# Patient Record
Sex: MALE | Race: OTHER | HISPANIC OR LATINO | URBAN - METROPOLITAN AREA
[De-identification: names, ages, dates, MRNs, and addresses within clinical notes are randomized per-mention and may not be internally consistent; named-entity substitution may affect disease eponyms.]

---

## 2018-04-26 ENCOUNTER — INPATIENT (INPATIENT)
Facility: HOSPITAL | Age: 43
LOS: 5 days | Discharge: HOME | DRG: 854 | End: 2018-05-02
Attending: STUDENT IN AN ORGANIZED HEALTH CARE EDUCATION/TRAINING PROGRAM | Admitting: STUDENT IN AN ORGANIZED HEALTH CARE EDUCATION/TRAINING PROGRAM
Payer: COMMERCIAL

## 2018-04-26 VITALS
RESPIRATION RATE: 18 BRPM | OXYGEN SATURATION: 100 % | DIASTOLIC BLOOD PRESSURE: 98 MMHG | HEART RATE: 99 BPM | SYSTOLIC BLOOD PRESSURE: 174 MMHG | WEIGHT: 220.46 LBS | TEMPERATURE: 98 F

## 2018-04-26 DIAGNOSIS — E11.621 TYPE 2 DIABETES MELLITUS WITH FOOT ULCER: ICD-10-CM

## 2018-04-26 DIAGNOSIS — R63.8 OTHER SYMPTOMS AND SIGNS CONCERNING FOOD AND FLUID INTAKE: ICD-10-CM

## 2018-04-26 DIAGNOSIS — Z29.9 ENCOUNTER FOR PROPHYLACTIC MEASURES, UNSPECIFIED: ICD-10-CM

## 2018-04-26 DIAGNOSIS — E11.9 TYPE 2 DIABETES MELLITUS WITHOUT COMPLICATIONS: ICD-10-CM

## 2018-04-26 DIAGNOSIS — Z89.429 ACQUIRED ABSENCE OF OTHER TOE(S), UNSPECIFIED SIDE: Chronic | ICD-10-CM

## 2018-04-26 LAB
ALBUMIN SERPL ELPH-MCNC: 3.9 G/DL — SIGNIFICANT CHANGE UP (ref 3.3–5)
ALP SERPL-CCNC: 118 U/L — SIGNIFICANT CHANGE UP (ref 40–120)
ALT FLD-CCNC: 21 U/L — SIGNIFICANT CHANGE UP (ref 10–45)
ANION GAP SERPL CALC-SCNC: 10 MMOL/L — SIGNIFICANT CHANGE UP (ref 5–17)
APPEARANCE UR: CLEAR — SIGNIFICANT CHANGE UP
APTT BLD: 30.1 SEC — SIGNIFICANT CHANGE UP (ref 27.5–37.4)
AST SERPL-CCNC: 22 U/L — SIGNIFICANT CHANGE UP (ref 10–40)
BASOPHILS NFR BLD AUTO: 0.2 % — SIGNIFICANT CHANGE UP (ref 0–2)
BILIRUB SERPL-MCNC: 0.7 MG/DL — SIGNIFICANT CHANGE UP (ref 0.2–1.2)
BILIRUB UR-MCNC: NEGATIVE — SIGNIFICANT CHANGE UP
BUN SERPL-MCNC: 16 MG/DL — SIGNIFICANT CHANGE UP (ref 7–23)
CALCIUM SERPL-MCNC: 9.4 MG/DL — SIGNIFICANT CHANGE UP (ref 8.4–10.5)
CHLORIDE SERPL-SCNC: 93 MMOL/L — LOW (ref 96–108)
CO2 SERPL-SCNC: 27 MMOL/L — SIGNIFICANT CHANGE UP (ref 22–31)
COLOR SPEC: YELLOW — SIGNIFICANT CHANGE UP
CREAT SERPL-MCNC: 0.84 MG/DL — SIGNIFICANT CHANGE UP (ref 0.5–1.3)
CRP SERPL-MCNC: 10.53 MG/DL — HIGH (ref 0–0.4)
DIFF PNL FLD: NEGATIVE — SIGNIFICANT CHANGE UP
EOSINOPHIL NFR BLD AUTO: 1.2 % — SIGNIFICANT CHANGE UP (ref 0–6)
ERYTHROCYTE [SEDIMENTATION RATE] IN BLOOD: 34 MM/HR — HIGH
GLUCOSE BLDC GLUCOMTR-MCNC: 120 MG/DL — HIGH (ref 70–99)
GLUCOSE BLDC GLUCOMTR-MCNC: 337 MG/DL — HIGH (ref 70–99)
GLUCOSE SERPL-MCNC: 348 MG/DL — HIGH (ref 70–99)
GLUCOSE UR QL: >=1000
HCT VFR BLD CALC: 39.5 % — SIGNIFICANT CHANGE UP (ref 39–50)
HGB BLD-MCNC: 13.7 G/DL — SIGNIFICANT CHANGE UP (ref 13–17)
INR BLD: 0.91 — SIGNIFICANT CHANGE UP (ref 0.88–1.16)
INR BLD: 0.96 — SIGNIFICANT CHANGE UP (ref 0.88–1.16)
KETONES UR-MCNC: NEGATIVE — SIGNIFICANT CHANGE UP
LACTATE SERPL-SCNC: 1.6 MMOL/L — SIGNIFICANT CHANGE UP (ref 0.5–2)
LEUKOCYTE ESTERASE UR-ACNC: NEGATIVE — SIGNIFICANT CHANGE UP
LYMPHOCYTES # BLD AUTO: 17.7 % — SIGNIFICANT CHANGE UP (ref 13–44)
MCHC RBC-ENTMCNC: 29 PG — SIGNIFICANT CHANGE UP (ref 27–34)
MCHC RBC-ENTMCNC: 34.7 G/DL — SIGNIFICANT CHANGE UP (ref 32–36)
MCV RBC AUTO: 83.7 FL — SIGNIFICANT CHANGE UP (ref 80–100)
MONOCYTES NFR BLD AUTO: 7.4 % — SIGNIFICANT CHANGE UP (ref 2–14)
NEUTROPHILS NFR BLD AUTO: 73.5 % — SIGNIFICANT CHANGE UP (ref 43–77)
NITRITE UR-MCNC: NEGATIVE — SIGNIFICANT CHANGE UP
PH UR: 6 — SIGNIFICANT CHANGE UP (ref 5–8)
PLATELET # BLD AUTO: 263 K/UL — SIGNIFICANT CHANGE UP (ref 150–400)
POTASSIUM SERPL-MCNC: 4.5 MMOL/L — SIGNIFICANT CHANGE UP (ref 3.5–5.3)
POTASSIUM SERPL-SCNC: 4.5 MMOL/L — SIGNIFICANT CHANGE UP (ref 3.5–5.3)
PROT SERPL-MCNC: 8.7 G/DL — HIGH (ref 6–8.3)
PROT UR-MCNC: 30 MG/DL
PROTHROM AB SERPL-ACNC: 10.1 SEC — SIGNIFICANT CHANGE UP (ref 9.8–12.7)
PROTHROM AB SERPL-ACNC: 10.6 SEC — SIGNIFICANT CHANGE UP (ref 9.8–12.7)
RBC # BLD: 4.72 M/UL — SIGNIFICANT CHANGE UP (ref 4.2–5.8)
RBC # FLD: 13.2 % — SIGNIFICANT CHANGE UP (ref 10.3–16.9)
SODIUM SERPL-SCNC: 130 MMOL/L — LOW (ref 135–145)
SP GR SPEC: 1.02 — SIGNIFICANT CHANGE UP (ref 1–1.03)
UROBILINOGEN FLD QL: 0.2 E.U./DL — SIGNIFICANT CHANGE UP
WBC # BLD: 12.3 K/UL — HIGH (ref 3.8–10.5)
WBC # FLD AUTO: 12.3 K/UL — HIGH (ref 3.8–10.5)

## 2018-04-26 PROCEDURE — 73630 X-RAY EXAM OF FOOT: CPT | Mod: 26,RT

## 2018-04-26 PROCEDURE — 99285 EMERGENCY DEPT VISIT HI MDM: CPT | Mod: 25

## 2018-04-26 PROCEDURE — 99223 1ST HOSP IP/OBS HIGH 75: CPT | Mod: GC

## 2018-04-26 PROCEDURE — 99231 SBSQ HOSP IP/OBS SF/LOW 25: CPT

## 2018-04-26 PROCEDURE — 71045 X-RAY EXAM CHEST 1 VIEW: CPT | Mod: 26

## 2018-04-26 RX ORDER — INSULIN LISPRO 100/ML
VIAL (ML) SUBCUTANEOUS
Refills: 0 | Status: DISCONTINUED | OUTPATIENT
Start: 2018-04-26 | End: 2018-05-02

## 2018-04-26 RX ORDER — VANCOMYCIN HCL 1 G
1000 VIAL (EA) INTRAVENOUS ONCE
Refills: 0 | Status: DISCONTINUED | OUTPATIENT
Start: 2018-04-26 | End: 2018-04-26

## 2018-04-26 RX ORDER — DEXTROSE 50 % IN WATER 50 %
25 SYRINGE (ML) INTRAVENOUS ONCE
Refills: 0 | Status: DISCONTINUED | OUTPATIENT
Start: 2018-04-26 | End: 2018-05-02

## 2018-04-26 RX ORDER — IBUPROFEN 200 MG
600 TABLET ORAL EVERY 8 HOURS
Refills: 0 | Status: DISCONTINUED | OUTPATIENT
Start: 2018-04-26 | End: 2018-04-27

## 2018-04-26 RX ORDER — PIPERACILLIN AND TAZOBACTAM 4; .5 G/20ML; G/20ML
4.5 INJECTION, POWDER, LYOPHILIZED, FOR SOLUTION INTRAVENOUS EVERY 6 HOURS
Refills: 0 | Status: DISCONTINUED | OUTPATIENT
Start: 2018-04-26 | End: 2018-04-29

## 2018-04-26 RX ORDER — PIPERACILLIN AND TAZOBACTAM 4; .5 G/20ML; G/20ML
3.38 INJECTION, POWDER, LYOPHILIZED, FOR SOLUTION INTRAVENOUS ONCE
Refills: 0 | Status: COMPLETED | OUTPATIENT
Start: 2018-04-26 | End: 2018-04-26

## 2018-04-26 RX ORDER — HEPARIN SODIUM 5000 [USP'U]/ML
5000 INJECTION INTRAVENOUS; SUBCUTANEOUS EVERY 8 HOURS
Refills: 0 | Status: DISCONTINUED | OUTPATIENT
Start: 2018-04-26 | End: 2018-05-02

## 2018-04-26 RX ORDER — INSULIN GLARGINE 100 [IU]/ML
10 INJECTION, SOLUTION SUBCUTANEOUS AT BEDTIME
Refills: 0 | Status: DISCONTINUED | OUTPATIENT
Start: 2018-04-26 | End: 2018-04-27

## 2018-04-26 RX ORDER — VANCOMYCIN HCL 1 G
1500 VIAL (EA) INTRAVENOUS EVERY 12 HOURS
Refills: 0 | Status: DISCONTINUED | OUTPATIENT
Start: 2018-04-26 | End: 2018-05-01

## 2018-04-26 RX ORDER — INSULIN HUMAN 100 [IU]/ML
8 INJECTION, SOLUTION SUBCUTANEOUS ONCE
Refills: 0 | Status: COMPLETED | OUTPATIENT
Start: 2018-04-26 | End: 2018-04-26

## 2018-04-26 RX ORDER — PIPERACILLIN AND TAZOBACTAM 4; .5 G/20ML; G/20ML
4.5 INJECTION, POWDER, LYOPHILIZED, FOR SOLUTION INTRAVENOUS EVERY 8 HOURS
Refills: 0 | Status: DISCONTINUED | OUTPATIENT
Start: 2018-04-26 | End: 2018-04-26

## 2018-04-26 RX ORDER — DEXTROSE 50 % IN WATER 50 %
12.5 SYRINGE (ML) INTRAVENOUS ONCE
Refills: 0 | Status: DISCONTINUED | OUTPATIENT
Start: 2018-04-26 | End: 2018-05-02

## 2018-04-26 RX ORDER — GLUCAGON INJECTION, SOLUTION 0.5 MG/.1ML
1 INJECTION, SOLUTION SUBCUTANEOUS ONCE
Refills: 0 | Status: DISCONTINUED | OUTPATIENT
Start: 2018-04-26 | End: 2018-05-02

## 2018-04-26 RX ORDER — ACETAMINOPHEN 500 MG
650 TABLET ORAL EVERY 6 HOURS
Refills: 0 | Status: DISCONTINUED | OUTPATIENT
Start: 2018-04-26 | End: 2018-05-02

## 2018-04-26 RX ORDER — INSULIN LISPRO 100/ML
6 VIAL (ML) SUBCUTANEOUS ONCE
Refills: 0 | Status: COMPLETED | OUTPATIENT
Start: 2018-04-26 | End: 2018-04-26

## 2018-04-26 RX ORDER — VANCOMYCIN HCL 1 G
1500 VIAL (EA) INTRAVENOUS ONCE
Refills: 0 | Status: COMPLETED | OUTPATIENT
Start: 2018-04-26 | End: 2018-04-26

## 2018-04-26 RX ORDER — LANOLIN ALCOHOL/MO/W.PET/CERES
5 CREAM (GRAM) TOPICAL AT BEDTIME
Refills: 0 | Status: DISCONTINUED | OUTPATIENT
Start: 2018-04-26 | End: 2018-05-02

## 2018-04-26 RX ORDER — ATORVASTATIN CALCIUM 80 MG/1
40 TABLET, FILM COATED ORAL AT BEDTIME
Refills: 0 | Status: DISCONTINUED | OUTPATIENT
Start: 2018-04-26 | End: 2018-05-02

## 2018-04-26 RX ORDER — SODIUM CHLORIDE 9 MG/ML
2000 INJECTION INTRAMUSCULAR; INTRAVENOUS; SUBCUTANEOUS ONCE
Refills: 0 | Status: COMPLETED | OUTPATIENT
Start: 2018-04-26 | End: 2018-04-26

## 2018-04-26 RX ORDER — DEXTROSE 50 % IN WATER 50 %
1 SYRINGE (ML) INTRAVENOUS ONCE
Refills: 0 | Status: DISCONTINUED | OUTPATIENT
Start: 2018-04-26 | End: 2018-05-02

## 2018-04-26 RX ORDER — SODIUM CHLORIDE 9 MG/ML
1000 INJECTION, SOLUTION INTRAVENOUS
Refills: 0 | Status: DISCONTINUED | OUTPATIENT
Start: 2018-04-26 | End: 2018-05-02

## 2018-04-26 RX ORDER — SODIUM CHLORIDE 9 MG/ML
1000 INJECTION INTRAMUSCULAR; INTRAVENOUS; SUBCUTANEOUS
Refills: 0 | Status: DISCONTINUED | OUTPATIENT
Start: 2018-04-26 | End: 2018-04-28

## 2018-04-26 RX ORDER — AMPICILLIN SODIUM AND SULBACTAM SODIUM 250; 125 MG/ML; MG/ML
3 INJECTION, POWDER, FOR SUSPENSION INTRAMUSCULAR; INTRAVENOUS ONCE
Refills: 0 | Status: DISCONTINUED | OUTPATIENT
Start: 2018-04-26 | End: 2018-04-26

## 2018-04-26 RX ADMIN — Medication 600 MILLIGRAM(S): at 19:01

## 2018-04-26 RX ADMIN — SODIUM CHLORIDE 75 MILLILITER(S): 9 INJECTION INTRAMUSCULAR; INTRAVENOUS; SUBCUTANEOUS at 13:08

## 2018-04-26 RX ADMIN — PIPERACILLIN AND TAZOBACTAM 200 GRAM(S): 4; .5 INJECTION, POWDER, LYOPHILIZED, FOR SOLUTION INTRAVENOUS at 13:15

## 2018-04-26 RX ADMIN — INSULIN HUMAN 8 UNIT(S): 100 INJECTION, SOLUTION SUBCUTANEOUS at 10:09

## 2018-04-26 RX ADMIN — HEPARIN SODIUM 5000 UNIT(S): 5000 INJECTION INTRAVENOUS; SUBCUTANEOUS at 22:00

## 2018-04-26 RX ADMIN — Medication 8: at 21:53

## 2018-04-26 RX ADMIN — Medication 600 MILLIGRAM(S): at 20:01

## 2018-04-26 RX ADMIN — INSULIN GLARGINE 10 UNIT(S): 100 INJECTION, SOLUTION SUBCUTANEOUS at 22:17

## 2018-04-26 RX ADMIN — ATORVASTATIN CALCIUM 40 MILLIGRAM(S): 80 TABLET, FILM COATED ORAL at 22:16

## 2018-04-26 RX ADMIN — Medication 300 MILLIGRAM(S): at 10:52

## 2018-04-26 RX ADMIN — PIPERACILLIN AND TAZOBACTAM 200 GRAM(S): 4; .5 INJECTION, POWDER, LYOPHILIZED, FOR SOLUTION INTRAVENOUS at 18:29

## 2018-04-26 RX ADMIN — HEPARIN SODIUM 5000 UNIT(S): 5000 INJECTION INTRAVENOUS; SUBCUTANEOUS at 15:42

## 2018-04-26 RX ADMIN — Medication 300 MILLIGRAM(S): at 22:18

## 2018-04-26 RX ADMIN — SODIUM CHLORIDE 1000 MILLILITER(S): 9 INJECTION INTRAMUSCULAR; INTRAVENOUS; SUBCUTANEOUS at 07:54

## 2018-04-26 RX ADMIN — PIPERACILLIN AND TAZOBACTAM 200 GRAM(S): 4; .5 INJECTION, POWDER, LYOPHILIZED, FOR SOLUTION INTRAVENOUS at 07:51

## 2018-04-26 RX ADMIN — Medication 5 MILLIGRAM(S): at 22:18

## 2018-04-26 RX ADMIN — Medication 6 UNIT(S): at 14:19

## 2018-04-26 NOTE — CONSULT NOTE ADULT - SUBJECTIVE AND OBJECTIVE BOX
Vascular Attending:  Ifeanyi      HPI:  42YM w/ pmhx of DM2 diagnosed in 20s, uncontrolled (never checked FS) since 20s as well, on insulin at home (no lantus, no set regimen) w/ amputations of right toes ,  presents after bumping leg on  found to have popped blister and worsening pain/ulceration since.  AM felt feverish and chills and came to ED. Took one pill amox at home. No other sx including SOB, urinary changes, n/v. Only takes insulin when 'feels' like sugar is high. Has refused to follow up with any doctors and does not know a1c. Refills meds by visiting [place] ED. Has some peripheral tingles and numbness in toes and fingertips that he barely notices and does not interfere with life. Works in maintenance.     In ED, VSS with mild tachycardia to 100. Afebrile on admission. Given 2L NS and lispro 8 for glucose of 350. Evaluated by podiatry (superficial debridement with culture) and vascular (no intervention, good flow). Given vanc/zosyn x1.     Reports requiring PICC line and 6wks abx after one of his toe amputations. (2018 12:20)    Vascular Surgical Addendum: Pt seen and examined at bedside, as stated above, felt fine 5 days pta, 4day pta bumped foot and felt fevers/chill, 2 days prior notice blister, popped it and had seropurulent drainage, 1 day pta redeveloped f/c.  Today denies F/C/CP/SOB/N/V/D/Dysuria.      PAST MEDICAL & SURGICAL HISTORY:  Diabetes  Toe amputation status        MEDICATIONS  (STANDING):  atorvastatin 40 milliGRAM(s) Oral at bedtime  dextrose 5%. 1000 milliLiter(s) (50 mL/Hr) IV Continuous <Continuous>  dextrose 50% Injectable 12.5 Gram(s) IV Push once  dextrose 50% Injectable 25 Gram(s) IV Push once  dextrose 50% Injectable 25 Gram(s) IV Push once  heparin  Injectable 5000 Unit(s) SubCutaneous every 8 hours  insulin lispro (HumaLOG) corrective regimen sliding scale   SubCutaneous Before meals and at bedtime  piperacillin/tazobactam IVPB. 4.5 Gram(s) IV Intermittent every 6 hours  sodium chloride 0.9%. 1000 milliLiter(s) (75 mL/Hr) IV Continuous <Continuous>  vancomycin  IVPB 1500 milliGRAM(s) IV Intermittent every 12 hours    MEDICATIONS  (PRN):  acetaminophen   Tablet. 650 milliGRAM(s) Oral every 6 hours PRN Moderate Pain (4 - 6)  dextrose Gel 1 Dose(s) Oral once PRN Blood Glucose LESS THAN 70 milliGRAM(s)/deciliter  glucagon  Injectable 1 milliGRAM(s) IntraMuscular once PRN Glucose LESS THAN 70 milligrams/deciliter        Vital Signs Last 24 Hrs  T(C): 36.9 (2018 13:01), Max: 37.6 (2018 06:12)  T(F): 98.5 (2018 13:), Max: 99.6 (2018 06:12)  HR: 85 (2018 13:) (85 - 100)  BP: 116/78 (2018 13:) (116/78 - 174/98)  BP(mean): --  RR: 18 (2018 13:) (17 - 18)  SpO2: 98% (2018 13:01) (98% - 100%)    PHYSICAL EXAM:  General: Age appropriate male in NAD  CV: RRR  Resp: No increased work of breathing  Abd: S/NT/ND  Ext: Right foot s/p 4th and 5th toes amputation. Lateral ulcer extending from lateral midfoot to plantar 3rd toe with mixed necrotic/infected soft tissue, + malodor, + mixed sero purulent drainage, left superficial hallux ulcer, no purulence or probing.   Vasc: 2+ b/l fem/pop/DP/PT        LABS:                        13.7   12.3  )-----------( 263      ( 2018 06:10 )             39.5     04-26    130<L>  |  93<L>  |  16  ----------------------------<  348<H>  4.5   |  27  |  0.84    Ca    9.4      2018 06:10    TPro  8.7<H>  /  Alb  3.9  /  TBili  0.7  /  DBili  x   /  AST  22  /  ALT  21  /  AlkPhos  118  04-26    PT/INR - ( 2018 06:57 )   PT: 10.6 sec;   INR: 0.96          PTT - ( 2018 06:57 )  PTT:30.1 sec  Urinalysis Basic - ( 2018 08:08 )    Color: Yellow / Appearance: Clear / S.020 / pH: x  Gluc: x / Ketone: NEGATIVE  / Bili: Negative / Urobili: 0.2 E.U./dL   Blood: x / Protein: 30 mg/dL / Nitrite: NEGATIVE   Leuk Esterase: NEGATIVE / RBC: < 5 /HPF / WBC < 5 /HPF   Sq Epi: x / Non Sq Epi: 0-5 /HPF / Bacteria: Present /HPF        RADIOLOGY & ADDITIONAL STUDIES  pending XR

## 2018-04-26 NOTE — H&P ADULT - HISTORY OF PRESENT ILLNESS
42YM w/ pmhx of DM2 diagnosed in 20s, uncontrolled (never checked FS) since 20s as well, on insulin at home (no lantus, no set regimen) w/ amputations of right toes 2015, 2017 presents after bumping leg on Sunday found to have popped blister and worsening pain/ulceration since. 4/26 AM felt feverish and chills and came to ED. Took one pill amox at home. No other sx including SOB, urinary changes, n/v. Only takes insulin when 'feels' like sugar is high. Has refused to follow up with any doctors and does not know a1c. Refills meds by visiting [place] ED. Has some peripheral tingles and numbness in toes and fingertips that he barely notices and does not interfere with life. Works in maintenance.     In ED, VSS with mild tachycardia to 100. Afebrile on admission. Given 2L NS and lispro 8 for glucose of 350. Evaluated by podiatry (superficial debridement with culture) and vascular (no intervention, good flow). Given vanc/zosyn x1.     Reports requiring PICC line and 6wks abx after one of his toe amputations.

## 2018-04-26 NOTE — H&P ADULT - PROBLEM SELECTOR PLAN 2
- f/u hba1c  - ISS for now   - dose lantus depending on coverage   - endo consult, needs to establish care   - diabetes education  - nutrition consult

## 2018-04-26 NOTE — H&P ADULT - ATTENDING COMMENTS
Pt seen and examined by me at bedside in [facility]. Agree with housestaff's exam/a/p as noted above,   VSS exam as above with addition,   +2 R DP pulse  + R large lateral foot ulcer, with serous drainage.   labs reviewed   CXR reviewed     a/p:  1. R diabetic foot ulcer: appreciate podiatry recs, pending MRI, agree with vanco/zosyn for now, check EKG in AM  2. hyperglycemia 2/2 Uncontrolled DMII: start lantus 10units at bedtime, c/w ISS; check hgba1c  3. Hyponatremia 2/2 hyperglycemia    Agree with rest of a/p as above.

## 2018-04-26 NOTE — ED ADULT TRIAGE NOTE - CHIEF COMPLAINT QUOTE
I am an insulin dependent diabetic with a history of right toe amputation.  I noticed a blister on my right foot yesterday and it burst with reddish foul smelling fluid.  I want to make sure I am not developing an infection.  I have had pain, chills lately.

## 2018-04-26 NOTE — H&P ADULT - PROBLEM SELECTOR PLAN 1
Eval by podiatry. Apparent low clinical suspicion for osteo but in for MRI. WBC of 12.3, ESR of 34 and crp of 10.   - f/u MRI foot   - c/w vanc/zosyn   - f/u Bcx and tissue Cx  - IVF maintenance

## 2018-04-26 NOTE — H&P ADULT - NSHPPHYSICALEXAM_GEN_ALL_CORE
.  VITAL SIGNS:  T(C): 37.2 (04-26-18 @ 10:10), Max: 37.6 (04-26-18 @ 06:12)  T(F): 98.9 (04-26-18 @ 10:10), Max: 99.6 (04-26-18 @ 06:12)  HR: 94 (04-26-18 @ 10:10) (94 - 100)  BP: 131/87 (04-26-18 @ 10:10) (131/87 - 174/98)  BP(mean): --  RR: 18 (04-26-18 @ 10:10) (17 - 18)  SpO2: 98% (04-26-18 @ 10:10) (98% - 100%)  Wt(kg): --    PHYSICAL EXAM:    Constitutional: WDWN resting comfortably in bed; NAD  Head: NC/AT  Eyes: PERRL, EOMI, anicteric sclera  ENT: no nasal discharge; uvula midline, no oropharyngeal erythema or exudates; MMM  Neck: supple; no JVD or thyromegaly  Respiratory: CTA B/L; no W/R/R,   Cardiac: +S1/S2; RRR; no M/R/G;   Gastrointestinal: soft, NT/ND; no rebound or guarding; +BSx4  Extremities: WWP, no clubbing or cyanosis; no peripheral edema, R foot wrapped by podiatry. Some superficial old scars/ulceration on L foot toes. No open wounds.    Musculoskeletal: NROM x4; no joint swelling, tenderness or erythema  Vascular: 2+ radial, femoral, DP/PT pulses B/L  Dermatologic: skin warm, dry and intact; no rashes, wounds, or scars  Lymphatic: no submandibular or cervical LAD  Neurologic: AAOx3; CNII-XII grossly intact; no focal deficits

## 2018-04-26 NOTE — CONSULT NOTE ADULT - SUBJECTIVE AND OBJECTIVE BOX
Attending: Norma     HPI:  42M w/ pmh of DM for 18 years, on insulin,  prior Right foot 5th and 4th partial ray amputations last one in october 2017 at [facility]. Was on IV Abx via picc for 6 week. Pt reports his amputation site healed well. However last week sunday he felt a little feverish and wednesday he noted a blister by the Right foot which he popped and oozed foul smelling mixed pus and blood. Reported chills and general malaise overnight. Took an augmentin this AM however felt ill so presented to ED.  Denies any  nausea vomiting fever chills SOB at this time.   In terms of sugar control pt reports he rarely checks his sugar but doses his insulin based off of how he feels. Does not know A1c.  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      Review of systems negative except per HPI    PAST MEDICAL & SURGICAL HISTORY:  Diabetes  No significant past surgical history    Home Medications:    Allergies    No Known Allergies    Intolerances      FAMILY HISTORY:    Social History:   denies etoh or tobacco     LABS                        13.7   12.3  )-----------( 263      ( 26 Apr 2018 06:10 )             39.5     04-26    130<L>  |  93<L>  |  16  ----------------------------<  348<H>  4.5   |  27  |  0.84    Ca    9.4      26 Apr 2018 06:10    TPro  8.7<H>  /  Alb  3.9  /  TBili  0.7  /  DBili  x   /  AST  22  /  ALT  21  /  AlkPhos  118  04-26    PT/INR - ( 26 Apr 2018 06:57 )   PT: 10.6 sec;   INR: 0.96          PTT - ( 26 Apr 2018 06:57 )  PTT:30.1 sec  ESR: 34  CRP: --  04-26 @ 06:10  C-Reactive Protein, Serum (04.26.18 @ 06:10)    C-Reactive Protein, Serum: 10.53 mg/dL        Vital Signs Last 24 Hrs  T(C): 37.2 (26 Apr 2018 10:10), Max: 37.6 (26 Apr 2018 06:12)  T(F): 98.9 (26 Apr 2018 10:10), Max: 99.6 (26 Apr 2018 06:12)  HR: 94 (26 Apr 2018 10:10) (94 - 100)  BP: 131/87 (26 Apr 2018 10:10) (131/87 - 174/98)  BP(mean): --  RR: 18 (26 Apr 2018 10:10) (17 - 18)  SpO2: 98% (26 Apr 2018 10:10) (98% - 100%)    PHYSICAL EXAM  General: NAD, AA0x3  no resp distress   Lower Extremity Focused:  Vasc: DP 2/4 BL PT 1/4 on right 2/4 on left. cap fill time <3sec.  no  LE edema, extremities warm well perfused   Derm: Right foot s/p partial 4th ray and 5th ray amputation. Plantar lateral ulcer extending  from lateral midfoot to plantar 3rd toe with mixed necrotic/infected soft tissue, + malodor, + mixed sero purulent drainage, no sinus tracts, no probe to bone, no tendon or fascia exposed.   left superficial hallux ulcer, no purulence or probing.   Neuro: no protective sensation   MSK: decreased pedal joint ROM, muscle power 5/5  TA, EHL PT  4/5 peroneals     RADIOLOGY  x-ray - s/p partial 4th and 5th ray amputations, no gas in soft tissue, erosions of 3rd  proximal interphalangeal joint suspicious for OM,  official read pending. Attending: Norma     HPI:  42M w/ pmh of DM for 18 years, on insulin,  prior Right foot 5th and 4th partial ray amputations last one in october 2017 at [facility]. Was on IV Abx via picc for 6 week. Pt reports his amputation site healed well. However last week sunday he felt a little feverish and wednesday he noted a blister by the Right foot which he popped and oozed foul smelling mixed pus and blood. Reported chills and general malaise overnight. Took an augmentin this AM however felt ill so presented to ED.  Denies any  nausea vomiting fever chills SOB at this time.   In terms of sugar control pt reports he rarely checks his sugar but doses his insulin based off of how he feels. Does not know A1c.  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      Review of systems negative except per HPI    PAST MEDICAL & SURGICAL HISTORY:  Diabetes  No significant past surgical history    Home Medications:    Allergies    No Known Allergies    Intolerances      FAMILY HISTORY:    Social History:   denies etoh or tobacco     LABS                        13.7   12.3  )-----------( 263      ( 26 Apr 2018 06:10 )             39.5     04-26    130<L>  |  93<L>  |  16  ----------------------------<  348<H>  4.5   |  27  |  0.84    Ca    9.4      26 Apr 2018 06:10    TPro  8.7<H>  /  Alb  3.9  /  TBili  0.7  /  DBili  x   /  AST  22  /  ALT  21  /  AlkPhos  118  04-26    PT/INR - ( 26 Apr 2018 06:57 )   PT: 10.6 sec;   INR: 0.96          PTT - ( 26 Apr 2018 06:57 )  PTT:30.1 sec  ESR: 34  CRP: --  04-26 @ 06:10  C-Reactive Protein, Serum (04.26.18 @ 06:10)    C-Reactive Protein, Serum: 10.53 mg/dL        Vital Signs Last 24 Hrs  T(C): 37.2 (26 Apr 2018 10:10), Max: 37.6 (26 Apr 2018 06:12)  T(F): 98.9 (26 Apr 2018 10:10), Max: 99.6 (26 Apr 2018 06:12)  HR: 94 (26 Apr 2018 10:10) (94 - 100)  BP: 131/87 (26 Apr 2018 10:10) (131/87 - 174/98)  BP(mean): --  RR: 18 (26 Apr 2018 10:10) (17 - 18)  SpO2: 98% (26 Apr 2018 10:10) (98% - 100%)    PHYSICAL EXAM  General: NAD, AA0x3  no resp distress   Lower Extremity Focused:  Vasc: DP 2/4 BL PT 1/4 on right 2/4 on left. cap fill time <3sec.  no  LE edema, extremities warm well perfused   Derm: Right foot s/p partial 4th ray and 5th ray amputation. Plantar lateral ulcer extending  from lateral midfoot to plantar 3rd toe with mixed necrotic/infected soft tissue, + malodor, + mixed sero purulent drainage, no sinus tracts, no probe to bone, no tendon or fascia exposed.   left superficial hallux ulcer, no purulence or probing.   Neuro: no protective sensation   MSK: decreased pedal joint ROM, muscle power 5/5  TA, EHL PT  4/5 peroneals   Left foot no open lesions or signs of infection     RADIOLOGY  x-ray - s/p partial 4th and 5th ray amputations, no gas in soft tissue, erosions of 3rd  proximal interphalangeal joint suspicious for OM,  official read pending.

## 2018-04-26 NOTE — ED ADULT NURSE NOTE - OBJECTIVE STATEMENT
pt aaox3 w/ c/o blister to right great toe stump/ states the blister drained yesterday "clear, bloody, smelly fluid" yesterday and then began to feel fever, chills and cough x 2 days. pt ambulatory in nad awaiting md serene reyna

## 2018-04-26 NOTE — H&P ADULT - ASSESSMENT
42YM w/ DMII uncontrolled w/ hx of 2 toe amputations presents with f/c and toe wound c/f osteomyelitis

## 2018-04-26 NOTE — ED PROVIDER NOTE - OBJECTIVE STATEMENT
43 y/o male with a PMHx of DM2 (on insulin) hx of 2 previous amputation of the right toes is present with fever, chills and foul smelling discharge from the right foot. Pt states he noticed a blister yesterday that popped with a bloody, yellowish discharge. Pt took one pill of amox that he had. Reports no pain. He denies the following: sob, chest disc 41 y/o male with a PMHx of DM2 (on insulin) hx of 2 previous amputation of the right toes is present with fever, chills and foul smelling discharge from the right foot. Pt states he noticed a blister yesterday that popped with a bloody, yellowish discharge. Pt took one pill of amox that he had. Reports no pain. He denies the following: sob, chest discomfort. Pt reports unknown a1c - does not know sugar level. Take insulin when he "feels" like his sugar is high.

## 2018-04-26 NOTE — ED ADULT NURSE NOTE - CHPI ED SYMPTOMS NEG
no abrasion/no back pain/no fever/no numbness/no stiffness/no tingling/no weakness/no bruising/no difficulty bearing weight

## 2018-04-26 NOTE — H&P ADULT - NSHPLABSRESULTS_GEN_ALL_CORE
.  LABS:                         13.7   12.3  )-----------( 263      ( 2018 06:10 )             39.5         130<L>  |  93<L>  |  16  ----------------------------<  348<H>  4.5   |  27  |  0.84    Ca    9.4      2018 06:10    TPro  8.7<H>  /  Alb  3.9  /  TBili  0.7  /  DBili  x   /  AST  22  /  ALT  21  /  AlkPhos  118      PT/INR - ( 2018 06:57 )   PT: 10.6 sec;   INR: 0.96          PTT - ( 2018 06:57 )  PTT:30.1 sec  Urinalysis Basic - ( 2018 08:08 )    Color: Yellow / Appearance: Clear / S.020 / pH: x  Gluc: x / Ketone: NEGATIVE  / Bili: Negative / Urobili: 0.2 E.U./dL   Blood: x / Protein: 30 mg/dL / Nitrite: NEGATIVE   Leuk Esterase: NEGATIVE / RBC: < 5 /HPF / WBC < 5 /HPF   Sq Epi: x / Non Sq Epi: 0-5 /HPF / Bacteria: Present /HPF            Lactate, Blood: 1.6 mmoL/L ( @ 06:09)    Old metatarsal fracture on xray - not acute issue.

## 2018-04-27 DIAGNOSIS — M86.10 OTHER ACUTE OSTEOMYELITIS, UNSPECIFIED SITE: ICD-10-CM

## 2018-04-27 DIAGNOSIS — E11.40 TYPE 2 DIABETES MELLITUS WITH DIABETIC NEUROPATHY, UNSPECIFIED: ICD-10-CM

## 2018-04-27 DIAGNOSIS — A41.9 SEPSIS, UNSPECIFIED ORGANISM: ICD-10-CM

## 2018-04-27 DIAGNOSIS — E11.65 TYPE 2 DIABETES MELLITUS WITH HYPERGLYCEMIA: ICD-10-CM

## 2018-04-27 DIAGNOSIS — G62.9 POLYNEUROPATHY, UNSPECIFIED: ICD-10-CM

## 2018-04-27 LAB
CHOLEST SERPL-MCNC: 145 MG/DL — SIGNIFICANT CHANGE UP (ref 10–199)
GLUCOSE BLDC GLUCOMTR-MCNC: 233 MG/DL — HIGH (ref 70–99)
GLUCOSE BLDC GLUCOMTR-MCNC: 253 MG/DL — HIGH (ref 70–99)
GLUCOSE BLDC GLUCOMTR-MCNC: 282 MG/DL — HIGH (ref 70–99)
GLUCOSE BLDC GLUCOMTR-MCNC: 291 MG/DL — HIGH (ref 70–99)
GRAM STN FLD: SIGNIFICANT CHANGE UP
HBA1C BLD-MCNC: 12.9 % — HIGH (ref 4–5.6)
HDLC SERPL-MCNC: 45 MG/DL — SIGNIFICANT CHANGE UP (ref 40–125)
INR BLD: 1.02 — SIGNIFICANT CHANGE UP (ref 0.88–1.16)
LIPID PNL WITH DIRECT LDL SERPL: 85 MG/DL — SIGNIFICANT CHANGE UP
PROTHROM AB SERPL-ACNC: 11.3 SEC — SIGNIFICANT CHANGE UP (ref 9.8–12.7)
SPECIMEN SOURCE: SIGNIFICANT CHANGE UP
TOTAL CHOLESTEROL/HDL RATIO MEASUREMENT: 3.2 RATIO — LOW (ref 3.4–9.6)
TRIGL SERPL-MCNC: 76 MG/DL — SIGNIFICANT CHANGE UP (ref 10–149)
VANCOMYCIN TROUGH SERPL-MCNC: 15 UG/ML — SIGNIFICANT CHANGE UP (ref 10–20)

## 2018-04-27 PROCEDURE — 99233 SBSQ HOSP IP/OBS HIGH 50: CPT | Mod: GC

## 2018-04-27 PROCEDURE — 93010 ELECTROCARDIOGRAM REPORT: CPT

## 2018-04-27 RX ORDER — INSULIN GLARGINE 100 [IU]/ML
22 INJECTION, SOLUTION SUBCUTANEOUS AT BEDTIME
Refills: 0 | Status: DISCONTINUED | OUTPATIENT
Start: 2018-04-27 | End: 2018-05-02

## 2018-04-27 RX ORDER — LISINOPRIL 2.5 MG/1
5 TABLET ORAL DAILY
Refills: 0 | Status: DISCONTINUED | OUTPATIENT
Start: 2018-04-27 | End: 2018-04-28

## 2018-04-27 RX ADMIN — ATORVASTATIN CALCIUM 40 MILLIGRAM(S): 80 TABLET, FILM COATED ORAL at 21:54

## 2018-04-27 RX ADMIN — PIPERACILLIN AND TAZOBACTAM 200 GRAM(S): 4; .5 INJECTION, POWDER, LYOPHILIZED, FOR SOLUTION INTRAVENOUS at 18:22

## 2018-04-27 RX ADMIN — HEPARIN SODIUM 5000 UNIT(S): 5000 INJECTION INTRAVENOUS; SUBCUTANEOUS at 21:54

## 2018-04-27 RX ADMIN — SODIUM CHLORIDE 75 MILLILITER(S): 9 INJECTION INTRAMUSCULAR; INTRAVENOUS; SUBCUTANEOUS at 03:40

## 2018-04-27 RX ADMIN — PIPERACILLIN AND TAZOBACTAM 200 GRAM(S): 4; .5 INJECTION, POWDER, LYOPHILIZED, FOR SOLUTION INTRAVENOUS at 00:50

## 2018-04-27 RX ADMIN — Medication 6: at 12:35

## 2018-04-27 RX ADMIN — Medication 6: at 22:38

## 2018-04-27 RX ADMIN — PIPERACILLIN AND TAZOBACTAM 200 GRAM(S): 4; .5 INJECTION, POWDER, LYOPHILIZED, FOR SOLUTION INTRAVENOUS at 05:57

## 2018-04-27 RX ADMIN — INSULIN GLARGINE 22 UNIT(S): 100 INJECTION, SOLUTION SUBCUTANEOUS at 22:39

## 2018-04-27 RX ADMIN — Medication 5 MILLIGRAM(S): at 21:54

## 2018-04-27 RX ADMIN — LISINOPRIL 5 MILLIGRAM(S): 2.5 TABLET ORAL at 12:37

## 2018-04-27 RX ADMIN — PIPERACILLIN AND TAZOBACTAM 200 GRAM(S): 4; .5 INJECTION, POWDER, LYOPHILIZED, FOR SOLUTION INTRAVENOUS at 13:19

## 2018-04-27 RX ADMIN — Medication 300 MILLIGRAM(S): at 10:14

## 2018-04-27 RX ADMIN — Medication 300 MILLIGRAM(S): at 21:56

## 2018-04-27 RX ADMIN — Medication 4: at 08:44

## 2018-04-27 RX ADMIN — Medication 6: at 17:52

## 2018-04-27 RX ADMIN — HEPARIN SODIUM 5000 UNIT(S): 5000 INJECTION INTRAVENOUS; SUBCUTANEOUS at 13:23

## 2018-04-27 RX ADMIN — HEPARIN SODIUM 5000 UNIT(S): 5000 INJECTION INTRAVENOUS; SUBCUTANEOUS at 05:57

## 2018-04-27 NOTE — DIETITIAN INITIAL EVALUATION ADULT. - NS AS NUTRI DX KNOWLEDGE BELIEFS
Food - and nutrition - related knowledge deficit/Self - monitoring deficit/Limited adherence to nutrition - related recommendations/Undesirable food choices

## 2018-04-27 NOTE — PROGRESS NOTE ADULT - SUBJECTIVE AND OBJECTIVE BOX
OVERNIGHT EVENTS: ANDREW    SUBJECTIVE / INTERVAL HPI: Patient seen and examined at bedside. Denies pain, SOB, F/C.    VITAL SIGNS:  Vital Signs Last 24 Hrs  T(C): 36.8 (2018 08:36), Max: 37 (2018 16:01)  T(F): 98.3 (2018 08:36), Max: 98.6 (2018 16:01)  HR: 83 (2018 08:36) (67 - 86)  BP: 126/76 (2018 08:36) (110/68 - 131/83)  BP(mean): --  RR: 17 (2018 08:36) (17 - 18)  SpO2: 99% (2018 08:36) (97% - 99%)    PHYSICAL EXAM:    General: WDWN resting comfortably  HEENT: NC/AT; PERRL, anicteric sclera; MMM  Neck: supple  Cardiovascular: +S1/S2, RRR  Respiratory: CTA B/L; no W/R/R  Gastrointestinal: soft, NT/ND; +BSx4  Extremities: R foot with partial 4th ray and 5th ray amputation. Plantar lateral ulcer extending from lateral midfoot to  3rd toe with mixed necrotic/infected soft tissue, + malodor, no active drainage,  R superficial hallux ulcer, no purulence  Vascular: 2+ radial, DP/PT pulses B/L  Neurological: AAOx3; no focal deficits    MEDICATIONS:  MEDICATIONS  (STANDING):  atorvastatin 40 milliGRAM(s) Oral at bedtime  dextrose 5%. 1000 milliLiter(s) (50 mL/Hr) IV Continuous <Continuous>  dextrose 50% Injectable 12.5 Gram(s) IV Push once  dextrose 50% Injectable 25 Gram(s) IV Push once  dextrose 50% Injectable 25 Gram(s) IV Push once  heparin  Injectable 5000 Unit(s) SubCutaneous every 8 hours  insulin glargine Injectable (LANTUS) 10 Unit(s) SubCutaneous at bedtime  insulin lispro (HumaLOG) corrective regimen sliding scale   SubCutaneous Before meals and at bedtime  lisinopril 5 milliGRAM(s) Oral daily  melatonin 5 milliGRAM(s) Oral at bedtime  piperacillin/tazobactam IVPB. 4.5 Gram(s) IV Intermittent every 6 hours  sodium chloride 0.9%. 1000 milliLiter(s) (75 mL/Hr) IV Continuous <Continuous>  vancomycin  IVPB 1500 milliGRAM(s) IV Intermittent every 12 hours    MEDICATIONS  (PRN):  acetaminophen   Tablet. 650 milliGRAM(s) Oral every 6 hours PRN Moderate Pain (4 - 6)  dextrose Gel 1 Dose(s) Oral once PRN Blood Glucose LESS THAN 70 milliGRAM(s)/deciliter  glucagon  Injectable 1 milliGRAM(s) IntraMuscular once PRN Glucose LESS THAN 70 milligrams/deciliter    ALLERGIES:  Allergies    No Known Allergies    Intolerances    LABS:                        13.7   12.3  )-----------( 263      ( 2018 06:10 )             39.5         130<L>  |  93<L>  |  16  ----------------------------<  348<H>  4.5   |  27  |  0.84    Ca    9.4      2018 06:10    TPro  8.7<H>  /  Alb  3.9  /  TBili  0.7  /  DBili  x   /  AST  22  /  ALT  21  /  AlkPhos  118      PT/INR - ( 2018 07:37 )   PT: 11.3 sec;   INR: 1.02          PTT - ( 2018 06:57 )  PTT:30.1 sec  Urinalysis Basic - ( 2018 08:08 )    Color: Yellow / Appearance: Clear / S.020 / pH: x  Gluc: x / Ketone: NEGATIVE  / Bili: Negative / Urobili: 0.2 E.U./dL   Blood: x / Protein: 30 mg/dL / Nitrite: NEGATIVE   Leuk Esterase: NEGATIVE / RBC: < 5 /HPF / WBC < 5 /HPF   Sq Epi: x / Non Sq Epi: 0-5 /HPF / Bacteria: Present /HPF    CAPILLARY BLOOD GLUCOSE  POCT Blood Glucose.: 291 mg/dL (2018 12:32)    Culture - Other (18 @ 09:22)    Gram Stain:   Numerous Gram positive cocci in pairs, chains and clusters  Numerous White blood cells    Specimen Source: .Other foot    Culture Results:   Numerous Streptococcus agalactiae (Group B)  Susceptibility to follow.  Culture in progress    Hemoglobin A1C, Whole Blood in AM (18 @ 07:37)    Hemoglobin A1C, Whole Blood: 12.9: Method: HPLC, NGSP Level 1 Certified       Reference Range                4.0-5.6%       High risk (prediabetic)        5.7-6.4%       Diabetic, diagnostic             >=6.5%       ADA diabetic treatment goal       <7.0%  The Hemoglobin A1c referenceranges are based on the 2010 recommendations  of  The American Diabetes Association.  Interpretation may vary for children  and  adolescent %    RADIOLOGY & ADDITIONAL TESTS: Reviewed.

## 2018-04-27 NOTE — DIETITIAN INITIAL EVALUATION ADULT. - OTHER INFO
41 y/o male admitted with toe ulcers/osteo and noted very poor DM control.Reportedly was on insulin at home,but not checking FS and admittedly noncompliant with diet."I try".No N/V/D denied pain from foot.Noted amputation and right foot ulcer.

## 2018-04-27 NOTE — PROGRESS NOTE ADULT - PROBLEM SELECTOR PLAN 2
Xray with evidence of chronic osteo, suspect presentation is 2/2 acute on chronic infection. WBC of 12.3, ESR of 34 and crp of 11.  Tissue Cx growing GBS.  - f/u MRI foot   - c/w vanc/zosyn   - f/u Bcx  - IVF maintenance

## 2018-04-27 NOTE — PROGRESS NOTE ADULT - PROBLEM SELECTOR PLAN 3
HbA1C 12.9.  - ISS for now   - dose lantus depending on coverage   - diabetes education  - nutrition consult HbA1C 12.9.  - ISS for now   - dose lantus depending on coverage   - diabetes education  - nutrition consult  -Ace inhibitor started

## 2018-04-27 NOTE — DIETITIAN INITIAL EVALUATION ADULT. - NS AS NUTRI INTERV ED CONTENT
review of CHO/portion sizes/Purpose of the nutrition education/Priority modifications/Nutrition relationship to health/disease/Recommended modifications

## 2018-04-27 NOTE — PROGRESS NOTE ADULT - SUBJECTIVE AND OBJECTIVE BOX
Pt notes that he had a good night. He states that he no longer feels chills or feverish. He denies any foot pain, nausea, vomiting, SOB    ICU Vital Signs Last 24 Hrs  T(C): 36.5 (27 Apr 2018 05:08), Max: 37.2 (26 Apr 2018 10:10)  T(F): 97.7 (27 Apr 2018 05:08), Max: 98.9 (26 Apr 2018 10:10)  HR: 67 (27 Apr 2018 05:08) (67 - 94)  BP: 131/83 (27 Apr 2018 05:08) (110/68 - 131/87)  BP(mean): --  ABP: --  ABP(mean): --  RR: 18 (27 Apr 2018 05:08) (18 - 18)  SpO2: 97% (27 Apr 2018 05:08) (97% - 98%)                          13.7   12.3  )-----------( 263      ( 26 Apr 2018 06:10 )             39.5     04-26    130<L>  |  93<L>  |  16  ----------------------------<  348<H>  4.5   |  27  |  0.84    Ca    9.4      26 Apr 2018 06:10    TPro  8.7<H>  /  Alb  3.9  /  TBili  0.7  /  DBili  x   /  AST  22  /  ALT  21  /  AlkPhos  118  04-26        PHYSICAL EXAM  General: NAD, AA0x3  no resp distress   Lower Extremity Focused:  Vasc: DP 2/4 BL PT 1/4 on right 2/4 on left. cap fill time <3sec.  no  LE edema, extremities warm well perfused   Derm: Right foot s/p partial 4th ray and 5th ray amputation. Plantar lateral ulcer extending  from lateral midfoot to plantar 3rd toe with mixed necrotic/infected soft tissue, + malodor, no active drainage, no sinus tracts, no probe to bone, no tendon or fascia exposed.   left superficial hallux ulcer, no purulence or probing.   Neuro: no protective sensation   MSK: decreased pedal joint ROM, muscle power 5/5  TA, EHL PT  4/5 peroneals   Left foot no open lesions or signs of infection     < from: Xray Foot AP + Lateral + Oblique, Right (04.26.18 @ 08:36) >  EXAM:  XR FOOT-RIGHT MIN 3 VIEWS                          PROCEDURE DATE:  04/26/2018                     INTERPRETATION:  X-rays of the RIGHT FOOT dated 4/26/2018 8:36 AM    Indication: Right foot ulceration.    Comparison studies: None.    3 views of the right foot are submitted. These views demonstrate prior   amputation of the fourth and fifth rays. Destruction of both sides of the   proximal interphalangeal joint of the third toe is noted. Lucency is seen   in the base of the proximal phalanx of the third toe. Degenerative   changes are noted in the great toe. Arterial calcifications are noted.   Soft tissue defect is noted distal to the stump of the fourth metatarsal,   likely representing a skin ulceration.       IMPRESSION:  Large soft tissue defect.   Possible septic arthritis and osteomyelitis at proximal interphalangeal   joint third toe.  Correlation with MRI (already scheduled) is recommended.        "Thank you for the opportunity to participate in the care of this   patient."    MARIA ISABEL SILVA M.D., ATTENDING RADIOLOGIST  This document has been electronically signed. Apr 26 2018  4:42PM    < end of copied text >        42M w/DM2 with infected R foot ulcer     - continue IV abx  - f/u culture  - f/u MRI  -  if + for osteomyelitis will likely need transmetatarsal amputation, if negative will go for debridement of necrotic tissue   - Wet to Dry dressing applied  - pre-op workup, type and screen x 2, CXR, EKG UA, coags, medical clearance  -dispensed surgical shoe. WBAT in surgical shoe  -discussed with medical team  - Will continue to follow

## 2018-04-28 LAB
-  AMPICILLIN: SIGNIFICANT CHANGE UP
-  CLINDAMYCIN: SIGNIFICANT CHANGE UP
-  ERYTHROMYCIN: SIGNIFICANT CHANGE UP
-  PENICILLIN: SIGNIFICANT CHANGE UP
-  VANCOMYCIN: SIGNIFICANT CHANGE UP
ANION GAP SERPL CALC-SCNC: 9 MMOL/L — SIGNIFICANT CHANGE UP (ref 5–17)
APTT BLD: 30.5 SEC — SIGNIFICANT CHANGE UP (ref 27.5–37.4)
BUN SERPL-MCNC: 8 MG/DL — SIGNIFICANT CHANGE UP (ref 7–23)
CALCIUM SERPL-MCNC: 9.2 MG/DL — SIGNIFICANT CHANGE UP (ref 8.4–10.5)
CHLORIDE SERPL-SCNC: 100 MMOL/L — SIGNIFICANT CHANGE UP (ref 96–108)
CO2 SERPL-SCNC: 27 MMOL/L — SIGNIFICANT CHANGE UP (ref 22–31)
CREAT SERPL-MCNC: 0.9 MG/DL — SIGNIFICANT CHANGE UP (ref 0.5–1.3)
GLUCOSE BLDC GLUCOMTR-MCNC: 176 MG/DL — HIGH (ref 70–99)
GLUCOSE BLDC GLUCOMTR-MCNC: 204 MG/DL — HIGH (ref 70–99)
GLUCOSE BLDC GLUCOMTR-MCNC: 246 MG/DL — HIGH (ref 70–99)
GLUCOSE BLDC GLUCOMTR-MCNC: 75 MG/DL — SIGNIFICANT CHANGE UP (ref 70–99)
GLUCOSE SERPL-MCNC: 210 MG/DL — HIGH (ref 70–99)
HCT VFR BLD CALC: 38 % — LOW (ref 39–50)
HGB BLD-MCNC: 12.6 G/DL — LOW (ref 13–17)
INR BLD: 1.01 — SIGNIFICANT CHANGE UP (ref 0.88–1.16)
MAGNESIUM SERPL-MCNC: 1.7 MG/DL — SIGNIFICANT CHANGE UP (ref 1.6–2.6)
MCHC RBC-ENTMCNC: 28.4 PG — SIGNIFICANT CHANGE UP (ref 27–34)
MCHC RBC-ENTMCNC: 33.2 G/DL — SIGNIFICANT CHANGE UP (ref 32–36)
MCV RBC AUTO: 85.6 FL — SIGNIFICANT CHANGE UP (ref 80–100)
METHOD TYPE: SIGNIFICANT CHANGE UP
PLATELET # BLD AUTO: 333 K/UL — SIGNIFICANT CHANGE UP (ref 150–400)
POTASSIUM SERPL-MCNC: 4.4 MMOL/L — SIGNIFICANT CHANGE UP (ref 3.5–5.3)
POTASSIUM SERPL-SCNC: 4.4 MMOL/L — SIGNIFICANT CHANGE UP (ref 3.5–5.3)
PROTHROM AB SERPL-ACNC: 11.2 SEC — SIGNIFICANT CHANGE UP (ref 9.8–12.7)
RBC # BLD: 4.44 M/UL — SIGNIFICANT CHANGE UP (ref 4.2–5.8)
RBC # FLD: 13.3 % — SIGNIFICANT CHANGE UP (ref 10.3–16.9)
SODIUM SERPL-SCNC: 136 MMOL/L — SIGNIFICANT CHANGE UP (ref 135–145)
WBC # BLD: 7.9 K/UL — SIGNIFICANT CHANGE UP (ref 3.8–10.5)
WBC # FLD AUTO: 7.9 K/UL — SIGNIFICANT CHANGE UP (ref 3.8–10.5)

## 2018-04-28 PROCEDURE — 73719 MRI LOWER EXTREMITY W/DYE: CPT | Mod: 26,RT

## 2018-04-28 PROCEDURE — 99233 SBSQ HOSP IP/OBS HIGH 50: CPT | Mod: GC

## 2018-04-28 RX ORDER — LISINOPRIL 2.5 MG/1
5 TABLET ORAL DAILY
Refills: 0 | Status: DISCONTINUED | OUTPATIENT
Start: 2018-04-29 | End: 2018-04-30

## 2018-04-28 RX ADMIN — Medication 300 MILLIGRAM(S): at 12:53

## 2018-04-28 RX ADMIN — Medication 5 MILLIGRAM(S): at 21:47

## 2018-04-28 RX ADMIN — Medication 4: at 12:52

## 2018-04-28 RX ADMIN — INSULIN GLARGINE 22 UNIT(S): 100 INJECTION, SOLUTION SUBCUTANEOUS at 21:48

## 2018-04-28 RX ADMIN — HEPARIN SODIUM 5000 UNIT(S): 5000 INJECTION INTRAVENOUS; SUBCUTANEOUS at 05:35

## 2018-04-28 RX ADMIN — Medication 300 MILLIGRAM(S): at 21:48

## 2018-04-28 RX ADMIN — Medication 4: at 21:46

## 2018-04-28 RX ADMIN — Medication 2: at 17:54

## 2018-04-28 RX ADMIN — PIPERACILLIN AND TAZOBACTAM 200 GRAM(S): 4; .5 INJECTION, POWDER, LYOPHILIZED, FOR SOLUTION INTRAVENOUS at 01:04

## 2018-04-28 RX ADMIN — HEPARIN SODIUM 5000 UNIT(S): 5000 INJECTION INTRAVENOUS; SUBCUTANEOUS at 21:47

## 2018-04-28 RX ADMIN — HEPARIN SODIUM 5000 UNIT(S): 5000 INJECTION INTRAVENOUS; SUBCUTANEOUS at 14:36

## 2018-04-28 RX ADMIN — ATORVASTATIN CALCIUM 40 MILLIGRAM(S): 80 TABLET, FILM COATED ORAL at 21:47

## 2018-04-28 RX ADMIN — PIPERACILLIN AND TAZOBACTAM 200 GRAM(S): 4; .5 INJECTION, POWDER, LYOPHILIZED, FOR SOLUTION INTRAVENOUS at 14:17

## 2018-04-28 RX ADMIN — PIPERACILLIN AND TAZOBACTAM 200 GRAM(S): 4; .5 INJECTION, POWDER, LYOPHILIZED, FOR SOLUTION INTRAVENOUS at 20:59

## 2018-04-28 RX ADMIN — PIPERACILLIN AND TAZOBACTAM 200 GRAM(S): 4; .5 INJECTION, POWDER, LYOPHILIZED, FOR SOLUTION INTRAVENOUS at 06:59

## 2018-04-28 RX ADMIN — LISINOPRIL 5 MILLIGRAM(S): 2.5 TABLET ORAL at 05:35

## 2018-04-28 NOTE — PROGRESS NOTE ADULT - SUBJECTIVE AND OBJECTIVE BOX
Patient is a 42y old  Male who presents with a chief complaint of foot pain (26 Apr 2018 12:20)      INTERVAL HPI/ OVERNIGHT EVENTS  Pt seen and examined at bedside. Pt lying comfortably in bed at time of visit. Pt denies any overnight events, pain, n/v/f/c. Pt has no complaints at this time.       LABS      PT/INR - ( 28 Apr 2018 08:10 )   PT: 11.2 sec;   INR: 1.01          PTT - ( 28 Apr 2018 08:10 )  PTT:30.5 sec    ICU Vital Signs Last 24 Hrs  T(C): 37 (28 Apr 2018 09:51), Max: 37.1 (27 Apr 2018 15:47)  T(F): 98.6 (28 Apr 2018 09:51), Max: 98.8 (27 Apr 2018 15:47)  HR: 72 (28 Apr 2018 09:51) (72 - 87)  BP: 110/68 (28 Apr 2018 09:51) (110/68 - 150/82)  BP(mean): --  ABP: --  ABP(mean): --  RR: 19 (28 Apr 2018 09:51) (16 - 19)  SpO2: 97% (28 Apr 2018 09:51) (96% - 99%)      RADIOLOGY    MICROBIOLOGY  Culture - Other (04.26.18 @ 09:22)    Gram Stain:   Numerous Gram positive cocci in pairs, chains and clusters  Numerous White blood cells    Specimen Source: .Other foot    Culture Results:   Numerous Streptococcus agalactiae (Group B)  Susceptibility to follow.  Culture in progress    PHYSICAL EXAM  Right Lower Extremity Focused  Vasc: DP/ PT 2+, CFT < 3 secs x 3, TG: wnl, no edema appreciated  Derm: Simpson grade 0 ulcer of the plantar distal hallux, no drainage, no PTB, no malodor, no clinical signs of infection with overlying hyperkeratotic tissue. Simpson grade 1 ulcer of the plantar lateral aspect of previous amputation site with fibrotic base, no undermining, no tracking, no malodor, no PTB, no erythema  Neuro: protective sensation grossly diminished   MSK: grossly intact

## 2018-04-28 NOTE — PROGRESS NOTE ADULT - ASSESSMENT
41 yo diabetic male with infected ulcer of right foot    -C/w IV abx, as per primary team  -Pt going for MRI today, f/u results  -Group B strep on culture, f/u sensitivities  -OR Sunday 4/29/18 for possible amputation vs. wound debridement pending results of MRI  -Pt pre oped   -Application of DSD to RLE  -WBAT in surgical shoe RLE  -Plan discussed with attending

## 2018-04-28 NOTE — PROGRESS NOTE ADULT - SUBJECTIVE AND OBJECTIVE BOX
Patient is a 42y old  Male who presents with a chief complaint of foot pain (26 Apr 2018 12:20)      INTERVAL HPI/OVERNIGHT EVENTS:  No acute overnight events.          Review of Systems: 12 point review of systems otherwise negative  ( - )fevers/chills  ( - ) dyspnea  ( - ) cough  ( - ) chest pain  ( - ) palpatations  ( - ) dizziness/lightheadedness  ( - ) nausea/vomiting  ( - ) abd pain  ( - ) diarrhea  ( - ) melena  ( - ) hematochezia  ( - ) dysuria  ( - ) hematuria  ( - ) leg swelling  ( -) calf tenderness  ( - ) motor weakness  ( - ) extremity numbness  ( - ) back pain  ( + ) tolerating POs  ( + ) BM    MEDICATIONS  (STANDING):  atorvastatin 40 milliGRAM(s) Oral at bedtime  dextrose 5%. 1000 milliLiter(s) (50 mL/Hr) IV Continuous <Continuous>  dextrose 50% Injectable 12.5 Gram(s) IV Push once  dextrose 50% Injectable 25 Gram(s) IV Push once  dextrose 50% Injectable 25 Gram(s) IV Push once  heparin  Injectable 5000 Unit(s) SubCutaneous every 8 hours  insulin glargine Injectable (LANTUS) 22 Unit(s) SubCutaneous at bedtime  insulin lispro (HumaLOG) corrective regimen sliding scale   SubCutaneous Before meals and at bedtime  lisinopril 5 milliGRAM(s) Oral daily  melatonin 5 milliGRAM(s) Oral at bedtime  piperacillin/tazobactam IVPB. 4.5 Gram(s) IV Intermittent every 6 hours  vancomycin  IVPB 1500 milliGRAM(s) IV Intermittent every 12 hours    MEDICATIONS  (PRN):  acetaminophen   Tablet. 650 milliGRAM(s) Oral every 6 hours PRN Moderate Pain (4 - 6)  dextrose Gel 1 Dose(s) Oral once PRN Blood Glucose LESS THAN 70 milliGRAM(s)/deciliter  glucagon  Injectable 1 milliGRAM(s) IntraMuscular once PRN Glucose LESS THAN 70 milligrams/deciliter      Allergies    No Known Allergies    Intolerances          Vital Signs Last 24 Hrs  T(C): 37 (28 Apr 2018 09:51), Max: 37.1 (27 Apr 2018 15:47)  T(F): 98.6 (28 Apr 2018 09:51), Max: 98.8 (27 Apr 2018 15:47)  HR: 72 (28 Apr 2018 09:51) (72 - 87)  BP: 110/68 (28 Apr 2018 09:51) (110/68 - 150/82)  BP(mean): --  RR: 19 (28 Apr 2018 09:51) (16 - 19)  SpO2: 97% (28 Apr 2018 09:51) (96% - 99%)  CAPILLARY BLOOD GLUCOSE      POCT Blood Glucose.: 75 mg/dL (28 Apr 2018 08:20)  POCT Blood Glucose.: 253 mg/dL (27 Apr 2018 22:27)  POCT Blood Glucose.: 282 mg/dL (27 Apr 2018 17:45)  POCT Blood Glucose.: 291 mg/dL (27 Apr 2018 12:32)        Physical Exam:    Daily     Daily   General:  Well appearing, NAD, not cachetic  HEENT:  Nonicteric, PERRLA  CV:  RRR, no murmur, no JVD  Lungs:  CTA B/L, no wheezes, rales, rhonchi  Abdomen:  Soft, non-tender, no distended, positive BS, no hepatosplenomegaly  Skin:  Warm and dry, no rashes  :  No johnson  Neuro:  AAOx3, non-focal, CN II-XII grossly intact  No Restraints    LABS:          PT/INR - ( 28 Apr 2018 08:10 )   PT: 11.2 sec;   INR: 1.01          PTT - ( 28 Apr 2018 08:10 )  PTT:30.5 sec        RADIOLOGY & ADDITIONAL TESTS:    ---------------------------------------------------------------------------  I personally reviewed: [  ]EKG   [  ]CXR    [  ] CT    [  ]Other  ---------------------------------------------------------------------------  PLEASE CHECK WHEN PRESENT:     [  ]Heart Failure     [  ] Acute     [  ] Acute on Chronic     [  ] Chronic  -------------------------------------------------------------------     [  ]Diastolic [HFpEF]     [  ]Systolic [HFrEF]     [  ]Combined [HFpEF & HFrEF]     [  ]Other:  -------------------------------------------------------------------  [  ]CHRIST     [  ]ATN     [  ]Reneal Medullary Necrosis     [  ]Renal Cortical Necrosis     [  ]Other Pathological Lesions:    [  ]CKD 1  [  ]CKD 2  [  ]CKD 3  [  ]CKD 4  [  ]CKD 5  [  ]Other  -------------------------------------------------------------------  [  ]Other/Unspecified:    --------------------------------------------------------------------    Abdominal Nutritional Status  [  ]Malnutrition: See Nutrition Note  [  ]Cachexia  [  ]Other:   [  ]Supplement Ordered:  [  ]Morbid Obesity (BMI >=40]

## 2018-04-28 NOTE — PROGRESS NOTE ADULT - PROBLEM SELECTOR PLAN 2
Xray with evidence of chronic osteo, suspect presentation is 2/2 acute on chronic infection. WBC of 12.3, ESR of 34 and crp of 11.  Tissue Cx growing GBS.  - f/u MRI foot   - c/w vanc/zosyn   - f/u Bcx

## 2018-04-29 ENCOUNTER — TRANSCRIPTION ENCOUNTER (OUTPATIENT)
Age: 43
End: 2018-04-29

## 2018-04-29 LAB
-  CEFAZOLIN: SIGNIFICANT CHANGE UP
-  CLINDAMYCIN: SIGNIFICANT CHANGE UP
-  ERYTHROMYCIN: SIGNIFICANT CHANGE UP
-  LINEZOLID: SIGNIFICANT CHANGE UP
-  OXACILLIN: SIGNIFICANT CHANGE UP
-  PENICILLIN: SIGNIFICANT CHANGE UP
-  RIFAMPIN: SIGNIFICANT CHANGE UP
-  TRIMETHOPRIM/SULFAMETHOXAZOLE: SIGNIFICANT CHANGE UP
-  VANCOMYCIN: SIGNIFICANT CHANGE UP
ANION GAP SERPL CALC-SCNC: 12 MMOL/L — SIGNIFICANT CHANGE UP (ref 5–17)
BASOPHILS NFR BLD AUTO: 0.7 % — SIGNIFICANT CHANGE UP (ref 0–2)
BUN SERPL-MCNC: 10 MG/DL — SIGNIFICANT CHANGE UP (ref 7–23)
CALCIUM SERPL-MCNC: 9.5 MG/DL — SIGNIFICANT CHANGE UP (ref 8.4–10.5)
CHLORIDE SERPL-SCNC: 100 MMOL/L — SIGNIFICANT CHANGE UP (ref 96–108)
CO2 SERPL-SCNC: 26 MMOL/L — SIGNIFICANT CHANGE UP (ref 22–31)
CREAT SERPL-MCNC: 0.99 MG/DL — SIGNIFICANT CHANGE UP (ref 0.5–1.3)
CULTURE RESULTS: SIGNIFICANT CHANGE UP
EOSINOPHIL NFR BLD AUTO: 3.8 % — SIGNIFICANT CHANGE UP (ref 0–6)
GLUCOSE BLDC GLUCOMTR-MCNC: 153 MG/DL — HIGH (ref 70–99)
GLUCOSE BLDC GLUCOMTR-MCNC: 170 MG/DL — HIGH (ref 70–99)
GLUCOSE BLDC GLUCOMTR-MCNC: 196 MG/DL — HIGH (ref 70–99)
GLUCOSE BLDC GLUCOMTR-MCNC: 225 MG/DL — HIGH (ref 70–99)
GLUCOSE SERPL-MCNC: 170 MG/DL — HIGH (ref 70–99)
HCT VFR BLD CALC: 38.3 % — LOW (ref 39–50)
HGB BLD-MCNC: 12.7 G/DL — LOW (ref 13–17)
INR BLD: 1.04 — SIGNIFICANT CHANGE UP (ref 0.88–1.16)
LYMPHOCYTES # BLD AUTO: 41.5 % — SIGNIFICANT CHANGE UP (ref 13–44)
MAGNESIUM SERPL-MCNC: 1.6 MG/DL — SIGNIFICANT CHANGE UP (ref 1.6–2.6)
MCHC RBC-ENTMCNC: 28.2 PG — SIGNIFICANT CHANGE UP (ref 27–34)
MCHC RBC-ENTMCNC: 33.2 G/DL — SIGNIFICANT CHANGE UP (ref 32–36)
MCV RBC AUTO: 85.1 FL — SIGNIFICANT CHANGE UP (ref 80–100)
METHOD TYPE: SIGNIFICANT CHANGE UP
MONOCYTES NFR BLD AUTO: 8.5 % — SIGNIFICANT CHANGE UP (ref 2–14)
NEUTROPHILS NFR BLD AUTO: 45.5 % — SIGNIFICANT CHANGE UP (ref 43–77)
ORGANISM # SPEC MICROSCOPIC CNT: SIGNIFICANT CHANGE UP
PLATELET # BLD AUTO: 378 K/UL — SIGNIFICANT CHANGE UP (ref 150–400)
POTASSIUM SERPL-MCNC: 4.2 MMOL/L — SIGNIFICANT CHANGE UP (ref 3.5–5.3)
POTASSIUM SERPL-SCNC: 4.2 MMOL/L — SIGNIFICANT CHANGE UP (ref 3.5–5.3)
PROTHROM AB SERPL-ACNC: 11.6 SEC — SIGNIFICANT CHANGE UP (ref 9.8–12.7)
RBC # BLD: 4.5 M/UL — SIGNIFICANT CHANGE UP (ref 4.2–5.8)
RBC # FLD: 13.2 % — SIGNIFICANT CHANGE UP (ref 10.3–16.9)
SODIUM SERPL-SCNC: 138 MMOL/L — SIGNIFICANT CHANGE UP (ref 135–145)
SPECIMEN SOURCE: SIGNIFICANT CHANGE UP
VANCOMYCIN TROUGH SERPL-MCNC: 17 UG/ML — SIGNIFICANT CHANGE UP (ref 10–20)
WBC # BLD: 7.8 K/UL — SIGNIFICANT CHANGE UP (ref 3.8–10.5)
WBC # FLD AUTO: 7.8 K/UL — SIGNIFICANT CHANGE UP (ref 3.8–10.5)

## 2018-04-29 PROCEDURE — 99233 SBSQ HOSP IP/OBS HIGH 50: CPT | Mod: GC

## 2018-04-29 RX ORDER — PIPERACILLIN AND TAZOBACTAM 4; .5 G/20ML; G/20ML
4.5 INJECTION, POWDER, LYOPHILIZED, FOR SOLUTION INTRAVENOUS EVERY 6 HOURS
Refills: 0 | Status: DISCONTINUED | OUTPATIENT
Start: 2018-04-29 | End: 2018-05-02

## 2018-04-29 RX ORDER — LISINOPRIL 2.5 MG/1
1 TABLET ORAL
Qty: 30 | Refills: 0
Start: 2018-04-29 | End: 2018-05-28

## 2018-04-29 RX ORDER — ATORVASTATIN CALCIUM 80 MG/1
1 TABLET, FILM COATED ORAL
Qty: 30 | Refills: 0
Start: 2018-04-29

## 2018-04-29 RX ORDER — INSULIN GLARGINE 100 [IU]/ML
22 INJECTION, SOLUTION SUBCUTANEOUS
Qty: 1 | Refills: 0
Start: 2018-04-29 | End: 2018-05-28

## 2018-04-29 RX ORDER — COLLAGENASE CLOSTRIDIUM HIST. 250 UNIT/G
1 OINTMENT (GRAM) TOPICAL DAILY
Refills: 0 | Status: DISCONTINUED | OUTPATIENT
Start: 2018-04-29 | End: 2018-05-02

## 2018-04-29 RX ADMIN — Medication 2: at 17:34

## 2018-04-29 RX ADMIN — PIPERACILLIN AND TAZOBACTAM 200 GRAM(S): 4; .5 INJECTION, POWDER, LYOPHILIZED, FOR SOLUTION INTRAVENOUS at 15:15

## 2018-04-29 RX ADMIN — Medication 300 MILLIGRAM(S): at 21:19

## 2018-04-29 RX ADMIN — HEPARIN SODIUM 5000 UNIT(S): 5000 INJECTION INTRAVENOUS; SUBCUTANEOUS at 13:55

## 2018-04-29 RX ADMIN — LISINOPRIL 5 MILLIGRAM(S): 2.5 TABLET ORAL at 05:45

## 2018-04-29 RX ADMIN — HEPARIN SODIUM 5000 UNIT(S): 5000 INJECTION INTRAVENOUS; SUBCUTANEOUS at 05:44

## 2018-04-29 RX ADMIN — HEPARIN SODIUM 5000 UNIT(S): 5000 INJECTION INTRAVENOUS; SUBCUTANEOUS at 21:19

## 2018-04-29 RX ADMIN — PIPERACILLIN AND TAZOBACTAM 200 GRAM(S): 4; .5 INJECTION, POWDER, LYOPHILIZED, FOR SOLUTION INTRAVENOUS at 03:08

## 2018-04-29 RX ADMIN — Medication 2: at 21:18

## 2018-04-29 RX ADMIN — Medication 4: at 13:59

## 2018-04-29 RX ADMIN — PIPERACILLIN AND TAZOBACTAM 200 GRAM(S): 4; .5 INJECTION, POWDER, LYOPHILIZED, FOR SOLUTION INTRAVENOUS at 09:31

## 2018-04-29 RX ADMIN — ATORVASTATIN CALCIUM 40 MILLIGRAM(S): 80 TABLET, FILM COATED ORAL at 21:19

## 2018-04-29 RX ADMIN — Medication 2: at 08:59

## 2018-04-29 RX ADMIN — Medication 5 MILLIGRAM(S): at 21:19

## 2018-04-29 RX ADMIN — INSULIN GLARGINE 22 UNIT(S): 100 INJECTION, SOLUTION SUBCUTANEOUS at 21:20

## 2018-04-29 RX ADMIN — PIPERACILLIN AND TAZOBACTAM 200 GRAM(S): 4; .5 INJECTION, POWDER, LYOPHILIZED, FOR SOLUTION INTRAVENOUS at 21:10

## 2018-04-29 RX ADMIN — Medication 300 MILLIGRAM(S): at 10:21

## 2018-04-29 NOTE — PROGRESS NOTE ADULT - SUBJECTIVE AND OBJECTIVE BOX
Patient is a 42y old  Male who presents with a chief complaint of foot pain (29 Apr 2018 12:47)      INTERVAL HPI/OVERNIGHT EVENTS:  No acute overnight events       Review of Systems: 12 point review of systems otherwise negative  ( - )fevers/chills  ( - ) dyspnea  ( - ) cough  ( - ) chest pain  ( - ) palpatations  ( - ) dizziness/lightheadedness  ( - ) nausea/vomiting  ( - ) abd pain  ( - ) diarrhea  ( - ) melena  ( - ) hematochezia  ( - ) dysuria  ( - ) hematuria  ( - ) leg swelling  ( -) calf tenderness  ( - ) motor weakness  ( - ) extremity numbness  ( - ) back pain  ( + ) tolerating POs  ( + ) BM    MEDICATIONS  (STANDING):  atorvastatin 40 milliGRAM(s) Oral at bedtime  collagenase Ointment 1 Application(s) Topical daily  dextrose 5%. 1000 milliLiter(s) (50 mL/Hr) IV Continuous <Continuous>  dextrose 50% Injectable 12.5 Gram(s) IV Push once  dextrose 50% Injectable 25 Gram(s) IV Push once  dextrose 50% Injectable 25 Gram(s) IV Push once  heparin  Injectable 5000 Unit(s) SubCutaneous every 8 hours  insulin glargine Injectable (LANTUS) 22 Unit(s) SubCutaneous at bedtime  insulin lispro (HumaLOG) corrective regimen sliding scale   SubCutaneous Before meals and at bedtime  lisinopril 5 milliGRAM(s) Oral daily  melatonin 5 milliGRAM(s) Oral at bedtime  piperacillin/tazobactam IVPB. 4.5 Gram(s) IV Intermittent every 6 hours  vancomycin  IVPB 1500 milliGRAM(s) IV Intermittent every 12 hours    MEDICATIONS  (PRN):  acetaminophen   Tablet. 650 milliGRAM(s) Oral every 6 hours PRN Moderate Pain (4 - 6)  dextrose Gel 1 Dose(s) Oral once PRN Blood Glucose LESS THAN 70 milliGRAM(s)/deciliter  glucagon  Injectable 1 milliGRAM(s) IntraMuscular once PRN Glucose LESS THAN 70 milligrams/deciliter      Allergies    No Known Allergies    Intolerances          Vital Signs Last 24 Hrs  T(C): 36.6 (29 Apr 2018 10:20), Max: 37.1 (28 Apr 2018 20:40)  T(F): 97.9 (29 Apr 2018 10:20), Max: 98.7 (28 Apr 2018 20:40)  HR: 76 (29 Apr 2018 10:20) (67 - 76)  BP: 111/75 (29 Apr 2018 10:20) (111/75 - 162/87)  BP(mean): --  RR: 18 (29 Apr 2018 10:20) (17 - 19)  SpO2: 98% (29 Apr 2018 10:20) (97% - 98%)  CAPILLARY BLOOD GLUCOSE      POCT Blood Glucose.: 225 mg/dL (29 Apr 2018 12:21)  POCT Blood Glucose.: 153 mg/dL (29 Apr 2018 08:28)  POCT Blood Glucose.: 246 mg/dL (28 Apr 2018 21:13)  POCT Blood Glucose.: 176 mg/dL (28 Apr 2018 17:40)      04-28 @ 07:01  -  04-29 @ 07:00  --------------------------------------------------------  IN: 850 mL / OUT: 0 mL / NET: 850 mL        Physical Exam:    Daily     General:  Well appearing, NAD, not cachetic  HEENT:  Nonicteric, PERRLA  CV:  RRR, no murmur, no JVD  Lungs:  CTA B/L, no wheezes, rales, rhonchi  Abdomen:  Soft, non-tender, no distended, positive BS, no hepatosplenomegaly  :  No johnson  Neuro:  AAOx3, non-focal, CN II-XII grossly intact  No Restraints    LABS:                        12.7   7.8   )-----------( 378      ( 29 Apr 2018 06:09 )             38.3     04-29    138  |  100  |  10  ----------------------------<  170<H>  4.2   |  26  |  0.99    Ca    9.5      29 Apr 2018 06:09  Mg     1.6     04-29      PT/INR - ( 29 Apr 2018 06:09 )   PT: 11.6 sec;   INR: 1.04          PTT - ( 28 Apr 2018 08:10 )  PTT:30.5 sec        RADIOLOGY & ADDITIONAL TESTS:    ---------------------------------------------------------------------------  I personally reviewed: [  ]EKG   [  ]CXR    [  ] CT    [  ]Other  ---------------------------------------------------------------------------  PLEASE CHECK WHEN PRESENT:     [  ]Heart Failure     [  ] Acute     [  ] Acute on Chronic     [  ] Chronic  -------------------------------------------------------------------     [  ]Diastolic [HFpEF]     [  ]Systolic [HFrEF]     [  ]Combined [HFpEF & HFrEF]     [  ]Other:  -------------------------------------------------------------------  [  ]CHRIST     [  ]ATN     [  ]Reneal Medullary Necrosis     [  ]Renal Cortical Necrosis     [  ]Other Pathological Lesions:    [  ]CKD 1  [  ]CKD 2  [  ]CKD 3  [  ]CKD 4  [  ]CKD 5  [  ]Other  -------------------------------------------------------------------  [  ]Other/Unspecified:    --------------------------------------------------------------------    Abdominal Nutritional Status  [  ]Malnutrition: See Nutrition Note  [  ]Cachexia  [  ]Other:   [  ]Supplement Ordered:  [  ]Morbid Obesity (BMI >=40]

## 2018-04-29 NOTE — PROGRESS NOTE ADULT - ASSESSMENT
42 M w/ DMII uncontrolled w/ hx of 2 toe amputations admitted for diabetic wound infection with suspicion for osteomyelitis.

## 2018-04-29 NOTE — DISCHARGE NOTE ADULT - MEDICATION SUMMARY - MEDICATIONS TO TAKE
I will START or STAY ON the medications listed below when I get home from the hospital:    lisinopril 5 mg oral tablet  -- 1 tab(s) by mouth once a day  -- Indication: For Hypertension    heparin flush 10 units/mL intravenous solution  -- 3 milliliter(s) intravenously every 24 hours every 8 hours Post Infusion   -- Indication: For PICC Line Care    Lantus Solostar Pen 100 units/mL subcutaneous solution  -- 24 unit(s) subcutaneous once a day (at bedtime)   -- Do not drink alcoholic beverages when taking this medication.  It is very important that you take or use this exactly as directed.  Do not skip doses or discontinue unless directed by your doctor.  Keep in refrigerator.  Do not freeze.    -- Indication: For Type 2 Diabetes    HumaLOG KwikPen (Concentrated) 200 units/mL subcutaneous solution  -- 8 unit(s) subcutaneous 3 times a day (before meals)   -- Do not drink alcoholic beverages when taking this medication.  It is very important that you take or use this exactly as directed.  Do not skip doses or discontinue unless directed by your doctor.  Keep in refrigerator.  Do not freeze.    -- Indication: For Type 2 Diabetes    atorvastatin 40 mg oral tablet  -- 1 tab(s) by mouth once a day (at bedtime)  -- Indication: For Type 2 Diabetes    cefepime 2 g intravenous injection  -- 2 gram(s) by continuous intravenous infusion every 8 hours until June 7th, 2018  -- Finish all this medication unless otherwise directed by prescriber.    -- Indication: For Osteomyelitis    Normal Saline Flush 0.9% injectable solution  -- 10 milliliter(s) injectable every 8 hours pre and post infusion  -- Indication: For PICC Line Care

## 2018-04-29 NOTE — PROGRESS NOTE ADULT - ASSESSMENT
43 yo diabetic male with infected ulcer of right foot    -C/w IV abx, as per primary team, leukocytosis down trending, wound clinically improved  -Prelim MRI read negative or osteomyelitis, f/u final read  -Wound clinically improved, no osteomyelitis, no need for surgical intervention, pt okay for discharge from podiatry standpoint   -Recommend PO abx of Augmentin 875 BID, upon discharge  -Aseptic excisional debridement of RLE wound down to viable dermis, using a sterile #15 blade  -Application of DSD to RLE, recommend Santyl for daily dressing changes  -WBAT in surgical shoe RLE  -Pt to follow up with Dr. David Green within two weeks of discharge   -Plan discussed with attending

## 2018-04-29 NOTE — PROGRESS NOTE ADULT - SUBJECTIVE AND OBJECTIVE BOX
Patient is a 42y old  Male who presents with a chief complaint of foot pain (26 Apr 2018 12:20)      INTERVAL HPI/ OVERNIGHT EVENTS  Pt seen and examined at bedside. Pt lying comfortably in bed a time of visit. Pt states that he got his dressings wet when he showered this morning. Pt denies any pain, n/v/f/c.     LABS                        12.7   7.8   )-----------( 378      ( 29 Apr 2018 06:09 )             38.3     04-29    138  |  100  |  10  ----------------------------<  170<H>  4.2   |  26  |  0.99    Ca    9.5      29 Apr 2018 06:09  Mg     1.6     04-29      PT/INR - ( 29 Apr 2018 06:09 )   PT: 11.6 sec;   INR: 1.04          PTT - ( 28 Apr 2018 08:10 )  PTT:30.5 sec    ICU Vital Signs Last 24 Hrs  T(C): 36.6 (29 Apr 2018 10:20), Max: 37.1 (28 Apr 2018 20:40)  T(F): 97.9 (29 Apr 2018 10:20), Max: 98.7 (28 Apr 2018 20:40)  HR: 76 (29 Apr 2018 10:20) (67 - 76)  BP: 111/75 (29 Apr 2018 10:20) (111/75 - 162/87)  BP(mean): --  ABP: --  ABP(mean): --  RR: 18 (29 Apr 2018 10:20) (17 - 19)  SpO2: 98% (29 Apr 2018 10:20) (97% - 98%)      RADIOLOGY    MICROBIOLOGY  Culture - Other (04.26.18 @ 09:22)    Gram Stain:   Numerous Gram positive cocci in pairs, chains and clusters  Numerous White blood cells    -  Ampicillin: S    -  Clindamycin: R    -  Erythromycin: R    -  Penicillin: S    -  Vancomycin: S    Specimen Source: .Other foot    Culture Results:   Numerous Streptococcus agalactiae (Group B)  Few Staphylococcus aureus  Susceptibility to follow.  Culture in progress    Organism Identification: Streptococcus agalactiae (Group B)    Organism: Streptococcus agalactiae (Group B)    Method Type: KB        PHYSICAL EXAM  Right Lower Extremity Focused  Vasc: DP/ PT 2+, CFT < 3 secs x 3, TG: wnl, no edema appreciated  Derm: Simpson grade 0 ulcer of the plantar distal hallux, no drainage, no PTB, no malodor, no clinical signs of infection with overlying hyperkeratotic tissue. Simpson grade 1 ulcer of the plantar lateral aspect of previous amputation site with fibrotic base, no undermining, no tracking, no malodor, no PTB, no erythema  Neuro: protective sensation grossly diminished   MSK: grossly intact

## 2018-04-29 NOTE — DISCHARGE NOTE ADULT - CARE PROVIDER_API CALL
Carlie Chisholm), EndocrinologyMetabDiabetes  [address]  [address]  [place] [address]  Phone: [phone]  Fax: [phone]    David Green (AIMEE), Orthopaedic Surgery  [address] [place] [address] [address]  [place] [address]  Phone: [phone]  Fax: [phone] Carlie Chisholm), EndocrinologyMetabDiabetes  [address]  [address]  [place] [address]  Phone: [phone]  Fax: [phone]    David Green (DPPARIS), Orthopaedic Surgery  [address] [place] [address] [address]  [place] [address]  Phone: [phone]  Fax: [phone]    Lety Foy), Infectious Disease; Internal Medicine  [address]  [address]th [place] [address]  Phone: [phone]  Fax: [phone]    [facility],   [phone]  Fax [phone]   [address] [place] [address]  Phone: (   )    -  Fax: (   )    -

## 2018-04-29 NOTE — DISCHARGE NOTE ADULT - HOSPITAL COURSE
42YM w/ DMII uncontrolled w/ hx of 2 toe amputations presented with osteomyelitis of R foot confirmed on MRI, treated with vancomycin and zosyn.  Podiatry recommended against surgical intervention.  Bone biopsy gram stain negative, likely sterilized by multiple days of IV antibiotics.  ID consulted for antibiotic recommendations.  Uncontrolled DM, started on Lantus 22 and Lispro 6.  Plan to follow-up with Dr Chisholm, at Endocrine clinic. 42YM w/ DMII uncontrolled w/ hx of 2 toe amputations presented with osteomyelitis of R foot confirmed on MRI, treated with vancomycin and zosyn.  Podiatry recommended against surgical intervention.  Bone biopsy gram stain negative, likely sterilized by multiple days of IV antibiotics.  ID consulted for antibiotic recommendations.  Uncontrolled DM, started on Lantus 22 and Lispro 6. -> discharged on basaglar and novolog given insurance approval. Patient is discharged on cefepime 2g q8 till 6/7/18. Plan to follow-up with Dr Chisholm,(Endocrine clinic), Dr. Strauss, podiatry and [facility].

## 2018-04-29 NOTE — DISCHARGE NOTE ADULT - ADDITIONAL INSTRUCTIONS
Dr Chisholm, Endocrine, 5/15 at 1:20 PM Please follow-up with Dr. Chisholm (Endocrinology) on 5/15/18 at 1:20 PM.  Please follow-up with Dr. Vaughan (Infectious disease) on 5/11/18 at 9:40 AM.   Please follow-up with Dr. Green (Podiatry) on 5/7/18 at 11:30AM.   Please follow-with [facility] at your earliest convenience (they will call you with follow-up appointment.

## 2018-04-29 NOTE — DISCHARGE NOTE ADULT - PLAN OF CARE
Your wound is clinically improved and you   -Prelim MRI read negative or osteomyelitis, f/u final read  -Wound clinically improved, no osteomyelitis, no need for surgical intervention, pt okay for discharge from podiatry standpoint   -Recommend PO abx of Augmentin 875 BID, upon discharge  -Aseptic excisional debridement of RLE wound down to viable dermis, using a sterile #15 blade  -Application of DSD to RLE, recommend Santyl for daily dressing changes  -WBAT in surgical shoe RLE  -Pt to follow up with Dr. David Green within two weeks of discharge   -Plan discussed with attending You were treated for infection of the bone with IV antibiotics.  Please continue ******** for ****** more days to complete your course of treatment.  Santyl for daily dressing changes  Weight bearing as tolerated in surgical shoe  Follow up with Dr. David Green, Podiatry, within two weeks of discharge. Resolution of infection Please continue lipitor 40mg once daily. Please follow-up at [facility] for further management. Please continue lisinopril 10mg once daily. Please follow-up at [facility] for further management. You were treated for infection of the bone with IV antibiotics.  Please continue cefepime 2mg IV every 8 hours till 6/7/18 to complete your course of treatment. Please follow-up with Dr. Vaughan and Dr. Green. Continue with Santyl for daily dressing changes. Weight bearing is tolerated in surgical shoe. Please follow up with Dr. David Green, Podiatry, within two weeks of discharge. Please continue Basaglar 24units every night and Novolog 8units premeal. Please follow-up with your endocrinologist. You were admitted for sepsis secondary to osteomyelitis. Sepsis has resolved. Please continue your antibiotic regimen.

## 2018-04-29 NOTE — DISCHARGE NOTE ADULT - CARE PROVIDERS DIRECT ADDRESSES
,DirectAddress_Unknown,DirectAddress_Unknown ,DirectAddress_Unknown,DirectAddress_Unknown,[email or web address],DirectAddress_Unknown

## 2018-04-29 NOTE — PROGRESS NOTE ADULT - SUBJECTIVE AND OBJECTIVE BOX
OVERNIGHT EVENTS: ANDREW    SUBJECTIVE / INTERVAL HPI: Patient seen and examined at bedside.  No F/C, pain.    VITAL SIGNS:  Vital Signs Last 24 Hrs  T(C): 36.8 (29 Apr 2018 16:13), Max: 37.1 (28 Apr 2018 20:40)  T(F): 98.2 (29 Apr 2018 16:13), Max: 98.7 (28 Apr 2018 20:40)  HR: 67 (29 Apr 2018 16:13) (67 - 76)  BP: 149/89 (29 Apr 2018 16:13) (111/75 - 162/87)  BP(mean): --  RR: 18 (29 Apr 2018 16:13) (17 - 18)  SpO2: 97% (29 Apr 2018 16:13) (97% - 98%)    PHYSICAL EXAM:    General: WDWN resting comfortably  HEENT: NC/AT; PERRL, anicteric sclera; MMM  Neck: supple  Cardiovascular: +S1/S2, RRR  Respiratory: CTA B/L; no W/R/R  Gastrointestinal: soft, NT/ND; +BSx4  Extremities: R foot with partial 4th ray and 5th ray amputation. Plantar lateral ulcer extending from lateral midfoot to  3rd toe with debrided to healthy appearing soft tissue no active drainage,  R superficial hallux ulcer, no purulence  Vascular: 2+ radial, DP/PT pulses B/L  Neurological: AAOx3; no focal deficits    MEDICATIONS:  MEDICATIONS  (STANDING):  atorvastatin 40 milliGRAM(s) Oral at bedtime  collagenase Ointment 1 Application(s) Topical daily  dextrose 5%. 1000 milliLiter(s) (50 mL/Hr) IV Continuous <Continuous>  dextrose 50% Injectable 12.5 Gram(s) IV Push once  dextrose 50% Injectable 25 Gram(s) IV Push once  dextrose 50% Injectable 25 Gram(s) IV Push once  heparin  Injectable 5000 Unit(s) SubCutaneous every 8 hours  insulin glargine Injectable (LANTUS) 22 Unit(s) SubCutaneous at bedtime  insulin lispro (HumaLOG) corrective regimen sliding scale   SubCutaneous Before meals and at bedtime  lisinopril 5 milliGRAM(s) Oral daily  melatonin 5 milliGRAM(s) Oral at bedtime  vancomycin  IVPB 1500 milliGRAM(s) IV Intermittent every 12 hours    MEDICATIONS  (PRN):  acetaminophen   Tablet. 650 milliGRAM(s) Oral every 6 hours PRN Moderate Pain (4 - 6)  dextrose Gel 1 Dose(s) Oral once PRN Blood Glucose LESS THAN 70 milliGRAM(s)/deciliter  glucagon  Injectable 1 milliGRAM(s) IntraMuscular once PRN Glucose LESS THAN 70 milligrams/deciliter      ALLERGIES:  Allergies    No Known Allergies    Intolerances      LABS:                        12.7   7.8   )-----------( 378      ( 29 Apr 2018 06:09 )             38.3     04-29    138  |  100  |  10  ----------------------------<  170<H>  4.2   |  26  |  0.99    Ca    9.5      29 Apr 2018 06:09  Mg     1.6     04-29      PT/INR - ( 29 Apr 2018 06:09 )   PT: 11.6 sec;   INR: 1.04          PTT - ( 28 Apr 2018 08:10 )  PTT:30.5 sec    CAPILLARY BLOOD GLUCOSE  POCT Blood Glucose.: 225 mg/dL (29 Apr 2018 12:21)    Culture - Other (04.26.18 @ 09:22)    Gram Stain:   Numerous Gram positive cocci in pairs, chains and clusters  Numerous White blood cells    -  Ampicillin: S    -  Cefazolin: S <=4    -  Clindamycin: R    -  Clindamycin: S <=0.5    -  Erythromycin: R    -  Erythromycin: S <=0.5    -  Linezolid: S 4    -  Oxacillin: S 0.5    -  Penicillin: S    -  Penicillin: R >8    -  RIF- Rifampin: S <=1    -  Trimethoprim/Sulfamethoxazole: S <=0.5/9.5    -  Vancomycin: S    -  Vancomycin: S 2    Specimen Source: .Other foot    Culture Results:   Numerous Streptococcus agalactiae (Group B)  Few Staphylococcus aureus    Organism Identification: Streptococcus agalactiae (Group B)  Staphylococcus aureus    Organism: Streptococcus agalactiae (Group B)    Organism: Staphylococcus aureus    Method Type: JULIOCESAR    Method Type: YUN        RADIOLOGY & ADDITIONAL TESTS: Reviewed.

## 2018-04-29 NOTE — PROGRESS NOTE ADULT - PROBLEM SELECTOR PLAN 3
HbA1C 12.9.   - Lantus 22 units at bedtime  - ISS   - diabetes education  - C/w lisinopril 5 mg daily

## 2018-04-29 NOTE — DISCHARGE NOTE ADULT - CARE PLAN
Principal Discharge DX:	Diabetic foot ulcer  Assessment and plan of treatment:	Your wound is clinically improved and you   -Prelim MRI read negative or osteomyelitis, f/u final read  -Wound clinically improved, no osteomyelitis, no need for surgical intervention, pt okay for discharge from podiatry standpoint   -Recommend PO abx of Augmentin 875 BID, upon discharge  -Aseptic excisional debridement of RLE wound down to viable dermis, using a sterile #15 blade  -Application of DSD to RLE, recommend Santyl for daily dressing changes  -WBAT in surgical shoe RLE  -Pt to follow up with Dr. David Green within two weeks of discharge   -Plan discussed with attending  Secondary Diagnosis:	Diabetes  Secondary Diagnosis:	Sepsis Principal Discharge DX:	Acute osteomyelitis  Goal:	Resolution of infection  Assessment and plan of treatment:	You were treated for infection of the bone with IV antibiotics.  Please continue ******** for ****** more days to complete your course of treatment.  Santyl for daily dressing changes  Weight bearing as tolerated in surgical shoe  Follow up with Dr. David Green, Podiatry, within two weeks of discharge.  Secondary Diagnosis:	Diabetes  Secondary Diagnosis:	Sepsis Principal Discharge DX:	Acute osteomyelitis  Goal:	Resolution of infection  Assessment and plan of treatment:	You were treated for infection of the bone with IV antibiotics.  Please continue cefepime 2mg IV every 8 hours till 6/7/18 to complete your course of treatment. Please follow-up with Dr. Vaughan and Dr. Green. Continue with Santyl for daily dressing changes. Weight bearing is tolerated in surgical shoe. Please follow up with Dr. David Green, Podiatry, within two weeks of discharge.  Secondary Diagnosis:	Diabetes  Assessment and plan of treatment:	Please continue Basaglar 24units every night and Novolog 8units premeal. Please follow-up with your endocrinologist.  Secondary Diagnosis:	Sepsis  Assessment and plan of treatment:	You were admitted for sepsis secondary to osteomyelitis. Sepsis has resolved. Please continue your antibiotic regimen.  Secondary Diagnosis:	Hypertension, unspecified type  Assessment and plan of treatment:	Please continue lisinopril 10mg once daily. Please follow-up at [facility] for further management.  Secondary Diagnosis:	Hyperlipidemia  Assessment and plan of treatment:	Please continue lipitor 40mg once daily. Please follow-up at [facility] for further management.

## 2018-04-29 NOTE — DISCHARGE NOTE ADULT - PROVIDER TOKENS
TOKEN:'[id number]',TOKEN:'[id number]' TOKEN:'[id number]:'[id number]:'[id number]',FREE:[LAST:[[facility]],PHONE:[(   )    -],FAX:[(   )    -],ADDRESS:[phone]  Fax [phone]   [address] [place] [address], [address] [place] [address]]]

## 2018-04-29 NOTE — DISCHARGE NOTE ADULT - PATIENT PORTAL LINK FT
You can access the [facility] Patient Portal, offered by [facility], by registering with the following website: [email or web address]

## 2018-04-29 NOTE — PROGRESS NOTE ADULT - PROBLEM SELECTOR PLAN 2
Xray with evidence of chronic osteo, suspect presentation is 2/2 acute on chronic infection. WBC of 12.3, ESR of 34 and crp of 11.  Tissue Cx growing GBS and MSSA.  - f/u MRI foot   - c/w vanc/zosyn   - f/u Bcx

## 2018-04-29 NOTE — PROGRESS NOTE ADULT - PROBLEM SELECTOR PLAN 2
Xray with evidence of chronic osteo, suspect presentation is 2/2 acute on chronic infection. WBC of 12.3, ESR of 34 and crp of 11.  Tissue Cx growing GBS.  - f/u OFFICIAL READ of  MRI foot   - c/w vanc  - f/u Bcx

## 2018-04-30 ENCOUNTER — RESULT REVIEW (OUTPATIENT)
Age: 43
End: 2018-04-30

## 2018-04-30 LAB
GLUCOSE BLDC GLUCOMTR-MCNC: 182 MG/DL — HIGH (ref 70–99)
GLUCOSE BLDC GLUCOMTR-MCNC: 205 MG/DL — HIGH (ref 70–99)
GLUCOSE BLDC GLUCOMTR-MCNC: 224 MG/DL — HIGH (ref 70–99)
GLUCOSE BLDC GLUCOMTR-MCNC: 350 MG/DL — HIGH (ref 70–99)

## 2018-04-30 PROCEDURE — 99233 SBSQ HOSP IP/OBS HIGH 50: CPT | Mod: GC

## 2018-04-30 PROCEDURE — 99221 1ST HOSP IP/OBS SF/LOW 40: CPT

## 2018-04-30 RX ORDER — LISINOPRIL 2.5 MG/1
10 TABLET ORAL DAILY
Refills: 0 | Status: DISCONTINUED | OUTPATIENT
Start: 2018-05-01 | End: 2018-05-02

## 2018-04-30 RX ORDER — LIDOCAINE HCL 20 MG/ML
20 VIAL (ML) INJECTION ONCE
Refills: 0 | Status: DISCONTINUED | OUTPATIENT
Start: 2018-04-30 | End: 2018-05-02

## 2018-04-30 RX ORDER — INSULIN LISPRO 100/ML
3 VIAL (ML) SUBCUTANEOUS
Refills: 0 | Status: DISCONTINUED | OUTPATIENT
Start: 2018-04-30 | End: 2018-05-01

## 2018-04-30 RX ADMIN — Medication 1 APPLICATION(S): at 11:24

## 2018-04-30 RX ADMIN — PIPERACILLIN AND TAZOBACTAM 200 GRAM(S): 4; .5 INJECTION, POWDER, LYOPHILIZED, FOR SOLUTION INTRAVENOUS at 21:25

## 2018-04-30 RX ADMIN — INSULIN GLARGINE 22 UNIT(S): 100 INJECTION, SOLUTION SUBCUTANEOUS at 22:32

## 2018-04-30 RX ADMIN — PIPERACILLIN AND TAZOBACTAM 200 GRAM(S): 4; .5 INJECTION, POWDER, LYOPHILIZED, FOR SOLUTION INTRAVENOUS at 09:54

## 2018-04-30 RX ADMIN — Medication 300 MILLIGRAM(S): at 10:48

## 2018-04-30 RX ADMIN — LISINOPRIL 5 MILLIGRAM(S): 2.5 TABLET ORAL at 05:49

## 2018-04-30 RX ADMIN — Medication 3 UNIT(S): at 17:39

## 2018-04-30 RX ADMIN — Medication 4: at 22:32

## 2018-04-30 RX ADMIN — Medication 4: at 08:14

## 2018-04-30 RX ADMIN — ATORVASTATIN CALCIUM 40 MILLIGRAM(S): 80 TABLET, FILM COATED ORAL at 21:30

## 2018-04-30 RX ADMIN — Medication 8: at 12:40

## 2018-04-30 RX ADMIN — Medication 650 MILLIGRAM(S): at 20:47

## 2018-04-30 RX ADMIN — PIPERACILLIN AND TAZOBACTAM 200 GRAM(S): 4; .5 INJECTION, POWDER, LYOPHILIZED, FOR SOLUTION INTRAVENOUS at 14:01

## 2018-04-30 RX ADMIN — Medication 5 MILLIGRAM(S): at 21:27

## 2018-04-30 RX ADMIN — Medication 2: at 17:39

## 2018-04-30 RX ADMIN — HEPARIN SODIUM 5000 UNIT(S): 5000 INJECTION INTRAVENOUS; SUBCUTANEOUS at 21:25

## 2018-04-30 RX ADMIN — HEPARIN SODIUM 5000 UNIT(S): 5000 INJECTION INTRAVENOUS; SUBCUTANEOUS at 14:00

## 2018-04-30 RX ADMIN — Medication 300 MILLIGRAM(S): at 21:27

## 2018-04-30 RX ADMIN — Medication 650 MILLIGRAM(S): at 21:37

## 2018-04-30 RX ADMIN — PIPERACILLIN AND TAZOBACTAM 200 GRAM(S): 4; .5 INJECTION, POWDER, LYOPHILIZED, FOR SOLUTION INTRAVENOUS at 03:00

## 2018-04-30 RX ADMIN — HEPARIN SODIUM 5000 UNIT(S): 5000 INJECTION INTRAVENOUS; SUBCUTANEOUS at 05:49

## 2018-04-30 NOTE — PROGRESS NOTE ADULT - PROBLEM SELECTOR PLAN 3
HbA1C 12.9.   - Lantus 22 units at bedtime  - ISS   - diabetes education  - C/w lisinopril 5 mg daily HbA1C 12.9.   - Lantus 22 units at bedtime  - Lispro 3 units premeal  - ISS   - diabetes education  - C/w lisinopril 5 mg daily  - c/w atorvastatin

## 2018-04-30 NOTE — CONSULT NOTE ADULT - SUBJECTIVE AND OBJECTIVE BOX
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 Patient admitted not septic, with WBC 12.3, ESR and CRP elevation. Podiatry consulted for draining right foot ulcer, unable to probe bone, cultured which showed GBS and staph species. Patient empirically started on Vancomycin and Zosyn. MRI of right foot done, consistent with osteomyelitis of the 3rd toe. Patient has not been febrile since admission, with downtrending leukocytosis. Infection disease consulted for antibiotic management and duration. Patient seen and examined at bedside. Patient feeling "ok", without any acute complaints. Denies any foot pain, abdominal pain, nausea, vomiting, diarrhea, SOB, cough.       PAST MEDICAL & SURGICAL HISTORY:  Diabetes  Toe amputation status      REVIEW OF SYSTEMS:  Negative except for above.       MEDICATIONS  (STANDING):  atorvastatin 40 milliGRAM(s) Oral at bedtime  collagenase Ointment 1 Application(s) Topical daily  dextrose 5%. 1000 milliLiter(s) (50 mL/Hr) IV Continuous <Continuous>  dextrose 50% Injectable 12.5 Gram(s) IV Push once  dextrose 50% Injectable 25 Gram(s) IV Push once  dextrose 50% Injectable 25 Gram(s) IV Push once  heparin  Injectable 5000 Unit(s) SubCutaneous every 8 hours  insulin glargine Injectable (LANTUS) 22 Unit(s) SubCutaneous at bedtime  insulin lispro (HumaLOG) corrective regimen sliding scale   SubCutaneous Before meals and at bedtime  lisinopril 5 milliGRAM(s) Oral daily  melatonin 5 milliGRAM(s) Oral at bedtime  piperacillin/tazobactam IVPB. 4.5 Gram(s) IV Intermittent every 6 hours  vancomycin  IVPB 1500 milliGRAM(s) IV Intermittent every 12 hours    MEDICATIONS  (PRN):  acetaminophen   Tablet. 650 milliGRAM(s) Oral every 6 hours PRN Moderate Pain (4 - 6)  dextrose Gel 1 Dose(s) Oral once PRN Blood Glucose LESS THAN 70 milliGRAM(s)/deciliter  glucagon  Injectable 1 milliGRAM(s) IntraMuscular once PRN Glucose LESS THAN 70 milligrams/deciliter      Allergies    No Known Allergies    Intolerances        Vital Signs Last 24 Hrs  T(C): 36.6 (30 Apr 2018 08:45), Max: 37.1 (29 Apr 2018 20:11)  T(F): 97.9 (30 Apr 2018 08:45), Max: 98.8 (29 Apr 2018 20:11)  HR: 73 (30 Apr 2018 08:45) (67 - 73)  BP: 125/83 (30 Apr 2018 08:45) (125/83 - 149/89)  BP(mean): --  RR: 18 (30 Apr 2018 08:45) (17 - 18)  SpO2: 99% (30 Apr 2018 08:45) (97% - 99%)    PHYSICAL EXAM:  Constitutional: NAD. Well-developed, well nourished  HEENT: Conjunctiva clear, no oral lesion, no sinus tenderness on percussion	  Neck: no JVD, no lymphadenopathy, supple  Respiratory: CTAB. No w/r/r.  Cardiovascular: RRR with no murmurs  Gastrointestinal:soft, (+) BS, no HSM, nondistened, nonrigid.  Extremities: LE WWP b/l. No LE edema b/l.   Vascular: 2+ DP pulses b/l    LABS                        12.7   7.8   )-----------( 378      ( 29 Apr 2018 06:09 )             38.3     04-29    138  |  100  |  10  ----------------------------<  170<H>  4.2   |  26  |  0.99    Ca    9.5      29 Apr 2018 06:09  Mg     1.6     04-29      PT/INR - ( 29 Apr 2018 06:09 )   PT: 11.6 sec;   INR: 1.04                MICROBIOLOGY:    RADIOLOGY & ADDITIONAL STUDIES: HPI:  42 year old male w/ hx of uncontrolled/non-compliant DM2 (on Insulin), s/p amputations of 4th and 5th right toes 2015, 2017, presents to [facility] after feelings of subjective fever and chills after bumping his right foot with draining ulcer. Patient admitted not septic, with WBC 12.3, ESR and CRP elevation. Podiatry consulted for draining right foot ulcer, unable to probe bone, cultured which showed GBS and staph species. Patient empirically started on Vancomycin and Zosyn. MRI of right foot done, consistent with osteomyelitis of the 3rd toe. Patient has not been febrile since admission, with downtrending leukocytosis. Infection disease consulted for antibiotic management and duration. Patient seen and examined at bedside. Patient feeling "ok", without any acute complaints. Denies any foot pain, abdominal pain, nausea, vomiting, diarrhea, SOB, cough.       PAST MEDICAL & SURGICAL HISTORY:  Diabetes  Toe amputation status      REVIEW OF SYSTEMS:  Negative except for above.       MEDICATIONS  (STANDING):  atorvastatin 40 milliGRAM(s) Oral at bedtime  collagenase Ointment 1 Application(s) Topical daily  dextrose 5%. 1000 milliLiter(s) (50 mL/Hr) IV Continuous <Continuous>  dextrose 50% Injectable 12.5 Gram(s) IV Push once  dextrose 50% Injectable 25 Gram(s) IV Push once  dextrose 50% Injectable 25 Gram(s) IV Push once  heparin  Injectable 5000 Unit(s) SubCutaneous every 8 hours  insulin glargine Injectable (LANTUS) 22 Unit(s) SubCutaneous at bedtime  insulin lispro (HumaLOG) corrective regimen sliding scale   SubCutaneous Before meals and at bedtime  lisinopril 5 milliGRAM(s) Oral daily  melatonin 5 milliGRAM(s) Oral at bedtime  piperacillin/tazobactam IVPB. 4.5 Gram(s) IV Intermittent every 6 hours  vancomycin  IVPB 1500 milliGRAM(s) IV Intermittent every 12 hours    MEDICATIONS  (PRN):  acetaminophen   Tablet. 650 milliGRAM(s) Oral every 6 hours PRN Moderate Pain (4 - 6)  dextrose Gel 1 Dose(s) Oral once PRN Blood Glucose LESS THAN 70 milliGRAM(s)/deciliter  glucagon  Injectable 1 milliGRAM(s) IntraMuscular once PRN Glucose LESS THAN 70 milligrams/deciliter      Allergies    No Known Allergies    Intolerances        Vital Signs Last 24 Hrs  T(C): 36.6 (30 Apr 2018 08:45), Max: 37.1 (29 Apr 2018 20:11)  T(F): 97.9 (30 Apr 2018 08:45), Max: 98.8 (29 Apr 2018 20:11)  HR: 73 (30 Apr 2018 08:45) (67 - 73)  BP: 125/83 (30 Apr 2018 08:45) (125/83 - 149/89)  BP(mean): --  RR: 18 (30 Apr 2018 08:45) (17 - 18)  SpO2: 99% (30 Apr 2018 08:45) (97% - 99%)    PHYSICAL EXAM:  Constitutional: NAD. Well-developed, well nourished  HEENT: Conjunctiva clear, no oral lesion, no sinus tenderness on percussion	  Neck: no JVD, no lymphadenopathy, supple  Respiratory: CTAB. No w/r/r.  Cardiovascular: RRR with no murmurs  Gastrointestinal:soft, (+) BS, no HSM, nondistened, nonrigid.  Extremities: R foot with partial 4th ray and 5th ray amputation. Plantar lateral ulcer extending from lateral midfoot to  3rd toe, no active drainage. R superficial hallux ulcer, no purulence  Vascular: 2+ DP pulses b/l    LABS                        12.7   7.8   )-----------( 378      ( 29 Apr 2018 06:09 )             38.3     04-29    138  |  100  |  10  ----------------------------<  170<H>  4.2   |  26  |  0.99    Ca    9.5      29 Apr 2018 06:09  Mg     1.6     04-29      PT/INR - ( 29 Apr 2018 06:09 )   PT: 11.6 sec;   INR: 1.04                MICROBIOLOGY:  Culture - Other (04.26.18 @ 09:22)    Gram Stain:   Numerous Gram positive cocci in pairs, chains and clusters  Numerous White blood cells    -  Ampicillin: S    -  Cefazolin: S <=4    -  Clindamycin: R    -  Clindamycin: S <=0.5    -  Erythromycin: R    -  Erythromycin: S <=0.5    -  Linezolid: S 4    -  Oxacillin: S 0.5    -  Penicillin: S    -  Penicillin: R >8    -  RIF- Rifampin: S <=1    -  Trimethoprim/Sulfamethoxazole: S <=0.5/9.5    -  Vancomycin: S    -  Vancomycin: S 2    Specimen Source: .Other foot    Culture Results:   Numerous Streptococcus agalactiae (Group B)  Few Staphylococcus aureus    Organism Identification: Streptococcus agalactiae (Group B)  Staphylococcus aureus    Organism: Streptococcus agalactiae (Group B)    Organism: Staphylococcus aureus    Method Type: KB    Method Type: YUN        RADIOLOGY & ADDITIONAL STUDIES:

## 2018-04-30 NOTE — PROGRESS NOTE ADULT - SUBJECTIVE AND OBJECTIVE BOX
Patient is a 42y old  Male who presents with a chief complaint of foot pain (29 Apr 2018 12:47)      INTERVAL HPI/ OVERNIGHT EVENTS  Pt reports no AE overnight. Denies any foot pain. denies any nausea vomiting fever chills CP or SOb. toelrating Abx well.       LABS                        12.7   7.8   )-----------( 378      ( 29 Apr 2018 06:09 )             38.3     04-29    138  |  100  |  10  ----------------------------<  170<H>  4.2   |  26  |  0.99    Ca    9.5      29 Apr 2018 06:09  Mg     1.6     04-29      PT/INR - ( 29 Apr 2018 06:09 )   PT: 11.6 sec;   INR: 1.04              ICU Vital Signs Last 24 Hrs  T(C): 36.6 (30 Apr 2018 08:45), Max: 37.1 (29 Apr 2018 20:11)  T(F): 97.9 (30 Apr 2018 08:45), Max: 98.8 (29 Apr 2018 20:11)  HR: 73 (30 Apr 2018 08:45) (67 - 76)  BP: 125/83 (30 Apr 2018 08:45) (111/75 - 149/89)  BP(mean): --  ABP: --  ABP(mean): --  RR: 18 (30 Apr 2018 08:45) (17 - 18)  SpO2: 99% (30 Apr 2018 08:45) (97% - 99%)      RADIOLOGY    MICROBIOLOGY  Culture - Other (04.26.18 @ 09:22)    Gram Stain:   Numerous Gram positive cocci in pairs, chains and clusters  Numerous White blood cells    -  Ampicillin: S    -  Cefazolin: S <=4    -  Clindamycin: R    -  Clindamycin: S <=0.5    -  Erythromycin: R    -  Erythromycin: S <=0.5    -  Linezolid: S 4    -  Oxacillin: S 0.5    -  Penicillin: S    -  Penicillin: R >8    -  RIF- Rifampin: S <=1    -  Trimethoprim/Sulfamethoxazole: S <=0.5/9.5    -  Vancomycin: S    -  Vancomycin: S 2    Specimen Source: .Other foot    Culture Results:   Numerous Streptococcus agalactiae (Group B)  Few Staphylococcus aureus    Organism Identification: Streptococcus agalactiae (Group B)  Staphylococcus aureus    Organism: Streptococcus agalactiae (Group B)    Organism: Staphylococcus aureus    Method Type: KB    Method Type: YUN      PHYSICAL EXAM  NAD AAO x 3   RLE  Focused  Vasc: DP/PT 2/4, cft brisk to remaining toes. no calor, no erthema, no edema.  simpson grade 0 ulceer plantar hallux, serosanguinous drainage, no signs of infeciton   Simpson grade 1 ulcer plantar lateral foot,  with small area of fibrotic tissue centrally with surrounding dermal layer intact, no probe to bone, no tracking, some clear exudate. No malodor, no purulence,   neuro: protective sensation diminished   MSK grossly intact       Derm:  Neuro:  MSK:

## 2018-04-30 NOTE — PROGRESS NOTE ADULT - PROBLEM SELECTOR PLAN 2
Xray with evidence of chronic osteo, suspect presentation is 2/2 acute on chronic infection. WBC of 12.3, ESR of 34 and crp of 11.  Tissue Cx growing GBS and MSSA.  - f/u MRI foot   - c/w vanc/zosyn   - f/u Bcx MRI confirming osteomyelitis.  Tissue Cx growing GBS and MSSA.  - c/w vanc/zosyn   - ID consult  - F/u podiatry recs

## 2018-04-30 NOTE — PROGRESS NOTE ADULT - SUBJECTIVE AND OBJECTIVE BOX
OVERNIGHT EVENTS:    SUBJECTIVE / INTERVAL HPI: Patient seen and examined at bedside.     VITAL SIGNS:  Vital Signs Last 24 Hrs  T(C): 36.6 (30 Apr 2018 08:45), Max: 37.1 (29 Apr 2018 20:11)  T(F): 97.9 (30 Apr 2018 08:45), Max: 98.8 (29 Apr 2018 20:11)  HR: 73 (30 Apr 2018 08:45) (67 - 73)  BP: 125/83 (30 Apr 2018 08:45) (125/83 - 149/89)  BP(mean): --  RR: 18 (30 Apr 2018 08:45) (17 - 18)  SpO2: 99% (30 Apr 2018 08:45) (97% - 99%)    PHYSICAL EXAM:    General: WDWN  HEENT: NC/AT; PERRL, anicteric sclera; MMM  Neck: supple  Cardiovascular: +S1/S2, RRR  Respiratory: CTA B/L; no W/R/R  Gastrointestinal: soft, NT/ND; +BSx4  Extremities: WWP; no edema, clubbing or cyanosis  Vascular: 2+ radial, DP/PT pulses B/L  Neurological: AAOx3; no focal deficits    MEDICATIONS:  MEDICATIONS  (STANDING):  atorvastatin 40 milliGRAM(s) Oral at bedtime  collagenase Ointment 1 Application(s) Topical daily  dextrose 5%. 1000 milliLiter(s) (50 mL/Hr) IV Continuous <Continuous>  dextrose 50% Injectable 12.5 Gram(s) IV Push once  dextrose 50% Injectable 25 Gram(s) IV Push once  dextrose 50% Injectable 25 Gram(s) IV Push once  heparin  Injectable 5000 Unit(s) SubCutaneous every 8 hours  insulin glargine Injectable (LANTUS) 22 Unit(s) SubCutaneous at bedtime  insulin lispro (HumaLOG) corrective regimen sliding scale   SubCutaneous Before meals and at bedtime  insulin lispro Injectable (HumaLOG) 3 Unit(s) SubCutaneous three times a day before meals  melatonin 5 milliGRAM(s) Oral at bedtime  piperacillin/tazobactam IVPB. 4.5 Gram(s) IV Intermittent every 6 hours  vancomycin  IVPB 1500 milliGRAM(s) IV Intermittent every 12 hours    MEDICATIONS  (PRN):  acetaminophen   Tablet. 650 milliGRAM(s) Oral every 6 hours PRN Moderate Pain (4 - 6)  dextrose Gel 1 Dose(s) Oral once PRN Blood Glucose LESS THAN 70 milliGRAM(s)/deciliter  glucagon  Injectable 1 milliGRAM(s) IntraMuscular once PRN Glucose LESS THAN 70 milligrams/deciliter      ALLERGIES:  Allergies    No Known Allergies    Intolerances        LABS:                        12.7   7.8   )-----------( 378      ( 29 Apr 2018 06:09 )             38.3     04-29    138  |  100  |  10  ----------------------------<  170<H>  4.2   |  26  |  0.99    Ca    9.5      29 Apr 2018 06:09  Mg     1.6     04-29      PT/INR - ( 29 Apr 2018 06:09 )   PT: 11.6 sec;   INR: 1.04              CAPILLARY BLOOD GLUCOSE      POCT Blood Glucose.: 350 mg/dL (30 Apr 2018 12:31)      RADIOLOGY & ADDITIONAL TESTS: Reviewed.    ASSESSMENT:    PLAN: OVERNIGHT EVENTS: ANDREW    SUBJECTIVE / INTERVAL HPI: Patient seen and examined at bedside. No complaints. ROS unremarkable    VITAL SIGNS:  Vital Signs Last 24 Hrs  T(C): 36.6 (30 Apr 2018 08:45), Max: 37.1 (29 Apr 2018 20:11)  T(F): 97.9 (30 Apr 2018 08:45), Max: 98.8 (29 Apr 2018 20:11)  HR: 73 (30 Apr 2018 08:45) (67 - 73)  BP: 125/83 (30 Apr 2018 08:45) (125/83 - 149/89)  BP(mean): --  RR: 18 (30 Apr 2018 08:45) (17 - 18)  SpO2: 99% (30 Apr 2018 08:45) (97% - 99%)    PHYSICAL EXAM:    General: WDWN, NAD  HEENT: NC/AT; PERRL, anicteric sclera; MMM  Neck: supple  Cardiovascular: +S1/S2, RRR  Respiratory: CTA B/L; no W/R/R  Gastrointestinal: soft, NT/ND; +BSx4  Extremities: WWP; b/l rosenthal thickening consistent with dupytren's contracture, right foot wrapped in gauze, C/D/I  Vascular: 2+ radial, DP/PT pulses B/L  Neurological: AAOx3; no focal deficits    MEDICATIONS:  MEDICATIONS  (STANDING):  atorvastatin 40 milliGRAM(s) Oral at bedtime  collagenase Ointment 1 Application(s) Topical daily  dextrose 5%. 1000 milliLiter(s) (50 mL/Hr) IV Continuous <Continuous>  dextrose 50% Injectable 12.5 Gram(s) IV Push once  dextrose 50% Injectable 25 Gram(s) IV Push once  dextrose 50% Injectable 25 Gram(s) IV Push once  heparin  Injectable 5000 Unit(s) SubCutaneous every 8 hours  insulin glargine Injectable (LANTUS) 22 Unit(s) SubCutaneous at bedtime  insulin lispro (HumaLOG) corrective regimen sliding scale   SubCutaneous Before meals and at bedtime  insulin lispro Injectable (HumaLOG) 3 Unit(s) SubCutaneous three times a day before meals  melatonin 5 milliGRAM(s) Oral at bedtime  piperacillin/tazobactam IVPB. 4.5 Gram(s) IV Intermittent every 6 hours  vancomycin  IVPB 1500 milliGRAM(s) IV Intermittent every 12 hours    MEDICATIONS  (PRN):  acetaminophen   Tablet. 650 milliGRAM(s) Oral every 6 hours PRN Moderate Pain (4 - 6)  dextrose Gel 1 Dose(s) Oral once PRN Blood Glucose LESS THAN 70 milliGRAM(s)/deciliter  glucagon  Injectable 1 milliGRAM(s) IntraMuscular once PRN Glucose LESS THAN 70 milligrams/deciliter    ALLERGIES:  Allergies    No Known Allergies    Intolerances    LABS:                        12.7   7.8   )-----------( 378      ( 29 Apr 2018 06:09 )             38.3     04-29    138  |  100  |  10  ----------------------------<  170<H>  4.2   |  26  |  0.99    Ca    9.5      29 Apr 2018 06:09  Mg     1.6     04-29      PT/INR - ( 29 Apr 2018 06:09 )   PT: 11.6 sec;   INR: 1.04       CAPILLARY BLOOD GLUCOSE  POCT Blood Glucose.: 350 mg/dL (30 Apr 2018 12:31)    < from: MR Foot w/ IV Cont, Right (04.28.18 @ 12:34) >  Evaluation of the osseous structures redemonstrate amputation at the   proximal aspect of the fourth and fifth metatarsal bones. The metatarsal   stumps making T1 hyperintensity. No significant edema like signal is   present in the metatarsal stumps. There is significant erosion of the   proximal and middle phalanges of the third toe at the proximal   interphalangeal joint adjacent marrow edema like signal is present. A   moderate amount of overlying soft tissue swelling is noted. This is   suspicious for septic arthritis with osteomyelitis. Postcontrast images   demonstrate enhancement soft tissues adjacent the PIP joint as well as   the periarticular osseousstructures. There is suspected ulceration at   the dorsal lateral aspect of the toe near the PIP joint. Additionally,   there is T1 hypointensity and corresponding STIR hyperintensity in the   distal phalanx of the great toe. Overlying skin thickening is present.   The distal phalanx demonstrates postcontrast enhancement in this too is   consistent with osteomyelitis.      The tibial hallux sesamoid is fragmented with edema like signal more   pronounced in the distal segment.    IMPRESSION:   1.Patient is again noted to be status post amputation of the fourth and   fifth metatarsal bones as above.  2.  Findings consistent with osteomyelitis of the third toe centered   about the PIP joint and the distal phalanx of the great toe.    < end of copied text >    RADIOLOGY & ADDITIONAL TESTS: Reviewed.

## 2018-04-30 NOTE — CONSULT NOTE ADULT - ASSESSMENT
42M w/DM2 with infected R foot ulcer     - No acute vascular intervention at this point in time.  - Agree with management as per Podiatry.  - Pt appears to have adequate distal blood flow.  - Please call back with any further questions or concerns.
42M w/DM2 with infected R foot ulcer     - Recommend admission to medicine for Broad spectrum IV Abx   - f/u culture  - f/u MRI  -  if + for osteomyelitis will likely need transmetatarsal amputation, if negative will go for debridement of necrotic tissue   - betadine dressing   - pre-op workup, type and screen x 2, CXR, EKG UA, coags   - will discuss with attending
42 year old male w/ hx of uncontrolled/non-compliant DM2 (on Insulin), s/p amputations of 4th and 5th right toes 2015, 2017 admitted for right foot ulceration and drainage, with likely osteomyelitis based on MRI. Superficial cultures growing GBS and staph species. Currently on empirical Vancomycin and Zosyn. Podiatry on board, plan for bone biopsy today.     Recommendations:   1. Continue with Vancomycin 1500mg q12h and Zosyn 4.5g q6h  2. Bone biopsy- as per podiatry     Discussed w/ primary team.   ID will follow

## 2018-04-30 NOTE — PROGRESS NOTE ADULT - PROBLEM SELECTOR PLAN 6
F: none  E: replete PRN  N: diabetic diet F: none  E: replete PRN  N: diabetic diet    FULL CODE    Dispo: [facility]

## 2018-04-30 NOTE — PROCEDURE NOTE - PROCEDURE
<<-----Click on this checkbox to enter Procedure Percutaneous biopsy of bone  05/01/2018  Right 1st and 3rd toe  Active  DMANDIL

## 2018-04-30 NOTE — CONSULT NOTE ADULT - ATTENDING COMMENTS
I have reviewed the medical record, including laboratory and radiographic studies, interviewed and examined the patient and discussed the plan with Dr. Armendariz, the ID Resident.  Agree with above.  Will follow with you – ID service. I have reviewed the medical record, including laboratory and radiographic studies, interviewed and examined the patient and discussed the plan with Dr. Armendariz, the ID Resident.  Agree with above.  MRI findings are in 3rd toe in area separate from ulcer.  Would treat for osteomyelitis even if bone biopsy cultures do not grow but they would be useful to direct therapy. Will follow with you – ID service.

## 2018-04-30 NOTE — PROGRESS NOTE ADULT - ASSESSMENT
42 M w/ DMII uncontrolled w/ hx of 2 toe amputations admitted for diabetic wound infection with suspicion for osteomyelitis. 42 M w/ DMII uncontrolled w/ hx of 2 toe amputations admitted for osteomyelitis of R foot

## 2018-04-30 NOTE — PROGRESS NOTE ADULT - ASSESSMENT
43 yo diabetic male with infected ulcer of right foot    -C/w IV abx, as per primary team, leukocytosis down trending, wound clinically improved  -f/u MRI   -Wound clinically improved,  no need for surgical intervention, pt okay for discharge from podiatry standpoint   - Santyl and DSD to wound qday   -Recommend PO abx of Augmentin 875 BID, upon discharge  - Weight bear with surgical shoe   - follow up with Dr. David Green 1 week after dc

## 2018-05-01 LAB
ANION GAP SERPL CALC-SCNC: 11 MMOL/L — SIGNIFICANT CHANGE UP (ref 5–17)
ANION GAP SERPL CALC-SCNC: 11 MMOL/L — SIGNIFICANT CHANGE UP (ref 5–17)
APTT BLD: 31.9 SEC — SIGNIFICANT CHANGE UP (ref 27.5–37.4)
BUN SERPL-MCNC: 14 MG/DL — SIGNIFICANT CHANGE UP (ref 7–23)
BUN SERPL-MCNC: 15 MG/DL — SIGNIFICANT CHANGE UP (ref 7–23)
CALCIUM SERPL-MCNC: 9.1 MG/DL — SIGNIFICANT CHANGE UP (ref 8.4–10.5)
CALCIUM SERPL-MCNC: 9.3 MG/DL — SIGNIFICANT CHANGE UP (ref 8.4–10.5)
CHLORIDE SERPL-SCNC: 95 MMOL/L — LOW (ref 96–108)
CHLORIDE SERPL-SCNC: 96 MMOL/L — SIGNIFICANT CHANGE UP (ref 96–108)
CO2 SERPL-SCNC: 26 MMOL/L — SIGNIFICANT CHANGE UP (ref 22–31)
CO2 SERPL-SCNC: 26 MMOL/L — SIGNIFICANT CHANGE UP (ref 22–31)
CREAT SERPL-MCNC: 1.05 MG/DL — SIGNIFICANT CHANGE UP (ref 0.5–1.3)
CREAT SERPL-MCNC: 1.17 MG/DL — SIGNIFICANT CHANGE UP (ref 0.5–1.3)
CULTURE RESULTS: SIGNIFICANT CHANGE UP
CULTURE RESULTS: SIGNIFICANT CHANGE UP
GLUCOSE BLDC GLUCOMTR-MCNC: 134 MG/DL — HIGH (ref 70–99)
GLUCOSE BLDC GLUCOMTR-MCNC: 202 MG/DL — HIGH (ref 70–99)
GLUCOSE BLDC GLUCOMTR-MCNC: 240 MG/DL — HIGH (ref 70–99)
GLUCOSE BLDC GLUCOMTR-MCNC: 252 MG/DL — HIGH (ref 70–99)
GLUCOSE SERPL-MCNC: 284 MG/DL — HIGH (ref 70–99)
GLUCOSE SERPL-MCNC: 331 MG/DL — HIGH (ref 70–99)
GRAM STN FLD: SIGNIFICANT CHANGE UP
GRAM STN FLD: SIGNIFICANT CHANGE UP
HCT VFR BLD CALC: 39.9 % — SIGNIFICANT CHANGE UP (ref 39–50)
HGB BLD-MCNC: 13.5 G/DL — SIGNIFICANT CHANGE UP (ref 13–17)
INR BLD: 1.05 — SIGNIFICANT CHANGE UP (ref 0.88–1.16)
MAGNESIUM SERPL-MCNC: 1.6 MG/DL — SIGNIFICANT CHANGE UP (ref 1.6–2.6)
MAGNESIUM SERPL-MCNC: 1.7 MG/DL — SIGNIFICANT CHANGE UP (ref 1.6–2.6)
MCHC RBC-ENTMCNC: 28.8 PG — SIGNIFICANT CHANGE UP (ref 27–34)
MCHC RBC-ENTMCNC: 33.8 G/DL — SIGNIFICANT CHANGE UP (ref 32–36)
MCV RBC AUTO: 85.1 FL — SIGNIFICANT CHANGE UP (ref 80–100)
OSMOLALITY SERPL: 299 MOSM/KG — SIGNIFICANT CHANGE UP (ref 280–301)
PLATELET # BLD AUTO: 408 K/UL — HIGH (ref 150–400)
POTASSIUM SERPL-MCNC: 4.4 MMOL/L — SIGNIFICANT CHANGE UP (ref 3.5–5.3)
POTASSIUM SERPL-MCNC: 4.9 MMOL/L — SIGNIFICANT CHANGE UP (ref 3.5–5.3)
POTASSIUM SERPL-SCNC: 4.4 MMOL/L — SIGNIFICANT CHANGE UP (ref 3.5–5.3)
POTASSIUM SERPL-SCNC: 4.9 MMOL/L — SIGNIFICANT CHANGE UP (ref 3.5–5.3)
PROTHROM AB SERPL-ACNC: 11.7 SEC — SIGNIFICANT CHANGE UP (ref 9.8–12.7)
RBC # BLD: 4.69 M/UL — SIGNIFICANT CHANGE UP (ref 4.2–5.8)
RBC # FLD: 13.4 % — SIGNIFICANT CHANGE UP (ref 10.3–16.9)
SODIUM SERPL-SCNC: 132 MMOL/L — LOW (ref 135–145)
SODIUM SERPL-SCNC: 133 MMOL/L — LOW (ref 135–145)
SPECIMEN SOURCE: SIGNIFICANT CHANGE UP
VANCOMYCIN TROUGH SERPL-MCNC: 20 UG/ML — SIGNIFICANT CHANGE UP (ref 10–20)
WBC # BLD: 9.4 K/UL — SIGNIFICANT CHANGE UP (ref 3.8–10.5)
WBC # FLD AUTO: 9.4 K/UL — SIGNIFICANT CHANGE UP (ref 3.8–10.5)

## 2018-05-01 PROCEDURE — 99233 SBSQ HOSP IP/OBS HIGH 50: CPT | Mod: GC

## 2018-05-01 RX ORDER — INSULIN LISPRO 100/ML
6 VIAL (ML) SUBCUTANEOUS
Refills: 0 | Status: DISCONTINUED | OUTPATIENT
Start: 2018-05-01 | End: 2018-05-02

## 2018-05-01 RX ORDER — VANCOMYCIN HCL 1 G
1500 VIAL (EA) INTRAVENOUS EVERY 12 HOURS
Refills: 0 | Status: DISCONTINUED | OUTPATIENT
Start: 2018-05-01 | End: 2018-05-02

## 2018-05-01 RX ADMIN — ATORVASTATIN CALCIUM 40 MILLIGRAM(S): 80 TABLET, FILM COATED ORAL at 21:28

## 2018-05-01 RX ADMIN — Medication 300 MILLIGRAM(S): at 12:00

## 2018-05-01 RX ADMIN — PIPERACILLIN AND TAZOBACTAM 200 GRAM(S): 4; .5 INJECTION, POWDER, LYOPHILIZED, FOR SOLUTION INTRAVENOUS at 03:44

## 2018-05-01 RX ADMIN — HEPARIN SODIUM 5000 UNIT(S): 5000 INJECTION INTRAVENOUS; SUBCUTANEOUS at 05:13

## 2018-05-01 RX ADMIN — Medication 300 MILLIGRAM(S): at 21:29

## 2018-05-01 RX ADMIN — Medication 6: at 12:49

## 2018-05-01 RX ADMIN — LISINOPRIL 10 MILLIGRAM(S): 2.5 TABLET ORAL at 05:13

## 2018-05-01 RX ADMIN — PIPERACILLIN AND TAZOBACTAM 200 GRAM(S): 4; .5 INJECTION, POWDER, LYOPHILIZED, FOR SOLUTION INTRAVENOUS at 19:11

## 2018-05-01 RX ADMIN — HEPARIN SODIUM 5000 UNIT(S): 5000 INJECTION INTRAVENOUS; SUBCUTANEOUS at 14:45

## 2018-05-01 RX ADMIN — Medication 3 UNIT(S): at 08:44

## 2018-05-01 RX ADMIN — Medication 650 MILLIGRAM(S): at 21:28

## 2018-05-01 RX ADMIN — Medication 6 UNIT(S): at 12:49

## 2018-05-01 RX ADMIN — HEPARIN SODIUM 5000 UNIT(S): 5000 INJECTION INTRAVENOUS; SUBCUTANEOUS at 21:28

## 2018-05-01 RX ADMIN — Medication 6 UNIT(S): at 17:59

## 2018-05-01 RX ADMIN — Medication 4: at 08:44

## 2018-05-01 RX ADMIN — Medication 4: at 22:03

## 2018-05-01 RX ADMIN — INSULIN GLARGINE 22 UNIT(S): 100 INJECTION, SOLUTION SUBCUTANEOUS at 22:03

## 2018-05-01 RX ADMIN — Medication 5 MILLIGRAM(S): at 21:28

## 2018-05-01 RX ADMIN — Medication 1 APPLICATION(S): at 12:28

## 2018-05-01 RX ADMIN — PIPERACILLIN AND TAZOBACTAM 200 GRAM(S): 4; .5 INJECTION, POWDER, LYOPHILIZED, FOR SOLUTION INTRAVENOUS at 09:55

## 2018-05-01 RX ADMIN — Medication 650 MILLIGRAM(S): at 22:27

## 2018-05-01 NOTE — PROGRESS NOTE ADULT - SUBJECTIVE AND OBJECTIVE BOX
PROGRESS NOTE   Patient is a 42y old  Male who presents with a chief complaint of foot pain (29 Apr 2018 12:47)      Interval History: Patient seen and evaluated at bedside resting comfortably in no pain.  Patient states that he had some pain at the bone biopsy site but that it has since resolved.     Vital Signs Last 24 Hrs  T(C): 36.5 (01 May 2018 05:05), Max: 37.1 (30 Apr 2018 20:45)  T(F): 97.7 (01 May 2018 05:05), Max: 98.8 (30 Apr 2018 20:45)  HR: 65 (01 May 2018 05:05) (65 - 71)  BP: 118/74 (01 May 2018 05:05) (118/74 - 121/74)  BP(mean): --  RR: 18 (01 May 2018 05:05) (18 - 18)  SpO2: 96% (01 May 2018 05:05) (96% - 99%)                          13.5   9.4   )-----------( 408      ( 01 May 2018 07:18 )             39.9               05-01    133<L>  |  96  |  15  ----------------------------<  284<H>  4.4   |  26  |  1.17    Ca    9.1      01 May 2018 07:18  Mg     1.6     05-01        PHYSICAL EXAM  GEN: ARIEL MOURA is a pleasant well-nourished, well developed 42y Male in no acute distress, alert awake, and oriented to person, place and time.   LE Focused:  No change in neurovascular status since the previous encounter.  The right lateral foot ulceration has a fibrogranular base that appears to be healing.  No periwound erythema, edema. or active drainage.

## 2018-05-01 NOTE — PROGRESS NOTE ADULT - SUBJECTIVE AND OBJECTIVE BOX
OVERNIGHT EVENTS: ANDREW    SUBJECTIVE / INTERVAL HPI: Patient seen and examined at bedside. C/o mild R foot pain after bone biopsy.  Remaining ROS unremarkable.    VITAL SIGNS:  Vital Signs Last 24 Hrs  T(C): 36.5 (01 May 2018 05:05), Max: 37.1 (30 Apr 2018 20:45)  T(F): 97.7 (01 May 2018 05:05), Max: 98.8 (30 Apr 2018 20:45)  HR: 65 (01 May 2018 05:05) (65 - 71)  BP: 118/74 (01 May 2018 05:05) (118/74 - 121/74)  BP(mean): --  RR: 18 (01 May 2018 05:05) (18 - 18)  SpO2: 96% (01 May 2018 05:05) (96% - 99%)    PHYSICAL EXAM:    General: WDWN, NAD  HEENT: NC/AT; PERRL, anicteric sclera; MMM  Neck: supple  Cardiovascular: +S1/S2, RRR  Respiratory: CTA B/L; no W/R/R  Gastrointestinal: soft, NT/ND; +BSx4  Extremities: WWP; b/l rosenthal thickening consistent with dupytren's contracture, right foot wrapped in gauze, C/D/I  Vascular: 2+ radial, DP/PT pulses B/L  Neurological: AAOx3; no focal deficits    MEDICATIONS:  MEDICATIONS  (STANDING):  atorvastatin 40 milliGRAM(s) Oral at bedtime  collagenase Ointment 1 Application(s) Topical daily  dextrose 5%. 1000 milliLiter(s) (50 mL/Hr) IV Continuous <Continuous>  dextrose 50% Injectable 12.5 Gram(s) IV Push once  dextrose 50% Injectable 25 Gram(s) IV Push once  dextrose 50% Injectable 25 Gram(s) IV Push once  heparin  Injectable 5000 Unit(s) SubCutaneous every 8 hours  insulin glargine Injectable (LANTUS) 22 Unit(s) SubCutaneous at bedtime  insulin lispro (HumaLOG) corrective regimen sliding scale   SubCutaneous Before meals and at bedtime  insulin lispro Injectable (HumaLOG) 6 Unit(s) SubCutaneous three times a day before meals  lidocaine 1% Injectable 20 milliLiter(s) Local Injection once  lisinopril 10 milliGRAM(s) Oral daily  melatonin 5 milliGRAM(s) Oral at bedtime  piperacillin/tazobactam IVPB. 4.5 Gram(s) IV Intermittent every 6 hours  vancomycin  IVPB 1500 milliGRAM(s) IV Intermittent every 12 hours    MEDICATIONS  (PRN):  acetaminophen   Tablet. 650 milliGRAM(s) Oral every 6 hours PRN Moderate Pain (4 - 6)  dextrose Gel 1 Dose(s) Oral once PRN Blood Glucose LESS THAN 70 milliGRAM(s)/deciliter  glucagon  Injectable 1 milliGRAM(s) IntraMuscular once PRN Glucose LESS THAN 70 milligrams/deciliter      ALLERGIES:  Allergies    No Known Allergies    Intolerances    LABS:                        13.5   9.4   )-----------( 408      ( 01 May 2018 07:18 )             39.9     05-01    133<L>  |  96  |  15  ----------------------------<  284<H>  4.4   |  26  |  1.17    Ca    9.1      01 May 2018 07:18  Mg     1.6     05-01    PT/INR - ( 01 May 2018 07:18 )   PT: 11.7 sec;   INR: 1.05      PTT - ( 01 May 2018 07:18 )  PTT:31.9 sec    CAPILLARY BLOOD GLUCOSE  POCT Blood Glucose.: 240 mg/dL (01 May 2018 08:08)    RADIOLOGY & ADDITIONAL TESTS: Reviewed.

## 2018-05-01 NOTE — PROGRESS NOTE ADULT - PROBLEM SELECTOR PLAN 2
MRI confirming osteomyelitis.  Tissue Cx growing GBS and MSSA. Bone biopsy gram stain negative. Cx unlikely to grow in setting of prolonged abx.  - c/w vanc/zosyn   - F/u ID recs for antibiotic regimen  - Plan for PICC line

## 2018-05-01 NOTE — PROGRESS NOTE ADULT - PROBLEM SELECTOR PLAN 3
HbA1C 12.9.   - Lantus 22 units at bedtime  - Lispro 6 units premeal  - ISS   - diabetes education  - C/w lisinopril 5 mg daily  - c/w atorvastatin

## 2018-05-01 NOTE — PROGRESS NOTE ADULT - ASSESSMENT
42 year old male w/ hx of uncontrolled/non-compliant DM2 (on Insulin), s/p amputations of 4th and 5th right toes 2015, 2017 admitted for right foot ulceration and drainage, with likely osteomyelitis based on MRI. Superficial cultures growing GBS and staph species. Currently on empirical Vancomycin and Zosyn. Podiatry on board, bone biopsy yesterday.     -Recommend PICC line for long term antibiotics  -Continue Vancomycin 1500mg q12h and Zosyn 4.5g q6h for a total of 6 weeks (stop date:   -f/u bone biopsy results and will tailor IV antibiotics if necessary.   -S/w patient, at length, about discharge and IV home infusions and pt states he is confident that he will be able to properly manage his IV antibiotics. In addition, pt states that he has his daughter taking care of him and she will help out.   -Pt is safe for d/c once PICC line is placement and IV antibiotic treatments are set up. 42 year old male w/ hx of uncontrolled/non-compliant DM2 (on Insulin), s/p amputations of 4th and 5th right toes 2015, 2017 admitted for right foot ulceration and drainage, with likely osteomyelitis based on MRI. Superficial cultures growing GBS and staph species. Currently on empirical Vancomycin and Zosyn. Podiatry on board, bone biopsy yesterday.     -Recommend PICC line for long term antibiotics  -Continue Vancomycin 1500mg q12h and Zosyn 4.5g q6h for a total of 6 weeks (stop date: 6/7/18)  -f/u bone biopsy results and will tailor IV antibiotics if necessary.   -S/w patient, at length, about discharge and IV home infusions and pt states he is confident that he will be able to properly manage his IV antibiotics. In addition, pt states that he has his daughter taking care of him and she will help out.   -Pt is safe for d/c once PICC line is placement and IV antibiotic treatments are set up. 42 year old male w/ hx of uncontrolled/non-compliant DM2 (on Insulin), s/p amputations of 4th and 5th right toes 2015, 2017 admitted for right foot ulceration and drainage, with likely osteomyelitis based on MRI. Superficial cultures growing GBS and staph species. Currently on empirical Vancomycin and Zosyn. Podiatry on board, bone biopsy yesterday.     -Recommend PICC line for long term antibiotics  -Continue Vancomycin 1500mg q12h and Zosyn 4.5g q6h for a total of 6 weeks (stop date: 6/7/18)  - Weekly labs:  CBC, CMP, ESR, CRP, vancomycin trough  -f/u bone biopsy results and will tailor IV antibiotics if necessary.   -S/w patient, at length, about discharge and IV home infusions and pt states he is confident that he will be able to properly manage his IV antibiotics.   -Pt is safe for d/c once PICC line is placement and IV antibiotic treatments are set up.   -Please schedule pt to see Dr. Foy within 2 weeks from discharge.  Recommendations discussed with primary team.

## 2018-05-01 NOTE — PROGRESS NOTE ADULT - ASSESSMENT
A/P: 42 year old male with right plantar lateral foot ulceration, and osteomyelitis confirmed on MRI s/p bone biopsy of the right distal hallucal phalanx.    - Patient to go for PICC today, followed by 6 weeks of IV antibiotics for OM.  Antibiotic regimen as per ID / will adjust coverage pending results of bone biopsy.   - Dispo planning as per primary team.  Patient exhibited concern to return to work as soon as possible.   - Explained to patient how to use the Santyl ointment and perform his own dressing changes.  Patient exhibited adequate verbal understanding.

## 2018-05-01 NOTE — PROGRESS NOTE ADULT - SUBJECTIVE AND OBJECTIVE BOX
ID progress note:      ARIEL MOURA is a 42 year old male w/ hx of uncontrolled/non-compliant DM2 (on Insulin), s/p amputations of 4th and 5th right toes 2015, 2017 admitted for right foot ulceration and drainage, with likely osteomyelitis based on MRI. Superficial cultures growing GBS and staph species. Currently on empirical Vancomycin and Zosyn. Podiatry on board, bone biopsy yesterday    PMH:   Diabetic neuropathy  Uncontrolled insulin dependent diabetes mellitus  Peripheral neuropathy  Acute osteomyelitis  Sepsis  Nutrition, metabolism, and development symptoms  Prophylactic measure  Diabetes  Diabetic foot ulcer      ROS:  CONSTITUTIONAL:  Negative fever or chills, feels well, good appetite  EYES:  Negative  blurry vision or double vision  CARDIOVASCULAR:  Negative for chest pain or palpitations  RESPIRATORY:  Negative for cough, wheezing, or SOB   GASTROINTESTINAL:  Negative for nausea, vomiting, diarrhea, constipation, or abdominal pain  GENITOURINARY:  Negative frequency, urgency or dysuria  NEUROLOGIC:  No headache, confusion, dizziness, lightheadedness    Allergies    No Known Allergies    Intolerances        ANTIBIOTICS/RELEVANT:  antimicrobials  piperacillin/tazobactam IVPB. 4.5 Gram(s) IV Intermittent every 6 hours  vancomycin  IVPB 1500 milliGRAM(s) IV Intermittent every 12 hours    immunologic:    OTHER:  acetaminophen   Tablet. 650 milliGRAM(s) Oral every 6 hours PRN  atorvastatin 40 milliGRAM(s) Oral at bedtime  collagenase Ointment 1 Application(s) Topical daily  dextrose 5%. 1000 milliLiter(s) IV Continuous <Continuous>  dextrose 50% Injectable 12.5 Gram(s) IV Push once  dextrose 50% Injectable 25 Gram(s) IV Push once  dextrose 50% Injectable 25 Gram(s) IV Push once  dextrose Gel 1 Dose(s) Oral once PRN  glucagon  Injectable 1 milliGRAM(s) IntraMuscular once PRN  heparin  Injectable 5000 Unit(s) SubCutaneous every 8 hours  insulin glargine Injectable (LANTUS) 22 Unit(s) SubCutaneous at bedtime  insulin lispro (HumaLOG) corrective regimen sliding scale   SubCutaneous Before meals and at bedtime  insulin lispro Injectable (HumaLOG) 6 Unit(s) SubCutaneous three times a day before meals  lidocaine 1% Injectable 20 milliLiter(s) Local Injection once  lisinopril 10 milliGRAM(s) Oral daily  melatonin 5 milliGRAM(s) Oral at bedtime      Objective:  Vital Signs Last 24 Hrs  T(C): 37.1 (01 May 2018 15:40), Max: 37.1 (30 Apr 2018 20:45)  T(F): 98.7 (01 May 2018 15:40), Max: 98.8 (30 Apr 2018 20:45)  HR: 100 (01 May 2018 15:40) (62 - 100)  BP: 132/79 (01 May 2018 15:40) (118/74 - 132/79)  BP(mean): --  RR: 20 (01 May 2018 15:40) (18 - 20)  SpO2: 96% (01 May 2018 15:40) (96% - 99%)    PHYSICAL EXAM:  Constitutional:Well-developed, well nourishe  Eyes:RONI, EOMI  Ear/Nose/Throat: no oral lesion, no sinus tenderness on percussion	  Neck:no JVD, no lymphadenopathy, supple  Respiratory: CTA maricruz  Cardiovascular: S1S2 RRR, no murmurs  Gastrointestinal:soft, (+) BS, no HSM  Extremities:no e/e/c        LABS:                        13.5   9.4   )-----------( 408      ( 01 May 2018 07:18 )             39.9     05-01    132<L>  |  95<L>  |  14  ----------------------------<  331<H>  4.9   |  26  |  1.05    Ca    9.3      01 May 2018 11:21  Mg     1.7     05-01      PT/INR - ( 01 May 2018 07:18 )   PT: 11.7 sec;   INR: 1.05          PTT - ( 01 May 2018 07:18 )  PTT:31.9 sec        MICROBIOLOGY:  Culture Results:   No growth to date (04-30 @ 19:11)  Culture Results:   No growth to date (04-30 @ 19:11)        RADIOLOGY & ADDITIONAL STUDIES: ID progress note:      ARIEL MOURA is a 42 year old male w/ hx of uncontrolled/non-compliant DM2 (on Insulin), s/p amputations of 4th and 5th right toes 2015, 2017 admitted for right foot ulceration and drainage, with likely osteomyelitis based on MRI. Superficial cultures growing GBS and staph species. Currently on empirical Vancomycin and Zosyn. Podiatry on board, bone biopsy yesterday.     PMH:   Diabetic neuropathy  Uncontrolled insulin dependent diabetes mellitus  Peripheral neuropathy  Acute osteomyelitis  Sepsis  Nutrition, metabolism, and development symptoms  Prophylactic measure  Diabetes  Diabetic foot ulcer      ROS:  CONSTITUTIONAL:  Negative fever or chills, feels well, good appetite  EYES:  Negative  blurry vision or double vision  CARDIOVASCULAR:  Negative for chest pain or palpitations  RESPIRATORY:  Negative for cough, wheezing, or SOB   GASTROINTESTINAL:  Negative for nausea, vomiting, diarrhea, constipation, or abdominal pain  GENITOURINARY:  Negative frequency, urgency or dysuria  NEUROLOGIC:  No headache, confusion, dizziness, lightheadedness    Allergies    No Known Allergies    Intolerances        ANTIBIOTICS/RELEVANT:  antimicrobials  piperacillin/tazobactam IVPB. 4.5 Gram(s) IV Intermittent every 6 hours  vancomycin  IVPB 1500 milliGRAM(s) IV Intermittent every 12 hours    immunologic:    OTHER:  acetaminophen   Tablet. 650 milliGRAM(s) Oral every 6 hours PRN  atorvastatin 40 milliGRAM(s) Oral at bedtime  collagenase Ointment 1 Application(s) Topical daily  dextrose 5%. 1000 milliLiter(s) IV Continuous <Continuous>  dextrose 50% Injectable 12.5 Gram(s) IV Push once  dextrose 50% Injectable 25 Gram(s) IV Push once  dextrose 50% Injectable 25 Gram(s) IV Push once  dextrose Gel 1 Dose(s) Oral once PRN  glucagon  Injectable 1 milliGRAM(s) IntraMuscular once PRN  heparin  Injectable 5000 Unit(s) SubCutaneous every 8 hours  insulin glargine Injectable (LANTUS) 22 Unit(s) SubCutaneous at bedtime  insulin lispro (HumaLOG) corrective regimen sliding scale   SubCutaneous Before meals and at bedtime  insulin lispro Injectable (HumaLOG) 6 Unit(s) SubCutaneous three times a day before meals  lidocaine 1% Injectable 20 milliLiter(s) Local Injection once  lisinopril 10 milliGRAM(s) Oral daily  melatonin 5 milliGRAM(s) Oral at bedtime      Objective:  Vital Signs Last 24 Hrs  T(C): 37.1 (01 May 2018 15:40), Max: 37.1 (30 Apr 2018 20:45)  T(F): 98.7 (01 May 2018 15:40), Max: 98.8 (30 Apr 2018 20:45)  HR: 100 (01 May 2018 15:40) (62 - 100)  BP: 132/79 (01 May 2018 15:40) (118/74 - 132/79)  BP(mean): --  RR: 20 (01 May 2018 15:40) (18 - 20)  SpO2: 96% (01 May 2018 15:40) (96% - 99%)    PHYSICAL EXAM:  Constitutional:Well-developed, well nourishe  Eyes:RONI, EOMI  Ear/Nose/Throat: no oral lesion, no sinus tenderness on percussion	  Neck:no JVD, no lymphadenopathy, supple  Respiratory: CTA maricruz  Cardiovascular: S1S2 RRR, no murmurs  Gastrointestinal:soft, (+) BS, no HSM  Extremities:no e/e/c        LABS:                        13.5   9.4   )-----------( 408      ( 01 May 2018 07:18 )             39.9     05-01    132<L>  |  95<L>  |  14  ----------------------------<  331<H>  4.9   |  26  |  1.05    Ca    9.3      01 May 2018 11:21  Mg     1.7     05-01      PT/INR - ( 01 May 2018 07:18 )   PT: 11.7 sec;   INR: 1.05          PTT - ( 01 May 2018 07:18 )  PTT:31.9 sec        MICROBIOLOGY:  Culture Results:   No growth to date (04-30 @ 19:11)  Culture Results:   No growth to date (04-30 @ 19:11)    Culture - Tissue with Gram Stain (04.30.18 @ 19:11)    Gram Stain:   No organisms seen  Rare White blood cells    Specimen Source: .Tissue right 3rd toe proximal phalynx    Culture Results:   No growth to date            RADIOLOGY & ADDITIONAL STUDIES:

## 2018-05-02 VITALS
HEART RATE: 71 BPM | OXYGEN SATURATION: 99 % | TEMPERATURE: 98 F | SYSTOLIC BLOOD PRESSURE: 144 MMHG | DIASTOLIC BLOOD PRESSURE: 87 MMHG | RESPIRATION RATE: 17 BRPM

## 2018-05-02 LAB
ANION GAP SERPL CALC-SCNC: 9 MMOL/L — SIGNIFICANT CHANGE UP (ref 5–17)
APTT BLD: 33.2 SEC — SIGNIFICANT CHANGE UP (ref 27.5–37.4)
BLD GP AB SCN SERPL QL: NEGATIVE — SIGNIFICANT CHANGE UP
BUN SERPL-MCNC: 17 MG/DL — SIGNIFICANT CHANGE UP (ref 7–23)
CALCIUM SERPL-MCNC: 9.1 MG/DL — SIGNIFICANT CHANGE UP (ref 8.4–10.5)
CHLORIDE SERPL-SCNC: 96 MMOL/L — SIGNIFICANT CHANGE UP (ref 96–108)
CO2 SERPL-SCNC: 27 MMOL/L — SIGNIFICANT CHANGE UP (ref 22–31)
CREAT ?TM UR-MCNC: 63 MG/DL — SIGNIFICANT CHANGE UP
CREAT SERPL-MCNC: 1.1 MG/DL — SIGNIFICANT CHANGE UP (ref 0.5–1.3)
GLUCOSE BLDC GLUCOMTR-MCNC: 129 MG/DL — HIGH (ref 70–99)
GLUCOSE BLDC GLUCOMTR-MCNC: 136 MG/DL — HIGH (ref 70–99)
GLUCOSE BLDC GLUCOMTR-MCNC: 199 MG/DL — HIGH (ref 70–99)
GLUCOSE SERPL-MCNC: 229 MG/DL — HIGH (ref 70–99)
INR BLD: 1.06 — SIGNIFICANT CHANGE UP (ref 0.88–1.16)
MAGNESIUM SERPL-MCNC: 1.8 MG/DL — SIGNIFICANT CHANGE UP (ref 1.6–2.6)
OSMOLALITY UR: 446 MOSMOL/KG — SIGNIFICANT CHANGE UP (ref 100–650)
PHOSPHATE SERPL-MCNC: 2.9 MG/DL — SIGNIFICANT CHANGE UP (ref 2.5–4.5)
POTASSIUM SERPL-MCNC: 4.3 MMOL/L — SIGNIFICANT CHANGE UP (ref 3.5–5.3)
POTASSIUM SERPL-SCNC: 4.3 MMOL/L — SIGNIFICANT CHANGE UP (ref 3.5–5.3)
PROTHROM AB SERPL-ACNC: 11.8 SEC — SIGNIFICANT CHANGE UP (ref 9.8–12.7)
RH IG SCN BLD-IMP: POSITIVE — SIGNIFICANT CHANGE UP
SODIUM SERPL-SCNC: 132 MMOL/L — LOW (ref 135–145)
SODIUM UR-SCNC: 81 MMOL/L — SIGNIFICANT CHANGE UP
UUN UR-MCNC: 462 MG/DL — SIGNIFICANT CHANGE UP

## 2018-05-02 PROCEDURE — 99239 HOSP IP/OBS DSCHRG MGMT >30: CPT

## 2018-05-02 PROCEDURE — 76937 US GUIDE VASCULAR ACCESS: CPT | Mod: 26

## 2018-05-02 PROCEDURE — 36569 INSJ PICC 5 YR+ W/O IMAGING: CPT

## 2018-05-02 PROCEDURE — 99232 SBSQ HOSP IP/OBS MODERATE 35: CPT | Mod: GC

## 2018-05-02 RX ORDER — SODIUM CHLORIDE 9 MG/ML
10 INJECTION INTRAMUSCULAR; INTRAVENOUS; SUBCUTANEOUS EVERY 12 HOURS
Refills: 0 | Status: DISCONTINUED | OUTPATIENT
Start: 2018-05-02 | End: 2018-05-02

## 2018-05-02 RX ORDER — INSULIN ASPART 100 [IU]/ML
8 INJECTION, SOLUTION SUBCUTANEOUS
Qty: 9 | Refills: 2
Start: 2018-05-02 | End: 2018-07-30

## 2018-05-02 RX ORDER — SODIUM CHLORIDE 9 MG/ML
20 INJECTION INTRAMUSCULAR; INTRAVENOUS; SUBCUTANEOUS ONCE
Refills: 0 | Status: DISCONTINUED | OUTPATIENT
Start: 2018-05-02 | End: 2018-05-02

## 2018-05-02 RX ORDER — MAGNESIUM SULFATE 500 MG/ML
1 VIAL (ML) INJECTION ONCE
Refills: 0 | Status: COMPLETED | OUTPATIENT
Start: 2018-05-02 | End: 2018-05-02

## 2018-05-02 RX ORDER — CEFEPIME 1 G/1
2 INJECTION, POWDER, FOR SOLUTION INTRAMUSCULAR; INTRAVENOUS
Qty: 216 | Refills: 0
Start: 2018-05-02 | End: 2018-05-11

## 2018-05-02 RX ORDER — INSULIN GLARGINE 100 [IU]/ML
24 INJECTION, SOLUTION SUBCUTANEOUS
Qty: 9 | Refills: 2
Start: 2018-05-02 | End: 2018-07-30

## 2018-05-02 RX ORDER — CEFEPIME 1 G/1
2000 INJECTION, POWDER, FOR SOLUTION INTRAMUSCULAR; INTRAVENOUS ONCE
Refills: 0 | Status: COMPLETED | OUTPATIENT
Start: 2018-05-02 | End: 2018-05-02

## 2018-05-02 RX ORDER — SODIUM CHLORIDE 9 MG/ML
10 INJECTION INTRAMUSCULAR; INTRAVENOUS; SUBCUTANEOUS
Refills: 0 | Status: DISCONTINUED | OUTPATIENT
Start: 2018-05-02 | End: 2018-05-02

## 2018-05-02 RX ORDER — INSULIN GLARGINE 100 [IU]/ML
24 INJECTION, SOLUTION SUBCUTANEOUS
Qty: 9 | Refills: 0
Start: 2018-05-02 | End: 2018-05-31

## 2018-05-02 RX ORDER — SODIUM CHLORIDE 9 MG/ML
10 INJECTION INTRAMUSCULAR; INTRAVENOUS; SUBCUTANEOUS
Qty: 2100 | Refills: 0
Start: 2018-05-02

## 2018-05-02 RX ORDER — INSULIN LISPRO 100/ML
8 VIAL (ML) SUBCUTANEOUS
Qty: 9 | Refills: 0
Start: 2018-05-02

## 2018-05-02 RX ORDER — INSULIN GLARGINE 100 [IU]/ML
30 INJECTION, SOLUTION SUBCUTANEOUS
Qty: 0 | Refills: 2 | DISCHARGE
Start: 2018-05-02 | End: 2018-07-30

## 2018-05-02 RX ADMIN — Medication 6 UNIT(S): at 17:39

## 2018-05-02 RX ADMIN — PIPERACILLIN AND TAZOBACTAM 200 GRAM(S): 4; .5 INJECTION, POWDER, LYOPHILIZED, FOR SOLUTION INTRAVENOUS at 00:22

## 2018-05-02 RX ADMIN — Medication 100 GRAM(S): at 12:41

## 2018-05-02 RX ADMIN — PIPERACILLIN AND TAZOBACTAM 200 GRAM(S): 4; .5 INJECTION, POWDER, LYOPHILIZED, FOR SOLUTION INTRAVENOUS at 06:34

## 2018-05-02 RX ADMIN — CEFEPIME 100 MILLIGRAM(S): 1 INJECTION, POWDER, FOR SOLUTION INTRAMUSCULAR; INTRAVENOUS at 19:42

## 2018-05-02 RX ADMIN — Medication 6 UNIT(S): at 08:38

## 2018-05-02 RX ADMIN — Medication 1 APPLICATION(S): at 12:41

## 2018-05-02 RX ADMIN — LISINOPRIL 10 MILLIGRAM(S): 2.5 TABLET ORAL at 05:21

## 2018-05-02 RX ADMIN — Medication 300 MILLIGRAM(S): at 07:35

## 2018-05-02 RX ADMIN — HEPARIN SODIUM 5000 UNIT(S): 5000 INJECTION INTRAVENOUS; SUBCUTANEOUS at 05:22

## 2018-05-02 RX ADMIN — Medication 2: at 08:39

## 2018-05-02 RX ADMIN — Medication 6 UNIT(S): at 12:48

## 2018-05-02 RX ADMIN — HEPARIN SODIUM 5000 UNIT(S): 5000 INJECTION INTRAVENOUS; SUBCUTANEOUS at 14:36

## 2018-05-02 NOTE — PROGRESS NOTE ADULT - SUBJECTIVE AND OBJECTIVE BOX
INTERVAL HPI/OVERNIGHT EVENTS: No acute events overnight. Denies fever, chills, nausea, emesis, chest pain, dyspnea, abdominal pain.     HPI: Pt is seen at bedside resting comfortably in no acute pain or distress. Denies n/v/f/sob/chest pain/sob.     CONSTITUTIONAL:  No fever, chills, night sweats  EYES:  No photophobia or visual changes  CARDIOVASCULAR:  No chest pain  RESPIRATORY:  No cough, wheezing, or SOB   GASTROINTESTINAL:  No nausea, vomiting, diarrhea, constipation, or abdominal pain  GENITOURINARY:  No frequency, urgency, dysuria or hematuria  NEUROLOGIC:  No headache, lightheadedness          Vital Signs Last 24 Hrs  T(C): 36.3 (02 May 2018 05:42), Max: 37.2 (01 May 2018 20:35)  T(F): 97.4 (02 May 2018 05:42), Max: 98.9 (01 May 2018 20:35)  HR: 64 (02 May 2018 05:42) (62 - 100)  BP: 121/75 (02 May 2018 05:42) (121/75 - 132/79)  BP(mean): --  RR: 18 (02 May 2018 05:42) (18 - 20)  SpO2: 100% (02 May 2018 05:42) (96% - 100%)    PHYSICAL EXAM:  Constitutional:  Well-developed, well nourished  Eyes:  Sclerae anicterica, conjunctivae clear, PERRL  Ear/Nose/Throat:  No nasal exudate or sinus tenderness;  No buccal mucosal lesions, no pharyngeal erythema or exudate	  Neck:  Supple, no adenopathy  Respiratory:  Clear bilaterally  Cardiovascular:  RRR, S1S2, no murmur appreciated  Gastrointestinal:  Symmetric, normoactive BS, soft, NT, no masses, guarding or rebound.  No HSM  Extremities:  No edema      LABS:                        13.5   9.4   )-----------( 408      ( 01 May 2018 07:18 )             39.9         05-02    132<L>  |  96  |  17  ----------------------------<  229<H>  4.3   |  27  |  1.10    Ca    9.1      02 May 2018 07:15  Phos  2.9     05-02  Mg     1.8     05-02            MICROBIOLOGY:  Culture - Tissue with Gram Stain (04.30.18 @ 19:11)    Gram Stain:   No organisms seen  Rare White blood cells    Specimen Source: .Tissue right 3rd toe proximal phalynx    Culture Results:   No growth to date    Culture - Tissue with Gram Stain (04.30.18 @ 19:11)    Gram Stain:   No organisms seen  No White blood cells    Specimen Source: .Tissue right hallux bone    Culture Results:   No growth to date          RADIOLOGY & ADDITIONAL STUDIES:

## 2018-05-02 NOTE — PROGRESS NOTE ADULT - SUBJECTIVE AND OBJECTIVE BOX
Patient is a 42y old  Male who presents with a chief complaint of foot pain (29 Apr 2018 12:47)      INTERVAL HPI/ OVERNIGHT EVENTS  Pt seen and examined at bedside. Pt lying comfortably in bed at time of visit. Pt has no pedal complaints at this time. Pt denies n/v/f/c.     LABS                        13.5   9.4   )-----------( 408      ( 01 May 2018 07:18 )             39.9     05-02    132<L>  |  96  |  17  ----------------------------<  229<H>  4.3   |  27  |  1.10    Ca    9.1      02 May 2018 07:15  Phos  2.9     05-02  Mg     1.8     05-02      PT/INR - ( 02 May 2018 07:15 )   PT: 11.8 sec;   INR: 1.06          PTT - ( 02 May 2018 07:15 )  PTT:33.2 sec    ICU Vital Signs Last 24 Hrs  T(C): 36.3 (02 May 2018 05:42), Max: 37.2 (01 May 2018 20:35)  T(F): 97.4 (02 May 2018 05:42), Max: 98.9 (01 May 2018 20:35)  HR: 64 (02 May 2018 05:42) (62 - 100)  BP: 121/75 (02 May 2018 05:42) (121/75 - 132/79)  BP(mean): --  ABP: --  ABP(mean): --  RR: 18 (02 May 2018 05:42) (18 - 20)  SpO2: 100% (02 May 2018 05:42) (96% - 100%)      RADIOLOGY    MICROBIOLOGY    PHYSICAL EXAM  GEN: ARIEL MOURA is a pleasant well-nourished, well developed 42y Male in no acute distress, alert awake, and oriented to person, place and time.   LE Focused:  No change in neurovascular status since the previous encounter.  The right lateral foot ulceration has a fibrogranular base that appears to be healing with reepithelization of the lateral aspect of the wound.  No periwound erythema, edema. or active drainage.

## 2018-05-02 NOTE — CONSULT NOTE ADULT - SUBJECTIVE AND OBJECTIVE BOX
V[facility] Service Consult Note    [facility]:  Diabetic neuropathy: Diabetic neuropathy  Uncontrolled insulin dependent diabetes mellitus: Uncontrolled insulin dependent diabetes mellitus  Peripheral neuropathy: Peripheral neuropathy  Acute osteomyelitis: Acute osteomyelitis  Sepsis: Sepsis  Nutrition, metabolism, and development symptoms: Nutrition, metabolism, and development symptoms  Prophylactic measure: Prophylactic measure  Diabetes: Diabetes  Diabetic foot ulcer: Diabetic foot ulcer             Diagnosis: osteomyelitis     Indications for Vascular Access (Check all that apply)  [X  ]  Antibiotic Therapy       Antibiotic Prescribed:  vanc and zosyn                                                                           Expected Duration of Therapy:   6 weeks            [  ]  IV Hydration  [  ]  Total Parenteral Nutrition  [  ]  Chemotherapy  [  ]  Difficult Venous Access  [  ]  CVP monitoring  [  ]  Medications with high potential for tissue necrosis on extravasation  [  ]  Other    Screening (Check all that apply)  Previous Radiation to chest  [  ] Yes      [ X ]  No  Breast Cancer                          [  ] Left     [  ]  Right    [ X ]  No  Lymph Node Dissection         [  ] Left     [  ]  Right    [ X ]  No  Pacemaker or ICD                   [  ] Left     [  ]  Right    [ X ]  No  Upper Extremity DVT             [  ] Left     [  ]  Right    [X  ]  No  Chronic Kidney Disease         [  ]  Yes     [ X ]  No  Hemodialysis                           [  ]  Yes     X[  ]  No  AV Fistula/ Graft                     [  ]  Left    [  ]  Right    [ X ]  No  Temp>101F in past 24 H       [  ]  Yes     [ X ]  No  H/O PICC/Midline                   [  X]  Yes     [  ]  No    Lab data:                        13.5   9.4   )-----------( 408      ( 01 May 2018 07:18 )             39.9     05-02    132<L>  |  96  |  17  ----------------------------<  229<H>  4.3   |  27  |  1.10    Ca    9.1      02 May 2018 07:15  Phos  2.9     05-02  Mg     1.8     05-02      PT/INR - ( 02 May 2018 07:15 )   PT: 11.8 sec;   INR: 1.06          PTT - ( 02 May 2018 07:15 )  PTT:33.2 sec          I have reviewed the chart, interviewed and examined the patient and determined that this patient:  [ X ] Is a candidate for a PICC line  [  ] Is a candidate for a Midline  [  ] Is not a candidate for vascular access device (reason)    Lumens:    [  ] Single  [  X] Double

## 2018-05-02 NOTE — PROGRESS NOTE ADULT - PROBLEM SELECTOR PROBLEM 5
Diabetic neuropathy
Prophylactic measure
Diabetic neuropathy
Prophylactic measure

## 2018-05-02 NOTE — PROGRESS NOTE ADULT - SUBJECTIVE AND OBJECTIVE BOX
INTERVAL HPI/OVERNIGHT EVENTS: ANDREW    SUBJECTIVE: Patient seen and examined at bedside. No fever, chills, n/v or diarrhea. Last BM earlier today. Voiding well.     OBJECTIVE:    VITAL SIGNS:  ICU Vital Signs Last 24 Hrs  T(C): 36.3 (02 May 2018 05:42), Max: 37.2 (01 May 2018 20:35)  T(F): 97.4 (02 May 2018 05:42), Max: 98.9 (01 May 2018 20:35)  HR: 64 (02 May 2018 05:42) (64 - 100)  BP: 121/75 (02 May 2018 05:42) (121/75 - 132/79)  BP(mean): --  ABP: --  ABP(mean): --  RR: 18 (02 May 2018 05:42) (18 - 20)  SpO2: 100% (02 May 2018 05:42) (96% - 100%)        05-01 @ 07:01  -  05-02 @ 07:00  --------------------------------------------------------  IN: 700 mL / OUT: 0 mL / NET: 700 mL      CAPILLARY BLOOD GLUCOSE      POCT Blood Glucose.: 136 mg/dL (02 May 2018 12:32)      PHYSICAL EXAM:    General: NAD  HEENT: NC/AT; PERRL, clear conjunctiva  Neck: supple  Respiratory: CTA b/l  Cardiovascular: +S1/S2; RRR  Abdomen: soft, NT/ND; +BS x4  Extremities: WWP, 2+ peripheral pulses b/l; no LE edema, right foot distal ulceration without purulent discharge.   Skin: normal color and turgor; no rash  Neurological: AAOx3, no focal deficits.     MEDICATIONS:  MEDICATIONS  (STANDING):  atorvastatin 40 milliGRAM(s) Oral at bedtime  collagenase Ointment 1 Application(s) Topical daily  dextrose 5%. 1000 milliLiter(s) (50 mL/Hr) IV Continuous <Continuous>  dextrose 50% Injectable 12.5 Gram(s) IV Push once  dextrose 50% Injectable 25 Gram(s) IV Push once  dextrose 50% Injectable 25 Gram(s) IV Push once  heparin  Injectable 5000 Unit(s) SubCutaneous every 8 hours  insulin glargine Injectable (LANTUS) 22 Unit(s) SubCutaneous at bedtime  insulin lispro (HumaLOG) corrective regimen sliding scale   SubCutaneous Before meals and at bedtime  insulin lispro Injectable (HumaLOG) 6 Unit(s) SubCutaneous three times a day before meals  lidocaine 1% Injectable 20 milliLiter(s) Local Injection once  lisinopril 10 milliGRAM(s) Oral daily  magnesium sulfate  IVPB 1 Gram(s) IV Intermittent once  melatonin 5 milliGRAM(s) Oral at bedtime  piperacillin/tazobactam IVPB. 4.5 Gram(s) IV Intermittent every 6 hours  sodium chloride 0.9% lock flush 20 milliLiter(s) IV Push once  vancomycin  IVPB 1500 milliGRAM(s) IV Intermittent every 12 hours    MEDICATIONS  (PRN):  acetaminophen   Tablet. 650 milliGRAM(s) Oral every 6 hours PRN Moderate Pain (4 - 6)  dextrose Gel 1 Dose(s) Oral once PRN Blood Glucose LESS THAN 70 milliGRAM(s)/deciliter  glucagon  Injectable 1 milliGRAM(s) IntraMuscular once PRN Glucose LESS THAN 70 milligrams/deciliter  sodium chloride 0.9% lock flush 10 milliLiter(s) IV Push every 1 hour PRN After each medication administration  sodium chloride 0.9% lock flush 10 milliLiter(s) IV Push every 12 hours PRN Lumen of catheter NOT used      ALLERGIES:  Allergies    No Known Allergies    Intolerances        LABS:                        13.5   9.4   )-----------( 408      ( 01 May 2018 07:18 )             39.9     05-02    132<L>  |  96  |  17  ----------------------------<  229<H>  4.3   |  27  |  1.10    Ca    9.1      02 May 2018 07:15  Phos  2.9     05-02  Mg     1.8     05-02      PT/INR - ( 02 May 2018 07:15 )   PT: 11.8 sec;   INR: 1.06          PTT - ( 02 May 2018 07:15 )  PTT:33.2 sec      RADIOLOGY & ADDITIONAL TESTS: Reviewed.

## 2018-05-02 NOTE — PROGRESS NOTE ADULT - ATTENDING COMMENTS
Agree with above. MRI shows no signs of OM. Patient should be stable for DC on PO antibiotics and will follow up with me as outpatient.
I have reviewed the medical record, including laboratory and radiographic studies, interviewed and examined the patient and discussed the plan with Dr. Mercedes, the ID Resident.  Agree with above. Please recall if further ID input is desired - ID service.
I have reviewed the medical record, including laboratory and radiographic studies, interviewed and examined the patient and discussed the plan with Dr. Mercedes, the ID Resident.  Agree with above. Please recall if further ID input is desired - ID service.
Patient was seen and examined by me at bedside. I agree with resident's note, subjective, objective physical exam, assessment and plan with following modifications/additions.
Patient was seen and examined by me at bedside. I agree with resident's note, subjective, objective physical exam, assessment and plan with following modifications/additions.     patient will need IV abx for Acute osteomyelitis.  Await ID recs regarding abx choice  For PICC line today
Patient was seen and examined by me at bedside. I agree with resident's note, subjective, objective physical exam, assessment and plan with following modifications/additions.     As per podiatry no surgical intervention however MRI still revealing acute osteomyelitis.  ID recs  Need better glycemic control increase Lantus and add pre-meal
Patient was seen and examined by me at bedside. I agree with resident's note, subjective, objective physical exam, assessment and plan with following modifications/additions.

## 2018-05-02 NOTE — PROGRESS NOTE ADULT - PROBLEM SELECTOR PLAN 3
HbA1C 12.9.   - increased Lantus 24 units at bedtime  - increased Lispro 8 units premeal  - ISS   - diabetes education  - C/w lisinopril 10 mg daily  - c/w atorvastatin 40

## 2018-05-02 NOTE — PROGRESS NOTE ADULT - PROBLEM SELECTOR PROBLEM 2
Acute osteomyelitis

## 2018-05-02 NOTE — PROGRESS NOTE ADULT - PROBLEM SELECTOR PLAN 6
F: none  E: replete PRN  N: diabetic diet    FULL CODE    Dispo: Home pending IV antibiotic approval

## 2018-05-02 NOTE — PROGRESS NOTE ADULT - PROBLEM SELECTOR PLAN 4
Currently asymptomatic.
Currently asymptomatic.
-Continue current lantus dose.    Monitor Blood sugar during the day and adjust regimen accordingly
Currently asymptomatic.    #Dupytrens contracture:  b/l. asymptomatic presently. no intervention.
Currently asymptomatic.
Currently asymptomatic.    #Dupytrens contracture:  b/l. asymptomatic presently. no intervention.
-Continue current lantus dose.    Monitor Blood sugar during the day and adjust regimen accordingly

## 2018-05-02 NOTE — PROCEDURE NOTE - NSPROCDETAILS_GEN_ALL_CORE
location identified, draped/prepped, sterile technique used/sterile dressing applied/sterile technique, catheter placed/supine position/ultrasound guidance/ultrasound assessment

## 2018-05-02 NOTE — PROGRESS NOTE ADULT - PROVIDER SPECIALTY LIST ADULT
Hospitalist
Infectious Disease
Podiatry
Infectious Disease
Podiatry
Podiatry
Internal Medicine
Hospitalist
Internal Medicine
Internal Medicine

## 2018-05-02 NOTE — PROGRESS NOTE ADULT - PROBLEM SELECTOR PLAN 1
On admission meeting sepsis criteria 2/2 leukocytosis and tachycardia.  Sepsis 2/2 osteomyelitis. Resolved.

## 2018-05-02 NOTE — PROGRESS NOTE ADULT - ASSESSMENT
42 year old male w/ hx of uncontrolled/non-compliant DM2 (on Insulin), s/p amputations of 4th and 5th right toes 2015, 2017 admitted for right foot ulceration and drainage, with likely osteomyelitis based on MRI. Superficial cultures growing GBS and staph species. Currently on empirical Vancomycin and Zosyn. Podiatry on board, bone biopsy yesterday.     - PICC line to be placed today for long term antibiotics  - Please change antibiotic to Cefepime 2g q8 for a total of 6 weeks of antibiotics (stop date: 6/7/18)  - Weekly labs:  CBC, CMP, ESR, CRP,   -f/u bone biopsy results and will tailor IV antibiotics if necessary.   -S/w patient, at length, about discharge and IV home infusions and pt states he is confident that he will be able to properly manage his IV antibiotics.   -Pt is safe for d/c once PICC line is placement and IV antibiotic treatments are set up.   -Please schedule pt to see Dr. Foy within 2 weeks from discharge. Pt can call [phone] opt 2 to schedule an appointment.  -Recommendations discussed with primary team.

## 2018-05-03 LAB
CULTURE RESULTS: NO GROWTH — SIGNIFICANT CHANGE UP
CULTURE RESULTS: NO GROWTH — SIGNIFICANT CHANGE UP
SPECIMEN SOURCE: SIGNIFICANT CHANGE UP
SPECIMEN SOURCE: SIGNIFICANT CHANGE UP

## 2018-05-04 DIAGNOSIS — M86.171 OTHER ACUTE OSTEOMYELITIS, RIGHT ANKLE AND FOOT: ICD-10-CM

## 2018-05-04 DIAGNOSIS — B95.1 STREPTOCOCCUS, GROUP B, AS THE CAUSE OF DISEASES CLASSIFIED ELSEWHERE: ICD-10-CM

## 2018-05-04 DIAGNOSIS — E87.1 HYPO-OSMOLALITY AND HYPONATREMIA: ICD-10-CM

## 2018-05-04 DIAGNOSIS — A41.9 SEPSIS, UNSPECIFIED ORGANISM: ICD-10-CM

## 2018-05-04 DIAGNOSIS — Z89.421 ACQUIRED ABSENCE OF OTHER RIGHT TOE(S): ICD-10-CM

## 2018-05-04 DIAGNOSIS — E11.40 TYPE 2 DIABETES MELLITUS WITH DIABETIC NEUROPATHY, UNSPECIFIED: ICD-10-CM

## 2018-05-04 DIAGNOSIS — E11.621 TYPE 2 DIABETES MELLITUS WITH FOOT ULCER: ICD-10-CM

## 2018-05-04 DIAGNOSIS — E11.69 TYPE 2 DIABETES MELLITUS WITH OTHER SPECIFIED COMPLICATION: ICD-10-CM

## 2018-05-04 DIAGNOSIS — Z91.14 PATIENT'S OTHER NONCOMPLIANCE WITH MEDICATION REGIMEN: ICD-10-CM

## 2018-05-04 DIAGNOSIS — L97.419 NON-PRESSURE CHRONIC ULCER OF RIGHT HEEL AND MIDFOOT WITH UNSPECIFIED SEVERITY: ICD-10-CM

## 2018-05-04 DIAGNOSIS — E11.65 TYPE 2 DIABETES MELLITUS WITH HYPERGLYCEMIA: ICD-10-CM

## 2018-05-04 DIAGNOSIS — L97.518 NON-PRESSURE CHRONIC ULCER OF OTHER PART OF RIGHT FOOT WITH OTHER SPECIFIED SEVERITY: ICD-10-CM

## 2018-05-04 DIAGNOSIS — Z79.4 LONG TERM (CURRENT) USE OF INSULIN: ICD-10-CM

## 2018-05-08 ENCOUNTER — APPOINTMENT (OUTPATIENT)
Dept: INTERNAL MEDICINE | Facility: CLINIC | Age: 43
End: 2018-05-08
Payer: MEDICAID

## 2018-05-08 VITALS
OXYGEN SATURATION: 98 % | HEART RATE: 85 BPM | SYSTOLIC BLOOD PRESSURE: 126 MMHG | DIASTOLIC BLOOD PRESSURE: 85 MMHG | TEMPERATURE: 98.4 F

## 2018-05-08 PROCEDURE — 99496 TRANSJ CARE MGMT HIGH F2F 7D: CPT | Mod: GC

## 2018-05-11 ENCOUNTER — APPOINTMENT (OUTPATIENT)
Dept: INFECTIOUS DISEASE | Facility: CLINIC | Age: 43
End: 2018-05-11
Payer: MEDICAID

## 2018-05-11 VITALS
TEMPERATURE: 98.1 F | WEIGHT: 238 LBS | RESPIRATION RATE: 16 BRPM | OXYGEN SATURATION: 98 % | BODY MASS INDEX: 31.89 KG/M2 | HEART RATE: 79 BPM | HEIGHT: 72.5 IN

## 2018-05-11 VITALS — DIASTOLIC BLOOD PRESSURE: 89 MMHG | SYSTOLIC BLOOD PRESSURE: 136 MMHG

## 2018-05-11 DIAGNOSIS — Z78.9 OTHER SPECIFIED HEALTH STATUS: ICD-10-CM

## 2018-05-11 PROCEDURE — 99213 OFFICE O/P EST LOW 20 MIN: CPT

## 2018-05-15 ENCOUNTER — APPOINTMENT (OUTPATIENT)
Dept: ENDOCRINOLOGY | Facility: CLINIC | Age: 43
End: 2018-05-15
Payer: MEDICAID

## 2018-05-15 VITALS
BODY MASS INDEX: 31.89 KG/M2 | SYSTOLIC BLOOD PRESSURE: 142 MMHG | HEART RATE: 84 BPM | HEIGHT: 72.5 IN | WEIGHT: 238 LBS | DIASTOLIC BLOOD PRESSURE: 93 MMHG

## 2018-05-15 DIAGNOSIS — Z82.49 FAMILY HISTORY OF ISCHEMIC HEART DISEASE AND OTHER DISEASES OF THE CIRCULATORY SYSTEM: ICD-10-CM

## 2018-05-15 DIAGNOSIS — Z80.3 FAMILY HISTORY OF MALIGNANT NEOPLASM OF BREAST: ICD-10-CM

## 2018-05-15 PROCEDURE — 99205 OFFICE O/P NEW HI 60 MIN: CPT

## 2018-05-22 ENCOUNTER — APPOINTMENT (OUTPATIENT)
Dept: ENDOCRINOLOGY | Facility: CLINIC | Age: 43
End: 2018-05-22

## 2018-05-23 PROCEDURE — 86140 C-REACTIVE PROTEIN: CPT

## 2018-05-23 PROCEDURE — 96375 TX/PRO/DX INJ NEW DRUG ADDON: CPT

## 2018-05-23 PROCEDURE — 85730 THROMBOPLASTIN TIME PARTIAL: CPT

## 2018-05-23 PROCEDURE — 80048 BASIC METABOLIC PNL TOTAL CA: CPT

## 2018-05-23 PROCEDURE — 80202 ASSAY OF VANCOMYCIN: CPT

## 2018-05-23 PROCEDURE — 93005 ELECTROCARDIOGRAM TRACING: CPT

## 2018-05-23 PROCEDURE — 82570 ASSAY OF URINE CREATININE: CPT

## 2018-05-23 PROCEDURE — 82962 GLUCOSE BLOOD TEST: CPT

## 2018-05-23 PROCEDURE — 71045 X-RAY EXAM CHEST 1 VIEW: CPT

## 2018-05-23 PROCEDURE — 84100 ASSAY OF PHOSPHORUS: CPT

## 2018-05-23 PROCEDURE — 73719 MRI LOWER EXTREMITY W/DYE: CPT

## 2018-05-23 PROCEDURE — 73630 X-RAY EXAM OF FOOT: CPT

## 2018-05-23 PROCEDURE — 83735 ASSAY OF MAGNESIUM: CPT

## 2018-05-23 PROCEDURE — 96374 THER/PROPH/DIAG INJ IV PUSH: CPT

## 2018-05-23 PROCEDURE — C1751: CPT

## 2018-05-23 PROCEDURE — 87070 CULTURE OTHR SPECIMN AEROBIC: CPT

## 2018-05-23 PROCEDURE — 76937 US GUIDE VASCULAR ACCESS: CPT

## 2018-05-23 PROCEDURE — 87184 SC STD DISK METHOD PER PLATE: CPT

## 2018-05-23 PROCEDURE — 87186 SC STD MICRODIL/AGAR DIL: CPT

## 2018-05-23 PROCEDURE — 86850 RBC ANTIBODY SCREEN: CPT

## 2018-05-23 PROCEDURE — 87040 BLOOD CULTURE FOR BACTERIA: CPT

## 2018-05-23 PROCEDURE — 84540 ASSAY OF URINE/UREA-N: CPT

## 2018-05-23 PROCEDURE — 83935 ASSAY OF URINE OSMOLALITY: CPT

## 2018-05-23 PROCEDURE — 85610 PROTHROMBIN TIME: CPT

## 2018-05-23 PROCEDURE — 80061 LIPID PANEL: CPT

## 2018-05-23 PROCEDURE — 87075 CULTR BACTERIA EXCEPT BLOOD: CPT

## 2018-05-23 PROCEDURE — 85025 COMPLETE CBC W/AUTO DIFF WBC: CPT

## 2018-05-23 PROCEDURE — 85652 RBC SED RATE AUTOMATED: CPT

## 2018-05-23 PROCEDURE — 99285 EMERGENCY DEPT VISIT HI MDM: CPT | Mod: 25

## 2018-05-23 PROCEDURE — 80053 COMPREHEN METABOLIC PANEL: CPT

## 2018-05-23 PROCEDURE — 83605 ASSAY OF LACTIC ACID: CPT

## 2018-05-23 PROCEDURE — 86900 BLOOD TYPING SEROLOGIC ABO: CPT

## 2018-05-23 PROCEDURE — 83036 HEMOGLOBIN GLYCOSYLATED A1C: CPT

## 2018-05-23 PROCEDURE — 88305 TISSUE EXAM BY PATHOLOGIST: CPT

## 2018-05-23 PROCEDURE — 36569 INSJ PICC 5 YR+ W/O IMAGING: CPT

## 2018-05-23 PROCEDURE — 81001 URINALYSIS AUTO W/SCOPE: CPT

## 2018-05-23 PROCEDURE — 96372 THER/PROPH/DIAG INJ SC/IM: CPT

## 2018-05-23 PROCEDURE — 36415 COLL VENOUS BLD VENIPUNCTURE: CPT

## 2018-05-23 PROCEDURE — 86901 BLOOD TYPING SEROLOGIC RH(D): CPT

## 2018-05-23 PROCEDURE — 85027 COMPLETE CBC AUTOMATED: CPT

## 2018-05-23 PROCEDURE — 88311 DECALCIFY TISSUE: CPT

## 2018-05-23 PROCEDURE — A9585: CPT

## 2018-05-23 PROCEDURE — 84300 ASSAY OF URINE SODIUM: CPT

## 2018-05-23 PROCEDURE — 83930 ASSAY OF BLOOD OSMOLALITY: CPT

## 2018-05-25 ENCOUNTER — APPOINTMENT (OUTPATIENT)
Dept: INFECTIOUS DISEASE | Facility: CLINIC | Age: 43
End: 2018-05-25
Payer: MEDICAID

## 2018-05-25 VITALS
OXYGEN SATURATION: 99 % | BODY MASS INDEX: 32.69 KG/M2 | HEIGHT: 72.5 IN | HEART RATE: 81 BPM | WEIGHT: 244 LBS | RESPIRATION RATE: 14 BRPM | DIASTOLIC BLOOD PRESSURE: 100 MMHG | TEMPERATURE: 98.2 F | SYSTOLIC BLOOD PRESSURE: 154 MMHG

## 2018-05-25 DIAGNOSIS — R42 DIZZINESS AND GIDDINESS: ICD-10-CM

## 2018-05-25 PROCEDURE — 99213 OFFICE O/P EST LOW 20 MIN: CPT

## 2018-05-30 ENCOUNTER — EMERGENCY (EMERGENCY)
Facility: HOSPITAL | Age: 43
LOS: 1 days | Discharge: HOME | End: 2018-05-30
Attending: EMERGENCY MEDICINE | Admitting: EMERGENCY MEDICINE
Payer: COMMERCIAL

## 2018-05-30 VITALS
WEIGHT: 246.92 LBS | SYSTOLIC BLOOD PRESSURE: 174 MMHG | OXYGEN SATURATION: 98 % | TEMPERATURE: 98 F | HEART RATE: 97 BPM | RESPIRATION RATE: 18 BRPM | DIASTOLIC BLOOD PRESSURE: 108 MMHG

## 2018-05-30 VITALS
SYSTOLIC BLOOD PRESSURE: 144 MMHG | OXYGEN SATURATION: 97 % | HEART RATE: 90 BPM | DIASTOLIC BLOOD PRESSURE: 97 MMHG | RESPIRATION RATE: 18 BRPM | TEMPERATURE: 99 F
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
## 2018-05-30 DIAGNOSIS — T82.898A OTHER SPECIFIED COMPLICATION OF VASCULAR PROSTHETIC DEVICES, IMPLANTS AND GRAFTS, INITIAL ENCOUNTER: ICD-10-CM

## 2018-05-30 DIAGNOSIS — Z79.899 OTHER LONG TERM (CURRENT) DRUG THERAPY: ICD-10-CM

## 2018-05-30 DIAGNOSIS — Z89.429 ACQUIRED ABSENCE OF OTHER TOE(S), UNSPECIFIED SIDE: Chronic | ICD-10-CM
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
## 2018-05-30 PROCEDURE — 99282 EMERGENCY DEPT VISIT SF MDM: CPT

## 2018-05-30 NOTE — ED ADULT TRIAGE NOTE - CHIEF COMPLAINT QUOTE
Pt states his LUE PICC line started bleeding under the dressing yesterday. Pt has had it for a month, using for antibiotics.

## 2018-05-30 NOTE — ED PROVIDER NOTE - OBJECTIVE STATEMENT
41yo male here with L upper ext PICC that was bleeding last night. Now is no longer bleeding. Pt denies fevers or chills or worsening redness of wound on R foot (treated for osteo with IV abx, 5 days left in treatment).

## 2018-05-30 NOTE — ED ADULT NURSE NOTE - OBJECTIVE STATEMENT
Patient presents to the ED, reports that the site around the picc line started to bleed yesterday. PICC on left upper arm. States that he has been getting antibiotics for a toe infection. Mild bleeding  noted around the site.

## 2018-05-30 NOTE — ED PROVIDER NOTE - MEDICAL DECISION MAKING DETAILS
Pt with mild old blood around PICC insertion site. Cleaned and dressing replaced. No active bleeding. Ulcer well healed, no worsening foot pain. VS repeated, pt afebrile.

## 2018-06-12 ENCOUNTER — APPOINTMENT (OUTPATIENT)
Dept: INTERNAL MEDICINE | Facility: CLINIC | Age: 43
End: 2018-06-12
Payer: MEDICAID

## 2018-06-12 ENCOUNTER — APPOINTMENT (OUTPATIENT)
Dept: INFECTIOUS DISEASE | Facility: CLINIC | Age: 43
End: 2018-06-12
Payer: MEDICAID

## 2018-06-12 VITALS
HEIGHT: 72 IN | RESPIRATION RATE: 14 BRPM | DIASTOLIC BLOOD PRESSURE: 87 MMHG | HEART RATE: 81 BPM | WEIGHT: 247 LBS | BODY MASS INDEX: 33.46 KG/M2 | SYSTOLIC BLOOD PRESSURE: 142 MMHG | TEMPERATURE: 98.2 F | OXYGEN SATURATION: 100 %

## 2018-06-12 VITALS
SYSTOLIC BLOOD PRESSURE: 142 MMHG | BODY MASS INDEX: 33.46 KG/M2 | HEIGHT: 72 IN | OXYGEN SATURATION: 100 % | WEIGHT: 247 LBS | DIASTOLIC BLOOD PRESSURE: 87 MMHG | HEART RATE: 81 BPM | TEMPERATURE: 98.2 F

## 2018-06-12 DIAGNOSIS — Z00.00 ENCOUNTER FOR GENERAL ADULT MEDICAL EXAMINATION W/OUT ABNORMAL FINDINGS: ICD-10-CM

## 2018-06-12 DIAGNOSIS — M86.9 OSTEOMYELITIS, UNSPECIFIED: ICD-10-CM

## 2018-06-12 DIAGNOSIS — R25.2 CRAMP AND SPASM: ICD-10-CM

## 2018-06-12 DIAGNOSIS — I10 ESSENTIAL (PRIMARY) HYPERTENSION: ICD-10-CM

## 2018-06-12 PROCEDURE — 36415 COLL VENOUS BLD VENIPUNCTURE: CPT

## 2018-06-12 PROCEDURE — 99396 PREV VISIT EST AGE 40-64: CPT | Mod: 25

## 2018-06-12 PROCEDURE — 99214 OFFICE O/P EST MOD 30 MIN: CPT | Mod: 25,GC

## 2018-06-12 PROCEDURE — 99214 OFFICE O/P EST MOD 30 MIN: CPT | Mod: 25

## 2018-06-12 RX ORDER — CEFEPIME 1 G/50ML
2 INJECTION, SOLUTION INTRAVENOUS EVERY 8 HOURS
Qty: 1 | Refills: 0 | Status: COMPLETED | COMMUNITY
Start: 2018-05-08 | End: 2018-06-12

## 2018-06-13 ENCOUNTER — RESULT REVIEW (OUTPATIENT)
Age: 43
End: 2018-06-13

## 2018-06-13 LAB
ALBUMIN SERPL ELPH-MCNC: 4.1 G/DL
ALP BLD-CCNC: 88 U/L
ALT SERPL-CCNC: 24 U/L
ANION GAP SERPL CALC-SCNC: 13 MMOL/L
AST SERPL-CCNC: 46 U/L
BASOPHILS # BLD AUTO: 0.03 K/UL
BASOPHILS NFR BLD AUTO: 0.4 %
BILIRUB SERPL-MCNC: 0.5 MG/DL
BUN SERPL-MCNC: 25 MG/DL
CALCIUM SERPL-MCNC: 9.7 MG/DL
CHLORIDE SERPL-SCNC: 99 MMOL/L
CO2 SERPL-SCNC: 27 MMOL/L
CREAT SERPL-MCNC: 0.99 MG/DL
CRP SERPL-MCNC: 1.9 MG/DL
EOSINOPHIL # BLD AUTO: 0.17 K/UL
EOSINOPHIL NFR BLD AUTO: 2 %
ERYTHROCYTE [SEDIMENTATION RATE] IN BLOOD BY WESTERGREN METHOD: 16 MM/HR
GLUCOSE SERPL-MCNC: 76 MG/DL
HBA1C MFR BLD HPLC: 11.3 %
HCT VFR BLD CALC: 38.1 %
HGB BLD-MCNC: 12.4 G/DL
IMM GRANULOCYTES NFR BLD AUTO: 0.2 %
LYMPHOCYTES # BLD AUTO: 2.73 K/UL
LYMPHOCYTES NFR BLD AUTO: 32.9 %
MAN DIFF?: NORMAL
MCHC RBC-ENTMCNC: 28.6 PG
MCHC RBC-ENTMCNC: 32.5 GM/DL
MCV RBC AUTO: 87.8 FL
MONOCYTES # BLD AUTO: 0.78 K/UL
MONOCYTES NFR BLD AUTO: 9.4 %
NEUTROPHILS # BLD AUTO: 4.57 K/UL
NEUTROPHILS NFR BLD AUTO: 55.1 %
PLATELET # BLD AUTO: 340 K/UL
POTASSIUM SERPL-SCNC: 4.3 MMOL/L
PROT SERPL-MCNC: 8.1 G/DL
RBC # BLD: 4.34 M/UL
RBC # FLD: 14.2 %
SODIUM SERPL-SCNC: 139 MMOL/L
WBC # FLD AUTO: 8.3 K/UL

## 2018-06-21 NOTE — ED PROVIDER NOTE - CPE EDP NEURO NORM
Electrodesiccation And Curettage Text: The wound bed was treated with electrodesiccation and curettage after the biopsy was performed. normal...

## 2018-06-25 ENCOUNTER — APPOINTMENT (OUTPATIENT)
Dept: ENDOCRINOLOGY | Facility: CLINIC | Age: 43
End: 2018-06-25

## 2018-07-06 ENCOUNTER — INPATIENT (INPATIENT)
Facility: HOSPITAL | Age: 43
LOS: 9 days | Discharge: NURSING, REHAB OR LONG-TERM CARE | DRG: 854 | End: 2018-07-16
Attending: INTERNAL MEDICINE | Admitting: INTERNAL MEDICINE
Payer: COMMERCIAL

## 2018-07-06 VITALS
RESPIRATION RATE: 20 BRPM | OXYGEN SATURATION: 98 % | SYSTOLIC BLOOD PRESSURE: 149 MMHG | TEMPERATURE: 102 F | WEIGHT: 246.26 LBS | DIASTOLIC BLOOD PRESSURE: 85 MMHG | HEART RATE: 113 BPM

## 2018-07-06 DIAGNOSIS — R63.8 OTHER SYMPTOMS AND SIGNS CONCERNING FOOD AND FLUID INTAKE: ICD-10-CM

## 2018-07-06 DIAGNOSIS — Z89.429 ACQUIRED ABSENCE OF OTHER TOE(S), UNSPECIFIED SIDE: Chronic | ICD-10-CM

## 2018-07-06 DIAGNOSIS — E11.9 TYPE 2 DIABETES MELLITUS WITHOUT COMPLICATIONS: ICD-10-CM

## 2018-07-06 DIAGNOSIS — L08.9 LOCAL INFECTION OF THE SKIN AND SUBCUTANEOUS TISSUE, UNSPECIFIED: ICD-10-CM

## 2018-07-06 DIAGNOSIS — E87.1 HYPO-OSMOLALITY AND HYPONATREMIA: ICD-10-CM

## 2018-07-06 DIAGNOSIS — A41.9 SEPSIS, UNSPECIFIED ORGANISM: ICD-10-CM

## 2018-07-06 DIAGNOSIS — Z29.9 ENCOUNTER FOR PROPHYLACTIC MEASURES, UNSPECIFIED: ICD-10-CM

## 2018-07-06 LAB
ALBUMIN SERPL ELPH-MCNC: 3.6 G/DL — SIGNIFICANT CHANGE UP (ref 3.3–5)
ALP SERPL-CCNC: 96 U/L — SIGNIFICANT CHANGE UP (ref 40–120)
ALT FLD-CCNC: 28 U/L — SIGNIFICANT CHANGE UP (ref 10–45)
ANION GAP SERPL CALC-SCNC: 15 MMOL/L — SIGNIFICANT CHANGE UP (ref 5–17)
AST SERPL-CCNC: 36 U/L — SIGNIFICANT CHANGE UP (ref 10–40)
BASOPHILS NFR BLD AUTO: 0.2 % — SIGNIFICANT CHANGE UP (ref 0–2)
BILIRUB SERPL-MCNC: 0.8 MG/DL — SIGNIFICANT CHANGE UP (ref 0.2–1.2)
BUN SERPL-MCNC: 16 MG/DL — SIGNIFICANT CHANGE UP (ref 7–23)
CALCIUM SERPL-MCNC: 9.1 MG/DL — SIGNIFICANT CHANGE UP (ref 8.4–10.5)
CHLORIDE SERPL-SCNC: 89 MMOL/L — LOW (ref 96–108)
CO2 SERPL-SCNC: 26 MMOL/L — SIGNIFICANT CHANGE UP (ref 22–31)
CREAT SERPL-MCNC: 1.11 MG/DL — SIGNIFICANT CHANGE UP (ref 0.5–1.3)
CRP SERPL-MCNC: 16.37 MG/DL — HIGH (ref 0–0.4)
EOSINOPHIL NFR BLD AUTO: 1.6 % — SIGNIFICANT CHANGE UP (ref 0–6)
ERYTHROCYTE [SEDIMENTATION RATE] IN BLOOD: 86 MM/HR — HIGH
GLUCOSE BLDC GLUCOMTR-MCNC: 218 MG/DL — HIGH (ref 70–99)
GLUCOSE BLDC GLUCOMTR-MCNC: 263 MG/DL — HIGH (ref 70–99)
GLUCOSE SERPL-MCNC: 242 MG/DL — HIGH (ref 70–99)
GRAM STN FLD: SIGNIFICANT CHANGE UP
HCT VFR BLD CALC: 35.5 % — LOW (ref 39–50)
HGB BLD-MCNC: 12.3 G/DL — LOW (ref 13–17)
LACTATE SERPL-SCNC: 1.2 MMOL/L — SIGNIFICANT CHANGE UP (ref 0.5–2)
LYMPHOCYTES # BLD AUTO: 19.2 % — SIGNIFICANT CHANGE UP (ref 13–44)
MAGNESIUM SERPL-MCNC: 1.8 MG/DL — SIGNIFICANT CHANGE UP (ref 1.6–2.6)
MCHC RBC-ENTMCNC: 29.4 PG — SIGNIFICANT CHANGE UP (ref 27–34)
MCHC RBC-ENTMCNC: 34.6 G/DL — SIGNIFICANT CHANGE UP (ref 32–36)
MCV RBC AUTO: 84.9 FL — SIGNIFICANT CHANGE UP (ref 80–100)
MONOCYTES NFR BLD AUTO: 9.3 % — SIGNIFICANT CHANGE UP (ref 2–14)
NEUTROPHILS NFR BLD AUTO: 69.7 % — SIGNIFICANT CHANGE UP (ref 43–77)
PLATELET # BLD AUTO: 408 K/UL — HIGH (ref 150–400)
POTASSIUM SERPL-MCNC: SIGNIFICANT CHANGE UP MMOL/L (ref 3.5–5.3)
POTASSIUM SERPL-SCNC: SIGNIFICANT CHANGE UP MMOL/L (ref 3.5–5.3)
PROT SERPL-MCNC: 8.5 G/DL — HIGH (ref 6–8.3)
RBC # BLD: 4.18 M/UL — LOW (ref 4.2–5.8)
RBC # FLD: 12.9 % — SIGNIFICANT CHANGE UP (ref 10.3–16.9)
SODIUM SERPL-SCNC: 130 MMOL/L — LOW (ref 135–145)
SPECIMEN SOURCE: SIGNIFICANT CHANGE UP
WBC # BLD: 15.2 K/UL — HIGH (ref 3.8–10.5)
WBC # FLD AUTO: 15.2 K/UL — HIGH (ref 3.8–10.5)

## 2018-07-06 PROCEDURE — 93010 ELECTROCARDIOGRAM REPORT: CPT

## 2018-07-06 PROCEDURE — 99223 1ST HOSP IP/OBS HIGH 75: CPT | Mod: GC

## 2018-07-06 PROCEDURE — 73630 X-RAY EXAM OF FOOT: CPT | Mod: 26,RT

## 2018-07-06 PROCEDURE — 99285 EMERGENCY DEPT VISIT HI MDM: CPT | Mod: 25

## 2018-07-06 RX ORDER — IBUPROFEN 200 MG
600 TABLET ORAL ONCE
Refills: 0 | Status: COMPLETED | OUTPATIENT
Start: 2018-07-06 | End: 2018-07-06

## 2018-07-06 RX ORDER — ACETAMINOPHEN 500 MG
650 TABLET ORAL ONCE
Refills: 0 | Status: COMPLETED | OUTPATIENT
Start: 2018-07-06 | End: 2018-07-06

## 2018-07-06 RX ORDER — INSULIN GLARGINE 100 [IU]/ML
24 INJECTION, SOLUTION SUBCUTANEOUS AT BEDTIME
Refills: 0 | Status: DISCONTINUED | OUTPATIENT
Start: 2018-07-06 | End: 2018-07-09

## 2018-07-06 RX ORDER — SODIUM CHLORIDE 9 MG/ML
1000 INJECTION INTRAMUSCULAR; INTRAVENOUS; SUBCUTANEOUS ONCE
Refills: 0 | Status: COMPLETED | OUTPATIENT
Start: 2018-07-06 | End: 2018-07-06

## 2018-07-06 RX ORDER — ACETAMINOPHEN 500 MG
650 TABLET ORAL EVERY 6 HOURS
Refills: 0 | Status: DISCONTINUED | OUTPATIENT
Start: 2018-07-06 | End: 2018-07-16

## 2018-07-06 RX ORDER — PIPERACILLIN AND TAZOBACTAM 4; .5 G/20ML; G/20ML
3.38 INJECTION, POWDER, LYOPHILIZED, FOR SOLUTION INTRAVENOUS ONCE
Refills: 0 | Status: COMPLETED | OUTPATIENT
Start: 2018-07-06 | End: 2018-07-06

## 2018-07-06 RX ORDER — GLUCAGON INJECTION, SOLUTION 0.5 MG/.1ML
1 INJECTION, SOLUTION SUBCUTANEOUS ONCE
Refills: 0 | Status: DISCONTINUED | OUTPATIENT
Start: 2018-07-06 | End: 2018-07-16

## 2018-07-06 RX ORDER — SODIUM CHLORIDE 9 MG/ML
500 INJECTION INTRAMUSCULAR; INTRAVENOUS; SUBCUTANEOUS ONCE
Refills: 0 | Status: COMPLETED | OUTPATIENT
Start: 2018-07-06 | End: 2018-07-06

## 2018-07-06 RX ORDER — ATORVASTATIN CALCIUM 80 MG/1
40 TABLET, FILM COATED ORAL AT BEDTIME
Refills: 0 | Status: DISCONTINUED | OUTPATIENT
Start: 2018-07-06 | End: 2018-07-16

## 2018-07-06 RX ORDER — VANCOMYCIN HCL 1 G
1750 VIAL (EA) INTRAVENOUS EVERY 12 HOURS
Refills: 0 | Status: DISCONTINUED | OUTPATIENT
Start: 2018-07-06 | End: 2018-07-07

## 2018-07-06 RX ORDER — INSULIN LISPRO 100/ML
VIAL (ML) SUBCUTANEOUS
Refills: 0 | Status: DISCONTINUED | OUTPATIENT
Start: 2018-07-06 | End: 2018-07-16

## 2018-07-06 RX ORDER — DEXTROSE 50 % IN WATER 50 %
15 SYRINGE (ML) INTRAVENOUS ONCE
Refills: 0 | Status: DISCONTINUED | OUTPATIENT
Start: 2018-07-06 | End: 2018-07-16

## 2018-07-06 RX ORDER — VANCOMYCIN HCL 1 G
1750 VIAL (EA) INTRAVENOUS ONCE
Refills: 0 | Status: COMPLETED | OUTPATIENT
Start: 2018-07-06 | End: 2018-07-06

## 2018-07-06 RX ORDER — DEXTROSE 50 % IN WATER 50 %
25 SYRINGE (ML) INTRAVENOUS ONCE
Refills: 0 | Status: DISCONTINUED | OUTPATIENT
Start: 2018-07-06 | End: 2018-07-16

## 2018-07-06 RX ORDER — SODIUM CHLORIDE 9 MG/ML
1000 INJECTION, SOLUTION INTRAVENOUS
Refills: 0 | Status: DISCONTINUED | OUTPATIENT
Start: 2018-07-06 | End: 2018-07-16

## 2018-07-06 RX ORDER — PIPERACILLIN AND TAZOBACTAM 4; .5 G/20ML; G/20ML
4.5 INJECTION, POWDER, LYOPHILIZED, FOR SOLUTION INTRAVENOUS EVERY 6 HOURS
Refills: 0 | Status: DISCONTINUED | OUTPATIENT
Start: 2018-07-06 | End: 2018-07-16

## 2018-07-06 RX ORDER — DEXTROSE 50 % IN WATER 50 %
12.5 SYRINGE (ML) INTRAVENOUS ONCE
Refills: 0 | Status: DISCONTINUED | OUTPATIENT
Start: 2018-07-06 | End: 2018-07-16

## 2018-07-06 RX ADMIN — Medication 250 MILLIGRAM(S): at 19:42

## 2018-07-06 RX ADMIN — Medication 600 MILLIGRAM(S): at 17:54

## 2018-07-06 RX ADMIN — SODIUM CHLORIDE 2000 MILLILITER(S): 9 INJECTION INTRAMUSCULAR; INTRAVENOUS; SUBCUTANEOUS at 18:37

## 2018-07-06 RX ADMIN — Medication 100 MILLIGRAM(S): at 22:41

## 2018-07-06 RX ADMIN — Medication 250 MILLIGRAM(S): at 18:20

## 2018-07-06 RX ADMIN — ATORVASTATIN CALCIUM 40 MILLIGRAM(S): 80 TABLET, FILM COATED ORAL at 22:41

## 2018-07-06 RX ADMIN — Medication 100 MILLIGRAM(S): at 17:54

## 2018-07-06 RX ADMIN — PIPERACILLIN AND TAZOBACTAM 200 GRAM(S): 4; .5 INJECTION, POWDER, LYOPHILIZED, FOR SOLUTION INTRAVENOUS at 17:00

## 2018-07-06 RX ADMIN — INSULIN GLARGINE 24 UNIT(S): 100 INJECTION, SOLUTION SUBCUTANEOUS at 23:40

## 2018-07-06 RX ADMIN — SODIUM CHLORIDE 2000 MILLILITER(S): 9 INJECTION INTRAMUSCULAR; INTRAVENOUS; SUBCUTANEOUS at 17:55

## 2018-07-06 RX ADMIN — Medication 4: at 23:14

## 2018-07-06 RX ADMIN — Medication 650 MILLIGRAM(S): at 17:14

## 2018-07-06 RX ADMIN — PIPERACILLIN AND TAZOBACTAM 200 GRAM(S): 4; .5 INJECTION, POWDER, LYOPHILIZED, FOR SOLUTION INTRAVENOUS at 23:57

## 2018-07-06 RX ADMIN — SODIUM CHLORIDE 1000 MILLILITER(S): 9 INJECTION INTRAMUSCULAR; INTRAVENOUS; SUBCUTANEOUS at 18:20

## 2018-07-06 RX ADMIN — SODIUM CHLORIDE 2000 MILLILITER(S): 9 INJECTION INTRAMUSCULAR; INTRAVENOUS; SUBCUTANEOUS at 17:05

## 2018-07-06 RX ADMIN — Medication 600 MILLIGRAM(S): at 18:27

## 2018-07-06 NOTE — H&P ADULT - ATTENDING COMMENTS
Pt seen and examined at bedside on 7/6/2018 @ 2100    Agree with HPI, ROS as above.     VS, Labs, FH, SH, allergies, medications, imaging reviewed. Agree with physical exam as above     A/P: 42 year old male with poorly controlled diabetes, s/p right 4th and 5th toe amputation (2015 and 2017), recent admission for osteomyelitis on base of right foot, treated with IV Abx, presents with 1 day of fever, pain and discharge of right 2nd toe concerning for cellulitis/osteomyelitis/necrotizing fascitis.     **Sepsis  -Pt meeting 3/4 SIRS criteria on admission with diabetic foot ulcer  -LA wnl - 1.2  -C/w Vancomycin, Zosyn. Clindamycin  -F/u cultures  -Podiatry following and patient may go to OR either tonight or tomorrow - thus will keep patient NPO  -Pt given 3.5 L NS bolus in ED    Plan otherwise as outlined above.....

## 2018-07-06 NOTE — H&P ADULT - NSHPSOCIALHISTORY_GEN_ALL_CORE
No current smoking. Stopped 20 years ago.   Drinks 1-2 beers a day.  Never had withdrawal symptoms of seizures.  Denies any current drug use (Used cocaine years ago).  Patient works as a building manage. No current smoking. Stopped 20 years ago.   Drinks 1-2 beers a day.  Never had withdrawal symptoms of seizures.  Denies any current drug use (Used cocaine years ago).  Patient works as a .

## 2018-07-06 NOTE — H&P ADULT - PROBLEM SELECTOR PLAN 5
DVT Prophylaxis: SCD's. Hold off on anticoagulants in case patient requires surgery. F: S/P 3.5 L in ED. Start on maintenance fluids as necessary.  E: Replete K to 4 and Mg to 2.  N: NPO in case requires surgery.

## 2018-07-06 NOTE — ED ADULT TRIAGE NOTE - CHIEF COMPLAINT QUOTE
Patient c/o rt knee pain ( shooting pain ) and rt 2nd toe pus discharge noticed this morning . History of DM .

## 2018-07-06 NOTE — H&P ADULT - PROBLEM SELECTOR PLAN 2
See above. Patient has a history of type II diabetes requiring insulin (April 2018 A1c 12.9%). He is s/p Toe amputation x 2.  -c/w MISS. C/w Lantus home dose 30 U at bedtime. Holding 8 units premeal while pt NPO Patient has a history of type II diabetes requiring insulin (April 2018 A1c 12.9%). He is s/p Toe amputation x 2.  -c/w MISS. C/w Lantus home dose 18 U at bedtime. Cutting patient's home dose of 30 because patient NPO. Give regular insulin if patient becomes hyperglycemic. Holding 8 units premeal while pt NPO

## 2018-07-06 NOTE — CONSULT NOTE ADULT - ASSESSMENT
41 yo M presents with osteomyelitis of the right 2nd digit due to underlying DMII    Plan:  - Bedside debridement performed with sterile suture removal kit and sterile #15 blade  - Wound was flushed with sterile saline and betadine  - Dressed in DSD with gauze and kerlix   - Recommend admission and NPO order for potential OR time tonight or tomorrow pending vitals and SIRS criteria  - Recommend ID consult  - Recommend Clindamycin added to IV Abx regimen  - Podiatry to follow 41 yo M presents with osteomyelitis of the right 2nd digit due with underlying DMII    Plan:  - Bedside debridement of right 2nd digit performed to level of subcutaneous tissue using sterile suture removal kit and sterile #15 blade  - Wounds flushed with sterile saline and betadine, washed with soap and water   - Bacitracin applied to wounds with DSD  - Patient to be admitted  - F/u vitals and labs, SIRS criteria  - To be taken to OR this weekend or Monday pending lab results  - Culture results pending  - Recommend ID consult  - Recommend Clindamycin added to IV Abx regimen  - Recommend NWB status to right foot  - Podiatry to follow 43 yo M presents with osteomyelitis of the right 2nd digit due with underlying DMII    Plan:  - Bedside debridement of right 2nd digit performed to level of subcutaneous tissue using sterile suture removal kit and sterile #15 blade  - Wounds flushed with sterile saline and betadine, washed with soap and water   - Bacitracin applied to wounds with DSD  - Patient to be admitted  - Monitor vitals closely  - To be taken to OR this weekend or Monday pending lab results  - Culture results pending  - Recommend ID consult  - Recommend Clindamycin added to IV Abx regimen  - Recommend NWB status to right foot  - Podiatry to follow

## 2018-07-06 NOTE — H&P ADULT - NSHPLABSRESULTS_GEN_ALL_CORE
LABS:                         12.3   15.2  )-----------( 408      ( 06 Jul 2018 16:38 )             35.5     07-06    130<L>  |  89<L>  |  16  ----------------------------<  242<H>  see note   |  26  |  1.11    Ca    9.1      06 Jul 2018 16:38  Mg     1.8     07-06    TPro  8.5<H>  /  Alb  3.6  /  TBili  0.8  /  DBili  x   /  AST  36  /  ALT  28  /  AlkPhos  96  07-06              Lactate, Blood: 1.2 mmoL/L (07-06 @ 16:37)      RADIOLOGY, EKG & ADDITIONAL TESTS: Reviewed. LABS:                         12.3   15.2  )-----------( 408      ( 06 Jul 2018 16:38 )             35.5     07-06    130<L>  |  89<L>  |  16  ----------------------------<  242<H>  see note   |  26  |  1.11    Ca    9.1      06 Jul 2018 16:38  Mg     1.8     07-06    TPro  8.5<H>  /  Alb  3.6  /  TBili  0.8  /  DBili  x   /  AST  36  /  ALT  28  /  AlkPhos  96  07-06    Sedimentation Rate, Erythrocyte (07.06.18 @ 17:57)    Sedimentation Rate, Erythrocyte: 86 mm/Hr    Lactate, Blood: 1.2 mmoL/L (07-06 @ 16:37)      RADIOLOGY, EKG & ADDITIONAL TESTS: Xray Foot: Amputation of fourth and fifth right toe. Third digit abnormality (possible fracture?). Hyperlucency of 2nd middle phalange.

## 2018-07-06 NOTE — ED ADULT NURSE NOTE - OBJECTIVE STATEMENT
42YM w/ DMII uncontrolled w/ hx of 2 toe amputations presented with osteomyelitis of R foot confirmed on MRI 2 months ago s/p 6wks of cefepime via PICC, returns today w/ 2 days of swelling, pain, discharge and malodor to the R 2nd toe. PICC line no longer in place. Base of foot ulcer that was infected on prior admission has been stable as per pt, no discharge or swelling to that area. R 2nd toe had no prior ulcer, this is a totally new infection. +fevers/chills, no N/V/D. code sepsis initiated on arrival, pt placed on continuous cardiac monitor, EKG obtained, IV access obtained, pt medicated as prescribed. pt in no apparent distress, speaking in full sentences, only c/o right toe/foot/leg pain 7/10.

## 2018-07-06 NOTE — CONSULT NOTE ADULT - SUBJECTIVE AND OBJECTIVE BOX
Patient is a 42y old  Male who presents with a chief complaint of right 2nd toe pain, swelling and active drainage     HPI: 43 yo M PMH diabetes, hypertension, previous toe amputations, foot ulcerations, h/o osteomyelitis and sepsis presents to ED with c/o 2nd swollen, painful toe that is actively draining with fever. Patient reports that he was an inpatient at [facility] at the end of April 2018 for about a week and has a PICC line for Abx for osteomyelitis of his right foot. He has two previous amputations of his 2nd and 3rd digit in 2015 and 2017, respectively. Patient reports that he sees his podiatrist every 2 weeks for wound care management. About two days ago, he noted pain coming from his right foot as well as swelling. This morning, he noticed active drainage and felt feverish. Admits to having body aches and chills. Patient is febrile and tachycardic. Patient denies any trauma to his foot. Patient reports that he has not eaten since 12 PM today.     Review of systems negative except per HPI    PAST MEDICAL & SURGICAL HISTORY:  Diabetes  Toe amputation status    Home Medications:    Allergies: No Known Allergies      FAMILY HISTORY:  No pertinent family history in first degree relatives    Social History:       LABS                        12.3   15.2  )-----------( 408      ( 06 Jul 2018 16:38 )             35.5     07-06    130<L>  |  89<L>  |  16  ----------------------------<  242<H>  see note   |  26  |  1.11    Ca    9.1      06 Jul 2018 16:38  Mg     1.8     07-06    TPro  8.5<H>  /  Alb  3.6  /  TBili  0.8  /  DBili  x   /  AST  36  /  ALT  28  /  AlkPhos  96  07-06    CRP, ESR pending     Lactate, Blood (07.06.18 @ 16:37)    Lactate, Blood: 1.2 mmoL/L    Vital Signs Last 24 Hrs  T(C): 39.4 (06 Jul 2018 17:28), Max: 39.4 (06 Jul 2018 17:28)  T(F): 103 (06 Jul 2018 17:28), Max: 103 (06 Jul 2018 17:28)  HR: 119 (06 Jul 2018 17:28) (113 - 119)  BP: 181/91 (06 Jul 2018 17:28) (149/85 - 181/91)  BP(mean): --  RR: 20 (06 Jul 2018 17:28) (20 - 20)  SpO2: 98% (06 Jul 2018 17:28) (98% - 98%)    PHYSICAL EXAM  General: NAD, AA0x3    Right Lower Extremity Focused:  Vasc: Bounding DP/PT pulses, temperature of foot is warm and 2nd digit is warm relative to contralateral side, CFT x3, 2nd digit is swollen relative to 1st and 3rd   Derm: dusky appearance to 2nd digit, erythema is noted on the 3rd digit and plantar lateral aspect of the right foot         - Ulcer on tip of 2nd digit: measures approximately 0.5 cm by 0.5 cm demonstrates extensive hyperkeratotic border with signs of hematoma, wound tunnels in all directions, active purulent drainage noted, probes to bone with malodor noted. Fluctuance of digit is also noted.          - Resolved ulceration on plantar aspect of right halluc         - Ulcer on plantar lateral midfoot of right foot measuring 1.0 cm by 1.0 cm with hyperkeratotic rim with granular base. No purulent drainage, no probing to bone, no tunneling or undermining, no erythema or signs of infection stemming from this ulcer site.     Neuro: sensation not intact.   MSK:     RADIOLOGY  Final read pending.     A few radiolucencies notes at the distal phalanx of the right 2nd digit. Tunneling of wound might be responsible for radiolucencies seen. Patient is a 42y old  Male who presents with a chief complaint of right 2nd toe pain, swelling and active drainage     HPI: 43 yo M PMH diabetes, hypertension, previous toe amputations, foot ulcerations, h/o osteomyelitis and sepsis presents to ED with c/o 2nd swollen, painful toe that is actively draining with fever. Patient reports that he was an inpatient at [facility] at the end of April 2018 for about a week and has a PICC line for Abx for osteomyelitis of his right foot. He has two previous amputations of his 2nd and 3rd digit in 2015 and 2017, respectively. Patient reports that he sees his podiatrist every 2 weeks for wound care management. About two days ago, he noted pain coming from his right foot as well as swelling. This morning, he noticed active drainage and felt feverish. Admits to having body aches and chills. Patient is febrile and tachycardic. Patient denies any trauma to his foot. Patient reports that he has not eaten since 12 PM today.     Review of systems negative except per HPI    PAST MEDICAL & SURGICAL HISTORY:  Diabetes  Toe amputation status    Home Medications:    Allergies: No Known Allergies      FAMILY HISTORY:  No pertinent family history in first degree relatives    Social History: Non-smoker, Drinker 2 6-packs every other week       LABS                        12.3   15.2  )-----------( 408      ( 06 Jul 2018 16:38 )             35.5     07-06    130<L>  |  89<L>  |  16  ----------------------------<  242<H>  see note   |  26  |  1.11    Ca    9.1      06 Jul 2018 16:38  Mg     1.8     07-06    TPro  8.5<H>  /  Alb  3.6  /  TBili  0.8  /  DBili  x   /  AST  36  /  ALT  28  /  AlkPhos  96  07-06    CRP, ESR pending     Lactate, Blood (07.06.18 @ 16:37)    Lactate, Blood: 1.2 mmoL/L    Vital Signs Last 24 Hrs  T(C): 39.4 (06 Jul 2018 17:28), Max: 39.4 (06 Jul 2018 17:28)  T(F): 103 (06 Jul 2018 17:28), Max: 103 (06 Jul 2018 17:28)  HR: 119 (06 Jul 2018 17:28) (113 - 119)  BP: 181/91 (06 Jul 2018 17:28) (149/85 - 181/91)  BP(mean): --  RR: 20 (06 Jul 2018 17:28) (20 - 20)  SpO2: 98% (06 Jul 2018 17:28) (98% - 98%)    PHYSICAL EXAM  General: NAD, AA0x3    Right Lower Extremity Focused:  Vasc: Bounding DP/PT pulses, temperature of foot is warm and 2nd digit is warm relative to contralateral side, CFT x3, 2nd digit is swollen relative to 1st and 3rd   Derm: dusky appearance to 2nd digit, erythema is noted on the 3rd digit and plantar lateral aspect of the right foot         - Ulcer on tip of 2nd digit: pre-debridement measurement 0.5 cm by 0.5 cm demonstrates extensive hyperkeratotic border with signs of hematoma, tunneling in all directions, active purulent drainage noted, probes to bone with malodor noted. Fluctuance noted. Post-debridement measurement 3.0 cm by 3.5 cm.          - Resolved ulceration on plantar aspect of right hallux         - Ulcer on plantar lateral midfoot of right foot measuring 2.0 cm by 2.0 cm with hyperkeratotic rim with granular base. No purulent drainage, no probing to bone, no tunneling or undermining, no erythema or signs of infection stemming from this ulcer site. Malodor noted.   Neuro: sensation not intact.       RADIOLOGY  Final read pending.     A few radiolucencies notes at the distal phalanx of the right 2nd digit. Tunneling of wound might be responsible for radiolucencies seen. Patient is a 42y old  Male who presents with a chief complaint of right 2nd toe pain, swelling and active drainage     HPI: 43 yo M PMH diabetes, hypertension, previous toe amputations, foot ulcerations, h/o osteomyelitis and sepsis presents to ED with c/o 2nd swollen, painful toe that is actively draining with fever. Patient reports that he was an inpatient at [facility] at the end of April 2018 for about a week and has a PICC line for Abx for osteomyelitis of his right foot. He has two previous amputations of his 2nd and 3rd digit in 2015 and 2017, respectively. Patient reports that he sees his podiatrist every 2 weeks for wound care management. About two days ago, he noted pain coming from his right foot as well as swelling. This morning, he noticed active drainage and felt feverish. Admits to having body aches and chills. Patient is febrile and tachycardic. Patient denies any trauma to his foot. Patient reports that he has not eaten since 12 PM today.     Review of systems negative except per HPI    PAST MEDICAL & SURGICAL HISTORY:  Diabetes  Toe amputation status    Home Medications:  Atorvastatin, Glargine, Losartan, NovoLog    Allergies: No Known Allergies      FAMILY HISTORY:  No pertinent family history in first degree relatives    Social History: Non-smoker, Drinker 2 6-packs every other week       LABS                        12.3   15.2  )-----------( 408      ( 06 Jul 2018 16:38 )             35.5     07-06    130<L>  |  89<L>  |  16  ----------------------------<  242<H>  see note   |  26  |  1.11    Ca    9.1      06 Jul 2018 16:38  Mg     1.8     07-06    TPro  8.5<H>  /  Alb  3.6  /  TBili  0.8  /  DBili  x   /  AST  36  /  ALT  28  /  AlkPhos  96  07-06    CRP, ESR pending     Lactate, Blood (07.06.18 @ 16:37)    Lactate, Blood: 1.2 mmoL/L    Vital Signs Last 24 Hrs  T(C): 39.4 (06 Jul 2018 17:28), Max: 39.4 (06 Jul 2018 17:28)  T(F): 103 (06 Jul 2018 17:28), Max: 103 (06 Jul 2018 17:28)  HR: 119 (06 Jul 2018 17:28) (113 - 119)  BP: 181/91 (06 Jul 2018 17:28) (149/85 - 181/91)  BP(mean): --  RR: 20 (06 Jul 2018 17:28) (20 - 20)  SpO2: 98% (06 Jul 2018 17:28) (98% - 98%)    PHYSICAL EXAM  General: NAD, AA0x3    Right Lower Extremity Focused:  Vasc: Bounding DP/PT pulses, temperature of foot is warm and 2nd digit is warm relative to contralateral side, CFT x3, 2nd digit is swollen relative to 1st and 3rd   Derm: dusky appearance to 2nd digit, erythema is noted on the 3rd digit and plantar lateral aspect of the right foot         - Ulcer on tip of 2nd digit: pre-debridement measurement 0.5 cm by 0.5 cm demonstrates extensive hyperkeratotic border with signs of hematoma, tunneling in all directions, active purulent drainage noted, probes to bone with malodor noted. Fluctuance noted. Post-debridement measurement 3.0 cm by 3.5 cm.          - Resolved ulceration on plantar aspect of right hallux         - Ulcer on plantar lateral midfoot of right foot measuring 2.0 cm by 2.0 cm with hyperkeratotic rim with granular base. No purulent drainage, no probing to bone, no tunneling or undermining, no erythema or signs of infection stemming from this ulcer site. Malodor noted.   Neuro: sensation not intact.       RADIOLOGY  Final read pending.     A few radiolucencies notes at the distal phalanx of the right 2nd digit. Tunneling of wound might be responsible for radiolucencies seen. Patient is a 42y old  Male who presents with a chief complaint of right 2nd toe pain, swelling and active drainage     HPI: 41 yo M PMH diabetes, hypertension, previous toe amputations, foot ulcerations, h/o osteomyelitis and sepsis presents to ED with c/o 2nd swollen, painful toe that is actively draining with fever. Patient reports that he was an inpatient at [facility] at the end of April 2018 for about a week and had a PICC line for Abx for osteomyelitis of his right foot. He has two previous amputations of his 2nd and 3rd digit in 2015 and 2017, respectively. Patient reports that he sees his podiatrist every 2 weeks for wound care management. About two days ago, he noted pain coming from his right foot as well as swelling. This morning, he noticed active drainage and felt feverish. Admits to having body aches and chills. Patient is febrile and tachy. Patient denies any trauma to his foot. Patient reports that he has not eaten since 12 PM today.     Review of systems negative except per HPI    PAST MEDICAL & SURGICAL HISTORY:  Diabetes  Toe amputation status    Home Medications:  Atorvastatin, Glargine, Losartan, NovoLog    Allergies: No Known Allergies      FAMILY HISTORY:  No pertinent family history in first degree relatives    Social History: Non-smoker, Drinker 2 6-packs every other week       LABS                        12.3   15.2  )-----------( 408      ( 06 Jul 2018 16:38 )             35.5     07-06    130<L>  |  89<L>  |  16  ----------------------------<  242<H>  see note   |  26  |  1.11    Ca    9.1      06 Jul 2018 16:38  Mg     1.8     07-06    TPro  8.5<H>  /  Alb  3.6  /  TBili  0.8  /  DBili  x   /  AST  36  /  ALT  28  /  AlkPhos  96  07-06    CRP, ESR pending     Lactate, Blood (07.06.18 @ 16:37)    Lactate, Blood: 1.2 mmoL/L    Vital Signs Last 24 Hrs  T(C): 39.4 (06 Jul 2018 17:28), Max: 39.4 (06 Jul 2018 17:28)  T(F): 103 (06 Jul 2018 17:28), Max: 103 (06 Jul 2018 17:28)  HR: 119 (06 Jul 2018 17:28) (113 - 119)  BP: 181/91 (06 Jul 2018 17:28) (149/85 - 181/91)  BP(mean): --  RR: 20 (06 Jul 2018 17:28) (20 - 20)  SpO2: 98% (06 Jul 2018 17:28) (98% - 98%)    PHYSICAL EXAM  General: NAD, AA0x3    Right Lower Extremity Focused:  Vasc: Bounding DP/PT pulses, temperature of foot is warm and 2nd digit is warm relative to contralateral side, 2nd digit is swollen relative to 1st and 3rd   Derm: dusky appearance to 2nd digit, erythema is noted on the 3rd digit and plantar lateral aspect of the right foot         - Ulcer on tip of 2nd digit had pre-debridement measurement of 0.5 cm by 0.5 cm. Demonstrates extensive hyperkeratotic border with signs of hematoma, tunneling in all directions, active purulent drainage noted, probes to bone with malodor and fluctuance. Post-debridement measurement 3.0 cm by 3.5 cm.          - Resolved ulceration on plantar aspect of right hallux         - Ulcer on plantar lateral midfoot of right foot measuring 2.0 cm by 2.0 cm pre-debridement with hyperkeratotic rim with fibrogranular base. No purulent drainage, no probing to bone, no tunneling or undermining, no erythema. Malodor noted. Post-debridement measurement 3.7 cm by 3.0 cm.  Neuro: sensation not intact.       RADIOLOGY  Final read pending.   A few radiolucencies noted at the distal phalanx of the right 2nd digit. Tunneling of wound might be responsible for radiolucencies seen. Patient is a 42y old  Male who presents with a chief complaint of right 2nd toe pain, swelling and active drainage     HPI: 41 yo M PMH diabetes, hypertension, previous toe amputations, foot ulcerations, h/o osteomyelitis and sepsis presents to ED with c/o 2nd swollen, painful toe that is actively draining with fever. Patient reports that he was an inpatient at [facility] at the end of April 2018 for about a week and had a PICC line for Abx for osteomyelitis of his right foot. Two previous amputations of 2nd and 3rd digit in 2015 and 2017, respectively. Reports that he sees his podiatrist every 2 weeks for wound care management. About two days ago, he noted pain and swelling of right 2nd toe. This morning, he noticed active drainage and felt feverish. Admits to having body aches and chills. Patient is febrile and tachy. Patient denies any trauma to his foot. Patient reports that he has not eaten since 12 PM today.     Review of systems negative except per HPI    PAST MEDICAL & SURGICAL HISTORY:  Diabetes  Toe amputation status    Home Medications:  Atorvastatin, Glargine, Losartan, NovoLog    Allergies: No Known Allergies      FAMILY HISTORY:  No pertinent family history in first degree relatives    Social History: Non-smoker, Drinker 2 6-packs every other week       LABS                        12.3   15.2  )-----------( 408      ( 06 Jul 2018 16:38 )             35.5     07-06    130<L>  |  89<L>  |  16  ----------------------------<  242<H>  see note   |  26  |  1.11    Ca    9.1      06 Jul 2018 16:38  Mg     1.8     07-06    TPro  8.5<H>  /  Alb  3.6  /  TBili  0.8  /  DBili  x   /  AST  36  /  ALT  28  /  AlkPhos  96  07-06    CRP, ESR pending     Lactate, Blood (07.06.18 @ 16:37)    Lactate, Blood: 1.2 mmoL/L    Vital Signs Last 24 Hrs  T(C): 39.4 (06 Jul 2018 17:28), Max: 39.4 (06 Jul 2018 17:28)  T(F): 103 (06 Jul 2018 17:28), Max: 103 (06 Jul 2018 17:28)  HR: 119 (06 Jul 2018 17:28) (113 - 119)  BP: 181/91 (06 Jul 2018 17:28) (149/85 - 181/91)  BP(mean): --  RR: 20 (06 Jul 2018 17:28) (20 - 20)  SpO2: 98% (06 Jul 2018 17:28) (98% - 98%)    PHYSICAL EXAM  General: NAD, AA0x3    Right Lower Extremity Focused:  Vasc: Bounding DP/PT pulses, foot and 2nd digit warm relative to contralateral side, 2nd digit edema  Derm: dusky appearance to 2nd digit, erythema is noted on the 3rd digit and plantar lateral aspect of the right foot         - Ulcer on tip of 2nd digit had pre-debridement measurement of 0.5 cm by 0.5 cm. Demonstrates extensive hyperkeratotic border with signs of hematoma, tunneling in all directions, active purulent drainage noted, probes to bone with malodor and fluctuance. Post-debridement measurement 3.0 cm by 3.5 cm.          - Resolved ulceration on plantar aspect of right hallux         - Ulcer on plantar lateral midfoot of right foot measuring 2.0 cm by 2.0 cm pre-debridement with hyperkeratotic rim with fibrogranular base. No purulent drainage, no probing to bone, no tunneling or undermining, no erythema. Malodor noted. Post-debridement measurement 3.7 cm by 3.0 cm.  Neuro: sensation not intact.       RADIOLOGY  Final read pending.   A few radiolucencies noted at the distal phalanx of the right 2nd digit. Tunneling of wound might be responsible for radiolucencies seen. Patient is a 42y old  Male who presents with a chief complaint of right 2nd toe pain, swelling and active drainage     HPI: 41 yo M PMH diabetes, hypertension, previous toe amputations, foot ulcerations, h/o osteomyelitis and sepsis presents to ED with c/o 2nd swollen, painful toe that is actively draining with fever. Patient reports that he was an inpatient at [facility] at the end of April 2018 for about a week and had a PICC line for Abx for osteomyelitis of his right foot. Two previous amputations of 2nd and 3rd digit in 2015 and 2017, respectively. Reports that he sees his podiatrist every 2 weeks for wound care management. About two days ago, he noted pain and swelling of right 2nd toe. This morning, he noticed active drainage and felt feverish. Admits to having body aches and chills. Patient is febrile and tachy. Patient denies any trauma to his foot. Patient reports that he has not eaten since 12 PM today.     Review of systems negative except per HPI    PAST MEDICAL & SURGICAL HISTORY:  Diabetes  Toe amputation status    Home Medications:  Atorvastatin, Glargine, Losartan, NovoLog    Allergies: No Known Allergies      FAMILY HISTORY:  No pertinent family history in first degree relatives    Social History: Non-smoker, Drinker 2 6-packs every other week       LABS                        12.3   15.2  )-----------( 408      ( 06 Jul 2018 16:38 )             35.5     07-06    130<L>  |  89<L>  |  16  ----------------------------<  242<H>  see note   |  26  |  1.11    Ca    9.1      06 Jul 2018 16:38  Mg     1.8     07-06    TPro  8.5<H>  /  Alb  3.6  /  TBili  0.8  /  DBili  x   /  AST  36  /  ALT  28  /  AlkPhos  96  07-06    CRP, ESR pending     Lactate, Blood (07.06.18 @ 16:37)    Lactate, Blood: 1.2 mmoL/L    Vital Signs Last 24 Hrs  T(C): 39.4 (06 Jul 2018 17:28), Max: 39.4 (06 Jul 2018 17:28)  T(F): 103 (06 Jul 2018 17:28), Max: 103 (06 Jul 2018 17:28)  HR: 119 (06 Jul 2018 17:28) (113 - 119)  BP: 181/91 (06 Jul 2018 17:28) (149/85 - 181/91)  BP(mean): --  RR: 20 (06 Jul 2018 17:28) (20 - 20)  SpO2: 98% (06 Jul 2018 17:28) (98% - 98%)    PHYSICAL EXAM  General: NAD, AA0x3    Right Lower Extremity Focused:  Vasc: Bounding DP/PT pulses, foot and 2nd digit warm relative to contralateral side, 2nd digit edema  Derm: dusky appearance to 2nd digit, erythema is noted on the 3rd digit and plantar lateral aspect of the right foot         - Ulcer on tip of 2nd digit had pre-debridement measurement of 0.5 cm by 0.5 cm. Demonstrates extensive hyperkeratotic border with signs of hematoma, tunneling at 12 o'clock, active purulent drainage noted, probes to bone with malodor and fluctuance. Post-debridement measurement 3.0 cm by 3.5 cm.          - Resolved ulceration on plantar aspect of right hallux         - Ulcer on plantar lateral midfoot of right foot measuring 2.0 cm by 2.0 cm pre-debridement with hyperkeratotic rim with fibrogranular base. No purulent drainage, no probing to bone, no tunneling or undermining, no erythema. Malodor noted. Post-debridement measurement 3.7 cm by 3.0 cm.  Neuro: sensation not intact.       RADIOLOGY  Final read pending.   A few radiolucencies noted at the distal phalanx of the right 2nd digit. Tunneling of wound might be responsible for radiolucencies seen.

## 2018-07-06 NOTE — H&P ADULT - HISTORY OF PRESENT ILLNESS
Patient is a 42 year old male with diabetes II (on insulin), s/p right 4th and 5th toe amputation (2015 and 2017), recent admission for osteomyelitis on base of right foot, treated with Abx via PICC, who is presenting with 12 hours of pain and discharge from right 2nd toe. Patient describes that he felt fine until this morning when he woke up and noticed drainage from the tip of his 2nd right toe. He drained as much as he could and wrapped his foot. As the day went on he developed worsening sharp pain in the toe up to a 9/10 in severity which prompted him to come to the ED. He describes that he also felt feverish, fatigued, and confused throughout the day. He denies headache, chest pain, difficulty breathing, abdominal pain, N/V/D, dysuria, increased frequency of urination. Patient follows up with a podiatrist every 2 weeks. Of note, this is not related to prior infection.    PMH: Diabetes  PSH: Right 4th and 5th toe amputations (2013 and 2015)  Medications: Lantus 30 U at bedtime. Novalog 8 U with meals. Lisinopril 5 mg once daily.  Allergies: None  Social History: Drinks 1-2 beers per day. Former smoker (20 years ago). No drug use (cocaine use years ago)    In ED: T: 103 HR: 119 BP: 181/91 RR: 20 SpO2 98% on RA. Labs were significant for WBC: 15.2 Lactate 1.2 (normal). Patient given 3.5 L NS and 1x Vanc/Zosyn and Clindamycin. Patient is a 42 year old male with diabetes II (A1C 12.9 on insulin), s/p right 4th and 5th toe amputation (2015 and 2017), recent admission for osteomyelitis on base of right foot, treated with Abx via PICC (finished course of cefepime under supervision of Dr Foy), who is presenting with 12 hours of pain and discharge from right 2nd toe. Patient describes that he felt fine until this morning when he woke up and noticed drainage from the tip of his 2nd right toe. He drained as much as he could and wrapped his foot. As the day went on he developed worsening sharp pain in the toe up to a 9/10 in severity which prompted him to come to the ED. He describes that he also felt feverish, fatigued, and confused throughout the day. He denies headache, chest pain, difficulty breathing, abdominal pain, N/V/D, dysuria, increased frequency of urination. Patient follows up with a podiatrist every 2 weeks. Of note, this is not related to prior infection.    PMH: Diabetes  PSH: Right 4th and 5th toe amputations (2013 and 2015)  Medications: Lantus 30 U at bedtime. Novalog 8 U with meals. Lisinopril 5 mg once daily.  Allergies: None  Social History: Drinks 1-2 beers per day. Former smoker (20 years ago). No drug use (cocaine use years ago)    In ED: T: 103 HR: 119 BP: 181/91 RR: 20 SpO2 98% on RA. Labs were significant for WBC: 15.2 Lactate 1.2 (normal). Patient given 3.5 L NS and 1x Vanc/Zosyn and Clindamycin. Patient is a 42 year old male with diabetes II (A1C 12.9 on insulin), s/p right 4th and 5th toe amputation (2015 and 2017), recent admission for osteomyelitis on base of right foot, treated with Abx via PICC (finished course of cefepime under supervision of Dr Foy), who is presenting with 12 hours of pain and discharge from right 2nd toe. Patient describes that he felt fine until this morning when he woke up and noticed drainage from the tip of his 2nd right toe. He drained as much as he could and wrapped his foot. As the day went on he developed worsening sharp pain in the toe up to a 9/10 in severity which prompted him to come to the ED. He describes that he also felt feverish, fatigued, and confused throughout the day. He denies headache, chest pain, difficulty breathing, abdominal pain, N/V/D, dysuria, increased frequency of urination. Patient follows up with a podiatrist every 2 weeks. Of note, this is not related to prior infection.    PMH: Diabetes  PSH: Right 4th and 5th toe amputations (2013 and 2015)  Medications: Lantus 30 U at bedtime. Novalog 8 U with meals. Lisinopril 5 mg once daily.  Allergies: None  Social History: Drinks 1-2 beers per day. Former smoker (20 years ago). No drug use (cocaine use years ago)  FH: no hx of cardiac disease in father    In ED: T: 103 HR: 119 BP: 181/91 RR: 20 SpO2 98% on RA. Labs were significant for WBC: 15.2 Lactate 1.2 (normal). Patient given 3.5 L NS and 1x Vanc/Zosyn and Clindamycin.

## 2018-07-06 NOTE — H&P ADULT - NSHPPHYSICALEXAM_GEN_ALL_CORE
VITAL SIGNS:  T(C): 38.8 (07-06-18 @ 18:05), Max: 39.4 (07-06-18 @ 17:28)  T(F): 101.8 (07-06-18 @ 18:05), Max: 103 (07-06-18 @ 17:28)  HR: 105 (07-06-18 @ 18:05) (105 - 119)  BP: 131/78 (07-06-18 @ 18:05) (131/78 - 181/91)  BP(mean): --  RR: 20 (07-06-18 @ 18:05) (20 - 20)  SpO2: 98% (07-06-18 @ 18:05) (98% - 98%)  Wt(kg): --    PHYSICAL EXAM:    Constitutional: WDWN resting comfortably in bed; NAD.  Head: NC/AT  Eyes: PERRL, EOMI, anicteric sclera  ENT: no oropharyngeal erythema or exudates; MMM  Neck: supple  Respiratory: Breathing comfortably on room air without accesory muscle use. CTA B/L; no W/R/R,   Cardiac: +S1/S2; RRR; no M/R/G; PMI non-displaced  Gastrointestinal: soft, NT/ND; no rebound or guarding; +BSx4  Extremities: WWP, Right foot with 3 toes. 2 cm round lesion with granulation tissue on base of right foot. Ulcer at tip of right 2nd toe with edema, erythema, malodorous and purulence.  Vascular: 2+ DP pulses b/l  Neurologic: AAOx3. Sensation intact in all extremities. VITAL SIGNS:  T(C): 38.8 (07-06-18 @ 18:05), Max: 39.4 (07-06-18 @ 17:28)  T(F): 101.8 (07-06-18 @ 18:05), Max: 103 (07-06-18 @ 17:28)  HR: 105 (07-06-18 @ 18:05) (105 - 119)  BP: 131/78 (07-06-18 @ 18:05) (131/78 - 181/91)  BP(mean): --  RR: 20 (07-06-18 @ 18:05) (20 - 20)  SpO2: 98% (07-06-18 @ 18:05) (98% - 98%)  Wt(kg): --    PHYSICAL EXAM:    Constitutional: WDWN resting comfortably in bed; NAD.  Head: NC/AT  Eyes: PERRL, EOMI, anicteric sclera  ENT: no oropharyngeal erythema or exudates; MMM  Neck: supple  Respiratory: Breathing comfortably on room air without accesory muscle use. CTA B/L; no W/R/R,   Cardiac: +S1/S2; RRR; no M/R/G; PMI non-displaced  Gastrointestinal: soft, NT/ND; no rebound or guarding; +BSx4  Extremities: WWP, Right foot with 3 toes. 2 cm round lesion with granulation tissue on base of right foot. Ulcer at tip of right 2nd toe with edema, erythema, malodorous and purulence. Erythema up to mid-shin but appears also to be related to chronic skin changes    Vascular: 2+ DP pulses b/l  Neurologic: AAOx3. Sensation intact in all extremities.

## 2018-07-06 NOTE — H&P ADULT - PROBLEM SELECTOR PLAN 6
F: S/P 3.5 L in ED. Start on maintenace fluids as necessary.  E: Replete K to 4 and Mg to 2.  N: NPO in case requires surgery.

## 2018-07-06 NOTE — ED PROVIDER NOTE - MEDICAL DECISION MAKING DETAILS
here w/ new R toe infection, actively draining pus. needs IV abx, anti pyretics. seen by podiatry in the ED, will check esr/crp & xray given hx of recent osteomyelitis. needs admission for iv abx and further monitoring.

## 2018-07-06 NOTE — H&P ADULT - ASSESSMENT
42 year old male with poorly controlled diabetes, s/p right 4th and 5th toe amputation (2015 and 2017), recent admission for osteomyelitis on base of right foot, treated with Abx,  presents with 1 day of fever, pain and discharge of right 2nd toe concerning for cellulitis/osteomyelitis. 42 year old male with poorly controlled diabetes, s/p right 4th and 5th toe amputation (2015 and 2017), recent admission for osteomyelitis on base of right foot, treated with IV Abx, presents with 1 day of fever, pain and discharge of right 2nd toe concerning for cellulitis/osteomyelitis/necrotizing fascitis.

## 2018-07-06 NOTE — H&P ADULT - PROBLEM SELECTOR PLAN 3
Patient has a history of type II diabetes requiring insulin. He is s/p Toe amputation x 2.  -Monitor Glucose levels  -SSI  -Continue home enalapril. On admission, Na: 130. Corrected 132. Likely hypovolemic hyponatremia secondary to sepsis. S/p 3.5 L NS.  -Trend Electrolytes

## 2018-07-06 NOTE — ED PROVIDER NOTE - OBJECTIVE STATEMENT
42YM w/ DMII uncontrolled w/ hx of 2 toe amputations presented with osteomyelitis of R foot confirmed on MRI 2 months ago s/p 6wks of cefepime via PICC, returns today w/ 2 days of swelling, pain, discharge and malodor to the R 2nd toe. Base of foot ulcer that was infected on prior admission has been stable as per pt, no discharge or swelling to that area. R 2nd toe had no prior ulcer, this is a totally new infection. +fevers/chills, no N/V/D.

## 2018-07-06 NOTE — H&P ADULT - PROBLEM SELECTOR PLAN 4
On admission, Na: 130. Corrected 132. Likely hypovolemic hyponatremia secondary to sepsis. S/p 3.5 L NS.  -Trend Electrolytes DVT Prophylaxis: Low risk, no VTE ppx at this time

## 2018-07-07 DIAGNOSIS — M86.9 OSTEOMYELITIS, UNSPECIFIED: ICD-10-CM

## 2018-07-07 DIAGNOSIS — Z91.89 OTHER SPECIFIED PERSONAL RISK FACTORS, NOT ELSEWHERE CLASSIFIED: ICD-10-CM

## 2018-07-07 LAB
ANION GAP SERPL CALC-SCNC: 6 MMOL/L — SIGNIFICANT CHANGE UP (ref 5–17)
BUN SERPL-MCNC: 13 MG/DL — SIGNIFICANT CHANGE UP (ref 7–23)
CALCIUM SERPL-MCNC: 8.4 MG/DL — SIGNIFICANT CHANGE UP (ref 8.4–10.5)
CHLORIDE SERPL-SCNC: 104 MMOL/L — SIGNIFICANT CHANGE UP (ref 96–108)
CO2 SERPL-SCNC: 26 MMOL/L — SIGNIFICANT CHANGE UP (ref 22–31)
CREAT SERPL-MCNC: 0.92 MG/DL — SIGNIFICANT CHANGE UP (ref 0.5–1.3)
GLUCOSE BLDC GLUCOMTR-MCNC: 173 MG/DL — HIGH (ref 70–99)
GLUCOSE BLDC GLUCOMTR-MCNC: 183 MG/DL — HIGH (ref 70–99)
GLUCOSE BLDC GLUCOMTR-MCNC: 255 MG/DL — HIGH (ref 70–99)
GLUCOSE BLDC GLUCOMTR-MCNC: 266 MG/DL — HIGH (ref 70–99)
GLUCOSE SERPL-MCNC: 212 MG/DL — HIGH (ref 70–99)
HBA1C BLD-MCNC: 9.4 % — HIGH (ref 4–5.6)
HCT VFR BLD CALC: 35.3 % — LOW (ref 39–50)
HGB BLD-MCNC: 11.5 G/DL — LOW (ref 13–17)
MCHC RBC-ENTMCNC: 28.7 PG — SIGNIFICANT CHANGE UP (ref 27–34)
MCHC RBC-ENTMCNC: 32.6 G/DL — SIGNIFICANT CHANGE UP (ref 32–36)
MCV RBC AUTO: 88 FL — SIGNIFICANT CHANGE UP (ref 80–100)
PLATELET # BLD AUTO: 367 K/UL — SIGNIFICANT CHANGE UP (ref 150–400)
POTASSIUM SERPL-MCNC: 4.6 MMOL/L — SIGNIFICANT CHANGE UP (ref 3.5–5.3)
POTASSIUM SERPL-SCNC: 4.6 MMOL/L — SIGNIFICANT CHANGE UP (ref 3.5–5.3)
RBC # BLD: 4.01 M/UL — LOW (ref 4.2–5.8)
RBC # FLD: 13.2 % — SIGNIFICANT CHANGE UP (ref 10.3–16.9)
SODIUM SERPL-SCNC: 136 MMOL/L — SIGNIFICANT CHANGE UP (ref 135–145)
WBC # BLD: 12.7 K/UL — HIGH (ref 3.8–10.5)
WBC # FLD AUTO: 12.7 K/UL — HIGH (ref 3.8–10.5)

## 2018-07-07 PROCEDURE — 73700 CT LOWER EXTREMITY W/O DYE: CPT | Mod: 26,RT

## 2018-07-07 PROCEDURE — 99221 1ST HOSP IP/OBS SF/LOW 40: CPT

## 2018-07-07 PROCEDURE — 99232 SBSQ HOSP IP/OBS MODERATE 35: CPT

## 2018-07-07 RX ADMIN — Medication 650 MILLIGRAM(S): at 13:01

## 2018-07-07 RX ADMIN — Medication 250 MILLIGRAM(S): at 07:52

## 2018-07-07 RX ADMIN — INSULIN GLARGINE 24 UNIT(S): 100 INJECTION, SOLUTION SUBCUTANEOUS at 21:33

## 2018-07-07 RX ADMIN — Medication 650 MILLIGRAM(S): at 21:25

## 2018-07-07 RX ADMIN — PIPERACILLIN AND TAZOBACTAM 200 GRAM(S): 4; .5 INJECTION, POWDER, LYOPHILIZED, FOR SOLUTION INTRAVENOUS at 12:16

## 2018-07-07 RX ADMIN — Medication 2: at 17:20

## 2018-07-07 RX ADMIN — Medication 250 MILLIGRAM(S): at 18:06

## 2018-07-07 RX ADMIN — Medication 6: at 21:33

## 2018-07-07 RX ADMIN — Medication 6: at 11:38

## 2018-07-07 RX ADMIN — PIPERACILLIN AND TAZOBACTAM 200 GRAM(S): 4; .5 INJECTION, POWDER, LYOPHILIZED, FOR SOLUTION INTRAVENOUS at 17:25

## 2018-07-07 RX ADMIN — Medication 100 MILLIGRAM(S): at 05:21

## 2018-07-07 RX ADMIN — PIPERACILLIN AND TAZOBACTAM 200 GRAM(S): 4; .5 INJECTION, POWDER, LYOPHILIZED, FOR SOLUTION INTRAVENOUS at 06:22

## 2018-07-07 RX ADMIN — Medication 650 MILLIGRAM(S): at 12:19

## 2018-07-07 RX ADMIN — Medication 2: at 07:51

## 2018-07-07 RX ADMIN — Medication 650 MILLIGRAM(S): at 20:54

## 2018-07-07 RX ADMIN — PIPERACILLIN AND TAZOBACTAM 200 GRAM(S): 4; .5 INJECTION, POWDER, LYOPHILIZED, FOR SOLUTION INTRAVENOUS at 23:56

## 2018-07-07 NOTE — CONSULT NOTE ADULT - ASSESSMENT
43 yo M with DM with likely OM of right second toe, initially with fever, tachycardia and leukocytosis, now afebrile with decreasing leukocytosis.  Agree with broad spectrum coverage for polymicrobial process, including coverage for MRSA and Pseudomonas in view of recent admission and treatment with cefepime.  Suggest:  - F/U blood cultures - pending from 7/6  - Agree with vancomycin - can continue at present dose - please check trough prior to 4th dose  - Agree with pip-tazo 4.5 g IV q6h for now  - Please d/c clindamycin  - Plans for surgery per Podiatry - if done, please send cultures of bone from OR for bacterial, fungal and AFB cultures.  Recommendations discussed with primary team.  Will follow with you.  Please call directly with questions.

## 2018-07-07 NOTE — PROGRESS NOTE ADULT - PROBLEM SELECTOR PLAN 4
On admission, Na: 130. Corrected 132. Likely hypovolemic hyponatremia secondary to sepsis. S/p 3.5 L NS.  -Trend Electrolytes and replete PRN

## 2018-07-07 NOTE — PROGRESS NOTE ADULT - PROBLEM SELECTOR PLAN 3
Patient has a history of type II diabetes requiring insulin (April 2018 A1c 12.9%). He is s/p Toe amputation x 2.  -c/w MISS  -C/w Lantus dose 24U   -Give regular insulin if patient becomes hyperglycemic

## 2018-07-07 NOTE — PROGRESS NOTE ADULT - SUBJECTIVE AND OBJECTIVE BOX
Patient is a 42y old  Male who presents with a chief complaint of Toe ulcer with purulent drainage (06 Jul 2018 18:28)    INTERVAL HPI/ OVERNIGHT EVENTS: Patient seen bedside with no complaints. Relates that he feels better. No pain. No events overnight. Denies chills, body aches, nausea and vomiting.      LABS                        11.5   12.7  )-----------( 367      ( 07 Jul 2018 07:17 )             35.3     07-07    136  |  104  |  13  ----------------------------<  212<H>  4.6   |  26  |  0.92    Ca    8.4      07 Jul 2018 07:17  Mg     1.8     07-06    TPro  8.5<H>  /  Alb  3.6  /  TBili  0.8  /  DBili  x   /  AST  36  /  ALT  28  /  AlkPhos  96  07-06      ESR: 86  C-Reactive Protein, Serum (07.06.18 @ 16:38)    C-Reactive Protein, Serum: 16.37 mg/dL    07-06 @ 17:57  Lactate, Blood (07.06.18 @ 16:37)    Lactate, Blood: 1.2 mmoL/L      ICU Vital Signs Last 24 Hrs  T(C): 37.2 (07 Jul 2018 08:06), Max: 39.4 (06 Jul 2018 17:28)  T(F): 99 (07 Jul 2018 08:06), Max: 103 (06 Jul 2018 17:28)  HR: 83 (07 Jul 2018 08:06) (71 - 119)  BP: 113/67 (07 Jul 2018 08:06) (110/68 - 181/91)  BP(mean): --  ABP: --  ABP(mean): --  RR: 17 (07 Jul 2018 08:06) (17 - 20)  SpO2: 97% (07 Jul 2018 08:06) (96% - 98%)      RADIOLOGY  Final read pending    MICROBIOLOGY  Culture - Surgical Swab (07.06.18 @ 19:47)    Gram Stain:   Few Gram positive cocci in pairs, chains and clusters  Few Gram Negative Rods  No WBC's seen.    Specimen Source: .Surgical Swab right 2nd toe infection    Culture Results:   Numerous Streptococcus agalactiae (Group B)  Susceptibility to follow.  Culture in progress    Culture - Blood (07.06.18 @ 17:17)    Specimen Source: .Blood Blood-Peripheral    Culture Results:   No growth at 12 hours    PHYSICAL EXAM  Right Lower Extremity Focused -   Dressing was dry and intact. Neurovascular status: bounding pulses and diminished sensation. Right 2nd digit is edematous, boggy. No tunneling, no purulent drainage from 2nd digit ulcer.

## 2018-07-07 NOTE — PROGRESS NOTE ADULT - SUBJECTIVE AND OBJECTIVE BOX
OVERNIGHT EVENTS: No acute events    SUBJECTIVE / INTERVAL HPI: Patient seen and examined at bedside. Patient complaining of R foot pain. Patient denies fevers, chills, night sweats, new change in sensation, weakness, chest pain, SOB, nausea, vomiting, diarrhea, constipation, new skin changes, dysuria.     VITAL SIGNS:  Vital Signs Last 24 Hrs  T(C): 37.3 (07 Jul 2018 20:15), Max: 37.5 (07 Jul 2018 17:05)  T(F): 99.1 (07 Jul 2018 20:15), Max: 99.5 (07 Jul 2018 17:05)  HR: 92 (07 Jul 2018 20:15) (71 - 92)  BP: 174/94 (07 Jul 2018 20:15) (110/68 - 174/94)  BP(mean): --  RR: 17 (07 Jul 2018 20:15) (16 - 17)  SpO2: 98% (07 Jul 2018 20:15) (97% - 98%)    PHYSICAL EXAM:    General: Well developed, well nourished. In no acute distress.  HEENT: NCAT; PERRL, anicteric sclera; MMM  Neck: supple  Cardiovascular: +S1/S2, RRR  Respiratory: CTA B/L; no W/R/R  Gastrointestinal: soft, nontender, nondistended; +BSx4  Extremities: R foot with amputated fourth and fifth right toe. Erythematous and dark-colored ulcer at tip of the right 2nd digit but with no purulent discharge appreciated. Clean base ulcer on the sole of the right foot.  Left foot without ulcerations.   Vascular: distal pulses intact b/l   Neurological: AAOx3; no focal deficits. Sensation intact throughout b/l with the exception of b/l feet (patient states this is chronic). Muscle strength 5/5 b/l throughout.     MEDICATIONS:  MEDICATIONS  (STANDING):  atorvastatin 40 milliGRAM(s) Oral at bedtime  dextrose 5%. 1000 milliLiter(s) (50 mL/Hr) IV Continuous <Continuous>  dextrose 50% Injectable 12.5 Gram(s) IV Push once  dextrose 50% Injectable 25 Gram(s) IV Push once  dextrose 50% Injectable 25 Gram(s) IV Push once  insulin glargine Injectable (LANTUS) 24 Unit(s) SubCutaneous at bedtime  insulin lispro (HumaLOG) corrective regimen sliding scale   SubCutaneous Before meals and at bedtime  piperacillin/tazobactam IVPB. 4.5 Gram(s) IV Intermittent every 6 hours    MEDICATIONS  (PRN):  acetaminophen   Tablet. 650 milliGRAM(s) Oral every 6 hours PRN Moderate Pain (4 - 6)  dextrose 40% Gel 15 Gram(s) Oral once PRN Blood Glucose LESS THAN 70 milliGRAM(s)/deciliter  glucagon  Injectable 1 milliGRAM(s) IntraMuscular once PRN Glucose LESS THAN 70 milligrams/deciliter      ALLERGIES:  Allergies    No Known Allergies    Intolerances        LABS:                        11.5   12.7  )-----------( 367      ( 07 Jul 2018 07:17 )             35.3     07-07    136  |  104  |  13  ----------------------------<  212<H>  4.6   |  26  |  0.92    Ca    8.4      07 Jul 2018 07:17  Mg     1.8     07-06    TPro  8.5<H>  /  Alb  3.6  /  TBili  0.8  /  DBili  x   /  AST  36  /  ALT  28  /  AlkPhos  96  07-06        CAPILLARY BLOOD GLUCOSE  218 (06 Jul 2018 22:15)      POCT Blood Glucose.: 255 mg/dL (07 Jul 2018 21:18)      RADIOLOGY & ADDITIONAL TESTS: Reviewed.

## 2018-07-07 NOTE — CONSULT NOTE ADULT - SUBJECTIVE AND OBJECTIVE BOX
HPI:  Patient is a 42 year old male with diabetes II (A1C 12.9 on insulin), s/p right 4th and 5th toe amputation (2015 and 2017), recent admission for osteomyelitis on base of right foot, treated with Abx via PICC (finished course of cefepime under supervision of Dr Foy), who is presenting with 12 hours of pain and discharge from right 2nd toe. Patient describes that he felt fine until this morning when he woke up and noticed drainage from the tip of his 2nd right toe. He drained as much as he could and wrapped his foot. As the day went on he developed worsening sharp pain in the toe up to a 9/10 in severity which prompted him to come to the ED. He describes that he also felt feverish, fatigued, and confused throughout the day. He denies headache, chest pain, difficulty breathing, abdominal pain, N/V/D, dysuria, increased frequency of urination. Patient follows up with a podiatrist every 2 weeks. Of note, this is not related to prior infection.    PMH: Diabetes  PSH: Right 4th and 5th toe amputations (2013 and 2015)  Medications: Lantus 30 U at bedtime. Novalog 8 U with meals. Lisinopril 5 mg once daily.  Allergies: None  Social History: Drinks 1-2 beers per day. Former smoker (20 years ago). No drug use (cocaine use years ago)  FH: no hx of cardiac disease in father    In ED: T: 103 HR: 119 BP: 181/91 RR: 20 SpO2 98% on RA. Labs were significant for WBC: 15.2 Lactate 1.2 (normal). Patient given 3.5 L NS and 1x Vanc/Zosyn and Clindamycin. (06 Jul 2018 18:28)      PAST MEDICAL & SURGICAL HISTORY:  Osteomyelitis  Diabetes  Toe amputation status          MEDICATIONS  (STANDING):  atorvastatin 40 milliGRAM(s) Oral at bedtime  clindamycin IVPB 600 milliGRAM(s) IV Intermittent every 8 hours  dextrose 5%. 1000 milliLiter(s) (50 mL/Hr) IV Continuous <Continuous>  dextrose 50% Injectable 12.5 Gram(s) IV Push once  dextrose 50% Injectable 25 Gram(s) IV Push once  dextrose 50% Injectable 25 Gram(s) IV Push once  insulin glargine Injectable (LANTUS) 24 Unit(s) SubCutaneous at bedtime  insulin lispro (HumaLOG) corrective regimen sliding scale   SubCutaneous Before meals and at bedtime  piperacillin/tazobactam IVPB. 4.5 Gram(s) IV Intermittent every 6 hours  vancomycin  IVPB 1750 milliGRAM(s) IV Intermittent every 12 hours    MEDICATIONS  (PRN):  acetaminophen   Tablet. 650 milliGRAM(s) Oral every 6 hours PRN Moderate Pain (4 - 6)  dextrose 40% Gel 15 Gram(s) Oral once PRN Blood Glucose LESS THAN 70 milliGRAM(s)/deciliter  glucagon  Injectable 1 milliGRAM(s) IntraMuscular once PRN Glucose LESS THAN 70 milligrams/deciliter      Allergies    No Known Allergies    Intolerances        SOCIAL HISTORY:    FAMILY HISTORY:  No pertinent family history in first degree relatives      Vital Signs Last 24 Hrs  T(C): 37.2 (07 Jul 2018 08:06), Max: 39.4 (06 Jul 2018 17:28)  T(F): 99 (07 Jul 2018 08:06), Max: 103 (06 Jul 2018 17:28)  HR: 83 (07 Jul 2018 08:06) (71 - 119)  BP: 113/67 (07 Jul 2018 08:06) (110/68 - 181/91)  BP(mean): --  RR: 17 (07 Jul 2018 08:06) (17 - 20)  SpO2: 97% (07 Jul 2018 08:06) (96% - 98%)    PE:  WDWN in no distress  HEENT:  NC, PERRL, sclerae anicteric, conjunctivae clear, EOMI.  Sinuses nontender, no nasal exudate.  No buccal or pharyngeal lesions, erythema or exudate  Neck:  Supple, no adenopathy  Lungs:  Clear to auscultation  Cor:  RRR, S1, S2, no murmur appreciated  Abd:  Symmetric, normoactive BS.  Soft, nontender, no masses, guarding or rebound.  Liver and spleen not enlarged  Extrem:  No cyanosis or edema  Skin:  No rashes.    LABS:                        11.5   12.7  )-----------( 367      ( 07 Jul 2018 07:17 )             35.3     07-07    136  |  104  |  13  ----------------------------<  212<H>  4.6   |  26  |  0.92    Ca    8.4      07 Jul 2018 07:17  Mg     1.8     07-06    TPro  8.5<H>  /  Alb  3.6  /  TBili  0.8  /  DBili  x   /  AST  36  /  ALT  28  /  AlkPhos  96  07-06        RADIOLOGY & ADDITIONAL STUDIES: HPI:  Patient is a 42 year old male with diabetes II (A1C 12.9 on insulin), s/p right 4th and 5th toe amputation (2015 and 2017), recent admission for osteomyelitis of the right 3rd toe (no culture data), ulcer on base of right foot (superficial culture grew GBS and MSSA), treated with vanc & pip-tazo initially, then d/austen on cefepime via PICC (finished 6 week course on 6/7), who presented on 7/6 with 12 hours of pain and discharge from right 2nd toe. Patient describes that he felt fine until yesterday morning when he woke up and noticed drainage from the tip of his 2nd right toe. He drained as much as he could and wrapped his foot. As the day went on he developed worsening sharp pain in the toe up to a 9/10 in severity which prompted him to come to the ED. He denied fever or chills. Patient follows up with a podiatrist every 2 weeks.  At his last visit, was told that there was concern regarding 2nd toe but that it was not clearly actively infected at that time.    In ED: Tm 103 HR: 119 BP: 181/91 RR: 20 SpO2 98% on RA. Labs were significant for WBC: 15.2 Lactate 1.2 (normal). Patient given 3.5 L NS and 1x Vanc/Zosyn and Clindamycin.    ROS:  No HA, photophobia, neck stiffness, rhinorrhea, sore throat, cough, CP, SOB, N, V, diarrhea, abd pain, dysuria, hematuria, other joint symptoms.  Is chronically numb in his feet.      PMH: Diabetes  PSH: Right 4th and 5th toe amputations (2013 and 2015)  Medications: Lantus 30 U at bedtime. Novalog 8 U with meals. Lisinopril 5 mg once daily.  Allergies: None  Social History:  Lives in [place].  Has 12 yo daughter.  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. Drinks 1-2 beers per day. Former smoker (20 years ago). No drug use (intranasal cocaine use years ago, no h/o IVDU)  FH: no hx of cardiac disease in father      PAST MEDICAL & SURGICAL HISTORY:  Osteomyelitis  Diabetes  Toe amputation status          MEDICATIONS  (STANDING):  atorvastatin 40 milliGRAM(s) Oral at bedtime  clindamycin IVPB 600 milliGRAM(s) IV Intermittent every 8 hours  dextrose 5%. 1000 milliLiter(s) (50 mL/Hr) IV Continuous <Continuous>  dextrose 50% Injectable 12.5 Gram(s) IV Push once  dextrose 50% Injectable 25 Gram(s) IV Push once  dextrose 50% Injectable 25 Gram(s) IV Push once  insulin glargine Injectable (LANTUS) 24 Unit(s) SubCutaneous at bedtime  insulin lispro (HumaLOG) corrective regimen sliding scale   SubCutaneous Before meals and at bedtime  piperacillin/tazobactam IVPB. 4.5 Gram(s) IV Intermittent every 6 hours  vancomycin  IVPB 1750 milliGRAM(s) IV Intermittent every 12 hours    MEDICATIONS  (PRN):  acetaminophen   Tablet. 650 milliGRAM(s) Oral every 6 hours PRN Moderate Pain (4 - 6)  dextrose 40% Gel 15 Gram(s) Oral once PRN Blood Glucose LESS THAN 70 milliGRAM(s)/deciliter  glucagon  Injectable 1 milliGRAM(s) IntraMuscular once PRN Glucose LESS THAN 70 milligrams/deciliter      Allergies    No Known Allergies    Intolerances        FAMILY HISTORY:  No pertinent family history in first degree relatives      Vital Signs Last 24 Hrs  T(C): 37.2 (07 Jul 2018 08:06), Max: 39.4 (06 Jul 2018 17:28)  T(F): 99 (07 Jul 2018 08:06), Max: 103 (06 Jul 2018 17:28)  HR: 83 (07 Jul 2018 08:06) (71 - 119)  BP: 113/67 (07 Jul 2018 08:06) (110/68 - 181/91)  BP(mean): --  RR: 17 (07 Jul 2018 08:06) (17 - 20)  SpO2: 97% (07 Jul 2018 08:06) (96% - 98%)    PE:  WDWN in no distress  HEENT:  NC, PERRL, sclerae anicteric, conjunctivae clear, EOMI.  Sinuses nontender, no nasal exudate.  No buccal or pharyngeal lesions, erythema or exudate  Neck:  Supple, no adenopathy  Lungs:  Clear to auscultation  Cor:  RRR, S1, S2, no murmur appreciated  Abd:  Symmetric, normoactive BS.  Soft, nontender, no masses, guarding or rebound.  Liver and spleen not enlarged  Extrem:  Right foot with ~2 cm ulcer at distal midfoot- base clean. 2nd toe dusky, tip of distal phalynx with ulcer with hematoma, no drainage, s/p amputation 4th and 5th digits  Skin:  No rashes.    LABS:                        11.5   12.7  )-----------( 367      ( 07 Jul 2018 07:17 )             35.3     07-07    136  |  104  |  13  ----------------------------<  212<H>  4.6   |  26  |  0.92    Ca    8.4      07 Jul 2018 07:17  Mg     1.8     07-06    TPro  8.5<H>  /  Alb  3.6  /  TBili  0.8  /  DBili  x   /  AST  36  /  ALT  28  /  AlkPhos  96  07-06        RADIOLOGY & ADDITIONAL STUDIES:  Right foot film - pending

## 2018-07-07 NOTE — DIETITIAN INITIAL EVALUATION ADULT. - PROBLEM SELECTOR PLAN 5
F: S/P 3.5 L in ED. Start on maintenance fluids as necessary.  E: Replete K to 4 and Mg to 2.  N: NPO in case requires surgery.

## 2018-07-07 NOTE — DIETITIAN INITIAL EVALUATION ADULT. - PROBLEM SELECTOR PLAN 1
Likely due to infection of toe. Patient presented with Tmax: 103 HR: 119 RR: 20, WBC: 15.2 with cellulitis/osteomyelitis of 2nd right digit. Lactate normal. Foot xray shows  amputation of fourth and fifth right toe. Third digit abnormality (possible fracture?). Hyperlucency of 2nd middle phalange. Must consider purulent cellulitis vs osteomyelitis along with nec fasc. S/p Vanc/Zosyn and Clindamycin x1 in ED.  -Continue Vanc 1750mg q12h/Zosyn 4.5 q6h/Clindamycin 600mg q8h. Please get vancomcyin trough before the 4th dose. s/p 3.5 L IV NS (30 CC/KG).   -F/u Podiatry recs. Possible OR tonight or tomorrow, they will make the decision based on clinical status.  -F/u Blood culture, swab culture, abscess culture.  -F/u CRP. ESR: 86  -Trend WBC  -Monitor hemodynamics holding home lisinopril given sepsis. Restart as tolerated.

## 2018-07-07 NOTE — PROGRESS NOTE ADULT - PROBLEM SELECTOR PLAN 1
Patient septic on admission with foot xray showing amputation of fourth and fifth right toe. Third digit abnormality (possible fracture?). Hyperlucency of 2nd middle phalange. Must consider purulent cellulitis vs osteomyelitis, unlikely nec fasc. S/p Vanc/Zosyn and Clindamycin x1 in ED.  -ESR 86  -d/c clinda  -c/w vanc and zosyn  -f/u AM vanc trough  -redose vanc according to trough  -f/u podiatry, recs appreciated. Will have surgery on Monday  -NPO after midnight Sunday for surgery Monday  -f/u ID recs. Recs appreciated. Will communicate need for podiatry to obtain bone bacterial, fungal, AFB cultures while they are in the OR  -f/u CT R foot with thin slices  -WBCs downtrended 15.2 <--12.7, continue to trend CBC  -BCxs negative to date  -Swab cxs positive for strep agalactiae and gram negative rods

## 2018-07-07 NOTE — DIETITIAN INITIAL EVALUATION ADULT. - NS AS NUTRI DX KNOWLEDGE BELIEFS
Food - and nutrition - related knowledge deficit/Not ready for diet/lifestyle change/Disordered eating pattern/Limited adherence to nutrition - related recommendations/Undesirable food choices

## 2018-07-07 NOTE — DIETITIAN INITIAL EVALUATION ADULT. - PROBLEM SELECTOR PLAN 2
Patient has a history of type II diabetes requiring insulin (April 2018 A1c 12.9%). He is s/p Toe amputation x 2.  -c/w MISS. C/w Lantus home dose 18 U at bedtime. Cutting patient's home dose of 30 because patient NPO. Give regular insulin if patient becomes hyperglycemic. Holding 8 units premeal while pt NPO

## 2018-07-07 NOTE — DIETITIAN INITIAL EVALUATION ADULT. - ENERGY NEEDS
Ht:6ft IBW:178lbs+/-10%,138% of IBW.BMI:33.23.Adjusted for weight.Increased protein and nutrient needs due to toe ulcer

## 2018-07-07 NOTE — PROGRESS NOTE ADULT - SUBJECTIVE AND OBJECTIVE BOX
Patient is a 42y old  Male who presents with a chief complaint of Toe ulcer with purulent drainage (2018 18:28)      INTERVAL HPI/OVERNIGHT EVENTS:  Seen by me this afternoon prior to being taken down to Radiology. Doing well. No complaints.    Review of Systems: 12 point review of systems otherwise negative    MEDICATIONS  (STANDING):  atorvastatin 40 milliGRAM(s) Oral at bedtime  dextrose 5%. 1000 milliLiter(s) (50 mL/Hr) IV Continuous <Continuous>  dextrose 50% Injectable 12.5 Gram(s) IV Push once  dextrose 50% Injectable 25 Gram(s) IV Push once  dextrose 50% Injectable 25 Gram(s) IV Push once  insulin glargine Injectable (LANTUS) 24 Unit(s) SubCutaneous at bedtime  insulin lispro (HumaLOG) corrective regimen sliding scale   SubCutaneous Before meals and at bedtime  piperacillin/tazobactam IVPB. 4.5 Gram(s) IV Intermittent every 6 hours  vancomycin  IVPB 1750 milliGRAM(s) IV Intermittent every 12 hours    MEDICATIONS  (PRN):  acetaminophen   Tablet. 650 milliGRAM(s) Oral every 6 hours PRN Moderate Pain (4 - 6)  dextrose 40% Gel 15 Gram(s) Oral once PRN Blood Glucose LESS THAN 70 milliGRAM(s)/deciliter  glucagon  Injectable 1 milliGRAM(s) IntraMuscular once PRN Glucose LESS THAN 70 milligrams/deciliter      Allergies    No Known Allergies    Intolerances          Vital Signs Last 24 Hrs  T(C): 37.5 (2018 17:05), Max: 37.5 (2018 17:05)  T(F): 99.5 (2018 17:05), Max: 99.5 (2018 17:05)  HR: 85 (2018 17:05) (71 - 87)  BP: 138/81 (2018 17:05) (110/68 - 138/81)  BP(mean): --  RR: 17 (2018 17:05) (16 - 17)  SpO2: 97% (2018 17:05) (97% - 98%)  CAPILLARY BLOOD GLUCOSE  218 (2018 22:15)      POCT Blood Glucose.: 183 mg/dL (2018 17:11)  POCT Blood Glucose.: 266 mg/dL (2018 11:12)  POCT Blood Glucose.: 173 mg/dL (2018 07:37)  POCT Blood Glucose.: 218 mg/dL (2018 22:14)       @ 07:  -   @ 07:00  --------------------------------------------------------  IN: 250 mL / OUT: 1000 mL / NET: -750 mL     @ 07:  -   @ 19:57  --------------------------------------------------------  IN: 1450 mL / OUT: 700 mL / NET: 750 mL        Physical Exam:    Daily     Daily Weight in k.7 (2018 11:05)  General:  Well appearing, NAD, not cachetic  HEENT:  Nonicteric, PERRLA  Neuro:  AAOx3, non-focal, CN II-XII grossly intact  Skin: no rashes    LABS:                        11.5   12.7  )-----------( 367      ( 2018 07:17 )             35.3         136  |  104  |  13  ----------------------------<  212<H>  4.6   |  26  |  0.92    Ca    8.4      2018 07:17  Mg     1.8         TPro  8.5<H>  /  Alb  3.6  /  TBili  0.8  /  DBili  x   /  AST  36  /  ALT  28  /  AlkPhos  96

## 2018-07-07 NOTE — DIETITIAN INITIAL EVALUATION ADULT. - NS AS NUTRI INTERV ED CONTENT
review of CHO and spacing of meals/Purpose of the nutrition education/Survival information/Nutrition relationship to health/disease/Recommended modifications

## 2018-07-07 NOTE — PROGRESS NOTE ADULT - PROBLEM SELECTOR PLAN 2
Likely due to osteomyelitis of 2nd digit on right foot. On admission, patient presented with Tmax: 103 HR: 119 RR: 20, WBC: 15.2 with cellulitis/osteomyelitis of 2nd right digit. Lactate normal. s/p 3.5 L IV NS (30 CC/KG).  S/p Vanc/Zosyn and Clindamycin x1 in ED. Foot xray shows amputation of fourth and fifth right toe. Third digit abnormality (possible fracture?). Hyperlucency of 2nd middle phalange. Must consider purulent cellulitis vs osteomyelitis, unlikely nec fasc. S/p Vanc/Zosyn and Clindamycin x1 in ED.  -plan as above  -Monitor hemodynamics holding home lisinopril given sepsis. Restart as tolerated.

## 2018-07-07 NOTE — PROGRESS NOTE ADULT - PROBLEM SELECTOR PLAN 1
Secondary to diabetic foot ulcer on R foot and possible OM.   Apprec ID and Podiatry recs.  C/w vanco/zosyn for now, f/up vanco levels.  F/up XR reading. Leukocytosis improving. BCX neg to date.  F/up CT scan. Plan for OR on Monday.

## 2018-07-07 NOTE — PROGRESS NOTE ADULT - ASSESSMENT
42 year old male with poorly controlled diabetes, s/p right 4th and 5th toe amputation (2015 and 2017), recent admission for osteomyelitis on base of right foot, treated with IV Abx, presents with 1 day of fever, pain and discharge of right 2nd toe likely 2/2 osteomyelitis

## 2018-07-07 NOTE — PROGRESS NOTE ADULT - ASSESSMENT
41 yo M presents with osteomyelitis of the right 2nd digit due with underlying DMII    Plan:  - Wound evaluated at bedside and dressed with DSD  - Recommend ordering CT thin slices r/o soft tissue emphysema of right 2nd digit and foot  - Continue to monitor patient closely  - Plan to take pt to OR on Monday   - NPO order after midnight Sunday night   - NWB status to right foot  - Recommend removing Clindamycin as part of Abx regimen

## 2018-07-08 LAB
-  AMPICILLIN: SIGNIFICANT CHANGE UP
-  CLINDAMYCIN: SIGNIFICANT CHANGE UP
-  ERYTHROMYCIN: SIGNIFICANT CHANGE UP
-  PENICILLIN: SIGNIFICANT CHANGE UP
-  VANCOMYCIN: SIGNIFICANT CHANGE UP
ANION GAP SERPL CALC-SCNC: 11 MMOL/L — SIGNIFICANT CHANGE UP (ref 5–17)
BUN SERPL-MCNC: 13 MG/DL — SIGNIFICANT CHANGE UP (ref 7–23)
CALCIUM SERPL-MCNC: 9.9 MG/DL — SIGNIFICANT CHANGE UP (ref 8.4–10.5)
CHLORIDE SERPL-SCNC: 97 MMOL/L — SIGNIFICANT CHANGE UP (ref 96–108)
CO2 SERPL-SCNC: 29 MMOL/L — SIGNIFICANT CHANGE UP (ref 22–31)
CREAT SERPL-MCNC: 1.1 MG/DL — SIGNIFICANT CHANGE UP (ref 0.5–1.3)
CRP SERPL-MCNC: 15.91 MG/DL — HIGH (ref 0–0.4)
ERYTHROCYTE [SEDIMENTATION RATE] IN BLOOD: 77 MM/HR — HIGH
GLUCOSE BLDC GLUCOMTR-MCNC: 115 MG/DL — HIGH (ref 70–99)
GLUCOSE BLDC GLUCOMTR-MCNC: 220 MG/DL — HIGH (ref 70–99)
GLUCOSE BLDC GLUCOMTR-MCNC: 227 MG/DL — HIGH (ref 70–99)
GLUCOSE BLDC GLUCOMTR-MCNC: 267 MG/DL — HIGH (ref 70–99)
GLUCOSE SERPL-MCNC: 105 MG/DL — HIGH (ref 70–99)
HCT VFR BLD CALC: 38.1 % — LOW (ref 39–50)
HGB BLD-MCNC: 12.7 G/DL — LOW (ref 13–17)
MAGNESIUM SERPL-MCNC: 2 MG/DL — SIGNIFICANT CHANGE UP (ref 1.6–2.6)
MCHC RBC-ENTMCNC: 28.7 PG — SIGNIFICANT CHANGE UP (ref 27–34)
MCHC RBC-ENTMCNC: 33.3 G/DL — SIGNIFICANT CHANGE UP (ref 32–36)
MCV RBC AUTO: 86.2 FL — SIGNIFICANT CHANGE UP (ref 80–100)
METHOD TYPE: SIGNIFICANT CHANGE UP
PHOSPHATE SERPL-MCNC: 4 MG/DL — SIGNIFICANT CHANGE UP (ref 2.5–4.5)
PLATELET # BLD AUTO: 445 K/UL — HIGH (ref 150–400)
POTASSIUM SERPL-MCNC: 4.8 MMOL/L — SIGNIFICANT CHANGE UP (ref 3.5–5.3)
POTASSIUM SERPL-SCNC: 4.8 MMOL/L — SIGNIFICANT CHANGE UP (ref 3.5–5.3)
RBC # BLD: 4.42 M/UL — SIGNIFICANT CHANGE UP (ref 4.2–5.8)
RBC # FLD: 13.1 % — SIGNIFICANT CHANGE UP (ref 10.3–16.9)
SODIUM SERPL-SCNC: 137 MMOL/L — SIGNIFICANT CHANGE UP (ref 135–145)
VANCOMYCIN TROUGH SERPL-MCNC: 10.7 UG/ML — SIGNIFICANT CHANGE UP (ref 10–20)
WBC # BLD: 11 K/UL — HIGH (ref 3.8–10.5)
WBC # FLD AUTO: 11 K/UL — HIGH (ref 3.8–10.5)

## 2018-07-08 PROCEDURE — 99233 SBSQ HOSP IP/OBS HIGH 50: CPT

## 2018-07-08 RX ORDER — VANCOMYCIN HCL 1 G
2000 VIAL (EA) INTRAVENOUS EVERY 12 HOURS
Refills: 0 | Status: DISCONTINUED | OUTPATIENT
Start: 2018-07-08 | End: 2018-07-08

## 2018-07-08 RX ORDER — VANCOMYCIN HCL 1 G
2000 VIAL (EA) INTRAVENOUS EVERY 12 HOURS
Refills: 0 | Status: DISCONTINUED | OUTPATIENT
Start: 2018-07-08 | End: 2018-07-09

## 2018-07-08 RX ADMIN — PIPERACILLIN AND TAZOBACTAM 200 GRAM(S): 4; .5 INJECTION, POWDER, LYOPHILIZED, FOR SOLUTION INTRAVENOUS at 06:15

## 2018-07-08 RX ADMIN — PIPERACILLIN AND TAZOBACTAM 200 GRAM(S): 4; .5 INJECTION, POWDER, LYOPHILIZED, FOR SOLUTION INTRAVENOUS at 12:57

## 2018-07-08 RX ADMIN — Medication 6: at 21:39

## 2018-07-08 RX ADMIN — PIPERACILLIN AND TAZOBACTAM 200 GRAM(S): 4; .5 INJECTION, POWDER, LYOPHILIZED, FOR SOLUTION INTRAVENOUS at 17:52

## 2018-07-08 RX ADMIN — INSULIN GLARGINE 24 UNIT(S): 100 INJECTION, SOLUTION SUBCUTANEOUS at 21:39

## 2018-07-08 RX ADMIN — Medication 4: at 11:40

## 2018-07-08 RX ADMIN — Medication 250 MILLIGRAM(S): at 09:52

## 2018-07-08 RX ADMIN — Medication 4: at 17:02

## 2018-07-08 RX ADMIN — Medication 250 MILLIGRAM(S): at 21:40

## 2018-07-08 NOTE — PROGRESS NOTE ADULT - PROBLEM SELECTOR PLAN 1
Secondary to diabetic foot ulcer on R foot and possible OM.   Apprec ID and Podiatry recs.  C/w vanco/zosyn for now, f/up vanco levels.  F/up XR reading. Leukocytosis continue to improve. BCX neg to date.  CT scan shows: Ulceration along the plantar aspect of the foot. Moderate diffuse soft tissue swelling which seen in cellulitis. No discrete subcutaneous air to suggest necrotizing fasciitis.  Status post fourth and fifth proximal/mid metatarsal amputations. No   definite evidence for osteomyelitis.  Plan for OR tomorrow per Podiatry. Secondary to diabetic foot ulcer on R foot and possible OM.   Apprec ID and Podiatry recs.  C/w vanco/zosyn for now, vanco level at 10 so dose adjusted.  F/up XR reading. Leukocytosis continue to improve. BCX neg to date.  CT scan shows: Ulceration along the plantar aspect of the foot. Moderate diffuse soft tissue swelling which seen in cellulitis. No discrete subcutaneous air to suggest necrotizing fasciitis.  Status post fourth and fifth proximal/mid metatarsal amputations. No   definite evidence for osteomyelitis.  Plan for OR tomorrow per Podiatry.

## 2018-07-08 NOTE — PROGRESS NOTE ADULT - SUBJECTIVE AND OBJECTIVE BOX
Patient is a 42y old  Male who presents with a chief complaint of Toe ulcer with purulent drainage (06 Jul 2018 18:28)      INTERVAL HPI/ OVERNIGHT EVENTS: Patient seen bedside with no complaints at this time, no pain and no AE overnight. Denies nausea, vomiting, shortness of breath, chills, fever. Admits to chest pain early in the AM.       LABS                        12.7   11.0  )-----------( 445      ( 08 Jul 2018 05:32 )             38.1     07-08    137  |  97  |  13  ----------------------------<  105<H>  4.8   |  29  |  1.10    Ca    9.9      08 Jul 2018 05:31  Phos  4.0     07-08  Mg     2.0     07-08    TPro  8.5<H>  /  Alb  3.6  /  TBili  0.8  /  DBili  x   /  AST  36  /  ALT  28  /  AlkPhos  96  07-06      ESR: 77  C-Reactive Protein, Serum (07.08.18 @ 05:31)    C-Reactive Protein, Serum: 15.91 mg/dL  07-08 @ 05:32    ICU Vital Signs Last 24 Hrs  T(C): 36.6 (08 Jul 2018 07:49), Max: 37.5 (07 Jul 2018 17:05)  T(F): 97.8 (08 Jul 2018 07:49), Max: 99.5 (07 Jul 2018 17:05)  HR: 79 (08 Jul 2018 07:49) (74 - 92)  BP: 151/87 (08 Jul 2018 07:49) (123/78 - 174/94)  BP(mean): --  ABP: --  ABP(mean): --  RR: 17 (08 Jul 2018 07:49) (16 - 17)  SpO2: 99% (08 Jul 2018 07:49) (97% - 100%)      RADIOLOGY  Soft tissue read: diffuse swelling, soft tissue ulceration along plantar aspect of foot at level of met heads. no significant subcutaneous air. no radioopaque foreign body. No soft tissue emphysema noted.    MICROBIOLOGY  Culture - Surgical Swab (07.06.18 @ 19:47)    Gram Stain:   Few Gram positive cocci in pairs, chains and clusters  Few Gram Negative Rods  No WBC's seen.    Specimen Source: .Surgical Swab right 2nd toe infection    Culture Results:   Numerous Streptococcus agalactiae (Group B)  Susceptibility to follow.  Culture in progress    PHYSICAL EXAM  Right Lower Extremity Focused  Bounding DP/PT pulses. Diminished sensation. CFT brisk x 3. Right 2nd digit edematous, hyperpigmented. Bulla present on 2nd digit. Distal tip 2nd digit ulcer presents with scab overlying base of wound. Probes to bone. Purulent drainage upon debridement of wound and after lancing bulla. No deroofing. No tunneling, no erythema.

## 2018-07-08 NOTE — PROGRESS NOTE ADULT - ASSESSMENT
41 yo Diabetic M with rojo grade 3 ulcer of right 2nd digit (osteomyelitis)    Plan:  - Patient seen bedside, debrided 2nd digit ulcer with sterile #15 blade  - Bulla on 2nd digit lanced using sterile # 15 blade  - Patient tolerated procedures well with no issues or pain   - Monitor labs/vitals  - OR Monday for distal symes amputation of right 2nd toe as well as debridement of right plantar ulcer with graft and VAC placement  - Order Bacterial/fungal/AFS for intra-op bone specimen  - NPO after midnight tonight   - NWB status to RLE  - Order MRI to evaluate extent of bone marrow edema r/o osteomyelitis  - Podiatry to follow 41 yo Diabetic M with rojo grade 3 ulcer of right 2nd digit (osteomyelitis)    Plan:  - Patient seen bedside, debrided 2nd digit ulcer with sterile #15 blade  - Bulla on 2nd digit lanced using sterile # 15 blade  - Patient tolerated procedures well with no issues or pain   - Monitor labs/vitals  - OR Monday for distal symes amputation of right 2nd toe as well as debridement of right plantar ulcer with graft and VAC placement  - Order Bacterial/fungal/AFS for intra-op bone specimen  - NPO after midnight tonight   - NWB status to RLE  - Order MRI w/contrast to evaluate extent of bone marrow edema r/o osteomyelitis  - Podiatry to follow

## 2018-07-08 NOTE — PROGRESS NOTE ADULT - SUBJECTIVE AND OBJECTIVE BOX
Patient is a 42y old  Male who presents with a chief complaint of Toe ulcer with purulent drainage (06 Jul 2018 18:28)      INTERVAL HPI/OVERNIGHT EVENTS:  Seen by me this afternoon. Sleeping comfortably. No complaints.    Review of Systems: 12 point review of systems otherwise negative    MEDICATIONS  (STANDING):  atorvastatin 40 milliGRAM(s) Oral at bedtime  dextrose 5%. 1000 milliLiter(s) (50 mL/Hr) IV Continuous <Continuous>  dextrose 50% Injectable 12.5 Gram(s) IV Push once  dextrose 50% Injectable 25 Gram(s) IV Push once  dextrose 50% Injectable 25 Gram(s) IV Push once  insulin glargine Injectable (LANTUS) 24 Unit(s) SubCutaneous at bedtime  insulin lispro (HumaLOG) corrective regimen sliding scale   SubCutaneous Before meals and at bedtime  piperacillin/tazobactam IVPB. 4.5 Gram(s) IV Intermittent every 6 hours  vancomycin  IVPB 2000 milliGRAM(s) IV Intermittent every 12 hours    MEDICATIONS  (PRN):  acetaminophen   Tablet. 650 milliGRAM(s) Oral every 6 hours PRN Moderate Pain (4 - 6)  dextrose 40% Gel 15 Gram(s) Oral once PRN Blood Glucose LESS THAN 70 milliGRAM(s)/deciliter  glucagon  Injectable 1 milliGRAM(s) IntraMuscular once PRN Glucose LESS THAN 70 milligrams/deciliter      Allergies    No Known Allergies    Intolerances          Vital Signs Last 24 Hrs  T(C): 37.2 (08 Jul 2018 16:15), Max: 37.3 (07 Jul 2018 20:15)  T(F): 99 (08 Jul 2018 16:15), Max: 99.1 (07 Jul 2018 20:15)  HR: 88 (08 Jul 2018 16:15) (74 - 92)  BP: 162/105 (08 Jul 2018 16:15) (141/81 - 174/94)  BP(mean): --  RR: 18 (08 Jul 2018 16:15) (16 - 18)  SpO2: 99% (08 Jul 2018 16:15) (97% - 100%)  CAPILLARY BLOOD GLUCOSE      POCT Blood Glucose.: 227 mg/dL (08 Jul 2018 16:52)  POCT Blood Glucose.: 220 mg/dL (08 Jul 2018 11:28)  POCT Blood Glucose.: 115 mg/dL (08 Jul 2018 07:40)  POCT Blood Glucose.: 255 mg/dL (07 Jul 2018 21:18)      07-07 @ 07:01  -  07-08 @ 07:00  --------------------------------------------------------  IN: 1450 mL / OUT: 1900 mL / NET: -450 mL    07-08 @ 07:01 - 07-08 @ 18:34  --------------------------------------------------------  IN: 1260 mL / OUT: 700 mL / NET: 560 mL        Physical Exam:    Daily     Daily   General:  Well appearing, NAD, not cachetic  HEENT:  Nonicteric, PERRLA  CV:  RRR, no murmur, no JVD  Lungs:  CTA B/L, no wheezes, rales, rhonchi  Abdomen:  Soft, non-tender, no distended, positive BS, no hepatosplenomegaly  Extremities:  R foot covered with dressing  Skin:  Warm and dry, no rashes  :  No johnson  Neuro:  AAOx3, non-focal, CN II-XII grossly intact  No Restraints    LABS:                        12.7   11.0  )-----------( 445      ( 08 Jul 2018 05:32 )             38.1     07-08    137  |  97  |  13  ----------------------------<  105<H>  4.8   |  29  |  1.10    Ca    9.9      08 Jul 2018 05:31  Phos  4.0     07-08  Mg     2.0     07-08              RADIOLOGY & ADDITIONAL TESTS:

## 2018-07-09 ENCOUNTER — RESULT REVIEW (OUTPATIENT)
Age: 43
End: 2018-07-09

## 2018-07-09 LAB
-  AZTREONAM: SIGNIFICANT CHANGE UP
-  CEFTAZIDIME: SIGNIFICANT CHANGE UP
-  CIPROFLOXACIN: SIGNIFICANT CHANGE UP
-  GENTAMICIN: SIGNIFICANT CHANGE UP
-  PIPERACILLIN/TAZOBACTAM: SIGNIFICANT CHANGE UP
-  TOBRAMYCIN: SIGNIFICANT CHANGE UP
ANION GAP SERPL CALC-SCNC: 13 MMOL/L — SIGNIFICANT CHANGE UP (ref 5–17)
APTT BLD: 33.7 SEC — SIGNIFICANT CHANGE UP (ref 27.5–37.4)
BLD GP AB SCN SERPL QL: NEGATIVE — SIGNIFICANT CHANGE UP
BUN SERPL-MCNC: 17 MG/DL — SIGNIFICANT CHANGE UP (ref 7–23)
CALCIUM SERPL-MCNC: 10 MG/DL — SIGNIFICANT CHANGE UP (ref 8.4–10.5)
CHLORIDE SERPL-SCNC: 94 MMOL/L — LOW (ref 96–108)
CO2 SERPL-SCNC: 27 MMOL/L — SIGNIFICANT CHANGE UP (ref 22–31)
CREAT SERPL-MCNC: 1.31 MG/DL — HIGH (ref 0.5–1.3)
CULTURE RESULTS: SIGNIFICANT CHANGE UP
GLUCOSE BLDC GLUCOMTR-MCNC: 109 MG/DL — HIGH (ref 70–99)
GLUCOSE BLDC GLUCOMTR-MCNC: 111 MG/DL — HIGH (ref 70–99)
GLUCOSE BLDC GLUCOMTR-MCNC: 280 MG/DL — HIGH (ref 70–99)
GLUCOSE BLDC GLUCOMTR-MCNC: 284 MG/DL — HIGH (ref 70–99)
GLUCOSE SERPL-MCNC: 293 MG/DL — HIGH (ref 70–99)
HCT VFR BLD CALC: 39.5 % — SIGNIFICANT CHANGE UP (ref 39–50)
HGB BLD-MCNC: 12.9 G/DL — LOW (ref 13–17)
INR BLD: 1.11 — SIGNIFICANT CHANGE UP (ref 0.88–1.16)
MCHC RBC-ENTMCNC: 28.6 PG — SIGNIFICANT CHANGE UP (ref 27–34)
MCHC RBC-ENTMCNC: 32.7 G/DL — SIGNIFICANT CHANGE UP (ref 32–36)
MCV RBC AUTO: 87.6 FL — SIGNIFICANT CHANGE UP (ref 80–100)
METHOD TYPE: SIGNIFICANT CHANGE UP
ORGANISM # SPEC MICROSCOPIC CNT: SIGNIFICANT CHANGE UP
PLATELET # BLD AUTO: 535 K/UL — HIGH (ref 150–400)
POTASSIUM SERPL-MCNC: 5.1 MMOL/L — SIGNIFICANT CHANGE UP (ref 3.5–5.3)
POTASSIUM SERPL-SCNC: 5.1 MMOL/L — SIGNIFICANT CHANGE UP (ref 3.5–5.3)
PROTHROM AB SERPL-ACNC: 12.3 SEC — SIGNIFICANT CHANGE UP (ref 9.8–12.7)
RBC # BLD: 4.51 M/UL — SIGNIFICANT CHANGE UP (ref 4.2–5.8)
RBC # FLD: 13.1 % — SIGNIFICANT CHANGE UP (ref 10.3–16.9)
RH IG SCN BLD-IMP: POSITIVE — SIGNIFICANT CHANGE UP
SODIUM SERPL-SCNC: 134 MMOL/L — LOW (ref 135–145)
SPECIMEN SOURCE: SIGNIFICANT CHANGE UP
WBC # BLD: 10.4 K/UL — SIGNIFICANT CHANGE UP (ref 3.8–10.5)
WBC # FLD AUTO: 10.4 K/UL — SIGNIFICANT CHANGE UP (ref 3.8–10.5)

## 2018-07-09 PROCEDURE — 99232 SBSQ HOSP IP/OBS MODERATE 35: CPT

## 2018-07-09 PROCEDURE — 99233 SBSQ HOSP IP/OBS HIGH 50: CPT

## 2018-07-09 RX ORDER — SODIUM CHLORIDE 9 MG/ML
1000 INJECTION, SOLUTION INTRAVENOUS
Refills: 0 | Status: DISCONTINUED | OUTPATIENT
Start: 2018-07-09 | End: 2018-07-11

## 2018-07-09 RX ORDER — ONDANSETRON 8 MG/1
4 TABLET, FILM COATED ORAL EVERY 6 HOURS
Refills: 0 | Status: DISCONTINUED | OUTPATIENT
Start: 2018-07-09 | End: 2018-07-12

## 2018-07-09 RX ORDER — SODIUM CHLORIDE 9 MG/ML
1000 INJECTION INTRAMUSCULAR; INTRAVENOUS; SUBCUTANEOUS
Refills: 0 | Status: DISCONTINUED | OUTPATIENT
Start: 2018-07-09 | End: 2018-07-09

## 2018-07-09 RX ORDER — SODIUM CHLORIDE 9 MG/ML
500 INJECTION INTRAMUSCULAR; INTRAVENOUS; SUBCUTANEOUS ONCE
Refills: 0 | Status: COMPLETED | OUTPATIENT
Start: 2018-07-09 | End: 2018-07-09

## 2018-07-09 RX ORDER — HYDROMORPHONE HYDROCHLORIDE 2 MG/ML
1 INJECTION INTRAMUSCULAR; INTRAVENOUS; SUBCUTANEOUS
Refills: 0 | Status: DISCONTINUED | OUTPATIENT
Start: 2018-07-09 | End: 2018-07-11

## 2018-07-09 RX ORDER — INSULIN GLARGINE 100 [IU]/ML
27 INJECTION, SOLUTION SUBCUTANEOUS AT BEDTIME
Refills: 0 | Status: DISCONTINUED | OUTPATIENT
Start: 2018-07-09 | End: 2018-07-13

## 2018-07-09 RX ADMIN — SODIUM CHLORIDE 80 MILLILITER(S): 9 INJECTION INTRAMUSCULAR; INTRAVENOUS; SUBCUTANEOUS at 08:18

## 2018-07-09 RX ADMIN — PIPERACILLIN AND TAZOBACTAM 200 GRAM(S): 4; .5 INJECTION, POWDER, LYOPHILIZED, FOR SOLUTION INTRAVENOUS at 07:06

## 2018-07-09 RX ADMIN — SODIUM CHLORIDE 1000 MILLILITER(S): 9 INJECTION INTRAMUSCULAR; INTRAVENOUS; SUBCUTANEOUS at 12:51

## 2018-07-09 RX ADMIN — INSULIN GLARGINE 27 UNIT(S): 100 INJECTION, SOLUTION SUBCUTANEOUS at 23:51

## 2018-07-09 RX ADMIN — SODIUM CHLORIDE 80 MILLILITER(S): 9 INJECTION INTRAMUSCULAR; INTRAVENOUS; SUBCUTANEOUS at 18:27

## 2018-07-09 RX ADMIN — Medication 6: at 08:17

## 2018-07-09 RX ADMIN — PIPERACILLIN AND TAZOBACTAM 200 GRAM(S): 4; .5 INJECTION, POWDER, LYOPHILIZED, FOR SOLUTION INTRAVENOUS at 18:27

## 2018-07-09 RX ADMIN — Medication 6: at 12:51

## 2018-07-09 RX ADMIN — PIPERACILLIN AND TAZOBACTAM 200 GRAM(S): 4; .5 INJECTION, POWDER, LYOPHILIZED, FOR SOLUTION INTRAVENOUS at 01:13

## 2018-07-09 RX ADMIN — PIPERACILLIN AND TAZOBACTAM 200 GRAM(S): 4; .5 INJECTION, POWDER, LYOPHILIZED, FOR SOLUTION INTRAVENOUS at 12:51

## 2018-07-09 RX ADMIN — Medication 250 MILLIGRAM(S): at 09:49

## 2018-07-09 NOTE — PROGRESS NOTE ADULT - ASSESSMENT
41 yo male with uncontrolled DM, s/p R 4th and 5th TMA, s/p 6-week course of cefepime (ended 6/7) for R foot osteomyelitis 2/2 GBS, staph spp, p/w purulent drainage from R plantar foot ulcer; +probe to bone c/w osteomyelitis. Cx 7/6 (obtained via sterile bedside debridement) growing GBS, numerous Prevotella, and rare Pseudomonas.  - bcx ngtd  - OR today for further debridement by podiatry; to f/u intra-operative findings and cultures  - d/c vancomycin  - continue Zosyn 4.5g IV q6h  - needs optimization of glycemic control

## 2018-07-09 NOTE — PROGRESS NOTE ADULT - PROBLEM SELECTOR PLAN 1
Patient septic on admission with foot xray showing amputation of fourth and fifth right toe, third digit abnormality (possible fracture?), hyperlucency of 2nd middle phalange. Likely osteomyelitis, unlikely nec fasc as CT R foot without significant collection of gas. S/p Vanc/Zosyn and Clindamycin x1 in ED. ESR 86. Swab Cxs growing GBS, numerous Prevotella, and rare Pseudomonas. BCxs negative to date. CT R foot (7/7) demonstrated ulceration of the plantar aspect of the foot, no significant gas collection, fourth and fifth transmetatarsal amputation, Irregular articular surfaces of the third PIP joint, which may be a result of previous osteo vs. active infection.  -d/c vanc and c/w zosyn per ID recs and sensitivities. ID recs appreciated.  -f/u ID  -f/u podiatry, recs appreciated. Will have surgery today- planned 2nd toe amputation of right foot and ulcer debridement. Continue NPO. No weight bearing of Left lower extremity following surgery. c/w Wet to dry dressing.   - f/u bone bacterial, fungal, AFB cultures from OR samples  -WBCs trending downward 12.9 (7/9) <-- 12.7<-- 15.2 <--12.7, continue to trend CBC  -consider MRI R foot.

## 2018-07-09 NOTE — PROGRESS NOTE ADULT - PROBLEM SELECTOR PLAN 4
On admission, Na: 130. Corrected 132. Likely hypovolemic hyponatremia secondary to sepsis. S/p 3.5 L NS.  -Na+ 134 ( 7/9).Trend Electrolytes and replete PRN

## 2018-07-09 NOTE — PROGRESS NOTE ADULT - SUBJECTIVE AND OBJECTIVE BOX
PRE-OP NOTE    Pre-Op Diagnosis:  2nd toe osteomyelitis and plantar diabetic foot ulcer.   Planned Procedure: 2nd toe amputation, ulcer debridement and graft application, Right foot   Scheduled Date / Time:  7/9 - add on      Labs:                       12.9   10.4  )-----------( 535      ( 09 Jul 2018 06:34 )             39.5   07-09    134<L>  |  94<L>  |  17  ----------------------------<  293<H>  5.1   |  27  |  1.31<H>    Ca    10.0      09 Jul 2018 06:34  Phos  4.0     07-08  Mg     2.0     07-08      Vitals: Vital Signs Last 24 Hrs  T(C): 36.4 (09 Jul 2018 06:59), Max: 37.7 (08 Jul 2018 20:10)  T(F): 97.5 (09 Jul 2018 06:59), Max: 99.8 (08 Jul 2018 20:10)  HR: 82 (09 Jul 2018 06:59) (76 - 89)  BP: 104/70 (09 Jul 2018 06:59) (104/70 - 162/105)  BP(mean): --  RR: 17 (09 Jul 2018 06:59) (16 - 18)  SpO2: 97% (09 Jul 2018 06:59) (97% - 99%)  PE:   Official CXR reading: (On Chart)  Official EKG reading: (On Chart)  Type and Cross/Screen:   NPO after MN  Antibiotics: vanc/zosyn   Anesthesia evaluation (on chart)  Operative Consent (on chart)

## 2018-07-09 NOTE — PROGRESS NOTE ADULT - ASSESSMENT
42 year old male with poorly controlled diabetes, s/p right 4th and 5th toe amputation (2015 and 2017), recent admission for osteomyelitis on base of right foot, treated with IV Abx, presented with 1 day of fever, pain and discharge of right 2nd toe likely 2/2 osteomyelitis

## 2018-07-09 NOTE — PROGRESS NOTE ADULT - SUBJECTIVE AND OBJECTIVE BOX
Coverage for Dr. Foy    INTERVAL HPI/OVERNIGHT EVENTS: No fevers. Continues to report drainage from R foot.     CONSTITUTIONAL:  Negative fever or chills, feels well, good appetite  EYES:  Negative  blurry vision or double vision  CARDIOVASCULAR:  Negative for chest pain or palpitations  RESPIRATORY:  Negative for cough, wheezing, or SOB   GASTROINTESTINAL:  Negative for nausea, vomiting, diarrhea, constipation, or abdominal pain  GENITOURINARY:  Negative frequency, urgency or dysuria  NEUROLOGIC:  No headache, confusion, dizziness, lightheadedness      ANTIBIOTICS/RELEVANT:    MEDICATIONS  (STANDING):  atorvastatin 40 milliGRAM(s) Oral at bedtime  dextrose 5%. 1000 milliLiter(s) (50 mL/Hr) IV Continuous <Continuous>  dextrose 50% Injectable 12.5 Gram(s) IV Push once  dextrose 50% Injectable 25 Gram(s) IV Push once  dextrose 50% Injectable 25 Gram(s) IV Push once  insulin glargine Injectable (LANTUS) 24 Unit(s) SubCutaneous at bedtime  insulin lispro (HumaLOG) corrective regimen sliding scale   SubCutaneous Before meals and at bedtime  piperacillin/tazobactam IVPB. 4.5 Gram(s) IV Intermittent every 6 hours  sodium chloride 0.9% Bolus 500 milliLiter(s) IV Bolus once  sodium chloride 0.9%. 1000 milliLiter(s) (80 mL/Hr) IV Continuous <Continuous>  vancomycin  IVPB 2000 milliGRAM(s) IV Intermittent every 12 hours    MEDICATIONS  (PRN):  acetaminophen   Tablet. 650 milliGRAM(s) Oral every 6 hours PRN Moderate Pain (4 - 6)  dextrose 40% Gel 15 Gram(s) Oral once PRN Blood Glucose LESS THAN 70 milliGRAM(s)/deciliter  glucagon  Injectable 1 milliGRAM(s) IntraMuscular once PRN Glucose LESS THAN 70 milligrams/deciliter        Vital Signs Last 24 Hrs  T(C): 37.1 (09 Jul 2018 09:00), Max: 37.7 (08 Jul 2018 20:10)  T(F): 98.7 (09 Jul 2018 09:00), Max: 99.8 (08 Jul 2018 20:10)  HR: 84 (09 Jul 2018 09:00) (76 - 89)  BP: 134/87 (09 Jul 2018 09:00) (104/70 - 162/105)  BP(mean): --  RR: 16 (09 Jul 2018 09:00) (16 - 18)  SpO2: 99% (09 Jul 2018 09:00) (97% - 99%)    PHYSICAL EXAM:  Constitutional:Well-developed, well nourished, obese  Eyes:RONI, EOMI  Ear/Nose/Throat: no oral lesion, no sinus tenderness on percussion	  Neck:no JVD, no lymphadenopathy, supple  Respiratory: CTA maricruz  Cardiovascular: S1S2 RRR, no murmurs  Gastrointestinal:soft, (+) BS, no HSM  Extremities: hyperpigentation R foot; dressing in place without surrounding erythema; foot not warm      LABS:                        12.9   10.4  )-----------( 535      ( 09 Jul 2018 06:34 )             39.5     07-09    134<L>  |  94<L>  |  17  ----------------------------<  293<H>  5.1   |  27  |  1.31<H>    Ca    10.0      09 Jul 2018 06:34  Phos  4.0     07-08  Mg     2.0     07-08      PT/INR - ( 09 Jul 2018 06:35 )   PT: 12.3 sec;   INR: 1.11          PTT - ( 09 Jul 2018 06:35 )  PTT:33.7 sec      MICROBIOLOGY: Culture - Surgical Swab (07.06.18 @ 19:47)    Gram Stain:   Few Gram positive cocci in pairs, chains and clusters  Few Gram Negative Rods  No WBC's seen.    -  Ceftazidime: S 4    -  Ciprofloxacin: S <=1    -  Ampicillin: S    -  Aztreonam: S <=4    -  Clindamycin: R    -  Erythromycin: R    -  Gentamicin: S <=4    -  Tobramycin: S <=4    -  Penicillin: S Predicts results for ampicillin, amoxicillin, amoxicillin/clavulanate, ampicillin/sulbactam, 1st, 2nd and 3rd generation cephalosporins and carbapenems.    -  Piperacillin/Tazobactam: S <=16    -  Vancomycin: S    Specimen Source: .Surgical Swab right 2nd toe infection    Culture Results:   Numerous Streptococcus agalactiae (Group B)  Rare Pseudomonas aeruginosa  Numerous Prevotella bivia Beta lactamase positive    Organism Identification: Streptococcus agalactiae (Group B)  Pseudomonas aeruginosa    Organism: Streptococcus agalactiae (Group B)    Organism: Pseudomonas aeruginosa    Method Type: JULIOCESAR    Method Type: YUN      Culture - Blood (07.06.18 @ 17:17)    Specimen Source: .Blood Blood-Peripheral    Culture Results:   No growth at 2 days.    RADIOLOGY & ADDITIONAL STUDIES: < from: Xray Foot AP + Lateral + Oblique, Right (07.06.18 @ 17:25) >  EXAM:  XR FOOT-RIGHT MIN 3 VIEWS                          PROCEDURE DATE:  07/06/2018          INTERPRETATION:  Indication: r/o osteo    3 views of the right foot are submitted. As on the previous examination   4/26/2018 the patient status post transmetatarsal amputation of the   fourth and fifth toes. There are chronic appearing erosive changes at the   third PIP joint. Soft tissue swelling is noted. There is gas in the soft   tissues distal tip of the second toe compatible with infection. No   discrete osseous erosion is identified.      Impression: Findings consistent with soft tissue infection of the second   toe.    < end of copied text >

## 2018-07-09 NOTE — PROGRESS NOTE ADULT - PROBLEM SELECTOR PLAN 2
Likely due to osteomyelitis of 2nd digit on right foot. On admission, patient presented with Tmax: 103 HR: 119 RR: 20, WBC: 15.2 with osteomyelitis of 2nd right digit of right foot. Lactate normal. s/p 3.5 L IV NS (30 CC/KG).  S/p Vanc/Zosyn and Clindamycin x1 in ED. Foot xray sshowing amputation of fourth and fifth right toe, third digit abnormality (possible fracture?), hyperlucency of 2nd middle phalange. Likely osteomyelitis, unlikely nec fasc as CT R foot without significant collection of gas. S/p Vanc/Zosyn and Clindamycin x1 in ED. ESR 86. Swab Cxs growing GBS, numerous Prevotella, and rare Pseudomonas. BCxs negative to date. CT R foot (7/7) demonstrated ulceration of the plantar aspect of the foot, no significant gas collection, fourth and fifth transmetatarsal amputation, Irregular articular surfaces of the third PIP joint, which may be a result of previous osteo vs. active infection.  -plan as above  -Monitor hemodynamics holding home lisinopril given sepsis. Restart as tolerated.

## 2018-07-09 NOTE — BRIEF OPERATIVE NOTE - POST-OP DX
Diabetic foot ulcer  07/09/2018    Active  Colt Isable  Osteomyelitis of toe of right foot  07/09/2018  2nd  Active  Colt Isabel

## 2018-07-09 NOTE — BRIEF OPERATIVE NOTE - PROCEDURE
Integra dressing applied to wound  07/09/2018    Active  DMANDIL  Toe amputation, right, single toe  07/09/2018  2nd  Active  DMANDIL <<-----Click on this checkbox to enter Procedure

## 2018-07-09 NOTE — PROGRESS NOTE ADULT - PROBLEM SELECTOR PLAN 3
Patient has a history of type II diabetes requiring insulin (April 2018 A1c 12.9%). He is s/p Toe amputation x 2.  -c/w MISS  -C/w Lantus dose 24U   -Give regular insulin if patient becomes hyperglycemic  -FS have been in mid to high 200s, consider increasing lantus

## 2018-07-09 NOTE — PROGRESS NOTE ADULT - ASSESSMENT
42M DM2 h/o osteomyelitis and amputation left 4th 5th toes no with 2nd toe osteomyelitis and plantar diabetic foot ulcer     - plan for 2nd toe amputation and ulcer debridement today as add-on   - continue npo   - Abx per ID   - WTD  -weight bear as tolerated 42M DM2 h/o osteomyelitis and amputation left 4th 5th toes no with 2nd toe osteomyelitis and plantar diabetic foot ulcer     - plan for 2nd toe amputation and ulcer debridement today as add-on   - continue npo   - Abx per ID   - WTD dressing  - will need to be non weight bearing Left LE after surgery 42M DM2 h/o osteomyelitis and amputation right 4th 5th toes no with 2nd toe osteomyelitis and plantar diabetic foot ulcer     - plan for 2nd toe amputation and ulcer debridement today as add-on   - continue npo   - Abx per ID   - WTD dressing  - will need to be non weight bearing Right LE after surgery

## 2018-07-09 NOTE — BRIEF OPERATIVE NOTE - OPERATION/FINDINGS
R 2nd toe amputation, sent for culture and path. CLean margins sent as well.   Plantar ulcer debrided, minimal bleeding but no signs of acute infection. Integra graft applied

## 2018-07-09 NOTE — BRIEF OPERATIVE NOTE - PRE-OP DX
Diabetic foot ulcer  07/09/2018    Active  Colt Isabel  Osteomyelitis of toe of right foot  07/09/2018  2nd  Active  Colt Isabel

## 2018-07-09 NOTE — PROGRESS NOTE ADULT - SUBJECTIVE AND OBJECTIVE BOX
Patient is a 42y old  Male who presents with a chief complaint of Toe ulcer with purulent drainage (06 Jul 2018 18:28)      INTERVAL HPI/ OVERNIGHT EVENTS  seen and examined bedside. reports some chills and sweating o/n but has since resolved.  No other complaints at this time.     LABS                        12.9   10.4  )-----------( 535      ( 09 Jul 2018 06:34 )             39.5     07-09    134<L>  |  94<L>  |  17  ----------------------------<  293<H>  5.1   |  27  |  1.31<H>    Ca    10.0      09 Jul 2018 06:34  Phos  4.0     07-08  Mg     2.0     07-08      PT/INR - ( 09 Jul 2018 06:35 )   PT: 12.3 sec;   INR: 1.11          PTT - ( 09 Jul 2018 06:35 )  PTT:33.7 sec    ICU Vital Signs Last 24 Hrs  T(C): 37.1 (09 Jul 2018 09:00), Max: 37.7 (08 Jul 2018 20:10)  T(F): 98.7 (09 Jul 2018 09:00), Max: 99.8 (08 Jul 2018 20:10)  HR: 84 (09 Jul 2018 09:00) (76 - 89)  BP: 134/87 (09 Jul 2018 09:00) (104/70 - 162/105)  BP(mean): --  ABP: --  ABP(mean): --  RR: 16 (09 Jul 2018 09:00) (16 - 18)  SpO2: 99% (09 Jul 2018 09:00) (97% - 99%)      RADIOLOGY  < from: Xray Foot AP + Lateral + Oblique, Right (07.06.18 @ 17:25) >  Impression: Findings consistent with soft tissue infection of the second   toe.      < end of copied text >      MICROBIOLOGY  Culture - Surgical Swab (07.06.18 @ 19:47)    -  Ampicillin: S    Gram Stain:   Few Gram positive cocci in pairs, chains and clusters  Few Gram Negative Rods  No WBC's seen.    -  Clindamycin: R    -  Erythromycin: R    -  Penicillin: S Predicts results for ampicillin, amoxicillin, amoxicillin/clavulanate, ampicillin/sulbactam, 1st, 2nd and 3rd generation cephalosporins and carbapenems.    -  Vancomycin: S    Specimen Source: .Surgical Swab right 2nd toe infection    Culture Results:   Numerous Streptococcus agalactiae (Group B)  Rare Gram Negative Rods  Identification and susceptibility to follow.  Numerous Prevotella bivia Beta lactamase positive    Organism Identification: Streptococcus agalactiae (Group B)    Organism: Streptococcus agalactiae (Group B)    Method Type: KB        PHYSICAL EXAM  Lower Extremity Focused  Left foot s/p partial 4th 5th ray amputation.  DP/pt 2+ Patient is a 42y old  Male who presents with a chief complaint of Toe ulcer with purulent drainage (06 Jul 2018 18:28)      INTERVAL HPI/ OVERNIGHT EVENTS  seen and examined bedside. reports some chills and sweating o/n but has since resolved.  No other complaints at this time.     LABS                        12.9   10.4  )-----------( 535      ( 09 Jul 2018 06:34 )             39.5     07-09    134<L>  |  94<L>  |  17  ----------------------------<  293<H>  5.1   |  27  |  1.31<H>    Ca    10.0      09 Jul 2018 06:34  Phos  4.0     07-08  Mg     2.0     07-08      PT/INR - ( 09 Jul 2018 06:35 )   PT: 12.3 sec;   INR: 1.11          PTT - ( 09 Jul 2018 06:35 )  PTT:33.7 sec    ICU Vital Signs Last 24 Hrs  T(C): 37.1 (09 Jul 2018 09:00), Max: 37.7 (08 Jul 2018 20:10)  T(F): 98.7 (09 Jul 2018 09:00), Max: 99.8 (08 Jul 2018 20:10)  HR: 84 (09 Jul 2018 09:00) (76 - 89)  BP: 134/87 (09 Jul 2018 09:00) (104/70 - 162/105)  BP(mean): --  ABP: --  ABP(mean): --  RR: 16 (09 Jul 2018 09:00) (16 - 18)  SpO2: 99% (09 Jul 2018 09:00) (97% - 99%)      RADIOLOGY  < from: Xray Foot AP + Lateral + Oblique, Right (07.06.18 @ 17:25) >  Impression: Findings consistent with soft tissue infection of the second   toe.      < end of copied text >      MICROBIOLOGY  Culture - Surgical Swab (07.06.18 @ 19:47)    -  Ampicillin: S    Gram Stain:   Few Gram positive cocci in pairs, chains and clusters  Few Gram Negative Rods  No WBC's seen.    -  Clindamycin: R    -  Erythromycin: R    -  Penicillin: S Predicts results for ampicillin, amoxicillin, amoxicillin/clavulanate, ampicillin/sulbactam, 1st, 2nd and 3rd generation cephalosporins and carbapenems.    -  Vancomycin: S    Specimen Source: .Surgical Swab right 2nd toe infection    Culture Results:   Numerous Streptococcus agalactiae (Group B)  Rare Gram Negative Rods  Identification and susceptibility to follow.  Numerous Prevotella bivia Beta lactamase positive    Organism Identification: Streptococcus agalactiae (Group B)    Organism: Streptococcus agalactiae (Group B)    Method Type: KB        PHYSICAL EXAM  Lower Extremity Focused  Left foot s/p partial 4th 5th ray amputation.  DP/pt 2+   2nd toe still with purulent drainage,   plantar ulcer mixed fibrogranular base, + sinus tract  0.5cm deep. not to bone. no purulent drainage Patient is a 42y old  Male who presents with a chief complaint of Toe ulcer with purulent drainage (06 Jul 2018 18:28)      INTERVAL HPI/ OVERNIGHT EVENTS  seen and examined bedside. reports some chills and sweating o/n but has since resolved.  No other complaints at this time.     LABS                        12.9   10.4  )-----------( 535      ( 09 Jul 2018 06:34 )             39.5     07-09    134<L>  |  94<L>  |  17  ----------------------------<  293<H>  5.1   |  27  |  1.31<H>    Ca    10.0      09 Jul 2018 06:34  Phos  4.0     07-08  Mg     2.0     07-08      PT/INR - ( 09 Jul 2018 06:35 )   PT: 12.3 sec;   INR: 1.11          PTT - ( 09 Jul 2018 06:35 )  PTT:33.7 sec    ICU Vital Signs Last 24 Hrs  T(C): 37.1 (09 Jul 2018 09:00), Max: 37.7 (08 Jul 2018 20:10)  T(F): 98.7 (09 Jul 2018 09:00), Max: 99.8 (08 Jul 2018 20:10)  HR: 84 (09 Jul 2018 09:00) (76 - 89)  BP: 134/87 (09 Jul 2018 09:00) (104/70 - 162/105)  BP(mean): --  ABP: --  ABP(mean): --  RR: 16 (09 Jul 2018 09:00) (16 - 18)  SpO2: 99% (09 Jul 2018 09:00) (97% - 99%)      RADIOLOGY  < from: Xray Foot AP + Lateral + Oblique, Right (07.06.18 @ 17:25) >  Impression: Findings consistent with soft tissue infection of the second   toe.      < end of copied text >      MICROBIOLOGY  Culture - Surgical Swab (07.06.18 @ 19:47)    -  Ampicillin: S    Gram Stain:   Few Gram positive cocci in pairs, chains and clusters  Few Gram Negative Rods  No WBC's seen.    -  Clindamycin: R    -  Erythromycin: R    -  Penicillin: S Predicts results for ampicillin, amoxicillin, amoxicillin/clavulanate, ampicillin/sulbactam, 1st, 2nd and 3rd generation cephalosporins and carbapenems.    -  Vancomycin: S    Specimen Source: .Surgical Swab right 2nd toe infection    Culture Results:   Numerous Streptococcus agalactiae (Group B)  Rare Gram Negative Rods  Identification and susceptibility to follow.  Numerous Prevotella bivia Beta lactamase positive    Organism Identification: Streptococcus agalactiae (Group B)    Organism: Streptococcus agalactiae (Group B)    Method Type: KB        PHYSICAL EXAM  Lower Extremity Focused  Right foot s/p partial 4th 5th ray amputation.  DP/pt 2+   2nd toe still with purulent drainage,   plantar ulcer mixed fibrogranular base, + sinus tract  0.5cm deep. not to bone. no purulent drainage

## 2018-07-09 NOTE — PROGRESS NOTE ADULT - SUBJECTIVE AND OBJECTIVE BOX
OVERNIGHT EVENTS: Patient NPO for surgery.    SUBJECTIVE / INTERVAL HPI: Patient seen and examined at bedside. Patient complaining of dizziness (described as lightheadedness) with standing. Orthostatics in AM were positive, pt. was given 500cc bolus and placed on IV NS @80 cc/hr. Patient denied chest pain, SOB, fever, chills, night sweats, weakness, change in sensation, nausea, vomiting, diarrhea, constipation, changes in urination, headache.     VITAL SIGNS:  Vital Signs Last 24 Hrs  T(C): 37.1 (09 Jul 2018 09:00), Max: 37.7 (08 Jul 2018 20:10)  T(F): 98.7 (09 Jul 2018 09:00), Max: 99.8 (08 Jul 2018 20:10)  HR: 84 (09 Jul 2018 09:00) (82 - 89)  BP: 134/87 (09 Jul 2018 09:00) (104/70 - 162/105)  BP(mean): --  RR: 16 (09 Jul 2018 09:00) (16 - 18)  SpO2: 99% (09 Jul 2018 09:00) (97% - 99%)    PHYSICAL EXAM:    General: Well developed, well nourished.   HEENT: NCAT; PERRL, anicteric sclera; MMM  Neck: supple  Cardiovascular: +S1/S2, RRR  Respiratory: CTA B/L; no W/R/R  Gastrointestinal: soft, nontender, nondistended; +BSx4  Extremities: Left foot dressing intact and dry. No edema, no cyanosis upper and lower extremities.   Vascular: 2+ radial pulses b/l; 1+ posterior tibial pulses B/L  Neurological: AAOx3; no focal deficits. Muscle strength 5/5 throughout, sensation grossly intact. CNs II-XII grossly intact.     MEDICATIONS:  MEDICATIONS  (STANDING):  atorvastatin 40 milliGRAM(s) Oral at bedtime  dextrose 5%. 1000 milliLiter(s) (50 mL/Hr) IV Continuous <Continuous>  dextrose 50% Injectable 12.5 Gram(s) IV Push once  dextrose 50% Injectable 25 Gram(s) IV Push once  dextrose 50% Injectable 25 Gram(s) IV Push once  insulin glargine Injectable (LANTUS) 27 Unit(s) SubCutaneous at bedtime  insulin lispro (HumaLOG) corrective regimen sliding scale   SubCutaneous Before meals and at bedtime  piperacillin/tazobactam IVPB. 4.5 Gram(s) IV Intermittent every 6 hours  sodium chloride 0.9%. 1000 milliLiter(s) (80 mL/Hr) IV Continuous <Continuous>    MEDICATIONS  (PRN):  acetaminophen   Tablet. 650 milliGRAM(s) Oral every 6 hours PRN Moderate Pain (4 - 6)  dextrose 40% Gel 15 Gram(s) Oral once PRN Blood Glucose LESS THAN 70 milliGRAM(s)/deciliter  glucagon  Injectable 1 milliGRAM(s) IntraMuscular once PRN Glucose LESS THAN 70 milligrams/deciliter      ALLERGIES:  Allergies    No Known Allergies    Intolerances        LABS:                        12.9   10.4  )-----------( 535      ( 09 Jul 2018 06:34 )             39.5     07-09    134<L>  |  94<L>  |  17  ----------------------------<  293<H>  5.1   |  27  |  1.31<H>    Ca    10.0      09 Jul 2018 06:34  Phos  4.0     07-08  Mg     2.0     07-08      PT/INR - ( 09 Jul 2018 06:35 )   PT: 12.3 sec;   INR: 1.11          PTT - ( 09 Jul 2018 06:35 )  PTT:33.7 sec    CAPILLARY BLOOD GLUCOSE      POCT Blood Glucose.: 280 mg/dL (09 Jul 2018 11:48)      RADIOLOGY & ADDITIONAL TESTS: Reviewed.    < from: CT Foot No Cont, Right (07.07.18 @ 17:00) >  Impression: Ulceration of the plantar aspect of the foot    No significant collection of gas in the soft tissues suggest necrotizing   fasciitis.    Status post fourth and fifth transmetatarsal amputation. The articular   surfaces of the third PIP joint are irregular. This may be the result of   osteomyelitis described on the MRI of 4/20/2018. Activeinfection cannot   be excluded.    MICROBIOLOGY  Culture - Surgical Swab (07.06.18 @ 19:47)    -  Ampicillin: S    Gram Stain:   Few Gram positive cocci in pairs, chains and clusters  Few Gram Negative Rods  No WBC's seen.    -  Clindamycin: R    -  Erythromycin: R    -  Penicillin: S Predicts results for ampicillin, amoxicillin, amoxicillin/clavulanate, ampicillin/sulbactam, 1st, 2nd and 3rd generation cephalosporins and carbapenems.    -  Vancomycin: S    Specimen Source: .Surgical Swab right 2nd toe infection    Culture Results:   Numerous Streptococcus agalactiae (Group B)  Rare Gram Negative Rods  Identification and susceptibility to follow.  Numerous Prevotella bivia Beta lactamase positive    Organism Identification: Streptococcus agalactiae (Group B)    Organism: Streptococcus agalactiae (Group B)    Method Type: KB

## 2018-07-10 LAB
ANION GAP SERPL CALC-SCNC: 13 MMOL/L — SIGNIFICANT CHANGE UP (ref 5–17)
BUN SERPL-MCNC: 14 MG/DL — SIGNIFICANT CHANGE UP (ref 7–23)
CALCIUM SERPL-MCNC: 8.8 MG/DL — SIGNIFICANT CHANGE UP (ref 8.4–10.5)
CHLORIDE SERPL-SCNC: 99 MMOL/L — SIGNIFICANT CHANGE UP (ref 96–108)
CO2 SERPL-SCNC: 26 MMOL/L — SIGNIFICANT CHANGE UP (ref 22–31)
CREAT SERPL-MCNC: 1.34 MG/DL — HIGH (ref 0.5–1.3)
GLUCOSE BLDC GLUCOMTR-MCNC: 156 MG/DL — HIGH (ref 70–99)
GLUCOSE BLDC GLUCOMTR-MCNC: 212 MG/DL — HIGH (ref 70–99)
GLUCOSE BLDC GLUCOMTR-MCNC: 215 MG/DL — HIGH (ref 70–99)
GLUCOSE BLDC GLUCOMTR-MCNC: 225 MG/DL — HIGH (ref 70–99)
GLUCOSE BLDC GLUCOMTR-MCNC: 236 MG/DL — HIGH (ref 70–99)
GLUCOSE SERPL-MCNC: 170 MG/DL — HIGH (ref 70–99)
GRAM STN FLD: SIGNIFICANT CHANGE UP
GRAM STN FLD: SIGNIFICANT CHANGE UP
HCT VFR BLD CALC: 37.2 % — LOW (ref 39–50)
HGB BLD-MCNC: 12.2 G/DL — LOW (ref 13–17)
MAGNESIUM SERPL-MCNC: 1.7 MG/DL — SIGNIFICANT CHANGE UP (ref 1.6–2.6)
MCHC RBC-ENTMCNC: 28.4 PG — SIGNIFICANT CHANGE UP (ref 27–34)
MCHC RBC-ENTMCNC: 32.8 G/DL — SIGNIFICANT CHANGE UP (ref 32–36)
MCV RBC AUTO: 86.5 FL — SIGNIFICANT CHANGE UP (ref 80–100)
NIGHT BLUE STAIN TISS: SIGNIFICANT CHANGE UP
NIGHT BLUE STAIN TISS: SIGNIFICANT CHANGE UP
PHOSPHATE SERPL-MCNC: 3.8 MG/DL — SIGNIFICANT CHANGE UP (ref 2.5–4.5)
PLATELET # BLD AUTO: 518 K/UL — HIGH (ref 150–400)
POTASSIUM SERPL-MCNC: 4.1 MMOL/L — SIGNIFICANT CHANGE UP (ref 3.5–5.3)
POTASSIUM SERPL-SCNC: 4.1 MMOL/L — SIGNIFICANT CHANGE UP (ref 3.5–5.3)
RBC # BLD: 4.3 M/UL — SIGNIFICANT CHANGE UP (ref 4.2–5.8)
RBC # FLD: 13 % — SIGNIFICANT CHANGE UP (ref 10.3–16.9)
SODIUM SERPL-SCNC: 138 MMOL/L — SIGNIFICANT CHANGE UP (ref 135–145)
SPECIMEN SOURCE: SIGNIFICANT CHANGE UP
WBC # BLD: 10.7 K/UL — HIGH (ref 3.8–10.5)
WBC # FLD AUTO: 10.7 K/UL — HIGH (ref 3.8–10.5)

## 2018-07-10 PROCEDURE — 99232 SBSQ HOSP IP/OBS MODERATE 35: CPT

## 2018-07-10 PROCEDURE — 73630 X-RAY EXAM OF FOOT: CPT | Mod: 26,RT

## 2018-07-10 PROCEDURE — 99233 SBSQ HOSP IP/OBS HIGH 50: CPT | Mod: GC

## 2018-07-10 RX ORDER — MAGNESIUM SULFATE 500 MG/ML
2 VIAL (ML) INJECTION ONCE
Refills: 0 | Status: COMPLETED | OUTPATIENT
Start: 2018-07-10 | End: 2018-07-10

## 2018-07-10 RX ADMIN — PIPERACILLIN AND TAZOBACTAM 200 GRAM(S): 4; .5 INJECTION, POWDER, LYOPHILIZED, FOR SOLUTION INTRAVENOUS at 13:42

## 2018-07-10 RX ADMIN — Medication 4: at 17:43

## 2018-07-10 RX ADMIN — Medication 2: at 07:34

## 2018-07-10 RX ADMIN — Medication 50 GRAM(S): at 11:38

## 2018-07-10 RX ADMIN — PIPERACILLIN AND TAZOBACTAM 200 GRAM(S): 4; .5 INJECTION, POWDER, LYOPHILIZED, FOR SOLUTION INTRAVENOUS at 00:10

## 2018-07-10 RX ADMIN — PIPERACILLIN AND TAZOBACTAM 200 GRAM(S): 4; .5 INJECTION, POWDER, LYOPHILIZED, FOR SOLUTION INTRAVENOUS at 06:13

## 2018-07-10 RX ADMIN — Medication 4: at 21:31

## 2018-07-10 RX ADMIN — Medication 4: at 11:37

## 2018-07-10 RX ADMIN — INSULIN GLARGINE 27 UNIT(S): 100 INJECTION, SOLUTION SUBCUTANEOUS at 21:31

## 2018-07-10 RX ADMIN — SODIUM CHLORIDE 125 MILLILITER(S): 9 INJECTION, SOLUTION INTRAVENOUS at 12:02

## 2018-07-10 RX ADMIN — PIPERACILLIN AND TAZOBACTAM 200 GRAM(S): 4; .5 INJECTION, POWDER, LYOPHILIZED, FOR SOLUTION INTRAVENOUS at 19:11

## 2018-07-10 NOTE — PHYSICAL THERAPY INITIAL EVALUATION ADULT - LEVEL OF INDEPENDENCE: STAND/SIT, REHAB EVAL
Fairly steady, however slight loss of eccentric control at end of descent. Required verbal cues to keep walker close while backing up to EOB./supervision

## 2018-07-10 NOTE — PROGRESS NOTE ADULT - SUBJECTIVE AND OBJECTIVE BOX
INTERVAL HPI/OVERNIGHT EVENTS: No fevers. s/p R 2nd toe amputation and debridement of R plantar foot ulcer last night.     CONSTITUTIONAL:  Negative fever or chills, feels well, good appetite  EYES:  Negative  blurry vision or double vision  CARDIOVASCULAR:  Negative for chest pain or palpitations  RESPIRATORY:  Negative for cough, wheezing, or SOB   GASTROINTESTINAL:  Negative for nausea, vomiting, diarrhea, constipation, or abdominal pain  GENITOURINARY:  Negative frequency, urgency or dysuria  NEUROLOGIC:  No headache, confusion, dizziness, lightheadedness      ANTIBIOTICS/RELEVANT:    MEDICATIONS  (STANDING):  atorvastatin 40 milliGRAM(s) Oral at bedtime  dextrose 5%. 1000 milliLiter(s) (50 mL/Hr) IV Continuous <Continuous>  dextrose 50% Injectable 12.5 Gram(s) IV Push once  dextrose 50% Injectable 25 Gram(s) IV Push once  dextrose 50% Injectable 25 Gram(s) IV Push once  insulin glargine Injectable (LANTUS) 27 Unit(s) SubCutaneous at bedtime  insulin lispro (HumaLOG) corrective regimen sliding scale   SubCutaneous Before meals and at bedtime  lactated ringers. 1000 milliLiter(s) (125 mL/Hr) IV Continuous <Continuous>  piperacillin/tazobactam IVPB. 4.5 Gram(s) IV Intermittent every 6 hours    MEDICATIONS  (PRN):  acetaminophen   Tablet. 650 milliGRAM(s) Oral every 6 hours PRN Moderate Pain (4 - 6)  dextrose 40% Gel 15 Gram(s) Oral once PRN Blood Glucose LESS THAN 70 milliGRAM(s)/deciliter  glucagon  Injectable 1 milliGRAM(s) IntraMuscular once PRN Glucose LESS THAN 70 milligrams/deciliter  HYDROmorphone  Injectable 1 milliGRAM(s) IV Push every 10 minutes PRN Severe Pain (7 - 10)  ondansetron Injectable 4 milliGRAM(s) IV Push every 6 hours PRN Nausea and/or Vomiting        Vital Signs Last 24 Hrs  T(C): 36.6 (10 Jul 2018 14:03), Max: 36.9 (10 Jul 2018 05:56)  T(F): 97.8 (10 Jul 2018 14:03), Max: 98.4 (10 Jul 2018 05:56)  HR: 83 (10 Jul 2018 14:03) (82 - 95)  BP: 155/88 (10 Jul 2018 14:03) (111/75 - 177/102)  BP(mean): 110 (09 Jul 2018 22:50) (110 - 131)  RR: 16 (10 Jul 2018 14:03) (3 - 18)  SpO2: 99% (10 Jul 2018 14:03) (98% - 100%)    PHYSICAL EXAM:  Constitutional:Well-developed, well nourished  Eyes:RONI, EOMI  Ear/Nose/Throat: no oral lesion, no sinus tenderness on percussion	  Neck:no JVD, no lymphadenopathy, supple  Respiratory: CTA maricruz  Cardiovascular: S1S2 RRR, no murmurs  Gastrointestinal:soft, (+) BS, no HSM  Extremities:no e/e/c  Vascular: DP Pulse:	right normal; left normal      LABS:                        12.2   10.7  )-----------( 518      ( 10 Jul 2018 08:15 )             37.2     07-10    138  |  99  |  14  ----------------------------<  170<H>  4.1   |  26  |  1.34<H>    Ca    8.8      10 Jul 2018 08:15  Phos  3.8     07-10  Mg     1.7     07-10      PT/INR - ( 09 Jul 2018 06:35 )   PT: 12.3 sec;   INR: 1.11          PTT - ( 09 Jul 2018 06:35 )  PTT:33.7 sec      MICROBIOLOGY: Culture - Tissue with Gram Stain (07.09.18 @ 22:27)    Gram Stain:   Test cannot be performed on this type of specimen.    Specimen Source: .Tissue second toe rt foot or specimen    Culture Results:   No growth to date        RADIOLOGY & ADDITIONAL STUDIES: reviewed

## 2018-07-10 NOTE — PROGRESS NOTE ADULT - PROBLEM SELECTOR PLAN 6
F: IV LR @ 125 cc/hr. Hold if patient SOB and crackles on physical exam, edematous   E: Replete PRN  N: DASH diet

## 2018-07-10 NOTE — PHYSICAL THERAPY INITIAL EVALUATION ADULT - DIAGNOSIS, PT EVAL
4I: Impaired Joint Mobility, Motor Function, Muscle Performance, and Range of Motion Associated with Bony or Soft Tissue Surgery 4J: Impaired Motor Function, Muscle Performance, Range of Motion, Gait, Locomotion, and Balance Associated with Amputation

## 2018-07-10 NOTE — PHYSICAL THERAPY INITIAL EVALUATION ADULT - THERAPY FREQUENCY, PT EVAL
Patient educated on frequency of inpatient therapy at [facility], patient verbalized understanding./2-3x/week

## 2018-07-10 NOTE — PHYSICAL THERAPY INITIAL EVALUATION ADULT - GENERAL OBSERVATIONS, REHAB EVAL
Chart reviewed. IE completed. IVELISSE Winston cleared for treatment. No c/o pain at rest. Agreeable to PT. Pt received semi-supine, NAD, (R) LE dressing C/D/I, +(L) IV. Chart reviewed. IE completed. RN  cleared for treatment. No c/o pain at rest. Agreeable to PT. Pt received semi-supine, NAD, (R) LE dressing C/D/I, +(L) IV. Chart reviewed. IE completed. IVELISSE Souza cleared for treatment. No c/o pain at rest. Agreeable to PT. Pt received semi-supine, NAD, (R) LE dressing C/D/I, +(L) IV. Chart reviewed. IE completed. IVELISSE Souza cleared for treatment. Confirmed strict nonweight-bearing (R) LE, heel weight-bearing non-negotiable (MD Aguila, Podiatry). No c/o pain at rest. Agreeable to PT. Pt received semi-supine, NAD, (R) LE dressing C/D/I, +(L) IV.

## 2018-07-10 NOTE — PHYSICAL THERAPY INITIAL EVALUATION ADULT - ADDITIONAL COMMENTS
PLOF received from patient. Prior to admission patient reports independence will all ADLs/IADLs. No mechanical falls in past 6 months.

## 2018-07-10 NOTE — PROGRESS NOTE ADULT - PROBLEM SELECTOR PLAN 2
Likely due to osteomyelitis of 2nd digit on right foot. On admission, patient presented with Tmax: 103 HR: 119 RR: 20, WBC: 15.2 with osteomyelitis of 2nd right digit of right foot. Lactate normal. s/p 3.5 L IV NS (30 CC/KG).  S/p Vanc/Zosyn and Clindamycin x1 in ED. Foot xray sshowing amputation of fourth and fifth right toe, third digit abnormality (possible fracture?), hyperlucency of 2nd middle phalange. Likely osteomyelitis, unlikely nec fasc as CT R foot without significant collection of gas. S/p Vanc/Zosyn and Clindamycin x1 in ED. ESR 86. Swab Cxs growing GBS, numerous Prevotella, and rare Pseudomonas. BCxs negative to date. CT R foot (7/7) demonstrated ulceration of the plantar aspect of the foot, no significant gas collection, fourth and fifth transmetatarsal amputation, Irregular articular surfaces of the third PIP joint, which may be a result of previous osteo vs. active infection.  -plan as above  -Monitor hemodynamics held home lisinopril sepsic on admission. Restart as tolerated.

## 2018-07-10 NOTE — PHYSICAL THERAPY INITIAL EVALUATION ADULT - MANUAL MUSCLE TESTING RESULTS, REHAB EVAL
Grossly assessed with functional movement, bilateral UE/LE greater than or equal to 3+/5 (L) LE grossly assessed as 3+/5 via functional movement; (R) LE not assessed 2/2 nonweight-bearing status

## 2018-07-10 NOTE — PROGRESS NOTE ADULT - PROBLEM SELECTOR PLAN 1
Patient septic on admission with foot xray showing amputation of fourth and fifth right toe, third digit abnormality (possible fracture?), hyperlucency of 2nd middle phalange. Likely osteomyelitis, unlikely nec fasc as CT R foot without significant collection of gas. S/p Vanc/Zosyn and Clindamycin x1 in ED. ESR 86. Swab Cxs growing GBS, numerous Prevotella, and rare Pseudomonas. BCxs negative to date. CT R foot (7/7) demonstrated ulceration of the plantar aspect of the foot, no significant gas collection, fourth and fifth transmetatarsal amputation, Irregular articular surfaces of the third PIP joint, which may be a result of previous osteo vs. active infection.  -c/w zosyn per ID recs and sensitivities. ID recs appreciated. Consider transition to PO Augmentin and Cipro  -f/u ID  -f/u podiatry, recs appreciated. s/p amputation of 2nd digit of right foot. No weight bearing of Left lower extremity  - f/u tissue cxs from OR  -WBCs trending downward 10.7 (7/10) <-- 12.9 (7/9) <-- 12.7<-- 15.2 <--12.7, continue to trend CBC  -c/w LR @ 125cc/hr given uptrending Cr @1.34. Hold if patient SOB, edematous, crackles on exam  -consider MRI R foot.

## 2018-07-10 NOTE — PROGRESS NOTE ADULT - SUBJECTIVE AND OBJECTIVE BOX
Hospital Course    Patient is a 42 year old male with diabetes II (A1C 12.9 on insulin), s/p right 2nd, 4th and 5th toe amputation (7/9/18- this admission, 2015, and 2017 respectively), recent admission for osteomyelitis on base of right foot, treated with Abx via PICC (finished course of cefepime under supervision of Dr Foy), presented with pain and discharge from right 2nd toe with associated fever, fatigue, and confusion, admitted for osteomyelitis of 2nd digit of right toe. In ED: T: 103 HR: 119 BP: 181/91 RR: 20 SpO2 98% on RA. Labs were significant for WBC: 15.2 Lactate 1.2, ESR 86. Patient given 3.5 L NS and 1x Vanc/Zosyn and Clindamycin.  Xray  of R foot (7/6) demonstrated amputation of fourth and fifth right toe, third digit abnormality (possible fracture?), hyperlucency of 2nd middle phalange. CT R foot (7/7) demonstrated ulceration of the plantar aspect of the foot, no significant gas collection, fourth and fifth transmetatarsal amputation, Irregular articular surfaces of the third PIP joint, which may be a result of previous osteo vs. active infection. Swab Cxs growing GBS, numerous Prevotella, and rare Pseudomonas. BCxs negative to date. ID and Podiatry following. Patient s/p amputation of 2nd digit of R foot on 7/9 with podiatry recs for no weight bearing on right lower extremity and daily dressing changes by podiatry. Tissue Cxs obtained in OR for bacteria, fungus, AFB, f/u results. Patient currently on zosyn with goal of discharging on PO Augmentin and Cipro to avoid PICC placement pending OR cx sensitivities. Patient currently on IV LR @ 125 cc/hr due to uptrending Cr @ 1.34 (baseline Cr ~1)    OVERNIGHT EVENTS: No acute events.     SUBJECTIVE / INTERVAL HPI: Patient seen and examined at bedside. Patient s/p amputation of 2nd digit on right foot. Patient with no complaints. Patient denies chest pain, shortness of breath, fever, chills, night sweats, weakness, changes in sensation, nausea, vomiting, diarrhea, constipation, lightheadedness, dizziness.     VITAL SIGNS:  Vital Signs Last 24 Hrs  T(C): 37 (10 Jul 2018 20:30), Max: 37 (10 Jul 2018 20:30)  T(F): 98.6 (10 Jul 2018 20:30), Max: 98.6 (10 Jul 2018 20:30)  HR: 82 (10 Jul 2018 20:30) (79 - 95)  BP: 159/93 (10 Jul 2018 20:30) (111/75 - 177/102)  BP(mean): 110 (09 Jul 2018 22:50) (110 - 131)  RR: 16 (10 Jul 2018 20:30) (10 - 17)  SpO2: 99% (10 Jul 2018 20:30) (98% - 100%)    PHYSICAL EXAM:    General: WDWN  HEENT: NC/AT; PERRL, anicteric sclera; MMM  Neck: supple  Cardiovascular: +S1/S2, RRR  Respiratory: CTA B/L; no W/R/R  Gastrointestinal: soft, NT/ND; +BSx4  Extremities: Right foot with ace bandage that is dry and intact. No edema, no cyanosis throughout.   Vascular: 2+ radial, DP/PT pulses B/L  Neurological: AAOx3; no focal deficits. Sensation intact b/l throughout, muscle strength 5/5 throughout      MEDICATIONS:  MEDICATIONS  (STANDING):  atorvastatin 40 milliGRAM(s) Oral at bedtime  dextrose 5%. 1000 milliLiter(s) (50 mL/Hr) IV Continuous <Continuous>  dextrose 50% Injectable 12.5 Gram(s) IV Push once  dextrose 50% Injectable 25 Gram(s) IV Push once  dextrose 50% Injectable 25 Gram(s) IV Push once  insulin glargine Injectable (LANTUS) 27 Unit(s) SubCutaneous at bedtime  insulin lispro (HumaLOG) corrective regimen sliding scale   SubCutaneous Before meals and at bedtime  lactated ringers. 1000 milliLiter(s) (125 mL/Hr) IV Continuous <Continuous>  piperacillin/tazobactam IVPB. 4.5 Gram(s) IV Intermittent every 6 hours    MEDICATIONS  (PRN):  acetaminophen   Tablet. 650 milliGRAM(s) Oral every 6 hours PRN Moderate Pain (4 - 6)  dextrose 40% Gel 15 Gram(s) Oral once PRN Blood Glucose LESS THAN 70 milliGRAM(s)/deciliter  glucagon  Injectable 1 milliGRAM(s) IntraMuscular once PRN Glucose LESS THAN 70 milligrams/deciliter  HYDROmorphone  Injectable 1 milliGRAM(s) IV Push every 10 minutes PRN Severe Pain (7 - 10)  ondansetron Injectable 4 milliGRAM(s) IV Push every 6 hours PRN Nausea and/or Vomiting      ALLERGIES:  Allergies    No Known Allergies    Intolerances        LABS:                        12.2   10.7  )-----------( 518      ( 10 Jul 2018 08:15 )             37.2     07-10    138  |  99  |  14  ----------------------------<  170<H>  4.1   |  26  |  1.34<H>    Ca    8.8      10 Jul 2018 08:15  Phos  3.8     07-10  Mg     1.7     07-10      PT/INR - ( 09 Jul 2018 06:35 )   PT: 12.3 sec;   INR: 1.11          PTT - ( 09 Jul 2018 06:35 )  PTT:33.7 sec    CAPILLARY BLOOD GLUCOSE      POCT Blood Glucose.: 236 mg/dL (10 Jul 2018 21:00)      RADIOLOGY & ADDITIONAL TESTS: Reviewed.    < from: Xray Foot AP + Lateral + Oblique, Right (07.10.18 @ 09:50) >    IMPRESSION:  Interval resection of the second middle and distal phalanges

## 2018-07-10 NOTE — PROGRESS NOTE ADULT - SUBJECTIVE AND OBJECTIVE BOX
Patient is a 42y old  Male who presents with a chief complaint of Toe ulcer with purulent drainage (06 Jul 2018 18:28)      INTERVAL HPI/OVERNIGHT EVENTS: No acute events O/N. Patient went to OR for debridement today and comfortable after procedure. No current complaints.     Review of Systems: 12 point review of systems otherwise negative      MEDICATIONS  (STANDING):  atorvastatin 40 milliGRAM(s) Oral at bedtime  dextrose 5%. 1000 milliLiter(s) (50 mL/Hr) IV Continuous <Continuous>  dextrose 50% Injectable 12.5 Gram(s) IV Push once  dextrose 50% Injectable 25 Gram(s) IV Push once  dextrose 50% Injectable 25 Gram(s) IV Push once  insulin glargine Injectable (LANTUS) 27 Unit(s) SubCutaneous at bedtime  insulin lispro (HumaLOG) corrective regimen sliding scale   SubCutaneous Before meals and at bedtime  lactated ringers. 1000 milliLiter(s) (125 mL/Hr) IV Continuous <Continuous>  piperacillin/tazobactam IVPB. 4.5 Gram(s) IV Intermittent every 6 hours    MEDICATIONS  (PRN):  acetaminophen   Tablet. 650 milliGRAM(s) Oral every 6 hours PRN Moderate Pain (4 - 6)  dextrose 40% Gel 15 Gram(s) Oral once PRN Blood Glucose LESS THAN 70 milliGRAM(s)/deciliter  glucagon  Injectable 1 milliGRAM(s) IntraMuscular once PRN Glucose LESS THAN 70 milligrams/deciliter  HYDROmorphone  Injectable 1 milliGRAM(s) IV Push every 10 minutes PRN Severe Pain (7 - 10)  ondansetron Injectable 4 milliGRAM(s) IV Push every 6 hours PRN Nausea and/or Vomiting      Allergies    No Known Allergies    Intolerances          Vital Signs Last 24 Hrs  T(C): 36.6 (10 Jul 2018 16:50), Max: 36.9 (10 Jul 2018 05:56)  T(F): 97.9 (10 Jul 2018 16:50), Max: 98.4 (10 Jul 2018 05:56)  HR: 79 (10 Jul 2018 16:50) (79 - 95)  BP: 150/83 (10 Jul 2018 16:50) (111/75 - 177/102)  BP(mean): 110 (09 Jul 2018 22:50) (110 - 131)  RR: 17 (10 Jul 2018 16:50) (3 - 18)  SpO2: 98% (10 Jul 2018 16:50) (98% - 100%)  CAPILLARY BLOOD GLUCOSE      POCT Blood Glucose.: 225 mg/dL (10 Jul 2018 16:33)  POCT Blood Glucose.: 212 mg/dL (10 Jul 2018 11:02)  POCT Blood Glucose.: 156 mg/dL (10 Jul 2018 07:20)  POCT Blood Glucose.: 111 mg/dL (09 Jul 2018 22:40)      07-09 @ 07:01  -  07-10 @ 07:00  --------------------------------------------------------  IN: 3965 mL / OUT: 30 mL / NET: 3935 mL    07-10 @ 07:01  -  07-10 @ 17:26  --------------------------------------------------------  IN: 680 mL / OUT: 1150 mL / NET: -470 mL        Physical Exam:    General:  Well appearing, NAD, not cachetic  HEENT:  Nonicteric, PERRLA  CV:  RRR, no murmur, no JVD  Lungs:  CTA B/L, no wheezes, rales, rhonchi  Abdomen:  Soft, non-tender, non distended  Extremities:  Left foot wrapped post surgically, dressing C/D/I  Skin:  Warm and dry, no rashes  :  No johnson      LABS:                        12.2   10.7  )-----------( 518      ( 10 Jul 2018 08:15 )             37.2     07-10    138  |  99  |  14  ----------------------------<  170<H>  4.1   |  26  |  1.34<H>    Ca    8.8      10 Jul 2018 08:15  Phos  3.8     07-10  Mg     1.7     07-10      PT/INR - ( 09 Jul 2018 06:35 )   PT: 12.3 sec;   INR: 1.11          PTT - ( 09 Jul 2018 06:35 )  PTT:33.7 sec        RADIOLOGY & ADDITIONAL TESTS:    ---------------------------------------------------------------------------  I personally reviewed: [  ]EKG   [  ]CXR    [  ] CT    [  ]Other  ---------------------------------------------------------------------------  PLEASE CHECK WHEN PRESENT:     [  ]Heart Failure     [  ] Acute     [  ] Acute on Chronic     [  ] Chronic  -------------------------------------------------------------------     [  ]Diastolic [HFpEF]     [  ]Systolic [HFrEF]     [  ]Combined [HFpEF & HFrEF]     [  ]Other:  -------------------------------------------------------------------  [  ]CHRIST     [  ]ATN     [  ]Reneal Medullary Necrosis     [  ]Renal Cortical Necrosis     [  ]Other Pathological Lesions:    [  ]CKD 1  [  ]CKD 2  [  ]CKD 3  [  ]CKD 4  [  ]CKD 5  [  ]Other  -------------------------------------------------------------------  [  ]Other/Unspecified:    --------------------------------------------------------------------    Abdominal Nutritional Status  [  ]Malnutrition: See Nutrition Note  [  ]Cachexia  [  ]Other:   [  ]Supplement Ordered:  [  ]Morbid Obesity (BMI >=40]

## 2018-07-10 NOTE — PROGRESS NOTE ADULT - SUBJECTIVE AND OBJECTIVE BOX
Post-Op Note    Procedure: 2nd toe amputation, ulcer debridement and integra graft application  Right     Diagnosis/Indication: osteomyelitis 2nd toe and diabetic foot ulcer Right     Surgeon: Norma     S: Pt has no complaints. Denies CP, SOB, MAYO, calf tenderness. Pain controlled with medication.    O:  T(C): 36.5 (07-09-18 @ 23:51), Max: 36.5 (07-09-18 @ 23:51)  T(F): 97.7 (07-09-18 @ 23:51), Max: 97.7 (07-09-18 @ 23:51)  HR: 95 (07-09-18 @ 23:51) (84 - 95)  BP: 135/84 (07-09-18 @ 23:51) (135/84 - 177/102)  RR: 16 (07-09-18 @ 23:51) (3 - 18)  SpO2: 98% (07-09-18 @ 23:51) (98% - 100%)  Wt(kg): --                        12.9   10.4  )-----------( 535      ( 09 Jul 2018 06:34 )             39.5     07-09    134<L>  |  94<L>  |  17  ----------------------------<  293<H>  5.1   |  27  |  1.31<H>    Ca    10.0      09 Jul 2018 06:34  Phos  4.0     07-08  Mg     2.0     07-08        Gen: NAD, resting comfortably in bed  C/V: NSR  Pulm: Nonlabored breathing, no respiratory distress  Abd: soft, NT/ND  Extrem: WWP, no calf edema, SCDs in place dressing clean dry intact       A/P: 42yMale s/p above procedure  - return to rmf when stable per pacu  - continue Iv Abx per ID   - keep dressing clean dry intact to be changed by podiatry only   - f/u intraoperative cultures

## 2018-07-10 NOTE — PHYSICAL THERAPY INITIAL EVALUATION ADULT - ACTIVE RANGE OF MOTION EXAMINATION, REHAB EVAL
bilateral upper extremity Active ROM was WFL (within functional limits)/bilateral  lower extremity Active ROM was WFL (within functional limits) (L) LE active ROM was WFL; (R) LE hip and knee ROM was WFL; (R) LE ankle ROM not tested 2/2 nonweight-bearing orders/bilateral upper extremity Active ROM was WFL (within functional limits)

## 2018-07-10 NOTE — PHYSICAL THERAPY INITIAL EVALUATION ADULT - NS ASR WT BEARING DETAIL RLE
nonweight-bearing Confirmed with MD Aguila (Podiatry), strict nonweight-bearing (R) LE (no heel weight-bearing allowed)./nonweight-bearing

## 2018-07-10 NOTE — PHYSICAL THERAPY INITIAL EVALUATION ADULT - LEVEL OF INDEPENDENCE: GAIT, REHAB EVAL
Fairly steady gait with no LOBs noted; required verbal cuing for maintaining proper distance from walker especially during turns; pt noted (L) shoulder pain with RW use; distance limited by (L) shoulder pain and decreased activity tolerance/contact guard

## 2018-07-10 NOTE — PROGRESS NOTE ADULT - PROBLEM SELECTOR PLAN 4
On admission, Na: 130. Corrected 132. Likely hypovolemic hyponatremia secondary to sepsis. S/p 3.5 L NS.  -Na+ 138 (7/10) <-- 134 ( 7/9).Trend Electrolytes and replete PRN  -patient on IV LR @ 125cc/hr given recent uptrending Cr @ 1.34. d/c fluids if patient  edematous, crackles on exam, SOB

## 2018-07-10 NOTE — PHYSICAL THERAPY INITIAL EVALUATION ADULT - PERTINENT HX OF CURRENT PROBLEM, REHAB EVAL
carmen is a 42 year old male with diabetes II (A1C 12.9 on insulin), s/p right 4th and 5th toe amputation (2015 and 2017), recent admission for osteomyelitis on base of right foot, treated with Abx via PICC. Please refer to H&P in [place] for additional details. Patient is a 42 year old male with diabetes II (A1C 12.9 on insulin), s/p right 4th and 5th toe amputation (2015 and 2017), recent admission for osteomyelitis on base of right foot, treated with Abx via PICC. Please refer to H&P in [place] for additional details.

## 2018-07-10 NOTE — PROGRESS NOTE ADULT - ASSESSMENT
43 yo male with uncontrolled DM, s/p R 4th and 5th TMA, s/p 6-week course of cefepime (ended 6/7) for R foot osteomyelitis 2/2 GBS, staph spp, p/w purulent drainage from R plantar foot ulcer; +probe to bone c/w osteomyelitis. Cx 7/6 (obtained via sterile bedside debridement) growing GBS, numerous Prevotella, and rare Pseudomonas. s/p R 2nd toe amputation and debridement of R plantar foot ulcer per podiatry 7/9.  - case discussed with podiatry: proximal debridement from R 2nd toe to clean margins/healthy viable tissue performed; the ulcer did not appear particularly inflamed   - f/u intra-operative cultures  - continue Zosyn 4.5g IV q6h; pending final culture results, may consider transition to Augmentin + Cipro upon discharge; patient would like to avoid another PICC line   - needs optimization of glycemic control

## 2018-07-10 NOTE — PHYSICAL THERAPY INITIAL EVALUATION ADULT - LIVES WITH, PROFILE
Mom in private apartment with 5 steps w/ handrail to enter. 1 step inside apartment to enter the bathroom. Mom will not be present to assist patient at all times.

## 2018-07-11 ENCOUNTER — TRANSCRIPTION ENCOUNTER (OUTPATIENT)
Age: 43
End: 2018-07-11

## 2018-07-11 LAB
ANION GAP SERPL CALC-SCNC: 11 MMOL/L — SIGNIFICANT CHANGE UP (ref 5–17)
BUN SERPL-MCNC: 16 MG/DL — SIGNIFICANT CHANGE UP (ref 7–23)
CALCIUM SERPL-MCNC: 9.8 MG/DL — SIGNIFICANT CHANGE UP (ref 8.4–10.5)
CHLORIDE SERPL-SCNC: 97 MMOL/L — SIGNIFICANT CHANGE UP (ref 96–108)
CO2 SERPL-SCNC: 28 MMOL/L — SIGNIFICANT CHANGE UP (ref 22–31)
CREAT SERPL-MCNC: 1.26 MG/DL — SIGNIFICANT CHANGE UP (ref 0.5–1.3)
CULTURE RESULTS: SIGNIFICANT CHANGE UP
CULTURE RESULTS: SIGNIFICANT CHANGE UP
GLUCOSE BLDC GLUCOMTR-MCNC: 156 MG/DL — HIGH (ref 70–99)
GLUCOSE BLDC GLUCOMTR-MCNC: 212 MG/DL — HIGH (ref 70–99)
GLUCOSE BLDC GLUCOMTR-MCNC: 241 MG/DL — HIGH (ref 70–99)
GLUCOSE BLDC GLUCOMTR-MCNC: 242 MG/DL — HIGH (ref 70–99)
GLUCOSE BLDC GLUCOMTR-MCNC: 287 MG/DL — HIGH (ref 70–99)
GLUCOSE BLDC GLUCOMTR-MCNC: 287 MG/DL — HIGH (ref 70–99)
GLUCOSE SERPL-MCNC: 175 MG/DL — HIGH (ref 70–99)
HCT VFR BLD CALC: 37.2 % — LOW (ref 39–50)
HCT VFR BLD CALC: 38.2 % — LOW (ref 39–50)
HGB BLD-MCNC: 12.1 G/DL — LOW (ref 13–17)
HGB BLD-MCNC: 12.7 G/DL — LOW (ref 13–17)
MAGNESIUM SERPL-MCNC: 2 MG/DL — SIGNIFICANT CHANGE UP (ref 1.6–2.6)
MCHC RBC-ENTMCNC: 27.9 PG — SIGNIFICANT CHANGE UP (ref 27–34)
MCHC RBC-ENTMCNC: 28.7 PG — SIGNIFICANT CHANGE UP (ref 27–34)
MCHC RBC-ENTMCNC: 32.5 G/DL — SIGNIFICANT CHANGE UP (ref 32–36)
MCHC RBC-ENTMCNC: 33.2 G/DL — SIGNIFICANT CHANGE UP (ref 32–36)
MCV RBC AUTO: 85.9 FL — SIGNIFICANT CHANGE UP (ref 80–100)
MCV RBC AUTO: 86.4 FL — SIGNIFICANT CHANGE UP (ref 80–100)
PHOSPHATE SERPL-MCNC: 3.3 MG/DL — SIGNIFICANT CHANGE UP (ref 2.5–4.5)
PLATELET # BLD AUTO: 565 K/UL — HIGH (ref 150–400)
PLATELET # BLD AUTO: 574 K/UL — HIGH (ref 150–400)
POTASSIUM SERPL-MCNC: 4.6 MMOL/L — SIGNIFICANT CHANGE UP (ref 3.5–5.3)
POTASSIUM SERPL-SCNC: 4.6 MMOL/L — SIGNIFICANT CHANGE UP (ref 3.5–5.3)
RBC # BLD: 4.33 M/UL — SIGNIFICANT CHANGE UP (ref 4.2–5.8)
RBC # BLD: 4.42 M/UL — SIGNIFICANT CHANGE UP (ref 4.2–5.8)
RBC # FLD: 12.8 % — SIGNIFICANT CHANGE UP (ref 10.3–16.9)
RBC # FLD: 12.9 % — SIGNIFICANT CHANGE UP (ref 10.3–16.9)
SODIUM SERPL-SCNC: 136 MMOL/L — SIGNIFICANT CHANGE UP (ref 135–145)
SPECIMEN SOURCE: SIGNIFICANT CHANGE UP
SPECIMEN SOURCE: SIGNIFICANT CHANGE UP
WBC # BLD: 7.6 K/UL — SIGNIFICANT CHANGE UP (ref 3.8–10.5)
WBC # BLD: 9.7 K/UL — SIGNIFICANT CHANGE UP (ref 3.8–10.5)
WBC # FLD AUTO: 7.6 K/UL — SIGNIFICANT CHANGE UP (ref 3.8–10.5)
WBC # FLD AUTO: 9.7 K/UL — SIGNIFICANT CHANGE UP (ref 3.8–10.5)

## 2018-07-11 PROCEDURE — 99232 SBSQ HOSP IP/OBS MODERATE 35: CPT

## 2018-07-11 PROCEDURE — 99233 SBSQ HOSP IP/OBS HIGH 50: CPT | Mod: GC

## 2018-07-11 PROCEDURE — 73719 MRI LOWER EXTREMITY W/DYE: CPT | Mod: 26,RT

## 2018-07-11 RX ORDER — INSULIN LISPRO 100/ML
3 VIAL (ML) SUBCUTANEOUS
Refills: 0 | Status: DISCONTINUED | OUTPATIENT
Start: 2018-07-11 | End: 2018-07-13

## 2018-07-11 RX ADMIN — PIPERACILLIN AND TAZOBACTAM 200 GRAM(S): 4; .5 INJECTION, POWDER, LYOPHILIZED, FOR SOLUTION INTRAVENOUS at 05:27

## 2018-07-11 RX ADMIN — INSULIN GLARGINE 27 UNIT(S): 100 INJECTION, SOLUTION SUBCUTANEOUS at 23:57

## 2018-07-11 RX ADMIN — Medication 2: at 08:49

## 2018-07-11 RX ADMIN — PIPERACILLIN AND TAZOBACTAM 200 GRAM(S): 4; .5 INJECTION, POWDER, LYOPHILIZED, FOR SOLUTION INTRAVENOUS at 12:57

## 2018-07-11 RX ADMIN — Medication 4: at 17:14

## 2018-07-11 RX ADMIN — PIPERACILLIN AND TAZOBACTAM 200 GRAM(S): 4; .5 INJECTION, POWDER, LYOPHILIZED, FOR SOLUTION INTRAVENOUS at 00:23

## 2018-07-11 RX ADMIN — PIPERACILLIN AND TAZOBACTAM 200 GRAM(S): 4; .5 INJECTION, POWDER, LYOPHILIZED, FOR SOLUTION INTRAVENOUS at 18:43

## 2018-07-11 RX ADMIN — Medication 6: at 23:56

## 2018-07-11 RX ADMIN — Medication 4: at 13:25

## 2018-07-11 RX ADMIN — SODIUM CHLORIDE 125 MILLILITER(S): 9 INJECTION, SOLUTION INTRAVENOUS at 07:12

## 2018-07-11 RX ADMIN — ATORVASTATIN CALCIUM 40 MILLIGRAM(S): 80 TABLET, FILM COATED ORAL at 23:57

## 2018-07-11 NOTE — PROGRESS NOTE ADULT - PROBLEM SELECTOR PLAN 2
Likely due to osteomyelitis of 2nd digit on right foot. On admission, patient presented with Tmax: 103 HR: 119 RR: 20, WBC: 15.2 with osteomyelitis of 2nd right digit of right foot. Lactate normal. s/p 3.5 L IV NS (30 CC/KG).  S/p Vanc/Zosyn and Clindamycin x1 in ED. Foot xray sshowing amputation of fourth and fifth right toe, third digit abnormality (possible fracture?), hyperlucency of 2nd middle phalange. Likely osteomyelitis, unlikely nec fasc as CT R foot without significant collection of gas. S/p Vanc/Zosyn and Clindamycin x1 in ED. ESR 86. Swab Cxs growing GBS, numerous Prevotella, and rare Pseudomonas. BCxs negative to date. CT R foot (7/7) demonstrated ulceration of the plantar aspect of the foot, no significant gas collection, fourth and fifth transmetatarsal amputation, Irregular articular surfaces of the third PIP joint, which may be a result of previous osteo vs. active infection.  --plan as above  --Monitor hemodynamics  -- held home lisinopril as septicc on admission. Restart as tolerated Likely due to osteomyelitis of 2nd digit on right foot. On admission, patient presented with Tmax: 103 HR: 119 RR: 20, WBC: 15.2 with osteomyelitis of 2nd right digit of right foot. Lactate normal. s/p 3.5 L IV NS (30 CC/KG).  S/p Vanc/Zosyn and Clindamycin x1 in ED. Foot xray sshowing amputation of fourth and fifth right toe, third digit abnormality (possible fracture?), hyperlucency of 2nd middle phalange. Likely osteomyelitis, unlikely nec fasc as CT R foot without significant collection of gas. S/p Vanc/Zosyn and Clindamycin x1 in ED. ESR 86. Swab Cxs growing GBS, numerous Prevotella, and rare Pseudomonas. BCxs negative to date. CT R foot (7/7) demonstrated ulceration of the plantar aspect of the foot, no significant gas collection, fourth and fifth transmetatarsal amputation, Irregular articular surfaces of the third PIP joint, which may be a result of previous osteo vs. active infection.  --plan as above  --Monitor hemodynamics  -- held home lisinopril as septic on admission. Restart as tolerated Likely due to osteomyelitis of 2nd digit on right foot. On admission, patient presented with Tmax: 103 HR: 119 RR: 20, WBC: 15.2 with osteomyelitis of 2nd right digit of right foot. Lactate normal. ESR 86. s/p 3.5 L IV NS. S/p Vanc/Zosyn and Clindamycin x1 in ED. Likely osteomyelitis, unlikely nec fasc as CT R foot without significant collection of gas. Swab Cxs growing GBS, numerous Prevotella, and rare Pseudomonas. BCxs negative to date.   --plan as above  --resolved  --Monitor hemodynamics  -- held home lisinopril as septic on admission. Restart as tolerated

## 2018-07-11 NOTE — PROGRESS NOTE ADULT - ASSESSMENT
42M DM2 h/o osteomyelitis and amputation right 4th 5th toes 2 days s/p Right 2nd toe amputation, ulcer debridement and integra graft application      -Applied adaptic to the plantar wound and a DSD consisting of 4x4 gauze, abd pad, kerlix and an ace bandage to the RLE.   -Rare co-ag staph likely contaminate or mislabel given that intra-op tissue showing no growth and prior cultures growing   - Abx per ID.   - will need to be non weight bearing Right LE for 2 weeks  -f/u with Dr. Green in 1 week 42M DM2 h/o osteomyelitis and amputation right 4th 5th toes 2 days s/p Right 2nd toe amputation, ulcer debridement and integra graft application      -Applied adaptic to the plantar wound and a DSD consisting of 4x4 gauze, abd pad, kerlix and an ace bandage to the RLE.   -Culture labeled "clean margin" growing rare co-ag staph likely contaminate or mislabel given that intra-op tissue showing no growth and prior cultures growing GBS, and gram negative rods.   - Abx per ID.   - will need to be non weight bearing Right LE for 2 weeks  -f/u with Dr. Green in 1 week

## 2018-07-11 NOTE — DISCHARGE NOTE ADULT - SECONDARY DIAGNOSIS.
Diabetes CHRIST (acute kidney injury) Hyperlipidemia Transition of care performed with sharing of clinical summary Dressing change

## 2018-07-11 NOTE — PROGRESS NOTE ADULT - SUBJECTIVE AND OBJECTIVE BOX
PROGRESS NOTE   Patient is a 42y old  Male who presents with a chief complaint of Toe ulcer with purulent drainage (06 Jul 2018 18:28)      Interval History: No acute events overnight. Pt is seen resting comfortably in bed.  Pt denies pain in his right foot.  Denies fever, chills, nausea, emesis, chest pain, dyspnea, abdominal pain.     Vital Signs Last 24 Hrs  T(C): 36.8 (11 Jul 2018 09:56), Max: 37 (10 Jul 2018 20:30)  T(F): 98.2 (11 Jul 2018 09:56), Max: 98.6 (10 Jul 2018 20:30)  HR: 75 (11 Jul 2018 09:56) (75 - 83)  BP: 134/88 (11 Jul 2018 09:56) (134/88 - 159/93)  BP(mean): --  RR: 16 (11 Jul 2018 09:56) (16 - 17)  SpO2: 99% (11 Jul 2018 09:56) (98% - 99%)                          12.7   9.7   )-----------( 565      ( 11 Jul 2018 07:04 )             38.2               07-11    136  |  97  |  16  ----------------------------<  175<H>  4.6   |  28  |  1.26    Ca    9.8      11 Jul 2018 07:04  Phos  3.3     07-11  Mg     2.0     07-11    RADIOLOGY  < from: Xray Foot AP + Lateral + Oblique, Right (07.06.18 @ 17:25) >  Impression: Findings consistent with soft tissue infection of the second   toe.      < end of copied text >      MICROBIOLOGY  Culture - Surgical Swab (07.06.18 @ 19:47)  Culture - Tissue with Gram Stain (07.09.18 @ 22:26)    Gram Stain:   No organisms seen  Rare White blood cells    Specimen Source: .Tissue senond toe rt foot clear margin or    Culture Results:   Rare Gram Positive Cocci Identification and susceptibility to follow.  Culture in progress      -  Ampicillin: S    Gram Stain:   Few Gram positive cocci in pairs, chains and clusters  Few Gram Negative Rods  No WBC's seen.    -  Clindamycin: R    -  Erythromycin: R    -  Penicillin: S Predicts results for ampicillin, amoxicillin, amoxicillin/clavulanate, ampicillin/sulbactam, 1st, 2nd and 3rd generation cephalosporins and carbapenems.    -  Vancomycin: S    Specimen Source: .Surgical Swab right 2nd toe infection    Culture Results:   Numerous Streptococcus agalactiae (Group B)  Rare Gram Negative Rods  Identification and susceptibility to follow.  Numerous Prevotella bivia Beta lactamase positive    Organism Identification: Streptococcus agalactiae (Group B)    Organism: Streptococcus agalactiae (Group B)    Method Type: KB    Culture - Tissue with Gram Stain (07.09.18 @ 22:26)    Gram Stain:   No organisms seen  Rare White blood cells    Specimen Source: .Tissue senond toe rt foot clear margin or    Culture Results:   Rare Gram Positive Cocci Identification and susceptibility to follow.  Culture in progress      Culture - Acid Fast - Tissue w/Smear (07.10.18 @ 05:50)    Specimen Source: .Tissue senond toe rt foot clear margin or    Acid Fast Bacilli Smear:   No acid fast bacilli seen by fluorochrome stain          PHYSICAL EXAM  GEN: ARIEL MOURA is a pleasant well-nourished, well developed 42y Male in no acute distress, alert awake, and oriented to person, place and time.     LE Focused:  Dressing c/d/i. No strikethrough. Incision site of the right 2nd digit is well-coapted, sutures intact. no dehiscence. no clinical signs of infection. Plantar ulcer with integra graft sutured in place with overlying adaptic. no clinical sings of infection. PROGRESS NOTE   Patient is a 42y old  Male who presents with a chief complaint of Toe ulcer with purulent drainage (06 Jul 2018 18:28)      Interval History: No acute events overnight. Pt is seen resting comfortably in bed.  Pt denies pain in his right foot.  Denies fever, chills, nausea, emesis, chest pain, dyspnea, abdominal pain.     Vital Signs Last 24 Hrs  T(C): 36.8 (11 Jul 2018 09:56), Max: 37 (10 Jul 2018 20:30)  T(F): 98.2 (11 Jul 2018 09:56), Max: 98.6 (10 Jul 2018 20:30)  HR: 75 (11 Jul 2018 09:56) (75 - 83)  BP: 134/88 (11 Jul 2018 09:56) (134/88 - 159/93)  BP(mean): --  RR: 16 (11 Jul 2018 09:56) (16 - 17)  SpO2: 99% (11 Jul 2018 09:56) (98% - 99%)                          12.7   9.7   )-----------( 565      ( 11 Jul 2018 07:04 )             38.2               07-11    136  |  97  |  16  ----------------------------<  175<H>  4.6   |  28  |  1.26    Ca    9.8      11 Jul 2018 07:04  Phos  3.3     07-11  Mg     2.0     07-11    RADIOLOGY  < from: Xray Foot AP + Lateral + Oblique, Right (07.06.18 @ 17:25) >  Impression: Findings consistent with soft tissue infection of the second   toe.      < end of copied text >      MICROBIOLOGY  Culture - Surgical Swab (07.06.18 @ 19:47)  Culture - Tissue with Gram Stain (07.09.18 @ 22:26)    Gram Stain:   No organisms seen  Rare White blood cells    Specimen Source: .Tissue senond toe rt foot clear margin or    Culture Results:   Rare Gram Positive Cocci Identification and susceptibility to follow.  Culture in progress      -  Ampicillin: S    Gram Stain:   Few Gram positive cocci in pairs, chains and clusters  Few Gram Negative Rods  No WBC's seen.    -  Clindamycin: R    -  Erythromycin: R    -  Penicillin: S Predicts results for ampicillin, amoxicillin, amoxicillin/clavulanate, ampicillin/sulbactam, 1st, 2nd and 3rd generation cephalosporins and carbapenems.    -  Vancomycin: S    Specimen Source: .Surgical Swab right 2nd toe infection    Culture Results:   Numerous Streptococcus agalactiae (Group B)  Rare Gram Negative Rods  Identification and susceptibility to follow.  Numerous Prevotella bivia Beta lactamase positive    Organism Identification: Streptococcus agalactiae (Group B)    Organism: Streptococcus agalactiae (Group B)    Method Type: KB    Culture - Tissue with Gram Stain (07.09.18 @ 22:26)    Gram Stain:   No organisms seen  Rare White blood cells    Specimen Source: .Tissue senond toe rt foot clear margin or    Culture Results:   Rare Gram Positive Cocci Identification and susceptibility to follow.  Culture in progress      Culture - Acid Fast - Tissue w/Smear (07.10.18 @ 05:50)    Specimen Source: .Tissue senond toe rt foot clear margin or    Acid Fast Bacilli Smear:   No acid fast bacilli seen by fluorochrome stain          PHYSICAL EXAM  GEN: ARIEL MOURA is a pleasant well-nourished, well developed 42y Male in no acute distress, alert awake, and oriented to person, place and time.     LE Focused:  Dressing c/d/i. No strikethrough. Incision site of the right 2nd digit is well-coapted, sutures intact. no dehiscence.  no drainage. no clinical signs of infection. Plantar ulcer with integra graft sutured in place with overlying adaptic. sutures intact. no dehiscence. no drainage.  no clinical sings of infection.

## 2018-07-11 NOTE — DISCHARGE NOTE ADULT - PATIENT PORTAL LINK FT
You can access the [facility], offered by [facility], by registering with the following website: [email or web address]

## 2018-07-11 NOTE — DISCHARGE NOTE ADULT - OTHER SIGNIFICANT FINDINGS
MRI Foot w/ IV Cont, Right 07/11/18  Impression:  Since 4/28/2018, there has been interval development of plantar surface   ulcer near the third metatarsophalangeal joint with associated   osteoarthritis.    RENETTA SUGGS M.D., RADIOLOGY RESIDENT  This document has been electronically signed.  ABDOUL OWUSU M.D., ATTENDING RADIOLOGIST  This document has been electronically signed. Jul 12 2018 11:19AM      CT Foot No Cont, Right 7/7/18  Impression: Ulceration of the plantar aspect of the foot    No significant collection of gas in the soft tissues suggest necrotizing   fasciitis.    Status post fourth and fifth transmetatarsal amputation. The articular   surfaces of the third PIP joint are irregular. This may be the result of   osteomyelitis described on the MRI of 4/20/2018. Activeinfection cannot   be excluded.      ALVAREZ TORRE M.D., RADIOLOGY RESIDENT  This document has been electronically signed.  ABDOUL OWUSU M.D., ATTENDING RADIOLOGIST  This document has been electronically signed. Jul 9 2018  8:45AM            Xray Foot AP + Lateral + Oblique, Right 07/06/18  INTERPRETATION:  Indication: r/o osteo    3 views of the right foot are submitted. As on the previous examination   4/26/2018 the patient status post transmetatarsal amputation of the   fourth and fifth toes. There are chronic appearing erosive changes at the   third PIP joint. Soft tissue swelling is noted. There is gas in the soft   tissues distal tip of the second toe compatible with infection. No   discrete osseous erosion is identified.    Impression: Findings consistent with soft tissue infection of the second   toe.      ABDOUL OWUSU M.D., ATTENDING RADIOLOGIST  This document has been electronically signed. Jul 9 2018  9:22AM MRI Foot w/ IV Cont, Right 07/11/18  Impression:  Since 4/28/2018, there has been interval development of plantar surface   ulcer near the third metatarsophalangeal joint with associated   osteoarthritis.    RENETTA SUGGS M.D., RADIOLOGY RESIDENT  This document has been electronically signed.  ABDOUL OWUSU M.D., ATTENDING RADIOLOGIST  This document has been electronically signed. Jul 12 2018 11:19AM      CT Foot No Cont, Right 7/7/18  Impression: Ulceration of the plantar aspect of the foot    No significant collection of gas in the soft tissues suggest necrotizing   fasciitis.    Status post fourth and fifth transmetatarsal amputation. The articular   surfaces of the third PIP joint are irregular. This may be the result of   osteomyelitis described on the MRI of 4/20/2018. Activeinfection cannot   be excluded.      ALVAREZ TORRE M.D., RADIOLOGY RESIDENT  This document has been electronically signed.  ABDOUL OWUSU M.D., ATTENDING RADIOLOGIST  This document has been electronically signed. Jul 9 2018  8:45AM            Xray Foot AP + Lateral + Oblique, Right 07/06/18  INTERPRETATION:  Indication: r/o osteo    3 views of the right foot are submitted. As on the previous examination   4/26/2018 the patient status post transmetatarsal amputation of the   fourth and fifth toes. There are chronic appearing erosive changes at the   third PIP joint. Soft tissue swelling is noted. There is gas in the soft   tissues distal tip of the second toe compatible with infection. No   discrete osseous erosion is identified.    Impression: Findings consistent with soft tissue infection of the second   toe.      ABDOUL OWUSU M.D., ATTENDING RADIOLOGIST  This document has been electronically signed. Jul 9 2018  9:22AM     US Retroperitoneal Complete (07.16.18 @ 12:04)   1.  No hydronephrosis.   2.  Enlarged prostate.  3.  Mild bladder wall thickening which may suggest some element of   bladder outlet obstruction.      SIVAN FONTANA M.D., RADIOLOGY RESIDENT  This document has been electronically signed.  DIANA COOLEY M.D., ATTENDING RADIOLOGIST  This document has been electronically signed. Jul 16 2018  2:10PM MRI Foot w/ IV Cont, Right 07/11/18  Impression:  Since 4/28/2018, there has been interval development of plantar surface   ulcer near the third metatarsophalangeal joint with associated   osteoarthritis.    CT Foot No Cont, Right 7/7/18  Impression: Ulceration of the plantar aspect of the foot  No significant collection of gas in the soft tissues suggest necrotizing   fasciitis.  Status post fourth and fifth transmetatarsal amputation. The articular   surfaces of the third PIP joint are irregular. This may be the result of   osteomyelitis described on the MRI of 4/20/2018. Activeinfection cannot   be excluded.    Xray Foot AP + Lateral + Oblique, Right 07/06/18  INTERPRETATION:  Indication: r/o osteo  3 views of the right foot are submitted. As on the previous examination   4/26/2018 the patient status post transmetatarsal amputation of the   fourth and fifth toes. There are chronic appearing erosive changes at the   third PIP joint. Soft tissue swelling is noted. There is gas in the soft   tissues distal tip of the second toe compatible with infection. No   discrete osseous erosion is identified.  Impression: Findings consistent with soft tissue infection of the second   toe.    US Retroperitoneal Complete (07.16.18 @ 12:04)   1.  No hydronephrosis.   2.  Enlarged prostate.  3.  Mild bladder wall thickening which may suggest some element of   bladder outlet obstruction.

## 2018-07-11 NOTE — PROGRESS NOTE ADULT - PROBLEM SELECTOR PLAN 7
Patient's case and plan discussed with medicine team and consultation services. Patient's case and plan discussed with medicine team and consultation services.  Will contact PCP in the AM 7/12.

## 2018-07-11 NOTE — PROGRESS NOTE ADULT - PROBLEM SELECTOR PLAN 6
F: IV LR @ 125 cc/hr. Hold if patient SOB and crackles on physical exam, edematous   E: Replete PRN  N: DASH diet. F: None  E: Replete PRN  N: DASH diet.

## 2018-07-11 NOTE — PROGRESS NOTE ADULT - SUBJECTIVE AND OBJECTIVE BOX
OVERNIGHT EVENTS:    SUBJECTIVE / INTERVAL HPI: Patient seen and examined at bedside.     VITAL SIGNS:  Vital Signs Last 24 Hrs  T(C): 35.9 (11 Jul 2018 05:38), Max: 37 (10 Jul 2018 20:30)  T(F): 96.7 (11 Jul 2018 05:38), Max: 98.6 (10 Jul 2018 20:30)  HR: 75 (11 Jul 2018 05:38) (75 - 83)  BP: 137/80 (11 Jul 2018 05:38) (111/75 - 159/93)  BP(mean): --  RR: 16 (11 Jul 2018 05:38) (16 - 17)  SpO2: 99% (11 Jul 2018 05:38) (98% - 100%)    PHYSICAL EXAM:    General: WDWN  HEENT: NCAT; PERRL, anicteric sclera; MMM  Neck: supple, trachea midline  Cardiovascular: S1, S2 normal; RRR, no M/G/R  Respiratory: CTABL; no W/R/R  Gastrointestinal: soft, nontender, nondistended. bowel sounds present.  Skin: no ulcerations or visible rashes appreciated  Extremities: WWP; no edema, clubbing or cyanosis  Vascular: 2+ radial, DP/PT pulses B/L  Neurological: AAOx3; CN II-XII grossly intact; no focal deficits    MEDICATIONS:  MEDICATIONS  (STANDING):  atorvastatin 40 milliGRAM(s) Oral at bedtime  dextrose 5%. 1000 milliLiter(s) (50 mL/Hr) IV Continuous <Continuous>  dextrose 50% Injectable 12.5 Gram(s) IV Push once  dextrose 50% Injectable 25 Gram(s) IV Push once  dextrose 50% Injectable 25 Gram(s) IV Push once  insulin glargine Injectable (LANTUS) 27 Unit(s) SubCutaneous at bedtime  insulin lispro (HumaLOG) corrective regimen sliding scale   SubCutaneous Before meals and at bedtime  lactated ringers. 1000 milliLiter(s) (125 mL/Hr) IV Continuous <Continuous>  piperacillin/tazobactam IVPB. 4.5 Gram(s) IV Intermittent every 6 hours    MEDICATIONS  (PRN):  acetaminophen   Tablet. 650 milliGRAM(s) Oral every 6 hours PRN Moderate Pain (4 - 6)  dextrose 40% Gel 15 Gram(s) Oral once PRN Blood Glucose LESS THAN 70 milliGRAM(s)/deciliter  glucagon  Injectable 1 milliGRAM(s) IntraMuscular once PRN Glucose LESS THAN 70 milligrams/deciliter  HYDROmorphone  Injectable 1 milliGRAM(s) IV Push every 10 minutes PRN Severe Pain (7 - 10)  ondansetron Injectable 4 milliGRAM(s) IV Push every 6 hours PRN Nausea and/or Vomiting      ALLERGIES:  Allergies    No Known Allergies    Intolerances        LABS:                        12.2   10.7  )-----------( 518      ( 10 Jul 2018 08:15 )             37.2     07-10    138  |  99  |  14  ----------------------------<  170<H>  4.1   |  26  |  1.34<H>    Ca    8.8      10 Jul 2018 08:15  Phos  3.8     07-10  Mg     1.7     07-10      PT/INR - ( 09 Jul 2018 06:35 )   PT: 12.3 sec;   INR: 1.11          PTT - ( 09 Jul 2018 06:35 )  PTT:33.7 sec    CAPILLARY BLOOD GLUCOSE      POCT Blood Glucose.: 236 mg/dL (10 Jul 2018 21:00)      RADIOLOGY & ADDITIONAL TESTS: Reviewed. OVERNIGHT EVENTS: Per nurse no acute events over night. Per float team:    SUBJECTIVE / INTERVAL HPI: Patient seen and examined at bedside. Patient has no complaints. Is s/p amputation of R 2nd digit on 7/9. States he is not in any pain and has no dizziness today. Pt is eating and drinking. Last BM was yesterday. Denies any SOB, CP, n/v, f/c, diarrhea/constipation, urinary changes, headache.     VITAL SIGNS:  Vital Signs Last 24 Hrs  T(C): 35.9 (11 Jul 2018 05:38), Max: 37 (10 Jul 2018 20:30)  T(F): 96.7 (11 Jul 2018 05:38), Max: 98.6 (10 Jul 2018 20:30)  HR: 75 (11 Jul 2018 05:38) (75 - 83)  BP: 137/80 (11 Jul 2018 05:38) (111/75 - 159/93)  BP(mean): --  RR: 16 (11 Jul 2018 05:38) (16 - 17)  SpO2: 99% (11 Jul 2018 05:38) (98% - 100%)    PHYSICAL EXAM:    General: NAD  HEENT: NCAT; PERRL, anicteric sclera; MMM  Neck: supple, trachea midline  Cardiovascular: S1, S2 normal; RRR, no M/G/R  Respiratory: CTABL; no W/R/R  Gastrointestinal: soft, nontender, nondistended. bowel sounds present.  Skin: no ulcerations or visible rashes appreciated  Extremities: WWP; no edema, clubbing or cyanosis. R foot is in ace bandage that is dry and intact.    Vascular: 2+ radial, DP/PT pulses B/L  Neurological: AAOx3    MEDICATIONS:  MEDICATIONS  (STANDING):  atorvastatin 40 milliGRAM(s) Oral at bedtime  dextrose 5%. 1000 milliLiter(s) (50 mL/Hr) IV Continuous <Continuous>  dextrose 50% Injectable 12.5 Gram(s) IV Push once  dextrose 50% Injectable 25 Gram(s) IV Push once  dextrose 50% Injectable 25 Gram(s) IV Push once  insulin glargine Injectable (LANTUS) 27 Unit(s) SubCutaneous at bedtime  insulin lispro (HumaLOG) corrective regimen sliding scale   SubCutaneous Before meals and at bedtime  lactated ringers. 1000 milliLiter(s) (125 mL/Hr) IV Continuous <Continuous>  piperacillin/tazobactam IVPB. 4.5 Gram(s) IV Intermittent every 6 hours    MEDICATIONS  (PRN):  acetaminophen   Tablet. 650 milliGRAM(s) Oral every 6 hours PRN Moderate Pain (4 - 6)  dextrose 40% Gel 15 Gram(s) Oral once PRN Blood Glucose LESS THAN 70 milliGRAM(s)/deciliter  glucagon  Injectable 1 milliGRAM(s) IntraMuscular once PRN Glucose LESS THAN 70 milligrams/deciliter  HYDROmorphone  Injectable 1 milliGRAM(s) IV Push every 10 minutes PRN Severe Pain (7 - 10)  ondansetron Injectable 4 milliGRAM(s) IV Push every 6 hours PRN Nausea and/or Vomiting      ALLERGIES:  Allergies    No Known Allergies    Intolerances        LABS:                        12.2   10.7  )-----------( 518      ( 10 Jul 2018 08:15 )             37.2     07-10    138  |  99  |  14  ----------------------------<  170<H>  4.1   |  26  |  1.34<H>    Ca    8.8      10 Jul 2018 08:15  Phos  3.8     07-10  Mg     1.7     07-10      PT/INR - ( 09 Jul 2018 06:35 )   PT: 12.3 sec;   INR: 1.11          PTT - ( 09 Jul 2018 06:35 )  PTT:33.7 sec    CAPILLARY BLOOD GLUCOSE      POCT Blood Glucose.: 236 mg/dL (10 Jul 2018 21:00)      RADIOLOGY & ADDITIONAL TESTS: Reviewed.    < from: Xray Foot AP + Lateral + Oblique, Right (07.10.18 @ 09:50) >  IMPRESSION:  Interval resection of the second middle and distal phalanges.    "Thank you for the opportunity to participate in the care of this   patient."    RENETTA SUGGS M.D., RADIOLOGY RESIDENT  This document has been electronically signed.  ABDOUL OWUSU M.D., ATTENDING RADIOLOGIST  This document has been electronically signed. Jul 10 2018  2:40PM        < end of copied text >

## 2018-07-11 NOTE — DISCHARGE NOTE ADULT - ADDITIONAL INSTRUCTIONS
Please follow up at Endocrine clinic on  Please follow up with your infectious disease doctor on  Please go to your appointment at [facility] on Please see your endocrinologist Dr. Chisholm on Monday 7/23 at 11:20am. The address is [address] [place] [address], [address]th floor [place], [place]  Please follow up with your infectious disease doctor, Dr. Foy on Tuesday, 7/24 at 11:20am. The address is [address] [place] [address], [address] [place] [address].  Please go to your appointment at [facility] to see your primary care provider. [facility] will call you in the next few days to schedule your appointment. Please see your endocrinologist Dr. Chisholm on Monday 7/23 at 11:20am. The address is [address], [address]th floor [place], [place]  Please follow up with your infectious disease doctor, Dr. Foy on Tuesday, 7/24 at 11:20am. The address is [address] [place] [address], [address] [place] [address].  Please go to your appointment at [facility] to see your primary care provider. [facility] will call you in the next few days to schedule your appointment.  Please make an appointment with the kidney doctor, Dr. Blanchard. His office number to make an appointment is [phone]. At your appointment you will tell Dr. Blanchard that you need renal ultrasound with doppler, bladder ultrasound with PVR, and ECHO with stress test. Please see your endocrinologist Dr. Chisholm on Monday 7/23 at 11:20am. The address is [address], 8th floor [place], [place]    Please follow up with your infectious disease doctor, Dr. Foy on Tuesday, 7/24 at 11:20am. The address is [address] [place] [address], [address] [place] [address].  Please go to your appointment with your primary care doctor at [facility] on 7/23 at 1:30Pm. The address is [address] [place] [address]    Please make an appointment with the kidney doctor, Dr. Blanchard. His office number to make an appointment is [phone]. At your appointment you will tell Dr. Blanchard that you need renal ultrasound with doppler, bladder ultrasound with PVR, and ECHO with stress test. Please see your endocrinologist Dr. Chisholm on Monday 7/23 at 11:20am. The address is [address], [address]th floor [place], [place]    Please follow up with your infectious disease doctor, Dr. Foy on Tuesday, 7/24 at 11:20am. The address is [address] [place] [address], [address] [place] [address].    Please go to your appointment with your primary care doctor at [facility] on 7/23 at 1:30Pm. The address is [address] [place] [address]    Please make an appointment with the kidney doctor, Dr. Blanchard. His office number to make an appointment is [phone]. At your appointment you will tell Dr. Blanchard that you need renal ultrasound with doppler, bladder ultrasound with PVR, and ECHO with stress test. Please see your endocrinologist Dr. Chisholm on Monday 7/23 at 11:20am. The address is [address], [address] floor [place], [place]    Please follow up with your infectious disease doctor, Dr. Foy on Tuesday, 7/24 at 11:20am. The address is [address] [place] [address], [address] [place] [address].    Please go to your appointment with your primary care doctor at [facility] on 7/23 at 1:30pm. The address is [address] [place] [address]    Please make an appointment with the kidney doctor, Dr. Blanchard. His office number to make an appointment is [phone]. At your appointment you will tell Dr. Blanchard that you need renal ultrasound with doppler, bladder ultrasound with PVR, and ECHO with stress test. - Please see your endocrinologist Dr. Chisholm on Monday 7/23 at 11:20am. The address is [address] [place] [address], 8th floor [place], [place]  - Please follow up with your infectious disease doctor, Dr. Foy on Tuesday, 7/24 at 11:20am. The address is [address] [place] [address], [address] [place] [address].  - Please go to your appointment with your primary care doctor at [facility] on 7/23 at 1:30pm. The address is [address] [place] [address]  - Please make an appointment with the kidney doctor, Dr. Blanchard. His office number to make an appointment is [phone]

## 2018-07-11 NOTE — DISCHARGE NOTE ADULT - PLAN OF CARE
You will finish complete course of IV antibiotics. You have osteomyelitis of your right foot. Your second toe on your right foot was amputated because of your osteomyelitis. Osteomyelitis is a severe infection of the bone that is caused by bacteria. Your osteomyelitis is due to your uncontrolled diabetes. It is crucial that you follow up at endocrine clinic and your primary care provider in order to manage your diabetes. Uncontrolled diabetes can lead to many severe health consequences such as stroke, heart attack, amputation of the legs, and kidney failure. Please call your PCP at [facility] if you have increased swelling, redness, or pain of your right foot or you have new drainage or odor from your wound. Please go to endocrine clinic for an appointment with your endocrinologist and [facility] for appointment with your primary care provider. You have uncontrolled diabetes that requires very close followup with your doctors at [facility] endocrine clinic and your primary care provider at [facility]. Uncontrolled diabetes can lead to many severe health consequences such as stroke, heart attack, amputation of the legs, and kidney failure. Please see your endocrionologist Dr. Chisholm on Monday 7/23 at 11:20am. The address is [address] [place] [address], [address] [place], [place]. Please go to your appointment at [facility]. [facility] will call you to schedule the appointment. You have uncontrolled diabetes that requires very close followup with your endocrinologist Dr. Chisholm and your primary care provider at [facility]. Uncontrolled diabetes can lead to many severe health consequences such as stroke, heart attack, amputation of the legs, and kidney failure. Please follow up with your infectious disease doctor, Dr. Foy on Tuesday, 7/24 at 11:20am. The address is [address] [place] [address], [address] [place] [address].. You have acute kidney injury that is seen with an elevated kidney enzyme in the blood.  Acute kidney injury happens when your kidneys stop working properly. This causes the kidneys to be unable to remove waste,chemicals, or extra fluid from your body. You will need to make an appointment with the kidney doctor, Dr. Blanchard. His office number to make an appointment is [phone]. At your appointment you will tell Dr. Blanchard that you need renal ultrasound with doppler, bladder ultrasound with PVR, and ECHO with stress test. You will need to make an appointment with the kidney doctor, Dr. Blanchard. You have acute kidney injury that is seen with an elevated kidney enzyme in the blood.  Acute kidney injury happens when your kidneys stop working properly. This causes the kidneys to be unable to remove waste,chemicals, or extra fluid from your body. You will need to make an appointment with the kidney doctor, Dr. Blanchard. His office number to make an appointment is [phone]. At your appointment you will tell Dr. Blanchard that you need renal ultrasound with doppler, bladder ultrasound with PVR, and ECHO with stress test. You will also need labs of your vitamin D 25 level, vitamin D 1,25 level, and baseline phosphate level. You must avoid NSAIDs because of your acute kidney disease. Please see your endocrionologist Dr. Chisholm on Monday 7/23 at 11:20am. The address is [address] [place] [address], [address] [place], [place]. Please go to your appointment at [facility]. with your primary care provider on 7/23 at 1:30pm. The address is You have osteomyelitis of your right foot. Your toes were amputated because of your osteomyelitis. Osteomyelitis is a severe infection of the bone that is caused by bacteria. Your osteomyelitis is due to your uncontrolled diabetes. It is crucial that you follow up at endocrine clinic and your primary care provider in order to manage your diabetes. Uncontrolled diabetes can lead to many severe health consequences such as stroke, heart attack, amputation of the legs, and kidney failure. Please call your primary care doctor at [facility] if you have increased swelling, redness, or pain of your right foot or you have new drainage or odor from your wound. For the osteomyelitis your will be continued on IV vancomycin and IV zosyn for the next six weeks. You will nee weekly CBC with diff, CMP, ESR, C-reactive protein and please send to Dr. Foy's clinic. Please see your endocrinologists Dr. Chisholm on Monday 7/23 at 11:20am. The address is [address] [place] [address], [address] [place], [place]. Please go to your appointment at [facility]. with your primary care provider on 7/23 at 1:30pm. The address is You have uncontrolled diabetes that requires very close followup with your endocrinologist Dr. Chisholm and your primary care provider at [facility]. Uncontrolled diabetes can lead to many severe health consequences such as stroke, heart attack, amputation of the legs, and kidney failure. While you were in the hospital we adjusted your insulin regimen based on your requirements. Please have them continue to take your blood glucose and adjust your insulin needs. You should have cardiac work-up outpatient with stress test and echocardiogram. You have acute kidney injury that is seen with an elevated kidney enzyme in the blood.  Acute kidney injury happens when your kidneys stop working properly. This causes the kidneys to be unable to remove waste,chemicals, or extra fluid from your body. You will need to make an appointment with the kidney doctor, Dr. Blanchard. His office number to make an appointment is [phone]. At your appointment you will tell Dr. Blanchard that you had a renal ultrasound with doppler while in the hospital and may need a bladder ultrasound with PVR. You will also need labs of your vitamin D 25 level, vitamin D 1,25 level, and baseline phosphate level. You must avoid NSAIDs because of your acute kidney disease. Podiatry recommends that on discharge there should be an adaptic and a dry sterile dressing consisting of 4x4 gauze, abd pad, kerlix, and an ace bandage to the RLE plantar wound. The dressing should be changed once a day. No weight bearing of right lower extremity for 2 weeks. You have acute kidney injury that is seen with an elevated kidney enzyme in the blood.  Acute kidney injury happens when your kidneys stop working properly. This causes the kidneys to be unable to remove waste, chemicals, or extra fluid from your body. You will need to make an appointment with the kidney doctor, Dr. Blanchard. His office number to make an appointment is [phone]. At your appointment you will tell Dr. Blanchard that you had a renal ultrasound with doppler while in the hospital and may need a bladder ultrasound with PVR. You will also need labs of your vitamin D 25 level, vitamin D 1,25 level, and baseline phosphate level. You must avoid NSAIDs because of your acute kidney disease. continue with home medication atorvastatin 40mg daily Documentation sent to [facility] about hospital course and appointment scheduled for patient [facility] wound care for R foot You have uncontrolled diabetes that requires very close followup with your endocrinologist Dr. Chisholm and your primary care provider at [facility]. Uncontrolled diabetes can lead to many severe health consequences such as stroke, heart attack, amputation of the legs, and kidney failure. While you were in the hospital we adjusted your insulin regimen based on your requirements. Please have them continue to take your blood glucose and adjust your insulin needs. Continue with lisinopril 5mg daily. You have a history of high cholesterol. Continue with your statin medication atorvastatin to reduce your risk of heart attacks and strokes. Continue to follow with your primary care doctor at [facility]. You should have cardiac work-up outpatient with stress test and echocardiogram. You have osteomyelitis of your right foot. Your toes were amputated because of your osteomyelitis. Osteomyelitis is a severe infection of the bone that is caused by bacteria. Your osteomyelitis is due to your uncontrolled diabetes. It is crucial that you follow up at endocrine clinic and your primary care provider in order to manage your diabetes. Uncontrolled diabetes can lead to many severe health consequences such as stroke, heart attack, amputation of the legs, and kidney failure. Please call your primary care doctor at [facility] if you have increased swelling, redness, or pain of your right foot or you have new drainage or odor from your wound. For the osteomyelitis your will be continued on IV vancomycin and IV zosyn for the next six weeks. Weekly CBC w/ diff, weekly CMP, weekly CRP and weekly ESR and appropriate vancomycin trough with goal 15-20 while on antibiotic. Patient will need to finish 6 week course of IV vancomycin and IV zosyn that started 7/9/18. (Last day of 6 week course will be 8/20/18). Currently on IV vancomycin 1.250g IV q12h and zosyn 4.5g IV q6h. Please adjust vancomycin with appropriate trough levels with goal vancomycin level 15-20. You have uncontrolled diabetes that requires very close followup with your endocrinologist Dr. Chisholm and your primary care provider at [facility]. Uncontrolled diabetes can lead to many severe health consequences such as stroke, heart attack, amputation of the legs, and kidney failure. While you were in the hospital we adjusted your insulin regimen based on your requirements. Please have them continue to take your blood glucose and adjust your insulin needs. Do not continue with lisinopril 5mg by mouth daily because it makes you cough. Please follow up with your PCP about an alternative to lisinopril. Please see your endocrinologists Dr. Chisholm on Monday 7/23 at 11:20am. The address is [address] [place] [address], [address] [place], [place]. Please go to your appointment at [facility]. with your primary care provider on 7/23 at 1:30pm.

## 2018-07-11 NOTE — DISCHARGE NOTE ADULT - MEDICATION SUMMARY - MEDICATIONS TO TAKE
I will START or STAY ON the medications listed below when I get home from the hospital:    lisinopril 5 mg oral tablet  -- 1 tab(s) by mouth once a day  -- Indication: For Diabetes    insulin lispro 100 units/mL injectable solution  -- 8 unit(s) injectable 3 times a day (before meals) Please adjust with insulin requirements  -- Indication: For Diabetes    Lantus 100 units/mL subcutaneous solution  -- 38 unit(s) subcutaneous once a day (at bedtime) Please modify as needed depending on basal insulin requirements.   -- Do not drink alcoholic beverages when taking this medication.  It is very important that you take or use this exactly as directed.  Do not skip doses or discontinue unless directed by your doctor.  Keep in refrigerator.  Do not freeze.    -- Indication: For Diabetes    atorvastatin 40 mg oral tablet  -- 1 tab(s) by mouth once a day (at bedtime)  -- Indication: For Prophylactic measure    vancomycin  -- 1250 milligram(s) intravenously every 12 hours  -- Indication: For Osteomyelitis    piperacillin-tazobactam 2 g-0.25 g intravenous injection  -- 4.5 gram(s) intravenous every 6 hours  -- Indication: For Osteomyelitis I will START or STAY ON the medications listed below when I get home from the hospital:    insulin lispro 100 units/mL injectable solution  -- 8 unit(s) injectable 3 times a day (before meals) Please adjust with insulin requirements  -- Indication: For Diabetes    Lantus 100 units/mL subcutaneous solution  -- 38 unit(s) subcutaneous once a day (at bedtime) Please modify as needed depending on basal insulin requirements.   -- Do not drink alcoholic beverages when taking this medication.  It is very important that you take or use this exactly as directed.  Do not skip doses or discontinue unless directed by your doctor.  Keep in refrigerator.  Do not freeze.    -- Indication: For Diabetes    atorvastatin 40 mg oral tablet  -- 1 tab(s) by mouth once a day (at bedtime)  -- Indication: For Prophylactic measure    vancomycin  -- 1250 milligram(s) intravenously every 12 hours for 6 weeks (end date August 20) with goal vancomycin troughs 15-20 and will need weekly CBC with diff, CMP, CRP, ESR  -- Indication: For Osteomyelitis    piperacillin-tazobactam 2 g-0.25 g intravenous injection  -- 4.5 gram(s) intravenous every 6 hours for 6 weeks (end date August 20) and will need weekly CBC with diff, CMP, CRP, ESR  -- Indication: For Osteomyelitis

## 2018-07-11 NOTE — PROGRESS NOTE ADULT - SUBJECTIVE AND OBJECTIVE BOX
INTERVAL HPI/OVERNIGHT EVENTS: No fevers. Denies R foot pain.     CONSTITUTIONAL:  Negative fever or chills, feels well, good appetite  EYES:  Negative  blurry vision or double vision  CARDIOVASCULAR:  Negative for chest pain or palpitations  RESPIRATORY:  Negative for cough, wheezing, or SOB   GASTROINTESTINAL:  Negative for nausea, vomiting, diarrhea, constipation, or abdominal pain  GENITOURINARY:  Negative frequency, urgency or dysuria  NEUROLOGIC:  No headache, confusion, dizziness, lightheadedness      ANTIBIOTICS/RELEVANT:    MEDICATIONS  (STANDING):  atorvastatin 40 milliGRAM(s) Oral at bedtime  dextrose 5%. 1000 milliLiter(s) (50 mL/Hr) IV Continuous <Continuous>  dextrose 50% Injectable 12.5 Gram(s) IV Push once  dextrose 50% Injectable 25 Gram(s) IV Push once  dextrose 50% Injectable 25 Gram(s) IV Push once  insulin glargine Injectable (LANTUS) 27 Unit(s) SubCutaneous at bedtime  insulin lispro (HumaLOG) corrective regimen sliding scale   SubCutaneous Before meals and at bedtime  piperacillin/tazobactam IVPB. 4.5 Gram(s) IV Intermittent every 6 hours    MEDICATIONS  (PRN):  acetaminophen   Tablet. 650 milliGRAM(s) Oral every 6 hours PRN Moderate Pain (4 - 6)  dextrose 40% Gel 15 Gram(s) Oral once PRN Blood Glucose LESS THAN 70 milliGRAM(s)/deciliter  glucagon  Injectable 1 milliGRAM(s) IntraMuscular once PRN Glucose LESS THAN 70 milligrams/deciliter  HYDROmorphone  Injectable 1 milliGRAM(s) IV Push every 10 minutes PRN Severe Pain (7 - 10)  ondansetron Injectable 4 milliGRAM(s) IV Push every 6 hours PRN Nausea and/or Vomiting        Vital Signs Last 24 Hrs  T(C): 36.7 (11 Jul 2018 14:08), Max: 37 (10 Jul 2018 20:30)  T(F): 98 (11 Jul 2018 14:08), Max: 98.6 (10 Jul 2018 20:30)  HR: 82 (11 Jul 2018 14:08) (75 - 82)  BP: 138/82 (11 Jul 2018 14:08) (134/88 - 159/93)  BP(mean): --  RR: 16 (11 Jul 2018 14:08) (16 - 17)  SpO2: 98% (11 Jul 2018 14:08) (98% - 99%)    PHYSICAL EXAM:  Constitutional:Well-developed, well nourished  Eyes:RONI, EOMI  Ear/Nose/Throat: no oral lesion, no sinus tenderness on percussion	  Neck:no JVD, no lymphadenopathy, supple  Respiratory: CTA maricruz  Cardiovascular: S1S2 RRR, no murmurs  Gastrointestinal:soft, (+) BS, no HSM  Extremities:no R foot cast in place  Vascular: DP Pulse:	right normal; left normal      LABS:                        12.7   9.7   )-----------( 565      ( 11 Jul 2018 07:04 )             38.2     07-11    136  |  97  |  16  ----------------------------<  175<H>  4.6   |  28  |  1.26    Ca    9.8      11 Jul 2018 07:04  Phos  3.3     07-11  Mg     2.0     07-11            MICROBIOLOGY: Culture - Tissue with Gram Stain (07.09.18 @ 22:26)    Gram Stain:   No organisms seen  Rare White blood cells    Specimen Source: .Tissue senond toe rt foot clear margin or    Culture Results:   Rare Gram Positive Cocci Identification and susceptibility to follow.  Culture in progress    Culture - Surgical Swab (07.06.18 @ 19:47)    -  Penicillin: S Predicts results for ampicillin, amoxicillin, amoxicillin/clavulanate, ampicillin/sulbactam, 1st, 2nd and 3rd generation cephalosporins and carbapenems.    -  Piperacillin/Tazobactam: S <=16    -  Vancomycin: S    -  Gentamicin: S <=4    -  Tobramycin: S <=4    -  Ampicillin: S    -  Aztreonam: S <=4    -  Ceftazidime: S 4    -  Ciprofloxacin: S <=1    Gram Stain:   Few Gram positive cocci in pairs, chains and clusters  Few Gram Negative Rods  No WBC's seen.    -  Clindamycin: R    -  Erythromycin: R    Specimen Source: .Surgical Swab right 2nd toe infection    Culture Results:   Numerous Streptococcus agalactiae (Group B)  Rare Pseudomonas aeruginosa  Numerous Prevotella bivia Beta lactamase positive    Organism Identification: Streptococcus agalactiae (Group B)  Pseudomonas aeruginosa    Organism: Streptococcus agalactiae (Group B)    Organism: Pseudomonas aeruginosa    Method Type: KB    Method Type: YUN      RADIOLOGY & ADDITIONAL STUDIES: reviewed

## 2018-07-11 NOTE — DISCHARGE NOTE ADULT - CARE PLAN
Principal Discharge DX:	Osteomyelitis  Secondary Diagnosis:	Diabetes Principal Discharge DX:	Osteomyelitis  Goal:	You will finish complete course of IV antibiotics.  Secondary Diagnosis:	Diabetes Principal Discharge DX:	Osteomyelitis  Goal:	You will finish complete course of IV antibiotics.  Assessment and plan of treatment:	You have osteomyelitis of your right foot. Your second toe on your right foot was amputated because of your osteomyelitis. Osteomyelitis is a severe infection of the bone that is caused by bacteria. Your osteomyelitis is due to your uncontrolled diabetes. It is crucial that you follow up at endocrine clinic and your primary care provider in order to manage your diabetes. Uncontrolled diabetes can lead to many severe health consequences such as stroke, heart attack, amputation of the legs, and kidney failure. Please call your PCP at [facility] if you have increased swelling, redness, or pain of your right foot or you have new drainage or odor from your wound.  Secondary Diagnosis:	Diabetes  Goal:	Please go to endocrine clinic for an appointment with your endocrinologist and [facility] for appointment with your primary care provider.  Assessment and plan of treatment:	You have uncontrolled diabetes that requires very close followup with your doctors at [facility] endocrine clinic and your primary care provider at [facility]. Uncontrolled diabetes can lead to many severe health consequences such as stroke, heart attack, amputation of the legs, and kidney failure. Principal Discharge DX:	Osteomyelitis  Goal:	Please follow up with your infectious disease doctor, Dr. Foy on Tuesday, 7/24 at 11:20am. The address is [address] [place] [address]..  Assessment and plan of treatment:	You have osteomyelitis of your right foot. Your second toe on your right foot was amputated because of your osteomyelitis. Osteomyelitis is a severe infection of the bone that is caused by bacteria. Your osteomyelitis is due to your uncontrolled diabetes. It is crucial that you follow up at endocrine clinic and your primary care provider in order to manage your diabetes. Uncontrolled diabetes can lead to many severe health consequences such as stroke, heart attack, amputation of the legs, and kidney failure. Please call your PCP at [facility] if you have increased swelling, redness, or pain of your right foot or you have new drainage or odor from your wound.  Secondary Diagnosis:	Diabetes  Goal:	Please see your endocrionologist Dr. Chisholm on Monday 7/23 at 11:20am. The address is [address] [place] [address], [address] [place], [place]. Please go to your appointment at [facility]. [facility] will call you to schedule the appointment.  Assessment and plan of treatment:	You have uncontrolled diabetes that requires very close followup with your endocrinologist Dr. Chisholm and your primary care provider at [facility]. Uncontrolled diabetes can lead to many severe health consequences such as stroke, heart attack, amputation of the legs, and kidney failure. Principal Discharge DX:	Osteomyelitis  Goal:	Please follow up with your infectious disease doctor, Dr. Foy on Tuesday, 7/24 at 11:20am. The address is [address] [place] [address], [address] [place] [address]..  Assessment and plan of treatment:	You have osteomyelitis of your right foot. Your second toe on your right foot was amputated because of your osteomyelitis. Osteomyelitis is a severe infection of the bone that is caused by bacteria. Your osteomyelitis is due to your uncontrolled diabetes. It is crucial that you follow up at endocrine clinic and your primary care provider in order to manage your diabetes. Uncontrolled diabetes can lead to many severe health consequences such as stroke, heart attack, amputation of the legs, and kidney failure. Please call your PCP at [facility] if you have increased swelling, redness, or pain of your right foot or you have new drainage or odor from your wound.  Secondary Diagnosis:	Diabetes  Goal:	Please see your endocrionologist Dr. Chisholm on Monday 7/23 at 11:20am. The address is [address] [place] [address], [address] floor [place], [place]. Please go to your appointment at [facility]. [facility] will call you to schedule the appointment.  Assessment and plan of treatment:	You have uncontrolled diabetes that requires very close followup with your endocrinologist Dr. Chisholm and your primary care provider at [facility]. Uncontrolled diabetes can lead to many severe health consequences such as stroke, heart attack, amputation of the legs, and kidney failure.  Secondary Diagnosis:	CHRIST (acute kidney injury)  Goal:	You will need to make an appointment with the kidney doctor, Dr. Blanchard.  Assessment and plan of treatment:	You have acute kidney injury that is seen with an elevated kidney enzyme in the blood.  Acute kidney injury happens when your kidneys stop working properly. This causes the kidneys to be unable to remove waste,chemicals, or extra fluid from your body. You will need to make an appointment with the kidney doctor, Dr. Blanchard. His office number to make an appointment is [phone]. At your appointment you will tell Dr. Blanchard that you need renal ultrasound with doppler, bladder ultrasound with PVR, and ECHO with stress test. Principal Discharge DX:	Osteomyelitis  Goal:	Please follow up with your infectious disease doctor, Dr. oFy on Tuesday, 7/24 at 11:20am. The address is [address] [place] [address], [address] [place] [address]..  Assessment and plan of treatment:	You have osteomyelitis of your right foot. Your second toe on your right foot was amputated because of your osteomyelitis. Osteomyelitis is a severe infection of the bone that is caused by bacteria. Your osteomyelitis is due to your uncontrolled diabetes. It is crucial that you follow up at endocrine clinic and your primary care provider in order to manage your diabetes. Uncontrolled diabetes can lead to many severe health consequences such as stroke, heart attack, amputation of the legs, and kidney failure. Please call your PCP at [facility] if you have increased swelling, redness, or pain of your right foot or you have new drainage or odor from your wound.  Secondary Diagnosis:	Diabetes  Goal:	Please see your endocrionologist Dr. Chisholm on Monday 7/23 at 11:20am. The address is [address] [place] [address], [address] floor [place], [place]. Please go to your appointment at [facility]. [facility] will call you to schedule the appointment.  Assessment and plan of treatment:	You have uncontrolled diabetes that requires very close followup with your endocrinologist Dr. Chisholm and your primary care provider at [facility]. Uncontrolled diabetes can lead to many severe health consequences such as stroke, heart attack, amputation of the legs, and kidney failure.  Secondary Diagnosis:	CHRIST (acute kidney injury)  Goal:	You will need to make an appointment with the kidney doctor, Dr. Blanchard.  Assessment and plan of treatment:	You have acute kidney injury that is seen with an elevated kidney enzyme in the blood.  Acute kidney injury happens when your kidneys stop working properly. This causes the kidneys to be unable to remove waste,chemicals, or extra fluid from your body. You will need to make an appointment with the kidney doctor, Dr. Blanchard. His office number to make an appointment is [phone]. At your appointment you will tell Dr. Blanchard that you need renal ultrasound with doppler, bladder ultrasound with PVR, and ECHO with stress test. You will also need labs of your vitamin D 25 level, vitamin D 1,25 level, and baseline phosphate level. You must avoid NSAIDs because of your acute kidney disease. Principal Discharge DX:	Osteomyelitis  Goal:	Please follow up with your infectious disease doctor, Dr. Foy on Tuesday, 7/24 at 11:20am. The address is [address] [place] [address], [address] [place] [address]..  Assessment and plan of treatment:	You have osteomyelitis of your right foot. Your second toe on your right foot was amputated because of your osteomyelitis. Osteomyelitis is a severe infection of the bone that is caused by bacteria. Your osteomyelitis is due to your uncontrolled diabetes. It is crucial that you follow up at endocrine clinic and your primary care provider in order to manage your diabetes. Uncontrolled diabetes can lead to many severe health consequences such as stroke, heart attack, amputation of the legs, and kidney failure. Please call your PCP at [facility] if you have increased swelling, redness, or pain of your right foot or you have new drainage or odor from your wound.  Secondary Diagnosis:	Diabetes  Goal:	Please see your endocrionologist Dr. Chisholm on Monday 7/23 at 11:20am. The address is [address] [place] [address], [address] [place], [place]. Please go to your appointment at [facility]. with your primary care provider on 7/23 at 1:30pm. The address is  Assessment and plan of treatment:	You have uncontrolled diabetes that requires very close followup with your endocrinologist Dr. Chisholm and your primary care provider at [facility]. Uncontrolled diabetes can lead to many severe health consequences such as stroke, heart attack, amputation of the legs, and kidney failure.  Secondary Diagnosis:	CHRIST (acute kidney injury)  Goal:	You will need to make an appointment with the kidney doctor, Dr. Blanchard.  Assessment and plan of treatment:	You have acute kidney injury that is seen with an elevated kidney enzyme in the blood.  Acute kidney injury happens when your kidneys stop working properly. This causes the kidneys to be unable to remove waste,chemicals, or extra fluid from your body. You will need to make an appointment with the kidney doctor, Dr. Blanchard. His office number to make an appointment is [phone]. At your appointment you will tell Dr. Blanchard that you need renal ultrasound with doppler, bladder ultrasound with PVR, and ECHO with stress test. You will also need labs of your vitamin D 25 level, vitamin D 1,25 level, and baseline phosphate level. You must avoid NSAIDs because of your acute kidney disease. Principal Discharge DX:	Osteomyelitis  Goal:	Please follow up with your infectious disease doctor, Dr. Foy on Tuesday, 7/24 at 11:20am. The address is [address] [place] [address], [address] [place] [address]..  Assessment and plan of treatment:	You have osteomyelitis of your right foot. Your toes were amputated because of your osteomyelitis. Osteomyelitis is a severe infection of the bone that is caused by bacteria. Your osteomyelitis is due to your uncontrolled diabetes. It is crucial that you follow up at endocrine clinic and your primary care provider in order to manage your diabetes. Uncontrolled diabetes can lead to many severe health consequences such as stroke, heart attack, amputation of the legs, and kidney failure. Please call your primary care doctor at [facility] if you have increased swelling, redness, or pain of your right foot or you have new drainage or odor from your wound. For the osteomyelitis your will be continued on IV vancomycin and IV zosyn for the next six weeks. You will nee weekly CBC with diff, CMP, ESR, C-reactive protein and please send to Dr. Foy's clinic.  Secondary Diagnosis:	Diabetes  Goal:	Please see your endocrinologists Dr. Chisholm on Monday 7/23 at 11:20am. The address is [address] [place] [address], [address] [place], [place]. Please go to your appointment at [facility]. with your primary care provider on 7/23 at 1:30pm. The address is  Assessment and plan of treatment:	You have uncontrolled diabetes that requires very close followup with your endocrinologist Dr. Chisholm and your primary care provider at [facility]. Uncontrolled diabetes can lead to many severe health consequences such as stroke, heart attack, amputation of the legs, and kidney failure. While you were in the hospital we adjusted your insulin regimen based on your requirements. Please have them continue to take your blood glucose and adjust your insulin needs. You should have cardiac work-up outpatient with stress test and echocardiogram.  Secondary Diagnosis:	CHRIST (acute kidney injury)  Goal:	You will need to make an appointment with the kidney doctor, Dr. Blanchard.  Assessment and plan of treatment:	You have acute kidney injury that is seen with an elevated kidney enzyme in the blood.  Acute kidney injury happens when your kidneys stop working properly. This causes the kidneys to be unable to remove waste,chemicals, or extra fluid from your body. You will need to make an appointment with the kidney doctor, Dr. Blanchard. His office number to make an appointment is [phone]. At your appointment you will tell Dr. Blanchard that you had a renal ultrasound with doppler while in the hospital and may need a bladder ultrasound with PVR. You will also need labs of your vitamin D 25 level, vitamin D 1,25 level, and baseline phosphate level. You must avoid NSAIDs because of your acute kidney disease. Principal Discharge DX:	Osteomyelitis  Goal:	Please follow up with your infectious disease doctor, Dr. Foy on Tuesday, 7/24 at 11:20am. The address is [address] [place] [address]..  Assessment and plan of treatment:	You have osteomyelitis of your right foot. Your toes were amputated because of your osteomyelitis. Osteomyelitis is a severe infection of the bone that is caused by bacteria. Your osteomyelitis is due to your uncontrolled diabetes. It is crucial that you follow up at endocrine clinic and your primary care provider in order to manage your diabetes. Uncontrolled diabetes can lead to many severe health consequences such as stroke, heart attack, amputation of the legs, and kidney failure. Please call your primary care doctor at [facility] if you have increased swelling, redness, or pain of your right foot or you have new drainage or odor from your wound. For the osteomyelitis your will be continued on IV vancomycin and IV zosyn for the next six weeks. Weekly CBC w/ diff, weekly CMP, weekly CRP and weekly ESR and appropriate vancomycin trough with goal 15-20 while on antibiotic. Patient will need to finish 6 week course of IV vancomycin and IV zosyn that started 7/9/18. (Last day of 6 week course will be 8/20/18). Currently on IV vancomycin 1.250g IV q12h and zosyn 4.5g IV q6h. Please adjust vancomycin with appropriate trough levels with goal vancomycin level 15-20.  Secondary Diagnosis:	Diabetes  Goal:	Please see your endocrinologists Dr. Chisholm on Monday 7/23 at 11:20am. The address is [address] [place] [address], [address] [place], [place]. Please go to your appointment at [facility]. with your primary care provider on 7/23 at 1:30pm. The address is  Assessment and plan of treatment:	You have uncontrolled diabetes that requires very close followup with your endocrinologist Dr. Chisholm and your primary care provider at [facility]. Uncontrolled diabetes can lead to many severe health consequences such as stroke, heart attack, amputation of the legs, and kidney failure. While you were in the hospital we adjusted your insulin regimen based on your requirements. Please have them continue to take your blood glucose and adjust your insulin needs. Continue with lisinopril 5mg daily.  Secondary Diagnosis:	CHRIST (acute kidney injury)  Goal:	You will need to make an appointment with the kidney doctor, Dr. Blanchard.  Assessment and plan of treatment:	You have acute kidney injury that is seen with an elevated kidney enzyme in the blood.  Acute kidney injury happens when your kidneys stop working properly. This causes the kidneys to be unable to remove waste, chemicals, or extra fluid from your body. You will need to make an appointment with the kidney doctor, Dr. Blanchard. His office number to make an appointment is [phone]. At your appointment you will tell Dr. Blanchard that you had a renal ultrasound with doppler while in the hospital and may need a bladder ultrasound with PVR. You will also need labs of your vitamin D 25 level, vitamin D 1,25 level, and baseline phosphate level. You must avoid NSAIDs because of your acute kidney disease.  Secondary Diagnosis:	Hyperlipidemia  Goal:	continue with home medication atorvastatin 40mg daily  Assessment and plan of treatment:	You have a history of high cholesterol. Continue with your statin medication atorvastatin to reduce your risk of heart attacks and strokes. Continue to follow with your primary care doctor at [facility]. You should have cardiac work-up outpatient with stress test and echocardiogram.  Secondary Diagnosis:	Transition of care performed with sharing of clinical summary  Goal:	Documentation sent to [facility] about hospital course and appointment scheduled for patient  Secondary Diagnosis:	Dressing change  Goal:	Proper wound care for R foot  Assessment and plan of treatment:	Podiatry recommends that on discharge there should be an adaptic and a dry sterile dressing consisting of 4x4 gauze, abd pad, kerlix, and an ace bandage to the RLE plantar wound. The dressing should be changed once a day. No weight bearing of right lower extremity for 2 weeks. Principal Discharge DX:	Osteomyelitis  Goal:	Please follow up with your infectious disease doctor, Dr. Foy on Tuesday, 7/24 at 11:20am. The address is [address] [place] [address]..  Assessment and plan of treatment:	You have osteomyelitis of your right foot. Your toes were amputated because of your osteomyelitis. Osteomyelitis is a severe infection of the bone that is caused by bacteria. Your osteomyelitis is due to your uncontrolled diabetes. It is crucial that you follow up at endocrine clinic and your primary care provider in order to manage your diabetes. Uncontrolled diabetes can lead to many severe health consequences such as stroke, heart attack, amputation of the legs, and kidney failure. Please call your primary care doctor at [facility] if you have increased swelling, redness, or pain of your right foot or you have new drainage or odor from your wound. For the osteomyelitis your will be continued on IV vancomycin and IV zosyn for the next six weeks. Weekly CBC w/ diff, weekly CMP, weekly CRP and weekly ESR and appropriate vancomycin trough with goal 15-20 while on antibiotic. Patient will need to finish 6 week course of IV vancomycin and IV zosyn that started 7/9/18. (Last day of 6 week course will be 8/20/18). Currently on IV vancomycin 1.250g IV q12h and zosyn 4.5g IV q6h. Please adjust vancomycin with appropriate trough levels with goal vancomycin level 15-20.  Secondary Diagnosis:	Diabetes  Goal:	Please see your endocrinologists Dr. Chisholm on Monday 7/23 at 11:20am. The address is [address] [place] [address], [address] [place], [place]. Please go to your appointment at [facility]. with your primary care provider on 7/23 at 1:30pm. The address is  Assessment and plan of treatment:	You have uncontrolled diabetes that requires very close followup with your endocrinologist Dr. Chisholm and your primary care provider at [facility]. Uncontrolled diabetes can lead to many severe health consequences such as stroke, heart attack, amputation of the legs, and kidney failure. While you were in the hospital we adjusted your insulin regimen based on your requirements. Please have them continue to take your blood glucose and adjust your insulin needs. Do not continue with lisinopril 5mg by mouth daily because it makes you cough. Please follow up with your PCP about an alternative to lisinopril.  Secondary Diagnosis:	CHRIST (acute kidney injury)  Goal:	You will need to make an appointment with the kidney doctor, Dr. Blanchard.  Assessment and plan of treatment:	You have acute kidney injury that is seen with an elevated kidney enzyme in the blood.  Acute kidney injury happens when your kidneys stop working properly. This causes the kidneys to be unable to remove waste, chemicals, or extra fluid from your body. You will need to make an appointment with the kidney doctor, Dr. Blanchard. His office number to make an appointment is [phone]. At your appointment you will tell Dr. Blanchard that you had a renal ultrasound with doppler while in the hospital and may need a bladder ultrasound with PVR. You will also need labs of your vitamin D 25 level, vitamin D 1,25 level, and baseline phosphate level. You must avoid NSAIDs because of your acute kidney disease.  Secondary Diagnosis:	Hyperlipidemia  Goal:	continue with home medication atorvastatin 40mg daily  Assessment and plan of treatment:	You have a history of high cholesterol. Continue with your statin medication atorvastatin to reduce your risk of heart attacks and strokes. Continue to follow with your primary care doctor at [facility]. You should have cardiac work-up outpatient with stress test and echocardiogram.  Secondary Diagnosis:	Transition of care performed with sharing of clinical summary  Goal:	Documentation sent to [facility] about hospital course and appointment scheduled for patient  Secondary Diagnosis:	Dressing change  Goal:	Proper wound care for R foot  Assessment and plan of treatment:	Podiatry recommends that on discharge there should be an adaptic and a dry sterile dressing consisting of 4x4 gauze, abd pad, kerlix, and an ace bandage to the RLE plantar wound. The dressing should be changed once a day. No weight bearing of right lower extremity for 2 weeks. Principal Discharge DX:	Osteomyelitis  Goal:	Please follow up with your infectious disease doctor, Dr. Foy on Tuesday, 7/24 at 11:20am. The address is [address] [place] [address], [address] [place] [address]..  Assessment and plan of treatment:	You have osteomyelitis of your right foot. Your toes were amputated because of your osteomyelitis. Osteomyelitis is a severe infection of the bone that is caused by bacteria. Your osteomyelitis is due to your uncontrolled diabetes. It is crucial that you follow up at endocrine clinic and your primary care provider in order to manage your diabetes. Uncontrolled diabetes can lead to many severe health consequences such as stroke, heart attack, amputation of the legs, and kidney failure. Please call your primary care doctor at [facility] if you have increased swelling, redness, or pain of your right foot or you have new drainage or odor from your wound. For the osteomyelitis your will be continued on IV vancomycin and IV zosyn for the next six weeks. Weekly CBC w/ diff, weekly CMP, weekly CRP and weekly ESR and appropriate vancomycin trough with goal 15-20 while on antibiotic. Patient will need to finish 6 week course of IV vancomycin and IV zosyn that started 7/9/18. (Last day of 6 week course will be 8/20/18). Currently on IV vancomycin 1.250g IV q12h and zosyn 4.5g IV q6h. Please adjust vancomycin with appropriate trough levels with goal vancomycin level 15-20.  Secondary Diagnosis:	Diabetes  Goal:	Please see your endocrinologists Dr. Chisholm on Monday 7/23 at 11:20am. The address is [address] [place] [address], [address] [place], [place]. Please go to your appointment at [facility]. with your primary care provider on 7/23 at 1:30pm.  Assessment and plan of treatment:	You have uncontrolled diabetes that requires very close followup with your endocrinologist Dr. Chisholm and your primary care provider at [facility]. Uncontrolled diabetes can lead to many severe health consequences such as stroke, heart attack, amputation of the legs, and kidney failure. While you were in the hospital we adjusted your insulin regimen based on your requirements. Please have them continue to take your blood glucose and adjust your insulin needs. Do not continue with lisinopril 5mg by mouth daily because it makes you cough. Please follow up with your PCP about an alternative to lisinopril.  Secondary Diagnosis:	CHRIST (acute kidney injury)  Goal:	You will need to make an appointment with the kidney doctor, Dr. Blanchard.  Assessment and plan of treatment:	You have acute kidney injury that is seen with an elevated kidney enzyme in the blood.  Acute kidney injury happens when your kidneys stop working properly. This causes the kidneys to be unable to remove waste, chemicals, or extra fluid from your body. You will need to make an appointment with the kidney doctor, Dr. Blanchard. His office number to make an appointment is [phone]. At your appointment you will tell Dr. Blanchard that you had a renal ultrasound with doppler while in the hospital and may need a bladder ultrasound with PVR. You will also need labs of your vitamin D 25 level, vitamin D 1,25 level, and baseline phosphate level. You must avoid NSAIDs because of your acute kidney disease.  Secondary Diagnosis:	Hyperlipidemia  Goal:	continue with home medication atorvastatin 40mg daily  Assessment and plan of treatment:	You have a history of high cholesterol. Continue with your statin medication atorvastatin to reduce your risk of heart attacks and strokes. Continue to follow with your primary care doctor at [facility]. You should have cardiac work-up outpatient with stress test and echocardiogram.  Secondary Diagnosis:	Transition of care performed with sharing of clinical summary  Goal:	Documentation sent to [facility] about hospital course and appointment scheduled for patient  Secondary Diagnosis:	Dressing change  Goal:	Proper wound care for R foot  Assessment and plan of treatment:	Podiatry recommends that on discharge there should be an adaptic and a dry sterile dressing consisting of 4x4 gauze, abd pad, kerlix, and an ace bandage to the RLE plantar wound. The dressing should be changed once a day. No weight bearing of right lower extremity for 2 weeks.

## 2018-07-11 NOTE — PROGRESS NOTE ADULT - PROBLEM SELECTOR PLAN 4
On admission, Na: 130. Corrected 132. Likely hypovolemic hyponatremia secondary to sepsis. S/p 3.5 L NS.  --Na+ 138 (7/10) <-- 134 ( 7/9).Trend Electrolytes and replete PRN  --patient on IV LR @ 125cc/hr given recent uptrending Cr @ 1.34. d/c fluids if patient  edematous, crackles on exam, SOB. Resolved. On admission, Na: 130. Likely hypovolemic hyponatremia secondary to sepsis. S/p 3.5 L NS.  --Na+ 136 (7/11)  --patient on IV LR @ 125cc/hr given recent uptrending Cr @ 1.34. d/c fluids if patient  edematous, crackles on exam, SOB. Resolved. On admission, Na: 130. Likely hypovolemic hyponatremia secondary to sepsis. S/p 3.5 L NS.  --Na+ 136 (7/11)

## 2018-07-11 NOTE — PROGRESS NOTE ADULT - ASSESSMENT
43 yo male with uncontrolled DM, s/p R 4th and 5th TMA, s/p 6-week course of cefepime (ended 6/7) for R foot osteomyelitis 2/2 GBS, staph spp, p/w purulent drainage from R plantar foot ulcer; +probe to bone c/w osteomyelitis. Cx 7/6 (obtained via sterile bedside debridement) growing GBS, numerous Prevotella, and rare Pseudomonas. s/p R 2nd toe amputation and debridement of R plantar foot ulcer per podiatry 7/9.  - case discussed with podiatry: proximal debridement from R 2nd toe to clean margins/healthy viable tissue performed; the ulcer did not appear particularly inflamed   - f/u intra-operative cultures--rare GPC growing  - continue Zosyn 4.5g IV q6h; pending final culture results, may consider transition to Augmentin + Cipro upon discharge; patient would like to avoid another PICC line   - needs optimization of glycemic control    Dr. Mosley to assume care Thursday 7/12

## 2018-07-11 NOTE — PROGRESS NOTE ADULT - PROBLEM SELECTOR PLAN 3
Patient has a history of type II diabetes requiring insulin (April 2018 A1c 12.9%). He is s/p Toe amputation x 2.  --c/w MISS  --C/w Lantus dose 24U   --Give regular insulin if patient becomes hyperglycemic  --FS have been in mid to high 200s, consider increasing lantus.

## 2018-07-11 NOTE — DISCHARGE NOTE ADULT - PROVIDER TOKENS
FREE:[LAST:[[facility]],PHONE:[phone]],FAX:[(   )    -],ADDRESS:[[address] [place] [address]]] TOKEN:'[id number]',FREE:[LAST:[[facility]],PHONE:[phone]],FAX:[(   )    -],ADDRESS:[[address] [place] [address]]],FREE:[LAST:[[facility]],PHONE:[phone]],FAX:[(   )    -],ADDRESS:[[address], [place] [address]]] TOKEN:'[id number]',TOKEN:'[id number]',FREE:[LAST:[[facility]],PHONE:[(   )    -],FAX:[(   )    -],ADDRESS:[[address] [place] [address]  [phone]]] TOKEN:'[id number]',TOKEN:'[id number]',FREE:[LAST:[[facility]],PHONE:[(   )    -],FAX:[(   )    -],ADDRESS:[[address] [place] [address]  [phone]]],TOKEN:'[id number]'

## 2018-07-11 NOTE — PROGRESS NOTE ADULT - PROBLEM SELECTOR PLAN 1
Patient septic on admission with foot xray showing amputation of fourth and fifth right toe, third digit abnormality (possible fracture?), hyperlucency of 2nd middle phalange. Likely osteomyelitis, unlikely nec fasc as CT R foot without significant collection of gas. S/p Vanc/Zosyn and Clindamycin x1 in ED. ESR 86. Swab Cxs growing GBS, numerous Prevotella, and rare Pseudomonas. BCxs negative to date. CT R foot (7/7) demonstrated ulceration of the plantar aspect of the foot, no significant gas collection, fourth and fifth transmetatarsal amputation, Irregular articular surfaces of the third PIP joint, which may be a result of previous osteo vs. active infection.  --c/w zosyn per ID recs and sensitivities. ID recs appreciated. Consider transition to PO Augmentin and Cipro. Will f/u w ID about antibiotics on discharge.  --Podiatry recs appreciated: Plantar there should be a piece of adaptec dressing w 4 by 4 overlying, dry sterile dressing, ABD, kerlex, ace bandage. No weight bearing of Left lower extremity  - f/u tissue cxs from OR  -WBCs trending downward 10.7 (7/10) <-- 12.9 (7/9) <-- 12.7<-- 15.2 <--12.7, continue to trend CBC  -c/w LR @ 125cc/hr given uptrending Cr @1.34. Hold if patient SOB, edematous, crackles on exam  -consider MRI R foot. Patient septic on admission with foot xray showing amputation of fourth and fifth right toe, third digit abnormality (possible fracture?), hyperlucency of 2nd middle phalange. Likely osteomyelitis, unlikely nec fasc as CT R foot without significant collection of gas. S/p Vanc/Zosyn and Clindamycin x1 in ED. ESR 86. Swab Cxs growing GBS, numerous Prevotella, and rare Pseudomonas. BCxs negative to date. CT R foot (7/7) demonstrated ulceration of the plantar aspect of the foot, no significant gas collection, fourth and fifth transmetatarsal amputation, Irregular articular surfaces of the third PIP joint, which may be a result of previous osteo vs. active infection.  --c/w zosyn per ID recs and sensitivities. ID recs appreciated. Consider transition to PO Augmentin and Cipro pending final culture results  --Podiatry recs appreciated: Plantar there should be a piece of adaptec dressing w 4 by 4 overlying, dry sterile dressing, ABD, kerlex, ace bandage. No weight bearing of Left lower extremity  - f/u tissue cxs from OR  -WBCs trending downward. 9.7 today from 10.7 yesterday  -continue to trend CBC  -c/w LR @ 125cc/hr given uptrending Cr @1.34. Hold if patient SOB, edematous, crackles on exam  -consider MRI R foot. Patient septic on admission with foot xray showing amputation of fourth and fifth right toe, third digit abnormality (possible fracture?), hyperlucency of 2nd middle phalange. Likely osteomyelitis, unlikely nec fasc as CT R foot without significant collection of gas. S/p Vanc/Zosyn and Clindamycin x1 in ED. ESR 86. Swab Cxs growing GBS, numerous Prevotella, and rare Pseudomonas. BCxs negative to date. CT R foot (7/7) demonstrated ulceration of the plantar aspect of the foot, no significant gas collection, fourth and fifth transmetatarsal amputation, Irregular articular surfaces of the third PIP joint, which may be a result of previous osteo vs. active infection.  --c/w zosyn per ID recs and sensitivities. ID recs appreciated. Consider transition to PO Augmentin and Cipro pending final culture results  --Podiatry recs appreciated: For plantar wound should apply adaptic and a dry sterile dressing consisting of 4x4 gauze, abd pad, kerlix, and an ace bandage to the RLE. No weight bearing of right lower extremity for 2 weeks.   -Per podiatry the culture labeled "clean margin" is growing rare co-ag staph that is likely contaminate or mislabel given that intra-op tissue shows no growth and prior culture christopher GBC and gram neg rods.   -WBCs trending downward. 9.7 today from 10.7 yesterday  -continue to trend CBC  -c/w LR @ 125cc/hr given uptrending Cr @1.34. Hold if patient SOB, edematous, crackles on exam  -consider MRI R foot. Patient septic on admission with foot xray showing amputation of fourth and fifth right toe, third digit abnormality (possible fracture?), hyperlucency of 2nd middle phalange. Likely osteomyelitis, unlikely nec fasc as CT R foot without significant collection of gas. S/p Vanc/Zosyn and Clindamycin x1 in ED. ESR 86. Swab Cxs growing GBS, numerous Prevotella, and rare Pseudomonas. BCxs negative to date. CT R foot (7/7) demonstrated ulceration of the plantar aspect of the foot, no significant gas collection, fourth and fifth transmetatarsal amputation, Irregular articular surfaces of the third PIP joint, which may be a result of previous osteo vs. active infection.  --c/w zosyn per ID recs and sensitivities. ID recs appreciated. Consider transition to PO Augmentin and Cipro pending final culture results  --Podiatry recs appreciated: For plantar wound should apply adaptic and a dry sterile dressing consisting of 4x4 gauze, abd pad, kerlix, and an ace bandage to the RLE. The dressing should be changed once a day. No weight bearing of right lower extremity for 2 weeks.   -Per podiatry the culture labeled "clean margin" is growing rare co-ag staph that is likely contaminate or mislabel given that intra-op tissue shows no growth and prior culture christopher GBC and gram neg rods.   -WBCs trending downward. 9.7 today from 10.7 yesterday  -continue to trend CBC  -c/w LR @ 125cc/hr given uptrending Cr @1.34. Hold if patient SOB, edematous, crackles on exam  -consider MRI R foot. Patient septic on admission with foot xray showing amputation of fourth and fifth right toe, third digit abnormality (possible fracture?), hyperlucency of 2nd middle phalange. Likely osteomyelitis, unlikely nec fasc as CT R foot without significant collection of gas. S/p Vanc/Zosyn and Clindamycin x1 in ED. ESR 86. Swab Cxs growing GBS, numerous Prevotella, and rare Pseudomonas. BCxs negative to date.   --c/w zosyn. Per ID consider transition to PO Augmentin and Cipro pending final culture results  --final culture results still in progress  --Podiatry recs appreciated: For plantar wound should apply adaptic and a dry sterile dressing consisting of 4x4 gauze, abd pad, kerlix, and an ace bandage to the RLE. The dressing should be changed once a day. No weight bearing of right lower extremity for 2 weeks.   -Per podiatry the culture labeled "clean margin" is growing rare co-ag staph that is likely contaminate or mislabel given that intra-op tissue shows no growth and prior culture gross GBC and gram neg rods.   -WBCs trending downward. 9.7 today from 10.7 yesterday  -continue to trend CBC

## 2018-07-11 NOTE — DISCHARGE NOTE ADULT - MEDICATION SUMMARY - MEDICATIONS TO STOP TAKING
I will STOP taking the medications listed below when I get home from the hospital:  None I will STOP taking the medications listed below when I get home from the hospital:    Basaglar KwikPen 100 units/mL subcutaneous solution  -- 24 unit(s) subcutaneous once a day (at bedtime)   -- Do not drink alcoholic beverages when taking this medication.  It is very important that you take or use this exactly as directed.  Do not skip doses or discontinue unless directed by your doctor.  Keep in refrigerator.  Do not freeze.    Basaglar KwikPen 100 units/mL subcutaneous solution  -- 30 unit(s) subcutaneous once a day (at bedtime)  -- Do not drink alcoholic beverages when taking this medication.  It is very important that you take or use this exactly as directed.  Do not skip doses or discontinue unless directed by your doctor.  Keep in refrigerator.  Do not freeze. I will STOP taking the medications listed below when I get home from the hospital:    lisinopril 5 mg oral tablet  -- 1 tab(s) by mouth once a day    Basaglar KwikPen 100 units/mL subcutaneous solution  -- 24 unit(s) subcutaneous once a day (at bedtime)   -- Do not drink alcoholic beverages when taking this medication.  It is very important that you take or use this exactly as directed.  Do not skip doses or discontinue unless directed by your doctor.  Keep in refrigerator.  Do not freeze.    Basaglar KwikPen 100 units/mL subcutaneous solution  -- 30 unit(s) subcutaneous once a day (at bedtime)  -- Do not drink alcoholic beverages when taking this medication.  It is very important that you take or use this exactly as directed.  Do not skip doses or discontinue unless directed by your doctor.  Keep in refrigerator.  Do not freeze.

## 2018-07-11 NOTE — DISCHARGE NOTE ADULT - CARE PROVIDER_API CALL
[facility],   [address] [place] [address]  Phone: [phone]  Fax:     - Lety Foy), Infectious Disease; Internal Medicine  [address]  [address]th [place] [address]  Phone: [phone]  Fax: [phone]    [facility],   [address] [address]  Phone: [phone]  Fax: (   )    -    [facility],   [address], [address]  [place] [address]  Phone: [phone]  Fax: (   )    - Lety Foy), Infectious Disease; Internal Medicine  [address]  [address]th [place] [address]  Phone: [phone]  Fax: [phone]    Carlie Chisholm), EndocrinologyMetabDiabetes  [address]  [address]  [place] [address]  Phone: [phone]  Fax: [phone]    [facility],   [id number]  [phone]  Phone: (   )    -  Fax: (   )    - Lety Foy), Infectious Disease; Internal Medicine  [address]  [address]th [place] [address]  Phone: [phone]  Fax: [phone]    Carlie Chisholm), EndocrinologyMetabDiabetes  [address]  [address]  [place] [address]  Phone: [phone]  Fax: [phone]    [facility],   [address] [address]  [phone]  Phone: (   )    -  Fax: (   )    -    Anton Blanchard), Internal Medicine; Nephrology  [address]  [address]  [place] [address]  Phone: [phone]  Fax: [phone]

## 2018-07-11 NOTE — DISCHARGE NOTE ADULT - CARE PROVIDERS DIRECT ADDRESSES
,DirectAddress_Unknown ,[email or web address],DirectAddress_Unknown,DirectAddress_Unknown ,[email or web address],DirectAddress_Unknown,DirectAddress_Unknown,[email or web address]

## 2018-07-11 NOTE — PROGRESS NOTE ADULT - ASSESSMENT
42 year old male with poorly controlled diabetes (A!C 12.9 on insulin), s/p right 4th and 5th toe amputation (2015 and 2017), recent admission for osteomyelitis on base of right foot, treated with IV Abx, presented with 1 day of fever, pain and discharge of right 2nd toe likely 2/2 osteomyelitis associated with fever, fatigue, and confusion. Now is s/p amputation of R 2nd toe 7/9/18.

## 2018-07-11 NOTE — DISCHARGE NOTE ADULT - HOSPITAL COURSE
Patient presented with pain and discharge from right second toe with associated fever, fatigue and confusion. Patient was admitted for osteomylitis of 2nd digit of right toe. In the ED the patient was given 3.5 liters of normal saline and one dose each of vanc, zosyn, and clindamycin. Patient had amputation of the Patient presented with pain and discharge from right second toe with associated fever, fatigue and confusion. Patient was admitted for osteomylitis of 2nd digit of right toe. In the ED the patient was given 3.5 liters of normal saline and one dose each of vanc, zosyn, and clindamycin. Patient had amputation of the second toe of right food on 7/9. Podiatry recommends that for the plantar wound should apply adaptic and a dry sterile dressing consisting of 4x4 gauze, abd pad, kerlix, and an ace bandage to the RLE. The dressing should be changed every    No weight bearing of right lower extremity for 2 weeks. Patient presented with pain and discharge from right second toe with associated fever, fatigue and confusion. Patient was admitted for osteomylitis of 2nd digit of right toe. In the ED the patient was given 3.5 liters of normal saline and one dose each of vanc, zosyn, and clindamycin. Patient had amputation of the second toe of right food on 7/9. Podiatry recommends that for the plantar wound should apply adaptic and a dry sterile dressing consisting of 4x4 gauze, abd pad, kerlix, and an ace bandage to the RLE. The dressing should be changed once a day. No weight bearing of right lower extremity for 2 weeks. 42 Y male with PMH diabetes II (A1C 12.9 on insulin), s/p right 2nd, 4th and 5th toe amputation (7/9/18- this admission, 2015, and 2017 respectively), recent admission for osteomyelitis on base of right foot, treated with IV antibiotics via PICC (finished course of cefepime under supervision of Dr Foy), presented with pain and discharge from right 2nd toe with associated fever, fatigue, and confusion. In the ED the patient had a Tmax of 103 with a HR of 119 and /99, RR: 20 SpO2 98% on RA. Labs were significant for wbc 15.2 amd lactate of 1.2 Pt was given 3.5 liters of normal saline and one dose each of vanc, zosyn, and clindamycin. Patient was admitted to medicine for osteomylitis of 2nd digit of right toe. CT of R foot (7/6) demonstrated ulceration of the plantar aspect of the right foot, no significant gas collection.  Patient had amputation of the second toe of right food on 7/9.   The patient was on zosyn until final culture results from the amputation came back that were positive for enterococcus and coag negative staph. Per ID recs the patient was started on IV vancomycin and continued with IV zosyn. A right Picc line was inserted b      Podiatry recommends that for the plantar wound should apply adaptic and a dry sterile dressing consisting of 4x4 gauze, abd pad, kerlix, and an ace bandage to the RLE. The dressing should be changed once a day. No weight bearing of right lower extremity for 2 weeks. 42 Y male with PMH diabetes II (A1C 12.9 on insulin), s/p right 4th and 5th toe amputation (2015, and 2017 respectively), recent admission for osteomyelitis on base of right foot, treated with IV antibiotics via PICC (finished course of cefepime under supervision of Dr Foy), presented with pain and discharge from right 2nd toe with associated fever, fatigue, and confusion. In the ED the patient had a Tmax of 103 with a HR of 119 and /99, RR: 20 SpO2 98% on RA. Labs were significant for wbc 15.2 amd lactate of 1.2 Pt was given 3.5 liters of normal saline and one dose each of vanc, zosyn, and clindamycin. Patient was admitted to medicine for osteomylitis of 2nd digit of right toe. CT of R foot (7/6) demonstrated ulceration of the plantar aspect of the right foot, no significant gas collection.  Patient had amputation of the second toe of right foot on 7/9. There were no complications.    The patient was on zosyn until final culture results from the amputation came back that were positive for enterococcus and coag negative staph. Per ID recs the patient was started on IV vancomycin and continued with IV zosyn. A right PICC line for long term antibiotic therapy was inserted by interventional radiology PA.       Podiatry recommends that for the plantar wound should apply adaptic and a dry sterile dressing consisting of 4x4 gauze, abd pad, kerlix, and an ace bandage to the RLE. The dressing should be changed once a day. No weight bearing of right lower extremity for 2 weeks. 42 Y male with PMH diabetes II (A1C 12.9 on insulin), s/p right 4th and 5th toe amputation (2015, and 2017 respectively), recent admission for osteomyelitis on base of right foot, treated with IV antibiotics via PICC (finished course of cefepime under supervision of Dr Foy), presented with pain and discharge from right 2nd toe with associated fever, fatigue, and confusion. In the ED the patient had a Tmax of 103 with a HR of 119 and /99, RR: 20 SpO2 98% on RA. Labs were significant for wbc 15.2 and lactate of 1.2 Pt was given 3.5 liters of normal saline and one dose each of zosyn 3.375 grams IV, vancomycin 1750mg IV, and clindamycin 600mg IV.     Patient was admitted to medicine for osteomyelitis of 2nd digit of right toe. CT of R foot (7/6) demonstrated ulceration of the plantar aspect of the right foot, no significant gas collection.  Patient had amputation of the second toe of right foot on 7/9. There were no complications. Patient was managed with IV zosyn until final culture results of the right second toe showed enterococcus and coag negative staph. Per ID recommendations the patient was continued on zosyn 4.5g IV q6h and started on vancomycin 1gram IV q12h. A right PICC line was inserted by the interventional radiology PA on 7/13/18 for long term antibiotic therapy because ID recommended that the patient be continued on IV antibiotics for another 4-6 weeks because IV antibiotics are preferred in osteomyelitis and given the polymicrobial nature of the infection, it would be difficult to cover all bacteria with PO antibiotic. The vanc trough was subtherapeutic so vancomycin was increased from 1 g IV q12h to 1.25 gram IV q12h. Every day podiatry dressed the plantar wound in a sterile dressing.  Podiatry recommends that on discharge there should be an adaptic and a dry sterile dressing consisting of 4x4 gauze, abd pad, kerlix, and an ace bandage to the RLE plantar wound. The dressing should be changed once a day. No weight bearing of right lower extremity for 2 weeks. The patient’s continued to have hyperglycemia in the hospital so his diabetes medications were changed as needed until the glucose level was stable with premeal lispro 7 units sc before meals, insulin sliding scale, and lantus 38 units sc at bedtime.  Patient had an episode of feeling lightheaded and was orthostatic positive so was given IVF as needed. 42 Y male with PMH diabetes II (A1C 12.9 on insulin), s/p right 4th and 5th toe amputation (2015, and 2017 respectively), recent admission for osteomyelitis on base of right foot, treated with IV antibiotics via PICC (finished course of cefepime under supervision of Dr Foy), presented with pain and discharge from right 2nd toe with associated fever, fatigue, and confusion. In the ED the patient had a Tmax of 103 with a HR of 119 and /99, RR: 20 SpO2 98% on RA. Labs were significant for wbc 15.2 and lactate of 1.2 Pt was given 3.5 liters of normal saline and one dose each of zosyn 3.375 grams IV, vancomycin 1750mg IV, and clindamycin 600mg IV.     Patient was admitted to medicine for IV antibiotics and monitoring of osteomyelitis of 2nd digit of right toe. CT of R foot (7/6) demonstrated ulceration of the plantar aspect of the right foot, no significant gas collection.  Patient had amputation of the second toe of right foot on 7/9. There were no complications. Patient was managed with IV zosyn until final culture results of the right second toe showed enterococcus and coag negative staph. Per ID recommendations the patient was continued on zosyn 4.5g IV q6h and started on vancomycin 1gram IV q12h. A right PICC line was inserted by the interventional radiology PA on 7/13/18 for long term antibiotic therapy because ID recommended that the patient be continued on IV antibiotics for another 4-6 weeks because IV antibiotics are preferred in osteomyelitis and given the polymicrobial nature of the infection, it would be difficult to cover all bacteria with PO antibiotic. The vanc trough was subtherapeutic so vancomycin was increased from 1 g IV q12h to 1.25 gram IV q12h. Every day podiatry dressed the plantar wound in a sterile dressing.  Podiatry recommends that on discharge there should be an adaptic and a dry sterile dressing consisting of 4x4 gauze, abd pad, kerlix, and an ace bandage to the RLE plantar wound. The dressing should be changed once a day. No weight bearing of right lower extremity for 2 weeks. The patient’s continued to have hyperglycemia in the hospital so his diabetes medications were changed as needed until the glucose level was stable with premeal lispro 7 units sc before meals, insulin sliding scale, and lantus 38 units sc at bedtime. Patient's hyponatremia on the first day of admission was hypovolemic hyponatremia likely 2/2 dehydration and osteomyelitis, it resolved with IVF.   Patient had an episode of feeling lightheaded and was orthostatic positive so was given IVF as needed. Patient's creatinine was elevated on 7/15 at 1.41 and BUN of 21 with a FeNa of 1.4% indicative of intrinsic CHRIST. Creatinine decreased to normal the next day, but a full workup was still done. Renal was consulted and a renal ultrasound ordered. The patient's episode of intrinsic CHRIST likely 2/2 diabetes vs vancomycin use. 42 Y male with PMH diabetes II (A1C 12.9 on insulin), s/p right 4th and 5th toe amputation (2015, and 2017 respectively), recent admission for osteomyelitis on base of right foot, treated with IV antibiotics via PICC (finished course of cefepime under supervision of Dr Foy), presented with pain and discharge from right 2nd toe with associated fever, fatigue, and confusion. In the ED the patient had a Tmax of 103 with a HR of 119 and /99, RR: 20 SpO2 98% on RA. Labs were significant for wbc 15.2 and lactate of 1.2 Pt was given 3.5 liters of normal saline and one dose each of zosyn 3.375 grams IV, vancomycin 1750mg IV, and clindamycin 600mg IV.     Patient was admitted to medicine for IV antibiotics and monitoring of osteomyelitis of 2nd digit of right toe. CT of R foot (7/6) demonstrated ulceration of the plantar aspect of the right foot, no significant gas collection.  Patient had amputation of the second toe of right foot on 7/9. There were no complications. Patient was managed with IV zosyn until final culture results of the culture with clears margins of right second toe showed enterococcus and coag negative staph. Per ID recommendations the patient was continued on zosyn 4.5g IV q6h and started on vancomycin 1gram IV q12h. A right PICC line was inserted by the interventional radiology PA on 7/13/18 for long term antibiotic therapy because ID recommended that the patient be continued on IV antibiotics for another 4-6 weeks because IV antibiotics are preferred in osteomyelitis and given the polymicrobial nature of the infection, it would be difficult to cover all bacteria with PO antibiotic. The vanc trough was subtherapeutic so vancomycin was increased from 1 g IV q12h to 1.25 gram IV q12h. Every day podiatry dressed the plantar wound in a sterile dressing.  Podiatry recommends that on discharge there should be an adaptic and a dry sterile dressing consisting of 4x4 gauze, abd pad, kerlix, and an ace bandage to the RLE plantar wound. The dressing should be changed once a day. No weight bearing of right lower extremity for 2 weeks. The patient’s continued to have hyperglycemia in the hospital so his diabetes medications were changed as needed until the glucose level was stable with premeal lispro 7 units sc before meals, insulin sliding scale, and lantus 38 units sc at bedtime. Patient's hyponatremia on the first day of admission was hypovolemic hyponatremia likely 2/2 dehydration and osteomyelitis, it resolved with IVF.   Patient had an episode of feeling lightheaded and was orthostatic positive so was given IVF as needed. Patient's creatinine was elevated on 7/15 at 1.41 and BUN of 21 with a FeNa of 1.4% indicative of intrinsic CHRIST. Creatinine decreased to normal the next day, but a full workup was still done. Renal was consulted and a renal ultrasound ordered. The patient's episode of intrinsic CHRIST likely 2/2 diabetes vs vancomycin use. 42 Y male with PMH diabetes II (A1C 12.9 on insulin), s/p right 4th and 5th toe amputation (2015, and 2017 respectively), recent admission for osteomyelitis on base of right foot, treated with IV antibiotics via PICC (finished course of cefepime under supervision of Dr Foy), presented with pain and discharge from right 2nd toe with associated fever, fatigue, and confusion. In the ED the patient had a Tmax of 103 with a HR of 119 and /99, RR: 20 SpO2 98% on RA. Labs were significant for wbc 15.2 and lactate of 1.2 Pt was given 3.5 liters of normal saline and one dose each of zosyn 3.375 grams IV, vancomycin 1750mg IV, and clindamycin 600mg IV. Xray of R foot showed chronic appearing erosive changes at the   third PIP joint. Soft tissue swelling is noted. There is gas in the soft  tissues distal tip of the second toe compatible with infection. No discrete osseous erosion is identified.     Patient was admitted to medicine for IV antibiotics and monitoring of osteomyelitis of 2nd digit of right toe with sepsis. CT of R foot (7/6) demonstrated ulceration of the plantar aspect of the right foot, no significant gas collection. The sepsis resolved with downtrending wbc and stable vitals. Patient had amputation of the second toe of right foot on 7/9. There were no complications. Patient was managed with IV zosyn until final culture results of the culture with clears margins of right second toe showed enterococcus and coag negative staph. Per ID recommendations the patient was continued on zosyn 4.5g IV q6h and started on vancomycin 1gram IV q12h. A right PICC line was inserted by the interventional radiology PA on 7/13/18 for long term antibiotic therapy because ID recommended that the patient be continued on IV antibiotics for another 4-6 weeks because IV antibiotics are preferred in osteomyelitis and given the polymicrobial nature of the infection, it would be difficult to cover all bacteria with PO antibiotic. The vanc trough was subtherapeutic so vancomycin was increased from 1 g IV q12h to 1.25 gram IV q12h. Every day podiatry dressed the plantar wound in a sterile dressing.  Podiatry recommends that on discharge there should be an adaptic and a dry sterile dressing consisting of 4x4 gauze, abd pad, kerlix, and an ace bandage to the RLE plantar wound. The dressing should be changed once a day. No weight bearing of right lower extremity for 2 weeks. The patient’s continued to have hyperglycemia in the hospital so his diabetes medications were changed as needed until the glucose level was stable with premeal lispro 7 units sc before meals, insulin sliding scale, and lantus 38 units sc at bedtime. Patient's hyponatremia on the first day of admission was hypovolemic hyponatremia likely 2/2 dehydration and osteomyelitis, it resolved with IVF.   Patient had an episode of feeling lightheaded and was orthostatic positive so was given IVF as needed. Patient's creatinine was elevated on 7/15 at 1.41 and BUN of 21 with a FeNa of 1.4% indicative of intrinsic CHRIST possible 2/2 vancomycin. Creatinine decreased to normal the next day, but a full workup was still done as per renal consult. Patient will need renal ultrasound as an outpatient. Upon discharge the patient will need weekly CBC w diff, and weekly CRP and ESR to monitor osteomyelitis and antibiotic use. 42 Y male with PMH diabetes II (A1C 12.9 on insulin), s/p right 4th and 5th toe amputation (2015, and 2017 respectively), recent admission for osteomyelitis on base of right foot, treated with IV antibiotics via PICC (finished course of cefepime under supervision of Dr Foy), presented with pain and discharge from right 2nd toe with associated fever, fatigue, and confusion. In the ED the patient had a Tmax of 103 with a HR of 119 and /99, RR: 20 SpO2 98% on RA. Labs were significant for wbc 15.2 and lactate of 1.2 Pt was given 3.5 liters of normal saline and one dose each of zosyn 3.375 grams IV, vancomycin 1750mg IV, and clindamycin 600mg IV. Xray of R foot showed chronic appearing erosive changes at the   third PIP joint. Soft tissue swelling is noted. There is gas in the soft  tissues distal tip of the second toe compatible with infection. No discrete osseous erosion is identified.     Patient was admitted to medicine for IV antibiotics and monitoring of osteomyelitis of 2nd digit of right toe with sepsis. CT of R foot (7/6) demonstrated ulceration of the plantar aspect of the right foot, no significant gas collection. The sepsis resolved with downtrending wbc and stable vitals. Patient had amputation of the second toe of right foot on 7/9. There were no complications. Patient was managed with IV zosyn until final culture results of the culture with clears margins of right second toe showed enterococcus and coag negative staph. Per ID recommendations the patient was continued on zosyn 4.5g IV q6h and started on vancomycin 1gram IV q12h. A right PICC line was inserted by the interventional radiology PA on 7/13/18 for long term antibiotic therapy because ID recommended that the patient be continued on IV antibiotics for another 4-6 weeks because IV antibiotics are preferred in osteomyelitis and given the polymicrobial nature of the infection, it would be difficult to cover all bacteria with PO antibiotic. The vanc trough was subtherapeutic so vancomycin was increased from 1 g IV q12h to 1.25 gram IV q12h. Every day podiatry dressed the plantar wound in a sterile dressing.  Podiatry recommends that on discharge there should be an adaptic and a dry sterile dressing consisting of 4x4 gauze, abd pad, kerlix, and an ace bandage to the RLE plantar wound. The dressing should be changed once a day. No weight bearing of right lower extremity for 2 weeks. The patient’s continued to have hyperglycemia in the hospital so his diabetes medications were changed as needed until the glucose level was stable with premeal lispro 7 units sc before meals, insulin sliding scale, and lantus 38 units sc at bedtime. Patient's hyponatremia on the first day of admission was hypovolemic hyponatremia likely 2/2 dehydration and osteomyelitis, it resolved with IVF.   Patient had an episode of feeling lightheaded and was orthostatic positive so was given IVF as needed. Patient's creatinine was elevated on 7/15 at 1.41 and BUN of 21 with a FeNa of 1.4% indicative of intrinsic CHRIST possible 2/2 vancomycin use vs unknown etiology. Creatinine decreased to normal the next day, but a full workup was still done as per renal consult. Patient will need renal ultrasound as an outpatient. Upon discharge the patient will need weekly CBC w diff, and weekly CRP and ESR to monitor osteomyelitis and antibiotic use. Patient will need to be on IV vancomycin and IV zosyn for the next 4-6 weeks. Currently on IV vancomycin 1.250g IV q12h and zosyn 4.5g IV q6h 42 Y male with PMH diabetes II (A1C 12.9 on insulin), s/p right 4th and 5th toe amputation (2015, and 2017 respectively), recent admission for osteomyelitis on base of right foot, treated with IV antibiotics via PICC (finished course of cefepime under supervision of Dr Foy), presented with pain and discharge from right 2nd toe with associated fever, fatigue, and confusion. In the ED the patient had a Tmax of 103 with a HR of 119 and /99, RR: 20 SpO2 98% on RA. Labs were significant for wbc 15.2 and lactate of 1.2 Pt was given 3.5 liters of normal saline and one dose each of zosyn 3.375 grams IV, vancomycin 1750mg IV, and clindamycin 600mg IV. Xray of R foot showed chronic appearing erosive changes at the third PIP joint. Soft tissue swelling is noted. There is gas in the soft  tissues distal tip of the second toe compatible with infection. No discrete osseous erosion is identified.     Patient was admitted to medicine for IV antibiotics and monitoring of osteomyelitis of 2nd digit of right toe with sepsis. CT of R foot (7/6) demonstrated ulceration of the plantar aspect of the right foot, no significant gas collection. The sepsis resolved with downtrending wbc and stable vitals. Patient had amputation of the second toe of right foot on 7/9. There were no complications. Patient was managed with IV zosyn until final culture results of the culture with clears margins of right second toe showed enterococcus and coag negative staph. Per ID recommendations the patient was continued on zosyn 4.5g IV q6h and started on vancomycin 1gram IV q12h. A right PICC line was inserted by the interventional radiology PA on 7/13/18 for long term antibiotic therapy because ID recommended that the patient be continued on IV antibiotics for another 4-6 weeks because IV antibiotics are preferred in osteomyelitis and given the polymicrobial nature of the infection, it would be difficult to cover all bacteria with PO antibiotic. The vanc trough was subtherapeutic so vancomycin was increased from 1 g IV q12h to 1.25 gram IV q12h. Every day podiatry dressed the plantar wound in a sterile dressing.  Podiatry recommends that on discharge there should be an adaptic and a dry sterile dressing consisting of 4x4 gauze, abd pad, kerlix, and an ace bandage to the RLE plantar wound. The dressing should be changed once a day. No weight bearing of right lower extremity for 2 weeks. The patient’s continued to have hyperglycemia in the hospital so his diabetes medications were changed as needed until the glucose level was stable with premeal lispro 7 units sc before meals, insulin sliding scale, and lantus 38 units sc at bedtime. Patient's hyponatremia on the first day of admission was hypovolemic hyponatremia likely 2/2 dehydration and osteomyelitis, it resolved with IVF.   Patient had an episode of feeling lightheaded and was orthostatic positive so was given IVF as needed. Patient's creatinine was elevated on 7/15 at 1.41 and BUN of 21 with a FeNa of 1.4% indicative of intrinsic CHRIST possible 2/2 vancomycin use vs unknown etiology. Creatinine decreased to normal the next day, but a full workup was still done as per renal consult. Patient will need renal ultrasound as an outpatient. Upon discharge the patient will need weekly CBC w diff, and weekly CRP and ESR to monitor osteomyelitis and antibiotic use. Patient will need to be on IV vancomycin and IV zosyn for the next 4-6 weeks. Currently on IV vancomycin 1.250g IV q12h and zosyn 4.5g IV q6h 42 Y male with PMH diabetes II (A1C 12.9 on insulin), s/p right 4th and 5th toe amputation (2015, and 2017 respectively), recent admission for osteomyelitis on base of right foot, treated with IV antibiotics via PICC (finished course of cefepime under supervision of Dr Foy), presented with pain and discharge from right 2nd toe with associated fever, fatigue, and confusion. In the ED the patient had a Tmax of 103 with a HR of 119 and /99, RR: 20 SpO2 98% on RA. Labs were significant for wbc 15.2 and lactate of 1.2 Pt was given 3.5 liters of normal saline and one dose each of zosyn 3.375 grams IV, vancomycin 1750mg IV, and clindamycin 600mg IV. Xray of R foot showed chronic appearing erosive changes at the third PIP joint. Soft tissue swelling is noted. There is gas in the soft  tissues distal tip of the second toe compatible with infection. No discrete osseous erosion is identified.     Patient was admitted to medicine for IV antibiotics and monitoring of osteomyelitis of 2nd digit of right toe with sepsis. CT of R foot (7/6) demonstrated ulceration of the plantar aspect of the right foot, no significant gas collection. The sepsis resolved with downtrending wbc and stable vitals. Patient had amputation of the second toe of right foot on 7/9. There were no complications. Patient was managed with IV zosyn until final culture results of the culture with clears margins of right second toe showed enterococcus and coag negative staph. Per ID recommendations the patient was continued on zosyn 4.5g IV q6h and started on vancomycin 1gram IV q12h. A right PICC line was inserted by the interventional radiology PA on 7/13/18 for long term antibiotic therapy because ID recommended that the patient be continued on IV antibiotics for another 4-6 weeks because IV antibiotics are preferred in osteomyelitis and given the polymicrobial nature of the infection, it would be difficult to cover all bacteria with PO antibiotic. The vanc trough was subtherapeutic so vancomycin was increased from 1 g IV q12h to 1.25 gram IV q12h. Every day podiatry dressed the plantar wound in a sterile dressing.  Podiatry recommends that on discharge there should be an adaptic and a dry sterile dressing consisting of 4x4 gauze, abd pad, kerlix, and an ace bandage to the RLE plantar wound. The dressing should be changed once a day. No weight bearing of right lower extremity for 2 weeks. The patient’s continued to have hyperglycemia in the hospital so his diabetes medications were changed as needed until the glucose level was stable with premeal lispro 7 units sc before meals, insulin sliding scale, and lantus 38 units sc at bedtime. Patient's hyponatremia on the first day of admission was hypovolemic hyponatremia likely 2/2 dehydration and osteomyelitis, it resolved with IVF.   Patient had an episode of feeling lightheaded and was orthostatic positive so was given IVF as needed. Patient's creatinine was elevated on 7/15 at 1.41 and BUN of 21 so a full workup was done with a FeNa of 1.4% indicative of intrinsic CHRIST possible 2/2 vancomycin use vs unknown etiology. Creatinine decreased to normal the next day. Patient will need renal ultrasound as an outpatient. Upon discharge the patient will need weekly CBC w diff, and weekly CRP and ESR to monitor osteomyelitis and antibiotic use. Patient will need to be on IV vancomycin and IV zosyn for the next 4-6 weeks. Currently on IV vancomycin 1.250g IV q12h and zosyn 4.5g IV q6h 42 Y male with PMH diabetes II (A1C 12.9 on insulin), s/p right 4th and 5th toe amputation (2015, and 2017 respectively), recent admission for osteomyelitis on base of right foot, treated with IV antibiotics via PICC (finished course of cefepime under supervision of Dr Foy), presented with pain and discharge from right 2nd toe with associated fever, fatigue, and confusion. In the ED the patient had a Tmax of 103 with a HR of 119 and /99, RR: 20 SpO2 98% on RA. Labs were significant for wbc 15.2 and lactate of 1.2 Pt was given 3.5 liters of normal saline and one dose each of zosyn 3.375 grams IV, vancomycin 1750mg IV, and clindamycin 600mg IV. Xray of R foot showed chronic appearing erosive changes at the third PIP joint. Soft tissue swelling is noted. There is gas in the soft  tissues distal tip of the second toe compatible with infection. No discrete osseous erosion is identified.     Patient was admitted to medicine for IV antibiotics and monitoring of osteomyelitis of 2nd digit of right toe with sepsis. CT of R foot (7/6) demonstrated ulceration of the plantar aspect of the right foot, no significant gas collection. The sepsis resolved with downtrending wbc and stable vitals. Patient had amputation of the second toe of right foot on 7/9. There were no complications. Patient was managed with IV zosyn until final culture results of the culture with clears margins of right second toe showed enterococcus and coag negative staph. Per ID recommendations the patient was continued on zosyn 4.5g IV q6h and started on vancomycin 1gram IV q12h. A right PICC line was inserted by the interventional radiology PA on 7/13/18 for long term antibiotic therapy because ID recommended that the patient be continued on IV antibiotics for another 4-6 weeks because IV antibiotics are preferred in osteomyelitis and given the polymicrobial nature of the infection, it would be difficult to cover all bacteria with PO antibiotic. The vanc trough was subtherapeutic so vancomycin was increased from 1 g IV q12h to 1.25 gram IV q12h. Every day podiatry dressed the plantar wound in a sterile dressing.  Podiatry recommends that on discharge there should be an adaptic and a dry sterile dressing consisting of 4x4 gauze, abd pad, kerlix, and an ace bandage to the RLE plantar wound. The dressing should be changed once a day. No weight bearing of right lower extremity for 2 weeks. The patient’s continued to have hyperglycemia in the hospital so his diabetes medications were changed as needed until the glucose level was stable with premeal lispro 7 units sc before meals, insulin sliding scale, and lantus 38 units sc at bedtime. Patient's hyponatremia on the first day of admission was hypovolemic hyponatremia likely 2/2 dehydration and osteomyelitis, it resolved with IVF.   Patient had an episode of feeling lightheaded and was orthostatic positive so was given IVF as needed. Patient's creatinine was elevated on 7/15 at 1.41 and BUN of 21 so a full workup was done with a FeNa of 1.4% indicative of intrinsic CHRIST possible 2/2 vancomycin use vs unknown etiology. Creatinine decreased to normal the next day. Patient will have a renal ultrasound on discharge. Upon discharge the patient will need weekly CBC w diff, and weekly CRP and ESR to monitor osteomyelitis and antibiotic use. Patient will need to be on IV vancomycin and IV zosyn for the next 4-6 weeks. Currently on IV vancomycin 1.250g IV q12h and zosyn 4.5g IV q6h. 42 Y male with PMH diabetes II (A1C 12.9 on insulin), s/p right 4th and 5th toe amputation (2015, and 2017 respectively), recent admission for osteomyelitis on base of right foot, treated with IV antibiotics via PICC (finished course of cefepime under supervision of Dr Foy), presented with pain and discharge from right 2nd toe with associated fever, fatigue, and confusion. In the ED the patient had a Tmax of 103 with a HR of 119 and /99, RR: 20 SpO2 98% on RA. Labs were significant for wbc 15.2 and lactate of 1.2 Pt was given 3.5 liters of normal saline and one dose each of zosyn 3.375 grams IV, vancomycin 1750mg IV, and clindamycin 600mg IV. Xray of R foot showed chronic appearing erosive changes at the third PIP joint. Soft tissue swelling is noted. There is gas in the soft  tissues distal tip of the second toe compatible with infection. No discrete osseous erosion is identified.     Patient was admitted to medicine for IV antibiotics and monitoring of osteomyelitis of 2nd digit of right toe with sepsis. CT of R foot (7/6) demonstrated ulceration of the plantar aspect of the right foot, no significant gas collection. The sepsis resolved with downtrending wbc and stable vitals. Patient had amputation of the second toe of right foot on 7/9. There were no complications. Patient was managed with IV zosyn until final culture results of the culture with clears margins of right second toe showed enterococcus and coag negative staph. Per ID recommendations the patient was continued on zosyn 4.5g IV q6h and started on vancomycin 1gram IV q12h. A right PICC line was inserted by the interventional radiology PA on 7/13/18 for long term antibiotic therapy because ID recommended that the patient be continued on IV antibiotics for another 4-6 weeks because IV antibiotics are preferred in osteomyelitis and given the polymicrobial nature of the infection, it would be difficult to cover all bacteria with PO antibiotic. The vanc trough was subtherapeutic so vancomycin was increased from 1 g IV q12h to 1.25 gram IV q12h. Every day podiatry dressed the plantar wound in a sterile dressing.  Podiatry recommends that on discharge there should be an adaptic and a dry sterile dressing consisting of 4x4 gauze, abd pad, kerlix, and an ace bandage to the RLE plantar wound. The dressing should be changed once a day. No weight bearing of right lower extremity for 2 weeks. The patient’s continued to have hyperglycemia in the hospital so his diabetes medications were changed as needed until the glucose level was stable with premeal lispro 7 units sc before meals, insulin sliding scale, and lantus 38 units sc at bedtime. Patient's hyponatremia on the first day of admission was hypovolemic hyponatremia likely 2/2 dehydration and osteomyelitis, it resolved with IVF.   Patient had an episode of feeling lightheaded and was orthostatic positive so was given IVF as needed. Patient's creatinine was elevated on 7/15 at 1.41 and BUN of 21 so a full workup was done with a FeNa of 1.4% indicative of intrinsic CHRIST possible 2/2 vancomycin use vs unknown etiology. Creatinine decreased to normal the next day. Per renal consult antibiotics must be renally dosed, patient should avoid NSAIDs, and can resume home lisinopril for proteinuria.  Patient will have a renal ultrasound on discharge. Upon discharge the patient will need weekly CBC w diff, weekly CMP, weekly CRP and weekly ESR to monitor osteomyelitis and antibiotic use. Patient will need to finish 6 week course of IV vancomycin and IV zosyn that started 7/9/18. (Last day of 6 week course will be 8/20/18). Currently on IV vancomycin 1.250g IV q12h and zosyn 4.5g IV q6h. Antibiotics must be renally dosed. 42 Y male with PMH diabetes II (A1C 12.9 on insulin), s/p right 4th and 5th toe amputation (2015, and 2017 respectively), recent admission for osteomyelitis on base of right foot, treated with IV antibiotics via PICC (finished course of cefepime under supervision of Dr Foy), presented with pain and discharge from right 2nd toe with associated fever, fatigue, and confusion. In the ED the patient had a Tmax of 103 with a HR of 119 and /99, RR: 20 SpO2 98% on RA. Labs were significant for wbc 15.2 and lactate of 1.2 Pt was given 3.5 liters of normal saline and one dose each of zosyn 3.375 grams IV, vancomycin 1750mg IV, and clindamycin 600mg IV. Xray of R foot showed chronic appearing erosive changes at the third PIP joint. Soft tissue swelling is noted. There is gas in the soft  tissues distal tip of the second toe compatible with infection. No discrete osseous erosion is identified.     Patient was admitted to medicine for IV antibiotics and monitoring of osteomyelitis of 2nd digit of right toe with sepsis. CT of R foot (7/6) demonstrated ulceration of the plantar aspect of the right foot, no significant gas collection. The sepsis resolved with downtrending wbc and stable vitals. Patient had amputation of the second toe of right foot on 7/9. There were no complications. Patient was managed with IV zosyn until final culture results of the culture with clears margins of right second toe showed enterococcus and coag negative staph. Per ID recommendations the patient was continued on zosyn 4.5g IV q6h and started on vancomycin 1gram IV q12h. A right PICC line was inserted by the interventional radiology PA on 7/13/18 for long term antibiotic therapy because ID recommended that the patient be continued on IV antibiotics for another 4-6 weeks because IV antibiotics are preferred in osteomyelitis and given the polymicrobial nature of the infection, it would be difficult to cover all bacteria with PO antibiotic. The vanc trough was subtherapeutic so vancomycin was increased from 1 g IV q12h to 1.25 gram IV q12h. Every day podiatry dressed the plantar wound in a sterile dressing.  Podiatry recommends that on discharge there should be an adaptic and a dry sterile dressing consisting of 4x4 gauze, abd pad, kerlix, and an ace bandage to the RLE plantar wound. The dressing should be changed once a day. No weight bearing of right lower extremity for 2 weeks. The patient’s continued to have hyperglycemia in the hospital so his diabetes medications were changed as needed until the glucose level was stable with premeal lispro 7 units sc before meals, insulin sliding scale, and lantus 38 units sc at bedtime. Patient's hyponatremia on the first day of admission was hypovolemic hyponatremia likely 2/2 dehydration and osteomyelitis, it resolved with IVF.   Patient had an episode of feeling lightheaded and was orthostatic positive so was given IVF as needed. Patient's creatinine was elevated on 7/15 at 1.41 and BUN of 21 so a full workup was done with a FeNa of 1.4% indicative of intrinsic CHRIST possible 2/2 vancomycin use vs unknown etiology. Creatinine decreased to normal the next day. Per renal consult antibiotics must be renally dosed, patient should avoid NSAIDs, and can resume home lisinopril for proteinuria.  Patient's renal ultrasound with doppler showed no hydronephrosis, enlarged prostate, and mild bladder wall thickening which may suggest some bladder outlet obstruction. Upon discharge the patient will need weekly CBC w diff, weekly CMP, weekly CRP and weekly ESR to monitor osteomyelitis and antibiotic use. Patient will need to finish 6 week course of IV vancomycin and IV zosyn that started 7/9/18. (Last day of 6 week course will be 8/20/18). Currently on IV vancomycin 1.250g IV q12h and zosyn 4.5g IV q6h. Antibiotics must be renally dosed. 42 Y male with PMH diabetes II (A1C 12.9 on insulin), s/p right 4th and 5th toe amputation (2015, and 2017 respectively), recent admission for osteomyelitis on base of right foot, treated with IV antibiotics via PICC (finished course of cefepime under supervision of Dr Foy), presented with pain and discharge from right 2nd toe with associated fever, fatigue, and confusion. In the ED the patient had a Tmax of 103 with a HR of 119 and /99, RR: 20 SpO2 98% on RA. Labs were significant for wbc 15.2 and lactate of 1.2 Pt was given 3.5 liters of normal saline and given zosyn, vancomycin and clindamycin. X ray of R foot showed chronic appearing erosive changes at the third PIP joint. Soft tissue swelling is noted. There is gas in the soft  tissues distal tip of the second toe compatible with infection. No discrete osseous erosion is identified. Patient was admitted to medicine for IV antibiotics and monitoring of osteomyelitis of 2nd digit of right toe with sepsis. CT of R foot (7/6) demonstrated ulceration of the plantar aspect of the right foot, no significant gas collection. The sepsis resolved with downtrending wbc and stable vitals. Patient had amputation of the second toe of right foot on 7/9. There were no complications. Patient was managed with IV zosyn until final culture results of the culture with clears margins of right second toe showed enterococcus and coag negative staph. Per ID recommendations the patient was continued on zosyn 4.5g IV q6h and started on vancomycin 1gram IV q12h. A right PICC line was inserted by the interventional radiology PA on 7/13/18 for long term antibiotic therapy because ID recommended that the patient be continued on IV antibiotics for another 4-6 weeks because IV antibiotics are preferred in osteomyelitis and given the polymicrobial nature of the infection, it would be difficult to cover all bacteria with PO antibiotic. The vanc trough was subtherapeutic so vancomycin was increased from 1 g IV q12h to 1.25 gram IV q12h. Every day podiatry dressed the plantar wound in a sterile dressing.  Podiatry recommends that on discharge there should be an adaptic and a dry sterile dressing consisting of 4x4 gauze, abd pad, kerlix, and an ace bandage to the RLE plantar wound. The dressing should be changed once a day. No weight bearing of right lower extremity for 2 weeks. The patient’s continued to have hyperglycemia in the hospital so his diabetes medications were changed as needed with discharge with 8u TID with meals, insulin sliding scale, and lantus 38u at bedtime. Patient's hyponatremia on the first day of admission was hypovolemic hyponatremia likely 2/2 dehydration and osteomyelitis, it resolved with IVF.   Patient had an episode of feeling lightheaded and was orthostatic positive so was given IVF as needed Patient's creatinine was elevated on 7/15 at 1.41 and BUN of 21 so a full workup was done with a FeNa of 1.4% indicative of intrinsic CHRIST possible 2/2 vancomycin use vs unknown etiology. Creatinine decreased to normal the next day. Per renal consult, patient should avoid NSAIDs, and can resume home lisinopril for proteinuria.  Patient's renal ultrasound with doppler showed no hydronephrosis, enlarged prostate, and mild bladder wall thickening which may suggest some bladder outlet obstruction. Upon discharge the patient will need weekly CBC w diff, weekly CMP, weekly CRP and weekly ESR to monitor osteomyelitis and antibiotic use. Patient will need to finish 6 week course of IV vancomycin and IV zosyn that started 7/9/18. (Last day of 6 week course will be 8/20/18). Currently on IV vancomycin 1.250g IV q12h and zosyn 4.5g IV q6h. Antibiotics must be renally dosed.     WOUND CARE INSTRUCTIONS   Podiatry recommends that on discharge there should be an adaptic and a dry sterile dressing consisting of 4x4 gauze, abd pad, kerlix, and an ace bandage to the RLE plantar wound. The dressing should be changed once a day. No weight bearing of right lower extremity for 2 weeks.     WHILE ON IV ANTIBIOTICS:   Weekly CBC w/ diff, weekly CMP, weekly CRP and weekly ESR and appropriate vancomycin trough with goal 15-20 while on antibiotic. Patient will need to finish 6 week course of IV vancomycin and IV zosyn that started 7/9/18. (Last day of 6 week course will be 8/20/18). Currently on IV vancomycin 1.250g IV q12h and zosyn 4.5g IV q6h. Please adjust vancomycin with appropriate trough levels with goal vancomycin level 15-20. 42 Y male with PMH diabetes II (A1C 12.9 on insulin), s/p right 4th and 5th toe amputation (2015, and 2017 respectively), recent admission for osteomyelitis on base of right foot, treated with IV antibiotics via PICC (finished course of cefepime under supervision of Dr Foy), presented with pain and discharge from right 2nd toe with associated fever, fatigue, and confusion. In the ED the patient had a Tmax of 103 with a HR of 119 and /99, RR: 20 SpO2 98% on RA. Labs were significant for wbc 15.2 and lactate of 1.2 Pt was given 3.5 liters of normal saline and given zosyn, vancomycin and clindamycin. X ray of R foot showed chronic appearing erosive changes at the third PIP joint. Soft tissue swelling is noted. There is gas in the soft  tissues distal tip of the second toe compatible with infection. No discrete osseous erosion is identified. Patient was admitted to medicine for IV antibiotics and monitoring of osteomyelitis of 2nd digit of right toe with sepsis. CT of R foot (7/6) demonstrated ulceration of the plantar aspect of the right foot, no significant gas collection. The sepsis resolved with downtrending wbc and stable vitals. Patient had amputation of the second toe of right foot on 7/9. There were no complications. Patient was managed with IV zosyn until final culture results of the culture with clears margins of right second toe showed enterococcus and coag negative staph. Per ID recommendations the patient was continued on zosyn 4.5g IV q6h and started on vancomycin 1gram IV q12h. A right PICC line was inserted by the interventional radiology PA on 7/13/18 for long term antibiotic therapy because ID recommended that the patient be continued on IV antibiotics for another 4-6 weeks because IV antibiotics are preferred in osteomyelitis and given the polymicrobial nature of the infection, it would be difficult to cover all bacteria with PO antibiotic. The vanc trough was subtherapeutic so vancomycin was increased from 1 g IV q12h to 1.25 gram IV q12h. Every day podiatry dressed the plantar wound in a sterile dressing.  Podiatry recommends that on discharge there should be an adaptic and a dry sterile dressing consisting of 4x4 gauze, abd pad, kerlix, and an ace bandage to the RLE plantar wound. The dressing should be changed once a day. No weight bearing of right lower extremity for 2 weeks. The patient’s continued to have hyperglycemia in the hospital so his diabetes medications were changed as needed with discharge with 8u TID with meals, insulin sliding scale, and lantus 38u at bedtime. Patient's hyponatremia on the first day of admission was hypovolemic hyponatremia likely 2/2 dehydration and osteomyelitis, it resolved with IVF.   Patient had an episode of feeling lightheaded and was orthostatic positive so was given IVF as needed Patient's creatinine was elevated on 7/15 at 1.41 and BUN of 21 so a full workup was done with a FeNa of 1.4% indicative of intrinsic CHRIST possible 2/2 vancomycin use vs unknown etiology. Creatinine decreased to normal the next day. Per renal consult, patient should avoid NSAIDs, and can resume home lisinopril for proteinuria.  Patient's renal ultrasound with doppler showed no hydronephrosis, enlarged prostate, and mild bladder wall thickening which may suggest some bladder outlet obstruction, but patient with no complaints of urinary retention. Upon discharge the patient will need weekly CBC w diff, weekly CMP, weekly CRP and weekly ESR to monitor osteomyelitis and antibiotic use. Patient will need to finish 6 week course of IV vancomycin and IV zosyn that started 7/9/18. (Last day of 6 week course will be 8/20/18). Currently on IV vancomycin 1.250g IV q12h and zosyn 4.5g IV q6h. Antibiotics must be renally dosed.     WOUND CARE INSTRUCTIONS   Podiatry recommends that on discharge there should be an adaptic and a dry sterile dressing consisting of 4x4 gauze, abd pad, kerlix, and an ace bandage to the RLE plantar wound. The dressing should be changed once a day. No weight bearing of right lower extremity for 2 weeks.     WHILE ON IV ANTIBIOTICS:   Weekly CBC w/ diff, weekly CMP, weekly CRP and weekly ESR and appropriate vancomycin trough with goal 15-20 while on antibiotic. Patient will need to finish 6 week course of IV vancomycin and IV zosyn that started 7/9/18. (Last day of 6 week course will be 8/20/18). Currently on IV vancomycin 1.250g IV q12h and zosyn 4.5g IV q6h. Please adjust vancomycin with appropriate trough levels with goal vancomycin level 15-20. 42 Y male with PMH diabetes II (A1C 12.9 on insulin), s/p right 4th and 5th toe amputation (2015, and 2017 respectively), recent admission for osteomyelitis on base of right foot, treated with IV antibiotics via PICC (finished course of cefepime under supervision of Dr Foy), presented with pain and discharge from right 2nd toe with associated fever, fatigue, and confusion. In the ED the patient had a Tmax of 103 with a HR of 119 and /99, RR: 20 SpO2 98% on RA. Labs were significant for wbc 15.2 and lactate of 1.2 Pt was given 3.5 liters of normal saline and given zosyn, vancomycin and clindamycin. X ray of R foot showed chronic appearing erosive changes at the third PIP joint. Soft tissue swelling is noted. There is gas in the soft  tissues distal tip of the second toe compatible with infection. No discrete osseous erosion is identified. Patient was admitted to medicine for IV antibiotics and monitoring of osteomyelitis of 2nd digit of right toe with sepsis. CT of R foot (7/6) demonstrated ulceration of the plantar aspect of the right foot, no significant gas collection. The sepsis resolved with downtrending wbc and stable vitals. Patient had amputation of the second toe of right foot on 7/9. There were no complications. Patient was managed with IV zosyn until final culture results of the culture with clears margins of right second toe showed enterococcus and coag negative staph. Per ID recommendations the patient was continued on zosyn 4.5g IV q6h and started on vancomycin 1gram IV q12h. A right PICC line was inserted by the interventional radiology PA on 7/13/18 for long term antibiotic therapy because ID recommended that the patient be continued on IV antibiotics for another 4-6 weeks because IV antibiotics are preferred in osteomyelitis and given the polymicrobial nature of the infection, it would be difficult to cover all bacteria with PO antibiotic. The vanc trough was subtherapeutic so vancomycin was increased from 1 g IV q12h to 1.25 gram IV q12h. Every day podiatry dressed the plantar wound in a sterile dressing.  Podiatry recommends that on discharge there should be an adaptic and a dry sterile dressing consisting of 4x4 gauze, abd pad, kerlix, and an ace bandage to the RLE plantar wound. The dressing should be changed once a day. No weight bearing of right lower extremity for 2 weeks. The patient’s continued to have hyperglycemia in the hospital so his diabetes medications were changed as needed with discharge with 8u TID with meals, insulin sliding scale, and lantus 38u at bedtime. Patient's hyponatremia on the first day of admission was hypovolemic hyponatremia likely 2/2 dehydration and osteomyelitis, it resolved with IVF.   Patient had an episode of feeling lightheaded and was orthostatic positive so was given IVF as needed Patient's creatinine was elevated on 7/15 at 1.41 and BUN of 21 so a full workup was done with a FeNa of 1.4% indicative of intrinsic CHRIST possible 2/2 vancomycin use vs unknown etiology. Creatinine decreased to normal the next day. Per renal consult, patient should avoid NSAIDs. Patient not restarted on home lisinopril 5mg PO qd because he says it gives him a cough.  Patient's renal ultrasound with doppler showed no hydronephrosis, enlarged prostate, and mild bladder wall thickening which may suggest some bladder outlet obstruction, but patient with no complaints of urinary retention. Upon discharge the patient will need weekly CBC w diff, weekly CMP, weekly CRP and weekly ESR to monitor osteomyelitis and antibiotic use. Patient will need to finish 6 week course of IV vancomycin and IV zosyn that started 7/9/18. (Last day of 6 week course will be 8/20/18). Currently on IV vancomycin 1.250g IV q12h and zosyn 4.5g IV q6h. Antibiotics must be renally dosed.     WOUND CARE INSTRUCTIONS   Podiatry recommends that on discharge there should be an adaptic and a dry sterile dressing consisting of 4x4 gauze, abd pad, kerlix, and an ace bandage to the RLE plantar wound. The dressing should be changed once a day. No weight bearing of right lower extremity for 2 weeks.     WHILE ON IV ANTIBIOTICS:   Weekly CBC w/ diff, weekly CMP, weekly CRP and weekly ESR and appropriate vancomycin trough with goal 15-20 while on antibiotic. Patient will need to finish 6 week course of IV vancomycin and IV zosyn that started 7/9/18. (Last day of 6 week course will be 8/20/18). Currently on IV vancomycin 1.250g IV q12h and zosyn 4.5g IV q6h. Please adjust vancomycin with appropriate trough levels with goal vancomycin level 15-20.

## 2018-07-11 NOTE — DISCHARGE NOTE ADULT - FINDINGS/TREATMENT
On 07/09/18 Pt had amputation of the right second toe by Dr. Green, sent for culture and pathology. Clean margins sent as well. Plantar ulcer debrided, minimal bleeding and no signs of acute infection. Integra graft applied. On 7/13/18 the patient had a Right double lumen PICC line inserted into the right basilic vein for antibiotic therapy by interventional radiology COSME Roper. Informed consent was obtained and there were no complications.

## 2018-07-11 NOTE — DISCHARGE NOTE ADULT - MEDICATION SUMMARY - MEDICATIONS TO CHANGE
I will SWITCH the dose or number of times a day I take the medications listed below when I get home from the hospital:    Basaglar KwikPen 100 units/mL subcutaneous solution  -- 24 unit(s) subcutaneous once a day (at bedtime)   -- Do not drink alcoholic beverages when taking this medication.  It is very important that you take or use this exactly as directed.  Do not skip doses or discontinue unless directed by your doctor.  Keep in refrigerator.  Do not freeze.    Basaglar KwikPen 100 units/mL subcutaneous solution  -- 30 unit(s) subcutaneous once a day (at bedtime)  -- Do not drink alcoholic beverages when taking this medication.  It is very important that you take or use this exactly as directed.  Do not skip doses or discontinue unless directed by your doctor.  Keep in refrigerator.  Do not freeze. I will SWITCH the dose or number of times a day I take the medications listed below when I get home from the hospital:  None

## 2018-07-12 LAB
-  AMOXICILLIN/CLAVULANIC ACID: SIGNIFICANT CHANGE UP
-  CEFAZOLIN: SIGNIFICANT CHANGE UP
-  CEFAZOLIN: SIGNIFICANT CHANGE UP
-  CLINDAMYCIN: SIGNIFICANT CHANGE UP
-  ERYTHROMYCIN: SIGNIFICANT CHANGE UP
-  ERYTHROMYCIN: SIGNIFICANT CHANGE UP
-  LINEZOLID: SIGNIFICANT CHANGE UP
-  LINEZOLID: SIGNIFICANT CHANGE UP
-  METRONIDAZOLE: SIGNIFICANT CHANGE UP
-  MOXIFLOXACIN(AEROBIC): SIGNIFICANT CHANGE UP
-  OXACILLIN: SIGNIFICANT CHANGE UP
-  OXACILLIN: SIGNIFICANT CHANGE UP
-  PENICILLIN: SIGNIFICANT CHANGE UP
-  PENICILLIN: SIGNIFICANT CHANGE UP
-  RIFAMPIN: SIGNIFICANT CHANGE UP
-  RIFAMPIN: SIGNIFICANT CHANGE UP
-  TRIMETHOPRIM/SULFAMETHOXAZOLE: SIGNIFICANT CHANGE UP
-  TRIMETHOPRIM/SULFAMETHOXAZOLE: SIGNIFICANT CHANGE UP
-  VANCOMYCIN: SIGNIFICANT CHANGE UP
-  VANCOMYCIN: SIGNIFICANT CHANGE UP
CULTURE RESULTS: SIGNIFICANT CHANGE UP
GLUCOSE BLDC GLUCOMTR-MCNC: 140 MG/DL — HIGH (ref 70–99)
GLUCOSE BLDC GLUCOMTR-MCNC: 163 MG/DL — HIGH (ref 70–99)
GLUCOSE BLDC GLUCOMTR-MCNC: 247 MG/DL — HIGH (ref 70–99)
GLUCOSE BLDC GLUCOMTR-MCNC: 251 MG/DL — HIGH (ref 70–99)
METHOD TYPE: SIGNIFICANT CHANGE UP
ORGANISM # SPEC MICROSCOPIC CNT: SIGNIFICANT CHANGE UP
SPECIMEN SOURCE: SIGNIFICANT CHANGE UP

## 2018-07-12 PROCEDURE — 99233 SBSQ HOSP IP/OBS HIGH 50: CPT | Mod: GC

## 2018-07-12 PROCEDURE — 99232 SBSQ HOSP IP/OBS MODERATE 35: CPT | Mod: GC

## 2018-07-12 RX ADMIN — Medication 3 UNIT(S): at 16:33

## 2018-07-12 RX ADMIN — Medication 6: at 22:14

## 2018-07-12 RX ADMIN — Medication 3 UNIT(S): at 12:01

## 2018-07-12 RX ADMIN — Medication 4: at 11:28

## 2018-07-12 RX ADMIN — PIPERACILLIN AND TAZOBACTAM 200 GRAM(S): 4; .5 INJECTION, POWDER, LYOPHILIZED, FOR SOLUTION INTRAVENOUS at 18:57

## 2018-07-12 RX ADMIN — Medication 3 UNIT(S): at 08:43

## 2018-07-12 RX ADMIN — PIPERACILLIN AND TAZOBACTAM 200 GRAM(S): 4; .5 INJECTION, POWDER, LYOPHILIZED, FOR SOLUTION INTRAVENOUS at 06:36

## 2018-07-12 RX ADMIN — PIPERACILLIN AND TAZOBACTAM 200 GRAM(S): 4; .5 INJECTION, POWDER, LYOPHILIZED, FOR SOLUTION INTRAVENOUS at 01:15

## 2018-07-12 RX ADMIN — INSULIN GLARGINE 27 UNIT(S): 100 INJECTION, SOLUTION SUBCUTANEOUS at 22:16

## 2018-07-12 RX ADMIN — Medication 2: at 16:33

## 2018-07-12 RX ADMIN — PIPERACILLIN AND TAZOBACTAM 200 GRAM(S): 4; .5 INJECTION, POWDER, LYOPHILIZED, FOR SOLUTION INTRAVENOUS at 12:01

## 2018-07-12 NOTE — PROGRESS NOTE ADULT - PROBLEM SELECTOR PLAN 2
Likely due to osteomyelitis of 2nd digit on right foot. On admission, patient presented with Tmax: 103 HR: 119 RR: 20, WBC: 15.2 with osteomyelitis of 2nd right digit of right foot. Lactate normal. ESR 86. s/p 3.5 L IV NS. S/p Vanc/Zosyn and Clindamycin x1 in ED. Likely osteomyelitis, unlikely nec fasc as CT R foot without significant collection of gas. Swab Cxs growing GBS, numerous Prevotella, and rare Pseudomonas. BCxs negative to date.   --plan as above  --resolved  --Monitor hemodynamics  -- held home lisinopril as septic on admission. Restart as tolerated

## 2018-07-12 NOTE — PROGRESS NOTE ADULT - ASSESSMENT
IMPRESSION:  Osteomyelitis of right foot    Recommend:  1.  Continue zosyn for now  2.  Follow up final culture results with transition to PO if possible

## 2018-07-12 NOTE — PROGRESS NOTE ADULT - PROBLEM SELECTOR PLAN 7
Patient's case and plan discussed with medicine team and consultation services.  Will contact PCP in the AM 7/13. Patient's case and plan discussed with medicine team and consultation services.  Patient's PCP is at [facility]

## 2018-07-12 NOTE — PROGRESS NOTE ADULT - PROBLEM SELECTOR PLAN 4
Resolved. On admission, Na: 130. Likely hypovolemic hyponatremia secondary to sepsis. S/p 3.5 L NS.  --Na+ 136 (7/11)

## 2018-07-12 NOTE — PROGRESS NOTE ADULT - SUBJECTIVE AND OBJECTIVE BOX
OVERNIGHT EVENTS:    SUBJECTIVE / INTERVAL HPI: Patient seen and examined at bedside. Patient says that he has 2/10 pain in his R foot s/p amputation of R second toe. Denies any fever/chills, SOB, CP, n/v, urinary changes, headache. Had a BM this morning    VITAL SIGNS:  Vital Signs Last 24 Hrs  T(C): 36.9 (12 Jul 2018 12:37), Max: 37 (12 Jul 2018 12:29)  T(F): 98.5 (12 Jul 2018 12:37), Max: 98.6 (12 Jul 2018 12:29)  HR: 68 (12 Jul 2018 12:37) (68 - 85)  BP: 111/78 (12 Jul 2018 12:37) (111/78 - 160/99)  BP(mean): --  RR: 16 (12 Jul 2018 12:37) (16 - 17)  SpO2: 97% (12 Jul 2018 12:37) (97% - 98%)    PHYSICAL EXAM:    General: NAD  HEENT: NCAT; PERRL, anicteric sclera; MMM  Neck: supple, trachea midline  Cardiovascular: S1, S2 normal; RRR, no M/G/R  Respiratory: CTABL; no W/R/R  Gastrointestinal: soft, nontender, nondistended. bowel sounds present.  Skin: no ulcerations or visible rashes appreciated  Extremities: No GEOVANY. R foot is wrapped in a sterile dressing. No erythema, edema, discharge noticed.   Neurological: AAOx3    MEDICATIONS:  MEDICATIONS  (STANDING):  atorvastatin 40 milliGRAM(s) Oral at bedtime  dextrose 5%. 1000 milliLiter(s) (50 mL/Hr) IV Continuous <Continuous>  dextrose 50% Injectable 12.5 Gram(s) IV Push once  dextrose 50% Injectable 25 Gram(s) IV Push once  dextrose 50% Injectable 25 Gram(s) IV Push once  insulin glargine Injectable (LANTUS) 27 Unit(s) SubCutaneous at bedtime  insulin lispro (HumaLOG) corrective regimen sliding scale   SubCutaneous Before meals and at bedtime  insulin lispro Injectable (HumaLOG) 3 Unit(s) SubCutaneous three times a day with meals  piperacillin/tazobactam IVPB. 4.5 Gram(s) IV Intermittent every 6 hours    MEDICATIONS  (PRN):  acetaminophen   Tablet. 650 milliGRAM(s) Oral every 6 hours PRN Moderate Pain (4 - 6)  dextrose 40% Gel 15 Gram(s) Oral once PRN Blood Glucose LESS THAN 70 milliGRAM(s)/deciliter  glucagon  Injectable 1 milliGRAM(s) IntraMuscular once PRN Glucose LESS THAN 70 milligrams/deciliter      ALLERGIES:  Allergies    No Known Allergies    Intolerances        LABS:                        12.1   7.6   )-----------( 574      ( 11 Jul 2018 16:53 )             37.2     07-11    136  |  97  |  16  ----------------------------<  175<H>  4.6   |  28  |  1.26    Ca    9.8      11 Jul 2018 07:04  Phos  3.3     07-11  Mg     2.0     07-11          CAPILLARY BLOOD GLUCOSE      POCT Blood Glucose.: 247 mg/dL (12 Jul 2018 11:12)      RADIOLOGY & ADDITIONAL TESTS: Reviewed.

## 2018-07-12 NOTE — PROGRESS NOTE ADULT - ASSESSMENT
42M DM2 h/o osteomyelitis and amputation right 4th 5th toes 3 days s/p Right 2nd toe amputation, ulcer debridement and integra graft application      -Applied adaptic to the plantar wound and a DSD consisting of 4x4 gauze, abd pad, kerlix and an ace bandage to the RLE.   -Culture labeled "clean margin" growing rare co-ag staph likely contaminate or mislabel given that intra-op tissue showing no growth and prior cultures growing GBS, and gram negative rods.   - Abx per ID.   - will need to be non weight bearing Right LE for 2 weeks  -f/u with Dr. Green in 1 week

## 2018-07-12 NOTE — PROGRESS NOTE ADULT - PROBLEM SELECTOR PLAN 3
Patient has a history of type II diabetes requiring insulin (April 2018 A1c 12.9%). He is s/p Toe amputation x 2.  --c/w ISS  --increased Lantus dose 27U sc qhs  --Give regular insulin if patient becomes hyperglycemic Patient has a history of type II diabetes requiring insulin (April 2018 A1c 12.9%). He is s/p Toe amputation x 2.  --c/w ISS  --premeal 3 units  --increased Lantus dose 27U sc qhs  --Give regular insulin if patient becomes hyperglycemic

## 2018-07-12 NOTE — PROGRESS NOTE ADULT - PROBLEM SELECTOR PLAN 1
Patient septic on admission with foot xray showing amputation of fourth and fifth right toe, third digit abnormality (possible fracture?), hyperlucency of 2nd middle phalange. Likely osteomyelitis, unlikely nec fasc as CT R foot without significant collection of gas. S/p Vanc/Zosyn and Clindamycin x1 in ED. ESR 86. Swab Cxs growing GBS, numerous Prevotella, and rare Pseudomonas. BCxs negative to date.   --c/w zosyn. Per ID consider transition to PO Augmentin and Cipro pending final culture results  --final culture results with sensitivities still in progress  --Podiatry recs appreciated: For plantar wound should apply adaptic and a dry sterile dressing consisting of 4x4 gauze, abd pad, kerlix, and an ace bandage to the RLE. The dressing should be changed once a day. No weight bearing of right lower extremity for 2 weeks.   -Per podiatry the culture labeled "clean margin" is growing rare co-ag staph that is likely contaminate or mislabel given that intra-op tissue shows no growth and prior culture gross GBC and gram neg rods.

## 2018-07-12 NOTE — PROGRESS NOTE ADULT - SUBJECTIVE AND OBJECTIVE BOX
INTERVAL HPI/OVERNIGHT EVENTS:    Patient was seen and examined at bedside.  No complaints.      CONSTITUTIONAL:  Negative fever or chills, feels well, good appetite  EYES:  Negative  blurry vision or double vision  CARDIOVASCULAR:  Negative for chest pain or palpitations  RESPIRATORY:  Negative for cough, wheezing, or SOB   GASTROINTESTINAL:  Negative for nausea, vomiting, diarrhea, constipation, or abdominal pain  GENITOURINARY:  Negative frequency, urgency or dysuria  NEUROLOGIC:  No headache, confusion, dizziness, lightheadedness      ANTIBIOTICS/RELEVANT:    MEDICATIONS  (STANDING):  atorvastatin 40 milliGRAM(s) Oral at bedtime  dextrose 5%. 1000 milliLiter(s) (50 mL/Hr) IV Continuous <Continuous>  dextrose 50% Injectable 12.5 Gram(s) IV Push once  dextrose 50% Injectable 25 Gram(s) IV Push once  dextrose 50% Injectable 25 Gram(s) IV Push once  insulin glargine Injectable (LANTUS) 27 Unit(s) SubCutaneous at bedtime  insulin lispro (HumaLOG) corrective regimen sliding scale   SubCutaneous Before meals and at bedtime  insulin lispro Injectable (HumaLOG) 3 Unit(s) SubCutaneous three times a day with meals  piperacillin/tazobactam IVPB. 4.5 Gram(s) IV Intermittent every 6 hours    MEDICATIONS  (PRN):  acetaminophen   Tablet. 650 milliGRAM(s) Oral every 6 hours PRN Moderate Pain (4 - 6)  dextrose 40% Gel 15 Gram(s) Oral once PRN Blood Glucose LESS THAN 70 milliGRAM(s)/deciliter  glucagon  Injectable 1 milliGRAM(s) IntraMuscular once PRN Glucose LESS THAN 70 milligrams/deciliter        Vital Signs Last 24 Hrs  T(C): 37 (12 Jul 2018 12:29), Max: 37 (12 Jul 2018 12:29)  T(F): 98.6 (12 Jul 2018 12:29), Max: 98.6 (12 Jul 2018 12:29)  HR: 85 (12 Jul 2018 12:29) (69 - 85)  BP: 128/85 (12 Jul 2018 12:29) (128/82 - 160/99)  BP(mean): --  RR: 17 (12 Jul 2018 12:29) (16 - 17)  SpO2: 97% (12 Jul 2018 12:29) (97% - 98%)    PHYSICAL EXAM:  Constitutional:Well-developed, well nourished  Eyes:RONI, EOMI  Ear/Nose/Throat: no oral lesion, no sinus tenderness on percussion	  Neck:no JVD, no lymphadenopathy, supple  Respiratory: CTA maricruz  Cardiovascular: S1S2 RRR, no murmurs  Gastrointestinal:soft, (+) BS, no HSM  Extremities:  right foot with surgical dressing in place   Vascular: DP Pulse:	right normal; left normal      LABS:                        12.1   7.6   )-----------( 574      ( 11 Jul 2018 16:53 )             37.2     07-11    136  |  97  |  16  ----------------------------<  175<H>  4.6   |  28  |  1.26    Ca    9.8      11 Jul 2018 07:04  Phos  3.3     07-11  Mg     2.0     07-11            MICROBIOLOGY:  Culture - Acid Fast - Tissue w/Smear (07.10.18 @ 05:50)    Specimen Source: .Tissue senond toe rt foot clear margin or    Acid Fast Bacilli Smear:   No acid fast bacilli seen by fluorochrome stain    Culture - Tissue with Gram Stain (07.09.18 @ 22:27)    -  Trimethoprim/Sulfamethoxazole: S <=0.5/9.5    -  Vancomycin: S 2    -  Oxacillin: R >2    -  Penicillin: R >8    -  RIF- Rifampin: S <=1 Should not be used as monotherapy    -  Linezolid: S 2    Gram Stain:   Test cannot be performed on this type of specimen.    -  Cefazolin: R 8    -  Clindamycin: R >4    -  Erythromycin: R >4    Specimen Source: .Tissue second toe rt foot or specimen    Culture Results:   Growth in fluid media only Enterococcus faecalis Susceptibility to follow.  Growth in fluid media only Coag Negative Staphylococcus    Organism Identification: Coag Negative Staphylococcus    Organism: Coag Negative Staphylococcus    Method Type: YUN        Culture - Tissue with Gram Stain (07.09.18 @ 22:26)    -  Clindamycin: R >4    -  Cefazolin: R <=4    Gram Stain:   No organisms seen  Rare White blood cells    -  Erythromycin: R >4    -  Vancomycin: S 2    -  Trimethoprim/Sulfamethoxazole: S <=0.5/9.5    -  Linezolid: S 2    -  RIF- Rifampin: S <=1 Should not be used as monotherapy    -  Penicillin: R 0.25    -  Oxacillin: R >2    Specimen Source: .Tissue senond toe rt foot clear margin or    Culture Results:   Rare Coag Negative Staphylococcus    Organism Identification: Coag Negative Staphylococcus    Organism: Coag Negative Staphylococcus    Method Type: YUN    RADIOLOGY & ADDITIONAL STUDIES:  < from: MR Foot w/ IV Cont, Right (07.11.18 @ 22:53) >  Since 4/28/2018, there has been interval development of plantar surface   ulcer near the third metatarsophalangeal joint with associated   osteoarthritis.

## 2018-07-12 NOTE — PROGRESS NOTE ADULT - SUBJECTIVE AND OBJECTIVE BOX
PROGRESS NOTE   Patient is a 42y old  Male who presents with a chief complaint of Toe ulcer with purulent drainage (06 Jul 2018 18:28)      Interval History: Pt is seen and evaluated bedisde, resting comfortably. No acute events overnight.  Pt denies pain in his right foot.  Denies fever, chills, nausea, emesis, chest pain, dyspnea, abdominal pain.     Vital Signs Last 24 Hrs  T(C): 36.8 (11 Jul 2018 09:56), Max: 37 (10 Jul 2018 20:30)  T(F): 98.2 (11 Jul 2018 09:56), Max: 98.6 (10 Jul 2018 20:30)  HR: 75 (11 Jul 2018 09:56) (75 - 83)  BP: 134/88 (11 Jul 2018 09:56) (134/88 - 159/93)  BP(mean): --  RR: 16 (11 Jul 2018 09:56) (16 - 17)  SpO2: 99% (11 Jul 2018 09:56) (98% - 99%)                          12.7   9.7   )-----------( 565      ( 11 Jul 2018 07:04 )             38.2               07-11    136  |  97  |  16  ----------------------------<  175<H>  4.6   |  28  |  1.26    Ca    9.8      11 Jul 2018 07:04  Phos  3.3     07-11  Mg     2.0     07-11    RADIOLOGY  < from: Xray Foot AP + Lateral + Oblique, Right (07.06.18 @ 17:25) >  Impression: Findings consistent with soft tissue infection of the second   toe.      < end of copied text >      MICROBIOLOGY  Culture - Surgical Swab (07.06.18 @ 19:47)  Culture - Tissue with Gram Stain (07.09.18 @ 22:26)    Gram Stain:   No organisms seen  Rare White blood cells    Specimen Source: .Tissue senond toe rt foot clear margin or    Culture Results:   Rare Gram Positive Cocci Identification and susceptibility to follow.  Culture in progress      -  Ampicillin: S    Gram Stain:   Few Gram positive cocci in pairs, chains and clusters  Few Gram Negative Rods  No WBC's seen.    -  Clindamycin: R    -  Erythromycin: R    -  Penicillin: S Predicts results for ampicillin, amoxicillin, amoxicillin/clavulanate, ampicillin/sulbactam, 1st, 2nd and 3rd generation cephalosporins and carbapenems.    -  Vancomycin: S    Specimen Source: .Surgical Swab right 2nd toe infection    Culture Results:   Numerous Streptococcus agalactiae (Group B)  Rare Gram Negative Rods  Identification and susceptibility to follow.  Numerous Prevotella bivia Beta lactamase positive    Organism Identification: Streptococcus agalactiae (Group B)    Organism: Streptococcus agalactiae (Group B)    Method Type: KB    Culture - Tissue with Gram Stain (07.09.18 @ 22:26)    Gram Stain:   No organisms seen  Rare White blood cells    Specimen Source: .Tissue senond toe rt foot clear margin or    Culture Results:   Rare Gram Positive Cocci Identification and susceptibility to follow.  Culture in progress      Culture - Acid Fast - Tissue w/Smear (07.10.18 @ 05:50)    Specimen Source: .Tissue senond toe rt foot clear margin or    Acid Fast Bacilli Smear:   No acid fast bacilli seen by fluorochrome stain          PHYSICAL EXAM  GEN: ARIEL MOURA is a pleasant well-nourished, well developed 42y Male in no acute distress, alert awake, and oriented to person, place and time.     LE Focused:  Dressing c/d/i. No strikethrough. Incision site of the right 2nd digit is well-coapted, sutures intact. no dehiscence.  no drainage. no clinical signs of infection. Plantar ulcer with integra graft sutured in place with overlying adaptic. sutures intact. no dehiscence. no drainage.  no clinical sings of infection.

## 2018-07-13 LAB
-  AMPICILLIN: SIGNIFICANT CHANGE UP
-  VANCOMYCIN: SIGNIFICANT CHANGE UP
CULTURE RESULTS: SIGNIFICANT CHANGE UP
GLUCOSE BLDC GLUCOMTR-MCNC: 179 MG/DL — HIGH (ref 70–99)
GLUCOSE BLDC GLUCOMTR-MCNC: 189 MG/DL — HIGH (ref 70–99)
GLUCOSE BLDC GLUCOMTR-MCNC: 197 MG/DL — HIGH (ref 70–99)
GLUCOSE BLDC GLUCOMTR-MCNC: 304 MG/DL — HIGH (ref 70–99)
GLUCOSE BLDC GLUCOMTR-MCNC: 342 MG/DL — HIGH (ref 70–99)
METHOD TYPE: SIGNIFICANT CHANGE UP
ORGANISM # SPEC MICROSCOPIC CNT: SIGNIFICANT CHANGE UP
SPECIMEN SOURCE: SIGNIFICANT CHANGE UP

## 2018-07-13 PROCEDURE — 99233 SBSQ HOSP IP/OBS HIGH 50: CPT | Mod: GC

## 2018-07-13 PROCEDURE — 99232 SBSQ HOSP IP/OBS MODERATE 35: CPT

## 2018-07-13 PROCEDURE — 36569 INSJ PICC 5 YR+ W/O IMAGING: CPT

## 2018-07-13 PROCEDURE — 76937 US GUIDE VASCULAR ACCESS: CPT | Mod: 26

## 2018-07-13 RX ORDER — INSULIN LISPRO 100/ML
5 VIAL (ML) SUBCUTANEOUS
Refills: 0 | Status: DISCONTINUED | OUTPATIENT
Start: 2018-07-14 | End: 2018-07-15

## 2018-07-13 RX ORDER — INSULIN GLARGINE 100 [IU]/ML
32 INJECTION, SOLUTION SUBCUTANEOUS AT BEDTIME
Refills: 0 | Status: DISCONTINUED | OUTPATIENT
Start: 2018-07-13 | End: 2018-07-15

## 2018-07-13 RX ORDER — SODIUM CHLORIDE 9 MG/ML
10 INJECTION INTRAMUSCULAR; INTRAVENOUS; SUBCUTANEOUS EVERY 12 HOURS
Refills: 0 | Status: DISCONTINUED | OUTPATIENT
Start: 2018-07-13 | End: 2018-07-16

## 2018-07-13 RX ORDER — VANCOMYCIN HCL 1 G
1000 VIAL (EA) INTRAVENOUS EVERY 12 HOURS
Refills: 0 | Status: DISCONTINUED | OUTPATIENT
Start: 2018-07-13 | End: 2018-07-14

## 2018-07-13 RX ORDER — SODIUM CHLORIDE 9 MG/ML
10 INJECTION INTRAMUSCULAR; INTRAVENOUS; SUBCUTANEOUS
Refills: 0 | Status: DISCONTINUED | OUTPATIENT
Start: 2018-07-13 | End: 2018-07-16

## 2018-07-13 RX ORDER — SODIUM CHLORIDE 9 MG/ML
20 INJECTION INTRAMUSCULAR; INTRAVENOUS; SUBCUTANEOUS ONCE
Refills: 0 | Status: DISCONTINUED | OUTPATIENT
Start: 2018-07-13 | End: 2018-07-16

## 2018-07-13 RX ADMIN — Medication 3 UNIT(S): at 17:16

## 2018-07-13 RX ADMIN — Medication 250 MILLIGRAM(S): at 16:44

## 2018-07-13 RX ADMIN — INSULIN GLARGINE 32 UNIT(S): 100 INJECTION, SOLUTION SUBCUTANEOUS at 23:19

## 2018-07-13 RX ADMIN — PIPERACILLIN AND TAZOBACTAM 200 GRAM(S): 4; .5 INJECTION, POWDER, LYOPHILIZED, FOR SOLUTION INTRAVENOUS at 06:58

## 2018-07-13 RX ADMIN — Medication 2: at 17:16

## 2018-07-13 RX ADMIN — PIPERACILLIN AND TAZOBACTAM 200 GRAM(S): 4; .5 INJECTION, POWDER, LYOPHILIZED, FOR SOLUTION INTRAVENOUS at 16:44

## 2018-07-13 RX ADMIN — Medication 3 UNIT(S): at 11:51

## 2018-07-13 RX ADMIN — Medication 2: at 11:50

## 2018-07-13 RX ADMIN — Medication 3 UNIT(S): at 08:01

## 2018-07-13 RX ADMIN — Medication 8: at 23:06

## 2018-07-13 RX ADMIN — PIPERACILLIN AND TAZOBACTAM 200 GRAM(S): 4; .5 INJECTION, POWDER, LYOPHILIZED, FOR SOLUTION INTRAVENOUS at 00:54

## 2018-07-13 RX ADMIN — PIPERACILLIN AND TAZOBACTAM 200 GRAM(S): 4; .5 INJECTION, POWDER, LYOPHILIZED, FOR SOLUTION INTRAVENOUS at 23:19

## 2018-07-13 RX ADMIN — Medication 2: at 08:01

## 2018-07-13 NOTE — PROGRESS NOTE ADULT - ASSESSMENT
IMPRESSION:  Osteomyelitis of right foot, polymicrobial in nature.      Recommend:  1.  Start vancomycin 1 gram IV q12 to cover coag negative staph  2.  Continue zosyn    Would prefer to treat this patient with IV antibiotics for another 4-6 weeks. IV is preferred in osteomyelitis and given the polymicrobial nature of this infection, it would difficult to cover all bacteria with PO antibiotics.    Patient is amenable to this since he is going to rehab

## 2018-07-13 NOTE — PROGRESS NOTE ADULT - PROBLEM SELECTOR PLAN 4
Resolved. On admission, Na: 130. Likely hypovolemic hyponatremia secondary to sepsis. S/p 3.5 L NS.  --Na+ 136 (7/11) On admission, Na: 130. Likely hypovolemic hyponatremia secondary to sepsis. S/p 3.5 L NS.  -RESOLVED

## 2018-07-13 NOTE — PROGRESS NOTE ADULT - PROBLEM SELECTOR PLAN 6
F: None  E: Replete PRN  N: DASH diet. F: None  E: Replete PRN. Mg>2, K>4  N: consistent carbohydrate

## 2018-07-13 NOTE — PROGRESS NOTE ADULT - PROBLEM SELECTOR PLAN 1
Patient septic on admission with foot xray showing s/p amputation of fourth and fifth R toe, third digit abnormality (possible fracture?), hyperlucency of 2nd middle phalange. Likely osteomyelitis, unlikely nec fasc as CT R foot without significant collection of gas. S/p Vanc/Zosyn and Clindamycin x1 in ED. ESR 86. Swab Cxs growing GBS, numerous Prevotella, and rare Pseudomonas. BCxs negative to date.  --final cx results positive for enterococcus sensitive to vancomycin and ampicillin, and coag neg staph   --ID  consulted. recs appreciated.  7/13 s/p final cx results are c/w zosyn, start vanc 1g IV q 12 to cover coag neg staph.   --PICC placement as ID recs are now IV antibiotics for another 4-6 weeks due to IV is preferred in osteomylitis and the infectious is polymicrobial so would be difficult to cover w PO antibiotics.   --Podiatry recs appreciated: For plantar wound should apply adaptic and a dry sterile dressing consisting of 4x4 gauze, abd pad, kerlix, and an ace bandage to the RLE. The dressing should be changed once a day. No weight bearing of right lower extremity for 2 weeks. Patient septic on admission with foot xray showing s/p amputation of fourth and fifth R toe, third digit abnormality (possible fracture?), hyperlucency of 2nd middle phalange. Likely osteomyelitis, unlikely nec fasc as CT R foot without significant collection of gas. S/p Vanc/Zosyn and Clindamycin x1 in ED. ESR 86. Swab Cxs growing GBS, numerous Prevotella, and rare Pseudomonas. BCxs negative to date.  --final cx results positive for enterococcus sensitive to vancomycin and ampicillin, and coag neg staph   --ID  consulted. recs appreciated.  7/13 s/p final cx results are c/w zosyn, start vanc 1g IV q 12 to cover coag neg staph.   --PICC placement today as ID recs are now IV antibiotics for another 4-6 weeks due to IV is preferred in osteomylitis and the infectious is polymicrobial so would be difficult to cover w PO antibiotics.   --Podiatry recs appreciated: For plantar wound should apply adaptic and a dry sterile dressing consisting of 4x4 gauze, abd pad, kerlix, and an ace bandage to the RLE. The dressing should be changed once a day. No weight bearing of right lower extremity for 2 weeks.

## 2018-07-13 NOTE — CONSULT NOTE ADULT - SUBJECTIVE AND OBJECTIVE BOX
Vascular Access Service Consult Note    [facility] - [facility]OBLEM Dx:  Transition of care performed with sharing of clinical summary: Transition of care performed with sharing of clinical summary  Osteomyelitis: Osteomyelitis  Nutrition, metabolism, and development symptoms: Nutrition, metabolism, and development symptoms  Prophylactic measure: Prophylactic measure  Hyponatremia: Hyponatremia  Diabetes: Diabetes  Infection of toe: Infection of toe  Sepsis: Sepsis             Diagnosis: osteomyelitis    Indications for Vascular Access (Check all that apply)  [ x ]  Antibiotic Therapy       Antibiotic Prescribed:     vanc/zosyn x4-6 weeks         [  ]  IV Hydration  [  ]  Total Parenteral Nutrition  [  ]  Chemotherapy  [  ]  Difficult Venous Access  [  ]  CVP monitoring  [  ]  Medications with high potential for tissue necrosis on extravasation  [  ]  Other    Screening (Check all that apply)  Previous Radiation to chest  [  ] Yes      [ x ]  No  Breast Cancer                          [  ] Left     [  ]  Right    [ x ]  No  Lymph Node Dissection         [  ] Left     [  ]  Right    [ x ]  No  Pacemaker or ICD                   [  ] Left     [  ]  Right    [ x ]  No  Upper Extremity DVT             [  ] Left     [  ]  Right    [ x ]  No  Chronic Kidney Disease         [  ]  Yes     [ x ]  No  Hemodialysis                           [  ]  Yes     [ x ]  No  AV Fistula/ Graft                     [  ]  Left    [  ]  Right    [ x ]  No  Temp>101F in past 24 H       [  ]  Yes     [x  ]  No  H/O PICC/Midline                   [ x ]  Yes- both sides     [  ]  No    Lab data:                        12.1   7.6   )-----------( 574      ( 11 Jul 2018 16:53 )             37.2             blood cultures 7/6- ngtd x5days        I have reviewed the chart, interviewed and examined the patient and determined that this patient:  [ x ] Is a candidate for a PICC line  [  ] Is a candidate for a Midline  [  ] Is not a candidate for vascular access device (reason)    Lumens:    [  ] Single  [ x ] Double

## 2018-07-13 NOTE — PROGRESS NOTE ADULT - SUBJECTIVE AND OBJECTIVE BOX
OVERNIGHT EVENTS:    SUBJECTIVE / INTERVAL HPI: Patient seen and examined at bedside.     VITAL SIGNS:  Vital Signs Last 24 Hrs  T(C): 36.8 (13 Jul 2018 09:15), Max: 36.8 (13 Jul 2018 09:15)  T(F): 98.3 (13 Jul 2018 09:15), Max: 98.3 (13 Jul 2018 09:15)  HR: 72 (13 Jul 2018 09:15) (67 - 80)  BP: 125/80 (13 Jul 2018 09:15) (115/74 - 147/87)  BP(mean): --  RR: 16 (13 Jul 2018 09:15) (16 - 19)  SpO2: 99% (13 Jul 2018 09:15) (97% - 100%)    PHYSICAL EXAM:    General: WDWN  HEENT: NCAT; PERRL, anicteric sclera; MMM  Neck: supple, trachea midline  Cardiovascular: S1, S2 normal; RRR, no M/G/R  Respiratory: CTABL; no W/R/R  Gastrointestinal: soft, nontender, nondistended. bowel sounds present.  Skin: no ulcerations or visible rashes appreciated  Extremities: WWP; no edema, clubbing or cyanosis  Vascular: 2+ radial, DP/PT pulses B/L  Neurological: AAOx3; CN II-XII grossly intact; no focal deficits    MEDICATIONS:  MEDICATIONS  (STANDING):  atorvastatin 40 milliGRAM(s) Oral at bedtime  dextrose 5%. 1000 milliLiter(s) (50 mL/Hr) IV Continuous <Continuous>  dextrose 50% Injectable 12.5 Gram(s) IV Push once  dextrose 50% Injectable 25 Gram(s) IV Push once  dextrose 50% Injectable 25 Gram(s) IV Push once  insulin glargine Injectable (LANTUS) 27 Unit(s) SubCutaneous at bedtime  insulin lispro (HumaLOG) corrective regimen sliding scale   SubCutaneous Before meals and at bedtime  insulin lispro Injectable (HumaLOG) 3 Unit(s) SubCutaneous three times a day with meals  piperacillin/tazobactam IVPB. 4.5 Gram(s) IV Intermittent every 6 hours  vancomycin  IVPB 1000 milliGRAM(s) IV Intermittent every 12 hours    MEDICATIONS  (PRN):  acetaminophen   Tablet. 650 milliGRAM(s) Oral every 6 hours PRN Moderate Pain (4 - 6)  dextrose 40% Gel 15 Gram(s) Oral once PRN Blood Glucose LESS THAN 70 milliGRAM(s)/deciliter  glucagon  Injectable 1 milliGRAM(s) IntraMuscular once PRN Glucose LESS THAN 70 milligrams/deciliter      ALLERGIES:  Allergies    No Known Allergies    Intolerances        LABS:                        12.1   7.6   )-----------( 574      ( 11 Jul 2018 16:53 )             37.2               CAPILLARY BLOOD GLUCOSE      POCT Blood Glucose.: 179 mg/dL (13 Jul 2018 11:21)      RADIOLOGY & ADDITIONAL TESTS: Reviewed. OVERNIGHT EVENTS:    SUBJECTIVE / INTERVAL HPI: Patient seen and examined at bedside. Patient has no complaints. Has some very mild R foot pain. Denies any CP, SOB, abd pain, n/v, f/c.     VITAL SIGNS:  Vital Signs Last 24 Hrs  T(C): 36.8 (13 Jul 2018 09:15), Max: 36.8 (13 Jul 2018 09:15)  T(F): 98.3 (13 Jul 2018 09:15), Max: 98.3 (13 Jul 2018 09:15)  HR: 72 (13 Jul 2018 09:15) (67 - 80)  BP: 125/80 (13 Jul 2018 09:15) (115/74 - 147/87)  BP(mean): --  RR: 16 (13 Jul 2018 09:15) (16 - 19)  SpO2: 99% (13 Jul 2018 09:15) (97% - 100%)    PHYSICAL EXAM:    General: NAD  HEENT: NCAT; PERRL, anicteric sclera; MMM  Neck: supple, trachea midline  Cardiovascular: S1, S2 normal; RRR, no M/G/R  Respiratory: CTABL; no W/R/R  Gastrointestinal: soft, nontender, nondistended. bowel sounds present.  Skin: R foot in bandage, no purulent discharge/edema  Extremities: no GEOVANY b/l  Vascular: 2+ radial, DP/PT pulses B/L  Neurological: AAOx3    MEDICATIONS:  MEDICATIONS  (STANDING):  atorvastatin 40 milliGRAM(s) Oral at bedtime  dextrose 5%. 1000 milliLiter(s) (50 mL/Hr) IV Continuous <Continuous>  dextrose 50% Injectable 12.5 Gram(s) IV Push once  dextrose 50% Injectable 25 Gram(s) IV Push once  dextrose 50% Injectable 25 Gram(s) IV Push once  insulin glargine Injectable (LANTUS) 27 Unit(s) SubCutaneous at bedtime  insulin lispro (HumaLOG) corrective regimen sliding scale   SubCutaneous Before meals and at bedtime  insulin lispro Injectable (HumaLOG) 3 Unit(s) SubCutaneous three times a day with meals  piperacillin/tazobactam IVPB. 4.5 Gram(s) IV Intermittent every 6 hours  vancomycin  IVPB 1000 milliGRAM(s) IV Intermittent every 12 hours    MEDICATIONS  (PRN):  acetaminophen   Tablet. 650 milliGRAM(s) Oral every 6 hours PRN Moderate Pain (4 - 6)  dextrose 40% Gel 15 Gram(s) Oral once PRN Blood Glucose LESS THAN 70 milliGRAM(s)/deciliter  glucagon  Injectable 1 milliGRAM(s) IntraMuscular once PRN Glucose LESS THAN 70 milligrams/deciliter      ALLERGIES:  Allergies    No Known Allergies    Intolerances        LABS:                        12.1   7.6   )-----------( 574      ( 11 Jul 2018 16:53 )             37.2               CAPILLARY BLOOD GLUCOSE      POCT Blood Glucose.: 179 mg/dL (13 Jul 2018 11:21)      RADIOLOGY & ADDITIONAL TESTS: Reviewed.

## 2018-07-13 NOTE — PROGRESS NOTE ADULT - SUBJECTIVE AND OBJECTIVE BOX
PROGRESS NOTE   Patient is a 42y old  Male who presents with a chief complaint of Toe ulcer with purulent drainage (11 Jul 2018 14:44)      Interval History: No acute events overnight. Pt seen resting comfortably in bed. Pt denies pain in foot. No complaints at this time. Denies fever, chills, nausea, emesis, chest pain, dyspnea, abdominal pain.     Vital Signs Last 24 Hrs  T(C): 36.8 (13 Jul 2018 09:15), Max: 36.8 (13 Jul 2018 09:15)  T(F): 98.3 (13 Jul 2018 09:15), Max: 98.3 (13 Jul 2018 09:15)  HR: 72 (13 Jul 2018 09:15) (67 - 80)  BP: 125/80 (13 Jul 2018 09:15) (115/74 - 147/87)  BP(mean): --  RR: 16 (13 Jul 2018 09:15) (16 - 19)  SpO2: 99% (13 Jul 2018 09:15) (97% - 100%)                          12.1   7.6   )-----------( 574      ( 11 Jul 2018 16:53 )             37.2                       PHYSICAL EXAM  GEN: ARIEL MOURA is a pleasant well-nourished, well developed 42y Male in no acute distress, alert awake, and oriented to person, place and time.     LE Focused:  Dressing c/d/i. No strikethrough. Incision site of the right 2nd digit is well-coapted, sutures intact. no dehiscence.  no drainage. no clinical signs of infection. Plantar ulcer with integra graft sutured in place with overlying adaptic. sutures intact. no dehiscence. no drainage.  no clinical sings of infection.

## 2018-07-13 NOTE — PROGRESS NOTE ADULT - PROBLEM SELECTOR PLAN 3
Patient has a history of type II diabetes requiring insulin (April 2018 A1c 12.9%). He is s/p Toe amputation x 2.  --c/w ISS  --premeal 3 units  --increased Lantus dose 27U sc qhs  --Give regular insulin if patient becomes hyperglycemic Patient has a history of type II diabetes requiring insulin (April 2018 A1c 12.9%). He is s/p Toe amputation x 2.  --c/w ISS, c/w premeal 3 units  --c/w Lantus dose 27U sc qhs  --Give regular insulin if patient becomes hyperglycemic

## 2018-07-13 NOTE — PROGRESS NOTE ADULT - PROBLEM SELECTOR PLAN 2
Likely due to osteomyelitis of 2nd digit on right foot. On admission, patient presented with Tmax: 103 HR: 119 RR: 20, WBC: 15.2 with osteomyelitis of 2nd right digit of right foot. Lactate normal. ESR 86. s/p 3.5 L IV NS. S/p Vanc/Zosyn and Clindamycin x1 in ED. Likely osteomyelitis, unlikely nec fasc as CT R foot without significant collection of gas. Swab Cxs growing GBS, numerous Prevotella, and rare Pseudomonas. BCxs negative to date.   --plan as above  --resolved  --Monitor hemodynamics  -- held home lisinopril as septic on admission. Restart as tolerated Likely due to osteomyelitis of 2nd digit on right foot. On admission, patient presented with Tmax: 103 HR: 119 RR: 20, WBC: 15.2 with osteomyelitis of 2nd right digit of right foot. Lactate normal. ESR 86. s/p 3.5 L IV NS. S/p Vanc/Zosyn and Clindamycin x1 in ED. Likely osteomyelitis, unlikely nec fasc as CT R foot without significant collection of gas. Swab Cxs growing GBS, numerous Prevotella, and rare Pseudomonas. BCxs negative to date.   --plan as above  --RESOLVED  --Monitor hemodynamics  -- held home lisinopril as septic on admission. Restart as tolerated

## 2018-07-13 NOTE — PROGRESS NOTE ADULT - SUBJECTIVE AND OBJECTIVE BOX
INTERVAL HPI/OVERNIGHT EVENTS:    Patient seen and examined at bedside.  No complaints    CONSTITUTIONAL:  Negative fever or chills, feels well, good appetite  EYES:  Negative  blurry vision or double vision  CARDIOVASCULAR:  Negative for chest pain or palpitations  RESPIRATORY:  Negative for cough, wheezing, or SOB   GASTROINTESTINAL:  Negative for nausea, vomiting, diarrhea, constipation, or abdominal pain  GENITOURINARY:  Negative frequency, urgency or dysuria  NEUROLOGIC:  No headache, confusion, dizziness, lightheadedness      ANTIBIOTICS/RELEVANT:    MEDICATIONS  (STANDING):  atorvastatin 40 milliGRAM(s) Oral at bedtime  dextrose 5%. 1000 milliLiter(s) (50 mL/Hr) IV Continuous <Continuous>  dextrose 50% Injectable 12.5 Gram(s) IV Push once  dextrose 50% Injectable 25 Gram(s) IV Push once  dextrose 50% Injectable 25 Gram(s) IV Push once  insulin glargine Injectable (LANTUS) 27 Unit(s) SubCutaneous at bedtime  insulin lispro (HumaLOG) corrective regimen sliding scale   SubCutaneous Before meals and at bedtime  insulin lispro Injectable (HumaLOG) 3 Unit(s) SubCutaneous three times a day with meals  piperacillin/tazobactam IVPB. 4.5 Gram(s) IV Intermittent every 6 hours    MEDICATIONS  (PRN):  acetaminophen   Tablet. 650 milliGRAM(s) Oral every 6 hours PRN Moderate Pain (4 - 6)  dextrose 40% Gel 15 Gram(s) Oral once PRN Blood Glucose LESS THAN 70 milliGRAM(s)/deciliter  glucagon  Injectable 1 milliGRAM(s) IntraMuscular once PRN Glucose LESS THAN 70 milligrams/deciliter        Vital Signs Last 24 Hrs  T(C): 36.8 (13 Jul 2018 09:15), Max: 36.8 (13 Jul 2018 09:15)  T(F): 98.3 (13 Jul 2018 09:15), Max: 98.3 (13 Jul 2018 09:15)  HR: 72 (13 Jul 2018 09:15) (67 - 80)  BP: 125/80 (13 Jul 2018 09:15) (115/74 - 147/87)  BP(mean): --  RR: 16 (13 Jul 2018 09:15) (16 - 19)  SpO2: 99% (13 Jul 2018 09:15) (97% - 100%)    PHYSICAL EXAM:  Constitutional:  non-toxic, no distress  Eyes: no icterus   Ear/Nose/Throat:  no sinus tenderness on percussion	  Neck:  supple  Respiratory:  clear to auscultation   Cardiovascular: S1S2 RRR, no murmurs  Gastrointestinal:soft, (+) BS, no HSM  Extremities: dressing in place   Vascular: DP Pulse:	right normal; left normal      LABS:                        12.1   7.6   )-----------( 574      ( 11 Jul 2018 16:53 )             37.2                 MICROBIOLOGY:  Culture - Acid Fast - Tissue w/Smear (07.10.18 @ 05:50)    Specimen Source: .Tissue senond toe rt foot clear margin or    Acid Fast Bacilli Smear:   No acid fast bacilli seen by fluorochrome stain        Culture - Tissue with Gram Stain (07.09.18 @ 22:27)    Gram Stain:   Test cannot be performed on this type of specimen.    -  Cefazolin: R 8    -  Ampicillin: S <=2 Predicts results to ampicillin/sulbactam, amoxacillin-clavulanate and  piperacillin-tazobactam.    -  Clindamycin: R >4    -  Erythromycin: R >4    -  Linezolid: S 2    -  Oxacillin: R >2    -  Penicillin: R >8    -  RIF- Rifampin: S <=1 Should not be used as monotherapy    -  Trimethoprim/Sulfamethoxazole: S <=0.5/9.5    -  Vancomycin: S 2    -  Vancomycin: S 2    Specimen Source: .Tissue second toe rt foot or specimen    Culture Results:   Growth in fluid media only Enterococcus faecalis  Growth in fluid media only Coag Negative Staphylococcus    Organism Identification: Enterococcus faecalis  Coag Negative Staphylococcus    Organism: Enterococcus faecalis    Organism: Coag Negative Staphylococcus    Method Type: YUN    Method Type: YUN    RADIOLOGY & ADDITIONAL STUDIES:

## 2018-07-13 NOTE — PROGRESS NOTE ADULT - PROBLEM SELECTOR PLAN 7
Patient's case and plan discussed with medicine team and consultation services.  Patient's PCP is at [facility] Patient's case and plan discussed with medicine team and consultation services.  Patient's PCP is at [facility]. Patient will be going to acute rehab on monday. Patient's case and plan discussed with medicine team and consultation services.  Patient's PCP is [facility]. Patient will be going to acute rehab on monday.

## 2018-07-13 NOTE — CHART NOTE - NSCHARTNOTEFT_GEN_A_CORE
Admitting Diagnosis:   Patient is a 42y old  Male who presents with a chief complaint of Toe ulcer with purulent drainage (11 Jul 2018 14:44)      PAST MEDICAL & SURGICAL HISTORY:  Osteomyelitis  Diabetes  Toe amputation status      Current Nutrition Order: CSTCHO     PO Intake: Good (%) [ x  ]  Fair (50-75%) [   ] Poor (<25%) [   ]    GI Issues: Denies N/V/D/C    Pain: No pain reported.     Skin Integrity: surgical incision on 2nd toe    Labs:         CAPILLARY BLOOD GLUCOSE      POCT Blood Glucose.: 179 mg/dL (13 Jul 2018 11:21)  POCT Blood Glucose.: 197 mg/dL (13 Jul 2018 07:36)  POCT Blood Glucose.: 251 mg/dL (12 Jul 2018 21:06)      Medications:  MEDICATIONS  (STANDING):  atorvastatin 40 milliGRAM(s) Oral at bedtime  dextrose 5%. 1000 milliLiter(s) (50 mL/Hr) IV Continuous <Continuous>  dextrose 50% Injectable 12.5 Gram(s) IV Push once  dextrose 50% Injectable 25 Gram(s) IV Push once  dextrose 50% Injectable 25 Gram(s) IV Push once  insulin glargine Injectable (LANTUS) 27 Unit(s) SubCutaneous at bedtime  insulin lispro (HumaLOG) corrective regimen sliding scale   SubCutaneous Before meals and at bedtime  insulin lispro Injectable (HumaLOG) 3 Unit(s) SubCutaneous three times a day with meals  piperacillin/tazobactam IVPB. 4.5 Gram(s) IV Intermittent every 6 hours  sodium chloride 0.9% lock flush 20 milliLiter(s) IV Push once  vancomycin  IVPB 1000 milliGRAM(s) IV Intermittent every 12 hours    MEDICATIONS  (PRN):  acetaminophen   Tablet. 650 milliGRAM(s) Oral every 6 hours PRN Moderate Pain (4 - 6)  dextrose 40% Gel 15 Gram(s) Oral once PRN Blood Glucose LESS THAN 70 milliGRAM(s)/deciliter  glucagon  Injectable 1 milliGRAM(s) IntraMuscular once PRN Glucose LESS THAN 70 milligrams/deciliter  sodium chloride 0.9% lock flush 10 milliLiter(s) IV Push every 1 hour PRN After each medication administration  sodium chloride 0.9% lock flush 10 milliLiter(s) IV Push every 12 hours PRN Lumen of catheter NOT used      Weight: 246lb   Ht:6ft IBW:178lbs+/-10%,138% of   Daily     Weight Change:   25lb wt gain over last 2 months per initial assessment.     Estimated energy needs:   IBW used for calculations as pt >120% of IBW   Increased protein and nutrient needs due to post-op  2017-2421kcal/day (25-30kcal/kg)  81-97g pro/day (1-1.2g/kg)  2017-2421ml fluid/day (25-30ml/kg)    Subjective:   43yo M DM2 h/o osteomyelitis and amputation right 4th 5th toes 3 days s/p Right 2nd toe amputation, ulcer debridement and integra graft application. Pt known to be a poorly controlled diabetic (A1C 12.9%). ABX switched from PO to IV. Awaiting PICC line prior to d/c. Pt seen resting in bed. Currently on a CSTCOH diet w/ evening snack and tolerating PO. Endorsing good appetite, consuming >75% meals. No GI distress, no N/V/D/C.  Reviewed w/ pt purpose of CSTCHO diet order relative to stable BS/BG. Offered to review in further detail w/ written edu to which pt was receptive to. Discussed CHO counting, portion sizes, fiber vs sugar. Pt receptive and expressed understanding. Outpatient RD/CDE contact info provided. RD to follow for further diet reinforcement.    Previous Nutrition Diagnosis:  food and nutrition related knowledge deficit; not ready for recommendations; limited adherence to nutrition related recommendations RT poor DM management AEB A1C and diet recall    Active [  x ]  Resolved [   ]    If resolved, new PES:     Goal:  Pt to recall 2 main concepts from edu (CHO counting, fiber vs sugar)    Recommendations:  1) Continue on CSTCHO diet as tolerated  2) Reinforce education provided  3) trend wts 2/2 reported wt change    Education:   Diabetes medical nutrition therapy. Discussed CHO counting, portion sizes, fiber vs sugar. Pt receptive and expressed understanding    Risk Level: High [   ] Moderate [   ] Low [   ]

## 2018-07-14 LAB
GLUCOSE BLDC GLUCOMTR-MCNC: 148 MG/DL — HIGH (ref 70–99)
GLUCOSE BLDC GLUCOMTR-MCNC: 203 MG/DL — HIGH (ref 70–99)
GLUCOSE BLDC GLUCOMTR-MCNC: 244 MG/DL — HIGH (ref 70–99)
GLUCOSE BLDC GLUCOMTR-MCNC: 279 MG/DL — HIGH (ref 70–99)

## 2018-07-14 PROCEDURE — 99233 SBSQ HOSP IP/OBS HIGH 50: CPT | Mod: GC

## 2018-07-14 RX ADMIN — Medication 5 UNIT(S): at 09:01

## 2018-07-14 RX ADMIN — Medication 4: at 12:14

## 2018-07-14 RX ADMIN — Medication 5 UNIT(S): at 12:14

## 2018-07-14 RX ADMIN — PIPERACILLIN AND TAZOBACTAM 200 GRAM(S): 4; .5 INJECTION, POWDER, LYOPHILIZED, FOR SOLUTION INTRAVENOUS at 22:51

## 2018-07-14 RX ADMIN — PIPERACILLIN AND TAZOBACTAM 200 GRAM(S): 4; .5 INJECTION, POWDER, LYOPHILIZED, FOR SOLUTION INTRAVENOUS at 11:42

## 2018-07-14 RX ADMIN — PIPERACILLIN AND TAZOBACTAM 200 GRAM(S): 4; .5 INJECTION, POWDER, LYOPHILIZED, FOR SOLUTION INTRAVENOUS at 17:16

## 2018-07-14 RX ADMIN — PIPERACILLIN AND TAZOBACTAM 200 GRAM(S): 4; .5 INJECTION, POWDER, LYOPHILIZED, FOR SOLUTION INTRAVENOUS at 05:27

## 2018-07-14 RX ADMIN — INSULIN GLARGINE 32 UNIT(S): 100 INJECTION, SOLUTION SUBCUTANEOUS at 21:25

## 2018-07-14 RX ADMIN — Medication 5 UNIT(S): at 17:16

## 2018-07-14 RX ADMIN — Medication 250 MILLIGRAM(S): at 15:57

## 2018-07-14 RX ADMIN — Medication 250 MILLIGRAM(S): at 03:23

## 2018-07-14 RX ADMIN — Medication 4: at 17:16

## 2018-07-14 RX ADMIN — Medication 6: at 21:25

## 2018-07-14 NOTE — PROGRESS NOTE ADULT - PROBLEM SELECTOR PLAN 7
Patient's case and plan discussed with medicine team and consultation services.  Patient's PCP is [facility]. Patient will be going to acute rehab on monday.

## 2018-07-14 NOTE — PROGRESS NOTE ADULT - PROBLEM SELECTOR PROBLEM 7
Transition of care performed with sharing of clinical summary

## 2018-07-14 NOTE — PROGRESS NOTE ADULT - SUBJECTIVE AND OBJECTIVE BOX
Patient is a 42y old  Male who presents with a chief complaint of Toe ulcer with purulent drainage (11 Jul 2018 14:44)      INTERVAL HPI/OVERNIGHT EVENTS: No acute events O/N. No complaints.     Review of Systems: 12 point review of systems otherwise negative      MEDICATIONS  (STANDING):  atorvastatin 40 milliGRAM(s) Oral at bedtime  dextrose 5%. 1000 milliLiter(s) (50 mL/Hr) IV Continuous <Continuous>  dextrose 50% Injectable 12.5 Gram(s) IV Push once  dextrose 50% Injectable 25 Gram(s) IV Push once  dextrose 50% Injectable 25 Gram(s) IV Push once  insulin glargine Injectable (LANTUS) 32 Unit(s) SubCutaneous at bedtime  insulin lispro (HumaLOG) corrective regimen sliding scale   SubCutaneous Before meals and at bedtime  insulin lispro Injectable (HumaLOG) 5 Unit(s) SubCutaneous three times a day before meals  piperacillin/tazobactam IVPB. 4.5 Gram(s) IV Intermittent every 6 hours  sodium chloride 0.9% lock flush 20 milliLiter(s) IV Push once  vancomycin  IVPB 1000 milliGRAM(s) IV Intermittent every 12 hours    MEDICATIONS  (PRN):  acetaminophen   Tablet. 650 milliGRAM(s) Oral every 6 hours PRN Moderate Pain (4 - 6)  dextrose 40% Gel 15 Gram(s) Oral once PRN Blood Glucose LESS THAN 70 milliGRAM(s)/deciliter  glucagon  Injectable 1 milliGRAM(s) IntraMuscular once PRN Glucose LESS THAN 70 milligrams/deciliter  sodium chloride 0.9% lock flush 10 milliLiter(s) IV Push every 1 hour PRN After each medication administration  sodium chloride 0.9% lock flush 10 milliLiter(s) IV Push every 12 hours PRN Lumen of catheter NOT used      Allergies    No Known Allergies    Intolerances          Vital Signs Last 24 Hrs  T(C): 37 (14 Jul 2018 16:49), Max: 37 (14 Jul 2018 16:49)  T(F): 98.6 (14 Jul 2018 16:49), Max: 98.6 (14 Jul 2018 16:49)  HR: 78 (14 Jul 2018 16:49) (69 - 87)  BP: 114/75 (14 Jul 2018 14:07) (103/71 - 171/92)  BP(mean): --  RR: 17 (14 Jul 2018 16:49) (16 - 18)  SpO2: 99% (14 Jul 2018 16:49) (98% - 100%)  CAPILLARY BLOOD GLUCOSE      POCT Blood Glucose.: 244 mg/dL (14 Jul 2018 11:13)  POCT Blood Glucose.: 148 mg/dL (14 Jul 2018 07:36)  POCT Blood Glucose.: 304 mg/dL (13 Jul 2018 23:02)  POCT Blood Glucose.: 342 mg/dL (13 Jul 2018 21:24)  POCT Blood Glucose.: 189 mg/dL (13 Jul 2018 17:10)      07-13 @ 07:01 - 07-14 @ 07:00  --------------------------------------------------------  IN: 1250 mL / OUT: 900 mL / NET: 350 mL    07-14 @ 07:01 - 07-14 @ 16:51  --------------------------------------------------------  IN: 1380 mL / OUT: 1150 mL / NET: 230 mL        Physical Exam:    General:  Well appearing, NAD, not cachetic  HEENT:  Nonicteric, PERRLA  CV:  RRR, no murmur, no JVD  Lungs:  CTA B/L, no wheezes, rales, rhonchi  Abdomen:  Soft, non-tender, no distended, positive BS  Extremities:  Left foot wrapped and dressing C/D/I  Skin:  Warm and dry, no rashes  :  No johnson  Neuro:  AAOx3  No Restraints    LABS:                  RADIOLOGY & ADDITIONAL TESTS:    ---------------------------------------------------------------------------  I personally reviewed: [  ]EKG   [  ]CXR    [  ] CT    [  ]Other  ---------------------------------------------------------------------------  PLEASE CHECK WHEN PRESENT:     [  ]Heart Failure     [  ] Acute     [  ] Acute on Chronic     [  ] Chronic  -------------------------------------------------------------------     [  ]Diastolic [HFpEF]     [  ]Systolic [HFrEF]     [  ]Combined [HFpEF & HFrEF]     [  ]Other:  -------------------------------------------------------------------  [  ]CHRIST     [  ]ATN     [  ]Reneal Medullary Necrosis     [  ]Renal Cortical Necrosis     [  ]Other Pathological Lesions:    [  ]CKD 1  [  ]CKD 2  [  ]CKD 3  [  ]CKD 4  [  ]CKD 5  [  ]Other  -------------------------------------------------------------------  [  ]Other/Unspecified:    --------------------------------------------------------------------    Abdominal Nutritional Status  [  ]Malnutrition: See Nutrition Note  [  ]Cachexia  [  ]Other:   [  ]Supplement Ordered:  [  ]Morbid Obesity (BMI >=40]

## 2018-07-14 NOTE — PROGRESS NOTE ADULT - PROBLEM SELECTOR PLAN 3
Patient has a history of type II diabetes requiring insulin (April 2018 A1c 12.9%). He is s/p Toe amputation x 2.  --c/w ISS, c/w premeal 3 units  --c/w Lantus dose 27U sc qhs  --Give regular insulin if patient becomes hyperglycemic

## 2018-07-14 NOTE — PROGRESS NOTE ADULT - SUBJECTIVE AND OBJECTIVE BOX
PROGRESS NOTE   Patient is a 42y old  Male who presents with a chief complaint of Toe ulcer with purulent drainage (11 Jul 2018 14:44)      Interval History: Pt is seen and examined bedside. Denies pain in right foot. Pt states that he has not been putting weight on his right foot. No complaints at this time.  No acute events overnight. Denies fever, chills, nausea, emesis, chest pain, dyspnea, abdominal pain.     Vital Signs Last 24 Hrs  T(C): 36.2 (14 Jul 2018 08:30), Max: 36.9 (13 Jul 2018 15:02)  T(F): 97.1 (14 Jul 2018 08:30), Max: 98.5 (13 Jul 2018 15:02)  HR: 70 (14 Jul 2018 08:30) (69 - 87)  BP: 123/83 (14 Jul 2018 08:30) (103/71 - 171/92)  BP(mean): --  RR: 16 (14 Jul 2018 08:30) (16 - 18)  SpO2: 100% (14 Jul 2018 08:30) (98% - 100%)                          12.7   9.7   )-----------( 565      ( 11 Jul 2018 07:04 )             38.2               07-11    136  |  97  |  16  ----------------------------<  175<H>  4.6   |  28  |  1.26    Ca    9.8      11 Jul 2018 07:04  Phos  3.3     07-11  Mg     2.0     07-11    RADIOLOGY  < from: Xray Foot AP + Lateral + Oblique, Right (07.06.18 @ 17:25) >  Impression: Findings consistent with soft tissue infection of the second   toe.      < end of copied text >      MICROBIOLOGY  Culture - Surgical Swab (07.06.18 @ 19:47)  Culture - Tissue with Gram Stain (07.09.18 @ 22:26)    Gram Stain:   No organisms seen  Rare White blood cells    Specimen Source: .Tissue senond toe rt foot clear margin or    Culture Results:   Rare Gram Positive Cocci Identification and susceptibility to follow.  Culture in progress      -  Ampicillin: S    Gram Stain:   Few Gram positive cocci in pairs, chains and clusters  Few Gram Negative Rods  No WBC's seen.    -  Clindamycin: R    -  Erythromycin: R    -  Penicillin: S Predicts results for ampicillin, amoxicillin, amoxicillin/clavulanate, ampicillin/sulbactam, 1st, 2nd and 3rd generation cephalosporins and carbapenems.    -  Vancomycin: S    Specimen Source: .Surgical Swab right 2nd toe infection    Culture Results:   Numerous Streptococcus agalactiae (Group B)  Rare Gram Negative Rods  Identification and susceptibility to follow.  Numerous Prevotella bivia Beta lactamase positive    Organism Identification: Streptococcus agalactiae (Group B)    Organism: Streptococcus agalactiae (Group B)    Method Type: KB    Culture - Tissue with Gram Stain (07.09.18 @ 22:26)    Gram Stain:   No organisms seen  Rare White blood cells    Specimen Source: .Tissue senond toe rt foot clear margin or    Culture Results:   Rare Gram Positive Cocci Identification and susceptibility to follow.  Culture in progress      Culture - Acid Fast - Tissue w/Smear (07.10.18 @ 05:50)    Specimen Source: .Tissue senond toe rt foot clear margin or    Acid Fast Bacilli Smear:   No acid fast bacilli seen by fluorochrome stain                      PHYSICAL EXAM  GEN: ARIEL MOURA is a pleasant well-nourished, well developed 42y Male in no acute distress, alert awake, and oriented to person, place and time.   LE Focused:  Dressing c/d/i. No strikethrough. Incision site of the right 2nd digit is well-coapted, sutures intact. no dehiscence.  no drainage. no clinical signs of infection. Plantar ulcer with integra graft sutured in place with overlying adaptic. sutures intact. no dehiscence. no drainage.  no clinical sings of infection.

## 2018-07-15 DIAGNOSIS — N17.9 ACUTE KIDNEY FAILURE, UNSPECIFIED: ICD-10-CM

## 2018-07-15 LAB
ANION GAP SERPL CALC-SCNC: 13 MMOL/L — SIGNIFICANT CHANGE UP (ref 5–17)
APPEARANCE UR: CLEAR — SIGNIFICANT CHANGE UP
BASOPHILS NFR BLD AUTO: 0.4 % — SIGNIFICANT CHANGE UP (ref 0–2)
BILIRUB UR-MCNC: NEGATIVE — SIGNIFICANT CHANGE UP
BUN SERPL-MCNC: 18 MG/DL — SIGNIFICANT CHANGE UP (ref 7–23)
CALCIUM SERPL-MCNC: 9.2 MG/DL — SIGNIFICANT CHANGE UP (ref 8.4–10.5)
CHLORIDE SERPL-SCNC: 98 MMOL/L — SIGNIFICANT CHANGE UP (ref 96–108)
CO2 SERPL-SCNC: 27 MMOL/L — SIGNIFICANT CHANGE UP (ref 22–31)
COLOR SPEC: YELLOW — SIGNIFICANT CHANGE UP
CREAT ?TM UR-MCNC: 60 MG/DL — SIGNIFICANT CHANGE UP
CREAT ?TM UR-MCNC: 64 MG/DL — SIGNIFICANT CHANGE UP
CREAT SERPL-MCNC: 1.41 MG/DL — HIGH (ref 0.5–1.3)
CRP SERPL-MCNC: 0.8 MG/DL — HIGH (ref 0–0.4)
DIFF PNL FLD: NEGATIVE — SIGNIFICANT CHANGE UP
EOSINOPHIL NFR BLD AUTO: 3.6 % — SIGNIFICANT CHANGE UP (ref 0–6)
ERYTHROCYTE [SEDIMENTATION RATE] IN BLOOD: 50 MM/HR — HIGH
GLUCOSE BLDC GLUCOMTR-MCNC: 185 MG/DL — HIGH (ref 70–99)
GLUCOSE BLDC GLUCOMTR-MCNC: 213 MG/DL — HIGH (ref 70–99)
GLUCOSE BLDC GLUCOMTR-MCNC: 246 MG/DL — HIGH (ref 70–99)
GLUCOSE BLDC GLUCOMTR-MCNC: 300 MG/DL — HIGH (ref 70–99)
GLUCOSE SERPL-MCNC: 219 MG/DL — HIGH (ref 70–99)
GLUCOSE UR QL: 250
HCT VFR BLD CALC: 40.2 % — SIGNIFICANT CHANGE UP (ref 39–50)
HGB BLD-MCNC: 13.4 G/DL — SIGNIFICANT CHANGE UP (ref 13–17)
KETONES UR-MCNC: NEGATIVE — SIGNIFICANT CHANGE UP
LEUKOCYTE ESTERASE UR-ACNC: NEGATIVE — SIGNIFICANT CHANGE UP
LYMPHOCYTES # BLD AUTO: 38.1 % — SIGNIFICANT CHANGE UP (ref 13–44)
MAGNESIUM SERPL-MCNC: 1.8 MG/DL — SIGNIFICANT CHANGE UP (ref 1.6–2.6)
MCHC RBC-ENTMCNC: 29.4 PG — SIGNIFICANT CHANGE UP (ref 27–34)
MCHC RBC-ENTMCNC: 33.3 G/DL — SIGNIFICANT CHANGE UP (ref 32–36)
MCV RBC AUTO: 88.2 FL — SIGNIFICANT CHANGE UP (ref 80–100)
MONOCYTES NFR BLD AUTO: 9.2 % — SIGNIFICANT CHANGE UP (ref 2–14)
NEUTROPHILS NFR BLD AUTO: 48.7 % — SIGNIFICANT CHANGE UP (ref 43–77)
NITRITE UR-MCNC: NEGATIVE — SIGNIFICANT CHANGE UP
OSMOLALITY UR: 419 MOSMOL/KG — SIGNIFICANT CHANGE UP (ref 100–650)
PH UR: 6 — SIGNIFICANT CHANGE UP (ref 5–8)
PLATELET # BLD AUTO: 564 K/UL — HIGH (ref 150–400)
POTASSIUM SERPL-MCNC: 3.9 MMOL/L — SIGNIFICANT CHANGE UP (ref 3.5–5.3)
POTASSIUM SERPL-SCNC: 3.9 MMOL/L — SIGNIFICANT CHANGE UP (ref 3.5–5.3)
PROT ?TM UR-MCNC: 22 MG/DL — HIGH (ref 0–12)
PROT UR-MCNC: NEGATIVE MG/DL — SIGNIFICANT CHANGE UP
PROT/CREAT UR-RTO: 0.3 RATIO — HIGH (ref 0–0.2)
RBC # BLD: 4.56 M/UL — SIGNIFICANT CHANGE UP (ref 4.2–5.8)
RBC # FLD: 13.2 % — SIGNIFICANT CHANGE UP (ref 10.3–16.9)
SODIUM SERPL-SCNC: 138 MMOL/L — SIGNIFICANT CHANGE UP (ref 135–145)
SODIUM UR-SCNC: 83 MMOL/L — SIGNIFICANT CHANGE UP
SP GR SPEC: 1.01 — SIGNIFICANT CHANGE UP (ref 1–1.03)
UROBILINOGEN FLD QL: 0.2 E.U./DL — SIGNIFICANT CHANGE UP
VANCOMYCIN TROUGH SERPL-MCNC: 8.8 UG/ML — LOW (ref 10–20)
WBC # BLD: 8.9 K/UL — SIGNIFICANT CHANGE UP (ref 3.8–10.5)
WBC # FLD AUTO: 8.9 K/UL — SIGNIFICANT CHANGE UP (ref 3.8–10.5)

## 2018-07-15 RX ORDER — INSULIN LISPRO 100/ML
7 VIAL (ML) SUBCUTANEOUS
Refills: 0 | Status: DISCONTINUED | OUTPATIENT
Start: 2018-07-16 | End: 2018-07-16

## 2018-07-15 RX ORDER — CHLORHEXIDINE GLUCONATE 213 G/1000ML
1 SOLUTION TOPICAL DAILY
Refills: 0 | Status: DISCONTINUED | OUTPATIENT
Start: 2018-07-15 | End: 2018-07-16

## 2018-07-15 RX ORDER — VANCOMYCIN HCL 1 G
1250 VIAL (EA) INTRAVENOUS EVERY 12 HOURS
Refills: 0 | Status: DISCONTINUED | OUTPATIENT
Start: 2018-07-15 | End: 2018-07-15

## 2018-07-15 RX ORDER — INSULIN GLARGINE 100 [IU]/ML
38 INJECTION, SOLUTION SUBCUTANEOUS AT BEDTIME
Refills: 0 | Status: DISCONTINUED | OUTPATIENT
Start: 2018-07-15 | End: 2018-07-16

## 2018-07-15 RX ORDER — MAGNESIUM SULFATE 500 MG/ML
1 VIAL (ML) INJECTION ONCE
Refills: 0 | Status: COMPLETED | OUTPATIENT
Start: 2018-07-15 | End: 2018-07-15

## 2018-07-15 RX ORDER — VANCOMYCIN HCL 1 G
1250 VIAL (EA) INTRAVENOUS EVERY 12 HOURS
Refills: 0 | Status: DISCONTINUED | OUTPATIENT
Start: 2018-07-15 | End: 2018-07-16

## 2018-07-15 RX ORDER — POTASSIUM CHLORIDE 20 MEQ
10 PACKET (EA) ORAL ONCE
Refills: 0 | Status: COMPLETED | OUTPATIENT
Start: 2018-07-15 | End: 2018-07-15

## 2018-07-15 RX ADMIN — Medication 166.67 MILLIGRAM(S): at 10:57

## 2018-07-15 RX ADMIN — Medication 100 GRAM(S): at 10:01

## 2018-07-15 RX ADMIN — INSULIN GLARGINE 38 UNIT(S): 100 INJECTION, SOLUTION SUBCUTANEOUS at 21:58

## 2018-07-15 RX ADMIN — PIPERACILLIN AND TAZOBACTAM 200 GRAM(S): 4; .5 INJECTION, POWDER, LYOPHILIZED, FOR SOLUTION INTRAVENOUS at 16:40

## 2018-07-15 RX ADMIN — Medication 5 UNIT(S): at 12:15

## 2018-07-15 RX ADMIN — Medication 10 MILLIEQUIVALENT(S): at 10:16

## 2018-07-15 RX ADMIN — Medication 2: at 08:18

## 2018-07-15 RX ADMIN — PIPERACILLIN AND TAZOBACTAM 200 GRAM(S): 4; .5 INJECTION, POWDER, LYOPHILIZED, FOR SOLUTION INTRAVENOUS at 04:18

## 2018-07-15 RX ADMIN — PIPERACILLIN AND TAZOBACTAM 200 GRAM(S): 4; .5 INJECTION, POWDER, LYOPHILIZED, FOR SOLUTION INTRAVENOUS at 21:59

## 2018-07-15 RX ADMIN — Medication 166.67 MILLIGRAM(S): at 23:30

## 2018-07-15 RX ADMIN — Medication 6: at 21:57

## 2018-07-15 RX ADMIN — PIPERACILLIN AND TAZOBACTAM 200 GRAM(S): 4; .5 INJECTION, POWDER, LYOPHILIZED, FOR SOLUTION INTRAVENOUS at 10:16

## 2018-07-15 RX ADMIN — Medication 4: at 12:15

## 2018-07-15 RX ADMIN — Medication 4: at 17:14

## 2018-07-15 RX ADMIN — Medication 5 UNIT(S): at 08:18

## 2018-07-15 RX ADMIN — Medication 5 UNIT(S): at 17:14

## 2018-07-15 NOTE — PROVIDER CONTACT NOTE (MEDICATION) - RECOMMENDATIONS
I contacted the pharmacy department and spoke to Katelin who stated Vancomycin can be mixed in NS but will require a new order with those instructions. MD wax was made aware of this.

## 2018-07-15 NOTE — PROGRESS NOTE ADULT - PROBLEM SELECTOR PLAN 3
DVT Prophylaxis: SCDs. Patient has a history of type II diabetes requiring insulin (April 2018 A1c 12.9%). He is s/p Toe amputation x 2.  --c/w ISS, increased premeal to 5 units  --lantus increased to 32U sc qhs  --still continues to be hyperglycemic w glucose in 200s so changed vancomycin to be in NS instead of D5.

## 2018-07-15 NOTE — PROGRESS NOTE ADULT - PROBLEM SELECTOR PLAN 2
Patient has a history of type II diabetes requiring insulin (April 2018 A1c 12.9%). He is s/p Toe amputation x 2.  --c/w ISS, increased premeal to 5 units  --lantus increased to 32U sc qhs Patient's Cr today jumped to 1.41. Urine Na 83, urine Cr 60. Calculated FeNa score of 1.3% suggests intrinsic CHRIST. Cr was elevated on 7/9 and 7/10 as well.  2/2 unknown etiology. possible vancomycin altho trough subtherapuetic.   --f/u Cr level Patient's Cr today jumped to 1.41. Urine Na 83, urine Cr 60. Calculated FeNa score of 1.3% suggests intrinsic CHRIST. Cr was elevated on 7/9 and 7/10 as well.  2/2 unknown etiology. possible vancomycin altho trough subtherapuetic.   --f/u Cr level  --UA neg for UTI. >250 glucose in UA  --total protein random urine elevated at 22, urine protein/cr ratio elevated at 0.3  --urine osmolality wnl 419 Patient's Cr today jumped up to 1.41. Calculated FeNa score of 1.3% suggests intrinsic CHRIST. Cr was elevated on 7/9 and 7/10 as well.  2/2 unknown etiology. possiblly due to vancomycin altho trough subtherapuetic   --trend serum Cr level  --UA neg for UTI. >250 glucose in UA  --total protein random urine elevated at 22, urine protein/cr ratio elevated at 0.3  --urine osmolality wnl 419 Patient's Cr today jumped up to 1.41. Calculated FeNa score of 1.3% suggests intrinsic CHRIST. Cr was elevated on 7/9 and 7/10 as well.  2/2 unknown etiology. possibly due to vancomycin altho trough subtherapeutic   --trend serum Cr level  --UA neg for UTI. >250 glucose in UA  --total protein random urine elevated at 22, urine protein/cr ratio elevated at 0.3  --urine osmolality wnl 419

## 2018-07-15 NOTE — PROGRESS NOTE ADULT - SUBJECTIVE AND OBJECTIVE BOX
OVERNIGHT EVENTS: ANDREW     SUBJECTIVE / INTERVAL HPI: Patient seen and examined at bedside. Patient has no complaints. Denies any CP, SOB, abdominal pain, n/v, fever/chills. Patient is eating and drinking and has a good appetite. Normal BM. No changes in urination.     VITAL SIGNS:  Vital Signs Last 24 Hrs  T(C): 36.1 (15 Jul 2018 14:25), Max: 37.1 (15 Jul 2018 08:15)  T(F): 97 (15 Jul 2018 14:25), Max: 98.7 (15 Jul 2018 08:15)  HR: 73 (15 Jul 2018 14:25) (69 - 78)  BP: 120/79 (15 Jul 2018 14:25) (114/72 - 147/91)  BP(mean): --  RR: 17 (15 Jul 2018 14:25) (15 - 17)  SpO2: 95% (15 Jul 2018 14:25) (95% - 100%)    PHYSICAL EXAM:    General: WDWN  HEENT: NCAT; PERRL, anicteric sclera; MMM  Neck: supple, trachea midline  Cardiovascular: S1, S2 normal; RRR, no M/G/R  Respiratory: CTABL; no W/R/R  Gastrointestinal: soft, nontender, nondistended. bowel sounds present.  Skin: no ulcerations or visible rashes appreciated  Extremities: No GEOVANY, picc line inserted on R arm, R foot is wrapped neatly in a dressing.   Vascular: 2+ radial, DP/PT pulses B/L  Neurological: AAOx3    MEDICATIONS:  MEDICATIONS  (STANDING):  atorvastatin 40 milliGRAM(s) Oral at bedtime  chlorhexidine 2% Cloths 1 Application(s) Topical daily  dextrose 5%. 1000 milliLiter(s) (50 mL/Hr) IV Continuous <Continuous>  dextrose 50% Injectable 12.5 Gram(s) IV Push once  dextrose 50% Injectable 25 Gram(s) IV Push once  dextrose 50% Injectable 25 Gram(s) IV Push once  insulin glargine Injectable (LANTUS) 32 Unit(s) SubCutaneous at bedtime  insulin lispro (HumaLOG) corrective regimen sliding scale   SubCutaneous Before meals and at bedtime  insulin lispro Injectable (HumaLOG) 5 Unit(s) SubCutaneous three times a day before meals  piperacillin/tazobactam IVPB. 4.5 Gram(s) IV Intermittent every 6 hours  sodium chloride 0.9% lock flush 20 milliLiter(s) IV Push once  vancomycin  IVPB 1250 milliGRAM(s) IV Intermittent every 12 hours    MEDICATIONS  (PRN):  acetaminophen   Tablet. 650 milliGRAM(s) Oral every 6 hours PRN Moderate Pain (4 - 6)  dextrose 40% Gel 15 Gram(s) Oral once PRN Blood Glucose LESS THAN 70 milliGRAM(s)/deciliter  glucagon  Injectable 1 milliGRAM(s) IntraMuscular once PRN Glucose LESS THAN 70 milligrams/deciliter  sodium chloride 0.9% lock flush 10 milliLiter(s) IV Push every 1 hour PRN After each medication administration  sodium chloride 0.9% lock flush 10 milliLiter(s) IV Push every 12 hours PRN Lumen of catheter NOT used      ALLERGIES:  Allergies    No Known Allergies    Intolerances        LABS:                        13.4   8.9   )-----------( 564      ( 15 Jul 2018 06:00 )             40.2     07-15    138  |  98  |  18  ----------------------------<  219<H>  3.9   |  27  |  1.41<H>    Ca    9.2      15 Jul 2018 06:00  Mg     1.8     07-15          CAPILLARY BLOOD GLUCOSE      POCT Blood Glucose.: 213 mg/dL (15 Jul 2018 11:30)      RADIOLOGY & ADDITIONAL TESTS: Reviewed.

## 2018-07-15 NOTE — PROGRESS NOTE ADULT - PROBLEM SELECTOR PROBLEM 5
Transition of care performed with sharing of clinical summary Nutrition, metabolism, and development symptoms

## 2018-07-15 NOTE — PROGRESS NOTE ADULT - ASSESSMENT
42M DM2 h/o osteomyelitis and amputation right 4th 5th toes 3 days s/p Right 2nd toe amputation, ulcer debridement and integra graft application      -Applied adaptic to the plantar wound and a DSD consisting of 4x4 gauze, abd pad, kerlix and an ace bandage to the RLE.   - Abx per ID.   - will need to be non weight bearing Right LE for 2 weeks  -f/u with Dr. Green in 1 week (call [phone] to make appt

## 2018-07-15 NOTE — PROVIDER CONTACT NOTE (MEDICATION) - ASSESSMENT
Patient has been having high sugar levels ranging between 200-304 in the past two to three days. Patient is receiving zosyn q6 and Vancomycin q12. Vancomycin is mixed in d5 which can be contributing to high sugar levels. MD Waters made aware of this.

## 2018-07-15 NOTE — PROGRESS NOTE ADULT - PROBLEM SELECTOR PLAN 1
Patient septic on admission with foot xray showing s/p amputation of 4th and 5th R toe, 3rd digit abnormality (possible fracture?), hyperlucency of 2nd middle phalange. Likely osteomyelitis, unlikely nec fasc as CT R foot without significant collection of gas. S/p Vanc/Zosyn and Clindamycin x1 in ED. ESR 86. Final cx results positive for enterococcus and coag neg staph.   --vanc trough subtherapeutic today at 8.8. (goal vanc 15-20). Increased vanc to 1250 mg IV q12 to cover coag neg staph.   -- c/w zosyn   --PICC placement 7/13 as ID recs are now IV antibiotics for another 4-6 weeks due to IV is preferred in osteomyelitis and the infectious is polymicrobial so would be difficult to cover w PO antibiotics.   --ID recs appreciated.  --Podiatry recs appreciated: For plantar wound should apply adaptic and a dry sterile dressing consisting of 4x4 gauze, abd pad, kerlix, and an ace bandage to the RLE. The dressing should be changed once a day. No weight bearing of right lower extremity for 2 weeks.

## 2018-07-15 NOTE — PROVIDER CONTACT NOTE (MEDICATION) - BACKGROUND
Patient has hx of 4th and 5th toe amputations. Hx of diabetes, hyponatremia, sepsis, and diabetic foot ulcer.

## 2018-07-15 NOTE — PROVIDER CONTACT NOTE (MEDICATION) - ACTION/TREATMENT ORDERED:
MD Waters is aware of suggestion to change vancomycin mixture and stated she will contact the resident for  a decision.

## 2018-07-15 NOTE — PROGRESS NOTE ADULT - SUBJECTIVE AND OBJECTIVE BOX
PROGRESS NOTE   Patient is a 42y old  Male who presents with a chief complaint of Toe ulcer with purulent drainage (11 Jul 2018 14:44)      Interval History: Pt is seen bedside with attending present. No complaints at this time. No acute events overnight. Denies fever, chills, nausea, emesis, chest pain, dyspnea, abdominal pain.     Vital Signs Last 24 Hrs  T(C): 37.1 (15 Jul 2018 08:15), Max: 37.1 (15 Jul 2018 08:15)  T(F): 98.7 (15 Jul 2018 08:15), Max: 98.7 (15 Jul 2018 08:15)  HR: 78 (15 Jul 2018 08:15) (69 - 78)  BP: 114/72 (15 Jul 2018 08:15) (114/72 - 147/91)  BP(mean): --  RR: 16 (15 Jul 2018 08:15) (15 - 17)  SpO2: 100% (15 Jul 2018 08:15) (98% - 100%)                          13.4   8.9   )-----------( 564      ( 15 Jul 2018 06:00 )             40.2               07-15    138  |  98  |  18  ----------------------------<  219<H>  3.9   |  27  |  1.41<H>    Ca    9.2      15 Jul 2018 06:00  Mg     1.8     07-15    RADIOLOGY  < from: Xray Foot AP + Lateral + Oblique, Right (07.06.18 @ 17:25) >  Impression: Findings consistent with soft tissue infection of the second   toe.      < end of copied text >      MICROBIOLOGY  Culture - Surgical Swab (07.06.18 @ 19:47)  Culture - Tissue with Gram Stain (07.09.18 @ 22:26)    Gram Stain:   No organisms seen  Rare White blood cells    Specimen Source: .Tissue senond toe rt foot clear margin or    Culture Results:   Rare Gram Positive Cocci Identification and susceptibility to follow.  Culture in progress      -  Ampicillin: S    Gram Stain:   Few Gram positive cocci in pairs, chains and clusters  Few Gram Negative Rods  No WBC's seen.    -  Clindamycin: R    -  Erythromycin: R    -  Penicillin: S Predicts results for ampicillin, amoxicillin, amoxicillin/clavulanate, ampicillin/sulbactam, 1st, 2nd and 3rd generation cephalosporins and carbapenems.    -  Vancomycin: S    Specimen Source: .Surgical Swab right 2nd toe infection    Culture Results:   Numerous Streptococcus agalactiae (Group B)  Rare Gram Negative Rods  Identification and susceptibility to follow.  Numerous Prevotella bivia Beta lactamase positive    Organism Identification: Streptococcus agalactiae (Group B)    Organism: Streptococcus agalactiae (Group B)    Method Type: KB    Culture - Tissue with Gram Stain (07.09.18 @ 22:26)    Gram Stain:   No organisms seen  Rare White blood cells    Specimen Source: .Tissue senond toe rt foot clear margin or    Culture Results:   Rare Gram Positive Cocci Identification and susceptibility to follow.  Culture in progress      Culture - Acid Fast - Tissue w/Smear (07.10.18 @ 05:50)    Specimen Source: .Tissue senond toe rt foot clear margin or    Acid Fast Bacilli Smear:   No acid fast bacilli seen by fluorochrome stain                    PHYSICAL EXAM  GEN: ARIEL MOURA is a pleasant well-nourished, well developed 42y Male in no acute distress, alert awake, and oriented to person, place and time.     LE Focused:  Dressing c/d/i. No strikethrough. Incision site of the right 2nd digit is well-coapted, sutures intact. no dehiscence.  no drainage. no clinical signs of infection. Plantar ulcer with integra graft sutured in place with overlying adaptic. sutures intact. no dehiscence. no drainage.  no clinical sings of infection.

## 2018-07-15 NOTE — PROGRESS NOTE ADULT - ASSESSMENT
42 year old male with poorly controlled diabetes (A1C 12.9 on insulin), s/p right 4th and 5th toe amputation (2015 and 2017), recent admission for osteomyelitis on base of right foot, treated with IV Abx, presented with 1 day of fever, pain and discharge of right 2nd toe likely 2/2 osteomyelitis associated with fever, fatigue, and confusion. Now is s/p amputation of R 2nd toe 7/9/18. s/p R picc placement on 7/13/18.

## 2018-07-15 NOTE — PROGRESS NOTE ADULT - PROBLEM SELECTOR PLAN 5
Patient's case and plan discussed with medicine team and consultation services.  Patient's PCP is [facility]. Patient will be going to acute rehab on monday. F: None  E: Replete PRN. Mg>2, K>4  N: consistent carbohydrate

## 2018-07-15 NOTE — PROGRESS NOTE ADULT - PROBLEM SELECTOR PLAN 6
Patient's case and plan discussed with medicine team and consultation services.  Patient's PCP is [facility]. Patient will be going to acute rehab on monday 7/16/18.

## 2018-07-16 ENCOUNTER — APPOINTMENT (OUTPATIENT)
Dept: INTERNAL MEDICINE | Facility: CLINIC | Age: 43
End: 2018-07-16

## 2018-07-16 VITALS
OXYGEN SATURATION: 97 % | RESPIRATION RATE: 16 BRPM | TEMPERATURE: 98 F | DIASTOLIC BLOOD PRESSURE: 88 MMHG | HEART RATE: 74 BPM | SYSTOLIC BLOOD PRESSURE: 145 MMHG

## 2018-07-16 DIAGNOSIS — M86.171 OTHER ACUTE OSTEOMYELITIS, RIGHT ANKLE AND FOOT: ICD-10-CM

## 2018-07-16 DIAGNOSIS — R80.9 PROTEINURIA, UNSPECIFIED: ICD-10-CM

## 2018-07-16 LAB
ALBUMIN SERPL ELPH-MCNC: 3.4 G/DL — SIGNIFICANT CHANGE UP (ref 3.3–5)
ALP SERPL-CCNC: 100 U/L — SIGNIFICANT CHANGE UP (ref 40–120)
ALT FLD-CCNC: 31 U/L — SIGNIFICANT CHANGE UP (ref 10–45)
ANION GAP SERPL CALC-SCNC: 12 MMOL/L — SIGNIFICANT CHANGE UP (ref 5–17)
AST SERPL-CCNC: 17 U/L — SIGNIFICANT CHANGE UP (ref 10–40)
BILIRUB SERPL-MCNC: 0.2 MG/DL — SIGNIFICANT CHANGE UP (ref 0.2–1.2)
BUN SERPL-MCNC: 21 MG/DL — SIGNIFICANT CHANGE UP (ref 7–23)
CALCIUM SERPL-MCNC: 9.1 MG/DL — SIGNIFICANT CHANGE UP (ref 8.4–10.5)
CHLORIDE SERPL-SCNC: 101 MMOL/L — SIGNIFICANT CHANGE UP (ref 96–108)
CO2 SERPL-SCNC: 25 MMOL/L — SIGNIFICANT CHANGE UP (ref 22–31)
CREAT SERPL-MCNC: 1.19 MG/DL — SIGNIFICANT CHANGE UP (ref 0.5–1.3)
GLUCOSE BLDC GLUCOMTR-MCNC: 160 MG/DL — HIGH (ref 70–99)
GLUCOSE BLDC GLUCOMTR-MCNC: 193 MG/DL — HIGH (ref 70–99)
GLUCOSE BLDC GLUCOMTR-MCNC: 208 MG/DL — HIGH (ref 70–99)
GLUCOSE SERPL-MCNC: 224 MG/DL — HIGH (ref 70–99)
MAGNESIUM SERPL-MCNC: 1.7 MG/DL — SIGNIFICANT CHANGE UP (ref 1.6–2.6)
POTASSIUM SERPL-MCNC: 4.1 MMOL/L — SIGNIFICANT CHANGE UP (ref 3.5–5.3)
POTASSIUM SERPL-SCNC: 4.1 MMOL/L — SIGNIFICANT CHANGE UP (ref 3.5–5.3)
PROT SERPL-MCNC: 8.2 G/DL — SIGNIFICANT CHANGE UP (ref 6–8.3)
SODIUM SERPL-SCNC: 138 MMOL/L — SIGNIFICANT CHANGE UP (ref 135–145)

## 2018-07-16 PROCEDURE — 83935 ASSAY OF URINE OSMOLALITY: CPT

## 2018-07-16 PROCEDURE — 83735 ASSAY OF MAGNESIUM: CPT

## 2018-07-16 PROCEDURE — 36569 INSJ PICC 5 YR+ W/O IMAGING: CPT

## 2018-07-16 PROCEDURE — 86901 BLOOD TYPING SEROLOGIC RH(D): CPT

## 2018-07-16 PROCEDURE — 84100 ASSAY OF PHOSPHORUS: CPT

## 2018-07-16 PROCEDURE — 84156 ASSAY OF PROTEIN URINE: CPT

## 2018-07-16 PROCEDURE — 76770 US EXAM ABDO BACK WALL COMP: CPT | Mod: 26

## 2018-07-16 PROCEDURE — 87070 CULTURE OTHR SPECIMN AEROBIC: CPT

## 2018-07-16 PROCEDURE — 99238 HOSP IP/OBS DSCHRG MGMT 30/<: CPT

## 2018-07-16 PROCEDURE — 84300 ASSAY OF URINE SODIUM: CPT

## 2018-07-16 PROCEDURE — 85025 COMPLETE CBC W/AUTO DIFF WBC: CPT

## 2018-07-16 PROCEDURE — C9363: CPT

## 2018-07-16 PROCEDURE — 86900 BLOOD TYPING SEROLOGIC ABO: CPT

## 2018-07-16 PROCEDURE — 80202 ASSAY OF VANCOMYCIN: CPT

## 2018-07-16 PROCEDURE — 85610 PROTHROMBIN TIME: CPT

## 2018-07-16 PROCEDURE — 85730 THROMBOPLASTIN TIME PARTIAL: CPT

## 2018-07-16 PROCEDURE — 83036 HEMOGLOBIN GLYCOSYLATED A1C: CPT

## 2018-07-16 PROCEDURE — 86850 RBC ANTIBODY SCREEN: CPT

## 2018-07-16 PROCEDURE — 88305 TISSUE EXAM BY PATHOLOGIST: CPT

## 2018-07-16 PROCEDURE — 88311 DECALCIFY TISSUE: CPT

## 2018-07-16 PROCEDURE — 80048 BASIC METABOLIC PNL TOTAL CA: CPT

## 2018-07-16 PROCEDURE — 99285 EMERGENCY DEPT VISIT HI MDM: CPT | Mod: 25

## 2018-07-16 PROCEDURE — 87102 FUNGUS ISOLATION CULTURE: CPT

## 2018-07-16 PROCEDURE — 76937 US GUIDE VASCULAR ACCESS: CPT

## 2018-07-16 PROCEDURE — 96374 THER/PROPH/DIAG INJ IV PUSH: CPT

## 2018-07-16 PROCEDURE — 73630 X-RAY EXAM OF FOOT: CPT

## 2018-07-16 PROCEDURE — 76770 US EXAM ABDO BACK WALL COMP: CPT

## 2018-07-16 PROCEDURE — 97530 THERAPEUTIC ACTIVITIES: CPT

## 2018-07-16 PROCEDURE — 83605 ASSAY OF LACTIC ACID: CPT

## 2018-07-16 PROCEDURE — C1751: CPT

## 2018-07-16 PROCEDURE — 97161 PT EVAL LOW COMPLEX 20 MIN: CPT

## 2018-07-16 PROCEDURE — 87186 SC STD MICRODIL/AGAR DIL: CPT

## 2018-07-16 PROCEDURE — 97110 THERAPEUTIC EXERCISES: CPT

## 2018-07-16 PROCEDURE — 87040 BLOOD CULTURE FOR BACTERIA: CPT

## 2018-07-16 PROCEDURE — 86140 C-REACTIVE PROTEIN: CPT

## 2018-07-16 PROCEDURE — 85027 COMPLETE CBC AUTOMATED: CPT

## 2018-07-16 PROCEDURE — 82570 ASSAY OF URINE CREATININE: CPT

## 2018-07-16 PROCEDURE — 73719 MRI LOWER EXTREMITY W/DYE: CPT

## 2018-07-16 PROCEDURE — 82962 GLUCOSE BLOOD TEST: CPT

## 2018-07-16 PROCEDURE — 73700 CT LOWER EXTREMITY W/O DYE: CPT

## 2018-07-16 PROCEDURE — 93005 ELECTROCARDIOGRAM TRACING: CPT

## 2018-07-16 PROCEDURE — 99232 SBSQ HOSP IP/OBS MODERATE 35: CPT

## 2018-07-16 PROCEDURE — 99233 SBSQ HOSP IP/OBS HIGH 50: CPT

## 2018-07-16 PROCEDURE — 87116 MYCOBACTERIA CULTURE: CPT

## 2018-07-16 PROCEDURE — 85652 RBC SED RATE AUTOMATED: CPT

## 2018-07-16 PROCEDURE — 87015 SPECIMEN INFECT AGNT CONCNTJ: CPT

## 2018-07-16 PROCEDURE — A9585: CPT

## 2018-07-16 PROCEDURE — 36415 COLL VENOUS BLD VENIPUNCTURE: CPT

## 2018-07-16 PROCEDURE — 87075 CULTR BACTERIA EXCEPT BLOOD: CPT

## 2018-07-16 PROCEDURE — 81003 URINALYSIS AUTO W/O SCOPE: CPT

## 2018-07-16 PROCEDURE — 87184 SC STD DISK METHOD PER PLATE: CPT

## 2018-07-16 PROCEDURE — 80053 COMPREHEN METABOLIC PANEL: CPT

## 2018-07-16 PROCEDURE — 87206 SMEAR FLUORESCENT/ACID STAI: CPT

## 2018-07-16 RX ORDER — LISINOPRIL 2.5 MG/1
1 TABLET ORAL
Qty: 0 | Refills: 0 | DISCHARGE
Start: 2018-07-16

## 2018-07-16 RX ORDER — INSULIN GLARGINE 100 [IU]/ML
38 INJECTION, SOLUTION SUBCUTANEOUS
Qty: 6 | Refills: 0
Start: 2018-07-16 | End: 2018-07-29

## 2018-07-16 RX ORDER — VANCOMYCIN HCL 1 G
1250 VIAL (EA) INTRAVENOUS
Qty: 0 | Refills: 0 | DISCHARGE
Start: 2018-07-16 | End: 2018-08-20

## 2018-07-16 RX ORDER — PIPERACILLIN AND TAZOBACTAM 4; .5 G/20ML; G/20ML
4.5 INJECTION, POWDER, LYOPHILIZED, FOR SOLUTION INTRAVENOUS
Qty: 0 | Refills: 0 | DISCHARGE
Start: 2018-07-16 | End: 2018-08-20

## 2018-07-16 RX ORDER — INSULIN LISPRO 100/ML
8 VIAL (ML) SUBCUTANEOUS
Qty: 4 | Refills: 0
Start: 2018-07-16 | End: 2018-07-29

## 2018-07-16 RX ORDER — MAGNESIUM SULFATE 500 MG/ML
2 VIAL (ML) INJECTION ONCE
Refills: 0 | Status: COMPLETED | OUTPATIENT
Start: 2018-07-16 | End: 2018-07-16

## 2018-07-16 RX ORDER — LISINOPRIL 2.5 MG/1
5 TABLET ORAL DAILY
Refills: 0 | Status: DISCONTINUED | OUTPATIENT
Start: 2018-07-16 | End: 2018-07-16

## 2018-07-16 RX ORDER — INSULIN GLARGINE 100 [IU]/ML
48 INJECTION, SOLUTION SUBCUTANEOUS
Qty: 0 | Refills: 0 | DISCHARGE
Start: 2018-07-16 | End: 2018-07-29

## 2018-07-16 RX ORDER — INSULIN LISPRO 100/ML
8 VIAL (ML) SUBCUTANEOUS
Refills: 0 | Status: DISCONTINUED | OUTPATIENT
Start: 2018-07-16 | End: 2018-07-16

## 2018-07-16 RX ADMIN — Medication 8 UNIT(S): at 13:09

## 2018-07-16 RX ADMIN — Medication 7 UNIT(S): at 08:10

## 2018-07-16 RX ADMIN — Medication 2: at 13:09

## 2018-07-16 RX ADMIN — Medication 166.67 MILLIGRAM(S): at 12:59

## 2018-07-16 RX ADMIN — CHLORHEXIDINE GLUCONATE 1 APPLICATION(S): 213 SOLUTION TOPICAL at 12:58

## 2018-07-16 RX ADMIN — PIPERACILLIN AND TAZOBACTAM 200 GRAM(S): 4; .5 INJECTION, POWDER, LYOPHILIZED, FOR SOLUTION INTRAVENOUS at 04:36

## 2018-07-16 RX ADMIN — Medication 2: at 08:10

## 2018-07-16 RX ADMIN — PIPERACILLIN AND TAZOBACTAM 200 GRAM(S): 4; .5 INJECTION, POWDER, LYOPHILIZED, FOR SOLUTION INTRAVENOUS at 09:23

## 2018-07-16 RX ADMIN — Medication 50 GRAM(S): at 08:10

## 2018-07-16 NOTE — PROGRESS NOTE ADULT - PROBLEM SELECTOR PLAN 6
PCP appt at [facility] scheduled for 7/23 at 1:30pm.  Endocrinologist appt with Dr. Chisholm scheduled for 7/23 at 11:20am.   ID appt with Dr. Foy on 7/24 at 11:20am.

## 2018-07-16 NOTE — CONSULT NOTE ADULT - PROBLEM SELECTOR RECOMMENDATION 9
resolved  cr @ 1.19  Abx need to be renally dosed  recommend baseline renal US with doppler inpatient or outpatient.  urineP/creatinine ratio @ 0.3  can resume lisninopril  should be instructed to avoid NSAIDs  needs to follow outpatient with nephrologist  check baseline phos, vit d 1,25, vit d 25

## 2018-07-16 NOTE — PROGRESS NOTE ADULT - ATTENDING COMMENTS
Pt. seen and examined by me earlier today; I have read Dr. Waters's note, I agree w/ her findings and plan of care as documented; Pt. medically-clear for d/c to [facility] w/ outpatient ID and Podiatry f/u - see d/c summary
To OR today for amputation and debridement. D/C Vanco and cont a zosyn. F/U Intra-op cx data.
Cont abx as per ID. Awaiting rehab placement. F/U ID and podiatry recs.

## 2018-07-16 NOTE — CONSULT NOTE ADULT - ASSESSMENT
Patient is a 42 year old male with diabetes II (A1C 12.9 on insulin), s/p right 4th and 5th toe amputation (2015 and 2017), recent admission for osteomyelitis on base of right foot, treated with Abx via PICC (finished course of cefepime under supervision of Dr Foy), who is presenting with 12 hours of pain and discharge from right 2nd toe and found to have worsening kidney function.

## 2018-07-16 NOTE — PROGRESS NOTE ADULT - PROBLEM SELECTOR PROBLEM 6
Nutrition, metabolism, and development symptoms
Transition of care performed with sharing of clinical summary
Transition of care performed with sharing of clinical summary
Nutrition, metabolism, and development symptoms
Nutrition, metabolism, and development symptoms

## 2018-07-16 NOTE — PROGRESS NOTE ADULT - SUBJECTIVE AND OBJECTIVE BOX
INTERVAL HPI/OVERNIGHT EVENTS:    Patient was seen and examined. No complaints.  PICC line has been placed.  Awaiting discharge to rehab    CONSTITUTIONAL:  Negative fever or chills, feels well, good appetite  EYES:  Negative  blurry vision or double vision  CARDIOVASCULAR:  Negative for chest pain or palpitations  RESPIRATORY:  Negative for cough, wheezing, or SOB   GASTROINTESTINAL:  Negative for nausea, vomiting, diarrhea, constipation, or abdominal pain  GENITOURINARY:  Negative frequency, urgency or dysuria  NEUROLOGIC:  No headache, confusion, dizziness, lightheadedness      ANTIBIOTICS/RELEVANT:    MEDICATIONS  (STANDING):  atorvastatin 40 milliGRAM(s) Oral at bedtime  chlorhexidine 2% Cloths 1 Application(s) Topical daily  dextrose 5%. 1000 milliLiter(s) (50 mL/Hr) IV Continuous <Continuous>  dextrose 50% Injectable 12.5 Gram(s) IV Push once  dextrose 50% Injectable 25 Gram(s) IV Push once  dextrose 50% Injectable 25 Gram(s) IV Push once  insulin glargine Injectable (LANTUS) 38 Unit(s) SubCutaneous at bedtime  insulin lispro (HumaLOG) corrective regimen sliding scale   SubCutaneous Before meals and at bedtime  insulin lispro Injectable (HumaLOG) 8 Unit(s) SubCutaneous three times a day before meals  piperacillin/tazobactam IVPB. 4.5 Gram(s) IV Intermittent every 6 hours  sodium chloride 0.9% lock flush 20 milliLiter(s) IV Push once  vancomycin  IVPB 1250 milliGRAM(s) IV Intermittent every 12 hours    MEDICATIONS  (PRN):  acetaminophen   Tablet. 650 milliGRAM(s) Oral every 6 hours PRN Moderate Pain (4 - 6)  dextrose 40% Gel 15 Gram(s) Oral once PRN Blood Glucose LESS THAN 70 milliGRAM(s)/deciliter  glucagon  Injectable 1 milliGRAM(s) IntraMuscular once PRN Glucose LESS THAN 70 milligrams/deciliter  sodium chloride 0.9% lock flush 10 milliLiter(s) IV Push every 1 hour PRN After each medication administration  sodium chloride 0.9% lock flush 10 milliLiter(s) IV Push every 12 hours PRN Lumen of catheter NOT used        Vital Signs Last 24 Hrs  T(C): 36.4 (2018 08:30), Max: 36.8 (15 Jul 2018 20:50)  T(F): 97.5 (2018 08:30), Max: 98.3 (15 Jul 2018 20:50)  HR: 66 (2018 08:30) (66 - 86)  BP: 126/79 (2018 08:30) (114/78 - 128/83)  BP(mean): --  RR: 16 (2018 08:30) (16 - 18)  SpO2: 98% (2018 08:30) (95% - 99%)    PHYSICAL EXAM:  Constitutional:  non-toxic, no distress  Eyes:  no icterus   Ear/Nose/Throat: no oral lesion, no sinus tenderness on percussion	  Neck:no JVD, no lymphadenopathy, supple  Respiratory: CTA maricruz  Cardiovascular: S1S2 RRR, no murmurs  Gastrointestinal:soft, (+) BS, no HSM  Extremities:  right foot s/p 2nd toe amputation, sutures in place at base, no tenderness, no erythema, no discharge   Vascular: DP Pulse:	right normal; left normal      LABS:                        13.4   8.9   )-----------( 564      ( 15 Jul 2018 06:00 )             40.2     07-16    138  |  101  |  21  ----------------------------<  224<H>  4.1   |  25  |  1.19    Ca    9.1      2018 06:32  Mg     1.7     -    TPro  8.2  /  Alb  3.4  /  TBili  0.2  /  DBili  x   /  AST  17  /  ALT  31  /  AlkPhos  100  07-16      Urinalysis Basic - ( 15 Jul 2018 16:01 )    Color: Yellow / Appearance: Clear / S.010 / pH: x  Gluc: x / Ketone: NEGATIVE  / Bili: Negative / Urobili: 0.2 E.U./dL   Blood: x / Protein: NEGATIVE mg/dL / Nitrite: NEGATIVE   Leuk Esterase: NEGATIVE / RBC: x / WBC x   Sq Epi: x / Non Sq Epi: x / Bacteria: x        MICROBIOLOGY:    Culture - Acid Fast - Tissue w/Smear (07.10.18 @ 05:50)    Specimen Source: .Tissue senond toe rt foot clear margin or    Acid Fast Bacilli Smear:   No acid fast bacilli seen by fluorochrome stain        Culture - Tissue with Gram Stain (18 @ 22:27)    -  RIF- Rifampin: S <=1 Should not be used as monotherapy    -  Trimethoprim/Sulfamethoxazole: S <=0.5/9.5    -  Vancomycin: S 2    -  Vancomycin: S 2    Gram Stain:   Test cannot be performed on this type of specimen.    -  Ampicillin: S <=2 Predicts results to ampicillin/sulbactam, amoxacillin-clavulanate and  piperacillin-tazobactam.    -  Cefazolin: R 8    -  Clindamycin: R >4    -  Erythromycin: R >4    -  Linezolid: S 2    -  Oxacillin: R >2    -  Penicillin: R >8    Specimen Source: .Tissue second toe rt foot or specimen    Culture Results:   Growth in fluid media only Enterococcus faecalis  Growth in fluid media only Coag Negative Staphylococcus    Organism Identification: Enterococcus faecalis  Coag Negative Staphylococcus    Organism: Enterococcus faecalis    Organism: Coag Negative Staphylococcus    Method Type: YUN    Method Type: YUN    Culture - Surgical Swab (18 @ 19:47)    -  Tobramycin: S <=4    -  Vancomycin: S    -  Clindamycin: R    -  Clindamycin: S 0.032    -  Penicillin: S Predicts results for ampicillin, amoxicillin, amoxicillin/clavulanate, ampicillin/sulbactam, 1st, 2nd and 3rd generation cephalosporins and carbapenems.    -  Amoxicillin/Clavulanic Acid: R 64    -  Aztreonam: S <=4    Gram Stain:   Few Gram positive cocci in pairs, chains and clusters  Few Gram Negative Rods  No WBC's seen.    -  Ampicillin: S    -  Ceftazidime: S 4    -  Ciprofloxacin: S <=1    -  Erythromycin: R    -  Gentamicin: S <=4    -  Metronidazole/Anaerobe: S 0.75    -  Moxifloxacin(Aerobic): R 12    -  Piperacillin/Tazobactam: S <=16    Specimen Source: .Surgical Swab right 2nd toe infection    Culture Results:   Numerous Streptococcus agalactiae (Group B)  Rare Pseudomonas aeruginosa  Numerous Prevotella bivia Beta lactamase positive    Organism Identification: Streptococcus agalactiae (Group B)  Pseudomonas aeruginosa  Prevotella bivia    Organism: Streptococcus agalactiae (Group B)    Organism: Pseudomonas aeruginosa    Organism: Prevotella bivia    Method Type: KB    Method Type: YUN    Method Type: ETEST        RADIOLOGY & ADDITIONAL STUDIES:

## 2018-07-16 NOTE — PROGRESS NOTE ADULT - PROBLEM SELECTOR PLAN 2
Patient had intrinsic CHRIST yesterday with a Cr of 1.41 and calculated FeNa score of 1.3%. UA negative for UTI. Cr was elevated on 7/9 and 7/10 as well.  2/2 unknown etiology. possibly due to vancomycin.   --CHRIST resolved today with Cr of 1.11<--1.41 (7/15)  --Renal was consulted. Recs appreciated.   --f/u baseline phos, vit d 1,25, vit d 25  --renal u/s w Doppler shows no hydronephrosis, enlarged prostate, mild bladder wall thickening which may suggest some bladder outlet obstruction.   --avoid NSAIDs  --restarted home dose Lisinopril 5mg PO qd for diabetic proteinuria.   --total protein random urine elevated at 22, urine protein/cr ratio elevated at 0.3, urine osmolality wnl 419. Patient had intrinsic CHRIST yesterday with a Cr of 1.41 and calculated FeNa score of 1.3%. UA negative for UTI. Cr was elevated on 7/9 and 7/10 as well.  2/2 unknown etiology. possibly due to vancomycin.   --CHRIST resolved today with Cr of 1.11<--1.41 (7/15)  --Renal was consulted. Recs appreciated.   --f/u baseline phos, vit d 1,25, vit d 25  --renal u/s w Doppler shows no hydronephrosis, enlarged prostate, mild bladder wall thickening which may suggest some bladder outlet obstruction.   --avoid NSAIDs  --patient NOT restarted home dose Lisinopril 5mg PO qd for diabetic proteinuria because it gives him a cough.   --total protein random urine elevated at 22, urine protein/cr ratio elevated at 0.3, urine osmolality wnl 419.

## 2018-07-16 NOTE — PROGRESS NOTE ADULT - PROBLEM SELECTOR PLAN 1
Patient had osteomyelitis with sepsis on admission with foot xray showing s/p amputation of 4th and 5th R toe, 3rd digit abnormality (possible fracture?), hyperlucency of 2nd middle phalange. Likely osteomyelitis, unlikely nec fasc as CT R foot without significant collection of gas. S/p Vanc/Zosyn and Clindamycin x1 in ED. ESR 86. Final cx results positive for enterococcus and coag neg staph. Sepsis now resolved.   --c/w vanc 1250 mg IV q12 to cover coag neg staph and c/w zosyn 4.5mg IV q6h for full 6 week course that started 7/9/18. (Last day will be 8/20/18).  --check vanc trough after 3rd dose tonight.   --PICC placement 7/13 as ID recs are now IV antibiotics for another 4-6 weeks due to IV is preferred in osteomyelitis and the infectious is polymicrobial so would be difficult to cover w PO antibiotics.   --ID recs appreciated.  --weekly CBC, CMP, ESR in 2 weeks.  --Podiatry recs appreciated: For plantar wound should apply adaptic and a dry sterile dressing consisting of 4x4 gauze, abd pad, kerlix, and an ace bandage to the RLE. The dressing should be changed once a day. No weight bearing of right lower extremity for 2 weeks.

## 2018-07-16 NOTE — CONSULT NOTE ADULT - ATTENDING COMMENTS
have reviewed chart and discussed pt status with house staff.   pt to complete renal imagining w/u as outpt, and pt to complete prolonged course of iv abx for foot infection and osteomyelitis.    reenforced need for renal f/u with pt.

## 2018-07-16 NOTE — PROGRESS NOTE ADULT - SUBJECTIVE AND OBJECTIVE BOX
OVERNIGHT EVENTS: ANDREW    SUBJECTIVE / INTERVAL HPI: Patient seen and examined at bedside. Patient has no complaints. Denies CB, SOB, abdominal pain, n/v, d/c, headache, f/c,     VITAL SIGNS:  Vital Signs Last 24 Hrs  T(C): 36.4 (2018 13:45), Max: 36.8 (15 Jul 2018 20:50)  T(F): 97.5 (2018 13:45), Max: 98.3 (15 Jul 2018 20:50)  HR: 74 (2018 13:45) (66 - 86)  BP: 145/88 (2018 13:45) (114/78 - 145/88)  BP(mean): --  RR: 16 (2018 13:45) (16 - 18)  SpO2: 97% (2018 13:45) (95% - 99%)    PHYSICAL EXAM:    General: NAD  HEENT: NCAT; PERRL, anicteric sclera  Neck: supple, trachea midline  Cardiovascular: S1, S2 normal; RRR, no M/G/R  Respiratory: CTABL; no W/R/R  Gastrointestinal: soft, nontender, nondistended. bowel sounds present.  Skin: no ulcerations or visible rashes appreciated  Extremities: WWP; No GEOVANY. R foot is in dressing. picc line in R arm  Vascular: 2+ radial, DP/PT pulses B/L  Neurological: AAOx3; no focal deficits    MEDICATIONS:  MEDICATIONS  (STANDING):  atorvastatin 40 milliGRAM(s) Oral at bedtime  chlorhexidine 2% Cloths 1 Application(s) Topical daily  dextrose 5%. 1000 milliLiter(s) (50 mL/Hr) IV Continuous <Continuous>  dextrose 50% Injectable 12.5 Gram(s) IV Push once  dextrose 50% Injectable 25 Gram(s) IV Push once  dextrose 50% Injectable 25 Gram(s) IV Push once  insulin glargine Injectable (LANTUS) 38 Unit(s) SubCutaneous at bedtime  insulin lispro (HumaLOG) corrective regimen sliding scale   SubCutaneous Before meals and at bedtime  insulin lispro Injectable (HumaLOG) 8 Unit(s) SubCutaneous three times a day before meals  lisinopril 5 milliGRAM(s) Oral daily  piperacillin/tazobactam IVPB. 4.5 Gram(s) IV Intermittent every 6 hours  sodium chloride 0.9% lock flush 20 milliLiter(s) IV Push once  vancomycin  IVPB 1250 milliGRAM(s) IV Intermittent every 12 hours    MEDICATIONS  (PRN):  acetaminophen   Tablet. 650 milliGRAM(s) Oral every 6 hours PRN Moderate Pain (4 - 6)  dextrose 40% Gel 15 Gram(s) Oral once PRN Blood Glucose LESS THAN 70 milliGRAM(s)/deciliter  glucagon  Injectable 1 milliGRAM(s) IntraMuscular once PRN Glucose LESS THAN 70 milligrams/deciliter  sodium chloride 0.9% lock flush 10 milliLiter(s) IV Push every 1 hour PRN After each medication administration  sodium chloride 0.9% lock flush 10 milliLiter(s) IV Push every 12 hours PRN Lumen of catheter NOT used      ALLERGIES:  Allergies    No Known Allergies    Intolerances        LABS:                        13.4   8.9   )-----------( 564      ( 15 Jul 2018 06:00 )             40.2     07-16    138  |  101  |  21  ----------------------------<  224<H>  4.1   |  25  |  1.19    Ca    9.1      2018 06:32  Mg     1.7     07-16    TPro  8.2  /  Alb  3.4  /  TBili  0.2  /  DBili  x   /  AST  17  /  ALT  31  /  AlkPhos  100  07-16      Urinalysis Basic - ( 15 Jul 2018 16:01 )    Color: Yellow / Appearance: Clear / S.010 / pH: x  Gluc: x / Ketone: NEGATIVE  / Bili: Negative / Urobili: 0.2 E.U./dL   Blood: x / Protein: NEGATIVE mg/dL / Nitrite: NEGATIVE   Leuk Esterase: NEGATIVE / RBC: x / WBC x   Sq Epi: x / Non Sq Epi: x / Bacteria: x      CAPILLARY BLOOD GLUCOSE      POCT Blood Glucose.: 160 mg/dL (2018 12:53)      RADIOLOGY & ADDITIONAL TESTS: Reviewed.

## 2018-07-16 NOTE — PROGRESS NOTE ADULT - PROBLEM SELECTOR PROBLEM 1
Osteomyelitis
Sepsis
Sepsis
Osteomyelitis
Other acute osteomyelitis of right foot
Osteomyelitis

## 2018-07-16 NOTE — CONSULT NOTE ADULT - CONSULT REASON
Assistance with antibiotics
Right second toe pain, swelling and active drainage
evaluation for vascular access
Manny

## 2018-07-16 NOTE — CONSULT NOTE ADULT - SUBJECTIVE AND OBJECTIVE BOX
Patient is a 42y old  Male who presents with a chief complaint of IV antibiotic treatment for osteomyelitis. 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 Patient describes that he felt fine until this morning when he woke up and noticed drainage from the tip of his 2nd right toe. He drained as much as he could and wrapped his foot. As the day went on he developed worsening sharp pain in the toe up to a 9/10 in severity which prompted him to come to the ED. He describes that he also felt feverish, fatigued, and confused throughout the day. He denies headache, chest pain, difficulty breathing, abdominal pain, N/V/D, dysuria, increased frequency of urination. Patient follows up with a podiatrist every 2 weeks. Of note, this is not related to prior infection.    renal consult:  Patient is a 42 year old male with diabetes II (A1C 12.9 on insulin), s/p right 4th and 5th toe amputation ( and ), recent admission for osteomyelitis on base of right foot, treated with Abx via PICC (finished course of cefepime under supervision of Dr Foy), who is presenting with 12 hours of pain and discharge from right 2nd toe.  On admission, patient was found to have Bun/ cr @ 16/1.11. throughout his hospitalization course, creatinine has been fluctuating between 0.92 and 1.41.  today cr back to 1.19.  patient denies any prior h/o kidney disease nor family history of kidney disease. he denies any Nausea, vomiting, diarrhea.  pt has been on vancomycin and zosyn.   vanc through yesterday @ 8.8.  Ua--> 1.010, ph 6, urine protein 22, urine creatinine 64, urine P/cr @ 0.3.  renal us pending.  urine lytes pending.      PMH: Diabetes  PSH: Right 4th and 5th toe amputations ( and )  Medications: Lantus 30 U at bedtime. Novalog 8 U with meals. Lisinopril 5 mg once daily.  Allergies: None  Social History: Drinks 1-2 beers per day. Former smoker (20 years ago). No drug use (cocaine use years ago)  FH: no hx of cardiac disease in father    In ED: T: 103 HR: 119 BP: 181/91 RR: 20 SpO2 98% on RA. Labs were significant for WBC: 15.2 Lactate 1.2 (normal). Patient given 3.5 L NS and 1x Vanc/Zosyn and Clindamycin. (2018 18:28)      PAST MEDICAL & SURGICAL HISTORY:  Osteomyelitis  Diabetes  Toe amputation status      Allergies    No Known Allergies    Intolerances        FAMILY HISTORY:  No pertinent family history in first degree relatives      SOCIAL HISTORY:    MEDICATIONS  (STANDING):  atorvastatin 40 milliGRAM(s) Oral at bedtime  chlorhexidine 2% Cloths 1 Application(s) Topical daily  dextrose 5%. 1000 milliLiter(s) (50 mL/Hr) IV Continuous <Continuous>  dextrose 50% Injectable 12.5 Gram(s) IV Push once  dextrose 50% Injectable 25 Gram(s) IV Push once  dextrose 50% Injectable 25 Gram(s) IV Push once  insulin glargine Injectable (LANTUS) 38 Unit(s) SubCutaneous at bedtime  insulin lispro (HumaLOG) corrective regimen sliding scale   SubCutaneous Before meals and at bedtime  insulin lispro Injectable (HumaLOG) 8 Unit(s) SubCutaneous three times a day before meals  piperacillin/tazobactam IVPB. 4.5 Gram(s) IV Intermittent every 6 hours  sodium chloride 0.9% lock flush 20 milliLiter(s) IV Push once  vancomycin  IVPB 1250 milliGRAM(s) IV Intermittent every 12 hours    MEDICATIONS  (PRN):  acetaminophen   Tablet. 650 milliGRAM(s) Oral every 6 hours PRN Moderate Pain (4 - 6)  dextrose 40% Gel 15 Gram(s) Oral once PRN Blood Glucose LESS THAN 70 milliGRAM(s)/deciliter  glucagon  Injectable 1 milliGRAM(s) IntraMuscular once PRN Glucose LESS THAN 70 milligrams/deciliter  sodium chloride 0.9% lock flush 10 milliLiter(s) IV Push every 1 hour PRN After each medication administration  sodium chloride 0.9% lock flush 10 milliLiter(s) IV Push every 12 hours PRN Lumen of catheter NOT used      REVIEW OF SYSTEMS:    CONSTITUTIONAL: No fever or chills, No fatigue or tiredness.  EYES: No blurred or double vision.  ENT: No recent URI or sore throat  RESPIRATORY: No shortness of breath, cough, hemoptysis  CARDIOVASCULAR: No Chest pain or shortness of breath  GASTROINTESTINAL: NO abdominal or flank pain, No nausea or vomiting, No diarrhea  GENITOURINARY: No dysuria or urinary burning, No difficulty passing urine, No hematuria  NEUROLOGICAL: No headaches or blurred vision  SKIN: No skin rashes   MUSCULOSKELETAL: No arthralgia, Joint pain, leg edema, No muscle pains        PHYSICAL EXAM:  GENERAL: NAD, well-developed, well nourished, alert, awake, no acute distress at present  HEAD:  Atraumatic, Normocephalic,   EYES: Bilateral conjuctiva and sclera normal,  Oral cavity: Oral mucosa dry and pink  NECK: Neck supple, No JVD  CHEST/LUNG: Clear to auscultation bilaterally; No wheeze, no rales, no crepitations  HEART: Regular rate and rhythm. MARIA L II/VI at LPSB, No gallop, no rub   ABDOMEN: Soft, Nontender, BS+nt, No flank tenderness.   EXTREMITIES: R foot with dressing,  neurology: AAOx3, no focal neurological deficit  SKIN: No rashes or lesions      CAPILLARY BLOOD GLUCOSE      POCT Blood Glucose.: 208 mg/dL (2018 11:09)  POCT Blood Glucose.: 193 mg/dL (2018 07:43)  POCT Blood Glucose.: 300 mg/dL (15 Jul 2018 21:06)  POCT Blood Glucose.: 246 mg/dL (15 Jul 2018 16:24)      I&O's Summary    15 Jul 2018 07:  -  2018 07:00  --------------------------------------------------------  IN: 1200 mL / OUT: 1200 mL / NET: 0 mL    2018 07:  -  2018 12:24  --------------------------------------------------------  IN: 680 mL / OUT: 300 mL / NET: 380 mL          LABS:                                                   18 @ 06:32    138  |  101  |  21  ----------------------------<  224<H>  4.1   |  25  |  1.19                                                 07-15-18 @ 06:00    138  |  98  |  18  ----------------------------<  219<H>  3.9   |  27  |  1.41<H>    Ca    9.1      2018 06:32  Ca    9.2      15 Jul 2018 06:00  Mg     1.7       Mg     1.8     07-15    TPro  8.2  /  Alb  3.4  /  TBili  0.2  /  DBili  x   /  AST  17  /  ALT  31  /  AlkPhos  100                            13.4   8.9   )-----------( 564      ( 15 Jul 2018 06:00 )             40.2     CBC Full  -  ( 15 Jul 2018 06:00 )  WBC Count : 8.9 K/uL  Hemoglobin : 13.4 g/dL  Hematocrit : 40.2 %  Platelet Count - Automated : 564 K/uL  Mean Cell Volume : 88.2 fL              Urinalysis Basic - ( 15 Jul 2018 16:01 )    Color: Yellow / Appearance: Clear / S.010 / pH: x  Gluc: x / Ketone: NEGATIVE  / Bili: Negative / Urobili: 0.2 E.U./dL   Blood: x / Protein: NEGATIVE mg/dL / Nitrite: NEGATIVE   Leuk Esterase: NEGATIVE / RBC: x / WBC x   Sq Epi: x / Non Sq Epi: x / Bacteria: x        RADIOLOGY & ADDITIONAL TESTS:    EKG/Telemetry: Reviewed

## 2018-07-16 NOTE — PROGRESS NOTE ADULT - ASSESSMENT
IMPRESSION:  Osteomyelitis of right foot    Recommend:  1.  Continue vancomycin 1250 mg IV q12.  Needs a trough tonight before dose if still in the hospital  2.  Continue zosyn  3.  Recommend 6 weeks of IV antibiotics (day 1 = 7/9/2018)  4.  Needs weekly CBC, CMP, ESR, CRP    Follow up with ID Dr Foy as outpatient in 2 weeks:    [address], [address] [place] [address]  [phone]. option 2  fax: [phone]

## 2018-07-16 NOTE — PROGRESS NOTE ADULT - PROBLEM SELECTOR PLAN 3
Patient has a history of uncontrolled type II diabetes requiring insulin (April 2018 A1c 12.9%) complicated by osteomyelitis.  --continues to have elevated glucose so in 200s-300s so increased premeal to 8 units sc before meals  --lantus increased to 38U sc qhs  --c/w ISS

## 2018-07-16 NOTE — PROGRESS NOTE ADULT - PROVIDER SPECIALTY LIST ADULT
Infectious Disease
Infectious Disease
Internal Medicine
Podiatry
Infectious Disease
Infectious Disease
Podiatry
Podiatry
Infectious Disease
Podiatry
Infectious Disease
Hospitalist
Internal Medicine
Internal Medicine
Hospitalist
Hospitalist
Internal Medicine
Internal Medicine
Hospitalist
Internal Medicine

## 2018-07-21 LAB — SURGICAL PATHOLOGY STUDY: SIGNIFICANT CHANGE UP

## 2018-07-23 ENCOUNTER — APPOINTMENT (OUTPATIENT)
Dept: INTERNAL MEDICINE | Facility: CLINIC | Age: 43
End: 2018-07-23

## 2018-07-23 ENCOUNTER — APPOINTMENT (OUTPATIENT)
Dept: ENDOCRINOLOGY | Facility: CLINIC | Age: 43
End: 2018-07-23

## 2018-07-23 DIAGNOSIS — E87.1 HYPO-OSMOLALITY AND HYPONATREMIA: ICD-10-CM

## 2018-07-23 DIAGNOSIS — M86.9 OSTEOMYELITIS, UNSPECIFIED: ICD-10-CM

## 2018-07-23 DIAGNOSIS — E11.69 TYPE 2 DIABETES MELLITUS WITH OTHER SPECIFIED COMPLICATION: ICD-10-CM

## 2018-07-23 DIAGNOSIS — B95.7 OTHER STAPHYLOCOCCUS AS THE CAUSE OF DISEASES CLASSIFIED ELSEWHERE: ICD-10-CM

## 2018-07-23 DIAGNOSIS — A41.9 SEPSIS, UNSPECIFIED ORGANISM: ICD-10-CM

## 2018-07-23 DIAGNOSIS — Z79.4 LONG TERM (CURRENT) USE OF INSULIN: ICD-10-CM

## 2018-07-23 DIAGNOSIS — E11.621 TYPE 2 DIABETES MELLITUS WITH FOOT ULCER: ICD-10-CM

## 2018-07-23 DIAGNOSIS — E11.65 TYPE 2 DIABETES MELLITUS WITH HYPERGLYCEMIA: ICD-10-CM

## 2018-07-23 DIAGNOSIS — N17.9 ACUTE KIDNEY FAILURE, UNSPECIFIED: ICD-10-CM

## 2018-07-23 DIAGNOSIS — Z87.891 PERSONAL HISTORY OF NICOTINE DEPENDENCE: ICD-10-CM

## 2018-07-23 DIAGNOSIS — E78.5 HYPERLIPIDEMIA, UNSPECIFIED: ICD-10-CM

## 2018-07-23 DIAGNOSIS — L03.031 CELLULITIS OF RIGHT TOE: ICD-10-CM

## 2018-07-23 DIAGNOSIS — E86.0 DEHYDRATION: ICD-10-CM

## 2018-07-23 DIAGNOSIS — L97.519 NON-PRESSURE CHRONIC ULCER OF OTHER PART OF RIGHT FOOT WITH UNSPECIFIED SEVERITY: ICD-10-CM

## 2018-07-23 DIAGNOSIS — I10 ESSENTIAL (PRIMARY) HYPERTENSION: ICD-10-CM

## 2018-07-24 ENCOUNTER — APPOINTMENT (OUTPATIENT)
Dept: INFECTIOUS DISEASE | Facility: CLINIC | Age: 43
End: 2018-07-24

## 2018-07-31 ENCOUNTER — APPOINTMENT (OUTPATIENT)
Dept: INFECTIOUS DISEASE | Facility: CLINIC | Age: 43
End: 2018-07-31

## 2018-08-08 LAB
CULTURE RESULTS: SIGNIFICANT CHANGE UP
CULTURE RESULTS: SIGNIFICANT CHANGE UP
SPECIMEN SOURCE: SIGNIFICANT CHANGE UP
SPECIMEN SOURCE: SIGNIFICANT CHANGE UP

## 2018-08-24 ENCOUNTER — APPOINTMENT (OUTPATIENT)
Dept: ENDOCRINOLOGY | Facility: CLINIC | Age: 43
End: 2018-08-24

## 2018-12-03 ENCOUNTER — EMERGENCY (EMERGENCY)
Facility: HOSPITAL | Age: 43
LOS: 1 days | Discharge: HOME | End: 2018-12-03
Attending: EMERGENCY MEDICINE | Admitting: EMERGENCY MEDICINE
Payer: COMMERCIAL

## 2018-12-03 ENCOUNTER — INPATIENT (INPATIENT)
Facility: HOSPITAL | Age: 43
LOS: 1 days | Discharge: HOME | DRG: 638 | End: 2018-12-05
Attending: INTERNAL MEDICINE | Admitting: INTERNAL MEDICINE
Payer: COMMERCIAL

## 2018-12-03 VITALS
WEIGHT: 229.94 LBS | HEART RATE: 108 BPM | SYSTOLIC BLOOD PRESSURE: 157 MMHG | DIASTOLIC BLOOD PRESSURE: 97 MMHG | TEMPERATURE: 98 F | HEIGHT: 71 IN | OXYGEN SATURATION: 99 % | RESPIRATION RATE: 18 BRPM

## 2018-12-03 VITALS
DIASTOLIC BLOOD PRESSURE: 90 MMHG | WEIGHT: 229.94 LBS | RESPIRATION RATE: 20 BRPM | SYSTOLIC BLOOD PRESSURE: 138 MMHG | TEMPERATURE: 100 F | HEART RATE: 121 BPM | OXYGEN SATURATION: 96 %

## 2018-12-03 VITALS
TEMPERATURE: 99 F | RESPIRATION RATE: 18 BRPM | OXYGEN SATURATION: 99 % | HEART RATE: 92 BPM | SYSTOLIC BLOOD PRESSURE: 132 MMHG | DIASTOLIC BLOOD PRESSURE: 88 MMHG

## 2018-12-03 DIAGNOSIS — Z79.2 LONG TERM (CURRENT) USE OF ANTIBIOTICS: ICD-10-CM

## 2018-12-03 DIAGNOSIS — Z79.899 OTHER LONG TERM (CURRENT) DRUG THERAPY: ICD-10-CM

## 2018-12-03 DIAGNOSIS — R50.9 FEVER, UNSPECIFIED: ICD-10-CM

## 2018-12-03 DIAGNOSIS — Z79.4 LONG TERM (CURRENT) USE OF INSULIN: ICD-10-CM

## 2018-12-03 DIAGNOSIS — E11.9 TYPE 2 DIABETES MELLITUS WITHOUT COMPLICATIONS: ICD-10-CM

## 2018-12-03 DIAGNOSIS — Z87.891 PERSONAL HISTORY OF NICOTINE DEPENDENCE: ICD-10-CM

## 2018-12-03 DIAGNOSIS — Z89.429 ACQUIRED ABSENCE OF OTHER TOE(S), UNSPECIFIED SIDE: Chronic | ICD-10-CM

## 2018-12-03 DIAGNOSIS — B34.9 VIRAL INFECTION, UNSPECIFIED: ICD-10-CM

## 2018-12-03 LAB
ALBUMIN SERPL ELPH-MCNC: 3.5 G/DL — SIGNIFICANT CHANGE UP (ref 3.3–5)
ALP SERPL-CCNC: 113 U/L — SIGNIFICANT CHANGE UP (ref 40–120)
ALT FLD-CCNC: 43 U/L — SIGNIFICANT CHANGE UP (ref 10–45)
ANION GAP SERPL CALC-SCNC: 8 MMOL/L — SIGNIFICANT CHANGE UP (ref 5–17)
APPEARANCE UR: CLEAR — SIGNIFICANT CHANGE UP
APTT BLD: 29.6 SEC — SIGNIFICANT CHANGE UP (ref 27.5–36.3)
AST SERPL-CCNC: 24 U/L — SIGNIFICANT CHANGE UP (ref 10–40)
BASOPHILS NFR BLD AUTO: 0.2 % — SIGNIFICANT CHANGE UP (ref 0–2)
BILIRUB SERPL-MCNC: 0.6 MG/DL — SIGNIFICANT CHANGE UP (ref 0.2–1.2)
BILIRUB UR-MCNC: NEGATIVE — SIGNIFICANT CHANGE UP
BUN SERPL-MCNC: 22 MG/DL — SIGNIFICANT CHANGE UP (ref 7–23)
CALCIUM SERPL-MCNC: 8.6 MG/DL — SIGNIFICANT CHANGE UP (ref 8.4–10.5)
CHLORIDE SERPL-SCNC: 96 MMOL/L — SIGNIFICANT CHANGE UP (ref 96–108)
CHOLEST SERPL-MCNC: 172 MG/DL — SIGNIFICANT CHANGE UP (ref 10–199)
CO2 SERPL-SCNC: 25 MMOL/L — SIGNIFICANT CHANGE UP (ref 22–31)
COLOR SPEC: YELLOW — SIGNIFICANT CHANGE UP
CREAT SERPL-MCNC: 1 MG/DL — SIGNIFICANT CHANGE UP (ref 0.5–1.3)
CRP SERPL-MCNC: 20.77 MG/DL — HIGH (ref 0–0.4)
DIFF PNL FLD: ABNORMAL
EOSINOPHIL NFR BLD AUTO: 0.8 % — SIGNIFICANT CHANGE UP (ref 0–6)
ERYTHROCYTE [SEDIMENTATION RATE] IN BLOOD: 46 MM/HR — HIGH
GLUCOSE BLDC GLUCOMTR-MCNC: 258 MG/DL — HIGH (ref 70–99)
GLUCOSE SERPL-MCNC: 282 MG/DL — HIGH (ref 70–99)
GLUCOSE UR QL: >=1000
HBA1C BLD-MCNC: 9.2 % — HIGH (ref 4–5.6)
HCT VFR BLD CALC: 35.2 % — LOW (ref 39–50)
HDLC SERPL-MCNC: 58 MG/DL — SIGNIFICANT CHANGE UP
HGB BLD-MCNC: 11.9 G/DL — LOW (ref 13–17)
INR BLD: 1.08 — SIGNIFICANT CHANGE UP (ref 0.88–1.16)
KETONES UR-MCNC: NEGATIVE — SIGNIFICANT CHANGE UP
LACTATE SERPL-SCNC: 0.9 MMOL/L — SIGNIFICANT CHANGE UP (ref 0.5–2)
LACTATE SERPL-SCNC: 1.7 MMOL/L — SIGNIFICANT CHANGE UP (ref 0.5–2)
LEUKOCYTE ESTERASE UR-ACNC: NEGATIVE — SIGNIFICANT CHANGE UP
LIPID PNL WITH DIRECT LDL SERPL: 104 MG/DL — SIGNIFICANT CHANGE UP
LYMPHOCYTES # BLD AUTO: 13.7 % — SIGNIFICANT CHANGE UP (ref 13–44)
MAGNESIUM SERPL-MCNC: 1.7 MG/DL — SIGNIFICANT CHANGE UP (ref 1.6–2.6)
MCHC RBC-ENTMCNC: 28.3 PG — SIGNIFICANT CHANGE UP (ref 27–34)
MCHC RBC-ENTMCNC: 33.8 G/DL — SIGNIFICANT CHANGE UP (ref 32–36)
MCV RBC AUTO: 83.8 FL — SIGNIFICANT CHANGE UP (ref 80–100)
MONOCYTES NFR BLD AUTO: 5.3 % — SIGNIFICANT CHANGE UP (ref 2–14)
NEUTROPHILS NFR BLD AUTO: 80 % — HIGH (ref 43–77)
NITRITE UR-MCNC: NEGATIVE — SIGNIFICANT CHANGE UP
PH UR: 6.5 — SIGNIFICANT CHANGE UP (ref 5–8)
PLATELET # BLD AUTO: 278 K/UL — SIGNIFICANT CHANGE UP (ref 150–400)
POTASSIUM SERPL-MCNC: 4.7 MMOL/L — SIGNIFICANT CHANGE UP (ref 3.5–5.3)
POTASSIUM SERPL-SCNC: 4.7 MMOL/L — SIGNIFICANT CHANGE UP (ref 3.5–5.3)
PROT SERPL-MCNC: 7.7 G/DL — SIGNIFICANT CHANGE UP (ref 6–8.3)
PROT UR-MCNC: NEGATIVE MG/DL — SIGNIFICANT CHANGE UP
PROTHROM AB SERPL-ACNC: 12.2 SEC — SIGNIFICANT CHANGE UP (ref 10–12.9)
RAPID RVP RESULT: SIGNIFICANT CHANGE UP
RBC # BLD: 4.2 M/UL — SIGNIFICANT CHANGE UP (ref 4.2–5.8)
RBC # FLD: 12.8 % — SIGNIFICANT CHANGE UP (ref 10.3–16.9)
SODIUM SERPL-SCNC: 129 MMOL/L — LOW (ref 135–145)
SP GR SPEC: 1.01 — SIGNIFICANT CHANGE UP (ref 1–1.03)
TOTAL CHOLESTEROL/HDL RATIO MEASUREMENT: 3 RATIO — LOW (ref 3.4–9.6)
TRIGL SERPL-MCNC: 50 MG/DL — SIGNIFICANT CHANGE UP (ref 10–149)
UROBILINOGEN FLD QL: 0.2 E.U./DL — SIGNIFICANT CHANGE UP
WBC # BLD: 17.9 K/UL — HIGH (ref 3.8–10.5)
WBC # FLD AUTO: 17.9 K/UL — HIGH (ref 3.8–10.5)

## 2018-12-03 PROCEDURE — 99283 EMERGENCY DEPT VISIT LOW MDM: CPT | Mod: 25

## 2018-12-03 PROCEDURE — 80053 COMPREHEN METABOLIC PANEL: CPT

## 2018-12-03 PROCEDURE — 83605 ASSAY OF LACTIC ACID: CPT

## 2018-12-03 PROCEDURE — 99285 EMERGENCY DEPT VISIT HI MDM: CPT | Mod: 25

## 2018-12-03 PROCEDURE — 71046 X-RAY EXAM CHEST 2 VIEWS: CPT | Mod: 26

## 2018-12-03 PROCEDURE — 73630 X-RAY EXAM OF FOOT: CPT | Mod: 26,RT

## 2018-12-03 PROCEDURE — 71045 X-RAY EXAM CHEST 1 VIEW: CPT

## 2018-12-03 PROCEDURE — 87040 BLOOD CULTURE FOR BACTERIA: CPT

## 2018-12-03 PROCEDURE — 86140 C-REACTIVE PROTEIN: CPT

## 2018-12-03 PROCEDURE — 87633 RESP VIRUS 12-25 TARGETS: CPT

## 2018-12-03 PROCEDURE — 99285 EMERGENCY DEPT VISIT HI MDM: CPT

## 2018-12-03 PROCEDURE — 87486 CHLMYD PNEUM DNA AMP PROBE: CPT

## 2018-12-03 PROCEDURE — 93010 ELECTROCARDIOGRAM REPORT: CPT | Mod: 59

## 2018-12-03 PROCEDURE — 87798 DETECT AGENT NOS DNA AMP: CPT

## 2018-12-03 PROCEDURE — 84484 ASSAY OF TROPONIN QUANT: CPT

## 2018-12-03 PROCEDURE — 36415 COLL VENOUS BLD VENIPUNCTURE: CPT

## 2018-12-03 PROCEDURE — 80061 LIPID PANEL: CPT

## 2018-12-03 PROCEDURE — 81001 URINALYSIS AUTO W/SCOPE: CPT

## 2018-12-03 PROCEDURE — 87581 M.PNEUMON DNA AMP PROBE: CPT

## 2018-12-03 PROCEDURE — 83036 HEMOGLOBIN GLYCOSYLATED A1C: CPT

## 2018-12-03 PROCEDURE — 85730 THROMBOPLASTIN TIME PARTIAL: CPT

## 2018-12-03 PROCEDURE — 85025 COMPLETE CBC W/AUTO DIFF WBC: CPT

## 2018-12-03 PROCEDURE — 85610 PROTHROMBIN TIME: CPT

## 2018-12-03 PROCEDURE — 93005 ELECTROCARDIOGRAM TRACING: CPT

## 2018-12-03 PROCEDURE — 71045 X-RAY EXAM CHEST 1 VIEW: CPT | Mod: 26,59

## 2018-12-03 PROCEDURE — 87086 URINE CULTURE/COLONY COUNT: CPT

## 2018-12-03 PROCEDURE — 71045 X-RAY EXAM CHEST 1 VIEW: CPT | Mod: 26

## 2018-12-03 RX ORDER — HEPARIN SODIUM 5000 [USP'U]/ML
5000 INJECTION INTRAVENOUS; SUBCUTANEOUS EVERY 8 HOURS
Refills: 0 | Status: DISCONTINUED | OUTPATIENT
Start: 2018-12-03 | End: 2018-12-05

## 2018-12-03 RX ORDER — DEXTROSE 50 % IN WATER 50 %
25 SYRINGE (ML) INTRAVENOUS ONCE
Refills: 0 | Status: DISCONTINUED | OUTPATIENT
Start: 2018-12-03 | End: 2018-12-05

## 2018-12-03 RX ORDER — INSULIN GLARGINE 100 [IU]/ML
25 INJECTION, SOLUTION SUBCUTANEOUS AT BEDTIME
Refills: 0 | Status: DISCONTINUED | OUTPATIENT
Start: 2018-12-03 | End: 2018-12-04

## 2018-12-03 RX ORDER — DEXTROSE 50 % IN WATER 50 %
12.5 SYRINGE (ML) INTRAVENOUS ONCE
Refills: 0 | Status: DISCONTINUED | OUTPATIENT
Start: 2018-12-03 | End: 2018-12-05

## 2018-12-03 RX ORDER — PIPERACILLIN AND TAZOBACTAM 4; .5 G/20ML; G/20ML
4.5 INJECTION, POWDER, LYOPHILIZED, FOR SOLUTION INTRAVENOUS ONCE
Refills: 0 | Status: COMPLETED | OUTPATIENT
Start: 2018-12-03 | End: 2018-12-03

## 2018-12-03 RX ORDER — SODIUM CHLORIDE 9 MG/ML
1000 INJECTION, SOLUTION INTRAVENOUS
Refills: 0 | Status: DISCONTINUED | OUTPATIENT
Start: 2018-12-03 | End: 2018-12-05

## 2018-12-03 RX ORDER — SODIUM CHLORIDE 9 MG/ML
1000 INJECTION INTRAMUSCULAR; INTRAVENOUS; SUBCUTANEOUS ONCE
Refills: 0 | Status: COMPLETED | OUTPATIENT
Start: 2018-12-03 | End: 2018-12-03

## 2018-12-03 RX ORDER — GLUCAGON INJECTION, SOLUTION 0.5 MG/.1ML
1 INJECTION, SOLUTION SUBCUTANEOUS ONCE
Refills: 0 | Status: DISCONTINUED | OUTPATIENT
Start: 2018-12-03 | End: 2018-12-05

## 2018-12-03 RX ORDER — DEXTROSE 50 % IN WATER 50 %
15 SYRINGE (ML) INTRAVENOUS ONCE
Refills: 0 | Status: DISCONTINUED | OUTPATIENT
Start: 2018-12-03 | End: 2018-12-05

## 2018-12-03 RX ORDER — ACETAMINOPHEN 500 MG
650 TABLET ORAL ONCE
Refills: 0 | Status: COMPLETED | OUTPATIENT
Start: 2018-12-03 | End: 2018-12-03

## 2018-12-03 RX ORDER — ATORVASTATIN CALCIUM 80 MG/1
40 TABLET, FILM COATED ORAL AT BEDTIME
Refills: 0 | Status: DISCONTINUED | OUTPATIENT
Start: 2018-12-03 | End: 2018-12-05

## 2018-12-03 RX ORDER — VANCOMYCIN HCL 1 G
1000 VIAL (EA) INTRAVENOUS ONCE
Refills: 0 | Status: COMPLETED | OUTPATIENT
Start: 2018-12-03 | End: 2018-12-03

## 2018-12-03 RX ORDER — INSULIN LISPRO 100/ML
8 VIAL (ML) SUBCUTANEOUS
Refills: 0 | Status: DISCONTINUED | OUTPATIENT
Start: 2018-12-03 | End: 2018-12-05

## 2018-12-03 RX ORDER — INSULIN LISPRO 100/ML
VIAL (ML) SUBCUTANEOUS
Refills: 0 | Status: DISCONTINUED | OUTPATIENT
Start: 2018-12-03 | End: 2018-12-05

## 2018-12-03 RX ORDER — ACETAMINOPHEN 500 MG
975 TABLET ORAL ONCE
Refills: 0 | Status: COMPLETED | OUTPATIENT
Start: 2018-12-03 | End: 2018-12-03

## 2018-12-03 RX ADMIN — ATORVASTATIN CALCIUM 40 MILLIGRAM(S): 80 TABLET, FILM COATED ORAL at 23:51

## 2018-12-03 RX ADMIN — Medication 6: at 23:53

## 2018-12-03 RX ADMIN — Medication 975 MILLIGRAM(S): at 19:04

## 2018-12-03 RX ADMIN — SODIUM CHLORIDE 2000 MILLILITER(S): 9 INJECTION INTRAMUSCULAR; INTRAVENOUS; SUBCUTANEOUS at 19:04

## 2018-12-03 RX ADMIN — SODIUM CHLORIDE 2000 MILLILITER(S): 9 INJECTION INTRAMUSCULAR; INTRAVENOUS; SUBCUTANEOUS at 19:28

## 2018-12-03 RX ADMIN — SODIUM CHLORIDE 1000 MILLILITER(S): 9 INJECTION INTRAMUSCULAR; INTRAVENOUS; SUBCUTANEOUS at 07:46

## 2018-12-03 RX ADMIN — SODIUM CHLORIDE 1000 MILLILITER(S): 9 INJECTION INTRAMUSCULAR; INTRAVENOUS; SUBCUTANEOUS at 06:00

## 2018-12-03 RX ADMIN — Medication 650 MILLIGRAM(S): at 07:47

## 2018-12-03 RX ADMIN — PIPERACILLIN AND TAZOBACTAM 200 GRAM(S): 4; .5 INJECTION, POWDER, LYOPHILIZED, FOR SOLUTION INTRAVENOUS at 19:03

## 2018-12-03 RX ADMIN — Medication 250 MILLIGRAM(S): at 19:50

## 2018-12-03 RX ADMIN — SODIUM CHLORIDE 1000 MILLILITER(S): 9 INJECTION INTRAMUSCULAR; INTRAVENOUS; SUBCUTANEOUS at 06:55

## 2018-12-03 RX ADMIN — HEPARIN SODIUM 5000 UNIT(S): 5000 INJECTION INTRAVENOUS; SUBCUTANEOUS at 23:51

## 2018-12-03 NOTE — H&P ADULT - PROBLEM SELECTOR PLAN 1
-patient w/ a hx of multiple diabetic foot ulcers in the past requiring amputation and IV abx now presenting with fevers, chills, tachycardia and swollen R foot with a known ulcer; admitted for IV ABX per podiatry recommendations  -currenltlyon vanc and zosyn -patient w/ a hx of multiple diabetic foot ulcers in the past requiring amputation and IV abx now presenting with fevers, chills, tachycardia and swollen R foot with a known ulcer; admitted for IV ABX per podiatry recommendations  -c/w vanc and zosyn     -ashley mcfadden -patient w/ a hx of multiple diabetic foot ulcers in the past requiring amputation and IV abx now presenting with fevers, chills, tachycardia and swollen R foot with a known ulcer; admitted for IV ABX per podiatry recommendations  -sed rate and CRP both very elevated concerning for underylying inflammatory process such as osteomyelitis  - c/w vanc (1500mg BID) and zosyn (3.375 g q6)  - wound care consult  - f/u podiatry recs  - f/u xray for evaluation for osteomyelitis

## 2018-12-03 NOTE — ED PROVIDER NOTE - MEDICAL DECISION MAKING DETAILS
Impression: acute viral illness. Febrile and tachycardic on arrival improved with tx in ed. CXR neg for i/e/chf. Labs reviewed w/ findings of leukocytosis to 17. Mildly hyperglycemia to 282 with neg ag. Renal function intact.  RVP neg. UA neg for infection. No evidence of foot infection. Pt hydrated with ivf and feeling much better. ED evaluation and management discussed with the patient and family (if available) in detail.  Close PMD follow up encouraged.  Strict ED return instructions discussed in detail and patient given the opportunity to ask any questions about their discharge diagnosis and instructions. Patient verbalized understanding.

## 2018-12-03 NOTE — ED PROVIDER NOTE - CARE PLAN
Principal Discharge DX:	Diabetic foot ulcer  Secondary Diagnosis:	Cellulitis of right lower extremity

## 2018-12-03 NOTE — H&P ADULT - HISTORY OF PRESENT ILLNESS
43 yr old male with a PMHx of DMT2 (dx in 2003 transitioned from PO meds to insulin 5 yrs after dx; last A1c in July 2018 9.7), multiple toe amputations for diabetic foot ulcers/osteomyelitis (most recent in July 2018) that presents with R 1st metatarsal head ulcer. Patient states the ulcer has been present for ~2 months and is cared for weekly by his outpatient podiatrist. Patient states that he was in his usual state of health until this morning when he woke up with malaise, fevers and chest pressure type pain. He came to [facility] ED this AM where he underwent blood cx, CXR, 43 yr old male with a PMHx of DMT2 (dx in 2003 transitioned from PO meds to insulin 5 yrs after dx; last A1c in July 2018 9.7), multiple toe amputations for diabetic foot ulcers/osteomyelitis (most recent in July 2018) that presents with R 1st metatarsal head ulcer. Patient states the ulcer has been present for ~2 months and is cared for weekly by his outpatient podiatrist. Patient states that he was in his usual state of health until this morning when he woke up with malaise, fevers and chest pressure type pain. He came to [facility] ED this AM where he underwent blood cx, CXR, and RVP.    ED Course  Vital Signs Last 24 Hrs  TMax: 100.4 HR: 101 BP: 140/89 RR: 18 SpO2: 98%   S/P blood cx in the ED  CXR w/ non-specific hazy opacity in the LLL  EKG pending  s/p vanc and zosyn in the ED 43 yr old male with a PMHx of DMT2 (dx in 2003 transitioned from PO meds to insulin 5 yrs after dx; last A1c in July 2018 9.7), multiple toe amputations for diabetic foot ulcers/osteomyelitis (most recent in July 2018) that presents with R 1st metatarsal head ulcer. Patient states the ulcer has been present for ~2 months and is cared for weekly by his outpatient podiatrist. Patient states that he was in his usual state of health until this morning when he woke up with malaise, fevers and chest pressure type pain. He came to [facility] ED this AM where he underwent blood cx, CXR, and RVP.     ED Course  Vital Signs Last 24 Hrs  TMax: 100.4 HR: 101 BP: 140/89 RR: 18 SpO2: 98%   S/P blood cx in the ED  CXR w/ non-specific hazy opacity in the LLL  EKG pending  s/p vanc and zosyn in the ED 43 yr old male with a PMHx of DMT2 (dx in 2003 transitioned from PO meds to insulin 5 yrs after dx; last A1c in July 2018 9.7), multiple toe amputations for diabetic foot ulcers/osteomyelitis (most recent in July 2018) that presents with R 1st metatarsal head ulcer. Patient states the ulcer has been present for ~2 months and is cared for weekly by his outpatient podiatrist. Patient states that he was in his usual state of health until this morning when he woke up with malaise, fevers and chest pressure type pain. He came to [facility] ED this AM where he underwent blood cx, CXR, and RVP. In respect to his chest pain, patient states he had pressure like chest pain that felt like "an elephant sitting on his chest" this morning when he started having malaise and fever like symptoms. He states he has never had this type of pain before and has no hx of prior CAD. He states sometimes when he climbs too many stairs, he gets extremely short of breath and has some pressure type pain in the mid-substernal area that is releived with rest. He has never taken nitroglycerin before and has never been hospitalized before for chest pain.     ED Course  Vital Signs Last 24 Hrs  TMax: 100.4 HR: 101 BP: 140/89 RR: 18 SpO2: 98%   S/P blood cx in the ED  CXR w/ non-specific hazy opacity in the LLL  EKG pending  s/p vanc and zosyn in the ED 43 yr old male with a PMHx of DMT2 (dx in 2003 transitioned from PO meds to insulin 5 yrs after dx; last A1c in July 2018 9.7), multiple toe amputations for diabetic foot ulcers/osteomyelitis (most recent in July 2018) that presents with R 1st metatarsal head ulcer. Patient states the ulcer has been present for ~2 months and is cared for weekly by his outpatient podiatrist. Patient states that he was in his usual state of health until this morning when he woke up with malaise, fevers and chest pressure type pain. He came to [facility] ED this AM where he underwent blood cx, CXR, and RVP. None of his blood work was significant for any signs or symptoms of acute infection so patient was resuscitated with IVF and discharged home Patient went to see his podiatrist regarding his foot for routine followup and was noted to have increased pain, swelling and warmth in the foot and patient's podiatrist recommended admission to  In respect to his chest pain, patient states he had pressure like chest pain that felt like "an elephant sitting on his chest" this morning when he started having malaise and fever like symptoms. He states he has never had this type of pain before and has no hx of prior CAD. He states sometimes when he climbs too many stairs, he gets extremely short of breath and has some pressure type pain in the mid-substernal area that is releived with rest. He has never taken nitroglycerin before and has never been hospitalized before for chest pain.     ED Course  Vital Signs Last 24 Hrs  TMax: 100.4 HR: 101 BP: 140/89 RR: 18 SpO2: 98%   S/P blood cx in the ED  CXR w/ non-specific hazy opacity in the LLL  EKG pending  s/p vanc and zosyn in the ED 43 yr old male with a PMHx of DMT2 (dx in 2003 transitioned from PO meds to insulin 5 yrs after dx; last A1c in July 2018 9.7), multiple toe amputations for diabetic foot ulcers/osteomyelitis (most recent in July 2018) that presents with R 1st metatarsal head ulcer. Patient states the ulcer has been present for ~2 months and is cared for weekly by his outpatient podiatrist. Patient states that he was in his usual state of health until this morning when he woke up with malaise, fevers and chest pressure type pain. He came to [facility] ED this AM where he underwent blood cx, CXR, and RVP. None of his blood work was significant for any signs or symptoms of acute infection so patient was resuscitated with IVF and discharged home Patient went to see his podiatrist regarding his foot for routine followup and was noted to have increased pain, swelling and warmth in the foot and patient's podiatrist recommended admission to  In respect to his chest pain, patient states he had pressure like chest pain that felt like "an elephant sitting on his chest" this morning when he started having malaise and fever like symptoms. He states he has never had this type of pain before and has no hx of prior CAD. He states sometimes when he climbs too many stairs, he gets extremely short of breath and has some pressure type pain in the mid-substernal area that is relieved with rest. He has never taken nitroglycerin before and has never been hospitalized before for chest pain.        ED Course  Vital Signs Last 24 Hrs  TMax: 100.4 HR: 101 BP: 140/89 RR: 18 SpO2: 98%   S/P blood cx in the ED  CXR w/ non-specific hazy opacity in the LLL  EKG w/ TWI inversion    s/p vanc and zosyn in the ED 43 yr old male with a PMHx of DMT2 (dx in 2003 transitioned from PO meds to insulin 5 yrs after dx; last A1c in July 2018 9.7), multiple toe amputations for diabetic foot ulcers/osteomyelitis (most recent in July 2018) that presents with R 1st metatarsal head ulcer. Patient states the ulcer has been present for ~2 months and is cared for weekly by his outpatient podiatrist. Patient states that he was in his usual state of health until this morning when he woke up with malaise, fevers and chest pressure type pain. He came to [facility] ED this AM where he was found to have a fever to 100.4 and underwent blood cx, CXR, and RVP. None of his blood work was significant for any signs or symptoms of acute infection, patient's physical exam was unremarkable and so patient was discharged form the ED so patient was resuscitated with IVF and discharged home Patient went to see his podiatrist regarding his foot for routine followup and was noted to have increased pain, swelling and warmth in the foot and patient's podiatrist recommended admission to the hospital for IV abx 2/2 underlying concern that his swollen foot with an open ulcer was the underlying source of patient's earlier systemic symptoms. In respect to his chest pain, patient states he had pressure like chest pain that felt like "an elephant sitting on his chest" this morning when he started having malaise and fever like symptoms. He states he has never had this type of pain before and has no hx of prior CAD. He states sometimes when he climbs too many stairs, he gets extremely short of breath and has some pressure type pain in the mid-substernal area that is relieved with rest. He has never taken nitroglycerin before and has never been hospitalized before for chest pain.        ED Course  Vital Signs Last 24 Hrs  TMax: 100.4 HR: 101 BP: 140/89 RR: 18 SpO2: 98%   S/P blood cx in the ED  CXR w/ non-specific hazy opacity in the LLL  EKG w/ TWI inversion    s/p vanc and zosyn in the ED

## 2018-12-03 NOTE — H&P ADULT - PROBLEM SELECTOR PLAN 4
- on lantus 48 u at bedtime at home; 8 u premeal   - c/w 8U premeal; cut patient's home lantus dose in half and initiated lantus 25U lantus qhs  -f/u A1c

## 2018-12-03 NOTE — CONSULT NOTE ADULT - ASSESSMENT
A/P:  43yMale presents with Right third toe cellulitis and a diabetic ulcer on the plantar aspect of the right foot A/P:  43yMale presents with Right third toe cellulitis and a diabetic ulcer on the plantar aspect of the right foot    -Admit to medicine for IV antibiotics for cellulitis  -Xrays of the Right foot ordered. F/u on Xrays  -Culture of the Right foot plantar ulcer taken. F/u on results  -Applied betadine in 2nd interspace and on the ulcer wound edges and a dsd to the right foot  -WBAT to Right foot in surgical shoe   -Podiatry will follow A/P:  43yMale presents with Right third toe cellulitis and a diabetic ulcer on the plantar aspect of the right foot    -Admit to medicine for IV antibiotics for cellulitis  -Xrays of the Right foot ordered. F/u on Xrays  -Culture of the Right foot plantar ulcer taken. F/u on results  -Applied betadine in 2nd interspace and on the ulcer wound edges and a DSD to the right foot  -WBAT to Right foot in surgical shoe   -Podiatry will follow A/P:  43yMale with DM2 and hx of osteomyelitis and amputations of the Right 3rd and 4th toe and partial 2nd toe presents with Right third toe cellulitis and a diabetic ulcer on the plantar aspect of the right foot    -Admit to medicine for IV antibiotics for cellulitis  -Xrays of the Right foot ordered. F/u on Xrays  -Culture of the Right foot plantar ulcer taken. F/u on results  -Applied betadine in 2nd interspace and on the ulcer wound edges and a DSD to the right foot  -WBAT to Right foot in surgical shoe   -Podiatry will follow

## 2018-12-03 NOTE — ED ADULT TRIAGE NOTE - ARRIVAL INFO ADDITIONAL COMMENTS
pt c/o chills and bodyaches this morning. febrile here. denies cough, runny nose, sore throat, nausea, vomiting, diarrhea, flank pain, dysuria.

## 2018-12-03 NOTE — CONSULT NOTE ADULT - SUBJECTIVE AND OBJECTIVE BOX
Patient is a 43y old  Male who presents with a chief complaint of fevers and right foot redness     HPI: Patient states that he was not feeling well this morning and had a terrible headache and body aches so he went to the ED and was febrile, and received tylenol. Then, he went his podiatrist this afternoon and was found to have redness and swelling of his third toe and was told to return to ED. Patient states that he has had an ulcer on the bottom of his right foot for a long time and he has been putting on a "home remedy cream" on the ulcer daily and he feels like the ulcer is getting smaller. Pt states he has a history of osteomyelitis and toe amputations. Pt denies pain in his right foot but states that he has been experiencing body aches today.       ROS: Const:  ___febrile   Eyes:___ENT:___CV: __-_chest pain  Resp: __-__sob  GI:_-__nausea _-__vomiting __-__abd pain ___npo ___clears ___full diet __bm  :___ Musk: ___pain ___spasm  Skin:___ Neuro:  ___sedation__-_confusion_+__ numbness _+__weakness ___paresthesia  Psych:___anxiety  Endo:___ Heme:___Allergy:___    PAST MEDICAL & SURGICAL HISTORY:  Osteomyelitis  Diabetes  Toe amputation status      MEDICATIONS  (STANDING):    MEDICATIONS  (PRN):      Allergies    No Known Allergies    Intolerances        FAMILY HISTORY:  No pertinent family history in first degree relatives                            11.9   17.9  )-----------( 278      ( 03 Dec 2018 06:29 )             35.2                                                  12-03    129<L>  |  96  |  22  ----------------------------<  282<H>  4.7   |  25  |  1.00    Ca    8.6      03 Dec 2018 06:29    TPro  7.7  /  Alb  3.5  /  TBili  0.6  /  DBili  x   /  AST  24  /  ALT  43  /  AlkPhos  113  12-03      PT/INR - ( 03 Dec 2018 06:29 )   PT: 12.2 sec;   INR: 1.08          PTT - ( 03 Dec 2018 06:29 )  PTT:29.6 sec    C-Reactive Protein, Serum (12.03.18 @ 06:29)    C-Reactive Protein, Serum: 20.77 mg/dL    Sedimentation Rate, Erythrocyte (12.03.18 @ 19:05)    Sedimentation Rate, Erythrocyte: 46 mm/Hr        Medical Imaging:       PE:   GEN: Patient is a 43y well developed, well nourished Male, alert, awake and oriented to person, place and time in no acute distress.   HEENT: NCAT; PERRLA, no appreciable asymmetry noted  Lungs: Non-labored breathing in no respiratory distress      Extremities   Vasc: DP/PT 2+. CFT , 3 sec x 3. TG warm to warm.   Derm: Erythema and edema of the dorsal aspect of the right 3rd toe. Diabetic ulcer on the plantar aspect of the foot measuring 2cm x 2cm x 1.5cm with macerated border. No active drainage. No purulence. No malodor. +PTB. Maceration of the 2nd interspace.   Neuro: Protective Sensation diminished 3

## 2018-12-03 NOTE — ED PROVIDER NOTE - MEDICAL DECISION MAKING DETAILS
covered with vanc/zosyn, cultures sent, HDS and comfortable, distal pulses intact, seen by podiatry, admitting to [facility] for continued IV abx, possible debridement.

## 2018-12-03 NOTE — ED ADULT NURSE NOTE - NSIMPLEMENTINTERV_GEN_ALL_ED
Implemented All Universal Safety Interventions:  [place] to call system. Call bell, personal items and telephone within reach. Instruct patient to call for assistance. Room bathroom lighting operational. Non-slip footwear when patient is off stretcher. Physically safe environment: no spills, clutter or unnecessary equipment. Stretcher in lowest position, wheels locked, appropriate side rails in place.

## 2018-12-03 NOTE — ED PROVIDER NOTE - NSFOLLOWUPINSTRUCTIONS_ED_ALL_ED_FT
Drink plenty of fluids  Take tylenol/ motrin as needed for pain/ fever  Follow up with your primary care physician and podiatrist for re-evaluation  Return to er for any new or worsening symptoms

## 2018-12-03 NOTE — H&P ADULT - ASSESSMENT
43 yr old male with a PMHx of DMT2 (dx in 2003 transitioned from PO meds to insulin 5 yrs after dx; last A1c in July 2018 9.7), multiple toe amputations for diabetic foot ulcers/osteomyelitis (most recent in July 2018) that presents with fever, tachycardia and malaise w/  R 1st metatarsal head ulcer concerning for osteomyelitis; admitted for IV abx.

## 2018-12-03 NOTE — H&P ADULT - PROBLEM SELECTOR PLAN 3
- patient endorsing 1 episode of crushing chest pain this morning with the onset of   fevers and malaise; not typical chest pain per history (was at rest, resolved   spontaneously, not associated with exertion)  - no prior history of coronary artery disease per patient; no hx of acs  - troponin (-)  - EKG without acute ischemic changes  -CTM

## 2018-12-03 NOTE — ED PROVIDER NOTE - PHYSICAL EXAMINATION
VITAL SIGNS: I have reviewed nursing notes and confirm.  CONSTITUTIONAL: Well-developed; well-nourished; in no acute distress.   SKIN:  warm and dry, no acute rash.   HEAD:  normocephalic, atraumatic.  EYES: PERRL, EOM intact; conjunctiva and sclera clear.  ENT: No nasal discharge; airway clear. Mildly dry mm.  NECK: Supple; non tender.  CARD: S1, S2 normal; no murmurs, gallops, or rubs. Regular rate and rhythm.   RESP:  Clear to auscultation b/l, no wheezes, rales or rhonchi.  ABD: Normal bowel sounds; soft; non-distended; non-tender; no guarding/ rebound. No cvat.  EXT: Normal ROM. No clubbing, cyanosis or edema. R foot; + chronic wound to plantarsurface of foot near distal 3rd metatarsal region, margins clean, no surrounding erythema, no drainage. Dp/pt pulses intact and equal b/l.   NEURO: Alert, oriented, grossly unremarkable  PSYCH: Cooperative, mood and affect appropriate.

## 2018-12-03 NOTE — H&P ADULT - NSHPPHYSICALEXAM_GEN_ALL_CORE
VITAL SIGNS:  T(F): 99.1 (12-03-18 @ 20:23), Max: 100.4 (12-03-18 @ 07:49)  HR: 101 (12-03-18 @ 20:23) (92 - 121)  BP: 140/89 (12-03-18 @ 20:23) (118/64 - 190/98)  BP(mean): --  RR: 18 (12-03-18 @ 20:23) (18 - 20)  SpO2: 98% (12-03-18 @ 20:23) (96% - 99%)    PHYSICAL EXAM:  Constitutional: WDWN resting comfortably in bed; NAD  HEENT: NC/AT, PERRL, EOMI  Neck: supple; no JVD or thyromegaly  Respiratory: CTA B/L; no W/R/R, no retractions  Cardiac: +S1/S2; RRR; no M/R/G; PMI non-displaced  Gastrointestinal: soft, NT/ND; no rebound or guarding; +BSx4  Back: spine midline, no bony tenderness or step-offs; no CVAT B/L   Extremities: WWP, 3x3 cm ulcer on the 1st metatarsal head; no erythema of note  Musculoskeletal: NROM x4; no joint swelling, tenderness or erythema  Vascular: 2+ radial, femoral, DP/PT pulses B/L  Dermatologic: skin warm, dry and intact; no rashes, wounds, or scars  Lymphatic: no submandibular or cervical LAD  Neurologic: AAOx3; CNII-XII grossly intact; no focal deficits  Psychiatric: affect and characteristics of appearance, verbalizations, behaviors are appropriate, denies SI/HI/AH/VH VITAL SIGNS:  T(F): 99.1 (12-03-18 @ 20:23), Max: 100.4 (12-03-18 @ 07:49)  HR: 101 (12-03-18 @ 20:23) (92 - 121)  BP: 140/89 (12-03-18 @ 20:23) (118/64 - 190/98)  BP(mean): --  RR: 18 (12-03-18 @ 20:23) (18 - 20)  SpO2: 98% (12-03-18 @ 20:23) (96% - 99%)    PHYSICAL EXAM:  Constitutional: WDWN resting comfortably in bed; NAD  HEENT: NC/AT, PERRL, EOMI  Neck: supple; no JVD or thyromegaly  Respiratory: CTA B/L; no W/R/R, no retractions  Cardiac: +S1/S2; RRR; no M/R/G; PMI non-displaced  Gastrointestinal: soft, NT/ND; no rebound or guarding; +BSx4  Back: spine midline, no bony tenderness or step-offs; no CVAT B/L   Extremities: WWP, 3x3 cm ulcer on the 1st metatarsal head; amputated 2nd toe on the R foot; no erythema of note; 2+ pedal pulses b/l   Musculoskeletal: NROM x4; no joint swelling, tenderness or erythema  Vascular: 2+ radial, femoral, DP/PT pulses B/L  Dermatologic: skin warm, dry and intact; no rashes, wounds, or scars  Lymphatic: no submandibular or cervical LAD  Neurologic: AAOx3; CNII-XII grossly intact; no focal deficits  Psychiatric: affect and characteristics of appearance, verbalizations, behaviors are appropriate, denies SI/HI/AH/VH

## 2018-12-03 NOTE — ED PROVIDER NOTE - OBJECTIVE STATEMENT
Pt is a 44yo m, h/o dm, amp of r 4th and 5th toes and partial amp of 2nd toe, chronic wd to plantar surface of foot, who p/w c/o generalized myalgias, subj warmth, ha, np cough, generalized soreness/ pressure to chest/ upper abd, starting this am while on his way to work. No sob, palp, dizziness, vomiting, diarrhea, urinary sx's. Parents recently dx'd w/ flu. No drainage from foot or associated foot pain.

## 2018-12-03 NOTE — H&P ADULT - NSHPREVIEWOFSYSTEMS_GEN_ALL_CORE
REVIEW OF SYSTEMS:    CONSTITUTIONAL: (+) weakness, fevers or chills  EYES/ENT: No visual changes;  No vertigo or throat pain   NECK: No pain or stiffness  RESPIRATORY: No cough, wheezing, hemoptysis; No shortness of breath  CARDIOVASCULAR: No chest pain or palpitations  GASTROINTESTINAL: No abdominal or epigastric pain. No nausea, vomiting, or hematemesis; No diarrhea or constipation. No melena or hematochezia.  GENITOURINARY: No dysuria, frequency or hematuria  NEUROLOGICAL: No numbness or weakness  SKIN: No itching, rashes

## 2018-12-03 NOTE — ED ADULT TRIAGE NOTE - CHIEF COMPLAINT QUOTE
Patient was sent by Dr. Pelaez for rt foot cellulitis , was seen in the ER this morning for fever and chills  .

## 2018-12-03 NOTE — ED ADULT NURSE REASSESSMENT NOTE - NS ED NURSE REASSESS COMMENT FT1
Pt received from night shift RN, presented to ED w/ c/o fever/chills, & body aches. Recent travel to [place]. No distress noted upon assessment, vitals updated. Pt denies any pain, no chest pain/SOB. Will continue to monitor.

## 2018-12-03 NOTE — ED ADULT NURSE NOTE - OBJECTIVE STATEMENT
The pt is a 44 y/o male who was sent to ED from podiatrist office for evaluation of swelling to right foot. Pt has hx of DMII. Right foot is swollen, warm to touch, Podiatrist "cleaned and apply a dressing to foot"

## 2018-12-03 NOTE — H&P ADULT - NSHPLABSRESULTS_GEN_ALL_CORE
LABS:                         11.9   17.9  )-----------( 278      ( 03 Dec 2018 06:29 )             35.2     12    129<L>  |  96  |  22  ----------------------------<  282<H>  4.7   |  25  |  1.00    Ca    8.6      03 Dec 2018 06:29  Mg     1.7         TPro  7.7  /  Alb  3.5  /  TBili  0.6  /  DBili  x   /  AST  24  /  ALT  43  /  AlkPhos  113      PT/INR - ( 03 Dec 2018 06:29 )   PT: 12.2 sec;   INR: 1.08          PTT - ( 03 Dec 2018 06:29 )  PTT:29.6 sec  Urinalysis Basic - ( 03 Dec 2018 08:28 )    Color: Yellow / Appearance: Clear / S.010 / pH: x  Gluc: x / Ketone: NEGATIVE  / Bili: Negative / Urobili: 0.2 E.U./dL   Blood: x / Protein: NEGATIVE mg/dL / Nitrite: NEGATIVE   Leuk Esterase: NEGATIVE / RBC: < 5 /HPF / WBC < 5 /HPF   Sq Epi: x / Non Sq Epi: 0-5 /HPF / Bacteria: Present /HPF      CARDIAC MARKERS ( 03 Dec 2018 23:26 )  x     / <0.01 ng/mL / x     / x     / 1.5 ng/mL  CARDIAC MARKERS ( 03 Dec 2018 06:29 )  x     / <0.01 ng/mL / x     / x     / x            Lactate, Blood: 0.9 mmoL/L ( @ 19:05)      RADIOLOGY, EKG & ADDITIONAL TESTS:     EKG w/o acute ischemic changes    CXR w/ ?LLL infiltrate; will re-evaluate with PA Lateral

## 2018-12-03 NOTE — ED PROVIDER NOTE - OBJECTIVE STATEMENT
44yo m, h/o dm, amp of r 4th and 5th toes and partial amp of 2nd toe, chronic wd to plantar surface of foot, who p/w c/o generalized myalgias, subj warmth, ha, np cough, seen today earlier at [facility] w/negative workup for PNA, sent to podiatrist who identified a weeping R foot ulceration w/warmth and swelling, having progressed rapidly from time of ED eval w/recurrent fever, referred to ED for r/o osteo and treatment for diabetic foot ulcer.

## 2018-12-03 NOTE — ED PROVIDER NOTE - PHYSICAL EXAMINATION
VITAL SIGNS: I have reviewed nursing notes and confirm.  CONSTITUTIONAL: Well-developed; well-nourished; in no acute distress.  SKIN: Agree with RN documentation regarding decubitus evaluation. Remainder of skin exam is warm and dry, no acute rash.  HEAD: Normocephalic; atraumatic.  EYES: PERRL, EOM intact; conjunctiva and sclera clear.  ENT: No nasal discharge; airway clear.  NECK: Supple; non tender.  CARD: S1, S2 normal; no murmurs, gallops, or rubs. + tachycardic regular rhythm  RESP: No wheezes, rales or rhonchi. CTA w/good excursion  ABD: Normal bowel sounds; soft; non-distended; non-tender  EXT: Normal ROM. s/p amputation 4th and 5th R toes w/ 2cm weeping ulceration w/purulent discharge and surrounding erythema mild TTP, distal pulses intact, all other ext exam wnl  LYMPH: No acute cervical adenopathy.  NEURO: Alert, oriented. Grossly unremarkable.  PSYCH: Cooperative, appropriate.

## 2018-12-03 NOTE — H&P ADULT - NSHPSOCIALHISTORY_GEN_ALL_CORE
Tobacco use: denies  EtOH use: denies  Illicit drug use: kimberlee    Living situation: lives in [place]

## 2018-12-04 DIAGNOSIS — E11.9 TYPE 2 DIABETES MELLITUS WITHOUT COMPLICATIONS: ICD-10-CM

## 2018-12-04 DIAGNOSIS — R63.8 OTHER SYMPTOMS AND SIGNS CONCERNING FOOD AND FLUID INTAKE: ICD-10-CM

## 2018-12-04 DIAGNOSIS — Z29.9 ENCOUNTER FOR PROPHYLACTIC MEASURES, UNSPECIFIED: ICD-10-CM

## 2018-12-04 DIAGNOSIS — E78.5 HYPERLIPIDEMIA, UNSPECIFIED: ICD-10-CM

## 2018-12-04 DIAGNOSIS — E11.621 TYPE 2 DIABETES MELLITUS WITH FOOT ULCER: ICD-10-CM

## 2018-12-04 DIAGNOSIS — Z91.89 OTHER SPECIFIED PERSONAL RISK FACTORS, NOT ELSEWHERE CLASSIFIED: ICD-10-CM

## 2018-12-04 LAB
ALBUMIN SERPL ELPH-MCNC: 3 G/DL — LOW (ref 3.3–5)
ALP SERPL-CCNC: 104 U/L — SIGNIFICANT CHANGE UP (ref 40–120)
ALT FLD-CCNC: 50 U/L — HIGH (ref 10–45)
ANION GAP SERPL CALC-SCNC: 12 MMOL/L — SIGNIFICANT CHANGE UP (ref 5–17)
AST SERPL-CCNC: 36 U/L — SIGNIFICANT CHANGE UP (ref 10–40)
BILIRUB SERPL-MCNC: 0.5 MG/DL — SIGNIFICANT CHANGE UP (ref 0.2–1.2)
BUN SERPL-MCNC: 15 MG/DL — SIGNIFICANT CHANGE UP (ref 7–23)
CALCIUM SERPL-MCNC: 8.8 MG/DL — SIGNIFICANT CHANGE UP (ref 8.4–10.5)
CHLORIDE SERPL-SCNC: 97 MMOL/L — SIGNIFICANT CHANGE UP (ref 96–108)
CK MB CFR SERPL CALC: 1.5 NG/ML — SIGNIFICANT CHANGE UP (ref 0–6.7)
CK SERPL-CCNC: 138 U/L — SIGNIFICANT CHANGE UP (ref 30–200)
CO2 SERPL-SCNC: 26 MMOL/L — SIGNIFICANT CHANGE UP (ref 22–31)
CREAT SERPL-MCNC: 1.02 MG/DL — SIGNIFICANT CHANGE UP (ref 0.5–1.3)
CULTURE RESULTS: NO GROWTH — SIGNIFICANT CHANGE UP
GLUCOSE BLDC GLUCOMTR-MCNC: 131 MG/DL — HIGH (ref 70–99)
GLUCOSE BLDC GLUCOMTR-MCNC: 177 MG/DL — HIGH (ref 70–99)
GLUCOSE BLDC GLUCOMTR-MCNC: 225 MG/DL — HIGH (ref 70–99)
GLUCOSE BLDC GLUCOMTR-MCNC: 252 MG/DL — HIGH (ref 70–99)
GLUCOSE SERPL-MCNC: 307 MG/DL — HIGH (ref 70–99)
GRAM STN FLD: SIGNIFICANT CHANGE UP
HBA1C BLD-MCNC: 9.2 % — HIGH (ref 4–5.6)
HCT VFR BLD CALC: 35.5 % — LOW (ref 39–50)
HGB BLD-MCNC: 11.7 G/DL — LOW (ref 13–17)
INR BLD: 1.09 — SIGNIFICANT CHANGE UP (ref 0.88–1.16)
MCHC RBC-ENTMCNC: 28.1 PG — SIGNIFICANT CHANGE UP (ref 27–34)
MCHC RBC-ENTMCNC: 33 G/DL — SIGNIFICANT CHANGE UP (ref 32–36)
MCV RBC AUTO: 85.3 FL — SIGNIFICANT CHANGE UP (ref 80–100)
PLATELET # BLD AUTO: 285 K/UL — SIGNIFICANT CHANGE UP (ref 150–400)
POTASSIUM SERPL-MCNC: 4.3 MMOL/L — SIGNIFICANT CHANGE UP (ref 3.5–5.3)
POTASSIUM SERPL-SCNC: 4.3 MMOL/L — SIGNIFICANT CHANGE UP (ref 3.5–5.3)
PROT SERPL-MCNC: 7.5 G/DL — SIGNIFICANT CHANGE UP (ref 6–8.3)
PROTHROM AB SERPL-ACNC: 12.4 SEC — SIGNIFICANT CHANGE UP (ref 10–12.9)
RBC # BLD: 4.16 M/UL — LOW (ref 4.2–5.8)
RBC # FLD: 13.3 % — SIGNIFICANT CHANGE UP (ref 10.3–16.9)
SODIUM SERPL-SCNC: 135 MMOL/L — SIGNIFICANT CHANGE UP (ref 135–145)
SPECIMEN SOURCE: SIGNIFICANT CHANGE UP
SPECIMEN SOURCE: SIGNIFICANT CHANGE UP
TROPONIN T SERPL-MCNC: <0.01 NG/ML — SIGNIFICANT CHANGE UP (ref 0–0.01)
WBC # BLD: 11.3 K/UL — HIGH (ref 3.8–10.5)
WBC # FLD AUTO: 11.3 K/UL — HIGH (ref 3.8–10.5)

## 2018-12-04 RX ORDER — IBUPROFEN 200 MG
400 TABLET ORAL ONCE
Refills: 0 | Status: COMPLETED | OUTPATIENT
Start: 2018-12-04 | End: 2018-12-04

## 2018-12-04 RX ORDER — VANCOMYCIN HCL 1 G
1500 VIAL (EA) INTRAVENOUS ONCE
Refills: 0 | Status: COMPLETED | OUTPATIENT
Start: 2018-12-04 | End: 2018-12-04

## 2018-12-04 RX ORDER — IBUPROFEN 200 MG
400 TABLET ORAL EVERY 6 HOURS
Refills: 0 | Status: DISCONTINUED | OUTPATIENT
Start: 2018-12-04 | End: 2018-12-05

## 2018-12-04 RX ORDER — VANCOMYCIN HCL 1 G
1500 VIAL (EA) INTRAVENOUS EVERY 12 HOURS
Refills: 0 | Status: DISCONTINUED | OUTPATIENT
Start: 2018-12-04 | End: 2018-12-04

## 2018-12-04 RX ORDER — PIPERACILLIN AND TAZOBACTAM 4; .5 G/20ML; G/20ML
3.38 INJECTION, POWDER, LYOPHILIZED, FOR SOLUTION INTRAVENOUS EVERY 6 HOURS
Refills: 0 | Status: DISCONTINUED | OUTPATIENT
Start: 2018-12-04 | End: 2018-12-05

## 2018-12-04 RX ORDER — INSULIN GLARGINE 100 [IU]/ML
30 INJECTION, SOLUTION SUBCUTANEOUS AT BEDTIME
Refills: 0 | Status: DISCONTINUED | OUTPATIENT
Start: 2018-12-04 | End: 2018-12-05

## 2018-12-04 RX ADMIN — INSULIN GLARGINE 30 UNIT(S): 100 INJECTION, SOLUTION SUBCUTANEOUS at 22:29

## 2018-12-04 RX ADMIN — Medication 6: at 06:47

## 2018-12-04 RX ADMIN — HEPARIN SODIUM 5000 UNIT(S): 5000 INJECTION INTRAVENOUS; SUBCUTANEOUS at 13:12

## 2018-12-04 RX ADMIN — Medication 8 UNIT(S): at 09:01

## 2018-12-04 RX ADMIN — PIPERACILLIN AND TAZOBACTAM 200 GRAM(S): 4; .5 INJECTION, POWDER, LYOPHILIZED, FOR SOLUTION INTRAVENOUS at 14:48

## 2018-12-04 RX ADMIN — HEPARIN SODIUM 5000 UNIT(S): 5000 INJECTION INTRAVENOUS; SUBCUTANEOUS at 21:03

## 2018-12-04 RX ADMIN — Medication 2: at 17:45

## 2018-12-04 RX ADMIN — Medication 8 UNIT(S): at 12:46

## 2018-12-04 RX ADMIN — Medication 300 MILLIGRAM(S): at 07:02

## 2018-12-04 RX ADMIN — Medication 400 MILLIGRAM(S): at 15:39

## 2018-12-04 RX ADMIN — ATORVASTATIN CALCIUM 40 MILLIGRAM(S): 80 TABLET, FILM COATED ORAL at 21:03

## 2018-12-04 RX ADMIN — PIPERACILLIN AND TAZOBACTAM 200 GRAM(S): 4; .5 INJECTION, POWDER, LYOPHILIZED, FOR SOLUTION INTRAVENOUS at 02:29

## 2018-12-04 RX ADMIN — Medication 400 MILLIGRAM(S): at 21:32

## 2018-12-04 RX ADMIN — Medication 300 MILLIGRAM(S): at 18:53

## 2018-12-04 RX ADMIN — PIPERACILLIN AND TAZOBACTAM 200 GRAM(S): 4; .5 INJECTION, POWDER, LYOPHILIZED, FOR SOLUTION INTRAVENOUS at 09:45

## 2018-12-04 RX ADMIN — Medication 400 MILLIGRAM(S): at 16:33

## 2018-12-04 RX ADMIN — HEPARIN SODIUM 5000 UNIT(S): 5000 INJECTION INTRAVENOUS; SUBCUTANEOUS at 06:06

## 2018-12-04 RX ADMIN — PIPERACILLIN AND TAZOBACTAM 200 GRAM(S): 4; .5 INJECTION, POWDER, LYOPHILIZED, FOR SOLUTION INTRAVENOUS at 21:03

## 2018-12-04 RX ADMIN — INSULIN GLARGINE 25 UNIT(S): 100 INJECTION, SOLUTION SUBCUTANEOUS at 00:41

## 2018-12-04 RX ADMIN — Medication 4: at 22:29

## 2018-12-04 RX ADMIN — Medication 400 MILLIGRAM(S): at 21:02

## 2018-12-04 RX ADMIN — Medication 8 UNIT(S): at 17:45

## 2018-12-04 NOTE — PROGRESS NOTE ADULT - PROBLEM SELECTOR PLAN 6
F: No IVD indicated  E: Replete PRN  N: DASH/TLC    Full Code    Dispo: [facility] F: No IVF indicated  E: Replete PRN  N: DASH/TLC    Full Code    Dispo: [facility]

## 2018-12-04 NOTE — PROGRESS NOTE ADULT - PROBLEM SELECTOR PLAN 1
P\atient w/ a hx of multiple diabetic foot ulcers in the past requiring amputation and IV abx now presenting with fevers, chills, tachycardia and swollen R foot with a known ulcer; admitted for IV ABX per podiatry recommendations  -EST and CRP both very elevated concerning for underlying inflammatory process such as osteomyelitis  - c/w vanc (1500mg BID) and zosyn (3.375 g q6)  - wound care consult  - f/u podiatry recs  - f/u xray for evaluation for osteomyelitis P\atient w/ a hx of multiple diabetic foot ulcers in the past requiring amputation and IV abx now presenting with fevers, chills, tachycardia and swollen R foot with a known ulcer; admitted for IV ABX per podiatry recommendations  -EST and CRP both very elevated concerning for underlying inflammatory process such as osteomyelitis  - c/w vanc (1500mg BID) and zosyn (3.375 g q6)  - wound care consult  - f/u podiatry recs -- In AM podiatry explored foot ulcer and ~4cc  purulence expressed. irrigated with 1L saline, no further purulence.  iodoform packing. They will monitor clinical progression to determine whether formal debridement vs continued Abx and wound care warranted   - f/u xray for evaluation for osteomyelitis

## 2018-12-04 NOTE — PROGRESS NOTE ADULT - PROBLEM SELECTOR PLAN 4
On lantus 48 u at bedtime at home; 8 u premeal   - c/w 8U premeal; cut patient's home lantus dose in half and initiated lantus 25U lantus qhs  -f/u A1c On lantus 48 u at bedtime at home; 8 u premeal   - c/w 8U premeal; cut patient's home lantus dose in half and initiated lantus 25U lantus qhs  -A1c 9.2 On lantus 48 u at bedtime at home; 8 u premeal   - c/w 8U premeal; cut patient's home lantus dose in half and initiated lantus 25U qhs. Lantus increased to 30U qhs as AM FSG 250s.   -A1c 9.2

## 2018-12-04 NOTE — PROGRESS NOTE ADULT - ASSESSMENT
A/P:  43yMale with DM2 and hx of osteomyelitis and amputations of the Right 3rd and 4th toe and partial 2nd toe presents with R foot cellulitis and infected diabetic foot ulcer.     - Foot ulcer explored and ~4cc  purulence expressed. irrigated with 1L saline, no further purulence.  iodoform packing   - recommend continue IV Abx,   f.u Cx   - will monitor clinical progression to determine whether formal debridement vs continued Abx and wound care warranted   -

## 2018-12-04 NOTE — PROGRESS NOTE ADULT - PROBLEM SELECTOR PROBLEM 4
R/O Type 2 diabetes mellitus without complication, with long-term current use of insulin Type 2 diabetes mellitus without complication, with long-term current use of insulin

## 2018-12-05 ENCOUNTER — TRANSCRIPTION ENCOUNTER (OUTPATIENT)
Age: 43
End: 2018-12-05

## 2018-12-05 VITALS
HEART RATE: 81 BPM | RESPIRATION RATE: 18 BRPM | DIASTOLIC BLOOD PRESSURE: 82 MMHG | OXYGEN SATURATION: 97 % | TEMPERATURE: 99 F | SYSTOLIC BLOOD PRESSURE: 132 MMHG

## 2018-12-05 LAB
ANION GAP SERPL CALC-SCNC: 9 MMOL/L — SIGNIFICANT CHANGE UP (ref 5–17)
BUN SERPL-MCNC: 16 MG/DL — SIGNIFICANT CHANGE UP (ref 7–23)
CALCIUM SERPL-MCNC: 9.2 MG/DL — SIGNIFICANT CHANGE UP (ref 8.4–10.5)
CHLORIDE SERPL-SCNC: 102 MMOL/L — SIGNIFICANT CHANGE UP (ref 96–108)
CO2 SERPL-SCNC: 27 MMOL/L — SIGNIFICANT CHANGE UP (ref 22–31)
CREAT SERPL-MCNC: 1.05 MG/DL — SIGNIFICANT CHANGE UP (ref 0.5–1.3)
GLUCOSE BLDC GLUCOMTR-MCNC: 177 MG/DL — HIGH (ref 70–99)
GLUCOSE BLDC GLUCOMTR-MCNC: 182 MG/DL — HIGH (ref 70–99)
GLUCOSE SERPL-MCNC: 174 MG/DL — HIGH (ref 70–99)
HCT VFR BLD CALC: 37.7 % — LOW (ref 39–50)
HGB BLD-MCNC: 12.4 G/DL — LOW (ref 13–17)
MAGNESIUM SERPL-MCNC: 1.8 MG/DL — SIGNIFICANT CHANGE UP (ref 1.6–2.6)
MCHC RBC-ENTMCNC: 28.4 PG — SIGNIFICANT CHANGE UP (ref 27–34)
MCHC RBC-ENTMCNC: 32.9 G/DL — SIGNIFICANT CHANGE UP (ref 32–36)
MCV RBC AUTO: 86.3 FL — SIGNIFICANT CHANGE UP (ref 80–100)
PHOSPHATE SERPL-MCNC: 3.7 MG/DL — SIGNIFICANT CHANGE UP (ref 2.5–4.5)
PLATELET # BLD AUTO: 370 K/UL — SIGNIFICANT CHANGE UP (ref 150–400)
POTASSIUM SERPL-MCNC: 4.4 MMOL/L — SIGNIFICANT CHANGE UP (ref 3.5–5.3)
POTASSIUM SERPL-SCNC: 4.4 MMOL/L — SIGNIFICANT CHANGE UP (ref 3.5–5.3)
RBC # BLD: 4.37 M/UL — SIGNIFICANT CHANGE UP (ref 4.2–5.8)
RBC # FLD: 13.3 % — SIGNIFICANT CHANGE UP (ref 10.3–16.9)
SODIUM SERPL-SCNC: 138 MMOL/L — SIGNIFICANT CHANGE UP (ref 135–145)
VANCOMYCIN TROUGH SERPL-MCNC: 11.2 UG/ML — SIGNIFICANT CHANGE UP (ref 10–20)
WBC # BLD: 8.6 K/UL — SIGNIFICANT CHANGE UP (ref 3.8–10.5)
WBC # FLD AUTO: 8.6 K/UL — SIGNIFICANT CHANGE UP (ref 3.8–10.5)

## 2018-12-05 PROCEDURE — 83036 HEMOGLOBIN GLYCOSYLATED A1C: CPT

## 2018-12-05 PROCEDURE — 80048 BASIC METABOLIC PNL TOTAL CA: CPT

## 2018-12-05 PROCEDURE — 80202 ASSAY OF VANCOMYCIN: CPT

## 2018-12-05 PROCEDURE — 82550 ASSAY OF CK (CPK): CPT

## 2018-12-05 PROCEDURE — 96374 THER/PROPH/DIAG INJ IV PUSH: CPT

## 2018-12-05 PROCEDURE — 84100 ASSAY OF PHOSPHORUS: CPT

## 2018-12-05 PROCEDURE — 83605 ASSAY OF LACTIC ACID: CPT

## 2018-12-05 PROCEDURE — 83735 ASSAY OF MAGNESIUM: CPT

## 2018-12-05 PROCEDURE — 87186 SC STD MICRODIL/AGAR DIL: CPT

## 2018-12-05 PROCEDURE — 36415 COLL VENOUS BLD VENIPUNCTURE: CPT

## 2018-12-05 PROCEDURE — 84484 ASSAY OF TROPONIN QUANT: CPT

## 2018-12-05 PROCEDURE — 87075 CULTR BACTERIA EXCEPT BLOOD: CPT

## 2018-12-05 PROCEDURE — 73630 X-RAY EXAM OF FOOT: CPT

## 2018-12-05 PROCEDURE — 82962 GLUCOSE BLOOD TEST: CPT

## 2018-12-05 PROCEDURE — 82553 CREATINE MB FRACTION: CPT

## 2018-12-05 PROCEDURE — 80053 COMPREHEN METABOLIC PANEL: CPT

## 2018-12-05 PROCEDURE — 87070 CULTURE OTHR SPECIMN AEROBIC: CPT

## 2018-12-05 PROCEDURE — 85652 RBC SED RATE AUTOMATED: CPT

## 2018-12-05 PROCEDURE — 87184 SC STD DISK METHOD PER PLATE: CPT

## 2018-12-05 PROCEDURE — 96375 TX/PRO/DX INJ NEW DRUG ADDON: CPT

## 2018-12-05 PROCEDURE — 85610 PROTHROMBIN TIME: CPT

## 2018-12-05 PROCEDURE — 99285 EMERGENCY DEPT VISIT HI MDM: CPT | Mod: 25

## 2018-12-05 PROCEDURE — 71046 X-RAY EXAM CHEST 2 VIEWS: CPT

## 2018-12-05 PROCEDURE — 85027 COMPLETE CBC AUTOMATED: CPT

## 2018-12-05 RX ORDER — INSULIN GLARGINE 100 [IU]/ML
48 INJECTION, SOLUTION SUBCUTANEOUS
Qty: 7 | Refills: 0
Start: 2018-12-05 | End: 2018-12-18

## 2018-12-05 RX ORDER — CEPHALEXIN 500 MG
1 CAPSULE ORAL
Qty: 40 | Refills: 0
Start: 2018-12-05 | End: 2018-12-14

## 2018-12-05 RX ORDER — VANCOMYCIN HCL 1 G
1750 VIAL (EA) INTRAVENOUS EVERY 12 HOURS
Refills: 0 | Status: DISCONTINUED | OUTPATIENT
Start: 2018-12-05 | End: 2018-12-05

## 2018-12-05 RX ORDER — MAGNESIUM SULFATE 500 MG/ML
1 VIAL (ML) INJECTION ONCE
Refills: 0 | Status: COMPLETED | OUTPATIENT
Start: 2018-12-05 | End: 2018-12-05

## 2018-12-05 RX ORDER — INSULIN LISPRO 100/ML
8 VIAL (ML) SUBCUTANEOUS
Qty: 4 | Refills: 0
Start: 2018-12-05 | End: 2018-12-18

## 2018-12-05 RX ADMIN — HEPARIN SODIUM 5000 UNIT(S): 5000 INJECTION INTRAVENOUS; SUBCUTANEOUS at 12:54

## 2018-12-05 RX ADMIN — Medication 400 MILLIGRAM(S): at 07:29

## 2018-12-05 RX ADMIN — Medication 100 GRAM(S): at 09:08

## 2018-12-05 RX ADMIN — PIPERACILLIN AND TAZOBACTAM 200 GRAM(S): 4; .5 INJECTION, POWDER, LYOPHILIZED, FOR SOLUTION INTRAVENOUS at 10:12

## 2018-12-05 RX ADMIN — Medication 2: at 09:07

## 2018-12-05 RX ADMIN — Medication 400 MILLIGRAM(S): at 13:51

## 2018-12-05 RX ADMIN — Medication 8 UNIT(S): at 12:53

## 2018-12-05 RX ADMIN — HEPARIN SODIUM 5000 UNIT(S): 5000 INJECTION INTRAVENOUS; SUBCUTANEOUS at 06:26

## 2018-12-05 RX ADMIN — Medication 250 MILLIGRAM(S): at 11:32

## 2018-12-05 RX ADMIN — Medication 2: at 12:54

## 2018-12-05 RX ADMIN — PIPERACILLIN AND TAZOBACTAM 200 GRAM(S): 4; .5 INJECTION, POWDER, LYOPHILIZED, FOR SOLUTION INTRAVENOUS at 03:06

## 2018-12-05 RX ADMIN — Medication 400 MILLIGRAM(S): at 07:58

## 2018-12-05 RX ADMIN — Medication 8 UNIT(S): at 09:07

## 2018-12-05 NOTE — PROGRESS NOTE ADULT - PROBLEM SELECTOR PLAN 3
Patient endorsing 1 episode of crushing chest pain on morning of admisison with the onset of fevers and malaise; not typical chest pain per history (was at rest, resolved spontaneously, not associated with exertion)  - no prior history of coronary artery disease per patient; no hx of acs  - troponin (-)  - EKG without acute ischemic changes  - continue to monitor
Patient endorsing 1 episode of crushing chest pain on morning of admisison with the onset of fevers and malaise; not typical chest pain per history (was at rest, resolved spontaneously, not associated with exertion)  - no prior history of coronary artery disease per patient; no hx of acs  - troponin (-)  - EKG without acute ischemic changes  - continue to monitor

## 2018-12-05 NOTE — DISCHARGE NOTE ADULT - PLAN OF CARE
Management with antibiotics Management of diabetes with insulin You came in to the hospital with pain of the right foot. Medical testing and imaging, including blood work and an X-ray, revealed that you have cellulitis of the right foot, or a soft tissue infection. You were started on two antibiotics, vancomycin and ceftriaxone, but these were stopped and you are being discharged on another medication, called cephalexin, which you will continue to take for 10 days. Please follow up with Dr. Green for continued monitoring and treatment of your right foot. Type 2 diabetes is a disease that affects how your body uses glucose (sugar). Normally, when the blood sugar level increases, the pancreas makes more insulin. Insulin helps move sugar out of the blood so it can be used for energy. Type 2 diabetes develops because either the body cannot make enough insulin, or it cannot use the insulin correctly. After many years, your pancreas may stop making insulin.    Please see your PCP  to have your A1c checked every 3 months. You will need to return at least once each year to have your feet checked. You will need an eye exam once a year to check for retinopathy. You will also need urine tests every year to check for kidney problems. You may need tests to monitor for heart disease such as an EKG, stress test, blood pressure monitoring, and blood tests. Write down your questions so you remember to ask them during your visits.    You will need to check your blood sugar level at least 3 times each day if you are on insulin. If you check your blood sugar level before a meal , it should be between 80 and 130 mg/dL. If you check your blood sugar level 1 to 2 hours after a meal , it should be less than 180 mg/dL.  Your blood sugar level is too low if it goes below 70 mg/dL. If the level is too low, eat or drink 15 grams of fast-acting carbohydrates, such as 1/2 cup fruit juice or 4 oz. regular soda. Check your blood sugar level 15 minutes later. If the level is still low (less than 100 mg/dL), drink another serving.     Do not skip meals. Your blood sugar level may drop too low if you have taken diabetes medicine and do not eat.    Please seek medical attention immediately if:  You have severe abdominal pain, or the pain spreads to your back. You may also be vomiting.  You have trouble staying awake or focusing.  You are shaking or sweating.  You have blurred or double vision.  Your breath has a fruity, sweet smell.  Your breathing is deep and labored, or rapid and shallow.  Your heartbeat is fast and weak.

## 2018-12-05 NOTE — DISCHARGE NOTE ADULT - HOSPITAL COURSE
43 yr old male with a PMHx of DMT2 (dx in 2003 transitioned from PO meds to insulin 5 yrs after dx; last A1c in July 2018 9.7), multiple toe amputations for diabetic foot ulcers/osteomyelitis (most recent in July 2018) that presents with fever, tachycardia and malaise w/  R 1st metatarsal head ulcer concerning for osteomyelitis; admitted for IV abx.    Pt was started on vanc/zosyn. XR of the foot suggested cellulitis. Podiatry followed patients, did not want to do debridement in OR, but did treat the wound as necessary at bedside. Pt was discharged on cephalexin for a total of a 7 day course with instructions to follow up outpatient with ortho. Pt's wound improved during the course of his stay.

## 2018-12-05 NOTE — PROGRESS NOTE ADULT - PROBLEM SELECTOR PLAN 1
Patient w/ a hx of multiple diabetic foot ulcers in the past requiring amputation and IV abx now presenting with fevers, chills, tachycardia and swollen R foot with a known ulcer; admitted for IV ABX per podiatry recommendations  -ESR and CRP both very elevated concerning for underlying inflammatory process such as osteomyelitis  - c/w vanc (trough 12/5 am 11.2, increased dose to 1750mg q12)  - c/w zosyn (3.375g q6)  - wound care consult  - f/u podiatry recs -- 12/4 podiatry explored foot ulcer and ~4cc  purulence expressed. irrigated with 1L saline, no further purulence.  iodoform packing. They will monitor clinical progression to determine whether formal debridement vs continued Abx and wound care warranted   - XR with increased soft tissue swelling suggestive of cellulitis

## 2018-12-05 NOTE — DISCHARGE NOTE ADULT - MEDICATION SUMMARY - MEDICATIONS TO STOP TAKING
I will STOP taking the medications listed below when I get home from the hospital:    vancomycin  -- 1250 milligram(s) intravenously every 12 hours for 6 weeks (end date August 20) with goal vancomycin troughs 15-20 and will need weekly CBC with diff, CMP, CRP, ESR    piperacillin-tazobactam 2 g-0.25 g intravenous injection  -- 4.5 gram(s) intravenous every 6 hours for 6 weeks (end date August 20) and will need weekly CBC with diff, CMP, CRP, ESR

## 2018-12-05 NOTE — DISCHARGE NOTE ADULT - PATIENT PORTAL LINK FT
You can access the [facility] Patient Portal, offered by [facility], by registering with the following website: [email or web address]

## 2018-12-05 NOTE — DISCHARGE NOTE ADULT - MEDICATION SUMMARY - MEDICATIONS TO TAKE
I will START or STAY ON the medications listed below when I get home from the hospital:    insulin lispro 100 units/mL injectable solution  -- 8 unit(s) injectable 3 times a day (before meals) Please adjust with insulin requirements  -- Indication: For Diabetes    Lantus 100 units/mL subcutaneous solution  -- 48 unit(s) subcutaneous once a day (at bedtime)  -- Do not drink alcoholic beverages when taking this medication.  It is very important that you take or use this exactly as directed.  Do not skip doses or discontinue unless directed by your doctor.  Keep in refrigerator.  Do not freeze.    -- Indication: For Diabetes    atorvastatin 40 mg oral tablet  -- 1 tab(s) by mouth once a day (at bedtime)  -- Indication: For Hyperlipidemia, unspecified hyperlipidemia type    cephalexin 500 mg oral capsule  -- 1 cap(s) by mouth 4 times a day   -- Finish all this medication unless otherwise directed by prescriber.    -- Indication: For Cellulitis of right lower extremity

## 2018-12-05 NOTE — DISCHARGE NOTE ADULT - PROVIDER TOKENS
FREE:[LAST:[Devon],FIRST:[Barry],PHONE:[phone]],FAX:[(   )    -],ADDRESS:[[address] [facility], [place] [address]]]

## 2018-12-05 NOTE — DISCHARGE NOTE ADULT - CARE PLAN
Principal Discharge DX:	Cellulitis of right lower extremity  Goal:	Management with antibiotics  Assessment and plan of treatment:	You came in to the hospital with pain of the right foot. Medical testing and imaging, including blood work and an X-ray, revealed that you have cellulitis of the right foot, or a soft tissue infection. You were started on two antibiotics, vancomycin and ceftriaxone, but these were stopped and you are being discharged on another medication, called cephalexin, which you will continue to take for 10 days. Please follow up with Dr. Green for continued monitoring and treatment of your right foot.  Secondary Diagnosis:	Other specified diabetes mellitus with foot ulcer, with long-term current use of insulin  Goal:	Management of diabetes with insulin  Assessment and plan of treatment:	Type 2 diabetes is a disease that affects how your body uses glucose (sugar). Normally, when the blood sugar level increases, the pancreas makes more insulin. Insulin helps move sugar out of the blood so it can be used for energy. Type 2 diabetes develops because either the body cannot make enough insulin, or it cannot use the insulin correctly. After many years, your pancreas may stop making insulin.    Please see your PCP  to have your A1c checked every 3 months. You will need to return at least once each year to have your feet checked. You will need an eye exam once a year to check for retinopathy. You will also need urine tests every year to check for kidney problems. You may need tests to monitor for heart disease such as an EKG, stress test, blood pressure monitoring, and blood tests. Write down your questions so you remember to ask them during your visits.    You will need to check your blood sugar level at least 3 times each day if you are on insulin. If you check your blood sugar level before a meal , it should be between 80 and 130 mg/dL. If you check your blood sugar level 1 to 2 hours after a meal , it should be less than 180 mg/dL.  Your blood sugar level is too low if it goes below 70 mg/dL. If the level is too low, eat or drink 15 grams of fast-acting carbohydrates, such as 1/2 cup fruit juice or 4 oz. regular soda. Check your blood sugar level 15 minutes later. If the level is still low (less than 100 mg/dL), drink another serving.     Do not skip meals. Your blood sugar level may drop too low if you have taken diabetes medicine and do not eat.    Please seek medical attention immediately if:  You have severe abdominal pain, or the pain spreads to your back. You may also be vomiting.  You have trouble staying awake or focusing.  You are shaking or sweating.  You have blurred or double vision.  Your breath has a fruity, sweet smell.  Your breathing is deep and labored, or rapid and shallow.  Your heartbeat is fast and weak.

## 2018-12-05 NOTE — PROGRESS NOTE ADULT - PROBLEM SELECTOR PLAN 6
F: No IVF indicated  E: Replete PRN  N: DASH/TLC    Full Code    Dispo: [facility]

## 2018-12-05 NOTE — PROGRESS NOTE ADULT - SUBJECTIVE AND OBJECTIVE BOX
PROGRESS NOTE   Patient is a 43y old  Male who presents with a chief complaint of foot pain (04 Dec 2018 15:50)      Interval HPI:  Patient resting comfortably, no complaints of pain      Vital Signs Last 24 Hrs  T(C): 36.6 (05 Dec 2018 09:12), Max: 37.3 (04 Dec 2018 15:18)  T(F): 97.9 (05 Dec 2018 09:12), Max: 99.1 (04 Dec 2018 15:18)  HR: 73 (05 Dec 2018 09:12) (68 - 88)  BP: 137/84 (05 Dec 2018 09:12) (131/80 - 163/90)  BP(mean): --  RR: 16 (05 Dec 2018 09:12) (16 - 18)  SpO2: 97% (05 Dec 2018 09:12) (95% - 100%)                          12.4   8.6   )-----------( 370      ( 05 Dec 2018 06:44 )             37.7               12-05    138  |  102  |  16  ----------------------------<  174<H>  4.4   |  27  |  1.05    Ca    9.2      05 Dec 2018 06:43  Phos  3.7     12-05  Mg     1.8     12-05    TPro  7.5  /  Alb  3.0<L>  /  TBili  0.5  /  DBili  x   /  AST  36  /  ALT  50<H>  /  AlkPhos  104  12-04      MICROBIOLOGY  Culture - Surgical Swab (12.03.18 @ 22:59)    Gram Stain:   Moderate Gram positive cocci in pairs  Moderate WBC's    Specimen Source: .Surgical Swab wound    Culture Results:   Moderate Staphylococcus aureus  Susceptibility to follow.  Culture in progress        PHYSICAL EXAM  Right foot   Vasc: DP/PT 2+. CFT , 3 sec x 3. TG warm to warm.   Derm: s/p 4th 5th toe amputation, plantar ulcer continued improvement of erythema and compared to marked outline.   Right foot distal ulcer measuring 2cm x 2cm x 1.5cm with macerated border. now  ~1-2cc serous and purulent drainage,  No malodor. +PTB.  Neuro: Protective sensation diminished
Patient is a 43y old  Male who presents with a chief complaint of     INTERVAL HPI/ OVERNIGHT EVENTS  PT still in ED holding, reports improvement in R foot in terms of swelling. denies chest pain cough nausea vomiting feer.     LABS                        11.7   11.3  )-----------( 285      ( 04 Dec 2018 05:33 )             35.5     12-04    135  |  97  |  15  ----------------------------<  307<H>  4.3   |  26  |  1.02    Ca    8.8      04 Dec 2018 05:31  Mg     1.7     12-03    TPro  7.5  /  Alb  3.0<L>  /  TBili  0.5  /  DBili  x   /  AST  36  /  ALT  50<H>  /  AlkPhos  104  12-04    PT/INR - ( 04 Dec 2018 05:33 )   PT: 12.4 sec;   INR: 1.09     C-Reactive Protein, Serum (12.03.18 @ 06:29)    C-Reactive Protein, Serum: 20.77 mg/dL    Sedimentation Rate, Erythrocyte (12.03.18 @ 19:05)    Sedimentation Rate, Erythrocyte: 46 mm/Hr         PTT - ( 03 Dec 2018 06:29 )  PTT:29.6 sec  ESR: 46  CRP: --  12-03 @ 19:05    ICU Vital Signs Last 24 Hrs  T(C): 36.3 (04 Dec 2018 08:47), Max: 37.6 (03 Dec 2018 18:05)  T(F): 97.4 (04 Dec 2018 08:47), Max: 99.6 (03 Dec 2018 18:05)  HR: 75 (04 Dec 2018 08:47) (75 - 112)  BP: 149/83 (04 Dec 2018 08:47) (134/85 - 190/98)  BP(mean): 101 (04 Dec 2018 05:21) (101 - 101)  ABP: --  ABP(mean): --  RR: 18 (04 Dec 2018 08:47) (18 - 18)  SpO2: 100% (04 Dec 2018 08:47) (98% - 100%)      RADIOLOGY      MICROBIOLOGY      PHYSICAL EXAM  Vasc: DP/PT 2+. CFT , 3 sec x 3. TG warm to warm.   Derm: s/p 4th 5th toe amputation, plantar ulcer Improvement on erythema and compared to marked outline.   foot measuring 2cm x 2cm x 1.5cm with macerated border. now  ~3-4cc purulent drainage,  No malodor. +PTB.Neuro: Protective Sensation diminished
coverage Dr. Dickerson  HPI:  43 yr old male with a PMHx of DMT2 (dx in 2003 transitioned from PO meds to insulin 5 yrs after dx; last A1c in July 2018 9.7), multiple toe amputations for diabetic foot ulcers/osteomyelitis (most recent in July 2018) that presents with R 1st metatarsal head ulcer. Patient states the ulcer has been present for ~2 months and is cared for weekly by his outpatient podiatrist. Patient states that he was in his usual state of health until this morning when he woke up with malaise, fevers and chest pressure type pain. He came to [facility] ED this AM where he was found to have a fever to 100.4 and underwent blood cx, CXR, and RVP. None of his blood work was significant for any signs or symptoms of acute infection, patient's physical exam was unremarkable and so patient was discharged from the ED  Patient went to see his podiatrist regarding his foot for routine followup and was noted to have increased pain, swelling and warmth in the foot and patient's podiatrist recommended admission to the hospital for IV abx 2/2 underlying concern that his swollen foot with an open ulcer was the underlying source of patient's earlier systemic symptoms. In respect to his chest pain, patient states he had pressure like chest pain that felt like "an elephant sitting on his chest" this morning when he started having malaise and fever like symptoms. He states he has never had this type of pain before and has no hx of prior CAD. He states sometimes when he climbs too many stairs, he gets extremely short of breath and has some pressure type pain in the mid-substernal area that is relieved with rest. He has never taken nitroglycerin before and has never been hospitalized before for chest pain.              REVIEW OF SYSTEMS:  Constitutional: No fever, weight loss or fatigue  ENMT:  No difficulty hearing, tinnitus, vertigo; No sinus or throat pain  Respiratory: No cough, wheezing, chills or hemoptysis  Cardiovascular: No chest pain, palpitations, dizziness or leg swelling  Gastrointestinal: No abdominal or epigastric pain. No nausea, vomiting or hematemesis; No diarrhea or constipation. No melena or hematochezia.  Skin: No itching, burning, rashes or lesions   Musculoskeletal: foot pain    PAST MEDICAL & SURGICAL HISTORY:  Hyperlipidemia, unspecified hyperlipidemia type  Osteomyelitis  Diabetes  Toe amputation status      FAMILY HISTORY:  No family history of cardiovascular disease (Father, Mother)      SOCIAL HISTORY:  Smoking Status: [ ] Current, [ ] Former, [ ] Never  Pack Years:    MEDICATIONS:  MEDICATIONS  (STANDING):  atorvastatin 40 milliGRAM(s) Oral at bedtime  dextrose 5%. 1000 milliLiter(s) (50 mL/Hr) IV Continuous <Continuous>  dextrose 50% Injectable 12.5 Gram(s) IV Push once  dextrose 50% Injectable 25 Gram(s) IV Push once  dextrose 50% Injectable 25 Gram(s) IV Push once  heparin  Injectable 5000 Unit(s) SubCutaneous every 8 hours  insulin glargine Injectable (LANTUS) 25 Unit(s) SubCutaneous at bedtime  insulin lispro (HumaLOG) corrective regimen sliding scale   SubCutaneous Before meals and at bedtime  insulin lispro Injectable (HumaLOG) 8 Unit(s) SubCutaneous three times a day before meals  piperacillin/tazobactam IVPB. 3.375 Gram(s) IV Intermittent every 6 hours  vancomycin  IVPB 1500 milliGRAM(s) IV Intermittent every 12 hours    MEDICATIONS  (PRN):  dextrose 40% Gel 15 Gram(s) Oral once PRN Blood Glucose LESS THAN 70 milliGRAM(s)/deciliter  glucagon  Injectable 1 milliGRAM(s) IntraMuscular once PRN Glucose LESS THAN 70 milligrams/deciliter  ibuprofen  Tablet. 400 milliGRAM(s) Oral every 6 hours PRN Moderate Pain (4 - 6)      Allergies    No Known Allergies    Intolerances        Vital Signs Last 24 Hrs  T(C): 37.3 (04 Dec 2018 15:18), Max: 37.6 (03 Dec 2018 18:05)  T(F): 99.1 (04 Dec 2018 15:18), Max: 99.6 (03 Dec 2018 18:05)  HR: 88 (04 Dec 2018 15:18) (75 - 112)  BP: 163/90 (04 Dec 2018 15:18) (134/85 - 190/98)  BP(mean): 101 (04 Dec 2018 05:21) (101 - 101)  RR: 18 (04 Dec 2018 15:18) (16 - 18)  SpO2: 96% (04 Dec 2018 15:18) (96% - 100%)        PHYSICAL EXAM:    General: Well developed; well nourished; in no acute distress  HEENT: MMM, conjunctiva and sclera clear  Lungs: clear  Heart: regular  Gastrointestinal: Soft, non-tender non-distended; Normal bowel sounds; No rebound or guarding  Extremities: Normal range of motion, No clubbing, cyanosis or edema  Neurological: Alert and oriented x3  Skin: Warm and dry. dressings on right foot      LABS:                        11.7   11.3  )-----------( 285      ( 04 Dec 2018 05:33 )             35.5     12-04    135  |  97  |  15  ----------------------------<  307<H>  4.3   |  26  |  1.02    Ca    8.8      04 Dec 2018 05:31  Mg     1.7     12-03    TPro  7.5  /  Alb  3.0<L>  /  TBili  0.5  /  DBili  x   /  AST  36  /  ALT  50<H>  /  AlkPhos  104  12-04      Culture Results:   Moderate Staphylococcus aureus  Susceptibility to follow.  Culture in progress (12-03 @ 22:59)      RADIOLOGY & ADDITIONAL STUDIES:
Pt seen and examined   coverage for Dr. alonso    REVIEW OF SYSTEMS:  Constitutional: No fever,   Cardiovascular: No chest pain, palpitations, dizziness or leg swelling  Gastrointestinal: No abdominal or epigastric pain. No nausea, vomiting or hematemesis; No diarrhea  Skin: No itching, burning, rashes or lesions       MEDICATIONS:  MEDICATIONS  (STANDING):  atorvastatin 40 milliGRAM(s) Oral at bedtime  dextrose 5%. 1000 milliLiter(s) (50 mL/Hr) IV Continuous <Continuous>  dextrose 50% Injectable 12.5 Gram(s) IV Push once  dextrose 50% Injectable 25 Gram(s) IV Push once  dextrose 50% Injectable 25 Gram(s) IV Push once  heparin  Injectable 5000 Unit(s) SubCutaneous every 8 hours  insulin glargine Injectable (LANTUS) 30 Unit(s) SubCutaneous at bedtime  insulin lispro (HumaLOG) corrective regimen sliding scale   SubCutaneous Before meals and at bedtime  insulin lispro Injectable (HumaLOG) 8 Unit(s) SubCutaneous three times a day before meals  piperacillin/tazobactam IVPB. 3.375 Gram(s) IV Intermittent every 6 hours  vancomycin  IVPB 1750 milliGRAM(s) IV Intermittent every 12 hours    MEDICATIONS  (PRN):  dextrose 40% Gel 15 Gram(s) Oral once PRN Blood Glucose LESS THAN 70 milliGRAM(s)/deciliter  glucagon  Injectable 1 milliGRAM(s) IntraMuscular once PRN Glucose LESS THAN 70 milligrams/deciliter  ibuprofen  Tablet. 400 milliGRAM(s) Oral every 6 hours PRN Moderate Pain (4 - 6)      Allergies    No Known Allergies    Intolerances        Vital Signs Last 24 Hrs  T(C): 36.6 (05 Dec 2018 09:12), Max: 37.3 (04 Dec 2018 15:18)  T(F): 97.9 (05 Dec 2018 09:12), Max: 99.1 (04 Dec 2018 15:18)  HR: 73 (05 Dec 2018 09:12) (68 - 88)  BP: 137/84 (05 Dec 2018 09:12) (131/80 - 163/90)  BP(mean): --  RR: 16 (05 Dec 2018 09:12) (16 - 18)  SpO2: 97% (05 Dec 2018 09:12) (95% - 100%)      PHYSICAL EXAM:    General:  in no acute distress  HEENT: MMM, conjunctiva and sclera clear  Lungs: clear  Heart:regular  Gastrointestinal: Soft non-tender non-distended; Normal bowel sounds;   Skin: Warm and dry.   Ext: dressings on    LABS:      CBC Full  -  ( 05 Dec 2018 06:44 )  WBC Count : 8.6 K/uL  Hemoglobin : 12.4 g/dL  Hematocrit : 37.7 %  Platelet Count - Automated : 370 K/uL  Mean Cell Volume : 86.3 fL  Mean Cell Hemoglobin : 28.4 pg  Mean Cell Hemoglobin Concentration : 32.9 g/dL  Auto Neutrophil # : x  Auto Lymphocyte # : x  Auto Monocyte # : x  Auto Eosinophil # : x  Auto Basophil # : x  Auto Neutrophil % : x  Auto Lymphocyte % : x  Auto Monocyte % : x  Auto Eosinophil % : x  Auto Basophil % : x    12-05    138  |  102  |  16  ----------------------------<  174<H>  4.4   |  27  |  1.05    Ca    9.2      05 Dec 2018 06:43  Phos  3.7     12-05  Mg     1.8     12-05    TPro  7.5  /  Alb  3.0<L>  /  TBili  0.5  /  DBili  x   /  AST  36  /  ALT  50<H>  /  AlkPhos  104  12-04    PT/INR - ( 04 Dec 2018 05:33 )   PT: 12.4 sec;   INR: 1.09                            RADIOLOGY & ADDITIONAL STUDIES (The following images were personally reviewed):
OVERNIGHT EVENTS: ANDREW    SUBJECTIVE: Pt seen and examined at bedside. Pt without any acute complaint. Denies chest pain, fevers, malaise, leg pain/swelling, shortness of breath, n/v/d.     Vital Signs Last 12 Hrs  T(F): 98.7 (18 @ 14:03), Max: 98.9 (18 @ 13:05)  HR: 84 (18 @ 14:03) (75 - 84)  BP: 145/94 (18 @ 14:03) (134/85 - 155/98)  BP(mean): 101 (18 @ 05:21) (101 - 101)  RR: 17 (18 @ 14:03) (16 - 18)  SpO2: 98% (18 @ 14:03) (98% - 100%)  I&O's Summary      PHYSICAL EXAM:  Constitutional: WDWN Male, resting comfortably in bed; NAD  HEENT: NC/AT, PERRL, EOMI  Neck: spple; no JVD or thyromegaly  Respiratory: CTA B/L; no W/R/R, no retractions  Cardiac: +S1/S2; RRR; no M/R/G; PMI non-displaced  Gastrointestinal: soft, NT/ND; no rebound or guarding; +BSx4  Back: spine midline, no bony tenderness or step-offs; no CVAT B/L   Extremities: WWP, 1st metatarsal covered in bandage, exposed amputated 2nd toe on the R foot; no erythema of note; 2+ pedal pulses b/l   Musculoskeletal: NROM x4; no joint swelling, tenderness or erythema  Vascular: 2+ radial, femoral, DP/PT pulses B/L  Dermatologic: skin warm, dry and intact; no rashes, wounds, or scars  Lymphatic: no submandibular or cervical LAD  Neurologic: AAOx3; CNII-XII grossly intact; no focal deficits  Psychiatric: affect and characteristics of appearance, verbalizations, behaviors are appropriate, denies SI/HI/AH/VH        LABS:                        11.7   11.3  )-----------( 285      ( 04 Dec 2018 05:33 )             35.5     12-    135  |  97  |  15  ----------------------------<  307<H>  4.3   |  26  |  1.02    Ca    8.8      04 Dec 2018 05:31  Mg     1.7     12-    TPro  7.5  /  Alb  3.0<L>  /  TBili  0.5  /  DBili  x   /  AST  36  /  ALT  50<H>  /  AlkPhos  104  12-04    PT/INR - ( 04 Dec 2018 05:33 )   PT: 12.4 sec;   INR: 1.09          PTT - ( 03 Dec 2018 06:29 )  PTT:29.6 sec  Urinalysis Basic - ( 03 Dec 2018 08:28 )    Color: Yellow / Appearance: Clear / S.010 / pH: x  Gluc: x / Ketone: NEGATIVE  / Bili: Negative / Urobili: 0.2 E.U./dL   Blood: x / Protein: NEGATIVE mg/dL / Nitrite: NEGATIVE   Leuk Esterase: NEGATIVE / RBC: < 5 /HPF / WBC < 5 /HPF   Sq Epi: x / Non Sq Epi: 0-5 /HPF / Bacteria: Present /HPF    RADIOLOGY & ADDITIONAL TESTS:    CXR w/ non-specific hazy opacity in the LLL  EKG w/ TWI inversion      MEDICATIONS  (STANDING):  atorvastatin 40 milliGRAM(s) Oral at bedtime  dextrose 5%. 1000 milliLiter(s) (50 mL/Hr) IV Continuous <Continuous>  dextrose 50% Injectable 12.5 Gram(s) IV Push once  dextrose 50% Injectable 25 Gram(s) IV Push once  dextrose 50% Injectable 25 Gram(s) IV Push once  heparin  Injectable 5000 Unit(s) SubCutaneous every 8 hours  insulin glargine Injectable (LANTUS) 25 Unit(s) SubCutaneous at bedtime  insulin lispro (HumaLOG) corrective regimen sliding scale   SubCutaneous Before meals and at bedtime  insulin lispro Injectable (HumaLOG) 8 Unit(s) SubCutaneous three times a day before meals  piperacillin/tazobactam IVPB. 3.375 Gram(s) IV Intermittent every 6 hours  vancomycin  IVPB 1500 milliGRAM(s) IV Intermittent every 12 hours    MEDICATIONS  (PRN):  dextrose 40% Gel 15 Gram(s) Oral once PRN Blood Glucose LESS THAN 70 milliGRAM(s)/deciliter  glucagon  Injectable 1 milliGRAM(s) IntraMuscular once PRN Glucose LESS THAN 70 milligrams/deciliter
OVERNIGHT EVENTS: ANDREW    SUBJECTIVE: Pt seen and examined at bedside. Endorses some discomfort of the R foot related to the work podiatry did on the foot yesterday. Otherwise, pt denies fever, chills, headache, chest pain, SOB, abdominal pain, n/v/d.     Vital Signs Last 12 Hrs  T(F): 97.9 (12-05-18 @ 09:12), Max: 97.9 (12-05-18 @ 09:12)  HR: 73 (12-05-18 @ 09:12) (68 - 73)  BP: 137/84 (12-05-18 @ 09:12) (131/80 - 137/84)  BP(mean): --  RR: 16 (12-05-18 @ 09:12) (16 - 16)  SpO2: 97% (12-05-18 @ 09:12) (97% - 97%)  I&O's Summary      PHYSICAL EXAM:  Constitutional: WDWN Male, resting comfortably in bed; NAD  HEENT: NC/AT, PERRL, EOMI  Neck: spple; no JVD or thyromegaly  Respiratory: CTA B/L; no W/R/R, no retractions  Cardiac: +S1/S2; RRR; no M/R/G; PMI non-displaced  Gastrointestinal: soft, NT/ND; no rebound or guarding; +BSx4  Back: spine midline, no bony tenderness or step-offs; no CVAT B/L   Extremities: WWP, 1st metatarsal covered in bandage, exposed amputated 2nd toe on the R foot; no erythema of note; 2+ pedal pulses b/l   Musculoskeletal: NROM x4; no joint swelling, tenderness or erythema  Vascular: 2+ radial, femoral, DP/PT pulses B/L  Dermatologic: skin warm, dry and intact; no rashes, wounds, or scars  Lymphatic: no submandibular or cervical LAD  Neurologic: AAOx3; CNII-XII grossly intact; no focal deficits  Psychiatric: affect and characteristics of appearance, verbalizations, behaviors are appropriate, denies SI/HI/AH/VH        LABS:                        12.4   8.6   )-----------( 370      ( 05 Dec 2018 06:44 )             37.7     12-05    138  |  102  |  16  ----------------------------<  174<H>  4.4   |  27  |  1.05    Ca    9.2      05 Dec 2018 06:43  Phos  3.7     12-05  Mg     1.8     12-05    TPro  7.5  /  Alb  3.0<L>  /  TBili  0.5  /  DBili  x   /  AST  36  /  ALT  50<H>  /  AlkPhos  104  12-04    PT/INR - ( 04 Dec 2018 05:33 )   PT: 12.4 sec;   INR: 1.09       MICROBIOLOGY  Culture - Surgical Swab (12.03.18 @ 22:59)    Gram Stain:   Moderate Gram positive cocci in pairs  Moderate WBC's    Specimen Source: .Surgical Swab wound    Culture Results:   Moderate Staphylococcus aureus  Susceptibility to follow.  Culture in progress    RADIOLOGY & ADDITIONAL TESTS:    < from: Xray Foot AP + Lateral + Oblique, Right (12.03.18 @ 18:51) >  3 views of the right foot have been submitted and are compared to the   previous examination of 7/10/2018. Postsurgical changes are similar to   the prior study. However, there is increased soft tissue swelling near   the second toe stump and throughout the dorsum of the forefoot. No new   osseous erosion or periostitis is identified.      Impression: New/increased soft tissue swelling suspicious for cellulitis.    < end of copied text >      MEDICATIONS  (STANDING):  atorvastatin 40 milliGRAM(s) Oral at bedtime  dextrose 5%. 1000 milliLiter(s) (50 mL/Hr) IV Continuous <Continuous>  dextrose 50% Injectable 12.5 Gram(s) IV Push once  dextrose 50% Injectable 25 Gram(s) IV Push once  dextrose 50% Injectable 25 Gram(s) IV Push once  heparin  Injectable 5000 Unit(s) SubCutaneous every 8 hours  insulin glargine Injectable (LANTUS) 30 Unit(s) SubCutaneous at bedtime  insulin lispro (HumaLOG) corrective regimen sliding scale   SubCutaneous Before meals and at bedtime  insulin lispro Injectable (HumaLOG) 8 Unit(s) SubCutaneous three times a day before meals  piperacillin/tazobactam IVPB. 3.375 Gram(s) IV Intermittent every 6 hours  vancomycin  IVPB 1750 milliGRAM(s) IV Intermittent every 12 hours    MEDICATIONS  (PRN):  dextrose 40% Gel 15 Gram(s) Oral once PRN Blood Glucose LESS THAN 70 milliGRAM(s)/deciliter  glucagon  Injectable 1 milliGRAM(s) IntraMuscular once PRN Glucose LESS THAN 70 milligrams/deciliter  ibuprofen  Tablet. 400 milliGRAM(s) Oral every 6 hours PRN Moderate Pain (4 - 6)

## 2018-12-05 NOTE — PROGRESS NOTE ADULT - PROBLEM SELECTOR PLAN 4
On lantus 48 u at bedtime at home; 8 u premeal   - c/w 8U premeal; cut patient's home lantus dose in half and initiated lantus 25U qhs. On 12/5 Lantus increased to 30U qhs as AM FSG 250s.   -A1c 9.2

## 2018-12-06 LAB
-  AMPICILLIN: SIGNIFICANT CHANGE UP
-  CEFAZOLIN: SIGNIFICANT CHANGE UP
-  CLINDAMYCIN: SIGNIFICANT CHANGE UP
-  CLINDAMYCIN: SIGNIFICANT CHANGE UP
-  ERYTHROMYCIN: SIGNIFICANT CHANGE UP
-  ERYTHROMYCIN: SIGNIFICANT CHANGE UP
-  LEVOFLOXACIN: SIGNIFICANT CHANGE UP
-  LEVOFLOXACIN: SIGNIFICANT CHANGE UP
-  LINEZOLID: SIGNIFICANT CHANGE UP
-  OXACILLIN: SIGNIFICANT CHANGE UP
-  PENICILLIN: SIGNIFICANT CHANGE UP
-  PENICILLIN: SIGNIFICANT CHANGE UP
-  RIFAMPIN: SIGNIFICANT CHANGE UP
-  TRIMETHOPRIM/SULFAMETHOXAZOLE: SIGNIFICANT CHANGE UP
-  VANCOMYCIN: SIGNIFICANT CHANGE UP
-  VANCOMYCIN: SIGNIFICANT CHANGE UP
CULTURE RESULTS: SIGNIFICANT CHANGE UP
METHOD TYPE: SIGNIFICANT CHANGE UP
METHOD TYPE: SIGNIFICANT CHANGE UP
ORGANISM # SPEC MICROSCOPIC CNT: SIGNIFICANT CHANGE UP
SPECIMEN SOURCE: SIGNIFICANT CHANGE UP

## 2018-12-07 DIAGNOSIS — E11.621 TYPE 2 DIABETES MELLITUS WITH FOOT ULCER: ICD-10-CM

## 2018-12-07 DIAGNOSIS — E78.5 HYPERLIPIDEMIA, UNSPECIFIED: ICD-10-CM

## 2018-12-07 DIAGNOSIS — Z89.421 ACQUIRED ABSENCE OF OTHER RIGHT TOE(S): ICD-10-CM

## 2018-12-07 DIAGNOSIS — L97.519 NON-PRESSURE CHRONIC ULCER OF OTHER PART OF RIGHT FOOT WITH UNSPECIFIED SEVERITY: ICD-10-CM

## 2018-12-07 DIAGNOSIS — L03.115 CELLULITIS OF RIGHT LOWER LIMB: ICD-10-CM

## 2018-12-07 DIAGNOSIS — Z79.4 LONG TERM (CURRENT) USE OF INSULIN: ICD-10-CM

## 2018-12-22 ENCOUNTER — INPATIENT (INPATIENT)
Facility: HOSPITAL | Age: 43
LOS: 5 days | Discharge: HOME HEALTH | DRG: 629 | End: 2018-12-28
Attending: STUDENT IN AN ORGANIZED HEALTH CARE EDUCATION/TRAINING PROGRAM | Admitting: STUDENT IN AN ORGANIZED HEALTH CARE EDUCATION/TRAINING PROGRAM
Payer: COMMERCIAL

## 2018-12-22 VITALS
OXYGEN SATURATION: 98 % | DIASTOLIC BLOOD PRESSURE: 110 MMHG | HEART RATE: 86 BPM | SYSTOLIC BLOOD PRESSURE: 171 MMHG | HEIGHT: 71 IN | TEMPERATURE: 97 F | RESPIRATION RATE: 18 BRPM | WEIGHT: 246.92 LBS

## 2018-12-22 DIAGNOSIS — E78.5 HYPERLIPIDEMIA, UNSPECIFIED: ICD-10-CM

## 2018-12-22 DIAGNOSIS — D47.3 ESSENTIAL (HEMORRHAGIC) THROMBOCYTHEMIA: ICD-10-CM

## 2018-12-22 DIAGNOSIS — E11.9 TYPE 2 DIABETES MELLITUS WITHOUT COMPLICATIONS: ICD-10-CM

## 2018-12-22 DIAGNOSIS — Z91.89 OTHER SPECIFIED PERSONAL RISK FACTORS, NOT ELSEWHERE CLASSIFIED: ICD-10-CM

## 2018-12-22 DIAGNOSIS — B35.1 TINEA UNGUIUM: ICD-10-CM

## 2018-12-22 DIAGNOSIS — E11.621 TYPE 2 DIABETES MELLITUS WITH FOOT ULCER: ICD-10-CM

## 2018-12-22 DIAGNOSIS — M86.9 OSTEOMYELITIS, UNSPECIFIED: ICD-10-CM

## 2018-12-22 DIAGNOSIS — E66.9 OBESITY, UNSPECIFIED: ICD-10-CM

## 2018-12-22 DIAGNOSIS — E87.1 HYPO-OSMOLALITY AND HYPONATREMIA: ICD-10-CM

## 2018-12-22 DIAGNOSIS — E11.69 TYPE 2 DIABETES MELLITUS WITH OTHER SPECIFIED COMPLICATION: ICD-10-CM

## 2018-12-22 DIAGNOSIS — L97.419 NON-PRESSURE CHRONIC ULCER OF RIGHT HEEL AND MIDFOOT WITH UNSPECIFIED SEVERITY: ICD-10-CM

## 2018-12-22 DIAGNOSIS — I10 ESSENTIAL (PRIMARY) HYPERTENSION: ICD-10-CM

## 2018-12-22 DIAGNOSIS — R63.8 OTHER SYMPTOMS AND SIGNS CONCERNING FOOD AND FLUID INTAKE: ICD-10-CM

## 2018-12-22 DIAGNOSIS — L97.414 NON-PRESSURE CHRONIC ULCER OF RIGHT HEEL AND MIDFOOT WITH NECROSIS OF BONE: ICD-10-CM

## 2018-12-22 DIAGNOSIS — M86.671 OTHER CHRONIC OSTEOMYELITIS, RIGHT ANKLE AND FOOT: ICD-10-CM

## 2018-12-22 DIAGNOSIS — L40.9 PSORIASIS, UNSPECIFIED: ICD-10-CM

## 2018-12-22 DIAGNOSIS — L97.509 NON-PRESSURE CHRONIC ULCER OF OTHER PART OF UNSPECIFIED FOOT WITH UNSPECIFIED SEVERITY: ICD-10-CM

## 2018-12-22 DIAGNOSIS — L03.115 CELLULITIS OF RIGHT LOWER LIMB: ICD-10-CM

## 2018-12-22 DIAGNOSIS — Z89.429 ACQUIRED ABSENCE OF OTHER TOE(S), UNSPECIFIED SIDE: Chronic | ICD-10-CM

## 2018-12-22 DIAGNOSIS — Z89.421 ACQUIRED ABSENCE OF OTHER RIGHT TOE(S): ICD-10-CM

## 2018-12-22 DIAGNOSIS — Z79.4 LONG TERM (CURRENT) USE OF INSULIN: ICD-10-CM

## 2018-12-22 DIAGNOSIS — D64.9 ANEMIA, UNSPECIFIED: ICD-10-CM

## 2018-12-22 DIAGNOSIS — M79.89 OTHER SPECIFIED SOFT TISSUE DISORDERS: ICD-10-CM

## 2018-12-22 LAB
ALBUMIN SERPL ELPH-MCNC: 3.8 G/DL — SIGNIFICANT CHANGE UP (ref 3.3–5)
ALP SERPL-CCNC: 96 U/L — SIGNIFICANT CHANGE UP (ref 40–120)
ALT FLD-CCNC: 29 U/L — SIGNIFICANT CHANGE UP (ref 10–45)
ANION GAP SERPL CALC-SCNC: 10 MMOL/L — SIGNIFICANT CHANGE UP (ref 5–17)
APTT BLD: 34.2 SEC — SIGNIFICANT CHANGE UP (ref 27.5–36.3)
AST SERPL-CCNC: 26 U/L — SIGNIFICANT CHANGE UP (ref 10–40)
BASOPHILS NFR BLD AUTO: 0.4 % — SIGNIFICANT CHANGE UP (ref 0–2)
BILIRUB SERPL-MCNC: 0.2 MG/DL — SIGNIFICANT CHANGE UP (ref 0.2–1.2)
BLD GP AB SCN SERPL QL: NEGATIVE — SIGNIFICANT CHANGE UP
BUN SERPL-MCNC: 20 MG/DL — SIGNIFICANT CHANGE UP (ref 7–23)
CALCIUM SERPL-MCNC: 9 MG/DL — SIGNIFICANT CHANGE UP (ref 8.4–10.5)
CHLORIDE SERPL-SCNC: 101 MMOL/L — SIGNIFICANT CHANGE UP (ref 96–108)
CO2 SERPL-SCNC: 26 MMOL/L — SIGNIFICANT CHANGE UP (ref 22–31)
CREAT SERPL-MCNC: 0.96 MG/DL — SIGNIFICANT CHANGE UP (ref 0.5–1.3)
CRP SERPL-MCNC: 2.54 MG/DL — HIGH (ref 0–0.4)
EOSINOPHIL NFR BLD AUTO: 4.1 % — SIGNIFICANT CHANGE UP (ref 0–6)
ERYTHROCYTE [SEDIMENTATION RATE] IN BLOOD: 38 MM/HR — HIGH
GLUCOSE BLDC GLUCOMTR-MCNC: 172 MG/DL — HIGH (ref 70–99)
GLUCOSE SERPL-MCNC: 126 MG/DL — HIGH (ref 70–99)
HCT VFR BLD CALC: 35.2 % — LOW (ref 39–50)
HGB BLD-MCNC: 11.5 G/DL — LOW (ref 13–17)
INR BLD: 1.03 — SIGNIFICANT CHANGE UP (ref 0.88–1.16)
LYMPHOCYTES # BLD AUTO: 39.7 % — SIGNIFICANT CHANGE UP (ref 13–44)
MCHC RBC-ENTMCNC: 27.9 PG — SIGNIFICANT CHANGE UP (ref 27–34)
MCHC RBC-ENTMCNC: 32.7 G/DL — SIGNIFICANT CHANGE UP (ref 32–36)
MCV RBC AUTO: 85.4 FL — SIGNIFICANT CHANGE UP (ref 80–100)
MONOCYTES NFR BLD AUTO: 13 % — SIGNIFICANT CHANGE UP (ref 2–14)
NEUTROPHILS NFR BLD AUTO: 42.8 % — LOW (ref 43–77)
PLATELET # BLD AUTO: 421 K/UL — HIGH (ref 150–400)
POTASSIUM SERPL-MCNC: 4.6 MMOL/L — SIGNIFICANT CHANGE UP (ref 3.5–5.3)
POTASSIUM SERPL-SCNC: 4.6 MMOL/L — SIGNIFICANT CHANGE UP (ref 3.5–5.3)
PROT SERPL-MCNC: 7.9 G/DL — SIGNIFICANT CHANGE UP (ref 6–8.3)
PROTHROM AB SERPL-ACNC: 11.7 SEC — SIGNIFICANT CHANGE UP (ref 10–12.9)
RBC # BLD: 4.12 M/UL — LOW (ref 4.2–5.8)
RBC # FLD: 13.3 % — SIGNIFICANT CHANGE UP (ref 10.3–16.9)
RH IG SCN BLD-IMP: POSITIVE — SIGNIFICANT CHANGE UP
SODIUM SERPL-SCNC: 137 MMOL/L — SIGNIFICANT CHANGE UP (ref 135–145)
WBC # BLD: 7 K/UL — SIGNIFICANT CHANGE UP (ref 3.8–10.5)
WBC # FLD AUTO: 7 K/UL — SIGNIFICANT CHANGE UP (ref 3.8–10.5)

## 2018-12-22 PROCEDURE — 99223 1ST HOSP IP/OBS HIGH 75: CPT | Mod: GC

## 2018-12-22 PROCEDURE — 93010 ELECTROCARDIOGRAM REPORT: CPT | Mod: 59

## 2018-12-22 PROCEDURE — 73610 X-RAY EXAM OF ANKLE: CPT | Mod: 26,RT

## 2018-12-22 PROCEDURE — 73701 CT LOWER EXTREMITY W/DYE: CPT | Mod: 26,RT

## 2018-12-22 PROCEDURE — 93971 EXTREMITY STUDY: CPT | Mod: 26,RT

## 2018-12-22 PROCEDURE — 99285 EMERGENCY DEPT VISIT HI MDM: CPT | Mod: 25

## 2018-12-22 PROCEDURE — 73630 X-RAY EXAM OF FOOT: CPT | Mod: 26,RT

## 2018-12-22 PROCEDURE — 71045 X-RAY EXAM CHEST 1 VIEW: CPT | Mod: 26

## 2018-12-22 RX ORDER — VANCOMYCIN HCL 1 G
1750 VIAL (EA) INTRAVENOUS EVERY 12 HOURS
Refills: 0 | Status: DISCONTINUED | OUTPATIENT
Start: 2018-12-23 | End: 2018-12-24

## 2018-12-22 RX ORDER — PIPERACILLIN AND TAZOBACTAM 4; .5 G/20ML; G/20ML
3.38 INJECTION, POWDER, LYOPHILIZED, FOR SOLUTION INTRAVENOUS EVERY 6 HOURS
Refills: 0 | Status: DISCONTINUED | OUTPATIENT
Start: 2018-12-23 | End: 2018-12-27

## 2018-12-22 RX ORDER — SODIUM CHLORIDE 9 MG/ML
1000 INJECTION, SOLUTION INTRAVENOUS
Refills: 0 | Status: DISCONTINUED | OUTPATIENT
Start: 2018-12-22 | End: 2018-12-27

## 2018-12-22 RX ORDER — PIPERACILLIN AND TAZOBACTAM 4; .5 G/20ML; G/20ML
3.38 INJECTION, POWDER, LYOPHILIZED, FOR SOLUTION INTRAVENOUS ONCE
Refills: 0 | Status: COMPLETED | OUTPATIENT
Start: 2018-12-22 | End: 2018-12-22

## 2018-12-22 RX ORDER — DEXTROSE 50 % IN WATER 50 %
25 SYRINGE (ML) INTRAVENOUS ONCE
Refills: 0 | Status: DISCONTINUED | OUTPATIENT
Start: 2018-12-22 | End: 2018-12-27

## 2018-12-22 RX ORDER — INSULIN LISPRO 100/ML
8 VIAL (ML) SUBCUTANEOUS
Refills: 0 | Status: DISCONTINUED | OUTPATIENT
Start: 2018-12-22 | End: 2018-12-23

## 2018-12-22 RX ORDER — DEXTROSE 50 % IN WATER 50 %
15 SYRINGE (ML) INTRAVENOUS ONCE
Refills: 0 | Status: DISCONTINUED | OUTPATIENT
Start: 2018-12-22 | End: 2018-12-27

## 2018-12-22 RX ORDER — INSULIN LISPRO 100/ML
VIAL (ML) SUBCUTANEOUS
Refills: 0 | Status: DISCONTINUED | OUTPATIENT
Start: 2018-12-22 | End: 2018-12-27

## 2018-12-22 RX ORDER — SOD,AMMONIUM,POTASSIUM LACTATE
1 CREAM (GRAM) TOPICAL
Refills: 0 | Status: DISCONTINUED | OUTPATIENT
Start: 2018-12-22 | End: 2018-12-27

## 2018-12-22 RX ORDER — DEXTROSE 50 % IN WATER 50 %
12.5 SYRINGE (ML) INTRAVENOUS ONCE
Refills: 0 | Status: DISCONTINUED | OUTPATIENT
Start: 2018-12-22 | End: 2018-12-27

## 2018-12-22 RX ORDER — INSULIN GLARGINE 100 [IU]/ML
48 INJECTION, SOLUTION SUBCUTANEOUS AT BEDTIME
Refills: 0 | Status: DISCONTINUED | OUTPATIENT
Start: 2018-12-22 | End: 2018-12-23

## 2018-12-22 RX ORDER — GLUCAGON INJECTION, SOLUTION 0.5 MG/.1ML
1 INJECTION, SOLUTION SUBCUTANEOUS ONCE
Refills: 0 | Status: DISCONTINUED | OUTPATIENT
Start: 2018-12-22 | End: 2018-12-27

## 2018-12-22 RX ORDER — NICARDIPINE HYDROCHLORIDE 30 MG/1
30 CAPSULE, EXTENDED RELEASE ORAL ONCE
Refills: 0 | Status: COMPLETED | OUTPATIENT
Start: 2018-12-22 | End: 2018-12-22

## 2018-12-22 RX ORDER — ATORVASTATIN CALCIUM 80 MG/1
40 TABLET, FILM COATED ORAL AT BEDTIME
Refills: 0 | Status: DISCONTINUED | OUTPATIENT
Start: 2018-12-22 | End: 2018-12-27

## 2018-12-22 RX ORDER — VANCOMYCIN HCL 1 G
1750 VIAL (EA) INTRAVENOUS ONCE
Refills: 0 | Status: COMPLETED | OUTPATIENT
Start: 2018-12-22 | End: 2018-12-22

## 2018-12-22 RX ADMIN — INSULIN GLARGINE 48 UNIT(S): 100 INJECTION, SOLUTION SUBCUTANEOUS at 23:31

## 2018-12-22 RX ADMIN — Medication 250 MILLIGRAM(S): at 22:39

## 2018-12-22 RX ADMIN — Medication 2: at 23:03

## 2018-12-22 RX ADMIN — PIPERACILLIN AND TAZOBACTAM 200 GRAM(S): 4; .5 INJECTION, POWDER, LYOPHILIZED, FOR SOLUTION INTRAVENOUS at 21:17

## 2018-12-22 RX ADMIN — NICARDIPINE HYDROCHLORIDE 30 MILLIGRAM(S): 30 CAPSULE, EXTENDED RELEASE ORAL at 21:21

## 2018-12-22 NOTE — H&P ADULT - HISTORY OF PRESENT ILLNESS
43 yr old male with a PMHx of DMT2 (dx in 2003 transitioned from PO meds to insulin 5 yrs after dx; last A1c in July 2018 9.7), multiple toe amputations for diabetic foot ulcers/osteomyelitis (most recent in July 2018) that presents with fever, tachycardia and malaise w/  R 1st metatarsal head ulcer concerning for osteomyelitis; admitted for IV abx.    Pt was started on vanc/zosyn. XR of the foot suggested cellulitis. Podiatry followed patients, did not want to do debridement in OR, but did treat the wound as necessary at bedside. Pt was discharged on cephalexin for a total of a 7 day course with instructions to follow up outpatient with ortho. Pt's wound improved during the course of his stay. DC December 5  43 yr old male with a PMHx of DMT2 (dx in 2003 transitioned from PO meds to insulin 5 yrs after dx; last A1c in July 2018 9.7), multiple toe amputations for diabetic foot ulcers/osteomyelitis (most recent in July 2018) that presents with fever, tachycardia and malaise w/  R 1st metatarsal head ulcer concerning for osteomyelitis; admitted for IV abx.    Pt was started on vanc/zosyn. XR of the foot suggested cellulitis. Podiatry followed patients, did not want to do debridement in OR, but did treat the wound as necessary at bedside. Pt was discharged on cephalexin for a total of a 7 day course with instructions to follow up outpatient with ortho. Pt's wound improved during the course of his stay. Patient is a 43 year old male with PMHx of DMT2 (dx in 2003 transitioned from PO meds to insulin 5 yrs after dx; last A1c in July 2018 9.7), multiple toe amputations for diabetic foot ulcers/osteomyelitis (most recent in July 2018) that presents R foot swelling x 1 day. CT foot in the ED showing ongoing osteomyelitis and air in the dorsum w concern for necrotizing fasciitis. Admitted for antibiotic treatment and podiatric intervention in OR tomorrow AM.     Patient explains that he was recently hospitalized at [facility] in early December for osteomyelitis treatment for infection in right foot. During that hospital stay, podiatry followed patients, did not want to do debridement in OR, but did treat the wound as necessary at bedside. Pt was discharged on cephalexin for a total of a 7 day course with instructions to follow up outpatient with ortho.  He completed his outpatient treatment. However, yesterday he noticed increased swelling in his right foot after work that did not improve by next morning. No associated pain, signs of overlying infection or purulence. No fever or chills, tachypnea or sensation of palpitations. Patient presented to ED for management.    In [facility] ED patient arrived stable with vitals in normal range. His labs significant for elevated ESR and CRP. CT foot positive of ongoing osteomyelitis and air c/f necrotizing fasciitis. Patient seen by podiatry with plan for surgical intervention. Patient placed on abx and admitted to medicine service.

## 2018-12-22 NOTE — ED PROVIDER NOTE - MEDICAL DECISION MAKING DETAILS
left foot ulcer and swelling. neurovascular intact.  no d/c or erythema. labs noted. pt evaluated in ED by podiatry, doppler negative. dispo pending ct ankle as per podiatry.

## 2018-12-22 NOTE — H&P ADULT - NSHPPHYSICALEXAM_GEN_ALL_CORE
.  VITAL SIGNS:  T(C): 36.4 (12-22-18 @ 22:27), Max: 36.7 (12-22-18 @ 15:54)  T(F): 97.5 (12-22-18 @ 22:27), Max: 98.1 (12-22-18 @ 15:54)  HR: 92 (12-22-18 @ 22:27) (76 - 92)  BP: 139/79 (12-22-18 @ 22:27) (139/79 - 172/102)  BP(mean): --  RR: 20 (12-22-18 @ 22:27) (18 - 20)  SpO2: 99% (12-22-18 @ 22:27) (97% - 99%)  Wt(kg): --    PHYSICAL EXAM:    Constitutional: WDWN resting comfortably in bed; NAD  Head: NC/AT  Eyes: PERRL, EOMI, anicteric sclera  ENT: no nasal discharge; uvula midline, no oropharyngeal erythema or exudates; MMM  Neck: supple; no JVD or thyromegaly  Respiratory: CTA B/L; no W/R/R, no retractions  Cardiac: +S1/S2; RRR; no M/R/G; PMI non-displaced  Gastrointestinal: obese abdomen soft, NT/ND; no rebound or guarding; +BSx4 normoactive  Back: spine midline, no bony tenderness or step-offs; no CVAT B/L  Extremities: WWP, no clubbing or cyanosis; mild swelling over dorsum of right foot, nonpitting edema in legs bilaterally  Musculoskeletal: NROM x4; amputated toes on right foot  Vascular: 2+ radial and DP pulses bilaterally  Dermatologic: patches of psoriasis on hands and forearm, tattoo arm, cracked skin on legs bilaterally, onychomycosis of toenails  Neurologic: AAOx3; CNII-XII grossly intact; no focal deficits  Psychiatric: affect and characteristics of appearance, verbalizations, behaviors are appropriate

## 2018-12-22 NOTE — H&P ADULT - NSHPLABSRESULTS_GEN_ALL_CORE
LABS:                         11.5   7.0   )-----------( 421      ( 22 Dec 2018 10:52 )             35.2     12-22    137  |  101  |  20  ----------------------------<  126<H>  4.6   |  26  |  0.96    Ca    9.0      22 Dec 2018 10:52    TPro  7.9  /  Alb  3.8  /  TBili  0.2  /  DBili  x   /  AST  26  /  ALT  29  /  AlkPhos  96  12-22    RADIOLOGY, EKG & ADDITIONAL TESTS: Reviewed.     CT Ankle w/ IV Cont, Right (12.22.18 @ 19:06)   IMPRESSION:  1. Large ulceration is seen in the spine of distal third metatarsal bone   extending to the bone with mild osseous erosion consistent with osteomyelitis.  2. Soft tissue inflammatory changes are seen on dorsal aspect of the foot   containing an 8 mm pocket of air. Finding is consistent with cellulitis. Necrotizing fasciitis   cannot excluded.   3. There is dislocation of the third metatarsophalangeal joint and third   proximal interphalangeal joint.  4. Subcutaneous edema from distal foot extensive midfoot with mild   subcutaneous edema seen in the ankle. Proximal extension of cellulitis should be considered.

## 2018-12-22 NOTE — H&P ADULT - ASSESSMENT
Patient is a 43 year old male with PMHx of DMT2 (dx in 2003 transitioned from PO meds to insulin 5 yrs after dx; last A1c in July 2018 9.7), multiple toe amputations for diabetic foot ulcers/osteomyelitis (most recent in July 2018) that presents R foot swelling x 1 day. CT foot in the ED showing ongoing osteomyelitis and air in the dorsum w concern for necrotizing fasciitis. Admitted for antibiotic treatment and podiatric intervention in OR tomorrow AM.

## 2018-12-22 NOTE — ED ADULT NURSE NOTE - NSIMPLEMENTINTERV_GEN_ALL_ED
Implemented All Universal Safety Interventions:  [place] to call system. Call bell, personal items and telephone within reach. Instruct patient to call for assistance. Room bathroom lighting operational. Non-slip footwear when patient is off stretcher. Physically safe environment: no spills, clutter or unnecessary equipment. Stretcher in lowest position, wheels locked, appropriate side rails in place.

## 2018-12-22 NOTE — PATIENT PROFILE ADULT - NSPROMEDSPUMP_GEN_A_NUR
----- Message from Mariann Pichardo sent at 10/8/2018  2:26 PM CDT -----  Contact: [phone]  Pt called to let doctor know he is having reaction to medication, pt is breaking out in hive.      Thank you!   none

## 2018-12-22 NOTE — CONSULT NOTE ADULT - ASSESSMENT
A/P 44 yo M DM, HLD, h/o OM and mult amps pw R ankle edema and pain     - RLE ulcer eval and dressed with DSD. No clinical signs of acute infection. No concern at this time.   - F/u final read R F&A radiographs  - F/u venous duplex RLE  - F/u CT w contrast r/o ankle effusion A/P 44 yo M DM, HLD, h/o OM and mult amps pw R ankle edema and pain     - RLE ulcer eval and dressed with DSD. No clinical signs of acute infection. No concern at this time.   - F/u final read R F&A radiographs  - F/u venous duplex RLE  - F/u CT w contrast r/o ankle effusion  - Pt in stable condition. Good for discharge. Will follow test results on outpatient basis with Dr. Green.  Patient should follow up with Dr. Maximliiano Green within 1 week of discharge.    Office information:          [place] Address- [address], [place] [address] Phone: [phone]         [place] Address- [address], [place] [address] Phone: [phone]  [facility] page w any ques or concerns: [phone] A/P 42 yo M DM, HLD, h/o OM and mult amps pw R ankle edema and pain     - RLE plantar ulcer dressed with DSD  - F/u final read R F&A radiographs  - F/u venous duplex RLE  - F/u CT w contrast final read. Preliminary read reveals 8mm pocket of air at dorsal midfoot.   - Pt admitted. IV Antibiotics as per primary team  - NPO at 11PM 12/22  - Addon tomorrow morning 12/23 7AM for incision and drainage, washout right foot and ankle  - Consent in chart  - Podiatry will follow

## 2018-12-22 NOTE — H&P ADULT - PROBLEM SELECTOR PLAN 4
BMI 31  -nutrition consult BMI 31  -nutrition consult    #normocytic anemia  At patients baseline. No signs of bleeding.   -f/u AM CBC

## 2018-12-22 NOTE — ED PROVIDER NOTE - ATTENDING CONTRIBUTION TO CARE
44 y/o male with hx of DM, right foot ulcer and recent admission for cellulitis c/o swelling to right foot and ankle x 1 day. pt states twisted ankle one week ago but swelling had resolved and then noticed swelling this past 1 wk. no fever or chills. no numbness, tingling or weakness. no d/c. no further complaints.    PE no acute distress  + swelling and tenderness to right ankle/foot. no deformity. no bruising or redness. + ulcer to plantar aspect of right foot with area of deep ulceration. no d/c. distal NVI. s/p amputation of  5th and partial 2nd toes.    Agree with PA PE  Podiatry aware and eval pt in ED    Requests CT in ED  Signed out ot night team pending results of CT

## 2018-12-22 NOTE — H&P ADULT - PROBLEM SELECTOR PLAN 2
Likely site of entry; has received, and been compliant with oral treatment.   -wound care   -diabetes control  -continue antibiotics as above Likely site of entry; has received, and been compliant with oral treatment.   -wound care   -diabetes control  -continue antibiotics as above  -ammonium lactate lotion BID to moisturize skin    #onychomycosis  clotrimazole cream BID    #psoriasis  scaling patches on hands and left arm - says he takes an OTC, not sure which. Nothing here per pharmacy. Not currently bothering him.

## 2018-12-22 NOTE — H&P ADULT - PROBLEM SELECTOR PLAN 6
F: No IVF  E: K>4, Mg>2  N: DASH with consistent carbohydrate and evening snack, mISS  Ppx: SCD  Lines: peripheral  Ambulation: with assistance  Code: full  Dispo: regional medical floors for management of osteo and r/o nec fasc

## 2018-12-22 NOTE — ED ADULT NURSE REASSESSMENT NOTE - NS ED NURSE REASSESS COMMENT FT1
Pt is 43y male received from day shift RN. Melissa, presented to ED w/ c/o swelling to right foot x yesterday. As per pt, twisted right ankle a few days ago, swelling went down and thought nothing of it. Pt states swelling started again which brought him to seek evaluation. Pt denies any pain. COSME Ramírez noted @ bedside, Pt is 43y male received from day shift RN. Melissa, presented to ED w/ c/o swelling to right foot x yesterday. As per pt, twisted right ankle a few days ago, swelling went down and thought nothing of it. Pt states swelling started again which brought him to seek evaluation. Pt denies any pain. COSME Reno noted @ bedside.  Elevated Bp noted, COSME Reno made aware.

## 2018-12-22 NOTE — ED ADULT TRIAGE NOTE - CHIEF COMPLAINT QUOTE
Pt CO Right Foot swelling since yesterday.  Pt states "I have a diabetic wound that hasn't been healing for a couple months now, but the foot started to swelling up yesterday."  FSBG in progress.  Pt denies N/V/D, SOB, Fevers, CP and Dizziness.

## 2018-12-22 NOTE — ED PROVIDER NOTE - PROGRESS NOTE DETAILS
CT read concerning for cellulitis with sub q gas, discussed with podiatry who returned to ED, will give IV vanc/zosyn, admit, plan for OR

## 2018-12-22 NOTE — ED PROVIDER NOTE - OBJECTIVE STATEMENT
44 y/o male with hx of DM, right foot ulcer and recent admission for cellulitis c/o swelling to right foot and ankle x 1 day. pt states twisted ankle one week ago but swelling had resolved and then noticed swelling this past 1 wk. no fever or chills. no numbness, tingling or weakness. no d/c. no further complaints.

## 2018-12-22 NOTE — PATIENT PROFILE ADULT - NSASFUNCLEVELADLAMBULATE_GEN_A_NUR
Problem:  CARE  Goal: Vital signs are medically acceptable  Outcome: Ongoing  VS WNL for age  Goal: Thermoregulation maintained greater than 97/less than 99.4 Ax  Outcome: Ongoing  Temps WNL for age  Goal: Infant exhibits minimal/reduced signs of pain/discomfort  Outcome: Ongoing  NIPS scores 0  Goal: Infant is maintained in safe environment  Outcome: Ongoing  HUGS tag applied and functioning  Goal: Baby is with Mother and family  Outcome: Ongoing  Infant with parents during 2 hour recovery period    Comments: Care plan reviewed with parents. Parents verbalize understanding of the plan of care and contribute to goal setting.
Problem:  CARE  Goal: Vital signs are medically acceptable  Outcome: Ongoing  Vital signs and assessments WNL. Goal: Infant exhibits minimal/reduced signs of pain/discomfort  Outcome: Ongoing  NIPS 0  Goal: Infant is maintained in safe environment  Outcome: Ongoing  Infant security HUGS band and ID bands in place. Encouraged to room in with mother. Goal: Baby is with Mother and family  Outcome: Ongoing  Bonding with baby, participating in infant care. Problem: Discharge Planning:  Goal: Discharged to appropriate level of care  Discharged to appropriate level of care   Outcome: Ongoing  Remains in hospital, discussed possible discharge needs. Problem: Infant Care:  Goal: Will show no infection signs and symptoms  Will show no infection signs and symptoms   Outcome: Ongoing  Infant shows no sign/symptom of infection. Problem:  Screening:  Goal: Serum bilirubin within specified parameters  Serum bilirubin within specified parameters   Outcome: Ongoing  TCB 50%  Goal: Circulatory function within specified parameters  Circulatory function within specified parameters   Outcome: Ongoing  Infant active and pink, see flowsheets    Comments: Plan of care discussed with mother and she contributes to goal setting and voices understanding of plan of care.
Problem:  CARE  Goal: Vital signs are medically acceptable  Outcome: Ongoing  Vital signs stable. Goal: Infant exhibits minimal/reduced signs of pain/discomfort  Outcome: Ongoing  Nips scale assessed this shift. No sign of discomfort noted. Goal: Infant is maintained in safe environment  Outcome: Ongoing  Infant security HUGS band and ID bands in place. Encouraged to room in with mother. Goal: Baby is with Mother and family  Outcome: Ongoing   bonding well with mother. Problem: Discharge Planning:  Goal: Discharged to appropriate level of care  Discharged to appropriate level of care   Outcome: Ongoing  Plan to be discharged home with parents. Problem: Infant Care:  Goal: Will show no infection signs and symptoms  Will show no infection signs and symptoms   Outcome: Ongoing  Umbilical cord site remains free from infection. Comments: Care plan reviewed with parents  Parents verbalize understanding of the plan of care and contribute to goal setting.
Problem:  CARE  Goal: Vital signs are medically acceptable  Outcome: Ongoing  Vitals have been within normal limits this shift will continue to monitor. Goal: Thermoregulation maintained greater than 97/less than 99.4 Ax  Outcome: Ongoing  Baby's temperature has been within normal limits. Will continue to monitor. Goal: Infant exhibits minimal/reduced signs of pain/discomfort  Outcome: Ongoing  Baby is not showing any signs of pain or discomfort. Will continue to use NIPS scoring to assess for pain. Goal: Infant is maintained in safe environment  Outcome: Ongoing  Infant security HUGS band and ID bands in place. Encouraged to room in with mother. Goal: Baby is with Mother and family  Outcome: Ongoing  Baby is bonding well with mother. Rooming in encouraged. Will continue to monitor    Problem: Discharge Planning:  Goal: Discharged to appropriate level of care  Discharged to appropriate level of care   Outcome: Ongoing  Will continue to work toward discharge. Baby planning on home with mother    Problem: Body Temperature -  Risk of, Imbalanced  Goal: Ability to maintain a body temperature in the normal range will improve to within specified parameters  Ability to maintain a body temperature in the normal range will improve to within specified parameters   Outcome: Completed Date Met: 18  Babys temp has been wnl     Problem: Infant Care:  Goal: Will show no infection signs and symptoms  Will show no infection signs and symptoms   Outcome: Ongoing  Baby is not showing any signs of infection. Will continue to monitor    Problem:  Screening:  Goal: Serum bilirubin within specified parameters  Serum bilirubin within specified parameters   Outcome: Ongoing  TCB will be done tomorrow at 0400  Goal: Circulatory function within specified parameters  Circulatory function within specified parameters   Outcome: Ongoing  CCHD will be done today. Baby's cap refill is less than 3 sec.  Will continue to
Problem:  CARE  Goal: Vital signs are medically acceptable  Outcome: Ongoing  Vitals stable  Goal: Thermoregulation maintained greater than 97/less than 99.4 Ax  Outcome: Completed Date Met: 09/10/18    Goal: Infant exhibits minimal/reduced signs of pain/discomfort  Outcome: Ongoing  NIPS score 0  Goal: Infant is maintained in safe environment  Outcome: Ongoing  Infant security HUGS band and ID bands in place. Encouraged to room in with mother. Goal: Baby is with Mother and family  Outcome: Ongoing  Mother and father bonding with baby    Problem: Discharge Planning:  Goal: Discharged to appropriate level of care  Discharged to appropriate level of care   Outcome: Ongoing  Probable discharge to home with mom tomorrow, mom states she has supplies at home for baby    Problem: Infant Care:  Goal: Will show no infection signs and symptoms  Will show no infection signs and symptoms   Outcome: Ongoing  Vitals stable    Problem:  Screening:  Goal: Serum bilirubin within specified parameters  Serum bilirubin within specified parameters   Outcome: Ongoing  TCB 50%  Goal: Circulatory function within specified parameters  Circulatory function within specified parameters   Outcome: Ongoing  Color pink. passed CCHD    Comments: Plan of care discussed with mother and she contributes to goal setting and voices understanding of plan of care.
0 = independent

## 2018-12-22 NOTE — ED PROVIDER NOTE - PHYSICAL EXAMINATION
+ swelling and tenderness to right ankle/foot. no deformity. no bruising or redness. + ulcer to plantar aspect of right foot with area of deep ulceration. no d/c. distal NVI. s/p amputation of  5th and partial 2nd toes.

## 2018-12-22 NOTE — H&P ADULT - PROBLEM SELECTOR PLAN 1
Chronic in nature - completed 10 day regimen with oral keflex after discharge from hospital on December 5. CT from ED now with evidence of air collection on the dorsum of right foot.   -podiatry following; plan for I and D with exploratory surgery of right foot in AM, NPO at 23:00: no plan for bone Bx in the ED, will start abx prior to surgery with primary concern r/o nec fasc  -vancomycin 1750mg q12 hours, zosyn 3.375mg q8 hours, clindamycin 900mg q8 hours  -walk with assistance  -wound care recommendations by podiatry team, currently wrapped  -follow up blood cultures x2  -monitor for signs of decompensation or infectious spread

## 2018-12-22 NOTE — CONSULT NOTE ADULT - SUBJECTIVE AND OBJECTIVE BOX
Attending: Dr. Green    Patient is a 43y old  Male who presents with a chief complaint of     HPI: 44 yo M h/o DM, OM, mult amps w recent adm early Dec 2018 for right foot ulcer with cellulitis pw painful right ankle and swelling. Reports he twisted his ankle Dec16 2018 while wearing his Darco surgical shoe on right foot. Noticed swelling of right ankle which resolved. Saw Dr. Green, [facility] on 12/17for wound care follow up no acute findings. Pt was on his feet running around at work all day Thurs 12/20 and swelling and discomfort with ambulation started again on 12/21. Pt reports a shooting pain up and down his right leg from tibial tuberosity to the second toe in the front of the leg and ankle. He feels excssive pressure and discomfort along the path that this shooting nerve pain takes place. Only pain on ambulation. NO pain at rest. Denies any constitutional symptoms.     Review of systems negative except per HPI    PAST MEDICAL & SURGICAL HISTORY:  Hyperlipidemia, unspecified hyperlipidemia type  Osteomyelitis  Diabetes  Toe amputation status    Home Medications:    Allergies    No Known Allergies    Intolerances    FAMILY HISTORY:  No family history of cardiovascular disease (Father, Mother)      LABS                        11.5   7.0   )-----------( 421      ( 22 Dec 2018 10:52 )             35.2     Vital Signs Last 24 Hrs  T(C): 36.3 (22 Dec 2018 10:10), Max: 36.3 (22 Dec 2018 10:10)  T(F): 97.4 (22 Dec 2018 10:10), Max: 97.4 (22 Dec 2018 10:10)  HR: 86 (22 Dec 2018 10:10) (86 - 86)  BP: 171/110 (22 Dec 2018 10:10) (171/110 - 171/110)  BP(mean): --  RR: 18 (22 Dec 2018 10:10) (18 - 18)  SpO2: 98% (22 Dec 2018 10:10) (98% - 98%)    PHYSICAL EXAM  General: NAD, AA0x3    Lower Extremity Focused:  Vasc: DP/PT 2/4 Calvin, CFT intact 8, TG warm>warm calvin, no pedal hair present   Derm: Chronic ulcer submet 3 [id number] granular base hyperkeratotic rim +PTB, serous drainage, no purulence, no crepitus or clinical signs of infection, no erythema appreciated   Neuro:  MSK: POP at anterior ankle joint lateral and medial to TA in anterocentral and medial gutter, mild pain in anterolateral gutter; POP medial calf at musculotendinous junction; upon palpation at fibular head near common peroneal nerve shooting pain noted at anterior ankle extending to dorsal medial aspect right foot and dorsal lateral aspect right foot; Pain on ROM ankle joint    RADIOLOGY Attending: Dr. Green    Patient is a 43y old  Male who presents with a chief complaint of     HPI: 44 yo M DM, HLD, h/o OM and mult amps w recent adm early Dec 2018 for right foot ulcer with cellulitis. Pt pw painful right ankle and swelling. Reports he twisted R ankle 12/16/18 while wearing his Darco surgical shoe, resulting in swelling and resolved in 1 day. Pt ran around more than usual on 12/20 while at work. The next day he noticed the R ankle swelling and pain with ambulation on 12/21. Describes a shooting pain up and down his right leg from tibial tuberosity to the second toe dorsal leg and ankle. He feels unusual pressure/ discomfort and shooting pain up and down his right lower extrem. Pain on ambulation. NO pain at rest. Denies any constitutional symptoms.     Review of systems negative except per HPI    PAST MEDICAL & SURGICAL HISTORY:  Hyperlipidemia, unspecified hyperlipidemia type  Osteomyelitis  Diabetes  Toe amputation status    Home Medications:    Allergies    No Known Allergies    Intolerances    FAMILY HISTORY:  No family history of cardiovascular disease (Father, Mother)    LABS                        11.5   7.0   )-----------( 421      ( 22 Dec 2018 10:52 )             35.2     Vital Signs Last 24 Hrs  T(C): 36.3 (22 Dec 2018 10:10), Max: 36.3 (22 Dec 2018 10:10)  T(F): 97.4 (22 Dec 2018 10:10), Max: 97.4 (22 Dec 2018 10:10)  HR: 86 (22 Dec 2018 10:10) (86 - 86)  BP: 171/110 (22 Dec 2018 10:10) (171/110 - 171/110)  BP(mean): --  RR: 18 (22 Dec 2018 10:10) (18 - 18)  SpO2: 98% (22 Dec 2018 10:10) (98% - 98%)    PHYSICAL EXAM  General: NAD, AA0x3    Lower Extremity Focused:  Vasc: DP/PT 2/4 Calvin, CFT intact x8, TG warm>warm calvin, no pedal hair present   Derm: Chronic ulcer submet 3 [id number] granular base hyperkeratotic rim +PTB, serous drainage, no purulence, no crepitus or clinical signs of infection, no erythema appreciated   Neuro: Protective sensation diminished   MSK: POP at anterior ankle joint lateral and medial to Tibialis Anterior tendon central and medial gutter, mild pain in anterolateral gutter with guarding; POP medial calf at musculotendinous junction; Painful ankle joint ROM; R 2nd amp, partial 4th and 5th ray amps    RADIOLOGY  Preliminary read: No fractures/dislocation, no soft tissue emphysema Attending: Dr. Green    Patient is a 43y old  Male who presents with a chief complaint of right ankle pain and swelling     HPI: 44 yo M DM, HLD, h/o OM and mult amps w recent adm early Dec 2018 for right foot ulcer with cellulitis. Pt pw painful right ankle and swelling. Reports he twisted R ankle 12/16/18 while wearing his Darco surgical shoe, resulting in swelling and resolved in 1 day. Pt ran around more than usual on 12/20 while at work. The next day he noticed the R ankle swelling and pain with ambulation on 12/21. Describes a shooting pain up and down his right leg from tibial tuberosity to the dorsal digits. He feels unusual pressure/ discomfort and shooting painalong right lower extrem. Pain on ambulation. No pain at rest. Denies any constitutional symptoms.     Review of systems negative except per HPI    PAST MEDICAL & SURGICAL HISTORY:  Hyperlipidemia, unspecified hyperlipidemia type  Osteomyelitis  Diabetes  Toe amputation status    Home Medications:    Allergies    No Known Allergies    Intolerances    FAMILY HISTORY:  No family history of cardiovascular disease (Father, Mother)    LABS                        11.5   7.0   )-----------( 421      ( 22 Dec 2018 10:52 )             35.2     Vital Signs Last 24 Hrs  T(C): 36.3 (22 Dec 2018 10:10), Max: 36.3 (22 Dec 2018 10:10)  T(F): 97.4 (22 Dec 2018 10:10), Max: 97.4 (22 Dec 2018 10:10)  HR: 86 (22 Dec 2018 10:10) (86 - 86)  BP: 171/110 (22 Dec 2018 10:10) (171/110 - 171/110)  BP(mean): --  RR: 18 (22 Dec 2018 10:10) (18 - 18)  SpO2: 98% (22 Dec 2018 10:10) (98% - 98%)    PHYSICAL EXAM  General: NAD, AA0x3    Lower Extremity Focused:  Vasc: DP/PT 2/4 Calvin, CFT intact x8, TG warm>warm calvin, no pedal hair present   Derm: Chronic ulcer submet 3 [id number] granular base hyperkeratotic rim +PTB, serous drainage, no purulence, no crepitus or clinical signs of infection, no erythema appreciated   Neuro: Protective sensation diminished   MSK: POP at anterior ankle joint lateral and medial to Tibialis Anterior tendon central and medial gutter, mild pain in anterolateral gutter with guarding; POP medial calf at musculotendinous junction; Painful ankle joint ROM; R 2nd amp, partial 4th and 5th ray amps    RADIOLOGY  Preliminary read: No fractures/dislocation, no soft tissue emphysema    No DVT seen. Attending: Dr. Green    Patient is a 43y old  Male who presents with a chief complaint of right ankle pain and swelling     HPI: 42 yo M DM, HLD, h/o OM and mult amps w recent adm early Dec 2018 for right foot ulcer with cellulitis. Pt pw painful right ankle and swelling. Reports he twisted R ankle 12/16/18 while wearing his Darco surgical shoe, resulting in swelling and resolved in 1 day. Pt ran around more than usual on 12/20 while at work. The next day he noticed the R ankle swelling and pain with ambulation on 12/21. Describes a shooting pain up and down his right leg from tibial tuberosity to the dorsal digits. He feels unusual pressure/ discomfort and shooting painalong right lower extrem. Pain on ambulation. No pain at rest. Denies any constitutional symptoms.     Review of systems negative except per HPI    PAST MEDICAL & SURGICAL HISTORY:  Hyperlipidemia, unspecified hyperlipidemia type  Osteomyelitis  Diabetes  Toe amputation status    Home Medications:    Allergies    No Known Allergies    Intolerances    FAMILY HISTORY:  No family history of cardiovascular disease (Father, Mother)    LABS                        11.5   7.0   )-----------( 421      ( 22 Dec 2018 10:52 )             35.2     Vital Signs Last 24 Hrs  T(C): 36.3 (22 Dec 2018 10:10), Max: 36.3 (22 Dec 2018 10:10)  T(F): 97.4 (22 Dec 2018 10:10), Max: 97.4 (22 Dec 2018 10:10)  HR: 86 (22 Dec 2018 10:10) (86 - 86)  BP: 171/110 (22 Dec 2018 10:10) (171/110 - 171/110)  BP(mean): --  RR: 18 (22 Dec 2018 10:10) (18 - 18)  SpO2: 98% (22 Dec 2018 10:10) (98% - 98%)    PHYSICAL EXAM  General: NAD, AA0x3    Lower Extremity Focused:  Vasc: DP/PT 2/4 Calvin, CFT intact x8, TG warm>warm calvin, no pedal hair present   Derm: Chronic ulcer submet 3 [id number] granular base hyperkeratotic rim +PTB, serous drainage, no purulence, no crepitus or clinical signs of infection, no erythema appreciated   Neuro: Protective sensation diminished   MSK: POP at anterior ankle joint lateral and medial to Tibialis Anterior tendon central and medial gutter, mild pain in anterolateral gutter with guarding; POP medial calf at musculotendinous junction; Painful ankle joint ROM; R 2nd amp, partial 4th and 5th ray amps    RADIOLOGY    < from: CT Ankle w/ IV Cont, Right (12.22.18 @ 19:06) >  IMPRESSION:  1. Largeulceration is seen in the spine of distal third metatarsal bone   extending to the bone with mild  osseous erosion consistent with osteomyelitis.  2. Soft tissue inflammatory changes are seen on dorsal aspect of the foot   containing an 8 mm pocket  of air. Finding is consistent with cellulitis. Necrotizing fasciitis   cannot excluded. The  3. There is dislocation of the third metatarsophalangeal joint and third   proximal interphalangeal joint.  4. Subcutaneous edema from distal foot extensive midfoot with mild   subcutaneous edema seen in the  ankle. Proximal extension of cellulitis should be considered.    < end of copied text >

## 2018-12-22 NOTE — ED ADULT NURSE NOTE - OBJECTIVE STATEMENT
Pt to the ED with swelling to his R ankle that began last night. Pt states that he "twisted" his ankle one week ago and had some swelling that "got better". Pt states that at work on Thursday he was on his feet more than normal and yesterday sat "a lot" at work. Pt complains of some mild pain to R ankle. Pt has a chronic diabetic wound to R foot, pt denies any change to the wound.

## 2018-12-22 NOTE — ED PROVIDER NOTE - NS ED ATTENDING STATEMENT MOD
Problem: Patient Care Overview  Goal: Plan of Care Review  Outcome: Ongoing (interventions implemented as appropriate)  Tolerated prolia injection without any c/o; RTC as directed; Verb agreement with plan; amb off unit independently by self      
I have personally performed a face to face diagnostic evaluation on this patient. I have reviewed the ACP note and agree with the history, exam and plan of care, except as noted.

## 2018-12-22 NOTE — H&P ADULT - PROBLEM SELECTOR PLAN 3
Continue home regimen:   -lantus 48 qHS  -lispro 8U prior to meals  -moderate insulin sliding scale  -consistent carbohydrate diet with evening snack home regimen: lantus 48 qHS, lispro 8U prior to meals  -continue 38U lantus qHS, 6U lispro bolus insulin  -moderate insulin sliding scale  -consistent carbohydrate diet with evening snack

## 2018-12-22 NOTE — H&P ADULT - NSHPSOCIALHISTORY_GEN_ALL_CORE
EtOH: socially  smoking: quit in 1998: was 10 year smoker, 2 packs per week  Recreational drug use: cocaine - socially. Last use - "long time ago."  Living with family, performing ADLs independently, walking without ambulatory aid, currently employed

## 2018-12-23 LAB
ANION GAP SERPL CALC-SCNC: 13 MMOL/L — SIGNIFICANT CHANGE UP (ref 5–17)
APTT BLD: 32.3 SEC — SIGNIFICANT CHANGE UP (ref 27.5–36.3)
BUN SERPL-MCNC: 17 MG/DL — SIGNIFICANT CHANGE UP (ref 7–23)
CALCIUM SERPL-MCNC: 8.9 MG/DL — SIGNIFICANT CHANGE UP (ref 8.4–10.5)
CHLORIDE SERPL-SCNC: 99 MMOL/L — SIGNIFICANT CHANGE UP (ref 96–108)
CHOLEST SERPL-MCNC: 140 MG/DL — SIGNIFICANT CHANGE UP (ref 10–199)
CO2 SERPL-SCNC: 25 MMOL/L — SIGNIFICANT CHANGE UP (ref 22–31)
CREAT SERPL-MCNC: 1.02 MG/DL — SIGNIFICANT CHANGE UP (ref 0.5–1.3)
GLUCOSE BLDC GLUCOMTR-MCNC: 112 MG/DL — HIGH (ref 70–99)
GLUCOSE BLDC GLUCOMTR-MCNC: 121 MG/DL — HIGH (ref 70–99)
GLUCOSE BLDC GLUCOMTR-MCNC: 127 MG/DL — HIGH (ref 70–99)
GLUCOSE BLDC GLUCOMTR-MCNC: 139 MG/DL — HIGH (ref 70–99)
GLUCOSE BLDC GLUCOMTR-MCNC: 164 MG/DL — HIGH (ref 70–99)
GLUCOSE BLDC GLUCOMTR-MCNC: 78 MG/DL — SIGNIFICANT CHANGE UP (ref 70–99)
GLUCOSE SERPL-MCNC: 198 MG/DL — HIGH (ref 70–99)
HCT VFR BLD CALC: 38.1 % — LOW (ref 39–50)
HDLC SERPL-MCNC: 46 MG/DL — SIGNIFICANT CHANGE UP
HGB BLD-MCNC: 12.5 G/DL — LOW (ref 13–17)
INR BLD: 1.02 — SIGNIFICANT CHANGE UP (ref 0.88–1.16)
LIPID PNL WITH DIRECT LDL SERPL: 74 MG/DL — SIGNIFICANT CHANGE UP
MAGNESIUM SERPL-MCNC: 1.8 MG/DL — SIGNIFICANT CHANGE UP (ref 1.6–2.6)
MCHC RBC-ENTMCNC: 28 PG — SIGNIFICANT CHANGE UP (ref 27–34)
MCHC RBC-ENTMCNC: 32.8 G/DL — SIGNIFICANT CHANGE UP (ref 32–36)
MCV RBC AUTO: 85.4 FL — SIGNIFICANT CHANGE UP (ref 80–100)
PLATELET # BLD AUTO: 419 K/UL — HIGH (ref 150–400)
POTASSIUM SERPL-MCNC: 4.2 MMOL/L — SIGNIFICANT CHANGE UP (ref 3.5–5.3)
POTASSIUM SERPL-SCNC: 4.2 MMOL/L — SIGNIFICANT CHANGE UP (ref 3.5–5.3)
PROTHROM AB SERPL-ACNC: 11.5 SEC — SIGNIFICANT CHANGE UP (ref 10–12.9)
RBC # BLD: 4.46 M/UL — SIGNIFICANT CHANGE UP (ref 4.2–5.8)
RBC # FLD: 13.7 % — SIGNIFICANT CHANGE UP (ref 10.3–16.9)
SODIUM SERPL-SCNC: 137 MMOL/L — SIGNIFICANT CHANGE UP (ref 135–145)
TOTAL CHOLESTEROL/HDL RATIO MEASUREMENT: 3 RATIO — LOW (ref 3.4–9.6)
TRIGL SERPL-MCNC: 98 MG/DL — SIGNIFICANT CHANGE UP (ref 10–149)
WBC # BLD: 6 K/UL — SIGNIFICANT CHANGE UP (ref 3.8–10.5)
WBC # FLD AUTO: 6 K/UL — SIGNIFICANT CHANGE UP (ref 3.8–10.5)

## 2018-12-23 PROCEDURE — 99233 SBSQ HOSP IP/OBS HIGH 50: CPT | Mod: GC

## 2018-12-23 PROCEDURE — 73630 X-RAY EXAM OF FOOT: CPT | Mod: 26,RT

## 2018-12-23 RX ORDER — BENZOCAINE AND MENTHOL 5; 1 G/100ML; G/100ML
1 LIQUID ORAL ONCE
Refills: 0 | Status: COMPLETED | OUTPATIENT
Start: 2018-12-23 | End: 2018-12-23

## 2018-12-23 RX ORDER — HYDROMORPHONE HYDROCHLORIDE 2 MG/ML
1 INJECTION INTRAMUSCULAR; INTRAVENOUS; SUBCUTANEOUS EVERY 4 HOURS
Refills: 0 | Status: DISCONTINUED | OUTPATIENT
Start: 2018-12-23 | End: 2018-12-27

## 2018-12-23 RX ORDER — OXYCODONE AND ACETAMINOPHEN 5; 325 MG/1; MG/1
2 TABLET ORAL EVERY 6 HOURS
Refills: 0 | Status: DISCONTINUED | OUTPATIENT
Start: 2018-12-23 | End: 2018-12-27

## 2018-12-23 RX ORDER — BENZOCAINE AND MENTHOL 5; 1 G/100ML; G/100ML
1 LIQUID ORAL ONCE
Refills: 0 | Status: DISCONTINUED | OUTPATIENT
Start: 2018-12-23 | End: 2018-12-23

## 2018-12-23 RX ORDER — INSULIN LISPRO 100/ML
6 VIAL (ML) SUBCUTANEOUS
Refills: 0 | Status: DISCONTINUED | OUTPATIENT
Start: 2018-12-23 | End: 2018-12-27

## 2018-12-23 RX ORDER — SODIUM CHLORIDE 9 MG/ML
1000 INJECTION, SOLUTION INTRAVENOUS
Refills: 0 | Status: DISCONTINUED | OUTPATIENT
Start: 2018-12-23 | End: 2018-12-25

## 2018-12-23 RX ORDER — HEPARIN SODIUM 5000 [USP'U]/ML
5000 INJECTION INTRAVENOUS; SUBCUTANEOUS EVERY 8 HOURS
Refills: 0 | Status: DISCONTINUED | OUTPATIENT
Start: 2018-12-23 | End: 2018-12-27

## 2018-12-23 RX ORDER — INSULIN GLARGINE 100 [IU]/ML
38 INJECTION, SOLUTION SUBCUTANEOUS AT BEDTIME
Refills: 0 | Status: DISCONTINUED | OUTPATIENT
Start: 2018-12-23 | End: 2018-12-24

## 2018-12-23 RX ORDER — ONDANSETRON 8 MG/1
4 TABLET, FILM COATED ORAL EVERY 6 HOURS
Refills: 0 | Status: DISCONTINUED | OUTPATIENT
Start: 2018-12-23 | End: 2018-12-27

## 2018-12-23 RX ORDER — OXYCODONE AND ACETAMINOPHEN 5; 325 MG/1; MG/1
1 TABLET ORAL EVERY 4 HOURS
Refills: 0 | Status: DISCONTINUED | OUTPATIENT
Start: 2018-12-23 | End: 2018-12-27

## 2018-12-23 RX ADMIN — Medication 1 APPLICATION(S): at 06:33

## 2018-12-23 RX ADMIN — HEPARIN SODIUM 5000 UNIT(S): 5000 INJECTION INTRAVENOUS; SUBCUTANEOUS at 13:09

## 2018-12-23 RX ADMIN — Medication 1 APPLICATION(S): at 06:32

## 2018-12-23 RX ADMIN — Medication 250 MILLIGRAM(S): at 23:45

## 2018-12-23 RX ADMIN — Medication 100 MILLIGRAM(S): at 02:50

## 2018-12-23 RX ADMIN — HEPARIN SODIUM 5000 UNIT(S): 5000 INJECTION INTRAVENOUS; SUBCUTANEOUS at 22:40

## 2018-12-23 RX ADMIN — PIPERACILLIN AND TAZOBACTAM 200 GRAM(S): 4; .5 INJECTION, POWDER, LYOPHILIZED, FOR SOLUTION INTRAVENOUS at 22:40

## 2018-12-23 RX ADMIN — INSULIN GLARGINE 38 UNIT(S): 100 INJECTION, SOLUTION SUBCUTANEOUS at 22:40

## 2018-12-23 RX ADMIN — SODIUM CHLORIDE 100 MILLILITER(S): 9 INJECTION, SOLUTION INTRAVENOUS at 10:06

## 2018-12-23 RX ADMIN — PIPERACILLIN AND TAZOBACTAM 200 GRAM(S): 4; .5 INJECTION, POWDER, LYOPHILIZED, FOR SOLUTION INTRAVENOUS at 05:42

## 2018-12-23 RX ADMIN — Medication 100 MILLIGRAM(S): at 10:06

## 2018-12-23 RX ADMIN — Medication 250 MILLIGRAM(S): at 11:43

## 2018-12-23 RX ADMIN — Medication 6 UNIT(S): at 12:37

## 2018-12-23 RX ADMIN — Medication 1 APPLICATION(S): at 17:21

## 2018-12-23 RX ADMIN — Medication 1 APPLICATION(S): at 17:18

## 2018-12-23 RX ADMIN — HYDROMORPHONE HYDROCHLORIDE 1 MILLIGRAM(S): 2 INJECTION INTRAMUSCULAR; INTRAVENOUS; SUBCUTANEOUS at 10:06

## 2018-12-23 RX ADMIN — PIPERACILLIN AND TAZOBACTAM 200 GRAM(S): 4; .5 INJECTION, POWDER, LYOPHILIZED, FOR SOLUTION INTRAVENOUS at 11:03

## 2018-12-23 RX ADMIN — HYDROMORPHONE HYDROCHLORIDE 1 MILLIGRAM(S): 2 INJECTION INTRAMUSCULAR; INTRAVENOUS; SUBCUTANEOUS at 10:21

## 2018-12-23 RX ADMIN — PIPERACILLIN AND TAZOBACTAM 200 GRAM(S): 4; .5 INJECTION, POWDER, LYOPHILIZED, FOR SOLUTION INTRAVENOUS at 17:17

## 2018-12-23 NOTE — DIETITIAN INITIAL EVALUATION ADULT. - PROBLEM SELECTOR PLAN 4
BMI 31  -nutrition consult    #normocytic anemia  At patients baseline. No signs of bleeding.   -f/u AM CBC

## 2018-12-23 NOTE — PROGRESS NOTE ADULT - SUBJECTIVE AND OBJECTIVE BOX
POST OP NOTE    ARIEL MOURA  MRN-[id number]    Procedure: Right foot and ankle incision and drainage with washout and bone biopsy  Surgeon: David Green  Assistants: Judi Crocker    Patient tolerated procedure well without incident.  Patient transferred to PACU in stable condition.       PE / Post Op Check:       GEN: NAD, AAOx3, resting comfortably with pain controlled      LE Focused: CFT shows adequate perfusion b/l with no signs of ischemic compromise.  No strikethrough is appreciated on surgical dressings.  Dressings were dry, clean, and intact.  No neuromuscular deficits appreciated.

## 2018-12-23 NOTE — PROGRESS NOTE ADULT - PROBLEM SELECTOR PLAN 2
Likely site of entry; has received, and been compliant with oral treatment.   -wound care   -diabetes control  -continue antibiotics as above  -ammonium lactate lotion BID to moisturize skin    #onychomycosis  clotrimazole cream BID    #psoriasis  scaling patches on hands and left arm - says he takes an OTC, not sure which. Nothing here per pharmacy. Not currently bothering him.

## 2018-12-23 NOTE — PROGRESS NOTE ADULT - SUBJECTIVE AND OBJECTIVE BOX
PRE-OP NOTE    Pre-Op Diagnosis: Right foot ulcer with osteomyelitis and soft tissue emphysema   Planned Procedure: Right foot incision and drainage with washout and bone biopsy   Scheduled Date / Time: Addon 12/23/18 AM   Indication: Soft tissue emphysema   Labs:                       11.5   7.0   )-----------( 421      ( 22 Dec 2018 10:52 )             35.2   12-22    137  |  101  |  20  ----------------------------<  126<H>  4.6   |  26  |  0.96    Ca    9.0      22 Dec 2018 10:52    TPro  7.9  /  Alb  3.8  /  TBili  0.2  /  DBili  x   /  AST  26  /  ALT  29  /  AlkPhos  96  12-22  LIVER FUNCTIONS - ( 22 Dec 2018 10:52 )  Alb: 3.8 g/dL / Pro: 7.9 g/dL / ALK PHOS: 96 U/L / ALT: 29 U/L / AST: 26 U/L / GGT: x           Vitals: Vital Signs Last 24 Hrs  T(C): 37 (23 Dec 2018 05:04), Max: 37 (23 Dec 2018 05:04)  T(F): 98.6 (23 Dec 2018 05:04), Max: 98.6 (23 Dec 2018 05:04)  HR: 80 (23 Dec 2018 05:04) (76 - 92)  BP: 135/87 (23 Dec 2018 05:04) (135/87 - 172/102)  BP(mean): --  RR: 17 (23 Dec 2018 05:04) (17 - 20)  SpO2: 98% (23 Dec 2018 05:04) (97% - 99%)  PE:   Official CXR reading: (On Chart)  Official EKG reading: (On Chart)  Type and Cross/Screen: O+  NPO after MN  Antibiotics: Vanc, Zosyn, Clinda   Anesthesia evaluation (on chart)  Operative Consent (on chart)

## 2018-12-23 NOTE — PROGRESS NOTE ADULT - ASSESSMENT
A/P 43 yoF s/p R foot and ankle I&D washout w biopsy     - IV Abx as per primary team   - Podiatry dressing change 12/24: Replace iodoform packing dorsal lateral foot incision. Ankle I&D primarily closed w Nylon  - Follow up intra-op culture swabs from both incision sites and bone biopsy of third metatarsal bone   - Weight bear as tolerated in surgical shoe to right lower extremity   - Podiatry will follow

## 2018-12-23 NOTE — DIETITIAN INITIAL EVALUATION ADULT. - NS AS NUTRI INTERV ED CONTENT
Purpose of the nutrition education/Priority modifications/Survival information/Nutrition relationship to health/disease

## 2018-12-23 NOTE — DIETITIAN INITIAL EVALUATION ADULT. - OTHER INFO
44 yo/male with PMHx poorly managed T2DM, multiple toe amputations 2/2 diabetic ulcers/OM, presented with R. foot swelling. CT showing OM c/f necrotizing fasciitis. S/p OR this AM with podiatry, s/p I&D of R. foot and bone biopsy. Per MD note, little c/f nec. fasc. Pt seen in room, awake, alert. Endorses fair appetite PTA, does not eat consistently 2/2 work schedule. Pt reports that while at [facility], a nutritionist gave him an eating plan (hard boiled eggs, salad with lamb or chicken, slim fast shake, and rice/beans/meat) that he followed for some time but then felt that it was making him gain weight so he stopped and now follows no restrictions. Claims to not have an endocrinologist and does not take his insulin, only metformin. Currently pt reports 100% intake per meal. No N/V/C/D reported at this time. +BM. NKFA. Skin noted w/R. foot wound. Pain endorsed but tolerable. Spent time reviewing DM diet with patient, and encouraging patient to go back on previous diet. Suspect that pt did not tell full diet recall, as this diet likely would not cause weight gain. Pt noted to be 220# in April, 245# in July, now 232#. Pt likely to remain non-compliant as he has received education multiple times in the past. Pt referred to outpatient CDE.

## 2018-12-23 NOTE — PROGRESS NOTE ADULT - ASSESSMENT
Patient is a 43 year old male with PMHx of DMT2 (dx in 2003 transitioned from PO meds to insulin 5 yrs after dx; last A1c in July 2018 9.7), multiple toe amputations for diabetic foot ulcers/osteomyelitis (most recent in July 2018) that presents R foot swelling x 1 day. CT foot in the ED showing ongoing osteomyelitis and air in the dorsum w concern for necrotizing fasciitis. Admitted for antibiotic treatment and podiatric intervention in OR today

## 2018-12-23 NOTE — PROGRESS NOTE ADULT - ATTENDING COMMENTS
Pt seen and examined by me at bedside. Agree with housestaff's exam/a/p as noted above with additions, s/p OR   pt feel well no complaints  VSS exam as above with addition, R foot wrapped, sensation intact  labs reviewed  a/p:  1. R foot OM, s/p OR, will f/u with podiatry for post-op recs, c/w IV abx.  2. DMII-adjust insulin accordingly.   rest of a/p as above.

## 2018-12-23 NOTE — PROGRESS NOTE ADULT - PROBLEM SELECTOR PLAN 1
Chronic in nature - completed 10 day regimen with oral keflex after discharge from hospital on December 5. CT from ED now with evidence of air collection on the dorsum of right foot.   -podiatry following; plan for I and D with exploratory surgery of right foot today  NPO at 23:00: no plan for bone Bx in the ED, will start abx prior to surgery with primary concern r/o nec fasc  -vancomycin 1750mg q12 hours, zosyn 3.375mg q8 hours, clindamycin 900mg q8 hours  -walk with assistance  -wound care recommendations by podiatry team, currently wrapped  -follow up blood cultures x2  -monitor for signs of decompensation or infectious spread Chronic in nature - completed 10 day regimen with oral keflex after discharge from hospital on December 5. CT from ED now with evidence of air collection on the dorsum of right foot.   -podiatry following; plan for I and D with exploratory surgery of right foot today  NPO at 23:00: no plan for bone Bx in the ED, will start abx prior to surgery with primary concern r/o nec fasc  -vancomycin 1750mg q12 hours, zosyn 3.375mg q8 hours, clindamycin 900mg q8 hours  -walk with assistance  -wound care recommendations by podiatry team, currently wrapped  -follow up blood cultures x2  -monitor for signs of decompensation or infectious spread    -->> S/P OR PROCEDURE with podiatry, little concern for nec fasc, cx taken, bone biopsied, packed. will continue with iv abx for now. Chronic in nature - completed 10 day regimen with oral keflex after discharge from hospital on December 5. CT from ED now with evidence of air collection on the dorsum of right foot.   -podiatry following; plan for I and D with exploratory surgery of right foot today  NPO at 23:00: no plan for bone Bx in the ED, will start abx prior to surgery with primary concern r/o nec fasc  -vancomycin 1750mg q12 hours, zosyn 3.375mg q8 hours(clinda stopped )  -walk with assistance  -wound care recommendations by podiatry team, currently wrapped  -follow up blood cultures x2  -monitor for signs of decompensation or infectious spread    -->> S/P OR PROCEDURE with podiatry, little concern for nec fasc, cx taken, bone biopsied, packed. will continue with iv abx for now.  NPO after midnight for closure of foot incision and graft application to plantar ulcer. Chronic in nature - completed 10 day regimen with oral keflex after discharge from hospital on December 5. CT from ED now with evidence of air collection on the dorsum of right foot.   -podiatry following; plan for I and D with exploratory surgery of right foot today  NPO at 23:00: no plan for bone Bx in the ED, will start abx prior to surgery with primary concern r/o nec fasc  -vancomycin 1750mg q12 hours, zosyn 3.375mg q8 hours(clinda stopped )  -walk with assistance  -wound care recommendations by podiatry team, currently wrapped  -follow up blood cultures x2  -monitor for signs of decompensation or infectious spread    -->> S/P OR PROCEDURE with podiatry  Right foot and ankle incision and drainage with washout and bone biopsy POD 0   little concern for nec fasc, cx taken, bone biopsied, packed. will continue with iv abx for now.  NPO after midnight for closure of foot incision and graft application to plantar ulcer. Chronic in nature - completed 10 day regimen with oral keflex after discharge from hospital on December 5. CT from ED now with evidence of air collection on the dorsum of right foot.   -podiatry following; plan for I and D with exploratory surgery of right foot today  NPO at 23:00: no plan for bone Bx in the ED, will start abx prior to surgery with primary concern r/o nec fasc  -vancomycin 1750mg q12 hours, zosyn 3.375mg q8 hours(clinda stopped )  -walk with assistance  -wound care recommendations by podiatry team, currently wrapped  -follow up blood cultures x2  -monitor for signs of decompensation or infectious spread    -->> S/P OR PROCEDURE with podiatry  Right foot and ankle incision and drainage with washout and bone biopsy POD 0   little concern for nec fasc, cx taken, bone biopsied, packed. will continue with iv abx for now.  Plan to be taken to OR again possibly in Tuesday for  closure of foot incision and graft application to plantar ulcer.  F/u podiatry recs

## 2018-12-23 NOTE — DIETITIAN INITIAL EVALUATION ADULT. - PROBLEM SELECTOR PLAN 3
home regimen: lantus 48 qHS, lispro 8U prior to meals  -continue 38U lantus qHS, 6U lispro bolus insulin  -moderate insulin sliding scale  -consistent carbohydrate diet with evening snack

## 2018-12-23 NOTE — BRIEF OPERATIVE NOTE - PROCEDURE
<<-----Click on this checkbox to enter Procedure Incision and drainage, ankle  12/23/2018    Active  [email or web address]  Incision and drainage of abscess of right foot  12/23/2018    Active  [email or web address]

## 2018-12-23 NOTE — PROGRESS NOTE ADULT - SUBJECTIVE AND OBJECTIVE BOX
OVERNIGHT EVENTS :admitted via ED    SUBJECTIVE / INTERVAL HPI: Patient seen and examined at bedside. In no acute distress ,npo for procedure today     VITAL SIGNS:  Vital Signs Last 24 Hrs  T(C): 37 (23 Dec 2018 07:20), Max: 37 (23 Dec 2018 05:04)  T(F): 98.6 (23 Dec 2018 07:20), Max: 98.6 (23 Dec 2018 05:04)  HR: 80 (23 Dec 2018 07:20) (76 - 92)  BP: 135/87 (23 Dec 2018 07:20) (135/87 - 172/102)  BP(mean): --  RR: 17 (23 Dec 2018 07:20) (17 - 20)  SpO2: 98% (23 Dec 2018 05:04) (97% - 99%)    PHYSICAL EXAM:    General: WDWN  HEENT: NC/AT; PERRL, anicteric sclera; MMM  Neck: supple  Cardiovascular: +S1/S2; RRR  Respiratory: CTA B/L; no W/R/R  Gastrointestinal: soft, NT/ND; +BSx4  Extremities: RLE with dressing intact, no oozing or bleeding through dressing.   Vascular: 2+ radial, DP/PT pulses B/L  Neurological: AAOx3; no focal deficits    MEDICATIONS:  MEDICATIONS  (STANDING):  ammonium lactate 12% Lotion 1 Application(s) Topical two times a day  atorvastatin 40 milliGRAM(s) Oral at bedtime  clindamycin IVPB 900 milliGRAM(s) IV Intermittent every 8 hours  clotrimazole 1% Cream 1 Application(s) Topical two times a day  dextrose 5%. 1000 milliLiter(s) (50 mL/Hr) IV Continuous <Continuous>  dextrose 50% Injectable 12.5 Gram(s) IV Push once  dextrose 50% Injectable 25 Gram(s) IV Push once  dextrose 50% Injectable 25 Gram(s) IV Push once  insulin glargine Injectable (LANTUS) 38 Unit(s) SubCutaneous at bedtime  insulin lispro (HumaLOG) corrective regimen sliding scale   SubCutaneous three times a day before meals  insulin lispro Injectable (HumaLOG) 6 Unit(s) SubCutaneous three times a day before meals  piperacillin/tazobactam IVPB. 3.375 Gram(s) IV Intermittent every 6 hours  vancomycin  IVPB 1750 milliGRAM(s) IV Intermittent every 12 hours    MEDICATIONS  (PRN):  dextrose 40% Gel 15 Gram(s) Oral once PRN Blood Glucose LESS THAN 70 milliGRAM(s)/deciliter  glucagon  Injectable 1 milliGRAM(s) IntraMuscular once PRN Glucose LESS THAN 70 milligrams/deciliter      ALLERGIES:  Allergies    No Known Allergies    Intolerances        LABS:                        12.5   6.0   )-----------( [id number]      ( 23 Dec 2018 06:21 )             38.1     12-23    137  |  99  |  17  ----------------------------<  198<H>  4.2   |  25  |  1.02    Ca    8.9      23 Dec 2018 06:19  Mg     1.8     12-23    TPro  7.9  /  Alb  3.8  /  TBili  0.2  /  DBili  x   /  AST  26  /  ALT  29  /  AlkPhos  96  12-22    PT/INR - ( 23 Dec 2018 06:21 )   PT: 11.5 sec;   INR: 1.02          PTT - ( 23 Dec 2018 06:21 )  PTT:32.3 sec    CAPILLARY BLOOD GLUCOSE      POCT Blood Glucose.: 172 mg/dL (22 Dec 2018 22:39)      RADIOLOGY & ADDITIONAL TESTS: Reviewed.    ASSESSMENT:    PLAN:

## 2018-12-24 LAB
ANION GAP SERPL CALC-SCNC: 11 MMOL/L — SIGNIFICANT CHANGE UP (ref 5–17)
APTT BLD: 33.8 SEC — SIGNIFICANT CHANGE UP (ref 27.5–36.3)
BASOPHILS NFR BLD AUTO: 0.5 % — SIGNIFICANT CHANGE UP (ref 0–2)
BUN SERPL-MCNC: 16 MG/DL — SIGNIFICANT CHANGE UP (ref 7–23)
CALCIUM SERPL-MCNC: 8.7 MG/DL — SIGNIFICANT CHANGE UP (ref 8.4–10.5)
CHLORIDE SERPL-SCNC: 100 MMOL/L — SIGNIFICANT CHANGE UP (ref 96–108)
CO2 SERPL-SCNC: 27 MMOL/L — SIGNIFICANT CHANGE UP (ref 22–31)
CREAT SERPL-MCNC: 1.11 MG/DL — SIGNIFICANT CHANGE UP (ref 0.5–1.3)
EOSINOPHIL NFR BLD AUTO: 2.3 % — SIGNIFICANT CHANGE UP (ref 0–6)
GLUCOSE BLDC GLUCOMTR-MCNC: 104 MG/DL — HIGH (ref 70–99)
GLUCOSE BLDC GLUCOMTR-MCNC: 150 MG/DL — HIGH (ref 70–99)
GLUCOSE BLDC GLUCOMTR-MCNC: 224 MG/DL — HIGH (ref 70–99)
GLUCOSE BLDC GLUCOMTR-MCNC: 90 MG/DL — SIGNIFICANT CHANGE UP (ref 70–99)
GLUCOSE SERPL-MCNC: 124 MG/DL — HIGH (ref 70–99)
GRAM STN FLD: SIGNIFICANT CHANGE UP
HCT VFR BLD CALC: 37.8 % — LOW (ref 39–50)
HGB BLD-MCNC: 12.4 G/DL — LOW (ref 13–17)
INR BLD: 1.02 — SIGNIFICANT CHANGE UP (ref 0.88–1.16)
LYMPHOCYTES # BLD AUTO: 27.2 % — SIGNIFICANT CHANGE UP (ref 13–44)
MAGNESIUM SERPL-MCNC: 1.6 MG/DL — SIGNIFICANT CHANGE UP (ref 1.6–2.6)
MCHC RBC-ENTMCNC: 28.2 PG — SIGNIFICANT CHANGE UP (ref 27–34)
MCHC RBC-ENTMCNC: 32.8 G/DL — SIGNIFICANT CHANGE UP (ref 32–36)
MCV RBC AUTO: 85.9 FL — SIGNIFICANT CHANGE UP (ref 80–100)
MONOCYTES NFR BLD AUTO: 10.4 % — SIGNIFICANT CHANGE UP (ref 2–14)
NEUTROPHILS NFR BLD AUTO: 59.6 % — SIGNIFICANT CHANGE UP (ref 43–77)
PLATELET # BLD AUTO: 426 K/UL — HIGH (ref 150–400)
POTASSIUM SERPL-MCNC: 4.4 MMOL/L — SIGNIFICANT CHANGE UP (ref 3.5–5.3)
POTASSIUM SERPL-SCNC: 4.4 MMOL/L — SIGNIFICANT CHANGE UP (ref 3.5–5.3)
PROTHROM AB SERPL-ACNC: 11.5 SEC — SIGNIFICANT CHANGE UP (ref 10–12.9)
RBC # BLD: 4.4 M/UL — SIGNIFICANT CHANGE UP (ref 4.2–5.8)
RBC # FLD: 13.8 % — SIGNIFICANT CHANGE UP (ref 10.3–16.9)
SODIUM SERPL-SCNC: 138 MMOL/L — SIGNIFICANT CHANGE UP (ref 135–145)
SPECIMEN SOURCE: SIGNIFICANT CHANGE UP
VANCOMYCIN TROUGH SERPL-MCNC: 15.9 UG/ML — SIGNIFICANT CHANGE UP (ref 10–20)
WBC # BLD: 8.1 K/UL — SIGNIFICANT CHANGE UP (ref 3.8–10.5)
WBC # FLD AUTO: 8.1 K/UL — SIGNIFICANT CHANGE UP (ref 3.8–10.5)

## 2018-12-24 PROCEDURE — 99233 SBSQ HOSP IP/OBS HIGH 50: CPT | Mod: GC

## 2018-12-24 RX ORDER — VANCOMYCIN HCL 1 G
1750 VIAL (EA) INTRAVENOUS EVERY 12 HOURS
Refills: 0 | Status: DISCONTINUED | OUTPATIENT
Start: 2018-12-24 | End: 2018-12-24

## 2018-12-24 RX ORDER — SODIUM CHLORIDE 0.65 %
1 AEROSOL, SPRAY (ML) NASAL THREE TIMES A DAY
Refills: 0 | Status: DISCONTINUED | OUTPATIENT
Start: 2018-12-24 | End: 2018-12-27

## 2018-12-24 RX ORDER — INSULIN GLARGINE 100 [IU]/ML
28 INJECTION, SOLUTION SUBCUTANEOUS AT BEDTIME
Refills: 0 | Status: DISCONTINUED | OUTPATIENT
Start: 2018-12-24 | End: 2018-12-27

## 2018-12-24 RX ORDER — VANCOMYCIN HCL 1 G
1750 VIAL (EA) INTRAVENOUS EVERY 12 HOURS
Refills: 0 | Status: COMPLETED | OUTPATIENT
Start: 2018-12-24 | End: 2018-12-25

## 2018-12-24 RX ADMIN — Medication 6 UNIT(S): at 12:51

## 2018-12-24 RX ADMIN — Medication 6 UNIT(S): at 08:49

## 2018-12-24 RX ADMIN — HEPARIN SODIUM 5000 UNIT(S): 5000 INJECTION INTRAVENOUS; SUBCUTANEOUS at 06:09

## 2018-12-24 RX ADMIN — OXYCODONE AND ACETAMINOPHEN 2 TABLET(S): 5; 325 TABLET ORAL at 10:11

## 2018-12-24 RX ADMIN — HEPARIN SODIUM 5000 UNIT(S): 5000 INJECTION INTRAVENOUS; SUBCUTANEOUS at 12:36

## 2018-12-24 RX ADMIN — Medication 250 MILLIGRAM(S): at 12:36

## 2018-12-24 RX ADMIN — HEPARIN SODIUM 5000 UNIT(S): 5000 INJECTION INTRAVENOUS; SUBCUTANEOUS at 22:36

## 2018-12-24 RX ADMIN — PIPERACILLIN AND TAZOBACTAM 200 GRAM(S): 4; .5 INJECTION, POWDER, LYOPHILIZED, FOR SOLUTION INTRAVENOUS at 05:32

## 2018-12-24 RX ADMIN — PIPERACILLIN AND TAZOBACTAM 200 GRAM(S): 4; .5 INJECTION, POWDER, LYOPHILIZED, FOR SOLUTION INTRAVENOUS at 11:00

## 2018-12-24 RX ADMIN — SODIUM CHLORIDE 100 MILLILITER(S): 9 INJECTION, SOLUTION INTRAVENOUS at 23:02

## 2018-12-24 RX ADMIN — Medication 1 APPLICATION(S): at 06:08

## 2018-12-24 RX ADMIN — OXYCODONE AND ACETAMINOPHEN 2 TABLET(S): 5; 325 TABLET ORAL at 11:11

## 2018-12-24 RX ADMIN — Medication 1 APPLICATION(S): at 17:02

## 2018-12-24 RX ADMIN — PIPERACILLIN AND TAZOBACTAM 200 GRAM(S): 4; .5 INJECTION, POWDER, LYOPHILIZED, FOR SOLUTION INTRAVENOUS at 23:01

## 2018-12-24 RX ADMIN — PIPERACILLIN AND TAZOBACTAM 200 GRAM(S): 4; .5 INJECTION, POWDER, LYOPHILIZED, FOR SOLUTION INTRAVENOUS at 17:02

## 2018-12-24 RX ADMIN — INSULIN GLARGINE 28 UNIT(S): 100 INJECTION, SOLUTION SUBCUTANEOUS at 22:36

## 2018-12-24 RX ADMIN — Medication 4: at 22:36

## 2018-12-24 NOTE — PROGRESS NOTE ADULT - ASSESSMENT
Patient is a 43 year old male with PMHx of DMT2 (dx in 2003 transitioned from PO meds to insulin 5 yrs after dx; last A1c in July 2018 9.7), multiple toe amputations for diabetic foot ulcers/osteomyelitis (most recent in July 2018) that presents R foot swelling x 1 day. CT foot in the ED showing ongoing osteomyelitis and air in the dorsum w concern for necrotizing fasciitis, s/p Right foot and ankle incision and drainage with washout and bone biopsy pod 1 with plans to take patient to OR again for for  closure of foot incision and graft application to plantar ulcer.

## 2018-12-24 NOTE — PROGRESS NOTE ADULT - ASSESSMENT
A/P 43 yoF POD1 R foot and ankle I&D washout w biopsy     - IV Abx as per primary team   - Dressing changes by Podiatry. Dorsal foot I&D site repacked with iodoform packing. DSD applied to RLE with ACE bandage  - Follow up intra-op culture swabs from both incision sites and bone biopsy of third metatarsal bone   - Weight bear as tolerated in surgical shoe to right lower extremity   - Final plans for primary closure of incision and graft application to be discussed with attending. Will not happen tomorrow 12/25  - Podiatry will follow A/P 43 yoM POD1 R foot and ankle I&D washout w biopsy     - IV Abx as per primary team   - Dressing changes by Podiatry. Dorsal foot I&D site repacked with iodoform packing. DSD applied to RLE with ACE bandage  - Follow up intra-op culture swabs from both incision sites and bone biopsy of third metatarsal bone   - Weight bear as tolerated in surgical shoe to right lower extremity   - Final plans for primary closure of incision and graft application to be discussed with attending. Will not happen tomorrow 12/25  - Podiatry will follow

## 2018-12-24 NOTE — PROGRESS NOTE ADULT - PROBLEM SELECTOR PLAN 1
Chronic in nature - completed 10 day regimen with oral keflex after discharge from hospital on December 5. CT from ED now with evidence of air collection on the dorsum of right foot.    S/P OR PROCEDURE with podiatry POD 1   Right foot and ankle incision and drainage with washout and bone biopsy POD 0   little concern for nec fasc, cx taken, bone biopsied, packed.   will continue with iv abx for now- -vancomycin 1750mg q12 hours, zosyn 3.375mg q8 hours  Plan to be taken to OR again possibly in Tuesday for  closure of foot incision and graft application to plantar ulcer.  F/u podiatry recs Chronic in nature - completed 10 day regimen with oral keflex after discharge from hospital on December 5. CT from ED now with evidence of air collection on the dorsum of right foot.    S/P OR PROCEDURE with podiatry POD 1   Right foot and ankle incision and drainage with washout and bone biopsy   little concern for nec fasc, cx taken, bone biopsied, packed.   will continue with iv abx for now- -vancomycin 1750mg q12 hours, zosyn 3.375mg q8 hours  Plan to be taken to OR again possibly in Tuesday for  closure of foot incision and graft application to plantar ulcer.  F/u podiatry recs Chronic in nature - completed 10 day regimen with oral keflex after discharge from hospital on December 5. CT from ED now with evidence of air collection on the dorsum of right foot.    S/P OR PROCEDURE with podiatry POD 1   Right foot and ankle incision and drainage with washout and bone biopsy   little concern for nec fasc, cx taken, bone biopsied, packed.   will continue with iv abx for now- -vancomycin 1750mg q12 hours, zosyn 3.375mg q8 hours  Plan to be taken to OR again  Thursday for  closure of foot incision and graft application to plantar ulcer.  NPO Wednesday night   F/u podiatry recs

## 2018-12-24 NOTE — PROGRESS NOTE ADULT - PROBLEM SELECTOR PLAN 5
F: No IVF  E: K>4, Mg>2  N: DASH with consistent carbohydrate and evening snack, mISS  Ppx: SCD  Lines: peripheral  Ambulation: with assistance  Code: full  Dispo: regional medical floors

## 2018-12-24 NOTE — PROGRESS NOTE ADULT - SUBJECTIVE AND OBJECTIVE BOX
OVERNIGHT EVENTS: ANDREW  SUBJECTIVE / INTERVAL HPI: Patient seen and examined at bedside. Pt denies pain on right foot.     VITAL SIGNS:  Vital Signs Last 24 Hrs  T(C): 37.1 (24 Dec 2018 05:24), Max: 37.1 (24 Dec 2018 05:24)  T(F): 98.8 (24 Dec 2018 05:24), Max: 98.8 (24 Dec 2018 05:24)  HR: 86 (24 Dec 2018 05:24) (72 - 86)  BP: 150/89 (24 Dec 2018 05:24) (92/56 - 159/92)  BP(mean): 94 (23 Dec 2018 09:45) (65 - 94)  RR: 16 (24 Dec 2018 05:24) (16 - 20)  SpO2: 97% (24 Dec 2018 05:24) (97% - 99%)    PHYSICAL EXAM:    General: WDWN  HEENT: NC/AT; PERRL, anicteric sclera; MMM  Neck: supple  Cardiovascular: +S1/S2; RRR  Respiratory: CTA B/L; no W/R/R  Gastrointestinal: soft, NT/ND; +BSx4  Extremities: Right foot wrapped in dressing , toes feel warm.   Vascular: 2+ radial, DP/PT pulses B/L  Neurological: AAOx3; no focal deficits    MEDICATIONS:  MEDICATIONS  (STANDING):  ammonium lactate 12% Lotion 1 Application(s) Topical two times a day  atorvastatin 40 milliGRAM(s) Oral at bedtime  benzocaine 15 mG/menthol 3.6 mG Lozenge 1 Lozenge Oral once  clotrimazole 1% Cream 1 Application(s) Topical two times a day  dextrose 5%. 1000 milliLiter(s) (50 mL/Hr) IV Continuous <Continuous>  dextrose 50% Injectable 12.5 Gram(s) IV Push once  dextrose 50% Injectable 25 Gram(s) IV Push once  dextrose 50% Injectable 25 Gram(s) IV Push once  heparin  Injectable 5000 Unit(s) SubCutaneous every 8 hours  insulin glargine Injectable (LANTUS) 38 Unit(s) SubCutaneous at bedtime  insulin lispro (HumaLOG) corrective regimen sliding scale   SubCutaneous three times a day before meals  insulin lispro Injectable (HumaLOG) 6 Unit(s) SubCutaneous three times a day before meals  lactated ringers. 1000 milliLiter(s) (100 mL/Hr) IV Continuous <Continuous>  piperacillin/tazobactam IVPB. 3.375 Gram(s) IV Intermittent every 6 hours  vancomycin  IVPB 1750 milliGRAM(s) IV Intermittent every 12 hours    MEDICATIONS  (PRN):  dextrose 40% Gel 15 Gram(s) Oral once PRN Blood Glucose LESS THAN 70 milliGRAM(s)/deciliter  glucagon  Injectable 1 milliGRAM(s) IntraMuscular once PRN Glucose LESS THAN 70 milligrams/deciliter  HYDROmorphone  Injectable 1 milliGRAM(s) IV Push every 4 hours PRN breakthrough pain  ondansetron Injectable 4 milliGRAM(s) IV Push every 6 hours PRN Nausea and/or Vomiting  oxyCODONE    5 mG/acetaminophen 325 mG 1 Tablet(s) Oral every 4 hours PRN Moderate Pain (4 - 6)  oxyCODONE    5 mG/acetaminophen 325 mG 2 Tablet(s) Oral every 6 hours PRN Severe Pain (7 - 10)      ALLERGIES:  Allergies    No Known Allergies    Intolerances        LABS:                        12.4   8.1   )-----------( 426      ( 24 Dec 2018 05:53 )             37.8     12-24    138  |  100  |  16  ----------------------------<  124<H>  4.4   |  27  |  1.11    Ca    8.7      24 Dec 2018 05:53  Mg     1.6     12-24    TPro  7.9  /  Alb  3.8  /  TBili  0.2  /  DBili  x   /  AST  26  /  ALT  29  /  AlkPhos  96  12-22    PT/INR - ( 24 Dec 2018 05:53 )   PT: 11.5 sec;   INR: 1.02          PTT - ( 24 Dec 2018 05:53 )  PTT:33.8 sec    CAPILLARY BLOOD GLUCOSE      POCT Blood Glucose.: 164 mg/dL (23 Dec 2018 22:26)      RADIOLOGY & ADDITIONAL TESTS: Reviewed.    ASSESSMENT:    PLAN:

## 2018-12-24 NOTE — ADVANCED PRACTICE NURSE CONSULT - REASON FOR CONSULT
WOC nurse consult to assess foot ulcer. Patient being followed by podiatry team. Informed house staff, Joaquin, [facility] nurse consult not indicated at this time.

## 2018-12-24 NOTE — PROGRESS NOTE ADULT - SUBJECTIVE AND OBJECTIVE BOX
Patient is a 43y old  Male who presents with a chief complaint of osteomyelitis w c/f nec fascitis (24 Dec 2018 07:35)    INTERVAL HPI/ OVERNIGHT EVENTS: Pt seen and eval resting comfortably in bed. No complaints at this time. ANDREW o/n. VSS      LABS                        12.4   8.1   )-----------( 426      ( 24 Dec 2018 05:53 )             37.8     12-24    138  |  100  |  16  ----------------------------<  124<H>  4.4   |  27  |  1.11    Ca    8.7      24 Dec 2018 05:53  Mg     1.6     12-24    TPro  7.9  /  Alb  3.8  /  TBili  0.2  /  DBili  x   /  AST  26  /  ALT  29  /  AlkPhos  96  12-22    PT/INR - ( 24 Dec 2018 05:53 )   PT: 11.5 sec;   INR: 1.02          PTT - ( 24 Dec 2018 05:53 )  PTT:33.8 sec    ICU Vital Signs Last 24 Hrs  T(C): 36.9 (24 Dec 2018 08:40), Max: 37.1 (24 Dec 2018 05:24)  T(F): 98.5 (24 Dec 2018 08:40), Max: 98.8 (24 Dec 2018 05:24)  HR: 103 (24 Dec 2018 08:40) (72 - 103)  BP: 121/82 (24 Dec 2018 08:40) (121/82 - 159/92)  BP(mean): --  ABP: --  ABP(mean): --  RR: 17 (24 Dec 2018 08:40) (16 - 18)  SpO2: 98% (24 Dec 2018 08:40) (97% - 98%)    RADIOLOGY    MICROBIOLOGY  Culture - Surgical Swab (12.23.18 @ 11:53)    Gram Stain:   No organisms seen  Rare White blood cells    Specimen Source: .Surgical Swab Right Foot Wound #1    Culture Results:   No growth to date    Culture - Surgical Swab (12.23.18 @ 11:53)    Gram Stain:   No organisms seen  Rare White blood cells    Specimen Source: .Surgical Swab Right Foot Wound #2    Culture Results:   No growth to date    Culture - Surgical Swab (12.23.18 @ 11:53)    Gram Stain:   No organisms seen  Rare White blood cells    Specimen Source: .Surgical Swab Right Foot Wound #3    Culture Results:   No growth to date    Culture - Tissue with Gram Stain (12.23.18 @ 11:54)    Gram Stain:   No organisms seen  Rare White blood cells    Specimen Source: .Tissue Right Foot Bone    Culture Results:   No growth to date    PHYSICAL EXAM  Lower Extremity Focused:  Vasc: DP/PT 2/4 Calvin, CFT intact x8, TG warm>warm calvin  Derm: Surgical dressing D/C/I. Serosanguinous strikethrough to layer of gauze/abd pads. Packing intact. No purulence, malodor, crepitus, drainage, erythema or clinical signs of infection at dorsal right foot I&D site. Ankle I&D site with sutures intact well-coapted, no dehiscence, no drainage/purulence/erythema/clinical signs of infection. Plantar submet 3 ulcer communicates with dorsal incision

## 2018-12-25 ENCOUNTER — TRANSCRIPTION ENCOUNTER (OUTPATIENT)
Age: 43
End: 2018-12-25

## 2018-12-25 LAB
ANION GAP SERPL CALC-SCNC: 9 MMOL/L — SIGNIFICANT CHANGE UP (ref 5–17)
BLD GP AB SCN SERPL QL: NEGATIVE — SIGNIFICANT CHANGE UP
BUN SERPL-MCNC: 14 MG/DL — SIGNIFICANT CHANGE UP (ref 7–23)
CALCIUM SERPL-MCNC: 9 MG/DL — SIGNIFICANT CHANGE UP (ref 8.4–10.5)
CHLORIDE SERPL-SCNC: 103 MMOL/L — SIGNIFICANT CHANGE UP (ref 96–108)
CO2 SERPL-SCNC: 26 MMOL/L — SIGNIFICANT CHANGE UP (ref 22–31)
CREAT SERPL-MCNC: 1.05 MG/DL — SIGNIFICANT CHANGE UP (ref 0.5–1.3)
CULTURE RESULTS: NO GROWTH — SIGNIFICANT CHANGE UP
GLUCOSE BLDC GLUCOMTR-MCNC: 112 MG/DL — HIGH (ref 70–99)
GLUCOSE BLDC GLUCOMTR-MCNC: 150 MG/DL — HIGH (ref 70–99)
GLUCOSE BLDC GLUCOMTR-MCNC: 152 MG/DL — HIGH (ref 70–99)
GLUCOSE BLDC GLUCOMTR-MCNC: 307 MG/DL — HIGH (ref 70–99)
GLUCOSE SERPL-MCNC: 124 MG/DL — HIGH (ref 70–99)
HCT VFR BLD CALC: 38.5 % — LOW (ref 39–50)
HGB BLD-MCNC: 12.5 G/DL — LOW (ref 13–17)
MAGNESIUM SERPL-MCNC: 1.7 MG/DL — SIGNIFICANT CHANGE UP (ref 1.6–2.6)
MCHC RBC-ENTMCNC: 27.9 PG — SIGNIFICANT CHANGE UP (ref 27–34)
MCHC RBC-ENTMCNC: 32.5 G/DL — SIGNIFICANT CHANGE UP (ref 32–36)
MCV RBC AUTO: 85.9 FL — SIGNIFICANT CHANGE UP (ref 80–100)
PLATELET # BLD AUTO: 417 K/UL — HIGH (ref 150–400)
POTASSIUM SERPL-MCNC: 4.5 MMOL/L — SIGNIFICANT CHANGE UP (ref 3.5–5.3)
POTASSIUM SERPL-SCNC: 4.5 MMOL/L — SIGNIFICANT CHANGE UP (ref 3.5–5.3)
RBC # BLD: 4.48 M/UL — SIGNIFICANT CHANGE UP (ref 4.2–5.8)
RBC # FLD: 13.5 % — SIGNIFICANT CHANGE UP (ref 10.3–16.9)
RH IG SCN BLD-IMP: POSITIVE — SIGNIFICANT CHANGE UP
SODIUM SERPL-SCNC: 138 MMOL/L — SIGNIFICANT CHANGE UP (ref 135–145)
SPECIMEN SOURCE: SIGNIFICANT CHANGE UP
WBC # BLD: 8.3 K/UL — SIGNIFICANT CHANGE UP (ref 3.8–10.5)
WBC # FLD AUTO: 8.3 K/UL — SIGNIFICANT CHANGE UP (ref 3.8–10.5)

## 2018-12-25 PROCEDURE — 99233 SBSQ HOSP IP/OBS HIGH 50: CPT | Mod: GC

## 2018-12-25 RX ORDER — MAGNESIUM SULFATE 500 MG/ML
2 VIAL (ML) INJECTION ONCE
Refills: 0 | Status: COMPLETED | OUTPATIENT
Start: 2018-12-25 | End: 2018-12-25

## 2018-12-25 RX ADMIN — Medication 250 MILLIGRAM(S): at 06:45

## 2018-12-25 RX ADMIN — Medication 1 APPLICATION(S): at 17:52

## 2018-12-25 RX ADMIN — Medication 1 APPLICATION(S): at 05:30

## 2018-12-25 RX ADMIN — PIPERACILLIN AND TAZOBACTAM 200 GRAM(S): 4; .5 INJECTION, POWDER, LYOPHILIZED, FOR SOLUTION INTRAVENOUS at 11:24

## 2018-12-25 RX ADMIN — Medication 2: at 09:01

## 2018-12-25 RX ADMIN — HEPARIN SODIUM 5000 UNIT(S): 5000 INJECTION INTRAVENOUS; SUBCUTANEOUS at 13:55

## 2018-12-25 RX ADMIN — Medication 8: at 22:27

## 2018-12-25 RX ADMIN — PIPERACILLIN AND TAZOBACTAM 200 GRAM(S): 4; .5 INJECTION, POWDER, LYOPHILIZED, FOR SOLUTION INTRAVENOUS at 17:03

## 2018-12-25 RX ADMIN — INSULIN GLARGINE 28 UNIT(S): 100 INJECTION, SOLUTION SUBCUTANEOUS at 22:27

## 2018-12-25 RX ADMIN — Medication 1 APPLICATION(S): at 17:48

## 2018-12-25 RX ADMIN — PIPERACILLIN AND TAZOBACTAM 200 GRAM(S): 4; .5 INJECTION, POWDER, LYOPHILIZED, FOR SOLUTION INTRAVENOUS at 22:26

## 2018-12-25 RX ADMIN — HEPARIN SODIUM 5000 UNIT(S): 5000 INJECTION INTRAVENOUS; SUBCUTANEOUS at 05:29

## 2018-12-25 RX ADMIN — Medication 6 UNIT(S): at 17:59

## 2018-12-25 RX ADMIN — HEPARIN SODIUM 5000 UNIT(S): 5000 INJECTION INTRAVENOUS; SUBCUTANEOUS at 22:27

## 2018-12-25 RX ADMIN — Medication 250 MILLIGRAM(S): at 17:48

## 2018-12-25 RX ADMIN — PIPERACILLIN AND TAZOBACTAM 200 GRAM(S): 4; .5 INJECTION, POWDER, LYOPHILIZED, FOR SOLUTION INTRAVENOUS at 05:29

## 2018-12-25 RX ADMIN — Medication 50 GRAM(S): at 07:44

## 2018-12-25 RX ADMIN — SODIUM CHLORIDE 100 MILLILITER(S): 9 INJECTION, SOLUTION INTRAVENOUS at 07:29

## 2018-12-25 RX ADMIN — Medication 6 UNIT(S): at 09:01

## 2018-12-25 RX ADMIN — Medication 6 UNIT(S): at 12:45

## 2018-12-25 NOTE — PROGRESS NOTE ADULT - PROBLEM SELECTOR PLAN 2
home regimen: lantus 48 qHS, lispro 8U prior to meals  -continue 38U lantus qHS, 6U lispro bolus insulin  lantus decreased to 28 as patient to be taken to OR and will be npo, can increase to home dose upon d/ c  -moderate insulin sliding scale  -consistent carbohydrate diet with evening snack

## 2018-12-25 NOTE — PROGRESS NOTE ADULT - SUBJECTIVE AND OBJECTIVE BOX
Patient is a 43y old  Male who presents with a chief complaint of osteomyelitis w c/f nec fascitis (25 Dec 2018 10:25)    INTERVAL HPI/ OVERNIGHT EVENTS: Pt seen and eval bedside, resting comfortably. No pedal complaints, ANDREW o/n, VSS.      LABS                        12.5   8.3   )-----------( 417      ( 25 Dec 2018 06:05 )             38.5     12-25    138  |  103  |  14  ----------------------------<  124<H>  4.5   |  26  |  1.05    Ca    9.0      25 Dec 2018 06:04  Mg     1.7     12-25      PT/INR - ( 24 Dec 2018 05:53 )   PT: 11.5 sec;   INR: 1.02     PTT - ( 24 Dec 2018 05:53 )  PTT:33.8 sec    Culture - Surgical Swab (12.23.18 @ 11:53)    Gram Stain:   No organisms seen  Rare White blood cells    Specimen Source: .Surgical Swab Right Foot Wound #2    Culture Results:   No growth to date    ICU Vital Signs Last 24 Hrs  T(C): 36.8 (25 Dec 2018 09:24), Max: 36.8 (24 Dec 2018 15:33)  T(F): 98.2 (25 Dec 2018 09:24), Max: 98.3 (24 Dec 2018 15:33)  HR: 81 (25 Dec 2018 09:24) (77 - 81)  BP: 142/92 (25 Dec 2018 09:24) (135/84 - 160/89)  BP(mean): --  ABP: --  ABP(mean): --  RR: 18 (25 Dec 2018 09:24) (16 - 18)  SpO2: 98% (25 Dec 2018 09:24) (97% - 98%)    MICROBIOLOGY  Culture - Tissue with Gram Stain (12.23.18 @ 11:54)    Gram Stain:   No organisms seen  Rare White blood cells    Specimen Source: .Tissue Right Foot Bone    Culture Results:   No growth to date    Culture - Surgical Swab (12.23.18 @ 11:53)    Gram Stain:   No organisms seen  Rare White blood cells    Specimen Source: .Surgical Swab Right Foot Wound #3    Culture Results:   No growth to date    Culture - Surgical Swab (12.23.18 @ 11:53)    Gram Stain:   No organisms seen  Rare White blood cells    Specimen Source: .Surgical Swab Right Foot Wound #1    Culture Results:   No growth    PHYSICAL EXAM  Lower Extremity Focused:  No change in neurovasc status. Surgical dressing D/C/I. Serosanguinous strikethrough to layer of gauze/abd pads. Packing intact. No purulence, malodor, crepitus, drainage at dorsal right foot I&D site. Mild erythema lateral to incision. Ankle edema resolved. Ankle I&D site with sutures intact well-coapted, no dehiscence, no drainage/purulence/erythema/clinical signs of infection. Plantar submet 3 ulcer communicates with dorsal incision. Hyperkeratotic rim and fibrogranular base. No drainage or clinical signs of infection.

## 2018-12-25 NOTE — DISCHARGE NOTE ADULT - CARE PLAN
Principal Discharge DX:	Ankle swelling  Goal:	To achieve resolution of sx  Assessment and plan of treatment:	You have a past history of chronic osteomyelitis with multiple toe amputations. You presented with ankle swelling . CT imaging showed Large ulceration  in the spine of distal third metatarsal bone extending to the bone with mild osseous erosion consistent with osteomyelitis. Soft tissue inflammatory changes are seen on dorsal aspect of the foot containing an 8 mm pocket of air.  You were seen by podiatry in ED with plan for surgical intervention. We placed on abx and admitted to medicine service.  You were taken to OR by podiatry and underwent a Right foot and ankle incision and drainage with washout and bone biopsy . We continued on broad spectrum abx(vanc and zosyn for chronic osteo ). You are now clinically stable for d/c with instructions to f/u with podiatry  Secondary Diagnosis:	Osteomyelitis  Assessment and plan of treatment:	You have a past history of chronic osteomyelitis with multiple toe amputations. You presented with ankle swelling . CT imaging showed Large ulceration  in the spine of distal third metatarsal bone extending to the bone with mild osseous erosion consistent with osteomyelitis. Soft tissue inflammatory changes are seen on dorsal aspect of the foot containing an 8 mm pocket of air.  You were seen by podiatry in ED with plan for surgical intervention. We placed on abx and admitted to medicine service.  You were taken to OR by podiatry and underwent a Right foot and ankle incision and drainage with washout and bone biopsy . We continued on broad spectrum abx(vanc and zosyn for chronic osteo ). You are now clinically stable for d/c with instructions to f/u with podiatry  Secondary Diagnosis:	Diabetes  Assessment and plan of treatment:	You have a PMH of diabetes and are on insulin. Please continue your medications and f/u with your PMD Principal Discharge DX:	Osteomyelitis  Goal:	To achieve resolution of sx  Assessment and plan of treatment:	You have a past history of chronic osteomyelitis with multiple toe amputations. You presented with ankle swelling . CT imaging showed Large ulceration  in the spine of distal third metatarsal bone extending to the bone with mild osseous erosion consistent with osteomyelitis. Soft tissue inflammatory changes are seen on dorsal aspect of the foot containing an 8 mm pocket of air.  You were seen by podiatry in ED with plan for surgical intervention. We placed on abx and admitted to medicine service.  You were taken to OR by podiatry and underwent a Right foot and ankle incision and drainage with washout and bone biopsy . We continued on broad spectrum abx(vanc and zosyn for chronic osteo ).  You are now clinically stable for d/c with instructions to f/u with podiatry Norma on January 7th at 12pm (office information below). Please Continue Augmentin 875 twice daily for 14 days. Please apply kerlex gauze daily.  Secondary Diagnosis:	Ankle swelling  Assessment and plan of treatment:	You have a past history of chronic osteomyelitis with multiple toe amputations. You presented with ankle swelling. CT imaging showed Large ulceration  in the spine of distal third metatarsal bone extending to the bone with mild osseous erosion consistent with osteomyelitis. Soft tissue inflammatory changes are seen on dorsal aspect of the foot containing an 8 mm pocket of air.  You were seen by podiatry in ED with plan for surgical intervention. We placed on abx and admitted to medicine service.  You were taken to OR by podiatry and underwent a Right foot and ankle incision and drainage with washout and bone biopsy . We continued on broad spectrum abx(vanc and zosyn for chronic osteo ). You are now clinically stable for d/c with instructions to f/u with podiatry Norma on January 7th at 12pm (office information below). Please Continue Augmentin 875 twice daily for 14 days  Secondary Diagnosis:	Diabetes  Assessment and plan of treatment:	You have a PMH of diabetes and are on insulin. Please continue your medications and f/u with M on Principal Discharge DX:	Osteomyelitis  Goal:	To achieve resolution of sx  Assessment and plan of treatment:	You have a past history of chronic osteomyelitis with multiple toe amputations. You presented with ankle swelling . CT imaging showed Large ulceration  in the spine of distal third metatarsal bone extending to the bone with mild osseous erosion consistent with osteomyelitis. Soft tissue inflammatory changes are seen on dorsal aspect of the foot containing an 8 mm pocket of air.  You were seen by podiatry in ED with plan for surgical intervention. We placed on abx and admitted to medicine service.  You were taken to OR by podiatry and underwent a Right foot and ankle incision and drainage with washout and bone biopsy . We continued on broad spectrum abx(vanc and zosyn for chronic osteo ).  You are now clinically stable for d/c with instructions to f/u with podiatry Norma on January 7th at 12pm (office information below). Please Continue Augmentin 875 twice daily for 14 days. Please apply kerlex gauze daily.  Secondary Diagnosis:	Ankle swelling  Assessment and plan of treatment:	You have a past history of chronic osteomyelitis with multiple toe amputations. You presented with ankle swelling. CT imaging showed Large ulceration  in the spine of distal third metatarsal bone extending to the bone with mild osseous erosion consistent with osteomyelitis. Soft tissue inflammatory changes are seen on dorsal aspect of the foot containing an 8 mm pocket of air.  You were seen by podiatry in ED with plan for surgical intervention. We placed on abx and admitted to medicine service.  You were taken to OR by podiatry and underwent a Right foot and ankle incision and drainage with washout and bone biopsy . We continued on broad spectrum abx(vanc and zosyn for chronic osteo ). You are now clinically stable for d/c with instructions to f/u with podiatry Norma on January 7th at 12pm (office information below). Please Continue Augmentin 875 twice daily for 14 days  Secondary Diagnosis:	Diabetes  Assessment and plan of treatment:	You have a PMH of diabetes and are on insulin. Please continue your medications and f/u with M. Principal Discharge DX:	Osteomyelitis  Goal:	To achieve resolution of sx  Assessment and plan of treatment:	You have a past history of chronic osteomyelitis with multiple toe amputations. You presented with ankle swelling . CT imaging showed Large ulceration  in the spine of distal third metatarsal bone extending to the bone with mild osseous erosion consistent with osteomyelitis. Soft tissue inflammatory changes are seen on dorsal aspect of the foot containing an 8 mm pocket of air.  You were seen by podiatry in ED with plan for surgical intervention. We placed on abx and admitted to medicine service.  You were taken to OR by podiatry and underwent a Right foot and ankle incision and drainage with washout and bone biopsy . We continued on broad spectrum abx(vanc and zosyn for chronic osteo ).  You are now clinically stable for d/c with instructions to f/u with podiatry Norma on January 7th at 12pm (office information below). Please Continue Augmentin 875 twice daily for 14 days. Please apply kerlex gauze daily.  Secondary Diagnosis:	Ankle swelling  Assessment and plan of treatment:	You have a past history of chronic osteomyelitis with multiple toe amputations. You presented with ankle swelling. CT imaging showed Large ulceration  in the spine of distal third metatarsal bone extending to the bone with mild osseous erosion consistent with osteomyelitis. Soft tissue inflammatory changes are seen on dorsal aspect of the foot containing an 8 mm pocket of air.  You were seen by podiatry in ED with plan for surgical intervention. We placed on abx and admitted to medicine service.  You were taken to OR by podiatry and underwent a Right foot and ankle incision and drainage with washout and bone biopsy . We continued on broad spectrum abx(vanc and zosyn for chronic osteo ). You are now clinically stable for d/c with instructions to f/u with podiatry Norma on January 7th at 12pm (office information below). Please Continue Augmentin 875 twice daily for 14 days  Secondary Diagnosis:	Diabetes  Assessment and plan of treatment:	You have a PMH of diabetes and are on insulin. Please continue your medications and f/u with [facility]. Please follow up with [facility] nutrition services, information has been given to you and you will make appointment as discussed.

## 2018-12-25 NOTE — DISCHARGE NOTE ADULT - MEDICATION SUMMARY - MEDICATIONS TO STOP TAKING
I will STOP taking the medications listed below when I get home from the hospital:    cephalexin 500 mg oral capsule  -- 1 cap(s) by mouth 4 times a day   -- Finish all this medication unless otherwise directed by prescriber.

## 2018-12-25 NOTE — DISCHARGE NOTE ADULT - HOSPITAL COURSE
Patient is a 43 year old male with PMHx of DMT2 (dx in 2003 transitioned from PO meds to insulin 5 yrs after dx; last A1c in July 2018 9.7), multiple toe amputations for diabetic foot ulcers/osteomyelitis (most recent in July 2018) that presents R foot swelling x 1 day. In [facility] ED patient arrived stable with vitals in normal range. His labs significant for elevated ESR and CRP. CT foot positive of ongoing osteomyelitis and air concerning for necrotizing fasciitis. Patient seen by podiatry in ED with plan for surgical intervention. Patient placed on abx and admitted to medicine service.  Pt was taken to OR by podiatry and underwent a Right foot and ankle incision and drainage with washout and bone biopsy . Pt was continued on broad spectrum abx(vanc and zosyn for chronic osteo ). Pt clinically stable after procedure. Pt retaken to OR for closure of foot incision and graft application to plantar ulcer. Pt is now clinically stable for d/c with instructions to follow up with podiatry outpatient. Patient is a 43 year old male with PMHx of DMT2 (dx in 2003 transitioned from PO meds to insulin 5 yrs after dx; last A1c in July 2018 9.7), multiple toe amputations for diabetic foot ulcers/osteomyelitis (most recent in July 2018) that presents R foot swelling x 1 day. In [facility] ED patient arrived stable with vitals in normal range. His labs significant for elevated ESR and CRP. CT showed  Large ulceration  in the spine of distal third metatarsal bone extending to the bone with mild osseous erosion consistent with osteomyelitis. Soft tissue inflammatory changes are seen on dorsal aspect of the foot containing an 8 mm pocket of air. Finding is consistent with cellulitis. Necrotizing fasciitis cannot excluded. There is dislocation of the third metatarsophalangeal joint and third proximal interphalangeal joint.. Subcutaneous edema from distal foot extensive midfoot with mild   subcutaneous edema seen in the ankle. Proximal extension of cellulitis should be considered.  Patient seen by podiatry in ED with plan for surgical intervention. Patient placed on abx and admitted to medicine service.  Pt was taken to OR by podiatry and underwent a Right foot and ankle incision and drainage with washout and bone biopsy . Pt was continued on broad spectrum abx(vanc and zosyn for chronic osteo ). Pt clinically stable after procedure. Pt retaken to OR for closure of foot incision and graft application to plantar ulcer. Pt is now clinically stable for d/c with instructions to follow up with podiatry outpatient. Patient is a 43 year old male with PMHx of DMT2 (dx in 2003 transitioned from PO meds to insulin 5 yrs after dx; last A1c in July 2018 9.7), multiple toe amputations for diabetic foot ulcers/osteomyelitis (most recent in July 2018) that presents R foot swelling x 1 day. In [facility] ED patient arrived stable with vitals in normal range. His labs significant for elevated ESR and CRP. CT showed  Large ulceration  in the spine of distal third metatarsal bone extending to the bone with mild osseous erosion consistent with osteomyelitis. Soft tissue inflammatory changes are seen on dorsal aspect of the foot containing an 8 mm pocket of air. Finding is consistent with cellulitis. Necrotizing fasciitis cannot excluded. There is dislocation of the third metatarsophalangeal joint and third proximal interphalangeal joint.. Subcutaneous edema from distal foot extensive midfoot with mild subcutaneous edema seen in the ankle. Proximal extension of cellulitis should be considered.Patient seen by podiatry in ED with plan for surgical intervention. Patient placed on abx and admitted to medicine service.  Pt was taken to OR by podiatry and underwent a Right foot and ankle incision and drainage with washout and bone biopsy . Pt was continued on broad spectrum abx(vanc and zosyn for chronic osteo ). Pt clinically stable after procedure. Pt retaken to OR for closure of foot incision and graft application to plantar ulcer. Pt is now clinically stable for d/c with instructions to follow up with podiatry outpatient.

## 2018-12-25 NOTE — PROGRESS NOTE ADULT - ASSESSMENT
A/P 43 yoM POD2 R foot and ankle I&D washout w biopsy     - IV Abx as per primary team   - Dressing changes by Podiatry. Dorsal foot I&D site repacked with iodoform packing. DSD applied to RLE with ACE bandage  - Follow up intra-op culture swabs from both incision sites and bone biopsy of third metatarsal bone   - Weight bear as tolerated in surgical shoe to right lower extremity   - Return to OR 12/27 AM for primary closure of incision and graft application   - Podiatry will follow

## 2018-12-25 NOTE — DISCHARGE NOTE ADULT - INSTRUCTIONS
Dressing change instructions: Leave adaptic intact sutured to skin margins plantar right foot intact. Replace gauze and Kerlix only  daily.

## 2018-12-25 NOTE — DISCHARGE NOTE ADULT - PATIENT PORTAL LINK FT
You can access the [facility], offered by [facility], by registering with the following website: [email or web address]

## 2018-12-25 NOTE — PROGRESS NOTE ADULT - PROBLEM SELECTOR PLAN 1
Chronic in nature - completed 10 day regimen with oral keflex after discharge from hospital on December 5. CT from ED now with evidence of air collection on the dorsum of right foot.    S/P OR PROCEDURE with podiatry  Right foot and ankle incision and drainage with washout and bone biopsy   little concern for nec fasc, cx taken, bone biopsied, packed.   will continue with iv abx for now- -vancomycin 1750mg q12 hours, zosyn 3.375mg q8 hours  Plan to be taken to OR again  Thursday for  closure of foot incision and graft application to plantar ulcer.  NPO Wednesday night   F/u podiatry recs

## 2018-12-25 NOTE — PROGRESS NOTE ADULT - PROBLEM SELECTOR PLAN 5
F: No IVF  E: K>4, Mg>2  N: NPO WEDNESDAY NIGHT  Ppx: SCD  Lines: peripheral  Ambulation: with assistance  Code: full  Dispo: regional medical floors

## 2018-12-25 NOTE — DISCHARGE NOTE ADULT - CARE PROVIDER_API CALL
David Green (AIMEE), Orthopaedic Surgery  [address] [place] [address]  Phone: [phone]  Fax: [phone]

## 2018-12-25 NOTE — DISCHARGE NOTE ADULT - OTHER SIGNIFICANT FINDINGS
< from: CT Ankle w/ IV Cont, Right (12.22.18 @ 19:06) >    IMPRESSION:  1. Large ulceration is seen in the spine of distal third metatarsal bone   extending to the bone with mild  osseous erosion consistent with osteomyelitis.  2. Soft tissue inflammatory changes are seen on dorsal aspect of the foot   containing an 8 mm pocket  of air. Finding is consistent with cellulitis. Necrotizing fasciitis   cannot excluded. The  3. There is dislocation of the third metatarsophalangeal joint and third   proximal interphalangeal joint.  4. Subcutaneous edema from distal foot extensive midfoot with mild   subcutaneous edema seen in the  ankle. Proximal extension of cellulitis should be considered.    < end of copied text >

## 2018-12-25 NOTE — DISCHARGE NOTE ADULT - ADDITIONAL INSTRUCTIONS
Please follow up with podiatry David Green (AIMEE), Orthopaedic Surgery  [address] [place] [address] [address] [place] [address]  Phone: [phone]  Fax: [phone]

## 2018-12-25 NOTE — PROGRESS NOTE ADULT - SUBJECTIVE AND OBJECTIVE BOX
OVERNIGHT EVENTS: ANDREW     SUBJECTIVE / INTERVAL HPI: Patient seen and examined at bedside. No acute complaints. Pt planned for procedure on Thursday     VITAL SIGNS:  Vital Signs Last 24 Hrs  T(C): 36.8 (25 Dec 2018 09:24), Max: 36.8 (24 Dec 2018 15:33)  T(F): 98.2 (25 Dec 2018 09:24), Max: 98.3 (24 Dec 2018 15:33)  HR: 81 (25 Dec 2018 09:24) (77 - 81)  BP: 142/92 (25 Dec 2018 09:24) (135/84 - 160/89)  BP(mean): --  RR: 18 (25 Dec 2018 09:24) (16 - 18)  SpO2: 98% (25 Dec 2018 09:24) (97% - 98%)    PHYSICAL EXAM:    General: WDWN  HEENT: NC/AT; PERRL, anicteric sclera; MMM  Neck: supple  Cardiovascular: +S1/S2; RRR  Respiratory: CTA B/L; no W/R/R  Gastrointestinal: soft, NT/ND; +BSx4  Extremities: right foot wrapped, toes warm , denies pain  Vascular: 2+ radial, DP/PT pulses B/L  Neurological: AAOx3; no focal deficits    MEDICATIONS:  MEDICATIONS  (STANDING):  ammonium lactate 12% Lotion 1 Application(s) Topical two times a day  atorvastatin 40 milliGRAM(s) Oral at bedtime  clotrimazole 1% Cream 1 Application(s) Topical two times a day  dextrose 5%. 1000 milliLiter(s) (50 mL/Hr) IV Continuous <Continuous>  dextrose 50% Injectable 12.5 Gram(s) IV Push once  dextrose 50% Injectable 25 Gram(s) IV Push once  dextrose 50% Injectable 25 Gram(s) IV Push once  heparin  Injectable 5000 Unit(s) SubCutaneous every 8 hours  insulin glargine Injectable (LANTUS) 28 Unit(s) SubCutaneous at bedtime  insulin lispro (HumaLOG) corrective regimen sliding scale   SubCutaneous Before meals and at bedtime  insulin lispro Injectable (HumaLOG) 6 Unit(s) SubCutaneous three times a day before meals  piperacillin/tazobactam IVPB. 3.375 Gram(s) IV Intermittent every 6 hours  vancomycin  IVPB 1750 milliGRAM(s) IV Intermittent every 12 hours    MEDICATIONS  (PRN):  dextrose 40% Gel 15 Gram(s) Oral once PRN Blood Glucose LESS THAN 70 milliGRAM(s)/deciliter  glucagon  Injectable 1 milliGRAM(s) IntraMuscular once PRN Glucose LESS THAN 70 milligrams/deciliter  HYDROmorphone  Injectable 1 milliGRAM(s) IV Push every 4 hours PRN breakthrough pain  ondansetron Injectable 4 milliGRAM(s) IV Push every 6 hours PRN Nausea and/or Vomiting  oxyCODONE    5 mG/acetaminophen 325 mG 1 Tablet(s) Oral every 4 hours PRN Moderate Pain (4 - 6)  oxyCODONE    5 mG/acetaminophen 325 mG 2 Tablet(s) Oral every 6 hours PRN Severe Pain (7 - 10)  sodium chloride 0.65% Nasal 1 Spray(s) Both Nostrils three times a day PRN Nasal Congestion      ALLERGIES:  Allergies    No Known Allergies    Intolerances        LABS:                        12.5   8.3   )-----------( 417      ( 25 Dec 2018 06:05 )             38.5     12-25    138  |  103  |  14  ----------------------------<  124<H>  4.5   |  26  |  1.05    Ca    9.0      25 Dec 2018 06:04  Mg     1.7     12-25      PT/INR - ( 24 Dec 2018 05:53 )   PT: 11.5 sec;   INR: 1.02          PTT - ( 24 Dec 2018 05:53 )  PTT:33.8 sec    CAPILLARY BLOOD GLUCOSE      POCT Blood Glucose.: 152 mg/dL (25 Dec 2018 08:21)      RADIOLOGY & ADDITIONAL TESTS: Reviewed.    ASSESSMENT:    PLAN: HOSPITAL COURSE  Patient is a 43 year old male with PMHx of DMT2 (dx in 2003 transitioned from PO meds to insulin 5 yrs after dx; last A1c in July 2018 9.7), multiple toe amputations for diabetic foot ulcers/osteomyelitis (most recent in July 2018) that presents R foot swelling x 1 day. In [facility] ED patient arrived stable with vitals in normal range. His labs significant for elevated ESR and CRP. CT foot positive of ongoing osteomyelitis and air concerning for necrotizing fasciitis. Patient seen by podiatry in ED with plan for surgical intervention. Patient placed on abx and admitted to medicine service.  Pt was taken to OR by podiatry and underwent a Right foot and ankle incision and drainage with washout and bone biopsy . Pt was continued on broad spectrum abx(vanc and zosyn for chronic osteo ). Pt clinically stable after procedure. Pt to be taken to OR on Thursday for closure of foot incision and graft application to plantar ulcer. Plan to keep pt  NPO Wednesday night     OVERNIGHT EVENTS: ANDREW     SUBJECTIVE / INTERVAL HPI: Patient seen and examined at bedside. No acute complaints. Pt planned for procedure on Thursday     VITAL SIGNS:  Vital Signs Last 24 Hrs  T(C): 36.8 (25 Dec 2018 09:24), Max: 36.8 (24 Dec 2018 15:33)  T(F): 98.2 (25 Dec 2018 09:24), Max: 98.3 (24 Dec 2018 15:33)  HR: 81 (25 Dec 2018 09:24) (77 - 81)  BP: 142/92 (25 Dec 2018 09:24) (135/84 - 160/89)  BP(mean): --  RR: 18 (25 Dec 2018 09:24) (16 - 18)  SpO2: 98% (25 Dec 2018 09:24) (97% - 98%)    PHYSICAL EXAM:    General: WDWN  HEENT: NC/AT; PERRL, anicteric sclera; MMM  Neck: supple  Cardiovascular: +S1/S2; RRR  Respiratory: CTA B/L; no W/R/R  Gastrointestinal: soft, NT/ND; +BSx4  Extremities: right foot wrapped, toes warm , denies pain  Vascular: 2+ radial, DP/PT pulses B/L  Neurological: AAOx3; no focal deficits    MEDICATIONS:  MEDICATIONS  (STANDING):  ammonium lactate 12% Lotion 1 Application(s) Topical two times a day  atorvastatin 40 milliGRAM(s) Oral at bedtime  clotrimazole 1% Cream 1 Application(s) Topical two times a day  dextrose 5%. 1000 milliLiter(s) (50 mL/Hr) IV Continuous <Continuous>  dextrose 50% Injectable 12.5 Gram(s) IV Push once  dextrose 50% Injectable 25 Gram(s) IV Push once  dextrose 50% Injectable 25 Gram(s) IV Push once  heparin  Injectable 5000 Unit(s) SubCutaneous every 8 hours  insulin glargine Injectable (LANTUS) 28 Unit(s) SubCutaneous at bedtime  insulin lispro (HumaLOG) corrective regimen sliding scale   SubCutaneous Before meals and at bedtime  insulin lispro Injectable (HumaLOG) 6 Unit(s) SubCutaneous three times a day before meals  piperacillin/tazobactam IVPB. 3.375 Gram(s) IV Intermittent every 6 hours  vancomycin  IVPB 1750 milliGRAM(s) IV Intermittent every 12 hours    MEDICATIONS  (PRN):  dextrose 40% Gel 15 Gram(s) Oral once PRN Blood Glucose LESS THAN 70 milliGRAM(s)/deciliter  glucagon  Injectable 1 milliGRAM(s) IntraMuscular once PRN Glucose LESS THAN 70 milligrams/deciliter  HYDROmorphone  Injectable 1 milliGRAM(s) IV Push every 4 hours PRN breakthrough pain  ondansetron Injectable 4 milliGRAM(s) IV Push every 6 hours PRN Nausea and/or Vomiting  oxyCODONE    5 mG/acetaminophen 325 mG 1 Tablet(s) Oral every 4 hours PRN Moderate Pain (4 - 6)  oxyCODONE    5 mG/acetaminophen 325 mG 2 Tablet(s) Oral every 6 hours PRN Severe Pain (7 - 10)  sodium chloride 0.65% Nasal 1 Spray(s) Both Nostrils three times a day PRN Nasal Congestion      ALLERGIES:  Allergies    No Known Allergies    Intolerances        LABS:                        12.5   8.3   )-----------( 417      ( 25 Dec 2018 06:05 )             38.5     12-25    138  |  103  |  14  ----------------------------<  124<H>  4.5   |  26  |  1.05    Ca    9.0      25 Dec 2018 06:04  Mg     1.7     12-25      PT/INR - ( 24 Dec 2018 05:53 )   PT: 11.5 sec;   INR: 1.02          PTT - ( 24 Dec 2018 05:53 )  PTT:33.8 sec    CAPILLARY BLOOD GLUCOSE      POCT Blood Glucose.: 152 mg/dL (25 Dec 2018 08:21)      RADIOLOGY & ADDITIONAL TESTS: Reviewed.    ASSESSMENT:    PLAN: HOSPITAL COURSE  Patient is a 43 year old male with PMHx of DMT2 (dx in 2003 transitioned from PO meds to insulin 5 yrs after dx; last A1c in July 2018 9.7), multiple toe amputations for diabetic foot ulcers/osteomyelitis (most recent in July 2018) that presents R foot swelling x 1 day. In [facility] ED patient arrived stable with vitals in normal range. His labs significant for elevated ESR and CRP. CT showed  Large ulceration  in the spine of distal third metatarsal bone extending to the bone with mild osseous erosion consistent with osteomyelitis. Soft tissue inflammatory changes are seen on dorsal aspect of the foot containing an 8 mm pocket of air. Finding is consistent with cellulitis. Necrotizing fasciitis cannot excluded. There is dislocation of the third metatarsophalangeal joint and third proximal interphalangeal joint.. Subcutaneous edema from distal foot extensive midfoot with mild   subcutaneous edema seen in the ankle. Proximal extension of cellulitis should be considered.  Patient seen by podiatry in ED with plan for surgical intervention. Patient placed on abx and admitted to medicine service.  Pt was taken to OR by podiatry and underwent a Right foot and ankle incision and drainage with washout and bone biopsy . Pt was continued on broad spectrum abx (vanc and zosyn for chronic osteo ). Pt clinically stable after procedure. Pt retaken to OR for closure of foot incision and graft application to plantar ulcer. Pt is now clinically stable for d/c with instructions to follow up with podiatry outpatient.        OVERNIGHT EVENTS: ANDREW     SUBJECTIVE / INTERVAL HPI: Patient seen and examined at bedside. No acute complaints. Pt planned for procedure on Thursday     VITAL SIGNS:  Vital Signs Last 24 Hrs  T(C): 36.8 (25 Dec 2018 09:24), Max: 36.8 (24 Dec 2018 15:33)  T(F): 98.2 (25 Dec 2018 09:24), Max: 98.3 (24 Dec 2018 15:33)  HR: 81 (25 Dec 2018 09:24) (77 - 81)  BP: 142/92 (25 Dec 2018 09:24) (135/84 - 160/89)  BP(mean): --  RR: 18 (25 Dec 2018 09:24) (16 - 18)  SpO2: 98% (25 Dec 2018 09:24) (97% - 98%)    PHYSICAL EXAM:    General: WDWN  HEENT: NC/AT; PERRL, anicteric sclera; MMM  Neck: supple  Cardiovascular: +S1/S2; RRR  Respiratory: CTA B/L; no W/R/R  Gastrointestinal: soft, NT/ND; +BSx4  Extremities: right foot wrapped, toes warm , denies pain  Vascular: 2+ radial, DP/PT pulses B/L  Neurological: AAOx3; no focal deficits    MEDICATIONS:  MEDICATIONS  (STANDING):  ammonium lactate 12% Lotion 1 Application(s) Topical two times a day  atorvastatin 40 milliGRAM(s) Oral at bedtime  clotrimazole 1% Cream 1 Application(s) Topical two times a day  dextrose 5%. 1000 milliLiter(s) (50 mL/Hr) IV Continuous <Continuous>  dextrose 50% Injectable 12.5 Gram(s) IV Push once  dextrose 50% Injectable 25 Gram(s) IV Push once  dextrose 50% Injectable 25 Gram(s) IV Push once  heparin  Injectable 5000 Unit(s) SubCutaneous every 8 hours  insulin glargine Injectable (LANTUS) 28 Unit(s) SubCutaneous at bedtime  insulin lispro (HumaLOG) corrective regimen sliding scale   SubCutaneous Before meals and at bedtime  insulin lispro Injectable (HumaLOG) 6 Unit(s) SubCutaneous three times a day before meals  piperacillin/tazobactam IVPB. 3.375 Gram(s) IV Intermittent every 6 hours  vancomycin  IVPB 1750 milliGRAM(s) IV Intermittent every 12 hours    MEDICATIONS  (PRN):  dextrose 40% Gel 15 Gram(s) Oral once PRN Blood Glucose LESS THAN 70 milliGRAM(s)/deciliter  glucagon  Injectable 1 milliGRAM(s) IntraMuscular once PRN Glucose LESS THAN 70 milligrams/deciliter  HYDROmorphone  Injectable 1 milliGRAM(s) IV Push every 4 hours PRN breakthrough pain  ondansetron Injectable 4 milliGRAM(s) IV Push every 6 hours PRN Nausea and/or Vomiting  oxyCODONE    5 mG/acetaminophen 325 mG 1 Tablet(s) Oral every 4 hours PRN Moderate Pain (4 - 6)  oxyCODONE    5 mG/acetaminophen 325 mG 2 Tablet(s) Oral every 6 hours PRN Severe Pain (7 - 10)  sodium chloride 0.65% Nasal 1 Spray(s) Both Nostrils three times a day PRN Nasal Congestion      ALLERGIES:  Allergies    No Known Allergies    Intolerances        LABS:                        12.5   8.3   )-----------( 417      ( 25 Dec 2018 06:05 )             38.5     12-25    138  |  103  |  14  ----------------------------<  124<H>  4.5   |  26  |  1.05    Ca    9.0      25 Dec 2018 06:04  Mg     1.7     12-25      PT/INR - ( 24 Dec 2018 05:53 )   PT: 11.5 sec;   INR: 1.02          PTT - ( 24 Dec 2018 05:53 )  PTT:33.8 sec    CAPILLARY BLOOD GLUCOSE      POCT Blood Glucose.: 152 mg/dL (25 Dec 2018 08:21)      RADIOLOGY & ADDITIONAL TESTS: Reviewed.    ASSESSMENT:    PLAN:

## 2018-12-25 NOTE — DISCHARGE NOTE ADULT - MEDICATION SUMMARY - MEDICATIONS TO TAKE
I will START or STAY ON the medications listed below when I get home from the hospital:    Kerlex Gauze Roll  -- Apply on skin to affected area once a day   -- Indication: For Osteomyelitis    Lantus 100 units/mL subcutaneous solution  -- 48 unit(s) subcutaneous once a day (at bedtime)  -- Do not drink alcoholic beverages when taking this medication.  It is very important that you take or use this exactly as directed.  Do not skip doses or discontinue unless directed by your doctor.  Keep in refrigerator.  Do not freeze.    -- Indication: For Diabetes    insulin lispro 100 units/mL injectable solution  -- 8 unit(s) injectable 3 times a day (before meals) Please adjust with insulin requirements  -- Indication: For Diabetes    atorvastatin 40 mg oral tablet  -- 1 tab(s) by mouth once a day (at bedtime)  -- Indication: For Hyperlipidemia, unspecified hyperlipidemia type    Augmentin 875 mg-125 mg oral tablet  -- 1 milligram(s) by mouth 2 times a day   -- Finish all this medication unless otherwise directed by prescriber.  Take with food or milk.    -- Indication: For Osteomyelitis

## 2018-12-25 NOTE — DISCHARGE NOTE ADULT - PLAN OF CARE
To achieve resolution of sx You have a past history of chronic osteomyelitis with multiple toe amputations. You presented with ankle swelling . CT imaging showed Large ulceration  in the spine of distal third metatarsal bone extending to the bone with mild osseous erosion consistent with osteomyelitis. Soft tissue inflammatory changes are seen on dorsal aspect of the foot containing an 8 mm pocket of air.  You were seen by podiatry in ED with plan for surgical intervention. We placed on abx and admitted to medicine service.  You were taken to OR by podiatry and underwent a Right foot and ankle incision and drainage with washout and bone biopsy . We continued on broad spectrum abx(vanc and zosyn for chronic osteo ). You are now clinically stable for d/c with instructions to f/u with podiatry You have a PMH of diabetes and are on insulin. Please continue your medications and f/u with your PMD You have a PMH of diabetes and are on insulin. Please continue your medications and f/u with [facility] on You have a past history of chronic osteomyelitis with multiple toe amputations. You presented with ankle swelling. CT imaging showed Large ulceration  in the spine of distal third metatarsal bone extending to the bone with mild osseous erosion consistent with osteomyelitis. Soft tissue inflammatory changes are seen on dorsal aspect of the foot containing an 8 mm pocket of air.  You were seen by podiatry in ED with plan for surgical intervention. We placed on abx and admitted to medicine service.  You were taken to OR by podiatry and underwent a Right foot and ankle incision and drainage with washout and bone biopsy . We continued on broad spectrum abx(vanc and zosyn for chronic osteo ). You are now clinically stable for d/c with instructions to f/u with podiatry Norma on January 7th at 12pm (office information below). Please Continue Augmentin 875 twice daily for 14 days You have a past history of chronic osteomyelitis with multiple toe amputations. You presented with ankle swelling . CT imaging showed Large ulceration  in the spine of distal third metatarsal bone extending to the bone with mild osseous erosion consistent with osteomyelitis. Soft tissue inflammatory changes are seen on dorsal aspect of the foot containing an 8 mm pocket of air.  You were seen by podiatry in ED with plan for surgical intervention. We placed on abx and admitted to medicine service.  You were taken to OR by podiatry and underwent a Right foot and ankle incision and drainage with washout and bone biopsy . We continued on broad spectrum abx(vanc and zosyn for chronic osteo ).  You are now clinically stable for d/c with instructions to f/u with podiatry Norma on January 7th at 12pm (office information below). Please Continue Augmentin 875 twice daily for 14 days. Please apply kerlex gauze daily. You have a PMH of diabetes and are on insulin. Please continue your medications and f/u with [facility]. You have a PMH of diabetes and are on insulin. Please continue your medications and f/u with M. Please follow up with [facility] nutrition services, information has been given to you and you will make appointment as discussed.

## 2018-12-26 LAB
ANION GAP SERPL CALC-SCNC: 13 MMOL/L — SIGNIFICANT CHANGE UP (ref 5–17)
BUN SERPL-MCNC: 21 MG/DL — SIGNIFICANT CHANGE UP (ref 7–23)
CALCIUM SERPL-MCNC: 9.2 MG/DL — SIGNIFICANT CHANGE UP (ref 8.4–10.5)
CHLORIDE SERPL-SCNC: 94 MMOL/L — LOW (ref 96–108)
CO2 SERPL-SCNC: 27 MMOL/L — SIGNIFICANT CHANGE UP (ref 22–31)
CREAT SERPL-MCNC: 1.17 MG/DL — SIGNIFICANT CHANGE UP (ref 0.5–1.3)
CULTURE RESULTS: NO GROWTH — SIGNIFICANT CHANGE UP
CULTURE RESULTS: NO GROWTH — SIGNIFICANT CHANGE UP
GLUCOSE BLDC GLUCOMTR-MCNC: 187 MG/DL — HIGH (ref 70–99)
GLUCOSE BLDC GLUCOMTR-MCNC: 203 MG/DL — HIGH (ref 70–99)
GLUCOSE BLDC GLUCOMTR-MCNC: 239 MG/DL — HIGH (ref 70–99)
GLUCOSE BLDC GLUCOMTR-MCNC: 248 MG/DL — HIGH (ref 70–99)
GLUCOSE SERPL-MCNC: 168 MG/DL — HIGH (ref 70–99)
HCT VFR BLD CALC: 39.3 % — SIGNIFICANT CHANGE UP (ref 39–50)
HGB BLD-MCNC: 13.1 G/DL — SIGNIFICANT CHANGE UP (ref 13–17)
MAGNESIUM SERPL-MCNC: 1.9 MG/DL — SIGNIFICANT CHANGE UP (ref 1.6–2.6)
MCHC RBC-ENTMCNC: 28.2 PG — SIGNIFICANT CHANGE UP (ref 27–34)
MCHC RBC-ENTMCNC: 33.3 G/DL — SIGNIFICANT CHANGE UP (ref 32–36)
MCV RBC AUTO: 84.5 FL — SIGNIFICANT CHANGE UP (ref 80–100)
PLATELET # BLD AUTO: 416 K/UL — HIGH (ref 150–400)
POTASSIUM SERPL-MCNC: 4.3 MMOL/L — SIGNIFICANT CHANGE UP (ref 3.5–5.3)
POTASSIUM SERPL-SCNC: 4.3 MMOL/L — SIGNIFICANT CHANGE UP (ref 3.5–5.3)
RBC # BLD: 4.65 M/UL — SIGNIFICANT CHANGE UP (ref 4.2–5.8)
RBC # FLD: 13.4 % — SIGNIFICANT CHANGE UP (ref 10.3–16.9)
SODIUM SERPL-SCNC: 134 MMOL/L — LOW (ref 135–145)
SPECIMEN SOURCE: SIGNIFICANT CHANGE UP
SPECIMEN SOURCE: SIGNIFICANT CHANGE UP
VANCOMYCIN TROUGH SERPL-MCNC: 17.3 UG/ML — SIGNIFICANT CHANGE UP (ref 10–20)
WBC # BLD: 9 K/UL — SIGNIFICANT CHANGE UP (ref 3.8–10.5)
WBC # FLD AUTO: 9 K/UL — SIGNIFICANT CHANGE UP (ref 3.8–10.5)

## 2018-12-26 PROCEDURE — 99232 SBSQ HOSP IP/OBS MODERATE 35: CPT | Mod: GC

## 2018-12-26 RX ORDER — VANCOMYCIN HCL 1 G
1750 VIAL (EA) INTRAVENOUS EVERY 12 HOURS
Refills: 0 | Status: DISCONTINUED | OUTPATIENT
Start: 2018-12-26 | End: 2018-12-26

## 2018-12-26 RX ORDER — VANCOMYCIN HCL 1 G
1750 VIAL (EA) INTRAVENOUS EVERY 12 HOURS
Refills: 0 | Status: COMPLETED | OUTPATIENT
Start: 2018-12-26 | End: 2018-12-27

## 2018-12-26 RX ORDER — MAGNESIUM SULFATE 500 MG/ML
1 VIAL (ML) INJECTION ONCE
Refills: 0 | Status: COMPLETED | OUTPATIENT
Start: 2018-12-26 | End: 2018-12-26

## 2018-12-26 RX ADMIN — Medication 2: at 12:24

## 2018-12-26 RX ADMIN — Medication 1 APPLICATION(S): at 05:35

## 2018-12-26 RX ADMIN — Medication 250 MILLIGRAM(S): at 18:57

## 2018-12-26 RX ADMIN — Medication 100 GRAM(S): at 07:45

## 2018-12-26 RX ADMIN — HEPARIN SODIUM 5000 UNIT(S): 5000 INJECTION INTRAVENOUS; SUBCUTANEOUS at 05:35

## 2018-12-26 RX ADMIN — HEPARIN SODIUM 5000 UNIT(S): 5000 INJECTION INTRAVENOUS; SUBCUTANEOUS at 15:11

## 2018-12-26 RX ADMIN — INSULIN GLARGINE 28 UNIT(S): 100 INJECTION, SOLUTION SUBCUTANEOUS at 22:02

## 2018-12-26 RX ADMIN — Medication 4: at 09:03

## 2018-12-26 RX ADMIN — PIPERACILLIN AND TAZOBACTAM 200 GRAM(S): 4; .5 INJECTION, POWDER, LYOPHILIZED, FOR SOLUTION INTRAVENOUS at 23:36

## 2018-12-26 RX ADMIN — Medication 6 UNIT(S): at 12:24

## 2018-12-26 RX ADMIN — PIPERACILLIN AND TAZOBACTAM 200 GRAM(S): 4; .5 INJECTION, POWDER, LYOPHILIZED, FOR SOLUTION INTRAVENOUS at 17:45

## 2018-12-26 RX ADMIN — Medication 1 APPLICATION(S): at 17:46

## 2018-12-26 RX ADMIN — Medication 250 MILLIGRAM(S): at 06:22

## 2018-12-26 RX ADMIN — PIPERACILLIN AND TAZOBACTAM 200 GRAM(S): 4; .5 INJECTION, POWDER, LYOPHILIZED, FOR SOLUTION INTRAVENOUS at 05:34

## 2018-12-26 RX ADMIN — Medication 4: at 17:43

## 2018-12-26 RX ADMIN — Medication 1 APPLICATION(S): at 17:45

## 2018-12-26 RX ADMIN — Medication 6 UNIT(S): at 09:02

## 2018-12-26 RX ADMIN — PIPERACILLIN AND TAZOBACTAM 200 GRAM(S): 4; .5 INJECTION, POWDER, LYOPHILIZED, FOR SOLUTION INTRAVENOUS at 11:49

## 2018-12-26 RX ADMIN — Medication 6 UNIT(S): at 17:45

## 2018-12-26 RX ADMIN — HEPARIN SODIUM 5000 UNIT(S): 5000 INJECTION INTRAVENOUS; SUBCUTANEOUS at 22:02

## 2018-12-26 RX ADMIN — Medication 4: at 22:03

## 2018-12-26 RX ADMIN — OXYCODONE AND ACETAMINOPHEN 2 TABLET(S): 5; 325 TABLET ORAL at 23:43

## 2018-12-26 NOTE — PROGRESS NOTE ADULT - ASSESSMENT
A/P 43 yoM POD3 R foot and ankle I&D washout w biopsy     - IV Abx as per primary team   - Dressing changes by Podiatry. I&D site flushed with sterile saline. Dorsal foot I&D site repacked with iodoform packing. DSD applied to RLE with ACE bandage  - Follow up intra-op culture swabs from both incision sites and bone biopsy of third metatarsal bone   - Weight bear as tolerated in surgical shoe to right lower extremity   - Return to OR 7;30AM 12/27 AM for primary closure of incision and graft application to plantar ulcer   - Podiatry will follow

## 2018-12-26 NOTE — PROGRESS NOTE ADULT - SUBJECTIVE AND OBJECTIVE BOX
Patient is a 43y old  Male who presents with a chief complaint of osteomyelitis w c/f nec fascitis (25 Dec 2018 11:16)      INTERVAL HPI/OVERNIGHT EVENTS:  ICU Vital Signs Last 24 Hrs  T(C): 36.6 (26 Dec 2018 05:49), Max: 37.1 (25 Dec 2018 16:39)  T(F): 97.9 (26 Dec 2018 05:49), Max: 98.7 (25 Dec 2018 16:39)  HR: 76 (26 Dec 2018 05:49) (76 - 83)  BP: 148/89 (26 Dec 2018 05:49) (142/92 - 150/84)  BP(mean): --  ABP: --  ABP(mean): --  RR: 17 (26 Dec 2018 05:49) (16 - 20)  SpO2: 98% (26 Dec 2018 05:49) (97% - 98%)    I&O's Summary    24 Dec 2018 07:01  -  25 Dec 2018 07:00  --------------------------------------------------------  IN: 1000 mL / OUT: 0 mL / NET: 1000 mL          LABS:                        13.1   9.0   )-----------( 416      ( 26 Dec 2018 04:14 )             39.3     12-26    134<L>  |  94<L>  |  21  ----------------------------<  168<H>  4.3   |  27  |  1.17    Ca    9.2      26 Dec 2018 04:14  Mg     1.9     12-26          CAPILLARY BLOOD GLUCOSE      POCT Blood Glucose.: 307 mg/dL (25 Dec 2018 22:11)  POCT Blood Glucose.: 112 mg/dL (25 Dec 2018 17:08)  POCT Blood Glucose.: 150 mg/dL (25 Dec 2018 12:14)  POCT Blood Glucose.: 152 mg/dL (25 Dec 2018 08:21)        RADIOLOGY & ADDITIONAL TESTS:    Consultant(s) Notes Reviewed:  [x ] YES  [ ] NO    MEDICATIONS  (STANDING):  ammonium lactate 12% Lotion 1 Application(s) Topical two times a day  atorvastatin 40 milliGRAM(s) Oral at bedtime  clotrimazole 1% Cream 1 Application(s) Topical two times a day  dextrose 5%. 1000 milliLiter(s) (50 mL/Hr) IV Continuous <Continuous>  dextrose 50% Injectable 12.5 Gram(s) IV Push once  dextrose 50% Injectable 25 Gram(s) IV Push once  dextrose 50% Injectable 25 Gram(s) IV Push once  heparin  Injectable 5000 Unit(s) SubCutaneous every 8 hours  insulin glargine Injectable (LANTUS) 28 Unit(s) SubCutaneous at bedtime  insulin lispro (HumaLOG) corrective regimen sliding scale   SubCutaneous Before meals and at bedtime  insulin lispro Injectable (HumaLOG) 6 Unit(s) SubCutaneous three times a day before meals  piperacillin/tazobactam IVPB. 3.375 Gram(s) IV Intermittent every 6 hours  vancomycin  IVPB 1750 milliGRAM(s) IV Intermittent every 12 hours    MEDICATIONS  (PRN):  dextrose 40% Gel 15 Gram(s) Oral once PRN Blood Glucose LESS THAN 70 milliGRAM(s)/deciliter  glucagon  Injectable 1 milliGRAM(s) IntraMuscular once PRN Glucose LESS THAN 70 milligrams/deciliter  HYDROmorphone  Injectable 1 milliGRAM(s) IV Push every 4 hours PRN breakthrough pain  ondansetron Injectable 4 milliGRAM(s) IV Push every 6 hours PRN Nausea and/or Vomiting  oxyCODONE    5 mG/acetaminophen 325 mG 1 Tablet(s) Oral every 4 hours PRN Moderate Pain (4 - 6)  oxyCODONE    5 mG/acetaminophen 325 mG 2 Tablet(s) Oral every 6 hours PRN Severe Pain (7 - 10)  sodium chloride 0.65% Nasal 1 Spray(s) Both Nostrils three times a day PRN Nasal Congestion      PHYSICAL EXAM:  GENERAL: well built, well nourished  HEAD:  Atraumatic, Normocephalic  EYES: EOMI, PERRLA, conjunctiva and sclera clear  ENT: No tonsillar erythema, exudates, or enlargement; Moist mucous membranes, Good dentition, No lesions  NECK: Supple, No JVD, Normal thyroid, no enlarged nodes  NERVOUS SYSTEM:  Alert & Oriented X3, Good concentration; Motor Strength 5/5 B/L upper and lower extremities; DTRs 2+ intact and symmetric, sensory intact  CHEST/LUNG: B/L good air entry; No rales, rhonchi, or wheezing  HEART: S1S2 normal, no S3, Regular rate and rhythm; No murmurs, rubs, or gallops  ABDOMEN: Soft, Nontender, Nondistended; Bowel sounds present  EXTREMITIES:  2+ Peripheral Pulses, No clubbing, cyanosis, or edema  LYMPH: No lymphadenopathy noted  SKIN: No rashes or lesions    Care Discussed with Consultants/Other Providers [ x] YES  [ ] NO OVERNIGHT EVENTS: Vancomycin redosed.     INTERVAL HPI: Pt is examined at bedside. He appears well in NAD. He denies chest pain, SOB, abdominal pain, nausea/vomiting. No complaints at this time.     ICU Vital Signs Last 24 Hrs  T(C): 36.6 (26 Dec 2018 05:49), Max: 37.1 (25 Dec 2018 16:39)  T(F): 97.9 (26 Dec 2018 05:49), Max: 98.7 (25 Dec 2018 16:39)  HR: 76 (26 Dec 2018 05:49) (76 - 83)  BP: 148/89 (26 Dec 2018 05:49) (142/92 - 150/84)  BP(mean): --  ABP: --  ABP(mean): --  RR: 17 (26 Dec 2018 05:49) (16 - 20)  SpO2: 98% (26 Dec 2018 05:49) (97% - 98%)    I&O's Summary    24 Dec 2018 07:01  -  25 Dec 2018 07:00  --------------------------------------------------------  IN: 1000 mL / OUT: 0 mL / NET: 1000 mL          LABS:                        13.1   9.0   )-----------( 416      ( 26 Dec 2018 04:14 )             39.3     12-26    134<L>  |  94<L>  |  21  ----------------------------<  168<H>  4.3   |  27  |  1.17    Ca    9.2      26 Dec 2018 04:14  Mg     1.9     12-26          CAPILLARY BLOOD GLUCOSE      POCT Blood Glucose.: 307 mg/dL (25 Dec 2018 22:11)  POCT Blood Glucose.: 112 mg/dL (25 Dec 2018 17:08)  POCT Blood Glucose.: 150 mg/dL (25 Dec 2018 12:14)  POCT Blood Glucose.: 152 mg/dL (25 Dec 2018 08:21)        RADIOLOGY & ADDITIONAL TESTS:    Consultant(s) Notes Reviewed:  [x ] YES  [ ] NO    MEDICATIONS  (STANDING):  ammonium lactate 12% Lotion 1 Application(s) Topical two times a day  atorvastatin 40 milliGRAM(s) Oral at bedtime  clotrimazole 1% Cream 1 Application(s) Topical two times a day  dextrose 5%. 1000 milliLiter(s) (50 mL/Hr) IV Continuous <Continuous>  dextrose 50% Injectable 12.5 Gram(s) IV Push once  dextrose 50% Injectable 25 Gram(s) IV Push once  dextrose 50% Injectable 25 Gram(s) IV Push once  heparin  Injectable 5000 Unit(s) SubCutaneous every 8 hours  insulin glargine Injectable (LANTUS) 28 Unit(s) SubCutaneous at bedtime  insulin lispro (HumaLOG) corrective regimen sliding scale   SubCutaneous Before meals and at bedtime  insulin lispro Injectable (HumaLOG) 6 Unit(s) SubCutaneous three times a day before meals  piperacillin/tazobactam IVPB. 3.375 Gram(s) IV Intermittent every 6 hours  vancomycin  IVPB 1750 milliGRAM(s) IV Intermittent every 12 hours    MEDICATIONS  (PRN):  dextrose 40% Gel 15 Gram(s) Oral once PRN Blood Glucose LESS THAN 70 milliGRAM(s)/deciliter  glucagon  Injectable 1 milliGRAM(s) IntraMuscular once PRN Glucose LESS THAN 70 milligrams/deciliter  HYDROmorphone  Injectable 1 milliGRAM(s) IV Push every 4 hours PRN breakthrough pain  ondansetron Injectable 4 milliGRAM(s) IV Push every 6 hours PRN Nausea and/or Vomiting  oxyCODONE    5 mG/acetaminophen 325 mG 1 Tablet(s) Oral every 4 hours PRN Moderate Pain (4 - 6)  oxyCODONE    5 mG/acetaminophen 325 mG 2 Tablet(s) Oral every 6 hours PRN Severe Pain (7 - 10)  sodium chloride 0.65% Nasal 1 Spray(s) Both Nostrils three times a day PRN Nasal Congestion    PHYSICAL EXAM:  General: WDWN  HEENT: NC/AT; PERRL, anicteric sclera; MMM  Neck: supple  Cardiovascular: +S1/S2; RRR  Respiratory: CTA B/L; no W/R/R  Gastrointestinal: soft, NT/ND; +BSx4  Extremities: right foot wrapped, toes warm , denies pain  Vascular: 2+ radial, DP/PT pulses B/L  Neurological: AAOx3; no focal deficits    Care Discussed with Consultants/Other Providers [ x] YES  [ ] NO

## 2018-12-26 NOTE — PROGRESS NOTE ADULT - PROBLEM SELECTOR PLAN 1
Chronic in nature - completed 10 day regimen with oral keflex after discharge from hospital on December 5. CT from ED now with evidence of air collection on the dorsum of right foot.    S/P OR PROCEDURE with podiatry  Right foot and ankle incision and drainage with washout and bone biopsy   little concern for nec fasc, cx taken, bone biopsied, packed.   will continue with iv abx for now- -vancomycin 1750mg q12 hours, zosyn 3.375mg q8 hours  Plan to be taken to OR again  Thursday for  closure of foot incision and graft application to plantar ulcer.  NPO Wednesday night   F/u podiatry recs Chronic in nature - completed 10 day regimen with oral keflex after discharge from hospital on December 5. CT from ED now with evidence of air collection on the dorsum of right foot.   - S/P OR PROCEDURE with podiatry  - Right foot and ankle incision and drainage with washout and bone biopsy little concern for nec fasc, cx taken, bone biopsied, packed.   - will continue with iv abx for now- -vancomycin 1750mg q12 hours, zosyn 3.375mg q8 hours.   - Plan to be taken to OR again thursday for closure of foot incision and graft application to plantar ulcer.  NPO after midnight tonight  F/u podiatry recs

## 2018-12-26 NOTE — PROGRESS NOTE ADULT - SUBJECTIVE AND OBJECTIVE BOX
Patient is a 43y old  Male who presents with a chief complaint of osteomyelitis w c/f nec fascitis (26 Dec 2018 06:30)    INTERVAL HPI/ OVERNIGHT EVENTS: Seen and eval bedside. ANDREW o/n. VSS. No pedal complaints. Pain-controlled      LABS                        13.1   9.0   )-----------( 416      ( 26 Dec 2018 04:14 )             39.3     12-26    134<L>  |  94<L>  |  21  ----------------------------<  168<H>  4.3   |  27  |  1.17    Ca    9.2      26 Dec 2018 04:14  Mg     1.9     12-26    ICU Vital Signs Last 24 Hrs  T(C): 36.9 (26 Dec 2018 09:11), Max: 37.1 (25 Dec 2018 16:39)  T(F): 98.4 (26 Dec 2018 09:11), Max: 98.7 (25 Dec 2018 16:39)  HR: 86 (26 Dec 2018 09:11) (76 - 86)  BP: 115/79 (26 Dec 2018 09:11) (115/79 - 150/84)  BP(mean): --  ABP: --  ABP(mean): --  RR: 17 (26 Dec 2018 09:11) (16 - 20)  SpO2: 99% (26 Dec 2018 09:11) (97% - 99%)    MICROBIOLOGY  Culture - Tissue with Gram Stain (12.23.18 @ 11:54)    Gram Stain:   No organisms seen  Rare White blood cells    Specimen Source: .Tissue Right Foot Bone    Culture Results:   No growth    Culture - Surgical Swab (12.23.18 @ 11:53)    Gram Stain:   No organisms seen  Rare White blood cells    Specimen Source: .Surgical Swab Right Foot Wound #3    Culture Results:   No growth    Culture - Surgical Swab (12.23.18 @ 11:53)    Gram Stain:   No organisms seen  Rare White blood cells    Specimen Source: .Surgical Swab Right Foot Wound #2    Culture Results:   No growth to date    Culture - Surgical Swab (12.23.18 @ 11:53)    Gram Stain:   No organisms seen  Rare White blood cells    Specimen Source: .Surgical Swab Right Foot Wound #1    Culture Results:   No growth    PHYSICAL EXAM  Lower Extremity Focused:  No change in neurovasc status. Surgical dressing D/C/I. Serosanguinous strikethrough to layer of gauze/abd pads. Packing intact. No purulence, malodor, crepitus, drainage at dorsal right foot I&D site. Dusky appearance evolving at lateral incision site at dorsal foot. Ankle I&D site with sutures intact well-coapted, no dehiscence, no drainage/purulence/erythema/clinical signs of infection. Plantar submet 3 ulcer communicates with dorsal incision. Hyperkeratotic rim and fibrogranular base. No drainage or clinical signs of infection.

## 2018-12-26 NOTE — PROGRESS NOTE ADULT - PROBLEM SELECTOR PLAN 2
home regimen: lantus 48 qHS, lispro 8U prior to meals  -continue 38U lantus qHS, 6U lispro bolus insulin  lantus decreased to 28 as patient to be taken to OR and will be npo, can increase to home dose upon d/ c  -moderate insulin sliding scale  -consistent carbohydrate diet with evening snack home regimen: lantus 48 qHS, lispro 8U prior to meals  - continue 38U lantus qHS, 6U lispro bolus insulin  - lantus decreased to 28 as patient to be taken to OR and will be npo, can increase to home dose upon d/ c  -moderate insulin sliding scale  -consistent carbohydrate diet with evening snack

## 2018-12-27 LAB
-  AMPICILLIN: SIGNIFICANT CHANGE UP
-  VANCOMYCIN: SIGNIFICANT CHANGE UP
ANION GAP SERPL CALC-SCNC: 12 MMOL/L — SIGNIFICANT CHANGE UP (ref 5–17)
APPEARANCE UR: CLEAR — SIGNIFICANT CHANGE UP
APTT BLD: 32.6 SEC — SIGNIFICANT CHANGE UP (ref 27.5–36.3)
BILIRUB UR-MCNC: NEGATIVE — SIGNIFICANT CHANGE UP
BLD GP AB SCN SERPL QL: NEGATIVE — SIGNIFICANT CHANGE UP
BUN SERPL-MCNC: 20 MG/DL — SIGNIFICANT CHANGE UP (ref 7–23)
CALCIUM SERPL-MCNC: 9.2 MG/DL — SIGNIFICANT CHANGE UP (ref 8.4–10.5)
CHLORIDE SERPL-SCNC: 96 MMOL/L — SIGNIFICANT CHANGE UP (ref 96–108)
CO2 SERPL-SCNC: 27 MMOL/L — SIGNIFICANT CHANGE UP (ref 22–31)
COLOR SPEC: YELLOW — SIGNIFICANT CHANGE UP
CREAT SERPL-MCNC: 1.23 MG/DL — SIGNIFICANT CHANGE UP (ref 0.5–1.3)
CULTURE RESULTS: SIGNIFICANT CHANGE UP
DIFF PNL FLD: NEGATIVE — SIGNIFICANT CHANGE UP
GLUCOSE BLDC GLUCOMTR-MCNC: 120 MG/DL — HIGH (ref 70–99)
GLUCOSE BLDC GLUCOMTR-MCNC: 138 MG/DL — HIGH (ref 70–99)
GLUCOSE BLDC GLUCOMTR-MCNC: 212 MG/DL — HIGH (ref 70–99)
GLUCOSE BLDC GLUCOMTR-MCNC: 229 MG/DL — HIGH (ref 70–99)
GLUCOSE BLDC GLUCOMTR-MCNC: 261 MG/DL — HIGH (ref 70–99)
GLUCOSE BLDC GLUCOMTR-MCNC: 473 MG/DL — CRITICAL HIGH (ref 70–99)
GLUCOSE BLDC GLUCOMTR-MCNC: 78 MG/DL — SIGNIFICANT CHANGE UP (ref 70–99)
GLUCOSE SERPL-MCNC: 257 MG/DL — HIGH (ref 70–99)
GLUCOSE UR QL: 500
GRAM STN FLD: SIGNIFICANT CHANGE UP
HCT VFR BLD CALC: 39.9 % — SIGNIFICANT CHANGE UP (ref 39–50)
HGB BLD-MCNC: 13.2 G/DL — SIGNIFICANT CHANGE UP (ref 13–17)
INR BLD: 0.99 — SIGNIFICANT CHANGE UP (ref 0.88–1.16)
KETONES UR-MCNC: NEGATIVE — SIGNIFICANT CHANGE UP
LEUKOCYTE ESTERASE UR-ACNC: NEGATIVE — SIGNIFICANT CHANGE UP
MAGNESIUM SERPL-MCNC: 1.7 MG/DL — SIGNIFICANT CHANGE UP (ref 1.6–2.6)
MCHC RBC-ENTMCNC: 28.3 PG — SIGNIFICANT CHANGE UP (ref 27–34)
MCHC RBC-ENTMCNC: 33.1 G/DL — SIGNIFICANT CHANGE UP (ref 32–36)
MCV RBC AUTO: 85.6 FL — SIGNIFICANT CHANGE UP (ref 80–100)
METHOD TYPE: SIGNIFICANT CHANGE UP
NITRITE UR-MCNC: NEGATIVE — SIGNIFICANT CHANGE UP
ORGANISM # SPEC MICROSCOPIC CNT: SIGNIFICANT CHANGE UP
ORGANISM # SPEC MICROSCOPIC CNT: SIGNIFICANT CHANGE UP
PH UR: 6 — SIGNIFICANT CHANGE UP (ref 5–8)
PLATELET # BLD AUTO: 453 K/UL — HIGH (ref 150–400)
POTASSIUM SERPL-MCNC: 4.6 MMOL/L — SIGNIFICANT CHANGE UP (ref 3.5–5.3)
POTASSIUM SERPL-SCNC: 4.6 MMOL/L — SIGNIFICANT CHANGE UP (ref 3.5–5.3)
PROT UR-MCNC: NEGATIVE MG/DL — SIGNIFICANT CHANGE UP
PROTHROM AB SERPL-ACNC: 11.2 SEC — SIGNIFICANT CHANGE UP (ref 10–12.9)
RBC # BLD: 4.66 M/UL — SIGNIFICANT CHANGE UP (ref 4.2–5.8)
RBC # FLD: 13.4 % — SIGNIFICANT CHANGE UP (ref 10.3–16.9)
RH IG SCN BLD-IMP: POSITIVE — SIGNIFICANT CHANGE UP
SODIUM SERPL-SCNC: 135 MMOL/L — SIGNIFICANT CHANGE UP (ref 135–145)
SP GR SPEC: 1.02 — SIGNIFICANT CHANGE UP (ref 1–1.03)
SPECIMEN SOURCE: SIGNIFICANT CHANGE UP
UROBILINOGEN FLD QL: 0.2 E.U./DL — SIGNIFICANT CHANGE UP
VANCOMYCIN TROUGH SERPL-MCNC: 21.3 UG/ML — HIGH (ref 10–20)
WBC # BLD: 8.9 K/UL — SIGNIFICANT CHANGE UP (ref 3.8–10.5)
WBC # FLD AUTO: 8.9 K/UL — SIGNIFICANT CHANGE UP (ref 3.8–10.5)

## 2018-12-27 PROCEDURE — 93010 ELECTROCARDIOGRAM REPORT: CPT

## 2018-12-27 PROCEDURE — 99233 SBSQ HOSP IP/OBS HIGH 50: CPT | Mod: GC

## 2018-12-27 PROCEDURE — 73630 X-RAY EXAM OF FOOT: CPT | Mod: 26,RT

## 2018-12-27 RX ORDER — INSULIN LISPRO 100/ML
6 VIAL (ML) SUBCUTANEOUS
Refills: 0 | Status: DISCONTINUED | OUTPATIENT
Start: 2018-12-27 | End: 2018-12-27

## 2018-12-27 RX ORDER — INSULIN LISPRO 100/ML
6 VIAL (ML) SUBCUTANEOUS
Refills: 0 | Status: DISCONTINUED | OUTPATIENT
Start: 2018-12-27 | End: 2018-12-28

## 2018-12-27 RX ORDER — GLUCAGON INJECTION, SOLUTION 0.5 MG/.1ML
1 INJECTION, SOLUTION SUBCUTANEOUS ONCE
Refills: 0 | Status: DISCONTINUED | OUTPATIENT
Start: 2018-12-27 | End: 2018-12-28

## 2018-12-27 RX ORDER — DEXTROSE 50 % IN WATER 50 %
15 SYRINGE (ML) INTRAVENOUS ONCE
Refills: 0 | Status: DISCONTINUED | OUTPATIENT
Start: 2018-12-27 | End: 2018-12-28

## 2018-12-27 RX ORDER — INSULIN LISPRO 100/ML
4 VIAL (ML) SUBCUTANEOUS ONCE
Refills: 0 | Status: COMPLETED | OUTPATIENT
Start: 2018-12-27 | End: 2018-12-27

## 2018-12-27 RX ORDER — DEXTROSE 50 % IN WATER 50 %
12.5 SYRINGE (ML) INTRAVENOUS ONCE
Refills: 0 | Status: DISCONTINUED | OUTPATIENT
Start: 2018-12-27 | End: 2018-12-28

## 2018-12-27 RX ORDER — INSULIN GLARGINE 100 [IU]/ML
28 INJECTION, SOLUTION SUBCUTANEOUS AT BEDTIME
Refills: 0 | Status: DISCONTINUED | OUTPATIENT
Start: 2018-12-27 | End: 2018-12-27

## 2018-12-27 RX ORDER — HYDROMORPHONE HYDROCHLORIDE 2 MG/ML
1 INJECTION INTRAMUSCULAR; INTRAVENOUS; SUBCUTANEOUS EVERY 4 HOURS
Refills: 0 | Status: DISCONTINUED | OUTPATIENT
Start: 2018-12-27 | End: 2018-12-28

## 2018-12-27 RX ORDER — VANCOMYCIN HCL 1 G
1500 VIAL (EA) INTRAVENOUS EVERY 12 HOURS
Refills: 0 | Status: DISCONTINUED | OUTPATIENT
Start: 2018-12-27 | End: 2018-12-27

## 2018-12-27 RX ORDER — INSULIN LISPRO 100/ML
VIAL (ML) SUBCUTANEOUS
Refills: 0 | Status: DISCONTINUED | OUTPATIENT
Start: 2018-12-27 | End: 2018-12-28

## 2018-12-27 RX ORDER — SODIUM CHLORIDE 0.65 %
1 AEROSOL, SPRAY (ML) NASAL THREE TIMES A DAY
Refills: 0 | Status: DISCONTINUED | OUTPATIENT
Start: 2018-12-27 | End: 2018-12-28

## 2018-12-27 RX ORDER — DEXTROSE 50 % IN WATER 50 %
25 SYRINGE (ML) INTRAVENOUS ONCE
Refills: 0 | Status: DISCONTINUED | OUTPATIENT
Start: 2018-12-27 | End: 2018-12-28

## 2018-12-27 RX ORDER — VANCOMYCIN HCL 1 G
1500 VIAL (EA) INTRAVENOUS EVERY 12 HOURS
Refills: 0 | Status: DISCONTINUED | OUTPATIENT
Start: 2018-12-27 | End: 2018-12-28

## 2018-12-27 RX ORDER — SODIUM CHLORIDE 9 MG/ML
1000 INJECTION, SOLUTION INTRAVENOUS
Refills: 0 | Status: DISCONTINUED | OUTPATIENT
Start: 2018-12-27 | End: 2018-12-27

## 2018-12-27 RX ORDER — INSULIN GLARGINE 100 [IU]/ML
38 INJECTION, SOLUTION SUBCUTANEOUS AT BEDTIME
Refills: 0 | Status: DISCONTINUED | OUTPATIENT
Start: 2018-12-27 | End: 2018-12-28

## 2018-12-27 RX ORDER — SODIUM CHLORIDE 9 MG/ML
1000 INJECTION, SOLUTION INTRAVENOUS
Refills: 0 | Status: DISCONTINUED | OUTPATIENT
Start: 2018-12-27 | End: 2018-12-28

## 2018-12-27 RX ORDER — HEPARIN SODIUM 5000 [USP'U]/ML
5000 INJECTION INTRAVENOUS; SUBCUTANEOUS EVERY 8 HOURS
Refills: 0 | Status: DISCONTINUED | OUTPATIENT
Start: 2018-12-27 | End: 2018-12-28

## 2018-12-27 RX ORDER — ONDANSETRON 8 MG/1
4 TABLET, FILM COATED ORAL EVERY 6 HOURS
Refills: 0 | Status: DISCONTINUED | OUTPATIENT
Start: 2018-12-27 | End: 2018-12-28

## 2018-12-27 RX ORDER — PIPERACILLIN AND TAZOBACTAM 4; .5 G/20ML; G/20ML
3.38 INJECTION, POWDER, LYOPHILIZED, FOR SOLUTION INTRAVENOUS EVERY 6 HOURS
Refills: 0 | Status: DISCONTINUED | OUTPATIENT
Start: 2018-12-27 | End: 2018-12-28

## 2018-12-27 RX ADMIN — HYDROMORPHONE HYDROCHLORIDE 1 MILLIGRAM(S): 2 INJECTION INTRAMUSCULAR; INTRAVENOUS; SUBCUTANEOUS at 15:28

## 2018-12-27 RX ADMIN — Medication 1 APPLICATION(S): at 05:50

## 2018-12-27 RX ADMIN — PIPERACILLIN AND TAZOBACTAM 200 GRAM(S): 4; .5 INJECTION, POWDER, LYOPHILIZED, FOR SOLUTION INTRAVENOUS at 05:50

## 2018-12-27 RX ADMIN — HYDROMORPHONE HYDROCHLORIDE 1 MILLIGRAM(S): 2 INJECTION INTRAMUSCULAR; INTRAVENOUS; SUBCUTANEOUS at 19:43

## 2018-12-27 RX ADMIN — HYDROMORPHONE HYDROCHLORIDE 1 MILLIGRAM(S): 2 INJECTION INTRAMUSCULAR; INTRAVENOUS; SUBCUTANEOUS at 19:28

## 2018-12-27 RX ADMIN — Medication 4 UNIT(S): at 18:32

## 2018-12-27 RX ADMIN — OXYCODONE AND ACETAMINOPHEN 2 TABLET(S): 5; 325 TABLET ORAL at 00:52

## 2018-12-27 RX ADMIN — Medication 250 MILLIGRAM(S): at 06:32

## 2018-12-27 RX ADMIN — PIPERACILLIN AND TAZOBACTAM 200 GRAM(S): 4; .5 INJECTION, POWDER, LYOPHILIZED, FOR SOLUTION INTRAVENOUS at 15:28

## 2018-12-27 RX ADMIN — PIPERACILLIN AND TAZOBACTAM 200 GRAM(S): 4; .5 INJECTION, POWDER, LYOPHILIZED, FOR SOLUTION INTRAVENOUS at 21:59

## 2018-12-27 RX ADMIN — HEPARIN SODIUM 5000 UNIT(S): 5000 INJECTION INTRAVENOUS; SUBCUTANEOUS at 05:51

## 2018-12-27 RX ADMIN — Medication 6: at 06:32

## 2018-12-27 RX ADMIN — Medication 12: at 17:09

## 2018-12-27 RX ADMIN — HEPARIN SODIUM 5000 UNIT(S): 5000 INJECTION INTRAVENOUS; SUBCUTANEOUS at 21:59

## 2018-12-27 RX ADMIN — Medication 300 MILLIGRAM(S): at 19:04

## 2018-12-27 RX ADMIN — HEPARIN SODIUM 5000 UNIT(S): 5000 INJECTION INTRAVENOUS; SUBCUTANEOUS at 15:21

## 2018-12-27 RX ADMIN — Medication 6 UNIT(S): at 17:12

## 2018-12-27 RX ADMIN — INSULIN GLARGINE 38 UNIT(S): 100 INJECTION, SOLUTION SUBCUTANEOUS at 22:54

## 2018-12-27 RX ADMIN — HYDROMORPHONE HYDROCHLORIDE 1 MILLIGRAM(S): 2 INJECTION INTRAMUSCULAR; INTRAVENOUS; SUBCUTANEOUS at 15:43

## 2018-12-27 NOTE — BRIEF OPERATIVE NOTE - OPERATION/FINDINGS
Sedation with local nerve block employed. Plantar right foot ulcer debridement of soft tissue and bone. Flushed surgical site. Packed wound with Stravix. Then sutured Stravix graft to skin margins. Adaptic was sutured over graft for reinforcement. Next, the dorsal incision and drainage surgical site was flushed with sterile saline. Delayed primary closure of dorsal incision performed. Dry sterile dressing applied to right lower extremity. Pt tolerated procedure well without complication. Transferred to PACU in stable condition.
GA employed. 2cm incision made at dorsolateral aspect third distal metatarsal right foot where 8mm pocket of air seen on radiographs. Dissected down to bone and freed up tissue around suspect area. Incision connected with plantar ulcer submet 3. Necrotic bone appreciated. No purulence, no malodor or lack of tissue resistance. Surgical site irrigated 2L antibiotic solution with pulse lavage. Two surgical cultures obtained. Bone biopsy of distal 3rd metatarsal performed. Small 1 cm incision made at anterior medial aspect right ankle. Subcutaneous tissue explored. No signs of infection, abscess, purulence or necrotic tissue seen. Site flushed and closed primarily with 4-0 Nylon. The dorsal foot incision was packed with Iodoform packing from dorsal to plantar to the ulceration. Surgical dressing applied to right lower ext. Pt tolerated procedure well without complication. Pt transferred to PACU in stable condition.

## 2018-12-27 NOTE — PROGRESS NOTE ADULT - ASSESSMENT
43 yoM s/p Right foot debridement soft tissue and bone, bone biopsy, delayed primary closure and graft application     - C/w IV Abx as per primary team   - Follow up bone culture  - Dressing change by podiatry 12/28   - Dressing change instructions: Leave adaptic intact sutured to skin margins plantar right foot intact. Replace gauze and Kerlix only   - Surgical shoe dispensed   - Nonweightbearing to right lower extremity   - Podiatry will follow

## 2018-12-27 NOTE — PROGRESS NOTE ADULT - SUBJECTIVE AND OBJECTIVE BOX
POST OP NOTE    ARIEL MOURA  MRN-[id number]    Procedure: Right foot debridement of bone and soft tissue, bone biopsy, delayed primary closure and graft application   Surgeon: Norma  Assistants: Bonifacio Crocker    Patient tolerated procedure well without incident.  Patient transferred to PACU in stable condition.       PE / Post Op Check:       GEN: NAD, AAOx3, resting comfortably with pain controlled      LE Focused: CFT shows adequate perfusion b/l with no signs of ischemic compromise.  No strikethrough is appreciated on surgical dressings.  Dressings were dry, clean, and intact.  No neuromuscular deficits appreciated.

## 2018-12-27 NOTE — PROGRESS NOTE ADULT - PROBLEM SELECTOR PLAN 2
home regimen: lantus 48 qHS, lispro 8U prior to meals  - continue 38U lantus qHS, 6U lispro bolus insulin  - lantus decreased to 28 as patient to be taken to OR and will be npo, can increase to home dose upon d/ c  -moderate insulin sliding scale  -consistent carbohydrate diet with evening snack home regimen: lantus 48 qHS, lispro 8U prior to meals  right foot wrapped, toes warm , denies pain  - c/w 6U lispro bolus insulin  - lantus decreased to 28 as patient pt was npo.  - will increase back to 38 when diet restarted   - moderate insulin sliding scale  - consistent carbohydrate diet with evening snack after procedure

## 2018-12-27 NOTE — PROGRESS NOTE ADULT - PROBLEM SELECTOR PLAN 5
F: No IVF  E: K>4, Mg>2  N: NPO WEDNESDAY NIGHT  Ppx: SCD  Lines: peripheral  Ambulation: with assistance  Code: full  Dispo: regional medical floors F: No IVF  E: K>4, Mg>2  N: NPO until after procedure   Ppx: SCD  Lines: peripheral  Ambulation: with assistance  Code: full  Dispo: regional medical floors

## 2018-12-27 NOTE — BRIEF OPERATIVE NOTE - PROCEDURE
<<-----Click on this checkbox to enter Procedure Delayed closure of wound of foot  12/27/2018    Active  [email or web address]  Debridement of bone of foot  12/27/2018    Active  [email or web address]

## 2018-12-27 NOTE — PROGRESS NOTE ADULT - PROBLEM SELECTOR PLAN 1
Chronic in nature - completed 10 day regimen with oral keflex after discharge from hospital on December 5. CT from ED now with evidence of air collection on the dorsum of right foot.   - S/P OR PROCEDURE with podiatry  - Right foot and ankle incision and drainage with washout and bone biopsy little concern for nec fasc, cx taken, bone biopsied, packed.   - will continue with iv abx for now- -vancomycin 1750mg q12 hours, zosyn 3.375mg q8 hours.   - Plan to be taken to OR again thursday for closure of foot incision and graft application to plantar ulcer.  NPO after midnight tonight  F/u podiatry recs Chronic in nature - completed 10 day regimen with oral keflex after discharge from hospital on December 5. CT from ED now with evidence of air collection on the dorsum of right foot.   - S/P OR PROCEDURE with podiatry  - Right foot and ankle incision and drainage with washout and bone biopsy little concern for nec fasc, cx taken, bone biopsied, packed.   - will continue with iv abx for now- -vancomycin 1750mg q12 hours, zosyn 3.375mg q8 hours.   - OR for closure of foot incision and graft application to plantar ulcer.  - F/u podiatry recs

## 2018-12-27 NOTE — PROGRESS NOTE ADULT - SUBJECTIVE AND OBJECTIVE BOX
PRE-OP NOTE    Pre-Op Diagnosis: Right foot infection  Planned Procedure:  Right foot primary closure with graft application  Scheduled Date / Time: 12/27  Labs:                       13.2   8.9   )-----------( 453      ( 27 Dec 2018 06:13 )             39.9   12-27    135  |  96  |  20  ----------------------------<  257<H>  4.6   |  27  |  1.23    Ca    9.2      27 Dec 2018 06:13  Mg     1.7     12-27      Vitals: Vital Signs Last 24 Hrs  T(C): 36.8 (27 Dec 2018 06:00), Max: 36.9 (26 Dec 2018 09:11)  T(F): 98.2 (27 Dec 2018 06:00), Max: 98.4 (26 Dec 2018 09:11)  HR: 75 (27 Dec 2018 06:00) (75 - 86)  BP: 136/78 (27 Dec 2018 06:00) (115/79 - 142/88)  BP(mean): --  RR: 18 (27 Dec 2018 06:00) (17 - 18)  SpO2: 99% (27 Dec 2018 06:00) (97% - 99%)  PE:   PHYSICAL EXAM  Lower Extremity Focused:  No change in neurovasc status. Surgical dressing D/C/I. Serosanguinous strikethrough to layer of gauze/abd pads. Packing intact. No purulence, malodor, crepitus, drainage at dorsal right foot I&D site. Dusky appearance evolving at lateral incision site at dorsal foot. Ankle I&D site with sutures intact well-coapted, no dehiscence, no drainage/purulence/erythema/clinical signs of infection. Plantar submet 3 ulcer communicates with dorsal incision. Hyperkeratotic rim and fibrogranular base. No drainage or clinical signs of infection.  Official CXR reading: (On Chart)  Official EKG reading: (On Chart)  Type and Cross/Screen:   NPO after MN  Antibiotics: on floor  Anesthesia evaluation (on chart)  Operative Consent (on chart)

## 2018-12-27 NOTE — BRIEF OPERATIVE NOTE - PRE-OP DX
Cellulitis of right lower extremity  12/23/2018    Active  Judi Crocker  Osteomyelitis of ankle or foot  12/23/2018    Active  Judi Crocker
Cellulitis of right lower extremity  12/23/2018    Active  Judi Crocker  Osteomyelitis of ankle or foot  12/23/2018    Active  Judi Crocker

## 2018-12-27 NOTE — PROGRESS NOTE ADULT - SUBJECTIVE AND OBJECTIVE BOX
Patient is a 43y old  Male who presents with a chief complaint of osteomyelitis w c/f nec fascitis (27 Dec 2018 07:51)      INTERVAL HPI/OVERNIGHT EVENTS:  ICU Vital Signs Last 24 Hrs  T(C): 36.8 (27 Dec 2018 06:00), Max: 36.9 (26 Dec 2018 09:11)  T(F): 98.2 (27 Dec 2018 06:00), Max: 98.4 (26 Dec 2018 09:11)  HR: 75 (27 Dec 2018 06:00) (75 - 86)  BP: 136/78 (27 Dec 2018 06:00) (115/79 - 142/88)  BP(mean): --  ABP: --  ABP(mean): --  RR: 18 (27 Dec 2018 06:00) (17 - 18)  SpO2: 99% (27 Dec 2018 06:00) (97% - 99%)    I&O's Summary    26 Dec 2018 07:01  -  27 Dec 2018 07:00  --------------------------------------------------------  IN: 700 mL / OUT: 0 mL / NET: 700 mL          LABS:                        13.2   8.9   )-----------( 453      ( 27 Dec 2018 06:13 )             39.9     12    135  |  96  |  20  ----------------------------<  257<H>  4.6   |  27  |  1.23    Ca    9.2      27 Dec 2018 06:13  Mg     1.7           PT/INR - ( 27 Dec 2018 06:13 )   PT: 11.2 sec;   INR: 0.99          PTT - ( 27 Dec 2018 06:13 )  PTT:32.6 sec  Urinalysis Basic - ( 27 Dec 2018 06:46 )    Color: Yellow / Appearance: Clear / S.025 / pH: x  Gluc: x / Ketone: NEGATIVE  / Bili: Negative / Urobili: 0.2 E.U./dL   Blood: x / Protein: NEGATIVE mg/dL / Nitrite: NEGATIVE   Leuk Esterase: NEGATIVE / RBC: x / WBC x   Sq Epi: x / Non Sq Epi: x / Bacteria: x      CAPILLARY BLOOD GLUCOSE      POCT Blood Glucose.: 261 mg/dL (27 Dec 2018 06:10)  POCT Blood Glucose.: 248 mg/dL (26 Dec 2018 21:53)  POCT Blood Glucose.: 239 mg/dL (26 Dec 2018 17:11)  POCT Blood Glucose.: 187 mg/dL (26 Dec 2018 12:05)        RADIOLOGY & ADDITIONAL TESTS:    Consultant(s) Notes Reviewed:  [x ] YES  [ ] NO    MEDICATIONS  (STANDING):  ammonium lactate 12% Lotion 1 Application(s) Topical two times a day  atorvastatin 40 milliGRAM(s) Oral at bedtime  clotrimazole 1% Cream 1 Application(s) Topical two times a day  dextrose 5%. 1000 milliLiter(s) (50 mL/Hr) IV Continuous <Continuous>  dextrose 50% Injectable 12.5 Gram(s) IV Push once  dextrose 50% Injectable 25 Gram(s) IV Push once  dextrose 50% Injectable 25 Gram(s) IV Push once  heparin  Injectable 5000 Unit(s) SubCutaneous every 8 hours  insulin glargine Injectable (LANTUS) 28 Unit(s) SubCutaneous at bedtime  insulin lispro (HumaLOG) corrective regimen sliding scale   SubCutaneous Before meals and at bedtime  insulin lispro Injectable (HumaLOG) 6 Unit(s) SubCutaneous three times a day before meals  piperacillin/tazobactam IVPB. 3.375 Gram(s) IV Intermittent every 6 hours    MEDICATIONS  (PRN):  dextrose 40% Gel 15 Gram(s) Oral once PRN Blood Glucose LESS THAN 70 milliGRAM(s)/deciliter  glucagon  Injectable 1 milliGRAM(s) IntraMuscular once PRN Glucose LESS THAN 70 milligrams/deciliter  HYDROmorphone  Injectable 1 milliGRAM(s) IV Push every 4 hours PRN breakthrough pain  ondansetron Injectable 4 milliGRAM(s) IV Push every 6 hours PRN Nausea and/or Vomiting  oxyCODONE    5 mG/acetaminophen 325 mG 1 Tablet(s) Oral every 4 hours PRN Moderate Pain (4 - 6)  oxyCODONE    5 mG/acetaminophen 325 mG 2 Tablet(s) Oral every 6 hours PRN Severe Pain (7 - 10)  sodium chloride 0.65% Nasal 1 Spray(s) Both Nostrils three times a day PRN Nasal Congestion      PHYSICAL EXAM:  GENERAL: well built, well nourished  HEAD:  Atraumatic, Normocephalic  EYES: EOMI, PERRLA, conjunctiva and sclera clear  ENT: No tonsillar erythema, exudates, or enlargement; Moist mucous membranes, Good dentition, No lesions  NECK: Supple, No JVD, Normal thyroid, no enlarged nodes  NERVOUS SYSTEM:  Alert & Oriented X3, Good concentration; Motor Strength 5/5 B/L upper and lower extremities; DTRs 2+ intact and symmetric, sensory intact  CHEST/LUNG: B/L good air entry; No rales, rhonchi, or wheezing  HEART: S1S2 normal, no S3, Regular rate and rhythm; No murmurs, rubs, or gallops  ABDOMEN: Soft, Nontender, Nondistended; Bowel sounds present  EXTREMITIES:  2+ Peripheral Pulses, No clubbing, cyanosis, or edema  LYMPH: No lymphadenopathy noted  SKIN: No rashes or lesions    Care Discussed with Consultants/Other Providers [ x] YES  [ ] NO OVERNIGHT EVENTS: ANDREW    INTERVAL HPI: Pt is examined at bedside. He appears well in NAD. He denies any chest pain, SOB, abdominal pain, nausea/vomiting, fevers/chills.     ICU Vital Signs Last 24 Hrs  T(C): 36.8 (27 Dec 2018 06:00), Max: 36.9 (26 Dec 2018 09:11)  T(F): 98.2 (27 Dec 2018 06:00), Max: 98.4 (26 Dec 2018 09:11)  HR: 75 (27 Dec 2018 06:00) (75 - 86)  BP: 136/78 (27 Dec 2018 06:00) (115/79 - 142/88)  BP(mean): --  ABP: --  ABP(mean): --  RR: 18 (27 Dec 2018 06:00) (17 - 18)  SpO2: 99% (27 Dec 2018 06:00) (97% - 99%)    I&O's Summary    26 Dec 2018 07:01  -  27 Dec 2018 07:00  --------------------------------------------------------  IN: 700 mL / OUT: 0 mL / NET: 700 mL          LABS:                        13.2   8.9   )-----------( 453      ( 27 Dec 2018 06:13 )             39.9     12    135  |  96  |  20  ----------------------------<  257<H>  4.6   |  27  |  1.23    Ca    9.2      27 Dec 2018 06:13  Mg     1.7           PT/INR - ( 27 Dec 2018 06:13 )   PT: 11.2 sec;   INR: 0.99          PTT - ( 27 Dec 2018 06:13 )  PTT:32.6 sec  Urinalysis Basic - ( 27 Dec 2018 06:46 )    Color: Yellow / Appearance: Clear / S.025 / pH: x  Gluc: x / Ketone: NEGATIVE  / Bili: Negative / Urobili: 0.2 E.U./dL   Blood: x / Protein: NEGATIVE mg/dL / Nitrite: NEGATIVE   Leuk Esterase: NEGATIVE / RBC: x / WBC x   Sq Epi: x / Non Sq Epi: x / Bacteria: x      CAPILLARY BLOOD GLUCOSE      POCT Blood Glucose.: 261 mg/dL (27 Dec 2018 06:10)  POCT Blood Glucose.: 248 mg/dL (26 Dec 2018 21:53)  POCT Blood Glucose.: 239 mg/dL (26 Dec 2018 17:11)  POCT Blood Glucose.: 187 mg/dL (26 Dec 2018 12:05)        RADIOLOGY & ADDITIONAL TESTS:    Consultant(s) Notes Reviewed:  [x ] YES  [ ] NO    MEDICATIONS  (STANDING):  ammonium lactate 12% Lotion 1 Application(s) Topical two times a day  atorvastatin 40 milliGRAM(s) Oral at bedtime  clotrimazole 1% Cream 1 Application(s) Topical two times a day  dextrose 5%. 1000 milliLiter(s) (50 mL/Hr) IV Continuous <Continuous>  dextrose 50% Injectable 12.5 Gram(s) IV Push once  dextrose 50% Injectable 25 Gram(s) IV Push once  dextrose 50% Injectable 25 Gram(s) IV Push once  heparin  Injectable 5000 Unit(s) SubCutaneous every 8 hours  insulin glargine Injectable (LANTUS) 28 Unit(s) SubCutaneous at bedtime  insulin lispro (HumaLOG) corrective regimen sliding scale   SubCutaneous Before meals and at bedtime  insulin lispro Injectable (HumaLOG) 6 Unit(s) SubCutaneous three times a day before meals  piperacillin/tazobactam IVPB. 3.375 Gram(s) IV Intermittent every 6 hours    MEDICATIONS  (PRN):  dextrose 40% Gel 15 Gram(s) Oral once PRN Blood Glucose LESS THAN 70 milliGRAM(s)/deciliter  glucagon  Injectable 1 milliGRAM(s) IntraMuscular once PRN Glucose LESS THAN 70 milligrams/deciliter  HYDROmorphone  Injectable 1 milliGRAM(s) IV Push every 4 hours PRN breakthrough pain  ondansetron Injectable 4 milliGRAM(s) IV Push every 6 hours PRN Nausea and/or Vomiting  oxyCODONE    5 mG/acetaminophen 325 mG 1 Tablet(s) Oral every 4 hours PRN Moderate Pain (4 - 6)  oxyCODONE    5 mG/acetaminophen 325 mG 2 Tablet(s) Oral every 6 hours PRN Severe Pain (7 - 10)  sodium chloride 0.65% Nasal 1 Spray(s) Both Nostrils three times a day PRN Nasal Congestion      PHYSICAL EXAM:  GENERAL: NAD  HEAD:  Atraumatic, Normocephalic  EYES: EOMI, PERRLA, conjunctiva and sclera clear  ENT: No tonsillar erythema, exudates, or enlargement; Moist mucous membranes, Good dentition, No lesions  NECK: Supple, No JVD, Normal thyroid, no enlarged nodes  NERVOUS SYSTEM:  Alert & Oriented X3, Good concentration; Motor Strength 5/5 B/L upper and lower extremities; DTRs 2+ intact and symmetric, sensory intact  CHEST/LUNG: B/L good air entry; No rales, rhonchi, or wheezing  HEART: S1S2 normal, no S3, Regular rate and rhythm; No murmurs, rubs, or gallops  ABDOMEN: Soft, Nontender, Nondistended; Bowel sounds present  EXTREMITIES: right foot wrapped, toes warm , denies pain  LYMPH: No lymphadenopathy noted  SKIN: No rashes or lesions    Care Discussed with Consultants/Other Providers [ x] YES  [ ] NO

## 2018-12-28 VITALS
HEART RATE: 83 BPM | RESPIRATION RATE: 18 BRPM | DIASTOLIC BLOOD PRESSURE: 87 MMHG | SYSTOLIC BLOOD PRESSURE: 139 MMHG | TEMPERATURE: 98 F | OXYGEN SATURATION: 98 %

## 2018-12-28 LAB
ANION GAP SERPL CALC-SCNC: 12 MMOL/L — SIGNIFICANT CHANGE UP (ref 5–17)
BASOPHILS NFR BLD AUTO: 0.3 % — SIGNIFICANT CHANGE UP (ref 0–2)
BUN SERPL-MCNC: 17 MG/DL — SIGNIFICANT CHANGE UP (ref 7–23)
CALCIUM SERPL-MCNC: 8.7 MG/DL — SIGNIFICANT CHANGE UP (ref 8.4–10.5)
CHLORIDE SERPL-SCNC: 97 MMOL/L — SIGNIFICANT CHANGE UP (ref 96–108)
CO2 SERPL-SCNC: 27 MMOL/L — SIGNIFICANT CHANGE UP (ref 22–31)
CREAT SERPL-MCNC: 1.12 MG/DL — SIGNIFICANT CHANGE UP (ref 0.5–1.3)
EOSINOPHIL NFR BLD AUTO: 3.8 % — SIGNIFICANT CHANGE UP (ref 0–6)
GLUCOSE BLDC GLUCOMTR-MCNC: 102 MG/DL — HIGH (ref 70–99)
GLUCOSE BLDC GLUCOMTR-MCNC: 152 MG/DL — HIGH (ref 70–99)
GLUCOSE BLDC GLUCOMTR-MCNC: 217 MG/DL — HIGH (ref 70–99)
GLUCOSE SERPL-MCNC: 270 MG/DL — HIGH (ref 70–99)
HCT VFR BLD CALC: 38.1 % — LOW (ref 39–50)
HGB BLD-MCNC: 12.4 G/DL — LOW (ref 13–17)
LYMPHOCYTES # BLD AUTO: 45.8 % — HIGH (ref 13–44)
MAGNESIUM SERPL-MCNC: 1.7 MG/DL — SIGNIFICANT CHANGE UP (ref 1.6–2.6)
MCHC RBC-ENTMCNC: 27.7 PG — SIGNIFICANT CHANGE UP (ref 27–34)
MCHC RBC-ENTMCNC: 32.5 G/DL — SIGNIFICANT CHANGE UP (ref 32–36)
MCV RBC AUTO: 85.2 FL — SIGNIFICANT CHANGE UP (ref 80–100)
MONOCYTES NFR BLD AUTO: 6.6 % — SIGNIFICANT CHANGE UP (ref 2–14)
NEUTROPHILS NFR BLD AUTO: 43.5 % — SIGNIFICANT CHANGE UP (ref 43–77)
PLATELET # BLD AUTO: 367 K/UL — SIGNIFICANT CHANGE UP (ref 150–400)
POTASSIUM SERPL-MCNC: 4.5 MMOL/L — SIGNIFICANT CHANGE UP (ref 3.5–5.3)
POTASSIUM SERPL-SCNC: 4.5 MMOL/L — SIGNIFICANT CHANGE UP (ref 3.5–5.3)
RBC # BLD: 4.47 M/UL — SIGNIFICANT CHANGE UP (ref 4.2–5.8)
RBC # FLD: 13.5 % — SIGNIFICANT CHANGE UP (ref 10.3–16.9)
SODIUM SERPL-SCNC: 136 MMOL/L — SIGNIFICANT CHANGE UP (ref 135–145)
WBC # BLD: 6.8 K/UL — SIGNIFICANT CHANGE UP (ref 3.8–10.5)
WBC # FLD AUTO: 6.8 K/UL — SIGNIFICANT CHANGE UP (ref 3.8–10.5)

## 2018-12-28 PROCEDURE — 99239 HOSP IP/OBS DSCHRG MGMT >30: CPT

## 2018-12-28 RX ORDER — INSULIN GLARGINE 100 [IU]/ML
48 INJECTION, SOLUTION SUBCUTANEOUS
Qty: 7 | Refills: 0
Start: 2018-12-28 | End: 2019-01-10

## 2018-12-28 RX ORDER — ACETAMINOPHEN 500 MG
650 TABLET ORAL ONCE
Refills: 0 | Status: COMPLETED | OUTPATIENT
Start: 2018-12-28 | End: 2018-12-28

## 2018-12-28 RX ORDER — INSULIN LISPRO 100/ML
8 VIAL (ML) SUBCUTANEOUS
Qty: 4 | Refills: 0
Start: 2018-12-28 | End: 2019-01-10

## 2018-12-28 RX ADMIN — Medication 300 MILLIGRAM(S): at 07:08

## 2018-12-28 RX ADMIN — HYDROMORPHONE HYDROCHLORIDE 1 MILLIGRAM(S): 2 INJECTION INTRAMUSCULAR; INTRAVENOUS; SUBCUTANEOUS at 00:07

## 2018-12-28 RX ADMIN — Medication 4: at 08:50

## 2018-12-28 RX ADMIN — PIPERACILLIN AND TAZOBACTAM 200 GRAM(S): 4; .5 INJECTION, POWDER, LYOPHILIZED, FOR SOLUTION INTRAVENOUS at 15:32

## 2018-12-28 RX ADMIN — HYDROMORPHONE HYDROCHLORIDE 1 MILLIGRAM(S): 2 INJECTION INTRAMUSCULAR; INTRAVENOUS; SUBCUTANEOUS at 00:41

## 2018-12-28 RX ADMIN — Medication 6 UNIT(S): at 12:46

## 2018-12-28 RX ADMIN — HEPARIN SODIUM 5000 UNIT(S): 5000 INJECTION INTRAVENOUS; SUBCUTANEOUS at 13:06

## 2018-12-28 RX ADMIN — PIPERACILLIN AND TAZOBACTAM 200 GRAM(S): 4; .5 INJECTION, POWDER, LYOPHILIZED, FOR SOLUTION INTRAVENOUS at 03:31

## 2018-12-28 RX ADMIN — Medication 650 MILLIGRAM(S): at 10:35

## 2018-12-28 RX ADMIN — PIPERACILLIN AND TAZOBACTAM 200 GRAM(S): 4; .5 INJECTION, POWDER, LYOPHILIZED, FOR SOLUTION INTRAVENOUS at 09:17

## 2018-12-28 RX ADMIN — Medication 6 UNIT(S): at 08:49

## 2018-12-28 RX ADMIN — HEPARIN SODIUM 5000 UNIT(S): 5000 INJECTION INTRAVENOUS; SUBCUTANEOUS at 07:08

## 2018-12-28 RX ADMIN — Medication 2: at 12:47

## 2018-12-28 RX ADMIN — Medication 650 MILLIGRAM(S): at 11:05

## 2018-12-28 RX ADMIN — Medication 6 UNIT(S): at 17:53

## 2018-12-28 NOTE — CHART NOTE - NSCHARTNOTEFT_GEN_A_CORE
Admitting Diagnosis:   Patient is a 43y old  Male who presents with a chief complaint of osteomyelitis w c/f nec fascitis (28 Dec 2018 07:46)      PAST MEDICAL & SURGICAL HISTORY:  Hyperlipidemia, unspecified hyperlipidemia type  Osteomyelitis  Diabetes  Toe amputation status      Current Nutrition Order: CSTCHOSN diet    PO Intake: Good (%) [   ]  Fair (50-75%) [   ] Poor (<25%) [   ]    GI Issues:   +BM      Pain:    Skin Integrity: intact with surgical incisions at foot wounds    Labs:       136  |  97  |  17  ----------------------------<  270<H>  4.5   |  27  |  1.12    Ca    8.7      28 Dec 2018 06:02  Mg     1.7           CAPILLARY BLOOD GLUCOSE      POCT Blood Glucose.: 217 mg/dL (28 Dec 2018 08:28)  POCT Blood Glucose.: 78 mg/dL (27 Dec 2018 21:51)  POCT Blood Glucose.: 212 mg/dL (27 Dec 2018 18:20)  POCT Blood Glucose.: 229 mg/dL (27 Dec 2018 17:36)  POCT Blood Glucose.: 473 mg/dL (27 Dec 2018 17:07)  POCT Blood Glucose.: 138 mg/dL (27 Dec 2018 13:13)  POCT Blood Glucose.: 120 mg/dL (27 Dec 2018 11:22)      Medications:  MEDICATIONS  (STANDING):  acetaminophen   Tablet .. 650 milliGRAM(s) Oral once  dextrose 5%. 1000 milliLiter(s) (50 mL/Hr) IV Continuous <Continuous>  dextrose 50% Injectable 12.5 Gram(s) IV Push once  dextrose 50% Injectable 25 Gram(s) IV Push once  dextrose 50% Injectable 25 Gram(s) IV Push once  heparin  Injectable 5000 Unit(s) SubCutaneous every 8 hours  insulin glargine Injectable (LANTUS) 38 Unit(s) SubCutaneous at bedtime  insulin lispro (HumaLOG) corrective regimen sliding scale   SubCutaneous Before meals and at bedtime  insulin lispro Injectable (HumaLOG) 6 Unit(s) SubCutaneous three times a day before meals  piperacillin/tazobactam IVPB. 3.375 Gram(s) IV Intermittent every 6 hours  vancomycin  IVPB 1500 milliGRAM(s) IV Intermittent every 12 hours    MEDICATIONS  (PRN):  dextrose 40% Gel 15 Gram(s) Oral once PRN Blood Glucose LESS THAN 70 milliGRAM(s)/deciliter  glucagon  Injectable 1 milliGRAM(s) IntraMuscular once PRN Glucose LESS THAN 70 milligrams/deciliter  ondansetron Injectable 4 milliGRAM(s) IV Push every 6 hours PRN Nausea and/or Vomiting  sodium chloride 0.65% Nasal 1 Spray(s) Both Nostrils three times a day PRN Nasal Congestion      Weight:   No new weights obtained at this time    Height 71"; #; #; 135%IBW, BMI 32.4  Estimated energy needs:   Ideal body weight used for calculations as pt >120% of IBW. Increased protein for wound healing/OM  25-30 kcal/k4512-0698 kcal  1-1.2 gm/k-93 gm protein  25-30 mL/k5161-7679 mL fluids    Subjective:   42 yo/male with PMHx poorly managed T2DM, multiple toe amputations 2/2 diabetic ulcers/OM, presented with R. foot swelling. CT showing OM c/f necrotizing fasciitis. S/p OR this AM with podiatry, s/p I&D of R. foot and bone biopsy. Per MD note, little c/f nec. fasc. Pt seen in room, awake, alert. Endorses fair appetite PTA, does not eat consistently 2/2 work schedule. Pt reports that while at [facility], a nutritionist gave him an eating plan (hard boiled eggs, salad with lamb or chicken, slim fast shake, and rice/beans/meat) that he followed for some time but then felt that it was making him gain weight so he stopped and now follows no restrictions. Claims to not have an endocrinologist and does not take his insulin, only metformin. Currently pt reports 100% intake per meal. No N/V/C/D reported at this time. +BM. NKFA. Skin noted w/R. foot wound. Pain endorsed but tolerable. Spent time reviewing DM diet with patient, and encouraging patient to go back on previous diet. Suspect that pt did not tell full diet recall, as this diet likely would not cause weight gain. Pt noted to be 220# in April, 245# in July, now 232#. Pt likely to remain non-compliant as he has received education multiple times in the past. Pt referred to outpatient CDE.    s/p debridement of bone of foot . Plan for possible OR for closure of foot incision and graft application to plantar ulcer.     Previous Nutrition Diagnosis:  Limited adherence to nutrition - related recommendations RT pt being educated multiple times in the past regarding DM diet AEB A1C 9.2%, undesirable food choices, medication non-compliance    Active [   ]  Resolved [   ]    If resolved, new PES:     Goal:  Pt will state 3+ diet changes that he will make after discharge    Recommendations:    Education:     Risk Level: High [   ] Moderate [   ] Low [   ] Admitting Diagnosis:   Patient is a 43y old  Male who presents with a chief complaint of osteomyelitis w c/f nec fascitis (28 Dec 2018 07:46)      PAST MEDICAL & SURGICAL HISTORY:  Hyperlipidemia, unspecified hyperlipidemia type  Osteomyelitis  Diabetes  Toe amputation status      Current Nutrition Order: CSTCHOSN diet    PO Intake: Good (%) [ X ]  Fair (50-75%) [   ] Poor (<25%) [   ]    GI Issues: Denies N/V/D/C  +BM      Pain: resting comfortably in bed    Skin Integrity: intact with surgical incisions at foot wounds    Labs:       136  |  97  |  17  ----------------------------<  270<H>  4.5   |  27  |  1.12    Ca    8.7      28 Dec 2018 06:02  Mg     1.7           CAPILLARY BLOOD GLUCOSE      POCT Blood Glucose.: 217 mg/dL (28 Dec 2018 08:28)  POCT Blood Glucose.: 78 mg/dL (27 Dec 2018 21:51)  POCT Blood Glucose.: 212 mg/dL (27 Dec 2018 18:20)  POCT Blood Glucose.: 229 mg/dL (27 Dec 2018 17:36)  POCT Blood Glucose.: 473 mg/dL (27 Dec 2018 17:07)  POCT Blood Glucose.: 138 mg/dL (27 Dec 2018 13:13)  POCT Blood Glucose.: 120 mg/dL (27 Dec 2018 11:22)      Medications:  MEDICATIONS  (STANDING):  acetaminophen   Tablet .. 650 milliGRAM(s) Oral once  dextrose 5%. 1000 milliLiter(s) (50 mL/Hr) IV Continuous <Continuous>  dextrose 50% Injectable 12.5 Gram(s) IV Push once  dextrose 50% Injectable 25 Gram(s) IV Push once  dextrose 50% Injectable 25 Gram(s) IV Push once  heparin  Injectable 5000 Unit(s) SubCutaneous every 8 hours  insulin glargine Injectable (LANTUS) 38 Unit(s) SubCutaneous at bedtime  insulin lispro (HumaLOG) corrective regimen sliding scale   SubCutaneous Before meals and at bedtime  insulin lispro Injectable (HumaLOG) 6 Unit(s) SubCutaneous three times a day before meals  piperacillin/tazobactam IVPB. 3.375 Gram(s) IV Intermittent every 6 hours  vancomycin  IVPB 1500 milliGRAM(s) IV Intermittent every 12 hours    MEDICATIONS  (PRN):  dextrose 40% Gel 15 Gram(s) Oral once PRN Blood Glucose LESS THAN 70 milliGRAM(s)/deciliter  glucagon  Injectable 1 milliGRAM(s) IntraMuscular once PRN Glucose LESS THAN 70 milligrams/deciliter  ondansetron Injectable 4 milliGRAM(s) IV Push every 6 hours PRN Nausea and/or Vomiting  sodium chloride 0.65% Nasal 1 Spray(s) Both Nostrils three times a day PRN Nasal Congestion      Weight:   No new weights obtained at this time    Height 71"; #; #; 135%IBW, BMI 32.4  Estimated energy needs:   Ideal body weight used for calculations as pt >120% of IBW. Increased protein for wound healing/OM  25-30 kcal/k0719-5776 kcal  1-1.2 gm/k-93 gm protein  25-30 mL/k2930-8414 mL fluids    Subjective:   42 yo/male with PMHx poorly managed T2DM, multiple toe amputations 2/2 diabetic ulcers/OM, presented with R. foot swelling. CT showing OM c/f necrotizing fasciitis. S/p OR this AM with podiatry, s/p I&D of R. foot and bone biopsy. Per MD note, little c/f nec. fasc. NKFA. s/p debridement of bone of foot . Plan for possible OR for closure of foot incision and graft application to plantar ulcer. Seen pt in room, pt tolerating diet well, no complaints at this time. Discussed further with pt about outpatient nutrition services. Pt agreeable to try [facility] nutrition outpatient services with RD for T2DM diet management. Please reinforced and encouraged referral to outpatient nutrition.     Previous Nutrition Diagnosis:  Limited adherence to nutrition - related recommendations RT pt being educated multiple times in the past regarding DM diet AEB A1C 9.2%, undesirable food choices, medication non-compliance    Active [ X ]  Resolved [   ]    If resolved, new PES:   no new nutrition problems identified at this time     Goal:  Pt will state 3+ diet changes that he will make after discharge    Recommendations:  Continue current nutrition orders as tolerated    Education: Referral made to outpatient nutrition     Risk Level: High [   ] Moderate [ X ] Low [   ]

## 2018-12-28 NOTE — PROGRESS NOTE ADULT - PROBLEM SELECTOR PROBLEM 3
Obesity (BMI 30.0-34.9)
Diabetes
Obesity (BMI 30.0-34.9)

## 2018-12-28 NOTE — PROGRESS NOTE ADULT - PROBLEM SELECTOR PROBLEM 4
Hyperlipidemia, unspecified hyperlipidemia type
Obesity (BMI 30.0-34.9)
Hyperlipidemia, unspecified hyperlipidemia type

## 2018-12-28 NOTE — PHYSICAL THERAPY INITIAL EVALUATION ADULT - PERTINENT HX OF CURRENT PROBLEM, REHAB EVAL
noticed increased swelling in his right foot after work that did not improve by next morning. CT foot positive of ongoing osteomyelitis and air c/f necrotizing fasciitis. Patient seen by podiatry with plan for surgical intervention. Patient placed on abx and admitted to medicine service.

## 2018-12-28 NOTE — PROGRESS NOTE ADULT - ASSESSMENT
43 yoM w/ R foot ulcer tissue necrosis, and necrosis of bone. s/p Right foot debridement soft tissue and bone, bone biopsy, delayed primary closure and graft application     - C/w IV Abx as per primary team   - Follow up bone culture  - Dressing change instructions: Leave adaptic intact sutured to skin margins plantar right foot intact.    Replace gauze and Kerlix only  daily.   - Surgical shoe dispensed   - may heel weight bear with surgical shoe and crutches minimally.   - plan to follow up with Dr. David Green 1 week from discharge.

## 2018-12-28 NOTE — PROGRESS NOTE ADULT - SUBJECTIVE AND OBJECTIVE BOX
OVERNIGHT EVENTS: 6:26 PM, Lispro 4 U given for sugars >200s.  At bedtime, sugar was 78, ate a PB&J sandwich and then gave 38U Lantus.    INTERVAL HPI: Pt is examined at bedside. He appears well, in NAD. Patient denies chest pain, SOB, abdominal pain, nausea/vomiing.     ICU Vital Signs Last 24 Hrs  T(C): 36.6 (28 Dec 2018 04:33), Max: 36.7 (27 Dec 2018 20:34)  T(F): 97.9 (28 Dec 2018 04:33), Max: 98 (27 Dec 2018 20:34)  HR: 78 (28 Dec 2018 03:34) (70 - 80)  BP: 135/83 (28 Dec 2018 04:33) (127/75 - 149/93)  BP(mean): 107 (27 Dec 2018 12:56) (95 - 114)  ABP: --  ABP(mean): --  RR: 17 (28 Dec 2018 04:33) (10 - 32)  SpO2: 97% (28 Dec 2018 04:33) (96% - 100%)    I&O's Summary    27 Dec 2018 07:01  -  28 Dec 2018 07:00  --------------------------------------------------------  IN: 900 mL / OUT: 50 mL / NET: 850 mL          LABS:                        12.4   6.8   )-----------( 367      ( 28 Dec 2018 06:02 )             38.1         136  |  97  |  17  ----------------------------<  270<H>  4.5   |  27  |  1.12    Ca    8.7      28 Dec 2018 06:02  Mg     1.7           PT/INR - ( 27 Dec 2018 06:13 )   PT: 11.2 sec;   INR: 0.99          PTT - ( 27 Dec 2018 06:13 )  PTT:32.6 sec  Urinalysis Basic - ( 27 Dec 2018 06:46 )    Color: Yellow / Appearance: Clear / S.025 / pH: x  Gluc: x / Ketone: NEGATIVE  / Bili: Negative / Urobili: 0.2 E.U./dL   Blood: x / Protein: NEGATIVE mg/dL / Nitrite: NEGATIVE   Leuk Esterase: NEGATIVE / RBC: x / WBC x   Sq Epi: x / Non Sq Epi: x / Bacteria: x      CAPILLARY BLOOD GLUCOSE      POCT Blood Glucose.: 78 mg/dL (27 Dec 2018 21:51)  POCT Blood Glucose.: 212 mg/dL (27 Dec 2018 18:20)  POCT Blood Glucose.: 229 mg/dL (27 Dec 2018 17:36)  POCT Blood Glucose.: 473 mg/dL (27 Dec 2018 17:07)  POCT Blood Glucose.: 138 mg/dL (27 Dec 2018 13:13)  POCT Blood Glucose.: 120 mg/dL (27 Dec 2018 11:22)        RADIOLOGY & ADDITIONAL TESTS:    Consultant(s) Notes Reviewed:  [x ] YES  [ ] NO    MEDICATIONS  (STANDING):  dextrose 5%. 1000 milliLiter(s) (50 mL/Hr) IV Continuous <Continuous>  dextrose 50% Injectable 12.5 Gram(s) IV Push once  dextrose 50% Injectable 25 Gram(s) IV Push once  dextrose 50% Injectable 25 Gram(s) IV Push once  heparin  Injectable 5000 Unit(s) SubCutaneous every 8 hours  insulin glargine Injectable (LANTUS) 38 Unit(s) SubCutaneous at bedtime  insulin lispro (HumaLOG) corrective regimen sliding scale   SubCutaneous Before meals and at bedtime  insulin lispro Injectable (HumaLOG) 6 Unit(s) SubCutaneous three times a day before meals  piperacillin/tazobactam IVPB. 3.375 Gram(s) IV Intermittent every 6 hours  vancomycin  IVPB 1500 milliGRAM(s) IV Intermittent every 12 hours    MEDICATIONS  (PRN):  dextrose 40% Gel 15 Gram(s) Oral once PRN Blood Glucose LESS THAN 70 milliGRAM(s)/deciliter  glucagon  Injectable 1 milliGRAM(s) IntraMuscular once PRN Glucose LESS THAN 70 milligrams/deciliter  ondansetron Injectable 4 milliGRAM(s) IV Push every 6 hours PRN Nausea and/or Vomiting  sodium chloride 0.65% Nasal 1 Spray(s) Both Nostrils three times a day PRN Nasal Congestion    PHYSICAL EXAM:  GENERAL: NAD  HEAD:  Atraumatic, Normocephalic  EYES: EOMI, PERRLA, conjunctiva and sclera clear  ENT: No tonsillar erythema, exudates, or enlargement; Moist mucous membranes, Good dentition, No lesions  NECK: Supple, No JVD, Normal thyroid, no enlarged nodes  NERVOUS SYSTEM:  Alert & Oriented X3, Good concentration; Motor Strength 5/5 B/L upper and lower extremities; DTRs 2+ intact and symmetric, sensory intact  CHEST/LUNG: B/L good air entry; No rales, rhonchi, or wheezing  HEART: S1S2 normal, no S3, Regular rate and rhythm; No murmurs, rubs, or gallops  ABDOMEN: Soft, Nontender, Nondistended; Bowel sounds present  EXTREMITIES: right foot wrapped, toes warm , denies pain  LYMPH: No lymphadenopathy noted  SKIN: No rashes or lesions    Care Discussed with Consultants/Other Providers [ x] YES  [ ] NO

## 2018-12-28 NOTE — PROGRESS NOTE ADULT - PROBLEM SELECTOR PLAN 6
1) PCP Contacted on Admission: (Y/N) --> Name & Phone #:  2) Date of Contact with PCP:  3) PCP Contacted at Discharge: (Y/N, N/A)  4) Summary of Handoff Given to PCP:   5) Post-Discharge Appointment Date and Location:
F: No IVF  E: K>4, Mg>2  N: DASH with consistent carbohydrate and evening snack, mISS  Ppx: SCD  Lines: peripheral  Ambulation: with assistance  Code: full  Dispo: regional medical floors for management of osteo and r/o nec fasc
1) PCP Contacted on Admission: (Y/N) --> Name & Phone #: Pt will follow up with Dr. busch in 1 week. He has no PCP, will make apt with M  2) Date of Contact with PCP:  3) PCP Contacted at Discharge: (Y/N, N/A)  4) Summary of Handoff Given to PCP:   5) Post-Discharge Appointment Date and Location:

## 2018-12-28 NOTE — PROGRESS NOTE ADULT - REASON FOR ADMISSION
osteomyelitis w c/f nec fascitis
osteomyelitis w cellulitis
osteomyelitis w c/f nec fascitis

## 2018-12-28 NOTE — PROGRESS NOTE ADULT - PROBLEM SELECTOR PROBLEM 6
Transition of care performed with sharing of clinical summary
Transition of care performed with sharing of clinical summary
Nutrition, metabolism, and development symptoms
Transition of care performed with sharing of clinical summary

## 2018-12-28 NOTE — PROGRESS NOTE ADULT - PROBLEM SELECTOR PLAN 1
Chronic in nature - completed 10 day regimen with oral keflex after discharge from hospital on December 5. CT from ED now with evidence of air collection on the dorsum of right foot.   - S/P OR PROCEDURE with podiatry  - Right foot and ankle incision and drainage with washout and bone biopsy little concern for nec fasc, cx taken, bone biopsied, packed.   - will continue with iv abx for now- -vancomycin 1750mg q12 hours, zosyn 3.375mg q8 hours. Will transition to PO pending poditry recs.   - Pt to have dressing change today and d/c with f/up with Dr. Khan   - F/u podiatry recs

## 2018-12-28 NOTE — PROGRESS NOTE ADULT - PROBLEM SELECTOR PLAN 3
BMI 31  -nutrition consult-pending     #normocytic anemia  At patients baseline. No signs of bleeding.   -f/u AM CBC
home regimen: lantus 48 qHS, lispro 8U prior to meals  -continue 38U lantus qHS, 6U lispro bolus insulin  -moderate insulin sliding scale  -consistent carbohydrate diet with evening snack
BMI 31  -nutrition consult    #normocytic anemia  At patients baseline. No signs of bleeding.   -f/u AM CBC
BMI 31  -nutrition consult-pending     #normocytic anemia  At patients baseline. No signs of bleeding.   -f/u AM CBC

## 2018-12-28 NOTE — PROGRESS NOTE ADULT - PROBLEM SELECTOR PLAN 5
F: No IVF  E: K>4, Mg>2  N: NPO until after procedure   Ppx: SCD  Lines: peripheral  Ambulation: with assistance  Code: full  Dispo: regional medical floors

## 2018-12-28 NOTE — PROGRESS NOTE ADULT - PROBLEM SELECTOR PLAN 4
-holding atorvastatin - says it causes cough
BMI 31  -nutrition consult    #normocytic anemia  At patients baseline. No signs of bleeding.   -f/u AM CBC
-holding atorvastatin - says it causes cough

## 2018-12-28 NOTE — PROGRESS NOTE ADULT - PROVIDER SPECIALTY LIST ADULT
Internal Medicine
Podiatry
Internal Medicine
Internal Medicine
Podiatry
Internal Medicine

## 2018-12-28 NOTE — PROGRESS NOTE ADULT - SUBJECTIVE AND OBJECTIVE BOX
Patient is a 43y old  Male who presents with a chief complaint of osteomyelitis w c/f nec fascitis (28 Dec 2018 07:46)      INTERVAL HPI/ OVERNIGHT EVENTS  pt seen bedside , no complaints overnight   1/day s/p R foot debridement and amniotic graft application      LABS                        12.4   6.8   )-----------( 367      ( 28 Dec 2018 06:02 )             38.1     12-28    136  |  97  |  17  ----------------------------<  270<H>  4.5   |  27  |  1.12    Ca    8.7      28 Dec 2018 06:02  Mg     1.7     12-28      PT/INR - ( 27 Dec 2018 06:13 )   PT: 11.2 sec;   INR: 0.99          PTT - ( 27 Dec 2018 06:13 )  PTT:32.6 sec    ICU Vital Signs Last 24 Hrs  T(C): 36.9 (28 Dec 2018 08:43), Max: 36.9 (28 Dec 2018 08:43)  T(F): 98.4 (28 Dec 2018 08:43), Max: 98.4 (28 Dec 2018 08:43)  HR: 77 (28 Dec 2018 08:43) (72 - 80)  BP: 148/88 (28 Dec 2018 08:43) (129/75 - 149/93)  BP(mean): 107 (27 Dec 2018 12:56) (102 - 114)  ABP: --  ABP(mean): --  RR: 18 (28 Dec 2018 08:43) (10 - 32)  SpO2: 98% (28 Dec 2018 08:43) (96% - 100%)      RADIOLOGY    MICROBIOLOGY    PHYSICAL EXAM  NAD AAO x 3   dressing left clean dry and intact, no calf tenderness BL , cap fill time to toes brisk.

## 2018-12-28 NOTE — PROGRESS NOTE ADULT - PROBLEM SELECTOR PROBLEM 5
Nutrition, metabolism, and development symptoms
Hyperlipidemia, unspecified hyperlipidemia type
Nutrition, metabolism, and development symptoms
Nutrition, metabolism, and development symptoms

## 2018-12-28 NOTE — PROGRESS NOTE ADULT - PROBLEM SELECTOR PLAN 2
home regimen: lantus 48 qHS, lispro 8U prior to meals  right foot wrapped, toes warm , denies pain  - c/w 6U lispro insulin and 38U at bedtime.   - moderate insulin sliding scale  - consistent carbohydrate diet with evening snack

## 2018-12-30 LAB
CULTURE RESULTS: NO GROWTH — SIGNIFICANT CHANGE UP
SPECIMEN SOURCE: SIGNIFICANT CHANGE UP

## 2018-12-31 PROCEDURE — 71045 X-RAY EXAM CHEST 1 VIEW: CPT

## 2018-12-31 PROCEDURE — 85027 COMPLETE CBC AUTOMATED: CPT

## 2018-12-31 PROCEDURE — 93005 ELECTROCARDIOGRAM TRACING: CPT

## 2018-12-31 PROCEDURE — 86140 C-REACTIVE PROTEIN: CPT

## 2018-12-31 PROCEDURE — 81003 URINALYSIS AUTO W/O SCOPE: CPT

## 2018-12-31 PROCEDURE — 80053 COMPREHEN METABOLIC PANEL: CPT

## 2018-12-31 PROCEDURE — 96374 THER/PROPH/DIAG INJ IV PUSH: CPT | Mod: XU

## 2018-12-31 PROCEDURE — 80202 ASSAY OF VANCOMYCIN: CPT

## 2018-12-31 PROCEDURE — 93971 EXTREMITY STUDY: CPT

## 2018-12-31 PROCEDURE — 87040 BLOOD CULTURE FOR BACTERIA: CPT

## 2018-12-31 PROCEDURE — 86901 BLOOD TYPING SEROLOGIC RH(D): CPT

## 2018-12-31 PROCEDURE — 80061 LIPID PANEL: CPT

## 2018-12-31 PROCEDURE — 85730 THROMBOPLASTIN TIME PARTIAL: CPT

## 2018-12-31 PROCEDURE — 82962 GLUCOSE BLOOD TEST: CPT

## 2018-12-31 PROCEDURE — 80048 BASIC METABOLIC PNL TOTAL CA: CPT

## 2018-12-31 PROCEDURE — 97161 PT EVAL LOW COMPLEX 20 MIN: CPT

## 2018-12-31 PROCEDURE — 83735 ASSAY OF MAGNESIUM: CPT

## 2018-12-31 PROCEDURE — 87186 SC STD MICRODIL/AGAR DIL: CPT

## 2018-12-31 PROCEDURE — 99285 EMERGENCY DEPT VISIT HI MDM: CPT | Mod: 25

## 2018-12-31 PROCEDURE — 73701 CT LOWER EXTREMITY W/DYE: CPT

## 2018-12-31 PROCEDURE — 73610 X-RAY EXAM OF ANKLE: CPT

## 2018-12-31 PROCEDURE — 87070 CULTURE OTHR SPECIMN AEROBIC: CPT

## 2018-12-31 PROCEDURE — 85025 COMPLETE CBC W/AUTO DIFF WBC: CPT

## 2018-12-31 PROCEDURE — 86850 RBC ANTIBODY SCREEN: CPT

## 2018-12-31 PROCEDURE — 73630 X-RAY EXAM OF FOOT: CPT

## 2018-12-31 PROCEDURE — 85610 PROTHROMBIN TIME: CPT

## 2018-12-31 PROCEDURE — 36415 COLL VENOUS BLD VENIPUNCTURE: CPT

## 2018-12-31 PROCEDURE — 86900 BLOOD TYPING SEROLOGIC ABO: CPT

## 2018-12-31 PROCEDURE — 85652 RBC SED RATE AUTOMATED: CPT

## 2018-12-31 PROCEDURE — 87075 CULTR BACTERIA EXCEPT BLOOD: CPT

## 2019-01-09 ENCOUNTER — RESULT CHARGE (OUTPATIENT)
Age: 44
End: 2019-01-09

## 2019-01-09 ENCOUNTER — TRANSCRIPTION ENCOUNTER (OUTPATIENT)
Age: 44
End: 2019-01-09

## 2019-01-09 ENCOUNTER — APPOINTMENT (OUTPATIENT)
Dept: ENDOCRINOLOGY | Facility: CLINIC | Age: 44
End: 2019-01-09
Payer: COMMERCIAL

## 2019-01-09 VITALS
HEIGHT: 72 IN | SYSTOLIC BLOOD PRESSURE: 132 MMHG | HEART RATE: 88 BPM | WEIGHT: 250 LBS | BODY MASS INDEX: 33.86 KG/M2 | DIASTOLIC BLOOD PRESSURE: 82 MMHG

## 2019-01-09 DIAGNOSIS — E11.65 TYPE 2 DIABETES MELLITUS WITH HYPERGLYCEMIA: ICD-10-CM

## 2019-01-09 LAB
GLUCOSE BLDC GLUCOMTR-MCNC: 232
HBA1C MFR BLD HPLC: 9.6

## 2019-01-09 PROCEDURE — 83036 HEMOGLOBIN GLYCOSYLATED A1C: CPT | Mod: QW

## 2019-01-09 PROCEDURE — 99204 OFFICE O/P NEW MOD 45 MIN: CPT | Mod: 25,GC

## 2019-01-09 PROCEDURE — 82962 GLUCOSE BLOOD TEST: CPT

## 2019-01-09 RX ORDER — FLASH GLUCOSE SCANNING READER
EACH MISCELLANEOUS
Qty: 1 | Refills: 0 | Status: ACTIVE | COMMUNITY
Start: 2019-01-09 | End: 1900-01-01

## 2019-01-09 RX ORDER — INSULIN GLARGINE 100 [IU]/ML
100 INJECTION, SOLUTION SUBCUTANEOUS
Qty: 1 | Refills: 5 | Status: ACTIVE | COMMUNITY
Start: 2018-05-08 | End: 1900-01-01

## 2019-01-09 RX ORDER — FLASH GLUCOSE SENSOR
KIT MISCELLANEOUS
Qty: 2 | Refills: 6 | Status: ACTIVE | COMMUNITY
Start: 2019-01-09 | End: 1900-01-01

## 2019-01-09 RX ORDER — INSULIN ASPART 100 [IU]/ML
100 INJECTION, SOLUTION INTRAVENOUS; SUBCUTANEOUS 3 TIMES DAILY
Qty: 3 | Refills: 0 | Status: DISCONTINUED | COMMUNITY
Start: 2018-05-08 | End: 2019-01-09

## 2019-01-09 RX ORDER — INSULIN LISPRO 100 [IU]/ML
100 INJECTION, SOLUTION INTRAVENOUS; SUBCUTANEOUS 3 TIMES DAILY
Qty: 1 | Refills: 5 | Status: ACTIVE | COMMUNITY
Start: 2018-12-28 | End: 1900-01-01

## 2019-01-09 RX ORDER — METFORMIN HYDROCHLORIDE 500 MG/1
500 TABLET, COATED ORAL
Qty: 1 | Refills: 5 | Status: ACTIVE | COMMUNITY
Start: 2019-01-09 | End: 1900-01-01

## 2019-01-09 NOTE — REVIEW OF SYSTEMS
[Recent Weight Loss (___ Lbs)] : recent [unfilled] ~Ulb weight loss [Blurry Vision] : blurred vision [Pain/Numbness of Digits] : pain/numbness of digits

## 2019-01-14 LAB
ALBUMIN SERPL ELPH-MCNC: 4.3 G/DL
ALP BLD-CCNC: 79 U/L
ALT SERPL-CCNC: 57 U/L
ANION GAP SERPL CALC-SCNC: 11 MMOL/L
AST SERPL-CCNC: 31 U/L
BASOPHILS # BLD AUTO: 0.04 K/UL
BASOPHILS NFR BLD AUTO: 0.4 %
BILIRUB SERPL-MCNC: 0.6 MG/DL
BUN SERPL-MCNC: 21 MG/DL
CALCIUM SERPL-MCNC: 9.9 MG/DL
CHLORIDE SERPL-SCNC: 99 MMOL/L
CHOLEST SERPL-MCNC: 225 MG/DL
CHOLEST/HDLC SERPL: 4.7 RATIO
CO2 SERPL-SCNC: 27 MMOL/L
CREAT SERPL-MCNC: 1.19 MG/DL
CREAT SPEC-SCNC: 250 MG/DL
EOSINOPHIL # BLD AUTO: 0.24 K/UL
EOSINOPHIL NFR BLD AUTO: 2.6 %
GLUCOSE SERPL-MCNC: 259 MG/DL
HCT VFR BLD CALC: 41.9 %
HDLC SERPL-MCNC: 48 MG/DL
HGB BLD-MCNC: 13.7 G/DL
IMM GRANULOCYTES NFR BLD AUTO: 0.1 %
LDLC SERPL CALC-MCNC: 154 MG/DL
LYMPHOCYTES # BLD AUTO: 4.27 K/UL
LYMPHOCYTES NFR BLD AUTO: 46.9 %
MAN DIFF?: NORMAL
MCHC RBC-ENTMCNC: 28.4 PG
MCHC RBC-ENTMCNC: 32.7 GM/DL
MCV RBC AUTO: 86.7 FL
MICROALBUMIN 24H UR DL<=1MG/L-MCNC: 30 MG/DL
MICROALBUMIN/CREAT 24H UR-RTO: 120 MG/G
MONOCYTES # BLD AUTO: 0.53 K/UL
MONOCYTES NFR BLD AUTO: 5.8 %
NEUTROPHILS # BLD AUTO: 4.02 K/UL
NEUTROPHILS NFR BLD AUTO: 44.2 %
PLATELET # BLD AUTO: 373 K/UL
POTASSIUM SERPL-SCNC: 4.9 MMOL/L
PROT SERPL-MCNC: 8.3 G/DL
RBC # BLD: 4.83 M/UL
RBC # FLD: 13.9 %
SODIUM SERPL-SCNC: 137 MMOL/L
TRIGL SERPL-MCNC: 113 MG/DL
WBC # FLD AUTO: 9.11 K/UL

## 2019-01-14 RX ORDER — ATORVASTATIN CALCIUM 40 MG/1
40 TABLET, FILM COATED ORAL DAILY
Qty: 30 | Refills: 5 | Status: ACTIVE | COMMUNITY
Start: 2018-06-12 | End: 1900-01-01

## 2019-01-14 RX ORDER — LOSARTAN POTASSIUM 25 MG/1
25 TABLET, FILM COATED ORAL DAILY
Qty: 30 | Refills: 5 | Status: ACTIVE | COMMUNITY
Start: 2018-06-12 | End: 1900-01-01

## 2019-01-14 NOTE — HISTORY OF PRESENT ILLNESS
[FreeTextEntry1] : 43 year male presents for post hospital discharge follow-up. He was evaluated by Dr Chisholm last year after a hospital discharge for osteomyelitis. This time, he had an unhealing ulcer under his feet that needed debridement. He was admitted in [facility] in December.\par \par Dx: Many years ago\par Type: 2\par \par Current regimen:  Basaglar 30 units QHS, Humalog 8 units TID\par \par Patient tends to forget to inject his Humalog especially during lunch atleast 3x per week\par \par Pt checks FSG      {8-10     } times per day:\par \par FS-130s\par Pre-breakfast:120s-130s\par Pre-lunch:190s\par Pre-dinner: 200s\par bedtime: 100s\par \par Hypoglycemia:never\par \par \par Diet:\par Breakfast: bought usually coffee and eggs\par Lunch: bought\par Dinner:home made\par Snacks: candy\par \par Urine micro: NA\par lipid profile:NA\par last hba1c: 9.6% 2019  11.3  2018\par eye exam: 2 years ago\par diabetic foot exam/podiatry: seen last week\par \par \par \par \par

## 2019-01-14 NOTE — PHYSICAL EXAM
[Alert] : alert [No Acute Distress] : no acute distress [Well Nourished] : well nourished [Well Developed] : well developed [Normal Sclera/Conjunctiva] : normal sclera/conjunctiva [EOMI] : extra ocular movement intact [No Proptosis] : no proptosis [Normal Oropharynx] : the oropharynx was normal [Thyroid Not Enlarged] : the thyroid was not enlarged [No Thyroid Nodules] : there were no palpable thyroid nodules [No Respiratory Distress] : no respiratory distress [No Accessory Muscle Use] : no accessory muscle use [Clear to Auscultation] : lungs were clear to auscultation bilaterally [Normal Rate] : heart rate was normal  [Normal S1, S2] : normal S1 and S2 [Regular Rhythm] : with a regular rhythm [Pedal Pulses Normal] : the pedal pulses are present [No Edema] : there was no peripheral edema [Normal Bowel Sounds] : normal bowel sounds [Not Tender] : non-tender [Soft] : abdomen soft [Not Distended] : not distended [Post Cervical Nodes] : posterior cervical nodes [Anterior Cervical Nodes] : anterior cervical nodes [Axillary Nodes] : axillary nodes [Normal] : normal and non tender [No Spinal Tenderness] : no spinal tenderness [Spine Straight] : spine straight [No Stigmata of Cushings Syndrome] : no stigmata of cushings syndrome [Normal Gait] : normal gait [Normal Strength/Tone] : muscle strength and tone were normal [Foot Ulcers] : foot ulcers [Normal Reflexes] : deep tendon reflexes were 2+ and symmetric [No Tremors] : no tremors [Oriented x3] : oriented to person, place, and time [Acanthosis Nigricans] : no acanthosis nigricans [de-identified] : R foot ulcer in bandage, 1/2 of 2 nd toe and 3rd toe missing

## 2019-01-14 NOTE — END OF VISIT
[FreeTextEntry3] : Pt seen and discussed with fellow. I agree with Dr. Salazar's assessment and plan. Potential complications of diabetes reviewed (blindness, kidney disease, nerve damage) and importance of glucose control discussed to prevent complications.  Explained that osteo is a very serious infection that can lead to amputation so he really has to get his sugars under control.

## 2019-01-14 NOTE — ASSESSMENT
[Carbohydrate Consistent Diet] : carbohydrate consistent diet [Diabetes Foot Care] : diabetes foot care [Long Term Vascular Complications] : long term vascular complications of diabetes [Importance of Diet and Exercise] : importance of diet and exercise to improve glycemic control, achieve weight loss and improve cardiovascular health [Self Monitoring of Blood Glucose] : self monitoring of blood glucose [Retinopathy Screening] : Patient was referred to ophthalmology for retinopathy screening [FreeTextEntry1] : uncontrolled, complicated\par Patient needs a lot of guidance but he feels that he can make changes himself and improve his diabetes. \par I recommended that he sees the educator and nutritionist but he wants to be given 2 months to show his efforts before seeking more help\par I reviewed all the complications of diabetes if he does not make changes now to improve his management and control\par He understands and will work harder\par Will cont Basaglar 30 units QHS, HUmalog 8 units TID w/ meals\par Will start Metformin 500 mg BID to inc insulin sensitivity\par Will order freestyle prakash. Patient may be more compliant with CGM\par Will check microalbumin, lipid, cbc, bmp\par RTC in 2 months\par \par \par 1/14 Addendum\par Labs reviewed-  and microalbumin 120. Patient rec to restart atorvastatin and losartan. Will repeat labs in 6 months.\par Patient to follow up on 2 months

## 2019-01-27 NOTE — PROGRESS NOTE ADULT - PROBLEM SELECTOR PLAN 2
Continue debrox  Pt will need ear irrigated MRI confirming osteomyelitis.  Tissue Cx growing GBS and MSSA. Bone biopsy gram stain negative. Cx unlikely to grow in setting of prolonged abx.  - discontinued vanc/zosyn on 5/2  - switched to cef 2g q8 till 6/7/18  - F/u ID recs for antibiotic regimen  - Plan for PICC line

## 2019-02-26 NOTE — PROGRESS NOTE ADULT - ASSESSMENT
42 year old male with poorly controlled diabetes (A1C 12.9 on insulin), s/p right 4th and 5th toe amputation (2015 and 2017), recent admission for osteomyelitis on base of right foot, treated with IV Abx, presented with 1 day of fever, pain and discharge of right 2nd toe 2/2 osteomyelitis associated with fever, fatigue, and confusion. Now is s/p amputation of R 2nd toe 7/9/18. s/p R Picc placement for IV antibiotics on 7/13/18. Lab Facility: [id number] Lab Facility: [id number]

## 2019-03-08 ENCOUNTER — APPOINTMENT (OUTPATIENT)
Dept: ENDOCRINOLOGY | Facility: CLINIC | Age: 44
End: 2019-03-08

## 2019-07-11 ENCOUNTER — INPATIENT (INPATIENT)
Facility: HOSPITAL | Age: 44
LOS: 0 days | Discharge: HOME HEALTH | DRG: 313 | End: 2019-07-12
Attending: INTERNAL MEDICINE | Admitting: INTERNAL MEDICINE
Payer: COMMERCIAL

## 2019-07-11 VITALS
TEMPERATURE: 98 F | SYSTOLIC BLOOD PRESSURE: 114 MMHG | WEIGHT: 252.87 LBS | RESPIRATION RATE: 18 BRPM | DIASTOLIC BLOOD PRESSURE: 77 MMHG | HEART RATE: 100 BPM

## 2019-07-11 DIAGNOSIS — M86.671 OTHER CHRONIC OSTEOMYELITIS, RIGHT ANKLE AND FOOT: ICD-10-CM

## 2019-07-11 DIAGNOSIS — Z89.429 ACQUIRED ABSENCE OF OTHER TOE(S), UNSPECIFIED SIDE: Chronic | ICD-10-CM

## 2019-07-11 DIAGNOSIS — Z02.9 ENCOUNTER FOR ADMINISTRATIVE EXAMINATIONS, UNSPECIFIED: ICD-10-CM

## 2019-07-11 DIAGNOSIS — Z94.5 SKIN TRANSPLANT STATUS: Chronic | ICD-10-CM

## 2019-07-11 DIAGNOSIS — E11.9 TYPE 2 DIABETES MELLITUS WITHOUT COMPLICATIONS: ICD-10-CM

## 2019-07-11 DIAGNOSIS — E78.5 HYPERLIPIDEMIA, UNSPECIFIED: ICD-10-CM

## 2019-07-11 DIAGNOSIS — I10 ESSENTIAL (PRIMARY) HYPERTENSION: ICD-10-CM

## 2019-07-11 DIAGNOSIS — N17.9 ACUTE KIDNEY FAILURE, UNSPECIFIED: ICD-10-CM

## 2019-07-11 DIAGNOSIS — Z29.9 ENCOUNTER FOR PROPHYLACTIC MEASURES, UNSPECIFIED: ICD-10-CM

## 2019-07-11 DIAGNOSIS — Z87.898 PERSONAL HISTORY OF OTHER SPECIFIED CONDITIONS: ICD-10-CM

## 2019-07-11 DIAGNOSIS — R07.9 CHEST PAIN, UNSPECIFIED: ICD-10-CM

## 2019-07-11 LAB
ALBUMIN SERPL ELPH-MCNC: 4 G/DL — SIGNIFICANT CHANGE UP (ref 3.3–5)
ALBUMIN SERPL ELPH-MCNC: 4.3 G/DL — SIGNIFICANT CHANGE UP (ref 3.3–5)
ALP SERPL-CCNC: 58 U/L — SIGNIFICANT CHANGE UP (ref 40–120)
ALP SERPL-CCNC: SIGNIFICANT CHANGE UP U/L (ref 40–120)
ALT FLD-CCNC: 31 U/L — SIGNIFICANT CHANGE UP (ref 10–45)
ALT FLD-CCNC: SIGNIFICANT CHANGE UP U/L (ref 10–45)
AMPHET UR-MCNC: NEGATIVE — SIGNIFICANT CHANGE UP
ANION GAP SERPL CALC-SCNC: 12 MMOL/L — SIGNIFICANT CHANGE UP (ref 5–17)
ANION GAP SERPL CALC-SCNC: 12 MMOL/L — SIGNIFICANT CHANGE UP (ref 5–17)
APPEARANCE UR: CLEAR — SIGNIFICANT CHANGE UP
APTT BLD: 34.5 SEC — SIGNIFICANT CHANGE UP (ref 27.5–36.3)
AST SERPL-CCNC: 30 U/L — SIGNIFICANT CHANGE UP (ref 10–40)
AST SERPL-CCNC: SIGNIFICANT CHANGE UP U/L (ref 10–40)
BARBITURATES UR SCN-MCNC: NEGATIVE — SIGNIFICANT CHANGE UP
BASOPHILS # BLD AUTO: 0.07 K/UL — SIGNIFICANT CHANGE UP (ref 0–0.2)
BASOPHILS NFR BLD AUTO: 0.6 % — SIGNIFICANT CHANGE UP (ref 0–2)
BENZODIAZ UR-MCNC: NEGATIVE — SIGNIFICANT CHANGE UP
BILIRUB SERPL-MCNC: 0.4 MG/DL — SIGNIFICANT CHANGE UP (ref 0.2–1.2)
BILIRUB SERPL-MCNC: 0.4 MG/DL — SIGNIFICANT CHANGE UP (ref 0.2–1.2)
BILIRUB UR-MCNC: NEGATIVE — SIGNIFICANT CHANGE UP
BUN SERPL-MCNC: 35 MG/DL — HIGH (ref 7–23)
BUN SERPL-MCNC: 35 MG/DL — HIGH (ref 7–23)
CALCIUM SERPL-MCNC: 9.5 MG/DL — SIGNIFICANT CHANGE UP (ref 8.4–10.5)
CALCIUM SERPL-MCNC: 9.9 MG/DL — SIGNIFICANT CHANGE UP (ref 8.4–10.5)
CHLORIDE SERPL-SCNC: 101 MMOL/L — SIGNIFICANT CHANGE UP (ref 96–108)
CHLORIDE SERPL-SCNC: 102 MMOL/L — SIGNIFICANT CHANGE UP (ref 96–108)
CK MB CFR SERPL CALC: 5 NG/ML — SIGNIFICANT CHANGE UP (ref 0–6.7)
CK MB CFR SERPL CALC: 6.1 NG/ML — SIGNIFICANT CHANGE UP (ref 0–6.7)
CK SERPL-CCNC: 172 U/L — SIGNIFICANT CHANGE UP (ref 30–200)
CK SERPL-CCNC: SIGNIFICANT CHANGE UP U/L (ref 30–200)
CO2 SERPL-SCNC: 23 MMOL/L — SIGNIFICANT CHANGE UP (ref 22–31)
CO2 SERPL-SCNC: 27 MMOL/L — SIGNIFICANT CHANGE UP (ref 22–31)
COCAINE METAB.OTHER UR-MCNC: NEGATIVE — SIGNIFICANT CHANGE UP
COLOR SPEC: YELLOW — SIGNIFICANT CHANGE UP
CREAT ?TM UR-MCNC: 171 MG/DL — SIGNIFICANT CHANGE UP
CREAT SERPL-MCNC: 1.96 MG/DL — HIGH (ref 0.5–1.3)
CREAT SERPL-MCNC: 2.06 MG/DL — HIGH (ref 0.5–1.3)
DIFF PNL FLD: NEGATIVE — SIGNIFICANT CHANGE UP
EOSINOPHIL # BLD AUTO: 0.24 K/UL — SIGNIFICANT CHANGE UP (ref 0–0.5)
EOSINOPHIL NFR BLD AUTO: 2.2 % — SIGNIFICANT CHANGE UP (ref 0–6)
GLUCOSE BLDC GLUCOMTR-MCNC: 106 MG/DL — HIGH (ref 70–99)
GLUCOSE BLDC GLUCOMTR-MCNC: 90 MG/DL — SIGNIFICANT CHANGE UP (ref 70–99)
GLUCOSE SERPL-MCNC: 128 MG/DL — HIGH (ref 70–99)
GLUCOSE SERPL-MCNC: 129 MG/DL — HIGH (ref 70–99)
GLUCOSE UR QL: NEGATIVE — SIGNIFICANT CHANGE UP
HBA1C BLD-MCNC: 9.8 % — HIGH (ref 4–5.6)
HCT VFR BLD CALC: 39.4 % — SIGNIFICANT CHANGE UP (ref 39–50)
HGB BLD-MCNC: 12.9 G/DL — LOW (ref 13–17)
IMM GRANULOCYTES NFR BLD AUTO: 0.3 % — SIGNIFICANT CHANGE UP (ref 0–1.5)
INR BLD: 0.97 — SIGNIFICANT CHANGE UP (ref 0.88–1.16)
KETONES UR-MCNC: NEGATIVE — SIGNIFICANT CHANGE UP
LEUKOCYTE ESTERASE UR-ACNC: NEGATIVE — SIGNIFICANT CHANGE UP
LYMPHOCYTES # BLD AUTO: 39.3 % — SIGNIFICANT CHANGE UP (ref 13–44)
LYMPHOCYTES # BLD AUTO: 4.31 K/UL — HIGH (ref 1–3.3)
MCHC RBC-ENTMCNC: 29.3 PG — SIGNIFICANT CHANGE UP (ref 27–34)
MCHC RBC-ENTMCNC: 32.7 GM/DL — SIGNIFICANT CHANGE UP (ref 32–36)
MCV RBC AUTO: 89.3 FL — SIGNIFICANT CHANGE UP (ref 80–100)
METHADONE UR-MCNC: NEGATIVE — SIGNIFICANT CHANGE UP
MONOCYTES # BLD AUTO: 0.77 K/UL — SIGNIFICANT CHANGE UP (ref 0–0.9)
MONOCYTES NFR BLD AUTO: 7 % — SIGNIFICANT CHANGE UP (ref 2–14)
NEUTROPHILS # BLD AUTO: 5.55 K/UL — SIGNIFICANT CHANGE UP (ref 1.8–7.4)
NEUTROPHILS NFR BLD AUTO: 50.6 % — SIGNIFICANT CHANGE UP (ref 43–77)
NITRITE UR-MCNC: NEGATIVE — SIGNIFICANT CHANGE UP
NRBC # BLD: 0 /100 WBCS — SIGNIFICANT CHANGE UP (ref 0–0)
OPIATES UR-MCNC: NEGATIVE — SIGNIFICANT CHANGE UP
OSMOLALITY UR: 540 MOSMOL/KG — SIGNIFICANT CHANGE UP (ref 100–650)
PCP SPEC-MCNC: SIGNIFICANT CHANGE UP
PCP UR-MCNC: NEGATIVE — SIGNIFICANT CHANGE UP
PH UR: 6 — SIGNIFICANT CHANGE UP (ref 5–8)
PLATELET # BLD AUTO: 328 K/UL — SIGNIFICANT CHANGE UP (ref 150–400)
POTASSIUM SERPL-MCNC: 5.4 MMOL/L — HIGH (ref 3.5–5.3)
POTASSIUM SERPL-MCNC: SIGNIFICANT CHANGE UP MMOL/L (ref 3.5–5.3)
POTASSIUM SERPL-SCNC: 5.4 MMOL/L — HIGH (ref 3.5–5.3)
POTASSIUM SERPL-SCNC: SIGNIFICANT CHANGE UP MMOL/L (ref 3.5–5.3)
PROT ?TM UR-MCNC: 11 MG/DL — SIGNIFICANT CHANGE UP (ref 0–12)
PROT ?TM UR-MCNC: 11 MG/DL — SIGNIFICANT CHANGE UP (ref 0–12)
PROT SERPL-MCNC: 8 G/DL — SIGNIFICANT CHANGE UP (ref 6–8.3)
PROT SERPL-MCNC: 8.2 G/DL — SIGNIFICANT CHANGE UP (ref 6–8.3)
PROT UR-MCNC: NEGATIVE MG/DL — SIGNIFICANT CHANGE UP
PROT/CREAT UR-RTO: 0.1 RATIO — SIGNIFICANT CHANGE UP (ref 0–0.2)
PROTHROM AB SERPL-ACNC: 11 SEC — SIGNIFICANT CHANGE UP (ref 10–12.9)
RBC # BLD: 4.41 M/UL — SIGNIFICANT CHANGE UP (ref 4.2–5.8)
RBC # FLD: 13.7 % — SIGNIFICANT CHANGE UP (ref 10.3–14.5)
SODIUM SERPL-SCNC: 137 MMOL/L — SIGNIFICANT CHANGE UP (ref 135–145)
SODIUM SERPL-SCNC: 140 MMOL/L — SIGNIFICANT CHANGE UP (ref 135–145)
SODIUM UR-SCNC: 73 MMOL/L — SIGNIFICANT CHANGE UP
SP GR SPEC: 1.01 — SIGNIFICANT CHANGE UP (ref 1–1.03)
THC UR QL: NEGATIVE — SIGNIFICANT CHANGE UP
TROPONIN T SERPL-MCNC: <0.01 NG/ML — SIGNIFICANT CHANGE UP (ref 0–0.01)
TROPONIN T SERPL-MCNC: <0.01 NG/ML — SIGNIFICANT CHANGE UP (ref 0–0.01)
UROBILINOGEN FLD QL: 0.2 E.U./DL — SIGNIFICANT CHANGE UP
WBC # BLD: 10.97 K/UL — HIGH (ref 3.8–10.5)
WBC # FLD AUTO: 10.97 K/UL — HIGH (ref 3.8–10.5)

## 2019-07-11 PROCEDURE — 99285 EMERGENCY DEPT VISIT HI MDM: CPT

## 2019-07-11 PROCEDURE — 99223 1ST HOSP IP/OBS HIGH 75: CPT | Mod: AI

## 2019-07-11 PROCEDURE — 93010 ELECTROCARDIOGRAM REPORT: CPT | Mod: 59

## 2019-07-11 PROCEDURE — 93306 TTE W/DOPPLER COMPLETE: CPT | Mod: 26

## 2019-07-11 PROCEDURE — 71045 X-RAY EXAM CHEST 1 VIEW: CPT | Mod: 26

## 2019-07-11 RX ORDER — ASPIRIN/CALCIUM CARB/MAGNESIUM 324 MG
325 TABLET ORAL ONCE
Refills: 0 | Status: COMPLETED | OUTPATIENT
Start: 2019-07-11 | End: 2019-07-11

## 2019-07-11 RX ORDER — AMLODIPINE BESYLATE 2.5 MG/1
5 TABLET ORAL DAILY
Refills: 0 | Status: DISCONTINUED | OUTPATIENT
Start: 2019-07-11 | End: 2019-07-12

## 2019-07-11 RX ORDER — DEXTROSE 50 % IN WATER 50 %
25 SYRINGE (ML) INTRAVENOUS ONCE
Refills: 0 | Status: DISCONTINUED | OUTPATIENT
Start: 2019-07-11 | End: 2019-07-12

## 2019-07-11 RX ORDER — HEPARIN SODIUM 5000 [USP'U]/ML
5000 INJECTION INTRAVENOUS; SUBCUTANEOUS EVERY 8 HOURS
Refills: 0 | Status: DISCONTINUED | OUTPATIENT
Start: 2019-07-11 | End: 2019-07-12

## 2019-07-11 RX ORDER — SODIUM CHLORIDE 9 MG/ML
3 INJECTION INTRAMUSCULAR; INTRAVENOUS; SUBCUTANEOUS ONCE
Refills: 0 | Status: COMPLETED | OUTPATIENT
Start: 2019-07-11 | End: 2019-07-11

## 2019-07-11 RX ORDER — ATORVASTATIN CALCIUM 80 MG/1
40 TABLET, FILM COATED ORAL AT BEDTIME
Refills: 0 | Status: DISCONTINUED | OUTPATIENT
Start: 2019-07-12 | End: 2019-07-12

## 2019-07-11 RX ORDER — SODIUM CHLORIDE 9 MG/ML
1000 INJECTION INTRAMUSCULAR; INTRAVENOUS; SUBCUTANEOUS
Refills: 0 | Status: DISCONTINUED | OUTPATIENT
Start: 2019-07-11 | End: 2019-07-12

## 2019-07-11 RX ORDER — DEXTROSE 50 % IN WATER 50 %
12.5 SYRINGE (ML) INTRAVENOUS ONCE
Refills: 0 | Status: DISCONTINUED | OUTPATIENT
Start: 2019-07-11 | End: 2019-07-12

## 2019-07-11 RX ORDER — INSULIN GLARGINE 100 [IU]/ML
20 INJECTION, SOLUTION SUBCUTANEOUS AT BEDTIME
Refills: 0 | Status: DISCONTINUED | OUTPATIENT
Start: 2019-07-11 | End: 2019-07-12

## 2019-07-11 RX ORDER — GLUCAGON INJECTION, SOLUTION 0.5 MG/.1ML
1 INJECTION, SOLUTION SUBCUTANEOUS ONCE
Refills: 0 | Status: DISCONTINUED | OUTPATIENT
Start: 2019-07-11 | End: 2019-07-12

## 2019-07-11 RX ORDER — SODIUM POLYSTYRENE SULFONATE 4.1 MEQ/G
15 POWDER, FOR SUSPENSION ORAL ONCE
Refills: 0 | Status: COMPLETED | OUTPATIENT
Start: 2019-07-11 | End: 2019-07-11

## 2019-07-11 RX ORDER — INSULIN LISPRO 100/ML
VIAL (ML) SUBCUTANEOUS
Refills: 0 | Status: DISCONTINUED | OUTPATIENT
Start: 2019-07-11 | End: 2019-07-12

## 2019-07-11 RX ORDER — ASPIRIN/CALCIUM CARB/MAGNESIUM 324 MG
81 TABLET ORAL DAILY
Refills: 0 | Status: DISCONTINUED | OUTPATIENT
Start: 2019-07-11 | End: 2019-07-12

## 2019-07-11 RX ORDER — SODIUM CHLORIDE 9 MG/ML
1000 INJECTION, SOLUTION INTRAVENOUS
Refills: 0 | Status: DISCONTINUED | OUTPATIENT
Start: 2019-07-11 | End: 2019-07-12

## 2019-07-11 RX ORDER — TICAGRELOR 90 MG/1
180 TABLET ORAL ONCE
Refills: 0 | Status: COMPLETED | OUTPATIENT
Start: 2019-07-11 | End: 2019-07-11

## 2019-07-11 RX ORDER — DEXTROSE 50 % IN WATER 50 %
15 SYRINGE (ML) INTRAVENOUS ONCE
Refills: 0 | Status: DISCONTINUED | OUTPATIENT
Start: 2019-07-11 | End: 2019-07-12

## 2019-07-11 RX ADMIN — SODIUM CHLORIDE 125 MILLILITER(S): 9 INJECTION INTRAMUSCULAR; INTRAVENOUS; SUBCUTANEOUS at 15:26

## 2019-07-11 RX ADMIN — INSULIN GLARGINE 20 UNIT(S): 100 INJECTION, SOLUTION SUBCUTANEOUS at 23:01

## 2019-07-11 RX ADMIN — Medication 325 MILLIGRAM(S): at 15:26

## 2019-07-11 RX ADMIN — TICAGRELOR 180 MILLIGRAM(S): 90 TABLET ORAL at 15:26

## 2019-07-11 RX ADMIN — HEPARIN SODIUM 5000 UNIT(S): 5000 INJECTION INTRAVENOUS; SUBCUTANEOUS at 22:16

## 2019-07-11 RX ADMIN — SODIUM POLYSTYRENE SULFONATE 15 GRAM(S): 4.1 POWDER, FOR SUSPENSION ORAL at 19:07

## 2019-07-11 RX ADMIN — SODIUM CHLORIDE 3 MILLILITER(S): 9 INJECTION INTRAMUSCULAR; INTRAVENOUS; SUBCUTANEOUS at 15:05

## 2019-07-11 NOTE — H&P ADULT - NSHPLABSRESULTS_GEN_ALL_CORE
ECG: SR with RENE vs jpoint elevation hyperacute Tw V1-v3 no reciprocal changes or TWI    < from: Echocardiogram (07.11.19 @ 17:26) >    There is moderate concentric left ventricular hypertrophy.The left   ventricular   wall motion is normal.The leftventricular ejection fraction is   normal.The   left ventricular ejection fraction is 65%.The right ventricle is normal   in   size and function.The left atrial size is normal.Right atrial size is   normal.No evidence for any hemodynamically significant valvular   disease.There   was insufficient TR detected from which to calculate pulmonary artery   systolic   pressure.  There is no pericardial effusion.    < end of copied text >

## 2019-07-11 NOTE — ED PROVIDER NOTE - OBJECTIVE STATEMENT
42 yo male with hx of HLD DM on glipizide/ metformin combo x 1 week  had been on Prandin, Humalog until 1 week ago  dev  left sided CP  2 hr ago  nausea no vomiting feeling weak    no syncope or back pain

## 2019-07-11 NOTE — H&P ADULT - RS GEN PE MLT RESP DETAILS PC
clear to auscultation bilaterally/no chest wall tenderness/no intercostal retractions/no rales/no rhonchi/no wheezes

## 2019-07-11 NOTE — H&P ADULT - PROBLEM SELECTOR PLAN 1
EKG with dynamic ST changes but normal ECHO, trop neg, currently cp free.  -Trend Mustapha to r/o ACS, serial ECGs  -NPO after MN for ETT in am unless becomes sx then will consider cath  -Hydration overnight for elevated Cr  -No BB given recent cocaine use  -Start Amlodipine 5mg po daily  -Continue Aspirin 81mg daily

## 2019-07-11 NOTE — ED ADULT TRIAGE NOTE - CHIEF COMPLAINT QUOTE
" I started a new diabetes medication and my sugar has been going up and down the past three days, I feel fatigue, dizzy and tired and weak".

## 2019-07-11 NOTE — H&P ADULT - NSHPSOCIALHISTORY_GEN_ALL_CORE
LOV 9/13/2016   EtOH: socially  	smoking: quit in 1998: was 10 year smoker, 2 packs per week  	Recreational drug use: cocaine - socially. Last use – 7/4/19  Living with family, performing ADLs independently, walking without ambulatory aid, currently

## 2019-07-11 NOTE — ED PROVIDER NOTE - CLINICAL SUMMARY MEDICAL DECISION MAKING FREE TEXT BOX
44 yo male with hx DM  withj fatigue malaise nausea  cphest pain 2 hr ago FS  82  EKG  with hyperacute T wave V2 vs j point  dw  cards fellow  no CCTA  available - will be on add on schedule  admit cards tele ASA brilinta  trop# 1 neg

## 2019-07-11 NOTE — ED ADULT NURSE NOTE - OBJECTIVE STATEMENT
Patient is a 42yo M, arrived to ED via walk-in, AAOx3, in NAD, VSS, complaining of weakness, lightheadedness, nausea and SOB since Sunday.  Patient states symptoms started when his PMD adjusted his DM medications.  Patient also complaining of intermittent chest pressure for several days.  Patient denies any vomiting, fevers, abdominal pain or any other complaints at this time.  PIV placed, labs drawn and sent, EKG done, placed on cardiac monitor.

## 2019-07-11 NOTE — H&P ADULT - ATTENDING COMMENTS
Assessment: Patient personally seen and examined myself during rounds with the   Physician Assistant  ON DATE 7/11/19    Note read, including vitals, physical findings, laboratory data, and radiological reports.   Agree with above and revisions, if any, included below.  - New ACS-unstable angina  - Severe chest pain  - Unstable hemodynamics  - Plan of Care: continuous telemetry monitoring for arrhythmias, echocardiogram to evaluate ejection fraction and central pressures, daily weights and I/Os, serial EKGs and lab work to evaluate and replete potassium, magnesium. Troponin added to labs and will cycle. Will need to be seen by EP or Interv Cardio if intervention needed.

## 2019-07-11 NOTE — H&P ADULT - PROBLEM SELECTOR PLAN 3
Recent changes in DM regimen probably contributing to sx. A1C 9.8  -ENDO Dr Quezada consulted.  Continue Lantus 20units qhs and moderate dose ISS.  -Will see in AM. No light breakfast while awaiting stress test.

## 2019-07-11 NOTE — ED PROVIDER NOTE - CARE PLAN
Principal Discharge DX:	Chest pain  Secondary Diagnosis:	Diabetes  Secondary Diagnosis:	Hyperlipidemia, unspecified hyperlipidemia type  Secondary Diagnosis:	EKG abnormalities

## 2019-07-11 NOTE — H&P ADULT - ASSESSMENT
42 yo male former smoker and social EtoH/recent cocaine use 7/4/19 with hx HTN, Hyperlipidemia, DM-2 on Insulin and Orals, chronic osteomyelitis of Rt foot s/p toe amputations in past and I&D w/plantar skin graft Dec 2018, now admitted for chest pain to R/O ACS, [facility], and DM management.

## 2019-07-11 NOTE — H&P ADULT - PROBLEM SELECTOR PLAN 2
Cr 2.09 above baseline level 0.9-1.2 (reports recent labs with PMD normal) with hyperkalemia  -Obtain collateral from PMD  -UA and Urine lytes ordered  -Continue IVF hydration  -Renal consulted recommend Kayexalate 15 for elevated K and will see in AM Cr 2.09 above baseline level 0.9-1.2 (reports recent labs with PMD normal) with hyperkalemia  -Obtain collateral from PMD  -UA neg for protein or ketones; Urine lytes c/w pre-renal process  -Continue IVF hydration  -Renal consulted recommend Kayexalate 15 for elevated K and will see in AM

## 2019-07-11 NOTE — H&P ADULT - HISTORY OF PRESENT ILLNESS
44 yo male former smoker and social EtoH/recent cocaine use 7/4/19 with hx HTN, Hyperlipidemia, DM-2 on Insulin and Orals, chronic osteomyelitis of Rt foot s/p toe amputations in past and I&D w/plantar skin graft Dec 2018, presented to ER today c/o fatigue malaise nausea  chest pain that started 2 hr prior to admission.  Pt reports chest pain sx started about 1 month ago, gets associated Rt arm discomfort and SOB.  Sx intermittent, not activity limiting, can come on with or without exertion and usually last 5-10minutes before resolving.  Today pt reports feeling similar chest pressure with nausea and malaise while at work so he walked to ER.  He endorses recent changes in his DM regimen where Basaglar dose was decreased from 42units to 20units, Humalog stopped, and Glipizide/Metformin plus Prandin added.  He states he didn’t feel right on Prandin so stopped taking week ago and started his Humalog again (did not discuss with his PMD).  In ER, FS  82,  EKG  with hyperacute T wave V2 vs j point; troponin neg, initial CMP hemolyzed.  ER attending dw cards fellow no CCTA  available – was considered for add on schedule for cath and given ASA brilinta load.  Repeat CMP showed BUN/Cr 35/2.09 (baseline on prior admits 0.98-1.2), ECHO with moderate concentric LVH, normal EF 65% with no wall motion abnormalities, no pericardial effusion or valve disease. Repeat ECG dynamic ST changes V1-V3, pt chest pain free.  No cath at this time, admit for tele monitor, probable ETT in AM, renal consult for [facility]ST and Endo consult for DM management.      **OF NOTE: Pt endorses was scheduled for outpat stress test this month for upcoming Personal Surgery (pt did not wish to disclose the surgical procedure other than to say "it's personal")

## 2019-07-11 NOTE — H&P ADULT - NSICDXPASTMEDICALHX_GEN_ALL_CORE_FT
PAST MEDICAL HISTORY:  Diabetes     History of cocaine use     Hyperlipidemia, unspecified hyperlipidemia type     Hypertension     Osteomyelitis

## 2019-07-11 NOTE — H&P ADULT - NSICDXFAMILYHX_GEN_ALL_CORE_FT
FAMILY HISTORY:  Father  Still living? Unknown  No family history of cardiovascular disease, Age at diagnosis: Age Unknown    Mother  Still living? Unknown  No family history of cardiovascular disease, Age at diagnosis: Age Unknown

## 2019-07-12 ENCOUNTER — TRANSCRIPTION ENCOUNTER (OUTPATIENT)
Age: 44
End: 2019-07-12

## 2019-07-12 ENCOUNTER — INBOUND DOCUMENT (OUTPATIENT)
Age: 44
End: 2019-07-12

## 2019-07-12 VITALS — SYSTOLIC BLOOD PRESSURE: 137 MMHG | RESPIRATION RATE: 18 BRPM | DIASTOLIC BLOOD PRESSURE: 80 MMHG | HEART RATE: 82 BPM

## 2019-07-12 LAB
ANION GAP SERPL CALC-SCNC: 9 MMOL/L — SIGNIFICANT CHANGE UP (ref 5–17)
ANION GAP SERPL CALC-SCNC: 9 MMOL/L — SIGNIFICANT CHANGE UP (ref 5–17)
BASOPHILS # BLD AUTO: 0.06 K/UL — SIGNIFICANT CHANGE UP (ref 0–0.2)
BASOPHILS NFR BLD AUTO: 0.6 % — SIGNIFICANT CHANGE UP (ref 0–2)
BUN SERPL-MCNC: 20 MG/DL — SIGNIFICANT CHANGE UP (ref 7–23)
BUN SERPL-MCNC: 25 MG/DL — HIGH (ref 7–23)
CALCIUM SERPL-MCNC: 9 MG/DL — SIGNIFICANT CHANGE UP (ref 8.4–10.5)
CALCIUM SERPL-MCNC: 9.5 MG/DL — SIGNIFICANT CHANGE UP (ref 8.4–10.5)
CHLORIDE SERPL-SCNC: 101 MMOL/L — SIGNIFICANT CHANGE UP (ref 96–108)
CHLORIDE SERPL-SCNC: 105 MMOL/L — SIGNIFICANT CHANGE UP (ref 96–108)
CHOLEST SERPL-MCNC: 120 MG/DL — SIGNIFICANT CHANGE UP (ref 10–199)
CO2 SERPL-SCNC: 25 MMOL/L — SIGNIFICANT CHANGE UP (ref 22–31)
CO2 SERPL-SCNC: 27 MMOL/L — SIGNIFICANT CHANGE UP (ref 22–31)
CREAT SERPL-MCNC: 1 MG/DL — SIGNIFICANT CHANGE UP (ref 0.5–1.3)
CREAT SERPL-MCNC: 1.12 MG/DL — SIGNIFICANT CHANGE UP (ref 0.5–1.3)
CULTURE RESULTS: SIGNIFICANT CHANGE UP
EOSINOPHIL # BLD AUTO: 0.27 K/UL — SIGNIFICANT CHANGE UP (ref 0–0.5)
EOSINOPHIL NFR BLD AUTO: 2.8 % — SIGNIFICANT CHANGE UP (ref 0–6)
GLUCOSE BLDC GLUCOMTR-MCNC: 106 MG/DL — HIGH (ref 70–99)
GLUCOSE BLDC GLUCOMTR-MCNC: 110 MG/DL — HIGH (ref 70–99)
GLUCOSE BLDC GLUCOMTR-MCNC: 131 MG/DL — HIGH (ref 70–99)
GLUCOSE SERPL-MCNC: 129 MG/DL — HIGH (ref 70–99)
GLUCOSE SERPL-MCNC: 134 MG/DL — HIGH (ref 70–99)
HCT VFR BLD CALC: 42.2 % — SIGNIFICANT CHANGE UP (ref 39–50)
HDLC SERPL-MCNC: 43 MG/DL — SIGNIFICANT CHANGE UP
HGB BLD-MCNC: 13.5 G/DL — SIGNIFICANT CHANGE UP (ref 13–17)
IMM GRANULOCYTES NFR BLD AUTO: 0.2 % — SIGNIFICANT CHANGE UP (ref 0–1.5)
LIPID PNL WITH DIRECT LDL SERPL: 55 MG/DL — SIGNIFICANT CHANGE UP
LYMPHOCYTES # BLD AUTO: 3.79 K/UL — HIGH (ref 1–3.3)
LYMPHOCYTES # BLD AUTO: 40 % — SIGNIFICANT CHANGE UP (ref 13–44)
MAGNESIUM SERPL-MCNC: 1.3 MG/DL — LOW (ref 1.6–2.6)
MAGNESIUM SERPL-MCNC: 2.4 MG/DL — SIGNIFICANT CHANGE UP (ref 1.6–2.6)
MCHC RBC-ENTMCNC: 28.6 PG — SIGNIFICANT CHANGE UP (ref 27–34)
MCHC RBC-ENTMCNC: 32 GM/DL — SIGNIFICANT CHANGE UP (ref 32–36)
MCV RBC AUTO: 89.4 FL — SIGNIFICANT CHANGE UP (ref 80–100)
MONOCYTES # BLD AUTO: 0.79 K/UL — SIGNIFICANT CHANGE UP (ref 0–0.9)
MONOCYTES NFR BLD AUTO: 8.3 % — SIGNIFICANT CHANGE UP (ref 2–14)
NEUTROPHILS # BLD AUTO: 4.55 K/UL — SIGNIFICANT CHANGE UP (ref 1.8–7.4)
NEUTROPHILS NFR BLD AUTO: 48.1 % — SIGNIFICANT CHANGE UP (ref 43–77)
NRBC # BLD: 0 /100 WBCS — SIGNIFICANT CHANGE UP (ref 0–0)
PLATELET # BLD AUTO: 325 K/UL — SIGNIFICANT CHANGE UP (ref 150–400)
POTASSIUM SERPL-MCNC: 4.5 MMOL/L — SIGNIFICANT CHANGE UP (ref 3.5–5.3)
POTASSIUM SERPL-MCNC: 4.9 MMOL/L — SIGNIFICANT CHANGE UP (ref 3.5–5.3)
POTASSIUM SERPL-SCNC: 4.5 MMOL/L — SIGNIFICANT CHANGE UP (ref 3.5–5.3)
POTASSIUM SERPL-SCNC: 4.9 MMOL/L — SIGNIFICANT CHANGE UP (ref 3.5–5.3)
RBC # BLD: 4.72 M/UL — SIGNIFICANT CHANGE UP (ref 4.2–5.8)
RBC # FLD: 13.4 % — SIGNIFICANT CHANGE UP (ref 10.3–14.5)
SODIUM SERPL-SCNC: 137 MMOL/L — SIGNIFICANT CHANGE UP (ref 135–145)
SODIUM SERPL-SCNC: 139 MMOL/L — SIGNIFICANT CHANGE UP (ref 135–145)
SPECIMEN SOURCE: SIGNIFICANT CHANGE UP
TOTAL CHOLESTEROL/HDL RATIO MEASUREMENT: 2.8 RATIO — LOW (ref 3.4–9.6)
TRIGL SERPL-MCNC: 112 MG/DL — SIGNIFICANT CHANGE UP (ref 10–149)
WBC # BLD: 9.48 K/UL — SIGNIFICANT CHANGE UP (ref 3.8–10.5)
WBC # FLD AUTO: 9.48 K/UL — SIGNIFICANT CHANGE UP (ref 3.8–10.5)

## 2019-07-12 PROCEDURE — 93005 ELECTROCARDIOGRAM TRACING: CPT

## 2019-07-12 PROCEDURE — 93017 CV STRESS TEST TRACING ONLY: CPT

## 2019-07-12 PROCEDURE — 93016 CV STRESS TEST SUPVJ ONLY: CPT

## 2019-07-12 PROCEDURE — 84300 ASSAY OF URINE SODIUM: CPT

## 2019-07-12 PROCEDURE — 93306 TTE W/DOPPLER COMPLETE: CPT

## 2019-07-12 PROCEDURE — A9505: CPT

## 2019-07-12 PROCEDURE — 93018 CV STRESS TEST I&R ONLY: CPT

## 2019-07-12 PROCEDURE — 85025 COMPLETE CBC W/AUTO DIFF WBC: CPT

## 2019-07-12 PROCEDURE — 85730 THROMBOPLASTIN TIME PARTIAL: CPT

## 2019-07-12 PROCEDURE — 82570 ASSAY OF URINE CREATININE: CPT

## 2019-07-12 PROCEDURE — 78452 HT MUSCLE IMAGE SPECT MULT: CPT

## 2019-07-12 PROCEDURE — 87086 URINE CULTURE/COLONY COUNT: CPT

## 2019-07-12 PROCEDURE — 85610 PROTHROMBIN TIME: CPT

## 2019-07-12 PROCEDURE — 99238 HOSP IP/OBS DSCHRG MGMT 30/<: CPT

## 2019-07-12 PROCEDURE — 36415 COLL VENOUS BLD VENIPUNCTURE: CPT

## 2019-07-12 PROCEDURE — 83735 ASSAY OF MAGNESIUM: CPT

## 2019-07-12 PROCEDURE — A9500: CPT

## 2019-07-12 PROCEDURE — 82550 ASSAY OF CK (CPK): CPT

## 2019-07-12 PROCEDURE — 80053 COMPREHEN METABOLIC PANEL: CPT

## 2019-07-12 PROCEDURE — 80048 BASIC METABOLIC PNL TOTAL CA: CPT

## 2019-07-12 PROCEDURE — 71045 X-RAY EXAM CHEST 1 VIEW: CPT

## 2019-07-12 PROCEDURE — 82962 GLUCOSE BLOOD TEST: CPT

## 2019-07-12 PROCEDURE — 80061 LIPID PANEL: CPT

## 2019-07-12 PROCEDURE — 82553 CREATINE MB FRACTION: CPT

## 2019-07-12 PROCEDURE — 80307 DRUG TEST PRSMV CHEM ANLYZR: CPT

## 2019-07-12 PROCEDURE — 99285 EMERGENCY DEPT VISIT HI MDM: CPT | Mod: 25

## 2019-07-12 PROCEDURE — 78452 HT MUSCLE IMAGE SPECT MULT: CPT | Mod: 26

## 2019-07-12 PROCEDURE — 81003 URINALYSIS AUTO W/O SCOPE: CPT

## 2019-07-12 PROCEDURE — 84484 ASSAY OF TROPONIN QUANT: CPT

## 2019-07-12 PROCEDURE — 83935 ASSAY OF URINE OSMOLALITY: CPT

## 2019-07-12 PROCEDURE — 83036 HEMOGLOBIN GLYCOSYLATED A1C: CPT

## 2019-07-12 PROCEDURE — 84156 ASSAY OF PROTEIN URINE: CPT

## 2019-07-12 RX ORDER — INSULIN LISPRO 100/ML
7 VIAL (ML) SUBCUTANEOUS
Refills: 0 | Status: DISCONTINUED | OUTPATIENT
Start: 2019-07-12 | End: 2019-07-12

## 2019-07-12 RX ORDER — MAGNESIUM SULFATE 500 MG/ML
4 VIAL (ML) INJECTION ONCE
Refills: 0 | Status: COMPLETED | OUTPATIENT
Start: 2019-07-12 | End: 2019-07-12

## 2019-07-12 RX ADMIN — AMLODIPINE BESYLATE 5 MILLIGRAM(S): 2.5 TABLET ORAL at 06:41

## 2019-07-12 RX ADMIN — HEPARIN SODIUM 5000 UNIT(S): 5000 INJECTION INTRAVENOUS; SUBCUTANEOUS at 06:41

## 2019-07-12 RX ADMIN — Medication 81 MILLIGRAM(S): at 11:20

## 2019-07-12 RX ADMIN — Medication 7 UNIT(S): at 16:30

## 2019-07-12 RX ADMIN — Medication 100 GRAM(S): at 09:26

## 2019-07-12 NOTE — DISCHARGE NOTE PROVIDER - CARE PROVIDER_API CALL
Regi Gee)  Internal Medicine  [address] [id number]  Phone: [phone]  Fax: [phone]  Follow Up Time: 2 weeks    Angela Quezada; PhD)  Internal Medicine  [address], [address]  [place] [address]  Phone: [phone]  Fax: [phone]  Follow Up Time: 2 weeks

## 2019-07-12 NOTE — DISCHARGE NOTE PROVIDER - PROVIDER TOKENS
PROVIDER:[TOKEN:[[id number]],FOLLOWUP:[2 weeks]],PROVIDER:[TOKEN:[[id number]],FOLLOWUP:[2 weeks]]

## 2019-07-12 NOTE — CONSULT NOTE ADULT - ATTENDING COMMENTS
A/P: 43y Male with hx of DM presenting for management of chest pain with good glycemic control    1.  DM - type 2, uncontrolled, complicated.   can continue 20 units lantus at bedtime, start 7 units lispro TID with meals when pt resumes PO diet.   Lispro moderate dose scale with meals and at bedtime.   Continue consistent carb diet, Nutrition consult  dilute all medications in NS instead of D5 when possible.   Ensure correct injection and FSG technique    2.  Hyperlipidemia - LDL goal < 70  LDL at goal, continue atorvastatin 40mg daily    3.  Obesity - outpatient medical management.   would consider bariatric surgery as outpatient    Pt is advised to follow up with me at discharge.  Please call  to schedule an appointment.

## 2019-07-12 NOTE — DISCHARGE NOTE PROVIDER - NSDCCPCAREPLAN_GEN_ALL_CORE_FT
PRINCIPAL DISCHARGE DIAGNOSIS  Diagnosis: Chest pain  Assessment and Plan of Treatment: You came to the hospital because you were having chest pain which has since then resolved. You had cardiac enzymes done which were negatve for any damage to the heart muscle or a heart attack. You had a Stress test which was normal. Please follow up [facility] Dr. Gee in 1-2 weeks. If you experience any persistent chest pain, palpitations, shortness of breath or dizziness, please go to the emergency room      SECONDARY DISCHARGE DIAGNOSES  Diagnosis: Essential hypertension  Assessment and Plan of Treatment: Please continue medications as listed to keep your blood pressure controlled. For blood pressure that is too high or too low please see your doctor or go to the emergency room as necessary.    Diagnosis: Hyperlipidemia, unspecified hyperlipidemia type  Assessment and Plan of Treatment: Please continue Atorvastatin to keep your cholesterol low. High cholesterol contributes to heart disease.    Diagnosis: Diabetes  Assessment and Plan of Treatment: Please continue medications as listed for diabetes. Maintain a low carbohydrate, low sugar diet. Check for your blood sugars regularly. Please follow up with Dr. Quezada in 1-2 weeks.

## 2019-07-12 NOTE — DISCHARGE NOTE PROVIDER - HOSPITAL COURSE
44 yo male former smoker and social EtoH/recent cocaine use 7/4/19 with hx HTN, Hyperlipidemia, DM-2 on Insulin and Orals, chronic osteomyelitis of Rt foot s/p toe amputations in past and I&D w/plantar skin graft Dec 2018, presented to ER today c/o fatigue malaise nausea  chest pain that started 2 hr prior to admission.  Pt reports chest pain sx started about 1 month ago, gets associated Rt arm discomfort and SOB.  Sx intermittent, not activity limiting, can come on with or without exertion and usually last 5-10minutes before resolving.  Today pt reports feeling similar chest pressure with nausea and malaise while at work so he walked to ER.  He endorses recent changes in his DM regimen where Basaglar dose was decreased from 42units to 20units, Humalog stopped, and Glipizide/Metformin plus Prandin added.  He states he didn’t feel right on Prandin so stopped taking week ago and started his Humalog again (did not discuss with his PMD).  In ER, FS  82,  EKG  with hyperacute T wave V2 vs j point; troponin neg, initial CMP hemolyzed.  ER attending dw cards fellow no CCTA  available – was considered for add on schedule for cath and given ASA brilinta load.  Repeat CMP showed BUN/Cr 35/2.09 (baseline on prior admits 0.98-1.2), ECHO with moderate concentric LVH, normal EF 65% with no wall motion abnormalities, no pericardial effusion or valve disease. Repeat ECG dynamic ST changes V1-V3, pt chest pain free.  No cath at this time, admit for tele monitor, probable ETT in AM, renal consult for [facility] and Endo consult for DM management.      CE trended, negative x 2. Pt had a stress revealing Myocardial perfusion imaging is normal, Overall left ventricular systolic function is normal without regional, wall motion abnormalities, EKG stress test is normal. Hospital course was c/b CHRIST which has since then resolved with IVF hydration. Pt was found to have an A1C of 9.8 and Dr. Quezada was consulted, pt is to follow up with her as an outpt. Pt seen and examined at bedside this AM without any complaints. VSS, labs and telemetry reviewed and pt stable for discharge as discussed with Dr. Gee. Pt has been given appropriate discharge instructions, including medication regimen and follow up.        PHYSICAL EXAM:    cardiac- RRR no murmurs/rubs/gallops    resp- lungs CTA B/L, no wheezes/ronchi/rales    abd- soft non distended non tender BS+    ext- no cyanosis or edema

## 2019-07-12 NOTE — CONSULT NOTE ADULT - SUBJECTIVE AND OBJECTIVE BOX
HPI:  42 yo male former smoker and social EtoH/recent cocaine use 19 with hx HTN, Hyperlipidemia, DM-2 on Insulin and Orals, chronic osteomyelitis of Rt foot s/p toe amputations in past and I&D w/plantar skin graft Dec 2018, presented to ER today c/o fatigue malaise nausea  chest pain that started 2 hr prior to admission.  Pt reports chest pain sx started about 1 month ago, gets associated Rt arm discomfort and SOB.  Sx intermittent, not activity limiting, can come on with or without exertion and usually last 5-10minutes before resolving.  Today pt reports feeling similar chest pressure with nausea and malaise while at work so he walked to ER.  He endorses recent changes in his DM regimen where Basaglar dose was decreased from 42units to 20units, Humalog stopped, and Glipizide/Metformin plus Prandin added.  He states he didn’t feel right on Prandin so stopped taking week ago and started his Humalog again (did not discuss with his PMD).  In ER, FS  82,  EKG  with hyperacute T wave V2 vs j point; troponin neg, initial CMP hemolyzed.  ER attending dw cards fellow no CCTA  available – was considered for add on schedule for cath and given ASA brilinta load.  Repeat CMP showed BUN/Cr 35/2.09 (baseline on prior admits 0.98-1.2), ECHO with moderate concentric LVH, normal EF 65% with no wall motion abnormalities, no pericardial effusion or valve disease. Repeat ECG dynamic ST changes V1-V3, pt chest pain free.  No cath at this time, admit for tele monitor, probable ETT in AM, renal consult for CHRIST and Endo consult for DM management.      **OF NOTE: Pt endorses was scheduled for outpat stress test this month for upcoming Personal Surgery (pt did not wish to disclose the surgical procedure other than to say "it's personal") (2019 19:32)    Regarding his DM, he has a long hx of type 2 DM, recently saw PMD who reduced his lantus dose from 40 to 20.  Also stopped his pre-meal insulin.   he reports compliance prior to arrival with this regimen.  On presentation his glucose level was appropriate.  He rec'd 20 units last night at bedtime and ahs been NPO for possible procedure today. Insulin administration since admission was reviewed.     PMH & Surgical Hx:  CHEST PAIN  History of cocaine use  Hypertension  Hyperlipidemia, unspecified hyperlipidemia type  Osteomyelitis  Diabetes  H/O skin graft    FH:  DM:  Thyroid:  Autoimmune:  Other:    SH:  Smoking: former  Etoh: denies  Recreational Drugs: yes  Social Life:    Current Meds:  amLODIPine   Tablet 5 milliGRAM(s) Oral daily  aspirin enteric coated 81 milliGRAM(s) Oral daily  atorvastatin 40 milliGRAM(s) Oral at bedtime  dextrose 40% Gel 15 Gram(s) Oral once PRN  dextrose 5%. 1000 milliLiter(s) IV Continuous <Continuous>  dextrose 50% Injectable 12.5 Gram(s) IV Push once  dextrose 50% Injectable 25 Gram(s) IV Push once  dextrose 50% Injectable 25 Gram(s) IV Push once  glucagon  Injectable 1 milliGRAM(s) IntraMuscular once PRN  heparin  Injectable 5000 Unit(s) SubCutaneous every 8 hours  insulin glargine Injectable (LANTUS) 20 Unit(s) SubCutaneous at bedtime  insulin lispro (HumaLOG) corrective regimen sliding scale   SubCutaneous Before meals and at bedtime  sodium chloride 0.9%. 1000 milliLiter(s) IV Continuous <Continuous>      Allergies:  No Known Allergies      ROS:  Denies the following except as indicated.    General: weight loss/weight gain, decreased appetite, fatigue  Eyes: Blurry vision, double vision, visual changes  ENT: Throat pain, changes in voice,   CV: palpitations, SOB, CP, cough  GI: NVD, difficulty swallowing, abdominal pain  : polyuria, dysuria  Endo: abnormal menses, temperature intolerance, decreased libido  MSK: weakness, joint pain  Skin: rash, dryness, diaphoresis  Heme: Easy bruising,bleeding  Neuro: HA, dizziness, lightheadedness, numbness tingling  Psych: Anxiety, Depression    Vital Signs Last 24 Hrs  T(C): 36.6 (2019 09:00), Max: 37.1 (2019 00:16)  T(F): 97.9 (2019 09:00), Max: 98.7 (2019 00:16)  HR: 80 (2019 09:40) (79 - 100)  BP: 125/72 (2019 09:40) (114/69 - 130/86)  BP(mean): 91 (2019 19:32) (91 - 91)  RR: 18 (2019 09:40) (17 - 18)  SpO2: 98% (2019 09:40) (97% - 99%)  Height (cm): 182.88 ( @ 23:30)  Weight (kg): 112.6 ( @ 23:30)  BMI (kg/m2): 33.7 ( @ 23:30)    Constitutional: wn/wd in NAD.   HEENT: NCAT, MMM, OP clear, EOMI, , no proptosis or lid retraction  Neck: no thyromegaly or palpable thyroid nodules   Respiratory: lungs CTAB.  Cardiovascular: regular rhythm, normal S1 and S2, no audible murmurs, no peripheral edema  GI: soft, NT/ND, no masses/HSM appreciated.  Neurology: no tremors, DTR 2+  Skin: no visible rashes/lesions  Psychiatric: AAO x 3, normal affect/mood.  Ext: radial pulses intact, DP pulses intact, extremities warm, no cyanosis, clubbing or edema.       LABS:                        13.5   9.48  )-----------( 325      ( 2019 06:24 )             42.2         139  |  105  |  25<H>  ----------------------------<  134<H>  4.5   |  25  |  1.12    Ca    9.0      2019 06:24  Mg     1.3         TPro  8.2  /  Alb  4.3  /  TBili  0.4  /  DBili  x   /  AST  30  /  ALT  31  /  AlkPhos  58  11    PT/INR - ( 2019 15:22 )   PT: 11.0 sec;   INR: 0.97          PTT - ( 2019 15:22 )  PTT:34.5 sec  Urinalysis Basic - ( 2019 18:55 )    Color: Yellow / Appearance: Clear / S.015 / pH: x  Gluc: x / Ketone: NEGATIVE  / Bili: Negative / Urobili: 0.2 E.U./dL   Blood: x / Protein: NEGATIVE mg/dL / Nitrite: NEGATIVE   Leuk Esterase: NEGATIVE / RBC: x / WBC x   Sq Epi: x / Non Sq Epi: x / Bacteria: x      Hemoglobin A1C, Whole Blood: 9.8 ( @ 15:04)      Cholesterol, Serum: 120 mg/dL (19 @ 06:24)  HDL Cholesterol, Serum: 43 mg/dL (19 @ 06:24)  Triglycerides, Serum: 112 mg/dL (19 @ 06:24)  Direct LDL: 55 mg/dL (19 @ 06:24)      CAPILLARY BLOOD GLUCOSE      POCT Blood Glucose.: 110 mg/dL (2019 10:40)  POCT Blood Glucose.: 131 mg/dL (2019 06:23)  POCT Blood Glucose.: 106 mg/dL (2019 23:26)  POCT Blood Glucose.: 90 mg/dL (2019 22:11)  POCT Blood Glucose.: 118 mg/dL (2019 15:09)  POCT Blood Glucose.: 82 mg/dL (2019 13:42)      Echocardiogram:   EXAM:  ECHOCARDIOGRAM (CARDIOL)                          PROCEDURE DATE:  2019          INTERPRETATION:  Interpretation Summary  There is moderate concentric left ventricular hypertrophy.The left   ventricular   wall motion is normal.The leftventricular ejection fraction is   normal.The   left ventricular ejection fraction is 65%.The right ventricle is normal   in   size and function.The left atrial size is normal.Right atrial size is   normal.No evidence for any hemodynamically significant valvular   disease.There   was insufficient TR detected from which to calculate pulmonary artery   systolic   pressure.  There is no pericardial effusion.  Procedure Details  A complete two-dimensional transthoracic echocardiogram was performed (2D,  M-mode, spectral and color flow doppler).  Study Quality: Good.  Left Ventricle  There is moderate concentric left ventricular hypertrophy.  The left ventricular wall motion is normal.  The left ventricular ejection fraction is normal.  The left ventricular ejection fraction is 65%.  Left Atrium  The left atrial size is normal.  Right Atrium  Right atrial size is normal.  Right Ventricle  The right ventricle is normal in size and function.  The tricuspid annular plane systolic excursion (TAPSE) is 26 mm (normal   17mm  or higher).  Aortic Valve  Structurally normal aortic valve.  No aortic regurgitation noted.  Mitral Valve  Structurally normal mitral valve.  There is trace mitral regurgitation.  Tricuspid Valve  Structurally normal tricuspid valve.  No tricuspid regurgitation noted.  There was insufficient TR detected from which to calculate pulmonary   artery  systolic pressure.  Pulmonic Valve  Structurally normal pulmonic valve.  No pulmonic regurgitation noted.  Arteries and Venous System  No aortic root dilatation.  Normal size aortic arch with no hemodynamic evidence for coarctation  The inferior vena cava is normal in size (<2.1 cm) with normal inspiratory  collapse (>50%) consistent with normal right atrial pressure.  Pericardium / Pleura  There is no pericardial effusion.  Additional Comments  No evidence for any hemodynamically significant valvular disease.  Doppler Measurements & Calculations  MV E point: 67.6 cm/sec  MV A point: 56.6 cm/sec  MV E/A: 1.2  MV dec slope: 283.4 cm/sec^2  Ao V2 max: 84.3 cm/sec  Ao max P.8 mmHg  Ao max PG (full): 0.6 mmHg  Ao V2 mean: 62.9 cm/sec  Ao mean P.7 mmHg  Ao mean PG (full): 0.6 mmHg  Ao V2 VTI: 15.0 cm  JENA(I,A): 3.9 cm^2  JENA(I,D): 3.9 cm^2  JENA(V,A): 4.0 cm^2  JENA(V,D): 4.0 cm^2  LV max P.2 mmHg  LV mean P.1 mmHg  LV V1 max: 74.3 cm/sec  LV V1 mean: 49.9 cm/sec  LV V1 VTI: 12.8 cm  SV(Ao): 142.5 ml  SV(LVOT): 58.6 ml  MMode 2D Measurements & Calculations  IVSd: 1.4 cm  LVIDd: 4.7 cm  LVIDs: 2.5 cm  LVPWd: 1.4 cm  IVS/LVPW: 1.0  FS: 47.0 %  EDV(Teich): 103.8 ml  ESV(Teich): 22.5 ml  LV mass(C)d: 262.1 grams  Ao root diam: 3.5 cm  Ao root area: 9.5 cm^2  LA dimension: 3.8 cm  LA/Ao: 1.1  LVOT diam: 2.4 cm  LVOT area: 4.6 cm^2  LVOT area (M): 4.6 cm^2  EDV(MOD-sp4): 125 ml  EDV(MOD-sp2): 40 ml  Interpreting Physician:Trang Palacios MD electronically signed on 2019   17:42:11                "Thank you for the opportunity to participate in the care of this   patient."        TRANG PALACIOS M.D., ATTENDING CARDIOLOGIST  This document has been electronically signed. 2019  5:42PM               (19 @ 17:26)

## 2019-07-12 NOTE — DISCHARGE NOTE NURSING/CASE MANAGEMENT/SOCIAL WORK - NSDCDPATPORTLINK_GEN_ALL_CORE
You can access the [facility] Patient Portal, offered by [facility], by registering with the following website: [email or web address]

## 2019-07-13 ENCOUNTER — TRANSCRIPTION ENCOUNTER (OUTPATIENT)
Age: 44
End: 2019-07-13

## 2019-07-16 DIAGNOSIS — E66.9 OBESITY, UNSPECIFIED: ICD-10-CM

## 2019-07-16 DIAGNOSIS — E78.5 HYPERLIPIDEMIA, UNSPECIFIED: ICD-10-CM

## 2019-07-16 DIAGNOSIS — I10 ESSENTIAL (PRIMARY) HYPERTENSION: ICD-10-CM

## 2019-07-16 DIAGNOSIS — R07.9 CHEST PAIN, UNSPECIFIED: ICD-10-CM

## 2019-07-16 DIAGNOSIS — Z89.421 ACQUIRED ABSENCE OF OTHER RIGHT TOE(S): ICD-10-CM

## 2019-07-16 DIAGNOSIS — E11.9 TYPE 2 DIABETES MELLITUS WITHOUT COMPLICATIONS: ICD-10-CM

## 2019-07-16 DIAGNOSIS — M86.671 OTHER CHRONIC OSTEOMYELITIS, RIGHT ANKLE AND FOOT: ICD-10-CM

## 2019-07-16 DIAGNOSIS — Z79.4 LONG TERM (CURRENT) USE OF INSULIN: ICD-10-CM

## 2019-07-16 DIAGNOSIS — N17.9 ACUTE KIDNEY FAILURE, UNSPECIFIED: ICD-10-CM

## 2019-10-07 ENCOUNTER — EMERGENCY (EMERGENCY)
Facility: HOSPITAL | Age: 44
LOS: 1 days | Discharge: HOME | End: 2019-10-07
Attending: EMERGENCY MEDICINE | Admitting: EMERGENCY MEDICINE
Payer: COMMERCIAL

## 2019-10-07 VITALS
HEART RATE: 84 BPM | OXYGEN SATURATION: 98 % | TEMPERATURE: 99 F | RESPIRATION RATE: 18 BRPM | SYSTOLIC BLOOD PRESSURE: 138 MMHG | DIASTOLIC BLOOD PRESSURE: 86 MMHG

## 2019-10-07 VITALS
RESPIRATION RATE: 18 BRPM | SYSTOLIC BLOOD PRESSURE: 134 MMHG | HEART RATE: 88 BPM | WEIGHT: 234.57 LBS | OXYGEN SATURATION: 100 % | DIASTOLIC BLOOD PRESSURE: 87 MMHG | TEMPERATURE: 99 F

## 2019-10-07 DIAGNOSIS — Z89.429 ACQUIRED ABSENCE OF OTHER TOE(S), UNSPECIFIED SIDE: Chronic | ICD-10-CM

## 2019-10-07 DIAGNOSIS — Z94.5 SKIN TRANSPLANT STATUS: Chronic | ICD-10-CM

## 2019-10-07 LAB
ALBUMIN SERPL ELPH-MCNC: 4.1 G/DL — SIGNIFICANT CHANGE UP (ref 3.3–5)
ALP SERPL-CCNC: 58 U/L — SIGNIFICANT CHANGE UP (ref 40–120)
ALT FLD-CCNC: 41 U/L — SIGNIFICANT CHANGE UP (ref 10–45)
ANION GAP SERPL CALC-SCNC: 10 MMOL/L — SIGNIFICANT CHANGE UP (ref 5–17)
APPEARANCE UR: CLEAR — SIGNIFICANT CHANGE UP
AST SERPL-CCNC: 35 U/L — SIGNIFICANT CHANGE UP (ref 10–40)
B-OH-BUTYR SERPL-SCNC: 0.1 MMOL/L — SIGNIFICANT CHANGE UP
BASOPHILS # BLD AUTO: 0.07 K/UL — SIGNIFICANT CHANGE UP (ref 0–0.2)
BASOPHILS NFR BLD AUTO: 0.8 % — SIGNIFICANT CHANGE UP (ref 0–2)
BILIRUB SERPL-MCNC: 0.3 MG/DL — SIGNIFICANT CHANGE UP (ref 0.2–1.2)
BILIRUB UR-MCNC: NEGATIVE — SIGNIFICANT CHANGE UP
BUN SERPL-MCNC: 26 MG/DL — HIGH (ref 7–23)
CALCIUM SERPL-MCNC: 9.6 MG/DL — SIGNIFICANT CHANGE UP (ref 8.4–10.5)
CHLORIDE SERPL-SCNC: 104 MMOL/L — SIGNIFICANT CHANGE UP (ref 96–108)
CK MB CFR SERPL CALC: 4.4 NG/ML — SIGNIFICANT CHANGE UP (ref 0–6.7)
CO2 SERPL-SCNC: 27 MMOL/L — SIGNIFICANT CHANGE UP (ref 22–31)
COLOR SPEC: YELLOW — SIGNIFICANT CHANGE UP
CREAT SERPL-MCNC: 1.11 MG/DL — SIGNIFICANT CHANGE UP (ref 0.5–1.3)
DIFF PNL FLD: NEGATIVE — SIGNIFICANT CHANGE UP
EOSINOPHIL # BLD AUTO: 0.26 K/UL — SIGNIFICANT CHANGE UP (ref 0–0.5)
EOSINOPHIL NFR BLD AUTO: 2.8 % — SIGNIFICANT CHANGE UP (ref 0–6)
GLUCOSE SERPL-MCNC: 80 MG/DL — SIGNIFICANT CHANGE UP (ref 70–99)
GLUCOSE UR QL: >=1000
HCT VFR BLD CALC: 42.1 % — SIGNIFICANT CHANGE UP (ref 39–50)
HGB BLD-MCNC: 13.5 G/DL — SIGNIFICANT CHANGE UP (ref 13–17)
IMM GRANULOCYTES NFR BLD AUTO: 0.1 % — SIGNIFICANT CHANGE UP (ref 0–1.5)
KETONES UR-MCNC: NEGATIVE — SIGNIFICANT CHANGE UP
LEUKOCYTE ESTERASE UR-ACNC: NEGATIVE — SIGNIFICANT CHANGE UP
LYMPHOCYTES # BLD AUTO: 3.48 K/UL — HIGH (ref 1–3.3)
LYMPHOCYTES # BLD AUTO: 37.6 % — SIGNIFICANT CHANGE UP (ref 13–44)
MCHC RBC-ENTMCNC: 28.6 PG — SIGNIFICANT CHANGE UP (ref 27–34)
MCHC RBC-ENTMCNC: 32.1 GM/DL — SIGNIFICANT CHANGE UP (ref 32–36)
MCV RBC AUTO: 89.2 FL — SIGNIFICANT CHANGE UP (ref 80–100)
MONOCYTES # BLD AUTO: 0.83 K/UL — SIGNIFICANT CHANGE UP (ref 0–0.9)
MONOCYTES NFR BLD AUTO: 9 % — SIGNIFICANT CHANGE UP (ref 2–14)
NEUTROPHILS # BLD AUTO: 4.6 K/UL — SIGNIFICANT CHANGE UP (ref 1.8–7.4)
NEUTROPHILS NFR BLD AUTO: 49.7 % — SIGNIFICANT CHANGE UP (ref 43–77)
NITRITE UR-MCNC: NEGATIVE — SIGNIFICANT CHANGE UP
NRBC # BLD: 0 /100 WBCS — SIGNIFICANT CHANGE UP (ref 0–0)
PH UR: 6 — SIGNIFICANT CHANGE UP (ref 5–8)
PLATELET # BLD AUTO: 375 K/UL — SIGNIFICANT CHANGE UP (ref 150–400)
POTASSIUM SERPL-MCNC: 5.2 MMOL/L — SIGNIFICANT CHANGE UP (ref 3.5–5.3)
POTASSIUM SERPL-SCNC: 5.2 MMOL/L — SIGNIFICANT CHANGE UP (ref 3.5–5.3)
PROT SERPL-MCNC: 8.3 G/DL — SIGNIFICANT CHANGE UP (ref 6–8.3)
PROT UR-MCNC: ABNORMAL MG/DL
RBC # BLD: 4.72 M/UL — SIGNIFICANT CHANGE UP (ref 4.2–5.8)
RBC # FLD: 13.7 % — SIGNIFICANT CHANGE UP (ref 10.3–14.5)
SODIUM SERPL-SCNC: 141 MMOL/L — SIGNIFICANT CHANGE UP (ref 135–145)
SP GR SPEC: 1.02 — SIGNIFICANT CHANGE UP (ref 1–1.03)
TROPONIN T SERPL-MCNC: <0.01 NG/ML — SIGNIFICANT CHANGE UP (ref 0–0.01)
TSH SERPL-MCNC: 0.24 UIU/ML — LOW (ref 0.35–4.94)
UROBILINOGEN FLD QL: 0.2 E.U./DL — SIGNIFICANT CHANGE UP
WBC # BLD: 9.25 K/UL — SIGNIFICANT CHANGE UP (ref 3.8–10.5)
WBC # FLD AUTO: 9.25 K/UL — SIGNIFICANT CHANGE UP (ref 3.8–10.5)

## 2019-10-07 PROCEDURE — 71046 X-RAY EXAM CHEST 2 VIEWS: CPT

## 2019-10-07 PROCEDURE — 96361 HYDRATE IV INFUSION ADD-ON: CPT

## 2019-10-07 PROCEDURE — 85025 COMPLETE CBC W/AUTO DIFF WBC: CPT

## 2019-10-07 PROCEDURE — 71275 CT ANGIOGRAPHY CHEST: CPT | Mod: 26

## 2019-10-07 PROCEDURE — 84484 ASSAY OF TROPONIN QUANT: CPT

## 2019-10-07 PROCEDURE — 71046 X-RAY EXAM CHEST 2 VIEWS: CPT | Mod: 26

## 2019-10-07 PROCEDURE — 82550 ASSAY OF CK (CPK): CPT

## 2019-10-07 PROCEDURE — 36415 COLL VENOUS BLD VENIPUNCTURE: CPT

## 2019-10-07 PROCEDURE — 82962 GLUCOSE BLOOD TEST: CPT

## 2019-10-07 PROCEDURE — 93010 ELECTROCARDIOGRAM REPORT: CPT | Mod: 59

## 2019-10-07 PROCEDURE — 80053 COMPREHEN METABOLIC PANEL: CPT

## 2019-10-07 PROCEDURE — 84443 ASSAY THYROID STIM HORMONE: CPT

## 2019-10-07 PROCEDURE — 82553 CREATINE MB FRACTION: CPT

## 2019-10-07 PROCEDURE — 96360 HYDRATION IV INFUSION INIT: CPT

## 2019-10-07 PROCEDURE — 82010 KETONE BODYS QUAN: CPT

## 2019-10-07 PROCEDURE — 93005 ELECTROCARDIOGRAM TRACING: CPT

## 2019-10-07 PROCEDURE — 87086 URINE CULTURE/COLONY COUNT: CPT

## 2019-10-07 PROCEDURE — 81001 URINALYSIS AUTO W/SCOPE: CPT

## 2019-10-07 PROCEDURE — 71275 CT ANGIOGRAPHY CHEST: CPT

## 2019-10-07 PROCEDURE — 99284 EMERGENCY DEPT VISIT MOD MDM: CPT

## 2019-10-07 PROCEDURE — 99284 EMERGENCY DEPT VISIT MOD MDM: CPT | Mod: 25

## 2019-10-07 RX ORDER — SODIUM CHLORIDE 9 MG/ML
1000 INJECTION INTRAMUSCULAR; INTRAVENOUS; SUBCUTANEOUS
Refills: 0 | Status: DISCONTINUED | OUTPATIENT
Start: 2019-10-07 | End: 2019-10-22

## 2019-10-07 RX ADMIN — SODIUM CHLORIDE 1000 MILLILITER(S): 9 INJECTION INTRAMUSCULAR; INTRAVENOUS; SUBCUTANEOUS at 18:43

## 2019-10-07 RX ADMIN — SODIUM CHLORIDE 125 MILLILITER(S): 9 INJECTION INTRAMUSCULAR; INTRAVENOUS; SUBCUTANEOUS at 15:08

## 2019-10-07 NOTE — ED PROVIDER NOTE - ATTENDING CONTRIBUTION TO CARE
45 yo male former smoker and social EtoH/recent cocaine use 7/4/19, h/o HTN, Hyperlipidemia, DM-2 on Insulin and Orals, chronic osteomyelitis of Rt foot s/p toe amputations in past and I&D w/plantar skin graft Dec 2018 s/p admit in July for cp w nl stress and echo (ef 65%) c/o 45 yo male former smoker and social EtoH/recent cocaine use 7/4/19, h/o HTN, Hyperlipidemia, DM-2 on Insulin and Orals, chronic osteomyelitis of Rt foot s/p toe amputations in past and I&D w/plantar skin graft Dec 2018, ? dvt (reports on ac x ~ 1 yr) s/p admit in July for cp w nl stress and echo (ef 65%) c/o exertional dyspnea x several days w/o associated cp, palpitations.  No uri sx, fever, cough, le pain/swelling.  + travel in May but not more recently.  Pt c/o labile glu readings to pa and gen malaise.  Well appearing, nad, nc/at, lung cta, heart reg, abd soft, nt, ext no gross deformity, no c/c/e/ttp bilat le, no gross neuro deficits   Pt c/o wallace - recent echo w nl ef so no concern for chf, also w recent nl stress so less concern for acs, pt reports h/o dvt - ? pe.  No pna sx.   Plan labs, cta if cr wnl.  Reassess.

## 2019-10-07 NOTE — ED PROVIDER NOTE - CLINICAL SUMMARY MEDICAL DECISION MAKING FREE TEXT BOX
Patient with DM with hypoglycemic episodes due to mismanage DM meds. Recommend to f/u with endocrinology. Elevated TSH pt aware advise to follow up as well.

## 2019-10-07 NOTE — ED PROVIDER NOTE - PROGRESS NOTE DETAILS
Director - pt pending cta for pe; pt signed out to Dr Cornell.  COSME Lange will cont to follow. Xavifish: Received s/o pending CTA. tsh low, can f/u outpt. CTA no acute pathology, feeling much better, comfortable for dc, outpt pmd f/u.  Discussed all results with patient, including any incidental radiological findings and lab abnormalities. Given copy of results and instructed to bring copy to primary doctor.

## 2019-10-07 NOTE — ED PROVIDER NOTE - PMH
Diabetes    History of cocaine use    Hyperlipidemia, unspecified hyperlipidemia type    Hypertension    Osteomyelitis

## 2019-10-07 NOTE — ED ADULT NURSE REASSESSMENT NOTE - NS ED NURSE REASSESS COMMENT FT1
All tests, CT scan resulted, no pain or symptom complaints, states feeling better, no lightheadedness or SOB.  VItal signs stable.  Discharged to home in stable condition.

## 2019-10-07 NOTE — ED PROVIDER NOTE - OBJECTIVE STATEMENT
43 yo male former smoker and social EtoH/recent cocaine use 7/4/19, h/o HTN, Hyperlipidemia, DM-2 on Insulin and Orals, chronic osteomyelitis of Rt foot s/p toe amputations in past and I&D w/plantar skin graft Dec 2018, admitted in July 2019 for c/o cp (ce neg x 3, ECHO with moderate concentric LVH, normal EF 65% with no wall motion abnormalities, no pericardial effusion or valve disease, stress revealing Myocardial perfusion imaging is normal, Overall left ventricular systolic function is normal without regional, wall motion abnormalities, EKG stress test is normal) and CHRIST that improved w ivf c/o

## 2019-10-07 NOTE — ED PROVIDER NOTE - PATIENT PORTAL LINK FT
You can access the FollowMyHealth Patient Portal offered by [facility] by registering at the following website: [email or web address]. By joining [employer]’s FollowMyHealth portal, you will also be able to view your health information using other applications (apps) compatible with our system.

## 2019-10-07 NOTE — ED ADULT NURSE NOTE - OBJECTIVE STATEMENT
Patient c/o of lightheadedness, increased fatigue and dizziness and SOB after walking for 5 min , no cough, symptoms started 2 days ago, last Friday, feels like passing out, w/ chest pressure, not pain, radiates to right arm.  States w/ episodes of diaphoresis,  no nausea/vomitting, no fevers.  PMHx. DM not on Insulin, states recently changed to oral meds.  States had cardiac work-up last July, results normal.  EKG NSR in ED.

## 2019-10-08 LAB
CULTURE RESULTS: NO GROWTH — SIGNIFICANT CHANGE UP
SPECIMEN SOURCE: SIGNIFICANT CHANGE UP

## 2019-10-12 DIAGNOSIS — R06.02 SHORTNESS OF BREATH: ICD-10-CM

## 2019-10-12 DIAGNOSIS — E78.5 HYPERLIPIDEMIA, UNSPECIFIED: ICD-10-CM

## 2019-10-12 DIAGNOSIS — Z79.899 OTHER LONG TERM (CURRENT) DRUG THERAPY: ICD-10-CM

## 2019-10-12 DIAGNOSIS — E11.649 TYPE 2 DIABETES MELLITUS WITH HYPOGLYCEMIA WITHOUT COMA: ICD-10-CM

## 2019-10-12 DIAGNOSIS — T38.3X5A ADVERSE EFFECT OF INSULIN AND ORAL HYPOGLYCEMIC [ANTIDIABETIC] DRUGS, INITIAL ENCOUNTER: ICD-10-CM

## 2019-11-13 ENCOUNTER — EMERGENCY (EMERGENCY)
Facility: HOSPITAL | Age: 44
LOS: 1 days | Discharge: HOME | End: 2019-11-13
Attending: EMERGENCY MEDICINE | Admitting: EMERGENCY MEDICINE
Payer: COMMERCIAL

## 2019-11-13 VITALS
HEART RATE: 97 BPM | HEIGHT: 72 IN | RESPIRATION RATE: 18 BRPM | WEIGHT: 233.69 LBS | DIASTOLIC BLOOD PRESSURE: 90 MMHG | OXYGEN SATURATION: 99 % | SYSTOLIC BLOOD PRESSURE: 171 MMHG | TEMPERATURE: 98 F

## 2019-11-13 DIAGNOSIS — Z89.429 ACQUIRED ABSENCE OF OTHER TOE(S), UNSPECIFIED SIDE: Chronic | ICD-10-CM

## 2019-11-13 DIAGNOSIS — Z94.5 SKIN TRANSPLANT STATUS: Chronic | ICD-10-CM

## 2019-11-13 LAB
ALBUMIN SERPL ELPH-MCNC: 4 G/DL — SIGNIFICANT CHANGE UP (ref 3.3–5)
ALP SERPL-CCNC: 83 U/L — SIGNIFICANT CHANGE UP (ref 40–120)
ALT FLD-CCNC: 38 U/L — SIGNIFICANT CHANGE UP (ref 10–45)
ANION GAP SERPL CALC-SCNC: 12 MMOL/L — SIGNIFICANT CHANGE UP (ref 5–17)
AST SERPL-CCNC: 26 U/L — SIGNIFICANT CHANGE UP (ref 10–40)
BASOPHILS # BLD AUTO: 0.07 K/UL — SIGNIFICANT CHANGE UP (ref 0–0.2)
BASOPHILS NFR BLD AUTO: 0.8 % — SIGNIFICANT CHANGE UP (ref 0–2)
BILIRUB SERPL-MCNC: 0.7 MG/DL — SIGNIFICANT CHANGE UP (ref 0.2–1.2)
BUN SERPL-MCNC: 18 MG/DL — SIGNIFICANT CHANGE UP (ref 7–23)
CALCIUM SERPL-MCNC: 9.8 MG/DL — SIGNIFICANT CHANGE UP (ref 8.4–10.5)
CHLORIDE SERPL-SCNC: 99 MMOL/L — SIGNIFICANT CHANGE UP (ref 96–108)
CO2 SERPL-SCNC: 27 MMOL/L — SIGNIFICANT CHANGE UP (ref 22–31)
CREAT SERPL-MCNC: 0.97 MG/DL — SIGNIFICANT CHANGE UP (ref 0.5–1.3)
CRP SERPL-MCNC: 0.66 MG/DL — HIGH (ref 0–0.4)
EOSINOPHIL # BLD AUTO: 0.18 K/UL — SIGNIFICANT CHANGE UP (ref 0–0.5)
EOSINOPHIL NFR BLD AUTO: 1.9 % — SIGNIFICANT CHANGE UP (ref 0–6)
ERYTHROCYTE [SEDIMENTATION RATE] IN BLOOD: 13 MM/HR — SIGNIFICANT CHANGE UP
GLUCOSE SERPL-MCNC: 158 MG/DL — HIGH (ref 70–99)
HCT VFR BLD CALC: 41.6 % — SIGNIFICANT CHANGE UP (ref 39–50)
HGB BLD-MCNC: 13.8 G/DL — SIGNIFICANT CHANGE UP (ref 13–17)
IMM GRANULOCYTES NFR BLD AUTO: 0.3 % — SIGNIFICANT CHANGE UP (ref 0–1.5)
LYMPHOCYTES # BLD AUTO: 3.12 K/UL — SIGNIFICANT CHANGE UP (ref 1–3.3)
LYMPHOCYTES # BLD AUTO: 33.6 % — SIGNIFICANT CHANGE UP (ref 13–44)
MCHC RBC-ENTMCNC: 29.2 PG — SIGNIFICANT CHANGE UP (ref 27–34)
MCHC RBC-ENTMCNC: 33.2 GM/DL — SIGNIFICANT CHANGE UP (ref 32–36)
MCV RBC AUTO: 87.9 FL — SIGNIFICANT CHANGE UP (ref 80–100)
MONOCYTES # BLD AUTO: 0.78 K/UL — SIGNIFICANT CHANGE UP (ref 0–0.9)
MONOCYTES NFR BLD AUTO: 8.4 % — SIGNIFICANT CHANGE UP (ref 2–14)
NEUTROPHILS # BLD AUTO: 5.11 K/UL — SIGNIFICANT CHANGE UP (ref 1.8–7.4)
NEUTROPHILS NFR BLD AUTO: 55 % — SIGNIFICANT CHANGE UP (ref 43–77)
NRBC # BLD: 0 /100 WBCS — SIGNIFICANT CHANGE UP (ref 0–0)
PLATELET # BLD AUTO: 322 K/UL — SIGNIFICANT CHANGE UP (ref 150–400)
POTASSIUM SERPL-MCNC: 4.3 MMOL/L — SIGNIFICANT CHANGE UP (ref 3.5–5.3)
POTASSIUM SERPL-SCNC: 4.3 MMOL/L — SIGNIFICANT CHANGE UP (ref 3.5–5.3)
PROT SERPL-MCNC: 8.1 G/DL — SIGNIFICANT CHANGE UP (ref 6–8.3)
RBC # BLD: 4.73 M/UL — SIGNIFICANT CHANGE UP (ref 4.2–5.8)
RBC # FLD: 14 % — SIGNIFICANT CHANGE UP (ref 10.3–14.5)
SODIUM SERPL-SCNC: 138 MMOL/L — SIGNIFICANT CHANGE UP (ref 135–145)
WBC # BLD: 9.29 K/UL — SIGNIFICANT CHANGE UP (ref 3.8–10.5)
WBC # FLD AUTO: 9.29 K/UL — SIGNIFICANT CHANGE UP (ref 3.8–10.5)

## 2019-11-13 PROCEDURE — 73660 X-RAY EXAM OF TOE(S): CPT

## 2019-11-13 PROCEDURE — 99284 EMERGENCY DEPT VISIT MOD MDM: CPT

## 2019-11-13 PROCEDURE — 85652 RBC SED RATE AUTOMATED: CPT

## 2019-11-13 PROCEDURE — 86140 C-REACTIVE PROTEIN: CPT

## 2019-11-13 PROCEDURE — 73660 X-RAY EXAM OF TOE(S): CPT | Mod: 26,RT

## 2019-11-13 PROCEDURE — 99283 EMERGENCY DEPT VISIT LOW MDM: CPT

## 2019-11-13 PROCEDURE — 80053 COMPREHEN METABOLIC PANEL: CPT

## 2019-11-13 PROCEDURE — 85025 COMPLETE CBC W/AUTO DIFF WBC: CPT

## 2019-11-13 PROCEDURE — 36415 COLL VENOUS BLD VENIPUNCTURE: CPT

## 2019-11-13 NOTE — ED PROVIDER NOTE - MUSCULOSKELETAL, MLM
Spine appears normal, range of motion is not limited, no muscle or joint tenderness, distal pulses good,

## 2019-11-13 NOTE — CONSULT NOTE ADULT - ASSESSMENT
45 YO M with PMHx significant for HTN, Hyperlipidemia, DM-2 on Insulin and Orals, with h/o chronic osteomyelitis of Rt foot s/p toe amputations presenting with a Simpson grade 2 ulcer of right plantar hallux     - No clinical signs of acute infection   - No cortical erosions seen on radiographs  - Aseptic debridement of hyperkeratotic rim down to viable dermis tissue using a sterile #15 blade  - Flushed wound and applied a dry sterile dressing   - Instructed patient to continue applying dry sterile dressing daily  - Keep wound clean and monitor closely  - Dispensed surgical shoe to right foot for offloading of the digit  - Patient should follow up with Dr. Maximiliano Green within 1 week of discharge.    Office information:          [place] Address- [address] [address] Phone: [phone]         [place] Address- [address], [place] [address] Phone: [phone]  -Stable from podiatric standpoint for discharge

## 2019-11-13 NOTE — ED PROVIDER NOTE - CLINICAL SUMMARY MEDICAL DECISION MAKING FREE TEXT BOX
43 yo male, former smoker, social ETOH drinker and cocaine user, h/o HTN, DM type 2, chronic osteomyelitis to right foot, s/p right 3,4,5th toes amputation and skin graft in the past, in the ER for a wound check- has ulcer to his right great toe. No drainage no erythema, or increased warmth to r great toe and foot, distal pulses palpable. plan : xray, labs, podiatry consult. 45 yo male, former smoker, social ETOH drinker and cocaine user, h/o HTN, DM type 2, chronic osteomyelitis to right foot, s/p right 2nd,4,5th toes amputation and skin graft in the past, in the ER for a wound check- has ulcer to his right great toe. No drainage no erythema, or increased warmth to r great toe and foot, distal pulses palpable. plan : xray, labs, podiatry consult.

## 2019-11-13 NOTE — ED PROVIDER NOTE - ATTENDING CONTRIBUTION TO CARE
44M pmh htn, dm, smoking, cocaine, s/p R toe amputations, osteomyelitis, p/w wound under 1st toe, was blister, now worsening smell. Exam noted for no active drainage, no diffuse erythema. Concern osteo, diabetic ulcer, less likely sepsis.

## 2019-11-13 NOTE — ED PROVIDER NOTE - PROGRESS NOTE DETAILS
labs and XRay reviewed. pt seen and evaluated by podiatry on call. D/c home recommended for out pt f/u with . ortho shoe for support , no abx at this time, as per consult team.

## 2019-11-13 NOTE — ED ADULT NURSE NOTE - OBJECTIVE STATEMENT
The pt is a 43 y/o male who came in to Ed for evaluation of wound to right great toe. Pt reports developing a blister while on vacation and "popping it" Pt came in for evaluation. Pt noted to have a open blister on right great toe with a deep orifice, no drainage noted, foul smell noted.

## 2019-11-13 NOTE — ED PROVIDER NOTE - OBJECTIVE STATEMENT
45 yo male former smoker and social EtoH drinker, h/o HTN, Hyperlipidemia, DM-2 on Insulin and Orals, chronic osteomyelitis of Rt foot s/p toe amputations in past and I&D w/plantar skin graft Dec 2018, present to ER today c/o odorous drainage from his right great toe wound. Pt reports having a blister on the plantar aspect of his great toe at first, states its probably due to uncomfortable shoes. Recently toe became swollen and pt noted a wound. He is concerned about infection. Denies fever, chills, pain or numbness to his right foot and toes.

## 2019-11-13 NOTE — CONSULT NOTE ADULT - SUBJECTIVE AND OBJECTIVE BOX
Attending: Norma     Patient is a 44y old  Male who presents with a chief complaint of right foot toe ulcer     HPI: 43 yo M former smoker and social EtoH drinker with PMHx significant for HTN, Hyperlipidemia, DM-2 on Insulin and Orals, chronic osteomyelitis of Rt foot s/p toe amputations in past and I&D w/plantar skin graft Dec 2018, present to ER today c/o odorous drainage from his right great toe wound. Pt reports having a blister on the plantar aspect of his great toe at first, states its probably due to uncomfortable shoes. patient relates that he first noticed the wound 1 week prior. Denies fever, chills, pain or numbness to his right foot and toes .Dr. Green referred patient to ED for further workup of toe ulcer    Review of systems negative except per HPI    PAST MEDICAL & SURGICAL HISTORY:  History of cocaine use  Hypertension  Hyperlipidemia, unspecified hyperlipidemia type  Osteomyelitis  Diabetes  H/O skin graft: Right foot  Toe amputation status: Right foot    Home Medications:  Aspirin Enteric Coated 81 mg oral delayed release tablet: 1 tab(s) orally once a day (07 Oct 2019 14:17)  Basaglar KwikPen 100 units/mL subcutaneous solution: 20 unit(s) subcutaneous once a day (at bedtime) (07 Oct 2019 14:17)  glipizide-metformin 2.5 mg-500 mg oral tablet: 2 tab(s) orally 2 times a day (07 Oct 2019 14:17)  lisinopril 5 mg oral tablet: 1 tab(s) orally once a day (11 Jul 2019 19:37)  Prandin 1 mg oral tablet: 1 tab(s) orally 3 times a day (before meals) (11 Jul 2019 19:37)    Allergies  No Known Allergies    FAMILY HISTORY:  No family history of cardiovascular disease (Father, Mother)    Social History:     LABS                        13.8   9.29  )-----------( 322      ( 13 Nov 2019 13:53 )             41.6     11-13    138  |  99  |  18  ----------------------------<  158<H>  4.3   |  27  |  0.97    Ca    9.8      13 Nov 2019 13:53    TPro  8.1  /  Alb  4.0  /  TBili  0.7  /  DBili  x   /  AST  26  /  ALT  38  /  AlkPhos  83  11-13      ESR: 13  CRP: --  11-13 @ 13:53    Vital Signs Last 24 Hrs  T(C): 36.6 (13 Nov 2019 13:11), Max: 36.6 (13 Nov 2019 13:11)  T(F): 97.8 (13 Nov 2019 13:11), Max: 97.8 (13 Nov 2019 13:11)  HR: 97 (13 Nov 2019 13:11) (97 - 97)  BP: 171/90 (13 Nov 2019 13:11) (171/90 - 171/90)  BP(mean): --  RR: 18 (13 Nov 2019 13:11) (18 - 18)  SpO2: 99% (13 Nov 2019 13:11) (99% - 99%)    PHYSICAL EXAM  General: NAD, AA0x3    Lower Extremity Focused:  Vasc: Palpable 2/4 pedal pulses. CFT intact x3 of right foot.   Derm: Simpson grade 2 ulcer plantar right hallux with hyperkeratotic rim, tracking 2cm distal laterally, undermining, no purulence/fluctuance/crepitus, no concern for a collection   Neuro: Grossly diminished   MSK: ambulatory     RADIOLOGY  Foot Xray, 3 views (wet read): No cortical erosions evident on radiographs

## 2019-11-13 NOTE — ED PROVIDER NOTE - SKIN, MLM
Skin normal color for race, warm, dry and intact. No evidence of rash.  right great toe- chronic appearing ulcer on the plantar aspect, no active drainage, no cellulitis, deformed toenail,

## 2019-11-13 NOTE — ED PROVIDER NOTE - PATIENT PORTAL LINK FT
You can access the FollowMyHealth Patient Portal offered by [facility] by registering at the following website: [email or web address]. By joining [employer]’s FollowMyHealth portal, you will also be able to view your health information using other applications (apps) compatible with our system.

## 2019-11-13 NOTE — ED PROVIDER NOTE - CARE PROVIDER_API CALL
David Green (AIMEE)  Orthopaedic Surgery  [address] [place] [address]  Phone: [phone]  Fax: [phone]  Follow Up Time:

## 2019-11-13 NOTE — ED PROVIDER NOTE - NSFOLLOWUPINSTRUCTIONS_ED_ALL_ED_FT
I have discussed the discharge plan with the patient. The patient agrees with the plan, as discussed.  The patient understands Emergency Department diagnosis is a preliminary diagnosis often based on limited information and that the patient must adhere to the follow-up plan as discussed.  The patient understands that if the symptoms worsen or if prescribed medications do not have the desired/planned effect that the patient may return to the Emergency Department at any time for further evaluation and treatment.      Diabetes Mellitus and Foot Care  Foot care is an important part of your health, especially when you have diabetes. Diabetes may cause you to have problems because of poor blood flow (circulation) to your feet and legs, which can cause your skin to:  Become thinner and drier.Break more easily.Heal more slowly.Peel and crack.You may also have nerve damage (neuropathy) in your legs and feet, causing decreased feeling in them. This means that you may not notice minor injuries to your feet that could lead to more serious problems. Noticing and addressing any potential problems early is the best way to prevent future foot problems.  How to care for your feet  Foot hygiene     Wash your feet daily with warm water and mild soap. Do not use hot water. Then, pat your feet and the areas between your toes until they are completely dry. Do not soak your feet as this can dry your skin.Trim your toenails straight across. Do not dig under them or around the cuticle. File the edges of your nails with an emery board or nail file.Apply a moisturizing lotion or petroleum jelly to the skin on your feet and to dry, brittle toenails. Use lotion that does not contain alcohol and is unscented. Do not apply lotion between your toes.Shoes and socks     Wear clean socks or stockings every day. Make sure they are not too tight. Do not wear knee-high stockings since they may decrease blood flow to your legs.Wear shoes that fit properly and have enough cushioning. Always look in your shoes before you put them on to be sure there are no objects inside.To break in new shoes, wear them for just a few hours a day. This prevents injuries on your feet.Wounds, scrapes, corns, and calluses     Check your feet daily for blisters, cuts, bruises, sores, and redness. If you cannot see the bottom of your feet, use a mirror or ask someone for help.Do not cut corns or calluses or try to remove them with medicine.If you find a minor scrape, cut, or break in the skin on your feet, keep it and the skin around it clean and dry. You may clean these areas with mild soap and water. Do not clean the area with peroxide, alcohol, or iodine.If you have a wound, scrape, corn, or callus on your foot, look at it several times a day to make sure it is healing and not infected. Check for:  Redness, swelling, or pain.Fluid or blood.Warmth.Pus or a bad smell.General instructions     Do not cross your legs. This may decrease blood flow to your feet.Do not use heating pads or hot water bottles on your feet. They may burn your skin. If you have lost feeling in your feet or legs, you may not know this is happening until it is too late.Protect your feet from hot and cold by wearing shoes, such as at the beach or on hot pavement.Schedule a complete foot exam at least once a year (annually) or more often if you have foot problems. If you have foot problems, report any cuts, sores, or bruises to your health care provider immediately.Contact a health care provider if:  You have a medical condition that increases your risk of infection and you have any cuts, sores, or bruises on your feet.You have an injury that is not healing.You have redness on your legs or feet.You feel burning or tingling in your legs or feet.You have pain or cramps in your legs and feet.Your legs or feet are numb.Your feet always feel cold.You have pain around a toenail.Get help right away if:  You have a wound, scrape, corn, or callus on your foot and:  You have pain, swelling, or redness that gets worse.You have fluid or blood coming from the wound, scrape, corn, or callus.Your wound, scrape, corn, or callus feels warm to the touch.You have pus or a bad smell coming from the wound, scrape, corn, or callus.You have a fever.You have a red line going up your leg.Summary  Check your feet every day for cuts, sores, red spots, swelling, and blisters.Moisturize feet and legs daily.Wear shoes that fit properly and have enough cushioning.If you have foot problems, report any cuts, sores, or bruises to your health care provider immediately.Schedule a complete foot exam at least once a year (annually) or more often if you have foot problems.This information is not intended to replace advice given to you by your health care provider. Make sure you discuss any questions you have with your health care provider.

## 2019-11-17 DIAGNOSIS — Z48.00 ENCOUNTER FOR CHANGE OR REMOVAL OF NONSURGICAL WOUND DRESSING: ICD-10-CM

## 2019-11-17 DIAGNOSIS — E11.9 TYPE 2 DIABETES MELLITUS WITHOUT COMPLICATIONS: ICD-10-CM

## 2019-11-17 DIAGNOSIS — L97.519 NON-PRESSURE CHRONIC ULCER OF OTHER PART OF RIGHT FOOT WITH UNSPECIFIED SEVERITY: ICD-10-CM

## 2019-11-17 DIAGNOSIS — Z79.899 OTHER LONG TERM (CURRENT) DRUG THERAPY: ICD-10-CM

## 2019-11-17 DIAGNOSIS — Z79.4 LONG TERM (CURRENT) USE OF INSULIN: ICD-10-CM

## 2019-11-17 DIAGNOSIS — I10 ESSENTIAL (PRIMARY) HYPERTENSION: ICD-10-CM

## 2019-11-17 DIAGNOSIS — E78.5 HYPERLIPIDEMIA, UNSPECIFIED: ICD-10-CM

## 2019-12-26 ENCOUNTER — EMERGENCY (EMERGENCY)
Facility: HOSPITAL | Age: 44
LOS: 1 days | Discharge: HOME | End: 2019-12-26
Attending: EMERGENCY MEDICINE | Admitting: EMERGENCY MEDICINE
Payer: COMMERCIAL

## 2019-12-26 VITALS
SYSTOLIC BLOOD PRESSURE: 159 MMHG | RESPIRATION RATE: 16 BRPM | OXYGEN SATURATION: 99 % | TEMPERATURE: 98 F | DIASTOLIC BLOOD PRESSURE: 107 MMHG | WEIGHT: 229.94 LBS | HEART RATE: 87 BPM

## 2019-12-26 DIAGNOSIS — Z94.5 SKIN TRANSPLANT STATUS: Chronic | ICD-10-CM

## 2019-12-26 DIAGNOSIS — L02.11 CUTANEOUS ABSCESS OF NECK: ICD-10-CM

## 2019-12-26 DIAGNOSIS — Z89.429 ACQUIRED ABSENCE OF OTHER TOE(S), UNSPECIFIED SIDE: Chronic | ICD-10-CM

## 2019-12-26 DIAGNOSIS — E11.9 TYPE 2 DIABETES MELLITUS WITHOUT COMPLICATIONS: ICD-10-CM

## 2019-12-26 DIAGNOSIS — I10 ESSENTIAL (PRIMARY) HYPERTENSION: ICD-10-CM

## 2019-12-26 DIAGNOSIS — Z79.4 LONG TERM (CURRENT) USE OF INSULIN: ICD-10-CM

## 2019-12-26 DIAGNOSIS — Z79.82 LONG TERM (CURRENT) USE OF ASPIRIN: ICD-10-CM

## 2019-12-26 DIAGNOSIS — E78.5 HYPERLIPIDEMIA, UNSPECIFIED: ICD-10-CM

## 2019-12-26 DIAGNOSIS — Z79.899 OTHER LONG TERM (CURRENT) DRUG THERAPY: ICD-10-CM

## 2019-12-26 DIAGNOSIS — M54.2 CERVICALGIA: ICD-10-CM

## 2019-12-26 LAB
ALBUMIN SERPL ELPH-MCNC: 3.9 G/DL — SIGNIFICANT CHANGE UP (ref 3.3–5)
ALP SERPL-CCNC: 61 U/L — SIGNIFICANT CHANGE UP (ref 40–120)
ALT FLD-CCNC: 31 U/L — SIGNIFICANT CHANGE UP (ref 10–45)
ANION GAP SERPL CALC-SCNC: 9 MMOL/L — SIGNIFICANT CHANGE UP (ref 5–17)
AST SERPL-CCNC: 28 U/L — SIGNIFICANT CHANGE UP (ref 10–40)
BILIRUB SERPL-MCNC: 0.7 MG/DL — SIGNIFICANT CHANGE UP (ref 0.2–1.2)
BUN SERPL-MCNC: 13 MG/DL — SIGNIFICANT CHANGE UP (ref 7–23)
CALCIUM SERPL-MCNC: 9 MG/DL — SIGNIFICANT CHANGE UP (ref 8.4–10.5)
CHLORIDE SERPL-SCNC: 99 MMOL/L — SIGNIFICANT CHANGE UP (ref 96–108)
CO2 SERPL-SCNC: 28 MMOL/L — SIGNIFICANT CHANGE UP (ref 22–31)
CREAT SERPL-MCNC: 1.03 MG/DL — SIGNIFICANT CHANGE UP (ref 0.5–1.3)
GLUCOSE SERPL-MCNC: 126 MG/DL — HIGH (ref 70–99)
HCT VFR BLD CALC: 39.4 % — SIGNIFICANT CHANGE UP (ref 39–50)
HGB BLD-MCNC: 12.9 G/DL — LOW (ref 13–17)
MCHC RBC-ENTMCNC: 28.6 PG — SIGNIFICANT CHANGE UP (ref 27–34)
MCHC RBC-ENTMCNC: 32.7 GM/DL — SIGNIFICANT CHANGE UP (ref 32–36)
MCV RBC AUTO: 87.4 FL — SIGNIFICANT CHANGE UP (ref 80–100)
NRBC # BLD: 0 /100 WBCS — SIGNIFICANT CHANGE UP (ref 0–0)
PLATELET # BLD AUTO: 307 K/UL — SIGNIFICANT CHANGE UP (ref 150–400)
POTASSIUM SERPL-MCNC: 4.4 MMOL/L — SIGNIFICANT CHANGE UP (ref 3.5–5.3)
POTASSIUM SERPL-SCNC: 4.4 MMOL/L — SIGNIFICANT CHANGE UP (ref 3.5–5.3)
PROT SERPL-MCNC: 7.5 G/DL — SIGNIFICANT CHANGE UP (ref 6–8.3)
RBC # BLD: 4.51 M/UL — SIGNIFICANT CHANGE UP (ref 4.2–5.8)
RBC # FLD: 13.2 % — SIGNIFICANT CHANGE UP (ref 10.3–14.5)
SODIUM SERPL-SCNC: 136 MMOL/L — SIGNIFICANT CHANGE UP (ref 135–145)
WBC # BLD: 11.56 K/UL — HIGH (ref 3.8–10.5)
WBC # FLD AUTO: 11.56 K/UL — HIGH (ref 3.8–10.5)

## 2019-12-26 PROCEDURE — 70491 CT SOFT TISSUE NECK W/DYE: CPT | Mod: 26

## 2019-12-26 PROCEDURE — 99285 EMERGENCY DEPT VISIT HI MDM: CPT

## 2019-12-26 RX ORDER — AMPICILLIN SODIUM AND SULBACTAM SODIUM 250; 125 MG/ML; MG/ML
3 INJECTION, POWDER, FOR SUSPENSION INTRAMUSCULAR; INTRAVENOUS ONCE
Refills: 0 | Status: COMPLETED | OUTPATIENT
Start: 2019-12-26 | End: 2019-12-27

## 2019-12-26 RX ORDER — MORPHINE SULFATE 50 MG/1
4 CAPSULE, EXTENDED RELEASE ORAL ONCE
Refills: 0 | Status: DISCONTINUED | OUTPATIENT
Start: 2019-12-26 | End: 2019-12-26

## 2019-12-26 RX ADMIN — MORPHINE SULFATE 4 MILLIGRAM(S): 50 CAPSULE, EXTENDED RELEASE ORAL at 21:52

## 2019-12-26 NOTE — ED PROVIDER NOTE - CLINICAL SUMMARY MEDICAL DECISION MAKING FREE TEXT BOX
43 y/o M with PMHx DM presents to the ED c/o L neck swelling x3d. Will obtain CT to r/o abscess mass.

## 2019-12-26 NOTE — ED PROVIDER NOTE - PROGRESS NOTE DETAILS
Disc w ENT, too small to drain, will trial dose of abx and fu ENT as outpt. to return for any worsening f/c, swelling. Disc w ENT on call who reviewed imaging, too small to drain, will trial dose of abx and fu ENT as outpt. to return for any worsening f/c, swelling.

## 2019-12-26 NOTE — ED PROVIDER NOTE - TIMING
Pt arguing about metoprolol administration stating she feels fine and wants her medication. Pt advised we cannot safely give her the medication due to her BP.       Jalen Marino RN  09/13/18 6610 constant

## 2019-12-26 NOTE — ED PROVIDER NOTE - PATIENT PORTAL LINK FT
You can access the FollowMyHealth Patient Portal offered by [facility] by registering at the following website: [email or web address]. By joining [facility]’s FollowMyHealth portal, you will also be able to view your health information using other applications (apps) compatible with our system.

## 2019-12-26 NOTE — ED ADULT NURSE NOTE - CHIEF COMPLAINT QUOTE
pt complaining of left sided neck pain since Monday reports a pimple to that area at that time denies difficulty swallowing or SOB states he was at [facility] earlier and eloped due to wait time arrives with IV access to left arm removed in triage reportedly was given pain medication with good effect and waited for Ct scan that was not yet performed

## 2019-12-26 NOTE — ED ADULT NURSE NOTE - NSIMPLEMENTINTERV_GEN_ALL_ED
Implemented All Universal Safety Interventions:  [place] to call system. Call bell, personal items and telephone within reach. Instruct patient to call for assistance. Room bathroom lighting operational. Non-slip footwear when patient is off stretcher. Physically safe environment: no spills, clutter or unnecessary equipment. Stretcher in lowest position, wheels locked, appropriate side rails in place.

## 2019-12-26 NOTE — ED PROVIDER NOTE - CARE PROVIDER_API CALL
Olga Acuna)  Otolaryngology  [address] [place] [address]  Phone: [phone]  Fax: [phone]  Follow Up Time:

## 2019-12-26 NOTE — ED PROVIDER NOTE - OBJECTIVE STATEMENT
43 y/o M with PMHx DM presents to the ED c/o L neck swelling x3d after pimple associated with L chest pain and HA. Denies fever and chills. 45 y/o M with PMHx DM presents to the ED c/o L neck swelling x3d after initially starting off as a pimple. Associated with radiating L sided HA. Denies fever and chills, sob, other complaints.

## 2019-12-26 NOTE — ED ADULT NURSE REASSESSMENT NOTE - NS ED NURSE REASSESS COMMENT FT1
Pt. was received into the ED, axox3, stating that he is still having pain to the neck.  MD Justin was notified, pt. medicated as per orders for pain.  Pt. is pending results of ct scan, will continue to monitor.

## 2019-12-26 NOTE — ED ADULT TRIAGE NOTE - CHIEF COMPLAINT QUOTE
pt complaining of left sided neck pain since Monday reports a pimple to that area at that time denies difficulty swallowing or SOB states he was at [facility] earlier and eloped due to wait time arrives with IV access to left arm removed in triage reportedly was given pain medication with good effect and waited for Ct scan that was not yet performed

## 2019-12-26 NOTE — ED PROVIDER NOTE - PHYSICAL EXAMINATION
CONSTITUTIONAL: Well appearing, well nourished, awake, alert and in no apparent distress.  HEENT: Head is atraumatic. Eyes clear bilaterally, normal EOMI. Airway patent. Posterior 4 by 4 swelling on L neck   CARDIAC: Normal rate, regular rhythm.  Heart sounds S1, S2.   RESPIRATORY: Breath sounds clear and equal bilaterally. no tachypnea, respiratory distress.   GASTROINTESTINAL: Abdomen soft, non-tender, no guarding, distension.  MUSCULOSKELETAL: Spine appears normal, no midline spinal tenderness, range of motion is not limited, no muscle or joint tenderness. no bony tenderness. no JVD, peripheral edema.   NEUROLOGICAL: Alert and oriented, no focal deficits, no motor or sensory deficits.  SKIN: Skin normal color for race, warm, dry and intact. No evidence of rash.  PSYCHIATRIC: Alert and oriented to person, place, time/situation. normal mood and affect. no apparent risk to self or others. CONSTITUTIONAL: Well appearing, well nourished, awake, alert and in no apparent distress.  HEENT: Head is atraumatic. Eyes clear bilaterally, normal EOMI. Airway patent. Posterior 4 by 4 swelling on L neck   CARDIAC: Normal rate, regular rhythm.  Heart sounds S1, S2.   RESPIRATORY: Breath sounds clear and equal bilaterally. no tachypnea, respiratory distress.   GASTROINTESTINAL: Abdomen soft, non-tender, no guarding, distension.  MUSCULOSKELETAL: Spine appears normal, no midline spinal tenderness, range of motion is not limited, no muscle or joint tenderness.   NEUROLOGICAL: Alert and oriented, no focal deficits, no motor or sensory deficits.  SKIN: Skin normal color for race, warm, dry and intact. No evidence of rash.  PSYCHIATRIC: Alert and oriented to person, place, time/situation. normal mood and affect. no apparent risk to self or others.

## 2019-12-26 NOTE — ED ADULT NURSE NOTE - OBJECTIVE STATEMENT
44 y      pt complaining of left sided neck pain since Monday reports a pimple to that area at that time denies difficulty swallowing or SOB states he was at [facility] earlier and eloped due to wait time arrives with IV access to left arm removed in triage reportedly was given pain medication with good effect and waited for Ct scan that was not yet performed 43 y/o male c/o left sided neck pain. pt states " I felt a pimple on Monday and yesterday it just grew and now I have pain and headache" pt reports going to [facility] and [facility] prior to blood work results and Ct scan test " because the waiting was too long" redness and swelling noted to the left side of the neck and occipital area.

## 2019-12-26 NOTE — ED PROVIDER NOTE - NSFOLLOWUPINSTRUCTIONS_ED_ALL_ED_FT
PLEASE RETURN TO ER FOR ANY WORSENING SWELLING, FEVER, PAIN OR OTHER CONCERNING SYMPTOMS. FOLLOW UP WITH DR. GARCIA ON MONDAY.  Abscess    WHAT YOU NEED TO KNOW:    What is an abscess? An abscess is an area under the skin where pus (infected fluid) collects. An abscess is often caused by bacteria, fungi or other germs that get into an open wound. You can get an abscess anywhere on your body.    What increases my risk for an abscess?     An animal bite      A foreign object lodged under your skin      Heavy or frequent sweating      A health problem, such as diabetes or obesity      Injecting illegal drugs    What are the signs and symptoms of an abscess? You may have a swollen mass that is red and painful. Pus may leak out of the mass. The pus will be white or yellow and may smell bad. You may have redness and pain days before the mass appears. You may have a fever and chills if the infection spreads.     How is an abscess diagnosed? Your healthcare provider will examine the area. He will check to see if your abscess is draining. A sample of fluid from your abscess may show what is causing your infection.    How is an abscess treated?     Incision and drainage is a procedure used to remove pus and fluid from the abscess. Your healthcare provider will make a cut in the abscess so it can drain. Then gauze will be put into the wound and it will be covered with a bandage.      Surgery may be needed to remove your abscess. Your healthcare provider may do this if the abscess is on your hands or buttocks. Surgery can decrease the risk that the abscess will come back.    What can I do to care for my self?     Apply a warm compress to your abscess. This will help it open and drain. Wet a washcloth in warm, but not hot, water. Apply the compress for 10 minutes. Repeat this 4 times each day. Do not press on an abscess or try to open it with a needle. You may push the bacteria deeper or into your blood.       Do not share your clothes, towels, or sheets with anyone. This can spread the infection to others.       Wash your hands often. This can help prevent the spread of germs. Use soap and water or an alcohol-based hand rub.     What can I do to care for my wound after it is drained?     Care for your wound as directed. If your healthcare provider says it is okay, carefully remove the bandage and gauze packing. You may need to soak the gauze to get it out of your wound. Clean your wound and the area around it as directed. Dry the area and put on new, clean bandages. Change your bandages when they get wet or dirty.       Ask your healthcare provider how to change the gauze in your wound. Keep track of how many pieces of gauze are placed inside the wound. Do not put too much packing in the wound. Do not pack the gauze too tightly in your wound.     When should I seek immediate care?     The area around your abscess becomes very painful, warm, or has red streaks.      You have a fever and chills.      Your heart is beating faster than usual.       You feel faint or confused.    When should I contact my healthcare provider?     Your abscess gets bigger.      Your abscess returns.      You have questions or concerns about your condition or care.    CARE AGREEMENT:    You have the right to help plan your care. Learn about your health condition and how it may be treated. Discuss treatment options with your healthcare providers to decide what care you want to receive. You always have the right to refuse treatment.

## 2019-12-27 VITALS
TEMPERATURE: 98 F | DIASTOLIC BLOOD PRESSURE: 63 MMHG | RESPIRATION RATE: 16 BRPM | OXYGEN SATURATION: 98 % | HEART RATE: 72 BPM | SYSTOLIC BLOOD PRESSURE: 161 MMHG

## 2019-12-27 PROCEDURE — 85027 COMPLETE CBC AUTOMATED: CPT

## 2019-12-27 PROCEDURE — 80053 COMPREHEN METABOLIC PANEL: CPT

## 2019-12-27 PROCEDURE — 96375 TX/PRO/DX INJ NEW DRUG ADDON: CPT | Mod: XU

## 2019-12-27 PROCEDURE — 99284 EMERGENCY DEPT VISIT MOD MDM: CPT | Mod: 25

## 2019-12-27 PROCEDURE — 96374 THER/PROPH/DIAG INJ IV PUSH: CPT | Mod: XU

## 2019-12-27 PROCEDURE — 70491 CT SOFT TISSUE NECK W/DYE: CPT

## 2019-12-27 PROCEDURE — 36415 COLL VENOUS BLD VENIPUNCTURE: CPT

## 2019-12-27 RX ORDER — IBUPROFEN 200 MG
1 TABLET ORAL
Qty: 12 | Refills: 0
Start: 2019-12-27

## 2019-12-27 RX ADMIN — AMPICILLIN SODIUM AND SULBACTAM SODIUM 200 GRAM(S): 250; 125 INJECTION, POWDER, FOR SUSPENSION INTRAMUSCULAR; INTRAVENOUS at 01:02

## 2019-12-28 ENCOUNTER — EMERGENCY (EMERGENCY)
Facility: HOSPITAL | Age: 44
LOS: 1 days | Discharge: HOME | End: 2019-12-28
Admitting: EMERGENCY MEDICINE

## 2019-12-28 VITALS
OXYGEN SATURATION: 100 % | HEART RATE: 94 BPM | DIASTOLIC BLOOD PRESSURE: 90 MMHG | SYSTOLIC BLOOD PRESSURE: 150 MMHG | RESPIRATION RATE: 18 BRPM | WEIGHT: 229.94 LBS | TEMPERATURE: 97 F | HEIGHT: 72 IN

## 2019-12-28 DIAGNOSIS — Z89.429 ACQUIRED ABSENCE OF OTHER TOE(S), UNSPECIFIED SIDE: Chronic | ICD-10-CM

## 2019-12-28 DIAGNOSIS — Z94.5 SKIN TRANSPLANT STATUS: Chronic | ICD-10-CM

## 2019-12-28 NOTE — ED ADULT TRIAGE NOTE - CHIEF COMPLAINT QUOTE
pt c/o abscess to back of L side of head, radiating to L neck. was seen at [facility] ED on 12/26, had CT scan and was prescribed clindamycin 300mg. states woke up this morning and abcess is bigger, affecting ear/hearing and causing pain. denies fevers.

## 2019-12-28 NOTE — ED ADULT NURSE NOTE - OBJECTIVE STATEMENT
Pt is a 45 y/o male c/o abscess to left occipital and neck. Pt reports being seen in ED for same complaint on 12/26 and sent home on oral antibiotics. Pt states "the abscess got bigger and now my left ear and shoulder hurts and they told me to come back if it got worse." Pt denies difficulty swallowing, breathing, N/V/D, fever/chills. Upon assessment edema noted to right pharynx, pt denies pain or discomfort to area.

## 2019-12-28 NOTE — ED ADULT NURSE NOTE - CHIEF COMPLAINT QUOTE
pt c/o abscess to back of L side of head, radiating to L neck. was seen at [facility] ED on 12/26, had CT scan and was prescribed clindamycin 300mg. states woke up this morning and abcess is bigger, affecting ear/hearing and causing pain. denies fevers.

## 2019-12-28 NOTE — ED ADULT NURSE NOTE - NSIMPLEMENTINTERV_GEN_ALL_ED
Implemented All Universal Safety Interventions:  [place] to call system. Call bell, personal items and telephone within reach. Instruct patient to call for assistance. Room bathroom lighting operational. Non-slip footwear when patient is off stretcher. Physically safe environment: no spills, clutter or unnecessary equipment. Stretcher in lowest position, wheels locked, appropriate side rails in place.

## 2020-01-01 DIAGNOSIS — L02.811 CUTANEOUS ABSCESS OF HEAD [ANY PART, EXCEPT FACE]: ICD-10-CM

## 2020-08-12 NOTE — DIETITIAN INITIAL EVALUATION ADULT. - PROBLEM SELECTOR PLAN 3
Left message for patient he doesn't need the iron anymore and not to take it. Also sent my chart message. On admission, Na: 130. Corrected 132. Likely hypovolemic hyponatremia secondary to sepsis. S/p 3.5 L NS.  -Trend Electrolytes

## 2020-08-14 NOTE — PROVIDER CONTACT NOTE (OTHER) - SITUATION
Patient's .  on recheck after administration of 18 U of lispro SC inj. Scribe Attestation (For Scribes USE Only)... Scribe Attestation (For Scribes USE Only).../Attending Attestation (For Attendings USE Only)...

## 2020-08-25 NOTE — PROCEDURE NOTE - ATTENDING PROVIDER
Monitor diet for food that clearly upset stomach or produce gas. Milk and Vegetables like Cauliflower or Broccoli can cause gas.     You may use Simethicone or Mylicon for gas.     Also ok to use over the counter Nexium 20 mg 1 tab daily.    Norma

## 2020-09-02 ENCOUNTER — TRANSCRIPTION ENCOUNTER (OUTPATIENT)
Age: 45
End: 2020-09-02

## 2020-09-08 NOTE — DIETITIAN INITIAL EVALUATION ADULT. - PROBLEM SELECTOR PROBLEM 1
Problem: Potential for Falls  Goal: Patient will remain free of falls  Description: INTERVENTIONS:  - Assess patient frequently for physical needs  -  Identify cognitive and physical deficits and behaviors that affect risk of falls  -  [place] fall precautions as indicated by assessment   - Educate patient/family on patient safety including physical limitations  - Instruct patient to call for assistance with activity based on assessment  - Modify environment to reduce risk of injury  - Consider OT/PT consult to assist with strengthening/mobility  Outcome: Progressing     Problem: PAIN - ADULT  Goal: Verbalizes/displays adequate comfort level or baseline comfort level  Description: Interventions:  - Encourage patient to monitor pain and request assistance  - Assess pain using appropriate pain scale  - Administer analgesics based on type and severity of pain and evaluate response  - Implement non-pharmacological measures as appropriate and evaluate response  - Consider cultural and social influences on pain and pain management  - Notify physician/advanced practitioner if interventions unsuccessful or patient reports new pain  Outcome: Progressing     Problem: Knowledge Deficit  Goal: Patient/family/caregiver demonstrates understanding of disease process, treatment plan, medications, and discharge instructions  Description: Complete learning assessment and assess knowledge base    Interventions:  - Provide teaching at level of understanding  - Provide teaching via preferred learning methods  Outcome: Progressing     Problem: GENITOURINARY - ADULT  Goal: Maintains or returns to baseline urinary function  Description: INTERVENTIONS:  - Assess urinary function  - Encourage oral fluids to ensure adequate hydration if ordered  - Administer IV fluids as ordered to ensure adequate hydration  - Administer ordered medications as needed  - Offer frequent toileting  - Follow urinary retention protocol if ordered  Outcome: Progressing Diabetic foot ulcer

## 2020-09-13 ENCOUNTER — INPATIENT (INPATIENT)
Facility: HOSPITAL | Age: 45
LOS: 3 days | Discharge: HOME | DRG: 629 | End: 2020-09-17
Attending: HOSPITALIST | Admitting: STUDENT IN AN ORGANIZED HEALTH CARE EDUCATION/TRAINING PROGRAM
Payer: COMMERCIAL

## 2020-09-13 VITALS
OXYGEN SATURATION: 99 % | HEIGHT: 72 IN | WEIGHT: 231.93 LBS | RESPIRATION RATE: 18 BRPM | SYSTOLIC BLOOD PRESSURE: 129 MMHG | HEART RATE: 89 BPM | DIASTOLIC BLOOD PRESSURE: 79 MMHG | TEMPERATURE: 98 F

## 2020-09-13 DIAGNOSIS — E78.5 HYPERLIPIDEMIA, UNSPECIFIED: ICD-10-CM

## 2020-09-13 DIAGNOSIS — E11.9 TYPE 2 DIABETES MELLITUS WITHOUT COMPLICATIONS: ICD-10-CM

## 2020-09-13 DIAGNOSIS — I10 ESSENTIAL (PRIMARY) HYPERTENSION: ICD-10-CM

## 2020-09-13 DIAGNOSIS — Z89.429 ACQUIRED ABSENCE OF OTHER TOE(S), UNSPECIFIED SIDE: Chronic | ICD-10-CM

## 2020-09-13 DIAGNOSIS — Z94.5 SKIN TRANSPLANT STATUS: Chronic | ICD-10-CM

## 2020-09-13 DIAGNOSIS — L97.509 NON-PRESSURE CHRONIC ULCER OF OTHER PART OF UNSPECIFIED FOOT WITH UNSPECIFIED SEVERITY: ICD-10-CM

## 2020-09-13 DIAGNOSIS — Z29.9 ENCOUNTER FOR PROPHYLACTIC MEASURES, UNSPECIFIED: ICD-10-CM

## 2020-09-13 DIAGNOSIS — Z87.898 PERSONAL HISTORY OF OTHER SPECIFIED CONDITIONS: ICD-10-CM

## 2020-09-13 LAB
ALBUMIN SERPL ELPH-MCNC: 3.5 G/DL — SIGNIFICANT CHANGE UP (ref 3.3–5)
ALP SERPL-CCNC: 66 U/L — SIGNIFICANT CHANGE UP (ref 40–120)
ALT FLD-CCNC: 35 U/L — SIGNIFICANT CHANGE UP (ref 10–45)
ANION GAP SERPL CALC-SCNC: 9 MMOL/L — SIGNIFICANT CHANGE UP (ref 5–17)
AST SERPL-CCNC: 20 U/L — SIGNIFICANT CHANGE UP (ref 10–40)
BASOPHILS # BLD AUTO: 0.05 K/UL — SIGNIFICANT CHANGE UP (ref 0–0.2)
BASOPHILS NFR BLD AUTO: 0.5 % — SIGNIFICANT CHANGE UP (ref 0–2)
BILIRUB SERPL-MCNC: 0.4 MG/DL — SIGNIFICANT CHANGE UP (ref 0.2–1.2)
BLD GP AB SCN SERPL QL: NEGATIVE — SIGNIFICANT CHANGE UP
BUN SERPL-MCNC: 14 MG/DL — SIGNIFICANT CHANGE UP (ref 7–23)
CALCIUM SERPL-MCNC: 9.4 MG/DL — SIGNIFICANT CHANGE UP (ref 8.4–10.5)
CHLORIDE SERPL-SCNC: 99 MMOL/L — SIGNIFICANT CHANGE UP (ref 96–108)
CO2 SERPL-SCNC: 26 MMOL/L — SIGNIFICANT CHANGE UP (ref 22–31)
CREAT SERPL-MCNC: 0.82 MG/DL — SIGNIFICANT CHANGE UP (ref 0.5–1.3)
CRP SERPL-MCNC: 9.89 MG/DL — HIGH (ref 0–0.4)
EOSINOPHIL # BLD AUTO: 0.28 K/UL — SIGNIFICANT CHANGE UP (ref 0–0.5)
EOSINOPHIL NFR BLD AUTO: 3.1 % — SIGNIFICANT CHANGE UP (ref 0–6)
ERYTHROCYTE [SEDIMENTATION RATE] IN BLOOD: 80 MM/HR — HIGH
GLUCOSE BLDC GLUCOMTR-MCNC: 236 MG/DL — HIGH (ref 70–99)
GLUCOSE SERPL-MCNC: 249 MG/DL — HIGH (ref 70–99)
HCT VFR BLD CALC: 38.9 % — LOW (ref 39–50)
HGB BLD-MCNC: 12.9 G/DL — LOW (ref 13–17)
IMM GRANULOCYTES NFR BLD AUTO: 0.3 % — SIGNIFICANT CHANGE UP (ref 0–1.5)
LACTATE SERPL-SCNC: 0.6 MMOL/L — SIGNIFICANT CHANGE UP (ref 0.5–2)
LYMPHOCYTES # BLD AUTO: 2.78 K/UL — SIGNIFICANT CHANGE UP (ref 1–3.3)
LYMPHOCYTES # BLD AUTO: 30.3 % — SIGNIFICANT CHANGE UP (ref 13–44)
MCHC RBC-ENTMCNC: 28.9 PG — SIGNIFICANT CHANGE UP (ref 27–34)
MCHC RBC-ENTMCNC: 33.2 GM/DL — SIGNIFICANT CHANGE UP (ref 32–36)
MCV RBC AUTO: 87.2 FL — SIGNIFICANT CHANGE UP (ref 80–100)
MONOCYTES # BLD AUTO: 0.89 K/UL — SIGNIFICANT CHANGE UP (ref 0–0.9)
MONOCYTES NFR BLD AUTO: 9.7 % — SIGNIFICANT CHANGE UP (ref 2–14)
NEUTROPHILS # BLD AUTO: 5.13 K/UL — SIGNIFICANT CHANGE UP (ref 1.8–7.4)
NEUTROPHILS NFR BLD AUTO: 56.1 % — SIGNIFICANT CHANGE UP (ref 43–77)
NRBC # BLD: 0 /100 WBCS — SIGNIFICANT CHANGE UP (ref 0–0)
PLATELET # BLD AUTO: 391 K/UL — SIGNIFICANT CHANGE UP (ref 150–400)
POTASSIUM SERPL-MCNC: 4.7 MMOL/L — SIGNIFICANT CHANGE UP (ref 3.5–5.3)
POTASSIUM SERPL-SCNC: 4.7 MMOL/L — SIGNIFICANT CHANGE UP (ref 3.5–5.3)
PROT SERPL-MCNC: 7.7 G/DL — SIGNIFICANT CHANGE UP (ref 6–8.3)
RBC # BLD: 4.46 M/UL — SIGNIFICANT CHANGE UP (ref 4.2–5.8)
RBC # FLD: 12.7 % — SIGNIFICANT CHANGE UP (ref 10.3–14.5)
RH IG SCN BLD-IMP: POSITIVE — SIGNIFICANT CHANGE UP
SARS-COV-2 RNA SPEC QL NAA+PROBE: SIGNIFICANT CHANGE UP
SODIUM SERPL-SCNC: 134 MMOL/L — LOW (ref 135–145)
WBC # BLD: 9.16 K/UL — SIGNIFICANT CHANGE UP (ref 3.8–10.5)
WBC # FLD AUTO: 9.16 K/UL — SIGNIFICANT CHANGE UP (ref 3.8–10.5)

## 2020-09-13 PROCEDURE — 93010 ELECTROCARDIOGRAM REPORT: CPT

## 2020-09-13 PROCEDURE — 73630 X-RAY EXAM OF FOOT: CPT | Mod: 26,RT

## 2020-09-13 PROCEDURE — 99285 EMERGENCY DEPT VISIT HI MDM: CPT

## 2020-09-13 PROCEDURE — 71045 X-RAY EXAM CHEST 1 VIEW: CPT | Mod: 26

## 2020-09-13 RX ORDER — VANCOMYCIN HCL 1 G
1500 VIAL (EA) INTRAVENOUS ONCE
Refills: 0 | Status: COMPLETED | OUTPATIENT
Start: 2020-09-13 | End: 2020-09-13

## 2020-09-13 RX ORDER — DEXTROSE 50 % IN WATER 50 %
12.5 SYRINGE (ML) INTRAVENOUS ONCE
Refills: 0 | Status: DISCONTINUED | OUTPATIENT
Start: 2020-09-13 | End: 2020-09-17

## 2020-09-13 RX ORDER — PIPERACILLIN AND TAZOBACTAM 4; .5 G/20ML; G/20ML
3.38 INJECTION, POWDER, LYOPHILIZED, FOR SOLUTION INTRAVENOUS EVERY 6 HOURS
Refills: 0 | Status: DISCONTINUED | OUTPATIENT
Start: 2020-09-13 | End: 2020-09-14

## 2020-09-13 RX ORDER — PIPERACILLIN AND TAZOBACTAM 4; .5 G/20ML; G/20ML
3.38 INJECTION, POWDER, LYOPHILIZED, FOR SOLUTION INTRAVENOUS ONCE
Refills: 0 | Status: COMPLETED | OUTPATIENT
Start: 2020-09-13 | End: 2020-09-13

## 2020-09-13 RX ORDER — ASPIRIN/CALCIUM CARB/MAGNESIUM 324 MG
81 TABLET ORAL DAILY
Refills: 0 | Status: DISCONTINUED | OUTPATIENT
Start: 2020-09-13 | End: 2020-09-17

## 2020-09-13 RX ORDER — ATORVASTATIN CALCIUM 80 MG/1
40 TABLET, FILM COATED ORAL AT BEDTIME
Refills: 0 | Status: DISCONTINUED | OUTPATIENT
Start: 2020-09-13 | End: 2020-09-17

## 2020-09-13 RX ORDER — DEXTROSE 50 % IN WATER 50 %
15 SYRINGE (ML) INTRAVENOUS ONCE
Refills: 0 | Status: DISCONTINUED | OUTPATIENT
Start: 2020-09-13 | End: 2020-09-17

## 2020-09-13 RX ORDER — GLUCAGON INJECTION, SOLUTION 0.5 MG/.1ML
1 INJECTION, SOLUTION SUBCUTANEOUS ONCE
Refills: 0 | Status: DISCONTINUED | OUTPATIENT
Start: 2020-09-13 | End: 2020-09-17

## 2020-09-13 RX ORDER — ENOXAPARIN SODIUM 100 MG/ML
40 INJECTION SUBCUTANEOUS EVERY 24 HOURS
Refills: 0 | Status: DISCONTINUED | OUTPATIENT
Start: 2020-09-13 | End: 2020-09-14

## 2020-09-13 RX ORDER — SODIUM CHLORIDE 9 MG/ML
1000 INJECTION, SOLUTION INTRAVENOUS
Refills: 0 | Status: DISCONTINUED | OUTPATIENT
Start: 2020-09-13 | End: 2020-09-16

## 2020-09-13 RX ORDER — INSULIN LISPRO 100/ML
VIAL (ML) SUBCUTANEOUS
Refills: 0 | Status: DISCONTINUED | OUTPATIENT
Start: 2020-09-13 | End: 2020-09-15

## 2020-09-13 RX ADMIN — PIPERACILLIN AND TAZOBACTAM 200 GRAM(S): 4; .5 INJECTION, POWDER, LYOPHILIZED, FOR SOLUTION INTRAVENOUS at 13:36

## 2020-09-13 RX ADMIN — Medication 4: at 17:19

## 2020-09-13 RX ADMIN — PIPERACILLIN AND TAZOBACTAM 3.38 GRAM(S): 4; .5 INJECTION, POWDER, LYOPHILIZED, FOR SOLUTION INTRAVENOUS at 14:37

## 2020-09-13 RX ADMIN — Medication 300 MILLIGRAM(S): at 14:39

## 2020-09-13 RX ADMIN — ENOXAPARIN SODIUM 40 MILLIGRAM(S): 100 INJECTION SUBCUTANEOUS at 22:06

## 2020-09-13 RX ADMIN — PIPERACILLIN AND TAZOBACTAM 200 GRAM(S): 4; .5 INJECTION, POWDER, LYOPHILIZED, FOR SOLUTION INTRAVENOUS at 19:34

## 2020-09-13 RX ADMIN — ATORVASTATIN CALCIUM 40 MILLIGRAM(S): 80 TABLET, FILM COATED ORAL at 22:06

## 2020-09-13 RX ADMIN — PIPERACILLIN AND TAZOBACTAM 200 GRAM(S): 4; .5 INJECTION, POWDER, LYOPHILIZED, FOR SOLUTION INTRAVENOUS at 23:36

## 2020-09-13 NOTE — CONSULT NOTE ADULT - ASSESSMENT
45M PMHx HTN, HLD, DM (poorly controlled), h/o multiple R toe amputations presenting with worsening R great toe wound/infection x 1 week. Wound noted probing deep to bone with changes on x-ray consistent with osteomyelitis. Recommended admission to hospital to complete work-up of R distal hallux osteomyelitis.     P:  -Broad spectrum IV abx given h/o polymicrobial infections and poorly controlled DM  -Recommend ID consult  -Recommend endocrine consult given h/o poorly controlled DM and its major factor in recurrent infected ulcerations  -Bedside excisional debridement w/ #15 blade performed on R distal hallux down to bone w/ trace purulence noted  -Plan for bedside bone biopsy of R distal hallux tomorrow  -Plan d/w Dr. Green 45M PMHx HTN, HLD, DM (poorly controlled), h/o multiple R toe amputations presenting with worsening R great toe wound/infection x 1 week. Wound noted probing deep to bone with changes on x-ray consistent with osteomyelitis. Recommended admission to hospital to complete work-up of R distal hallux osteomyelitis.     P:  -Broad spectrum IV abx given h/o polymicrobial infections and poorly controlled DM  -Recommend ID consult  -Recommend endocrine consult given h/o poorly controlled DM and its major factor in recurrent infected ulcerations  -Bedside excisional debridement w/ #15 blade performed on R distal hallux down to bone w/ trace purulence noted  -Deep wound swab cx obtained  -Plan for bedside bone biopsy of R distal hallux tomorrow  -Plan d/w Dr. Green

## 2020-09-13 NOTE — ED PROVIDER NOTE - PHYSICAL EXAMINATION
GEN: Well appearing, well nourished, awake, alert, oriented to person, place, time/situation and in no apparent distress. NTAF  ENT: Airway patent, Nasal mucosa clear. Mouth with normal mucosa.  EYES: Clear bilaterally. PERRL, EOMI  RESPIRATORY: Breathing comfortably with normal RR. No W/C/R, no hypoxia or resp distress.  CARDIAC: Regular rate and rhythm, no M/R/G  ABDOMEN: Soft, nontender, +bowel sounds, no rebound, rigidity, or guarding.  MSK: R foot with prior toe amputations (Right 2nd, 4th, and 5th toes amputations), right great toe with large infected ulcer with purulence and surrounding erythema, distal pulses +2. Otherwise Range of motion is not limited.  NEURO: Alert and oriented, no focal deficits.  SKIN: Skin normal color for race, warm, dry, see msk.  PSYCH: Alert and oriented to person, place, time/situation. normal mood and affect. no apparent risk to self or others.

## 2020-09-13 NOTE — H&P ADULT - PROBLEM SELECTOR PLAN 2
- at home on metformin glipizide; no longer using insulin 2/2 pt running out of insulin a couple months ago  - hold PO antidiabetics  - sliding scale for now, calculate Lantus needs for Monday night  - c/b OM s/p 3 toe amp

## 2020-09-13 NOTE — ED ADULT NURSE NOTE - OBJECTIVE STATEMENT
pt states "I have a wound to my right foot that first opened last year and healed, then it opened again in janurary 2020 and it has not healed because I did not have the access to the wound care supplies and I am a diabetic. I was at [place] recently and was told I needed an MRI because they think I may have a bone infection but they told me I could not get it done till Monday so I left and my podiatrist told me to come here". Pt report intermittent fevers and chills for a about 1 week now.

## 2020-09-13 NOTE — H&P ADULT - ATTENDING COMMENTS
45M w/ PMHx of HTN, HLD, DM, hx of cocaine use, prior OM s/p amputation p/w worsening R first digit of the foot infection.  Elevated inflammatory marker with high suspicion for OM pending bone biopsy.  Alrdy started on abx in ED, so continue with vanc and zosyn.  F/u wound culture. Podiatry following.  ID consult in AM for OM.  Continue SSI, hold home hypoglycemics.  DVT PPx with LMWH.  Dispo pending.    I have personally seen, examined, and participated in the care of this patient.  I have reviewed all pertinent clinical information, including history, physical exam, plan and the Medical/PA/NP Student’s note and agree except as noted.     55 minutes spent on total encounter; more than 50% of the visit was spent counseling and/or coordinating care by the attending physician.

## 2020-09-13 NOTE — H&P ADULT - NSHPREVIEWOFSYSTEMS_GEN_ALL_CORE
REVIEW OF SYSTEMS:    CONSTITUTIONAL: No weakness, fevers or chills  EYES/ENT: No visual changes;  No vertigo or throat pain   NECK: No pain or stiffness  RESPIRATORY: No cough, wheezing, hemoptysis; No shortness of breath  CARDIOVASCULAR: No chest pain or palpitations  GASTROINTESTINAL: No abdominal or epigastric pain. No nausea, vomiting, or hematemesis; No diarrhea or constipation. No melena or hematochezia.  GENITOURINARY: No dysuria, frequency or hematuria  NEUROLOGICAL: No numbness or weakness  SKIN: No itching, rashes REVIEW OF SYSTEMS:    CONSTITUTIONAL: No weakness, fevers or chills  EYES/ENT: No visual changes;  No vertigo or throat pain; + for b/l frontal headache  NECK: No pain or stiffness  RESPIRATORY: No cough, wheezing, hemoptysis; No shortness of breath  CARDIOVASCULAR: No chest pain or palpitations  GASTROINTESTINAL: No nausea, vomiting, or hematemesis; No diarrhea or constipation. No melena or hematochezia. + for epigastric pain since Tue, diarrhea Tue/Wed overnight but resolved now,   GENITOURINARY: No dysuria, hematuria; + for increased frequency (q2hrs)  NEUROLOGICAL: No numbness or weakness  SKIN: No itching, rashes

## 2020-09-13 NOTE — H&P ADULT - HISTORY OF PRESENT ILLNESS
Mr. Valentin is a 44yo M with a hx of HTN, HLD, hx of substance abuse, DM, OM with multiple toe amputations in the past who p/w worsening R great toe wound/infection x 1 week, sent in to the ER by his podiatrist for admission. Patient states      In the ED:  Initial vital signs: T: 98.5 F, HR: 89, BP: 129/79, R: 18, SpO2: 99% on RA  Labs: significant for Hb 12.9, CRP 9.89 w/ WBC wnl (9.16), Glu 224, Na 134  Imaging: R foot XR - read pending  CXR: read pending  EKG: SR, no acute ischemic changes  Medications: s/p Zosyn 3.375g, Vanc 1.5g  Consults: none  Mr. Valentin is a 46yo M with a hx of HTN, HLD, hx of substance abuse, DM, OM with multiple toe amputations in the past who p/w worsening R great toe wound/infection x 1 week, sent in to the ER by his podiatrist for admission. Patient states that he first noticed pain in his R foot/leg Monday night so he removed the dressing but didn't notice any drainage so he redressed it, and had fever/chills since Tue. Thursday morning he felt increased pain, and fever/chills, so he called an ambulance and was admitted to [facility], where he was found to be hyperglycemic to the 250s and admitted for R toe infection, treated w/ antibiotics (does not know which one, does not have d/c papers as he AMA'd yesterday). He was seen by his podiatrist who recommended him to present to [facility].  Of note, pt first had an open wound on his R great toe in Oct 2019 [place], which intermittently healed but reopened in Jan 2020 and has been present ever since. He also complains of increased urinary frequency (q2hrs) recently but denies dysuria, epigastric pain since Tue, diarrhea Tue/Wed overnight but resolved now, and pain in his R foot/ leg when walking. Has been noncompliant w/ insulin & ASS since he ran out of meds a couple of months ago.      In the ED:  Initial vital signs: T: 98.5 F, HR: 89, BP: 129/79, R: 18, SpO2: 99% on RA  Labs: significant for Hb 12.9, CRP 9.89 w/ WBC wnl (9.16), Glu 224, Na 134  Imaging: R foot XR - read pending  CXR: read pending  EKG: SR, no acute ischemic changes  Medications: s/p Zosyn 3.375g, Vanc 1.5g  Consults: none

## 2020-09-13 NOTE — H&P ADULT - PROBLEM SELECTOR PLAN 6
Fluids: No IVF currently needed  Electrolytes: Mg>2, K>4, replete lytes PRN  Nutrition:    Prophylaxis: Lovenox 40 qd  Activity: AAT, OOBTC  GI: none  C: FC  Dispo: Admit to [facility]

## 2020-09-13 NOTE — ED ADULT TRIAGE NOTE - CHIEF COMPLAINT QUOTE
right great toe with diabetic foot ulcer referred to the ED. Reported with subjective fever since Thursday

## 2020-09-13 NOTE — ED PROVIDER NOTE - CARE PLAN
Principal Discharge DX:	Foot ulcer  Secondary Diagnosis:	Toe amputation status  Secondary Diagnosis:	Diabetes

## 2020-09-13 NOTE — H&P ADULT - NSHPPHYSICALEXAM_GEN_ALL_CORE
.  VITAL SIGNS:  T(C): 36.8 (09-13-20 @ 13:14), Max: 36.9 (09-13-20 @ 11:24)  T(F): 98.3 (09-13-20 @ 13:14), Max: 98.5 (09-13-20 @ 11:24)  HR: 84 (09-13-20 @ 13:14) (84 - 89)  BP: 173/109 (09-13-20 @ 13:14) (129/79 - 173/109)  BP(mean): --  RR: 18 (09-13-20 @ 13:14) (18 - 18)  SpO2: 99% (09-13-20 @ 13:14) (99% - 99%)  Wt(kg): --    PHYSICAL EXAM:    Constitutional: WDWN resting comfortably in bed; NAD  Head: NC/AT  Eyes: PERRL, EOMI, anicteric sclera  ENT: no nasal discharge; uvula midline, no oropharyngeal erythema or exudates; MMM  Neck: supple; no JVD or thyromegaly  Respiratory: CTA B/L; no W/R/R, no retractions  Cardiac: +S1/S2; RRR; no M/R/G; PMI non-displaced  Gastrointestinal: soft, NT/ND; no rebound or guarding; +BSx4  Genitourinary: normal external genitalia  Back: spine midline, no bony tenderness or step-offs; no CVAT B/L  Extremities: WWP, no clubbing or cyanosis; no peripheral edema  Musculoskeletal: NROM x4; no joint swelling, tenderness or erythema  Vascular: 2+ radial, femoral, DP/PT pulses B/L  Dermatologic: skin warm, dry and intact; no rashes, wounds, or scars  Lymphatic: no submandibular or cervical LAD  Neurologic: AAOx3; CNII-XII grossly intact; no focal deficits  Psychiatric: affect and characteristics of appearance, verbalizations, behaviors are appropriate .  VITAL SIGNS:  T(C): 36.8 (09-13-20 @ 13:14), Max: 36.9 (09-13-20 @ 11:24)  T(F): 98.3 (09-13-20 @ 13:14), Max: 98.5 (09-13-20 @ 11:24)  HR: 84 (09-13-20 @ 13:14) (84 - 89)  BP: 173/109 (09-13-20 @ 13:14) (129/79 - 173/109)  BP(mean): --  RR: 18 (09-13-20 @ 13:14) (18 - 18)  SpO2: 99% (09-13-20 @ 13:14) (99% - 99%)  Wt(kg): --    PHYSICAL EXAM:    Constitutional: WDWN resting comfortably in bed; NAD  Head: NC/AT  Eyes: PERRL, EOMI, anicteric sclera  ENT: no nasal discharge; uvula midline, no oropharyngeal erythema or exudates; MMM  Neck: supple; no JVD or thyromegaly  Respiratory: CTA B/L; no W/R/R, no retractions  Cardiac: +S1/S2; RRR; no M/R/G; PMI non-displaced  Gastrointestinal: soft, NT/ND; no rebound or guarding; +BSx4  Genitourinary: normal external genitalia  Back: spine midline, no bony tenderness or step-offs; no CVAT B/L  Extremities: WWP, no clubbing or cyanosis; no peripheral edema; R calf pain on palpation; R foot covered in gauze, s/p R toe III-V amp  Musculoskeletal: NROM x4; no joint swelling, tenderness or erythema  Vascular: 2+ radial, femoral, DP/PT pulses B/L  Dermatologic: skin warm, dry and intact; no rashes, wounds, or scars  Lymphatic: no submandibular or cervical LAD  Neurologic: AAOx3; CNII-XII grossly intact; no focal deficits  Psychiatric: affect and characteristics of appearance, verbalizations, behaviors are appropriate

## 2020-09-13 NOTE — ED PROVIDER NOTE - CLINICAL SUMMARY MEDICAL DECISION MAKING FREE TEXT BOX
45M with a hx of HTN, HLD, DM, OM with multiple toe amputations in the past who p/w worsening R great toe wound/infection x 1 week, sent in to the ER by his podiatrist for admission. VSS afebrile on arrival, POCT BG 200s, right great toe infected appearing diabetic foot ulcer, podiatry resident at bedside for culture and debridement. Plan for labs, Bcx, Xrays, IV Vanc zosyn and admission to medicine with podiatry on consult.

## 2020-09-13 NOTE — H&P ADULT - NSHPLABSRESULTS_GEN_ALL_CORE
.  LABS:                         12.9   9.16  )-----------( 391      ( 13 Sep 2020 13:13 )             38.9     09-13    134<L>  |  99  |  14  ----------------------------<  249<H>  4.7   |  26  |  0.82    Ca    9.4      13 Sep 2020 13:38    TPro  7.7  /  Alb  3.5  /  TBili  0.4  /  DBili  x   /  AST  20  /  ALT  35  /  AlkPhos  66  09-13              Lactate, Blood: 0.6 mmol/L (09-13 @ 13:38)      RADIOLOGY, EKG & ADDITIONAL TESTS: Reviewed.

## 2020-09-13 NOTE — H&P ADULT - NSHPSOCIALHISTORY_GEN_ALL_CORE
.  Tobacco use:   EtOH use:  Illicit drug use:    Living situation: .  Tobacco use: former smoker; quit in 1998  EtOH use: socially  Illicit drug use: cocaine (last used 3ish wks ago per pt)    Living situation: lives w/ mom + stepdad  Works in maintenance.

## 2020-09-13 NOTE — H&P ADULT - ASSESSMENT
Mr. Valentin is a 46yo M with a hx of HTN, HLD, hx of substance abuse, DM, OM with multiple toe amputations in the past who p/w worsening R great toe wound/infection x 1 week, sent in to the ER by his podiatrist for admission.

## 2020-09-13 NOTE — ED PROVIDER NOTE - OBJECTIVE STATEMENT
45M with a hx of HTN, HLD, DM, OM with multiple toe amputations in the past who p/w worsening R great toe wound/infection x 1 week, sent in to the ER by hi podiatrist for admission. Pt reports subjective f/c, his glucose has been running in the 200s, no cp/sob, no n/v, no abd pain, no ha or neck pain, no dizziness or syncope, no n/t/w in ext. No other complaints. 45M with a hx of HTN, HLD, hx of substance abuse, DM, OM with multiple toe amputations in the past who p/w worsening R great toe wound/infection x 1 week, sent in to the ER by hi podiatrist for admission. Pt reports subjective f/c, his glucose has been running in the 200s, no cp/sob, no n/v, no abd pain, no ha or neck pain, no dizziness or syncope, no n/t/w in ext. No other complaints.

## 2020-09-13 NOTE — H&P ADULT - PROBLEM SELECTOR PLAN 1
-   - podiatry following, plan for bedside bone biopsy tmrw - recs appreciated  - c/w arnold Rodriguez - R foot s/p toe amp III-V  - R great toe covered in gauze, purulent drainage per EM signout  - podiatry following, plan for bedside bone biopsy tmrw - recs appreciated  - c/w arnold Rodriguez  - Pt's podiatrist is Dr. Danilo Green - R foot s/p toe amp III-V  - R great toe covered in gauze, purulent drainage per EM signout  - podiatry following, plan for bedside bone biopsy tmrw - recs appreciated  - c/w Vanc 1500 q12 hrs, zosyn [id number]  - f/u Vanc trough before 4th dose (Tue)  - f/u ESR  - Pt's podiatrist is Dr. Danilo Green - R foot s/p toe amp III-V  - R great toe covered in gauze, purulent drainage per EM signout  - podiatry following, plan for bedside bone biopsy tmrw - recs appreciated  - c/w Vanc 1500 q12 hrs, zosyn [id number]  - f/u Vanc trough before 4th dose (Tue)  - ESR 80, CRP 9.89  - Pt's podiatrist is Dr. Danilo Green

## 2020-09-13 NOTE — ED ADULT NURSE REASSESSMENT NOTE - NS ED NURSE REASSESS COMMENT FT1
Podiatry resident came to see patient, he cleaned wound and removed dead tissue, he also found the wound to undermine from the plantar areas of the right big toe to the dorsal area. He was able to get saline threw. Pt has new dressing in place by podiatry.

## 2020-09-14 ENCOUNTER — TRANSCRIPTION ENCOUNTER (OUTPATIENT)
Age: 45
End: 2020-09-14

## 2020-09-14 DIAGNOSIS — Z01.818 ENCOUNTER FOR OTHER PREPROCEDURAL EXAMINATION: ICD-10-CM

## 2020-09-14 DIAGNOSIS — M86.9 OSTEOMYELITIS, UNSPECIFIED: ICD-10-CM

## 2020-09-14 LAB
A1C WITH ESTIMATED AVERAGE GLUCOSE RESULT: 12.3 % — HIGH (ref 4–5.6)
ANION GAP SERPL CALC-SCNC: 13 MMOL/L — SIGNIFICANT CHANGE UP (ref 5–17)
APTT BLD: 33.2 SEC — SIGNIFICANT CHANGE UP (ref 27.5–35.5)
BASOPHILS # BLD AUTO: 0.06 K/UL — SIGNIFICANT CHANGE UP (ref 0–0.2)
BASOPHILS NFR BLD AUTO: 0.7 % — SIGNIFICANT CHANGE UP (ref 0–2)
BUN SERPL-MCNC: 14 MG/DL — SIGNIFICANT CHANGE UP (ref 7–23)
CALCIUM SERPL-MCNC: 9.2 MG/DL — SIGNIFICANT CHANGE UP (ref 8.4–10.5)
CHLORIDE SERPL-SCNC: 98 MMOL/L — SIGNIFICANT CHANGE UP (ref 96–108)
CO2 SERPL-SCNC: 24 MMOL/L — SIGNIFICANT CHANGE UP (ref 22–31)
CREAT SERPL-MCNC: 1.02 MG/DL — SIGNIFICANT CHANGE UP (ref 0.5–1.3)
EOSINOPHIL # BLD AUTO: 0.29 K/UL — SIGNIFICANT CHANGE UP (ref 0–0.5)
EOSINOPHIL NFR BLD AUTO: 3.6 % — SIGNIFICANT CHANGE UP (ref 0–6)
ESTIMATED AVERAGE GLUCOSE: 306 MG/DL — HIGH (ref 68–114)
GLUCOSE BLDC GLUCOMTR-MCNC: 209 MG/DL — HIGH (ref 70–99)
GLUCOSE BLDC GLUCOMTR-MCNC: 289 MG/DL — HIGH (ref 70–99)
GLUCOSE BLDC GLUCOMTR-MCNC: 308 MG/DL — HIGH (ref 70–99)
GLUCOSE BLDC GLUCOMTR-MCNC: 346 MG/DL — HIGH (ref 70–99)
GLUCOSE SERPL-MCNC: 299 MG/DL — HIGH (ref 70–99)
GRAM STN FLD: SIGNIFICANT CHANGE UP
HCT VFR BLD CALC: 39 % — SIGNIFICANT CHANGE UP (ref 39–50)
HGB BLD-MCNC: 13 G/DL — SIGNIFICANT CHANGE UP (ref 13–17)
IMM GRANULOCYTES NFR BLD AUTO: 0.4 % — SIGNIFICANT CHANGE UP (ref 0–1.5)
INR BLD: 1.03 — SIGNIFICANT CHANGE UP (ref 0.88–1.16)
LYMPHOCYTES # BLD AUTO: 2.69 K/UL — SIGNIFICANT CHANGE UP (ref 1–3.3)
LYMPHOCYTES # BLD AUTO: 33.2 % — SIGNIFICANT CHANGE UP (ref 13–44)
MAGNESIUM SERPL-MCNC: 1.6 MG/DL — SIGNIFICANT CHANGE UP (ref 1.6–2.6)
MCHC RBC-ENTMCNC: 28.8 PG — SIGNIFICANT CHANGE UP (ref 27–34)
MCHC RBC-ENTMCNC: 33.3 GM/DL — SIGNIFICANT CHANGE UP (ref 32–36)
MCV RBC AUTO: 86.3 FL — SIGNIFICANT CHANGE UP (ref 80–100)
MONOCYTES # BLD AUTO: 0.69 K/UL — SIGNIFICANT CHANGE UP (ref 0–0.9)
MONOCYTES NFR BLD AUTO: 8.5 % — SIGNIFICANT CHANGE UP (ref 2–14)
NEUTROPHILS # BLD AUTO: 4.35 K/UL — SIGNIFICANT CHANGE UP (ref 1.8–7.4)
NEUTROPHILS NFR BLD AUTO: 53.6 % — SIGNIFICANT CHANGE UP (ref 43–77)
NRBC # BLD: 0 /100 WBCS — SIGNIFICANT CHANGE UP (ref 0–0)
PLATELET # BLD AUTO: 407 K/UL — HIGH (ref 150–400)
POTASSIUM SERPL-MCNC: 4.6 MMOL/L — SIGNIFICANT CHANGE UP (ref 3.5–5.3)
POTASSIUM SERPL-SCNC: 4.6 MMOL/L — SIGNIFICANT CHANGE UP (ref 3.5–5.3)
PROTHROM AB SERPL-ACNC: 12.3 SEC — SIGNIFICANT CHANGE UP (ref 10.6–13.6)
RBC # BLD: 4.52 M/UL — SIGNIFICANT CHANGE UP (ref 4.2–5.8)
RBC # FLD: 12.4 % — SIGNIFICANT CHANGE UP (ref 10.3–14.5)
SODIUM SERPL-SCNC: 135 MMOL/L — SIGNIFICANT CHANGE UP (ref 135–145)
SPECIMEN SOURCE: SIGNIFICANT CHANGE UP
VANCOMYCIN TROUGH SERPL-MCNC: 8.9 UG/ML — LOW (ref 10–20)
WBC # BLD: 8.11 K/UL — SIGNIFICANT CHANGE UP (ref 3.8–10.5)
WBC # FLD AUTO: 8.11 K/UL — SIGNIFICANT CHANGE UP (ref 3.8–10.5)

## 2020-09-14 PROCEDURE — 99233 SBSQ HOSP IP/OBS HIGH 50: CPT | Mod: GC

## 2020-09-14 PROCEDURE — 99253 IP/OBS CNSLTJ NEW/EST LOW 45: CPT | Mod: GC

## 2020-09-14 PROCEDURE — 99222 1ST HOSP IP/OBS MODERATE 55: CPT | Mod: GC

## 2020-09-14 RX ORDER — VANCOMYCIN HCL 1 G
1500 VIAL (EA) INTRAVENOUS EVERY 12 HOURS
Refills: 0 | Status: DISCONTINUED | OUTPATIENT
Start: 2020-09-14 | End: 2020-09-15

## 2020-09-14 RX ORDER — INFLUENZA VIRUS VACCINE 15; 15; 15; 15 UG/.5ML; UG/.5ML; UG/.5ML; UG/.5ML
0.5 SUSPENSION INTRAMUSCULAR ONCE
Refills: 0 | Status: DISCONTINUED | OUTPATIENT
Start: 2020-09-14 | End: 2020-09-17

## 2020-09-14 RX ORDER — PIPERACILLIN AND TAZOBACTAM 4; .5 G/20ML; G/20ML
3.38 INJECTION, POWDER, LYOPHILIZED, FOR SOLUTION INTRAVENOUS EVERY 6 HOURS
Refills: 0 | Status: DISCONTINUED | OUTPATIENT
Start: 2020-09-14 | End: 2020-09-14

## 2020-09-14 RX ORDER — INSULIN LISPRO 100/ML
10 VIAL (ML) SUBCUTANEOUS
Refills: 0 | Status: DISCONTINUED | OUTPATIENT
Start: 2020-09-14 | End: 2020-09-15

## 2020-09-14 RX ORDER — PIPERACILLIN AND TAZOBACTAM 4; .5 G/20ML; G/20ML
3.38 INJECTION, POWDER, LYOPHILIZED, FOR SOLUTION INTRAVENOUS EVERY 6 HOURS
Refills: 0 | Status: DISCONTINUED | OUTPATIENT
Start: 2020-09-14 | End: 2020-09-17

## 2020-09-14 RX ORDER — INSULIN GLARGINE 100 [IU]/ML
20 INJECTION, SOLUTION SUBCUTANEOUS AT BEDTIME
Refills: 0 | Status: DISCONTINUED | OUTPATIENT
Start: 2020-09-14 | End: 2020-09-15

## 2020-09-14 RX ORDER — MAGNESIUM SULFATE 500 MG/ML
1 VIAL (ML) INJECTION ONCE
Refills: 0 | Status: COMPLETED | OUTPATIENT
Start: 2020-09-14 | End: 2020-09-14

## 2020-09-14 RX ADMIN — Medication 100 GRAM(S): at 09:57

## 2020-09-14 RX ADMIN — Medication 8: at 12:11

## 2020-09-14 RX ADMIN — Medication 300 MILLIGRAM(S): at 22:03

## 2020-09-14 RX ADMIN — PIPERACILLIN AND TAZOBACTAM 200 GRAM(S): 4; .5 INJECTION, POWDER, LYOPHILIZED, FOR SOLUTION INTRAVENOUS at 06:08

## 2020-09-14 RX ADMIN — INSULIN GLARGINE 20 UNIT(S): 100 INJECTION, SOLUTION SUBCUTANEOUS at 22:06

## 2020-09-14 RX ADMIN — Medication 300 MILLIGRAM(S): at 11:37

## 2020-09-14 RX ADMIN — ATORVASTATIN CALCIUM 40 MILLIGRAM(S): 80 TABLET, FILM COATED ORAL at 22:06

## 2020-09-14 RX ADMIN — Medication 8: at 22:19

## 2020-09-14 RX ADMIN — Medication 4: at 16:22

## 2020-09-14 RX ADMIN — Medication 81 MILLIGRAM(S): at 11:38

## 2020-09-14 RX ADMIN — PIPERACILLIN AND TAZOBACTAM 200 GRAM(S): 4; .5 INJECTION, POWDER, LYOPHILIZED, FOR SOLUTION INTRAVENOUS at 16:39

## 2020-09-14 RX ADMIN — PIPERACILLIN AND TAZOBACTAM 200 GRAM(S): 4; .5 INJECTION, POWDER, LYOPHILIZED, FOR SOLUTION INTRAVENOUS at 11:37

## 2020-09-14 NOTE — PROGRESS NOTE ADULT - ATTENDING COMMENTS
podiatry to do possible bone biopsy vs toe amputation later this afternoon  patient NPO  continue vanc/zosyn    infection in setting of uncontrolled diabetes - patient off of insulin for past few months, reports only taking oral agents  a1c 12 - has been education on risk of uncontrolled diabetes, osteomyelitis, and complications    will f/u with surgical path with podiatry to consider possible picc placement and ID consult

## 2020-09-14 NOTE — CONSULT NOTE ADULT - ASSESSMENT
Assessment: 45y Male with R 1st toe infection, managed by podiatry. Pt has palpable pedal pulses bilaterally. No arterial insufficiency.    Recommendations:  - foot infection care by podiatry (OR tomorrow with podiatry for R 1st toe amp)  - continue IV abx  - f/u cultures  - recommend ASA 81 mg po qd and statin therapy  - no vascular surgery intervention  - discussed with attending, Dr. Odom  - vascular to sign off, reconsult if needed, [phone] with questions

## 2020-09-14 NOTE — PROGRESS NOTE ADULT - PROBLEM SELECTOR PLAN 2
- at home on metformin glipizide; no longer using insulin 2/2 pt running out of insulin a couple months ago  - hold PO antidiabetics  - sliding scale for now, calculate Lantus needs for Monday night  - c/b OM s/p 3 toe amp - at home on metformin glipizide; no longer using insulin 2/2 pt running out of insulin a couple months ago  - hold PO antidiabetics  - sliding scale for now, calculate Lantus needs for Monday night  - c/b OM s/p 3 toe amp  - HbA1c: 12.3%  - f/u endo consult - recs appreciated RCRI score: 1 (Class II risk, 6.0% 30day risk of death, MI, cardiac arrest)  Salas score: 0.4% risk of MI or cardiac arrest intraoperatively/up to 30 days post-op RCRI score: 1 (Class II risk, 6.0% 30day risk of death, MI, cardiac arrest)  Salas score: 0.4% risk of MI or cardiac arrest intraoperatively/up to 30 days post-op  NSQIP Score: Serious complication - 12%, any complication - 14%, cardiac complication 1% RCRI score: 1 (Class II risk, 6.0% 30day risk of death, MI, cardiac arrest)  Salas score: 0.4% risk of MI or cardiac arrest intraoperatively/up to 30 days post-op  CXR: No acute pathology.  NSQIP Score: Serious complication - 12%, any complication - 14%, cardiac complication 1%

## 2020-09-14 NOTE — SBIRT NOTE ADULT - NSSBIRTALCNOACTINTDET_GEN_A_CORE
Patient reported drinking 21 beers daily. Patient stated that alcohol is the only thing that helps him function. Patient does not want to reduce or quit his alcohol use. SW offered patient resources; patient declined.

## 2020-09-14 NOTE — PROGRESS NOTE ADULT - PROBLEM SELECTOR PLAN 6
Fluids: No IVF currently needed  Electrolytes: Mg>2, K>4, replete lytes PRN  Nutrition:    Prophylaxis: Lovenox 40 qd  Activity: AAT, OOBTC  GI: none  C: FC  Dispo: Admit to [facility] - last use 3ish weeks ago per pt - last use 3ish weeks ago per pt, no signs of withdrawal - last use 3ish weeks ago per pt, no signs of withdrawal  - snorts it

## 2020-09-14 NOTE — PROGRESS NOTE ADULT - PROBLEM SELECTOR PLAN 3
- Pt normotensive on admission, currently not taking any home meds - at home on metformin glipizide; no longer using insulin 2/2 pt running out of insulin a couple months ago  - hold PO antidiabetics  - sliding scale for now, calculate Lantus needs for Monday night  - c/b OM s/p 3 toe amp  - HbA1c: 12.3%  - f/u endo consult - recs appreciated

## 2020-09-14 NOTE — PROGRESS NOTE ADULT - PROBLEM SELECTOR PLAN 7
Fluids: No IVF currently needed  Electrolytes: Mg>2, K>4, replete lytes PRN  Nutrition:    Prophylaxis: Lovenox 40 qd  Activity: AAT, OOBTC  GI: none  C: FC  Dispo: Admit to [facility] Fluids: No IVF currently needed  Electrolytes: Mg>2, K>4, replete lytes PRN  Nutrition: NPO for procedure  Prophylaxis: Lovenox 40 qd  Activity: AAT, OOBTC  GI: none  C: FC  Dispo: Admit to [facility]

## 2020-09-14 NOTE — PROGRESS NOTE ADULT - ASSESSMENT
Mr. Valentin is a 44yo M with a hx of HTN, HLD, hx of substance abuse, DM, OM with multiple toe amputations in the past who p/w worsening R great toe wound/infection x 1 week, sent in to the ER by his podiatrist for admission.

## 2020-09-14 NOTE — PROGRESS NOTE ADULT - PROBLEM SELECTOR PLAN 1
- R foot s/p toe amp III-V  - R great toe covered in gauze, purulent drainage per EM signout  - podiatry following, plan for bedside bone biopsy tmrw - recs appreciated  - c/w Vanc 1500 q12 hrs, zosyn [id number]  - f/u Vanc trough before 4th dose (Tue)  - ESR 80, CRP 9.89  - Pt's podiatrist is Dr. Danilo Green - R foot s/p toe amp III-V  - R great toe covered in gauze, purulent drainage per EM signout  - podiatry following, plan for bedside bone biopsy tmrw - recs appreciated  - c/w Vanc 1500 q12 hrs, zosyn [id number]  - f/u Vanc trough before 4th dose (Mon night)  - ESR 80, CRP 9.89  - f/u ID consult - recs appreciated  - Pt's podiatrist is Dr. Danilo Green - R foot s/p toe amp III-V  - R great toe covered in gauze, purulent drainage per EM signout  - podiatry following, plan for R toe amputation tonight - recs appreciated  - c/w Vanc 1500 q12 hrs, zosyn [id number]  - f/u Vanc trough before 4th dose (Mon night)  - ESR 80, CRP 9.89  - f/u ID consult - recs appreciated  - Pt's podiatrist is Dr. Danilo Green - R foot s/p toe amp III-V  - R great toe covered in gauze, purulent drainage per EM signout  - podiatry following, plan for R toe amputation tonight - recs appreciated  - c/w Vanc 1500 q12 hrs, zosyn [id number]  - f/u Vanc trough before 4th dose (Mon night)  - ESR 80, CRP 9.89  - f/u ID consult - recs appreciated  - Pt's podiatrist is Dr. Danilo Green  - f/u podiatry recs post surgery, consider PICC line if long-term IV Abx necessary  - f/u wound cxs  - blood cxs NGTD - R foot s/p toe amp III-V  - R great toe covered in gauze, purulent drainage per EM signout  - Xray: 1. Since 12/27/2018, there is now osteomyelitis of the right first distal phalanx with surrounding cellulitis. No soft tissue gas.  2. Improvement in osteomyelitis involving the distal portion of the third metatarsal.  - podiatry following, plan for R toe amputation tonight - recs appreciated  - c/w Vanc 1500 q12 hrs, zosyn [id number]  - f/u Vanc trough before 4th dose (Mon night)  - ESR 80, CRP 9.89  - f/u ID consult - recs appreciated  - Pt's podiatrist is Dr. Danilo Green  - f/u podiatry recs post surgery, consider PICC line if long-term IV Abx necessary  - f/u wound cxs  - blood cxs NGTD

## 2020-09-14 NOTE — PROGRESS NOTE ADULT - SUBJECTIVE AND OBJECTIVE BOX
CC: Patient is a 45y old  Male who presents with a chief complaint of     OVERNIGHT EVENTS:    SUBJECTIVE / INTERVAL HPI: Patient seen and examined at bedside.     ROS: negative unless otherwise stated above.    VITAL SIGNS:  Vital Signs Last 24 Hrs  T(C): 37.1 (14 Sep 2020 06:30), Max: 37.4 (13 Sep 2020 20:25)  T(F): 98.8 (14 Sep 2020 06:30), Max: 99.4 (13 Sep 2020 20:25)  HR: 88 (14 Sep 2020 06:30) (82 - 98)  BP: 149/85 (14 Sep 2020 06:30) (129/79 - 184/115)  BP(mean): --  RR: 19 (14 Sep 2020 06:30) (18 - 19)  SpO2: 99% (14 Sep 2020 06:30) (97% - 99%)    PHYSICAL EXAM:    General: WDWN  HEENT: NC/AT; PERRL, anicteric sclera; MMM  Neck: supple  Cardiovascular: +S1/S2; RRR  Respiratory: CTA B/L; no W/R/R  Gastrointestinal: soft, NT/ND; +BSx4  Extremities: WWP; no edema, clubbing or cyanosis  Vascular: 2+ radial, DP/PT pulses B/L  Neurological: AAOx3; no focal deficits    MEDICATIONS:  MEDICATIONS  (STANDING):  aspirin enteric coated 81 milliGRAM(s) Oral daily  atorvastatin 40 milliGRAM(s) Oral at bedtime  dextrose 5%. 1000 milliLiter(s) (50 mL/Hr) IV Continuous <Continuous>  dextrose 50% Injectable 12.5 Gram(s) IV Push once  enoxaparin Injectable 40 milliGRAM(s) SubCutaneous every 24 hours  insulin lispro (HumaLOG) corrective regimen sliding scale   SubCutaneous three times a day before meals  piperacillin/tazobactam IVPB.. 3.375 Gram(s) IV Intermittent every 6 hours    MEDICATIONS  (PRN):  dextrose 40% Gel 15 Gram(s) Oral once PRN Blood Glucose LESS THAN 70 milliGRAM(s)/deciliter  glucagon  Injectable 1 milliGRAM(s) IntraMuscular once PRN Glucose LESS THAN 70 milligrams/deciliter      ALLERGIES:  Allergies    No Known Allergies    Intolerances        LABS:                        13.0   8.11  )-----------( 407      ( 14 Sep 2020 07:01 )             39.0     09-13    134<L>  |  99  |  14  ----------------------------<  249<H>  4.7   |  26  |  0.82    Ca    9.4      13 Sep 2020 13:38    TPro  7.7  /  Alb  3.5  /  TBili  0.4  /  DBili  x   /  AST  20  /  ALT  35  /  AlkPhos  66  09-13        CAPILLARY BLOOD GLUCOSE      POCT Blood Glucose.: 236 mg/dL (13 Sep 2020 17:13)      RADIOLOGY & ADDITIONAL TESTS: Reviewed. CC: Patient is a 45y old  Male who presents with a chief complaint of R toe infection.    OVERNIGHT EVENTS: ANDREW.    SUBJECTIVE / INTERVAL HPI: Patient seen and examined at bedside. States he is a little tired, has mild pain in his R foot and calf pain that he describes as sore. He also complains of constant epigastric pain/pressure that he has had since Tue after having had spicy food Monday night, no alleviating/worsening factors; denies N/V/D/melena/blood in stool/CP/SOB/fever/chills.    ROS: negative unless otherwise stated above.    VITAL SIGNS:  Vital Signs Last 24 Hrs  T(C): 37.1 (14 Sep 2020 06:30), Max: 37.4 (13 Sep 2020 20:25)  T(F): 98.8 (14 Sep 2020 06:30), Max: 99.4 (13 Sep 2020 20:25)  HR: 88 (14 Sep 2020 06:30) (82 - 98)  BP: 149/85 (14 Sep 2020 06:30) (129/79 - 184/115)  BP(mean): --  RR: 19 (14 Sep 2020 06:30) (18 - 19)  SpO2: 99% (14 Sep 2020 06:30) (97% - 99%)    PHYSICAL EXAM:    General: WDWN, resting comfortably in bed, in NAD  HEENT: NC/AT; PERRL, anicteric sclera; MMM  Neck: supple, buffalo hump  Cardiovascular: +S1/S2; RRR, no rubs or murmurs appreciated  Respiratory: CTA B/L; no W/R/R appreciated  Gastrointestinal: soft, NT/ND; +BSx4, tenderness to palpation over epigastrium  Extremities: WWP; no edema, clubbing or cyanosis; R foot wrapped in gauze, R lower leg feels warmer than left, pain on palpation  Vascular: 2+ radial, DP/PT pulses B/L  Neurological: AAOx3; no focal deficits    MEDICATIONS:  MEDICATIONS  (STANDING):  aspirin enteric coated 81 milliGRAM(s) Oral daily  atorvastatin 40 milliGRAM(s) Oral at bedtime  dextrose 5%. 1000 milliLiter(s) (50 mL/Hr) IV Continuous <Continuous>  dextrose 50% Injectable 12.5 Gram(s) IV Push once  enoxaparin Injectable 40 milliGRAM(s) SubCutaneous every 24 hours  insulin lispro (HumaLOG) corrective regimen sliding scale   SubCutaneous three times a day before meals  piperacillin/tazobactam IVPB.. 3.375 Gram(s) IV Intermittent every 6 hours    MEDICATIONS  (PRN):  dextrose 40% Gel 15 Gram(s) Oral once PRN Blood Glucose LESS THAN 70 milliGRAM(s)/deciliter  glucagon  Injectable 1 milliGRAM(s) IntraMuscular once PRN Glucose LESS THAN 70 milligrams/deciliter      ALLERGIES:  Allergies    No Known Allergies    Intolerances        LABS:                        13.0   8.11  )-----------( 407      ( 14 Sep 2020 07:01 )             39.0     09-13    134<L>  |  99  |  14  ----------------------------<  249<H>  4.7   |  26  |  0.82    Ca    9.4      13 Sep 2020 13:38    TPro  7.7  /  Alb  3.5  /  TBili  0.4  /  DBili  x   /  AST  20  /  ALT  35  /  AlkPhos  66  09-13        CAPILLARY BLOOD GLUCOSE      POCT Blood Glucose.: 236 mg/dL (13 Sep 2020 17:13)      RADIOLOGY & ADDITIONAL TESTS: Reviewed.

## 2020-09-14 NOTE — PROGRESS NOTE ADULT - ASSESSMENT
45M PMHx HTN, HLD, DM (poorly controlled), h/o multiple R toe amputations presenting with worsening R great toe wound/infection x 1 week. Wound noted probing deep to bone with changes on x-ray consistent with osteomyelitis.     P:  -F/u ID recs  -F/u deep swab culture  -3-5 cc purulence expressed from dorsal and plantar wounds, which are communicating  -Bone biopsy aborted due to presence of diffuse abscess, patient will likely need operative I&D and possible hallux hamputation  -Please obtain pre op eval for low risk procedure (right hallux I&D vs amputation) under light sedation and local  -Please make patient NPO for possible OR this evening, last meal at 8:30AM  -Please keep active T&S  -Plan d/w Dr. Green 45M PMHx HTN, HLD, DM (poorly controlled), h/o multiple R toe amputations presenting with worsening R great toe wound/infection x 1 week. Wound noted probing deep to bone with changes on x-ray consistent with osteomyelitis.     P:  -F/u ID recs  -F/u deep swab culture  -3-5 cc purulence expressed from dorsal and plantar wounds, which are communicating  -Bone biopsy aborted due to presence of diffuse abscess, patient will likely need operative I&D and possible hallux hamputation  -Please obtain pre op eval for low risk procedure (right hallux I&D vs amputation) under light sedation and local  -Please make patient NPO after midnight tonight  -Please keep active T&S  -Plan d/w Dr. Green

## 2020-09-14 NOTE — PROGRESS NOTE ADULT - PROBLEM SELECTOR PLAN 4
- c/w home atorvastatin 40mg, ASS 81mg - Pt normotensive on admission, currently not taking any home meds - Pt normotensive on admission, currently not taking any home meds  - Per chart review, Pt took amlodipine 5mg before  - Consider antihypertensive therapy   - plan for outpatient f/u

## 2020-09-14 NOTE — PROGRESS NOTE ADULT - SUBJECTIVE AND OBJECTIVE BOX
Patient is a 45y old  Male who presents with a chief complaint of     INTERVAL HPI/ OVERNIGHT EVENTS: Patient seen and examined bedside. Bone biopsy aborted due to presence of diffuse abscess in great toe. NAOE. Denies f/c/n/v      LABS                        13.0   8.11  )-----------( 407      ( 14 Sep 2020 07:01 )             39.0     09-14    135  |  98  |  14  ----------------------------<  299<H>  4.6   |  24  |  1.02    Ca    9.2      14 Sep 2020 07:01  Mg     1.6     09-14    TPro  7.7  /  Alb  3.5  /  TBili  0.4  /  DBili  x   /  AST  20  /  ALT  35  /  AlkPhos  66  09-13      ESR: 80  CRP: --  09-13 @ 13:38    ICU Vital Signs Last 24 Hrs  T(C): 36.7 (14 Sep 2020 08:50), Max: 37.4 (13 Sep 2020 20:25)  T(F): 98 (14 Sep 2020 08:50), Max: 99.4 (13 Sep 2020 20:25)  HR: 87 (14 Sep 2020 08:50) (82 - 98)  BP: 122/70 (14 Sep 2020 08:50) (122/70 - 184/115)  BP(mean): --  ABP: --  ABP(mean): --  RR: 18 (14 Sep 2020 08:50) (18 - 19)  SpO2: 99% (14 Sep 2020 08:50) (97% - 99%)      RADIOLOGY    MICROBIOLOGY    PHYSICAL EXAM  Lower Extremity Focused:  Vasc: DP/PT 2/4 B/L  Derm: R ulcer plantar distal hallux w/ probing noted deep to bone. Trace pus/purulence.. Dorsal wound noted with connection to plantar wound. Significantly edematous hallux.  Neuro: Sensation grossly diminished  MSK: S/p R 4th/5th partial ray and 2nd partial digit amputations.

## 2020-09-14 NOTE — CONSULT NOTE ADULT - ATTENDING COMMENTS
Pt seen on rounds this afternoon.  Has had an ongoing infection R first toe which worsened dramatically and was accompanied by chills and fever on 9/10.  Went to the ED at [place], was admitted and stayed for 2 days but left after MRI of the toe could not be done.  Now admitted for this problem, will be undergoing I & D in OR by Podiatry tomorrow.  Has had previous toe amputations on that foot.  Main risk factor is seems to be peripheral neuropathy.  (Has decreased sensation over the toes and distal left foot--R cannot be tested due to dressings).  Says that his A1c level was "down to 7%" without insulin in March, but he then stopped monitoring (was using a Leo but was unable to get the sensors) and also stopped taking insulin.  Glucose was 236 on admission yesterday.  Is on sliding scale coverage only.  Was seen by Dr. Quezada during a previous admission and discharged on 20 Lantus/10 units premeal lispro.  Lantus had been increased to 30 units by his PCP before he lapsed on therapy.  Can restart the regimen of 20/10 now, and continue sliding scale.

## 2020-09-14 NOTE — CONSULT NOTE ADULT - SUBJECTIVE AND OBJECTIVE BOX
Attending: Dr. Odom    HPI:  Mr. Valentin is a 46yo M with a hx of HTN, HLD, hx of substance abuse, DM, OM with multiple toe amputations (R foot) in the past who p/w worsening R great toe wound/infection x 1 week, sent in to the ER by his podiatrist for admission. Patient states that he first noticed pain in his R foot/leg Monday night so he removed the dressing but didn't notice any drainage so he redressed it, and had fever/chills since Tue. Thursday morning he felt increased pain, and fever/chills, so he called an ambulance and was admitted to [facility], where he was found to be hyperglycemic to the 250s and admitted for R toe infection, treated w/ antibiotics (does not know which one, does not have d/c papers as he AMA'd yesterday). He was seen by his podiatrist who recommended him to present to [facility].  Of note, pt first had an open wound on his R great toe in Oct [place] which intermittently healed but reopened in Jan 2020 and has been present ever since. He also complains of increased urinary frequency (q2hrs) recently but denies dysuria, epigastric pain since Tue, diarrhea Tue/Wed overnight but resolved now, and pain in his R foot/ leg when walking. Has been noncompliant w/ insulin & ASS since he ran out of meds a couple of months ago.    Vascular consulted to assess for PAD. Denies h/o claudication/rest pain. Says he had an angiogram done in [place] in the past 1-2 years, but he's unsure what was done at that time.       In the ED:  Initial vital signs: T: 98.5 F, HR: 89, BP: 129/79, R: 18, SpO2: 99% on RA  Labs: significant for Hb 12.9, CRP 9.89 w/ WBC wnl (9.16), Glu 224, Na 134  Imaging: R foot XR - read pending  CXR: read pending  EKG: SR, no acute ischemic changes  Medications: s/p Zosyn 3.375g, Vanc 1.5g  Consults: none  (13 Sep 2020 14:44)          PAST MEDICAL & SURGICAL HISTORY:  History of cocaine use    Hypertension    Hyperlipidemia, unspecified hyperlipidemia type    Osteomyelitis    Diabetes    H/O skin graft  Right foot    Toe amputation status  Right foot        MEDICATIONS  (STANDING):  aspirin enteric coated 81 milliGRAM(s) Oral daily  atorvastatin 40 milliGRAM(s) Oral at bedtime  dextrose 5%. 1000 milliLiter(s) (50 mL/Hr) IV Continuous <Continuous>  dextrose 50% Injectable 12.5 Gram(s) IV Push once  influenza   Vaccine 0.5 milliLiter(s) IntraMuscular once  insulin glargine Injectable (LANTUS) 20 Unit(s) SubCutaneous at bedtime  insulin lispro (HumaLOG) corrective regimen sliding scale   SubCutaneous Before meals and at bedtime  insulin lispro Injectable (HumaLOG) 10 Unit(s) SubCutaneous three times a day before meals  piperacillin/tazobactam IVPB.. 3.375 Gram(s) IV Intermittent every 6 hours  vancomycin  IVPB 1500 milliGRAM(s) IV Intermittent every 12 hours    MEDICATIONS  (PRN):  dextrose 40% Gel 15 Gram(s) Oral once PRN Blood Glucose LESS THAN 70 milliGRAM(s)/deciliter  glucagon  Injectable 1 milliGRAM(s) IntraMuscular once PRN Glucose LESS THAN 70 milligrams/deciliter      Allergies    No Known Allergies        FAMILY HISTORY:  No family history of cardiovascular disease (Father, Mother)      Vital Signs Last 24 Hrs  T(C): 36.8 (14 Sep 2020 16:05), Max: 37.1 (14 Sep 2020 06:30)  T(F): 98.2 (14 Sep 2020 16:05), Max: 98.8 (14 Sep 2020 06:30)  HR: 89 (14 Sep 2020 16:05) (87 - 89)  BP: 134/89 (14 Sep 2020 16:05) (122/70 - 149/85)  BP(mean): --  RR: 18 (14 Sep 2020 16:05) (18 - 19)  SpO2: 97% (14 Sep 2020 16:05) (97% - 99%)    I&O's Summary    13 Sep 2020 07:01  -  14 Sep 2020 07:00  --------------------------------------------------------  IN: 450 mL / OUT: 0 mL / NET: 450 mL    14 Sep 2020 07:01  -  14 Sep 2020 21:50  --------------------------------------------------------  IN: 360 mL / OUT: 0 mL / NET: 360 mL        Physical Exam:  General: NAD, resting comfortably  HEENT: NC/AT, EOMI, normal hearing  Pulmonary: normal resp effort  Cardiovascular: NSR  Extremities: WWP, normal strength, no clubbing/cyanosis. L foot without wounds/ulcers. R foot with prior toe amps (healed), R 1st toe with purulent and sanguineous discharge from a pinpoint ulcer, with tenderness. Minimal erythema. No crepitus.  Neuro: A/O x 3, CNs II-XII grossly intact, normal sensation, no focal deficits  Pulses:   Right:  FEM [x ]2+ [ ]1+ [ ]doppler    POP [ x]2+ [ ]1+ [ ]doppler  DP [ x]2+ [ ]1+ [ ]doppler  PT[x ]2+ [ ]1+ [ ]doppler    Left:  FEM [ x]2+ [ ]1+ [ ]doppler  POP [x ]2+ [ ]1+ [ ]doppler  DP [x ]2+ [ ]1+ [ ]doppler  PT [ ]2+ [ x]1+ [ ]doppler      LABS:                        13.0   8.11  )-----------( 407      ( 14 Sep 2020 07:01 )             39.0     09-14    135  |  98  |  14  ----------------------------<  299<H>  4.6   |  24  |  1.02    Ca    9.2      14 Sep 2020 07:01  Mg     1.6     09-14    TPro  7.7  /  Alb  3.5  /  TBili  0.4  /  DBili  x   /  AST  20  /  ALT  35  /  AlkPhos  66  09-13    PT/INR - ( 14 Sep 2020 12:28 )   PT: 12.3 sec;   INR: 1.03          PTT - ( 14 Sep 2020 12:28 )  PTT:33.2 sec    CAPILLARY BLOOD GLUCOSE      POCT Blood Glucose.: 209 mg/dL (14 Sep 2020 16:10)  POCT Blood Glucose.: 346 mg/dL (14 Sep 2020 11:57)  POCT Blood Glucose.: 289 mg/dL (14 Sep 2020 07:49)    LIVER FUNCTIONS - ( 13 Sep 2020 13:38 )  Alb: 3.5 g/dL / Pro: 7.7 g/dL / ALK PHOS: 66 U/L / ALT: 35 U/L / AST: 20 U/L / GGT: x             Cultures:  Culture Results:   No growth at 1 day. (09-13 @ 14:13)  Culture Results:   No growth at 1 day. (09-13 @ 14:13)  Culture Results:   Few Streptococcus anginosus Susceptibility to follow.  Moderate Streptococcus mitis/oralis group  Culture in progress (09-13 @ 13:02)
HPI: 45yMale with PMHx of HTN, HLD, hx of substance abuse, DM, OM with multiple toe amputations in the past who p/w worsening R great toe infection x 1 week.    Pt was sent in to the ER by his podiatrist for admission. Patient states that he first noticed pain in his R foot/leg Monday night so he removed the dressing but didn't notice any drainage so he redressed it, and had fever/chills since Tue. Thursday morning he felt increased pain, and fever/chills, so he called an ambulance and was admitted to [place], where he was found to be hyperglycemic to the 250s and admitted for R toe infection, treated w/ antibiotics (does not know which one, does not have d/c papers as he AMA'd yesterday). He was seen by his podiatrist who recommended him to present to [facility].    Of note, pt first had an open wound on his R great toe in Oct 2019 [place], which intermittently healed but reopened in 2020 and has been present ever since. He also complains of increased urinary frequency (q2hrs) recently but denies dysuria, epigastric pain since Tue, diarrhea Tue/Wed overnight but resolved now, and pain in his R foot/ leg when walking. Has been noncompliant w/ insulin & ASS since he ran out of meds a couple of months ago.    In the ED:vital signs: T: 98.5 F, HR: 89, BP: 129/79, R: 18, SpO2: 99% on RALabs: significant for Hb 12.9, CRP 9.89 w/ WBC wnl (9.16), Glu 224, Na 134EKG: SR, no acute ischemic changes  Medications: s/p Zosyn 3.375g, Vanca 1.5g. pt admitted for further management . Endo consulted for DM management    DM history:   pt reports he has DM for many years. Currently on basiglar 30 units and homolog 10-12 units with meals which he uses as back up, if his FS is high he use insulin not every day. pt take s combination [id number] mg- glipizide 2.5 mg gerri day. Last seen by PCP 6 months ago. pt doesn’t check FS since 6 months ago. Reports last A1c as outpatient was 7-8 % 6about 6 months ago. pt diet breakfast: coffee. Pt usually skips lunch but sometimes chicken. Dinner milkshake. pt denies smoking, reports drinking 12 bottles of BEER every day. Snacks with cookies. pt reports family hx of DM in his mother. pt reports neuropathy. pt started on sliding scale .   FSG & Insulin received:    Yesterday:  pre-dinner fs  4  units lispro SS      Today:  pre-breakfast fs  pre-lunch fs  8  units lispro SS    Outpatient Endo :follows with PCP     PMH & Surgical Hx:FOOT ULCER; TOE AMPUTATION STATUS; DIABETES    No family history of cardiovascular disease (Father, Mother)    Handoff    MEWS Score    Osteomyelitis    Preop examination    Prophylactic measure    History of cocaine use    Hyperlipidemia, unspecified hyperlipidemia type    Hypertension    Diabetes    Foot ulcer    H/O skin graft    Toe amputation status        Current Meds:  aspirin enteric coated 81 milliGRAM(s) Oral daily  atorvastatin 40 milliGRAM(s) Oral at bedtime  dextrose 40% Gel 15 Gram(s) Oral once PRN  dextrose 5%. 1000 milliLiter(s) IV Continuous <Continuous>  dextrose 50% Injectable 12.5 Gram(s) IV Push once  glucagon  Injectable 1 milliGRAM(s) IntraMuscular once PRN  insulin glargine Injectable (LANTUS) 20 Unit(s) SubCutaneous at bedtime  insulin lispro (HumaLOG) corrective regimen sliding scale   SubCutaneous Before meals and at bedtime  insulin lispro Injectable (HumaLOG) 10 Unit(s) SubCutaneous three times a day before meals  piperacillin/tazobactam IVPB.. 3.375 Gram(s) IV Intermittent every 6 hours  vancomycin  IVPB 1500 milliGRAM(s) IV Intermittent every 12 hours      Allergies:  No Known Allergies      ROS:  Denies the following except as indicated.    General: weight loss/weight gain, decreased appetite, fatigue  ENT: Throat pain, changes in voice,   CV: palpitations, SOB, CP, cough  GI: NVD, difficulty swallowing, abdominal pain  : polyuria, dysuria  Skin: rash, dryness, diaphoresis  Heme: Easy bruising,bleeding  Neuro: HA, dizziness, lightheadedness,    Vital Signs Last 24 Hrs  T(C): 36.8 (14 Sep 2020 16:05), Max: 37.4 (13 Sep 2020 20:25)  T(F): 98.2 (14 Sep 2020 16:05), Max: 99.4 (13 Sep 2020 20:25)  HR: 89 (14 Sep 2020 16:05) (87 - 98)  BP: 134/89 (14 Sep 2020 16:05) (122/70 - 149/85)  BP(mean): --  RR: 18 (14 Sep 2020 16:05) (18 - 19)  SpO2: 97% (14 Sep 2020 16:05) (97% - 99%)  Height (cm): 182.9 ( @ 11:24)  Weight (kg): 105.2 ( @ 11:24)  BMI (kg/m2): 31.4 ( @ 11:24)      Constitutional: in NAD.   Neck: no thyromegaly or palpable thyroid nodules   Respiratory: lungs CTAB.  Cardiovascular: regular rhythm, normal S1 and S2  GI: soft, NT/ND, no masses/HSM appreciated.  Neurology: no tremors, DTR 2+  Psychiatric: AAO x 3  Ext: left radial pulse intact, no edema, right foot dressing.       LABS:                        13.0   8.11  )-----------( 407      ( 14 Sep 2020 07:01 )             39.0     09-14    135  |  98  |  14  ----------------------------<  299<H>  4.6   |  24  |  1.02    Ca    9.2      14 Sep 2020 07:01  Mg     1.6     -14    TPro  7.7  /  Alb  3.5  /  TBili  0.4  /  DBili  x   /  AST  20  /  ALT  35  /  AlkPhos  66  09-13    PT/INR - ( 14 Sep 2020 12:28 )   PT: 12.3 sec;   INR: 1.03          PTT - ( 14 Sep 2020 12:28 )  PTT:33.2 sec      Thyroid Stimulating Hormone, Serum: 0.238 (10-07 @ 15:04)      RADIOLOGY & ADDITIONAL STUDIES:  CAPILLARY BLOOD GLUCOSE      POCT Blood Glucose.: 209 mg/dL (14 Sep 2020 16:10)  POCT Blood Glucose.: 346 mg/dL (14 Sep 2020 11:57)  POCT Blood Glucose.: 289 mg/dL (14 Sep 2020 07:49)        A/P:45y Male with PMHx of HTN, HLD, hx of substance abuse, DM, OM with multiple toe amputations in the past who p/w worsening R great toe infection x 1 week. Endo consulted for DM management.     1.  DM  A1c:12.3%  cr:1.02, GFR: 88  weight:105. 2 kg, BMI: 31.5  Please start lantus  20     units at night.  Please start  lispro  10    units before each meal.  Please continue lispro moderate  dose sliding scale four times daily with meals and at bedtime    Pt's fingerstick glucose goal is     Will continue to monitor     For discharge, pt can continue    Pt can follow up at discharge with [facility] by calling  to make an appointment.   case seen and discussed  with     and update primary team
Attending: Norma    45M PMHx HTN, HLD, DM (poorly controlled), h/o multiple R toe amputations presenting with worsening R great toe wound/infection x 1 week. Pt reports he was previously at [facility], but was unhappy with the care he was receiving there, so he left and contacted Dr. Green who recommended him to come to [facility] ED. Pt reported prior subjective f/c. Denies N/V/CP/SOB. Denies other complaints.    Review of systems negative except per HPI    PAST MEDICAL & SURGICAL HISTORY:  History of cocaine use    Hypertension    Hyperlipidemia, unspecified hyperlipidemia type    Osteomyelitis    Diabetes    H/O skin graft  Right foot    Toe amputation status  Right foot      Home Medications:  Aspirin Enteric Coated 81 mg oral delayed release tablet: 1 tab(s) orally once a day (07 Oct 2019 14:17)  Basaglar KwikPen 100 units/mL subcutaneous solution: 20 unit(s) subcutaneous once a day (at bedtime) (07 Oct 2019 14:17)  glipizide-metformin 2.5 mg-500 mg oral tablet: 2 tab(s) orally 2 times a day (07 Oct 2019 14:17)  lisinopril 5 mg oral tablet: 1 tab(s) orally once a day (11 Jul 2019 19:37)  Prandin 1 mg oral tablet: 1 tab(s) orally 3 times a day (before meals) (11 Jul 2019 19:37)    Allergies    No Known Allergies    Intolerances      FAMILY HISTORY:  No family history of cardiovascular disease (Father, Mother)      Social History:       LABS  Pending    Vital Signs Last 24 Hrs  T(C): 36.9 (13 Sep 2020 11:24), Max: 36.9 (13 Sep 2020 11:24)  T(F): 98.5 (13 Sep 2020 11:24), Max: 98.5 (13 Sep 2020 11:24)  HR: 89 (13 Sep 2020 11:24) (89 - 89)  BP: 129/79 (13 Sep 2020 11:24) (129/79 - 129/79)  BP(mean): --  RR: 18 (13 Sep 2020 11:24) (18 - 18)  SpO2: 99% (13 Sep 2020 11:24) (99% - 99%)    PHYSICAL EXAM  General: NAD, AA0x3    Lower Extremity Focused:  Vasc: DP/PT 2/4 B/L  Derm: R ulcer plantar distal hallux w/ probing noted deep to bone. Trace pus/purulence.. Dorsal wound noted with connection to plantar wound. Significantly edematous hallux.  Neuro: Sensation grossly diminished  MSK: S/p R 4th/5th partial ray and 2nd partial digit amputations.    RADIOLOGY  R foot x-ray - appearance of changes of distal phalanx consistent with osteomyelitis

## 2020-09-15 ENCOUNTER — TRANSCRIPTION ENCOUNTER (OUTPATIENT)
Age: 45
End: 2020-09-15

## 2020-09-15 ENCOUNTER — RESULT REVIEW (OUTPATIENT)
Age: 45
End: 2020-09-15

## 2020-09-15 DIAGNOSIS — R10.9 UNSPECIFIED ABDOMINAL PAIN: ICD-10-CM

## 2020-09-15 LAB
-  CEFTRIAXONE: SIGNIFICANT CHANGE UP
-  CEFTRIAXONE: SIGNIFICANT CHANGE UP
-  CLINDAMYCIN: SIGNIFICANT CHANGE UP
-  CLINDAMYCIN: SIGNIFICANT CHANGE UP
-  ERYTHROMYCIN: SIGNIFICANT CHANGE UP
-  ERYTHROMYCIN: SIGNIFICANT CHANGE UP
-  LEVOFLOXACIN: SIGNIFICANT CHANGE UP
-  LEVOFLOXACIN: SIGNIFICANT CHANGE UP
-  PENICILLIN: SIGNIFICANT CHANGE UP
-  PENICILLIN: SIGNIFICANT CHANGE UP
-  VANCOMYCIN: SIGNIFICANT CHANGE UP
-  VANCOMYCIN: SIGNIFICANT CHANGE UP
ALBUMIN SERPL ELPH-MCNC: 3.2 G/DL — LOW (ref 3.3–5)
ALP SERPL-CCNC: 89 U/L — SIGNIFICANT CHANGE UP (ref 40–120)
ALT FLD-CCNC: 37 U/L — SIGNIFICANT CHANGE UP (ref 10–45)
ANION GAP SERPL CALC-SCNC: 11 MMOL/L — SIGNIFICANT CHANGE UP (ref 5–17)
AST SERPL-CCNC: 30 U/L — SIGNIFICANT CHANGE UP (ref 10–40)
BILIRUB SERPL-MCNC: 0.3 MG/DL — SIGNIFICANT CHANGE UP (ref 0.2–1.2)
BLD GP AB SCN SERPL QL: NEGATIVE — SIGNIFICANT CHANGE UP
BUN SERPL-MCNC: 16 MG/DL — SIGNIFICANT CHANGE UP (ref 7–23)
CALCIUM SERPL-MCNC: 9.6 MG/DL — SIGNIFICANT CHANGE UP (ref 8.4–10.5)
CHLORIDE SERPL-SCNC: 97 MMOL/L — SIGNIFICANT CHANGE UP (ref 96–108)
CO2 SERPL-SCNC: 25 MMOL/L — SIGNIFICANT CHANGE UP (ref 22–31)
CREAT SERPL-MCNC: 1.03 MG/DL — SIGNIFICANT CHANGE UP (ref 0.5–1.3)
CULTURE RESULTS: SIGNIFICANT CHANGE UP
GLUCOSE BLDC GLUCOMTR-MCNC: 152 MG/DL — HIGH (ref 70–99)
GLUCOSE BLDC GLUCOMTR-MCNC: 168 MG/DL — HIGH (ref 70–99)
GLUCOSE BLDC GLUCOMTR-MCNC: 276 MG/DL — HIGH (ref 70–99)
GLUCOSE BLDC GLUCOMTR-MCNC: 283 MG/DL — HIGH (ref 70–99)
GLUCOSE BLDC GLUCOMTR-MCNC: 284 MG/DL — HIGH (ref 70–99)
GLUCOSE SERPL-MCNC: 266 MG/DL — HIGH (ref 70–99)
HCT VFR BLD CALC: 37.7 % — LOW (ref 39–50)
HGB BLD-MCNC: 12.5 G/DL — LOW (ref 13–17)
INR BLD: 1.06 — SIGNIFICANT CHANGE UP (ref 0.88–1.16)
MAGNESIUM SERPL-MCNC: 1.8 MG/DL — SIGNIFICANT CHANGE UP (ref 1.6–2.6)
MCHC RBC-ENTMCNC: 28.7 PG — SIGNIFICANT CHANGE UP (ref 27–34)
MCHC RBC-ENTMCNC: 33.2 GM/DL — SIGNIFICANT CHANGE UP (ref 32–36)
MCV RBC AUTO: 86.7 FL — SIGNIFICANT CHANGE UP (ref 80–100)
METHOD TYPE: SIGNIFICANT CHANGE UP
NRBC # BLD: 0 /100 WBCS — SIGNIFICANT CHANGE UP (ref 0–0)
ORGANISM # SPEC MICROSCOPIC CNT: SIGNIFICANT CHANGE UP
PHOSPHATE SERPL-MCNC: 3.6 MG/DL — SIGNIFICANT CHANGE UP (ref 2.5–4.5)
PLATELET # BLD AUTO: 436 K/UL — HIGH (ref 150–400)
POTASSIUM SERPL-MCNC: 4.2 MMOL/L — SIGNIFICANT CHANGE UP (ref 3.5–5.3)
POTASSIUM SERPL-SCNC: 4.2 MMOL/L — SIGNIFICANT CHANGE UP (ref 3.5–5.3)
PROT SERPL-MCNC: 7.5 G/DL — SIGNIFICANT CHANGE UP (ref 6–8.3)
PROTHROM AB SERPL-ACNC: 12.7 SEC — SIGNIFICANT CHANGE UP (ref 10.6–13.6)
RBC # BLD: 4.35 M/UL — SIGNIFICANT CHANGE UP (ref 4.2–5.8)
RBC # FLD: 12.4 % — SIGNIFICANT CHANGE UP (ref 10.3–14.5)
RH IG SCN BLD-IMP: POSITIVE — SIGNIFICANT CHANGE UP
SODIUM SERPL-SCNC: 133 MMOL/L — LOW (ref 135–145)
SPECIMEN SOURCE: SIGNIFICANT CHANGE UP
WBC # BLD: 8.75 K/UL — SIGNIFICANT CHANGE UP (ref 3.8–10.5)
WBC # FLD AUTO: 8.75 K/UL — SIGNIFICANT CHANGE UP (ref 3.8–10.5)

## 2020-09-15 PROCEDURE — 99231 SBSQ HOSP IP/OBS SF/LOW 25: CPT | Mod: GC

## 2020-09-15 PROCEDURE — 88312 SPECIAL STAINS GROUP 1: CPT | Mod: 26

## 2020-09-15 PROCEDURE — 99233 SBSQ HOSP IP/OBS HIGH 50: CPT | Mod: GC

## 2020-09-15 PROCEDURE — 88305 TISSUE EXAM BY PATHOLOGIST: CPT | Mod: 26

## 2020-09-15 PROCEDURE — 88311 DECALCIFY TISSUE: CPT | Mod: 26

## 2020-09-15 PROCEDURE — 88304 TISSUE EXAM BY PATHOLOGIST: CPT | Mod: 26

## 2020-09-15 RX ORDER — VANCOMYCIN HCL 1 G
1750 VIAL (EA) INTRAVENOUS EVERY 12 HOURS
Refills: 0 | Status: DISCONTINUED | OUTPATIENT
Start: 2020-09-15 | End: 2020-09-15

## 2020-09-15 RX ORDER — INSULIN LISPRO 100/ML
12 VIAL (ML) SUBCUTANEOUS ONCE
Refills: 0 | Status: COMPLETED | OUTPATIENT
Start: 2020-09-15 | End: 2020-09-15

## 2020-09-15 RX ORDER — INSULIN GLARGINE 100 [IU]/ML
30 INJECTION, SOLUTION SUBCUTANEOUS AT BEDTIME
Refills: 0 | Status: DISCONTINUED | OUTPATIENT
Start: 2020-09-15 | End: 2020-09-16

## 2020-09-15 RX ORDER — OXYCODONE AND ACETAMINOPHEN 5; 325 MG/1; MG/1
1 TABLET ORAL EVERY 4 HOURS
Refills: 0 | Status: DISCONTINUED | OUTPATIENT
Start: 2020-09-15 | End: 2020-09-17

## 2020-09-15 RX ORDER — VANCOMYCIN HCL 1 G
1750 VIAL (EA) INTRAVENOUS EVERY 12 HOURS
Refills: 0 | Status: COMPLETED | OUTPATIENT
Start: 2020-09-15 | End: 2020-09-16

## 2020-09-15 RX ORDER — INSULIN LISPRO 100/ML
12 VIAL (ML) SUBCUTANEOUS
Refills: 0 | Status: DISCONTINUED | OUTPATIENT
Start: 2020-09-15 | End: 2020-09-16

## 2020-09-15 RX ORDER — INSULIN LISPRO 100/ML
VIAL (ML) SUBCUTANEOUS
Refills: 0 | Status: DISCONTINUED | OUTPATIENT
Start: 2020-09-15 | End: 2020-09-17

## 2020-09-15 RX ORDER — POLYETHYLENE GLYCOL 3350 17 G/17G
17 POWDER, FOR SOLUTION ORAL DAILY
Refills: 0 | Status: DISCONTINUED | OUTPATIENT
Start: 2020-09-15 | End: 2020-09-17

## 2020-09-15 RX ORDER — VANCOMYCIN HCL 1 G
250 VIAL (EA) INTRAVENOUS ONCE
Refills: 0 | Status: COMPLETED | OUTPATIENT
Start: 2020-09-15 | End: 2020-09-15

## 2020-09-15 RX ORDER — SODIUM CHLORIDE 9 MG/ML
1000 INJECTION, SOLUTION INTRAVENOUS
Refills: 0 | Status: DISCONTINUED | OUTPATIENT
Start: 2020-09-15 | End: 2020-09-16

## 2020-09-15 RX ADMIN — Medication 12 UNIT(S): at 22:43

## 2020-09-15 RX ADMIN — Medication 6: at 22:43

## 2020-09-15 RX ADMIN — Medication 2: at 17:46

## 2020-09-15 RX ADMIN — SODIUM CHLORIDE 100 MILLILITER(S): 9 INJECTION, SOLUTION INTRAVENOUS at 23:54

## 2020-09-15 RX ADMIN — OXYCODONE AND ACETAMINOPHEN 1 TABLET(S): 5; 325 TABLET ORAL at 23:35

## 2020-09-15 RX ADMIN — INSULIN GLARGINE 30 UNIT(S): 100 INJECTION, SOLUTION SUBCUTANEOUS at 22:43

## 2020-09-15 RX ADMIN — PIPERACILLIN AND TAZOBACTAM 200 GRAM(S): 4; .5 INJECTION, POWDER, LYOPHILIZED, FOR SOLUTION INTRAVENOUS at 14:53

## 2020-09-15 RX ADMIN — OXYCODONE AND ACETAMINOPHEN 1 TABLET(S): 5; 325 TABLET ORAL at 22:50

## 2020-09-15 RX ADMIN — Medication 100 MILLIGRAM(S): at 03:00

## 2020-09-15 RX ADMIN — Medication 6: at 12:56

## 2020-09-15 RX ADMIN — Medication 81 MILLIGRAM(S): at 11:10

## 2020-09-15 RX ADMIN — PIPERACILLIN AND TAZOBACTAM 200 GRAM(S): 4; .5 INJECTION, POWDER, LYOPHILIZED, FOR SOLUTION INTRAVENOUS at 07:15

## 2020-09-15 RX ADMIN — Medication 10 UNIT(S): at 09:24

## 2020-09-15 RX ADMIN — Medication 250 MILLIGRAM(S): at 16:05

## 2020-09-15 RX ADMIN — PIPERACILLIN AND TAZOBACTAM 200 GRAM(S): 4; .5 INJECTION, POWDER, LYOPHILIZED, FOR SOLUTION INTRAVENOUS at 01:11

## 2020-09-15 RX ADMIN — Medication 6: at 09:23

## 2020-09-15 RX ADMIN — POLYETHYLENE GLYCOL 3350 17 GRAM(S): 17 POWDER, FOR SOLUTION ORAL at 12:14

## 2020-09-15 RX ADMIN — PIPERACILLIN AND TAZOBACTAM 200 GRAM(S): 4; .5 INJECTION, POWDER, LYOPHILIZED, FOR SOLUTION INTRAVENOUS at 20:43

## 2020-09-15 RX ADMIN — ATORVASTATIN CALCIUM 40 MILLIGRAM(S): 80 TABLET, FILM COATED ORAL at 22:44

## 2020-09-15 NOTE — DISCHARGE NOTE PROVIDER - NSDCFUADDAPPT_GEN_ALL_CORE_FT
Please follow up with a primary care provider on XX/XX/2020 at XX:XX.    Please follow up with your podiatrist Dr. Mas on XX/XX/2020 at XX:XX. Please follow up with Primary Care Provider Dr. Obed Olivo at [address] [place] [address] on 9/29/2020 at 10:15am.    Please follow up with your podiatrist Dr. Barry aMs at [address] [place] [address], [place] [address] on 9/21/2020 at 3:00pm.    Appointments were scheduled by Ms. VAISHNAVI Quintero, Referral Coordinator.

## 2020-09-15 NOTE — PROGRESS NOTE ADULT - PROBLEM SELECTOR PLAN 2
RCRI score: 1 (Class II risk, 6.0% 30day risk of death, MI, cardiac arrest)  Salas score: 0.4% risk of MI or cardiac arrest intraoperatively/up to 30 days post-op  CXR: No acute pathology.  NSQIP Score: Serious complication - 12%, any complication - 14%, cardiac complication 1% RCRI score: 1 (Class II risk, 6.0% 30day risk of death, MI, cardiac arrest)  Salas score: 0.4% risk of MI or cardiac arrest intraoperatively/up to 30 days post-op  CXR: No acute pathology.  NSQIP Score: Serious complication - 12%, any complication - 14%, cardiac complication 1%  EKG:   Patient medically optimized for surgery. - RCRI score: 1 (Class II risk, 6.0% 30day risk of death, MI, cardiac arrest)  - Salas score: 0.4% risk of MI or cardiac arrest intraoperatively/up to 30 days post-op  - NSQIP Score: Serious complication - 12%, any complication - 14%, cardiac complication 1%  - EKG w/ NSR, non-specific ST-T changes  - CXR: no acute infiltrates  - patient is low risk for low  risk procedure  - Patient is medically optimized for procedure

## 2020-09-15 NOTE — PHYSICAL THERAPY INITIAL EVALUATION ADULT - PREDICTED DURATION OF THERAPY (DAYS/WKS), PT EVAL
Pt. would benefit from further post-op gait, stairs assessments  post planned R hallux I&D vs amputation to ensure safety and promote functional independence.

## 2020-09-15 NOTE — PROGRESS NOTE ADULT - SUBJECTIVE AND OBJECTIVE BOX
Patient is a 45y old  Male who presents with a chief complaint of R 1st toe infection (14 Sep 2020 21:49)      INTERVAL HPI/ OVERNIGHT EVENTS: Pt is complaining of hunger this morning and was given a light breakfast. He was told that he had to be NPO after that because he has a scheduled OR procedure planned for today. Pt acknowledges the plan and has consented to the procedure. He denies any N/V/F/chills.       LABS                        12.5   8.75  )-----------( 436      ( 15 Sep 2020 05:44 )             37.7     09-15    133<L>  |  97  |  16  ----------------------------<  266<H>  4.2   |  25  |  1.03    Ca    9.6      15 Sep 2020 05:44  Phos  3.6     09-15  Mg     1.8     09-15    TPro  7.5  /  Alb  3.2<L>  /  TBili  0.3  /  DBili  x   /  AST  30  /  ALT  37  /  AlkPhos  89  09-15    PT/INR - ( 14 Sep 2020 12:28 )   PT: 12.3 sec;   INR: 1.03          PTT - ( 14 Sep 2020 12:28 )  PTT:33.2 sec    ICU Vital Signs Last 24 Hrs  T(C): 36.4 (15 Sep 2020 08:56), Max: 37.1 (15 Sep 2020 05:51)  T(F): 97.6 (15 Sep 2020 08:56), Max: 98.7 (15 Sep 2020 05:51)  HR: 77 (15 Sep 2020 08:56) (77 - 89)  BP: 154/96 (15 Sep 2020 08:56) (131/81 - 156/80)  BP(mean): --  ABP: --  ABP(mean): --  RR: 18 (15 Sep 2020 08:56) (18 - 20)  SpO2: 98% (15 Sep 2020 08:56) (96% - 98%)      RADIOLOGY  < from: Xray Foot AP + Lateral + Oblique, Right (09.13.20 @ 12:06) >  1. Since 12/27/2018, there is now osteomyelitis of the right first distal phalanx with surrounding cellulitis. No soft tissue gas.    2. Improvement in osteomyelitis involving the distal portion of the third metatarsal.    < end of copied text >      MICROBIOLOGY  Culture - Blood (09.13.20 @ 14:13)    Specimen Source: .Blood Blood    Culture Results:   No growth at 1 day.      PHYSICAL EXAM  Lower Extremity Focused  Vasc:  Derm:  Neuro:  MSK: Patient is a 45y old  Male who presents with a chief complaint of R 1st toe infection (14 Sep 2020 21:49)      INTERVAL HPI/ OVERNIGHT EVENTS: Pt is complaining of hunger this morning and was given a light breakfast. He was told that he had to be NPO after that because he has a scheduled OR procedure planned for today. Pt acknowledges the plan and has consented to the procedure. He denies any N/V/F/chills.       LABS                        12.5   8.75  )-----------( 436      ( 15 Sep 2020 05:44 )             37.7     09-15    133<L>  |  97  |  16  ----------------------------<  266<H>  4.2   |  25  |  1.03    Ca    9.6      15 Sep 2020 05:44  Phos  3.6     09-15  Mg     1.8     09-15    TPro  7.5  /  Alb  3.2<L>  /  TBili  0.3  /  DBili  x   /  AST  30  /  ALT  37  /  AlkPhos  89  09-15    PT/INR - ( 14 Sep 2020 12:28 )   PT: 12.3 sec;   INR: 1.03          PTT - ( 14 Sep 2020 12:28 )  PTT:33.2 sec    ICU Vital Signs Last 24 Hrs  T(C): 36.4 (15 Sep 2020 08:56), Max: 37.1 (15 Sep 2020 05:51)  T(F): 97.6 (15 Sep 2020 08:56), Max: 98.7 (15 Sep 2020 05:51)  HR: 77 (15 Sep 2020 08:56) (77 - 89)  BP: 154/96 (15 Sep 2020 08:56) (131/81 - 156/80)  BP(mean): --  ABP: --  ABP(mean): --  RR: 18 (15 Sep 2020 08:56) (18 - 20)  SpO2: 98% (15 Sep 2020 08:56) (96% - 98%)      RADIOLOGY  < from: Xray Foot AP + Lateral + Oblique, Right (09.13.20 @ 12:06) >  1. Since 12/27/2018, there is now osteomyelitis of the right first distal phalanx with surrounding cellulitis. No soft tissue gas.    2. Improvement in osteomyelitis involving the distal portion of the third metatarsal.    < end of copied text >      MICROBIOLOGY  Culture - Blood (09.13.20 @ 14:13)    Specimen Source: .Blood Blood    Culture Results:   No growth at 1 day.      PHYSICAL EXAM  Lower Extremity Focused  Vasc: DP/PT 2/4 B/L  Derm: R ulcer plantar distal hallux w/ probing noted deep to bone. Trace pus/purulence.. Dorsal wound noted with connection to plantar wound. Significantly edematous hallux.  Neuro: Sensation grossly diminished  MSK: S/p R 4th/5th partial ray and 2nd partial digit amputations.

## 2020-09-15 NOTE — DISCHARGE NOTE PROVIDER - NSDCCPCAREPLAN_GEN_ALL_CORE_FT
PRINCIPAL DISCHARGE DIAGNOSIS  Diagnosis: Osteomyelitis  Assessment and Plan of Treatment: You were admitted for osteomyelitis (bone infection) of your right great toe, and treated with antibiotics. Podiatry saw you and performed a surgery on you to ________  Please follow up with a primary care provider on XX/XX/2020 at XX:XX.  Please follow up with your podiatrist Dr. Mas on XX/XX/2020 at XX:XX for further management.      SECONDARY DISCHARGE DIAGNOSES  Diagnosis: Abdominal pain  Assessment and Plan of Treatment: During your admission, you also complained of abdominal pain. If your pain does not resolve, please follow up with a primary care provider on XX/XX/2020 at XX:XX for further work-up.    Diagnosis: Diabetes  Assessment and Plan of Treatment: Your blood sugars were very high on admission, and your diabetes seems to not be well controlled on the medications you were taking prior to admission. Having high blood sugar levels can lead to a lot of complications long-term, including kidney failure, blindness, and erectile dysfunction.  We consulted the endocrinology team to give us recommendations for your diabetes management. To get better control of your sugar levels, please take the following medications:  Also, please follow up with a primary care provider on XX/XX/2020 at XX:XX.     PRINCIPAL DISCHARGE DIAGNOSIS  Diagnosis: Cellulitis  Assessment and Plan of Treatment: You were admitted for infection of your right great toe, and treated with antibiotics. Podiatry performed an incision and drainage of your toe. They determined that the infection was of the skin and soft tissue, and not a bone infection. Please take your antibiotics (Keflex) for 10 days, twice per day, upon leaving the hospital.  Please follow up with a primary care provider on Dr. Obed Olivo at [address] [place] [address] on 9/29/2020 at 10:15am.  Please follow up with your podiatrist Dr. Mas on Please follow up with your podiatrist Dr. Barry Mas at [address] [place] [address], [place] [address] on 9/21/2020 at 3:00pm.      SECONDARY DISCHARGE DIAGNOSES  Diagnosis: Diabetes  Assessment and Plan of Treatment: Your blood sugars were very high on admission, and your diabetes seems to not be well controlled on the medications you were taking prior to admission. Having high blood sugar levels can lead to a lot of complications long-term, including kidney failure, blindness, and erectile dysfunction.  We consulted the endocrinology team to give us recommendations for your diabetes management. To get better control of your sugar levels, please take the following medications:   Also, please follow up with a primary care provider on  Dr. Obed Olivo at [address] [place] [address] on 9/29/2020 at 10:15am.     PRINCIPAL DISCHARGE DIAGNOSIS  Diagnosis: Cellulitis  Assessment and Plan of Treatment: You were admitted for infection of your right great toe, and treated with antibiotics. Podiatry performed an incision and drainage of your toe. They determined that the infection was of the skin and soft tissue, and not a bone infection. Please take your antibiotics (Keflex) for 10 days, twice per day, upon leaving the hospital. Wound care as follows: wash your hands and pack your wound with dressing as instructed by podiatry education.  Please follow up with a primary care provider on Dr. Obed Olivo at [address] [place] [address] on 9/29/2020 at 10:15am.  Please follow up with your podiatrist Dr. Mas on Please follow up with your podiatrist Dr. Barry Mas at [address] [place] [address], [place] [address] on 9/21/2020 at 3:00pm.      SECONDARY DISCHARGE DIAGNOSES  Diagnosis: Diabetes  Assessment and Plan of Treatment: Your blood sugars were very high on admission, and your diabetes seems to not be well controlled on the medications you were taking prior to admission. Having high blood sugar levels can lead to a lot of complications long-term, including kidney failure, blindness, and erectile dysfunction.  We consulted the endocrinology team to give us recommendations for your diabetes management. To get better control of your sugar levels, please take the following medications:   Also, please follow up with a primary care provider on  Dr. Obed Olivo at [address] [place] [address] on 9/29/2020 at 10:15am.

## 2020-09-15 NOTE — PROGRESS NOTE ADULT - ATTENDING COMMENTS
RCRI score: 1 (Class II risk, 6.0% 30day risk of death, MI, cardiac arrest)  Salas score: 0.4% risk of MI or cardiac arrest intraoperatively/up to 30 days post-op    EKG w/ NSR, non-specific ST-T changes  CXR: no acute infiltrates      Patient lower risk for low risk procedure and is medically optimized.    Plan for OR today per podiatry.  Will f/u surgical op report for potential IV abx. RCRI score: 1 (Class II risk, 6.0% 30day risk of death, MI, cardiac arrest)  Salas score: 0.4% risk of MI or cardiac arrest intraoperatively/up to 30 days post-op    EKG w/ NSR, non-specific ST-T changes  CXR: no acute infiltrates      Patient lower risk for low risk procedure and is medically optimized.    Plan for OR today per podiatry.  Will f/u surgical op report for potential IV abx    abd pain likely 2/2 constipation vs reflux  last bm yesterday but minimal and strained  will add bowel regimen  if no relief, add tums

## 2020-09-15 NOTE — PROGRESS NOTE ADULT - PROBLEM SELECTOR PLAN 8
Fluids: No IVF currently needed  Electrolytes: Mg>2, K>4, replete lytes PRN  Nutrition: NPO for procedure  Prophylaxis: Lovenox 40 qd  Activity: AAT, OOBTC  GI: none  C: FC  Dispo: Admit to [facility]

## 2020-09-15 NOTE — PROGRESS NOTE ADULT - PROBLEM SELECTOR PLAN 6
- last use 3ish weeks ago per pt, no signs of withdrawal  - snorts it - c/w home atorvastatin 40mg, ASS 81mg

## 2020-09-15 NOTE — PROGRESS NOTE ADULT - ASSESSMENT
45M PMHx HTN, HLD, DM (poorly controlled), h/o multiple R toe amputations presenting with worsening R great toe wound/infection x 1 week. Wound noted probing deep to bone with changes on x-ray consistent with osteomyelitis.     P:  -WBAT on R foot and full WB on L foot  -c/w abx recs per ID  -Can resume normal DVT ppx  -Can resume normal diet   -Podiatry will follow in the AM  -Plan d/w Dr. Green 45M PMHx HTN, HLD, DM (poorly controlled), h/o multiple R toe amputations presenting with worsening R great toe wound/infection x 1 week. Wound noted probing deep to bone with changes on x-ray consistent with osteomyelitis, now s/p wound debridement and bone biopsy (pending results).     P:  -WBAT on R foot and full WB on L foot  -c/w abx recs per ID  -Can resume normal DVT ppx  -Can resume normal diet   -Podiatry will follow in the AM  -Plan d/w Dr. Green

## 2020-09-15 NOTE — PROGRESS NOTE ADULT - SUBJECTIVE AND OBJECTIVE BOX
POST OP NOTE    ARIEL MOURA  MRN-[id number]    Procedure: Debridement of the R foot ulcer and bone biopsy   Surgeon: Dr. David Green   Assistants: Gaston PGY1, Amber PGY2, Clarisse MS4    Patient tolerated procedure well without incident.  Patient transferred to PACU in stable condition.       PE / Post Op Check:       GEN: NAD, AAOx3, resting comfortably with pain controlled      LE Focused: CFT shows adequate perfusion b/l with no signs of ischemic compromise.  No strikethrough is appreciated on surgical dressings.  Dressings were dry, clean, and intact.  No neuromuscular deficits appreciated.

## 2020-09-15 NOTE — DISCHARGE NOTE PROVIDER - NSDCMRMEDTOKEN_GEN_ALL_CORE_FT
Aspirin Enteric Coated 81 mg oral delayed release tablet: 1 tab(s) orally once a day  atorvastatin 40 mg oral tablet: 1 tab(s) orally once a day (at bedtime)  Basaglar KwikPen 100 units/mL subcutaneous solution: 20 unit(s) subcutaneous once a day (at bedtime)  glipizide-metformin 2.5 mg-500 mg oral tablet: 2 tab(s) orally 2 times a day  insulin lispro 100 units/mL injectable solution: 8 unit(s) injectable 3 times a day (before meals) Please adjust with insulin requirements   alcohol swabs : Apply topically to affected area 4 times a day   Aspirin Enteric Coated 81 mg oral delayed release tablet: 1 tab(s) orally once a day  atorvastatin 40 mg oral tablet: 1 tab(s) orally once a day (at bedtime)  Basaglar KwikPen 100 units/mL subcutaneous solution: 20 unit(s) subcutaneous once a day (at bedtime)  glipizide-metformin 2.5 mg-500 mg oral tablet: 2 tab(s) orally 2 times a day  glucometer (per patient&#x27;s insurance): Test blood sugars four times a day. Dispense #1 glucometer.  insulin lispro 100 units/mL injectable solution: 8 unit(s) injectable 3 times a day (before meals) Please adjust with insulin requirements  lancets: 1 application subcutaneously 4 times a day   test strips (per patient&#x27;s insurance): 1 application subcutaneously 4 times a day. ** Compatible with patient&#x27;s glucometer **   alcohol swabs : Apply topically to affected area 4 times a day   Aspirin Enteric Coated 81 mg oral delayed release tablet: 1 tab(s) orally once a day  atorvastatin 40 mg oral tablet: 1 tab(s) orally once a day (at bedtime)  cephalexin 500 mg oral capsule: 1 cap(s) orally 4 times a day  glucometer (per patient&#x27;s insurance): Test blood sugars four times a day. Dispense #1 glucometer.  insulin glargine: 40 unit(s) subcutaneous once a day (at bedtime)   Insulin Lispro KwikPen 100 units/mL injectable solution: 14 unit(s) injectable 3 times a day (with meals)   Insulin Pen Needles, 4mm: 1 application subcutaneously 4 times a day. ** Use with insulin pen **   lancets: 1 application subcutaneously 4 times a day   test strips (per patient&#x27;s insurance): 1 application subcutaneously 4 times a day. ** Compatible with patient&#x27;s glucometer **

## 2020-09-15 NOTE — PACU DISCHARGE NOTE - COMMENTS
Pt AOx4, VSS,afebrile No sign of distress or SOB noted. No c/o pain. Rt foot with ace bandage packing. Rt FA 20g IVL patent and intact, inserted by anesthesiologist. Tolerated sandwich and soda. Report given to IVELISSE Brooks.

## 2020-09-15 NOTE — BRIEF OPERATIVE NOTE - OPERATION/FINDINGS
Incision and drainage of the right great toe and ray, in addition to a complete nail avulsion and bone biopsy. surgical site was copiously irrigated with NS. Wound site packed with sterile packing and dressed with DSD and ace bandage. Bone Biopsy sent for pathology and culture.

## 2020-09-15 NOTE — PROGRESS NOTE ADULT - SUBJECTIVE AND OBJECTIVE BOX
PRE-OP NOTE    Pre-Op Diagnosis: OM of R distal hallux   Planned Procedure: Soft tissue Debridement of R foot hallux   Scheduled Date / Time: 09/15/2020 at 5PM  Indication: OM of R distal hallux   Labs:                       12.5   8.75  )-----------( 436      ( 15 Sep 2020 05:44 )             37.7   09-15    133<L>  |  97  |  16  ----------------------------<  266<H>  4.2   |  25  |  1.03    Ca    9.6      15 Sep 2020 05:44  Phos  3.6     09-15  Mg     1.8     09-15    TPro  7.5  /  Alb  3.2<L>  /  TBili  0.3  /  DBili  x   /  AST  30  /  ALT  37  /  AlkPhos  89  09-15  LIVER FUNCTIONS - ( 15 Sep 2020 05:44 )  Alb: 3.2 g/dL / Pro: 7.5 g/dL / ALK PHOS: 89 U/L / ALT: 37 U/L / AST: 30 U/L / GGT: x           Vitals: Vital Signs Last 24 Hrs  T(C): 36.4 (15 Sep 2020 08:56), Max: 37.1 (15 Sep 2020 05:51)  T(F): 97.6 (15 Sep 2020 08:56), Max: 98.7 (15 Sep 2020 05:51)  HR: 77 (15 Sep 2020 08:56) (77 - 89)  BP: 154/96 (15 Sep 2020 08:56) (131/81 - 156/80)  BP(mean): --  RR: 18 (15 Sep 2020 08:56) (18 - 20)  SpO2: 98% (15 Sep 2020 08:56) (96% - 98%)  PE:   Official CXR reading: (On Chart)  Official EKG reading: (On Chart)  Type and Cross/Screen:   NPO after [place]  Antibiotics:   Anesthesia evaluation (on chart)  Operative Consent (on chart)

## 2020-09-15 NOTE — PROGRESS NOTE ADULT - PROBLEM SELECTOR PLAN 5
- c/w home atorvastatin 40mg, ASS 81mg - Pt normotensive on admission, currently not taking any home meds  - Per chart review, Pt took amlodipine 5mg before  - Consider antihypertensive therapy post surgery (starting Wed, amlodipine 5mg)  - plan for outpatient f/u

## 2020-09-15 NOTE — DISCHARGE NOTE PROVIDER - CARE PROVIDER_API CALL
Barry Green  [address]  [place] [address]  Phone: [phone]  Fax: (   )    -  Established Patient  Follow Up Time: 2 weeks    Obed Olivo  INTERNAL MEDICINE  [address] [place] [address]  Phone: [phone]  Fax: [phone]  Follow Up Time: 2 weeks   Obed Olivo  INTERNAL MEDICINE  [address] [place] [address]  Phone: [phone]  Fax: [phone]  Scheduled Appointment: 09/29/2020 10:15 AM    Barry Geren  [address]  [place] [address]  Phone: [phone]  Fax: (   )    -  Established Patient  Scheduled Appointment: 09/21/2020 03:00 PM

## 2020-09-15 NOTE — PROGRESS NOTE ADULT - SUBJECTIVE AND OBJECTIVE BOX
INTERVAL HPI/OVERNIGHT EVENTS:    Patient is a 45y old  Male who presents with a chief complaint of R 1st toe infection (14 Sep 2020 21:49)  Planned for OR later this afternoon/evening for soft tissue Debridement of R foot hallux with possible hallux amputation. Saw patient in bed. Right foot wrapped. Tender to palpations. Otherwise no complaints. Had full liquid breakfast this AM that was not consistent carb.    FSG & insulin:  9/14:  Dinner FSG  209  Lispro 4   Ate chicken, swt potato, pudding  Bedtime    Lantus  20  lispro  8   9/15:  Breakfast    Lispro 10+6.  Ate cream of wheat, apple juice, and 2 milks.  Lunch FSG     Lispro   Ate    Pt reports the following symptoms:    CONSTITUTIONAL:  Negative fever or chills, feels well, good appetite  EYES:  Negative  blurry vision or double vision  CARDIOVASCULAR:  Negative for chest pain or palpitations  RESPIRATORY:  Negative for cough, wheezing, or SOB   GASTROINTESTINAL:  Negative for nausea, vomiting, diarrhea, constipation, or abdominal pain  GENITOURINARY:  Negative frequency, urgency or dysuria  NEUROLOGIC:  No headache, confusion, dizziness, lightheadedness    MEDICATIONS  (STANDING):  aspirin enteric coated 81 milliGRAM(s) Oral daily  atorvastatin 40 milliGRAM(s) Oral at bedtime  dextrose 5%. 1000 milliLiter(s) (50 mL/Hr) IV Continuous <Continuous>  dextrose 50% Injectable 12.5 Gram(s) IV Push once  influenza   Vaccine 0.5 milliLiter(s) IntraMuscular once  insulin glargine Injectable (LANTUS) 20 Unit(s) SubCutaneous at bedtime  insulin lispro (HumaLOG) corrective regimen sliding scale   SubCutaneous Before meals and at bedtime  insulin lispro Injectable (HumaLOG) 10 Unit(s) SubCutaneous three times a day before meals  piperacillin/tazobactam IVPB.. 3.375 Gram(s) IV Intermittent every 6 hours  vancomycin  IVPB 1750 milliGRAM(s) IV Intermittent every 12 hours    MEDICATIONS  (PRN):  dextrose 40% Gel 15 Gram(s) Oral once PRN Blood Glucose LESS THAN 70 milliGRAM(s)/deciliter  glucagon  Injectable 1 milliGRAM(s) IntraMuscular once PRN Glucose LESS THAN 70 milligrams/deciliter  polyethylene glycol 3350 17 Gram(s) Oral daily PRN Constipation      PHYSICAL EXAM  Vital Signs Last 24 Hrs  T(C): 36.4 (15 Sep 2020 08:56), Max: 37.1 (15 Sep 2020 05:51)  T(F): 97.6 (15 Sep 2020 08:56), Max: 98.7 (15 Sep 2020 05:51)  HR: 77 (15 Sep 2020 08:56) (77 - 89)  BP: 154/96 (15 Sep 2020 08:56) (131/81 - 156/80)  BP(mean): --  RR: 18 (15 Sep 2020 08:56) (18 - 20)  SpO2: 98% (15 Sep 2020 08:56) (96% - 98%)    Constitutional: wn/wd in NAD.   HEENT: NCAT, MMM, OP clear, EOMI, no proptosis or lid retraction  Neck: no thyromegaly or palpable thyroid nodules   Respiratory: lungs CTAB.  Cardiovascular: regular rhythm, normal S1 and S2, no audible murmurs, no peripheral edema  GI: soft, NT/ND, no masses/HSM appreciated.  Neurology: no tremors, DTR 2+  Skin: no visible rashes/lesions. no acanthosis nigricans. no lipohypertrophy. no cushing's stigmata.  Ext: R foot wrapped in gauze, tenderness to palpation of R lower extremity  Psychiatric: AAO x 3, normal affect/mood.    LABS:                        12.5   8.75  )-----------( 436      ( 15 Sep 2020 05:44 )             37.7     09-15    133<L>  |  97  |  16  ----------------------------<  266<H>  4.2   |  25  |  1.03    Ca    9.6      15 Sep 2020 05:44  Phos  3.6     09-15  Mg     1.8     09-15    TPro  7.5  /  Alb  3.2<L>  /  TBili  0.3  /  DBili  x   /  AST  30  /  ALT  37  /  AlkPhos  89  09-15    PT/INR - ( 14 Sep 2020 12:28 )   PT: 12.3 sec;   INR: 1.03          PTT - ( 14 Sep 2020 12:28 )  PTT:33.2 sec    Thyroid Stimulating Hormone, Serum: 0.238 uIU/mL (10-07 @ 15:04)      HbA1C:   CAPILLARY BLOOD GLUCOSE      POCT Blood Glucose.: 284 mg/dL (15 Sep 2020 08:32)  POCT Blood Glucose.: 308 mg/dL (14 Sep 2020 22:02)  POCT Blood Glucose.: 209 mg/dL (14 Sep 2020 16:10)  POCT Blood Glucose.: 346 mg/dL (14 Sep 2020 11:57)      Will discuss in rounds  A/P:45y Male with PMHx of HTN, HLD, hx of substance abuse, DM, OM with multiple toe amputations in the past who p/w worsening R great toe infection x 1 week. Endo consulted for DM management.     1.  DM  A1c:12.3%  cr:1.02, GFR: 88  weight:105. 2 kg, BMI: 31.5  Please start lantus       units at night.  Please start  lispro     units before each meal.  Please continue lispro moderate  dose sliding scale four times daily with meals and at bedtime    Pt's fingerstick glucose goal is     Will continue to monitor     For discharge, pt can continue    Pt can follow up at discharge with [facility] by calling  to make an appointment.   case seen and discussed  with     and update primary team INTERVAL HPI/OVERNIGHT EVENTS:    Patient is a 45y old  Male who presents with a chief complaint of R 1st toe infection (14 Sep 2020 21:49)  Planned for OR later this afternoon/evening for soft tissue Debridement of R foot hallux with possible hallux amputation. Saw patient in bed. Right foot wrapped. Tender to palpations. Otherwise no complaints. Had full liquid breakfast this AM that was not consistent carb.    FSG & insulin:  9/14:  Dinner FSG  209  Lispro 4   Ate chicken, swt potato, pudding  Bedtime    Lantus  20  lispro  8   9/15:  Breakfast    Lispro 10+6.  Ate cream of wheat, apple juice, and 2 milks.  Lunch FSG     Lispro   Ate    Pt reports the following symptoms:    CONSTITUTIONAL:  Negative fever or chills, feels well, good appetite  EYES:  Negative  blurry vision or double vision  CARDIOVASCULAR:  Negative for chest pain or palpitations  RESPIRATORY:  Negative for cough, wheezing, or SOB   GASTROINTESTINAL:  Negative for nausea, vomiting, diarrhea, constipation, or abdominal pain  GENITOURINARY:  Negative frequency, urgency or dysuria  NEUROLOGIC:  No headache, confusion, dizziness, lightheadedness    MEDICATIONS  (STANDING):  aspirin enteric coated 81 milliGRAM(s) Oral daily  atorvastatin 40 milliGRAM(s) Oral at bedtime  dextrose 5%. 1000 milliLiter(s) (50 mL/Hr) IV Continuous <Continuous>  dextrose 50% Injectable 12.5 Gram(s) IV Push once  influenza   Vaccine 0.5 milliLiter(s) IntraMuscular once  insulin glargine Injectable (LANTUS) 20 Unit(s) SubCutaneous at bedtime  insulin lispro (HumaLOG) corrective regimen sliding scale   SubCutaneous Before meals and at bedtime  insulin lispro Injectable (HumaLOG) 10 Unit(s) SubCutaneous three times a day before meals  piperacillin/tazobactam IVPB.. 3.375 Gram(s) IV Intermittent every 6 hours  vancomycin  IVPB 1750 milliGRAM(s) IV Intermittent every 12 hours    MEDICATIONS  (PRN):  dextrose 40% Gel 15 Gram(s) Oral once PRN Blood Glucose LESS THAN 70 milliGRAM(s)/deciliter  glucagon  Injectable 1 milliGRAM(s) IntraMuscular once PRN Glucose LESS THAN 70 milligrams/deciliter  polyethylene glycol 3350 17 Gram(s) Oral daily PRN Constipation      PHYSICAL EXAM  Vital Signs Last 24 Hrs  T(C): 36.4 (15 Sep 2020 08:56), Max: 37.1 (15 Sep 2020 05:51)  T(F): 97.6 (15 Sep 2020 08:56), Max: 98.7 (15 Sep 2020 05:51)  HR: 77 (15 Sep 2020 08:56) (77 - 89)  BP: 154/96 (15 Sep 2020 08:56) (131/81 - 156/80)  BP(mean): --  RR: 18 (15 Sep 2020 08:56) (18 - 20)  SpO2: 98% (15 Sep 2020 08:56) (96% - 98%)    Constitutional: wn/wd in NAD.   HEENT: NCAT, MMM, OP clear, EOMI, no proptosis or lid retraction  Neck: no thyromegaly or palpable thyroid nodules   Respiratory: lungs CTAB.  Cardiovascular: regular rhythm, normal S1 and S2, no audible murmurs, no peripheral edema  GI: soft, NT/ND, no masses/HSM appreciated.  Neurology: no tremors, DTR 2+  Skin: no visible rashes/lesions. no acanthosis nigricans. no lipohypertrophy. no cushing's stigmata.  Ext: R foot wrapped in gauze, tenderness to palpation of R lower extremity  Psychiatric: AAO x 3, normal affect/mood.    LABS:                        12.5   8.75  )-----------( 436      ( 15 Sep 2020 05:44 )             37.7     09-15    133<L>  |  97  |  16  ----------------------------<  266<H>  4.2   |  25  |  1.03    Ca    9.6      15 Sep 2020 05:44  Phos  3.6     09-15  Mg     1.8     09-15    TPro  7.5  /  Alb  3.2<L>  /  TBili  0.3  /  DBili  x   /  AST  30  /  ALT  37  /  AlkPhos  89  09-15    PT/INR - ( 14 Sep 2020 12:28 )   PT: 12.3 sec;   INR: 1.03          PTT - ( 14 Sep 2020 12:28 )  PTT:33.2 sec    Thyroid Stimulating Hormone, Serum: 0.238 uIU/mL (10-07 @ 15:04)      HbA1C:   CAPILLARY BLOOD GLUCOSE      POCT Blood Glucose.: 284 mg/dL (15 Sep 2020 08:32)  POCT Blood Glucose.: 308 mg/dL (14 Sep 2020 22:02)  POCT Blood Glucose.: 209 mg/dL (14 Sep 2020 16:10)  POCT Blood Glucose.: 346 mg/dL (14 Sep 2020 11:57)      A/P:45y Male with PMHx of HTN, HLD, hx of substance abuse, DM, OM with multiple toe amputations in the past who p/w worsening R great toe infection x 1 week. Endo consulted for DM management.     1.  DM  A1c:12.3%  cr:1.02, GFR: 88  weight:105. 2 kg, BMI: 31.5  Please increase lantus to 30      units at night.  Please increase  lispro to 12   units before each meal.  Please continue lispro moderate  dose sliding scale four times daily with meals and at bedtime    Pt's fingerstick glucose goal is     Will continue to monitor     For discharge, pt can continue    Pt can follow up at discharge with [facility] by calling  to make an appointment.   case seen and discussed  with     and update primary team

## 2020-09-15 NOTE — DISCHARGE NOTE PROVIDER - PROVIDER TOKENS
FREE:[LAST:[Norma],FIRST:[Barry],PHONE:[phone]],FAX:[(   )    -],ADDRESS:[[address]  [place] [address]],FOLLOWUP:[2 weeks],ESTABLISHEDPATIENT:[T]],PROVIDER:[TOKEN:[[id number]],FOLLOWUP:[2 weeks]] PROVIDER:[TOKEN:[[id number]],SCHEDULEDAPPT:[09/29/2020],SCHEDULEDAPPTTIME:[10:15 AM]],FREE:[LAST:[Norma],FIRST:[Barry],PHONE:[phone]],FAX:[(   )    -],ADDRESS:[[address] [place] [address] [address] [place] [address]],SCHEDULEDAPPT:[09/21/2020],SCHEDULEDAPPTTIME:[03:00 PM],ESTABLISHEDPATIENT:[T]]

## 2020-09-15 NOTE — PROGRESS NOTE ADULT - SUBJECTIVE AND OBJECTIVE BOX
CC: Patient is a 45y old  Male who presents with a chief complaint of R 1st toe infection (14 Sep 2020 21:49)      OVERNIGHT EVENTS:    SUBJECTIVE / INTERVAL HPI: Patient seen and examined at bedside.     ROS: negative unless otherwise stated above.    VITAL SIGNS:  Vital Signs Last 24 Hrs  T(C): 37.1 (15 Sep 2020 05:51), Max: 37.1 (15 Sep 2020 05:51)  T(F): 98.7 (15 Sep 2020 05:51), Max: 98.7 (15 Sep 2020 05:51)  HR: 78 (15 Sep 2020 05:51) (78 - 89)  BP: 131/81 (15 Sep 2020 05:51) (122/70 - 156/80)  BP(mean): --  RR: 18 (15 Sep 2020 05:51) (18 - 20)  SpO2: 97% (15 Sep 2020 05:51) (96% - 99%)    PHYSICAL EXAM:    General: WDWN  HEENT: NC/AT; PERRL, anicteric sclera; MMM  Neck: supple  Cardiovascular: +S1/S2; RRR  Respiratory: CTA B/L; no W/R/R  Gastrointestinal: soft, NT/ND; +BSx4  Extremities: WWP; no edema, clubbing or cyanosis  Vascular: 2+ radial, DP/PT pulses B/L  Neurological: AAOx3; no focal deficits    MEDICATIONS:  MEDICATIONS  (STANDING):  aspirin enteric coated 81 milliGRAM(s) Oral daily  atorvastatin 40 milliGRAM(s) Oral at bedtime  dextrose 5%. 1000 milliLiter(s) (50 mL/Hr) IV Continuous <Continuous>  dextrose 50% Injectable 12.5 Gram(s) IV Push once  influenza   Vaccine 0.5 milliLiter(s) IntraMuscular once  insulin glargine Injectable (LANTUS) 20 Unit(s) SubCutaneous at bedtime  insulin lispro (HumaLOG) corrective regimen sliding scale   SubCutaneous Before meals and at bedtime  insulin lispro Injectable (HumaLOG) 10 Unit(s) SubCutaneous three times a day before meals  piperacillin/tazobactam IVPB.. 3.375 Gram(s) IV Intermittent every 6 hours  vancomycin  IVPB 1750 milliGRAM(s) IV Intermittent every 12 hours    MEDICATIONS  (PRN):  dextrose 40% Gel 15 Gram(s) Oral once PRN Blood Glucose LESS THAN 70 milliGRAM(s)/deciliter  glucagon  Injectable 1 milliGRAM(s) IntraMuscular once PRN Glucose LESS THAN 70 milligrams/deciliter      ALLERGIES:  Allergies    No Known Allergies    Intolerances        LABS:                        12.5   8.75  )-----------( 436      ( 15 Sep 2020 05:44 )             37.7     09-15    133<L>  |  97  |  16  ----------------------------<  266<H>  4.2   |  25  |  1.03    Ca    9.6      15 Sep 2020 05:44  Phos  3.6     09-15  Mg     1.8     09-15    TPro  7.5  /  Alb  3.2<L>  /  TBili  0.3  /  DBili  x   /  AST  30  /  ALT  37  /  AlkPhos  89  09-15    PT/INR - ( 14 Sep 2020 12:28 )   PT: 12.3 sec;   INR: 1.03          PTT - ( 14 Sep 2020 12:28 )  PTT:33.2 sec    CAPILLARY BLOOD GLUCOSE      POCT Blood Glucose.: 308 mg/dL (14 Sep 2020 22:02)      RADIOLOGY & ADDITIONAL TESTS: Reviewed. CC: Patient is a 45y old  Male who presents with a chief complaint of R 1st toe infection (14 Sep 2020 21:49)      OVERNIGHT EVENTS: Vanc trough 9 -> dose increased to 1750    SUBJECTIVE / INTERVAL HPI: Patient seen and examined at bedside. States he feels about the same as yesterday, abdominal pain unchanged, R lower leg still sore when touched. Denies headaches, SOB, chest pain, N/V/D/C.    ROS: negative unless otherwise stated above.    VITAL SIGNS:  Vital Signs Last 24 Hrs  T(C): 37.1 (15 Sep 2020 05:51), Max: 37.1 (15 Sep 2020 05:51)  T(F): 98.7 (15 Sep 2020 05:51), Max: 98.7 (15 Sep 2020 05:51)  HR: 78 (15 Sep 2020 05:51) (78 - 89)  BP: 131/81 (15 Sep 2020 05:51) (122/70 - 156/80)  BP(mean): --  RR: 18 (15 Sep 2020 05:51) (18 - 20)  SpO2: 97% (15 Sep 2020 05:51) (96% - 99%)    PHYSICAL EXAM:    General: WDWN, resting comfortably in bed, in NAD  HEENT: NC/AT; PERRL, anicteric sclera; MMM  Neck: supple  Cardiovascular: +S1/S2; RRR  Respiratory: CTA B/L; no W/R/R  Gastrointestinal: soft, ND; +BSx4; diffuse tenderness to palpation w/ PM periumbilical, no rebound tenderness, no guarding  Extremities: WWP; no edema, clubbing or cyanosis; R foot wrapped in gauze, tenderness to palpation of R lower extremity  Vascular: 2+ radial, DP/PT pulses B/L  Neurological: AAOx3; no focal deficits    MEDICATIONS:  MEDICATIONS  (STANDING):  aspirin enteric coated 81 milliGRAM(s) Oral daily  atorvastatin 40 milliGRAM(s) Oral at bedtime  dextrose 5%. 1000 milliLiter(s) (50 mL/Hr) IV Continuous <Continuous>  dextrose 50% Injectable 12.5 Gram(s) IV Push once  influenza   Vaccine 0.5 milliLiter(s) IntraMuscular once  insulin glargine Injectable (LANTUS) 20 Unit(s) SubCutaneous at bedtime  insulin lispro (HumaLOG) corrective regimen sliding scale   SubCutaneous Before meals and at bedtime  insulin lispro Injectable (HumaLOG) 10 Unit(s) SubCutaneous three times a day before meals  piperacillin/tazobactam IVPB.. 3.375 Gram(s) IV Intermittent every 6 hours  vancomycin  IVPB 1750 milliGRAM(s) IV Intermittent every 12 hours    MEDICATIONS  (PRN):  dextrose 40% Gel 15 Gram(s) Oral once PRN Blood Glucose LESS THAN 70 milliGRAM(s)/deciliter  glucagon  Injectable 1 milliGRAM(s) IntraMuscular once PRN Glucose LESS THAN 70 milligrams/deciliter      ALLERGIES:  Allergies    No Known Allergies    Intolerances        LABS:                        12.5   8.75  )-----------( 436      ( 15 Sep 2020 05:44 )             37.7     09-15    133<L>  |  97  |  16  ----------------------------<  266<H>  4.2   |  25  |  1.03    Ca    9.6      15 Sep 2020 05:44  Phos  3.6     09-15  Mg     1.8     09-15    TPro  7.5  /  Alb  3.2<L>  /  TBili  0.3  /  DBili  x   /  AST  30  /  ALT  37  /  AlkPhos  89  09-15    PT/INR - ( 14 Sep 2020 12:28 )   PT: 12.3 sec;   INR: 1.03          PTT - ( 14 Sep 2020 12:28 )  PTT:33.2 sec    CAPILLARY BLOOD GLUCOSE      POCT Blood Glucose.: 308 mg/dL (14 Sep 2020 22:02)      RADIOLOGY & ADDITIONAL TESTS: Reviewed.

## 2020-09-15 NOTE — PROGRESS NOTE ADULT - ATTENDING COMMENTS
Pt seen on rounds this afternoon.  Awaiting OR, which is scheduled for late afternoon.  Glucoses still quite high, ?? due to infection, but he also did not receive standing pre-supper insulin last night and clearly needs increased Lantus.  To increase the Lantus to 30 units, the lispro to 12 units AC

## 2020-09-15 NOTE — PROGRESS NOTE ADULT - PROBLEM SELECTOR PLAN 7
Fluids: No IVF currently needed  Electrolytes: Mg>2, K>4, replete lytes PRN  Nutrition: NPO for procedure  Prophylaxis: Lovenox 40 qd  Activity: AAT, OOBTC  GI: none  C: FC  Dispo: Admit to [facility] - last use 3ish weeks ago per pt, no signs of withdrawal  - snorts it

## 2020-09-15 NOTE — PROGRESS NOTE ADULT - ASSESSMENT
45M PMHx HTN, HLD, DM (poorly controlled), h/o multiple R toe amputations presenting with worsening R great toe wound/infection x 1 week. Wound noted probing deep to bone with changes on x-ray consistent with osteomyelitis.     P:  -F/u ID recs  -3-5 cc purulence expressed from dorsal and plantar wounds, which are communicating  -Pt scheduled for an operative I&D and possible hallux amputation today   -Please continue keeping pt NPO today   -Requesting pre-op clearance from Medicine team (pre op eval for low risk procedure (right hallux I&D vs amputation) under light sedation and local)  -Please keep active T&S  -Requesting up to date chest xray and EKG for today's procedure   -Obtained consent from pt for today's procedure and discussed risk vs. benefits- pt acknowledges and consented.  -Plan d/w Dr. Green

## 2020-09-15 NOTE — BRIEF OPERATIVE NOTE - NSICDXBRIEFPROCEDURE_GEN_ALL_CORE_FT
PROCEDURES:  Open biopsy, bone, toe 15-Sep-2020 20:04:56  Cyrus Feldman  Incision and drainage of bone abscess or osteomyelitis of foot 15-Sep-2020 20:04:38  Cyrus Feldman

## 2020-09-15 NOTE — DISCHARGE NOTE PROVIDER - HOSPITAL COURSE
#Discharge: do not delete    Mr. Valentin is a 46yo M with a hx of HTN, HLD, hx of substance abuse, DM, OM with multiple toe amputations in the past who p/w worsening R great toe wound/infection x 1 week, sent in to the ER by his podiatrist for admission.    Problem List/Inpatient treatment course:     #Osteomyelitis:  - R foot s/p toe amp III-V  - admitted for R great toe osteomyelitis, purulent drainage per EM signout;   - ESR 80, CRP 9.89 on admission, blood cxs NGTD.   - Per Xray: Since 12/27/2018, there is now osteomyelitis of the right first distal phalanx with surrounding cellulitis; Improvement in osteomyelitis involving the distal portion of the third metatarsal.  - antibiotic treatment w/ Vanc 1750 q12 hrs per Vanc trough, zosyn [id number]  - Podiatry performed .......................................  - Pt's podiatrist is Dr. Danilo Green  - f/u podiatry recs post surgery, consider PICC line if long-term IV Abx necessary  - f/u wound cxs    #Diabetes:  - at home on metformin glipizide; no longer using insulin 2/2 pt running out of insulin a couple months ago  - PO antidiabetics held inpatient  - c/b OM s/p 3 toe amp  - HbA1c: 12.3%  - Lantus 20, lispro 10 TID per endo recs  - d/c on .........  - f/u outpatient w/ .............    #Abdominal pain:  - Pt complains of abd pain that started Tue 9/8 after having eaten spicy food the night before  - constant; no N/V/D, no dysuria; no alleviating/worsening factors; regular BM  - consider tums if doesn't resolve    #Hypertension:  - Pt normotensive on admission, currently not taking any home meds  - Per chart review, Pt took amlodipine 5mg before  - Consider antihypertensive therapy post surgery (starting Wed, amlodipine 5mg)  - plan for outpatient f/u.     #Hyperlipidemia:  - c/w home atorvastatin 40mg, ASS 81mg.    #History of cocaine use  - last use 3ish weeks ago per pt, no signs of withdrawal, snorts it  -  for risks      New medications:   Labs to be followed outpatient:   Exam to be followed outpatient:    #Discharge: do not delete    Mr. Valentin is a 44yo M with a hx of HTN, HLD, hx of substance abuse, DM, OM with multiple toe amputations in the past who p/w worsening R great toe wound/infection x 1 week, sent in to the ER by his podiatrist for admission.    Problem List/Inpatient treatment course:     #Osteomyelitis:  - R foot s/p toe amp III-V  - admitted for R great toe osteomyelitis, purulent drainage per EM signout;   - ESR 80, CRP 9.89 on admission, blood cxs NGTD.   - Per Xray: Since 12/27/2018, there is now osteomyelitis of the right first distal phalanx with surrounding cellulitis; Improvement in osteomyelitis involving the distal portion of the third metatarsal.  - antibiotic treatment w/ Vanc 1750 q12 hrs per Vanc trough, zosyn [id number]  - Podiatry performed I&D with bone biopsy, diagnosed cellulitis, wound cultures show strep  - Pt's podiatrist is Dr. Danilo Green  - 10 days keflex PO    #Diabetes:  - at home on metformin glipizide; no longer using insulin 2/2 pt running out of insulin a couple months ago  - PO antidiabetics held inpatient  - c/b OM s/p 3 toe amp  - HbA1c: 12.3%  - Lantus 20, lispro 10 TID per endo recs  - d/c on .........  - f/u outpatient w/ .............    #Abdominal pain:  - Pt complains of abd pain that started Tue 9/8 after having eaten spicy food the night before  - constant; no N/V/D, no dysuria; no alleviating/worsening factors; regular BM  - consider tums if doesn't resolve    #Hypertension:  - Pt normotensive on admission, currently not taking any home meds  - Per chart review, Pt took amlodipine 5mg before  - Consider antihypertensive therapy post surgery (starting Wed, amlodipine 5mg)  - plan for outpatient f/u.     #Hyperlipidemia:  - c/w home atorvastatin 40mg, ASS 81mg.    #History of cocaine use  - last use 3ish weeks ago per pt, no signs of withdrawal, snorts it  -  for risks      New medications:   Labs to be followed outpatient: none.  Exam to be followed outpatient: none.   #Discharge: do not delete    Mr. Valentin is a 44yo M with a hx of HTN, HLD, hx of substance abuse, DM, OM with multiple toe amputations in the past who p/w worsening R great toe wound/infection x 1 week, sent in to the ER by his podiatrist for admission.    Problem List/Inpatient treatment course:     #Cellulitis  - R foot s/p toe amp III-V  - admitted for R great toe osteomyelitis, purulent drainage per EM signout;   - ESR 80, CRP 9.89 on admission, blood cxs NGTD.   - Per Xray: Since 12/27/2018, there is now osteomyelitis of the right first distal phalanx with surrounding cellulitis; Improvement in osteomyelitis involving the distal portion of the third metatarsal.  - antibiotic treatment w/ Vanc 1750 q12 hrs per Vanc trough, zosyn 3.105a6etx  - Podiatry performed I&D with bone biopsy, diagnosed cellulitis, wound cultures show strep  - Pt's podiatrist is Dr. Danilo Green  - 10 days keflex 500 four times a day PO    #Diabetes:  - at home on metformin glipizide; no longer using insulin 2/2 pt running out of insulin a couple months ago  - PO antidiabetics held inpatient  - c/b OM s/p 3 toe amp  - HbA1c: 12.3%  - d/c on Lantus 40 lispro 14 TID, per endocrine    #Abdominal pain:  - Pt complains of abd pain that started Tue 9/8 after having eaten spicy food the night before  - constant; no N/V/D, no dysuria; no alleviating/worsening factors; regular BM  - consider tums if doesn't resolve    #Hypertension:  - Pt normotensive on admission, currently not taking any home meds  - Per chart review, Pt took amlodipine 5mg before  - Consider antihypertensive therapy post surgery (starting Wed, amlodipine 5mg)  - plan for outpatient f/u.     #Hyperlipidemia:  - c/w home atorvastatin 40mg, ASS 81mg.    #History of cocaine use  - last use 3ish weeks ago per pt, no signs of withdrawal, snorts it  -  for risks      New medications: Keflex, insulin lantus, insulin lispro  Labs to be followed outpatient: none.  Exam to be followed outpatient: none.   #Discharge: do not delete    Mr. Valentin is a 46yo M with a hx of HTN, HLD, hx of substance abuse, DM, OM with multiple toe amputations in the past who p/w worsening R great toe wound/infection x 1 week, sent in to the ER by his podiatrist for admission.    Problem List/Inpatient treatment course:     #Cellulitis  - R foot s/p toe amp III-V  - admitted for R great toe osteomyelitis concern, purulent drainage per EM signout;   - ESR 80, CRP 9.89 on admission, blood cxs NGTD.   - Per Xray: Since 12/27/2018, there is now osteomyelitis of the right first distal phalanx with surrounding cellulitis; Improvement in osteomyelitis involving the distal portion of the third metatarsal.  - antibiotic treatment w/ Vanc 1750 q12 hrs per Vanc trough, zosyn [id number]  - Podiatry performed I&D with bone biopsy, diagnosed cellulitis, no osteo, wound cultures show strep  - Pt's podiatrist is Dr. Danilo Green  - 10 days keflex 500 PO four times a day    #Diabetes:  - at home on metformin glipizide; no longer using insulin 2/2 pt running out of insulin a couple months ago  - c/b OM s/p 3 toe amp  - HbA1c: 12.3%  - d/c on Lantus 40 lispro 14 TID, per endocrine    #Hyperlipidemia:  - c/w home atorvastatin 40mg, ASS 81mg.    #History of cocaine use  - last use 3ish weeks ago per pt, no signs of withdrawal, snorts it  -  for risks      New medications: Keflex, insulin lantus, insulin lispro  Labs to be followed outpatient: none.  Exam to be followed outpatient: none.

## 2020-09-15 NOTE — DISCHARGE NOTE PROVIDER - CARE PROVIDERS DIRECT ADDRESSES
,DirectAddress_Unknown,[email or web address],DirectAddress_Unknown

## 2020-09-15 NOTE — PROGRESS NOTE ADULT - PROBLEM SELECTOR PLAN 4
- Pt normotensive on admission, currently not taking any home meds  - Per chart review, Pt took amlodipine 5mg before  - Consider antihypertensive therapy   - plan for outpatient f/u - Pt complains of abd pain that started Tue 9/8 after having eaten spicy food the night before  - constant; no N/V/D; no alleviating/worsening factors  - - Pt complains of abd pain that started Tue 9/8 after having eaten spicy food the night before  - constant; no N/V/D, no dysuria; no alleviating/worsening factors  - last BM yesterday  - consider tums if doesn't resolve  -

## 2020-09-15 NOTE — PROGRESS NOTE ADULT - PROBLEM SELECTOR PLAN 1
- R foot s/p toe amp III-V  - R great toe covered in gauze, purulent drainage per EM signout  - Xray: 1. Since 12/27/2018, there is now osteomyelitis of the right first distal phalanx with surrounding cellulitis. No soft tissue gas.  2. Improvement in osteomyelitis involving the distal portion of the third metatarsal.  - podiatry following, plan for R toe amputation tonight - recs appreciated  - c/w Vanc 1500 q12 hrs, zosyn [id number]  - f/u Vanc trough before 4th dose (Mon night)  - ESR 80, CRP 9.89  - f/u ID consult - recs appreciated  - Pt's podiatrist is Dr. Danilo Green  - f/u podiatry recs post surgery, consider PICC line if long-term IV Abx necessary  - f/u wound cxs  - blood cxs NGTD - R foot s/p toe amp III-V  - R great toe covered in gauze, purulent drainage per EM signout  - Xray: 1. Since 12/27/2018, there is now osteomyelitis of the right first distal phalanx with surrounding cellulitis. No soft tissue gas.  2. Improvement in osteomyelitis involving the distal portion of the third metatarsal.  - podiatry following, plan for R toe amputation tonight - recs appreciated  - increase Vanc to 1750 q12 hrs per Vanc trough, zosyn 3.496b6fwm  - ESR 80, CRP 9.89  - f/u ID consult - recs appreciated  - Pt's podiatrist is Dr. Danilo Green  - f/u podiatry recs post surgery, consider PICC line if long-term IV Abx necessary  - f/u wound cxs  - blood cxs NGTD

## 2020-09-15 NOTE — PHYSICAL THERAPY INITIAL EVALUATION ADULT - PERTINENT HX OF CURRENT PROBLEM, REHAB EVAL
Pt. is a 45 y.o male presenting with R first toe wound, found to have OM, currently pending  planned R hallux I&D vs amputation.

## 2020-09-16 LAB
ALBUMIN SERPL ELPH-MCNC: 2.9 G/DL — LOW (ref 3.3–5)
ALP SERPL-CCNC: 80 U/L — SIGNIFICANT CHANGE UP (ref 40–120)
ALT FLD-CCNC: 37 U/L — SIGNIFICANT CHANGE UP (ref 10–45)
ANION GAP SERPL CALC-SCNC: 12 MMOL/L — SIGNIFICANT CHANGE UP (ref 5–17)
AST SERPL-CCNC: 30 U/L — SIGNIFICANT CHANGE UP (ref 10–40)
BILIRUB SERPL-MCNC: 0.2 MG/DL — SIGNIFICANT CHANGE UP (ref 0.2–1.2)
BUN SERPL-MCNC: 18 MG/DL — SIGNIFICANT CHANGE UP (ref 7–23)
CALCIUM SERPL-MCNC: 9.2 MG/DL — SIGNIFICANT CHANGE UP (ref 8.4–10.5)
CHLORIDE SERPL-SCNC: 97 MMOL/L — SIGNIFICANT CHANGE UP (ref 96–108)
CO2 SERPL-SCNC: 25 MMOL/L — SIGNIFICANT CHANGE UP (ref 22–31)
CREAT SERPL-MCNC: 1.27 MG/DL — SIGNIFICANT CHANGE UP (ref 0.5–1.3)
GLUCOSE BLDC GLUCOMTR-MCNC: 111 MG/DL — HIGH (ref 70–99)
GLUCOSE BLDC GLUCOMTR-MCNC: 190 MG/DL — HIGH (ref 70–99)
GLUCOSE BLDC GLUCOMTR-MCNC: 205 MG/DL — HIGH (ref 70–99)
GLUCOSE BLDC GLUCOMTR-MCNC: 209 MG/DL — HIGH (ref 70–99)
GLUCOSE BLDC GLUCOMTR-MCNC: 287 MG/DL — HIGH (ref 70–99)
GLUCOSE SERPL-MCNC: 266 MG/DL — HIGH (ref 70–99)
GRAM STN FLD: SIGNIFICANT CHANGE UP
HCT VFR BLD CALC: 37.3 % — LOW (ref 39–50)
HGB BLD-MCNC: 12.2 G/DL — LOW (ref 13–17)
MAGNESIUM SERPL-MCNC: 1.6 MG/DL — SIGNIFICANT CHANGE UP (ref 1.6–2.6)
MCHC RBC-ENTMCNC: 28.9 PG — SIGNIFICANT CHANGE UP (ref 27–34)
MCHC RBC-ENTMCNC: 32.7 GM/DL — SIGNIFICANT CHANGE UP (ref 32–36)
MCV RBC AUTO: 88.4 FL — SIGNIFICANT CHANGE UP (ref 80–100)
NRBC # BLD: 0 /100 WBCS — SIGNIFICANT CHANGE UP (ref 0–0)
PHOSPHATE SERPL-MCNC: 4.2 MG/DL — SIGNIFICANT CHANGE UP (ref 2.5–4.5)
PLATELET # BLD AUTO: 462 K/UL — HIGH (ref 150–400)
POTASSIUM SERPL-MCNC: 4.4 MMOL/L — SIGNIFICANT CHANGE UP (ref 3.5–5.3)
POTASSIUM SERPL-SCNC: 4.4 MMOL/L — SIGNIFICANT CHANGE UP (ref 3.5–5.3)
PROT SERPL-MCNC: 7.2 G/DL — SIGNIFICANT CHANGE UP (ref 6–8.3)
RBC # BLD: 4.22 M/UL — SIGNIFICANT CHANGE UP (ref 4.2–5.8)
RBC # FLD: 12.4 % — SIGNIFICANT CHANGE UP (ref 10.3–14.5)
SODIUM SERPL-SCNC: 134 MMOL/L — LOW (ref 135–145)
SPECIMEN SOURCE: SIGNIFICANT CHANGE UP
VANCOMYCIN TROUGH SERPL-MCNC: 21.1 UG/ML — HIGH (ref 10–20)
WBC # BLD: 10.4 K/UL — SIGNIFICANT CHANGE UP (ref 3.8–10.5)
WBC # FLD AUTO: 10.4 K/UL — SIGNIFICANT CHANGE UP (ref 3.8–10.5)

## 2020-09-16 PROCEDURE — 99232 SBSQ HOSP IP/OBS MODERATE 35: CPT | Mod: GC

## 2020-09-16 PROCEDURE — 99233 SBSQ HOSP IP/OBS HIGH 50: CPT | Mod: GC

## 2020-09-16 PROCEDURE — 93010 ELECTROCARDIOGRAM REPORT: CPT

## 2020-09-16 RX ORDER — SENNA PLUS 8.6 MG/1
2 TABLET ORAL AT BEDTIME
Refills: 0 | Status: DISCONTINUED | OUTPATIENT
Start: 2020-09-16 | End: 2020-09-17

## 2020-09-16 RX ORDER — ISOPROPYL ALCOHOL, BENZOCAINE .7; .06 ML/ML; ML/ML
1 SWAB TOPICAL
Qty: 100 | Refills: 1
Start: 2020-09-16 | End: 2020-11-04

## 2020-09-16 RX ORDER — INSULIN LISPRO 100/ML
12 VIAL (ML) SUBCUTANEOUS
Refills: 0 | Status: DISCONTINUED | OUTPATIENT
Start: 2020-09-16 | End: 2020-09-17

## 2020-09-16 RX ORDER — POLYETHYLENE GLYCOL 3350 17 G/17G
17 POWDER, FOR SOLUTION ORAL DAILY
Refills: 0 | Status: DISCONTINUED | OUTPATIENT
Start: 2020-09-16 | End: 2020-09-16

## 2020-09-16 RX ORDER — ENOXAPARIN SODIUM 100 MG/ML
40 INJECTION SUBCUTANEOUS EVERY 24 HOURS
Refills: 0 | Status: DISCONTINUED | OUTPATIENT
Start: 2020-09-16 | End: 2020-09-17

## 2020-09-16 RX ORDER — INSULIN GLARGINE 100 [IU]/ML
35 INJECTION, SOLUTION SUBCUTANEOUS AT BEDTIME
Refills: 0 | Status: DISCONTINUED | OUTPATIENT
Start: 2020-09-16 | End: 2020-09-17

## 2020-09-16 RX ADMIN — Medication 2: at 22:38

## 2020-09-16 RX ADMIN — OXYCODONE AND ACETAMINOPHEN 1 TABLET(S): 5; 325 TABLET ORAL at 22:38

## 2020-09-16 RX ADMIN — Medication 81 MILLIGRAM(S): at 12:06

## 2020-09-16 RX ADMIN — SENNA PLUS 2 TABLET(S): 8.6 TABLET ORAL at 22:38

## 2020-09-16 RX ADMIN — PIPERACILLIN AND TAZOBACTAM 200 GRAM(S): 4; .5 INJECTION, POWDER, LYOPHILIZED, FOR SOLUTION INTRAVENOUS at 14:03

## 2020-09-16 RX ADMIN — Medication 4: at 08:28

## 2020-09-16 RX ADMIN — Medication 12 UNIT(S): at 08:28

## 2020-09-16 RX ADMIN — ATORVASTATIN CALCIUM 40 MILLIGRAM(S): 80 TABLET, FILM COATED ORAL at 22:38

## 2020-09-16 RX ADMIN — Medication 4: at 12:06

## 2020-09-16 RX ADMIN — ENOXAPARIN SODIUM 40 MILLIGRAM(S): 100 INJECTION SUBCUTANEOUS at 08:28

## 2020-09-16 RX ADMIN — Medication 12 UNIT(S): at 12:06

## 2020-09-16 RX ADMIN — POLYETHYLENE GLYCOL 3350 17 GRAM(S): 17 POWDER, FOR SOLUTION ORAL at 12:06

## 2020-09-16 RX ADMIN — INSULIN GLARGINE 35 UNIT(S): 100 INJECTION, SOLUTION SUBCUTANEOUS at 22:38

## 2020-09-16 RX ADMIN — Medication 250 MILLIGRAM(S): at 03:10

## 2020-09-16 RX ADMIN — PIPERACILLIN AND TAZOBACTAM 200 GRAM(S): 4; .5 INJECTION, POWDER, LYOPHILIZED, FOR SOLUTION INTRAVENOUS at 07:50

## 2020-09-16 RX ADMIN — OXYCODONE AND ACETAMINOPHEN 1 TABLET(S): 5; 325 TABLET ORAL at 13:13

## 2020-09-16 RX ADMIN — OXYCODONE AND ACETAMINOPHEN 1 TABLET(S): 5; 325 TABLET ORAL at 12:13

## 2020-09-16 RX ADMIN — PIPERACILLIN AND TAZOBACTAM 200 GRAM(S): 4; .5 INJECTION, POWDER, LYOPHILIZED, FOR SOLUTION INTRAVENOUS at 02:11

## 2020-09-16 RX ADMIN — PIPERACILLIN AND TAZOBACTAM 200 GRAM(S): 4; .5 INJECTION, POWDER, LYOPHILIZED, FOR SOLUTION INTRAVENOUS at 19:30

## 2020-09-16 RX ADMIN — OXYCODONE AND ACETAMINOPHEN 1 TABLET(S): 5; 325 TABLET ORAL at 23:10

## 2020-09-16 RX ADMIN — Medication 12 UNIT(S): at 18:17

## 2020-09-16 NOTE — PROGRESS NOTE ADULT - PROBLEM SELECTOR PLAN 7
Fluids: No IVF currently needed  Electrolytes: Mg>2, K>4, replete lytes PRN  Nutrition: NPO for procedure  Prophylaxis: Lovenox 40 qd  Activity: AAT, OOBTC  GI: none  C: FC  Dispo: Admit to [facility]

## 2020-09-16 NOTE — PROGRESS NOTE ADULT - PROBLEM SELECTOR PLAN 2
- at home on metformin glipizide; no longer using insulin 2/2 pt running out of insulin a couple months ago  - hold PO antidiabetics  - c/b OM s/p 3 toe amp  - HbA1c: 12.3%  - Lantus 30, lispro 12 TID per endo recs  - f/u endo consult - recs appreciated  - needs diabetes education and glucometer on d/c - at home on metformin glipizide; no longer using insulin 2/2 pt running out of insulin a couple months ago  - hold PO antidiabetics  - c/b OM s/p 3 toe amp  - HbA1c: 12.3%  - Lantus 35, lispro 15 TID per endo recs  - f/u endo consult - recs appreciated  - needs diabetes education and glucometer on d/c

## 2020-09-16 NOTE — PROGRESS NOTE ADULT - SUBJECTIVE AND OBJECTIVE BOX
OVERNIGHT EVENTS: none    SUBJECTIVE / INTERVAL HPI: Patient seen and examined at bedside. Complains of abdominal discomfort since yesterday. Denies f/c, n/v/d/c, chest pain.    VITAL SIGNS:  Vital Signs Last 24 Hrs  T(C): 36.8 (16 Sep 2020 08:44), Max: 36.9 (16 Sep 2020 02:09)  T(F): 98.3 (16 Sep 2020 08:44), Max: 98.4 (16 Sep 2020 02:09)  HR: 76 (16 Sep 2020 08:44) (74 - 87)  BP: 137/89 (16 Sep 2020 08:44) (92/51 - 137/89)  BP(mean): 93 (15 Sep 2020 21:05) (66 - 98)  RR: 17 (16 Sep 2020 08:44) (13 - 18)  SpO2: 99% (16 Sep 2020 08:44) (97% - 100%)  I&O's Summary    15 Sep 2020 07:01  -  16 Sep 2020 07:00  --------------------------------------------------------  IN: 1200 mL / OUT: 655 mL / NET: 545 mL    16 Sep 2020 07:01  -  16 Sep 2020 14:36  --------------------------------------------------------  IN: 100 mL / OUT: 0 mL / NET: 100 mL        PHYSICAL EXAM:    General: WDWN  HEENT: NC/AT; PERRL, anicteric sclera; MMM  Neck: supple  Cardiovascular: +S1/S2; RRR  Respiratory: CTA B/L; no W/R/R  Gastrointestinal: soft, NT/ND; +BSx4  Extremities: WWP; no edema, clubbing or cyanosis; right foot wrapped, dressing clean and dry  Vascular: 2+ radial, DP/PT pulses B/L  Neurological: AAOx3; no focal deficits    MEDICATIONS:  MEDICATIONS  (STANDING):  aspirin enteric coated 81 milliGRAM(s) Oral daily  atorvastatin 40 milliGRAM(s) Oral at bedtime  dextrose 50% Injectable 12.5 Gram(s) IV Push once  enoxaparin Injectable 40 milliGRAM(s) SubCutaneous every 24 hours  influenza   Vaccine 0.5 milliLiter(s) IntraMuscular once  insulin glargine Injectable (LANTUS) 30 Unit(s) SubCutaneous at bedtime  insulin lispro (HumaLOG) corrective regimen sliding scale   SubCutaneous Before meals and at bedtime  insulin lispro Injectable (HumaLOG) 12 Unit(s) SubCutaneous three times a day before meals  piperacillin/tazobactam IVPB.. 3.375 Gram(s) IV Intermittent every 6 hours  polyethylene glycol 3350 17 Gram(s) Oral daily  senna 2 Tablet(s) Oral at bedtime    MEDICATIONS  (PRN):  dextrose 40% Gel 15 Gram(s) Oral once PRN Blood Glucose LESS THAN 70 milliGRAM(s)/deciliter  glucagon  Injectable 1 milliGRAM(s) IntraMuscular once PRN Glucose LESS THAN 70 milligrams/deciliter  oxycodone    5 mG/acetaminophen 325 mG 1 Tablet(s) Oral every 4 hours PRN Moderate Pain (4 - 6)      ALLERGIES:  Allergies    No Known Allergies    Intolerances        LABS:                        12.2   10.40 )-----------( 462      ( 16 Sep 2020 05:47 )             37.3     09-16    134<L>  |  97  |  18  ----------------------------<  266<H>  4.4   |  25  |  1.27    Ca    9.2      16 Sep 2020 05:47  Phos  4.2     09-16  Mg     1.6     09-16    TPro  7.2  /  Alb  2.9<L>  /  TBili  0.2  /  DBili  x   /  AST  30  /  ALT  37  /  AlkPhos  80  09-16    PT/INR - ( 15 Sep 2020 14:57 )   PT: 12.7 sec;   INR: 1.06              CAPILLARY BLOOD GLUCOSE      POCT Blood Glucose.: 209 mg/dL (16 Sep 2020 12:03)      RADIOLOGY & ADDITIONAL TESTS: Reviewed.

## 2020-09-16 NOTE — PROGRESS NOTE ADULT - SUBJECTIVE AND OBJECTIVE BOX
INTERVAL HPI/OVERNIGHT EVENTS:    Patient is a 45y old  Male who presents with a chief complaint of R 1st toe infection (14 Sep 2020 21:49)  s/p Debridement of R foot hallux with bone bx 9/15. Saw patient in bed. Right foot wrapped. Otherwise no complaints.     FSG & insulin:  9/15:  Dinner FSG  168  Lispro 2  Bedtime . Ate late dinner after OR. Had turkey burger sweet potato and 2 pudding.   Lantus  30  lispro  12+6  9/16:  1AM   Breakfast  Lispro 12+4.  Ate Korean toast, greek yogurt, and coffee.  Lunch FSG  209   Lispro 12 + 4   Ate cod sweet potato, pudding, and milk.    Pt reports the following symptoms:    CONSTITUTIONAL:  Negative fever or chills, feels well, good appetite  EYES:  Negative  blurry vision or double vision  CARDIOVASCULAR:  Negative for chest pain or palpitations  RESPIRATORY:  Negative for cough, wheezing, or SOB   GASTROINTESTINAL:  Negative for nausea, vomiting, diarrhea, constipation, or abdominal pain  GENITOURINARY:  Negative frequency, urgency or dysuria  NEUROLOGIC:  No headache, confusion, dizziness, lightheadedness    MEDICATIONS  (STANDING):  aspirin enteric coated 81 milliGRAM(s) Oral daily  atorvastatin 40 milliGRAM(s) Oral at bedtime  dextrose 50% Injectable 12.5 Gram(s) IV Push once  enoxaparin Injectable 40 milliGRAM(s) SubCutaneous every 24 hours  influenza   Vaccine 0.5 milliLiter(s) IntraMuscular once  insulin glargine Injectable (LANTUS) 30 Unit(s) SubCutaneous at bedtime  insulin lispro (HumaLOG) corrective regimen sliding scale   SubCutaneous Before meals and at bedtime  insulin lispro Injectable (HumaLOG) 12 Unit(s) SubCutaneous three times a day before meals  piperacillin/tazobactam IVPB.. 3.375 Gram(s) IV Intermittent every 6 hours  polyethylene glycol 3350 17 Gram(s) Oral daily  polyethylene glycol 3350 17 Gram(s) Oral daily  senna 1 Tablet(s) Oral daily    MEDICATIONS  (PRN):  dextrose 40% Gel 15 Gram(s) Oral once PRN Blood Glucose LESS THAN 70 milliGRAM(s)/deciliter  glucagon  Injectable 1 milliGRAM(s) IntraMuscular once PRN Glucose LESS THAN 70 milligrams/deciliter  oxycodone    5 mG/acetaminophen 325 mG 1 Tablet(s) Oral every 4 hours PRN Moderate Pain (4 - 6)      PHYSICAL EXAM  Vital Signs Last 24 Hrs  T(C): 36.8 (16 Sep 2020 08:44), Max: 36.9 (16 Sep 2020 02:09)  T(F): 98.3 (16 Sep 2020 08:44), Max: 98.4 (16 Sep 2020 02:09)  HR: 76 (16 Sep 2020 08:44) (74 - 87)  BP: 137/89 (16 Sep 2020 08:44) (92/51 - 137/89)  BP(mean): 93 (15 Sep 2020 21:05) (66 - 98)  RR: 17 (16 Sep 2020 08:44) (13 - 18)  SpO2: 99% (16 Sep 2020 08:44) (97% - 100%)    Constitutional: wn/wd in NAD.   HEENT: NCAT, MMM, OP clear, EOMI, no proptosis or lid retraction  Neck: no thyromegaly or palpable thyroid nodules   Respiratory: lungs CTAB.  Cardiovascular: regular rhythm, normal S1 and S2, no audible murmurs, no peripheral edema  GI: soft, NT/ND, no masses/HSM appreciated.  Neurology: no tremors, DTR 2+  Skin: no visible rashes/lesions. no acanthosis nigricans. no lipohypertrophy. no cushing's stigmata.  Ext: R foot wrapped in gauze, tenderness to palpation of R lower extremity  Psychiatric: AAO x 3, normal affect/mood.    LABS:                        12.2   10.40 )-----------( 462      ( 16 Sep 2020 05:47 )             37.3     09-16    134<L>  |  97  |  18  ----------------------------<  266<H>  4.4   |  25  |  1.27    Ca    9.2      16 Sep 2020 05:47  Phos  4.2     09-16  Mg     1.6     09-16    TPro  7.2  /  Alb  2.9<L>  /  TBili  0.2  /  DBili  x   /  AST  30  /  ALT  37  /  AlkPhos  80  09-16    PT/INR - ( 15 Sep 2020 14:57 )   PT: 12.7 sec;   INR: 1.06              Thyroid Stimulating Hormone, Serum: 0.238 uIU/mL (10-07 @ 15:04)      HbA1C:   CAPILLARY BLOOD GLUCOSE      POCT Blood Glucose.: 209 mg/dL (16 Sep 2020 12:03)  POCT Blood Glucose.: 205 mg/dL (16 Sep 2020 08:24)  POCT Blood Glucose.: 287 mg/dL (16 Sep 2020 01:01)  POCT Blood Glucose.: 283 mg/dL (15 Sep 2020 22:02)  POCT Blood Glucose.: 152 mg/dL (15 Sep 2020 20:00)  POCT Blood Glucose.: 168 mg/dL (15 Sep 2020 17:34)    Will discuss in rounds  A/P:45y Male with PMHx of HTN, HLD, hx of substance abuse, DM, OM with multiple toe amputations in the past who p/w worsening R great toe infection x 1 week. Endo consulted for DM management.     1.  DM  A1c:12.3%  cr:1.02, GFR: 88  weight:105. 2 kg, BMI: 31.5  Please increase lantus to 30      units at night.  Please increase  lispro to 12   units before each meal.  Please continue lispro moderate  dose sliding scale four times daily with meals and at bedtime    Pt's fingerstick glucose goal is     Will continue to monitor     For discharge, pt can continue    Pt can follow up at discharge with [facility] by calling  to make an appointment.   case seen and discussed  with     and update primary team     INTERVAL HPI/OVERNIGHT EVENTS:    Patient is a 45y old  Male who presents with a chief complaint of R 1st toe infection (14 Sep 2020 21:49)  s/p Debridement of R foot hallux with bone bx 9/15. Saw patient in bed. Right foot wrapped. Otherwise no complaints.     FSG & insulin:  9/15:  Dinner FSG  168  Lispro 2  Bedtime . Ate late dinner after OR. Had turkey burger sweet potato and 2 pudding.   Lantus  30  lispro  12+6  9/16:  1AM   Breakfast  Lispro 12+4.  Ate Yoruba toast, greek yogurt, and coffee.  Lunch FSG  209   Lispro 12 + 4   Ate cod sweet potato, pudding, and milk.    Pt reports the following symptoms:    CONSTITUTIONAL:  Negative fever or chills, feels well, good appetite  EYES:  Negative  blurry vision or double vision  CARDIOVASCULAR:  Negative for chest pain or palpitations  RESPIRATORY:  Negative for cough, wheezing, or SOB   GASTROINTESTINAL:  Negative for nausea, vomiting, diarrhea, constipation, or abdominal pain  GENITOURINARY:  Negative frequency, urgency or dysuria  NEUROLOGIC:  No headache, confusion, dizziness, lightheadedness    MEDICATIONS  (STANDING):  aspirin enteric coated 81 milliGRAM(s) Oral daily  atorvastatin 40 milliGRAM(s) Oral at bedtime  dextrose 50% Injectable 12.5 Gram(s) IV Push once  enoxaparin Injectable 40 milliGRAM(s) SubCutaneous every 24 hours  influenza   Vaccine 0.5 milliLiter(s) IntraMuscular once  insulin glargine Injectable (LANTUS) 30 Unit(s) SubCutaneous at bedtime  insulin lispro (HumaLOG) corrective regimen sliding scale   SubCutaneous Before meals and at bedtime  insulin lispro Injectable (HumaLOG) 12 Unit(s) SubCutaneous three times a day before meals  piperacillin/tazobactam IVPB.. 3.375 Gram(s) IV Intermittent every 6 hours  polyethylene glycol 3350 17 Gram(s) Oral daily  polyethylene glycol 3350 17 Gram(s) Oral daily  senna 1 Tablet(s) Oral daily    MEDICATIONS  (PRN):  dextrose 40% Gel 15 Gram(s) Oral once PRN Blood Glucose LESS THAN 70 milliGRAM(s)/deciliter  glucagon  Injectable 1 milliGRAM(s) IntraMuscular once PRN Glucose LESS THAN 70 milligrams/deciliter  oxycodone    5 mG/acetaminophen 325 mG 1 Tablet(s) Oral every 4 hours PRN Moderate Pain (4 - 6)      PHYSICAL EXAM  Vital Signs Last 24 Hrs  T(C): 36.8 (16 Sep 2020 08:44), Max: 36.9 (16 Sep 2020 02:09)  T(F): 98.3 (16 Sep 2020 08:44), Max: 98.4 (16 Sep 2020 02:09)  HR: 76 (16 Sep 2020 08:44) (74 - 87)  BP: 137/89 (16 Sep 2020 08:44) (92/51 - 137/89)  BP(mean): 93 (15 Sep 2020 21:05) (66 - 98)  RR: 17 (16 Sep 2020 08:44) (13 - 18)  SpO2: 99% (16 Sep 2020 08:44) (97% - 100%)    Constitutional: wn/wd in NAD.   HEENT: NCAT, MMM, OP clear, EOMI, no proptosis or lid retraction  Neck: no thyromegaly or palpable thyroid nodules   Respiratory: lungs CTAB.  Cardiovascular: regular rhythm, normal S1 and S2, no audible murmurs, no peripheral edema  GI: soft, NT/ND, no masses/HSM appreciated.  Neurology: no tremors, DTR 2+  Skin: no visible rashes/lesions. no acanthosis nigricans. no lipohypertrophy. no cushing's stigmata.  Ext: R foot wrapped in gauze, tenderness to palpation of R lower extremity  Psychiatric: AAO x 3, normal affect/mood.    LABS:                        12.2   10.40 )-----------( 462      ( 16 Sep 2020 05:47 )             37.3     09-16    134<L>  |  97  |  18  ----------------------------<  266<H>  4.4   |  25  |  1.27    Ca    9.2      16 Sep 2020 05:47  Phos  4.2     09-16  Mg     1.6     09-16    TPro  7.2  /  Alb  2.9<L>  /  TBili  0.2  /  DBili  x   /  AST  30  /  ALT  37  /  AlkPhos  80  09-16    PT/INR - ( 15 Sep 2020 14:57 )   PT: 12.7 sec;   INR: 1.06              Thyroid Stimulating Hormone, Serum: 0.238 uIU/mL (10-07 @ 15:04)      HbA1C:   CAPILLARY BLOOD GLUCOSE      POCT Blood Glucose.: 209 mg/dL (16 Sep 2020 12:03)  POCT Blood Glucose.: 205 mg/dL (16 Sep 2020 08:24)  POCT Blood Glucose.: 287 mg/dL (16 Sep 2020 01:01)  POCT Blood Glucose.: 283 mg/dL (15 Sep 2020 22:02)  POCT Blood Glucose.: 152 mg/dL (15 Sep 2020 20:00)  POCT Blood Glucose.: 168 mg/dL (15 Sep 2020 17:34)    A/P:45y Male with PMHx of HTN, HLD, hx of substance abuse, DM, OM with multiple toe amputations in the past who p/w worsening R great toe infection x 1 week. Endo consulted for DM management.     1.  DM -  type 2, uncontrolled, complicated with hyperglycemia  A1c:12.3%  cr:1.02, GFR: 88  weight:105. 2 kg, BMI: 31.5  Please increase lantus to 35      units at night.  Please increase  lispro to 15   units before each meal.  Please continue lispro moderate  dose sliding scale four times daily with meals and at bedtime    Pt's fingerstick glucose goal is     Will continue to monitor     For discharge, pt can continue    Pt can follow up at discharge with [facility] by calling  to make an appointment.   case seen and discussed  with     and update primary team

## 2020-09-16 NOTE — PROGRESS NOTE ADULT - PROBLEM SELECTOR PLAN 3
- Pt complains of abd pain that started Tue 9/8 after having eaten spicy food the night before  - constant; no N/V/D, no dysuria; no alleviating/worsening factors  - consider tums if doesn't resolve

## 2020-09-16 NOTE — PROGRESS NOTE ADULT - ATTENDING COMMENTS
Pt seen on rounds this afternoon.   Findings in OR noted.  Path and cultures pending.  Glucose spiked to 283 at 10 PM last night because he ate a sandwich and soda post-op without receiving any insulin.  He then had a second supper for which he did receive pre-meal insulin plus coverage plus 30 Lantus, but was still elevated to 205 mg% this morning.  He is clearly insulin-resistant--likely a combination of "glucose toxicity" and infection.  He understands what we are doing with the insulin, and is actually keeping track of the doses.  Has been adherent to his diet  Will increase the Lantus to 35, premeal lispro to 15 units

## 2020-09-16 NOTE — PROGRESS NOTE ADULT - PROBLEM SELECTOR PLAN 1
- R foot s/p toe amp III-V  - R great toe covered in gauze, purulent drainage per EM signout  - Xray: 1. Since 12/27/2018, there is now osteomyelitis of the right first distal phalanx with surrounding cellulitis. No soft tissue gas.  2. Improvement in osteomyelitis involving the distal portion of the third metatarsal.  - Podiatry right toe I&D and bone biopsy  - increase Vanc to 1750 q12 hrs per Vanc trough, zosyn [id number]  - 6pm vanc trough today  - ESR 80, CRP 9.89  - f/u ID recs  - Pt's podiatrist is Dr. Danilo Green  - f/u podiatry recs post surgery, consider PICC line if long-term IV Abx necessary  - f/u bone biopsy for dx of OM  - f/u wound cxs  - blood cxs NGTD

## 2020-09-16 NOTE — PROGRESS NOTE ADULT - PROBLEM SELECTOR PLAN 4
- Pt normotensive on admission, currently not taking any home meds  - Per chart review, Pt took amlodipine 5mg before  - normotensive post-surgery   - consider amlodipine 5mg if continues to be hypertensive  - plan for outpatient f/u

## 2020-09-16 NOTE — PROGRESS NOTE ADULT - SUBJECTIVE AND OBJECTIVE BOX
Patient is a 45y old  Male who presents with a chief complaint of R 1st toe infection (14 Sep 2020 21:49)      INTERVAL HPI/ OVERNIGHT EVENTS: pt seen and examined at bedside. He states that he has started to feel a little pain at the surgical site and has taken a Percocet for the pain. He states that he does not have any additional complaints or symptoms. He denies f/n/v/chills.       LABS                        12.2   10.40 )-----------( 462      ( 16 Sep 2020 05:47 )             37.3     09-16    134<L>  |  97  |  18  ----------------------------<  266<H>  4.4   |  25  |  1.27    Ca    9.2      16 Sep 2020 05:47  Phos  4.2     09-16  Mg     1.6     09-16    TPro  7.2  /  Alb  2.9<L>  /  TBili  0.2  /  DBili  x   /  AST  30  /  ALT  37  /  AlkPhos  80  09-16    PT/INR - ( 15 Sep 2020 14:57 )   PT: 12.7 sec;   INR: 1.06              ICU Vital Signs Last 24 Hrs  T(C): 36.8 (16 Sep 2020 08:44), Max: 36.9 (16 Sep 2020 02:09)  T(F): 98.3 (16 Sep 2020 08:44), Max: 98.4 (16 Sep 2020 02:09)  HR: 76 (16 Sep 2020 08:44) (74 - 87)  BP: 137/89 (16 Sep 2020 08:44) (92/51 - 137/89)  BP(mean): 93 (15 Sep 2020 21:05) (66 - 98)  ABP: --  ABP(mean): --  RR: 17 (16 Sep 2020 08:44) (13 - 18)  SpO2: 99% (16 Sep 2020 08:44) (97% - 100%)      RADIOLOGY  < from: Xray Foot AP + Lateral + Oblique, Right (09.13.20 @ 12:06) >  Since 12/27/2018, there is now osteomyelitis of the right first distal phalanx with surrounding cellulitis. No soft tissue gas.    2. Improvement in osteomyelitis involving the distal portion of the third metatarsal.    < end of copied text >      MICROBIOLOGY  Intraop cx & path pending     PHYSICAL EXAM  Lower Extremity Focused  Vasc: DP/PT 2/4 B/L  Derm: R ulcer plantar distal hallux w/ probing noted deep to bone. Trace pus/purulence.. Dorsal wound noted with connection to plantar wound. Significantly edematous hallux.  Neuro: Sensation grossly diminished  MSK: S/p R 4th/5th partial ray and 2nd partial digit amputations.

## 2020-09-16 NOTE — PROGRESS NOTE ADULT - ASSESSMENT
45M PMHx HTN, HLD, DM (poorly controlled), h/o multiple R toe amputations presenting with worsening R great toe wound/infection x 1 week. Wound noted probing deep to bone with changes on x-ray consistent with osteomyelitis, now s/p wound debridement and bone biopsy (pending results).     P:  -WBAT on R foot and full WB on L foot w/ surgical shoe   -c/w abx recs per ID  -Flushed the wound with NS and changed iodoform packing this AM and dressed with DSD   -Please discharge patient on Bactrim DS BID for 10 days   -Please set up home VNS service for wound care   -Follow up with Podiatry, Dr. Green in 1 week  -Plan d/w Dr. Green

## 2020-09-16 NOTE — PROGRESS NOTE ADULT - ATTENDING COMMENTS
patient s/p debridement and bone biopsy yesterday per podiatry  will f/u with bone biopsy results to decide whether picc and IV abx course will be needed.  thrombocytosis - in setting of active infection and s/p manipulation    epigastric pain - likely 2/2 constipation, 1 bm today but still strained and has been for few days. will add miralax and senna as standing today    elevated blood sugars persistently in setting of elevated A1c. endocrine following, 30U lantus bedtime 12U tid before meals is current regimen.       d/c pending decision on oral vs. IV abx after bone biopsy.

## 2020-09-17 ENCOUNTER — TRANSCRIPTION ENCOUNTER (OUTPATIENT)
Age: 45
End: 2020-09-17

## 2020-09-17 VITALS — WEIGHT: 177.91 LBS

## 2020-09-17 LAB
ANION GAP SERPL CALC-SCNC: 9 MMOL/L — SIGNIFICANT CHANGE UP (ref 5–17)
BUN SERPL-MCNC: 14 MG/DL — SIGNIFICANT CHANGE UP (ref 7–23)
CALCIUM SERPL-MCNC: 9.3 MG/DL — SIGNIFICANT CHANGE UP (ref 8.4–10.5)
CHLORIDE SERPL-SCNC: 99 MMOL/L — SIGNIFICANT CHANGE UP (ref 96–108)
CHOLEST SERPL-MCNC: 128 MG/DL — SIGNIFICANT CHANGE UP (ref 10–199)
CO2 SERPL-SCNC: 28 MMOL/L — SIGNIFICANT CHANGE UP (ref 22–31)
CREAT SERPL-MCNC: 1.09 MG/DL — SIGNIFICANT CHANGE UP (ref 0.5–1.3)
GLUCOSE BLDC GLUCOMTR-MCNC: 190 MG/DL — HIGH (ref 70–99)
GLUCOSE BLDC GLUCOMTR-MCNC: 219 MG/DL — HIGH (ref 70–99)
GLUCOSE SERPL-MCNC: 211 MG/DL — HIGH (ref 70–99)
GRAM STN FLD: SIGNIFICANT CHANGE UP
HCT VFR BLD CALC: 37.4 % — LOW (ref 39–50)
HDLC SERPL-MCNC: 31 MG/DL — LOW
HGB BLD-MCNC: 12.3 G/DL — LOW (ref 13–17)
LIPID PNL WITH DIRECT LDL SERPL: 76 MG/DL — SIGNIFICANT CHANGE UP
MAGNESIUM SERPL-MCNC: 1.5 MG/DL — LOW (ref 1.6–2.6)
MCHC RBC-ENTMCNC: 28.9 PG — SIGNIFICANT CHANGE UP (ref 27–34)
MCHC RBC-ENTMCNC: 32.9 GM/DL — SIGNIFICANT CHANGE UP (ref 32–36)
MCV RBC AUTO: 88 FL — SIGNIFICANT CHANGE UP (ref 80–100)
NRBC # BLD: 0 /100 WBCS — SIGNIFICANT CHANGE UP (ref 0–0)
PHOSPHATE SERPL-MCNC: 3.5 MG/DL — SIGNIFICANT CHANGE UP (ref 2.5–4.5)
PLATELET # BLD AUTO: 495 K/UL — HIGH (ref 150–400)
POTASSIUM SERPL-MCNC: 4.1 MMOL/L — SIGNIFICANT CHANGE UP (ref 3.5–5.3)
POTASSIUM SERPL-SCNC: 4.1 MMOL/L — SIGNIFICANT CHANGE UP (ref 3.5–5.3)
RBC # BLD: 4.25 M/UL — SIGNIFICANT CHANGE UP (ref 4.2–5.8)
RBC # FLD: 12.6 % — SIGNIFICANT CHANGE UP (ref 10.3–14.5)
SODIUM SERPL-SCNC: 136 MMOL/L — SIGNIFICANT CHANGE UP (ref 135–145)
SPECIMEN SOURCE: SIGNIFICANT CHANGE UP
SURGICAL PATHOLOGY STUDY: SIGNIFICANT CHANGE UP
TOTAL CHOLESTEROL/HDL RATIO MEASUREMENT: 4.1 RATIO — SIGNIFICANT CHANGE UP (ref 3.4–9.6)
TRIGL SERPL-MCNC: 106 MG/DL — SIGNIFICANT CHANGE UP (ref 10–149)
VANCOMYCIN TROUGH SERPL-MCNC: 9.3 UG/ML — LOW (ref 10–20)
WBC # BLD: 8.3 K/UL — SIGNIFICANT CHANGE UP (ref 3.8–10.5)
WBC # FLD AUTO: 8.3 K/UL — SIGNIFICANT CHANGE UP (ref 3.8–10.5)

## 2020-09-17 PROCEDURE — 88312 SPECIAL STAINS GROUP 1: CPT

## 2020-09-17 PROCEDURE — U0003: CPT

## 2020-09-17 PROCEDURE — 88305 TISSUE EXAM BY PATHOLOGIST: CPT

## 2020-09-17 PROCEDURE — 73630 X-RAY EXAM OF FOOT: CPT

## 2020-09-17 PROCEDURE — 93005 ELECTROCARDIOGRAM TRACING: CPT

## 2020-09-17 PROCEDURE — 96374 THER/PROPH/DIAG INJ IV PUSH: CPT

## 2020-09-17 PROCEDURE — 99239 HOSP IP/OBS DSCHRG MGMT >30: CPT | Mod: GC

## 2020-09-17 PROCEDURE — 87102 FUNGUS ISOLATION CULTURE: CPT

## 2020-09-17 PROCEDURE — 83036 HEMOGLOBIN GLYCOSYLATED A1C: CPT

## 2020-09-17 PROCEDURE — 80053 COMPREHEN METABOLIC PANEL: CPT

## 2020-09-17 PROCEDURE — 87070 CULTURE OTHR SPECIMN AEROBIC: CPT

## 2020-09-17 PROCEDURE — 84100 ASSAY OF PHOSPHORUS: CPT

## 2020-09-17 PROCEDURE — 87186 SC STD MICRODIL/AGAR DIL: CPT

## 2020-09-17 PROCEDURE — 88304 TISSUE EXAM BY PATHOLOGIST: CPT

## 2020-09-17 PROCEDURE — 83605 ASSAY OF LACTIC ACID: CPT

## 2020-09-17 PROCEDURE — 97161 PT EVAL LOW COMPLEX 20 MIN: CPT

## 2020-09-17 PROCEDURE — 85652 RBC SED RATE AUTOMATED: CPT

## 2020-09-17 PROCEDURE — 86850 RBC ANTIBODY SCREEN: CPT

## 2020-09-17 PROCEDURE — 85025 COMPLETE CBC W/AUTO DIFF WBC: CPT

## 2020-09-17 PROCEDURE — 80048 BASIC METABOLIC PNL TOTAL CA: CPT

## 2020-09-17 PROCEDURE — 88311 DECALCIFY TISSUE: CPT

## 2020-09-17 PROCEDURE — 80061 LIPID PANEL: CPT

## 2020-09-17 PROCEDURE — 86140 C-REACTIVE PROTEIN: CPT

## 2020-09-17 PROCEDURE — 83735 ASSAY OF MAGNESIUM: CPT

## 2020-09-17 PROCEDURE — 99231 SBSQ HOSP IP/OBS SF/LOW 25: CPT | Mod: GC

## 2020-09-17 PROCEDURE — 87184 SC STD DISK METHOD PER PLATE: CPT

## 2020-09-17 PROCEDURE — 85027 COMPLETE CBC AUTOMATED: CPT

## 2020-09-17 PROCEDURE — 97116 GAIT TRAINING THERAPY: CPT

## 2020-09-17 PROCEDURE — 80202 ASSAY OF VANCOMYCIN: CPT

## 2020-09-17 PROCEDURE — 99285 EMERGENCY DEPT VISIT HI MDM: CPT | Mod: 25

## 2020-09-17 PROCEDURE — 82962 GLUCOSE BLOOD TEST: CPT

## 2020-09-17 PROCEDURE — 85610 PROTHROMBIN TIME: CPT

## 2020-09-17 PROCEDURE — 85730 THROMBOPLASTIN TIME PARTIAL: CPT

## 2020-09-17 PROCEDURE — 86900 BLOOD TYPING SEROLOGIC ABO: CPT

## 2020-09-17 PROCEDURE — 87040 BLOOD CULTURE FOR BACTERIA: CPT

## 2020-09-17 PROCEDURE — 36415 COLL VENOUS BLD VENIPUNCTURE: CPT

## 2020-09-17 PROCEDURE — 87075 CULTR BACTERIA EXCEPT BLOOD: CPT

## 2020-09-17 PROCEDURE — 71045 X-RAY EXAM CHEST 1 VIEW: CPT

## 2020-09-17 PROCEDURE — 86901 BLOOD TYPING SEROLOGIC RH(D): CPT

## 2020-09-17 RX ORDER — INSULIN GLARGINE 100 [IU]/ML
30 INJECTION, SOLUTION SUBCUTANEOUS
Qty: 0 | Refills: 0 | DISCHARGE
Start: 2020-09-17

## 2020-09-17 RX ORDER — INSULIN GLARGINE 100 [IU]/ML
35 INJECTION, SOLUTION SUBCUTANEOUS
Qty: 0 | Refills: 0 | DISCHARGE
Start: 2020-09-17

## 2020-09-17 RX ORDER — INSULIN LISPRO 100/ML
14 VIAL (ML) SUBCUTANEOUS
Qty: 15 | Refills: 0
Start: 2020-09-17

## 2020-09-17 RX ORDER — ISOPROPYL ALCOHOL, BENZOCAINE .7; .06 ML/ML; ML/ML
1 SWAB TOPICAL
Qty: 100 | Refills: 1
Start: 2020-09-17 | End: 2020-11-05

## 2020-09-17 RX ORDER — INSULIN LISPRO 100/ML
10 VIAL (ML) SUBCUTANEOUS
Qty: 0 | Refills: 0 | DISCHARGE
Start: 2020-09-17

## 2020-09-17 RX ORDER — MAGNESIUM SULFATE 500 MG/ML
2 VIAL (ML) INJECTION ONCE
Refills: 0 | Status: COMPLETED | OUTPATIENT
Start: 2020-09-17 | End: 2020-09-17

## 2020-09-17 RX ORDER — CEPHALEXIN 500 MG
500 CAPSULE ORAL
Refills: 0 | Status: DISCONTINUED | OUTPATIENT
Start: 2020-09-17 | End: 2020-09-17

## 2020-09-17 RX ORDER — CEPHALEXIN 500 MG
1 CAPSULE ORAL
Qty: 40 | Refills: 0
Start: 2020-09-17 | End: 2020-09-26

## 2020-09-17 RX ORDER — INSULIN GLARGINE 100 [IU]/ML
40 INJECTION, SOLUTION SUBCUTANEOUS
Qty: 15 | Refills: 0
Start: 2020-09-17

## 2020-09-17 RX ORDER — INSULIN GLARGINE 100 [IU]/ML
45 INJECTION, SOLUTION SUBCUTANEOUS
Qty: 0 | Refills: 0 | DISCHARGE
Start: 2020-09-17

## 2020-09-17 RX ADMIN — POLYETHYLENE GLYCOL 3350 17 GRAM(S): 17 POWDER, FOR SOLUTION ORAL at 12:29

## 2020-09-17 RX ADMIN — ENOXAPARIN SODIUM 40 MILLIGRAM(S): 100 INJECTION SUBCUTANEOUS at 09:00

## 2020-09-17 RX ADMIN — Medication 50 GRAM(S): at 09:52

## 2020-09-17 RX ADMIN — Medication 2: at 08:29

## 2020-09-17 RX ADMIN — Medication 12 UNIT(S): at 12:29

## 2020-09-17 RX ADMIN — Medication 500 MILLIGRAM(S): at 12:34

## 2020-09-17 RX ADMIN — Medication 12 UNIT(S): at 08:29

## 2020-09-17 RX ADMIN — Medication 4: at 12:29

## 2020-09-17 RX ADMIN — PIPERACILLIN AND TAZOBACTAM 200 GRAM(S): 4; .5 INJECTION, POWDER, LYOPHILIZED, FOR SOLUTION INTRAVENOUS at 01:22

## 2020-09-17 RX ADMIN — OXYCODONE AND ACETAMINOPHEN 1 TABLET(S): 5; 325 TABLET ORAL at 07:24

## 2020-09-17 RX ADMIN — Medication 81 MILLIGRAM(S): at 12:29

## 2020-09-17 RX ADMIN — Medication 1 TABLET(S): at 11:33

## 2020-09-17 RX ADMIN — PIPERACILLIN AND TAZOBACTAM 200 GRAM(S): 4; .5 INJECTION, POWDER, LYOPHILIZED, FOR SOLUTION INTRAVENOUS at 07:05

## 2020-09-17 NOTE — DIETITIAN INITIAL EVALUATION ADULT. - OTHER INFO
45M PMHx HTN, HLD, DM (poorly controlled), h/o multiple R toe amputations presenting with worsening R great toe wound/infection x 1 week. Wound noted probing deep to bone with changes on x-ray consistent with osteomyelitis, now s/p wound debridement and bone biopsy (pending results). Consult received for diet education. Pt seen in room, currently on consistent carbohydrate diet w/evening snack. Provided verbal and written education regarding importance of consistent carbohydrate diet as well as meeting EER. Pt reports he usually does not have breakfast, will have a large lunch and dinner. Encouraged 3meals/day +snack. Pt receptive. Pt denies N/V/D/C. Denies wt changes. NFPE not pertinent. Please see recs below. Will continue to follow per RD protocol.

## 2020-09-17 NOTE — DIETITIAN INITIAL EVALUATION ADULT. - ADD RECOMMEND
1. Continue consistent carbohydrate diet, encourage intake 2. Trend wts weekly 3. Monitor lytes, glucose, skin 4. Add MVI

## 2020-09-17 NOTE — PROGRESS NOTE ADULT - PROVIDER SPECIALTY LIST ADULT
Endocrinology
Internal Medicine
Internal Medicine
Podiatry
Endocrinology
Endocrinology
Internal Medicine
Podiatry
Internal Medicine

## 2020-09-17 NOTE — DIETITIAN INITIAL EVALUATION ADULT. - PROBLEM SELECTOR PLAN 6
Fluids: No IVF currently needed  Electrolytes: Mg>2, K>4, replete lytes PRN  Nutrition:    Prophylaxis: Lovenox 40 qd  Activity: AAT, OOBTC  GI: none  C: FC  Dispo: Admit to [facility]

## 2020-09-17 NOTE — DISCHARGE NOTE NURSING/CASE MANAGEMENT/SOCIAL WORK - NSDCFUADDAPPT_GEN_ALL_CORE_FT
Please follow up with Primary Care Provider Dr. Obed Olivo at [address] [place] [address] on 9/29/2020 at 10:15am.    Please follow up with your podiatrist Dr. Barry Mas at [address] [place] [address], [place] [address] on 9/21/2020 at 3:00pm.    Appointments were scheduled by Ms. VAISHNAVI Quintero, Referral Coordinator.

## 2020-09-17 NOTE — PROGRESS NOTE ADULT - ATTENDING COMMENTS
Pt seen on rounds this afternoon prior to discharge.  Glucoses significantly improved, though spiked to 219 pre-lunch--almost certainly because he ate 2 bananas at breakfast.  Was still 190 this morning, so will increase the Lantus to 40 units  The premeal was not increased as suggested yesterday, but would increase to 14 units  He wants to only return to his PCP for follow-up

## 2020-09-17 NOTE — DIETITIAN INITIAL EVALUATION ADULT. - ENERGY NEEDS
Height: 72" Weight: 252lbs, IBW 178lbs+/-10%, %%, BMI 31.5,  IBW used for calculations as pt >120% of IBW, adjusted for skin

## 2020-09-17 NOTE — PROGRESS NOTE ADULT - ASSESSMENT
45M PMHx HTN, HLD, DM (poorly controlled), h/o multiple R toe amputations presenting with worsening R great toe wound/infection x 1 week. Wound noted probing deep to bone with changes on x-ray consistent with osteomyelitis, now s/p wound debridement and bone biopsy (pending results).     P:  -WBAT on R foot and full WB on L foot w/ surgical shoe   -Flushed the wound with NS and changed iodoform packing this AM and dressed with DSD   -Please discharge patient on Bactrim DS BID for 10 days   -Patient given dressing supplies and educated on proper dressing, can d/c packing upon discharge  -Follow up with Podiatry, Dr. Green in 1 week  -Plan d/w Dr. Green    Patient should follow up with Dr. Maximiliano Green within 1 week of discharge.    Office information:          [place] Address- [address] [address] Phone: [phone]         [place] Address- [address], [place] [address] Phone: [phone]     45M PMHx HTN, HLD, DM (poorly controlled), h/o multiple R toe amputations presenting with worsening R great toe wound/infection x 1 week. Wound noted probing deep to bone with changes on x-ray consistent with osteomyelitis, now s/p wound debridement and bone biopsy (pending results).     P:  -WBAT on R foot and full WB on L foot w/ surgical shoe   -Flushed the wound with NS and changed iodoform packing this AM and dressed with DSD   -Please discharge patient on keflex 500mg QID for 10 days   -Patient given dressing supplies and educated on proper dressing, can d/c packing upon discharge  -Follow up with Podiatry, Dr. Green in 1 week  -Plan d/w Dr. Green    Patient should follow up with Dr. Maximiliano Green within 1 week of discharge.    Office information:          [place] Address- [address] [address] Phone: [phone]         [place] Address- [address], [place] [address] Phone: [phone]

## 2020-09-17 NOTE — PROGRESS NOTE ADULT - SUBJECTIVE AND OBJECTIVE BOX
INTERVAL HPI/OVERNIGHT EVENTS:    Patient is a 45y old  Male who presents with a chief complaint of Osteomyelitis (16 Sep 2020 06:16)  Pt was seen and examined at the bedside pt has no complaints today. Pt will be discharge today. pt had       FSG & Insulin received:    Yesterday:  pre-dinner fs  nutritional lispro 12  units   bedtime fs  lantus  35 units + 2   units lispro SS    Today:  pre-breakfast fs  nutritional lispro  12 units+  2  units lispro SS  pre-lunch fs  nutritional lispro 12  units+ 4  units lispro SS      Pt reports the following symptoms:    CONSTITUTIONAL:  Negative fever or chills, feels well, good appetite  EYES:  Negative  blurry vision or double vision  CARDIOVASCULAR:  Negative for chest pain or palpitations  RESPIRATORY:  Negative for cough, wheezing, or SOB   GASTROINTESTINAL:  Negative for nausea, vomiting, diarrhea, constipation, or abdominal pain  GENITOURINARY:  Negative frequency, urgency or dysuria  NEUROLOGIC:  No headache, confusion, dizziness, lightheadedness    MEDICATIONS  (STANDING):  aspirin enteric coated 81 milliGRAM(s) Oral daily  atorvastatin 40 milliGRAM(s) Oral at bedtime  cephalexin 500 milliGRAM(s) Oral four times a day  dextrose 50% Injectable 12.5 Gram(s) IV Push once  enoxaparin Injectable 40 milliGRAM(s) SubCutaneous every 24 hours  influenza   Vaccine 0.5 milliLiter(s) IntraMuscular once  insulin glargine Injectable (LANTUS) 35 Unit(s) SubCutaneous at bedtime  insulin lispro (HumaLOG) corrective regimen sliding scale   SubCutaneous Before meals and at bedtime  insulin lispro Injectable (HumaLOG) 12 Unit(s) SubCutaneous three times a day before meals  polyethylene glycol 3350 17 Gram(s) Oral daily  senna 2 Tablet(s) Oral at bedtime    MEDICATIONS  (PRN):  dextrose 40% Gel 15 Gram(s) Oral once PRN Blood Glucose LESS THAN 70 milliGRAM(s)/deciliter  glucagon  Injectable 1 milliGRAM(s) IntraMuscular once PRN Glucose LESS THAN 70 milligrams/deciliter  oxycodone    5 mG/acetaminophen 325 mG 1 Tablet(s) Oral every 4 hours PRN Moderate Pain (4 - 6)      Past medical history reviewed  Family history reviewed  Social history reviewed    PHYSICAL EXAM  Vital Signs Last 24 Hrs  T(C): 36.9 (17 Sep 2020 08:28), Max: 36.9 (16 Sep 2020 16:41)  T(F): 98.5 (17 Sep 2020 08:28), Max: 98.5 (16 Sep 2020 20:31)  HR: 71 (17 Sep 2020 08:28) (69 - 80)  BP: 158/100 (17 Sep 2020 08:28) (133/91 - 163/99)  BP(mean): --  RR: 17 (17 Sep 2020 08:28) (17 - 20)  SpO2: 97% (17 Sep 2020 08:28) (96% - 98%)    Constitutional: in NAD.   HEENT: NCAT, MMM, OP clear, EOMI, no proptosis or lid retraction  Neck: no thyromegaly or palpable thyroid nodules   Respiratory: lungs CTAB.  Cardiovascular: regular rhythm, normal S1 and S2, no audible murmurs, no peripheral edema  GI: soft, NT/ND, no masses/HSM appreciated.  Psychiatric: AAO x 3  Ext: R foot wrapped in gauze    LABS:                        12.3   8.30  )-----------( 495      ( 17 Sep 2020 05:59 )             37.4     09-17    136  |  99  |  14  ----------------------------<  211<H>  4.1   |  28  |  1.09    Ca    9.3      17 Sep 2020 05:59  Phos  3.5     -17  Mg     1.5     -17    TPro  7.2  /  Alb  2.9<L>  /  TBili  0.2  /  DBili  x   /  AST  30  /  ALT  37  /  AlkPhos  80  09-16    PT/INR - ( 15 Sep 2020 14:57 )   PT: 12.7 sec;   INR: 1.06              Thyroid Stimulating Hormone, Serum: 0.238 uIU/mL (10-07 @ 15:04)      HbA1C: 12.3 % ( @ 07:01)      CAPILLARY BLOOD GLUCOSE      POCT Blood Glucose.: 219 mg/dL (17 Sep 2020 12:21)  POCT Blood Glucose.: 190 mg/dL (17 Sep 2020 08:25)  POCT Blood Glucose.: 190 mg/dL (16 Sep 2020 22:26)  POCT Blood Glucose.: 111 mg/dL (16 Sep 2020 17:39)      Cholesterol, Serum: 128 mg/dL (20 @ 05:59)  HDL Cholesterol, Serum: 31 mg/dL (20 @ 05:59)  Triglycerides, Serum: 106 mg/dL (20 @ 05:59)  Direct LDL: 76 mg/dL (20 @ 05:59)    A/P:45y Male with PMHx of HTN, HLD, hx of substance abuse, DM, OM with multiple toe amputations in the past who p/w worsening R great toe infection x 1 week. Endo consulted for DM management.     1.  DM -  type 2, uncontrolled, complicated with hyperglycemia  A1c:12.3%  cr:1.02, GFR: 88  weight:105. 2 kg, BMI: 31.5  Please increase lantus to       units at night.  Please increase  lispro to    units before each meal.  Please continue lispro moderate  dose sliding scale four times daily with meals and at bedtime    Pt's fingerstick glucose goal is     Will continue to monitor     For discharge, pt can continue    Pt can follow up at discharge with [facility] by calling  to make an appointment.   case seen and discussed  with     and update primary team         INTERVAL HPI/OVERNIGHT EVENTS:    Patient is a 45y old  Male who presents with a chief complaint of Osteomyelitis (16 Sep 2020 06:16)  Pt was seen and examined at the bedside pt has no complaints today. Pt will be discharge today. pt had boiled eggs, 2 bananas and cereal. pt had turkey burger and sweet potato for lunch.       FSG & Insulin received:    Yesterday:  pre-dinner fs  nutritional lispro 12  units   bedtime fs  lantus  35 units + 2   units lispro SS    Today:  pre-breakfast fs  nutritional lispro  12 units+  2  units lispro SS  pre-lunch fs  nutritional lispro 12  units+ 4  units lispro SS      Pt reports the following symptoms:    CONSTITUTIONAL:  Negative fever or chills, feels well, good appetite  EYES:  Negative  blurry vision or double vision  CARDIOVASCULAR:  Negative for chest pain or palpitations  RESPIRATORY:  Negative for cough, wheezing, or SOB   GASTROINTESTINAL:  Negative for nausea, vomiting, diarrhea, constipation, or abdominal pain  GENITOURINARY:  Negative frequency, urgency or dysuria  NEUROLOGIC:  No headache, confusion, dizziness, lightheadedness    MEDICATIONS  (STANDING):  aspirin enteric coated 81 milliGRAM(s) Oral daily  atorvastatin 40 milliGRAM(s) Oral at bedtime  cephalexin 500 milliGRAM(s) Oral four times a day  dextrose 50% Injectable 12.5 Gram(s) IV Push once  enoxaparin Injectable 40 milliGRAM(s) SubCutaneous every 24 hours  influenza   Vaccine 0.5 milliLiter(s) IntraMuscular once  insulin glargine Injectable (LANTUS) 35 Unit(s) SubCutaneous at bedtime  insulin lispro (HumaLOG) corrective regimen sliding scale   SubCutaneous Before meals and at bedtime  insulin lispro Injectable (HumaLOG) 12 Unit(s) SubCutaneous three times a day before meals  polyethylene glycol 3350 17 Gram(s) Oral daily  senna 2 Tablet(s) Oral at bedtime    MEDICATIONS  (PRN):  dextrose 40% Gel 15 Gram(s) Oral once PRN Blood Glucose LESS THAN 70 milliGRAM(s)/deciliter  glucagon  Injectable 1 milliGRAM(s) IntraMuscular once PRN Glucose LESS THAN 70 milligrams/deciliter  oxycodone    5 mG/acetaminophen 325 mG 1 Tablet(s) Oral every 4 hours PRN Moderate Pain (4 - 6)      Past medical history reviewed  Family history reviewed  Social history reviewed    PHYSICAL EXAM  Vital Signs Last 24 Hrs  T(C): 36.9 (17 Sep 2020 08:28), Max: 36.9 (16 Sep 2020 16:41)  T(F): 98.5 (17 Sep 2020 08:28), Max: 98.5 (16 Sep 2020 20:31)  HR: 71 (17 Sep 2020 08:28) (69 - 80)  BP: 158/100 (17 Sep 2020 08:28) (133/91 - 163/99)  BP(mean): --  RR: 17 (17 Sep 2020 08:28) (17 - 20)  SpO2: 97% (17 Sep 2020 08:28) (96% - 98%)    Constitutional: in NAD.   HEENT: NCAT, MMM, OP clear, EOMI, no proptosis or lid retraction  Neck: no thyromegaly or palpable thyroid nodules   Respiratory: lungs CTAB.  Cardiovascular: regular rhythm, normal S1 and S2, no audible murmurs, no peripheral edema  GI: soft, NT/ND, no masses/HSM appreciated.  Psychiatric: AAO x 3  Ext: R foot wrapped in gauze    LABS:                        12.3   8.30  )-----------( 495      ( 17 Sep 2020 05:59 )             37.4     09-17    136  |  99  |  14  ----------------------------<  211<H>  4.1   |  28  |  1.09    Ca    9.3      17 Sep 2020 05:59  Phos  3.5     -17  Mg     1.5     09-17    TPro  7.2  /  Alb  2.9<L>  /  TBili  0.2  /  DBili  x   /  AST  30  /  ALT  37  /  AlkPhos  80  09-16    PT/INR - ( 15 Sep 2020 14:57 )   PT: 12.7 sec;   INR: 1.06              Thyroid Stimulating Hormone, Serum: 0.238 uIU/mL (10-07 @ 15:04)      HbA1C: 12.3 % ( @ 07:01)      CAPILLARY BLOOD GLUCOSE      POCT Blood Glucose.: 219 mg/dL (17 Sep 2020 12:21)  POCT Blood Glucose.: 190 mg/dL (17 Sep 2020 08:25)  POCT Blood Glucose.: 190 mg/dL (16 Sep 2020 22:26)  POCT Blood Glucose.: 111 mg/dL (16 Sep 2020 17:39)      Cholesterol, Serum: 128 mg/dL (20 @ 05:59)  HDL Cholesterol, Serum: 31 mg/dL (20 @ 05:59)  Triglycerides, Serum: 106 mg/dL (20 @ 05:59)  Direct LDL: 76 mg/dL (20 @ 05:59)    A/P:45y Male with PMHx of HTN, HLD, hx of substance abuse, DM, OM with multiple toe amputations in the past who p/w worsening R great toe infection x 1 week. Endo consulted for DM management.     1.  DM -  type 2, uncontrolled, complicated with hyperglycemia  A1c:12.3%  cr:1.02, GFR: 88  weight:105. 2 kg, BMI: 31.5  Please increase lantus to       units at night.  Please increase  lispro to    units before each meal.  Please continue lispro moderate  dose sliding scale four times daily with meals and at bedtime    Pt's fingerstick glucose goal is     Will continue to monitor     For discharge, pt can continue    Pt can follow up at discharge with [facility] by calling  to make an appointment.   case seen and discussed  with     and update primary team         INTERVAL HPI/OVERNIGHT EVENTS:    Patient is a 45y old  Male who presents with a chief complaint of Osteomyelitis (16 Sep 2020 06:16)  Pt was seen and examined at the bedside pt has no complaints today. Pt will be discharge today. pt had boiled eggs, 2 bananas and cereal. pt had turkey burger and sweet potato for lunch.       FSG & Insulin received:    Yesterday:  pre-dinner fs  nutritional lispro 12  units   bedtime fs  lantus  35 units + 2   units lispro SS    Today:  pre-breakfast fs  nutritional lispro  12 units+  2  units lispro SS  pre-lunch fs  nutritional lispro 12  units+ 4  units lispro SS      Pt reports the following symptoms:    CONSTITUTIONAL:  Negative fever or chills, feels well, good appetite  EYES:  Negative  blurry vision or double vision  CARDIOVASCULAR:  Negative for chest pain or palpitations  RESPIRATORY:  Negative for cough, wheezing, or SOB   GASTROINTESTINAL:  Negative for nausea, vomiting, diarrhea, constipation, or abdominal pain  GENITOURINARY:  Negative frequency, urgency or dysuria  NEUROLOGIC:  No headache, confusion, dizziness, lightheadedness    MEDICATIONS  (STANDING):  aspirin enteric coated 81 milliGRAM(s) Oral daily  atorvastatin 40 milliGRAM(s) Oral at bedtime  cephalexin 500 milliGRAM(s) Oral four times a day  dextrose 50% Injectable 12.5 Gram(s) IV Push once  enoxaparin Injectable 40 milliGRAM(s) SubCutaneous every 24 hours  influenza   Vaccine 0.5 milliLiter(s) IntraMuscular once  insulin glargine Injectable (LANTUS) 35 Unit(s) SubCutaneous at bedtime  insulin lispro (HumaLOG) corrective regimen sliding scale   SubCutaneous Before meals and at bedtime  insulin lispro Injectable (HumaLOG) 12 Unit(s) SubCutaneous three times a day before meals  polyethylene glycol 3350 17 Gram(s) Oral daily  senna 2 Tablet(s) Oral at bedtime    MEDICATIONS  (PRN):  dextrose 40% Gel 15 Gram(s) Oral once PRN Blood Glucose LESS THAN 70 milliGRAM(s)/deciliter  glucagon  Injectable 1 milliGRAM(s) IntraMuscular once PRN Glucose LESS THAN 70 milligrams/deciliter  oxycodone    5 mG/acetaminophen 325 mG 1 Tablet(s) Oral every 4 hours PRN Moderate Pain (4 - 6)      Past medical history reviewed  Family history reviewed  Social history reviewed    PHYSICAL EXAM  Vital Signs Last 24 Hrs  T(C): 36.9 (17 Sep 2020 08:28), Max: 36.9 (16 Sep 2020 16:41)  T(F): 98.5 (17 Sep 2020 08:28), Max: 98.5 (16 Sep 2020 20:31)  HR: 71 (17 Sep 2020 08:28) (69 - 80)  BP: 158/100 (17 Sep 2020 08:28) (133/91 - 163/99)  BP(mean): --  RR: 17 (17 Sep 2020 08:28) (17 - 20)  SpO2: 97% (17 Sep 2020 08:28) (96% - 98%)    Constitutional: in NAD.   HEENT: NCAT, MMM, OP clear, EOMI, no proptosis or lid retraction  Neck: no thyromegaly or palpable thyroid nodules   Respiratory: lungs CTAB.  Cardiovascular: regular rhythm, normal S1 and S2, no audible murmurs, no peripheral edema  GI: soft, NT/ND, no masses/HSM appreciated.  Psychiatric: AAO x 3  Ext: R foot wrapped in gauze    LABS:                        12.3   8.30  )-----------( 495      ( 17 Sep 2020 05:59 )             37.4     09-17    136  |  99  |  14  ----------------------------<  211<H>  4.1   |  28  |  1.09    Ca    9.3      17 Sep 2020 05:59  Phos  3.5     -17  Mg     1.5     09-17    TPro  7.2  /  Alb  2.9<L>  /  TBili  0.2  /  DBili  x   /  AST  30  /  ALT  37  /  AlkPhos  80  09-16    PT/INR - ( 15 Sep 2020 14:57 )   PT: 12.7 sec;   INR: 1.06              Thyroid Stimulating Hormone, Serum: 0.238 uIU/mL (10-07 @ 15:04)      HbA1C: 12.3 % ( @ 07:01)      CAPILLARY BLOOD GLUCOSE      POCT Blood Glucose.: 219 mg/dL (17 Sep 2020 12:21)  POCT Blood Glucose.: 190 mg/dL (17 Sep 2020 08:25)  POCT Blood Glucose.: 190 mg/dL (16 Sep 2020 22:26)  POCT Blood Glucose.: 111 mg/dL (16 Sep 2020 17:39)      Cholesterol, Serum: 128 mg/dL (20 @ 05:59)  HDL Cholesterol, Serum: 31 mg/dL (20 @ 05:59)  Triglycerides, Serum: 106 mg/dL (20 @ 05:59)  Direct LDL: 76 mg/dL (20 @ 05:59)    A/P:45y Male with PMHx of HTN, HLD, hx of substance abuse, DM, OM with multiple toe amputations in the past who p/w worsening R great toe infection x 1 week. Endo consulted for DM management.     1.  DM -  type 2, uncontrolled, complicated with hyperglycemia  A1c:12.3%  cr:1.02, GFR: 88  weight:105. 2 kg, BMI: 31.5  Please increase lantus to  40     units at night.  Please give  lispro 14  units before each meal.  Please continue lispro moderate  dose sliding scale four times daily with meals and at bedtime    Pt's fingerstick glucose goal is     Will continue to monitor     Pt can follow up at discharge with [facility] by calling  to make an appointment.   case seen and discussed  with     and update primary team

## 2020-09-17 NOTE — PROGRESS NOTE ADULT - SUBJECTIVE AND OBJECTIVE BOX
Patient is a 45y old  Male who presents with a chief complaint of Osteomyelitis (16 Sep 2020 06:16)      INTERVAL HPI/ OVERNIGHT EVENTS: Patient seen and examined bedside. Resting comfortably in bed in NAD.      LABS                        12.3   8.30  )-----------( 495      ( 17 Sep 2020 05:59 )             37.4     09-17    136  |  99  |  14  ----------------------------<  211<H>  4.1   |  28  |  1.09    Ca    9.3      17 Sep 2020 05:59  Phos  3.5     09-17  Mg     1.5     09-17    TPro  7.2  /  Alb  2.9<L>  /  TBili  0.2  /  DBili  x   /  AST  30  /  ALT  37  /  AlkPhos  80  09-16    PT/INR - ( 15 Sep 2020 14:57 )   PT: 12.7 sec;   INR: 1.06              ICU Vital Signs Last 24 Hrs  T(C): 36.9 (17 Sep 2020 08:28), Max: 36.9 (16 Sep 2020 16:41)  T(F): 98.5 (17 Sep 2020 08:28), Max: 98.5 (16 Sep 2020 20:31)  HR: 71 (17 Sep 2020 08:28) (69 - 80)  BP: 158/100 (17 Sep 2020 08:28) (133/91 - 163/99)  BP(mean): --  ABP: --  ABP(mean): --  RR: 17 (17 Sep 2020 08:28) (17 - 20)  SpO2: 97% (17 Sep 2020 08:28) (96% - 98%)      RADIOLOGY    MICROBIOLOGY    Culture - Fungal, Tissue (collected 16 Sep 2020 08:42)  Source: .Tissue R HALLUX  Preliminary Report (17 Sep 2020 09:03):    Testing in progress    Culture - Fungal, Tissue (collected 16 Sep 2020 08:42)  Source: .Tissue distal phallanx r hallux  Preliminary Report (17 Sep 2020 09:03):    Testing in progress    Culture - Surgical Swab (collected 16 Sep 2020 00:18)  Source: .Surgical Swab Distal Phallanx Right Hallux Or Spec  Gram Stain (16 Sep 2020 05:01):    No White blood cells    No organisms seen      Lower Extremity Focused  Vasc: DP/PT 2/4 B/L  Derm: Right wound packed, no aditional purulence encountered on exam. Erythema improving.  Neuro: Sensation grossly diminished  MSK: S/p R 4th/5th partial ray and 2nd partial digit amputations.

## 2020-09-17 NOTE — PROGRESS NOTE ADULT - NSHPATTENDINGPLANDISCUSS_GEN_ALL_CORE
hs, patient
Ms. Newsome and [address] [place], [place] [id number]
hs, patient
Ms. Newsome and [address] [place], [place] [id number]
Dr. DIETRICH and [address] [place], [place] [id number]
hs, patient
hs, patient

## 2020-09-17 NOTE — PROGRESS NOTE ADULT - ATTENDING COMMENTS
thrombocytosis, likely 2/2 podiatric manipulation yesterday  will replete mag  f/u final endo recs   will d/c patient home today with 10 day of keflex PO for treatment of cellulitis without osteomyelitis per bone biopsy - patient  has received wound care teaching and instructions and has endorsed understanding    patient will f/u outpatient for repeat cbc, podiatry eval, and endo f/u thrombocytosis, likely 2/2 podiatric manipulation yesterday  will replete mag  f/u final endo recs   will d/c patient home today with 10 day of keflex PO for treatment of cellulitis without osteomyelitis per bone biopsy - patient  has received wound care teaching and instructions and has endorsed understanding    patient will f/u outpatient for repeat cbc, podiatry eval, and endo f/u  will hold off on starting any htn meds here in setting of active infection and patient came in 120-130s. should have htn readdressed outpatient if still elevated should be started on ace-i

## 2020-09-17 NOTE — DISCHARGE NOTE NURSING/CASE MANAGEMENT/SOCIAL WORK - PATIENT PORTAL LINK FT
You can access the FollowMyHealth Patient Portal offered by [facility] by registering at the following website: [email or web address]. By joining [employer]’s FollowMyHealth portal, you will also be able to view your health information using other applications (apps) compatible with our system.

## 2020-09-19 LAB
-  CEFAZOLIN: SIGNIFICANT CHANGE UP
-  CEFAZOLIN: SIGNIFICANT CHANGE UP
-  CLINDAMYCIN: SIGNIFICANT CHANGE UP
-  CLINDAMYCIN: SIGNIFICANT CHANGE UP
-  ERYTHROMYCIN: SIGNIFICANT CHANGE UP
-  ERYTHROMYCIN: SIGNIFICANT CHANGE UP
-  LINEZOLID: SIGNIFICANT CHANGE UP
-  LINEZOLID: SIGNIFICANT CHANGE UP
-  OXACILLIN: SIGNIFICANT CHANGE UP
-  OXACILLIN: SIGNIFICANT CHANGE UP
-  RIFAMPIN: SIGNIFICANT CHANGE UP
-  RIFAMPIN: SIGNIFICANT CHANGE UP
-  TRIMETHOPRIM/SULFAMETHOXAZOLE: SIGNIFICANT CHANGE UP
-  TRIMETHOPRIM/SULFAMETHOXAZOLE: SIGNIFICANT CHANGE UP
-  VANCOMYCIN: SIGNIFICANT CHANGE UP
-  VANCOMYCIN: SIGNIFICANT CHANGE UP
CULTURE RESULTS: SIGNIFICANT CHANGE UP
METHOD TYPE: SIGNIFICANT CHANGE UP
METHOD TYPE: SIGNIFICANT CHANGE UP
ORGANISM # SPEC MICROSCOPIC CNT: SIGNIFICANT CHANGE UP
SPECIMEN SOURCE: SIGNIFICANT CHANGE UP

## 2020-09-20 LAB
-  CEFTRIAXONE: SIGNIFICANT CHANGE UP
-  CLINDAMYCIN: SIGNIFICANT CHANGE UP
-  ERYTHROMYCIN: SIGNIFICANT CHANGE UP
-  LEVOFLOXACIN: SIGNIFICANT CHANGE UP
-  VANCOMYCIN: SIGNIFICANT CHANGE UP
CULTURE RESULTS: SIGNIFICANT CHANGE UP
METHOD TYPE: SIGNIFICANT CHANGE UP
METHOD TYPE: SIGNIFICANT CHANGE UP
ORGANISM # SPEC MICROSCOPIC CNT: SIGNIFICANT CHANGE UP
SPECIMEN SOURCE: SIGNIFICANT CHANGE UP

## 2020-09-23 DIAGNOSIS — Z87.898 PERSONAL HISTORY OF OTHER SPECIFIED CONDITIONS: ICD-10-CM

## 2020-09-23 DIAGNOSIS — Z79.82 LONG TERM (CURRENT) USE OF ASPIRIN: ICD-10-CM

## 2020-09-23 DIAGNOSIS — D47.3 ESSENTIAL (HEMORRHAGIC) THROMBOCYTHEMIA: ICD-10-CM

## 2020-09-23 DIAGNOSIS — Z89.421 ACQUIRED ABSENCE OF OTHER RIGHT TOE(S): ICD-10-CM

## 2020-09-23 DIAGNOSIS — Z87.891 PERSONAL HISTORY OF NICOTINE DEPENDENCE: ICD-10-CM

## 2020-09-23 DIAGNOSIS — M86.9 OSTEOMYELITIS, UNSPECIFIED: ICD-10-CM

## 2020-09-23 DIAGNOSIS — Z79.84 LONG TERM (CURRENT) USE OF ORAL HYPOGLYCEMIC DRUGS: ICD-10-CM

## 2020-09-23 DIAGNOSIS — E78.5 HYPERLIPIDEMIA, UNSPECIFIED: ICD-10-CM

## 2020-09-23 DIAGNOSIS — L03.031 CELLULITIS OF RIGHT TOE: ICD-10-CM

## 2020-09-23 DIAGNOSIS — L97.516 NON-PRESSURE CHRONIC ULCER OF OTHER PART OF RIGHT FOOT WITH BONE INVOLVEMENT WITHOUT EVIDENCE OF NECROSIS: ICD-10-CM

## 2020-09-23 DIAGNOSIS — E11.65 TYPE 2 DIABETES MELLITUS WITH HYPERGLYCEMIA: ICD-10-CM

## 2020-09-23 DIAGNOSIS — E11.621 TYPE 2 DIABETES MELLITUS WITH FOOT ULCER: ICD-10-CM

## 2020-09-23 DIAGNOSIS — I10 ESSENTIAL (PRIMARY) HYPERTENSION: ICD-10-CM

## 2020-09-23 DIAGNOSIS — L02.611 CUTANEOUS ABSCESS OF RIGHT FOOT: ICD-10-CM

## 2020-09-23 DIAGNOSIS — Z91.14 PATIENT'S OTHER NONCOMPLIANCE WITH MEDICATION REGIMEN: ICD-10-CM

## 2020-09-23 DIAGNOSIS — E11.42 TYPE 2 DIABETES MELLITUS WITH DIABETIC POLYNEUROPATHY: ICD-10-CM

## 2020-09-23 DIAGNOSIS — K59.00 CONSTIPATION, UNSPECIFIED: ICD-10-CM

## 2020-09-23 DIAGNOSIS — M20.41 OTHER HAMMER TOE(S) (ACQUIRED), RIGHT FOOT: ICD-10-CM

## 2020-09-23 DIAGNOSIS — E11.69 TYPE 2 DIABETES MELLITUS WITH OTHER SPECIFIED COMPLICATION: ICD-10-CM

## 2020-09-29 ENCOUNTER — APPOINTMENT (OUTPATIENT)
Dept: INTERNAL MEDICINE | Facility: CLINIC | Age: 45
End: 2020-09-29

## 2020-12-23 ENCOUNTER — TRANSCRIPTION ENCOUNTER (OUTPATIENT)
Age: 45
End: 2020-12-23

## 2021-01-21 ENCOUNTER — TRANSCRIPTION ENCOUNTER (OUTPATIENT)
Age: 46
End: 2021-01-21

## 2021-03-21 ENCOUNTER — EMERGENCY (EMERGENCY)
Facility: HOSPITAL | Age: 46
LOS: 1 days | Discharge: HOME | End: 2021-03-21
Attending: EMERGENCY MEDICINE | Admitting: EMERGENCY MEDICINE
Payer: COMMERCIAL

## 2021-03-21 VITALS
RESPIRATION RATE: 18 BRPM | SYSTOLIC BLOOD PRESSURE: 119 MMHG | HEART RATE: 94 BPM | OXYGEN SATURATION: 99 % | TEMPERATURE: 99 F | DIASTOLIC BLOOD PRESSURE: 77 MMHG

## 2021-03-21 VITALS
TEMPERATURE: 99 F | OXYGEN SATURATION: 100 % | DIASTOLIC BLOOD PRESSURE: 77 MMHG | HEART RATE: 104 BPM | SYSTOLIC BLOOD PRESSURE: 120 MMHG | HEIGHT: 72 IN | RESPIRATION RATE: 18 BRPM

## 2021-03-21 DIAGNOSIS — I10 ESSENTIAL (PRIMARY) HYPERTENSION: ICD-10-CM

## 2021-03-21 DIAGNOSIS — R50.9 FEVER, UNSPECIFIED: ICD-10-CM

## 2021-03-21 DIAGNOSIS — R05 COUGH: ICD-10-CM

## 2021-03-21 DIAGNOSIS — E78.5 HYPERLIPIDEMIA, UNSPECIFIED: ICD-10-CM

## 2021-03-21 DIAGNOSIS — E11.9 TYPE 2 DIABETES MELLITUS WITHOUT COMPLICATIONS: ICD-10-CM

## 2021-03-21 DIAGNOSIS — Z89.429 ACQUIRED ABSENCE OF OTHER TOE(S), UNSPECIFIED SIDE: Chronic | ICD-10-CM

## 2021-03-21 DIAGNOSIS — Z79.4 LONG TERM (CURRENT) USE OF INSULIN: ICD-10-CM

## 2021-03-21 DIAGNOSIS — Z20.822 CONTACT WITH AND (SUSPECTED) EXPOSURE TO COVID-19: ICD-10-CM

## 2021-03-21 DIAGNOSIS — R52 PAIN, UNSPECIFIED: ICD-10-CM

## 2021-03-21 DIAGNOSIS — Z79.82 LONG TERM (CURRENT) USE OF ASPIRIN: ICD-10-CM

## 2021-03-21 DIAGNOSIS — M86.9 OSTEOMYELITIS, UNSPECIFIED: ICD-10-CM

## 2021-03-21 DIAGNOSIS — Z94.5 SKIN TRANSPLANT STATUS: Chronic | ICD-10-CM

## 2021-03-21 DIAGNOSIS — Z89.429 ACQUIRED ABSENCE OF OTHER TOE(S), UNSPECIFIED SIDE: ICD-10-CM

## 2021-03-21 DIAGNOSIS — Z79.02 LONG TERM (CURRENT) USE OF ANTITHROMBOTICS/ANTIPLATELETS: ICD-10-CM

## 2021-03-21 DIAGNOSIS — Z94.5 SKIN TRANSPLANT STATUS: ICD-10-CM

## 2021-03-21 LAB — SARS-COV-2 RNA SPEC QL NAA+PROBE: SIGNIFICANT CHANGE UP

## 2021-03-21 PROCEDURE — U0003: CPT

## 2021-03-21 PROCEDURE — 99283 EMERGENCY DEPT VISIT LOW MDM: CPT | Mod: 25

## 2021-03-21 PROCEDURE — 99284 EMERGENCY DEPT VISIT MOD MDM: CPT

## 2021-03-21 PROCEDURE — 82962 GLUCOSE BLOOD TEST: CPT

## 2021-03-21 PROCEDURE — 71045 X-RAY EXAM CHEST 1 VIEW: CPT | Mod: 26

## 2021-03-21 PROCEDURE — U0005: CPT

## 2021-03-21 PROCEDURE — 71045 X-RAY EXAM CHEST 1 VIEW: CPT

## 2021-03-21 RX ORDER — ACETAMINOPHEN 500 MG
975 TABLET ORAL ONCE
Refills: 0 | Status: COMPLETED | OUTPATIENT
Start: 2021-03-21 | End: 2021-03-21

## 2021-03-21 RX ADMIN — Medication 975 MILLIGRAM(S): at 07:49

## 2021-03-21 NOTE — ED PROVIDER NOTE - ATTENDING CONTRIBUTION TO CARE
I discussed the plan of care of the patient directly with the PA and examined the patient while in the Emergency Department. I agree with the HPI and PE as documented by the PA.  Pt is a 44yo m, h/o dm, who p/w subj warmth x 2 days, achy, run down. Took tylenol with improvement. Also took pcn x 1 dose. + mild cough with mild sob. No chest pain, abd pain, n/v/d, urinary sx's, recent travel. Received 1 dose of Pfizer vaccine 2 wks ago. Afebrile. HDS. WNWD m in nad. Lungs cta b/l. + s1, s2, rrr. Abd soft, nt/nd. . Plan for flu/ covid test, cxr. Suspect acute viral illness.

## 2021-03-21 NOTE — ED ADULT TRIAGE NOTE - LOCATION:
How Severe Are Your Spot(S)?: moderate
Have Your Spot(S) Been Treated In The Past?: has not been treated
Hpi Title: Evaluation of Skin Lesions
Additional History: Patient has noticed that since he has started his new medications that his forehead and legs feel very rough and scaly.
Left arm;

## 2021-03-21 NOTE — ED PROVIDER NOTE - NSFOLLOWUPINSTRUCTIONS_ED_ALL_ED_FT
Upper Respiratory Infection    WHAT YOU NEED TO KNOW:    An upper respiratory infection is also called a cold. It can affect your nose, throat, ears, and sinuses. Cold symptoms are usually worst for the first 3 to 5 days. Most people get better in 7 to 14 days. You may continue to cough for 2 to 3 weeks. Colds are caused by viruses and do not get better with antibiotics.    DISCHARGE INSTRUCTIONS:    Call your local emergency number (911 in the [place]) if:   •You have chest pain or trouble breathing.          Return to the emergency department if:   •You have a fever over 102ºF (39ºC).          Call your doctor if:   •You have a low fever.      •Your sore throat gets worse or you see white or yellow spots in your throat.      •Your symptoms get worse after 3 to 5 days or are not better in 14 days.      •You have a rash anywhere on your skin.      •You have large, tender lumps in your neck.      •You have thick, green, or yellow drainage from your nose.      •You cough up thick yellow, green, or bloody mucus.      •You have a bad earache.      •You have questions or concerns about your condition or care.      Medicines: You may need any of the following:   •Decongestants help reduce nasal congestion and help you breathe more easily. If you take decongestant pills, they may make you feel restless or cause problems with your sleep. Do not use decongestant sprays for more than a few days.      •Cough suppressants help reduce coughing. Ask your healthcare provider which type of cough medicine is best for you.       •NSAIDs, such as ibuprofen, help decrease swelling, pain, and fever. NSAIDs can cause stomach bleeding or kidney problems in certain people. If you take blood thinner medicine, always ask your healthcare provider if NSAIDs are safe for you. Always read the medicine label and follow directions.      •Acetaminophen decreases pain and fever. It is available without a doctor's order. Ask how much to take and how often to take it. Follow directions. Read the labels of all other medicines you are using to see if they also contain acetaminophen, or ask your doctor or pharmacist. Acetaminophen can cause liver damage if not taken correctly. Do not use more than 4 grams (4,000 milligrams) total of acetaminophen in one day.       •Take your medicine as directed. Contact your healthcare provider if you think your medicine is not helping or if you have side effects. Tell him or her if you are allergic to any medicine. Keep a list of the medicines, vitamins, and herbs you take. Include the amounts, and when and why you take them. Bring the list or the pill bottles to follow-up visits. Carry your medicine list with you in case of an emergency.      Self-care:   •Rest as much as possible. Slowly start to do more each day.      •Drink more liquids as directed. Liquids will help thin and loosen mucus so you can cough it up. Liquids will also help prevent dehydration. Liquids that help prevent dehydration include water, fruit juice, and broth. Do not drink liquids that contain caffeine. Caffeine can increase your risk for dehydration. Ask your healthcare provider how much liquid to drink each day.      •Soothe a sore throat. Gargle with warm salt water. Make salt water by dissolving ¼ teaspoon salt in 1 cup warm water. You may also suck on hard candy or throat lozenges. You may use a sore throat spray.      •Use a humidifier or vaporizer. Use a cool mist humidifier or a vaporizer to increase air moisture in your home. This may make it easier for you to breathe and help decrease your cough.      •Use saline nasal drops as directed. These help relieve congestion.      •Apply petroleum-based jelly around the outside of your nostrils. This can decrease irritation from blowing your nose.      •Do not smoke. Nicotine and other chemicals in cigarettes and cigars can make your symptoms worse. They can also cause infections such as bronchitis or pneumonia. Ask your healthcare provider for information if you currently smoke and need help to quit. E-cigarettes or smokeless tobacco still contain nicotine. Talk to your healthcare provider before you use these products.      Prevent a cold:   •Wash your hands often. Use soap and water every time you wash your hands. Rub your soapy hands together, lacing your fingers. Use the fingers of one hand to scrub under the nails of the other hand. Wash for at least 20 seconds. Rinse with warm, running water for several seconds. Then dry your hands. Use germ-killing gel if soap and water are not available. Do not touch your eyes or mouth without washing your hands first.   Handwashing           •Cover a sneeze or cough. Use a tissue that covers your mouth and nose. Put the used tissue in the trash right away. Use the bend of your arm if a tissue is not available. Wash your hands well with soap and water or use a hand . Do not stand close to anyone who is sneezing or coughing.      •Try to stay away from others while you are sick. This is especially important during the first 2 to 3 days when the virus is more easily spread. Wait until a fever, cough, or other symptoms are gone before you return to work or other regular activities.      •Do not share items while you are sick. This includes food, drinks, eating utensils, and dishes.      Follow up with your doctor as directed: Write down your questions so you remember to ask them during your visits.

## 2021-03-21 NOTE — ED PROVIDER NOTE - CLINICAL SUMMARY MEDICAL DECISION MAKING FREE TEXT BOX
44 y/o m hx DM presents c/o subjective fever, body aches x 2 days, mild cough; pt afebrile in ED, well appearing, vss.  CXR negative, flu and covid swabs pending, pt feels improved with tylenol.  Susupect viral etiology, will d/c, recommend supportive treatment, isolate, f/u covid test, return to ED if sx worsen, if develops sob.

## 2021-03-21 NOTE — ED PROVIDER NOTE - PATIENT PORTAL LINK FT
You can access the FollowMyHealth Patient Portal offered by [facility] by registering at the following website: [email or web address]. By joining [employer]’s FollowMyHealth portal, you will also be able to view your health information using other applications (apps) compatible with our system.

## 2021-03-21 NOTE — ED PROVIDER NOTE - OBJECTIVE STATEMENT
44 y/o m hx DM presents c/o 2 days of subjective fever, chills, mild cough.  Pt stating he has taken tylenol with improvement, also took 1 "penicillin" which he obtained in [place] and keeps at home.  Pt reports he felt well this morning but after getting to work, felt achy and feverish so came to ED for eval.  Pt had first covid vaccine 2 weeks ago.  Denies CP, n/v/d, urinary sx, recent travel, all other ROS negative.

## 2021-03-21 NOTE — ED ADULT NURSE NOTE - OBJECTIVE STATEMENT
Reports subjective ever since Friday, responding to Tylenol. Also c/o generalized body aches at this time. No cough, no N/V/D. No dysuria. No sick contacts, no recent travel.

## 2021-03-26 ENCOUNTER — INPATIENT (INPATIENT)
Facility: HOSPITAL | Age: 46
LOS: 7 days | Discharge: HOME | DRG: 854 | End: 2021-04-03
Attending: INTERNAL MEDICINE | Admitting: INTERNAL MEDICINE
Payer: COMMERCIAL

## 2021-03-26 VITALS
TEMPERATURE: 99 F | SYSTOLIC BLOOD PRESSURE: 130 MMHG | HEART RATE: 86 BPM | WEIGHT: 235.01 LBS | DIASTOLIC BLOOD PRESSURE: 85 MMHG | HEIGHT: 72 IN | OXYGEN SATURATION: 97 % | RESPIRATION RATE: 18 BRPM

## 2021-03-26 DIAGNOSIS — R63.8 OTHER SYMPTOMS AND SIGNS CONCERNING FOOD AND FLUID INTAKE: ICD-10-CM

## 2021-03-26 DIAGNOSIS — E78.5 HYPERLIPIDEMIA, UNSPECIFIED: ICD-10-CM

## 2021-03-26 DIAGNOSIS — E11.9 TYPE 2 DIABETES MELLITUS WITHOUT COMPLICATIONS: ICD-10-CM

## 2021-03-26 DIAGNOSIS — A41.9 SEPSIS, UNSPECIFIED ORGANISM: ICD-10-CM

## 2021-03-26 DIAGNOSIS — E11.628 TYPE 2 DIABETES MELLITUS WITH OTHER SKIN COMPLICATIONS: ICD-10-CM

## 2021-03-26 DIAGNOSIS — Z89.429 ACQUIRED ABSENCE OF OTHER TOE(S), UNSPECIFIED SIDE: Chronic | ICD-10-CM

## 2021-03-26 DIAGNOSIS — Z94.5 SKIN TRANSPLANT STATUS: Chronic | ICD-10-CM

## 2021-03-26 LAB
ANION GAP SERPL CALC-SCNC: 10 MMOL/L — SIGNIFICANT CHANGE UP (ref 5–17)
BASOPHILS # BLD AUTO: 0.06 K/UL — SIGNIFICANT CHANGE UP (ref 0–0.2)
BASOPHILS NFR BLD AUTO: 0.4 % — SIGNIFICANT CHANGE UP (ref 0–2)
BUN SERPL-MCNC: 12 MG/DL — SIGNIFICANT CHANGE UP (ref 7–23)
CALCIUM SERPL-MCNC: 9 MG/DL — SIGNIFICANT CHANGE UP (ref 8.4–10.5)
CHLORIDE SERPL-SCNC: 98 MMOL/L — SIGNIFICANT CHANGE UP (ref 96–108)
CK MB CFR SERPL CALC: 1.4 NG/ML — SIGNIFICANT CHANGE UP (ref 0–6.7)
CK SERPL-CCNC: 71 U/L — SIGNIFICANT CHANGE UP (ref 30–200)
CO2 SERPL-SCNC: 28 MMOL/L — SIGNIFICANT CHANGE UP (ref 22–31)
CREAT SERPL-MCNC: 1.12 MG/DL — SIGNIFICANT CHANGE UP (ref 0.5–1.3)
EOSINOPHIL # BLD AUTO: 0.23 K/UL — SIGNIFICANT CHANGE UP (ref 0–0.5)
EOSINOPHIL NFR BLD AUTO: 1.5 % — SIGNIFICANT CHANGE UP (ref 0–6)
GLUCOSE BLDC GLUCOMTR-MCNC: 151 MG/DL — HIGH (ref 70–99)
GLUCOSE BLDC GLUCOMTR-MCNC: 192 MG/DL — HIGH (ref 70–99)
GLUCOSE SERPL-MCNC: 174 MG/DL — HIGH (ref 70–99)
GRAM STN FLD: SIGNIFICANT CHANGE UP
HCT VFR BLD CALC: 36.6 % — LOW (ref 39–50)
HGB BLD-MCNC: 12.1 G/DL — LOW (ref 13–17)
IMM GRANULOCYTES NFR BLD AUTO: 0.7 % — SIGNIFICANT CHANGE UP (ref 0–1.5)
LYMPHOCYTES # BLD AUTO: 13.6 % — SIGNIFICANT CHANGE UP (ref 13–44)
LYMPHOCYTES # BLD AUTO: 2.08 K/UL — SIGNIFICANT CHANGE UP (ref 1–3.3)
MAGNESIUM SERPL-MCNC: 2.1 MG/DL — SIGNIFICANT CHANGE UP (ref 1.6–2.6)
MCHC RBC-ENTMCNC: 28.9 PG — SIGNIFICANT CHANGE UP (ref 27–34)
MCHC RBC-ENTMCNC: 33.1 GM/DL — SIGNIFICANT CHANGE UP (ref 32–36)
MCV RBC AUTO: 87.4 FL — SIGNIFICANT CHANGE UP (ref 80–100)
MONOCYTES # BLD AUTO: 1.29 K/UL — HIGH (ref 0–0.9)
MONOCYTES NFR BLD AUTO: 8.5 % — SIGNIFICANT CHANGE UP (ref 2–14)
NEUTROPHILS # BLD AUTO: 11.48 K/UL — HIGH (ref 1.8–7.4)
NEUTROPHILS NFR BLD AUTO: 75.3 % — SIGNIFICANT CHANGE UP (ref 43–77)
NRBC # BLD: 0 /100 WBCS — SIGNIFICANT CHANGE UP (ref 0–0)
NT-PROBNP SERPL-SCNC: 59 PG/ML — SIGNIFICANT CHANGE UP (ref 0–300)
PHOSPHATE SERPL-MCNC: 2.8 MG/DL — SIGNIFICANT CHANGE UP (ref 2.5–4.5)
PLATELET # BLD AUTO: 414 K/UL — HIGH (ref 150–400)
POTASSIUM SERPL-MCNC: 5 MMOL/L — SIGNIFICANT CHANGE UP (ref 3.5–5.3)
POTASSIUM SERPL-SCNC: 5 MMOL/L — SIGNIFICANT CHANGE UP (ref 3.5–5.3)
RBC # BLD: 4.19 M/UL — LOW (ref 4.2–5.8)
RBC # FLD: 13.3 % — SIGNIFICANT CHANGE UP (ref 10.3–14.5)
SARS-COV-2 RNA SPEC QL NAA+PROBE: SIGNIFICANT CHANGE UP
SODIUM SERPL-SCNC: 136 MMOL/L — SIGNIFICANT CHANGE UP (ref 135–145)
SPECIMEN SOURCE: SIGNIFICANT CHANGE UP
TROPONIN T SERPL-MCNC: <0.01 NG/ML — SIGNIFICANT CHANGE UP (ref 0–0.01)
WBC # BLD: 15.25 K/UL — HIGH (ref 3.8–10.5)
WBC # FLD AUTO: 15.25 K/UL — HIGH (ref 3.8–10.5)

## 2021-03-26 PROCEDURE — 93010 ELECTROCARDIOGRAM REPORT: CPT

## 2021-03-26 RX ORDER — ACETAMINOPHEN 500 MG
650 TABLET ORAL EVERY 6 HOURS
Refills: 0 | Status: DISCONTINUED | OUTPATIENT
Start: 2021-03-26 | End: 2021-04-03

## 2021-03-26 RX ORDER — INSULIN LISPRO 100/ML
VIAL (ML) SUBCUTANEOUS
Refills: 0 | Status: DISCONTINUED | OUTPATIENT
Start: 2021-03-26 | End: 2021-04-03

## 2021-03-26 RX ORDER — PIPERACILLIN AND TAZOBACTAM 4; .5 G/20ML; G/20ML
4.5 INJECTION, POWDER, LYOPHILIZED, FOR SOLUTION INTRAVENOUS EVERY 6 HOURS
Refills: 0 | Status: DISCONTINUED | OUTPATIENT
Start: 2021-03-26 | End: 2021-03-29

## 2021-03-26 RX ORDER — OXYCODONE HYDROCHLORIDE 5 MG/1
5 TABLET ORAL EVERY 4 HOURS
Refills: 0 | Status: DISCONTINUED | OUTPATIENT
Start: 2021-03-26 | End: 2021-03-30

## 2021-03-26 RX ORDER — ACETAMINOPHEN 500 MG
650 TABLET ORAL EVERY 6 HOURS
Refills: 0 | Status: DISCONTINUED | OUTPATIENT
Start: 2021-03-26 | End: 2021-03-26

## 2021-03-26 RX ORDER — DEXTROSE 50 % IN WATER 50 %
25 SYRINGE (ML) INTRAVENOUS ONCE
Refills: 0 | Status: DISCONTINUED | OUTPATIENT
Start: 2021-03-26 | End: 2021-04-03

## 2021-03-26 RX ORDER — INSULIN GLARGINE 100 [IU]/ML
20 INJECTION, SOLUTION SUBCUTANEOUS EVERY MORNING
Refills: 0 | Status: DISCONTINUED | OUTPATIENT
Start: 2021-03-27 | End: 2021-03-27

## 2021-03-26 RX ORDER — DEXTROSE 50 % IN WATER 50 %
12.5 SYRINGE (ML) INTRAVENOUS ONCE
Refills: 0 | Status: DISCONTINUED | OUTPATIENT
Start: 2021-03-26 | End: 2021-04-03

## 2021-03-26 RX ORDER — VANCOMYCIN HCL 1 G
1500 VIAL (EA) INTRAVENOUS EVERY 12 HOURS
Refills: 0 | Status: COMPLETED | OUTPATIENT
Start: 2021-03-26 | End: 2021-03-26

## 2021-03-26 RX ORDER — SODIUM CHLORIDE 9 MG/ML
1000 INJECTION, SOLUTION INTRAVENOUS
Refills: 0 | Status: DISCONTINUED | OUTPATIENT
Start: 2021-03-26 | End: 2021-04-03

## 2021-03-26 RX ORDER — DEXTROSE 50 % IN WATER 50 %
15 SYRINGE (ML) INTRAVENOUS ONCE
Refills: 0 | Status: DISCONTINUED | OUTPATIENT
Start: 2021-03-26 | End: 2021-04-03

## 2021-03-26 RX ORDER — ENOXAPARIN SODIUM 100 MG/ML
40 INJECTION SUBCUTANEOUS EVERY 24 HOURS
Refills: 0 | Status: DISCONTINUED | OUTPATIENT
Start: 2021-03-27 | End: 2021-04-03

## 2021-03-26 RX ORDER — GLUCAGON INJECTION, SOLUTION 0.5 MG/.1ML
1 INJECTION, SOLUTION SUBCUTANEOUS ONCE
Refills: 0 | Status: DISCONTINUED | OUTPATIENT
Start: 2021-03-26 | End: 2021-04-03

## 2021-03-26 RX ADMIN — Medication 300 MILLIGRAM(S): at 22:01

## 2021-03-26 RX ADMIN — OXYCODONE HYDROCHLORIDE 5 MILLIGRAM(S): 5 TABLET ORAL at 22:01

## 2021-03-26 RX ADMIN — OXYCODONE HYDROCHLORIDE 5 MILLIGRAM(S): 5 TABLET ORAL at 23:00

## 2021-03-26 RX ADMIN — Medication 2: at 18:32

## 2021-03-26 RX ADMIN — PIPERACILLIN AND TAZOBACTAM 200 GRAM(S): 4; .5 INJECTION, POWDER, LYOPHILIZED, FOR SOLUTION INTRAVENOUS at 16:45

## 2021-03-26 RX ADMIN — Medication 2: at 23:28

## 2021-03-26 RX ADMIN — PIPERACILLIN AND TAZOBACTAM 200 GRAM(S): 4; .5 INJECTION, POWDER, LYOPHILIZED, FOR SOLUTION INTRAVENOUS at 23:31

## 2021-03-26 NOTE — PATIENT PROFILE ADULT - NSPROPTRIGHTSUPPORTPERSON_GEN_A_NUR
[facility] per referral by provider. [facility]ymphedema clinic to contact patient today. declines

## 2021-03-26 NOTE — H&P ADULT - PROBLEM SELECTOR PLAN 2
Wound culture 3/23: GPC in clusters  - Podiatry consult  - Pain control  - MR of the L foot Wound culture 3/23: GPC in clusters  - Podiatry consult  - Pain control: tylenol and oxycodone

## 2021-03-26 NOTE — CONSULT NOTE ADULT - TIME BILLING
-Pt. seen and examined  -Currently CP free, HD stable  -No h/o prior cardiac disease  -Troponin negative x 2  -Would obtain TTE prior to surgery(if non-urgent)  -Pt. is low risk for a low risk procedure. There are no cardiac contraindications at this time.    Lori Lantigua MD  Cardiology Attending

## 2021-03-26 NOTE — H&P ADULT - NSHPSOCIALHISTORY_GEN_ALL_CORE
Patient says he is a social drinker on weekends, quit smoking 20 years ago used to have a few cigarettes on weekends, occasional recreational drug, last cocaine use EMMETT

## 2021-03-26 NOTE — H&P ADULT - ASSESSMENT
46yo M with PMH diabetes, OM R foot s/p 2nd and 3rd toe amputations in setting of OM, who presented to the ED at [place] for fever/chills, myalgia, L foot wound, transferred to [facility] for further management of cellulitis vs. OM

## 2021-03-26 NOTE — CONSULT NOTE ADULT - ASSESSMENT
44yo M with PMH HLD, diabetes, OM R foot s/p 2nd and 3rd toe amputations in setting of OM, who presented to the ED at [place] for evaluation of fever, chills, rigors starting ~3/17. Admitted to [facility] for Diabetic Foot Infection of L foot, where patient was treated with vanc/zosyn, requested transfer to [facility]. Pt is now septic and has met 2/4 Sirs criteria. Podiatry was consulted for management of L foot infection.     Plan:  C/W IV abx   Recommend Spect CT Scan   F/U on MRI taken at [place]  Planning for OR possibly Monday night   Rest of care per primary team   Plan d/w Attending   Podiatry is following  46yo M with PMH HLD, diabetes, OM R foot s/p 2nd and 3rd toe amputations in setting of OM, who presented to the ED at [place] for evaluation of fever, chills, rigors starting ~3/17. Admitted to [place] for Diabetic Foot Infection of L foot, where patient was treated with vanc/zosyn, requested transfer to [facility]. Pt is now septic and has met 2/4 Sirs criteria. Podiatry was consulted for management of L foot infection.     Plan:  C/W IV abx   F/U Deep Wound culture results taken from ulcer on the plantar aspect of the L foot   Recommend Spect CT Scan   F/U on MRI taken at [place]  Planning for OR possibly Monday night   Rest of care per primary team   Plan d/w Attending   Podiatry is following  44yo M with PMH HLD, diabetes, OM R foot s/p 2nd and 3rd toe amputations in setting of OM, who presented to the ED at [place] for evaluation of fever, chills, rigors starting ~3/17. Admitted to [place] for Diabetic Foot Infection of L foot, where patient was treated with vanc/zosyn, requested transfer to [facility]. Pt is now septic and has met 2/4 Sirs criteria. Podiatry was consulted for management of L foot infection.     Plan:  C/W IV abx   F/U Deep Wound culture results taken from ulcer on the plantar aspect of the L foot   Recommend Spect CT Scan   F/U AM ESR and CRP   F/U on MRI taken at [place]  Planning for OR possibly Monday night   Rest of care per primary team   Plan d/w Attending   Podiatry is following

## 2021-03-26 NOTE — CONSULT NOTE ADULT - SUBJECTIVE AND OBJECTIVE BOX
HPI:  44yo M with PMH HLD, diabetes, OM R foot s/p 2nd and 3rd toe amputations in setting of OM, who     presented to the ED at [place] for evaluation of fever, chills, rigors starting ~3/17. He reported rhinorrhea, productive cough, and abdominal pain and went to an urgent care and was told he had a viral infection. Also had a blister on his left foot which caused pain while ambulating. Given antibiotics, underwent MRI morning of transfer 3/26AM. Admitted to [facility] where patient was treated with vanc/zosyn, requested transfer to [facility] to be under care of outpatient podiatrist Dr. Green.     In the ED at [place]  Vitals: 37.5, , RR 16, BP 13/77, 95% on RA  Labs: WBC 16.49, Glucose 171, BUN 22/Cr 1.27, ESR 64, CRP 16.8, D-dimer 252, rapid flu and COVID negative  CXR per [facility] notes: No acute processes  L foot XR: No fracture or dislocation  R foot XR: osseous erosion of first digit, distal phalynx, soft tissue c/f local infection  CT PE: No PE, mild ectasia of the pulmonary trunk  EKG: Sinus tachycardia per [facility] records  FULL [place] RECORD IN PATIENT'S PHYSICAL CHART    On arrival to [facility]   Vitals: HR 98.8, HR 86, /85, RR 18 97% RA  Patient says he is still having some L foot pain and chills. All other ROS negative   (26 Mar 2021 14:34)    PAST MEDICAL & SURGICAL HISTORY:  Osteomyelitis  R foot    Diabetes mellitus        PREVIOUS DIAGNOSTIC TESTING:    [ ] Echocardiogram:  [ ] Stress Test:  [ ] Catheterization: 	    FAMILY HISTORY:      ALLERGIES/INTOLERANCES:  No Known Allergies    HOME MEDICATIONS:    INPATIENT MEDICATIONS:      acetaminophen   Tablet .. 650 milliGRAM(s) Oral every 6 hours PRN  dextrose 40% Gel 15 Gram(s) Oral once  dextrose 5%. 1000 milliLiter(s) IV Continuous <Continuous>  dextrose 5%. 1000 milliLiter(s) IV Continuous <Continuous>  dextrose 50% Injectable 25 Gram(s) IV Push once  dextrose 50% Injectable 12.5 Gram(s) IV Push once  dextrose 50% Injectable 25 Gram(s) IV Push once  glucagon  Injectable 1 milliGRAM(s) IntraMuscular once  insulin lispro (ADMELOG) corrective regimen sliding scale   SubCutaneous Before meals and at bedtime  oxyCODONE    IR 5 milliGRAM(s) Oral every 4 hours PRN  piperacillin/tazobactam IVPB.. 4.5 Gram(s) IV Intermittent every 6 hours  vancomycin  IVPB 1500 milliGRAM(s) IV Intermittent every 12 hours      REVIEW OF SYSTEMS: See HPI     PHYSICAL EXAM:  Gen: NAD   HEENT: EOMI   Neck: Flat JVP   Cor: Normal s1, s2. RRR.   Abd: soft, non-tender, non-distended  Ext: no edema  Neuro: alert and attentive    T(C): 37.1 (03-26-21 @ 13:53), Max: 37.1 (03-26-21 @ 13:53)  HR: 86 (03-26-21 @ 13:53) (86 - 86)  BP: 130/85 (03-26-21 @ 13:53) (130/85 - 130/85)  RR: 18 (03-26-21 @ 13:53) (18 - 18)  SpO2: 97% (03-26-21 @ 13:53) (97% - 97%)  Wt(kg): --    I&O's Summary      TELEMETRY: 	      ECG:  	  	  LABS:            Lipid Profile:   HgA1c:   TSH:     CARDIAC MARKERS:          proBNP:     RADIOLOGY:         HPI:  46yo M with PMH HLD, diabetes, OM R foot s/p 2nd and 3rd toe amputations in setting of OM, who presented to OSH with fever, chills, rigors starting 3/17. Complains of chest discomfort and rib discomfort associated with shakes. Prior to this event, he has not had chest pain and is able to walk 3-4 blocks or climb a few flight of stairs without any symptoms. Currently denies chest pain, palpitations and SOB. Denies history of heart attacks, stroke, heart failure. Cardiology consulted for pre-op prior to possible amputation.     46yo M with PMH HLD, diabetes, OM R foot s/p 2nd and 3rd toe amputations in setting of OM who presented with fever/chills  L foot wound - cardiology consulted for pre-op of possible amputation.     #Pre-Op       PAST MEDICAL & SURGICAL HISTORY:  Osteomyelitis  R foot    Diabetes mellitus        PREVIOUS DIAGNOSTIC TESTING:    [ ] Echocardiogram:  [ ] Stress Test:  [ ] Catheterization: 	    FAMILY HISTORY:      ALLERGIES/INTOLERANCES:  No Known Allergies    HOME MEDICATIONS:    INPATIENT MEDICATIONS:      acetaminophen   Tablet .. 650 milliGRAM(s) Oral every 6 hours PRN  dextrose 40% Gel 15 Gram(s) Oral once  dextrose 5%. 1000 milliLiter(s) IV Continuous <Continuous>  dextrose 5%. 1000 milliLiter(s) IV Continuous <Continuous>  dextrose 50% Injectable 25 Gram(s) IV Push once  dextrose 50% Injectable 12.5 Gram(s) IV Push once  dextrose 50% Injectable 25 Gram(s) IV Push once  glucagon  Injectable 1 milliGRAM(s) IntraMuscular once  insulin lispro (ADMELOG) corrective regimen sliding scale   SubCutaneous Before meals and at bedtime  oxyCODONE    IR 5 milliGRAM(s) Oral every 4 hours PRN  piperacillin/tazobactam IVPB.. 4.5 Gram(s) IV Intermittent every 6 hours  vancomycin  IVPB 1500 milliGRAM(s) IV Intermittent every 12 hours      REVIEW OF SYSTEMS: See HPI     PHYSICAL EXAM:  Gen: NAD   HEENT: EOMI   Neck: Flat JVP   Cor: Normal s1, s2. RRR.   Abd: soft, non-tender, non-distended  Ext: no edema  Neuro: alert and attentive    T(C): 37.1 (03-26-21 @ 13:53), Max: 37.1 (03-26-21 @ 13:53)  HR: 86 (03-26-21 @ 13:53) (86 - 86)  BP: 130/85 (03-26-21 @ 13:53) (130/85 - 130/85)  RR: 18 (03-26-21 @ 13:53) (18 - 18)  SpO2: 97% (03-26-21 @ 13:53) (97% - 97%)  Wt(kg): --    I&O's Summary      TELEMETRY: 	      ECG:  	  	  LABS:            Lipid Profile:   HgA1c:   TSH:     CARDIAC MARKERS:          proBNP:     RADIOLOGY:         HPI:  44yo M with PMH HLD, diabetes, OM R foot s/p 2nd and 3rd toe amputations in setting of OM, who presented to OSH with fever, chills, rigors starting 3/17. Complains of chest discomfort and rib discomfort associated with shakes. Prior to this event, he has not had chest pain and is able to walk 3-4 blocks or climb a few flight of stairs without any symptoms. Currently denies chest pain, palpitations and SOB. Denies history of heart attacks, stroke, heart failure. Cardiology consulted for pre-op prior to possible amputation.     PAST MEDICAL & SURGICAL HISTORY:  Osteomyelitis  R foot    Diabetes mellitus    PREVIOUS DIAGNOSTIC TESTING:      FAMILY HISTORY:    ALLERGIES/INTOLERANCES:  No Known Allergies    HOME MEDICATIONS:    INPATIENT MEDICATIONS:    acetaminophen   Tablet .. 650 milliGRAM(s) Oral every 6 hours PRN  dextrose 40% Gel 15 Gram(s) Oral once  dextrose 5%. 1000 milliLiter(s) IV Continuous <Continuous>  dextrose 5%. 1000 milliLiter(s) IV Continuous <Continuous>  dextrose 50% Injectable 25 Gram(s) IV Push once  dextrose 50% Injectable 12.5 Gram(s) IV Push once  dextrose 50% Injectable 25 Gram(s) IV Push once  glucagon  Injectable 1 milliGRAM(s) IntraMuscular once  insulin lispro (ADMELOG) corrective regimen sliding scale   SubCutaneous Before meals and at bedtime  oxyCODONE    IR 5 milliGRAM(s) Oral every 4 hours PRN  piperacillin/tazobactam IVPB.. 4.5 Gram(s) IV Intermittent every 6 hours  vancomycin  IVPB 1500 milliGRAM(s) IV Intermittent every 12 hours    REVIEW OF SYSTEMS: See HPI     PHYSICAL EXAM:  Gen: NAD   HEENT: EOMI   Cor: Normal s1, s2. RRR.   Abd: soft, non-tender, non-distended  Ext: no edema  Neuro: alert and attentive    T(C): 37.1 (03-26-21 @ 13:53), Max: 37.1 (03-26-21 @ 13:53)  HR: 86 (03-26-21 @ 13:53) (86 - 86)  BP: 130/85 (03-26-21 @ 13:53) (130/85 - 130/85)  RR: 18 (03-26-21 @ 13:53) (18 - 18)  SpO2: 97% (03-26-21 @ 13:53) (97% - 97%)  Wt(kg): --    I&O's Summary    ECG:  sinus tachycardia

## 2021-03-26 NOTE — H&P ADULT - HISTORY OF PRESENT ILLNESS
INCOMPLETE    44yo M with PMH HLD, diabetes, OM R foot s/p 2nd and 3rd toe amputations in setting of OM, who presented to the ED at [place] for evaluation of fever, chills, rigors starting ~3/17. He reported rhinorrhea, productive cough, and abdominal pain and went to an urgent care and was told he had a viral infection. Also had a blister on his left foot which caused pain while ambulating. Admitted to [place] where patient was treated with vanc/zosyn, requested transfer to [facility] to be under care of outpatient podiatrist.     In the ED at [place]  Vitals: 37.5, , RR 16, BP 13/77, 95% on RA  Labs: WBC 16.49, Glucose 171, BUN 22/Cr 1.27, ESR 64, CRP 16.8, D-dimer 252, rapid flu and COVID negative  CXR per [facility] notes: No acute processes  L foot XR: No fracture or dislocation  R foot XR: osseous erosion of first digit, distal phalynx, soft tissue c/f local infection  CT PE: No PE, mild ectasia of the pulmonary trunk  EKG: Sinus tachycardia per [facility] records    On arrival to [facility]  Vitals: HR 98.8, HR 86, /85, RR 18 97% RA   44yo M with PMH HLD, diabetes, OM R foot s/p 2nd and 3rd toe amputations in setting of OM, who presented to the ED at [place] for evaluation of fever, chills, rigors starting ~3/17. He reported rhinorrhea, productive cough, and abdominal pain and went to an urgent care and was told he had a viral infection. Also had a blister on his left foot which caused pain while ambulating. Given antibiotics, underwent MRI morning of transfer 3/26AM. Admitted to [place] where patient was treated with vanc/zosyn, requested transfer to [facility] to be under care of outpatient podiatrist.     In the ED at [place]  Vitals: 37.5, , RR 16, BP 13/77, 95% on RA  Labs: WBC 16.49, Glucose 171, BUN 22/Cr 1.27, ESR 64, CRP 16.8, D-dimer 252, rapid flu and COVID negative  CXR per [facility] notes: No acute processes  L foot XR: No fracture or dislocation  R foot XR: osseous erosion of first digit, distal phalynx, soft tissue c/f local infection  CT PE: No PE, mild ectasia of the pulmonary trunk  EKG: Sinus tachycardia per [facility] records  FULL [place] RECORD IN PATIENT'S PHYSICAL CHART    On arrival to [facility]   Vitals: HR 98.8, HR 86, /85, RR 18 97% RA  Patient says he is still having some L foot pain and chills. All other ROS negative   46yo M with PMH HLD, diabetes, OM R foot s/p 2nd and 3rd toe amputations in setting of OM, who presented to the ED at [place] for evaluation of fever, chills, rigors starting ~3/17. He reported rhinorrhea, productive cough, and abdominal pain and went to an urgent care and was told he had a viral infection. Also had a blister on his left foot which caused pain while ambulating. Given antibiotics, underwent MRI morning of transfer 3/26AM. Admitted to [place] where patient was treated with vanc/zosyn, requested transfer to [facility] to be under care of outpatient podiatrist Dr. Green.     In the ED at [place]  Vitals: 37.5, , RR 16, BP 13/77, 95% on RA  Labs: WBC 16.49, Glucose 171, BUN 22/Cr 1.27, ESR 64, CRP 16.8, D-dimer 252, rapid flu and COVID negative  CXR per [facility] notes: No acute processes  L foot XR: No fracture or dislocation  R foot XR: osseous erosion of first digit, distal phalynx, soft tissue c/f local infection  CT PE: No PE, mild ectasia of the pulmonary trunk  EKG: Sinus tachycardia per [facility] records  FULL [place] RECORD IN PATIENT'S PHYSICAL CHART    On arrival to [facility]   Vitals: HR 98.8, HR 86, /85, RR 18 97% RA  Patient says he is still having some L foot pain and chills. All other ROS negative

## 2021-03-26 NOTE — H&P ADULT - PROBLEM SELECTOR PLAN 1
2/4 SIRS in setting of possible L foot infection 2/4 SIRS in setting of possible L foot infection  - Continue vanc/zosyn- per [facility] records trough was due 3/25 2:30 pm, last given ~10am on day of transport. Will order 3rd dose for 10pm and trough 3/27AM  - f/u podiatry: possible plans for OR next week  - reach out to [place] for results of MRI performed 3/26 AM per patient  - reach out to cardiology regarding pre-op clearance

## 2021-03-26 NOTE — H&P ADULT - NSHPPHYSICALEXAM_GEN_ALL_CORE
VITALS  Vital Signs Last 24 Hrs  T(C): 37.1 (26 Mar 2021 13:53), Max: 37.1 (26 Mar 2021 13:53)  T(F): 98.8 (26 Mar 2021 13:53), Max: 98.8 (26 Mar 2021 13:53)  HR: 86 (26 Mar 2021 13:53) (86 - 86)  BP: 130/85 (26 Mar 2021 13:53) (130/85 - 130/85)  BP(mean): --  RR: 18 (26 Mar 2021 13:53) (18 - 18)  SpO2: 97% (26 Mar 2021 13:53) (97% - 97%)    I&O's Summary    CAPILLARY BLOOD GLUCOSE    PHYSICAL EXAM  General: A&Ox3; NAD  Head: NC/AT; PERRL; EOMI; anicteric sclera  Neck: Supple; no JVD  Respiratory: CTA B/L; no wheezes/crackles/rales auscultated w/ good air movement  Cardiovascular: Regular rhythm/rate; S1/S2; no gallops or murmurs auscultated  Gastrointestinal: Soft; NTND w/out rebound tenderness or guarding; bowel sounds normal  Extremities: WWP; no edema or cyanosis; radial/pedal pulses palpable  Neurological:  CNII-XII grossly intact; no obvious focal deficits VITALS  Vital Signs Last 24 Hrs  T(C): 37.1 (26 Mar 2021 13:53), Max: 37.1 (26 Mar 2021 13:53)  T(F): 98.8 (26 Mar 2021 13:53), Max: 98.8 (26 Mar 2021 13:53)  HR: 86 (26 Mar 2021 13:53) (86 - 86)  BP: 130/85 (26 Mar 2021 13:53) (130/85 - 130/85)  BP(mean): --  RR: 18 (26 Mar 2021 13:53) (18 - 18)  SpO2: 97% (26 Mar 2021 13:53) (97% - 97%)    I&O's Summary    CAPILLARY BLOOD GLUCOSE    PHYSICAL EXAM  General: A&Ox3; NAD  Head: NC/AT; PERRL; EOMI; anicteric sclera  Neck: Supple; no JVD  Respiratory: CTA B/L; no wheezes/crackles/rales auscultated w/ good air movement  Cardiovascular: Regular rhythm/rate; S1/S2; no gallops or murmurs auscultated  Gastrointestinal: Soft; NTND w/out rebound tenderness or guarding; bowel sounds normal  Extremities: L foot wrapped. R foot with black dot on plantar surface of foot under big toe. Nontender to palpation   Neurological:  CNII-XII grossly intact; no obvious focal deficits

## 2021-03-26 NOTE — H&P ADULT - PROBLEM SELECTOR PLAN 3
A1C 8. 5 at [facility]. Per records from 9/2020 home lantus 40, lispro 14  - DASH/CC  - miSS  - Continue lantus and lispro A1C 8. 5 at [facility]. Per records from 9/2020 home lantus 40, lispro 14  - DASH/CC  - miSS  - Continue lantus 20 AM  - Endocrine consult  - Patient states he is on losartan for his DM, notes he does not have hypertension. Will hold for now in setting of normotensive and history of Cr elevation at [facility]

## 2021-03-26 NOTE — CONSULT NOTE ADULT - SUBJECTIVE AND OBJECTIVE BOX
Attending: David Green    Patient is a 45y old  Male who presents with a chief complaint of     HPI:  46yo M with PMH HLD, diabetes, OM R foot s/p 2nd and 3rd toe amputations in setting of OM, who presented to the ED at [place] for evaluation of fever, chills, rigors starting ~3/17. He reported rhinorrhea, productive cough, and abdominal pain and went to an urgent care and was told he had a viral infection. Also had a blister on his left foot which caused pain while ambulating. Given antibiotics, underwent MRI morning of transfer 3/26AM. Admitted to [place] where patient was treated with vanc/zosyn, requested transfer to [facility] to be under care of outpatient podiatrist Dr. Green.     In the ED at [place]  Vitals: 37.5, , RR 16, BP 13/77, 95% on RA  Labs: WBC 16.49, Glucose 171, BUN 22/Cr 1.27, ESR 64, CRP 16.8, D-dimer 252, rapid flu and COVID negative  CXR per [facility] notes: No acute processes  L foot XR: No fracture or dislocation  R foot XR: osseous erosion of first digit, distal phalynx, soft tissue c/f local infection  CT PE: No PE, mild ectasia of the pulmonary trunk  EKG: Sinus tachycardia per [facility] records  FULL [place] RECORD IN PATIENT'S PHYSICAL CHART    On arrival to [facility]   Vitals: HR 98.8, HR 86, /85, RR 18 97% RA  Patient says he is still having some L foot pain and chills. All other ROS negative   (26 Mar 2021 14:34)    Review of systems negative except per HPI    PAST MEDICAL & SURGICAL HISTORY:  Osteomyelitis  R foot    Diabetes mellitus      Home Medications:  atorvastatin 40 mg oral tablet: 1 tab(s) orally once a day (26 Mar 2021 15:18)  HumaLOG KwikPen 100 units/mL injectable solution: 10 unit(s) injectable 3 times a day (26 Mar 2021 15:18)  Jardiance 25 mg oral tablet: 1 tab(s) orally once a day (in the morning) (26 Mar 2021 15:19)  losartan 50 mg oral tablet: 1 tab(s) orally once a day (26 Mar 2021 15:19)  metFORMIN 750 mg oral tablet, extended release: 1 tab(s) orally once a day in the evening with food (26 Mar 2021 15:18)  Soliqua 100/33 subcutaneous solution: 30 unit(s) subcutaneous once a day in AM (26 Mar 2021 15:17)    Allergies    No Known Allergies    Intolerances      FAMILY HISTORY:    Social History:       LABS                        12.1   15.25 )-----------( 414      ( 26 Mar 2021 17:09 )             36.6     03-26    136  |  98  |  12  ----------------------------<  174<H>  5.0   |  28  |  1.12    Ca    9.0      26 Mar 2021 17:09  Phos  2.8     03-26  Mg     2.1     03-26          Vital Signs Last 24 Hrs  T(C): 37.1 (26 Mar 2021 13:53), Max: 37.1 (26 Mar 2021 13:53)  T(F): 98.8 (26 Mar 2021 13:53), Max: 98.8 (26 Mar 2021 13:53)  HR: 86 (26 Mar 2021 13:53) (86 - 86)  BP: 130/85 (26 Mar 2021 13:53) (130/85 - 130/85)  BP(mean): --  RR: 18 (26 Mar 2021 13:53) (18 - 18)  SpO2: 97% (26 Mar 2021 13:53) (97% - 97%)    PHYSICAL EXAM  General: NAD, AA0x3    Lower Extremity Focused:  Vasc:  Derm:  Neuro:  MSK:     RADIOLOGY                       Attending: David Green    Patient is a 45y old  Male who presents with a chief complaint of Left foot diabetic ulcer with a hx of OM. Podiatry was consulted for management of his ulcer. Pt states that the ulcer is not draining and "just recently opened up." He states that [facility] obtained cultures of the ulcer and MRI. Pt presently denies any F/N/V/SOB.     HPI:  46yo M with PMH HLD, diabetes, OM R foot s/p 2nd and 3rd toe amputations in setting of OM, who presented to the ED at [place] for evaluation of fever, chills, rigors starting ~3/17. He reported rhinorrhea, productive cough, and abdominal pain and went to an urgent care and was told he had a viral infection. Also had a blister on his left foot which caused pain while ambulating. Given antibiotics, underwent MRI morning of transfer 3/26AM. Admitted to [place] where patient was treated with vanc/zosyn, requested transfer to [facility] to be under care of outpatient podiatrist Dr. Green.     In the ED at [place]  Vitals: 37.5, , RR 16, BP 13/77, 95% on RA  Labs: WBC 16.49, Glucose 171, BUN 22/Cr 1.27, ESR 64, CRP 16.8, D-dimer 252, rapid flu and COVID negative  CXR per [facility] notes: No acute processes  L foot XR: No fracture or dislocation  R foot XR: osseous erosion of first digit, distal phalynx, soft tissue c/f local infection  CT PE: No PE, mild ectasia of the pulmonary trunk  EKG: Sinus tachycardia per [facility] records  FULL [place] RECORD IN PATIENT'S PHYSICAL CHART    On arrival to [facility]   Vitals: HR 98.8, HR 86, /85, RR 18 97% RA  Patient says he is still having some L foot pain and chills. All other ROS negative   (26 Mar 2021 14:34)    Review of systems negative except per HPI    PAST MEDICAL & SURGICAL HISTORY:  Osteomyelitis  R foot    Diabetes mellitus      Home Medications:  atorvastatin 40 mg oral tablet: 1 tab(s) orally once a day (26 Mar 2021 15:18)  HumaLOG KwikPen 100 units/mL injectable solution: 10 unit(s) injectable 3 times a day (26 Mar 2021 15:18)  Jardiance 25 mg oral tablet: 1 tab(s) orally once a day (in the morning) (26 Mar 2021 15:19)  losartan 50 mg oral tablet: 1 tab(s) orally once a day (26 Mar 2021 15:19)  metFORMIN 750 mg oral tablet, extended release: 1 tab(s) orally once a day in the evening with food (26 Mar 2021 15:18)  Soliqua 100/33 subcutaneous solution: 30 unit(s) subcutaneous once a day in AM (26 Mar 2021 15:17)    Allergies    No Known Allergies    Intolerances      FAMILY HISTORY:    Social History:       LABS                        12.1   15.25 )-----------( 414      ( 26 Mar 2021 17:09 )             36.6     03-26    136  |  98  |  12  ----------------------------<  174<H>  5.0   |  28  |  1.12    Ca    9.0      26 Mar 2021 17:09  Phos  2.8     03-26  Mg     2.1     03-26          Vital Signs Last 24 Hrs  T(C): 37.1 (26 Mar 2021 13:53), Max: 37.1 (26 Mar 2021 13:53)  T(F): 98.8 (26 Mar 2021 13:53), Max: 98.8 (26 Mar 2021 13:53)  HR: 86 (26 Mar 2021 13:53) (86 - 86)  BP: 130/85 (26 Mar 2021 13:53) (130/85 - 130/85)  BP(mean): --  RR: 18 (26 Mar 2021 13:53) (18 - 18)  SpO2: 97% (26 Mar 2021 13:53) (97% - 97%)    PHYSICAL EXAM  General: NAD, AA0x3    Lower Extremity Focused:  Vasc: DP/PT: 2/4 B/L, CFT <3sec b/l, erythema and edema present on the L foot  Derm: Diabetic foot ulcer at the plantar aspect of the L foot (pinhole size), however tracts to the dorsum of the foot and probes to bone. Superficial ulcer present on the plantar aspect of the 1st interspace of the L foot: Simpson grade 1. No drainage, purulence, or malodor noted. R foot: callus present on R foot submet one. Dorsum of R foot has hyperpigmented lesions of the dorsum of the foot and anterior aspect of the leg.  Neuro: Protective sensations grossly intact  MSK: R foot: 4th and 5th ray amputation, partial 2nd digit amp.     RADIOLOGY

## 2021-03-26 NOTE — CONSULT NOTE ADULT - ASSESSMENT
44yo M with PMH HLD, diabetes, OM R foot s/p 2nd and 3rd toe amputations in setting of OM who presented with fever/chills and L foot wound - cardiology consulted for pre-op of possible amputation.     #Pre-Op   ECG sinus tachycardia without ischemic changes. Chest pain occurring in the setting of "shakes".   - recommend trops/ck/ck-mb x 2 given complaints of chest pain the past few days  - TTE in AM   - will continue to follow along.     Jamel Schroeder MD   Cardiology Fellow  Recs not final until attested by cardiology attending.    46yo M with PMH HLD, diabetes, OM R foot s/p 2nd and 3rd toe amputations in setting of OM who presented with fever/chills and L foot wound - cardiology consulted for pre-op of possible amputation.     #Pre-Op   ECG sinus tachycardia without ischemic changes. Chest pain occurring in the setting of "shakes". RCRI score 1 - 6% 30-day risk of death, MI, or cardiac arrest. JACKSON 0.1%. METS > 4.   - recommend trops/ck/ck-mb x 2 given complaints of chest pain the past few days  - TTE in AM   - will continue to follow along.     Jamel Schroeder MD   Cardiology Fellow  Recs not final until attested by cardiology attending.

## 2021-03-27 LAB
A1C WITH ESTIMATED AVERAGE GLUCOSE RESULT: 8.6 % — HIGH (ref 4–5.6)
ANION GAP SERPL CALC-SCNC: 12 MMOL/L — SIGNIFICANT CHANGE UP (ref 5–17)
BASOPHILS # BLD AUTO: 0.06 K/UL — SIGNIFICANT CHANGE UP (ref 0–0.2)
BASOPHILS NFR BLD AUTO: 0.4 % — SIGNIFICANT CHANGE UP (ref 0–2)
BUN SERPL-MCNC: 12 MG/DL — SIGNIFICANT CHANGE UP (ref 7–23)
CALCIUM SERPL-MCNC: 8.8 MG/DL — SIGNIFICANT CHANGE UP (ref 8.4–10.5)
CHLORIDE SERPL-SCNC: 100 MMOL/L — SIGNIFICANT CHANGE UP (ref 96–108)
CK MB CFR SERPL CALC: 1.1 NG/ML — SIGNIFICANT CHANGE UP (ref 0–6.7)
CK SERPL-CCNC: 77 U/L — SIGNIFICANT CHANGE UP (ref 30–200)
CO2 SERPL-SCNC: 24 MMOL/L — SIGNIFICANT CHANGE UP (ref 22–31)
CREAT SERPL-MCNC: 1.22 MG/DL — SIGNIFICANT CHANGE UP (ref 0.5–1.3)
EOSINOPHIL # BLD AUTO: 0.37 K/UL — SIGNIFICANT CHANGE UP (ref 0–0.5)
EOSINOPHIL NFR BLD AUTO: 2.7 % — SIGNIFICANT CHANGE UP (ref 0–6)
ESTIMATED AVERAGE GLUCOSE: 200 MG/DL — HIGH (ref 68–114)
GLUCOSE BLDC GLUCOMTR-MCNC: 148 MG/DL — HIGH (ref 70–99)
GLUCOSE BLDC GLUCOMTR-MCNC: 221 MG/DL — HIGH (ref 70–99)
GLUCOSE BLDC GLUCOMTR-MCNC: 228 MG/DL — HIGH (ref 70–99)
GLUCOSE BLDC GLUCOMTR-MCNC: 234 MG/DL — HIGH (ref 70–99)
GLUCOSE SERPL-MCNC: 164 MG/DL — HIGH (ref 70–99)
HCT VFR BLD CALC: 34.8 % — LOW (ref 39–50)
HGB BLD-MCNC: 11.3 G/DL — LOW (ref 13–17)
IMM GRANULOCYTES NFR BLD AUTO: 0.6 % — SIGNIFICANT CHANGE UP (ref 0–1.5)
LYMPHOCYTES # BLD AUTO: 19.7 % — SIGNIFICANT CHANGE UP (ref 13–44)
LYMPHOCYTES # BLD AUTO: 2.74 K/UL — SIGNIFICANT CHANGE UP (ref 1–3.3)
MAGNESIUM SERPL-MCNC: 2 MG/DL — SIGNIFICANT CHANGE UP (ref 1.6–2.6)
MCHC RBC-ENTMCNC: 28.9 PG — SIGNIFICANT CHANGE UP (ref 27–34)
MCHC RBC-ENTMCNC: 32.5 GM/DL — SIGNIFICANT CHANGE UP (ref 32–36)
MCV RBC AUTO: 89 FL — SIGNIFICANT CHANGE UP (ref 80–100)
MONOCYTES # BLD AUTO: 1.47 K/UL — HIGH (ref 0–0.9)
MONOCYTES NFR BLD AUTO: 10.6 % — SIGNIFICANT CHANGE UP (ref 2–14)
NEUTROPHILS # BLD AUTO: 9.19 K/UL — HIGH (ref 1.8–7.4)
NEUTROPHILS NFR BLD AUTO: 66 % — SIGNIFICANT CHANGE UP (ref 43–77)
NRBC # BLD: 0 /100 WBCS — SIGNIFICANT CHANGE UP (ref 0–0)
PHOSPHATE SERPL-MCNC: 3.1 MG/DL — SIGNIFICANT CHANGE UP (ref 2.5–4.5)
PLATELET # BLD AUTO: 408 K/UL — HIGH (ref 150–400)
POTASSIUM SERPL-MCNC: 4.6 MMOL/L — SIGNIFICANT CHANGE UP (ref 3.5–5.3)
POTASSIUM SERPL-SCNC: 4.6 MMOL/L — SIGNIFICANT CHANGE UP (ref 3.5–5.3)
RBC # BLD: 3.91 M/UL — LOW (ref 4.2–5.8)
RBC # FLD: 13.2 % — SIGNIFICANT CHANGE UP (ref 10.3–14.5)
SODIUM SERPL-SCNC: 136 MMOL/L — SIGNIFICANT CHANGE UP (ref 135–145)
TROPONIN T SERPL-MCNC: 0.01 NG/ML — SIGNIFICANT CHANGE UP (ref 0–0.01)
VANCOMYCIN TROUGH SERPL-MCNC: 11.3 UG/ML — SIGNIFICANT CHANGE UP (ref 10–20)
WBC # BLD: 13.92 K/UL — HIGH (ref 3.8–10.5)
WBC # FLD AUTO: 13.92 K/UL — HIGH (ref 3.8–10.5)

## 2021-03-27 PROCEDURE — 99233 SBSQ HOSP IP/OBS HIGH 50: CPT

## 2021-03-27 RX ORDER — VANCOMYCIN HCL 1 G
1500 VIAL (EA) INTRAVENOUS EVERY 8 HOURS
Refills: 0 | Status: COMPLETED | OUTPATIENT
Start: 2021-03-27 | End: 2021-03-27

## 2021-03-27 RX ORDER — VANCOMYCIN HCL 1 G
VIAL (EA) INTRAVENOUS
Refills: 0 | Status: DISCONTINUED | OUTPATIENT
Start: 2021-03-27 | End: 2021-03-27

## 2021-03-27 RX ORDER — INSULIN LISPRO 100/ML
10 VIAL (ML) SUBCUTANEOUS
Refills: 0 | Status: DISCONTINUED | OUTPATIENT
Start: 2021-03-27 | End: 2021-03-30

## 2021-03-27 RX ORDER — INSULIN GLARGINE 100 [IU]/ML
30 INJECTION, SOLUTION SUBCUTANEOUS AT BEDTIME
Refills: 0 | Status: DISCONTINUED | OUTPATIENT
Start: 2021-03-27 | End: 2021-03-30

## 2021-03-27 RX ADMIN — Medication 650 MILLIGRAM(S): at 15:30

## 2021-03-27 RX ADMIN — PIPERACILLIN AND TAZOBACTAM 200 GRAM(S): 4; .5 INJECTION, POWDER, LYOPHILIZED, FOR SOLUTION INTRAVENOUS at 11:34

## 2021-03-27 RX ADMIN — Medication 300 MILLIGRAM(S): at 16:26

## 2021-03-27 RX ADMIN — Medication 300 MILLIGRAM(S): at 22:55

## 2021-03-27 RX ADMIN — Medication 4: at 12:17

## 2021-03-27 RX ADMIN — Medication 4: at 17:14

## 2021-03-27 RX ADMIN — OXYCODONE HYDROCHLORIDE 5 MILLIGRAM(S): 5 TABLET ORAL at 06:38

## 2021-03-27 RX ADMIN — OXYCODONE HYDROCHLORIDE 5 MILLIGRAM(S): 5 TABLET ORAL at 19:36

## 2021-03-27 RX ADMIN — OXYCODONE HYDROCHLORIDE 5 MILLIGRAM(S): 5 TABLET ORAL at 05:39

## 2021-03-27 RX ADMIN — PIPERACILLIN AND TAZOBACTAM 200 GRAM(S): 4; .5 INJECTION, POWDER, LYOPHILIZED, FOR SOLUTION INTRAVENOUS at 05:39

## 2021-03-27 RX ADMIN — PIPERACILLIN AND TAZOBACTAM 200 GRAM(S): 4; .5 INJECTION, POWDER, LYOPHILIZED, FOR SOLUTION INTRAVENOUS at 23:46

## 2021-03-27 RX ADMIN — Medication 4: at 22:38

## 2021-03-27 RX ADMIN — PIPERACILLIN AND TAZOBACTAM 200 GRAM(S): 4; .5 INJECTION, POWDER, LYOPHILIZED, FOR SOLUTION INTRAVENOUS at 18:52

## 2021-03-27 RX ADMIN — Medication 650 MILLIGRAM(S): at 14:16

## 2021-03-27 RX ADMIN — ENOXAPARIN SODIUM 40 MILLIGRAM(S): 100 INJECTION SUBCUTANEOUS at 10:08

## 2021-03-27 RX ADMIN — INSULIN GLARGINE 30 UNIT(S): 100 INJECTION, SOLUTION SUBCUTANEOUS at 22:39

## 2021-03-27 NOTE — PROGRESS NOTE ADULT - PROBLEM SELECTOR PLAN 2
Wound culture 3/23: GPC in clusters  - Podiatry recs appreciated   -mgmt as above   - Pain control: tylenol and oxycodone

## 2021-03-27 NOTE — PROGRESS NOTE ADULT - PROBLEM SELECTOR PLAN 1
2/4 SIRS in setting of L diabetic foot infection  - Continue vanc/zosyn - f/u 3/28 AM vanc trough   -podiatry plans on OR 3/29 PM   - reach out to [place] for results of MRI performed 3/26 AM per patient

## 2021-03-27 NOTE — PROGRESS NOTE ADULT - SUBJECTIVE AND OBJECTIVE BOX
coverage for Dr Dickerson    HPI:  46yo M with PMH HLD, diabetes, OM R foot s/p 2nd and 3rd toe amputations in setting of OM, who presented to the ED at [place] for evaluation of fever, chills, rigors starting ~3/17. He reported rhinorrhea, productive cough, and abdominal pain and went to an urgent care and was told he had a viral infection. Also had a blister on his left foot which caused pain while ambulating. Given antibiotics, underwent MRI morning of transfer 3/26AM. Admitted to [place] where patient was treated with vanc/zosyn, requested transfer to [facility] to be under care of outpatient podiatrist Dr. Green.         REVIEW OF SYSTEMS:  Constitutional: No fever, weight loss or fatigue  ENMT:  No difficulty hearing, tinnitus, vertigo; No sinus or throat pain  Respiratory: No cough, wheezing, chills or hemoptysis  Cardiovascular: No chest pain, palpitations, dizziness or leg swelling  Gastrointestinal: No abdominal or epigastric pain. No nausea, vomiting  No diarrhea  Skin: No itching, burning, rashes or lesions   Musculoskeletal: foot pain    PAST MEDICAL & SURGICAL HISTORY:  Osteomyelitis  R foot    Diabetes mellitus        FAMILY HISTORY:      SOCIAL HISTORY:  Smoking Status: [ ] Current, [ ] Former, [ ] Never  Pack Years:    MEDICATIONS:  MEDICATIONS  (STANDING):  dextrose 40% Gel 15 Gram(s) Oral once  dextrose 5%. 1000 milliLiter(s) (50 mL/Hr) IV Continuous <Continuous>  dextrose 5%. 1000 milliLiter(s) (100 mL/Hr) IV Continuous <Continuous>  dextrose 50% Injectable 25 Gram(s) IV Push once  dextrose 50% Injectable 12.5 Gram(s) IV Push once  dextrose 50% Injectable 25 Gram(s) IV Push once  enoxaparin Injectable 40 milliGRAM(s) SubCutaneous every 24 hours  glucagon  Injectable 1 milliGRAM(s) IntraMuscular once  insulin lispro (ADMELOG) corrective regimen sliding scale   SubCutaneous Before meals and at bedtime  piperacillin/tazobactam IVPB.. 4.5 Gram(s) IV Intermittent every 6 hours  vancomycin  IVPB        MEDICATIONS  (PRN):  acetaminophen   Tablet .. 650 milliGRAM(s) Oral every 6 hours PRN Mild Pain (1 - 3), Moderate Pain (4 - 6)  oxyCODONE    IR 5 milliGRAM(s) Oral every 4 hours PRN Severe Pain (7 - 10)      Allergies    No Known Allergies    Intolerances        Vital Signs Last 24 Hrs  T(C): 37.2 (27 Mar 2021 05:42), Max: 37.2 (27 Mar 2021 05:42)  T(F): 99 (27 Mar 2021 05:42), Max: 99 (27 Mar 2021 05:42)  HR: 84 (27 Mar 2021 05:42) (84 - 104)  BP: 145/66 (27 Mar 2021 05:42) (130/85 - 145/66)  BP(mean): --  RR: 18 (27 Mar 2021 05:42) (18 - 18)  SpO2: 96% (27 Mar 2021 05:42) (96% - 97%)        PHYSICAL EXAM:    General: Well developed; well nourished; in no acute distress  HEENT: MMM, conjunctiva and sclera clear  Lungs: clear  Heart: regular  Gastrointestinal: Soft, non-tender non-distended; Normal bowel sounds  Extremities: Normal range of motion, No clubbing, cyanosis or edema  Neurological: Alert and oriented x3  ExtL: dressings on, warm to touch      LABS:                        11.3   13.92 )-----------( 408      ( 27 Mar 2021 07:50 )             34.8     03-27    136  |  100  |  12  ----------------------------<  164<H>  4.6   |  24  |  1.22    Ca    8.8      27 Mar 2021 07:50  Phos  3.1     03-27  Mg     2.0     03-27            RADIOLOGY & ADDITIONAL STUDIES:

## 2021-03-27 NOTE — CHART NOTE - NSCHARTNOTEFT_GEN_A_CORE
pt with hx of DM  home regimen includes soliqua 30 units once daily, humalog 10 tid, metformin, jardiance    can c/w sliding scale, but if glucose levels > 200 for 2 consecutive readings, start 30 units lantus at bedtime, lispro 10 units tid with meals. pt with hx of DM  home regimen includes soliqua 30 units once daily, humalog 10 tid, metformin, jardiance    can c/w sliding scale, but if glucose levels > 200 for 2 consecutive readings, start 30 units lantus at bedtime, lispro 10 units tid with meals.    Consistent carb diet  statin if LDL > 70  please dilute medications in NS instead of D5

## 2021-03-27 NOTE — PROGRESS NOTE ADULT - SUBJECTIVE AND OBJECTIVE BOX
Patient is a 45y old  Male who presents with a chief complaint of     INCOMPLETE NOTE    INTERVAL HPI/ OVERNIGHT EVENTS: No overnight events reported. Pt seen and examined at bedside. Pt denies N/V/F/dizziness.      LABS                        11.3   13.92 )-----------( 408      ( 27 Mar 2021 07:50 )             34.8     03-27    136  |  100  |  12  ----------------------------<  164<H>  4.6   |  24  |  1.22    Ca    8.8      27 Mar 2021 07:50  Phos  3.1     03-27  Mg     2.0     03-27          ICU Vital Signs Last 24 Hrs  T(C): 37.2 (27 Mar 2021 05:42), Max: 37.2 (27 Mar 2021 05:42)  T(F): 99 (27 Mar 2021 05:42), Max: 99 (27 Mar 2021 05:42)  HR: 84 (27 Mar 2021 05:42) (84 - 104)  BP: 145/66 (27 Mar 2021 05:42) (130/85 - 145/66)  BP(mean): --  ABP: --  ABP(mean): --  RR: 18 (27 Mar 2021 05:42) (18 - 18)  SpO2: 96% (27 Mar 2021 05:42) (96% - 97%)      RADIOLOGY    MICROBIOLOGY    PHYSICAL EXAM  Lower Extremity Focused  Vasc: DP/PT: 2/4 B/L, CFT <3sec b/l, erythema and edema present on the L foot  Derm: Diabetic foot ulcer at the plantar aspect of the L foot (pinhole size), however tracts to the dorsum of the foot and probes to bone. Superficial ulcer present on the plantar aspect of the 1st interspace of the L foot: Simpson grade 1. No drainage, purulence, or malodor noted. R foot: callus present on R foot submet one. Dorsum of R foot has hyperpigmented lesions of the dorsum of the foot and anterior aspect of the leg.  Neuro: Protective sensations grossly intact  MSK: R foot: 4th and 5th ray amputation, partial 2nd digit amp.  Patient is a 45y old  Male who presents with a chief complaint of     INTERVAL HPI/ OVERNIGHT EVENTS: No overnight events reported. Pt seen and examined at bedside. Pt denies N/V/F/dizziness.      LABS                        11.3   13.92 )-----------( 408      ( 27 Mar 2021 07:50 )             34.8     03-27    136  |  100  |  12  ----------------------------<  164<H>  4.6   |  24  |  1.22    Ca    8.8      27 Mar 2021 07:50  Phos  3.1     03-27  Mg     2.0     03-27          ICU Vital Signs Last 24 Hrs  T(C): 37.2 (27 Mar 2021 05:42), Max: 37.2 (27 Mar 2021 05:42)  T(F): 99 (27 Mar 2021 05:42), Max: 99 (27 Mar 2021 05:42)  HR: 84 (27 Mar 2021 05:42) (84 - 104)  BP: 145/66 (27 Mar 2021 05:42) (130/85 - 145/66)  BP(mean): --  ABP: --  ABP(mean): --  RR: 18 (27 Mar 2021 05:42) (18 - 18)  SpO2: 96% (27 Mar 2021 05:42) (96% - 97%)      RADIOLOGY    MICROBIOLOGY    PHYSICAL EXAM  Lower Extremity Focused  Vasc: DP/PT: 2/4 B/L, CFT <3sec b/l, erythema and edema present on the L foot  Derm: Diabetic foot ulcer at the plantar aspect of the L foot (pinhole size), however tracts to the dorsum of the foot and probes to bone. Superficial ulcer present on the plantar aspect of the 1st interspace of the L foot: Simpson grade 1. No drainage, purulence, or malodor noted. R foot: callus present on R foot submet one. Dorsum of R foot has hyperpigmented lesions of the dorsum of the foot and anterior aspect of the leg.  Neuro: Protective sensations grossly intact  MSK: R foot: 4th and 5th ray amputation, partial 2nd digit amp.

## 2021-03-27 NOTE — PROGRESS NOTE ADULT - ASSESSMENT
46yo M with PMH diabetes, OM R foot s/p 2nd and 3rd toe amputations in setting of OM, who presented to the ED at [place] for fever/chills, myalgia, L foot wound, transferred to [facility] for further management of cellulitis vs. OM

## 2021-03-27 NOTE — PROGRESS NOTE ADULT - SUBJECTIVE AND OBJECTIVE BOX
OVERNIGHT EVENTS: ANDREW     SUBJECTIVE:  Patient seen and examined at bedside. Feels well. No pain in foot. Just feels mildly fatigued. rest of exam negative     Vital Signs Last 12 Hrs  T(F): 99 (03-27-21 @ 14:03), Max: 99 (03-27-21 @ 14:03)  HR: 87 (03-27-21 @ 14:03) (87 - 87)  BP: 157/85 (03-27-21 @ 14:03) (157/85 - 157/85)  BP(mean): --  RR: 17 (03-27-21 @ 14:03) (17 - 17)  SpO2: 97% (03-27-21 @ 14:03) (97% - 97%)  I&O's Summary      PHYSICAL EXAM:  Constitutional: NAD, obese, comfortable in bed.  HEENT: NC/AT, PERRLA, EOMI, no conjunctival pallor or scleral icterus, MMM  Neck: Supple, no JVD  Respiratory: CTA B/L. No w/r/r.   Cardiovascular: RRR, normal S1 and S2, no m/r/g.   Gastrointestinal: +BS, soft NTND, no guarding or rebound tenderness, no palpable masses   Extremities: wwp; no cyanosis, clubbing or edema. L foot wrapped w ACE. R foot 4th and 5th ray amputation, partial 2nd digit amputation  Vascular: Pulses equal and strong throughout.   Neurological: AAOx3, no CN deficits, strength and sensation intact throughout.   Skin: No gross skin abnormalities or rashes        LABS:                        11.3   13.92 )-----------( 408      ( 27 Mar 2021 07:50 )             34.8     03-27    136  |  100  |  12  ----------------------------<  164<H>  4.6   |  24  |  1.22    Ca    8.8      27 Mar 2021 07:50  Phos  3.1     03-27  Mg     2.0     03-27              RADIOLOGY & ADDITIONAL TESTS:    MEDICATIONS  (STANDING):  dextrose 40% Gel 15 Gram(s) Oral once  dextrose 5%. 1000 milliLiter(s) (50 mL/Hr) IV Continuous <Continuous>  dextrose 5%. 1000 milliLiter(s) (100 mL/Hr) IV Continuous <Continuous>  dextrose 50% Injectable 25 Gram(s) IV Push once  dextrose 50% Injectable 12.5 Gram(s) IV Push once  dextrose 50% Injectable 25 Gram(s) IV Push once  enoxaparin Injectable 40 milliGRAM(s) SubCutaneous every 24 hours  glucagon  Injectable 1 milliGRAM(s) IntraMuscular once  insulin lispro (ADMELOG) corrective regimen sliding scale   SubCutaneous Before meals and at bedtime  piperacillin/tazobactam IVPB.. 4.5 Gram(s) IV Intermittent every 6 hours  vancomycin  IVPB 1500 milliGRAM(s) IV Intermittent every 8 hours    MEDICATIONS  (PRN):  acetaminophen   Tablet .. 650 milliGRAM(s) Oral every 6 hours PRN Mild Pain (1 - 3), Moderate Pain (4 - 6)  oxyCODONE    IR 5 milliGRAM(s) Oral every 4 hours PRN Severe Pain (7 - 10)

## 2021-03-27 NOTE — PROGRESS NOTE ADULT - ASSESSMENT
46yo M with PMH HLD, diabetes, OM R foot s/p 2nd and 3rd toe amputations in setting of OM, who presented to the ED at [place] for evaluation of fever, chills, rigors starting ~3/17. Admitted to [place] for Diabetic Foot Infection of L foot, where patient was treated with vanc/zosyn, requested transfer to [facility]. Pt is now septic and has met 2/4 Sirs criteria. Podiatry was consulted for management of L foot infection.     Plan:  C/W IV abx   Deep Wound culture results show growth of rare to few gram positive cocci   Recommend Spect CT Scan   F/U AM ESR and CRP   F/U on MRI taken at [place]  Planning for OR possibly Monday night   Rest of care per primary team   Plan d/w Attending   Podiatry is following

## 2021-03-27 NOTE — PROGRESS NOTE ADULT - PROBLEM SELECTOR PLAN 3
A1C 8. 5 at [facility]. Per records from 9/2020 home lantus 40, lispro 14  - DASH/CC  - miSS  - Continue lantus 20 AM  - f/u endo recs   -started 30U lantus QHS and lispro 10U TID, per endo note since glucose >200 for 2 consecutive readings   - Patient states he is on losartan for his DM, notes he does not have hypertension. Will hold for now in setting of normotensive and history of Cr elevation at [facility]

## 2021-03-28 ENCOUNTER — TRANSCRIPTION ENCOUNTER (OUTPATIENT)
Age: 46
End: 2021-03-28

## 2021-03-28 LAB
-  CEFAZOLIN: SIGNIFICANT CHANGE UP
-  CLINDAMYCIN: SIGNIFICANT CHANGE UP
-  DAPTOMYCIN: SIGNIFICANT CHANGE UP
-  ERYTHROMYCIN: SIGNIFICANT CHANGE UP
-  LINEZOLID: SIGNIFICANT CHANGE UP
-  OXACILLIN: SIGNIFICANT CHANGE UP
-  RIFAMPIN: SIGNIFICANT CHANGE UP
-  TETRACYCLINE: SIGNIFICANT CHANGE UP
-  TRIMETHOPRIM/SULFAMETHOXAZOLE: SIGNIFICANT CHANGE UP
ANION GAP SERPL CALC-SCNC: 9 MMOL/L — SIGNIFICANT CHANGE UP (ref 5–17)
BASOPHILS # BLD AUTO: 0.06 K/UL — SIGNIFICANT CHANGE UP (ref 0–0.2)
BASOPHILS NFR BLD AUTO: 0.5 % — SIGNIFICANT CHANGE UP (ref 0–2)
BUN SERPL-MCNC: 13 MG/DL — SIGNIFICANT CHANGE UP (ref 7–23)
CALCIUM SERPL-MCNC: 8.8 MG/DL — SIGNIFICANT CHANGE UP (ref 8.4–10.5)
CHLORIDE SERPL-SCNC: 97 MMOL/L — SIGNIFICANT CHANGE UP (ref 96–108)
CO2 SERPL-SCNC: 26 MMOL/L — SIGNIFICANT CHANGE UP (ref 22–31)
CREAT SERPL-MCNC: 1.26 MG/DL — SIGNIFICANT CHANGE UP (ref 0.5–1.3)
CRP SERPL-MCNC: 183.6 MG/L — HIGH (ref 0–4)
EOSINOPHIL # BLD AUTO: 0.34 K/UL — SIGNIFICANT CHANGE UP (ref 0–0.5)
EOSINOPHIL NFR BLD AUTO: 2.7 % — SIGNIFICANT CHANGE UP (ref 0–6)
ERYTHROCYTE [SEDIMENTATION RATE] IN BLOOD: 128 MM/HR — HIGH
GLUCOSE BLDC GLUCOMTR-MCNC: 156 MG/DL — HIGH (ref 70–99)
GLUCOSE BLDC GLUCOMTR-MCNC: 220 MG/DL — HIGH (ref 70–99)
GLUCOSE BLDC GLUCOMTR-MCNC: 235 MG/DL — HIGH (ref 70–99)
GLUCOSE BLDC GLUCOMTR-MCNC: 241 MG/DL — HIGH (ref 70–99)
GLUCOSE BLDC GLUCOMTR-MCNC: 288 MG/DL — HIGH (ref 70–99)
GLUCOSE SERPL-MCNC: 243 MG/DL — HIGH (ref 70–99)
HCT VFR BLD CALC: 35 % — LOW (ref 39–50)
HGB BLD-MCNC: 11.5 G/DL — LOW (ref 13–17)
IMM GRANULOCYTES NFR BLD AUTO: 0.9 % — SIGNIFICANT CHANGE UP (ref 0–1.5)
LYMPHOCYTES # BLD AUTO: 18.2 % — SIGNIFICANT CHANGE UP (ref 13–44)
LYMPHOCYTES # BLD AUTO: 2.32 K/UL — SIGNIFICANT CHANGE UP (ref 1–3.3)
MAGNESIUM SERPL-MCNC: 2 MG/DL — SIGNIFICANT CHANGE UP (ref 1.6–2.6)
MCHC RBC-ENTMCNC: 28.7 PG — SIGNIFICANT CHANGE UP (ref 27–34)
MCHC RBC-ENTMCNC: 32.9 GM/DL — SIGNIFICANT CHANGE UP (ref 32–36)
MCV RBC AUTO: 87.3 FL — SIGNIFICANT CHANGE UP (ref 80–100)
METHOD TYPE: SIGNIFICANT CHANGE UP
MONOCYTES # BLD AUTO: 1.14 K/UL — HIGH (ref 0–0.9)
MONOCYTES NFR BLD AUTO: 8.9 % — SIGNIFICANT CHANGE UP (ref 2–14)
NEUTROPHILS # BLD AUTO: 8.81 K/UL — HIGH (ref 1.8–7.4)
NEUTROPHILS NFR BLD AUTO: 68.8 % — SIGNIFICANT CHANGE UP (ref 43–77)
NRBC # BLD: 0 /100 WBCS — SIGNIFICANT CHANGE UP (ref 0–0)
PHOSPHATE SERPL-MCNC: 3.3 MG/DL — SIGNIFICANT CHANGE UP (ref 2.5–4.5)
PLATELET # BLD AUTO: 457 K/UL — HIGH (ref 150–400)
POTASSIUM SERPL-MCNC: 4.4 MMOL/L — SIGNIFICANT CHANGE UP (ref 3.5–5.3)
POTASSIUM SERPL-SCNC: 4.4 MMOL/L — SIGNIFICANT CHANGE UP (ref 3.5–5.3)
RBC # BLD: 4.01 M/UL — LOW (ref 4.2–5.8)
RBC # FLD: 13 % — SIGNIFICANT CHANGE UP (ref 10.3–14.5)
SODIUM SERPL-SCNC: 132 MMOL/L — LOW (ref 135–145)
VANCOMYCIN TROUGH SERPL-MCNC: 19.2 UG/ML — SIGNIFICANT CHANGE UP (ref 10–20)
WBC # BLD: 12.78 K/UL — HIGH (ref 3.8–10.5)
WBC # FLD AUTO: 12.78 K/UL — HIGH (ref 3.8–10.5)

## 2021-03-28 RX ORDER — VANCOMYCIN HCL 1 G
1000 VIAL (EA) INTRAVENOUS EVERY 12 HOURS
Refills: 0 | Status: COMPLETED | OUTPATIENT
Start: 2021-03-28 | End: 2021-03-29

## 2021-03-28 RX ORDER — GABAPENTIN 400 MG/1
100 CAPSULE ORAL THREE TIMES A DAY
Refills: 0 | Status: DISCONTINUED | OUTPATIENT
Start: 2021-03-28 | End: 2021-04-03

## 2021-03-28 RX ORDER — VANCOMYCIN HCL 1 G
1500 VIAL (EA) INTRAVENOUS EVERY 12 HOURS
Refills: 0 | Status: DISCONTINUED | OUTPATIENT
Start: 2021-03-28 | End: 2021-03-28

## 2021-03-28 RX ORDER — MUPIROCIN 20 MG/G
1 OINTMENT TOPICAL
Refills: 0 | Status: DISCONTINUED | OUTPATIENT
Start: 2021-03-28 | End: 2021-04-03

## 2021-03-28 RX ADMIN — PIPERACILLIN AND TAZOBACTAM 200 GRAM(S): 4; .5 INJECTION, POWDER, LYOPHILIZED, FOR SOLUTION INTRAVENOUS at 17:29

## 2021-03-28 RX ADMIN — OXYCODONE HYDROCHLORIDE 5 MILLIGRAM(S): 5 TABLET ORAL at 16:02

## 2021-03-28 RX ADMIN — GABAPENTIN 100 MILLIGRAM(S): 400 CAPSULE ORAL at 13:12

## 2021-03-28 RX ADMIN — ENOXAPARIN SODIUM 40 MILLIGRAM(S): 100 INJECTION SUBCUTANEOUS at 10:15

## 2021-03-28 RX ADMIN — PIPERACILLIN AND TAZOBACTAM 200 GRAM(S): 4; .5 INJECTION, POWDER, LYOPHILIZED, FOR SOLUTION INTRAVENOUS at 05:25

## 2021-03-28 RX ADMIN — OXYCODONE HYDROCHLORIDE 5 MILLIGRAM(S): 5 TABLET ORAL at 06:00

## 2021-03-28 RX ADMIN — Medication 2: at 17:29

## 2021-03-28 RX ADMIN — Medication 10 UNIT(S): at 17:29

## 2021-03-28 RX ADMIN — INSULIN GLARGINE 30 UNIT(S): 100 INJECTION, SOLUTION SUBCUTANEOUS at 21:46

## 2021-03-28 RX ADMIN — GABAPENTIN 100 MILLIGRAM(S): 400 CAPSULE ORAL at 21:47

## 2021-03-28 RX ADMIN — Medication 10 UNIT(S): at 08:31

## 2021-03-28 RX ADMIN — Medication 10 UNIT(S): at 11:54

## 2021-03-28 RX ADMIN — OXYCODONE HYDROCHLORIDE 5 MILLIGRAM(S): 5 TABLET ORAL at 14:56

## 2021-03-28 RX ADMIN — Medication 4: at 23:02

## 2021-03-28 RX ADMIN — OXYCODONE HYDROCHLORIDE 5 MILLIGRAM(S): 5 TABLET ORAL at 11:30

## 2021-03-28 RX ADMIN — Medication 4: at 11:54

## 2021-03-28 RX ADMIN — OXYCODONE HYDROCHLORIDE 5 MILLIGRAM(S): 5 TABLET ORAL at 05:25

## 2021-03-28 RX ADMIN — Medication 6: at 08:31

## 2021-03-28 RX ADMIN — OXYCODONE HYDROCHLORIDE 5 MILLIGRAM(S): 5 TABLET ORAL at 10:15

## 2021-03-28 RX ADMIN — Medication 250 MILLIGRAM(S): at 13:16

## 2021-03-28 RX ADMIN — PIPERACILLIN AND TAZOBACTAM 200 GRAM(S): 4; .5 INJECTION, POWDER, LYOPHILIZED, FOR SOLUTION INTRAVENOUS at 11:53

## 2021-03-28 NOTE — PROGRESS NOTE ADULT - SUBJECTIVE AND OBJECTIVE BOX
Patient is a 45y old  Male who presents with a chief complaint of foot infection (left) (27 Mar 2021 13:16)      INTERVAL HPI/ OVERNIGHT EVENTS: No overnight events reported. Pt seen and examined at bedside. Pt states that he has recently been experiencing a loss of appetite. He now endorses to pain in the L foot and states that he has been requesting pain medication for it. He denies N/V/F/chest pain.        LABS                        11.3   13.92 )-----------( 408      ( 27 Mar 2021 07:50 )             34.8     03-27    136  |  100  |  12  ----------------------------<  164<H>  4.6   |  24  |  1.22    Ca    8.8      27 Mar 2021 07:50  Phos  3.1     03-27  Mg     2.0     03-27          ICU Vital Signs Last 24 Hrs  T(C): 37.3 (27 Mar 2021 20:40), Max: 37.3 (27 Mar 2021 20:40)  T(F): 99.2 (27 Mar 2021 20:40), Max: 99.2 (27 Mar 2021 20:40)  HR: 99 (27 Mar 2021 20:40) (87 - 99)  BP: 174/83 (27 Mar 2021 20:40) (157/85 - 174/83)  BP(mean): --  ABP: --  ABP(mean): --  RR: 17 (27 Mar 2021 20:40) (17 - 17)  SpO2: 96% (27 Mar 2021 20:40) (96% - 97%)      RADIOLOGY    MICROBIOLOGY    PHYSICAL EXAM  Lower Extremity Focused  Vasc: DP/PT: 2/4 B/L, CFT <3sec b/l, erythema and edema present on the L foot  Derm: Diabetic foot ulcer at the plantar aspect of the L foot (pinhole size), however tracts to the dorsum of the foot and probes to bone. Superficial ulcer present on the plantar aspect of the 1st interspace of the L foot: Simpson grade 1. No drainage, purulence, or malodor noted. R foot: callus present on R foot submet one. Dorsum of R foot has hyperpigmented lesions of the dorsum of the foot and anterior aspect of the leg.  Neuro: Protective sensations grossly intact  MSK: R foot: 4th and 5th ray amputation, partial 2nd digit amp.  Patient is a 45y old  Male who presents with a chief complaint of foot infection (left) (27 Mar 2021 13:16)      INTERVAL HPI/ OVERNIGHT EVENTS: No overnight events reported. Pt seen and examined at bedside. Pt states that he has recently been experiencing a loss of appetite. He now endorses to pain in the L foot and states that he has been requesting pain medication for it. He states his pain is like a shooting pain on the lateral aspect of his L foot. He denies N/V/F/chest pain.        LABS                        11.3   13.92 )-----------( 408      ( 27 Mar 2021 07:50 )             34.8     03-27    136  |  100  |  12  ----------------------------<  164<H>  4.6   |  24  |  1.22    Ca    8.8      27 Mar 2021 07:50  Phos  3.1     03-27  Mg     2.0     03-27          ICU Vital Signs Last 24 Hrs  T(C): 37.3 (27 Mar 2021 20:40), Max: 37.3 (27 Mar 2021 20:40)  T(F): 99.2 (27 Mar 2021 20:40), Max: 99.2 (27 Mar 2021 20:40)  HR: 99 (27 Mar 2021 20:40) (87 - 99)  BP: 174/83 (27 Mar 2021 20:40) (157/85 - 174/83)  BP(mean): --  ABP: --  ABP(mean): --  RR: 17 (27 Mar 2021 20:40) (17 - 17)  SpO2: 96% (27 Mar 2021 20:40) (96% - 97%)      RADIOLOGY    MICROBIOLOGY    PHYSICAL EXAM  Lower Extremity Focused  Vasc: DP/PT: 2/4 B/L, CFT <3sec b/l, erythema and edema present on the L foot  Derm: Diabetic foot ulcer at the plantar aspect of the L foot (pinhole size), however tracts to the dorsum of the foot and probes to bone. Superficial ulcer present on the plantar aspect of the 1st interspace of the L foot: Simpson grade 1. No drainage, purulence, or malodor noted. R foot: callus present on R foot submet one. Dorsum of R foot has hyperpigmented lesions of the dorsum of the foot and anterior aspect of the leg.  Neuro: Protective sensations grossly intact  MSK: R foot: 4th and 5th ray amputation, partial 2nd digit amp.  Patient is a 45y old  Male who presents with a chief complaint of foot infection (left) (27 Mar 2021 13:16)      INTERVAL HPI/ OVERNIGHT EVENTS: No overnight events reported. Pt seen and examined at bedside. Pt states that he has recently been experiencing a loss of appetite. He now endorses to pain in the L foot and states that he has been requesting pain medication for it. He states his pain is like a shooting pain on the medial aspect of his L foot. He denies N/V/F/chest pain.        LABS                        11.3   13.92 )-----------( 408      ( 27 Mar 2021 07:50 )             34.8     03-27    136  |  100  |  12  ----------------------------<  164<H>  4.6   |  24  |  1.22    Ca    8.8      27 Mar 2021 07:50  Phos  3.1     03-27  Mg     2.0     03-27          ICU Vital Signs Last 24 Hrs  T(C): 37.3 (27 Mar 2021 20:40), Max: 37.3 (27 Mar 2021 20:40)  T(F): 99.2 (27 Mar 2021 20:40), Max: 99.2 (27 Mar 2021 20:40)  HR: 99 (27 Mar 2021 20:40) (87 - 99)  BP: 174/83 (27 Mar 2021 20:40) (157/85 - 174/83)  BP(mean): --  ABP: --  ABP(mean): --  RR: 17 (27 Mar 2021 20:40) (17 - 17)  SpO2: 96% (27 Mar 2021 20:40) (96% - 97%)      RADIOLOGY    MICROBIOLOGY    PHYSICAL EXAM  Lower Extremity Focused  Vasc: DP/PT: 2/4 B/L, CFT <3sec b/l, erythema and edema present on the L foot  Derm: Diabetic foot ulcer at the plantar aspect of the L foot (pinhole size), however tracts to the dorsum of the foot and probes to bone. Superficial ulcer present on the plantar aspect of the 1st interspace of the L foot: Simpson grade 1. No drainage, purulence, or malodor noted. R foot: callus present on R foot submet one. Dorsum of R foot has hyperpigmented lesions of the dorsum of the foot and anterior aspect of the leg.  Neuro: Protective sensations grossly intact  MSK: R foot: 4th and 5th ray amputation, partial 2nd digit amp.

## 2021-03-28 NOTE — CONSULT NOTE ADULT - ASSESSMENT
RECOMMEND  Blood cultures x 2   Decrease Vancomycin to 1 gm IV q 12 hours  Recheck Vanco trough tomorrow AM  Continue Pip-Tazo for now  Obtain updated microbiologic records/results from the outside hospital           Discussed with the Intern Left diabetic foot infection including infected ulcer, osteomyelitis and cellulitis  MRSA in the wound  Sepsis  Concern patient could have been bacteremic  Slight increase in creatinine        RECOMMEND  Blood cultures x 2 now  Decrease Vancomycin to 1 gm IV q 12 hours  Recheck Vanco trough tomorrow AM  Continue Pip-Tazo for now but could decrease dose to 3.375 gm IV q 6 hours  Obtain radiologic records and updated microbiologic records/results from [facility] including update on blood cultures  Mupirocin ointment to anterior nares, skin lesions/areas of dry skin patches and underneath fingernails           Discussed with the Intern

## 2021-03-28 NOTE — PROGRESS NOTE ADULT - SUBJECTIVE AND OBJECTIVE BOX
Pt seen and examined   no new changes    REVIEW OF SYSTEMS:  Constitutional: No fever,   Cardiovascular: No chest pain, palpitations,  Gastrointestinal: No abdominal or epigastric pain. No nausea, no vomiting, no diarrhea  Skin: No itching, burning, rashes or lesions       MEDICATIONS:  MEDICATIONS  (STANDING):  dextrose 40% Gel 15 Gram(s) Oral once  dextrose 5%. 1000 milliLiter(s) (50 mL/Hr) IV Continuous <Continuous>  dextrose 5%. 1000 milliLiter(s) (100 mL/Hr) IV Continuous <Continuous>  dextrose 50% Injectable 25 Gram(s) IV Push once  dextrose 50% Injectable 12.5 Gram(s) IV Push once  dextrose 50% Injectable 25 Gram(s) IV Push once  enoxaparin Injectable 40 milliGRAM(s) SubCutaneous every 24 hours  glucagon  Injectable 1 milliGRAM(s) IntraMuscular once  insulin glargine Injectable (LANTUS) 30 Unit(s) SubCutaneous at bedtime  insulin lispro (ADMELOG) corrective regimen sliding scale   SubCutaneous Before meals and at bedtime  insulin lispro Injectable (ADMELOG) 10 Unit(s) SubCutaneous three times a day with meals  piperacillin/tazobactam IVPB.. 4.5 Gram(s) IV Intermittent every 6 hours  vancomycin  IVPB 1000 milliGRAM(s) IV Intermittent every 12 hours    MEDICATIONS  (PRN):  acetaminophen   Tablet .. 650 milliGRAM(s) Oral every 6 hours PRN Mild Pain (1 - 3), Moderate Pain (4 - 6)  oxyCODONE    IR 5 milliGRAM(s) Oral every 4 hours PRN Severe Pain (7 - 10)      Allergies    No Known Allergies    Intolerances        Vital Signs Last 24 Hrs  T(C): 37.7 (28 Mar 2021 06:13), Max: 37.7 (28 Mar 2021 06:13)  T(F): 99.9 (28 Mar 2021 06:13), Max: 99.9 (28 Mar 2021 06:13)  HR: 84 (28 Mar 2021 06:13) (84 - 99)  BP: 147/83 (28 Mar 2021 06:13) (147/83 - 174/83)  BP(mean): --  RR: 18 (28 Mar 2021 06:13) (17 - 18)  SpO2: 96% (28 Mar 2021 06:13) (96% - 97%)      PHYSICAL EXAM:    General: Well developed; well nourished; in no acute distress  HEENT: MMM, conjunctiva and sclera clear  Lungs: clear  Heart: regular  Gastrointestinal: Soft non-tender non-distended; Normal bowel sounds;  ExtL dressings on    LABS:      CBC Full  -  ( 28 Mar 2021 07:09 )  WBC Count : 12.78 K/uL  RBC Count : 4.01 M/uL  Hemoglobin : 11.5 g/dL  Hematocrit : 35.0 %  Platelet Count - Automated : 457 K/uL  Mean Cell Volume : 87.3 fl  Mean Cell Hemoglobin : 28.7 pg  Mean Cell Hemoglobin Concentration : 32.9 gm/dL  Auto Neutrophil # : 8.81 K/uL  Auto Lymphocyte # : 2.32 K/uL  Auto Monocyte # : 1.14 K/uL  Auto Eosinophil # : 0.34 K/uL  Auto Basophil # : 0.06 K/uL  Auto Neutrophil % : 68.8 %  Auto Lymphocyte % : 18.2 %  Auto Monocyte % : 8.9 %  Auto Eosinophil % : 2.7 %  Auto Basophil % : 0.5 %    03-28    132<L>  |  97  |  13  ----------------------------<  243<H>  4.4   |  26  |  1.26    Ca    8.8      28 Mar 2021 07:09  Phos  3.3     03-28  Mg     2.0     03-28                        RADIOLOGY & ADDITIONAL STUDIES (The following images were personally reviewed):

## 2021-03-28 NOTE — CONSULT NOTE ADULT - SUBJECTIVE AND OBJECTIVE BOX
HPI:  44yo M with PMH HLD, diabetes, OM R foot s/p 2nd and 3rd toe amputations in setting of OM, who presented to the ED at [facility] for evaluation of fever, chills, rigors starting ~3/17. He reported rhinorrhea, productive cough, and abdominal pain and went to an urgent care and was told he had a viral infection. Also had a blister on his left foot which caused pain while ambulating. Given antibiotics, underwent MRI morning of transfer 3/26AM. Admitted to [facility] where patient was treated with vanc/zosyn, requested transfer to [facility] to be under care of outpatient podiatrist Dr. Green.     In the ED at [facility]  Vitals: 37.5, , RR 16, BP 13/77, 95% on RA  Labs: WBC 16.49, Glucose 171, BUN 22/Cr 1.27, ESR 64, CRP 16.8, D-dimer 252, rapid flu and COVID negative  CXR per [facility] notes: No acute processes  L foot XR: No fracture or dislocation  R foot XR: osseous erosion of first digit, distal phalynx, soft tissue c/f local infection  CT PE: No PE, mild ectasia of the pulmonary trunk  EKG: Sinus tachycardia per [facility] records  FULL [place] RECORD IN PATIENT'S PHYSICAL CHART    On arrival to [facility]   Vitals: HR 98.8, HR 86, /85, RR 18 97% RA  Patient says he is still having some L foot pain and chills. All other ROS negative   (26 Mar 2021 14:34)      PAST MEDICAL & SURGICAL HISTORY:  Osteomyelitis  R foot    Diabetes mellitus        REVIEW OF SYSTEMS      General:	    Skin/Breast:  	  Ophthalmologic:  	  ENMT:	    Respiratory and Thorax:  	  Cardiovascular:	    Gastrointestinal:	    Genitourinary:	    Musculoskeletal:	    Neurological:	    Psychiatric:	    Hematology/Lymphatics:	    Endocrine:	    Allergic/Immunologic:	    MEDICATIONS  (STANDING):  dextrose 40% Gel 15 Gram(s) Oral once  dextrose 5%. 1000 milliLiter(s) (50 mL/Hr) IV Continuous <Continuous>  dextrose 5%. 1000 milliLiter(s) (100 mL/Hr) IV Continuous <Continuous>  dextrose 50% Injectable 25 Gram(s) IV Push once  dextrose 50% Injectable 12.5 Gram(s) IV Push once  dextrose 50% Injectable 25 Gram(s) IV Push once  enoxaparin Injectable 40 milliGRAM(s) SubCutaneous every 24 hours  glucagon  Injectable 1 milliGRAM(s) IntraMuscular once  insulin glargine Injectable (LANTUS) 30 Unit(s) SubCutaneous at bedtime  insulin lispro (ADMELOG) corrective regimen sliding scale   SubCutaneous Before meals and at bedtime  insulin lispro Injectable (ADMELOG) 10 Unit(s) SubCutaneous three times a day with meals  piperacillin/tazobactam IVPB.. 4.5 Gram(s) IV Intermittent every 6 hours  vancomycin  IVPB 1500 milliGRAM(s) IV Intermittent every 12 hours    MEDICATIONS  (PRN):  acetaminophen   Tablet .. 650 milliGRAM(s) Oral every 6 hours PRN Mild Pain (1 - 3), Moderate Pain (4 - 6)  oxyCODONE    IR 5 milliGRAM(s) Oral every 4 hours PRN Severe Pain (7 - 10)      Allergies    No Known Allergies      SOCIAL HISTORY:    FAMILY HISTORY:    EXAM  Vital Signs Last 24 Hrs  T(C): 37.7 (28 Mar 2021 06:13), Max: 37.7 (28 Mar 2021 06:13)  T(F): 99.9 (28 Mar 2021 06:13), Max: 99.9 (28 Mar 2021 06:13)  HR: 84 (28 Mar 2021 06:13) (84 - 99)  BP: 147/83 (28 Mar 2021 06:13) (147/83 - 174/83)  BP(mean): --  RR: 18 (28 Mar 2021 06:13) (17 - 18)  SpO2: 96% (28 Mar 2021 06:13) (96% - 97%)        LABS:                        11.5   12.78 )-----------( 457      ( 28 Mar 2021 07:09 )             35.0     03-28    132<L>  |  97  |  13  ----------------------------<  243<H>  4.4   |  26  |  1.26    Ca    8.8      28 Mar 2021 07:09  Phos  3.3     03-28  Mg     2.0     03-28    Vancomycin Level, Trough (03.28.21 @ 07:09)    Vancomycin Level, Trough: 19.2: Vancomycin trough levels should be rapidly reached and maintained at  15-20 ug/ml for life threatening MRSA  infections such as sepsis, endocarditis, osteomyelitis and pneumonia. A  first trough level should be drawn  before the 3rd or 4th dose.  Risk of renal toxicity is increased for levels >15 ug/ml, in patients on  other nephrotoxic drugs, who are  hemodynamically unstable, have unstable renal function, or are on  Vancomycin therapy for >14 days. Renal function with  creatinine levels should be monitored for those patients. ug/mL   HPI:  44yo M with PMH HLD, diabetes, OM R foot s/p 2nd and 3rd toe amputations in setting of OM, who presented to the ED at [facility] for evaluation of fever, chills, rigors starting ~3/17. He reported rhinorrhea, productive cough, and abdominal pain and went to an urgent care and was told he had a viral infection. Also had a blister on his left foot which caused pain while ambulating. Given antibiotics, underwent MRI morning of transfer 3/26AM. Admitted to [facility] where patient was treated with vanc/zosyn, requested transfer to [facility] to be under care of outpatient podiatrist Dr. Green.   In the ED at [facility]  Vitals: 37.5, , RR 16, BP 13/77, 95% on RA  Labs: WBC 16.49, Glucose 171, BUN 22/Cr 1.27, ESR 64, CRP 16.8, D-dimer 252, rapid flu and COVID negative  CXR per [facility] notes: No acute processes  L foot XR: No fracture or dislocation  R foot XR: osseous erosion of first digit, distal phalynx, soft tissue c/f local infection  CT PE: No PE, mild ectasia of the pulmonary trunk  EKG: Sinus tachycardia per [facility] records  --------------------------------------------    Patient gives the following additional history at present:  He has been ill for over a week now.  He was not admitted when he presented to ED about 1 week ago.  Admitted just a few days ago to [facility] and then transferred to [facility] on 3/26.  He had fever and shaking chills with left foot pain.  Temperatures improved but patient has been anorexic.  LEs neuropathy  ROS otherwise negative      On arrival to [facility]   Vitals: HR 98.8, HR 86, /85, RR 18 97% RA  Patient says he is still having some L foot pain and chills. All other ROS negative   (26 Mar 2021 14:34)      PAST MEDICAL & SURGICAL HISTORY:  Osteomyelitis  R foot    Diabetes mellitus x est. 20 years          MEDICATIONS  (STANDING):  dextrose 40% Gel 15 Gram(s) Oral once  dextrose 5%. 1000 milliLiter(s) (50 mL/Hr) IV Continuous <Continuous>  dextrose 5%. 1000 milliLiter(s) (100 mL/Hr) IV Continuous <Continuous>  dextrose 50% Injectable 25 Gram(s) IV Push once  dextrose 50% Injectable 12.5 Gram(s) IV Push once  dextrose 50% Injectable 25 Gram(s) IV Push once  enoxaparin Injectable 40 milliGRAM(s) SubCutaneous every 24 hours  glucagon  Injectable 1 milliGRAM(s) IntraMuscular once  insulin glargine Injectable (LANTUS) 30 Unit(s) SubCutaneous at bedtime  insulin lispro (ADMELOG) corrective regimen sliding scale   SubCutaneous Before meals and at bedtime  insulin lispro Injectable (ADMELOG) 10 Unit(s) SubCutaneous three times a day with meals  piperacillin/tazobactam IVPB.. 4.5 Gram(s) IV Intermittent every 6 hours  vancomycin  IVPB 1500 milliGRAM(s) IV Intermittent every 12 hours    MEDICATIONS  (PRN):  acetaminophen   Tablet .. 650 milliGRAM(s) Oral every 6 hours PRN Mild Pain (1 - 3), Moderate Pain (4 - 6)  oxyCODONE    IR 5 milliGRAM(s) Oral every 4 hours PRN Severe Pain (7 - 10)      Allergies    No Known Allergies      SOCIAL HISTORY:  Lives at home  Works as a       FAMILY HISTORY:  Noncontributory       EXAM  Vital Signs Last 24 Hrs  T(C): 37.7 (28 Mar 2021 06:13), Max: 37.7 (28 Mar 2021 06:13)  T(F): 99.9 (28 Mar 2021 06:13), Max: 99.9 (28 Mar 2021 06:13)  HR: 84 (28 Mar 2021 06:13) (84 - 99)  BP: 147/83 (28 Mar 2021 06:13) (147/83 - 174/83)  BP(mean): --  RR: 18 (28 Mar 2021 06:13) (17 - 18)  SpO2: 96% (28 Mar 2021 06:13) (96% - 97%)  Awake and alert  Responding appropriately without apparent toxicity  On RA without respiratory distress  No rash  No jaundice  Conjunctiva injected but without petichae  RRR with slight systolic murmur at LSB  Chest CTA  Abd soft NT NT  LE without overt edema except left foot which is also warm with some pinkish reddish discolortion.  Plantar ulcer and 1-2 metatarsal space without drainage and no fluctuance  RLE with scaly dry skin patches distal medial pretibial area.  Scab over 1st MTT ulcer.  S/P amputation 4-5 toes        LABS:                        11.5   12.78 )-----------( 457      ( 28 Mar 2021 07:09 )             35.0     03-28    132<L>  |  97  |  13  ----------------------------<  243<H>  4.4   |  26  |  1.26    Ca    8.8      28 Mar 2021 07:09  Phos  3.3     03-28  Mg     2.0     03-28    Vancomycin Level, Trough (03.28.21 @ 07:09)    Vancomycin Level, Trough: 19.2: Vancomycin trough levels should be rapidly reached and maintained at  15-20 ug/ml for life threatening MRSA  infections such as sepsis, endocarditis, osteomyelitis and pneumonia. A  first trough level should be drawn  before the 3rd or 4th dose.  Risk of renal toxicity is increased for levels >15 ug/ml, in patients on  other nephrotoxic drugs, who are  hemodynamically unstable, have unstable renal function, or are on  Vancomycin therapy for >14 days. Renal function with  creatinine levels should be monitored for those patients. ug/mL          Culture - Surgical Swab (03.26.21 @ 21:29)    Gram Stain:   Rare to few Gram positive cocci in pairs  Few WBC's    -  Cefazolin: R 8    -  Clindamycin: S <=0.25    -  Daptomycin: S 1    -  Erythromycin: R >4    -  Linezolid: S 2    -  Oxacillin: R >2    -  RIF- Rifampin: S <=1 Should not be used as monotherapy    -  Tetra/Doxy: I 8    -  Trimethoprim/Sulfamethoxazole: S <=0.5/9.5    Specimen Source: .Surgical Swab L foot plantar ulcer    Culture Results:   Few Methicillin resistant Staphylococcus aureus  Result called to and read back by_ Ms. JOSE Schumacher RN  03/28/2021  09:31:42    Organism Identification: Methicillin resistant Staphylococcus aureus    Organism: Methicillin resistant Staphylococcus aureus    Method Type: YUN      Creatine Kinase, Serum: 77 U/L (03.27.21 @ 07:50)

## 2021-03-28 NOTE — PROGRESS NOTE ADULT - ASSESSMENT
46yo M with PMH HLD, diabetes, OM R foot s/p 2nd and 3rd toe amputations in setting of OM, who presented to the ED at [facility] for evaluation of fever, chills, rigors starting ~3/17. Admitted to [facility] for Diabetic Foot Infection of L foot, where patient was treated with vanc/zosyn, requested transfer to [facility]. Pt is now septic and has met 2/4 Sirs criteria. Podiatry was consulted for management of L foot infection.     Plan:  C/W IV abx- per ID recs   F/U wound cx  Local wound care   Recommend Spect CT Scan   F/U AM ESR and CRP   F/U on MRI taken at [facility]  Planning for OR possibly Monday night   Rest of care per primary team   Plan d/w Attending   Podiatry is following  44yo M with PMH HLD, diabetes, OM R foot s/p 2nd and 3rd toe amputations in setting of OM, who presented to the ED at [place] for evaluation of fever, chills, rigors starting ~3/17. Admitted to [facility] for Diabetic Foot Infection of L foot, where patient was treated with vanc/zosyn, requested transfer to [facility]. Pt is now septic and has met 2/4 Sirs criteria. Podiatry was consulted for management of L foot infection.     Plan:  C/W IV abx- per ID recs   Possible nerve pain on the L foot  F/U wound cx  Local wound care   Recommend Spect CT Scan   F/U AM ESR and CRP   F/U on MRI taken at [facility]  Planning for OR possibly Monday night   Rest of care per primary team   Plan d/w Attending   Podiatry is following  46yo M with PMH HLD, diabetes, OM R foot s/p 2nd and 3rd toe amputations in setting of OM, who presented to the ED at [facility] for evaluation of fever, chills, rigors starting ~3/17. Admitted to [facility] for Diabetic Foot Infection of L foot, where patient was treated with vanc/zosyn, requested transfer to [facility]. Pt is now septic and has met 2/4 Sirs criteria. Podiatry was consulted for management of L foot infection.     Plan:  C/W IV abx- per ID recs   Possible nerve pain on the L foot  F/U wound cx  Local wound care   Recommend Spect CT Scan   F/U on MRI taken at [facility]  Planning for OR possibly Monday night   Rest of care per primary team   Plan d/w Attending   Podiatry is following  46yo M with PMH HLD, diabetes, OM R foot s/p 2nd and 3rd toe amputations in setting of OM, who presented to the ED at [place] for evaluation of fever, chills, rigors starting ~3/17. Admitted to [place] for Diabetic Foot Infection of L foot, where patient was treated with vanc/zosyn, requested transfer to [facility]. Pt is now septic and has met 2/4 Sirs criteria. Podiatry was consulted for management of L foot infection.     Plan:  NPO after 9am on Monday please  Please have patient's T&S, Covid test, etc up to date for OR tomorrow evening   C/W IV abx- per ID recs   Possible nerve pain on the L foot  F/U wound cx  Local wound care   Recommend Spect CT Scan   F/U on MRI taken at [place]  Planning for OR possibly Monday night   Rest of care per primary team   Plan d/w Attending   Podiatry is following

## 2021-03-28 NOTE — PROGRESS NOTE ADULT - ASSESSMENT
culture MRSA  antibiotics per ID  wound care per podiatry  cardio clearance noted  no contraindication for podiatric procedure   awaiting MRI results per podiatry

## 2021-03-29 ENCOUNTER — RESULT REVIEW (OUTPATIENT)
Age: 46
End: 2021-03-29

## 2021-03-29 LAB
-  VANCOMYCIN: SIGNIFICANT CHANGE UP
ANION GAP SERPL CALC-SCNC: 8 MMOL/L — SIGNIFICANT CHANGE UP (ref 5–17)
BUN SERPL-MCNC: 11 MG/DL — SIGNIFICANT CHANGE UP (ref 7–23)
CALCIUM SERPL-MCNC: 9.3 MG/DL — SIGNIFICANT CHANGE UP (ref 8.4–10.5)
CHLORIDE SERPL-SCNC: 98 MMOL/L — SIGNIFICANT CHANGE UP (ref 96–108)
CO2 SERPL-SCNC: 29 MMOL/L — SIGNIFICANT CHANGE UP (ref 22–31)
CREAT SERPL-MCNC: 1.27 MG/DL — SIGNIFICANT CHANGE UP (ref 0.5–1.3)
CULTURE RESULTS: SIGNIFICANT CHANGE UP
GLUCOSE BLDC GLUCOMTR-MCNC: 152 MG/DL — HIGH (ref 70–99)
GLUCOSE BLDC GLUCOMTR-MCNC: 162 MG/DL — HIGH (ref 70–99)
GLUCOSE BLDC GLUCOMTR-MCNC: 175 MG/DL — HIGH (ref 70–99)
GLUCOSE BLDC GLUCOMTR-MCNC: 228 MG/DL — HIGH (ref 70–99)
GLUCOSE BLDC GLUCOMTR-MCNC: 275 MG/DL — HIGH (ref 70–99)
GLUCOSE SERPL-MCNC: 246 MG/DL — HIGH (ref 70–99)
HCT VFR BLD CALC: 38.2 % — LOW (ref 39–50)
HGB BLD-MCNC: 12.2 G/DL — LOW (ref 13–17)
MAGNESIUM SERPL-MCNC: 1.9 MG/DL — SIGNIFICANT CHANGE UP (ref 1.6–2.6)
MCHC RBC-ENTMCNC: 28.4 PG — SIGNIFICANT CHANGE UP (ref 27–34)
MCHC RBC-ENTMCNC: 31.9 GM/DL — LOW (ref 32–36)
MCV RBC AUTO: 89 FL — SIGNIFICANT CHANGE UP (ref 80–100)
METHOD TYPE: SIGNIFICANT CHANGE UP
NRBC # BLD: 0 /100 WBCS — SIGNIFICANT CHANGE UP (ref 0–0)
ORGANISM # SPEC MICROSCOPIC CNT: SIGNIFICANT CHANGE UP
PHOSPHATE SERPL-MCNC: 3 MG/DL — SIGNIFICANT CHANGE UP (ref 2.5–4.5)
PLATELET # BLD AUTO: 517 K/UL — HIGH (ref 150–400)
POTASSIUM SERPL-MCNC: 4.2 MMOL/L — SIGNIFICANT CHANGE UP (ref 3.5–5.3)
POTASSIUM SERPL-SCNC: 4.2 MMOL/L — SIGNIFICANT CHANGE UP (ref 3.5–5.3)
RBC # BLD: 4.29 M/UL — SIGNIFICANT CHANGE UP (ref 4.2–5.8)
RBC # FLD: 13.2 % — SIGNIFICANT CHANGE UP (ref 10.3–14.5)
SARS-COV-2 RNA SPEC QL NAA+PROBE: NEGATIVE — SIGNIFICANT CHANGE UP
SODIUM SERPL-SCNC: 135 MMOL/L — SIGNIFICANT CHANGE UP (ref 135–145)
SPECIMEN SOURCE: SIGNIFICANT CHANGE UP
VANCOMYCIN FLD-MCNC: 12.9 UG/ML — SIGNIFICANT CHANGE UP
WBC # BLD: 12.94 K/UL — HIGH (ref 3.8–10.5)
WBC # FLD AUTO: 12.94 K/UL — HIGH (ref 3.8–10.5)

## 2021-03-29 PROCEDURE — 73630 X-RAY EXAM OF FOOT: CPT | Mod: 26,LT

## 2021-03-29 PROCEDURE — 88304 TISSUE EXAM BY PATHOLOGIST: CPT | Mod: 26

## 2021-03-29 PROCEDURE — 93010 ELECTROCARDIOGRAM REPORT: CPT

## 2021-03-29 PROCEDURE — 99233 SBSQ HOSP IP/OBS HIGH 50: CPT

## 2021-03-29 PROCEDURE — 93306 TTE W/DOPPLER COMPLETE: CPT | Mod: 26

## 2021-03-29 RX ORDER — VANCOMYCIN HCL 1 G
1250 VIAL (EA) INTRAVENOUS EVERY 12 HOURS
Refills: 0 | Status: DISCONTINUED | OUTPATIENT
Start: 2021-03-29 | End: 2021-03-30

## 2021-03-29 RX ORDER — PIPERACILLIN AND TAZOBACTAM 4; .5 G/20ML; G/20ML
3.38 INJECTION, POWDER, LYOPHILIZED, FOR SOLUTION INTRAVENOUS EVERY 6 HOURS
Refills: 0 | Status: DISCONTINUED | OUTPATIENT
Start: 2021-03-29 | End: 2021-03-31

## 2021-03-29 RX ORDER — VANCOMYCIN HCL 1 G
1000 VIAL (EA) INTRAVENOUS EVERY 12 HOURS
Refills: 0 | Status: DISCONTINUED | OUTPATIENT
Start: 2021-03-29 | End: 2021-03-29

## 2021-03-29 RX ADMIN — PIPERACILLIN AND TAZOBACTAM 25 GRAM(S): 4; .5 INJECTION, POWDER, LYOPHILIZED, FOR SOLUTION INTRAVENOUS at 18:17

## 2021-03-29 RX ADMIN — Medication 10 UNIT(S): at 08:59

## 2021-03-29 RX ADMIN — PIPERACILLIN AND TAZOBACTAM 200 GRAM(S): 4; .5 INJECTION, POWDER, LYOPHILIZED, FOR SOLUTION INTRAVENOUS at 06:02

## 2021-03-29 RX ADMIN — MUPIROCIN 1 APPLICATION(S): 20 OINTMENT TOPICAL at 12:17

## 2021-03-29 RX ADMIN — PIPERACILLIN AND TAZOBACTAM 200 GRAM(S): 4; .5 INJECTION, POWDER, LYOPHILIZED, FOR SOLUTION INTRAVENOUS at 12:17

## 2021-03-29 RX ADMIN — ENOXAPARIN SODIUM 40 MILLIGRAM(S): 100 INJECTION SUBCUTANEOUS at 09:02

## 2021-03-29 RX ADMIN — GABAPENTIN 100 MILLIGRAM(S): 400 CAPSULE ORAL at 23:32

## 2021-03-29 RX ADMIN — GABAPENTIN 100 MILLIGRAM(S): 400 CAPSULE ORAL at 06:03

## 2021-03-29 RX ADMIN — Medication 166.67 MILLIGRAM(S): at 12:17

## 2021-03-29 RX ADMIN — GABAPENTIN 100 MILLIGRAM(S): 400 CAPSULE ORAL at 16:33

## 2021-03-29 RX ADMIN — Medication 2: at 23:08

## 2021-03-29 RX ADMIN — Medication 250 MILLIGRAM(S): at 00:09

## 2021-03-29 RX ADMIN — Medication 4: at 12:18

## 2021-03-29 RX ADMIN — Medication 6: at 09:16

## 2021-03-29 RX ADMIN — Medication 2: at 18:36

## 2021-03-29 RX ADMIN — PIPERACILLIN AND TAZOBACTAM 25 GRAM(S): 4; .5 INJECTION, POWDER, LYOPHILIZED, FOR SOLUTION INTRAVENOUS at 23:08

## 2021-03-29 RX ADMIN — PIPERACILLIN AND TAZOBACTAM 200 GRAM(S): 4; .5 INJECTION, POWDER, LYOPHILIZED, FOR SOLUTION INTRAVENOUS at 00:06

## 2021-03-29 RX ADMIN — MUPIROCIN 1 APPLICATION(S): 20 OINTMENT TOPICAL at 18:36

## 2021-03-29 NOTE — PROGRESS NOTE ADULT - ASSESSMENT
Left diabetic foot infection including infected plantar ulcer and osteomyelitis  Right foot ulcer with prior toe-amputations  Fever  Leukocytosis  Concern for a possible BSI      RECOMMEND    Continue IV Vancomycin and Pip-Tazo  Awaiting OR cultures  Obtain results of blood cultures from [place]  Mupirocin ointment - nostrils, underneath finger nails and skin lesions  Dermatology consult

## 2021-03-29 NOTE — PROGRESS NOTE ADULT - ASSESSMENT
44yo M with PMH HLD, diabetes, OM R foot s/p 2nd and 3rd toe amputations in setting of OM who presented with fever/chills and L foot wound - cardiology consulted for pre-op of possible amputation.     #Pre-Op: hx of uncontrolled DM and HTN. HLD. ECG w/ suspicious ischemic changes, new from previous. Reports chest pain last week. No chest pain or SOB now. Troponin negative x 2. RCRI score 1- 6% 30-day risk of death, MI, or cardiac arrest. JACKSON 0.1%. METS > 4. He is considered a intermediate risk for a low risk procedure.   -Can obtain TTE & Coronary CTA postoperatively to r/o coronary artery disease   - Start Losartan post-op    Discussed w/ primary team

## 2021-03-29 NOTE — PROGRESS NOTE ADULT - ASSESSMENT
44yo M with PMH HLD, diabetes, OM R foot s/p 2nd and 3rd toe amputations in setting of OM, who presented to the ED at [place] for evaluation of fever, chills, rigors starting ~3/17. Admitted to [place] for Diabetic Foot Infection of L foot, where patient was treated with vanc/zosyn, requested transfer to [facility].  Podiatry was consulted for management of L foot infection. Patient is s/p L foot I&D; 3 surgical sites (2 dorsal, 1 plantar) remain open to allow for drainage, approx 5cc pus expressed from 1st webspace. Pt will likely return to OR later this week for possible closure of surgical sites.     - Continue all home medications and anti-coagulants   - Resume diet as tolerated  - Pain control PRN   - WBAT to left heel, preferably with surgical shoe  - Will f/u intra-op culture swabs and soft tissue (plantar wound) sent to pathology   - Likely return to OR later this week (Wednesday?) for closure 44yo M with PMH HLD, diabetes, OM R foot s/p 2nd and 3rd toe amputations in setting of OM, who presented to the ED at [place] for evaluation of fever, chills, rigors starting ~3/17. Admitted to [place] for Diabetic Foot Infection of L foot, where patient was treated with vanc/zosyn, requested transfer to [facility].  Podiatry was consulted for management of L foot infection. Patient is s/p L foot I&D; 3 surgical sites (2 dorsal, 1 plantar) remain open to allow for drainage, approx 5cc pus expressed from 1st webspace. Pt will likely return to OR later this week for possible closure of surgical sites.     - Continue all home medications and anti-coagulants   - Continue IV abx; pt will likely require 6wks IV abx via PICC line  - Resume diet, advance as tolerated  - Pain control PRN   - WBAT to left heel, preferably with surgical shoe  - F/u results of intra-op wound cx swab and soft tissue (plantar wound) pathology   - Likely return to OR later this week (Wednesday 3/31?) for closure of surgical sites

## 2021-03-29 NOTE — PROGRESS NOTE ADULT - SUBJECTIVE AND OBJECTIVE BOX
Subjective:  Patient seen and examined. No chest pain or SOB    PAST MEDICAL & SURGICAL HISTORY:  Osteomyelitis  R foot    Diabetes mellitus        MEDICATIONS  (STANDING):  dextrose 40% Gel 15 Gram(s) Oral once  dextrose 5%. 1000 milliLiter(s) (50 mL/Hr) IV Continuous <Continuous>  dextrose 5%. 1000 milliLiter(s) (100 mL/Hr) IV Continuous <Continuous>  dextrose 50% Injectable 25 Gram(s) IV Push once  dextrose 50% Injectable 12.5 Gram(s) IV Push once  dextrose 50% Injectable 25 Gram(s) IV Push once  enoxaparin Injectable 40 milliGRAM(s) SubCutaneous every 24 hours  gabapentin 100 milliGRAM(s) Oral three times a day  glucagon  Injectable 1 milliGRAM(s) IntraMuscular once  insulin glargine Injectable (LANTUS) 30 Unit(s) SubCutaneous at bedtime  insulin lispro (ADMELOG) corrective regimen sliding scale   SubCutaneous Before meals and at bedtime  insulin lispro Injectable (ADMELOG) 10 Unit(s) SubCutaneous three times a day with meals  mupirocin 2% Ointment 1 Application(s) Topical two times a day  piperacillin/tazobactam IVPB.. 4.5 Gram(s) IV Intermittent every 6 hours  vancomycin  IVPB 1250 milliGRAM(s) IV Intermittent every 12 hours      Vital Signs Last 24 Hrs  T(C): 37.2 (29 Mar 2021 06:30), Max: 37.2 (28 Mar 2021 20:31)  T(F): 98.9 (29 Mar 2021 06:30), Max: 99 (28 Mar 2021 20:31)  HR: 81 (29 Mar 2021 06:30) (81 - 85)  BP: 155/74 (29 Mar 2021 06:30) (123/72 - 155/74)  BP(mean): --  RR: 19 (29 Mar 2021 06:30) (19 - 20)  SpO2: 94% (29 Mar 2021 06:30) (94% - 96%)    Daily     Daily     Physical Exam:   GEN: NAD, AAOx3  HEENT: MMM, no icterus  CV: S1 S2 RRR, no MRG  Lung: CTAB  Abd: soft NT ND +BS  Ext: no c/c/e  Neuro: no focal neuro deficit      LABS:                        12.2   12.94 )-----------( 517      ( 29 Mar 2021 10:45 )             38.2     03-29    135  |  98  |  11  ----------------------------<  246<H>  4.2   |  29  |  1.27    Ca    9.3      29 Mar 2021 10:45  Phos  3.0     03-29  Mg     1.9     03-29              I&O's Detail          RADIOLOGY & ADDITIONAL STUDIES:

## 2021-03-29 NOTE — PROGRESS NOTE ADULT - SUBJECTIVE AND OBJECTIVE BOX
POST OP NOTE    ARIEL MOURA  MRN-[id number]    Procedure: Left foot Incision & Drainage  Surgeon: David Green DPM  Assistants: Mini Murphy DPM (PGY1)    Patient tolerated procedure well without incident.  Patient transferred to PACU in stable condition.       PE / Post Op Check:       GEN: NAD, AAOx3, resting comfortably with pain controlled      LE Focused: CFT shows adequate perfusion b/l with no signs of ischemic compromise.  No strikethrough is appreciated on surgical dressings.  Dressings were dry, clean, and intact.  No neuromuscular deficits appreciated.

## 2021-03-29 NOTE — PROGRESS NOTE ADULT - ASSESSMENT
45M with PMH diabetes, OM R foot s/p 2nd and 3rd toe amputations in setting of OM, who presented to the ED at [place] for fever/chills, myalgia, L foot wound, transferred to [facility] for further management of cellulitis vs. OM

## 2021-03-29 NOTE — PROGRESS NOTE ADULT - PROBLEM SELECTOR PLAN 1
2/4 SIRS in setting of L diabetic foot infection. [facility] MRI: Erosive change of 1st metatarsal head, concern for superimposed osteomyelitis. Report in chart and communicated to podiatry   - Continue vanc/zosyn, f/u 10pm vanc trough   -will f/u Bcx results from [facility]   -podiatry plans on OR 3/29 PM

## 2021-03-29 NOTE — PROGRESS NOTE ADULT - SUBJECTIVE AND OBJECTIVE BOX
INTERVAL HPI/OVERNIGHT EVENTS:        MEDICATIONS  (STANDING):  dextrose 40% Gel 15 Gram(s) Oral once  dextrose 5%. 1000 milliLiter(s) (50 mL/Hr) IV Continuous <Continuous>  dextrose 5%. 1000 milliLiter(s) (100 mL/Hr) IV Continuous <Continuous>  dextrose 50% Injectable 25 Gram(s) IV Push once  dextrose 50% Injectable 12.5 Gram(s) IV Push once  dextrose 50% Injectable 25 Gram(s) IV Push once  enoxaparin Injectable 40 milliGRAM(s) SubCutaneous every 24 hours  gabapentin 100 milliGRAM(s) Oral three times a day  glucagon  Injectable 1 milliGRAM(s) IntraMuscular once  insulin glargine Injectable (LANTUS) 30 Unit(s) SubCutaneous at bedtime  insulin lispro (ADMELOG) corrective regimen sliding scale   SubCutaneous Before meals and at bedtime  insulin lispro Injectable (ADMELOG) 10 Unit(s) SubCutaneous three times a day with meals  mupirocin 2% Ointment 1 Application(s) Topical two times a day  piperacillin/tazobactam IVPB.. 3.375 Gram(s) IV Intermittent every 6 hours  vancomycin  IVPB 1250 milliGRAM(s) IV Intermittent every 12 hours    MEDICATIONS  (PRN):  acetaminophen   Tablet .. 650 milliGRAM(s) Oral every 6 hours PRN Mild Pain (1 - 3), Moderate Pain (4 - 6)  oxyCODONE    IR 5 milliGRAM(s) Oral every 4 hours PRN Severe Pain (7 - 10)      Allergies    No Known Allergies    Intolerances        Vital Signs Last 24 Hrs  T(C): 37.4 (29 Mar 2021 16:39), Max: 37.4 (29 Mar 2021 16:39)  T(F): 99.3 (29 Mar 2021 16:39), Max: 99.3 (29 Mar 2021 16:39)  HR: 81 (29 Mar 2021 16:39) (81 - 85)  BP: 119/79 (29 Mar 2021 16:39) (119/79 - 155/74)  BP(mean): --  RR: 20 (29 Mar 2021 16:39) (19 - 20)  SpO2: 94% (29 Mar 2021 16:39) (94% - 96%)            LABS:                        12.2   12.94 )-----------( 517      ( 29 Mar 2021 10:45 )             38.2     03-29    135  |  98  |  11  ----------------------------<  246<H>  4.2   |  29  |  1.27    Ca    9.3      29 Mar 2021 10:45  Phos  3.0     03-29  Mg     1.9     03-29            MICROBIOLOGY:    RADIOLOGY & ADDITIONAL STUDIES: INTERVAL HPI/OVERNIGHT EVENTS:    Remains clinically stable  Awaiting foot surgery tonight  No diarrhea   No fever      MEDICATIONS  (STANDING):  dextrose 40% Gel 15 Gram(s) Oral once  dextrose 5%. 1000 milliLiter(s) (50 mL/Hr) IV Continuous <Continuous>  dextrose 5%. 1000 milliLiter(s) (100 mL/Hr) IV Continuous <Continuous>  dextrose 50% Injectable 25 Gram(s) IV Push once  dextrose 50% Injectable 12.5 Gram(s) IV Push once  dextrose 50% Injectable 25 Gram(s) IV Push once  enoxaparin Injectable 40 milliGRAM(s) SubCutaneous every 24 hours  gabapentin 100 milliGRAM(s) Oral three times a day  glucagon  Injectable 1 milliGRAM(s) IntraMuscular once  insulin glargine Injectable (LANTUS) 30 Unit(s) SubCutaneous at bedtime  insulin lispro (ADMELOG) corrective regimen sliding scale   SubCutaneous Before meals and at bedtime  insulin lispro Injectable (ADMELOG) 10 Unit(s) SubCutaneous three times a day with meals  mupirocin 2% Ointment 1 Application(s) Topical two times a day  piperacillin/tazobactam IVPB.. 3.375 Gram(s) IV Intermittent every 6 hours  vancomycin  IVPB 1250 milliGRAM(s) IV Intermittent every 12 hours    MEDICATIONS  (PRN):  acetaminophen   Tablet .. 650 milliGRAM(s) Oral every 6 hours PRN Mild Pain (1 - 3), Moderate Pain (4 - 6)  oxyCODONE    IR 5 milliGRAM(s) Oral every 4 hours PRN Severe Pain (7 - 10)      Allergies    No Known Allergies      EXAM  Vital Signs Last 24 Hrs  T(C): 37.4 (29 Mar 2021 16:39), Max: 37.4 (29 Mar 2021 16:39)  T(F): 99.3 (29 Mar 2021 16:39), Max: 99.3 (29 Mar 2021 16:39)  HR: 81 (29 Mar 2021 16:39) (81 - 85)  BP: 119/79 (29 Mar 2021 16:39) (119/79 - 155/74)  BP(mean): --  RR: 20 (29 Mar 2021 16:39) (19 - 20)  SpO2: 94% (29 Mar 2021 16:39) (94% - 96%)  Awake and alert  Appears nontoxic  No rash  Brownish discolored macules - especially proximal UEs  RR  Abd soft NT  LEs no edema  Feet unchanged  Palpable peripheral pulses      LABS:                        12.2   12.94 )-----------( 517      ( 29 Mar 2021 10:45 )             38.2     03-29    135  |  98  |  11  ----------------------------<  246<H>  4.2   |  29  |  1.27    Ca    9.3      29 Mar 2021 10:45  Phos  3.0     03-29  Mg     1.9     03-29        MICROBIOLOGY:    Culture - Blood (03.28.21 @ 13:44)    Specimen Source: .Blood Blood    Culture Results:   No growth at 1 day.    Culture - Blood (03.28.21 @ 13:44)    Specimen Source: .Blood Blood    Culture Results:   No growth at 1 day.    Culture - Surgical Swab (03.26.21 @ 21:29)    Gram Stain:   Rare to few Gram positive cocci in pairs  Few WBC's    -  Cefazolin: R 8    -  Clindamycin: S <=0.25    -  Daptomycin: S 1    -  Erythromycin: R >4    -  Linezolid: S 2    -  Oxacillin: R >2    -  RIF- Rifampin: S <=1 Should not be used as monotherapy    -  Tetra/Doxy: I 8    -  Trimethoprim/Sulfamethoxazole: S <=0.5/9.5    -  Vancomycin: S 1.5    Specimen Source: .Surgical Swab L foot plantar ulcer    Culture Results:   Few Methicillin resistant Staphylococcus aureus  Result called to and read back by_ Ms. JOSE Schumacher RN  03/28/2021  09:31:42    Organism Identification: Methicillin resistant Staphylococcus aureus  Methicillin resistant Staphylococcus aureus    Organism: Methicillin resistant Staphylococcus aureus    Organism: Methicillin resistant Staphylococcus aureus    Method Type: ETEST    Method Type: YUN  ------------------    Vancomycin Level, Random (03.29.21 @ 07:27)    Vancomycin Level, Random: 12.9: The “VANCOMYCIN, RANDOM” test should only be ordered for patients with  severe renal dysfunction or on dialysis. Therapeutic ranges have not been  established and results must be interpreted in the context of patient’s  clinical condition. NOTE: Therapeutic range for Trough Vancomycin is 10 –  20 mcg/mL. ug/mL

## 2021-03-29 NOTE — PROGRESS NOTE ADULT - PROBLEM SELECTOR PLAN 3
A1C 8. 5 at [facility]. Per records from 9/2020 home lantus 40, lispro 14  - DASH/CC  - miSS  -c/w 30U lantus qhs and lispro 10U TID  -endo recs appreciated   - Patient states he is on losartan for his DM, notes he does not have hypertension. Will hold for now in setting of normotensive and history of Cr elevation at [facility]

## 2021-03-29 NOTE — PROGRESS NOTE ADULT - SUBJECTIVE AND OBJECTIVE BOX
Patient is a 45y old  Male who presents with a chief complaint of foot infection (28 Mar 2021 11:18)      INTERVAL HPI/ OVERNIGHT EVENTS: No overnight events reported. Pt seen and examined at bedside. Pt was given Gabapentin yesterday for his foot pain and states that his pain has greatly improved since he started taking the medication. He denies any pain at this time. He denies any N/V/F/SOB. Pt states that he has been NPO since breakfast and is aware that Podiatry is planning a surgical procedure later today.      LABS                        12.2   12.94 )-----------( 517      ( 29 Mar 2021 10:45 )             38.2     03-29    135  |  98  |  11  ----------------------------<  246<H>  4.2   |  29  |  1.27    Ca    9.3      29 Mar 2021 10:45  Phos  3.0     03-29  Mg     1.9     03-29          ICU Vital Signs Last 24 Hrs  T(C): 37.2 (29 Mar 2021 06:30), Max: 37.2 (28 Mar 2021 20:31)  T(F): 98.9 (29 Mar 2021 06:30), Max: 99 (28 Mar 2021 20:31)  HR: 81 (29 Mar 2021 06:30) (81 - 87)  BP: 155/74 (29 Mar 2021 06:30) (123/72 - 155/74)  BP(mean): --  ABP: --  ABP(mean): --  RR: 19 (29 Mar 2021 06:30) (17 - 20)  SpO2: 94% (29 Mar 2021 06:30) (94% - 96%)      RADIOLOGY    MICROBIOLOGY    PHYSICAL EXAM  Lower Extremity Focused  Vasc:  Derm:  Neuro:  MSK: Patient is a 45y old  Male who presents with a chief complaint of foot infection (28 Mar 2021 11:18)      INTERVAL HPI/ OVERNIGHT EVENTS: No overnight events reported. Pt seen and examined at bedside. Pt was given Gabapentin yesterday for his foot pain and states that his pain has greatly improved since he started taking the medication. He denies any pain at this time. He denies any N/V/F/SOB. Pt states that he has been NPO since breakfast and is aware that Podiatry is planning a surgical procedure later today.      LABS                        12.2   12.94 )-----------( 517      ( 29 Mar 2021 10:45 )             38.2     03-29    135  |  98  |  11  ----------------------------<  246<H>  4.2   |  29  |  1.27    Ca    9.3      29 Mar 2021 10:45  Phos  3.0     03-29  Mg     1.9     03-29          ICU Vital Signs Last 24 Hrs  T(C): 37.2 (29 Mar 2021 06:30), Max: 37.2 (28 Mar 2021 20:31)  T(F): 98.9 (29 Mar 2021 06:30), Max: 99 (28 Mar 2021 20:31)  HR: 81 (29 Mar 2021 06:30) (81 - 87)  BP: 155/74 (29 Mar 2021 06:30) (123/72 - 155/74)  BP(mean): --  ABP: --  ABP(mean): --  RR: 19 (29 Mar 2021 06:30) (17 - 20)  SpO2: 94% (29 Mar 2021 06:30) (94% - 96%)      RADIOLOGY    MICROBIOLOGY    PHYSICAL EXAM  Lower Extremity Focused  Vasc: DP/PT: 2/4 B/L, CFT <3sec b/l, erythema and edema present on the L foot  Derm: Diabetic foot ulcer at the plantar aspect of the L foot (pinhole size), however tracts to the dorsum of the foot and probes to bone. Superficial ulcer present on the plantar aspect of the 1st interspace of the L foot: Simpson grade 1. No drainage, purulence, or malodor noted. R foot: callus present on R foot submet one. Dorsum of R foot has hyperpigmented lesions of the dorsum of the foot and anterior aspect of the leg.  Neuro: Protective sensations grossly intact  MSK: R foot: 4th and 5th ray amputation, partial 2nd digit amp.

## 2021-03-29 NOTE — PROGRESS NOTE ADULT - ATTENDING COMMENTS
Initial attending contact date  3/29/21    . See fellow note written above for details. I reviewed the fellow documentation. I have personally seen and examined this patient. I reviewed vitals, labs, medications, cardiac studies, and additional imaging. I agree with the above fellow's findings and plans as written above with the following additions/statements.        -44yo M with PMH , + smoker, HLD, insulin dependent diabetes, OM R foot s/p 2nd and 3rd toe amputations seen for pre-op cardiac assessment for wash out and bx of possible L foot osteomyelitis   -EKG with NSR with T wave inversions III/AVF today, without complaints of CP/SOB   -Will need ECHO and CCTA - r/o CAD on this admission  -Pt considered intermediate risk for very low risk procedure and may proceed as planned   -Will continue to follow

## 2021-03-30 ENCOUNTER — TRANSCRIPTION ENCOUNTER (OUTPATIENT)
Age: 46
End: 2021-03-30

## 2021-03-30 LAB
ANION GAP SERPL CALC-SCNC: 9 MMOL/L — SIGNIFICANT CHANGE UP (ref 5–17)
BUN SERPL-MCNC: 15 MG/DL — SIGNIFICANT CHANGE UP (ref 7–23)
CALCIUM SERPL-MCNC: 8.9 MG/DL — SIGNIFICANT CHANGE UP (ref 8.4–10.5)
CHLORIDE SERPL-SCNC: 98 MMOL/L — SIGNIFICANT CHANGE UP (ref 96–108)
CO2 SERPL-SCNC: 26 MMOL/L — SIGNIFICANT CHANGE UP (ref 22–31)
CREAT SERPL-MCNC: 1.34 MG/DL — HIGH (ref 0.5–1.3)
GLUCOSE BLDC GLUCOMTR-MCNC: 117 MG/DL — HIGH (ref 70–99)
GLUCOSE BLDC GLUCOMTR-MCNC: 146 MG/DL — HIGH (ref 70–99)
GLUCOSE BLDC GLUCOMTR-MCNC: 252 MG/DL — HIGH (ref 70–99)
GLUCOSE BLDC GLUCOMTR-MCNC: 279 MG/DL — HIGH (ref 70–99)
GLUCOSE SERPL-MCNC: 307 MG/DL — HIGH (ref 70–99)
GRAM STN FLD: SIGNIFICANT CHANGE UP
GRAM STN FLD: SIGNIFICANT CHANGE UP
HCT VFR BLD CALC: 35.9 % — LOW (ref 39–50)
HGB BLD-MCNC: 11.6 G/DL — LOW (ref 13–17)
MAGNESIUM SERPL-MCNC: 1.8 MG/DL — SIGNIFICANT CHANGE UP (ref 1.6–2.6)
MCHC RBC-ENTMCNC: 28.4 PG — SIGNIFICANT CHANGE UP (ref 27–34)
MCHC RBC-ENTMCNC: 32.3 GM/DL — SIGNIFICANT CHANGE UP (ref 32–36)
MCV RBC AUTO: 88 FL — SIGNIFICANT CHANGE UP (ref 80–100)
NRBC # BLD: 0 /100 WBCS — SIGNIFICANT CHANGE UP (ref 0–0)
PHOSPHATE SERPL-MCNC: 2.6 MG/DL — SIGNIFICANT CHANGE UP (ref 2.5–4.5)
PLATELET # BLD AUTO: 546 K/UL — HIGH (ref 150–400)
POTASSIUM SERPL-MCNC: 4.6 MMOL/L — SIGNIFICANT CHANGE UP (ref 3.5–5.3)
POTASSIUM SERPL-SCNC: 4.6 MMOL/L — SIGNIFICANT CHANGE UP (ref 3.5–5.3)
RBC # BLD: 4.08 M/UL — LOW (ref 4.2–5.8)
RBC # FLD: 13 % — SIGNIFICANT CHANGE UP (ref 10.3–14.5)
SODIUM SERPL-SCNC: 133 MMOL/L — LOW (ref 135–145)
SPECIMEN SOURCE: SIGNIFICANT CHANGE UP
SPECIMEN SOURCE: SIGNIFICANT CHANGE UP
VANCOMYCIN TROUGH SERPL-MCNC: 14.9 UG/ML — SIGNIFICANT CHANGE UP (ref 10–20)
WBC # BLD: 15.93 K/UL — HIGH (ref 3.8–10.5)
WBC # FLD AUTO: 15.93 K/UL — HIGH (ref 3.8–10.5)

## 2021-03-30 PROCEDURE — 99232 SBSQ HOSP IP/OBS MODERATE 35: CPT

## 2021-03-30 RX ORDER — KETOROLAC TROMETHAMINE 30 MG/ML
15 SYRINGE (ML) INJECTION ONCE
Refills: 0 | Status: DISCONTINUED | OUTPATIENT
Start: 2021-03-30 | End: 2021-03-30

## 2021-03-30 RX ORDER — VANCOMYCIN HCL 1 G
1500 VIAL (EA) INTRAVENOUS EVERY 12 HOURS
Refills: 0 | Status: DISCONTINUED | OUTPATIENT
Start: 2021-03-30 | End: 2021-03-31

## 2021-03-30 RX ORDER — METOPROLOL TARTRATE 50 MG
25 TABLET ORAL ONCE
Refills: 0 | Status: DISCONTINUED | OUTPATIENT
Start: 2021-03-30 | End: 2021-03-30

## 2021-03-30 RX ORDER — INSULIN LISPRO 100/ML
13 VIAL (ML) SUBCUTANEOUS
Refills: 0 | Status: DISCONTINUED | OUTPATIENT
Start: 2021-03-30 | End: 2021-04-03

## 2021-03-30 RX ORDER — INSULIN GLARGINE 100 [IU]/ML
36 INJECTION, SOLUTION SUBCUTANEOUS AT BEDTIME
Refills: 0 | Status: DISCONTINUED | OUTPATIENT
Start: 2021-03-30 | End: 2021-04-03

## 2021-03-30 RX ADMIN — Medication 10 UNIT(S): at 09:25

## 2021-03-30 RX ADMIN — Medication 15 MILLIGRAM(S): at 16:00

## 2021-03-30 RX ADMIN — OXYCODONE HYDROCHLORIDE 5 MILLIGRAM(S): 5 TABLET ORAL at 13:15

## 2021-03-30 RX ADMIN — OXYCODONE HYDROCHLORIDE 5 MILLIGRAM(S): 5 TABLET ORAL at 21:29

## 2021-03-30 RX ADMIN — OXYCODONE HYDROCHLORIDE 5 MILLIGRAM(S): 5 TABLET ORAL at 06:22

## 2021-03-30 RX ADMIN — PIPERACILLIN AND TAZOBACTAM 25 GRAM(S): 4; .5 INJECTION, POWDER, LYOPHILIZED, FOR SOLUTION INTRAVENOUS at 18:07

## 2021-03-30 RX ADMIN — Medication 13 UNIT(S): at 17:47

## 2021-03-30 RX ADMIN — GABAPENTIN 100 MILLIGRAM(S): 400 CAPSULE ORAL at 22:45

## 2021-03-30 RX ADMIN — Medication 15 MILLIGRAM(S): at 15:36

## 2021-03-30 RX ADMIN — GABAPENTIN 100 MILLIGRAM(S): 400 CAPSULE ORAL at 13:07

## 2021-03-30 RX ADMIN — Medication 166.67 MILLIGRAM(S): at 12:05

## 2021-03-30 RX ADMIN — OXYCODONE HYDROCHLORIDE 5 MILLIGRAM(S): 5 TABLET ORAL at 14:15

## 2021-03-30 RX ADMIN — Medication 10 UNIT(S): at 13:06

## 2021-03-30 RX ADMIN — PIPERACILLIN AND TAZOBACTAM 25 GRAM(S): 4; .5 INJECTION, POWDER, LYOPHILIZED, FOR SOLUTION INTRAVENOUS at 12:05

## 2021-03-30 RX ADMIN — PIPERACILLIN AND TAZOBACTAM 25 GRAM(S): 4; .5 INJECTION, POWDER, LYOPHILIZED, FOR SOLUTION INTRAVENOUS at 23:07

## 2021-03-30 RX ADMIN — OXYCODONE HYDROCHLORIDE 5 MILLIGRAM(S): 5 TABLET ORAL at 07:25

## 2021-03-30 RX ADMIN — Medication 6: at 09:25

## 2021-03-30 RX ADMIN — GABAPENTIN 100 MILLIGRAM(S): 400 CAPSULE ORAL at 06:09

## 2021-03-30 RX ADMIN — Medication 6: at 13:06

## 2021-03-30 RX ADMIN — INSULIN GLARGINE 36 UNIT(S): 100 INJECTION, SOLUTION SUBCUTANEOUS at 22:46

## 2021-03-30 RX ADMIN — MUPIROCIN 1 APPLICATION(S): 20 OINTMENT TOPICAL at 18:13

## 2021-03-30 RX ADMIN — OXYCODONE HYDROCHLORIDE 5 MILLIGRAM(S): 5 TABLET ORAL at 19:49

## 2021-03-30 RX ADMIN — Medication 166.67 MILLIGRAM(S): at 00:33

## 2021-03-30 RX ADMIN — PIPERACILLIN AND TAZOBACTAM 25 GRAM(S): 4; .5 INJECTION, POWDER, LYOPHILIZED, FOR SOLUTION INTRAVENOUS at 06:09

## 2021-03-30 RX ADMIN — INSULIN GLARGINE 30 UNIT(S): 100 INJECTION, SOLUTION SUBCUTANEOUS at 00:32

## 2021-03-30 RX ADMIN — ENOXAPARIN SODIUM 40 MILLIGRAM(S): 100 INJECTION SUBCUTANEOUS at 09:25

## 2021-03-30 NOTE — DIETITIAN INITIAL EVALUATION ADULT. - OTHER CALCULATIONS
IBW used due to above 120% of IBW.Adjusted for obesity and increased protein and nutrient needs due to wound and post op demands

## 2021-03-30 NOTE — DIETITIAN INITIAL EVALUATION ADULT. - ORAL INTAKE PTA/DIET HISTORY
Patient reported he has been residing with "friends" and admittedly not following a strict DM diet.He claims he is aware of needed restrictions but sometimes has difficulty complying.Seemed not motivated to discuss diet PTA at this time.Kept eyes closed.Suspected non-compliance with diet.

## 2021-03-30 NOTE — PROGRESS NOTE ADULT - SUBJECTIVE AND OBJECTIVE BOX
Subjective:  Patient seen and examined, no chest pain or SOB    PAST MEDICAL & SURGICAL HISTORY:  Diabetes mellitus    Osteomyelitis  R foot        MEDICATIONS  (STANDING):  dextrose 40% Gel 15 Gram(s) Oral once  dextrose 5%. 1000 milliLiter(s) (50 mL/Hr) IV Continuous <Continuous>  dextrose 5%. 1000 milliLiter(s) (100 mL/Hr) IV Continuous <Continuous>  dextrose 50% Injectable 25 Gram(s) IV Push once  dextrose 50% Injectable 12.5 Gram(s) IV Push once  dextrose 50% Injectable 25 Gram(s) IV Push once  enoxaparin Injectable 40 milliGRAM(s) SubCutaneous every 24 hours  gabapentin 100 milliGRAM(s) Oral three times a day  glucagon  Injectable 1 milliGRAM(s) IntraMuscular once  insulin glargine Injectable (LANTUS) 36 Unit(s) SubCutaneous at bedtime  insulin lispro (ADMELOG) corrective regimen sliding scale   SubCutaneous Before meals and at bedtime  insulin lispro Injectable (ADMELOG) 13 Unit(s) SubCutaneous three times a day with meals  mupirocin 2% Ointment 1 Application(s) Topical two times a day  piperacillin/tazobactam IVPB.. 3.375 Gram(s) IV Intermittent every 6 hours  vancomycin  IVPB 1250 milliGRAM(s) IV Intermittent every 12 hours      Vital Signs Last 24 Hrs  T(C): 37.2 (30 Mar 2021 12:39), Max: 37.4 (29 Mar 2021 16:39)  T(F): 98.9 (30 Mar 2021 12:39), Max: 99.3 (29 Mar 2021 16:39)  HR: 83 (30 Mar 2021 12:39) (78 - 95)  BP: 155/95 (30 Mar 2021 12:39) (119/79 - 179/83)  BP(mean): 108 (29 Mar 2021 23:44) (93 - 108)  RR: 17 (30 Mar 2021 12:39) (14 - 21)  SpO2: 97% (30 Mar 2021 12:39) (94% - 100%)    Daily     Daily     Physical Exam:   GEN: NAD, AAOx3  HEENT: MMM, no icterus  CV: S1 S2 RRR, no MRG  Lung: CTAB  Abd: soft NT ND +BS  Ext: no c/c/e  Neuro: no focal neuro deficit      LABS:                        11.6   15.93 )-----------( 546      ( 30 Mar 2021 07:50 )             35.9     03-30    133<L>  |  98  |  15  ----------------------------<  307<H>  4.6   |  26  |  1.34<H>    Ca    8.9      30 Mar 2021 07:50  Phos  2.6     03-30  Mg     1.8     03-30              I&O's Detail          RADIOLOGY & ADDITIONAL STUDIES:

## 2021-03-30 NOTE — PROGRESS NOTE ADULT - PROBLEM SELECTOR PLAN 1
2/4 SIRS in setting of L diabetic foot infection. [facility] MRI: Erosive change of 1st metatarsal head, concern for superimposed osteomyelitis. Report in chart and communicated to podiatry. s/p I&D 3/29 pm.  - Continue vanc/zosyn, f/u 10pm vanc trough   -will f/u Bcx results from [facility]   -f/u deep wound cx results   -podiatry plans on OR again 3/31 for further debridement and delayed primary closure 2/4 SIRS in setting of L diabetic foot infection. [facility] MRI: Erosive change of 1st metatarsal head, concern for superimposed osteomyelitis. Report in chart and communicated to podiatry. s/p I&D 3/29 pm.  - Continue vanc/zosyn, f/u 10pm vanc trough   -will f/u Bcx results from [facility]   -f/u deep wound cx results   -podiatry plans on OR again 3/31 for further debridement and delayed primary closure  -plan for PICC line for long term abx for osteomyelitis

## 2021-03-30 NOTE — PROGRESS NOTE ADULT - ASSESSMENT
46yo M with PMH HLD, diabetes, OM R foot s/p 2nd and 3rd toe amputations in setting of OM, transferred from [facility] presenting with cellulitis 2/2 Left foot ulceration POD1 s/p L foot incision and drainage    - C/w IV Abx   - Pain control PRN   - WBAT to left heel, preferably with surgical shoe  - F/u intra-op wound cx swab and soft tissue (plantar wound) pathology   - Surgical dressing intact left foot   - Rest of care per primary team  - Plan to RTOR tomorrow 3/30/21 for further debridement and possible delayed primary closure   Podiatry following    44yo M with PMH HLD, diabetes, OM R foot s/p 2nd and 3rd toe amputations in setting of OM, transferred from [facility] presenting with cellulitis 2/2 Left foot ulceration POD1 s/p L foot incision and drainage    - C/w IV Abx   - Pain control PRN   - WBAT to left heel, preferably with surgical shoe  - F/u intra-op wound cx swab and soft tissue (plantar wound) pathology   - Surgical dressing intact left foot   - Rest of care per primary team  - Plan to RTOR tomorrow 3/31/21 for further debridement and possible delayed primary closure   Podiatry following    46yo M with PMH HLD, diabetes, OM R foot s/p 2nd and 3rd toe amputations in setting of OM, transferred from [facility] presenting with cellulitis 2/2 Left foot ulceration POD1 s/p L foot incision and drainage    - C/w IV Abx   - Pain control PRN   - WBAT to left heel, preferably with surgical shoe  - F/u intra-op wound cx swab and soft tissue (plantar wound) pathology   - Surgical dressing intact left foot   - Rest of care per primary team  - Plan to RTOR tomorrow 3/31/21 for further debridement and possible delayed primary closure   - Pt consented and consent placed in chart  Podiatry following

## 2021-03-30 NOTE — PROGRESS NOTE ADULT - ASSESSMENT
45M with PMH diabetes, OM R foot s/p 2nd and 3rd toe amputations in setting of OM, who presented to the ED at [place] for fever/chills, myalgia, L foot wound, transferred to [facility] for further management of cellulitis vs. OM

## 2021-03-30 NOTE — DIETITIAN INITIAL EVALUATION ADULT. - PROBLEM SELECTOR PLAN 3
A1C 8. 5 at [facility]. Per records from 9/2020 home lantus 40, lispro 14  - DASH/CC  - miSS  - Continue lantus 20 AM  - Endocrine consult  - Patient states he is on losartan for his DM, notes he does not have hypertension. Will hold for now in setting of normotensive and history of Cr elevation at [facility]

## 2021-03-30 NOTE — DIETITIAN INITIAL EVALUATION ADULT. - PERSON TAUGHT/METHOD
patient instructed/brief review of [facility] diet with noted lack of interest in education at this time/verbal instruction

## 2021-03-30 NOTE — DIETITIAN INITIAL EVALUATION ADULT. - PROBLEM SELECTOR PLAN 1
2/4 SIRS in setting of possible L foot infection  - Continue vanc/zosyn- per [facility] records trough was due 3/25 2:30 pm, last given ~10am on day of transport. Will order 3rd dose for 10pm and trough 3/27AM  - f/u podiatry: possible plans for OR next week  - reach out to [place] for results of MRI performed 3/26 AM per patient  - reach out to cardiology regarding pre-op clearance

## 2021-03-30 NOTE — PROGRESS NOTE ADULT - SUBJECTIVE AND OBJECTIVE BOX
Patient is a 45y old  Male who presents with a chief complaint of Cellulitis of the L foot (29 Mar 2021 11:52)    INTERVAL HPI/ OVERNIGHT EVENTS: Pt seen at bedside. NAD. VSS WBC 15.93  POD1 s/p I+D Left foot. Pt resting comfortably at bedside     LABS                        11.6   15.93 )-----------( 546      ( 30 Mar 2021 07:50 )             35.9     03-30    133<L>  |  98  |  15  ----------------------------<  307<H>  4.6   |  26  |  1.34<H>    Ca    8.9      30 Mar 2021 07:50  Phos  2.6     03-30  Mg     1.8     03-30    ICU Vital Signs Last 24 Hrs  T(C): 37.2 (30 Mar 2021 05:36), Max: 37.4 (29 Mar 2021 16:39)  T(F): 98.9 (30 Mar 2021 05:36), Max: 99.3 (29 Mar 2021 16:39)  HR: 92 (30 Mar 2021 08:22) (78 - 95)  BP: 130/79 (30 Mar 2021 08:22) (119/79 - 179/83)  BP(mean): 108 (29 Mar 2021 23:44) (93 - 108)  ABP: --  ABP(mean): --  RR: 17 (30 Mar 2021 08:22) (14 - 21)  SpO2: 95% (30 Mar 2021 08:22) (94% - 100%)    RADIOLOGY    MICROBIOLOGY    PE / Post Op Check:       GEN: NAD, AAOx3, resting comfortably with pain controlled      LE Focused: CFT shows adequate perfusion b/l with no signs of ischemic compromise.  No strikethrough is appreciated on surgical dressings.  Dressings were dry, clean, and intact.  No neuromuscular deficits appreciated.

## 2021-03-30 NOTE — PROGRESS NOTE ADULT - ASSESSMENT
46yo M with PMH HLD, diabetes, OM R foot s/p 2nd and 3rd toe amputations in setting of OM who presented with fever/chills and L foot wound - cardiology consulted for pre-op of possible amputation.     #Pre-Op: hx of uncontrolled DM and HTN. HLD. ECG w/ t-wave inversion inferiorly on 3/29 ECG. No chest pain or SOB today. Normal LV systolic function. Patient refusing CCTA for ischemic evaluation. He is considered an intermediate risk for a low risk procedure.   - Can re-start home Losartan post-operatively.   - Can obtain CCTA while outpatient.   - No further cardiac recommendations prior to OR    Please re-consult cardiology for any further questions or concerns

## 2021-03-30 NOTE — DIETITIAN INITIAL EVALUATION ADULT. - OTHER INFO
46y/o male with history of HLD,IDDM,OM of right foot with 2nd and 3rd toe amputation who present with left foot wound.S/P POD #1 ID of left foot.No N/V/D/C or reported pain.Skin with surgical wound.BM 3/29.Patient with little feedback during visit.Claims he has been eating less over last few days.132% of IBW.Denied any weight change.

## 2021-03-30 NOTE — PROGRESS NOTE ADULT - PROBLEM SELECTOR PLAN 3
A1C 8.5 at [facility]. Per records from 9/2020 home lantus 40, lispro 14  - DASH/CC  - miSS  -increase lantus to 35U lantus qhs and lispro 13U TID  -endo recs appreciated   - Patient states he is on losartan for his DM, notes he does not have hypertension. Will hold for now in setting of normotensive and history of Cr elevation at [facility]

## 2021-03-30 NOTE — PROGRESS NOTE ADULT - ATTENDING COMMENTS
Initial attending contact date  3/29/21    . See fellow note written above for details. I reviewed the fellow documentation. I have personally seen and examined this patient. I reviewed vitals, labs, medications, cardiac studies, and additional imaging. I agree with the above fellow's findings and plans as written above with the following additions/statements.        -44yo M with PMH , + smoker, HLD, insulin dependent diabetes, OM R foot s/p 2nd and 3rd toe amputations seen for pre-op cardiac assessment for wash out and bx of possible L foot osteomyelitis   -EKG with NSR with T wave inversions III/AVF today, without complaints of CP/SOB   -ECHO with normal LVEF, pt refusing CCTA   -Pt considered intermediate risk for very low risk procedure and may proceed as planned

## 2021-03-31 LAB
ANION GAP SERPL CALC-SCNC: 7 MMOL/L — SIGNIFICANT CHANGE UP (ref 5–17)
BASOPHILS # BLD AUTO: 0.08 K/UL — SIGNIFICANT CHANGE UP (ref 0–0.2)
BASOPHILS NFR BLD AUTO: 0.6 % — SIGNIFICANT CHANGE UP (ref 0–2)
BUN SERPL-MCNC: 15 MG/DL — SIGNIFICANT CHANGE UP (ref 7–23)
CALCIUM SERPL-MCNC: 9.3 MG/DL — SIGNIFICANT CHANGE UP (ref 8.4–10.5)
CHLORIDE SERPL-SCNC: 100 MMOL/L — SIGNIFICANT CHANGE UP (ref 96–108)
CO2 SERPL-SCNC: 29 MMOL/L — SIGNIFICANT CHANGE UP (ref 22–31)
CREAT SERPL-MCNC: 1.34 MG/DL — HIGH (ref 0.5–1.3)
EOSINOPHIL # BLD AUTO: 0.46 K/UL — SIGNIFICANT CHANGE UP (ref 0–0.5)
EOSINOPHIL NFR BLD AUTO: 3.5 % — SIGNIFICANT CHANGE UP (ref 0–6)
GLUCOSE BLDC GLUCOMTR-MCNC: 183 MG/DL — HIGH (ref 70–99)
GLUCOSE BLDC GLUCOMTR-MCNC: 252 MG/DL — HIGH (ref 70–99)
GLUCOSE BLDC GLUCOMTR-MCNC: 277 MG/DL — HIGH (ref 70–99)
GLUCOSE SERPL-MCNC: 182 MG/DL — HIGH (ref 70–99)
HCT VFR BLD CALC: 36.5 % — LOW (ref 39–50)
HGB BLD-MCNC: 11.7 G/DL — LOW (ref 13–17)
IMM GRANULOCYTES NFR BLD AUTO: 0.5 % — SIGNIFICANT CHANGE UP (ref 0–1.5)
LYMPHOCYTES # BLD AUTO: 2.88 K/UL — SIGNIFICANT CHANGE UP (ref 1–3.3)
LYMPHOCYTES # BLD AUTO: 22 % — SIGNIFICANT CHANGE UP (ref 13–44)
MAGNESIUM SERPL-MCNC: 1.9 MG/DL — SIGNIFICANT CHANGE UP (ref 1.6–2.6)
MCHC RBC-ENTMCNC: 28.8 PG — SIGNIFICANT CHANGE UP (ref 27–34)
MCHC RBC-ENTMCNC: 32.1 GM/DL — SIGNIFICANT CHANGE UP (ref 32–36)
MCV RBC AUTO: 89.9 FL — SIGNIFICANT CHANGE UP (ref 80–100)
MONOCYTES # BLD AUTO: 0.84 K/UL — SIGNIFICANT CHANGE UP (ref 0–0.9)
MONOCYTES NFR BLD AUTO: 6.4 % — SIGNIFICANT CHANGE UP (ref 2–14)
NEUTROPHILS # BLD AUTO: 8.79 K/UL — HIGH (ref 1.8–7.4)
NEUTROPHILS NFR BLD AUTO: 67 % — SIGNIFICANT CHANGE UP (ref 43–77)
NRBC # BLD: 0 /100 WBCS — SIGNIFICANT CHANGE UP (ref 0–0)
PHOSPHATE SERPL-MCNC: 3 MG/DL — SIGNIFICANT CHANGE UP (ref 2.5–4.5)
PLATELET # BLD AUTO: 573 K/UL — HIGH (ref 150–400)
POTASSIUM SERPL-MCNC: 5 MMOL/L — SIGNIFICANT CHANGE UP (ref 3.5–5.3)
POTASSIUM SERPL-SCNC: 5 MMOL/L — SIGNIFICANT CHANGE UP (ref 3.5–5.3)
RBC # BLD: 4.06 M/UL — LOW (ref 4.2–5.8)
RBC # FLD: 13 % — SIGNIFICANT CHANGE UP (ref 10.3–14.5)
SODIUM SERPL-SCNC: 136 MMOL/L — SIGNIFICANT CHANGE UP (ref 135–145)
SURGICAL PATHOLOGY STUDY: SIGNIFICANT CHANGE UP
VANCOMYCIN TROUGH SERPL-MCNC: 18.3 UG/ML — SIGNIFICANT CHANGE UP (ref 10–20)
WBC # BLD: 13.11 K/UL — HIGH (ref 3.8–10.5)
WBC # FLD AUTO: 13.11 K/UL — HIGH (ref 3.8–10.5)

## 2021-03-31 PROCEDURE — 76937 US GUIDE VASCULAR ACCESS: CPT | Mod: 26,59

## 2021-03-31 PROCEDURE — 73620 X-RAY EXAM OF FOOT: CPT | Mod: 26,LT

## 2021-03-31 PROCEDURE — 36569 INSJ PICC 5 YR+ W/O IMAGING: CPT

## 2021-03-31 RX ORDER — DAPTOMYCIN 500 MG/10ML
800 INJECTION, POWDER, LYOPHILIZED, FOR SOLUTION INTRAVENOUS EVERY 24 HOURS
Refills: 0 | Status: DISCONTINUED | OUTPATIENT
Start: 2021-04-01 | End: 2021-04-03

## 2021-03-31 RX ORDER — DAPTOMYCIN 500 MG/10ML
INJECTION, POWDER, LYOPHILIZED, FOR SOLUTION INTRAVENOUS
Refills: 0 | Status: DISCONTINUED | OUTPATIENT
Start: 2021-03-31 | End: 2021-04-03

## 2021-03-31 RX ORDER — CHLORHEXIDINE GLUCONATE 213 G/1000ML
1 SOLUTION TOPICAL
Refills: 0 | Status: DISCONTINUED | OUTPATIENT
Start: 2021-03-31 | End: 2021-04-03

## 2021-03-31 RX ORDER — SODIUM CHLORIDE 9 MG/ML
10 INJECTION INTRAMUSCULAR; INTRAVENOUS; SUBCUTANEOUS
Refills: 0 | Status: DISCONTINUED | OUTPATIENT
Start: 2021-03-31 | End: 2021-04-03

## 2021-03-31 RX ORDER — DAPTOMYCIN 500 MG/10ML
800 INJECTION, POWDER, LYOPHILIZED, FOR SOLUTION INTRAVENOUS ONCE
Refills: 0 | Status: COMPLETED | OUTPATIENT
Start: 2021-03-31 | End: 2021-03-31

## 2021-03-31 RX ADMIN — DAPTOMYCIN 132 MILLIGRAM(S): 500 INJECTION, POWDER, LYOPHILIZED, FOR SOLUTION INTRAVENOUS at 22:59

## 2021-03-31 RX ADMIN — MUPIROCIN 1 APPLICATION(S): 20 OINTMENT TOPICAL at 06:22

## 2021-03-31 RX ADMIN — GABAPENTIN 100 MILLIGRAM(S): 400 CAPSULE ORAL at 06:21

## 2021-03-31 RX ADMIN — GABAPENTIN 100 MILLIGRAM(S): 400 CAPSULE ORAL at 22:58

## 2021-03-31 RX ADMIN — PIPERACILLIN AND TAZOBACTAM 25 GRAM(S): 4; .5 INJECTION, POWDER, LYOPHILIZED, FOR SOLUTION INTRAVENOUS at 06:21

## 2021-03-31 RX ADMIN — Medication 300 MILLIGRAM(S): at 00:26

## 2021-03-31 RX ADMIN — Medication 6: at 22:58

## 2021-03-31 RX ADMIN — Medication 6: at 12:34

## 2021-03-31 RX ADMIN — INSULIN GLARGINE 36 UNIT(S): 100 INJECTION, SOLUTION SUBCUTANEOUS at 22:58

## 2021-03-31 NOTE — PROGRESS NOTE ADULT - SUBJECTIVE AND OBJECTIVE BOX
INTERVAL HPI/OVERNIGHT EVENTS:  As per night team, no overnight events. Patient seen and examined at bedside. Informed he is scheduled for OR washout procedure with podiatry at 5pm today. Vancomycin held due to increased creatinine. Will obtain 12pm vanc trough and re-evaluate tomorrow if patient should be continued on vanc or switched to daptomycin. Says he wants to go to work and does not want medication dosing to affect employment. Patient denies fever, chills, dizziness, weakness, HA, CP, SOB, N/V/D/C. 12 pt ROS otherwise negative.     VITALS  Vital Signs Last 24 Hrs  T(C): 36.8 (31 Mar 2021 06:06), Max: 37.2 (30 Mar 2021 12:39)  T(F): 98.3 (31 Mar 2021 06:06), Max: 98.9 (30 Mar 2021 12:39)  HR: 75 (31 Mar 2021 06:06) (75 - 83)  BP: 175/92 (31 Mar 2021 06:06) (155/95 - 175/92)  BP(mean): --  RR: 18 (31 Mar 2021 06:06) (17 - 18)  SpO2: 97% (31 Mar 2021 06:06) (97% - 97%)    CAPILLARY BLOOD GLUCOSE      POCT Blood Glucose.: 183 mg/dL (31 Mar 2021 08:18)  POCT Blood Glucose.: 146 mg/dL (30 Mar 2021 22:15)  POCT Blood Glucose.: 117 mg/dL (30 Mar 2021 17:16)  POCT Blood Glucose.: 279 mg/dL (30 Mar 2021 12:36)      PHYSICAL EXAM  Constitutional: NAD, obese, comfortable in bed  HEENT: NC/AT, PERRLA, EOMI, no conjunctival pallor or scleral icterus, MMM  Neck: Supple, no JVD  Respiratory: CTA B/L. No wheezes/rales/rhonchi  Cardiovascular: RRR, normal S1 and S2, no m/r/g.   Gastrointestinal: +BS, soft NTND, no guarding or rebound tenderness, no palpable masses   Extremities: wwp; no cyanosis, clubbing or edema. L foot wrapped w ACE. R foot 4th and 5th ray amputation, partial 2nd digit amputation  Vascular: Pulses equal and strong throughout.   Neurological: AAOx3, no CN deficits, strength and sensation intact throughout.   Skin: No gross skin abnormalities or rashes    MEDICATIONS  (STANDING):  dextrose 40% Gel 15 Gram(s) Oral once  dextrose 5%. 1000 milliLiter(s) (50 mL/Hr) IV Continuous <Continuous>  dextrose 5%. 1000 milliLiter(s) (100 mL/Hr) IV Continuous <Continuous>  dextrose 50% Injectable 25 Gram(s) IV Push once  dextrose 50% Injectable 12.5 Gram(s) IV Push once  dextrose 50% Injectable 25 Gram(s) IV Push once  enoxaparin Injectable 40 milliGRAM(s) SubCutaneous every 24 hours  gabapentin 100 milliGRAM(s) Oral three times a day  glucagon  Injectable 1 milliGRAM(s) IntraMuscular once  insulin glargine Injectable (LANTUS) 36 Unit(s) SubCutaneous at bedtime  insulin lispro (ADMELOG) corrective regimen sliding scale   SubCutaneous Before meals and at bedtime  insulin lispro Injectable (ADMELOG) 13 Unit(s) SubCutaneous three times a day with meals  mupirocin 2% Ointment 1 Application(s) Topical two times a day    MEDICATIONS  (PRN):  acetaminophen   Tablet .. 650 milliGRAM(s) Oral every 6 hours PRN Mild Pain (1 - 3), Moderate Pain (4 - 6)  oxyCODONE    IR 5 milliGRAM(s) Oral every 4 hours PRN Severe Pain (7 - 10)      No Known Allergies      LABS                        11.7   13.11 )-----------( 573      ( 31 Mar 2021 08:38 )             36.5     03-31    136  |  100  |  15  ----------------------------<  182<H>  5.0   |  29  |  1.34<H>    Ca    9.3      31 Mar 2021 08:38  Phos  3.0     03-31  Mg     1.9     03-31                RADIOLOGY & ADDITIONAL TESTS: Reviewed

## 2021-03-31 NOTE — PROGRESS NOTE ADULT - SUBJECTIVE AND OBJECTIVE BOX
PRE-OP NOTE    Pre-Op Diagnosis: Diabetic foot Ulcer of L foot   Planned Procedure: Washout and debridement of the L foot with delayed primary wound closure   Scheduled Date / Time: Add on after 5PM 3/31/21  Indication: growth of MRSA at wound site   Labs:                       11.7   13.11 )-----------( 573      ( 31 Mar 2021 08:38 )             36.5   03-31    136  |  100  |  15  ----------------------------<  182<H>  5.0   |  29  |  1.34<H>    Ca    9.3      31 Mar 2021 08:38  Phos  3.0     03-31  Mg     1.9     03-31      Vitals: Vital Signs Last 24 Hrs  T(C): 36.8 (31 Mar 2021 06:06), Max: 37.2 (30 Mar 2021 12:39)  T(F): 98.3 (31 Mar 2021 06:06), Max: 98.9 (30 Mar 2021 12:39)  HR: 75 (31 Mar 2021 06:06) (75 - 83)  BP: 175/92 (31 Mar 2021 06:06) (155/95 - 175/92)  BP(mean): --  RR: 18 (31 Mar 2021 06:06) (17 - 18)  SpO2: 97% (31 Mar 2021 06:06) (97% - 97%)  PE:   Official CXR reading: (On Chart)  Official EKG reading: (On Chart)  Type and Cross/Screen:   NPO after MN  Antibiotics:   Anesthesia evaluation (on chart)  Operative Consent (on chart)

## 2021-03-31 NOTE — PROGRESS NOTE ADULT - PROBLEM SELECTOR PLAN 2
Wound culture 3/23: GPC in clusters   -mgmt as above   - Pain control: tylenol and oxycodone PRN Wound culture 3/23: GPC in clusters   Surgical culture 3/30: +MRSA  - mgmt as above   - Pain control: tylenol and oxycodone PRN

## 2021-03-31 NOTE — PROGRESS NOTE ADULT - PROBLEM SELECTOR PLAN 1
2/4 SIRS in setting of L diabetic foot infection. [place] MRI: Erosive change of 1st metatarsal head, concern for superimposed osteomyelitis. Report in chart and communicated to podiatry. s/p I&D 3/29 pm.  - 3/30 10pm vanc trough 14.8 -> redosed 1500 mg for 3 doses but held on 3/31 as c/f CHRIST per Dr. Montoya.    - will f/u Bcx results from [facility]   - deep wound cx results + MRSA   - podiatry plans on OR again 3/31 for further debridement and delayed primary closure  - plan for PICC line for long term abx for osteomyelitis 2/4 SIRS in setting of L diabetic foot infection. [place] MRI: Erosive change of 1st metatarsal head, concern for superimposed osteomyelitis. Report in chart and communicated to podiatry. s/p I&D 3/29 pm.  - 3/30 10pm vanc trough 14.8 -> redosed 1500 mg for 3 doses but held on 3/31 as c/f CHRIST per Dr. Montoya. Discontinued zosyn on 3/31.   - f/u 12pm random vanc trough  - will f/u Bcx results from [facility]   - deep wound cx results + MRSA   - podiatry plans on OR again 3/31 for further debridement and delayed primary closure  - plan for PICC line for long term abx for osteomyelitis

## 2021-03-31 NOTE — PROGRESS NOTE ADULT - PROBLEM SELECTOR PLAN 5
F: none  E: Replete K>4, Mg>2  N: DASH/CC  Ppx: Lovenox   Dispo: [facility] F: none  E: Replete K>4, Mg>2  N: NPO for OR washout on 3/31  Ppx: Lovenox   Dispo: [facility]

## 2021-03-31 NOTE — PROGRESS NOTE ADULT - SUBJECTIVE AND OBJECTIVE BOX
INTERVAL HPI/OVERNIGHT EVENTS:    ANTIBIOTICS/RELEVANT:    MEDICATIONS  (STANDING):  dextrose 40% Gel 15 Gram(s) Oral once  dextrose 5%. 1000 milliLiter(s) (50 mL/Hr) IV Continuous <Continuous>  dextrose 5%. 1000 milliLiter(s) (100 mL/Hr) IV Continuous <Continuous>  dextrose 50% Injectable 25 Gram(s) IV Push once  dextrose 50% Injectable 12.5 Gram(s) IV Push once  dextrose 50% Injectable 25 Gram(s) IV Push once  enoxaparin Injectable 40 milliGRAM(s) SubCutaneous every 24 hours  gabapentin 100 milliGRAM(s) Oral three times a day  glucagon  Injectable 1 milliGRAM(s) IntraMuscular once  insulin glargine Injectable (LANTUS) 36 Unit(s) SubCutaneous at bedtime  insulin lispro (ADMELOG) corrective regimen sliding scale   SubCutaneous Before meals and at bedtime  insulin lispro Injectable (ADMELOG) 13 Unit(s) SubCutaneous three times a day with meals  mupirocin 2% Ointment 1 Application(s) Topical two times a day    MEDICATIONS  (PRN):  acetaminophen   Tablet .. 650 milliGRAM(s) Oral every 6 hours PRN Mild Pain (1 - 3), Moderate Pain (4 - 6)  oxyCODONE    IR 5 milliGRAM(s) Oral every 4 hours PRN Severe Pain (7 - 10)      Allergies    No Known Allergies    Intolerances        Vital Signs Last 24 Hrs  T(C): 36.8 (31 Mar 2021 06:06), Max: 37.2 (30 Mar 2021 12:39)  T(F): 98.3 (31 Mar 2021 06:06), Max: 98.9 (30 Mar 2021 12:39)  HR: 75 (31 Mar 2021 06:06) (75 - 83)  BP: 175/92 (31 Mar 2021 06:06) (155/95 - 175/92)  BP(mean): --  RR: 18 (31 Mar 2021 06:06) (17 - 18)  SpO2: 97% (31 Mar 2021 06:06) (97% - 97%)    PHYSICAL EXAM:      Constitutional:    Eyes:    ENMT:    Neck:    Breasts:    Back:    Respiratory:    Cardiovascular:    Gastrointestinal:    Genitourinary:    Rectal:    Extremities:    Vascular:    Neurological:    Skin:    Lymph Nodes:    Musculoskeletal:    Psychiatric:        LABS:                        11.7   13.11 )-----------( 573      ( 31 Mar 2021 08:38 )             36.5     03-31    136  |  100  |  15  ----------------------------<  182<H>  5.0   |  29  |  1.34<H>    Ca    9.3      31 Mar 2021 08:38  Phos  3.0     03-31  Mg     1.9     03-31            MICROBIOLOGY:    RADIOLOGY & ADDITIONAL STUDIES: INTERVAL HPI/OVERNIGHT EVENTS:    Remains clinically stable after surgery  Wants to be able to work while on IV antibiotics at home      MEDICATIONS  (STANDING):  dextrose 40% Gel 15 Gram(s) Oral once  dextrose 5%. 1000 milliLiter(s) (50 mL/Hr) IV Continuous <Continuous>  dextrose 5%. 1000 milliLiter(s) (100 mL/Hr) IV Continuous <Continuous>  dextrose 50% Injectable 25 Gram(s) IV Push once  dextrose 50% Injectable 12.5 Gram(s) IV Push once  dextrose 50% Injectable 25 Gram(s) IV Push once  enoxaparin Injectable 40 milliGRAM(s) SubCutaneous every 24 hours  gabapentin 100 milliGRAM(s) Oral three times a day  glucagon  Injectable 1 milliGRAM(s) IntraMuscular once  insulin glargine Injectable (LANTUS) 36 Unit(s) SubCutaneous at bedtime  insulin lispro (ADMELOG) corrective regimen sliding scale   SubCutaneous Before meals and at bedtime  insulin lispro Injectable (ADMELOG) 13 Unit(s) SubCutaneous three times a day with meals  mupirocin 2% Ointment 1 Application(s) Topical two times a day    MEDICATIONS  (PRN):  acetaminophen   Tablet .. 650 milliGRAM(s) Oral every 6 hours PRN Mild Pain (1 - 3), Moderate Pain (4 - 6)  oxyCODONE    IR 5 milliGRAM(s) Oral every 4 hours PRN Severe Pain (7 - 10)      Allergies    No Known Allergies      EXAM  Vital Signs Last 24 Hrs  T(C): 36.8 (31 Mar 2021 06:06), Max: 37.2 (30 Mar 2021 12:39)  T(F): 98.3 (31 Mar 2021 06:06), Max: 98.9 (30 Mar 2021 12:39)  HR: 75 (31 Mar 2021 06:06) (75 - 83)  BP: 175/92 (31 Mar 2021 06:06) (155/95 - 175/92)  BP(mean): --  RR: 18 (31 Mar 2021 06:06) (17 - 18)  SpO2: 97% (31 Mar 2021 06:06) (97% - 97%)  Awake and alert  No rash  RR  Chest clear  Abd soft NT  LEs without edema  Postop dressing on the left foot  No apparent erythema appreciated to the extent foot could be visualized      LABS:                        11.7   13.11 )-----------( 573      ( 31 Mar 2021 08:38 )             36.5     03-31    136  |  100  |  15  ----------------------------<  182<H>  5.0   |  29  |  1.34<H>    Ca    9.3      31 Mar 2021 08:38  Phos  3.0     03-31  Mg     1.9     03-31      Vancomycin Level, Trough (03.31.21 @ 12:36)    Vancomycin Level, Trough: 18.3: Vancomycin trough levels should be rapidly reached and maintained at  15-20 ug/ml for life threatening MRSA  infections such as sepsis, endocarditis, osteomyelitis and pneumonia. A  first trough level should be drawn  before the 3rd or 4th dose.  Risk of renal toxicity is increased for levels >15 ug/ml, in patients on  other nephrotoxic drugs, who are  hemodynamically unstable, have unstable renal function, or are on  Vancomycin therapy for >14 days. Renal function with  creatinine levels should be monitored for those patients. ug/mL        MICROBIOLOGY:  Culture - Surgical Swab (03.30.21 @ 01:57)    Gram Stain:   No organisms seen  No WBC's seen.    Specimen Source: .Surgical Swab L Foot Bone Culture #2    Culture Results:   Few Staphylococcus aureus  presumptive Methicillin resistant  Confirmation to follow within 24 hours  Result called to and read back byErnie Whitney RN 03/31/2021 09:41:57    Culture - Surgical Swab (03.30.21 @ 01:57)    Gram Stain:   No organisms seen  Rare WBC's    Specimen Source: .Surgical Swab L Foot Bone Culture #1    Culture Results:   Moderate Staphylococcus aureus  presumptive Methicillin resistant  Confirmation to follow within 24 hours  Result called to and read back byErnie Whitney RN 03/31/2021 09:41:57    Culture - Blood (03.28.21 @ 13:44)    Specimen Source: .Blood Blood    Culture Results:   No growth at 3 days.    Culture - Blood (03.28.21 @ 13:44)    Specimen Source: .Blood Blood    Culture Results:   No growth at 3 days.          -----------------------      Culture - Surgical Swab (03.26.21 @ 21:29)    -  Vancomycin: S 1.5

## 2021-03-31 NOTE — PROGRESS NOTE ADULT - ASSESSMENT
46yo M with PMH HLD, diabetes, OM R foot s/p 2nd and 3rd toe amputations in setting of OM, transferred from [facility] presenting with cellulitis 2/2 Left foot ulceration POD1 s/p L foot incision and drainage    Plan:  - OR procedure will added on after 5PM tonight- c/w NPO  - C/w IV Abx   - Pain control PRN   - WBAT to left heel, preferably with surgical shoe  - Surgical dressing intact left foot   - Rest of care per primary team  - Pt consented and consent placed in chart  Podiatry following

## 2021-03-31 NOTE — PROCEDURE NOTE - NSPOSTCAREGUIDE_GEN_A_CORE
Verbal/written post procedure instructions were given to patient/caregiver/Instructed patient/caregiver regarding signs and symptoms of infection/Care for catheter as per unit/ICU protocols

## 2021-03-31 NOTE — PROGRESS NOTE ADULT - SUBJECTIVE AND OBJECTIVE BOX
POST OP NOTE    ARIEL MOURA  MRN-[id number]    Procedure:   Surgeon:   Assistants:     Patient tolerated procedure well without incident.  Patient transferred to PACU in stable condition.       PE / Post Op Check:       GEN: NAD, AAOx3, resting comfortably with pain controlled      LE Focused: CFT shows adequate perfusion b/l with no signs of ischemic compromise.  No strikethrough is appreciated on surgical dressings.  Dressings were dry, clean, and intact.  No neuromuscular deficits appreciated.         POST OP NOTE    ARIEL MOURA  [id number]    Procedure: Delayed primary closure of multiple incisions of left foot  Surgeon: David Green DPM  Assistants: Mini Murphy DPM (PGY1)    Patient tolerated procedure well without incident.  Patient transferred to PACU in stable condition.       PE / Post Op Check:       GEN: NAD, AAOx3, resting comfortably with pain controlled      LE Focused: CFT shows adequate perfusion b/l with no signs of ischemic compromise.  No strikethrough is appreciated on surgical dressings.  Dressings were dry, clean, and intact.  No neuromuscular deficits appreciated.

## 2021-03-31 NOTE — CONSULT NOTE ADULT - CONSULT REASON
Diabetic Foot Infection of L foot
Pre-op
Rehab evaluation
Diabetic foot infection
evaluation for picc placement

## 2021-03-31 NOTE — PROGRESS NOTE ADULT - ASSESSMENT
45M with PMH diabetes, OM R foot s/p 2nd and 3rd toe amputations in setting of OM, who presented to the ED at [place] [facility] for fever/chills, myalgia, L foot wound, transferred to [facility] for further management of cellulitis vs. OM

## 2021-03-31 NOTE — PROCEDURAL SAFETY CHECKLIST WITH OR WITHOUT SEDATION - NSPRESEDATIONFT_GEN_ALL_CORE
Final Crossmatch   Final crossmatch results for UNOS ID [id number] and recipient sample date 02/19/2018 and 03/01/2018  were  T cell and B cell  allo negative and T cell auto negative and B cell auto QS. Per Immunology lab B cell auto count to low resulting in a QS result.   DSA not noted.   Physician confirms case reviewed for anesthesia consultation requirements.

## 2021-03-31 NOTE — CONSULT NOTE ADULT - SUBJECTIVE AND OBJECTIVE BOX
[facility] Consult Note    [facility]:  Sepsis  Sepsis    Diabetic foot infection  Diabetic foot infection    Diabetes mellitus  Diabetes mellitus    HLD (hyperlipidemia)  HLD (hyperlipidemia)    Nutrition, metabolism, and development symptoms  Nutrition, metabolism, and development symptoms               Diagnosis: osteomyelitis     Indications for Vascular Access (Check all that apply)  [ X ]  Antibiotic Therapy       Antibiotic Prescribed: vanc or dapto                                                                            Expected Duration of Therapy: 6 weeks              [  ]  IV Hydration  [  ]  Total Parenteral Nutrition  [  ]  Chemotherapy  [  ]  Difficult Venous Access  [  ]  CVP monitoring  [  ]  Medications with high potential for tissue necrosis on extravasation  [  ]  Other    Screening (Check all that apply)  Previous Radiation to chest  [  ] Yes      [ X ]  No  Breast Cancer                          [  ] Left     [  ]  Right    [X  ]  No  Lymph Node Dissection         [  ] Left     [  ]  Right    [ X ]  No  Pacemaker or ICD                   [  ] Left     [  ]  Right    [ X ]  No  Upper Extremity DVT             [  ] Left     [  ]  Right    [ X ]  No  Chronic Kidney Disease         [  ]  Yes     [  X]  No  Hemodialysis                           [  ]  Yes     [  X]  No  AV Fistula/ Graft                     [  ]  Left    [  ]  Right    [ X ]  No  Temp>101F in past 24 H       [  ]  Yes     [  X]  No  H/O PICC/Midline                   [  ]  Yes     [X  ]  No    Lab data:                        11.7   13.11 )-----------( 573      ( 31 Mar 2021 08:38 )             36.5     03-31    136  |  100  |  15  ----------------------------<  182<H>  5.0   |  29  |  1.34<H>    Ca    9.3      31 Mar 2021 08:38  Phos  3.0     03-31  Mg     1.9     03-31                I have reviewed the chart, interviewed and examined the patient and determined that this patient:  [ X ] Is a candidate for a PICC line  [  ] Is a candidate for a Midline  [  ] Is not a candidate for vascular access device (reason)    Lumens:    [  X] Single  [  ] Double

## 2021-03-31 NOTE — PROGRESS NOTE ADULT - PROBLEM SELECTOR PLAN 3
A1C 8.5 at [facility]. Per records from 9/2020 home lantus 40, lispro 14  - DASH/CC  - miSS  -increase lantus to 35U lantus qhs and lispro 13U TID  -endo recs appreciated   - Patient states he is on losartan for his DM, notes he does not have hypertension. Will hold for now in setting of normotensive and history of Cr elevation at [facility] A1C 8.5 at [facility]. Per records from 9/2020 home lantus 40, lispro 14  - DASH/CC  - miSS  - c/w lantus 36U + lispro 13U TID  - Patient states he is on losartan for his DM, notes he does not have hypertension. Will hold for now in setting of normotensive and history of Cr elevation at [facility]

## 2021-03-31 NOTE — BRIEF OPERATIVE NOTE - OPERATION/FINDINGS
1.5cm dorsal incision proximal to first webspace, 1cm dorsal incision proximal to third webspace, 2cm elliptical plantar incision corresponding to area of excision of plantar chronic wound. All wounds open, packed w/ iodoform packing. Approximately 5cc of pus expressed from left foot 1st webspace. 2 culture swabs obtained, left foot plantar wound sent to pathology (fresh specimen).
Excisional debridement of devitalized skin incision edges. Delayed primary closure of 1cm and 1.5cm dorsal incisions. Partial delayed primary closure of plantar incision, distal most aspect of incisions remains open and packed w/ 1/4 inch iodoform packing. No purulence expressed.

## 2021-03-31 NOTE — CONSULT NOTE ADULT - SUBJECTIVE AND OBJECTIVE BOX
Patient is a 45y old  Male who presents with a chief complaint of Cellulitis of the L foot (30 Mar 2021 12:21)       HPI:  46yo M with PMH HLD, diabetes, OM R foot s/p 2nd and 3rd toe amputations in setting of OM, who presented to the ED at [place] for evaluation of fever, chills, rigors starting ~3/17. He reported rhinorrhea, productive cough, and abdominal pain and went to an urgent care and was told he had a viral infection. Also had a blister on his left foot which caused pain while ambulating. Given antibiotics, underwent MRI morning of transfer 3/26AM. Admitted to [place] where patient was treated with vanc/zosyn, requested transfer to [facility] to be under care of outpatient podiatrist Dr. Green.     In the ED at [place]  Vitals: 37.5, , RR 16, BP 13/77, 95% on RA  Labs: WBC 16.49, Glucose 171, BUN 22/Cr 1.27, ESR 64, CRP 16.8, D-dimer 252, rapid flu and COVID negative  CXR per [facility] notes: No acute processes  L foot XR: No fracture or dislocation  R foot XR: osseous erosion of first digit, distal phalynx, soft tissue c/f local infection  CT PE: No PE, mild ectasia of the pulmonary trunk  EKG: Sinus tachycardia per [facility] records  FULL [place] RECORD IN PATIENT'S PHYSICAL CHART    On arrival to [facility]   Vitals: HR 98.8, HR 86, /85, RR 18 97% RA  Patient says he is still having some L foot pain and chills. All other ROS negative   (26 Mar 2021 14:34)      PAST MEDICAL & SURGICAL HISTORY:  Diabetes mellitus    Osteomyelitis  R foot        MEDICATIONS  (STANDING):  dextrose 40% Gel 15 Gram(s) Oral once  dextrose 5%. 1000 milliLiter(s) (50 mL/Hr) IV Continuous <Continuous>  dextrose 5%. 1000 milliLiter(s) (100 mL/Hr) IV Continuous <Continuous>  dextrose 50% Injectable 25 Gram(s) IV Push once  dextrose 50% Injectable 12.5 Gram(s) IV Push once  dextrose 50% Injectable 25 Gram(s) IV Push once  enoxaparin Injectable 40 milliGRAM(s) SubCutaneous every 24 hours  gabapentin 100 milliGRAM(s) Oral three times a day  glucagon  Injectable 1 milliGRAM(s) IntraMuscular once  insulin glargine Injectable (LANTUS) 36 Unit(s) SubCutaneous at bedtime  insulin lispro (ADMELOG) corrective regimen sliding scale   SubCutaneous Before meals and at bedtime  insulin lispro Injectable (ADMELOG) 13 Unit(s) SubCutaneous three times a day with meals  mupirocin 2% Ointment 1 Application(s) Topical two times a day  piperacillin/tazobactam IVPB.. 3.375 Gram(s) IV Intermittent every 6 hours  vancomycin  IVPB 1500 milliGRAM(s) IV Intermittent every 12 hours    MEDICATIONS  (PRN):  acetaminophen   Tablet .. 650 milliGRAM(s) Oral every 6 hours PRN Mild Pain (1 - 3), Moderate Pain (4 - 6)  oxyCODONE    IR 5 milliGRAM(s) Oral every 4 hours PRN Severe Pain (7 - 10)      FAMILY HISTORY:      CBC Full  -  ( 31 Mar 2021 08:38 )  WBC Count : 13.11 K/uL  RBC Count : 4.06 M/uL  Hemoglobin : 11.7 g/dL  Hematocrit : 36.5 %  Platelet Count - Automated : 573 K/uL  Mean Cell Volume : 89.9 fl  Mean Cell Hemoglobin : 28.8 pg  Mean Cell Hemoglobin Concentration : 32.1 gm/dL  Auto Neutrophil # : 8.79 K/uL  Auto Lymphocyte # : 2.88 K/uL  Auto Monocyte # : 0.84 K/uL  Auto Eosinophil # : 0.46 K/uL  Auto Basophil # : 0.08 K/uL  Auto Neutrophil % : 67.0 %  Auto Lymphocyte % : 22.0 %  Auto Monocyte % : 6.4 %  Auto Eosinophil % : 3.5 %  Auto Basophil % : 0.6 %      03-31    136  |  100  |  15  ----------------------------<  182<H>  5.0   |  29  |  1.34<H>    Ca    9.3      31 Mar 2021 08:38  Phos  3.0     03-31  Mg     1.9     03-31              Radiology:    < from: Xray Foot AP + Lateral + Oblique, Left (03.29.21 @ 23:36) >  EXAM:  XR FOOT-LEFT MIN 3 VIEWS                          PROCEDURE DATE:  03/29/2021          INTERPRETATION:  Left foot    HISTORY: Status post left foot incision and drainage. History of osteomyelitis.    No prior study available for comparison.    FINDINGS: Three views left foot. There is soft tissue swelling with soft tissue irregularity and gas within the soft tissues along the plantar aspect of the foot near the medial aspect of the proximal portion of the second toe. No fracture, dislocation, or focal bone destruction. Extensive vascular calcification.    IMPRESSION: Findings consistent with postoperative changes to the foot and cellulitis.                Vital Signs Last 24 Hrs  T(C): 36.8 (31 Mar 2021 06:06), Max: 37.2 (30 Mar 2021 12:39)  T(F): 98.3 (31 Mar 2021 06:06), Max: 98.9 (30 Mar 2021 12:39)  HR: 75 (31 Mar 2021 06:06) (75 - 83)  BP: 175/92 (31 Mar 2021 06:06) (155/95 - 175/92)  BP(mean): --  RR: 18 (31 Mar 2021 06:06) (17 - 18)  SpO2: 97% (31 Mar 2021 06:06) (97% - 97%)    REVIEW OF SYSTEMS:    CONSTITUTIONAL: No fever, weight loss, or fatigue  EYES: No eye pain, visual disturbances, or discharge  ENMT:  No difficulty hearing, tinnitus, vertigo; No sinus or throat pain  NECK: No pain or stiffness  BREASTS: No pain, masses, or nipple discharge  RESPIRATORY: No cough, wheezing, chills or hemoptysis; No shortness of breath  CARDIOVASCULAR: No chest pain, palpitations, dizziness, or leg swelling  GASTROINTESTINAL: No abdominal or epigastric pain. No nausea, vomiting, or hematemesis; No diarrhea or constipation. No melena or hematochezia.  GENITOURINARY: No dysuria, frequency, hematuria, or incontinence  NEUROLOGICAL: No headaches, memory loss, loss of strength, numbness, or tremors  SKIN: No itching, burning, rashes, or lesions   LYMPH NODES: No enlarged glands  ENDOCRINE: No heat or cold intolerance; No hair loss  MUSCULOSKELETAL: No joint pain or swelling; No muscle, back, or extremity pain  PSYCHIATRIC: No depression, anxiety, mood swings, or difficulty sleeping  HEME/LYMPH: No easy bruising, or bleeding gums  ALLERGY AND IMMUNOLOGIC: No hives or eczema  VASCULAR: left foot pain  : no dysuria, hematuria, frequency        Physical Exam:  on contact isolation, overweight 44 yo  gentleman lying in semi Cody's position, no acute complaints    Head: normocephalic, atraumatic    Eyes: PERRLA, EOMI, no nystagmus, sclera anicteric    ENT: nasal discharge, uvula midline, no oropharyngeal erythema/exudate    Neck: supple, negative JVD, negative carotid bruits, no thyromegaly    Chest: CTA bilaterally, neg wheeze/ rhonchi/ rales/ crackles/ egophany    Cardiovascular: regular rate and rhythm, neg murmurs/rubs/gallops    Abdomen: soft, non distended, non tender to palpation in all 4 quadrants, negative rebound/guarding, normal bowel sounds    Extremities: left foot wound dressing    Neurologic Exam:      Motor Exam:    Right UE:            : 5/5                             wrist extensors/ flexors: 5/5                             biceps:   5/5                             triceps:  5/5                             deltoid:  5/5                            Left UE:              :  5/5                             wrist extensors/ flexors:  5/5                             biceps:    5/5                             triceps:   5/5                             deltoid:  5/5                          Right LE:            dorsiflexors:  5/5                            plantar flexors:  5/5                            quadriceps:  5/5                            hamstrings:  5/5                            hip flexors:  5/5    Left LE:              dorsiflexors:  5/5                            plantar flexors:  5/5                            quadriceps:  5/5                            hamstrings:  5/5                            hip flexors:  5/5      Gait:  not tested        PM&R Impression:    1)  Left foot cellulitis  2) LLE heel weight bearing as tolerated per Podiatry    REC:     1) Physical therapy focusing on therapeutic exercises, bed mobility/transfer out of bed evaluation, progressive ambulation with assistive devices prn.    2) Anticipated Disposition Plan/Recs:  d/c home

## 2021-03-31 NOTE — BRIEF OPERATIVE NOTE - NSICDXBRIEFPROCEDURE_GEN_ALL_CORE_FT
PROCEDURES:  Incision and drainage of abscess of left foot 29-Mar-2021 22:25:11  De Mini Ramos  
PROCEDURES:  Incision and drainage of abscess of left foot 29-Mar-2021 22:25:11  De Mini Ramos  Delayed primary closure of foot 31-Mar-2021 19:59:18  De Mini Ramos

## 2021-03-31 NOTE — CONSULT NOTE ADULT - ASSESSMENT
per Internal Medicine    46 yo M with PMH diabetes, OM R foot s/p 2nd and 3rd toe amputations in setting of OM, who presented to the ED at [place] for fever/chills, myalgia, L foot wound, transferred to [facility] for further management of cellulitis vs. OM    Problem/Plan - 1:  ·  Problem: Sepsis.  Plan: 2/4 SIRS in setting of L diabetic foot infection. [place] MRI: Erosive change of 1st metatarsal head, concern for superimposed osteomyelitis. Report in chart and communicated to podiatry. s/p I&D 3/29 pm.  - Continue vanc/zosyn, f/u 10pm vanc trough   -will f/u Bcx results from [place]   -f/u deep wound cx results   -podiatry plans on OR again 3/31 for further debridement and delayed primary closure  -plan for PICC line for long term abx for osteomyelitis.    Problem/Plan - 2:  ·  Problem: Diabetic foot infection.  Plan: Wound culture 3/23: G[facility] in clusters   -mgmt as above   - Pain control: tylenol and oxycodone PRN.     Problem/Plan - 3:  ·  Problem: Diabetes mellitus.  Plan: A1C 8.5 at [facility]. Per records from 9/2020 home lantus 40, lispro 14  - DASH/CC  - miSS  -increase lantus to 35U lantus qhs and lispro 13U TID  -endo recs appreciated   - Patient states he is on losartan for his DM, notes he does not have hypertension. Will hold for now in setting of normotensive and history of Cr elevation at [facility].     Problem/Plan - 4:  ·  Problem: HLD (hyperlipidemia).  Plan: continue home lipitor.     Problem/Plan - 5:  ·  Problem: Nutrition, metabolism, and development symptoms.  Plan: F: none  E: Replete K>4, Mg>2  N: DASH/CC  Ppx: Lovenox   Dispo: [facility].

## 2021-03-31 NOTE — PROGRESS NOTE ADULT - ASSESSMENT
46yo M with PMH HLD, diabetes, OM R foot s/p 2nd and 3rd toe amputations in setting of OM, transferred from [facility] presenting with cellulitis 2/2 Left foot ulceration. Pt underwent left foot I&D on 3/29/21. Pt is s/p delayed primary closure of (2) dorsal incisions and partial delayed primary closure of plantar incision w/ distal most aspect of plantar incision remaining open and packed w/ iodoform packing.     Plan:  - C/w IV Abx   - Pain control PRN   - Resume diet, advance as tolerated  - WBAT to left heel, preferably with surgical shoe  - Surgical dressing intact left foot   - Rest of care per primary team  Podiatry following

## 2021-03-31 NOTE — PROGRESS NOTE ADULT - ASSESSMENT
D/C Vancomycin for now  Check Vancomycin level  Might need a change to Daptomycin  Podiatry to review acceptable footware options   Call [facility] regarding blood cultures results  Left diabetic foot infection including infected ulcer, soft tissue infection and osteomyelitis - suspect all due to MRSA   MRSA with relatively elevated MC to Vancomycin  Sepsis at presentation - need to determine whether bacteremic when presented to [facility]  Renal insufficiency        RECOMMEND  D/C Vancomycin for now  Start Daptomycin IV at the dose of 6-8 mg/kg (I will order)  Podiatry to review acceptable foot-ware with the patient.  He wants to continue to work   Call [facility] regarding blood cultures results   Place PICC  Offer HIV testing  Continue Mupirocin ointment to anterior nares, skin lesions and underneath fingernails         Discussed with house staff

## 2021-03-31 NOTE — BRIEF OPERATIVE NOTE - NSICDXBRIEFPREOP_GEN_ALL_CORE_FT
PRE-OP DIAGNOSIS:  Diabetic infection of left foot 29-Mar-2021 22:25:34  De Mini Ramos  
PRE-OP DIAGNOSIS:  Diabetic infection of left foot 29-Mar-2021 22:25:34  De Mini Ramos

## 2021-03-31 NOTE — BRIEF OPERATIVE NOTE - NSICDXBRIEFPOSTOP_GEN_ALL_CORE_FT
POST-OP DIAGNOSIS:  Diabetic infection of left foot 29-Mar-2021 22:25:46  De Mini Ramos  
POST-OP DIAGNOSIS:  Diabetic infection of left foot 29-Mar-2021 22:25:46  De Mini Ramos

## 2021-04-01 ENCOUNTER — TRANSCRIPTION ENCOUNTER (OUTPATIENT)
Age: 46
End: 2021-04-01

## 2021-04-01 LAB
-  CEFAZOLIN: SIGNIFICANT CHANGE UP
-  CLINDAMYCIN: SIGNIFICANT CHANGE UP
-  DAPTOMYCIN: SIGNIFICANT CHANGE UP
-  ERYTHROMYCIN: SIGNIFICANT CHANGE UP
-  LINEZOLID: SIGNIFICANT CHANGE UP
-  OXACILLIN: SIGNIFICANT CHANGE UP
-  RIFAMPIN: SIGNIFICANT CHANGE UP
-  TETRACYCLINE: SIGNIFICANT CHANGE UP
-  TRIMETHOPRIM/SULFAMETHOXAZOLE: SIGNIFICANT CHANGE UP
-  VANCOMYCIN: SIGNIFICANT CHANGE UP
-  VANCOMYCIN: SIGNIFICANT CHANGE UP
ALBUMIN SERPL ELPH-MCNC: 3 G/DL — LOW (ref 3.3–5)
ALP SERPL-CCNC: 130 U/L — HIGH (ref 40–120)
ALT FLD-CCNC: 48 U/L — HIGH (ref 10–45)
ANION GAP SERPL CALC-SCNC: 8 MMOL/L — SIGNIFICANT CHANGE UP (ref 5–17)
APTT BLD: 33.9 SEC — SIGNIFICANT CHANGE UP (ref 27.5–35.5)
AST SERPL-CCNC: 31 U/L — SIGNIFICANT CHANGE UP (ref 10–40)
BASOPHILS # BLD AUTO: 0.07 K/UL — SIGNIFICANT CHANGE UP (ref 0–0.2)
BASOPHILS NFR BLD AUTO: 0.5 % — SIGNIFICANT CHANGE UP (ref 0–2)
BILIRUB SERPL-MCNC: 0.2 MG/DL — SIGNIFICANT CHANGE UP (ref 0.2–1.2)
BUN SERPL-MCNC: 18 MG/DL — SIGNIFICANT CHANGE UP (ref 7–23)
CALCIUM SERPL-MCNC: 9.4 MG/DL — SIGNIFICANT CHANGE UP (ref 8.4–10.5)
CHLORIDE SERPL-SCNC: 101 MMOL/L — SIGNIFICANT CHANGE UP (ref 96–108)
CO2 SERPL-SCNC: 26 MMOL/L — SIGNIFICANT CHANGE UP (ref 22–31)
CREAT SERPL-MCNC: 1.21 MG/DL — SIGNIFICANT CHANGE UP (ref 0.5–1.3)
CULTURE RESULTS: SIGNIFICANT CHANGE UP
CULTURE RESULTS: SIGNIFICANT CHANGE UP
EOSINOPHIL # BLD AUTO: 0.47 K/UL — SIGNIFICANT CHANGE UP (ref 0–0.5)
EOSINOPHIL NFR BLD AUTO: 3.4 % — SIGNIFICANT CHANGE UP (ref 0–6)
GLUCOSE BLDC GLUCOMTR-MCNC: 139 MG/DL — HIGH (ref 70–99)
GLUCOSE BLDC GLUCOMTR-MCNC: 149 MG/DL — HIGH (ref 70–99)
GLUCOSE BLDC GLUCOMTR-MCNC: 163 MG/DL — HIGH (ref 70–99)
GLUCOSE BLDC GLUCOMTR-MCNC: 173 MG/DL — HIGH (ref 70–99)
GLUCOSE BLDC GLUCOMTR-MCNC: 230 MG/DL — HIGH (ref 70–99)
GLUCOSE SERPL-MCNC: 261 MG/DL — HIGH (ref 70–99)
HCT VFR BLD CALC: 36.7 % — LOW (ref 39–50)
HGB BLD-MCNC: 11.8 G/DL — LOW (ref 13–17)
IMM GRANULOCYTES NFR BLD AUTO: 0.7 % — SIGNIFICANT CHANGE UP (ref 0–1.5)
INR BLD: 1.22 — HIGH (ref 0.88–1.16)
LYMPHOCYTES # BLD AUTO: 22.6 % — SIGNIFICANT CHANGE UP (ref 13–44)
LYMPHOCYTES # BLD AUTO: 3.13 K/UL — SIGNIFICANT CHANGE UP (ref 1–3.3)
MAGNESIUM SERPL-MCNC: 1.8 MG/DL — SIGNIFICANT CHANGE UP (ref 1.6–2.6)
MCHC RBC-ENTMCNC: 28.4 PG — SIGNIFICANT CHANGE UP (ref 27–34)
MCHC RBC-ENTMCNC: 32.2 GM/DL — SIGNIFICANT CHANGE UP (ref 32–36)
MCV RBC AUTO: 88.2 FL — SIGNIFICANT CHANGE UP (ref 80–100)
METHOD TYPE: SIGNIFICANT CHANGE UP
MONOCYTES # BLD AUTO: 0.7 K/UL — SIGNIFICANT CHANGE UP (ref 0–0.9)
MONOCYTES NFR BLD AUTO: 5.1 % — SIGNIFICANT CHANGE UP (ref 2–14)
NEUTROPHILS # BLD AUTO: 9.35 K/UL — HIGH (ref 1.8–7.4)
NEUTROPHILS NFR BLD AUTO: 67.7 % — SIGNIFICANT CHANGE UP (ref 43–77)
NRBC # BLD: 0 /100 WBCS — SIGNIFICANT CHANGE UP (ref 0–0)
ORGANISM # SPEC MICROSCOPIC CNT: SIGNIFICANT CHANGE UP
PHOSPHATE SERPL-MCNC: 2.7 MG/DL — SIGNIFICANT CHANGE UP (ref 2.5–4.5)
PLATELET # BLD AUTO: 687 K/UL — HIGH (ref 150–400)
POTASSIUM SERPL-MCNC: 4.9 MMOL/L — SIGNIFICANT CHANGE UP (ref 3.5–5.3)
POTASSIUM SERPL-SCNC: 4.9 MMOL/L — SIGNIFICANT CHANGE UP (ref 3.5–5.3)
PROT SERPL-MCNC: 8.7 G/DL — HIGH (ref 6–8.3)
PROTHROM AB SERPL-ACNC: 14.5 SEC — HIGH (ref 10.6–13.6)
RBC # BLD: 4.16 M/UL — LOW (ref 4.2–5.8)
RBC # FLD: 13 % — SIGNIFICANT CHANGE UP (ref 10.3–14.5)
SODIUM SERPL-SCNC: 135 MMOL/L — SIGNIFICANT CHANGE UP (ref 135–145)
SPECIMEN SOURCE: SIGNIFICANT CHANGE UP
SPECIMEN SOURCE: SIGNIFICANT CHANGE UP
WBC # BLD: 13.82 K/UL — HIGH (ref 3.8–10.5)
WBC # FLD AUTO: 13.82 K/UL — HIGH (ref 3.8–10.5)

## 2021-04-01 RX ADMIN — MUPIROCIN 1 APPLICATION(S): 20 OINTMENT TOPICAL at 18:28

## 2021-04-01 RX ADMIN — GABAPENTIN 100 MILLIGRAM(S): 400 CAPSULE ORAL at 21:49

## 2021-04-01 RX ADMIN — INSULIN GLARGINE 36 UNIT(S): 100 INJECTION, SOLUTION SUBCUTANEOUS at 21:58

## 2021-04-01 RX ADMIN — GABAPENTIN 100 MILLIGRAM(S): 400 CAPSULE ORAL at 07:24

## 2021-04-01 RX ADMIN — MUPIROCIN 1 APPLICATION(S): 20 OINTMENT TOPICAL at 07:25

## 2021-04-01 RX ADMIN — GABAPENTIN 100 MILLIGRAM(S): 400 CAPSULE ORAL at 13:22

## 2021-04-01 RX ADMIN — DAPTOMYCIN 132 MILLIGRAM(S): 500 INJECTION, POWDER, LYOPHILIZED, FOR SOLUTION INTRAVENOUS at 18:28

## 2021-04-01 RX ADMIN — Medication 13 UNIT(S): at 17:31

## 2021-04-01 RX ADMIN — Medication 4: at 08:36

## 2021-04-01 RX ADMIN — ENOXAPARIN SODIUM 40 MILLIGRAM(S): 100 INJECTION SUBCUTANEOUS at 11:42

## 2021-04-01 RX ADMIN — Medication 13 UNIT(S): at 13:21

## 2021-04-01 RX ADMIN — Medication 13 UNIT(S): at 08:37

## 2021-04-01 RX ADMIN — Medication 2: at 21:59

## 2021-04-01 RX ADMIN — CHLORHEXIDINE GLUCONATE 1 APPLICATION(S): 213 SOLUTION TOPICAL at 07:43

## 2021-04-01 NOTE — PROGRESS NOTE ADULT - SUBJECTIVE AND OBJECTIVE BOX
INTERVAL HPI/OVERNIGHT EVENTS:  As per night team, patient returned back from OR washout with podiatry. Diet resumed. Per Dr. Montoya decision made to start daptomycin 800 mg IV daily x42 days. Patient seen and examined at bedside. Patient denies fever, chills, dizziness, weakness, HA, CP, SOB, N/V/D/C. 12 pt ROS otherwise negative.     VITALS  Vital Signs Last 24 Hrs  T(C): 36.8 (01 Apr 2021 06:17), Max: 36.9 (31 Mar 2021 14:37)  T(F): 98.3 (01 Apr 2021 06:17), Max: 98.5 (31 Mar 2021 14:37)  HR: 75 (01 Apr 2021 06:17) (75 - 90)  BP: 145/82 (01 Apr 2021 06:17) (126/78 - 181/100)  BP(mean): 109 (31 Mar 2021 21:19) (96 - 118)  RR: 18 (01 Apr 2021 06:17) (11 - 25)  SpO2: 95% (01 Apr 2021 06:17) (85% - 98%)    CAPILLARY BLOOD GLUCOSE      POCT Blood Glucose.: 230 mg/dL (01 Apr 2021 08:15)  POCT Blood Glucose.: 252 mg/dL (31 Mar 2021 22:20)  POCT Blood Glucose.: 277 mg/dL (31 Mar 2021 12:22)      PHYSICAL EXAM  Constitutional: NAD, obese, comfortable in bed  HEENT: NC/AT, PERRLA, EOMI, no conjunctival pallor or scleral icterus, MMM  Neck: Supple, no JVD  Respiratory: CTA B/L. No wheezes/rales/rhonchi  Cardiovascular: RRR, normal S1 and S2, no m/r/g.   Gastrointestinal: +BS, soft NTND, no guarding or rebound tenderness, no palpable masses   Extremities: wwp; no cyanosis, clubbing or edema. L foot wrapped w ACE. R foot 4th and 5th ray amputation, partial 2nd digit amputation  Vascular: Pulses equal and strong throughout.   Neurological: AAOx3, no CN deficits, strength and sensation intact throughout.   Skin: No gross skin abnormalities or rashes    MEDICATIONS  (STANDING):  chlorhexidine 2% Cloths 1 Application(s) Topical <User Schedule>  DAPTOmycin IVPB      DAPTOmycin IVPB 800 milliGRAM(s) IV Intermittent every 24 hours  dextrose 40% Gel 15 Gram(s) Oral once  dextrose 5%. 1000 milliLiter(s) (50 mL/Hr) IV Continuous <Continuous>  dextrose 5%. 1000 milliLiter(s) (100 mL/Hr) IV Continuous <Continuous>  dextrose 50% Injectable 25 Gram(s) IV Push once  dextrose 50% Injectable 12.5 Gram(s) IV Push once  dextrose 50% Injectable 25 Gram(s) IV Push once  enoxaparin Injectable 40 milliGRAM(s) SubCutaneous every 24 hours  gabapentin 100 milliGRAM(s) Oral three times a day  glucagon  Injectable 1 milliGRAM(s) IntraMuscular once  insulin glargine Injectable (LANTUS) 36 Unit(s) SubCutaneous at bedtime  insulin lispro (ADMELOG) corrective regimen sliding scale   SubCutaneous Before meals and at bedtime  insulin lispro Injectable (ADMELOG) 13 Unit(s) SubCutaneous three times a day with meals  mupirocin 2% Ointment 1 Application(s) Topical two times a day    MEDICATIONS  (PRN):  acetaminophen   Tablet .. 650 milliGRAM(s) Oral every 6 hours PRN Mild Pain (1 - 3), Moderate Pain (4 - 6)  oxyCODONE    IR 5 milliGRAM(s) Oral every 4 hours PRN Severe Pain (7 - 10)  sodium chloride 0.9% lock flush 10 milliLiter(s) IV Push every 1 hour PRN Pre/post blood products, medications, blood draw, and to maintain line patency      No Known Allergies      LABS                        11.7   13.11 )-----------( 573      ( 31 Mar 2021 08:38 )             36.5     03-31    136  |  100  |  15  ----------------------------<  182<H>  5.0   |  29  |  1.34<H>    Ca    9.3      31 Mar 2021 08:38  Phos  3.0     03-31  Mg     1.9     03-31                RADIOLOGY & ADDITIONAL TESTS: Reviewed

## 2021-04-01 NOTE — DISCHARGE NOTE PROVIDER - NSDCMRMEDTOKEN_GEN_ALL_CORE_FT
atorvastatin 40 mg oral tablet: 1 tab(s) orally once a day  HumaLOG KwikPen 100 units/mL injectable solution: 10 unit(s) injectable 3 times a day  Jardiance 25 mg oral tablet: 1 tab(s) orally once a day (in the morning)  losartan 50 mg oral tablet: 1 tab(s) orally once a day  metFORMIN 750 mg oral tablet, extended release: 1 tab(s) orally once a day in the evening with food  Soliqua 100/33 subcutaneous solution: 30 unit(s) subcutaneous once a day in AM   atorvastatin 40 mg oral tablet: 1 tab(s) orally once a day  DAPTOmycin 500 mg intravenous injection:  intravenous   gabapentin 100 mg oral capsule: 1 cap(s) orally 3 times a day  HumaLOG KwikPen 100 units/mL injectable solution: 10 unit(s) injectable 3 times a day  Jardiance 25 mg oral tablet: 1 tab(s) orally once a day (in the morning)  losartan 50 mg oral tablet: 1 tab(s) orally once a day  metFORMIN 750 mg oral tablet, extended release: 1 tab(s) orally once a day in the evening with food  rifAMPin 300 mg oral capsule: 1 cap(s) orally 2 times a day  Soliqua 100/33 subcutaneous solution: 30 unit(s) subcutaneous once a day in AM   atorvastatin 40 mg oral tablet: 1 tab(s) orally once a day  DAPTOmycin 500 mg intravenous injection:  intravenous   gabapentin 100 mg oral capsule: 1 cap(s) orally 3 times a day  HumaLOG KwikPen 100 units/mL injectable solution: 10 unit(s) injectable 3 times a day  Jardiance 25 mg oral tablet: 1 tab(s) orally once a day (in the morning)  losartan 50 mg oral tablet: 1 tab(s) orally once a day  metFORMIN 750 mg oral tablet, extended release: 1 tab(s) orally once a day in the evening with food  rifAMPin 300 mg oral capsule: 1 cap(s) orally 2 times a day MDD:2  Soliqua 100/33 subcutaneous solution: 30 unit(s) subcutaneous once a day in AM

## 2021-04-01 NOTE — DISCHARGE NOTE PROVIDER - NSDCCPTREATMENT_GEN_ALL_CORE_FT
PRINCIPAL PROCEDURE  Procedure: X-ray of foot, two views  Findings and Treatment: Two views left foot. A bandage overlies the midfoot and forefoot. There is still gas within the soft tissues of the dorsum of the forefoot and at the plantar aspect of the first and second toes. Slight improvement soft tissue swelling. Density along medial aspect of second toe could be related to bandaging. Vascular calcifications again present. No fracture, dislocation, or focal bone destruction.

## 2021-04-01 NOTE — PROGRESS NOTE ADULT - ASSESSMENT
45M with PMH diabetes, OM R foot s/p 2nd and 3rd toe amputations in setting of OM, who presented to the ED at [place] for fever/chills, myalgia, L foot wound, transferred to [facility] for further management of cellulitis vs. OM

## 2021-04-01 NOTE — PROGRESS NOTE ADULT - PROBLEM SELECTOR PLAN 3
A1C 8.5 at [facility]. Per records from 9/2020 home lantus 40, lispro 14  - DASH/CC  - miSS  - c/w lantus 36U + lispro 13U TID  - Patient states he is on losartan for his DM, notes he does not have hypertension. Will hold for now in setting of normotensive and history of Cr elevation at [facility] A1C 8.5 at [facility]. er med rec takes jardiance 25, humalog 10 u TID, metformin  mg, soliqua 30u AM  - DASH/CC  - miSS  - c/w lantus 36U + lispro 13U TID  - Patient states he is on losartan for his DM, notes he does not have hypertension. Will hold for now in setting of normotensive and history of Cr elevation at [facility]

## 2021-04-01 NOTE — DISCHARGE NOTE PROVIDER - NSDCCPCAREPLAN_GEN_ALL_CORE_FT
PRINCIPAL DISCHARGE DIAGNOSIS  Diagnosis: Osteomyelitis of left foot  Assessment and Plan of Treatment:       SECONDARY DISCHARGE DIAGNOSES  Diagnosis: Diabetes mellitus  Assessment and Plan of Treatment:      PRINCIPAL DISCHARGE DIAGNOSIS  Diagnosis: Osteomyelitis of left foot  Assessment and Plan of Treatment: You presented to the hospital on March 26th with an infection of your left foot likely from poorly controlled diabetes. Per the [facility] MRI, there was erosive damage to the 1st toe which was concerning for a bone infection called osteomyelitis. The podiatry team was consulted. They took you to the operating room for an incision and drainage of the site. The surgical pathology report was positive for a multi-drug resistant organism called MRSA. We consulted the infectious disease doctor (Dr. Montoya) who started you on a 6 week antibiotic course which consists of IV daptomycin and oral rifampin. On day of discharge, you continued to remain without fevers and your white blood cell counts returned to normal limits. Blood cultures while admitted were negative.   Instructions:  1. An IV line was placed on your Left Arm on March 31st for IV antibiotics. Please continue taking the daptomycin for 6 weeks.   2. We are also prescribing you an oral antibiotic called rifampin. Please take this medication every 12 hours for 6 weeks.  3. Per the podiatry team, apply daily dressing changes to the left foot. You were educated on this in the hospital, but to reiterate clean the skin with overlyign incision sites with normal saline and protect with dry  sterile dressing (gauze, osmany, ACE). Sutures will be removed in clinic within 2-3 weeks following discharge. Weight bearing as tolerated to the left heel in surgical shoe.      SECONDARY DISCHARGE DIAGNOSES  Diagnosis: Diabetes mellitus  Assessment and Plan of Treatment: You have a known history of diabetes for which your hemoglobin A1c is 8.6%. When you go home please take your jardiance 25, humalog 10 units every 8 hours, metformin  mg, soliqua 30u (for breakfast). You also have an appointment scheduled with your endocrinologist on April 12th at 12pm.        PRINCIPAL DISCHARGE DIAGNOSIS  Diagnosis: Osteomyelitis of left foot  Assessment and Plan of Treatment: You presented to the hospital on March 26th with an infection of your left foot likely from poorly controlled diabetes. Per the [facility] MRI, there was erosive damage to the 1st toe which was concerning for a bone infection called osteomyelitis. The podiatry team was consulted. They took you to the operating room for an incision and drainage of the site. The surgical pathology report was positive for a multi-drug resistant organism called MRSA. We consulted the infectious disease doctor (Dr. Montoya) who started you on a 6 week antibiotic course which consists of IV daptomycin and oral rifampin. On day of discharge, you continued to remain without fevers and your white blood cell counts returned to normal limits. Blood cultures while admitted were negative.   Instructions:  1. An IV line was placed on your Left Arm on March 31st for IV antibiotics. Please continue taking the daptomycin for 6 weeks.   2. We are also prescribing you an oral antibiotic called rifampin. Please take this medication every 12 hours for 6 weeks.  3. Since you are on daptomycin, one side effect can be muscle breakdown and muscle aches. As such, we strongly recommend checking your creatine phosphokinase (CPK) on a weekly basis.   4. Per the podiatry team, apply daily dressing changes to the left foot. You were educated on this in the hospital, but to reiterate please clean the skin with overlying incision sites with normal saline and protect with dry sterile dressing (gauze, osmany, ACE). Sutures will be removed in clinic within 2-3 weeks following discharge. Weight bearing as tolerated to the left heel in surgical shoe.      SECONDARY DISCHARGE DIAGNOSES  Diagnosis: Diabetes mellitus  Assessment and Plan of Treatment: You have a known history of diabetes for which your hemoglobin A1c is 8.6%. When you go home please take your jardiance 25, humalog 10 units every 8 hours, metformin  mg, soliqua 30u (for breakfast). You also have an appointment scheduled with your endocrinologist on April 12th at 12pm.        PRINCIPAL DISCHARGE DIAGNOSIS  Diagnosis: Osteomyelitis of left foot  Assessment and Plan of Treatment: You presented to the hospital on March 26th with an infection of your left foot likely from poorly controlled diabetes. Per the [facility] MRI, there was erosive damage to the 1st toe which was concerning for a bone infection called osteomyelitis. The podiatry team was consulted. They took you to the operating room for an incision and drainage of the site. The surgical pathology report was positive for a multi-drug resistant organism called MRSA. We consulted the infectious disease doctor (Dr. Montoya) who started you on a 6 week antibiotic course which consists of IV daptomycin and oral rifampin. On day of discharge, you continued to remain without fevers and your white blood cell counts returned to normal limits. Blood cultures while admitted were negative.   Instructions:  1. An IV line was placed on your Left Arm on March 31st for IV antibiotics. Please continue taking the daptomycin for 6 weeks.   2. We are also prescribing you an oral antibiotic called rifampin. Please take this medication every 12 hours for 6 weeks.  3. Since you are on daptomycin, one side effect can be muscle breakdown and muscle aches. As such, we strongly recommend checking your creatine phosphokinase (CPK) on a weekly basis.   4. Per the podiatry team, apply daily dressing changes to the left foot. You were educated on this in the hospital, but to reiterate please clean the skin with overlying incision sites with normal saline and protect with dry sterile dressing (gauze, osmany, ACE). Sutures will be removed in clinic within 2-3 weeks following discharge. Weight bearing as tolerated to the left heel in surgical shoe.      SECONDARY DISCHARGE DIAGNOSES  Diagnosis: Diabetes mellitus  Assessment and Plan of Treatment: You have a known history of diabetes for which your hemoglobin A1c is 8.6%. When you go home please take your jardiance 25, humalog 10 units every 8 hours, metformin  mg, soliqua 30u (for breakfast). You also have an appointment scheduled with your endocrinologist on April 12th at 12pm.       Diagnosis: Other cardiovascular disease  Assessment and Plan of Treatment: During your pre-operative assessment for repair of your L foot the cardiology team was consulted to clear you fo rsurgery. They recommended obtaining a CCTA (Coronary Computed Tomography Angiography) to rule out coronary artery disease. Unfortunately you refused to get this study done, but we still recommend discussing with your primary care doctor to pursue this study as an outpatient.

## 2021-04-01 NOTE — DISCHARGE NOTE PROVIDER - PROVIDER TOKENS
PROVIDER:[TOKEN:[[id number]],FOLLOWUP:[2 weeks],ESTABLISHEDPATIENT:[T]],PROVIDER:[TOKEN:[[id number]],FOLLOWUP:[2 weeks]] PROVIDER:[TOKEN:[[id number]],FOLLOWUP:[2 weeks],ESTABLISHEDPATIENT:[T]],PROVIDER:[TOKEN:[[id number]],FOLLOWUP:[2 weeks]],PROVIDER:[TOKEN:[[id number]],FOLLOWUP:[2 weeks]] PROVIDER:[TOKEN:[[id number]],SCHEDULEDAPPT:[04/12/2021],SCHEDULEDAPPTTIME:[12:00 PM],ESTABLISHEDPATIENT:[T]],PROVIDER:[TOKEN:[[id number]],FOLLOWUP:[2 weeks]],FREE:[LAST:[Yagudayev],FIRST:[David],PHONE:[phone]],FAX:[(   )    -],ADDRESS:[[address] [place] [address]],SCHEDULEDAPPT:[04/12/2021],SCHEDULEDAPPTTIME:[02:45 PM],ESTABLISHEDPATIENT:[T]]

## 2021-04-01 NOTE — DISCHARGE NOTE PROVIDER - NPI NUMBER (FOR SYSADMIN USE ONLY) :
[[id number]],[[id number]] [[id number]],[[id number]],[[id number]] [[id number]],[[id number]],[UNKNOWN]

## 2021-04-01 NOTE — PROGRESS NOTE ADULT - PROBLEM SELECTOR PLAN 2
Wound culture 3/23: GPC in clusters   Surgical culture 3/30: +MRSA  - mgmt as above   - Pain control: tylenol and oxycodone PRN  - Per podiatry recs, WBAT to left heel, preferably with surgical shoe

## 2021-04-01 NOTE — PROGRESS NOTE ADULT - ASSESSMENT
44yo M with PMH HLD, diabetes, OM R foot s/p 2nd and 3rd toe amputations in setting of OM, transferred from [facility] presenting with cellulitis 2/2 Left foot ulceration. Pt underwent left foot I&D on 3/29/21. Pt is s/p delayed primary closure of left foot (2) dorsal incisions and partial delayed primary closure of plantar incision w/ distal most aspect of plantar incision remaining open and packed w/ iodoform packing (DOS 3/31/21).     Plan:  - C/w IV Abx per ID recs; PICC line in place  - Pain control PRN   - WBAT to left heel, preferably with surgical shoe  - Distal most aspect of plantar incision open and packed w/ plain packing. DSD to left foot.   **Remainder of plantar incision will be sutured closed at bedside tomorrow 4/2/21 AM  - Pt will need VNS for IV abx assistance and dressing changes to left foot at least 3X-4X/wk (every other day)   - Rest of care per primary team  Podiatry following

## 2021-04-01 NOTE — DISCHARGE NOTE PROVIDER - NSDCFUADDAPPT_GEN_ALL_CORE_FT
1. You have an appointment scheduled with Dr. Perez your endocrinologist and primary care doctor on 04/12/21 at 12:00pm. Please bring your hospital discharge summary during this visit so that your doctor knows the reason for the hospitalization and antibiotic course duration.     2. You have an appointment scheduled with your podiatrist, Dr. Green, on 04/12/21 at 2:45pm. The purpose of this appointment is to address the surgery done to your left foot.     3. Please schedule an appointment with the infectious disease doctor, Dr. Montoya, regarding your antibiotic regimen. You will be prescribed a 6 week course of IV and oral medications. The office can be reached at [phone].

## 2021-04-01 NOTE — DISCHARGE NOTE PROVIDER - HOSPITAL COURSE
#Discharge: do not delete    45M with [facility] diabetes, OM R foot s/p 2nd and 3rd toe amputations in setting of OM, who presented to the ED at [facility] for fever/chills, myalgia, L foot wound, transferred to [facility] for further management. Found to have sepsis 2/2 osteomyelitis of the left foot.    Problem List/Main Diagnoses (system-based):   #Sepsis.    Plan: 2/4 SIRS in setting of L diabetic foot infection. [facility] MRI: Erosive change of 1st metatarsal head, concern for superimposed osteomyelitis. Report in chart and communicated to podiatry. s/p I&D 3/29 pm.  - Temporarily on vancomycin until 3/31 but discontinued due to rising creatinine function   - Per Dr. Montoya recs started on daptomycin 800 mg IV daily for 42 days (start 3/31 PM) along with rifampin 300mg BID  - Per [facility] records, negative blood cultures during prior admission   - s/p OR 3/29 and 3/31 for debridement and primary closure of wound with podiatry  - deep wound cx results + MRSA   - PICC line placed 3/31 for long term abx for osteomyelitis  - Pain control: tylenol and oxycodone PRN  - Per podiatry recs, WBAT to left heel, preferably with surgical shoe.      #Diabetes mellitus.    - A1C 8.5 at . Per med rec takes jardiance 25, humalog 10 u TID, metformin  mg, soliqua 30u AM  - while inpatient, insulin regimen consisted of lantus 36U + lispro 13U TID  - Patient states he is on losartan for his DM, notes he does not have hypertension. Will hold for now in setting of normotensive and history of Cr elevation at [facility].     New medications: Daptomycin, Rifampin x42 days  Labs to be followed outpatient: A1c (in 3 months)  Exam to be followed outpatient: PCP   #Discharge: do not delete    45M with [facility] diabetes, OM R foot s/p 2nd and 3rd toe amputations in setting of OM, who presented to the ED at [facility] for fever/chills, myalgia, L foot wound, transferred to [facility] for further management. Found to have sepsis 2/2 osteomyelitis of the left foot.    Problem List/Main Diagnoses (system-based):   #Sepsis.    Plan: 2/4 SIRS in setting of L diabetic foot infection. [facility] MRI: Erosive change of 1st metatarsal head, concern for superimposed osteomyelitis. Report in chart and communicated to podiatry. s/p I&D 3/29 pm.  - Temporarily on vancomycin until 3/31 but discontinued due to rising creatinine function   - Per Dr. Montoya recs started on daptomycin 800 mg IV daily for 42 days (start 3/31 PM) along with rifampin 300mg BID  - Per [facility] records, negative blood cultures during prior admission   - s/p OR 3/29 and 3/31 for debridement and primary closure of wound with podiatry  - deep wound cx results + MRSA   - PICC line placed 3/31 for long term abx for osteomyelitis  - Pain control: tylenol and oxycodone PRN  - Per podiatry recs, WBAT to left heel, preferably with surgical shoe.      #Diabetes mellitus.    - A1C 8.5 at . Per med rec takes jardiance 25, humalog 10 u TID, metformin  mg, soliqua 30u AM  - while inpatient, insulin regimen consisted of lantus 36U + lispro 13U TID  - Patient states he is on losartan for his DM, notes he does not have hypertension. Will hold for now in setting of normotensive and history of Cr elevation at [facility].     New medications: Daptomycin, Rifampin x42 days  Labs to be followed outpatient: A1c (in 3 months)  Exam to be followed outpatient: PCP   #Discharge: do not delete    45M with [facility] diabetes, OM R foot s/p 2nd and 3rd toe amputations in setting of OM, who presented to the ED at [facility] for fever/chills, myalgia, L foot wound, transferred to [facility] for further management. Found to have sepsis 2/2 osteomyelitis of the left foot.    Problem List/Main Diagnoses (system-based):   #Sepsis.    Plan: 2/4 SIRS in setting of L diabetic foot infection. [facility] MRI: Erosive change of 1st metatarsal head, concern for superimposed osteomyelitis. Report in chart and communicated to podiatry. s/p I&D 3/29 pm.  - Temporarily on vancomycin until 3/31 but discontinued due to rising creatinine function   - Per Dr. Montoya recs started on daptomycin 800 mg IV daily for 42 days (start 3/31 PM) along with rifampin 300mg BID  - Per [facility] records, negative blood cultures during prior admission   - s/p OR 3/29 and 3/31 for debridement and primary closure of wound with podiatry  - deep wound cx results + MRSA   - PICC line placed 3/31 for long term abx for osteomyelitis  - Pain control: tylenol and oxycodone PRN  - Per podiatry recs, WBAT to left heel, preferably with surgical shoe.      #Diabetes mellitus.    - A1C 8.5 at . Per med rec takes jardiance 25, humalog 10 u TID, metformin  mg, soliqua 30u AM  - while inpatient, insulin regimen consisted of lantus 36U + lispro 13U TID  - Patient states he is on losartan for his DM, notes he does not have hypertension. Will hold for now in setting of normotensive and history of Cr elevation at [facility].     New medications: Daptomycin, Rifampin x42 days  Labs to be followed outpatient: A1c (in 3 months)  Exam to be followed outpatient: PCP, podiatry, endocrine   #Discharge: do not delete    45M with [facility] diabetes, OM R foot s/p 2nd and 3rd toe amputations in setting of OM, who presented to the ED at [facility] for fever/chills, myalgia, L foot wound, transferred to [facility] for further management. Found to have sepsis 2/2 osteomyelitis of the left foot.    Problem List/Main Diagnoses (system-based):   #Sepsis.    Plan: 2/4 SIRS in setting of L diabetic foot infection. [facility] MRI: Erosive change of 1st metatarsal head, concern for superimposed osteomyelitis. Report in chart and communicated to podiatry. s/p I&D 3/29 pm.  - Temporarily on vancomycin until 3/31 but discontinued due to rising creatinine function   - Per Dr. Montoya recs started on daptomycin 800 mg IV daily for 42 days (start 3/31 PM) along with rifampin 300mg BID  - Per [facility] records, negative blood cultures during prior admission   - s/p OR 3/29 and 3/31 for debridement and primary closure of wound with podiatry  - deep wound cx results + MRSA   - PICC line placed 3/31 for long term abx for osteomyelitis  - Pain control: tylenol and oxycodone PRN  - Per podiatry recs, WBAT to left heel, preferably with surgical shoe.      #Diabetes mellitus.    - A1C 8.5 at [facility]. Per med rec takes jardiance 25, humalog 10 u TID, metformin  mg, soliqua 30u AM  - while inpatient, insulin regimen consisted of lantus 36U + lispro 13U TID  - Patient states he is on losartan for his DM, notes he does not have hypertension. Will hold for now in setting of normotensive and history of Cr elevation at [facility].     New medications: Daptomycin, Rifampin x42 days  Labs to be followed outpatient: A1c (in 3 months), CPK  Exam to be followed outpatient: PCP, podiatry, endocrine

## 2021-04-01 NOTE — PROGRESS NOTE ADULT - SUBJECTIVE AND OBJECTIVE BOX
Patient is a 45y old  Male who presents with a chief complaint of left foot pain (31 Mar 2021 10:27)      INTERVAL HPI/ OVERNIGHT EVENTS: ANDREW O/N. Pt states that he has 3/10 pain to left foot.       LABS                        11.8   13.82 )-----------( 687      ( 01 Apr 2021 09:37 )             36.7     04-01    135  |  101  |  18  ----------------------------<  261<H>  4.9   |  26  |  1.21    Ca    9.4      01 Apr 2021 09:37  Phos  2.7     04-01  Mg     1.8     04-01    TPro  8.7<H>  /  Alb  3.0<L>  /  TBili  0.2  /  DBili  x   /  AST  31  /  ALT  48<H>  /  AlkPhos  130<H>  04-01    PT/INR - ( 01 Apr 2021 09:38 )   PT: 14.5 sec;   INR: 1.22          PTT - ( 01 Apr 2021 09:38 )  PTT:33.9 sec    ICU Vital Signs Last 24 Hrs  T(C): 36.8 (01 Apr 2021 06:17), Max: 36.9 (31 Mar 2021 14:37)  T(F): 98.3 (01 Apr 2021 06:17), Max: 98.5 (31 Mar 2021 14:37)  HR: 75 (01 Apr 2021 06:17) (75 - 90)  BP: 145/82 (01 Apr 2021 06:17) (126/78 - 181/100)  BP(mean): 109 (31 Mar 2021 21:19) (96 - 118)  ABP: --  ABP(mean): --  RR: 18 (01 Apr 2021 06:17) (11 - 25)  SpO2: 95% (01 Apr 2021 06:17) (85% - 98%)      RADIOLOGY    MICROBIOLOGY    PHYSICAL EXAM  Lower Extremity Focused  Lower Extremity Focused  Vasc: DP/PT: 2/4 B/L, CFT <3sec b/l.  Left foot:   1.5cm incision proximal to first webspace. 1cm incision proximal to third webspace. Both dorsal incisions closed with nylon sutures.  2cm plantar incision partially closed w/ nylon sutures; distal most aspect of incision open and packed w/ plain packing.

## 2021-04-01 NOTE — PHYSICAL THERAPY INITIAL EVALUATION ADULT - MD ORDER
Incision and drainage of abscess of left foot 29-Mar-2021 22:25:11  De Mini Ramos  Delayed primary closure of foot 31-Mar-2021 19:59:18  De Mini Ramos.

## 2021-04-01 NOTE — DISCHARGE NOTE PROVIDER - CARE PROVIDER_API CALL
Ami Perez)  EndocrinologyMetabDiabetes; Internal Medicine  [address] [address]  Phone: [phone]  Fax: [phone]  Established Patient  Follow Up Time: 2 weeks    Suman Montoya)  Infectious Disease  [address] [address]  Phone: [phone]  Fax: [phone]  Follow Up Time: 2 weeks   Ami Perez)  EndocrinologyMetabDiabetes; Internal Medicine  [address] [address]  Phone: [phone]  Fax: [phone]  Established Patient  Follow Up Time: 2 weeks    Suman Montoya)  Infectious Disease  [address] [address]  Phone: [phone]  Fax: [phone]  Follow Up Time: 2 weeks    David Green)  Surgery Orthopaedic Surgery  [address] [place] [address], [address] [place] [address]  Phone: [phone]  Fax: [phone]  Follow Up Time: 2 weeks   Ami Perez)  EndocrinologyMetabDiabetes; Internal Medicine  [address] [address]  Phone: [phone]  Fax: [phone]  Established Patient  Scheduled Appointment: 04/12/2021 12:00 PM    Suman Montoya)  Infectious Disease  [address] [place] [address]  Phone: [phone]  Fax: [phone]  Follow Up Time: 2 weeks    David Green  [address] [place], [place] [address]. [address]  [place] [address]  Phone: [phone]  Fax: (   )    -  Established Patient  Scheduled Appointment: 04/12/2021 02:45 PM

## 2021-04-01 NOTE — PROGRESS NOTE ADULT - PROBLEM SELECTOR PLAN 1
2/4 SIRS in setting of L diabetic foot infection. [facility] MRI: Erosive change of 1st metatarsal head, concern for superimposed osteomyelitis. Report in chart and communicated to podiatry. s/p I&D 3/29 pm.  - 3/30 10pm vanc trough 14.8 -> redosed 1500 mg for 3 doses but held on 3/31 as c/f CHRIST per Dr. Montoya. Discontinued zosyn on 3/31.   - started on daptomycin 800 mg IV daily for 42 days (start 3/31 PM)  - will f/u Bcx results from [facility]   - deep wound cx results + MRSA   - s/p OR 3/31 for further debridement and delayed primary closure  - PICC line placed 3/31 for long term abx for osteomyelitis 2/4 SIRS in setting of L diabetic foot infection. [facility] MRI: Erosive change of 1st metatarsal head, concern for superimposed osteomyelitis. Report in chart and communicated to podiatry. s/p I&D 3/29 pm.  - 3/30 10pm vanc trough 14.8 -> redosed 1500 mg for 3 doses but held on 3/31 as c/f CHRIST per Dr. Montoya. Discontinued zosyn on 3/31.   - c/w daptomycin 800 mg IV daily for 42 days (start 3/31 PM)  - started rifampin 300 mg IV x1. If no reaction, will start patient on rifampin 300mg BID dosing  - BCx NGTD from [facility] hospitalization  - deep wound cx results + MRSA   - s/p OR 3/31 for further debridement and delayed primary closure  - PICC line placed 3/31 for long term abx for osteomyelitis

## 2021-04-01 NOTE — PHYSICAL THERAPY INITIAL EVALUATION ADULT - ADDITIONAL COMMENTS
pt states that he lives w/ family and will be staying on the first floor of his house w/ no stairs to enter inside. Owns a SC from previous admissions. independent in ADLs prior to this admission

## 2021-04-01 NOTE — PROGRESS NOTE ADULT - ASSESSMENT
Left diabetic foot infection with MRSA and GBS, including infected abscess, soft tissue infection and osteomyelitis  S/P Debridement surgery  Leukocytosis persists  Given high platelet count, suspect ESR still high  Would expect resolution of leukocytosis by now.  Possibly Vancomycin alone was not very effective due to elevated YUN        RECOMMEND  Continue Daptomycin  Check ESR and CRP  Recheck CBC with manual differential tomorrow AM  If no interference with other meds, would give Rifampin 300 mg IV once today.  If no adverse reaction start Rifampin 300 mg PO twice a day with meals (breakfast and dinner)  Continue Mupirocin  Dermatology care  Please obtain patient's PMD's name and phone number   Left diabetic foot infection with MRSA and GBS, including infected abscess, soft tissue infection and osteomyelitis  S/P Debridement surgery  Leukocytosis persists  Given high platelet count, suspect ESR still high  Would expect resolution of leukocytosis by now.  Possibly Vancomycin alone was not very effective due to elevated YUN        RECOMMEND  Continue Daptomycin  Check ESR and CRP  Recheck CBC with manual differential tomorrow AM  If no interference with other meds, would give Rifampin 300 mg IV once today.  If no adverse reaction start Rifampin 300 mg PO twice a day with meals (breakfast and dinner)  Continue Mupirocin  Dermatology care  Please obtain patient's PMD's name and phone number          Discussed with the Resident, Dr Dickerson and Dr Green

## 2021-04-01 NOTE — PROGRESS NOTE ADULT - SUBJECTIVE AND OBJECTIVE BOX
INTERVAL HPI/OVERNIGHT EVENTS:    ANTIBIOTICS/RELEVANT:    MEDICATIONS  (STANDING):  chlorhexidine 2% Cloths 1 Application(s) Topical <User Schedule>  DAPTOmycin IVPB      DAPTOmycin IVPB 800 milliGRAM(s) IV Intermittent every 24 hours  dextrose 40% Gel 15 Gram(s) Oral once  dextrose 5%. 1000 milliLiter(s) (50 mL/Hr) IV Continuous <Continuous>  dextrose 5%. 1000 milliLiter(s) (100 mL/Hr) IV Continuous <Continuous>  dextrose 50% Injectable 25 Gram(s) IV Push once  dextrose 50% Injectable 12.5 Gram(s) IV Push once  dextrose 50% Injectable 25 Gram(s) IV Push once  enoxaparin Injectable 40 milliGRAM(s) SubCutaneous every 24 hours  gabapentin 100 milliGRAM(s) Oral three times a day  glucagon  Injectable 1 milliGRAM(s) IntraMuscular once  insulin glargine Injectable (LANTUS) 36 Unit(s) SubCutaneous at bedtime  insulin lispro (ADMELOG) corrective regimen sliding scale   SubCutaneous Before meals and at bedtime  insulin lispro Injectable (ADMELOG) 13 Unit(s) SubCutaneous three times a day with meals  mupirocin 2% Ointment 1 Application(s) Topical two times a day    MEDICATIONS  (PRN):  acetaminophen   Tablet .. 650 milliGRAM(s) Oral every 6 hours PRN Mild Pain (1 - 3), Moderate Pain (4 - 6)  oxyCODONE    IR 5 milliGRAM(s) Oral every 4 hours PRN Severe Pain (7 - 10)  sodium chloride 0.9% lock flush 10 milliLiter(s) IV Push every 1 hour PRN Pre/post blood products, medications, blood draw, and to maintain line patency      Allergies    No Known Allergies    Intolerances        Vital Signs Last 24 Hrs  T(C): 36.8 (01 Apr 2021 06:17), Max: 36.9 (31 Mar 2021 14:37)  T(F): 98.3 (01 Apr 2021 06:17), Max: 98.5 (31 Mar 2021 14:37)  HR: 75 (01 Apr 2021 06:17) (75 - 90)  BP: 145/82 (01 Apr 2021 06:17) (126/78 - 181/100)  BP(mean): 109 (31 Mar 2021 21:19) (96 - 118)  RR: 18 (01 Apr 2021 06:17) (11 - 25)  SpO2: 95% (01 Apr 2021 06:17) (85% - 98%)        LABS:                        11.8   13.82 )-----------( 687      ( 01 Apr 2021 09:37 )             36.7     04-01    135  |  101  |  18  ----------------------------<  261<H>  4.9   |  26  |  1.21    Ca    9.4      01 Apr 2021 09:37  Phos  2.7     04-01  Mg     1.8     04-01    TPro  8.7<H>  /  Alb  3.0<L>  /  TBili  0.2  /  DBili  x   /  AST  31  /  ALT  48<H>  /  AlkPhos  130<H>  04-01    PT/INR - ( 01 Apr 2021 09:38 )   PT: 14.5 sec;   INR: 1.22          PTT - ( 01 Apr 2021 09:38 )  PTT:33.9 sec      MICROBIOLOGY:    Culture - Surgical Swab (03.30.21 @ 01:57)    -  RIF- Rifampin: S <=1 Should not be used as monotherapy    -  Tetra/Doxy: I 8    -  Trimethoprim/Sulfamethoxazole: S <=0.5/9.5    -  Vancomycin: S 2    -  Vancomycin: S 1.5    Gram Stain:   No organisms seen  No WBC's seen.    -  Cefazolin: R 16    -  Clindamycin: S <=0.25    -  Daptomycin: S 1    -  Erythromycin: R >4    -  Linezolid: S 2    -  Oxacillin: R >2    Specimen Source: .Surgical Swab L Foot Bone Culture #2    Culture Results:   Few Methicillin resistant Staphylococcus aureus  Result called to and read back byErnie Whitney RN 03/31/2021 09:41:57    Organism Identification: Methicillin resistant Staphylococcus aureus  Methicillin resistant Staphylococcus aureus    Organism: Methicillin resistant Staphylococcus aureus    Organism: Methicillin resistant Staphylococcus aureus    Method Type: ETEST    Method Type: YUN    Culture - Surgical Swab (03.30.21 @ 01:57)    Gram Stain:   No organisms seen  Rare WBC's    Specimen Source: .Surgical Swab L Foot Bone Culture #1    Culture Results:   Moderate Methicillin resistant Staphylococcus aureus  Result called to and read back byErnie Whitney RN 03/31/2021 09:41:57  See previous culture for susceptibility    Organism Identification: Methicillin resistant Staphylococcus aureus  Methicillin resistant Staphylococcus aureus    Organism: Methicillin resistant Staphylococcus aureus    Organism: Methicillin resistant Staphylococcus aureus    Method Type: ETEST    Method Type: YUN  --------------------------------------------------------------      I called [facility] and song the following:    Blood cultures from 3.23 are negative    Foot wound culture grew MRSA and Group B Strep          RADIOLOGY & ADDITIONAL STUDIES: INTERVAL HPI/OVERNIGHT EVENTS:    Clinically stable  S/P another washout    MEDICATIONS  (STANDING):  chlorhexidine 2% Cloths 1 Application(s) Topical <User Schedule>  DAPTOmycin IVPB      DAPTOmycin IVPB 800 milliGRAM(s) IV Intermittent every 24 hours  dextrose 40% Gel 15 Gram(s) Oral once  dextrose 5%. 1000 milliLiter(s) (50 mL/Hr) IV Continuous <Continuous>  dextrose 5%. 1000 milliLiter(s) (100 mL/Hr) IV Continuous <Continuous>  dextrose 50% Injectable 25 Gram(s) IV Push once  dextrose 50% Injectable 12.5 Gram(s) IV Push once  dextrose 50% Injectable 25 Gram(s) IV Push once  enoxaparin Injectable 40 milliGRAM(s) SubCutaneous every 24 hours  gabapentin 100 milliGRAM(s) Oral three times a day  glucagon  Injectable 1 milliGRAM(s) IntraMuscular once  insulin glargine Injectable (LANTUS) 36 Unit(s) SubCutaneous at bedtime  insulin lispro (ADMELOG) corrective regimen sliding scale   SubCutaneous Before meals and at bedtime  insulin lispro Injectable (ADMELOG) 13 Unit(s) SubCutaneous three times a day with meals  mupirocin 2% Ointment 1 Application(s) Topical two times a day    MEDICATIONS  (PRN):  acetaminophen   Tablet .. 650 milliGRAM(s) Oral every 6 hours PRN Mild Pain (1 - 3), Moderate Pain (4 - 6)  oxyCODONE    IR 5 milliGRAM(s) Oral every 4 hours PRN Severe Pain (7 - 10)  sodium chloride 0.9% lock flush 10 milliLiter(s) IV Push every 1 hour PRN Pre/post blood products, medications, blood draw, and to maintain line patency      Allergies    No Known Allergies    EXAM  Vital Signs Last 24 Hrs  T(C): 36.8 (01 Apr 2021 06:17), Max: 36.9 (31 Mar 2021 14:37)  T(F): 98.3 (01 Apr 2021 06:17), Max: 98.5 (31 Mar 2021 14:37)  HR: 75 (01 Apr 2021 06:17) (75 - 90)  BP: 145/82 (01 Apr 2021 06:17) (126/78 - 181/100)  BP(mean): 109 (31 Mar 2021 21:19) (96 - 118)  RR: 18 (01 Apr 2021 06:17) (11 - 25)  SpO2: 95% (01 Apr 2021 06:17) (85% - 98%)  Awake and alert  No rash  RR  Chest clear'  Abd soft, NT  Left foot in surgical dressing  Pedal pulses palpable      LABS:                        11.8   13.82 )-----------( 687      ( 01 Apr 2021 09:37 )             36.7     04-01    135  |  101  |  18  ----------------------------<  261<H>  4.9   |  26  |  1.21    Ca    9.4      01 Apr 2021 09:37  Phos  2.7     04-01  Mg     1.8     04-01    TPro  8.7<H>  /  Alb  3.0<L>  /  TBili  0.2  /  DBili  x   /  AST  31  /  ALT  48<H>  /  AlkPhos  130<H>  04-01    PT/INR - ( 01 Apr 2021 09:38 )   PT: 14.5 sec;   INR: 1.22          PTT - ( 01 Apr 2021 09:38 )  PTT:33.9 sec      MICROBIOLOGY:    Culture - Surgical Swab (03.30.21 @ 01:57)    -  RIF- Rifampin: S <=1 Should not be used as monotherapy    -  Tetra/Doxy: I 8    -  Trimethoprim/Sulfamethoxazole: S <=0.5/9.5    -  Vancomycin: S 2    -  Vancomycin: S 1.5    Gram Stain:   No organisms seen  No WBC's seen.    -  Cefazolin: R 16    -  Clindamycin: S <=0.25    -  Daptomycin: S 1    -  Erythromycin: R >4    -  Linezolid: S 2    -  Oxacillin: R >2    Specimen Source: .Surgical Swab L Foot Bone Culture #2    Culture Results:   Few Methicillin resistant Staphylococcus aureus  Result called to and read back byErnie Whitney RN 03/31/2021 09:41:57    Organism Identification: Methicillin resistant Staphylococcus aureus  Methicillin resistant Staphylococcus aureus    Organism: Methicillin resistant Staphylococcus aureus    Organism: Methicillin resistant Staphylococcus aureus    Method Type: ETEST    Method Type: YUN    Culture - Surgical Swab (03.30.21 @ 01:57)    Gram Stain:   No organisms seen  Rare WBC's    Specimen Source: .Surgical Swab L Foot Bone Culture #1    Culture Results:   Moderate Methicillin resistant Staphylococcus aureus  Result called to and read back byErnie Whitney RN 03/31/2021 09:41:57  See previous culture for susceptibility    Organism Identification: Methicillin resistant Staphylococcus aureus  Methicillin resistant Staphylococcus aureus    Organism: Methicillin resistant Staphylococcus aureus    Organism: Methicillin resistant Staphylococcus aureus    Method Type: ETEST    Method Type: YUN  --------------------------------------------------------------      I called [facility] and [facility] the following:    Blood cultures from 3.23 are negative    Foot wound culture grew MRSA and Group B Strep

## 2021-04-01 NOTE — PHYSICAL THERAPY INITIAL EVALUATION ADULT - PERTINENT HX OF CURRENT PROBLEM, REHAB EVAL
44yo M with PMH HLD, diabetes, OM R foot s/p 2nd and 3rd toe amputations in setting of OM, transferred from [facility] presenting with cellulitis 2/2 Left foot ulceration. Pt underwent left foot I&D on 3/29/21. Pt is s/p delayed primary closure of (2) dorsal incisions and partial delayed primary closure of plantar incision w/ distal most aspect of plantar incision remaining open and packed w/ iodoform packing.

## 2021-04-02 LAB
ALBUMIN SERPL ELPH-MCNC: 3 G/DL — LOW (ref 3.3–5)
ALP SERPL-CCNC: 139 U/L — HIGH (ref 40–120)
ALT FLD-CCNC: 47 U/L — HIGH (ref 10–45)
ANION GAP SERPL CALC-SCNC: 9 MMOL/L — SIGNIFICANT CHANGE UP (ref 5–17)
AST SERPL-CCNC: 27 U/L — SIGNIFICANT CHANGE UP (ref 10–40)
BASOPHILS # BLD AUTO: 0.07 K/UL — SIGNIFICANT CHANGE UP (ref 0–0.2)
BASOPHILS NFR BLD AUTO: 0.6 % — SIGNIFICANT CHANGE UP (ref 0–2)
BILIRUB SERPL-MCNC: 0.2 MG/DL — SIGNIFICANT CHANGE UP (ref 0.2–1.2)
BUN SERPL-MCNC: 19 MG/DL — SIGNIFICANT CHANGE UP (ref 7–23)
CALCIUM SERPL-MCNC: 9.6 MG/DL — SIGNIFICANT CHANGE UP (ref 8.4–10.5)
CHLORIDE SERPL-SCNC: 101 MMOL/L — SIGNIFICANT CHANGE UP (ref 96–108)
CK SERPL-CCNC: 51 U/L — SIGNIFICANT CHANGE UP (ref 30–200)
CO2 SERPL-SCNC: 25 MMOL/L — SIGNIFICANT CHANGE UP (ref 22–31)
CREAT SERPL-MCNC: 1.1 MG/DL — SIGNIFICANT CHANGE UP (ref 0.5–1.3)
CRP SERPL-MCNC: 43.3 MG/L — HIGH (ref 0–4)
CULTURE RESULTS: SIGNIFICANT CHANGE UP
CULTURE RESULTS: SIGNIFICANT CHANGE UP
EOSINOPHIL # BLD AUTO: 0.48 K/UL — SIGNIFICANT CHANGE UP (ref 0–0.5)
EOSINOPHIL NFR BLD AUTO: 4 % — SIGNIFICANT CHANGE UP (ref 0–6)
ERYTHROCYTE [SEDIMENTATION RATE] IN BLOOD: 115 MM/HR — HIGH
GLUCOSE BLDC GLUCOMTR-MCNC: 132 MG/DL — HIGH (ref 70–99)
GLUCOSE BLDC GLUCOMTR-MCNC: 214 MG/DL — HIGH (ref 70–99)
GLUCOSE BLDC GLUCOMTR-MCNC: 229 MG/DL — HIGH (ref 70–99)
GLUCOSE BLDC GLUCOMTR-MCNC: 301 MG/DL — HIGH (ref 70–99)
GLUCOSE SERPL-MCNC: 218 MG/DL — HIGH (ref 70–99)
HCT VFR BLD CALC: 38.6 % — LOW (ref 39–50)
HGB BLD-MCNC: 12.3 G/DL — LOW (ref 13–17)
IMM GRANULOCYTES NFR BLD AUTO: 0.5 % — SIGNIFICANT CHANGE UP (ref 0–1.5)
LYMPHOCYTES # BLD AUTO: 29.4 % — SIGNIFICANT CHANGE UP (ref 13–44)
LYMPHOCYTES # BLD AUTO: 3.54 K/UL — HIGH (ref 1–3.3)
MAGNESIUM SERPL-MCNC: 1.9 MG/DL — SIGNIFICANT CHANGE UP (ref 1.6–2.6)
MCHC RBC-ENTMCNC: 28.8 PG — SIGNIFICANT CHANGE UP (ref 27–34)
MCHC RBC-ENTMCNC: 31.9 GM/DL — LOW (ref 32–36)
MCV RBC AUTO: 90.4 FL — SIGNIFICANT CHANGE UP (ref 80–100)
MONOCYTES # BLD AUTO: 0.79 K/UL — SIGNIFICANT CHANGE UP (ref 0–0.9)
MONOCYTES NFR BLD AUTO: 6.6 % — SIGNIFICANT CHANGE UP (ref 2–14)
NEUTROPHILS # BLD AUTO: 7.1 K/UL — SIGNIFICANT CHANGE UP (ref 1.8–7.4)
NEUTROPHILS NFR BLD AUTO: 58.9 % — SIGNIFICANT CHANGE UP (ref 43–77)
NRBC # BLD: 0 /100 WBCS — SIGNIFICANT CHANGE UP (ref 0–0)
PHOSPHATE SERPL-MCNC: 3.4 MG/DL — SIGNIFICANT CHANGE UP (ref 2.5–4.5)
PLATELET # BLD AUTO: 731 K/UL — HIGH (ref 150–400)
POTASSIUM SERPL-MCNC: 4.8 MMOL/L — SIGNIFICANT CHANGE UP (ref 3.5–5.3)
POTASSIUM SERPL-SCNC: 4.8 MMOL/L — SIGNIFICANT CHANGE UP (ref 3.5–5.3)
PROT SERPL-MCNC: 8.8 G/DL — HIGH (ref 6–8.3)
RBC # BLD: 4.27 M/UL — SIGNIFICANT CHANGE UP (ref 4.2–5.8)
RBC # FLD: 12.9 % — SIGNIFICANT CHANGE UP (ref 10.3–14.5)
SODIUM SERPL-SCNC: 135 MMOL/L — SIGNIFICANT CHANGE UP (ref 135–145)
SPECIMEN SOURCE: SIGNIFICANT CHANGE UP
SPECIMEN SOURCE: SIGNIFICANT CHANGE UP
WBC # BLD: 12.04 K/UL — HIGH (ref 3.8–10.5)
WBC # FLD AUTO: 12.04 K/UL — HIGH (ref 3.8–10.5)

## 2021-04-02 RX ORDER — DAPTOMYCIN 500 MG/10ML
0 INJECTION, POWDER, LYOPHILIZED, FOR SOLUTION INTRAVENOUS
Qty: 0 | Refills: 0 | DISCHARGE
Start: 2021-04-02

## 2021-04-02 RX ORDER — RIFAMPIN 300 MG
1 CAPSULE ORAL
Qty: 0 | Refills: 0 | DISCHARGE
Start: 2021-04-02

## 2021-04-02 RX ORDER — GABAPENTIN 400 MG/1
1 CAPSULE ORAL
Qty: 0 | Refills: 0 | DISCHARGE
Start: 2021-04-02

## 2021-04-02 RX ORDER — LOSARTAN POTASSIUM 100 MG/1
50 TABLET, FILM COATED ORAL DAILY
Refills: 0 | Status: DISCONTINUED | OUTPATIENT
Start: 2021-04-02 | End: 2021-04-03

## 2021-04-02 RX ADMIN — MUPIROCIN 1 APPLICATION(S): 20 OINTMENT TOPICAL at 17:37

## 2021-04-02 RX ADMIN — LOSARTAN POTASSIUM 50 MILLIGRAM(S): 100 TABLET, FILM COATED ORAL at 17:37

## 2021-04-02 RX ADMIN — CHLORHEXIDINE GLUCONATE 1 APPLICATION(S): 213 SOLUTION TOPICAL at 05:44

## 2021-04-02 RX ADMIN — Medication 4: at 08:30

## 2021-04-02 RX ADMIN — DAPTOMYCIN 132 MILLIGRAM(S): 500 INJECTION, POWDER, LYOPHILIZED, FOR SOLUTION INTRAVENOUS at 17:37

## 2021-04-02 RX ADMIN — Medication 13 UNIT(S): at 17:41

## 2021-04-02 RX ADMIN — Medication 8: at 11:52

## 2021-04-02 RX ADMIN — INSULIN GLARGINE 36 UNIT(S): 100 INJECTION, SOLUTION SUBCUTANEOUS at 22:29

## 2021-04-02 RX ADMIN — GABAPENTIN 100 MILLIGRAM(S): 400 CAPSULE ORAL at 13:05

## 2021-04-02 RX ADMIN — MUPIROCIN 1 APPLICATION(S): 20 OINTMENT TOPICAL at 05:44

## 2021-04-02 RX ADMIN — Medication 13 UNIT(S): at 11:51

## 2021-04-02 RX ADMIN — Medication 13 UNIT(S): at 08:30

## 2021-04-02 RX ADMIN — ENOXAPARIN SODIUM 40 MILLIGRAM(S): 100 INJECTION SUBCUTANEOUS at 13:05

## 2021-04-02 RX ADMIN — Medication 4: at 22:28

## 2021-04-02 RX ADMIN — GABAPENTIN 100 MILLIGRAM(S): 400 CAPSULE ORAL at 22:29

## 2021-04-02 RX ADMIN — GABAPENTIN 100 MILLIGRAM(S): 400 CAPSULE ORAL at 05:44

## 2021-04-02 NOTE — PROGRESS NOTE ADULT - PROVIDER SPECIALTY LIST ADULT
Cardiology
Infectious Disease
Internal Medicine
Podiatry
Infectious Disease
Infectious Disease
Internal Medicine
Podiatry
Podiatry
Cardiology
Infectious Disease
Internal Medicine
Podiatry
Internal Medicine
Podiatry
Podiatry
Internal Medicine

## 2021-04-02 NOTE — PROGRESS NOTE ADULT - PROBLEM SELECTOR PROBLEM 2
Diabetic foot infection

## 2021-04-02 NOTE — PROGRESS NOTE ADULT - SUBJECTIVE AND OBJECTIVE BOX
INTERVAL HPI/OVERNIGHT EVENTS:    ANTIBIOTICS/RELEVANT:    MEDICATIONS  (STANDING):  chlorhexidine 2% Cloths 1 Application(s) Topical <User Schedule>  DAPTOmycin IVPB 800 milliGRAM(s) IV Intermittent every 24 hours  DAPTOmycin IVPB      dextrose 40% Gel 15 Gram(s) Oral once  dextrose 5%. 1000 milliLiter(s) (50 mL/Hr) IV Continuous <Continuous>  dextrose 5%. 1000 milliLiter(s) (100 mL/Hr) IV Continuous <Continuous>  dextrose 50% Injectable 25 Gram(s) IV Push once  dextrose 50% Injectable 12.5 Gram(s) IV Push once  dextrose 50% Injectable 25 Gram(s) IV Push once  enoxaparin Injectable 40 milliGRAM(s) SubCutaneous every 24 hours  gabapentin 100 milliGRAM(s) Oral three times a day  glucagon  Injectable 1 milliGRAM(s) IntraMuscular once  insulin glargine Injectable (LANTUS) 36 Unit(s) SubCutaneous at bedtime  insulin lispro (ADMELOG) corrective regimen sliding scale   SubCutaneous Before meals and at bedtime  insulin lispro Injectable (ADMELOG) 13 Unit(s) SubCutaneous three times a day with meals  mupirocin 2% Ointment 1 Application(s) Topical two times a day    MEDICATIONS  (PRN):  acetaminophen   Tablet .. 650 milliGRAM(s) Oral every 6 hours PRN Mild Pain (1 - 3), Moderate Pain (4 - 6)  oxyCODONE    IR 5 milliGRAM(s) Oral every 4 hours PRN Severe Pain (7 - 10)  sodium chloride 0.9% lock flush 10 milliLiter(s) IV Push every 1 hour PRN Pre/post blood products, medications, blood draw, and to maintain line patency      Allergies    No Known Allergies    Intolerances        Vital Signs Last 24 Hrs  T(C): 36.8 (02 Apr 2021 11:27), Max: 37.3 (01 Apr 2021 14:10)  T(F): 98.2 (02 Apr 2021 11:27), Max: 99.1 (01 Apr 2021 14:10)  HR: 79 (02 Apr 2021 11:27) (74 - 79)  BP: 144/87 (02 Apr 2021 11:27) (135/80 - 169/89)  BP(mean): --  RR: 18 (02 Apr 2021 11:27) (17 - 18)  SpO2: 98% (02 Apr 2021 11:27) (96% - 98%)    PHYSICAL EXAM:      Constitutional:    Eyes:    ENMT:    Neck:    Breasts:    Back:    Respiratory:    Cardiovascular:    Gastrointestinal:    Genitourinary:    Rectal:    Extremities:    Vascular:    Neurological:    Skin:    Lymph Nodes:    Musculoskeletal:    Psychiatric:        LABS:                        12.3   12.04 )-----------( 731      ( 02 Apr 2021 08:40 )             38.6     04-02    135  |  101  |  19  ----------------------------<  218<H>  4.8   |  25  |  1.10    Ca    9.6      02 Apr 2021 08:40  Phos  3.4     04-02  Mg     1.9     04-02    TPro  8.8<H>  /  Alb  3.0<L>  /  TBili  0.2  /  DBili  x   /  AST  27  /  ALT  47<H>  /  AlkPhos  139<H>  04-02    PT/INR - ( 01 Apr 2021 09:38 )   PT: 14.5 sec;   INR: 1.22          PTT - ( 01 Apr 2021 09:38 )  PTT:33.9 sec      MICROBIOLOGY:    RADIOLOGY & ADDITIONAL STUDIES: INTERVAL HPI/OVERNIGHT EVENTS:    Doing well and tolerating Rifampin  No N/V     MEDICATIONS  (STANDING):  chlorhexidine 2% Cloths 1 Application(s) Topical <User Schedule>  DAPTOmycin IVPB 800 milliGRAM(s) IV Intermittent every 24 hours  DAPTOmycin IVPB      dextrose 40% Gel 15 Gram(s) Oral once  dextrose 5%. 1000 milliLiter(s) (50 mL/Hr) IV Continuous <Continuous>  dextrose 5%. 1000 milliLiter(s) (100 mL/Hr) IV Continuous <Continuous>  dextrose 50% Injectable 25 Gram(s) IV Push once  dextrose 50% Injectable 12.5 Gram(s) IV Push once  dextrose 50% Injectable 25 Gram(s) IV Push once  enoxaparin Injectable 40 milliGRAM(s) SubCutaneous every 24 hours  gabapentin 100 milliGRAM(s) Oral three times a day  glucagon  Injectable 1 milliGRAM(s) IntraMuscular once  insulin glargine Injectable (LANTUS) 36 Unit(s) SubCutaneous at bedtime  insulin lispro (ADMELOG) corrective regimen sliding scale   SubCutaneous Before meals and at bedtime  insulin lispro Injectable (ADMELOG) 13 Unit(s) SubCutaneous three times a day with meals  mupirocin 2% Ointment 1 Application(s) Topical two times a day    MEDICATIONS  (PRN):  acetaminophen   Tablet .. 650 milliGRAM(s) Oral every 6 hours PRN Mild Pain (1 - 3), Moderate Pain (4 - 6)  oxyCODONE    IR 5 milliGRAM(s) Oral every 4 hours PRN Severe Pain (7 - 10)  sodium chloride 0.9% lock flush 10 milliLiter(s) IV Push every 1 hour PRN Pre/post blood products, medications, blood draw, and to maintain line patency      Allergies    No Known Allergies    EXAM  Vital Signs Last 24 Hrs  T(C): 36.8 (02 Apr 2021 11:27), Max: 37.3 (01 Apr 2021 14:10)  T(F): 98.2 (02 Apr 2021 11:27), Max: 99.1 (01 Apr 2021 14:10)  HR: 79 (02 Apr 2021 11:27) (74 - 79)  BP: 144/87 (02 Apr 2021 11:27) (135/80 - 169/89)  BP(mean): --  RR: 18 (02 Apr 2021 11:27) (17 - 18)  SpO2: 98% (02 Apr 2021 11:27) (96% - 98%)  Awake and alert  On RA without distress  No new rash  RR  Abd soft NT  Left foot dressed by podiatry  No edema      LABS:                        12.3   12.04 )-----------( 731      ( 02 Apr 2021 08:40 )             38.6     04-02    135  |  101  |  19  ----------------------------<  218<H>  4.8   |  25  |  1.10    Ca    9.6      02 Apr 2021 08:40  Phos  3.4     04-02  Mg     1.9     04-02    TPro  8.8<H>  /  Alb  3.0<L>  /  TBili  0.2  /  DBili  x   /  AST  27  /  ALT  47<H>  /  AlkPhos  139<H>  04-02    PT/INR - ( 01 Apr 2021 09:38 )   PT: 14.5 sec;   INR: 1.22     Culture - Surgical Swab (03.30.21 @ 01:57)    Gram Stain:   No organisms seen  Rare WBC's    Specimen Source: .Surgical Swab L Foot Bone Culture #1    Culture Results:   Moderate Methicillin resistant Staphylococcus aureus  Result called to and read back byErnie Whitney RN 03/31/2021 09:41:57  See previous culture for susceptibility    Organism Identification: Methicillin resistant Staphylococcus aureus  Methicillin resistant Staphylococcus aureus    Organism: Methicillin resistant Staphylococcus aureus    Organism: Methicillin resistant Staphylococcus aureus    Method Type: ETEST    Method Type: YUN         PTT - ( 01 Apr 2021 09:38 )  PTT:33.9 sec      MICROBIOLOGY:    Culture - Surgical Swab (03.30.21 @ 01:57)    -  RIF- Rifampin: S <=1 Should not be used as monotherapy    -  Tetra/Doxy: I 8    -  Trimethoprim/Sulfamethoxazole: S <=0.5/9.5    -  Vancomycin: S 2    -  Vancomycin: S 1.5    Gram Stain:   No organisms seen  No WBC's seen.    -  Cefazolin: R 16    -  Clindamycin: S <=0.25    -  Daptomycin: S 1    -  Erythromycin: R >4    -  Linezolid: S 2    -  Oxacillin: R >2    Specimen Source: .Surgical Swab L Foot Bone Culture #2    Culture Results:   Few Methicillin resistant Staphylococcus aureus  Result called to and read back byErnie Whitney RN 03/31/2021 09:41:57    Organism Identification: Methicillin resistant Staphylococcus aureus  Methicillin resistant Staphylococcus aureus    Organism: Methicillin resistant Staphylococcus aureus    Organism: Methicillin resistant Staphylococcus aureus    Method Type: ETEST    Method Type: YUN    Culture - Surgical Swab (03.30.21 @ 01:57)    Gram Stain:   No organisms seen  Rare WBC's    Specimen Source: .Surgical Swab L Foot Bone Culture #1    Culture Results:   Moderate Methicillin resistant Staphylococcus aureus  Result called to and read back byErnie Whitney RN 03/31/2021 09:41:57  See previous culture for susceptibility    Organism Identification: Methicillin resistant Staphylococcus aureus  Methicillin resistant Staphylococcus aureus    Organism: Methicillin resistant Staphylococcus aureus    Organism: Methicillin resistant Staphylococcus aureus    Method Type: ETEST    Method Type: YUN

## 2021-04-02 NOTE — PROGRESS NOTE ADULT - ASSESSMENT
Left diabetic foot infection including osteomyelitis - MRSA and GBS  S/P Debridement surgery  Creatinine improved  Leukocytosis improving      RECOMMEND  Continue IV Daptomycin 800 mg daily and PO Rifampin for 6 weeks  CMP, CBC with diff, ESR, CRP and CPK at lease weekly  Please fax results to me at [phone]  F/P with Podiatry and his Internal Medicine doctor,  I will also follow the patient along with the podiatrist          Discussed with the Intern, Resident, Dr Sheffield and Dr Green

## 2021-04-02 NOTE — PROGRESS NOTE ADULT - SUBJECTIVE AND OBJECTIVE BOX
Pt seen and examined   no complaints  s/p wound closure and new dressings at bedside today    REVIEW OF SYSTEMS:  Constitutional: No fever,   Cardiovascular: No chest pain, palpitations, dizziness or leg swelling  Gastrointestinal: No abdominal or epigastric pain. No nausea, vomiting No diarrhea or constipation.   Skin: No itching, burning, rashes or lesions       MEDICATIONS:  MEDICATIONS  (STANDING):  chlorhexidine 2% Cloths 1 Application(s) Topical <User Schedule>  DAPTOmycin IVPB      DAPTOmycin IVPB 800 milliGRAM(s) IV Intermittent every 24 hours  dextrose 40% Gel 15 Gram(s) Oral once  dextrose 5%. 1000 milliLiter(s) (50 mL/Hr) IV Continuous <Continuous>  dextrose 5%. 1000 milliLiter(s) (100 mL/Hr) IV Continuous <Continuous>  dextrose 50% Injectable 25 Gram(s) IV Push once  dextrose 50% Injectable 12.5 Gram(s) IV Push once  dextrose 50% Injectable 25 Gram(s) IV Push once  enoxaparin Injectable 40 milliGRAM(s) SubCutaneous every 24 hours  gabapentin 100 milliGRAM(s) Oral three times a day  glucagon  Injectable 1 milliGRAM(s) IntraMuscular once  insulin glargine Injectable (LANTUS) 36 Unit(s) SubCutaneous at bedtime  insulin lispro (ADMELOG) corrective regimen sliding scale   SubCutaneous Before meals and at bedtime  insulin lispro Injectable (ADMELOG) 13 Unit(s) SubCutaneous three times a day with meals  mupirocin 2% Ointment 1 Application(s) Topical two times a day    MEDICATIONS  (PRN):  acetaminophen   Tablet .. 650 milliGRAM(s) Oral every 6 hours PRN Mild Pain (1 - 3), Moderate Pain (4 - 6)  oxyCODONE    IR 5 milliGRAM(s) Oral every 4 hours PRN Severe Pain (7 - 10)  sodium chloride 0.9% lock flush 10 milliLiter(s) IV Push every 1 hour PRN Pre/post blood products, medications, blood draw, and to maintain line patency      Allergies    No Known Allergies    Intolerances        Vital Signs Last 24 Hrs  T(C): 36.8 (02 Apr 2021 11:27), Max: 37.3 (01 Apr 2021 14:10)  T(F): 98.2 (02 Apr 2021 11:27), Max: 99.1 (01 Apr 2021 14:10)  HR: 79 (02 Apr 2021 11:27) (74 - 79)  BP: 144/87 (02 Apr 2021 11:27) (135/80 - 169/89)  BP(mean): --  RR: 18 (02 Apr 2021 11:27) (17 - 18)  SpO2: 98% (02 Apr 2021 11:27) (96% - 98%)      PHYSICAL EXAM:    General: Well developed; well nourished; in no acute distress  HEENT: MMM, conjunctiva and sclera clear  Lungs: clear  Heart: regular  Gastrointestinal: Soft non-tender non-distended; Normal bowel sounds;  Skin: Warm and dry. No obvious rash  ext: dressings    LABS:      CBC Full  -  ( 02 Apr 2021 08:40 )  WBC Count : 12.04 K/uL  RBC Count : 4.27 M/uL  Hemoglobin : 12.3 g/dL  Hematocrit : 38.6 %  Platelet Count - Automated : 731 K/uL  Mean Cell Volume : 90.4 fl  Mean Cell Hemoglobin : 28.8 pg  Mean Cell Hemoglobin Concentration : 31.9 gm/dL  Auto Neutrophil # : 7.10 K/uL  Auto Lymphocyte # : 3.54 K/uL  Auto Monocyte # : 0.79 K/uL  Auto Eosinophil # : 0.48 K/uL  Auto Basophil # : 0.07 K/uL  Auto Neutrophil % : 58.9 %  Auto Lymphocyte % : 29.4 %  Auto Monocyte % : 6.6 %  Auto Eosinophil % : 4.0 %  Auto Basophil % : 0.6 %    04-02    135  |  101  |  19  ----------------------------<  218<H>  4.8   |  25  |  1.10    Ca    9.6      02 Apr 2021 08:40  Phos  3.4     04-02  Mg     1.9     04-02    TPro  8.8<H>  /  Alb  3.0<L>  /  TBili  0.2  /  DBili  x   /  AST  27  /  ALT  47<H>  /  AlkPhos  139<H>  04-02    PT/INR - ( 01 Apr 2021 09:38 )   PT: 14.5 sec;   INR: 1.22          PTT - ( 01 Apr 2021 09:38 )  PTT:33.9 sec                  RADIOLOGY & ADDITIONAL STUDIES (The following images were personally reviewed):

## 2021-04-02 NOTE — PROGRESS NOTE ADULT - PROBLEM SELECTOR PLAN 1
2/4 SIRS in setting of L diabetic foot infection. [place] MRI: Erosive change of 1st metatarsal head, concern for superimposed osteomyelitis. Report in chart and communicated to podiatry. s/p I&D 3/29 pm.  - vanc d/c'ed 3/31 as c/f CHRIST. Also discontinued zosyn on 3/31.   - c/w daptomycin 800 mg IV daily for 42 days (start 3/31 PM)  - given rifampin 300 mg IV x1 on 4/1, plan to initiate PO dosing today  - BCx NGTD from [facility] hospitalization  - deep wound cx results + MRSA   - s/p OR 3/31 for further debridement and delayed primary closure  - podiatry plans for suture closure of remainder of plantar incision at bedside today 4/2 AM  - PICC line placed 3/31 for long term abx for osteomyelitis

## 2021-04-02 NOTE — PROGRESS NOTE ADULT - ASSESSMENT
45M with PMH diabetes, OM R foot s/p 2nd and 3rd toe amputations in setting of OM, who presented to the ED at [facility] for fever/chills, myalgia, L foot wound, transferred to [facility] for further management of cellulitis vs. OM

## 2021-04-02 NOTE — PROGRESS NOTE ADULT - ASSESSMENT
46yo M with PMH HLD, diabetes, OM R foot s/p 2nd and 3rd toe amputations in setting of OM, transferred from [facility] presenting with cellulitis 2/2 Left foot ulceration. Pt underwent left foot I&D on 3/29/21. Pt is s/p delayed primary closure of left foot (2) dorsal incisions and partial delayed primary closure of plantar incision w/ distal most aspect of plantar incision remaining open and packed w/ iodoform packing (DOS 3/31/21). Plantar incision sutured closed completely on 4/2/21.     Plan:  - C/w IV Abx per ID recs; PICC line in place  - Pain control PRN   - WBAT to left heel in surgical shoe (dispensed)  - DSD to left foot  - Pt will need VNS for IV abx assistance and dressing changes to left foot at least 3X-4X/wk (every other day)   - Rest of care per primary team  Podiatry following       Discharge Recommendations:   IV abx via PICC line per ID recommendations.   Can use OTC anti-inflammatories for pain mgmt if needed.   Daily dressing changes to left foot. Educated pt on how to perform daily dressing changes if VNS are not able to make it every day.    Clean skin overlying incision sites w/ normal saline. Protect with dry sterile dressing (gauze, osmany, ACE).  Sutures will be removed in clinic within 2-3 weeks following discharge.  Weight bearing as tolerated to left heel in surgical shoe.    Please call podiatrist and/or return to ED if there is increased redness, swelling, purulence and/or malodor observed.     Patient should follow up with Dr. Maximiliano Green within 1 week of discharge.    Office information:          [place] Address- [address], [place] [address] Phone: [phone]         [place] Address- [address], [place] [address] Phone: [phone]

## 2021-04-02 NOTE — PROGRESS NOTE ADULT - PROBLEM SELECTOR PLAN 3
A1C 8.5 at [facility]. per med rec takes jardiance 25, humalog 10 u TID, metformin  mg, soliqua 30u AM  - DASH/CC  - miSS  - c/w lantus 36U + lispro 13U TID  - Patient states he is on losartan for his DM, notes he does not have hypertension. Will hold for now in setting of normotensive and history of Cr elevation at [facility]

## 2021-04-02 NOTE — PROGRESS NOTE ADULT - SUBJECTIVE AND OBJECTIVE BOX
INTERVAL HPI/OVERNIGHT EVENTS:  As per night team, no overnight events. Patient seen and examined at bedside. Says he has no L leg discomfort, able to ambulate in the room. Also denies fever, chills, dizziness, weakness, HA, CP, SOB, N/V/D/C. 12 pt ROS otherwise negative.     VITALS  Vital Signs Last 24 Hrs  T(C): 36.8 (02 Apr 2021 05:28), Max: 37.3 (01 Apr 2021 14:10)  T(F): 98.3 (02 Apr 2021 05:28), Max: 99.1 (01 Apr 2021 14:10)  HR: 74 (02 Apr 2021 05:28) (74 - 79)  BP: 135/80 (02 Apr 2021 05:28) (135/80 - 169/89)  BP(mean): --  RR: 18 (02 Apr 2021 05:28) (17 - 18)  SpO2: 97% (02 Apr 2021 05:28) (96% - 97%)    CAPILLARY BLOOD GLUCOSE      POCT Blood Glucose.: 214 mg/dL (02 Apr 2021 08:03)  POCT Blood Glucose.: 173 mg/dL (01 Apr 2021 21:57)  POCT Blood Glucose.: 149 mg/dL (01 Apr 2021 17:24)  POCT Blood Glucose.: 139 mg/dL (01 Apr 2021 13:19)  POCT Blood Glucose.: 163 mg/dL (01 Apr 2021 11:58)      PHYSICAL EXAM  General: NAD, sitting comfortably in bed   HEENT: PERRL/ EOMI, no scleral icterus, MMM  Neck: Supple, no JVD  Respiratory: lungs CTA b/l, no wheezes/crackles, no accessory muscle use  Cardiovascular: Regular rhythm/rate; +S1 +S2, no murmurs  Gastrointestinal: Soft, NTND, normoactive BS, no rebound, no guarding  Genitourinary: no suprapubic tenderness  Extremities: WWP, no cyanosis, clubbing or edema. L foot wrapped w ACE. R foot 4th and 5th ray amputation, partial 2nd digit amputation  Neurological: A&Ox3, CNII-XII grossly intact, no gross focal deficits, follows commands  Skin: Normal temperature, warm, dry    MEDICATIONS  (STANDING):  chlorhexidine 2% Cloths 1 Application(s) Topical <User Schedule>  DAPTOmycin IVPB      DAPTOmycin IVPB 800 milliGRAM(s) IV Intermittent every 24 hours  dextrose 40% Gel 15 Gram(s) Oral once  dextrose 5%. 1000 milliLiter(s) (50 mL/Hr) IV Continuous <Continuous>  dextrose 5%. 1000 milliLiter(s) (100 mL/Hr) IV Continuous <Continuous>  dextrose 50% Injectable 25 Gram(s) IV Push once  dextrose 50% Injectable 12.5 Gram(s) IV Push once  dextrose 50% Injectable 25 Gram(s) IV Push once  enoxaparin Injectable 40 milliGRAM(s) SubCutaneous every 24 hours  gabapentin 100 milliGRAM(s) Oral three times a day  glucagon  Injectable 1 milliGRAM(s) IntraMuscular once  insulin glargine Injectable (LANTUS) 36 Unit(s) SubCutaneous at bedtime  insulin lispro (ADMELOG) corrective regimen sliding scale   SubCutaneous Before meals and at bedtime  insulin lispro Injectable (ADMELOG) 13 Unit(s) SubCutaneous three times a day with meals  mupirocin 2% Ointment 1 Application(s) Topical two times a day    MEDICATIONS  (PRN):  acetaminophen   Tablet .. 650 milliGRAM(s) Oral every 6 hours PRN Mild Pain (1 - 3), Moderate Pain (4 - 6)  oxyCODONE    IR 5 milliGRAM(s) Oral every 4 hours PRN Severe Pain (7 - 10)  sodium chloride 0.9% lock flush 10 milliLiter(s) IV Push every 1 hour PRN Pre/post blood products, medications, blood draw, and to maintain line patency      No Known Allergies      LABS                        11.8   13.82 )-----------( 687      ( 01 Apr 2021 09:37 )             36.7     04-01    135  |  101  |  18  ----------------------------<  261<H>  4.9   |  26  |  1.21    Ca    9.4      01 Apr 2021 09:37  Phos  2.7     04-01  Mg     1.8     04-01    TPro  8.7<H>  /  Alb  3.0<L>  /  TBili  0.2  /  DBili  x   /  AST  31  /  ALT  48<H>  /  AlkPhos  130<H>  04-01    PT/INR - ( 01 Apr 2021 09:38 )   PT: 14.5 sec;   INR: 1.22          PTT - ( 01 Apr 2021 09:38 )  PTT:33.9 sec          RADIOLOGY & ADDITIONAL TESTS: Reviewed

## 2021-04-02 NOTE — PROGRESS NOTE ADULT - NSICDXPILOT_GEN_ALL_CORE
[place]

## 2021-04-02 NOTE — PROGRESS NOTE ADULT - SUBJECTIVE AND OBJECTIVE BOX
Patient is a 45y old  Male who presents with a chief complaint of Osteomyelitis (01 Apr 2021 11:14)      INTERVAL HPI/ OVERNIGHT EVENTS      LABS                        12.3   12.04 )-----------( 731      ( 02 Apr 2021 08:40 )             38.6     04-02    135  |  101  |  19  ----------------------------<  218<H>  4.8   |  25  |  1.10    Ca    9.6      02 Apr 2021 08:40  Phos  3.4     04-02  Mg     1.9     04-02    TPro  8.8<H>  /  Alb  3.0<L>  /  TBili  0.2  /  DBili  x   /  AST  27  /  ALT  47<H>  /  AlkPhos  139<H>  04-02    PT/INR - ( 01 Apr 2021 09:38 )   PT: 14.5 sec;   INR: 1.22          PTT - ( 01 Apr 2021 09:38 )  PTT:33.9 sec  ESR: 115  CRP: --  04-02 @ 08:40    ICU Vital Signs Last 24 Hrs  T(C): 36.8 (02 Apr 2021 05:28), Max: 37.3 (01 Apr 2021 14:10)  T(F): 98.3 (02 Apr 2021 05:28), Max: 99.1 (01 Apr 2021 14:10)  HR: 74 (02 Apr 2021 05:28) (74 - 79)  BP: 135/80 (02 Apr 2021 05:28) (135/80 - 169/89)  BP(mean): --  ABP: --  ABP(mean): --  RR: 18 (02 Apr 2021 05:28) (17 - 18)  SpO2: 97% (02 Apr 2021 05:28) (96% - 97%)      RADIOLOGY    MICROBIOLOGY    PHYSICAL EXAM  Lower Extremity Focused  Vasc: DP/PT: 2/4 B/L, CFT <3sec b/l.  Left foot:   1.5cm incision proximal to first webspace. 1cm incision proximal to third webspace. Both dorsal incisions closed with nylon sutures.  2cm plantar incision now completely closed w/ nylon sutures.  Sutures intact along all incisions w/ skin edges well co-apted. Minimal serosanguinous drainage on plantar side of dressing.

## 2021-04-03 ENCOUNTER — TRANSCRIPTION ENCOUNTER (OUTPATIENT)
Age: 46
End: 2021-04-03

## 2021-04-03 VITALS
OXYGEN SATURATION: 99 % | TEMPERATURE: 98 F | RESPIRATION RATE: 20 BRPM | HEART RATE: 80 BPM | DIASTOLIC BLOOD PRESSURE: 70 MMHG | SYSTOLIC BLOOD PRESSURE: 147 MMHG

## 2021-04-03 LAB
ALBUMIN SERPL ELPH-MCNC: 3.1 G/DL — LOW (ref 3.3–5)
ALP SERPL-CCNC: 119 U/L — SIGNIFICANT CHANGE UP (ref 40–120)
ALT FLD-CCNC: 38 U/L — SIGNIFICANT CHANGE UP (ref 10–45)
ANION GAP SERPL CALC-SCNC: 12 MMOL/L — SIGNIFICANT CHANGE UP (ref 5–17)
AST SERPL-CCNC: 21 U/L — SIGNIFICANT CHANGE UP (ref 10–40)
BASOPHILS # BLD AUTO: 0.07 K/UL — SIGNIFICANT CHANGE UP (ref 0–0.2)
BASOPHILS NFR BLD AUTO: 0.7 % — SIGNIFICANT CHANGE UP (ref 0–2)
BILIRUB SERPL-MCNC: 0.2 MG/DL — SIGNIFICANT CHANGE UP (ref 0.2–1.2)
BUN SERPL-MCNC: 19 MG/DL — SIGNIFICANT CHANGE UP (ref 7–23)
CALCIUM SERPL-MCNC: 9.5 MG/DL — SIGNIFICANT CHANGE UP (ref 8.4–10.5)
CHLORIDE SERPL-SCNC: 99 MMOL/L — SIGNIFICANT CHANGE UP (ref 96–108)
CK SERPL-CCNC: 49 U/L — SIGNIFICANT CHANGE UP (ref 30–200)
CO2 SERPL-SCNC: 24 MMOL/L — SIGNIFICANT CHANGE UP (ref 22–31)
CREAT SERPL-MCNC: 1.15 MG/DL — SIGNIFICANT CHANGE UP (ref 0.5–1.3)
EOSINOPHIL # BLD AUTO: 0.26 K/UL — SIGNIFICANT CHANGE UP (ref 0–0.5)
EOSINOPHIL NFR BLD AUTO: 2.4 % — SIGNIFICANT CHANGE UP (ref 0–6)
GLUCOSE BLDC GLUCOMTR-MCNC: 161 MG/DL — HIGH (ref 70–99)
GLUCOSE SERPL-MCNC: 163 MG/DL — HIGH (ref 70–99)
HCT VFR BLD CALC: 38.1 % — LOW (ref 39–50)
HGB BLD-MCNC: 12.1 G/DL — LOW (ref 13–17)
IMM GRANULOCYTES NFR BLD AUTO: 0.7 % — SIGNIFICANT CHANGE UP (ref 0–1.5)
LYMPHOCYTES # BLD AUTO: 3.44 K/UL — HIGH (ref 1–3.3)
LYMPHOCYTES # BLD AUTO: 32.1 % — SIGNIFICANT CHANGE UP (ref 13–44)
MAGNESIUM SERPL-MCNC: 1.7 MG/DL — SIGNIFICANT CHANGE UP (ref 1.6–2.6)
MCHC RBC-ENTMCNC: 28.5 PG — SIGNIFICANT CHANGE UP (ref 27–34)
MCHC RBC-ENTMCNC: 31.8 GM/DL — LOW (ref 32–36)
MCV RBC AUTO: 89.9 FL — SIGNIFICANT CHANGE UP (ref 80–100)
MONOCYTES # BLD AUTO: 0.69 K/UL — SIGNIFICANT CHANGE UP (ref 0–0.9)
MONOCYTES NFR BLD AUTO: 6.4 % — SIGNIFICANT CHANGE UP (ref 2–14)
NEUTROPHILS # BLD AUTO: 6.19 K/UL — SIGNIFICANT CHANGE UP (ref 1.8–7.4)
NEUTROPHILS NFR BLD AUTO: 57.7 % — SIGNIFICANT CHANGE UP (ref 43–77)
NRBC # BLD: 0 /100 WBCS — SIGNIFICANT CHANGE UP (ref 0–0)
PHOSPHATE SERPL-MCNC: 3.5 MG/DL — SIGNIFICANT CHANGE UP (ref 2.5–4.5)
PLATELET # BLD AUTO: 789 K/UL — HIGH (ref 150–400)
POTASSIUM SERPL-MCNC: 4.4 MMOL/L — SIGNIFICANT CHANGE UP (ref 3.5–5.3)
POTASSIUM SERPL-SCNC: 4.4 MMOL/L — SIGNIFICANT CHANGE UP (ref 3.5–5.3)
PROT SERPL-MCNC: 8.6 G/DL — HIGH (ref 6–8.3)
RBC # BLD: 4.24 M/UL — SIGNIFICANT CHANGE UP (ref 4.2–5.8)
RBC # FLD: 13.1 % — SIGNIFICANT CHANGE UP (ref 10.3–14.5)
SARS-COV-2 RNA SPEC QL NAA+PROBE: SIGNIFICANT CHANGE UP
SODIUM SERPL-SCNC: 135 MMOL/L — SIGNIFICANT CHANGE UP (ref 135–145)
WBC # BLD: 10.73 K/UL — HIGH (ref 3.8–10.5)
WBC # FLD AUTO: 10.73 K/UL — HIGH (ref 3.8–10.5)

## 2021-04-03 RX ORDER — RIFAMPIN 300 MG
1 CAPSULE ORAL
Qty: 84 | Refills: 0
Start: 2021-04-03 | End: 2021-05-14

## 2021-04-03 RX ADMIN — Medication 13 UNIT(S): at 09:01

## 2021-04-03 RX ADMIN — LOSARTAN POTASSIUM 50 MILLIGRAM(S): 100 TABLET, FILM COATED ORAL at 06:24

## 2021-04-03 RX ADMIN — MUPIROCIN 1 APPLICATION(S): 20 OINTMENT TOPICAL at 06:27

## 2021-04-03 RX ADMIN — CHLORHEXIDINE GLUCONATE 1 APPLICATION(S): 213 SOLUTION TOPICAL at 06:23

## 2021-04-03 RX ADMIN — ENOXAPARIN SODIUM 40 MILLIGRAM(S): 100 INJECTION SUBCUTANEOUS at 09:02

## 2021-04-03 RX ADMIN — GABAPENTIN 100 MILLIGRAM(S): 400 CAPSULE ORAL at 06:24

## 2021-04-03 RX ADMIN — Medication 2: at 09:01

## 2021-04-03 NOTE — DISCHARGE NOTE NURSING/CASE MANAGEMENT/SOCIAL WORK - NSDCFUADDAPPT_GEN_ALL_CORE_FT
1. You have an appointment scheduled with Dr. Perez your endocrinologist and primary care doctor on 04/12/21 at 12:00pm. Please bring your hospital discharge summary during this visit so that your doctor knows the reason for the hospitalization and antibiotic course duration.     2. You have an appointment scheduled with your podiatrist, Dr. Green, on 04/12/21 at 2:45pm. The purpose of this appointment is to address the surgery done to your left foot.     3. Please schedule an appointment with the infectious disease doctor, Dr. Montoya, regarding your antibiotic regimen. You will be prescribed a 6 week course of IV and oral medications. The office can be reached at [phone].

## 2021-04-03 NOTE — DISCHARGE NOTE NURSING/CASE MANAGEMENT/SOCIAL WORK - PATIENT PORTAL LINK FT
You can access the FollowMyHealth Patient Portal offered by [facility] by registering at the following website: [email or web address]. By joining [employer]’s FollowMyHealth portal, you will also be able to view your health information using other applications (apps) compatible with our system.

## 2021-04-03 NOTE — DISCHARGE NOTE NURSING/CASE MANAGEMENT/SOCIAL WORK - NSPROEXTENSIONSOFSELF_GEN_A_NUR
03/21/19 1600   Group 1   Start Time 1400   Stop Time 1500   Length (min) 60 Min   Group Type Inpatient   Group Name Group psychotherapy   Focus of Group anger   Attendance Present   Participation Active   Patient Response Appropriate feedback;Attentive;Interactive;Interested in topic   Group Notes Intern worked 1:1 with the activity. Pt was engaged and completed the activity.  Pt identified 5 coping skills for anger      eyeglasses

## 2021-04-09 DIAGNOSIS — L03.115 CELLULITIS OF RIGHT LOWER LIMB: ICD-10-CM

## 2021-04-09 DIAGNOSIS — E78.5 HYPERLIPIDEMIA, UNSPECIFIED: ICD-10-CM

## 2021-04-09 DIAGNOSIS — A41.9 SEPSIS, UNSPECIFIED ORGANISM: ICD-10-CM

## 2021-04-09 DIAGNOSIS — M79.673 PAIN IN UNSPECIFIED FOOT: ICD-10-CM

## 2021-04-09 DIAGNOSIS — E66.9 OBESITY, UNSPECIFIED: ICD-10-CM

## 2021-04-09 DIAGNOSIS — M86.9 OSTEOMYELITIS, UNSPECIFIED: ICD-10-CM

## 2021-04-09 DIAGNOSIS — E11.621 TYPE 2 DIABETES MELLITUS WITH FOOT ULCER: ICD-10-CM

## 2021-04-09 DIAGNOSIS — E11.69 TYPE 2 DIABETES MELLITUS WITH OTHER SPECIFIED COMPLICATION: ICD-10-CM

## 2021-04-09 DIAGNOSIS — L97.426 NON-PRESSURE CHRONIC ULCER OF LEFT HEEL AND MIDFOOT WITH BONE INVOLVEMENT WITHOUT EVIDENCE OF NECROSIS: ICD-10-CM

## 2021-04-09 DIAGNOSIS — N28.9 DISORDER OF KIDNEY AND URETER, UNSPECIFIED: ICD-10-CM

## 2021-05-05 NOTE — ED ADULT TRIAGE NOTE - AS TEMP SITE
"Subjective     Bianka Whitmore is a 85 year old female who presents to clinic today for the following health issues:    HPI         Back Pain  Onset/Duration: 4/3/2021  Description:   Location of pain: {.:538963}  Character of pain: {.:593799}  Pain radiation: {.:434247::\"none\"}  New numbness or weakness in legs, not attributed to pain: { :204470}  Intensity: {.:801069::\"Currently ***/10\"}  Progression of Symptoms: {.:641098}  History:   Specific cause: {.:875731::\"none\"}  Pain interferes with job: {.:907862::\" no \"}  History of back problems: {.:821079::\"no prior back problems\"}  Any previous MRI or X-rays: {.:942195::\"None\"}  Sees a specialist for back pain: { :936149::\"No\"}  Alleviating factors:   Improved by: {.:238958}    Precipitating factors:  Worsened by: {.:870195::\"Nothing\"}  Therapies tried and outcome: {.:876478}    Accompanying Signs & Symptoms:  Risk of Fracture: {.:438302::\"None\"}  Risk of Cauda Equina: {.:578018::\"None\"}  Risk of Infection: {.:709008::\"None\"}  Risk of Cancer: {.:525770::\"None\"}  Risk of Ankylosing Spondylitis: Onset at age <35, male, AND morning back stiffness {.:142800::\" no \"}    {TIP  If yes to any of the last 5 items or sudden/progressive weakness, consider imaging and/or surgical referral, if not already done :790767}  {TIP  When medically safe- First line medication for acute LBP: Acetaminophen, Second line: NSAIDS, muscle relaxants, and consider gabapentin for radiculopathy; opioids are usually not needed.  Can add <dot>pilbp in patient instructions :355093}  {additonal problems for provider to add (Optional):964828}    {HIST REVIEW/ LINKS 2 (Optional):008026}    Reviewed and updated as needed this visit by Provider                Review of Systems   {ROS COMP (Optional):694486}      Objective    There were no vitals taken for this visit.  There is no height or weight on file to calculate BMI.  Physical Exam   {Exam List (Optional):806934}    {Diagnostic Test Results " "(Optional):590428::\"Diagnostic Test Results:\",\"Labs reviewed in Epic\"}        {PROVIDER CHARTING PREFERENCE:321494}  " oral

## 2021-06-30 ENCOUNTER — EMERGENCY (EMERGENCY)
Facility: HOSPITAL | Age: 46
LOS: 1 days | Discharge: HOME | End: 2021-06-30
Attending: EMERGENCY MEDICINE | Admitting: EMERGENCY MEDICINE
Payer: SELF-PAY

## 2021-06-30 VITALS
HEART RATE: 89 BPM | SYSTOLIC BLOOD PRESSURE: 154 MMHG | DIASTOLIC BLOOD PRESSURE: 89 MMHG | TEMPERATURE: 99 F | OXYGEN SATURATION: 95 % | RESPIRATION RATE: 16 BRPM | HEIGHT: 72 IN

## 2021-06-30 DIAGNOSIS — S91.332A PUNCTURE WOUND WITHOUT FOREIGN BODY, LEFT FOOT, INITIAL ENCOUNTER: ICD-10-CM
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
## 2021-06-30 DIAGNOSIS — Z79.84 LONG TERM (CURRENT) USE OF ORAL HYPOGLYCEMIC DRUGS: ICD-10-CM

## 2021-06-30 DIAGNOSIS — Z23 ENCOUNTER FOR IMMUNIZATION: ICD-10-CM
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
## 2021-06-30 DIAGNOSIS — Z89.429 ACQUIRED ABSENCE OF OTHER TOE(S), UNSPECIFIED SIDE: Chronic | ICD-10-CM

## 2021-06-30 DIAGNOSIS — M19.90 UNSPECIFIED OSTEOARTHRITIS, UNSPECIFIED SITE: ICD-10-CM

## 2021-06-30 DIAGNOSIS — I10 ESSENTIAL (PRIMARY) HYPERTENSION: ICD-10-CM

## 2021-06-30 DIAGNOSIS — E11.65 TYPE 2 DIABETES MELLITUS WITH HYPERGLYCEMIA: ICD-10-CM

## 2021-06-30 DIAGNOSIS — Z89.421 ACQUIRED ABSENCE OF OTHER RIGHT TOE(S): ICD-10-CM
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
## 2021-06-30 DIAGNOSIS — Z94.5 SKIN TRANSPLANT STATUS: Chronic | ICD-10-CM
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
## 2021-06-30 DIAGNOSIS — Z79.82 LONG TERM (CURRENT) USE OF ASPIRIN: ICD-10-CM

## 2021-06-30 DIAGNOSIS — E78.5 HYPERLIPIDEMIA, UNSPECIFIED: ICD-10-CM

## 2021-06-30 PROCEDURE — 90471 IMMUNIZATION ADMIN: CPT

## 2021-06-30 PROCEDURE — 99284 EMERGENCY DEPT VISIT MOD MDM: CPT

## 2021-06-30 PROCEDURE — 99284 EMERGENCY DEPT VISIT MOD MDM: CPT | Mod: 25

## 2021-06-30 PROCEDURE — 90715 TDAP VACCINE 7 YRS/> IM: CPT

## 2021-06-30 PROCEDURE — 73630 X-RAY EXAM OF FOOT: CPT

## 2021-06-30 PROCEDURE — 73630 X-RAY EXAM OF FOOT: CPT | Mod: 26,LT

## 2021-06-30 RX ORDER — TETANUS TOXOID, REDUCED DIPHTHERIA TOXOID AND ACELLULAR PERTUSSIS VACCINE, ADSORBED 5; 2.5; 8; 8; 2.5 [IU]/.5ML; [IU]/.5ML; UG/.5ML; UG/.5ML; UG/.5ML
0.5 SUSPENSION INTRAMUSCULAR ONCE
Refills: 0 | Status: COMPLETED | OUTPATIENT
Start: 2021-06-30 | End: 2021-06-30

## 2021-06-30 RX ORDER — CIPROFLOXACIN LACTATE 400MG/40ML
1 VIAL (ML) INTRAVENOUS
Qty: 20 | Refills: 0
Start: 2021-06-30 | End: 2021-07-09

## 2021-06-30 RX ORDER — CIPROFLOXACIN LACTATE 400MG/40ML
500 VIAL (ML) INTRAVENOUS ONCE
Refills: 0 | Status: COMPLETED | OUTPATIENT
Start: 2021-06-30 | End: 2021-06-30

## 2021-06-30 RX ADMIN — Medication 500 MILLIGRAM(S): at 18:22

## 2021-06-30 RX ADMIN — Medication 1 TABLET(S): at 18:22

## 2021-06-30 RX ADMIN — TETANUS TOXOID, REDUCED DIPHTHERIA TOXOID AND ACELLULAR PERTUSSIS VACCINE, ADSORBED 0.5 MILLILITER(S): 5; 2.5; 8; 8; 2.5 SUSPENSION INTRAMUSCULAR at 18:05

## 2021-06-30 NOTE — ED PROVIDER NOTE - NSFOLLOWUPINSTRUCTIONS_ED_ALL_ED_FT
PUNCTURE WOUND - Ambulatory Care           Puncture Wound    AMBULATORY CARE:    A puncture wound is a hole in the skin made by a sharp, pointed object. The area may be bruised or swollen. You may have bleeding, pain, or trouble moving the affected area.    Puncture Wound         Seek care immediately if:   •You have severe pain.      •You have numbness or tingling in the area of your wound.      •Your wound starts bleeding and does not stop, even after you apply pressure.      Call your doctor if:   •You have new drainage or a bad odor coming from the wound.      •You have a fever or chills.      •You have increased swelling, redness, or pain.      •You have red streaks on your skin coming from your wound.      •You have questions or concerns about your condition or care.      Medicines: You may need any of the following:  •NSAIDs, such as ibuprofen, help decrease swelling, pain, and fever. This medicine is available with or without a doctor's order. NSAIDs can cause stomach bleeding or kidney problems in certain people. If you take blood thinner medicine, always ask your healthcare provider if NSAIDs are safe for you. Always read the medicine label and follow directions.      •Antibiotics help prevent a bacterial infection.       •Take your medicine as directed. Contact your healthcare provider if you think your medicine is not helping or if you have side effects. Tell him of her if you are allergic to any medicine. Keep a list of the medicines, vitamins, and herbs you take. Include the amounts, and when and why you take them. Bring the list or the pill bottles to follow-up visits. Carry your medicine list with you in case of an emergency.      Care for your wound as directed: Keep your wound clean and dry. When you are allowed to bathe, carefully wash the wound with soap and water. Dry the area and put on new, clean bandages as directed. Change your bandages when they get wet or dirty.    Rest and elevate the injured area above the level of your heart as often as you can. This will help decrease swelling and pain. Prop your injured area on pillows or blankets to keep it elevated comfortably.     Follow up with your doctor in 2 to 3 days: Write down your questions so you remember to ask them during your visits.

## 2021-06-30 NOTE — ED PROVIDER NOTE - SKIN, MLM
Skin normal color for race, warm, dry and intact. No evidence of rash.  puncture wound to left foot plantar aspect, no active bleeding

## 2021-06-30 NOTE — ED PROVIDER NOTE - PATIENT PORTAL LINK FT
You can access the FollowMyHealth Patient Portal offered by [facility] by registering at the following website: [email or web address]. By joining [employer]’s FollowMyHealth portal, you will also be able to view your health information using other applications (apps) compatible with our system.

## 2021-06-30 NOTE — ED ADULT NURSE NOTE - NSIMPLEMENTINTERV_GEN_ALL_ED
Implemented All Universal Safety Interventions:  [place] to call system. Call bell, personal items and telephone within reach. Instruct patient to call for assistance. Room bathroom lighting operational. Non-slip footwear when patient is off stretcher. Physically safe environment: no spills, clutter or unnecessary equipment. Stretcher in lowest position, wheels locked, appropriate side rails in place.

## 2021-06-30 NOTE — CONSULT NOTE ADULT - ASSESSMENT
45M PMHx HTN, HLD, DM (poorly controlled), h/o multiple R toe amputations presents w/ c/o stepping on a 1" construction screw with his work boots on for his left foot. Podiatry consulted for management of puncture wound. Wet read on xrays do not indicate any bony involvement/ disruption from foreign body puncture.     Plan:  - Recommend Tetanus booster   - Copiously irrigated wound site with betadine/ saline flush and wrapped foot in DSD   45M PMHx HTN, HLD, DM (poorly controlled), h/o multiple R toe amputations presents w/ c/o stepping on a 1" construction screw with his work boots on for his left foot. Podiatry consulted for management of puncture wound. Wet read on xrays do not indicate any bony involvement/ disruption from foreign body puncture.     Plan:  - Recommend Tetanus booster   - Copiously irrigated wound site with betadine/ saline flush and wrapped foot in DSD  - Rx Augmentin 875mg PO BID and Ciprofloxacin 500mg BID for 10 days.  - F/u with Dr. Green for out patient.

## 2021-06-30 NOTE — CONSULT NOTE ADULT - SUBJECTIVE AND OBJECTIVE BOX
Attending: Dr. David Green     Patient is a 45y old  Male who presents with a chief complaint of left foot puncture wound 2/2 construction nail.     HPI: 45M PMHx HTN, HLD, DM (poorly controlled), h/o multiple R toe amputations presents w/ c/o stepping on a 1" construction screw with his work boots on for his left foot. He states that it happened today around noon. Pt pulled the nail out on his own and called his Podiatrist Dr. Maximiliano Green. Per pt, pt was told to come to the ED for a possible washout. He denies any purulent drainage, only endorses to the puncture site bleeding. Pt has a previous hx of stepping on a nail, which required I&D in the past. Pt denies N/V/F/SOB.     Review of systems negative except per HPI    PAST MEDICAL & SURGICAL HISTORY:  Diabetes    Osteomyelitis    Hyperlipidemia, unspecified hyperlipidemia type    Hypertension    History of cocaine use    Toe amputation status  Right foot    H/O skin graft  Right foot      Home Medications:  Aspirin Enteric Coated 81 mg oral delayed release tablet: 1 tab(s) orally once a day (30 Jun 2021 16:40)    Allergies    No Known Allergies    Intolerances      FAMILY HISTORY:  No family history of cardiovascular disease (Father, Mother)      Social History:       LABS              Vital Signs Last 24 Hrs  T(C): 37.2 (30 Jun 2021 15:48), Max: 37.2 (30 Jun 2021 15:48)  T(F): 98.9 (30 Jun 2021 15:48), Max: 98.9 (30 Jun 2021 15:48)  HR: 89 (30 Jun 2021 15:48) (89 - 89)  BP: 154/89 (30 Jun 2021 15:48) (154/89 - 154/89)  BP(mean): --  RR: 16 (30 Jun 2021 15:48) (16 - 16)  SpO2: 95% (30 Jun 2021 15:48) (95% - 95%)    PHYSICAL EXAM  General: NAD, AA0x3    Lower Extremity Focused:  Vasc: DP/PT 2/4 B/L  Derm: 0.5cm x 0.5cm x 1" puncture wound at the plantar aspect of the left foot submet 1. No purulent drainage or malodor noted. Wound site is stable w/ no signs of infection.   Neuro: Protective sensations diminished  MSK: S/p R 4th/5th partial ray and 2nd partial digit amputations.    RADIOLOGY

## 2021-06-30 NOTE — ED PROVIDER NOTE - MUSCULOSKELETAL, MLM
Spine and all extremities grossly appears normal, range of motion is not limited, no muscle or joint tenderness, distal pulses present

## 2021-06-30 NOTE — ED ADULT NURSE NOTE - CAS EDN INTEG ASSESS
Attempted to contact pt regarding no show appointment and her voicemail has not been set up. Will mail letter today.   - - -

## 2021-06-30 NOTE — ED PROVIDER NOTE - CLINICAL SUMMARY MEDICAL DECISION MAKING FREE TEXT BOX
44 yo male with h/o DM, in the ER due to accidental injury at work- pt eugenio the 44 yo male with h/o DM, in the ER due to accidental injury at work-

## 2021-06-30 NOTE — ED ADULT NURSE NOTE - OBJECTIVE STATEMENT
Patient alert and oriented x 3 came c/o left foot pain , patient reported accidentally step on the screw while working today and it went into his shoes and puncture his foot . History of dm , had left foot infection for the same foot last march . Seen and examined by COSME Huerta

## 2021-06-30 NOTE — ED PROVIDER NOTE - ATTENDING CONTRIBUTION TO CARE
diabetic with puncture wound to foot. from screw at work through shoe. no surrounding redness or drainage. xray without foreign body. podiatry consulted. home with antibiotic and outpatient f/u

## 2021-06-30 NOTE — ED PROVIDER NOTE - OBJECTIVE STATEMENT
44 yo male  with h/o DM, in the ER because he accidentally stepped on the screw at work today. Pt reports he noted bleeding. Pt cleaned his foot with antiseptic  spray and applied pressure dressing. Pt states he spoke to his podiatrist and was recommended to go to ER.  tetanus is UTD.

## 2021-06-30 NOTE — ED PROVIDER NOTE - CARE PROVIDER_API CALL
David Green (AIMEE)  Surgery Orthopaedic Surgery  [address] [place] [address], [address]  [place] [address]  Phone: [phone]  Fax: [phone]  Follow Up Time:

## 2021-07-06 ENCOUNTER — EMERGENCY (EMERGENCY)
Facility: HOSPITAL | Age: 46
LOS: 1 days | Discharge: HOME | End: 2021-07-06
Attending: EMERGENCY MEDICINE | Admitting: EMERGENCY MEDICINE
Payer: COMMERCIAL

## 2021-07-06 VITALS
SYSTOLIC BLOOD PRESSURE: 108 MMHG | HEART RATE: 78 BPM | HEIGHT: 72 IN | OXYGEN SATURATION: 100 % | WEIGHT: 235.01 LBS | TEMPERATURE: 98 F | DIASTOLIC BLOOD PRESSURE: 74 MMHG | RESPIRATION RATE: 18 BRPM

## 2021-07-06 VITALS
TEMPERATURE: 98 F | SYSTOLIC BLOOD PRESSURE: 137 MMHG | HEART RATE: 91 BPM | OXYGEN SATURATION: 99 % | DIASTOLIC BLOOD PRESSURE: 87 MMHG | RESPIRATION RATE: 18 BRPM

## 2021-07-06 DIAGNOSIS — R42 DIZZINESS AND GIDDINESS: ICD-10-CM

## 2021-07-06 DIAGNOSIS — E78.5 HYPERLIPIDEMIA, UNSPECIFIED: ICD-10-CM

## 2021-07-06 DIAGNOSIS — R55 SYNCOPE AND COLLAPSE: ICD-10-CM

## 2021-07-06 DIAGNOSIS — Z20.822 CONTACT WITH AND (SUSPECTED) EXPOSURE TO COVID-19: ICD-10-CM

## 2021-07-06 DIAGNOSIS — Z87.39 PERSONAL HISTORY OF OTHER DISEASES OF THE MUSCULOSKELETAL SYSTEM AND CONNECTIVE TISSUE: ICD-10-CM

## 2021-07-06 DIAGNOSIS — Z79.4 LONG TERM (CURRENT) USE OF INSULIN: ICD-10-CM

## 2021-07-06 DIAGNOSIS — H53.8 OTHER VISUAL DISTURBANCES: ICD-10-CM

## 2021-07-06 DIAGNOSIS — Z86.59 PERSONAL HISTORY OF OTHER MENTAL AND BEHAVIORAL DISORDERS: ICD-10-CM

## 2021-07-06 DIAGNOSIS — E11.9 TYPE 2 DIABETES MELLITUS WITHOUT COMPLICATIONS: ICD-10-CM

## 2021-07-06 DIAGNOSIS — Z87.891 PERSONAL HISTORY OF NICOTINE DEPENDENCE: ICD-10-CM

## 2021-07-06 DIAGNOSIS — I10 ESSENTIAL (PRIMARY) HYPERTENSION: ICD-10-CM

## 2021-07-06 DIAGNOSIS — Z89.429 ACQUIRED ABSENCE OF OTHER TOE(S), UNSPECIFIED SIDE: Chronic | ICD-10-CM

## 2021-07-06 DIAGNOSIS — R51.9 HEADACHE, UNSPECIFIED: ICD-10-CM

## 2021-07-06 DIAGNOSIS — Z94.5 SKIN TRANSPLANT STATUS: Chronic | ICD-10-CM

## 2021-07-06 DIAGNOSIS — N28.9 DISORDER OF KIDNEY AND URETER, UNSPECIFIED: ICD-10-CM

## 2021-07-06 LAB
ALBUMIN SERPL ELPH-MCNC: 4.1 G/DL — SIGNIFICANT CHANGE UP (ref 3.3–5)
ALP SERPL-CCNC: 54 U/L — SIGNIFICANT CHANGE UP (ref 40–120)
ALT FLD-CCNC: 39 U/L — SIGNIFICANT CHANGE UP (ref 10–45)
ANION GAP SERPL CALC-SCNC: 12 MMOL/L — SIGNIFICANT CHANGE UP (ref 5–17)
APTT BLD: 33.7 SEC — SIGNIFICANT CHANGE UP (ref 27.5–35.5)
AST SERPL-CCNC: 46 U/L — HIGH (ref 10–40)
BASOPHILS # BLD AUTO: 0.09 K/UL — SIGNIFICANT CHANGE UP (ref 0–0.2)
BASOPHILS NFR BLD AUTO: 1 % — SIGNIFICANT CHANGE UP (ref 0–2)
BILIRUB SERPL-MCNC: 0.6 MG/DL — SIGNIFICANT CHANGE UP (ref 0.2–1.2)
BUN SERPL-MCNC: 19 MG/DL — SIGNIFICANT CHANGE UP (ref 7–23)
CALCIUM SERPL-MCNC: 9.7 MG/DL — SIGNIFICANT CHANGE UP (ref 8.4–10.5)
CHLORIDE SERPL-SCNC: 102 MMOL/L — SIGNIFICANT CHANGE UP (ref 96–108)
CO2 SERPL-SCNC: 29 MMOL/L — SIGNIFICANT CHANGE UP (ref 22–31)
CREAT SERPL-MCNC: 1.51 MG/DL — HIGH (ref 0.5–1.3)
EOSINOPHIL # BLD AUTO: 0.14 K/UL — SIGNIFICANT CHANGE UP (ref 0–0.5)
EOSINOPHIL NFR BLD AUTO: 1.6 % — SIGNIFICANT CHANGE UP (ref 0–6)
GLUCOSE SERPL-MCNC: 166 MG/DL — HIGH (ref 70–99)
HCT VFR BLD CALC: 43.4 % — SIGNIFICANT CHANGE UP (ref 39–50)
HGB BLD-MCNC: 14.2 G/DL — SIGNIFICANT CHANGE UP (ref 13–17)
IMM GRANULOCYTES NFR BLD AUTO: 0.1 % — SIGNIFICANT CHANGE UP (ref 0–1.5)
INR BLD: 1 — SIGNIFICANT CHANGE UP (ref 0.88–1.16)
LYMPHOCYTES # BLD AUTO: 3.16 K/UL — SIGNIFICANT CHANGE UP (ref 1–3.3)
LYMPHOCYTES # BLD AUTO: 36.2 % — SIGNIFICANT CHANGE UP (ref 13–44)
MAGNESIUM SERPL-MCNC: 1.7 MG/DL — SIGNIFICANT CHANGE UP (ref 1.6–2.6)
MCHC RBC-ENTMCNC: 28.7 PG — SIGNIFICANT CHANGE UP (ref 27–34)
MCHC RBC-ENTMCNC: 32.7 GM/DL — SIGNIFICANT CHANGE UP (ref 32–36)
MCV RBC AUTO: 87.9 FL — SIGNIFICANT CHANGE UP (ref 80–100)
MONOCYTES # BLD AUTO: 0.63 K/UL — SIGNIFICANT CHANGE UP (ref 0–0.9)
MONOCYTES NFR BLD AUTO: 7.2 % — SIGNIFICANT CHANGE UP (ref 2–14)
NEUTROPHILS # BLD AUTO: 4.7 K/UL — SIGNIFICANT CHANGE UP (ref 1.8–7.4)
NEUTROPHILS NFR BLD AUTO: 53.9 % — SIGNIFICANT CHANGE UP (ref 43–77)
NRBC # BLD: 0 /100 WBCS — SIGNIFICANT CHANGE UP (ref 0–0)
PLATELET # BLD AUTO: 379 K/UL — SIGNIFICANT CHANGE UP (ref 150–400)
POTASSIUM SERPL-MCNC: 4.9 MMOL/L — SIGNIFICANT CHANGE UP (ref 3.5–5.3)
POTASSIUM SERPL-SCNC: 4.9 MMOL/L — SIGNIFICANT CHANGE UP (ref 3.5–5.3)
PROT SERPL-MCNC: 8.5 G/DL — HIGH (ref 6–8.3)
PROTHROM AB SERPL-ACNC: 12 SEC — SIGNIFICANT CHANGE UP (ref 10.6–13.6)
RBC # BLD: 4.94 M/UL — SIGNIFICANT CHANGE UP (ref 4.2–5.8)
RBC # FLD: 14.2 % — SIGNIFICANT CHANGE UP (ref 10.3–14.5)
SARS-COV-2 RNA SPEC QL NAA+PROBE: NEGATIVE — SIGNIFICANT CHANGE UP
SODIUM SERPL-SCNC: 143 MMOL/L — SIGNIFICANT CHANGE UP (ref 135–145)
TROPONIN T SERPL-MCNC: 0.01 NG/ML — SIGNIFICANT CHANGE UP (ref 0–0.01)
WBC # BLD: 8.73 K/UL — SIGNIFICANT CHANGE UP (ref 3.8–10.5)
WBC # FLD AUTO: 8.73 K/UL — SIGNIFICANT CHANGE UP (ref 3.8–10.5)

## 2021-07-06 PROCEDURE — 93005 ELECTROCARDIOGRAM TRACING: CPT

## 2021-07-06 PROCEDURE — 96361 HYDRATE IV INFUSION ADD-ON: CPT

## 2021-07-06 PROCEDURE — 36415 COLL VENOUS BLD VENIPUNCTURE: CPT

## 2021-07-06 PROCEDURE — 85610 PROTHROMBIN TIME: CPT

## 2021-07-06 PROCEDURE — 80053 COMPREHEN METABOLIC PANEL: CPT

## 2021-07-06 PROCEDURE — 71046 X-RAY EXAM CHEST 2 VIEWS: CPT | Mod: 26

## 2021-07-06 PROCEDURE — 70498 CT ANGIOGRAPHY NECK: CPT | Mod: 26,MA

## 2021-07-06 PROCEDURE — 71046 X-RAY EXAM CHEST 2 VIEWS: CPT

## 2021-07-06 PROCEDURE — 84484 ASSAY OF TROPONIN QUANT: CPT

## 2021-07-06 PROCEDURE — 70450 CT HEAD/BRAIN W/O DYE: CPT | Mod: 26,59,MA

## 2021-07-06 PROCEDURE — 83735 ASSAY OF MAGNESIUM: CPT

## 2021-07-06 PROCEDURE — 99284 EMERGENCY DEPT VISIT MOD MDM: CPT | Mod: 25

## 2021-07-06 PROCEDURE — 70498 CT ANGIOGRAPHY NECK: CPT

## 2021-07-06 PROCEDURE — 70496 CT ANGIOGRAPHY HEAD: CPT

## 2021-07-06 PROCEDURE — 85730 THROMBOPLASTIN TIME PARTIAL: CPT

## 2021-07-06 PROCEDURE — 70496 CT ANGIOGRAPHY HEAD: CPT | Mod: 26,MA

## 2021-07-06 PROCEDURE — 70450 CT HEAD/BRAIN W/O DYE: CPT

## 2021-07-06 PROCEDURE — 96365 THER/PROPH/DIAG IV INF INIT: CPT | Mod: XU

## 2021-07-06 PROCEDURE — 85025 COMPLETE CBC W/AUTO DIFF WBC: CPT

## 2021-07-06 PROCEDURE — 93010 ELECTROCARDIOGRAM REPORT: CPT | Mod: 59

## 2021-07-06 PROCEDURE — 87635 SARS-COV-2 COVID-19 AMP PRB: CPT

## 2021-07-06 PROCEDURE — 82962 GLUCOSE BLOOD TEST: CPT

## 2021-07-06 RX ORDER — ACETAMINOPHEN 500 MG
650 TABLET ORAL ONCE
Refills: 0 | Status: COMPLETED | OUTPATIENT
Start: 2021-07-06 | End: 2021-07-06

## 2021-07-06 RX ORDER — MAGNESIUM SULFATE 500 MG/ML
2 VIAL (ML) INJECTION ONCE
Refills: 0 | Status: COMPLETED | OUTPATIENT
Start: 2021-07-06 | End: 2021-07-06

## 2021-07-06 RX ORDER — SODIUM CHLORIDE 9 MG/ML
1000 INJECTION INTRAMUSCULAR; INTRAVENOUS; SUBCUTANEOUS ONCE
Refills: 0 | Status: COMPLETED | OUTPATIENT
Start: 2021-07-06 | End: 2021-07-06

## 2021-07-06 RX ADMIN — Medication 650 MILLIGRAM(S): at 07:40

## 2021-07-06 RX ADMIN — SODIUM CHLORIDE 1000 MILLILITER(S): 9 INJECTION INTRAMUSCULAR; INTRAVENOUS; SUBCUTANEOUS at 06:00

## 2021-07-06 RX ADMIN — Medication 2 GRAM(S): at 07:41

## 2021-07-06 RX ADMIN — Medication 50 GRAM(S): at 06:45

## 2021-07-06 RX ADMIN — SODIUM CHLORIDE 1000 MILLILITER(S): 9 INJECTION INTRAMUSCULAR; INTRAVENOUS; SUBCUTANEOUS at 07:41

## 2021-07-06 NOTE — ED PROVIDER NOTE - PROGRESS NOTE DETAILS
worsening creatinine when compared to priors, will hydrate with ivf. pt signed out pending CT head, CTA head/neck which show no acute findings, pt improving, BP noted to be slightly elevated.  Pt stable for d/c, recommend prompt f/u with pmd, possible neuro f/u if sx persist, return to ED if sx worsen.

## 2021-07-06 NOTE — ED ADULT NURSE NOTE - NSIMPLEMENTINTERV_GEN_ALL_ED
Implemented All Universal Safety Interventions:  [place] to call system. Call bell, personal items and telephone within reach. Instruct patient to call for assistance. Room bathroom lighting operational. Non-slip footwear when patient is off stretcher. Physically safe environment: no spills, clutter or unnecessary equipment. Stretcher in lowest position, wheels locked, appropriate side rails in place.

## 2021-07-06 NOTE — ED PROVIDER NOTE - CARE PLAN
Principal Discharge DX:	Near syncope   Principal Discharge DX:	Near syncope  Secondary Diagnosis:	Renal insufficiency

## 2021-07-06 NOTE — ED PROVIDER NOTE - PATIENT PORTAL LINK FT
You can access the FollowMyHealth Patient Portal offered by [facility] by registering at the following website: [email or web address]. By joining [employer]’s FollowMyHealth portal, you will also be able to view your health information using other applications (apps) compatible with our system.

## 2021-07-06 NOTE — ED PROVIDER NOTE - NSFOLLOWUPINSTRUCTIONS_ED_ALL_ED_FT
Lightheadedness    WHAT YOU NEED TO KNOW:    Lightheadedness is the feeling that you may faint, but you do not. Your heartbeat may be fast or feel like it flutters. Lightheadedness may occur when you take certain medicines, such as medicine to lower your blood pressure. Dehydration, low sodium, low blood sugar, an abnormal heart rhythm, and anxiety are other common causes.     DISCHARGE INSTRUCTIONS:    Return to the emergency department if:   •You have sudden chest pain.       •You have trouble breathing or shortness of breath.       •You have vision changes, are sweating, and have nausea while you are sitting or lying down.       •You feel flushed and your heart is fluttering.      •You faint.       Contact your healthcare provider if:   •You feel lightheaded often.       •Your heart beats faster or slower than usual.       •You have questions or concerns about your condition or care.      Follow up with your healthcare provider as directed: You may need more tests to help find the cause of your lightheadedness. The tests will help healthcare providers plan the best treatment for you. Write down your questions so you remember to ask them during your visits.     Self-care: Talk with your healthcare provider about these and other ways to manage your symptoms:  •Lie down when you feel lightheaded, your throat gets tight, or your vision changes. Raise your legs above the level of your heart.      •Stand up slowly. Sit on the side of the bed or couch for a few minutes before you stand up.       •Take slow, deep breaths when you feel lightheaded. This can help decrease the feeling that you might faint.       •Ask if you need to avoid hot baths and saunas. These may make your symptoms worse.       Watch for signs of low blood sugar: These include hunger, nervousness, sweating, and fast or fluttery heartbeats. Talk with your healthcare provider about ways to keep your blood sugar level steady.    Check your blood pressure often: You should do this especially if you take medicine to lower your blood pressure. Check your blood pressure when you are lying down and when you are standing. Ask how often to check during the day. Keep a record of your blood pressure numbers. Your healthcare provider may use the record to help plan your treatment.    Keep a record of your lightheadedness episodes: Include your symptoms and your activity before and after the episode. The record can help your healthcare provider find the cause of your lightheadedness and help you manage episodes.

## 2021-07-06 NOTE — ED PROVIDER NOTE - OBJECTIVE STATEMENT
45M hx dm, high chol, c/o feeling lightheaded. pt states feeling like he might pass out.  states occurs while walking.  pt states has intermittent right peripheral vision loss over the week. states wears glasses for distance, no contacts. occasional headache. no chest pain, no SOB. no vomiting. no abd pain. no numbness/weakness.  pt states has not been eating or sleeping well lately. states has been feeling down and sad.  denies SI/HI. states does not wish to talk to anyone currently.

## 2021-07-06 NOTE — ED PROVIDER NOTE - ATTESTATION, MLM
Called and spoke to patient and wife  Advised patient again that they need to make Med Onc appointment as soon as possible instead of waiting  They had insisted on previous calls that they wanted to wait until after surgery, even after being advised that there may not be immediate appts  Patient and wife have now agreed to make appt sooner  Are content that he received cardio clearance  I have reviewed and confirmed nurses' notes for patient's medications, allergies, medical history, and surgical history.

## 2021-07-06 NOTE — ED PROVIDER NOTE - CLINICAL SUMMARY MEDICAL DECISION MAKING FREE TEXT BOX
near syncope, no chest pain, occasional right peripheral vision loss, occasional HA, no focal neuro deficits currently. doubt cva. no SOB. pt tearful on exam, denies SI/HI. offered to speak with psych pt declining at this time  -check labs, ekg  -CXR  -CT/CTA  -ivf

## 2021-09-18 ENCOUNTER — INPATIENT (INPATIENT)
Facility: HOSPITAL | Age: 46
LOS: 2 days | Discharge: HOME | DRG: 623 | End: 2021-09-21
Attending: STUDENT IN AN ORGANIZED HEALTH CARE EDUCATION/TRAINING PROGRAM | Admitting: STUDENT IN AN ORGANIZED HEALTH CARE EDUCATION/TRAINING PROGRAM
Payer: COMMERCIAL

## 2021-09-18 VITALS
OXYGEN SATURATION: 97 % | WEIGHT: 229.94 LBS | DIASTOLIC BLOOD PRESSURE: 84 MMHG | TEMPERATURE: 98 F | SYSTOLIC BLOOD PRESSURE: 157 MMHG | HEART RATE: 92 BPM | HEIGHT: 72 IN | RESPIRATION RATE: 18 BRPM

## 2021-09-18 DIAGNOSIS — I10 ESSENTIAL (PRIMARY) HYPERTENSION: ICD-10-CM

## 2021-09-18 DIAGNOSIS — E11.65 TYPE 2 DIABETES MELLITUS WITH HYPERGLYCEMIA: ICD-10-CM

## 2021-09-18 DIAGNOSIS — Z89.429 ACQUIRED ABSENCE OF OTHER TOE(S), UNSPECIFIED SIDE: Chronic | ICD-10-CM

## 2021-09-18 DIAGNOSIS — Z96.0 PRESENCE OF UROGENITAL IMPLANTS: Chronic | ICD-10-CM

## 2021-09-18 DIAGNOSIS — R63.8 OTHER SYMPTOMS AND SIGNS CONCERNING FOOD AND FLUID INTAKE: ICD-10-CM

## 2021-09-18 DIAGNOSIS — E78.5 HYPERLIPIDEMIA, UNSPECIFIED: ICD-10-CM

## 2021-09-18 DIAGNOSIS — Z94.5 SKIN TRANSPLANT STATUS: Chronic | ICD-10-CM

## 2021-09-18 LAB
ALBUMIN SERPL ELPH-MCNC: 3.5 G/DL — SIGNIFICANT CHANGE UP (ref 3.3–5)
ALP SERPL-CCNC: 95 U/L — SIGNIFICANT CHANGE UP (ref 40–120)
ALT FLD-CCNC: 23 U/L — SIGNIFICANT CHANGE UP (ref 10–45)
ANION GAP SERPL CALC-SCNC: 8 MMOL/L — SIGNIFICANT CHANGE UP (ref 5–17)
APTT BLD: 34.3 SEC — SIGNIFICANT CHANGE UP (ref 27.5–35.5)
AST SERPL-CCNC: 21 U/L — SIGNIFICANT CHANGE UP (ref 10–40)
BASOPHILS # BLD AUTO: 0.04 K/UL — SIGNIFICANT CHANGE UP (ref 0–0.2)
BASOPHILS NFR BLD AUTO: 0.5 % — SIGNIFICANT CHANGE UP (ref 0–2)
BILIRUB SERPL-MCNC: <0.2 MG/DL — SIGNIFICANT CHANGE UP (ref 0.2–1.2)
BLD GP AB SCN SERPL QL: NEGATIVE — SIGNIFICANT CHANGE UP
BUN SERPL-MCNC: 24 MG/DL — HIGH (ref 7–23)
CALCIUM SERPL-MCNC: 9.1 MG/DL — SIGNIFICANT CHANGE UP (ref 8.4–10.5)
CHLORIDE SERPL-SCNC: 100 MMOL/L — SIGNIFICANT CHANGE UP (ref 96–108)
CO2 SERPL-SCNC: 28 MMOL/L — SIGNIFICANT CHANGE UP (ref 22–31)
CREAT SERPL-MCNC: 1.16 MG/DL — SIGNIFICANT CHANGE UP (ref 0.5–1.3)
CRP SERPL-MCNC: 40.6 MG/L — HIGH (ref 0–4)
EOSINOPHIL # BLD AUTO: 0.24 K/UL — SIGNIFICANT CHANGE UP (ref 0–0.5)
EOSINOPHIL NFR BLD AUTO: 2.8 % — SIGNIFICANT CHANGE UP (ref 0–6)
ERYTHROCYTE [SEDIMENTATION RATE] IN BLOOD: 60 MM/HR — HIGH
GLUCOSE BLDC GLUCOMTR-MCNC: 152 MG/DL — HIGH (ref 70–99)
GLUCOSE BLDC GLUCOMTR-MCNC: 271 MG/DL — HIGH (ref 70–99)
GLUCOSE SERPL-MCNC: 342 MG/DL — HIGH (ref 70–99)
HCT VFR BLD CALC: 37.7 % — LOW (ref 39–50)
HGB BLD-MCNC: 12.2 G/DL — LOW (ref 13–17)
IMM GRANULOCYTES NFR BLD AUTO: 0.2 % — SIGNIFICANT CHANGE UP (ref 0–1.5)
INR BLD: 1.04 — SIGNIFICANT CHANGE UP (ref 0.88–1.16)
LYMPHOCYTES # BLD AUTO: 3.12 K/UL — SIGNIFICANT CHANGE UP (ref 1–3.3)
LYMPHOCYTES # BLD AUTO: 36.1 % — SIGNIFICANT CHANGE UP (ref 13–44)
MCHC RBC-ENTMCNC: 28.3 PG — SIGNIFICANT CHANGE UP (ref 27–34)
MCHC RBC-ENTMCNC: 32.4 GM/DL — SIGNIFICANT CHANGE UP (ref 32–36)
MCV RBC AUTO: 87.5 FL — SIGNIFICANT CHANGE UP (ref 80–100)
MONOCYTES # BLD AUTO: 0.68 K/UL — SIGNIFICANT CHANGE UP (ref 0–0.9)
MONOCYTES NFR BLD AUTO: 7.9 % — SIGNIFICANT CHANGE UP (ref 2–14)
NEUTROPHILS # BLD AUTO: 4.54 K/UL — SIGNIFICANT CHANGE UP (ref 1.8–7.4)
NEUTROPHILS NFR BLD AUTO: 52.5 % — SIGNIFICANT CHANGE UP (ref 43–77)
NRBC # BLD: 0 /100 WBCS — SIGNIFICANT CHANGE UP (ref 0–0)
PLATELET # BLD AUTO: 521 K/UL — HIGH (ref 150–400)
POTASSIUM SERPL-MCNC: 4.7 MMOL/L — SIGNIFICANT CHANGE UP (ref 3.5–5.3)
POTASSIUM SERPL-SCNC: 4.7 MMOL/L — SIGNIFICANT CHANGE UP (ref 3.5–5.3)
PROT SERPL-MCNC: 8.4 G/DL — HIGH (ref 6–8.3)
PROTHROM AB SERPL-ACNC: 12.4 SEC — SIGNIFICANT CHANGE UP (ref 10.6–13.6)
RBC # BLD: 4.31 M/UL — SIGNIFICANT CHANGE UP (ref 4.2–5.8)
RBC # FLD: 13.3 % — SIGNIFICANT CHANGE UP (ref 10.3–14.5)
RH IG SCN BLD-IMP: POSITIVE — SIGNIFICANT CHANGE UP
SARS-COV-2 RNA SPEC QL NAA+PROBE: NEGATIVE — SIGNIFICANT CHANGE UP
SODIUM SERPL-SCNC: 136 MMOL/L — SIGNIFICANT CHANGE UP (ref 135–145)
WBC # BLD: 8.64 K/UL — SIGNIFICANT CHANGE UP (ref 3.8–10.5)
WBC # FLD AUTO: 8.64 K/UL — SIGNIFICANT CHANGE UP (ref 3.8–10.5)

## 2021-09-18 PROCEDURE — 99223 1ST HOSP IP/OBS HIGH 75: CPT | Mod: GC

## 2021-09-18 PROCEDURE — 93010 ELECTROCARDIOGRAM REPORT: CPT

## 2021-09-18 PROCEDURE — 73630 X-RAY EXAM OF FOOT: CPT | Mod: 26,RT

## 2021-09-18 PROCEDURE — 71045 X-RAY EXAM CHEST 1 VIEW: CPT | Mod: 26

## 2021-09-18 PROCEDURE — 99285 EMERGENCY DEPT VISIT HI MDM: CPT

## 2021-09-18 RX ORDER — GLUCAGON INJECTION, SOLUTION 0.5 MG/.1ML
1 INJECTION, SOLUTION SUBCUTANEOUS ONCE
Refills: 0 | Status: DISCONTINUED | OUTPATIENT
Start: 2021-09-18 | End: 2021-09-21

## 2021-09-18 RX ORDER — DEXTROSE 50 % IN WATER 50 %
25 SYRINGE (ML) INTRAVENOUS ONCE
Refills: 0 | Status: DISCONTINUED | OUTPATIENT
Start: 2021-09-18 | End: 2021-09-21

## 2021-09-18 RX ORDER — ATORVASTATIN CALCIUM 80 MG/1
40 TABLET, FILM COATED ORAL AT BEDTIME
Refills: 0 | Status: DISCONTINUED | OUTPATIENT
Start: 2021-09-18 | End: 2021-09-21

## 2021-09-18 RX ORDER — INSULIN LISPRO 100/ML
VIAL (ML) SUBCUTANEOUS EVERY 6 HOURS
Refills: 0 | Status: DISCONTINUED | OUTPATIENT
Start: 2021-09-18 | End: 2021-09-21

## 2021-09-18 RX ORDER — SODIUM CHLORIDE 9 MG/ML
1000 INJECTION INTRAMUSCULAR; INTRAVENOUS; SUBCUTANEOUS ONCE
Refills: 0 | Status: COMPLETED | OUTPATIENT
Start: 2021-09-18 | End: 2021-09-18

## 2021-09-18 RX ORDER — DEXTROSE 50 % IN WATER 50 %
12.5 SYRINGE (ML) INTRAVENOUS ONCE
Refills: 0 | Status: DISCONTINUED | OUTPATIENT
Start: 2021-09-18 | End: 2021-09-21

## 2021-09-18 RX ORDER — DEXTROSE 50 % IN WATER 50 %
15 SYRINGE (ML) INTRAVENOUS ONCE
Refills: 0 | Status: DISCONTINUED | OUTPATIENT
Start: 2021-09-18 | End: 2021-09-21

## 2021-09-18 RX ORDER — SODIUM CHLORIDE 9 MG/ML
1000 INJECTION, SOLUTION INTRAVENOUS
Refills: 0 | Status: DISCONTINUED | OUTPATIENT
Start: 2021-09-18 | End: 2021-09-21

## 2021-09-18 RX ADMIN — SODIUM CHLORIDE 1000 MILLILITER(S): 9 INJECTION INTRAMUSCULAR; INTRAVENOUS; SUBCUTANEOUS at 18:54

## 2021-09-18 NOTE — H&P ADULT - PROBLEM SELECTOR PLAN 2
Pt with history of HTN is on losartan but does not remember dosage. [facility]acy [facility].  - med recc.   - will hold prior to surgery regardless Pt able to ambulate >5 blocks without stopping, able to climb >2 flights of stairs. Prior smoking history but quit in 1998. No CAD history, but mother with CAD s/p CABG. No current cardiac symptoms.   - CXR clear  - EKG from prior admission, NSR. New EKG ordered.  - Salas score: 0.0%  - RCRI 2 points, Class III risk. 10.1% 30 day risk of MACE.  - METS > 4  - Pt is intermediate risk for intermediate risk procedure.

## 2021-09-18 NOTE — H&P ADULT - PROBLEM SELECTOR PLAN 6
F: s/p 1L in ED  E: replete PRN  N: NPO after MN  DVT: hold for surgery    dispo: F  code: FULL CODE

## 2021-09-18 NOTE — ED ADULT NURSE NOTE - CHIEF COMPLAINT QUOTE
Patient is here for preop workup.  He is scheduled for right foot diabetic wound surgery tomorrow.  Told to come by Dr. Green (vascular [phone])

## 2021-09-18 NOTE — H&P ADULT - NSICDXPASTSURGICALHX_GEN_ALL_CORE_FT
PAST SURGICAL HISTORY:  H/O skin graft Right foot    Toe amputation status Right foot     PAST SURGICAL HISTORY:  H/O skin graft Right foot    S/P insertion of penile implant     Toe amputation status Right foot

## 2021-09-18 NOTE — H&P ADULT - PROBLEM SELECTOR PLAN 1
Pt with multiple procedures to RLE for diabetic osteomyelitis in the past and ulcers. Has 4th and 5th digits amputated and 2nd digit partially amputated as well. Has had ulcer formation starting in March and progressively worsening. Diabetes in managed with PCP (pt does not remember name)  - X ray 3 views right foot: Official read pending. 4th and 5th right partial ray amputation and digital amputation of 2nd digit, soft tissue defect sub 1st MPJ  - Is on insulin 30mg lantus in AM, with varying lispro with meals depending on finger stick, usually 10u with meals. In addition is on metformin 1000 BID, and Jardiance 25mg PO.   PLAN:  - To go for graft and debridement with podiatry tomorrow.  - A1C in AM  - Obtain EKG and U/A per podiatry recs  - FSGs q6 while NPO. mISS.  - Will give 50% lantus dosing prior to surgery in AM, so 15u.
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
## 2021-09-18 NOTE — H&P ADULT - ATTENDING COMMENTS
reviewed pertinent data , h&p  patient seen and examined overnight     PE as above, right foot ulcer findings per podiatry PE     1. right foot ulcer in setting of uncontrolled DM : pending debridement and grafting by podiatry, no contraindications to procedure ; avoid beta blockers in setting of cocaine use hx, advised cessation. agree w/ holding ARB preop, restart post op , c/w statin; reduced basal regimen in preop setting, restart at 80% home basal dose post op        rest of  plan as above

## 2021-09-18 NOTE — H&P ADULT - PROBLEM SELECTOR PLAN 5
F: s/p 1L in ED  E: replete PRN  N: NPO after MN  DVT: hold for surgery    dispo: F  code: FULL CODE On atorvastatin 40mg outpatient.   - c/w atorvastatin 40mg.

## 2021-09-18 NOTE — ED PROVIDER NOTE - OBJECTIVE STATEMENT
47 yo M with pmh of DM here for  surgery of R foot for wound debridement and graft. Pt states he has had an ulcer to the bottom of his foot x 4-5 months. Started as a small blister. Denies fever, chills, swelling, drainage.

## 2021-09-18 NOTE — ED PROVIDER NOTE - CLINICAL SUMMARY MEDICAL DECISION MAKING FREE TEXT BOX
47 yo M with pmh of DM here for  surgery of R foot for wound debridement and graft. Pt states he has had an ulcer to the bottom of his foot x 4-5 months. Started as a small blister. Denies fever, chills, swelling, drainage. R foot- 4x3cm clean, pink ulceration to bottom of 1st MCP. NV intact. Pre-op labs, podiatry consult

## 2021-09-18 NOTE — H&P ADULT - NSHPLABSRESULTS_GEN_ALL_CORE
LABS:                         12.2   8.64  )-----------( 521      ( 18 Sep 2021 18:17 )             37.7     09-18    136  |  100  |  24<H>  ----------------------------<  342<H>  4.7   |  28  |  1.16    Ca    9.1      18 Sep 2021 18:17    TPro  8.4<H>  /  Alb  3.5  /  TBili  <0.2  /  DBili  x   /  AST  21  /  ALT  23  /  AlkPhos  95  09-18    PT/INR - ( 18 Sep 2021 18:17 )   PT: 12.4 sec;   INR: 1.04          PTT - ( 18 Sep 2021 18:17 )  PTT:34.3 sec        CAPILLARY BLOOD GLUCOSE              RADIOLOGY, EKG & ADDITIONAL TESTS:

## 2021-09-18 NOTE — H&P ADULT - PROBLEM SELECTOR PLAN 4
F: s/p 1L in ED  E: replete PRN  N: NPO after MN  DVT: hold for surgery    dispo: F  code: FULL CODE On atorvastatin 40mg outpatient.   - c/w atorvastatin 40mg. ADDENDUM: advised cessation; avoid beta blockers; check utox

## 2021-09-18 NOTE — ED ADULT NURSE NOTE - OBJECTIVE STATEMENT
Pt is a 45 y/o male A&Ox4 in NAD ambulatory with steady gait sent in for pre-op of right foot. Pt reports diabetic foot ulcer to plantar of right foot. Pt denies fever/chills, pain. Pt talking in clear, full sentences, respirations even and unlabored.

## 2021-09-18 NOTE — ED PROVIDER NOTE - PHYSICAL EXAMINATION
CONSTITUTIONAL: Well-appearing;  in no apparent distress.   HEAD: Normocephalic; atraumatic.   EYES: PERRL; EOM intact; conjunctiva and sclera clear  ENT: normal nose; no rhinorrhea; normal pharynx with no erythema or lesions.   NECK: Supple; non-tender; no LAD  CARDIOVASCULAR: Normal S1, S2; No audible murmurs. Regular rate and rhythm.   RESPIRATORY: Breathing easily; breath sounds clear and equal bilaterally; no wheezes, rhonchi, or rales.  GI: Soft; non-distended; non-tender; no palpable organomegaly.   MSK: FROM at all extremities, normal tone   EXT: R foot- 4x3cm clean, pink ulceration to bottom of 1st MCP. NV intact.   NEURO: A & O x 3; face symmetric; grossly unremarkable.   PSYCHOLOGICAL: The patient’s mood and manner are appropriate.

## 2021-09-18 NOTE — ED ADULT NURSE NOTE - CAS TRG GEN SKIN COLOR
Quality 111:Pneumonia Vaccination Status For Older Adults: Pneumococcal Vaccination Previously Received Quality 110: Preventive Care And Screening: Influenza Immunization: Influenza Immunization not Administered because Patient Refused. Detail Level: Detailed Normal for race

## 2021-09-18 NOTE — H&P ADULT - HISTORY OF PRESENT ILLNESS
46M with PMHx of DM presenting  for surgery of right foot for wound debridement and graft placement sent in by Dr. Green. Operation scheduled for Sunday 9/19. Pt states he has had an ulcer to the bottom of his foot x 4-5 months and went to see his podiatrist on Monday who sent him to the hospital for debridement and graft placement. He states it started as a small blister but he has been working more hours on his feet and had noticed clear fluid drainage and the size of the wound increasing. The ulceration is not painful. He denies fever, chills, swelling, nausea, vomiting.    In the ED:  Vitals:    46M with PMHx of DM, HTN, HLD, hx of substance abuse, h/o OM, multiple R toes amputations, presenting  for surgery of right foot for wound debridement and graft placement sent in by Dr. Green. Operation scheduled for Sunday 9/19. Pt states he has had an ulcer to the bottom of his foot x 4-5 months and went to see his podiatrist on Monday who sent him to the hospital for debridement and graft placement. He states it started as a small blister but he has been working more hours on his feet and had noticed clear fluid drainage and the size of the wound increasing. The ulceration is not painful. He denies fever, chills, swelling, nausea, vomiting.    In the ED:  Vitals: 98.2, hr 92, bp 157/84, 18 97%  Labs: Hgb 12.2, platelet 521, BUN 24, ESR 60, CRP 40.6.  X ray 3 views right foot: Official read pending  Wet read: 4th and 5th right partial ray amputation and digital amputation of 2nd digit, soft tissue defect sub 1st MPJ  CXR: no consolidation, increased hilar vascular markings  Interventions: 1L NS   46M with PMHx of IDDM, HTN, HLD, cocaine use disorder, h/o OM, multiple R toes amputations, presenting  for surgery of right foot for wound debridement and graft placement sent in by Dr. Green. Operation scheduled for Sunday 9/19. Pt states he has had an ulcer to the bottom of his foot x 4-5 months (since March) and went to see his podiatrist on Monday who sent him to the hospital for debridement and graft placement. He states it started as a small blister in March as he started putting more pressure on the R foot and he has been working more hours on his feet and had noticed clear fluid drainage and the size of the wound increasing. Per his outpatient podiatrist the ulcer increased in size to 5X4 outpatient. The ulceration is not painful. He denies fever, chills, swelling, nausea, vomiting, chest pain, palpitations, SOB, cough, N/V/D, dysuria, hematuria    In the ED:  Vitals: 98.2, hr 92, bp 157/84, 18 97%  Labs: Hgb 12.2, platelet 521, BUN 24, ESR 60, CRP 40.6.  X ray 3 views right foot: Official read pending  Wet read: 4th and 5th right partial ray amputation and digital amputation of 2nd digit, soft tissue defect sub 1st MPJ  CXR: no consolidation, increased hilar vascular markings  Interventions: 1L NS

## 2021-09-18 NOTE — CONSULT NOTE ADULT - SUBJECTIVE AND OBJECTIVE BOX
Attending: Dr. Green     Patient is a 46y old  Male who presents with a chief complaint of right foot wound     HPI: 45 yo M with Pmhx of DM here for surgery of right foot for wound debridement and graft placement sent in by Dr. Green operation will be Sunday 9/19. Pt states he has had an ulcer to the bottom of his foot x 4-5 months and went to see his podiatrist on Monday who sent him to the hospital for debridement and graft placement. He states it started as a small blister but he has been working more hours on his feet and had noticed clear fluid drainage and the size of the wound increasing. The ulceration is not painful. He denies fever, chills, swelling, nausea, vomiting.      Review of systems negative except per HPI and as stated below  General:	 no weakness; no fevers, no chills  Skin/Breast: no rash  Respiratory and Thorax: no SOB, no cough  Cardiovascular:	No chest pain  Gastrointestinal:	 no nausea, vomiting , diarrhea  Genitourinary:	no dysuria, no difficulty urinating, no hematuria  Musculoskeletal:	no weakness, no joint swelling/pain  Neurological:	no focal weakness/numbness  Endocrine:	no polyuria, no polydipsia    PAST MEDICAL & SURGICAL HISTORY:  Diabetes    Osteomyelitis    Hyperlipidemia, unspecified hyperlipidemia type    Hypertension    History of cocaine use    Toe amputation status  Right foot    H/O skin graft  Right foot      Home Medications:  Aspirin Enteric Coated 81 mg oral delayed release tablet: 1 tab(s) orally once a day (30 Jun 2021 16:40)    Allergies    No Known Allergies    Intolerances      FAMILY HISTORY:  No family history of cardiovascular disease (Father, Mother)      Social History:       LABS                        12.2   8.64  )-----------( 521      ( 18 Sep 2021 18:17 )             37.7     09-18    136  |  100  |  24<H>  ----------------------------<  342<H>  4.7   |  28  |  1.16    Ca    9.1      18 Sep 2021 18:17    TPro  8.4<H>  /  Alb  3.5  /  TBili  <0.2  /  DBili  x   /  AST  21  /  ALT  23  /  AlkPhos  95  09-18    PT/INR - ( 18 Sep 2021 18:17 )   PT: 12.4 sec;   INR: 1.04          PTT - ( 18 Sep 2021 18:17 )  PTT:34.3 sec    Vital Signs Last 24 Hrs  T(C): 36.8 (18 Sep 2021 17:51), Max: 36.8 (18 Sep 2021 17:51)  T(F): 98.2 (18 Sep 2021 17:51), Max: 98.2 (18 Sep 2021 17:51)  HR: 92 (18 Sep 2021 17:51) (92 - 92)  BP: 157/84 (18 Sep 2021 17:51) (157/84 - 157/84)  BP(mean): --  RR: 18 (18 Sep 2021 17:51) (18 - 18)  SpO2: 97% (18 Sep 2021 17:51) (97% - 97%)    PHYSICAL EXAM  General: NAD, AA0x3  Lower Extremity Focused:  Vasc: DP/PT 2/4 b/l, CFT<3secs, WWP, no edema  Derm:  Right foot- [id number] 1cm sub 1st MPJ, granular/fibrotic base, clear drainage no malodor, -PTB, hyperkeratotic border, no erythema.   Neuro: Protective sensation diminished   MSK: 4th and 5th right partial ray amputation and digital amputation of 2nd digit      RADIOLOGY:  X ray 3 views right foot: Official read pending  Wet read: 4th and 5th right partial ray amputation and digital amputation of 2nd digit, soft tissue defect sub 1st MPJ                           Attending: Dr. Green     Patient is a 46y old  Male who presents with a chief complaint of right foot wound     HPI: 47 yo M with Pmhx of DM here for surgery of right foot for wound debridement and graft placement sent in by Dr. Green operation will be Sunday 9/19. Pt states he has had an ulcer to the bottom of his foot x 4-5 months and went to see his podiatrist on Monday who sent him to the hospital for debridement and graft placement. He states it started as a small blister but he has been working more hours on his feet and had noticed clear fluid drainage and the size of the wound increasing. The ulceration is not painful. He denies fever, chills, swelling, nausea, vomiting.      Review of systems negative except per HPI and as stated below  General:	 no weakness; no fevers, no chills  Skin/Breast: no rash  Respiratory and Thorax: no SOB, no cough  Cardiovascular:	No chest pain  Gastrointestinal:	 no nausea, vomiting , diarrhea  Genitourinary:	no dysuria, no difficulty urinating, no hematuria  Musculoskeletal:	no weakness, no joint swelling/pain  Neurological:	no focal weakness/numbness  Endocrine:	no polyuria, no polydipsia    PAST MEDICAL & SURGICAL HISTORY:  Diabetes    Osteomyelitis    Hyperlipidemia, unspecified hyperlipidemia type    Hypertension    History of cocaine use    Toe amputation status  Right foot    H/O skin graft  Right foot      Home Medications:  Aspirin Enteric Coated 81 mg oral delayed release tablet: 1 tab(s) orally once a day (30 Jun 2021 16:40)    Allergies    No Known Allergies    Intolerances      FAMILY HISTORY:  No family history of cardiovascular disease (Father, Mother)      Social History:       LABS                        12.2   8.64  )-----------( 521      ( 18 Sep 2021 18:17 )             37.7     09-18    136  |  100  |  24<H>  ----------------------------<  342<H>  4.7   |  28  |  1.16    Ca    9.1      18 Sep 2021 18:17    TPro  8.4<H>  /  Alb  3.5  /  TBili  <0.2  /  DBili  x   /  AST  21  /  ALT  23  /  AlkPhos  95  09-18    PT/INR - ( 18 Sep 2021 18:17 )   PT: 12.4 sec;   INR: 1.04          PTT - ( 18 Sep 2021 18:17 )  PTT:34.3 sec    Vital Signs Last 24 Hrs  T(C): 36.8 (18 Sep 2021 17:51), Max: 36.8 (18 Sep 2021 17:51)  T(F): 98.2 (18 Sep 2021 17:51), Max: 98.2 (18 Sep 2021 17:51)  HR: 92 (18 Sep 2021 17:51) (92 - 92)  BP: 157/84 (18 Sep 2021 17:51) (157/84 - 157/84)  BP(mean): --  RR: 18 (18 Sep 2021 17:51) (18 - 18)  SpO2: 97% (18 Sep 2021 17:51) (97% - 97%)    PHYSICAL EXAM  General: NAD, AA0x3  Lower Extremity Right foot Focused:  Vasc: DP/PT 2/4, CFT<3secs, WWP, no edema  Derm:  Right foot- 2ial7pek 1cm sub 1st MPJ, granular/fibrotic base, clear drainage no malodor, -PTB, hyperkeratotic border, no erythema.   Neuro: Protective sensation diminished   MSK: 4th and 5th right partial ray amputation and digital amputation of 2nd digit      RADIOLOGY:  X ray 3 views right foot: Official read pending  Wet read: 4th and 5th right partial ray amputation and digital amputation of 2nd digit, soft tissue defect sub 1st MPJ

## 2021-09-18 NOTE — H&P ADULT - NSICDXFAMILYHX_GEN_ALL_CORE_FT
FAMILY HISTORY:  Father  Still living? Unknown  No family history of cardiovascular disease, Age at diagnosis: Age Unknown    Mother  Still living? Unknown  No family history of cardiovascular disease, Age at diagnosis: Age Unknown     FAMILY HISTORY:  Mother  Still living? Unknown  FH: CAD (coronary artery disease), Age at diagnosis: Age Unknown

## 2021-09-18 NOTE — H&P ADULT - NSHPPHYSICALEXAM_GEN_ALL_CORE
PHYSICAL EXAM:    Vital Signs Last 24 Hrs  T(C): 36.8 (18 Sep 2021 21:00), Max: 36.9 (18 Sep 2021 20:06)  T(F): 98.2 (18 Sep 2021 21:00), Max: 98.4 (18 Sep 2021 20:06)  HR: 84 (18 Sep 2021 21:00) (78 - 92)  BP: 158/97 (18 Sep 2021 21:00) (152/88 - 158/97)  BP(mean): --  RR: 18 (18 Sep 2021 21:00) (18 - 18)  SpO2: 98% (18 Sep 2021 21:00) (97% - 98%)  I&O's Summary      General: NAD, well developed  HEENT: NC/AT; EOMI, PERRLA, anicteric sclera; moist mucosal membranes.  Neck: supple, trachea midline  Cardiovascular: RRR, +S1/S2; NO M/R/G  Respiratory: CTA B/L; no W/R/R  Gastrointestinal: soft, NT/ND; +BSx4  Extremities: WWP; no edema or cyanosis  Vascular: 2+ radial, DP/PT pulses B/L  Neurological: AAOx3; no focal deficits PHYSICAL EXAM:    Vital Signs Last 24 Hrs  T(C): 36.8 (18 Sep 2021 21:00), Max: 36.9 (18 Sep 2021 20:06)  T(F): 98.2 (18 Sep 2021 21:00), Max: 98.4 (18 Sep 2021 20:06)  HR: 84 (18 Sep 2021 21:00) (78 - 92)  BP: 158/97 (18 Sep 2021 21:00) (152/88 - 158/97)  BP(mean): --  RR: 18 (18 Sep 2021 21:00) (18 - 18)  SpO2: 98% (18 Sep 2021 21:00) (97% - 98%)  I&O's Summary      General: NAD, well developed  HEENT: NC/AT; EOMI, PERRLA, anicteric sclera; moist mucosal membranes.  Neck: supple, trachea midline  Cardiovascular: RRR, +S1/S2; NO M/R/G  Respiratory: CTA B/L; no W/R/R  Gastrointestinal: soft, NT/ND; +BSx4  Extremities: WWP; no edema or cyanosis. RLE bandaged by podiatry. Pt has picture of wound. Serosanguinous drainage, not purulent. Central punctate hole.  Vascular: 2+ radial, PT pulses B/L  Neurological: AAOx3; no focal deficits

## 2021-09-18 NOTE — CONSULT NOTE ADULT - ASSESSMENT
Assessment: 45 yo M with Pmhx of DM type II here for right foot for wound debridement and graft placement sent in by Dr. Grene operation will be Sunday 9/19.    Plan:   -Admit pt to medicine  -Written consent obtained  -Wound dressed with wet to dry dressing   -Please obtain pre-op labs   -Please make pt NPO after midnight   -Wound debridement and graft application will be done in the OR as an add on case 9/19    Patient should follow up with Dr. Maximiliano Green within 1 week of discharge.    Office information:          [place] Address- [address], [place] [address] Phone: [phone]         [place] Address- [address] [facility] [address], [place] [address] Phone: [phone]   Assessment: 47 yo M with Pmhx of DM type II here for right foot for wound debridement and graft placement sent in by Dr. Green operation will be Sunday 9/19.    Plan:   -Admit pt to medicine  -Written consent obtained  -Wound dressed with wet to dry dressing   -Please obtain pre-op labs (CBC, CMP, Coags, type and screen x 2, COVID test, EKG, chest x-ray, UA)  -Hold anti-coag medications    -Please make pt NPO after midnight   -Wound debridement and graft application will be done in the OR as an add on case 9/19    Patient should follow up with Dr. Maximiliano Green within 1 week of discharge.    Office information:          [place] Address- [address], [place] [address] Phone: [phone]         [place] Address- [address] [address] Phone: [phone]   Assessment: 47 yo M with Pmhx of DM type II here for right foot for wound debridement and graft placement sent in by Dr. Green, operation will be Sunday 9/19.    Plan:   -Admit pt to medicine  -Written consent will be obtained in AM   -Wound dressed with wet to dry dressing   -Please obtain pre-op labs (CBC, CMP, Coags, type and screen x 2, COVID test, EKG, chest x-ray, UA)  -Hold anti-coag medications    -Please make pt NPO after midnight   -Wound debridement and graft application will be done in the OR as an add on case 9/19    Patient should follow up with Dr. Maximiliano Green within 1 week of discharge.    Office information:          [place] Address- [address], [place] [address] Phone: [phone]         [place] Address- [address], [place] [address] Phone: [phone]

## 2021-09-18 NOTE — ED ADULT TRIAGE NOTE - CHIEF COMPLAINT QUOTE
Patient is here for preop workup.  He is scheduled for right foot diabetic wound surgery tomorrow.  Told to come by Dr. Green (vascular [phone])

## 2021-09-18 NOTE — H&P ADULT - PROBLEM SELECTOR PLAN 3
On atorvastatin 40mg outpatient.   - c/w atorvastatin 40mg. Pt with history of HTN is on losartan but does not remember dosage. [facility].  - med recc.   - will hold prior to surgery regardless

## 2021-09-18 NOTE — ED PROVIDER NOTE - ATTENDING CONTRIBUTION TO CARE
47 yo male h/o DM, progressive R foot ulcer here for admit for surg mgmt w podiatry.  Pt w enlarging ulceration x 4-5 mo.  No sig pain, no fever.   Pt w lg ulcer centered around 1st MTP on sole of foot, s/p mult toe amputations.  Dp 1+.  Pt tba to podiatry for surg mgmt.

## 2021-09-18 NOTE — H&P ADULT - ASSESSMENT
46M with PMHx of IDDM, HTN, HLD, cocaine use disorder, h/o OM, multiple R toes amputations, presenting  for surgery of right foot for wound debridement and graft placement sent in by Dr. Green.

## 2021-09-19 DIAGNOSIS — Z01.818 ENCOUNTER FOR OTHER PREPROCEDURAL EXAMINATION: ICD-10-CM

## 2021-09-19 DIAGNOSIS — F14.10 COCAINE ABUSE, UNCOMPLICATED: ICD-10-CM

## 2021-09-19 LAB
A1C WITH ESTIMATED AVERAGE GLUCOSE RESULT: 8.3 % — HIGH (ref 4–5.6)
ALBUMIN SERPL ELPH-MCNC: 3.2 G/DL — LOW (ref 3.3–5)
ALP SERPL-CCNC: 126 U/L — HIGH (ref 40–120)
ALT FLD-CCNC: 23 U/L — SIGNIFICANT CHANGE UP (ref 10–45)
ANION GAP SERPL CALC-SCNC: 9 MMOL/L — SIGNIFICANT CHANGE UP (ref 5–17)
APTT BLD: 34.3 SEC — SIGNIFICANT CHANGE UP (ref 27.5–35.5)
AST SERPL-CCNC: 21 U/L — SIGNIFICANT CHANGE UP (ref 10–40)
BASOPHILS # BLD AUTO: 0.06 K/UL — SIGNIFICANT CHANGE UP (ref 0–0.2)
BASOPHILS NFR BLD AUTO: 0.8 % — SIGNIFICANT CHANGE UP (ref 0–2)
BILIRUB SERPL-MCNC: 0.2 MG/DL — SIGNIFICANT CHANGE UP (ref 0.2–1.2)
BLD GP AB SCN SERPL QL: NEGATIVE — SIGNIFICANT CHANGE UP
BUN SERPL-MCNC: 19 MG/DL — SIGNIFICANT CHANGE UP (ref 7–23)
CALCIUM SERPL-MCNC: 9.1 MG/DL — SIGNIFICANT CHANGE UP (ref 8.4–10.5)
CHLORIDE SERPL-SCNC: 102 MMOL/L — SIGNIFICANT CHANGE UP (ref 96–108)
CO2 SERPL-SCNC: 24 MMOL/L — SIGNIFICANT CHANGE UP (ref 22–31)
COVID-19 SPIKE DOMAIN AB INTERP: POSITIVE
COVID-19 SPIKE DOMAIN ANTIBODY RESULT: >250 U/ML — HIGH
CREAT SERPL-MCNC: 0.86 MG/DL — SIGNIFICANT CHANGE UP (ref 0.5–1.3)
EOSINOPHIL # BLD AUTO: 0.26 K/UL — SIGNIFICANT CHANGE UP (ref 0–0.5)
EOSINOPHIL NFR BLD AUTO: 3.4 % — SIGNIFICANT CHANGE UP (ref 0–6)
ESTIMATED AVERAGE GLUCOSE: 192 MG/DL — HIGH (ref 68–114)
GLUCOSE BLDC GLUCOMTR-MCNC: 187 MG/DL — HIGH (ref 70–99)
GLUCOSE BLDC GLUCOMTR-MCNC: 190 MG/DL — HIGH (ref 70–99)
GLUCOSE BLDC GLUCOMTR-MCNC: 210 MG/DL — HIGH (ref 70–99)
GLUCOSE BLDC GLUCOMTR-MCNC: 215 MG/DL — HIGH (ref 70–99)
GLUCOSE SERPL-MCNC: 225 MG/DL — HIGH (ref 70–99)
HCT VFR BLD CALC: 37.7 % — LOW (ref 39–50)
HGB BLD-MCNC: 12.2 G/DL — LOW (ref 13–17)
IMM GRANULOCYTES NFR BLD AUTO: 0.1 % — SIGNIFICANT CHANGE UP (ref 0–1.5)
INR BLD: 0.97 — SIGNIFICANT CHANGE UP (ref 0.88–1.16)
LYMPHOCYTES # BLD AUTO: 2.76 K/UL — SIGNIFICANT CHANGE UP (ref 1–3.3)
LYMPHOCYTES # BLD AUTO: 35.7 % — SIGNIFICANT CHANGE UP (ref 13–44)
MAGNESIUM SERPL-MCNC: 1.6 MG/DL — SIGNIFICANT CHANGE UP (ref 1.6–2.6)
MCHC RBC-ENTMCNC: 28.3 PG — SIGNIFICANT CHANGE UP (ref 27–34)
MCHC RBC-ENTMCNC: 32.4 GM/DL — SIGNIFICANT CHANGE UP (ref 32–36)
MCV RBC AUTO: 87.5 FL — SIGNIFICANT CHANGE UP (ref 80–100)
MONOCYTES # BLD AUTO: 0.61 K/UL — SIGNIFICANT CHANGE UP (ref 0–0.9)
MONOCYTES NFR BLD AUTO: 7.9 % — SIGNIFICANT CHANGE UP (ref 2–14)
NEUTROPHILS # BLD AUTO: 4.03 K/UL — SIGNIFICANT CHANGE UP (ref 1.8–7.4)
NEUTROPHILS NFR BLD AUTO: 52.1 % — SIGNIFICANT CHANGE UP (ref 43–77)
NRBC # BLD: 0 /100 WBCS — SIGNIFICANT CHANGE UP (ref 0–0)
PHOSPHATE SERPL-MCNC: 2.8 MG/DL — SIGNIFICANT CHANGE UP (ref 2.5–4.5)
PLATELET # BLD AUTO: 498 K/UL — HIGH (ref 150–400)
POTASSIUM SERPL-MCNC: 4.4 MMOL/L — SIGNIFICANT CHANGE UP (ref 3.5–5.3)
POTASSIUM SERPL-SCNC: 4.4 MMOL/L — SIGNIFICANT CHANGE UP (ref 3.5–5.3)
PROT SERPL-MCNC: 7.8 G/DL — SIGNIFICANT CHANGE UP (ref 6–8.3)
PROTHROM AB SERPL-ACNC: 11.6 SEC — SIGNIFICANT CHANGE UP (ref 10.6–13.6)
RBC # BLD: 4.31 M/UL — SIGNIFICANT CHANGE UP (ref 4.2–5.8)
RBC # FLD: 13.4 % — SIGNIFICANT CHANGE UP (ref 10.3–14.5)
RH IG SCN BLD-IMP: POSITIVE — SIGNIFICANT CHANGE UP
SARS-COV-2 IGG+IGM SERPL QL IA: >250 U/ML — HIGH
SARS-COV-2 IGG+IGM SERPL QL IA: POSITIVE
SODIUM SERPL-SCNC: 135 MMOL/L — SIGNIFICANT CHANGE UP (ref 135–145)
WBC # BLD: 7.73 K/UL — SIGNIFICANT CHANGE UP (ref 3.8–10.5)
WBC # FLD AUTO: 7.73 K/UL — SIGNIFICANT CHANGE UP (ref 3.8–10.5)

## 2021-09-19 PROCEDURE — 99233 SBSQ HOSP IP/OBS HIGH 50: CPT | Mod: GC

## 2021-09-19 RX ORDER — INSULIN GLARGINE 100 [IU]/ML
20 INJECTION, SOLUTION SUBCUTANEOUS EVERY MORNING
Refills: 0 | Status: DISCONTINUED | OUTPATIENT
Start: 2021-09-20 | End: 2021-09-21

## 2021-09-19 RX ORDER — LOSARTAN POTASSIUM 100 MG/1
50 TABLET, FILM COATED ORAL DAILY
Refills: 0 | Status: DISCONTINUED | OUTPATIENT
Start: 2021-09-19 | End: 2021-09-21

## 2021-09-19 RX ORDER — PIPERACILLIN AND TAZOBACTAM 4; .5 G/20ML; G/20ML
3.38 INJECTION, POWDER, LYOPHILIZED, FOR SOLUTION INTRAVENOUS ONCE
Refills: 0 | Status: COMPLETED | OUTPATIENT
Start: 2021-09-19 | End: 2021-09-19

## 2021-09-19 RX ORDER — INFLUENZA VIRUS VACCINE 15; 15; 15; 15 UG/.5ML; UG/.5ML; UG/.5ML; UG/.5ML
0.5 SUSPENSION INTRAMUSCULAR ONCE
Refills: 0 | Status: DISCONTINUED | OUTPATIENT
Start: 2021-09-19 | End: 2021-09-21

## 2021-09-19 RX ORDER — INSULIN GLARGINE 100 [IU]/ML
15 INJECTION, SOLUTION SUBCUTANEOUS ONCE
Refills: 0 | Status: COMPLETED | OUTPATIENT
Start: 2021-09-19 | End: 2021-09-19

## 2021-09-19 RX ORDER — LIDOCAINE HCL 20 MG/ML
20 VIAL (ML) INJECTION ONCE
Refills: 0 | Status: DISCONTINUED | OUTPATIENT
Start: 2021-09-19 | End: 2021-09-21

## 2021-09-19 RX ORDER — PIPERACILLIN AND TAZOBACTAM 4; .5 G/20ML; G/20ML
3.38 INJECTION, POWDER, LYOPHILIZED, FOR SOLUTION INTRAVENOUS EVERY 6 HOURS
Refills: 0 | Status: DISCONTINUED | OUTPATIENT
Start: 2021-09-19 | End: 2021-09-21

## 2021-09-19 RX ADMIN — PIPERACILLIN AND TAZOBACTAM 200 GRAM(S): 4; .5 INJECTION, POWDER, LYOPHILIZED, FOR SOLUTION INTRAVENOUS at 12:58

## 2021-09-19 RX ADMIN — ATORVASTATIN CALCIUM 40 MILLIGRAM(S): 80 TABLET, FILM COATED ORAL at 21:37

## 2021-09-19 RX ADMIN — INSULIN GLARGINE 15 UNIT(S): 100 INJECTION, SOLUTION SUBCUTANEOUS at 06:35

## 2021-09-19 RX ADMIN — Medication 2: at 00:01

## 2021-09-19 RX ADMIN — LOSARTAN POTASSIUM 50 MILLIGRAM(S): 100 TABLET, FILM COATED ORAL at 12:58

## 2021-09-19 RX ADMIN — Medication 2: at 17:36

## 2021-09-19 RX ADMIN — Medication 4: at 12:58

## 2021-09-19 RX ADMIN — PIPERACILLIN AND TAZOBACTAM 200 GRAM(S): 4; .5 INJECTION, POWDER, LYOPHILIZED, FOR SOLUTION INTRAVENOUS at 17:36

## 2021-09-19 NOTE — PROGRESS NOTE ADULT - PROBLEM SELECTOR PLAN 2
Pt able to ambulate >5 blocks without stopping, able to climb >2 flights of stairs. Prior smoking history but quit in 1998. No CAD history, but mother with CAD s/p CABG. No current cardiac symptoms.   - CXR clear  - EKG from prior admission, NSR. New EKG ordered.  - Salas score: 0.0%  - RCRI 2 points, Class III risk. 10.1% 30 day risk of MACE.  - METS > 4  - Pt is intermediate risk for intermediate risk procedure. Pt able to ambulate >5 blocks without stopping, able to climb >2 flights of stairs. Prior smoking history but quit in 1998. No CAD history, but mother with CAD s/p CABG. No current cardiac symptoms.   - CXR clear  - EKG 9/18: NSR  - Salas score: 0.0%  - RCRI 2 points, Class III risk. 10.1% 30 day risk of MACE.  - METS > 4  - Pt is intermediate risk for intermediate risk procedure.

## 2021-09-19 NOTE — PROGRESS NOTE ADULT - PROBLEM SELECTOR PLAN 1
Pt with multiple procedures to RLE for diabetic osteomyelitis in the past and ulcers. Has 4th and 5th digits amputated and 2nd digit partially amputated as well. Has had ulcer formation starting in March and progressively worsening. Diabetes in managed with PCP (pt does not remember name)  - X ray 3 views right foot: Official read pending. 4th and 5th right partial ray amputation and digital amputation of 2nd digit, soft tissue defect sub 1st MPJ  - Is on insulin 30mg lantus in AM, with varying lispro with meals depending on finger stick, usually 10u with meals. In addition is on metformin 1000 BID, and Jardiance 25mg PO.   PLAN:  - To go for graft and debridement with podiatry tomorrow.  - A1C in AM  - Obtain EKG and U/A per podiatry recs  - FSGs q6 while NPO. mISS.  - Will give 50% lantus dosing prior to surgery in AM, so 15u. Pt with multiple procedures to RLE for diabetic osteomyelitis in the past and ulcers. Has 4th and 5th digits amputated and 2nd digit partially amputated as well. Has had ulcer formation starting in March and progressively worsening. Diabetes in managed with PCP (pt does not remember name)  - X ray 3 views right foot: Official read pending. 4th and 5th right partial ray amputation and digital amputation of 2nd digit, soft tissue defect sub 1st MPJ  - Is on insulin 30mg lantus in AM, with varying lispro with meals depending on finger stick, usually 10u with meals. In addition is on metformin 1000 BID, and Jardiance 25mg PO.   - To go for graft and debridement with podiatry today 9/19  - A1C 8.3  - FSGs q6 while NPO. mISS.  - Will give 50% lantus dosing prior to surgery in AM (15unit), resume with 20unit post op. Pt with multiple procedures to RLE for diabetic osteomyelitis in the past and ulcers. Has 4th and 5th digits amputated and 2nd digit partially amputated as well. Has had ulcer formation starting in March and progressively worsening. Diabetes in managed with PCP (pt does not remember name)  - X ray 3 views right foot: Official read pending. 4th and 5th right partial ray amputation and digital amputation of 2nd digit, soft tissue defect sub 1st MPJ  - Is on insulin 30mg lantus in AM, with varying lispro with meals depending on finger stick, usually 10u with meals. In addition is on metformin 1000 BID, and Jardiance 25mg PO.   - Bedside graft and debridement with podiatry tomorrow 9/20  - A1C 8.3  - Started lantus 20 for tomorrow AM given no NPO requirement for graft

## 2021-09-19 NOTE — PROGRESS NOTE ADULT - SUBJECTIVE AND OBJECTIVE BOX
INTERVAL HPI/OVERNIGHT EVENTS:  As per night team, no overnight events. Patient seen and examined at bedside. Pt reports no complaints at this time. Patient denies fever, chills, dizziness, weakness, HA, CP, SOB, N/V/D/C.     VITALS  Vital Signs Last 24 Hrs  T(C): 36.6 (19 Sep 2021 06:00), Max: 36.9 (18 Sep 2021 20:06)  T(F): 97.9 (19 Sep 2021 06:00), Max: 98.4 (18 Sep 2021 20:06)  HR: 73 (19 Sep 2021 06:00) (73 - 92)  BP: 142/81 (19 Sep 2021 06:00) (142/81 - 158/97)  BP(mean): --  RR: 19 (19 Sep 2021 06:00) (18 - 19)  SpO2: 98% (19 Sep 2021 06:00) (97% - 98%)    CAPILLARY BLOOD GLUCOSE      POCT Blood Glucose.: 187 mg/dL (19 Sep 2021 06:14)  POCT Blood Glucose.: 152 mg/dL (18 Sep 2021 23:35)  POCT Blood Glucose.: 271 mg/dL (18 Sep 2021 22:25)      PHYSICAL EXAM  General: NAD, sitting comfortably in bed   HEENT: PERRL/ EOMI, no scleral icterus, MMM  Neck: Supple, no JVD  Respiratory: lungs CTA b/l, no wheezes/crackles, no accessory muscle use  Cardiovascular: Regular rhythm/rate; +S1 +S2, no murmurs  Gastrointestinal: Soft, NTND, normoactive BS, no rebound, no guarding  Genitourinary: no suprapubic tenderness  Extremities: WWP, no cyanosis, no clubbing, no edema, pulses equal  Neurological: A&Ox3, no gross focal deficits, follows commands  Skin: Normal temperature, warm, dry    MEDICATIONS  (STANDING):  atorvastatin 40 milliGRAM(s) Oral at bedtime  dextrose 40% Gel 15 Gram(s) Oral once  dextrose 5%. 1000 milliLiter(s) (50 mL/Hr) IV Continuous <Continuous>  dextrose 5%. 1000 milliLiter(s) (100 mL/Hr) IV Continuous <Continuous>  dextrose 50% Injectable 25 Gram(s) IV Push once  dextrose 50% Injectable 12.5 Gram(s) IV Push once  dextrose 50% Injectable 25 Gram(s) IV Push once  glucagon  Injectable 1 milliGRAM(s) IntraMuscular once  influenza   Vaccine 0.5 milliLiter(s) IntraMuscular once  insulin lispro (ADMELOG) corrective regimen sliding scale   SubCutaneous every 6 hours    MEDICATIONS  (PRN):      No Known Allergies      LABS                        12.2   7.73  )-----------( 498      ( 19 Sep 2021 07:18 )             37.7     09-19    135  |  102  |  19  ----------------------------<  225<H>  4.4   |  24  |  0.86    Ca    9.1      19 Sep 2021 07:18  Phos  2.8     09-19  Mg     1.6     09-19    TPro  7.8  /  Alb  3.2<L>  /  TBili  0.2  /  DBili  x   /  AST  21  /  ALT  23  /  AlkPhos  126<H>  09-19    PT/INR - ( 19 Sep 2021 07:18 )   PT: 11.6 sec;   INR: 0.97          PTT - ( 19 Sep 2021 07:18 )  PTT:34.3 sec          RADIOLOGY & ADDITIONAL TESTS: Reviewed     INTERVAL HPI/OVERNIGHT EVENTS:  As per night team, no overnight events. Patient seen and examined at bedside. Pt reports no complaints at this time. Patient denies fever, chills, dizziness, weakness, HA, CP, SOB, N/V/D/C.     VITALS  Vital Signs Last 24 Hrs  T(C): 36.6 (19 Sep 2021 06:00), Max: 36.9 (18 Sep 2021 20:06)  T(F): 97.9 (19 Sep 2021 06:00), Max: 98.4 (18 Sep 2021 20:06)  HR: 73 (19 Sep 2021 06:00) (73 - 92)  BP: 142/81 (19 Sep 2021 06:00) (142/81 - 158/97)  BP(mean): --  RR: 19 (19 Sep 2021 06:00) (18 - 19)  SpO2: 98% (19 Sep 2021 06:00) (97% - 98%)    CAPILLARY BLOOD GLUCOSE      POCT Blood Glucose.: 187 mg/dL (19 Sep 2021 06:14)  POCT Blood Glucose.: 152 mg/dL (18 Sep 2021 23:35)  POCT Blood Glucose.: 271 mg/dL (18 Sep 2021 22:25)      PHYSICAL EXAM  General: NAD, well developed, lying in bed comfortably  HEENT: NC/AT; EOMI, PERRLA, anicteric sclera; moist mucosal membranes.  Neck: supple, trachea midline  Cardiovascular: RRR, +S1/S2; NO M/R/G  Respiratory: CTA B/L; no W/R/R  Gastrointestinal: soft, NT/ND; +BSx4  Extremities: WWP; no edema or cyanosis. RLE bandaged by podiatry. Pt has picture of wound. Serosanguinous drainage, not purulent. Central punctate hole.  Vascular: 2+ radial, PT pulses B/L  Neurological: AAOx3; no focal deficits    MEDICATIONS  (STANDING):  atorvastatin 40 milliGRAM(s) Oral at bedtime  dextrose 40% Gel 15 Gram(s) Oral once  dextrose 5%. 1000 milliLiter(s) (50 mL/Hr) IV Continuous <Continuous>  dextrose 5%. 1000 milliLiter(s) (100 mL/Hr) IV Continuous <Continuous>  dextrose 50% Injectable 25 Gram(s) IV Push once  dextrose 50% Injectable 12.5 Gram(s) IV Push once  dextrose 50% Injectable 25 Gram(s) IV Push once  glucagon  Injectable 1 milliGRAM(s) IntraMuscular once  influenza   Vaccine 0.5 milliLiter(s) IntraMuscular once  insulin lispro (ADMELOG) corrective regimen sliding scale   SubCutaneous every 6 hours    MEDICATIONS  (PRN):      No Known Allergies      LABS                        12.2   7.73  )-----------( 498      ( 19 Sep 2021 07:18 )             37.7     09-19    135  |  102  |  19  ----------------------------<  225<H>  4.4   |  24  |  0.86    Ca    9.1      19 Sep 2021 07:18  Phos  2.8     09-19  Mg     1.6     09-19    TPro  7.8  /  Alb  3.2<L>  /  TBili  0.2  /  DBili  x   /  AST  21  /  ALT  23  /  AlkPhos  126<H>  09-19    PT/INR - ( 19 Sep 2021 07:18 )   PT: 11.6 sec;   INR: 0.97          PTT - ( 19 Sep 2021 07:18 )  PTT:34.3 sec          RADIOLOGY & ADDITIONAL TESTS: Reviewed

## 2021-09-19 NOTE — PROGRESS NOTE ADULT - SUBJECTIVE AND OBJECTIVE BOX
Patient is a 46y old  Male who presents with a chief complaint of diabetic foot ulcer (18 Sep 2021 22:03)      INTERVAL HPI/ OVERNIGHT EVENTS: Pt seen and evaluated bedside this morning. He is feeling well this morning and has no pain to his right foot. Pt has kept dressing clean, dry and intact.       LABS                        12.2   7.73  )-----------( 498      ( 19 Sep 2021 07:18 )             37.7     09-19    135  |  102  |  19  ----------------------------<  225<H>  4.4   |  24  |  0.86    Ca    9.1      19 Sep 2021 07:18  Phos  2.8     09-19  Mg     1.6     09-19    TPro  7.8  /  Alb  3.2<L>  /  TBili  0.2  /  DBili  x   /  AST  21  /  ALT  23  /  AlkPhos  126<H>  09-19    PT/INR - ( 19 Sep 2021 07:18 )   PT: 11.6 sec;   INR: 0.97     PTT - ( 19 Sep 2021 07:18 )  PTT:34.3 sec  ESR: 60  CRP: 40.6  09-18 @ 18:17    ICU Vital Signs Last 24 Hrs  T(C): 36.6 (19 Sep 2021 06:00), Max: 36.9 (18 Sep 2021 20:06)  T(F): 97.9 (19 Sep 2021 06:00), Max: 98.4 (18 Sep 2021 20:06)  HR: 73 (19 Sep 2021 06:00) (73 - 92)  BP: 142/81 (19 Sep 2021 06:00) (142/81 - 158/97)  BP(mean): --  ABP: --  ABP(mean): --  RR: 19 (19 Sep 2021 06:00) (18 - 19)  SpO2: 98% (19 Sep 2021 06:00) (97% - 98%)    PHYSICAL EXAM  General: NAD, AA0x3  Lower Extremity Right foot Focused:  Vasc: DP/PT 2/4, CFT<3secs, WWP, no edema  Derm:  Right foot- [id number] 1cm sub 1st MPJ, granular/fibrotic base, clear drainage no malodor, -PTB, hyperkeratotic border, no erythema.   Neuro: Protective sensation diminished   MSK: 4th and 5th right partial ray amputation and digital amputation of 2nd digit      RADIOLOGY:  X ray 3 views right foot: Official read pending  Wet read: 4th and 5th right partial ray amputation and digital amputation of 2nd digit, soft tissue defect sub 1st MPJ                                 Patient is a 46y old  Male who presents with a chief complaint of diabetic foot ulcer (18 Sep 2021 22:03)      INTERVAL HPI/ OVERNIGHT EVENTS: Pt seen and evaluated bedside this morning. He is feeling well this morning and has no pain to his right foot. Pt has kept dressing clean, dry and intact.       LABS                        12.2   7.73  )-----------( 498      ( 19 Sep 2021 07:18 )             37.7     09-19    135  |  102  |  19  ----------------------------<  225<H>  4.4   |  24  |  0.86    Ca    9.1      19 Sep 2021 07:18  Phos  2.8     09-19  Mg     1.6     09-19    TPro  7.8  /  Alb  3.2<L>  /  TBili  0.2  /  DBili  x   /  AST  21  /  ALT  23  /  AlkPhos  126<H>  09-19    PT/INR - ( 19 Sep 2021 07:18 )   PT: 11.6 sec;   INR: 0.97     PTT - ( 19 Sep 2021 07:18 )  PTT:34.3 sec  ESR: 60  CRP: 40.6  09-18 @ 18:17    ICU Vital Signs Last 24 Hrs  T(C): 36.6 (19 Sep 2021 06:00), Max: 36.9 (18 Sep 2021 20:06)  T(F): 97.9 (19 Sep 2021 06:00), Max: 98.4 (18 Sep 2021 20:06)  HR: 73 (19 Sep 2021 06:00) (73 - 92)  BP: 142/81 (19 Sep 2021 06:00) (142/81 - 158/97)  BP(mean): --  ABP: --  ABP(mean): --  RR: 19 (19 Sep 2021 06:00) (18 - 19)  SpO2: 98% (19 Sep 2021 06:00) (97% - 98%)    PHYSICAL EXAM  General: NAD, AA0x3  Lower Extremity Right foot Focused:  Vasc: DP/PT 2/4, CFT<3secs, WWP, no edema  Derm:  Right foot- [id number] 1cm sub 1st MPJ, granular/fibrotic base, clear drainage no malodor, -PTB, hyperkeratotic border, no erythema.   Neuro: Protective sensation diminished   MSK: 4th and 5th right partial ray amputation and digital amputation of 2nd digit    RADIOLOGY:  X ray 3 views right foot: Official read pending  Wet read: 4th and 5th right partial ray amputation and digital amputation of 2nd digit, soft tissue defect sub 1st MPJ                                 Patient is a 46y old  Male who presents with a chief complaint of diabetic foot ulcer (18 Sep 2021 22:03)      INTERVAL HPI/ OVERNIGHT EVENTS: Pt seen and evaluated bedside this morning. He is feeling well this morning and has no pain to his right foot. Pt has kept dressing clean, dry and intact.       LABS                        12.2   7.73  )-----------( 498      ( 19 Sep 2021 07:18 )             37.7     09-19    135  |  102  |  19  ----------------------------<  225<H>  4.4   |  24  |  0.86    Ca    9.1      19 Sep 2021 07:18  Phos  2.8     09-19  Mg     1.6     09-19    TPro  7.8  /  Alb  3.2<L>  /  TBili  0.2  /  DBili  x   /  AST  21  /  ALT  23  /  AlkPhos  126<H>  09-19    PT/INR - ( 19 Sep 2021 07:18 )   PT: 11.6 sec;   INR: 0.97     PTT - ( 19 Sep 2021 07:18 )  PTT:34.3 sec  ESR: 60  CRP: 40.6  09-18 @ 18:17    ICU Vital Signs Last 24 Hrs  T(C): 36.6 (19 Sep 2021 06:00), Max: 36.9 (18 Sep 2021 20:06)  T(F): 97.9 (19 Sep 2021 06:00), Max: 98.4 (18 Sep 2021 20:06)  HR: 73 (19 Sep 2021 06:00) (73 - 92)  BP: 142/81 (19 Sep 2021 06:00) (142/81 - 158/97)  BP(mean): --  ABP: --  ABP(mean): --  RR: 19 (19 Sep 2021 06:00) (18 - 19)  SpO2: 98% (19 Sep 2021 06:00) (97% - 98%)    PHYSICAL EXAM  General: NAD, AA0x3  Lower Extremity Right foot Focused:  Vasc: DP/PT 2/4, CFT<3secs, WWP, no edema  Derm:  Right foot- [id number] 1cm sub 1st MPJ, granular/fibrotic base, clear drainage no malodor, -PTB, hyperkeratotic border, no erythema.   Neuro: Protective sensation diminished   MSK: 4th and 5th right partial ray amputation and digital amputation of 2nd digit    RADIOLOGY:  < from: Xray Foot AP + Lateral + Oblique, Right (09.18.21 @ 18:32) >  IMPRESSION: Postoperative changes. Soft tissue injury noted. No evidence of acute fracture or dislocation. No radiographic evidence of osteomyelitis.  --- End of Report ---  < end of copied text >    < from: Xray Chest 1 View- PORTABLE-Urgent (Xray Chest 1 View- PORTABLE-Urgent .) (09.18.21 @ 21:06) >  INTERPRETATION:  Clinical History: Preop  Frontal examination of the chest demonstrates the heart to be within normal limits in transverse diameter. No acute infiltrates. Visualized osseous structures are within normal limits.  IMPRESSION: No acute infiltrates  --- End of Report ---  < end of copied text >

## 2021-09-19 NOTE — PROGRESS NOTE ADULT - ASSESSMENT
Assessment: 47 yo M with Pmhx of DM type II here for right foot for wound debridement and graft placement sent in by Dr. Green, operation will be on 9/19.    Plan:   -Written consent obtained and placed in pts chart  -Wound dressed with wet to dry dressing   -Pre-op labs (CBC, CMP, Coags, type and screen x 2, COVID test, EKG, chest x-ray, UA) obtained   -Hold anti-coag medications    -Please keep pt NPO   -Wound debridement and graft application will be done in the OR as an add on case 9/19    Patient should follow up with Dr. Maximiliano Green within 1 week of discharge.    Office information:          [place] Address- [address] [address] Phone: [phone]         [place] Address- [address], [place] [address] Phone: [phone] Assessment: 47 yo M with Pmhx of DM type II here for right foot for wound debridement and graft placement sent in by Dr. Green, will be done bedside on 9/20.    Plan:   -Wound debridement and Fibrinet graft application with total contact case will be done bedside 9/20 AM  -Written consent obtained and placed in pts chart  -Please start Zosyn IV abx  -Hold anti-coag medications    -Discontinue NPO as procedure will be bedside  -Wound dressed with wet to dry dressing     Patient should follow up with Dr. Maximiliano Green within 1 week of discharge.    Office information:          [place] Address- [address] [address] Phone: [phone]         [place] Address- [address], [place] [address] Phone: [phone] Assessment: 47 yo M with Pmhx of DM type II here for right foot for wound debridement and graft placement sent in by Dr. Green, will be done bedside on 9/20.    Plan:   -Wound debridement and Fibrinet graft application with total contact case will be done bedside 9/20 AM  -1% Lidocaine plain ordered for bedside procedure  -Written consent obtained and placed in pts chart  -Please start Zosyn IV abx  -Hold anti-coag medications    -Discontinue NPO as procedure will be bedside  -Wound dressed with wet to dry dressing     Patient should follow up with Dr. Maximiliano Green within 1 week of discharge.    Office information:          [place] Address- [address], [place] [address] Phone: [phone]         [place] Address- [address], [place] [address] Phone: [phone]

## 2021-09-19 NOTE — PROGRESS NOTE ADULT - PROBLEM SELECTOR PLAN 3
Pt with history of HTN is on losartan but does not remember dosage. Pharmacy [facility] in [facility].  - med recc.   - will hold prior to surgery regardless Pt with history of HTN is on losartan but does not remember dosage. Pharmacy [facility].  - Restarted losartan 50mg qd

## 2021-09-20 ENCOUNTER — TRANSCRIPTION ENCOUNTER (OUTPATIENT)
Age: 46
End: 2021-09-20

## 2021-09-20 LAB
AMPHET UR-MCNC: NEGATIVE — SIGNIFICANT CHANGE UP
ANION GAP SERPL CALC-SCNC: 7 MMOL/L — SIGNIFICANT CHANGE UP (ref 5–17)
BARBITURATES UR SCN-MCNC: NEGATIVE — SIGNIFICANT CHANGE UP
BENZODIAZ UR-MCNC: NEGATIVE — SIGNIFICANT CHANGE UP
BUN SERPL-MCNC: 23 MG/DL — SIGNIFICANT CHANGE UP (ref 7–23)
CALCIUM SERPL-MCNC: 9.3 MG/DL — SIGNIFICANT CHANGE UP (ref 8.4–10.5)
CHLORIDE SERPL-SCNC: 101 MMOL/L — SIGNIFICANT CHANGE UP (ref 96–108)
CO2 SERPL-SCNC: 27 MMOL/L — SIGNIFICANT CHANGE UP (ref 22–31)
COCAINE METAB.OTHER UR-MCNC: NEGATIVE — SIGNIFICANT CHANGE UP
CREAT SERPL-MCNC: 1.05 MG/DL — SIGNIFICANT CHANGE UP (ref 0.5–1.3)
GLUCOSE BLDC GLUCOMTR-MCNC: 182 MG/DL — HIGH (ref 70–99)
GLUCOSE BLDC GLUCOMTR-MCNC: 202 MG/DL — HIGH (ref 70–99)
GLUCOSE BLDC GLUCOMTR-MCNC: 208 MG/DL — HIGH (ref 70–99)
GLUCOSE BLDC GLUCOMTR-MCNC: 336 MG/DL — HIGH (ref 70–99)
GLUCOSE BLDC GLUCOMTR-MCNC: 345 MG/DL — HIGH (ref 70–99)
GLUCOSE SERPL-MCNC: 224 MG/DL — HIGH (ref 70–99)
HCT VFR BLD CALC: 38.7 % — LOW (ref 39–50)
HGB BLD-MCNC: 12.5 G/DL — LOW (ref 13–17)
MAGNESIUM SERPL-MCNC: 1.5 MG/DL — LOW (ref 1.6–2.6)
MCHC RBC-ENTMCNC: 28.3 PG — SIGNIFICANT CHANGE UP (ref 27–34)
MCHC RBC-ENTMCNC: 32.3 GM/DL — SIGNIFICANT CHANGE UP (ref 32–36)
MCV RBC AUTO: 87.8 FL — SIGNIFICANT CHANGE UP (ref 80–100)
METHADONE UR-MCNC: NEGATIVE — SIGNIFICANT CHANGE UP
NRBC # BLD: 0 /100 WBCS — SIGNIFICANT CHANGE UP (ref 0–0)
OPIATES UR-MCNC: NEGATIVE — SIGNIFICANT CHANGE UP
PCP SPEC-MCNC: SIGNIFICANT CHANGE UP
PCP UR-MCNC: NEGATIVE — SIGNIFICANT CHANGE UP
PHOSPHATE SERPL-MCNC: 3 MG/DL — SIGNIFICANT CHANGE UP (ref 2.5–4.5)
PLATELET # BLD AUTO: 498 K/UL — HIGH (ref 150–400)
POTASSIUM SERPL-MCNC: 4.5 MMOL/L — SIGNIFICANT CHANGE UP (ref 3.5–5.3)
POTASSIUM SERPL-SCNC: 4.5 MMOL/L — SIGNIFICANT CHANGE UP (ref 3.5–5.3)
RBC # BLD: 4.41 M/UL — SIGNIFICANT CHANGE UP (ref 4.2–5.8)
RBC # FLD: 13.3 % — SIGNIFICANT CHANGE UP (ref 10.3–14.5)
SODIUM SERPL-SCNC: 135 MMOL/L — SIGNIFICANT CHANGE UP (ref 135–145)
THC UR QL: NEGATIVE — SIGNIFICANT CHANGE UP
WBC # BLD: 8.79 K/UL — SIGNIFICANT CHANGE UP (ref 3.8–10.5)
WBC # FLD AUTO: 8.79 K/UL — SIGNIFICANT CHANGE UP (ref 3.8–10.5)

## 2021-09-20 PROCEDURE — 99233 SBSQ HOSP IP/OBS HIGH 50: CPT | Mod: GC

## 2021-09-20 RX ORDER — MAGNESIUM SULFATE 500 MG/ML
1 VIAL (ML) INJECTION ONCE
Refills: 0 | Status: COMPLETED | OUTPATIENT
Start: 2021-09-20 | End: 2021-09-20

## 2021-09-20 RX ADMIN — INSULIN GLARGINE 20 UNIT(S): 100 INJECTION, SOLUTION SUBCUTANEOUS at 08:44

## 2021-09-20 RX ADMIN — Medication 100 GRAM(S): at 08:45

## 2021-09-20 RX ADMIN — Medication 8: at 12:18

## 2021-09-20 RX ADMIN — LOSARTAN POTASSIUM 50 MILLIGRAM(S): 100 TABLET, FILM COATED ORAL at 06:07

## 2021-09-20 RX ADMIN — PIPERACILLIN AND TAZOBACTAM 200 GRAM(S): 4; .5 INJECTION, POWDER, LYOPHILIZED, FOR SOLUTION INTRAVENOUS at 17:55

## 2021-09-20 RX ADMIN — PIPERACILLIN AND TAZOBACTAM 200 GRAM(S): 4; .5 INJECTION, POWDER, LYOPHILIZED, FOR SOLUTION INTRAVENOUS at 00:00

## 2021-09-20 RX ADMIN — ATORVASTATIN CALCIUM 40 MILLIGRAM(S): 80 TABLET, FILM COATED ORAL at 21:31

## 2021-09-20 RX ADMIN — PIPERACILLIN AND TAZOBACTAM 200 GRAM(S): 4; .5 INJECTION, POWDER, LYOPHILIZED, FOR SOLUTION INTRAVENOUS at 06:07

## 2021-09-20 RX ADMIN — Medication 8: at 22:51

## 2021-09-20 RX ADMIN — Medication 4: at 00:13

## 2021-09-20 RX ADMIN — Medication 4: at 17:55

## 2021-09-20 RX ADMIN — Medication 4: at 06:23

## 2021-09-20 RX ADMIN — PIPERACILLIN AND TAZOBACTAM 200 GRAM(S): 4; .5 INJECTION, POWDER, LYOPHILIZED, FOR SOLUTION INTRAVENOUS at 12:14

## 2021-09-20 NOTE — PROCEDURE NOTE - ADDITIONAL PROCEDURE DETAILS
Pts right foot was prepped using chlorhexidine and draped in a sterile manner, no hemostasis was used. All surgeons donned sterile surgical gloves. Attention was turned to the wound on plantar surface of pts right forefoot. A sterile number #15 blade was used to debride wound down to the level of bleeding dermis, good bleeding dermis was visualized. The wound was flushed with normal saline post debridement. Next a 4x6cm Amnion-Freddy Dual Layer Amnion graft was applied to the wound bed. A sterile non-adhesive dressing was applied then a bolster dressing of sterile guaze and a layer of sterile Kerlix. A total contact cast was applied post procedure to the pts right foot. Pt tolerated procedure well without complications and had minimal blood loss during the procedure.       Graft specifications: (Implant form placed in pt binder)  Company: [employer]   Product Name: Amnion-Freddy Dual Layer Amnion graft  Product Number: [id number]  Allograft ID No: [id number]  Size: 4x6cm Patch  Donor No: [id number]  Expiration date: 10/20/2025

## 2021-09-20 NOTE — PROGRESS NOTE ADULT - SUBJECTIVE AND OBJECTIVE BOX
INTERVAL HPI/OVERNIGHT EVENTS:  As per night team, no overnight events. Patient seen and examined at bedside. Pt has no complaints. Patient denies fever, chills, HA, CP, SOB, N/V/D/C     VITALS  Vital Signs Last 24 Hrs  T(C): 36.6 (20 Sep 2021 14:34), Max: 36.7 (19 Sep 2021 20:45)  T(F): 97.8 (20 Sep 2021 14:34), Max: 98.1 (19 Sep 2021 20:45)  HR: 86 (20 Sep 2021 14:34) (79 - 86)  BP: 113/71 (20 Sep 2021 14:34) (113/71 - 142/83)  BP(mean): --  RR: 16 (20 Sep 2021 14:34) (16 - 18)  SpO2: 97% (20 Sep 2021 14:34) (97% - 98%)    CAPILLARY BLOOD GLUCOSE      POCT Blood Glucose.: 336 mg/dL (20 Sep 2021 12:15)  POCT Blood Glucose.: 182 mg/dL (20 Sep 2021 08:04)  POCT Blood Glucose.: 208 mg/dL (20 Sep 2021 06:16)  POCT Blood Glucose.: 215 mg/dL (19 Sep 2021 23:49)  POCT Blood Glucose.: 190 mg/dL (19 Sep 2021 17:33)      PHYSICAL EXAM  General: NAD, lying in bed comfortably  HEENT: NC/AT; EOMI, PERRLA, anicteric sclera; moist mucosal membranes.  Neck: supple, trachea midline  Cardiovascular: RRR, +S1/S2; NO M/R/G  Respiratory: CTA B/L; no W/R/R  Gastrointestinal: soft, NT/ND; +BSx4  Extremities: WWP; no edema or cyanosis. RLE bandaged by podiatry. Pt has picture of wound. Serosanguinous drainage, not purulent. Central punctate hole.   Vascular: 2+ radial, PT pulses B/L  Neurological: AAOx3; no focal deficits    MEDICATIONS  (STANDING):  atorvastatin 40 milliGRAM(s) Oral at bedtime  dextrose 40% Gel 15 Gram(s) Oral once  dextrose 5%. 1000 milliLiter(s) (50 mL/Hr) IV Continuous <Continuous>  dextrose 5%. 1000 milliLiter(s) (100 mL/Hr) IV Continuous <Continuous>  dextrose 50% Injectable 25 Gram(s) IV Push once  dextrose 50% Injectable 12.5 Gram(s) IV Push once  dextrose 50% Injectable 25 Gram(s) IV Push once  glucagon  Injectable 1 milliGRAM(s) IntraMuscular once  influenza   Vaccine 0.5 milliLiter(s) IntraMuscular once  insulin glargine Injectable (LANTUS) 20 Unit(s) SubCutaneous every morning  insulin lispro (ADMELOG) corrective regimen sliding scale   SubCutaneous every 6 hours  lidocaine 1% Injectable 20 milliLiter(s) Local Injection once  losartan 50 milliGRAM(s) Oral daily  piperacillin/tazobactam IVPB.. 3.375 Gram(s) IV Intermittent every 6 hours    MEDICATIONS  (PRN):      No Known Allergies      LABS                        12.5   8.79  )-----------( 498      ( 20 Sep 2021 06:25 )             38.7     09-20    135  |  101  |  23  ----------------------------<  224<H>  4.5   |  27  |  1.05    Ca    9.3      20 Sep 2021 06:25  Phos  3.0     09-20  Mg     1.5     09-20    TPro  7.8  /  Alb  3.2<L>  /  TBili  0.2  /  DBili  x   /  AST  21  /  ALT  23  /  AlkPhos  126<H>  09-19    PT/INR - ( 19 Sep 2021 07:18 )   PT: 11.6 sec;   INR: 0.97          PTT - ( 19 Sep 2021 07:18 )  PTT:34.3 sec          RADIOLOGY & ADDITIONAL TESTS: Reviewed

## 2021-09-20 NOTE — PROGRESS NOTE ADULT - PROBLEM SELECTOR PLAN 5
On atorvastatin 40mg outpatient.   - c/w atorvastatin 40mg.
On atorvastatin 40mg outpatient.   - c/w atorvastatin 40mg.

## 2021-09-20 NOTE — DISCHARGE NOTE PROVIDER - NSDCMRMEDTOKEN_GEN_ALL_CORE_FT
atorvastatin 40 mg oral tablet: 1 tab(s) orally once a day (at bedtime)  insulin glargine: 30 unit(s) subcutaneous once a day  Insulin Lispro KwikPen 100 units/mL injectable solution: 10 unit(s) injectable 3 times a day (with meals)  Jardiance 25 mg oral tablet: 1 tab(s) orally once a day (in the morning)  losartan:   metFORMIN 1000 mg oral tablet: 1 tab(s) orally 2 times a day

## 2021-09-20 NOTE — PROGRESS NOTE ADULT - PROBLEM SELECTOR PLAN 1
Pt with multiple procedures to RLE for diabetic osteomyelitis in the past and ulcers. Has 4th and 5th digits amputated and 2nd digit partially amputated as well. Has had ulcer formation starting in March and progressively worsening. Diabetes in managed with PCP (pt does not remember name)  - X ray 3 views right foot: Official read pending. 4th and 5th right partial ray amputation and digital amputation of 2nd digit, soft tissue defect sub 1st MPJ  - Is on insulin 30mg lantus in AM, with varying lispro with meals depending on finger stick, usually 10u with meals. In addition is on metformin 1000 BID, and Jardiance 25mg PO.   - s/p graft and debridement by podiatry today 9/20  - A1C 8.3  - c/w lantus 20 while inpatient, resume 30 on d/c  - per podiatry - c/w zoysn while inpatient, do not need to cont on d/c

## 2021-09-20 NOTE — PROGRESS NOTE ADULT - PROBLEM SELECTOR PROBLEM 1
Uncontrolled type 2 diabetes mellitus with foot ulcer
Uncontrolled type 2 diabetes mellitus with foot ulcer

## 2021-09-20 NOTE — PROCEDURE NOTE - NSICDXPROCEDURE_GEN_ALL_CORE_FT
PROCEDURES:  Debridement of skin and subcutaneous tissue of foot 20-Sep-2021 11:25:52  Margarita Robb  Application of graft to foot 20-Sep-2021 11:27:07  Margarita Robb

## 2021-09-20 NOTE — PROCEDURE NOTE - NSSITEPREP_SKIN_A_CORE
Sterile surgical gloves/chlorhexidine/Adherence to aseptic technique: hand hygiene prior to donning barriers (gown, gloves), don cap and mask, sterile drape over patient

## 2021-09-20 NOTE — DISCHARGE NOTE PROVIDER - HOSPITAL COURSE
#Discharge: do not delete    Patient is __ yo M/F with past medical history of _____ presented with _____, found to have _____ (one liner)    Hospital course (by problem):     Patient was discharged to: (home/[facility]/acute rehab/hospice, etc, and with what services – home health PT/RN? Home O2?)    New medications:   Changes to old medications:  Medications that were stopped:    Items to follow up as outpatient:    Physical exam at the time of discharge:       #Discharge: do not delete    Patient is 45 yo M with past medical history of presented for diabetic foot ulcer debridement and graft with podiatry.     Hospital course (by problem):     #Foot ulcer 2/2 uncontrolled diabetes: Now s/p debridement and graft placement with podiatry. Received zosyn [id number] while inpatient. Follow up with podiatry outpatient.      #Diabetes Mellitus: Continue current regimen of Lantus 30 in the morning,  metformin 1000 BID, and Jardiance 25mg PO.     #HTN: Continue Losartan 50mg qd    #HLD: Continue statin 40 qd    Patient was discharged to: home    New medications: none  Changes to old medications: none  Medications that were stopped: none    Items to follow up as outpatient: Follow up with podiatry for further monitoring     Physical exam at the time of discharge:       #Discharge: do not delete    Patient is 45 yo M with past medical history of presented for diabetic foot ulcer debridement and graft with podiatry.     Hospital course (by problem):     #Foot ulcer 2/2 uncontrolled diabetes: Now s/p debridement and graft placement with podiatry. Received zosyn [id number] while inpatient. Follow up with podiatry outpatient.      #Diabetes Mellitus: Continue current regimen of Lantus 30 in the morning,  metformin 1000 BID, and Jardiance 25mg PO.     #HTN: Continue Losartan 50mg qd    #HLD: Continue statin 40 qd    Patient was discharged to: home    New medications: none  Changes to old medications: none  Medications that were stopped: none    Items to follow up as outpatient: Follow up with podiatry for further monitoring     Physical exam at the time of discharge:  General: NAD, lying in bed comfortably  HEENT: NC/AT; EOMI, PERRLA, anicteric sclera; moist mucosal membranes.  Neck: supple, trachea midline  Cardiovascular: RRR, +S1/S2; NO M/R/G  Respiratory: CTA B/L; no W/R/R  Gastrointestinal: soft, NT/ND; +BSx4  Extremities: WWP; no edema or cyanosis. RLE bandaged by podiatry. Pt has picture of wound. Serosanguinous drainage, not purulent. Central punctate hole.   Vascular: 2+ radial, PT pulses B/L  Neurological: AAOx3; no focal deficits

## 2021-09-20 NOTE — PROGRESS NOTE ADULT - PROBLEM SELECTOR PLAN 6
F: s/p 1L in ED  E: replete PRN  N: NPO after MN  DVT: hold for surgery    dispo: F  code: FULL CODE
F: None  E: replete PRN  N: DASH/TLC  DVT: hold for surgery    dispo: [facility]  code: FULL CODE

## 2021-09-20 NOTE — PROGRESS NOTE ADULT - PROBLEM SELECTOR PLAN 3
Pt with history of HTN is on losartan but does not remember dosage. Pharmacy [facility] in [facility].  - c/w losartan 50mg qd

## 2021-09-20 NOTE — PROGRESS NOTE ADULT - PROBLEM SELECTOR PLAN 2
Pt able to ambulate >5 blocks without stopping, able to climb >2 flights of stairs. Prior smoking history but quit in 1998. No CAD history, but mother with CAD s/p CABG. No current cardiac symptoms.   - CXR clear  - EKG 9/18: NSR  - Salas score: 0.0%  - RCRI 2 points, Class III risk. 10.1% 30 day risk of MACE.  - METS > 4  - Pt is intermediate risk for intermediate risk procedure.

## 2021-09-20 NOTE — DISCHARGE NOTE PROVIDER - NSDCCPCAREPLAN_GEN_ALL_CORE_FT
PRINCIPAL DISCHARGE DIAGNOSIS  Diagnosis: Diabetic foot ulcer  Assessment and Plan of Treatment: You presented to the emergency room for debridement and graft placement for your right foot ulcer. This ulcer was likely due to uncontrolled diabetes which puts you at an increased risk of infection. The podiatry team (foot doctors) debrided your wound and placed a graft. Please follow up with podiatry outpatient for further monitring of your wound and dressing. Please follow with endocrinology for control of your diabetes.

## 2021-09-20 NOTE — PROGRESS NOTE ADULT - ASSESSMENT
Assessment: 47 yo M with Pmhx of DM type II here for right foot for wound debridement and graft placement sent in by Dr. Green. Wound debridement performed and Amnion Max graft placed bedside this morning 9/20.    Plan:   -Wound debridement and Amnion max graft application done bedside this morning 9/20 (see procedure note for more details)  -Written consent obtained   -Continue Zosyn IV abx  -Wound dressed with DSD, total contact cast ans boot will be applied later today    Discharge instructions:   -Pt should keep total contact cast clean, dry, and intact until next office visit in 1 week.   -Pt should make sure to cover cast while showering to ensure cast does not get wet  -Pt should ambulate as tolerated in specialized offloading boot, do not ambulate on total contact cast without the boot     Patient should follow up with Dr. Maximiliano Green within 1 week of discharge.    Office information:          [place] Address- [address] [place] [address] Phone: [phone]         [place] Address- [address] [address] Phone: [phone] Assessment: 45 yo M with Pmhx of DM type II here for right foot for wound debridement and graft placement sent in by Dr. Green. Wound debridement performed and Amnion Max graft placed bedside this morning 9/20.    Plan:   -Wound debridement and Amnion max graft application done bedside this morning 9/20 (see procedure note for more details)  -Written consent obtained   -Continue Zosyn IV abx  -Wound dressed with DSD, total contact cast and boot will applied today  -Pt feels more comfortable leaving tomorrow 9/21    Discharge instructions:   -Pt should keep total contact cast clean, dry, and intact until next office visit in 1 week.   -Pt should make sure to cover cast while showering to ensure cast does not get wet  -Pt should ambulate as tolerated in specialized offloading boot and cane, do not ambulate on total contact cast without the boot     Patient should follow up with Dr. Maximiliano Green within 1 week of discharge.    Office information:          [place] Address- [address], [place] [address] Phone: [phone]         [place] Address- [address] [address] Phone: [phone] Assessment: 47 yo M with Pmhx of DM type II here for right foot for wound debridement and graft placement sent in by Dr. Green. Wound debridement performed and Amnion Max graft placed bedside this morning 9/20.    Plan:   -Wound debridement and Amnion max graft application done bedside this morning 9/20 (see procedure note for more details)  -Written consent obtained   -Continue Zosyn IV abx  -Wound dressed with DSD, total contact cast and boot will applied today  -Pt feels more comfortable leaving tomorrow 9/21    Discharge instructions:   -Pt should keep total contact cast clean, dry, and intact until next office visit in 1 week.   -Pt should make sure to cover cast while showering to ensure cast does not get wet  -Pt should ambulate as tolerated in specialized offloading boot and cane, do not ambulate on total contact cast without the boot     Patient should follow up with Dr. Maximiliano Green within 1 week of discharge. ( will call pt directly to confirm pts appointment time on 9/27)    Office information:          [place] Address- [address], [place] [address] Phone: [phone]         [place] Address- [address], [place] [address] Phone: [phone]

## 2021-09-20 NOTE — PROGRESS NOTE ADULT - SUBJECTIVE AND OBJECTIVE BOX
Patient is a 46y old  Male who presents with a chief complaint of diabetic foot ulcer (19 Sep 2021 11:12)      INTERVAL HPI/ OVERNIGHT EVENTS: No overnight events. Pt seen bedside and dressing is clean dry and intact. Pt provided written consent for wound debridement and graft application bedside this morning.       LABS                        12.5   8.79  )-----------( 498      ( 20 Sep 2021 06:25 )             38.7     09-20    135  |  101  |  23  ----------------------------<  224<H>  4.5   |  27  |  1.05    Ca    9.3      20 Sep 2021 06:25  Phos  3.0     09-20  Mg     1.5     09-20    TPro  7.8  /  Alb  3.2<L>  /  TBili  0.2  /  DBili  x   /  AST  21  /  ALT  23  /  AlkPhos  126<H>  09-19    PT/INR - ( 19 Sep 2021 07:18 )   PT: 11.6 sec;   INR: 0.97     PTT - ( 19 Sep 2021 07:18 )  PTT:34.3 sec    ICU Vital Signs Last 24 Hrs  T(C): 36.5 (20 Sep 2021 06:21), Max: 36.7 (19 Sep 2021 20:45)  T(F): 97.7 (20 Sep 2021 06:21), Max: 98.1 (19 Sep 2021 20:45)  HR: 79 (20 Sep 2021 06:21) (78 - 84)  BP: 128/80 (20 Sep 2021 06:21) (128/80 - 142/83)  BP(mean): --  ABP: --  ABP(mean): --  RR: 18 (20 Sep 2021 06:21) (18 - 18)  SpO2: 98% (20 Sep 2021 06:21) (97% - 98%)    PHYSICAL EXAM  General: NAD, AA0x3  Lower Extremity Right foot Focused:  Vasc: DP/PT 2/4, CFT<3secs, WWP, no edema  Derm:  Right foot- [id number] 1cm sub 1st MPJ, granular/fibrotic base, clear drainage no malodor, -PTB, hyperkeratotic border, no erythema.   Neuro: Protective sensation diminished   MSK: 4th and 5th right partial ray amputation and digital amputation of 2nd digit    RADIOLOGY:  < from: Xray Foot AP + Lateral + Oblique, Right (09.18.21 @ 18:32) >  IMPRESSION: Postoperative changes. Soft tissue injury noted. No evidence of acute fracture or dislocation. No radiographic evidence of osteomyelitis.  --- End of Report ---  < end of copied text >    < from: Xray Chest 1 View- PORTABLE-Urgent (Xray Chest 1 View- PORTABLE-Urgent .) (09.18.21 @ 21:06) >  INTERPRETATION:  Clinical History: Preop  Frontal examination of the chest demonstrates the heart to be within normal limits in transverse diameter. No acute infiltrates. Visualized osseous structures are within normal limits.  IMPRESSION: No acute infiltrates  --- End of Report ---  < end of copied text >

## 2021-09-21 ENCOUNTER — TRANSCRIPTION ENCOUNTER (OUTPATIENT)
Age: 46
End: 2021-09-21

## 2021-09-21 VITALS
RESPIRATION RATE: 19 BRPM | OXYGEN SATURATION: 100 % | HEART RATE: 71 BPM | TEMPERATURE: 98 F | DIASTOLIC BLOOD PRESSURE: 79 MMHG | SYSTOLIC BLOOD PRESSURE: 166 MMHG

## 2021-09-21 LAB
ANION GAP SERPL CALC-SCNC: 7 MMOL/L — SIGNIFICANT CHANGE UP (ref 5–17)
BUN SERPL-MCNC: 24 MG/DL — HIGH (ref 7–23)
CALCIUM SERPL-MCNC: 9.2 MG/DL — SIGNIFICANT CHANGE UP (ref 8.4–10.5)
CHLORIDE SERPL-SCNC: 101 MMOL/L — SIGNIFICANT CHANGE UP (ref 96–108)
CO2 SERPL-SCNC: 25 MMOL/L — SIGNIFICANT CHANGE UP (ref 22–31)
CREAT SERPL-MCNC: 1.27 MG/DL — SIGNIFICANT CHANGE UP (ref 0.5–1.3)
GLUCOSE BLDC GLUCOMTR-MCNC: 250 MG/DL — HIGH (ref 70–99)
GLUCOSE SERPL-MCNC: 260 MG/DL — HIGH (ref 70–99)
HCT VFR BLD CALC: 39.3 % — SIGNIFICANT CHANGE UP (ref 39–50)
HGB BLD-MCNC: 12.6 G/DL — LOW (ref 13–17)
MAGNESIUM SERPL-MCNC: 1.5 MG/DL — LOW (ref 1.6–2.6)
MCHC RBC-ENTMCNC: 28.1 PG — SIGNIFICANT CHANGE UP (ref 27–34)
MCHC RBC-ENTMCNC: 32.1 GM/DL — SIGNIFICANT CHANGE UP (ref 32–36)
MCV RBC AUTO: 87.5 FL — SIGNIFICANT CHANGE UP (ref 80–100)
NRBC # BLD: 0 /100 WBCS — SIGNIFICANT CHANGE UP (ref 0–0)
PHOSPHATE SERPL-MCNC: 3 MG/DL — SIGNIFICANT CHANGE UP (ref 2.5–4.5)
PLATELET # BLD AUTO: 498 K/UL — HIGH (ref 150–400)
POTASSIUM SERPL-MCNC: 4.4 MMOL/L — SIGNIFICANT CHANGE UP (ref 3.5–5.3)
POTASSIUM SERPL-SCNC: 4.4 MMOL/L — SIGNIFICANT CHANGE UP (ref 3.5–5.3)
RBC # BLD: 4.49 M/UL — SIGNIFICANT CHANGE UP (ref 4.2–5.8)
RBC # FLD: 13.6 % — SIGNIFICANT CHANGE UP (ref 10.3–14.5)
SODIUM SERPL-SCNC: 133 MMOL/L — LOW (ref 135–145)
WBC # BLD: 8.94 K/UL — SIGNIFICANT CHANGE UP (ref 3.8–10.5)
WBC # FLD AUTO: 8.94 K/UL — SIGNIFICANT CHANGE UP (ref 3.8–10.5)

## 2021-09-21 PROCEDURE — 86769 SARS-COV-2 COVID-19 ANTIBODY: CPT

## 2021-09-21 PROCEDURE — 85027 COMPLETE CBC AUTOMATED: CPT

## 2021-09-21 PROCEDURE — 86901 BLOOD TYPING SEROLOGIC RH(D): CPT

## 2021-09-21 PROCEDURE — 85730 THROMBOPLASTIN TIME PARTIAL: CPT

## 2021-09-21 PROCEDURE — 86900 BLOOD TYPING SEROLOGIC ABO: CPT

## 2021-09-21 PROCEDURE — 85610 PROTHROMBIN TIME: CPT

## 2021-09-21 PROCEDURE — G0378: CPT

## 2021-09-21 PROCEDURE — 83036 HEMOGLOBIN GLYCOSYLATED A1C: CPT

## 2021-09-21 PROCEDURE — 80048 BASIC METABOLIC PNL TOTAL CA: CPT

## 2021-09-21 PROCEDURE — 36415 COLL VENOUS BLD VENIPUNCTURE: CPT

## 2021-09-21 PROCEDURE — 80053 COMPREHEN METABOLIC PANEL: CPT

## 2021-09-21 PROCEDURE — 93005 ELECTROCARDIOGRAM TRACING: CPT

## 2021-09-21 PROCEDURE — 86850 RBC ANTIBODY SCREEN: CPT

## 2021-09-21 PROCEDURE — 99285 EMERGENCY DEPT VISIT HI MDM: CPT | Mod: 25

## 2021-09-21 PROCEDURE — 82962 GLUCOSE BLOOD TEST: CPT

## 2021-09-21 PROCEDURE — 86140 C-REACTIVE PROTEIN: CPT

## 2021-09-21 PROCEDURE — 85652 RBC SED RATE AUTOMATED: CPT

## 2021-09-21 PROCEDURE — 80307 DRUG TEST PRSMV CHEM ANLYZR: CPT

## 2021-09-21 PROCEDURE — 71045 X-RAY EXAM CHEST 1 VIEW: CPT

## 2021-09-21 PROCEDURE — 87635 SARS-COV-2 COVID-19 AMP PRB: CPT

## 2021-09-21 PROCEDURE — 99239 HOSP IP/OBS DSCHRG MGMT >30: CPT | Mod: GC

## 2021-09-21 PROCEDURE — 83735 ASSAY OF MAGNESIUM: CPT

## 2021-09-21 PROCEDURE — 85025 COMPLETE CBC W/AUTO DIFF WBC: CPT

## 2021-09-21 PROCEDURE — 73630 X-RAY EXAM OF FOOT: CPT

## 2021-09-21 PROCEDURE — 84100 ASSAY OF PHOSPHORUS: CPT

## 2021-09-21 RX ADMIN — LOSARTAN POTASSIUM 50 MILLIGRAM(S): 100 TABLET, FILM COATED ORAL at 05:51

## 2021-09-21 RX ADMIN — PIPERACILLIN AND TAZOBACTAM 200 GRAM(S): 4; .5 INJECTION, POWDER, LYOPHILIZED, FOR SOLUTION INTRAVENOUS at 00:28

## 2021-09-21 RX ADMIN — PIPERACILLIN AND TAZOBACTAM 200 GRAM(S): 4; .5 INJECTION, POWDER, LYOPHILIZED, FOR SOLUTION INTRAVENOUS at 06:00

## 2021-09-21 RX ADMIN — Medication 4: at 07:51

## 2021-09-21 NOTE — DISCHARGE NOTE NURSING/CASE MANAGEMENT/SOCIAL WORK - NSDCVIVACCINE_GEN_ALL_CORE_FT
Tdap; 30-Jun-2021 18:05; Lorrie Ziegler (IVELISSE); [employer]; [id number] (Exp. Date: 25-Nov-2022); IntraMuscular; Deltoid Left.; 0.5 milliLiter(s); VIS (VIS Published: 09-May-2013, VIS Presented: 30-Jun-2021);

## 2021-09-21 NOTE — DISCHARGE NOTE NURSING/CASE MANAGEMENT/SOCIAL WORK - PATIENT PORTAL LINK FT
You can access the FollowMyHealth Patient Portal offered by Health system by registering at the following website: [email or web address]. By joining [facility]’s FollowMyHealth portal, you will also be able to view your health information using other applications (apps) compatible with our system.

## 2021-09-27 DIAGNOSIS — Z96.0 PRESENCE OF UROGENITAL IMPLANTS: ICD-10-CM

## 2021-09-27 DIAGNOSIS — Z87.891 PERSONAL HISTORY OF NICOTINE DEPENDENCE: ICD-10-CM

## 2021-09-27 DIAGNOSIS — E11.621 TYPE 2 DIABETES MELLITUS WITH FOOT ULCER: ICD-10-CM

## 2021-09-27 DIAGNOSIS — F14.19 COCAINE ABUSE WITH UNSPECIFIED COCAINE-INDUCED DISORDER: ICD-10-CM

## 2021-09-27 DIAGNOSIS — E78.5 HYPERLIPIDEMIA, UNSPECIFIED: ICD-10-CM

## 2021-09-27 DIAGNOSIS — I10 ESSENTIAL (PRIMARY) HYPERTENSION: ICD-10-CM

## 2021-09-27 DIAGNOSIS — Z89.421 ACQUIRED ABSENCE OF OTHER RIGHT TOE(S): ICD-10-CM

## 2021-09-27 DIAGNOSIS — L97.419 NON-PRESSURE CHRONIC ULCER OF RIGHT HEEL AND MIDFOOT WITH UNSPECIFIED SEVERITY: ICD-10-CM

## 2021-09-27 DIAGNOSIS — E11.65 TYPE 2 DIABETES MELLITUS WITH HYPERGLYCEMIA: ICD-10-CM

## 2021-09-27 DIAGNOSIS — Z79.4 LONG TERM (CURRENT) USE OF INSULIN: ICD-10-CM

## 2021-10-11 ENCOUNTER — INPATIENT (INPATIENT)
Facility: HOSPITAL | Age: 46
LOS: 1 days | Discharge: HOME | DRG: 639 | End: 2021-10-13
Attending: HOSPITALIST | Admitting: HOSPITALIST
Payer: COMMERCIAL

## 2021-10-11 VITALS
OXYGEN SATURATION: 98 % | WEIGHT: 220.02 LBS | HEIGHT: 72 IN | HEART RATE: 62 BPM | SYSTOLIC BLOOD PRESSURE: 116 MMHG | RESPIRATION RATE: 18 BRPM | DIASTOLIC BLOOD PRESSURE: 74 MMHG | TEMPERATURE: 98 F

## 2021-10-11 DIAGNOSIS — L97.519 NON-PRESSURE CHRONIC ULCER OF OTHER PART OF RIGHT FOOT WITH UNSPECIFIED SEVERITY: ICD-10-CM

## 2021-10-11 DIAGNOSIS — E78.5 HYPERLIPIDEMIA, UNSPECIFIED: ICD-10-CM

## 2021-10-11 DIAGNOSIS — Z94.5 SKIN TRANSPLANT STATUS: Chronic | ICD-10-CM

## 2021-10-11 DIAGNOSIS — Z89.429 ACQUIRED ABSENCE OF OTHER TOE(S), UNSPECIFIED SIDE: Chronic | ICD-10-CM

## 2021-10-11 DIAGNOSIS — Z00.00 ENCOUNTER FOR GENERAL ADULT MEDICAL EXAMINATION WITHOUT ABNORMAL FINDINGS: ICD-10-CM

## 2021-10-11 DIAGNOSIS — Z96.0 PRESENCE OF UROGENITAL IMPLANTS: Chronic | ICD-10-CM

## 2021-10-11 DIAGNOSIS — E11.9 TYPE 2 DIABETES MELLITUS WITHOUT COMPLICATIONS: ICD-10-CM

## 2021-10-11 DIAGNOSIS — I10 ESSENTIAL (PRIMARY) HYPERTENSION: ICD-10-CM

## 2021-10-11 LAB
ALBUMIN SERPL ELPH-MCNC: 3.9 G/DL — SIGNIFICANT CHANGE UP (ref 3.3–5)
ALP SERPL-CCNC: 74 U/L — SIGNIFICANT CHANGE UP (ref 40–120)
ALT FLD-CCNC: 30 U/L — SIGNIFICANT CHANGE UP (ref 10–45)
ANION GAP SERPL CALC-SCNC: 10 MMOL/L — SIGNIFICANT CHANGE UP (ref 5–17)
APTT BLD: 33.2 SEC — SIGNIFICANT CHANGE UP (ref 27.5–35.5)
AST SERPL-CCNC: 19 U/L — SIGNIFICANT CHANGE UP (ref 10–40)
BASE EXCESS BLDV CALC-SCNC: 3.6 MMOL/L — HIGH (ref -2–3)
BASOPHILS # BLD AUTO: 0.06 K/UL — SIGNIFICANT CHANGE UP (ref 0–0.2)
BASOPHILS NFR BLD AUTO: 0.5 % — SIGNIFICANT CHANGE UP (ref 0–2)
BILIRUB SERPL-MCNC: 0.6 MG/DL — SIGNIFICANT CHANGE UP (ref 0.2–1.2)
BUN SERPL-MCNC: 18 MG/DL — SIGNIFICANT CHANGE UP (ref 7–23)
CA-I SERPL-SCNC: 1.17 MMOL/L — SIGNIFICANT CHANGE UP (ref 1.15–1.33)
CALCIUM SERPL-MCNC: 9.6 MG/DL — SIGNIFICANT CHANGE UP (ref 8.4–10.5)
CHLORIDE SERPL-SCNC: 99 MMOL/L — SIGNIFICANT CHANGE UP (ref 96–108)
CK SERPL-CCNC: 119 U/L — SIGNIFICANT CHANGE UP (ref 30–200)
CO2 BLDV-SCNC: 31.7 MMOL/L — HIGH (ref 22–26)
CO2 SERPL-SCNC: 27 MMOL/L — SIGNIFICANT CHANGE UP (ref 22–31)
CREAT SERPL-MCNC: 1.09 MG/DL — SIGNIFICANT CHANGE UP (ref 0.5–1.3)
CRP SERPL-MCNC: 48.4 MG/L — HIGH (ref 0–4)
EOSINOPHIL # BLD AUTO: 0.19 K/UL — SIGNIFICANT CHANGE UP (ref 0–0.5)
EOSINOPHIL NFR BLD AUTO: 1.7 % — SIGNIFICANT CHANGE UP (ref 0–6)
ERYTHROCYTE [SEDIMENTATION RATE] IN BLOOD: 62 MM/HR — HIGH
GAS PNL BLDV: 128 MMOL/L — LOW (ref 136–145)
GAS PNL BLDV: SIGNIFICANT CHANGE UP
GLUCOSE BLDC GLUCOMTR-MCNC: 128 MG/DL — HIGH (ref 70–99)
GLUCOSE BLDC GLUCOMTR-MCNC: 134 MG/DL — HIGH (ref 70–99)
GLUCOSE SERPL-MCNC: 115 MG/DL — HIGH (ref 70–99)
GRAM STN FLD: SIGNIFICANT CHANGE UP
HCO3 BLDV-SCNC: 30 MMOL/L — HIGH (ref 22–29)
HCT VFR BLD CALC: 40 % — SIGNIFICANT CHANGE UP (ref 39–50)
HGB BLD-MCNC: 13 G/DL — SIGNIFICANT CHANGE UP (ref 13–17)
IMM GRANULOCYTES NFR BLD AUTO: 0.4 % — SIGNIFICANT CHANGE UP (ref 0–1.5)
INR BLD: 1.01 — SIGNIFICANT CHANGE UP (ref 0.88–1.16)
LACTATE SERPL-SCNC: 1.5 MMOL/L — SIGNIFICANT CHANGE UP (ref 0.5–2)
LYMPHOCYTES # BLD AUTO: 27 % — SIGNIFICANT CHANGE UP (ref 13–44)
LYMPHOCYTES # BLD AUTO: 3.04 K/UL — SIGNIFICANT CHANGE UP (ref 1–3.3)
MCHC RBC-ENTMCNC: 28.3 PG — SIGNIFICANT CHANGE UP (ref 27–34)
MCHC RBC-ENTMCNC: 32.5 GM/DL — SIGNIFICANT CHANGE UP (ref 32–36)
MCV RBC AUTO: 87 FL — SIGNIFICANT CHANGE UP (ref 80–100)
MONOCYTES # BLD AUTO: 0.85 K/UL — SIGNIFICANT CHANGE UP (ref 0–0.9)
MONOCYTES NFR BLD AUTO: 7.5 % — SIGNIFICANT CHANGE UP (ref 2–14)
NEUTROPHILS # BLD AUTO: 7.07 K/UL — SIGNIFICANT CHANGE UP (ref 1.8–7.4)
NEUTROPHILS NFR BLD AUTO: 62.9 % — SIGNIFICANT CHANGE UP (ref 43–77)
NRBC # BLD: 0 /100 WBCS — SIGNIFICANT CHANGE UP (ref 0–0)
PCO2 BLDV: 52 MMHG — SIGNIFICANT CHANGE UP (ref 42–55)
PH BLDV: 7.37 — SIGNIFICANT CHANGE UP (ref 7.32–7.43)
PLATELET # BLD AUTO: 348 K/UL — SIGNIFICANT CHANGE UP (ref 150–400)
PO2 BLDV: <35 MMHG — SIGNIFICANT CHANGE UP (ref 25–45)
POTASSIUM BLDV-SCNC: 4.4 MMOL/L — SIGNIFICANT CHANGE UP (ref 3.5–5.1)
POTASSIUM SERPL-MCNC: 4.3 MMOL/L — SIGNIFICANT CHANGE UP (ref 3.5–5.3)
POTASSIUM SERPL-SCNC: 4.3 MMOL/L — SIGNIFICANT CHANGE UP (ref 3.5–5.3)
PROT SERPL-MCNC: 8.7 G/DL — HIGH (ref 6–8.3)
PROTHROM AB SERPL-ACNC: 12.1 SEC — SIGNIFICANT CHANGE UP (ref 10.6–13.6)
RBC # BLD: 4.6 M/UL — SIGNIFICANT CHANGE UP (ref 4.2–5.8)
RBC # FLD: 13.8 % — SIGNIFICANT CHANGE UP (ref 10.3–14.5)
SAO2 % BLDV: 47.5 % — LOW (ref 67–88)
SARS-COV-2 RNA SPEC QL NAA+PROBE: NEGATIVE — SIGNIFICANT CHANGE UP
SODIUM SERPL-SCNC: 136 MMOL/L — SIGNIFICANT CHANGE UP (ref 135–145)
SPECIMEN SOURCE: SIGNIFICANT CHANGE UP
WBC # BLD: 11.26 K/UL — HIGH (ref 3.8–10.5)
WBC # FLD AUTO: 11.26 K/UL — HIGH (ref 3.8–10.5)

## 2021-10-11 PROCEDURE — 93010 ELECTROCARDIOGRAM REPORT: CPT | Mod: 59

## 2021-10-11 PROCEDURE — 71045 X-RAY EXAM CHEST 1 VIEW: CPT | Mod: 26

## 2021-10-11 PROCEDURE — 99223 1ST HOSP IP/OBS HIGH 75: CPT | Mod: GC

## 2021-10-11 PROCEDURE — 73620 X-RAY EXAM OF FOOT: CPT | Mod: 26,RT

## 2021-10-11 PROCEDURE — 99284 EMERGENCY DEPT VISIT MOD MDM: CPT

## 2021-10-11 PROCEDURE — 93971 EXTREMITY STUDY: CPT | Mod: 26,RT

## 2021-10-11 RX ORDER — INFLUENZA VIRUS VACCINE 15; 15; 15; 15 UG/.5ML; UG/.5ML; UG/.5ML; UG/.5ML
0.5 SUSPENSION INTRAMUSCULAR ONCE
Refills: 0 | Status: DISCONTINUED | OUTPATIENT
Start: 2021-10-11 | End: 2021-10-13

## 2021-10-11 RX ORDER — ACETAMINOPHEN 500 MG
650 TABLET ORAL EVERY 6 HOURS
Refills: 0 | Status: DISCONTINUED | OUTPATIENT
Start: 2021-10-11 | End: 2021-10-13

## 2021-10-11 RX ORDER — ACETAMINOPHEN 500 MG
650 TABLET ORAL EVERY 4 HOURS
Refills: 0 | Status: DISCONTINUED | OUTPATIENT
Start: 2021-10-11 | End: 2021-10-11

## 2021-10-11 RX ORDER — INSULIN LISPRO 100/ML
VIAL (ML) SUBCUTANEOUS
Refills: 0 | Status: DISCONTINUED | OUTPATIENT
Start: 2021-10-11 | End: 2021-10-13

## 2021-10-11 RX ORDER — SODIUM CHLORIDE 9 MG/ML
1000 INJECTION, SOLUTION INTRAVENOUS
Refills: 0 | Status: DISCONTINUED | OUTPATIENT
Start: 2021-10-11 | End: 2021-10-13

## 2021-10-11 RX ORDER — VANCOMYCIN HCL 1 G
1500 VIAL (EA) INTRAVENOUS EVERY 12 HOURS
Refills: 0 | Status: DISCONTINUED | OUTPATIENT
Start: 2021-10-12 | End: 2021-10-13

## 2021-10-11 RX ORDER — ATORVASTATIN CALCIUM 80 MG/1
40 TABLET, FILM COATED ORAL AT BEDTIME
Refills: 0 | Status: DISCONTINUED | OUTPATIENT
Start: 2021-10-11 | End: 2021-10-13

## 2021-10-11 RX ORDER — PIPERACILLIN AND TAZOBACTAM 4; .5 G/20ML; G/20ML
4.5 INJECTION, POWDER, LYOPHILIZED, FOR SOLUTION INTRAVENOUS EVERY 6 HOURS
Refills: 0 | Status: DISCONTINUED | OUTPATIENT
Start: 2021-10-11 | End: 2021-10-13

## 2021-10-11 RX ORDER — INSULIN GLARGINE 100 [IU]/ML
24 INJECTION, SOLUTION SUBCUTANEOUS EVERY MORNING
Refills: 0 | Status: DISCONTINUED | OUTPATIENT
Start: 2021-10-12 | End: 2021-10-13

## 2021-10-11 RX ORDER — DEXTROSE 50 % IN WATER 50 %
25 SYRINGE (ML) INTRAVENOUS ONCE
Refills: 0 | Status: DISCONTINUED | OUTPATIENT
Start: 2021-10-11 | End: 2021-10-13

## 2021-10-11 RX ORDER — GLUCAGON INJECTION, SOLUTION 0.5 MG/.1ML
1 INJECTION, SOLUTION SUBCUTANEOUS ONCE
Refills: 0 | Status: DISCONTINUED | OUTPATIENT
Start: 2021-10-11 | End: 2021-10-13

## 2021-10-11 RX ORDER — PIPERACILLIN AND TAZOBACTAM 4; .5 G/20ML; G/20ML
3.38 INJECTION, POWDER, LYOPHILIZED, FOR SOLUTION INTRAVENOUS ONCE
Refills: 0 | Status: COMPLETED | OUTPATIENT
Start: 2021-10-11 | End: 2021-10-11

## 2021-10-11 RX ORDER — DEXTROSE 50 % IN WATER 50 %
12.5 SYRINGE (ML) INTRAVENOUS ONCE
Refills: 0 | Status: DISCONTINUED | OUTPATIENT
Start: 2021-10-11 | End: 2021-10-13

## 2021-10-11 RX ORDER — ACETAMINOPHEN 500 MG
1000 TABLET ORAL EVERY 6 HOURS
Refills: 0 | Status: DISCONTINUED | OUTPATIENT
Start: 2021-10-11 | End: 2021-10-11

## 2021-10-11 RX ORDER — INSULIN LISPRO 100/ML
8 VIAL (ML) SUBCUTANEOUS
Refills: 0 | Status: DISCONTINUED | OUTPATIENT
Start: 2021-10-11 | End: 2021-10-13

## 2021-10-11 RX ORDER — DEXTROSE 50 % IN WATER 50 %
15 SYRINGE (ML) INTRAVENOUS ONCE
Refills: 0 | Status: DISCONTINUED | OUTPATIENT
Start: 2021-10-11 | End: 2021-10-13

## 2021-10-11 RX ORDER — TRAMADOL HYDROCHLORIDE 50 MG/1
75 TABLET ORAL EVERY 6 HOURS
Refills: 0 | Status: DISCONTINUED | OUTPATIENT
Start: 2021-10-11 | End: 2021-10-11

## 2021-10-11 RX ORDER — VANCOMYCIN HCL 1 G
1500 VIAL (EA) INTRAVENOUS ONCE
Refills: 0 | Status: COMPLETED | OUTPATIENT
Start: 2021-10-11 | End: 2021-10-11

## 2021-10-11 RX ORDER — KETOROLAC TROMETHAMINE 30 MG/ML
15 SYRINGE (ML) INJECTION ONCE
Refills: 0 | Status: DISCONTINUED | OUTPATIENT
Start: 2021-10-11 | End: 2021-10-11

## 2021-10-11 RX ORDER — LOSARTAN POTASSIUM 100 MG/1
50 TABLET, FILM COATED ORAL DAILY
Refills: 0 | Status: DISCONTINUED | OUTPATIENT
Start: 2021-10-11 | End: 2021-10-11

## 2021-10-11 RX ORDER — ENOXAPARIN SODIUM 100 MG/ML
40 INJECTION SUBCUTANEOUS EVERY 24 HOURS
Refills: 0 | Status: DISCONTINUED | OUTPATIENT
Start: 2021-10-11 | End: 2021-10-12

## 2021-10-11 RX ORDER — VANCOMYCIN HCL 1 G
1000 VIAL (EA) INTRAVENOUS ONCE
Refills: 0 | Status: DISCONTINUED | OUTPATIENT
Start: 2021-10-11 | End: 2021-10-11

## 2021-10-11 RX ADMIN — PIPERACILLIN AND TAZOBACTAM 200 GRAM(S): 4; .5 INJECTION, POWDER, LYOPHILIZED, FOR SOLUTION INTRAVENOUS at 13:06

## 2021-10-11 RX ADMIN — Medication 15 MILLIGRAM(S): at 22:11

## 2021-10-11 RX ADMIN — PIPERACILLIN AND TAZOBACTAM 200 GRAM(S): 4; .5 INJECTION, POWDER, LYOPHILIZED, FOR SOLUTION INTRAVENOUS at 19:40

## 2021-10-11 RX ADMIN — ATORVASTATIN CALCIUM 40 MILLIGRAM(S): 80 TABLET, FILM COATED ORAL at 22:07

## 2021-10-11 RX ADMIN — ENOXAPARIN SODIUM 40 MILLIGRAM(S): 100 INJECTION SUBCUTANEOUS at 17:25

## 2021-10-11 RX ADMIN — Medication 300 MILLIGRAM(S): at 13:39

## 2021-10-11 RX ADMIN — Medication 8 UNIT(S): at 17:25

## 2021-10-11 RX ADMIN — Medication 15 MILLIGRAM(S): at 23:00

## 2021-10-11 NOTE — H&P ADULT - PROBLEM SELECTOR PLAN 1
- Recommend US duplex of RLE to r/o DVT   - Start broad spectrum IV abx until wound cx comes back  - F/U wound cx taken in ED today   - Washed RLE with saline and applied betadine soaked gauze to plantar aspect of foot and in between the digits Hx OM s/p 3x R toe amputation procedures ([id number]), Nonhealing distal plantar foot ulcer developed in 04/2021, s/p wound debridement/graft placement, p/w worsening drainage through the cast w worsening odor. Found to have elevated inflammatory markers, XR negative for OM. Admitted for possible soft tissue infection  - Start broad spectrum IV abx until wound cx comes back  - Washed RLE with saline and applied betadine soaked gauze to plantar aspect of foot and in between the digits  - f/u US duplex of RLE to r/o DVT   - f/u wound cx taken in ED today Hx OM s/p 3x R toe amputation procedures ([id number]), Nonhealing distal plantar foot ulcer developed in 04/2021, s/p wound debridement/graft placement, p/w worsening drainage through the cast w worsening odor. Found to have elevated inflammatory markers & WBC11.26, XR negative for OM. Admitted for possible soft tissue infection  - Start broad spectrum IV abx until wound cx comes back  - Washed RLE with saline and applied betadine soaked gauze to plantar aspect of foot and in between the digits  - f/u US duplex of RLE to r/o DVT   - f/u wound cx taken in ED today  - f/u Pods recs Hx OM s/p 3x R toe amputation procedures ([id number]), Nonhealing distal plantar foot ulcer developed in 04/2021, s/p wound debridement/graft placement, p/w worsening drainage through the cast w worsening odor. Found to have elevated inflammatory markers & WBC11.26, XR negative for OM. Admitted for possible soft tissue infection  - Washed RLE with saline and applied betadine soaked gauze to plantar aspect of foot and in between the digits  - Started Vanc 1.5g q12h x2d (10/14) & Zosyn 3.375g q8h x10d (10/21)  - For mild-mod pain, PRN Tylenol 650mg q6h   - f/u US duplex of RLE to r/o DVT   - f/u wound cx taken in ED today  - f/u Pods recs

## 2021-10-11 NOTE — H&P ADULT - PROBLEM SELECTOR PLAN 5
F: None   E: Replete as necessary K>4 Mg>2  N: CC diet     DVT Prophylaxis: Lovenox 40mg daily   GI prophylaxis: None   CODE STATUS: FULL

## 2021-10-11 NOTE — ED PROVIDER NOTE - PROGRESS NOTE DETAILS
Xavifish: cast removed by podiatry, has 3x4 ulceration to plantar aspect of foot, +surrounding white skin at borders. +foul odor. 2+DP pulse. 1st toe is swollen, podiatry recommending abx/medicine admission for further care. Kraig: podiatry also recommending duplex, order placed, still pending. elevated inflammatory markers, other labs grossly wnl. will admit medicine for further care.

## 2021-10-11 NOTE — ED ADULT NURSE NOTE - NSICDXPASTSURGICALHX_GEN_ALL_CORE_FT
PAST SURGICAL HISTORY:  H/O skin graft Right foot    S/P insertion of penile implant     Toe amputation status Right foot

## 2021-10-11 NOTE — ED ADULT NURSE NOTE - NSICDXPASTMEDICALHX_GEN_ALL_CORE_FT
PAST MEDICAL HISTORY:  Diabetes     Diabetes mellitus     History of cocaine use     Hyperlipidemia, unspecified hyperlipidemia type     Hypertension     Osteomyelitis     Osteomyelitis R foot

## 2021-10-11 NOTE — ED PROVIDER NOTE - RELIEVING FACTORS
none
Infant is feeding, stooling, urinating normally.    Physical Exam:    Infant appears active, with normal color, normal  cry.    Skin is intact, no lesions. No jaundice.    Scalp is normal with open, soft, flat fontanels, normal sutures, no edema or hematoma.    Eyes with nl light reflex b/l, sclera clear, Ears symmetric, cartilage well formed, no pits or tags, Nares patent b/l, palate intact, lips and tongue normal.    Normal spontaneous respirations with no retractions, clear to auscultation b/l.    Strong, regular heart beat with no murmur, PMI normal, 2+ b/l femoral pulses. Thorax appears symmetric.    Abdomen soft, normal bowel sounds, no masses palpated, no spleen palpated, umbilicus nl with 2 art 1 vein.    Spine normal with no midline defects, anus patent.    Hips normal b/l, neg ortalani,  neg rothman    Ext normal x 4, 10 fingers 10 toes b/l. No clavicular crepitus or tenderness.    Good tone, no lethargy, normal cry, suck, grasp, leo, gag, swallow.    Genitalia normal    A/P: Patient seen and examined. Physical Exam within normal limits. Feeding ad [place]. Parents aware of plan of care. Routine care.

## 2021-10-11 NOTE — H&P ADULT - PROBLEM SELECTOR PLAN 3
F: None   E: Replete as necessary K>4 Mg>2  N: CC diet     DVT Prophylaxis: Lovenox 40mg daily   GI prophylaxis: None   CODE STATUS: FULL - c/w Losartan 50mg Qd - held Losartan 50mg Qd, as normotensive

## 2021-10-11 NOTE — H&P ADULT - ASSESSMENT
46M PMHx DM, s/p R toe amputation procedures. Presents for nonhealing distal plantar foot ulcer developed with worsening drainage concern for re-infection. Found to have elevated inflamatory markers, but XR negative for OM. Admitted for possible soft tissue infection. 46M PMHx DM, s/p R toe amputation procedures. Presents for nonhealing distal plantar foot ulcer developed with worsening drainage concern for re-infection. Found to have elevated inflammatory markers, XR negative for OM. Admitted for possible soft tissue infection.

## 2021-10-11 NOTE — H&P ADULT - NSHPPHYSICALEXAM_GEN_ALL_CORE
.  VITAL SIGNS:  T(C): 36.6 (10-11-21 @ 16:04), Max: 36.9 (10-11-21 @ 11:59)  T(F): 97.8 (10-11-21 @ 16:04), Max: 98.4 (10-11-21 @ 11:59)  HR: 76 (10-11-21 @ 16:04) (62 - 76)  BP: 121/77 (10-11-21 @ 16:04) (116/74 - 121/77)  BP(mean): --  RR: 16 (10-11-21 @ 16:04) (16 - 18)  SpO2: 97% (10-11-21 @ 16:04) (97% - 98%)  Wt(kg): --    PHYSICAL EXAM:  Constitutional: WDWN resting comfortably in bed; NAD  Head: NC/AT  Eyes: PERRL, EOMI, clear conjunctiva  ENT: no nasal discharge; uvula midline, no oropharyngeal erythema or exudates; MMM  Neck: supple; no JVD or thyromegaly  Respiratory: CTA B/L; no W/R/R, no retractions  Cardiac: +S1/S2; RRR; no M/R/G;  Gastrointestinal: soft, NT/ND; no rebound or guarding; +BSx4  Extremities: WWP, no clubbing or cyanosis; no peripheral edema. R foot in bandage, clean dry intact.  Musculoskeletal: NROM x4; no joint swelling, tenderness or erythema  Vascular: 2+ radial, femoral, DP/PT pulses B/L  Dermatologic: skin warm, dry and intact; no rashes, wounds, or scars  Neurologic: AAOx3; CNII-XII grossly intact; no focal deficits  Psychiatric: affect and characteristics of appearance, verbalizations, behaviors are appropriate .  VITAL SIGNS:  T(C): 36.6 (10-11-21 @ 16:04), Max: 36.9 (10-11-21 @ 11:59)  T(F): 97.8 (10-11-21 @ 16:04), Max: 98.4 (10-11-21 @ 11:59)  HR: 76 (10-11-21 @ 16:04) (62 - 76)  BP: 121/77 (10-11-21 @ 16:04) (116/74 - 121/77)  BP(mean): --  RR: 16 (10-11-21 @ 16:04) (16 - 18)  SpO2: 97% (10-11-21 @ 16:04) (97% - 98%)  Wt(kg): --    PHYSICAL EXAM:  Constitutional: resting comfortably in bed; NAD  Head: NC/AT  Eyes: PERRL, EOMI, clear conjunctiva  ENT: no nasal discharge; uvula midline, no oropharyngeal erythema or exudates; MMM  Neck: supple; no JVD or thyromegaly  Respiratory: CTA B/L; no W/R/R, no retractions  Cardiac: +S1/S2; RRR; no M/R/G;  Gastrointestinal: soft, NT/ND; no rebound or guarding; +BSx4  Extremities: WWP, no clubbing or cyanosis; no peripheral edema. R foot in bandage, clean dry intact.  Musculoskeletal: NROM x4; no joint swelling, tenderness or erythema  Vascular: 2+ radial, femoral, DP/PT pulses B/L  Dermatologic: skin warm, dry and intact; no rashes, wounds, or scars  Neurologic: AAOx3; CNII-XII grossly intact; no focal deficits  Psychiatric: affect and characteristics of appearance, verbalizations, behaviors are appropriate

## 2021-10-11 NOTE — H&P ADULT - NSHPLABSRESULTS_GEN_ALL_CORE
LABS:                         13.0   11.26 )-----------( 348      ( 11 Oct 2021 13:18 )             40.0     10-11    136  |  99  |  18  ----------------------------<  115<H>  4.3   |  27  |  1.09    Ca    9.6      11 Oct 2021 13:18    TPro  8.7<H>  /  Alb  3.9  /  TBili  0.6  /  DBili  x   /  AST  19  /  ALT  30  /  AlkPhos  74  10-11    PT/INR - ( 11 Oct 2021 13:18 )   PT: 12.1 sec;   INR: 1.01          PTT - ( 11 Oct 2021 13:18 )  PTT:33.2 sec    CARDIAC MARKERS ( 11 Oct 2021 13:18 )  x     / x     / 119 U/L / x     / x        Lactate, Blood: 1.5 mmol/L (10-11 @ 13:16)      RADIOLOGY, EKG & ADDITIONAL TESTS:

## 2021-10-11 NOTE — ED PROVIDER NOTE - OBJECTIVE STATEMENT
46M PMH DM, cocaine use (states he last used July 2021), HLD, HTN, OM, multiple R toe amputations, dc'd ~3w ago after admission for RLE wound debridement/graft placement, p/w concern for re-infection. ~2w ago wound to RLE re-opened, had cast replaced ~1w ago. Since then has worsening drainage through the cast as well as worsening odor. Returned to podiatry Dr. Green today who referred to ED. Pt has minimal intermittent pain to R foot. No other systemic symptoms.   Denies fatigue, decreased appetite, f/c, HA, SOB/CP, URI symptoms, NVD, abd pain, urinary complaints.

## 2021-10-11 NOTE — H&P ADULT - NSHPSOCIALHISTORY_GEN_ALL_CORE
Social smoker & alcohol, w last cocaine use on July 18 2021  Works in [employer].  Lives w parents on 1st floor apartment Social smoker & alcohol, w last cocaine use on July 18 2021  Works in building maintenance.  Lives w parents on 1st floor apartment

## 2021-10-11 NOTE — H&P ADULT - PROBLEM SELECTOR PLAN 2
Home Metformin 2x 750mg qhs & Lantus 30U AM & Lispro 30U Qd  - On Lantus 24U AM & Lispro 10U Home Metformin 2x 750mg qhs & Lantus 30U AM & Lispro 30U Qd  - On Lantus 24U AM, Lispro 10U & mISS Home Metformin 2x 750mg qhs & Lantus 30U AM & Lispro 30U Qd. 09/2021 A1c 8.3  - On Lantus 24U AM, Lispro 8U pre-meal & mISS  - f/u A1c

## 2021-10-11 NOTE — ED ADULT NURSE NOTE - OBJECTIVE STATEMENT
Pt presents to ED C/O worsening malodorous drainage from R foot weeping through cast. Seen by outpatient podiatrist to [facility] for further assessment. Pt has Hx of DM, osteomyelitis, toe amputation, wound debridement, and Amnion max graft application on 9/20. Denies N/V/Fever at home.  Podiatry at bedside removing boot, cleaning foot ulcer. RN continuing to assess.

## 2021-10-11 NOTE — H&P ADULT - ATTENDING COMMENTS
agree with assessment and plan as documented by resident.  --will continue IV abx tonight for broad coverage - no signs of osteomyelitis on imaging. likely can de-escalate to po abx tomorrow in setting of no leukocytosis/significant erythema/warmth/hemodynamically stable. will f/u with podiatry team recommendations for any further consideration of possible debridement  --will f/u with cultures  --f/u a.m. A1c

## 2021-10-11 NOTE — CONSULT NOTE ADULT - SUBJECTIVE AND OBJECTIVE BOX
Attending: Dr. David Green     Patient is a 46y old  Male who presents with a chief complaint of Malodorous wound on the R foot     HPI: 45 yo M with Pmhx of DM type II, Cocaine use (states he last used July 2021), HLD, HTN, OM, multiple R toe amputations, dc'd ~3w ago after admission for RLE wound debridement/graft placement (Amnion Max graft placed), p/w concern for re-infection. Pt states that he had a contact cast applied by his Podiatrist Dr. Green outpt. a week ago because his wound re-opened 2 weeks ago. Pt states that he started to notice increased drainage and malodor from the right foot for the past few days. He was told by his Podiatrist Dr. Green to come into the ED for a full work-up. Pt denies any pedal pain, however endorses to calf pain.     Review of systems negative except per HPI    PAST MEDICAL & SURGICAL HISTORY:  Diabetes    Osteomyelitis    Hyperlipidemia, unspecified hyperlipidemia type    Hypertension    History of cocaine use    Diabetes mellitus    Osteomyelitis  R foot    Toe amputation status  Right foot    H/O skin graft  Right foot    S/P insertion of penile implant      Home Medications:  atorvastatin 40 mg oral tablet: 1 tab(s) orally once a day (29 Sep 2021 17:07)  DAPTOmycin 500 mg intravenous injection:  intravenous  (29 Sep 2021 17:07)  gabapentin 100 mg oral capsule: 1 cap(s) orally 3 times a day (29 Sep 2021 17:07)  HumaLOG KwikPen 100 units/mL injectable solution: 10 unit(s) injectable 3 times a day (29 Sep 2021 17:07)  insulin glargine: 30 unit(s) subcutaneous once a day (19 Sep 2021 06:59)  Insulin Lispro KwikPen 100 units/mL injectable solution: 10 unit(s) injectable 3 times a day (with meals) (19 Sep 2021 06:59)  Jardiance 25 mg oral tablet: 1 tab(s) orally once a day (in the morning) (19 Sep 2021 06:59)  Jardiance 25 mg oral tablet: 1 tab(s) orally once a day (in the morning) (29 Sep 2021 17:07)  losartan:  (19 Sep 2021 06:59)  losartan 50 mg oral tablet: 1 tab(s) orally once a day (29 Sep 2021 17:07)  metFORMIN 1000 mg oral tablet: 1 tab(s) orally 2 times a day (19 Sep 2021 06:59)  metFORMIN 750 mg oral tablet, extended release: 1 tab(s) orally once a day in the evening with food (29 Sep 2021 17:07)  Soliqua 100/33 subcutaneous solution: 30 unit(s) subcutaneous once a day in AM (29 Sep 2021 17:07)    Allergies    No Known Allergies    Intolerances      FAMILY HISTORY:  FH: CAD (coronary artery disease) (Mother)      Social History:       LABS                        13.0   11.26 )-----------( 348      ( 11 Oct 2021 13:18 )             40.0     10-11    136  |  99  |  18  ----------------------------<  115<H>  4.3   |  27  |  1.09    Ca    9.6      11 Oct 2021 13:18    TPro  8.7<H>  /  Alb  3.9  /  TBili  0.6  /  DBili  x   /  AST  19  /  ALT  30  /  AlkPhos  74  10-11    PT/INR - ( 11 Oct 2021 13:18 )   PT: 12.1 sec;   INR: 1.01          PTT - ( 11 Oct 2021 13:18 )  PTT:33.2 sec    Vital Signs Last 24 Hrs  T(C): 36.9 (11 Oct 2021 11:59), Max: 36.9 (11 Oct 2021 11:59)  T(F): 98.4 (11 Oct 2021 11:59), Max: 98.4 (11 Oct 2021 11:59)  HR: 62 (11 Oct 2021 11:59) (62 - 62)  BP: 116/74 (11 Oct 2021 11:59) (116/74 - 116/74)  BP(mean): --  RR: 18 (11 Oct 2021 11:59) (18 - 18)  SpO2: 98% (11 Oct 2021 11:59) (98% - 98%)    PHYSICAL EXAM  General: NAD, AA0x3  Lower Extremity Right foot Focused:  Vasc: DP/PT 2/4, CFT<3secs, WWP, no edema  Derm:  Right foot- Submet 1 ulcer measuring 4.5cmx3.5cmx 1cm, Ulcer has granular/fibrotic base, clear drainage w/ malodor, -PTB, hyperkeratotic border, no erythema. Total Contact cast was intact however strikethrough noted on examination with malodor. Pre-ulcerative hyperkeratotic lesion on the left foot submet 1 2/2 stepping on a nail.   Neuro: Protective sensation diminished   MSK: 4th and 5th right partial ray amputation and digital amputation of 2nd digit of the R foot. +Paulino Sign       RADIOLOGY  < from: Xray Foot AP + Lateral, Right (10.11.21 @ 13:24) >  IMPRESSION:    No definite radiographic evidence for acute osteomyelitis. Post amputation changes.    < end of copied text >                       Attending: Dr. David Green     Patient is a 46y old  Male who presents with a chief complaint of Malodorous wound on the R foot     HPI: 47 yo M with Pmhx of DM type II, Cocaine use (states he last used July 2021), HLD, HTN, OM, multiple R toe amputations, dc'd ~3w ago after admission for RLE wound debridement/graft placement (Amnion Max graft placed), p/w concern for re-infection. Pt states that he had a contact cast applied by his Podiatrist Dr. Green outpt. a week ago because his wound re-opened 2 weeks ago. Pt states that he started to notice increased drainage and malodor from the right foot for the past few days. He was told by his Podiatrist Dr. Green to come into the ED for a full work-up. Pt denies any pedal pain, however endorses to calf pain.     Review of systems negative except per HPI    PAST MEDICAL & SURGICAL HISTORY:  Diabetes    Osteomyelitis    Hyperlipidemia, unspecified hyperlipidemia type    Hypertension    History of cocaine use    Diabetes mellitus    Osteomyelitis  R foot    Toe amputation status  Right foot    H/O skin graft  Right foot    S/P insertion of penile implant      Home Medications:  atorvastatin 40 mg oral tablet: 1 tab(s) orally once a day (29 Sep 2021 17:07)  DAPTOmycin 500 mg intravenous injection:  intravenous  (29 Sep 2021 17:07)  gabapentin 100 mg oral capsule: 1 cap(s) orally 3 times a day (29 Sep 2021 17:07)  HumaLOG KwikPen 100 units/mL injectable solution: 10 unit(s) injectable 3 times a day (29 Sep 2021 17:07)  insulin glargine: 30 unit(s) subcutaneous once a day (19 Sep 2021 06:59)  Insulin Lispro KwikPen 100 units/mL injectable solution: 10 unit(s) injectable 3 times a day (with meals) (19 Sep 2021 06:59)  Jardiance 25 mg oral tablet: 1 tab(s) orally once a day (in the morning) (19 Sep 2021 06:59)  Jardiance 25 mg oral tablet: 1 tab(s) orally once a day (in the morning) (29 Sep 2021 17:07)  losartan:  (19 Sep 2021 06:59)  losartan 50 mg oral tablet: 1 tab(s) orally once a day (29 Sep 2021 17:07)  metFORMIN 1000 mg oral tablet: 1 tab(s) orally 2 times a day (19 Sep 2021 06:59)  metFORMIN 750 mg oral tablet, extended release: 1 tab(s) orally once a day in the evening with food (29 Sep 2021 17:07)  Soliqua 100/33 subcutaneous solution: 30 unit(s) subcutaneous once a day in AM (29 Sep 2021 17:07)    Allergies    No Known Allergies    Intolerances      FAMILY HISTORY:  FH: CAD (coronary artery disease) (Mother)      Social History:       LABS                        13.0   11.26 )-----------( 348      ( 11 Oct 2021 13:18 )             40.0     10-11    136  |  99  |  18  ----------------------------<  115<H>  4.3   |  27  |  1.09    Ca    9.6      11 Oct 2021 13:18    TPro  8.7<H>  /  Alb  3.9  /  TBili  0.6  /  DBili  x   /  AST  19  /  ALT  30  /  AlkPhos  74  10-11    PT/INR - ( 11 Oct 2021 13:18 )   PT: 12.1 sec;   INR: 1.01          PTT - ( 11 Oct 2021 13:18 )  PTT:33.2 sec    Vital Signs Last 24 Hrs  T(C): 36.9 (11 Oct 2021 11:59), Max: 36.9 (11 Oct 2021 11:59)  T(F): 98.4 (11 Oct 2021 11:59), Max: 98.4 (11 Oct 2021 11:59)  HR: 62 (11 Oct 2021 11:59) (62 - 62)  BP: 116/74 (11 Oct 2021 11:59) (116/74 - 116/74)  BP(mean): --  RR: 18 (11 Oct 2021 11:59) (18 - 18)  SpO2: 98% (11 Oct 2021 11:59) (98% - 98%)    PHYSICAL EXAM  General: NAD, AA0x3  Lower Extremity Right foot Focused:  Vasc: DP/PT 2/4, CFT<3secs, WWP, no edema  Derm:  Right foot- Submet 1 ulcer measuring 4.5cmx3.5cmx 1cm, Ulcer has granular/fibrotic base, clear drainage w/ malodor, -PTB, hyperkeratotic border, no erythema. Total Contact cast was intact however strikethrough noted on examination with malodor. Left foot: Pre-ulcerative hyperkeratotic lesion on the left foot submet 1 2/2 stepping on a nail.   Neuro: Protective sensation diminished   MSK: 4th and 5th right partial ray amputation and digital amputation of 2nd digit of the R foot. +Paulino Sign       RADIOLOGY  < from: Xray Foot AP + Lateral, Right (10.11.21 @ 13:24) >  IMPRESSION:    No definite radiographic evidence for acute osteomyelitis. Post amputation changes.    < end of copied text >

## 2021-10-11 NOTE — ED ADULT TRIAGE NOTE - CHIEF COMPLAINT QUOTE
Pt presents to ED from podiatrist office. Pt had bedside Wound debridement and Amnion max graft application on 9/20. Pt now with malodorous drainage from foot, not visualized in triage due to dressing and walking boot. Hx of DM. Not on any abx.

## 2021-10-11 NOTE — H&P ADULT - HISTORY OF PRESENT ILLNESS
HPI: 46M PMHx DM, cocaine use (states he last used July 2021), OM, 3x R toe amputation procedures ([id number]), Nonhealing distal plantar foot ulcer developed in 04/2021, 3w ago admitted for RLE wound debridement/graft placement, 2w ago wound to RLE re-opened, had cast replaced, 1w ago worsening drainage through the cast as well as worsening odor. Returned to podiatry Dr. Green today who referred to ED, w c/f re-infection. Pt has minimal intermittent pain to R foot. No other systemic symptoms. Denies fatigue, decreased appetite, f/c, HA, SOB/CP, URI symptoms, NVD, abd pain, urinary complaints.    In the ED,  VS: T 98.4F, HR 62, /74, RR 18, SpO2 98% RA  Labs: WBC 11.26, CRP 48.4, Prot 8.7  XR foot: No evidence of OM  Orders: Vanc 1.5g, Zosyn 3.375g HPI: 46M PMHx DM, HTN, HLD, cocaine use (states he last used July 2021), OM, 3x R toe amputation procedures ([id number]), Nonhealing distal plantar foot ulcer developed in 04/2021, 3w ago admitted for RLE wound debridement/graft placement, 2w ago wound to RLE re-opened, had cast replaced, 1w ago worsening drainage through the cast as well as worsening odor. Returned to podiatry Dr. Green today who referred to ED, w c/f re-infection. Pt has minimal intermittent pain to R foot. No other systemic symptoms. Denies fatigue, decreased appetite, f/c, HA, SOB/CP, URI symptoms, NVD, abd pain, urinary complaints.    In the ED,  VS: T 98.4F, HR 62, /74, RR 18, SpO2 98% RA  Labs: WBC 11.26, CRP 48.4, Prot 8.7  XR foot: No evidence of OM  Orders: Vanc 1.5g, Zosyn 3.375g

## 2021-10-11 NOTE — ED PROVIDER NOTE - CLINICAL SUMMARY MEDICAL DECISION MAKING FREE TEXT BOX
46M PMH DM, cocaine use (states he last used July 2021), HLD, HTN, OM, multiple R toe amputations, dc'd ~3w ago after admission for RLE wound debridement/graft placement, p/w concern for re-infection. ~2w ago wound to RLE re-opened, had cast replaced ~1w ago. Since then has worsening drainage through the cast as well as worsening odor. Returned to podiatry Dr. Green today who referred to ED. Pt has minimal intermittent pain to R foot. No other systemic symptoms. Vitals wnl, exam as above.  ddx: Unable to examine, but concern for infection  Labs, podiatry consulted, XR, reassess.

## 2021-10-11 NOTE — ED PROVIDER NOTE - RATE
To Dr Fortino Goetz to please review sub therapeutic INR, broderick out of office today but back tomorrow 86

## 2021-10-11 NOTE — CONSULT NOTE ADULT - ASSESSMENT
45 yo M with Pmhx of DM type II, Cocaine use (states he last used July 2021), HLD, HTN, OM, multiple R toe amputations, dc'd ~3w ago after admission for RLE wound debridement/graft placement (Amnion Max graft placed), p/w concern for re-infection. Xrays show no definitive radiographic evidence for acute OM.     Plan:  - Recommend US duplex of RLE to r/o DVT   - Recommend admit to Medicine  - Start broad spectrum IV abx until wound cx comes back  - F/U wound cx taken in ED today   - Washed RLE with saline and applied betadine soaked gauze to plantar aspect of foot and in between the digits  - Rest of plan per primary team    Podiatry Following

## 2021-10-12 DIAGNOSIS — R63.8 OTHER SYMPTOMS AND SIGNS CONCERNING FOOD AND FLUID INTAKE: ICD-10-CM

## 2021-10-12 DIAGNOSIS — L97.519 NON-PRESSURE CHRONIC ULCER OF OTHER PART OF RIGHT FOOT WITH UNSPECIFIED SEVERITY: ICD-10-CM

## 2021-10-12 DIAGNOSIS — Z01.818 ENCOUNTER FOR OTHER PREPROCEDURAL EXAMINATION: ICD-10-CM

## 2021-10-12 LAB
A1C WITH ESTIMATED AVERAGE GLUCOSE RESULT: 8.6 % — HIGH (ref 4–5.6)
ANION GAP SERPL CALC-SCNC: 9 MMOL/L — SIGNIFICANT CHANGE UP (ref 5–17)
APTT BLD: 34 SEC — SIGNIFICANT CHANGE UP (ref 27.5–35.5)
BASOPHILS # BLD AUTO: 0.07 K/UL — SIGNIFICANT CHANGE UP (ref 0–0.2)
BASOPHILS NFR BLD AUTO: 1.2 % — SIGNIFICANT CHANGE UP (ref 0–2)
BLD GP AB SCN SERPL QL: NEGATIVE — SIGNIFICANT CHANGE UP
BUN SERPL-MCNC: 22 MG/DL — SIGNIFICANT CHANGE UP (ref 7–23)
CALCIUM SERPL-MCNC: 9.3 MG/DL — SIGNIFICANT CHANGE UP (ref 8.4–10.5)
CHLORIDE SERPL-SCNC: 102 MMOL/L — SIGNIFICANT CHANGE UP (ref 96–108)
CO2 SERPL-SCNC: 27 MMOL/L — SIGNIFICANT CHANGE UP (ref 22–31)
COVID-19 SPIKE DOMAIN AB INTERP: POSITIVE
COVID-19 SPIKE DOMAIN ANTIBODY RESULT: >250 U/ML — HIGH
CREAT SERPL-MCNC: 1.35 MG/DL — HIGH (ref 0.5–1.3)
EOSINOPHIL # BLD AUTO: 0.33 K/UL — SIGNIFICANT CHANGE UP (ref 0–0.5)
EOSINOPHIL NFR BLD AUTO: 5.9 % — SIGNIFICANT CHANGE UP (ref 0–6)
ESTIMATED AVERAGE GLUCOSE: 200 MG/DL — HIGH (ref 68–114)
GLUCOSE BLDC GLUCOMTR-MCNC: 120 MG/DL — HIGH (ref 70–99)
GLUCOSE BLDC GLUCOMTR-MCNC: 130 MG/DL — HIGH (ref 70–99)
GLUCOSE BLDC GLUCOMTR-MCNC: 144 MG/DL — HIGH (ref 70–99)
GLUCOSE BLDC GLUCOMTR-MCNC: 149 MG/DL — HIGH (ref 70–99)
GLUCOSE SERPL-MCNC: 163 MG/DL — HIGH (ref 70–99)
HCT VFR BLD CALC: 38.1 % — LOW (ref 39–50)
HGB BLD-MCNC: 12.4 G/DL — LOW (ref 13–17)
IMM GRANULOCYTES NFR BLD AUTO: 0.4 % — SIGNIFICANT CHANGE UP (ref 0–1.5)
INR BLD: 1 — SIGNIFICANT CHANGE UP (ref 0.88–1.16)
LYMPHOCYTES # BLD AUTO: 2.22 K/UL — SIGNIFICANT CHANGE UP (ref 1–3.3)
LYMPHOCYTES # BLD AUTO: 39.4 % — SIGNIFICANT CHANGE UP (ref 13–44)
MAGNESIUM SERPL-MCNC: 1.8 MG/DL — SIGNIFICANT CHANGE UP (ref 1.6–2.6)
MCHC RBC-ENTMCNC: 28.3 PG — SIGNIFICANT CHANGE UP (ref 27–34)
MCHC RBC-ENTMCNC: 32.5 GM/DL — SIGNIFICANT CHANGE UP (ref 32–36)
MCV RBC AUTO: 87 FL — SIGNIFICANT CHANGE UP (ref 80–100)
MONOCYTES # BLD AUTO: 0.58 K/UL — SIGNIFICANT CHANGE UP (ref 0–0.9)
MONOCYTES NFR BLD AUTO: 10.3 % — SIGNIFICANT CHANGE UP (ref 2–14)
NEUTROPHILS # BLD AUTO: 2.41 K/UL — SIGNIFICANT CHANGE UP (ref 1.8–7.4)
NEUTROPHILS NFR BLD AUTO: 42.8 % — LOW (ref 43–77)
NRBC # BLD: 0 /100 WBCS — SIGNIFICANT CHANGE UP (ref 0–0)
PLATELET # BLD AUTO: 351 K/UL — SIGNIFICANT CHANGE UP (ref 150–400)
POTASSIUM SERPL-MCNC: 4.5 MMOL/L — SIGNIFICANT CHANGE UP (ref 3.5–5.3)
POTASSIUM SERPL-SCNC: 4.5 MMOL/L — SIGNIFICANT CHANGE UP (ref 3.5–5.3)
PROTHROM AB SERPL-ACNC: 12 SEC — SIGNIFICANT CHANGE UP (ref 10.6–13.6)
RBC # BLD: 4.38 M/UL — SIGNIFICANT CHANGE UP (ref 4.2–5.8)
RBC # FLD: 13.7 % — SIGNIFICANT CHANGE UP (ref 10.3–14.5)
RH IG SCN BLD-IMP: POSITIVE — SIGNIFICANT CHANGE UP
SARS-COV-2 IGG+IGM SERPL QL IA: >250 U/ML — HIGH
SARS-COV-2 IGG+IGM SERPL QL IA: POSITIVE
SARS-COV-2 RNA SPEC QL NAA+PROBE: SIGNIFICANT CHANGE UP
SODIUM SERPL-SCNC: 138 MMOL/L — SIGNIFICANT CHANGE UP (ref 135–145)
WBC # BLD: 5.63 K/UL — SIGNIFICANT CHANGE UP (ref 3.8–10.5)
WBC # FLD AUTO: 5.63 K/UL — SIGNIFICANT CHANGE UP (ref 3.8–10.5)

## 2021-10-12 PROCEDURE — 99233 SBSQ HOSP IP/OBS HIGH 50: CPT | Mod: GC

## 2021-10-12 RX ORDER — ENOXAPARIN SODIUM 100 MG/ML
40 INJECTION SUBCUTANEOUS ONCE
Refills: 0 | Status: DISCONTINUED | OUTPATIENT
Start: 2021-10-12 | End: 2021-10-12

## 2021-10-12 RX ORDER — ENOXAPARIN SODIUM 100 MG/ML
40 INJECTION SUBCUTANEOUS EVERY 24 HOURS
Refills: 0 | Status: DISCONTINUED | OUTPATIENT
Start: 2021-10-12 | End: 2021-10-13

## 2021-10-12 RX ADMIN — PIPERACILLIN AND TAZOBACTAM 200 GRAM(S): 4; .5 INJECTION, POWDER, LYOPHILIZED, FOR SOLUTION INTRAVENOUS at 06:21

## 2021-10-12 RX ADMIN — PIPERACILLIN AND TAZOBACTAM 200 GRAM(S): 4; .5 INJECTION, POWDER, LYOPHILIZED, FOR SOLUTION INTRAVENOUS at 19:19

## 2021-10-12 RX ADMIN — Medication 300 MILLIGRAM(S): at 00:20

## 2021-10-12 RX ADMIN — Medication 8 UNIT(S): at 09:16

## 2021-10-12 RX ADMIN — ATORVASTATIN CALCIUM 40 MILLIGRAM(S): 80 TABLET, FILM COATED ORAL at 21:51

## 2021-10-12 RX ADMIN — ENOXAPARIN SODIUM 40 MILLIGRAM(S): 100 INJECTION SUBCUTANEOUS at 17:37

## 2021-10-12 RX ADMIN — INSULIN GLARGINE 24 UNIT(S): 100 INJECTION, SOLUTION SUBCUTANEOUS at 09:17

## 2021-10-12 RX ADMIN — PIPERACILLIN AND TAZOBACTAM 200 GRAM(S): 4; .5 INJECTION, POWDER, LYOPHILIZED, FOR SOLUTION INTRAVENOUS at 13:09

## 2021-10-12 RX ADMIN — Medication 300 MILLIGRAM(S): at 14:37

## 2021-10-12 RX ADMIN — Medication 8 UNIT(S): at 17:38

## 2021-10-12 RX ADMIN — PIPERACILLIN AND TAZOBACTAM 200 GRAM(S): 4; .5 INJECTION, POWDER, LYOPHILIZED, FOR SOLUTION INTRAVENOUS at 00:19

## 2021-10-12 NOTE — PROGRESS NOTE ADULT - PROBLEM SELECTOR PLAN 1
- D/C Vancomycin and Zosyn  - Start PO Bactrim and Keflex   - PRN Tylenol 650mg q6h for mod/mild pain  -US duplex of RLE negative for DVT  - Wound culture negative for growth at 12 hrs  - f/ podiatry recs Concern for osteomyelitis in the ED  Patient recently admitted 3 weeks ago for RLE wound and graft placement, wound reoopend 2 weeks ago and now with drainage and foul oder  Sees Dr. Green for podiatry  In ED patient started on vanco/zosyn, esr and crp were elevated  XR was negative for osteo   Cultures negative at 12 hours  Blood cultures ngtd   Patient with stable VS, mild leukocytosis, which improved s/p one day of antibiotics   - C/W Vancomycin and Zosyn  - f/u vanc trough at 3pm 10/13   - consider switch to PO Bactrim and Keflex at discharge   - PRN Tylenol 650mg q6h for mod/mild pain  - f/u cultures  - f/u podiatry recs  - patient for surgery with possible graft again per podiatry today Concern for osteomyelitis in the ED  Patient recently admitted 3 weeks ago for RLE wound and graft placement, wound reopend 2 weeks ago and now with drainage and foul oder  Sees Dr. Green for podiatry  In ED patient started on vanco/zosyn, esr and crp were elevated, XR was negative for osteo   Cultures negative at 12 hours, Blood cultures ngtd   Patient with stable VS, mild leukocytosis, which improved s/p one day of antibiotics   - Podiatry with little concern for infection- to repeat possible graft again per podiatry   - C/W Vancomycin and Zosyn for, if not concerned for infection anymore will discontinue   - f/u vanc trough at 3pm 10/13   - consider switch to PO Bactrim and Keflex at discharge   - PRN Tylenol 650mg q6h for mod/mild pain  - f/u cultures  - f/u podiatry recs

## 2021-10-12 NOTE — PROGRESS NOTE ADULT - SUBJECTIVE AND OBJECTIVE BOX
Subjective: ANDREW ON. Pt seen and examined at bedside. Pt states that he is currently not in any pain.     ROS:   Denies Headache, blurred vision, Chest Pain, SOB, Abdominal pain, nausea or vomiting     Social   piperacillin/tazobactam IVPB.. 4.5  vancomycin  IVPB 1500  enoxaparin Injectable 40  piperacillin/tazobactam IVPB.. 4.5  vancomycin  IVPB 1500      Allergies    No Known Allergies    Intolerances        Vital Signs Last 24 Hrs  T(C): 36.3 (12 Oct 2021 06:35), Max: 36.9 (11 Oct 2021 11:59)  T(F): 97.4 (12 Oct 2021 06:35), Max: 98.4 (11 Oct 2021 11:59)  HR: 69 (12 Oct 2021 06:35) (62 - 77)  BP: 145/79 (12 Oct 2021 06:35) (116/74 - 145/79)  BP(mean): --  RR: 18 (12 Oct 2021 06:35) (16 - 18)  SpO2: 98% (12 Oct 2021 06:35) (97% - 98%)  I&O's Summary      Physical Exam:  General: NAD, AA0x3  Lower Extremity Right foot Focused:  Vasc: DP/PT 2/4, CFT<3secs, WWP, no edema  Derm:  Right foot- Submet 1 ulcer measuring 4.5cmx3.5cmx 1cm, Ulcer has granular/fibrotic base, clear drainage w/ malodor, -PTB, hyperkeratotic border, no erythema. Total Contact cast was intact however strikethrough noted on examination with malodor. Left foot: Pre-ulcerative hyperkeratotic lesion on the left foot submet 1 2/2 stepping on a nail.   Neuro: Protective sensation diminished   MSK: 4th and 5th right partial ray amputation and digital amputation of 2nd digit of the R foot. +Paulino Sign       LABS:                        13.0   11.26 )-----------( 348      ( 11 Oct 2021 13:18 )             40.0     10-11    136  |  99  |  18  ----------------------------<  115<H>  4.3   |  27  |  1.09    Ca    9.6      11 Oct 2021 13:18    TPro  8.7<H>  /  Alb  3.9  /  TBili  0.6  /  DBili  x   /  AST  19  /  ALT  30  /  AlkPhos  74  10-11    PT/INR - ( 11 Oct 2021 13:18 )   PT: 12.1 sec;   INR: 1.01          PTT - ( 11 Oct 2021 13:18 )  PTT:33.2 sec    Radiology and Additional Studies:

## 2021-10-12 NOTE — PROGRESS NOTE ADULT - SUBJECTIVE AND OBJECTIVE BOX
INTERVAL HPI/OVERNIGHT EVENTS:  O/N:  This morning: Patient was seen and examined at bedside.      VITAL SIGNS:  T(F): 97.4 (10-12-21 @ 06:35)  HR: 69 (10-12-21 @ 06:35)  BP: 145/79 (10-12-21 @ 06:35)  RR: 18 (10-12-21 @ 06:35)  SpO2: 98% (10-12-21 @ 06:35)  Wt(kg): --    PHYSICAL EXAM:    Constitutional: NAD  HEENT: PERRL, EOMI, sclera non-icteric, neck supple, trachea midline, no masses, no JVD, MMM, good dentition  Respiratory: CTA b/l, good air entry b/l, no wheezing, no rhonchi, no rales, without accessory muscle use and no intercostal retractions  Cardiovascular: RRR, normal S1S2, no M/R/G  Gastrointestinal: abdomen soft, NTND, no masses palpable, BS normal  Extremities: Warm, well perfused, pulses equal bilateral upper and lower extremities, no edema, no clubbing  Neurological: AAOx3, CN Grossly intact  Skin: Normal temperature, warm, dry    MEDICATIONS  (STANDING):  atorvastatin 40 milliGRAM(s) Oral at bedtime  dextrose 40% Gel 15 Gram(s) Oral once  dextrose 5%. 1000 milliLiter(s) (50 mL/Hr) IV Continuous <Continuous>  dextrose 5%. 1000 milliLiter(s) (100 mL/Hr) IV Continuous <Continuous>  dextrose 50% Injectable 25 Gram(s) IV Push once  dextrose 50% Injectable 12.5 Gram(s) IV Push once  dextrose 50% Injectable 25 Gram(s) IV Push once  enoxaparin Injectable 40 milliGRAM(s) SubCutaneous every 24 hours  glucagon  Injectable 1 milliGRAM(s) IntraMuscular once  influenza   Vaccine 0.5 milliLiter(s) IntraMuscular once  insulin glargine Injectable (LANTUS) 24 Unit(s) SubCutaneous every morning  insulin lispro (ADMELOG) corrective regimen sliding scale   SubCutaneous Before meals and at bedtime  insulin lispro Injectable (ADMELOG) 8 Unit(s) SubCutaneous three times a day before meals  piperacillin/tazobactam IVPB.. 4.5 Gram(s) IV Intermittent every 6 hours  vancomycin  IVPB 1500 milliGRAM(s) IV Intermittent every 12 hours    MEDICATIONS  (PRN):  acetaminophen   Tablet .. 650 milliGRAM(s) Oral every 6 hours PRN Temp greater or equal to 38C (100.4F), Mild Pain (1 - 3), Moderate Pain (4 - 6)      Allergies    No Known Allergies    Intolerances        LABS:                        12.4   5.63  )-----------( 351      ( 12 Oct 2021 08:18 )             38.1     10-12    138  |  102  |  22  ----------------------------<  163<H>  4.5   |  27  |  1.35<H>    Ca    9.3      12 Oct 2021 08:18  Mg     1.8     10-12    TPro  8.7<H>  /  Alb  3.9  /  TBili  0.6  /  DBili  x   /  AST  19  /  ALT  30  /  AlkPhos  74  10-11    PT/INR - ( 11 Oct 2021 13:18 )   PT: 12.1 sec;   INR: 1.01          PTT - ( 11 Oct 2021 13:18 )  PTT:33.2 sec    Culture - Other (collected 10-11-21 @ 13:55)  Source: .Other r foot Submet 1 ulcer  Gram Stain (10-11-21 @ 17:20):    No organisms seen    No WBC's seen.    Culture - Blood (collected 10-11-21 @ 13:50)  Source: .Blood Blood-Peripheral  Preliminary Report (10-12-21 @ 02:00):    No growth at 12 hours    Culture - Blood (collected 10-11-21 @ 13:50)  Source: .Blood Blood-Peripheral  Preliminary Report (10-12-21 @ 02:00):    No growth at 12 hours        RADIOLOGY & ADDITIONAL TESTS:  Reviewed INTERVAL HPI/OVERNIGHT EVENTS:  O/N: No acute events or distress overnight  This morning: Patient was seen and examined at bedside.      VITAL SIGNS:  T(F): 97.4 (10-12-21 @ 06:35)  HR: 69 (10-12-21 @ 06:35)  BP: 145/79 (10-12-21 @ 06:35)  RR: 18 (10-12-21 @ 06:35)  SpO2: 98% (10-12-21 @ 06:35)  Wt(kg): --    PHYSICAL EXAM:    Constitutional: NAD  HEENT: PERRL, EOMI, sclera non-icteric, neck supple, trachea midline, no masses, no JVD, MMM, good dentition, no erythema, no discharge, no lymphadenopathy   Respiratory: CTA b/l, good air entry b/l, no wheezing, no rhonchi, no rales, without accessory muscle use and no intercostal retractions  Cardiovascular: RRR, normal S1and S2 sounds, no M/R/G  Gastrointestinal: abdomen soft, NTND, no masses palpable, BS normal  Extremities: Warm, well perfused, pulses equal bilateral upper and lower extremities, no edema, no clubbing, no cyanosis or pallor. No erythema or drainage at site of right foot ulcer  Neurological: AAOx3, CN Grossly intact  Skin: Normal temperature, warm, dry    MEDICATIONS  (STANDING):  atorvastatin 40 milliGRAM(s) Oral at bedtime  dextrose 40% Gel 15 Gram(s) Oral once  dextrose 5%. 1000 milliLiter(s) (50 mL/Hr) IV Continuous <Continuous>  dextrose 5%. 1000 milliLiter(s) (100 mL/Hr) IV Continuous <Continuous>  dextrose 50% Injectable 25 Gram(s) IV Push once  dextrose 50% Injectable 12.5 Gram(s) IV Push once  dextrose 50% Injectable 25 Gram(s) IV Push once  enoxaparin Injectable 40 milliGRAM(s) SubCutaneous every 24 hours  glucagon  Injectable 1 milliGRAM(s) IntraMuscular once  influenza   Vaccine 0.5 milliLiter(s) IntraMuscular once  insulin glargine Injectable (LANTUS) 24 Unit(s) SubCutaneous every morning  insulin lispro (ADMELOG) corrective regimen sliding scale   SubCutaneous Before meals and at bedtime  insulin lispro Injectable (ADMELOG) 8 Unit(s) SubCutaneous three times a day before meals  piperacillin/tazobactam IVPB.. 4.5 Gram(s) IV Intermittent every 6 hours  vancomycin  IVPB 1500 milliGRAM(s) IV Intermittent every 12 hours    MEDICATIONS  (PRN):  acetaminophen   Tablet .. 650 milliGRAM(s) Oral every 6 hours PRN Temp greater or equal to 38C (100.4F), Mild Pain (1 - 3), Moderate Pain (4 - 6)      Allergies    No Known Allergies    Intolerances        LABS:                        12.4   5.63  )-----------( 351      ( 12 Oct 2021 08:18 )             38.1     10-12    138  |  102  |  22  ----------------------------<  163<H>  4.5   |  27  |  1.35<H>    Ca    9.3      12 Oct 2021 08:18  Mg     1.8     10-12    TPro  8.7<H>  /  Alb  3.9  /  TBili  0.6  /  DBili  x   /  AST  19  /  ALT  30  /  AlkPhos  74  10-11    PT/INR - ( 11 Oct 2021 13:18 )   PT: 12.1 sec;   INR: 1.01          PTT - ( 11 Oct 2021 13:18 )  PTT:33.2 sec    Culture - Other (collected 10-11-21 @ 13:55)  Source: .Other r foot Submet 1 ulcer  Gram Stain (10-11-21 @ 17:20):    No organisms seen    No WBC's seen.    Culture - Blood (collected 10-11-21 @ 13:50)  Source: .Blood Blood-Peripheral  Preliminary Report (10-12-21 @ 02:00):    No growth at 12 hours    Culture - Blood (collected 10-11-21 @ 13:50)  Source: .Blood Blood-Peripheral  Preliminary Report (10-12-21 @ 02:00):    No growth at 12 hours        RADIOLOGY & ADDITIONAL TESTS:  Reviewed INTERVAL HPI/OVERNIGHT EVENTS:  O/N: No acute events or distress overnight    This morning: Patient was seen and examined at bedside. Still complains of pain of the right foot and anterior left leg pain     VITAL SIGNS:  T(F): 97.4 (10-12-21 @ 06:35)  HR: 69 (10-12-21 @ 06:35)  BP: 145/79 (10-12-21 @ 06:35)  RR: 18 (10-12-21 @ 06:35)  SpO2: 98% (10-12-21 @ 06:35)    PHYSICAL EXAM:    Constitutional: NAD  HEENT: PERRL, EOMI, sclera non-icteric, neck supple, trachea midline, no masses, no JVD, MMM, good dentition, no erythema, no discharge, no lymphadenopathy   Respiratory: CTA b/l, good air entry b/l, no wheezing, no rhonchi, no rales, without accessory muscle use and no intercostal retractions  Cardiovascular: RRR, normal S1and S2 sounds, no M/R/G  Gastrointestinal: abdomen soft, NTND, no masses palpable, BS normal  Extremities: Warm, well perfused, pulses equal bilateral upper and lower extremities, no edema, no clubbing, no cyanosis or pallor. No erythema or drainage at site of right foot ulcer, b/l lower leg hair loss, decreased sensation on the soles of his feet b/l   Neurological: AAOx3, CN Grossly intact  Skin: Normal temperature, warm, dry, large right foot ulcer     MEDICATIONS  (STANDING):  atorvastatin 40 milliGRAM(s) Oral at bedtime  dextrose 40% Gel 15 Gram(s) Oral once  dextrose 5%. 1000 milliLiter(s) (50 mL/Hr) IV Continuous <Continuous>  dextrose 5%. 1000 milliLiter(s) (100 mL/Hr) IV Continuous <Continuous>  dextrose 50% Injectable 25 Gram(s) IV Push once  dextrose 50% Injectable 12.5 Gram(s) IV Push once  dextrose 50% Injectable 25 Gram(s) IV Push once  enoxaparin Injectable 40 milliGRAM(s) SubCutaneous every 24 hours  glucagon  Injectable 1 milliGRAM(s) IntraMuscular once  influenza   Vaccine 0.5 milliLiter(s) IntraMuscular once  insulin glargine Injectable (LANTUS) 24 Unit(s) SubCutaneous every morning  insulin lispro (ADMELOG) corrective regimen sliding scale   SubCutaneous Before meals and at bedtime  insulin lispro Injectable (ADMELOG) 8 Unit(s) SubCutaneous three times a day before meals  piperacillin/tazobactam IVPB.. 4.5 Gram(s) IV Intermittent every 6 hours  vancomycin  IVPB 1500 milliGRAM(s) IV Intermittent every 12 hours    MEDICATIONS  (PRN):  acetaminophen   Tablet .. 650 milliGRAM(s) Oral every 6 hours PRN Temp greater or equal to 38C (100.4F), Mild Pain (1 - 3), Moderate Pain (4 - 6)      Allergies    No Known Allergies    Intolerances    LABS:                        12.4   5.63  )-----------( 351      ( 12 Oct 2021 08:18 )             38.1     10-12    138  |  102  |  22  ----------------------------<  163<H>  4.5   |  27  |  1.35<H>    Ca    9.3      12 Oct 2021 08:18  Mg     1.8     10-12    TPro  8.7<H>  /  Alb  3.9  /  TBili  0.6  /  DBili  x   /  AST  19  /  ALT  30  /  AlkPhos  74  10-11    PT/INR - ( 11 Oct 2021 13:18 )   PT: 12.1 sec;   INR: 1.01       PTT - ( 11 Oct 2021 13:18 )  PTT:33.2 sec    Culture - Other (collected 10-11-21 @ 13:55)  Source: .Other r foot Submet 1 ulcer  Gram Stain (10-11-21 @ 17:20):    No organisms seen    No WBC's seen.    Culture - Blood (collected 10-11-21 @ 13:50)  Source: .Blood Blood-Peripheral  Preliminary Report (10-12-21 @ 02:00):    No growth at 12 hours    Culture - Blood (collected 10-11-21 @ 13:50)  Source: .Blood Blood-Peripheral  Preliminary Report (10-12-21 @ 02:00):    No growth at 12 hours    RADIOLOGY & ADDITIONAL TESTS:  Reviewed

## 2021-10-12 NOTE — PROGRESS NOTE ADULT - PROBLEM SELECTOR PLAN 6
F: None   E: Replete as necessary K>4 Mg>2  N: NPO for procedure   DVT Prophylaxis: Lovenox 40mg daily   GI prophylaxis: None   CODE STATUS: FULL

## 2021-10-12 NOTE — PROGRESS NOTE ADULT - ATTENDING COMMENTS
-patient with slight CHRIST today likely 2/2 vancomycin - plan for OR add on as per podiatry team.   -cultures no growth so likely can be discharged today off of po abx after OR   -A1c 8.6 (last 8.3 in september) - will need outpatient for further optimization of diabetes ; will continue home regimen upon discharge for now

## 2021-10-12 NOTE — PROGRESS NOTE ADULT - ASSESSMENT
47 yo M with Pmhx of DM type II, Cocaine use (states he last used July 2021), HLD, HTN, OM, multiple R toe amputations, dc'd ~3w ago after admission for RLE wound debridement/graft placement (Amnion Max graft placed), p/w concern for re-infection. Xrays show no definitive radiographic evidence for acute OM. US Duplex negative for DVT of R foot. No organisms seen on deep wound cx.     Plan:  - C/W broad spectrum IV abx until wound cx comes back  - F/U wound cx   - Washed RLE with saline and applied betadine soaked gauze to plantar aspect of foot and in between the digits  - Rest of plan per primary team    Podiatry Following 
46M PMHx DM, HTN, HLD and cocaine use presented to the ED with complaint of drainage and foul smelling discharge from a nonhealing diabetic right foot ulcer. Pt being treated with Vancomycin and Zosyn for possible soft tissue infection. Inflammatory markers are elevated. X-ray preformed in ED was negative for OM.

## 2021-10-12 NOTE — PROGRESS NOTE ADULT - PROBLEM SELECTOR PLAN 5
Doesn't complain of any anginal equivalents Doesn't complain of any anginal equivalents  Good exercise intolerance   EKG with NSR, some T wave inversions in III and AVF and q wave in AVL   RCRI 1 point  Low risk for low risk procedure

## 2021-10-12 NOTE — PROGRESS NOTE ADULT - PROBLEM SELECTOR PLAN 2
- C/w home medications, metformin 2x 750 mg qhs, Lantus 30U Am and Lispro 30 U qd Long history of poorly controlled diabetes  Patient as a result has terrible right foot diabetic ulcer, HLD, and severe neuropathy below ankles  A1c 8.7%, BGs 110-160 while in patient  Good glucose control currently   - C/W 24U basal insulin with 8 pre-meal   - Resume home medications, metformin 2x 750 mg qhs, Lantus 30U Am and Lispro 30 U qd at discharge

## 2021-10-13 ENCOUNTER — TRANSCRIPTION ENCOUNTER (OUTPATIENT)
Age: 46
End: 2021-10-13

## 2021-10-13 VITALS
SYSTOLIC BLOOD PRESSURE: 121 MMHG | HEART RATE: 76 BPM | TEMPERATURE: 98 F | RESPIRATION RATE: 18 BRPM | DIASTOLIC BLOOD PRESSURE: 76 MMHG | OXYGEN SATURATION: 97 %

## 2021-10-13 LAB
ALBUMIN SERPL ELPH-MCNC: 3.4 G/DL — SIGNIFICANT CHANGE UP (ref 3.3–5)
ALP SERPL-CCNC: 71 U/L — SIGNIFICANT CHANGE UP (ref 40–120)
ALT FLD-CCNC: 26 U/L — SIGNIFICANT CHANGE UP (ref 10–45)
ANION GAP SERPL CALC-SCNC: 10 MMOL/L — SIGNIFICANT CHANGE UP (ref 5–17)
AST SERPL-CCNC: 21 U/L — SIGNIFICANT CHANGE UP (ref 10–40)
BILIRUB SERPL-MCNC: 0.3 MG/DL — SIGNIFICANT CHANGE UP (ref 0.2–1.2)
BUN SERPL-MCNC: 19 MG/DL — SIGNIFICANT CHANGE UP (ref 7–23)
CALCIUM SERPL-MCNC: 9 MG/DL — SIGNIFICANT CHANGE UP (ref 8.4–10.5)
CHLORIDE SERPL-SCNC: 103 MMOL/L — SIGNIFICANT CHANGE UP (ref 96–108)
CO2 SERPL-SCNC: 25 MMOL/L — SIGNIFICANT CHANGE UP (ref 22–31)
CREAT SERPL-MCNC: 1.26 MG/DL — SIGNIFICANT CHANGE UP (ref 0.5–1.3)
GLUCOSE BLDC GLUCOMTR-MCNC: 150 MG/DL — HIGH (ref 70–99)
GLUCOSE BLDC GLUCOMTR-MCNC: 162 MG/DL — HIGH (ref 70–99)
GLUCOSE SERPL-MCNC: 174 MG/DL — HIGH (ref 70–99)
HCT VFR BLD CALC: 37.9 % — LOW (ref 39–50)
HGB BLD-MCNC: 12.7 G/DL — LOW (ref 13–17)
MCHC RBC-ENTMCNC: 29.3 PG — SIGNIFICANT CHANGE UP (ref 27–34)
MCHC RBC-ENTMCNC: 33.5 GM/DL — SIGNIFICANT CHANGE UP (ref 32–36)
MCV RBC AUTO: 87.3 FL — SIGNIFICANT CHANGE UP (ref 80–100)
NRBC # BLD: 0 /100 WBCS — SIGNIFICANT CHANGE UP (ref 0–0)
PLATELET # BLD AUTO: 375 K/UL — SIGNIFICANT CHANGE UP (ref 150–400)
POTASSIUM SERPL-MCNC: 4.3 MMOL/L — SIGNIFICANT CHANGE UP (ref 3.5–5.3)
POTASSIUM SERPL-SCNC: 4.3 MMOL/L — SIGNIFICANT CHANGE UP (ref 3.5–5.3)
PROT SERPL-MCNC: 7.8 G/DL — SIGNIFICANT CHANGE UP (ref 6–8.3)
RBC # BLD: 4.34 M/UL — SIGNIFICANT CHANGE UP (ref 4.2–5.8)
RBC # FLD: 13.5 % — SIGNIFICANT CHANGE UP (ref 10.3–14.5)
SODIUM SERPL-SCNC: 138 MMOL/L — SIGNIFICANT CHANGE UP (ref 135–145)
WBC # BLD: 6.4 K/UL — SIGNIFICANT CHANGE UP (ref 3.8–10.5)
WBC # FLD AUTO: 6.4 K/UL — SIGNIFICANT CHANGE UP (ref 3.8–10.5)

## 2021-10-13 PROCEDURE — 85610 PROTHROMBIN TIME: CPT

## 2021-10-13 PROCEDURE — 84132 ASSAY OF SERUM POTASSIUM: CPT

## 2021-10-13 PROCEDURE — 87040 BLOOD CULTURE FOR BACTERIA: CPT

## 2021-10-13 PROCEDURE — 87070 CULTURE OTHR SPECIMN AEROBIC: CPT

## 2021-10-13 PROCEDURE — 86850 RBC ANTIBODY SCREEN: CPT

## 2021-10-13 PROCEDURE — 86769 SARS-COV-2 COVID-19 ANTIBODY: CPT

## 2021-10-13 PROCEDURE — 82962 GLUCOSE BLOOD TEST: CPT

## 2021-10-13 PROCEDURE — 93971 EXTREMITY STUDY: CPT

## 2021-10-13 PROCEDURE — 82330 ASSAY OF CALCIUM: CPT

## 2021-10-13 PROCEDURE — 97161 PT EVAL LOW COMPLEX 20 MIN: CPT

## 2021-10-13 PROCEDURE — 99239 HOSP IP/OBS DSCHRG MGMT >30: CPT | Mod: GC

## 2021-10-13 PROCEDURE — 85652 RBC SED RATE AUTOMATED: CPT

## 2021-10-13 PROCEDURE — 73620 X-RAY EXAM OF FOOT: CPT

## 2021-10-13 PROCEDURE — 86140 C-REACTIVE PROTEIN: CPT

## 2021-10-13 PROCEDURE — 80048 BASIC METABOLIC PNL TOTAL CA: CPT

## 2021-10-13 PROCEDURE — 86901 BLOOD TYPING SEROLOGIC RH(D): CPT

## 2021-10-13 PROCEDURE — 36415 COLL VENOUS BLD VENIPUNCTURE: CPT

## 2021-10-13 PROCEDURE — 99285 EMERGENCY DEPT VISIT HI MDM: CPT

## 2021-10-13 PROCEDURE — 80053 COMPREHEN METABOLIC PANEL: CPT

## 2021-10-13 PROCEDURE — 87184 SC STD DISK METHOD PER PLATE: CPT

## 2021-10-13 PROCEDURE — 83036 HEMOGLOBIN GLYCOSYLATED A1C: CPT

## 2021-10-13 PROCEDURE — 83605 ASSAY OF LACTIC ACID: CPT

## 2021-10-13 PROCEDURE — 82550 ASSAY OF CK (CPK): CPT

## 2021-10-13 PROCEDURE — 96375 TX/PRO/DX INJ NEW DRUG ADDON: CPT

## 2021-10-13 PROCEDURE — 85730 THROMBOPLASTIN TIME PARTIAL: CPT

## 2021-10-13 PROCEDURE — 71045 X-RAY EXAM CHEST 1 VIEW: CPT

## 2021-10-13 PROCEDURE — 86900 BLOOD TYPING SEROLOGIC ABO: CPT

## 2021-10-13 PROCEDURE — 84295 ASSAY OF SERUM SODIUM: CPT

## 2021-10-13 PROCEDURE — 93005 ELECTROCARDIOGRAM TRACING: CPT

## 2021-10-13 PROCEDURE — 87635 SARS-COV-2 COVID-19 AMP PRB: CPT

## 2021-10-13 PROCEDURE — 96374 THER/PROPH/DIAG INJ IV PUSH: CPT

## 2021-10-13 PROCEDURE — 85027 COMPLETE CBC AUTOMATED: CPT

## 2021-10-13 PROCEDURE — 83735 ASSAY OF MAGNESIUM: CPT

## 2021-10-13 PROCEDURE — 82803 BLOOD GASES ANY COMBINATION: CPT

## 2021-10-13 PROCEDURE — 85025 COMPLETE CBC W/AUTO DIFF WBC: CPT

## 2021-10-13 RX ORDER — CEPHALEXIN 500 MG
500 CAPSULE ORAL
Refills: 0 | Status: DISCONTINUED | OUTPATIENT
Start: 2021-10-13 | End: 2021-10-13

## 2021-10-13 RX ORDER — CEPHALEXIN 500 MG
500 CAPSULE ORAL EVERY 6 HOURS
Refills: 0 | Status: DISCONTINUED | OUTPATIENT
Start: 2021-10-13 | End: 2021-10-13

## 2021-10-13 RX ORDER — CEPHALEXIN 500 MG
1 CAPSULE ORAL
Qty: 32 | Refills: 0
Start: 2021-10-13

## 2021-10-13 RX ORDER — CEPHALEXIN 500 MG
1 CAPSULE ORAL
Qty: 0 | Refills: 0 | DISCHARGE
Start: 2021-10-13

## 2021-10-13 RX ADMIN — Medication 300 MILLIGRAM(S): at 01:45

## 2021-10-13 RX ADMIN — INSULIN GLARGINE 24 UNIT(S): 100 INJECTION, SOLUTION SUBCUTANEOUS at 09:39

## 2021-10-13 RX ADMIN — Medication 2: at 12:41

## 2021-10-13 RX ADMIN — Medication 8 UNIT(S): at 09:40

## 2021-10-13 RX ADMIN — PIPERACILLIN AND TAZOBACTAM 200 GRAM(S): 4; .5 INJECTION, POWDER, LYOPHILIZED, FOR SOLUTION INTRAVENOUS at 06:25

## 2021-10-13 RX ADMIN — Medication 8 UNIT(S): at 12:41

## 2021-10-13 RX ADMIN — Medication 500 MILLIGRAM(S): at 12:56

## 2021-10-13 RX ADMIN — PIPERACILLIN AND TAZOBACTAM 200 GRAM(S): 4; .5 INJECTION, POWDER, LYOPHILIZED, FOR SOLUTION INTRAVENOUS at 00:43

## 2021-10-13 NOTE — PHYSICAL THERAPY INITIAL EVALUATION ADULT - PERTINENT HX OF CURRENT PROBLEM, REHAB EVAL
46M PMHx DM, HTN, HLD and cocaine use presented to the ED with complaint of drainage and foul smelling discharge from a nonhealing diabetic right foot ulcer. Pt being treated with Vancomycin and Zosyn for possible soft tissue infection. Inflammatory markers are elevated. X-ray preformed in ED was negative for OM.

## 2021-10-13 NOTE — DISCHARGE NOTE PROVIDER - NSDCFUADDAPPT_GEN_ALL_CORE_FT
Please follow up with Dr. Green with Podiatry on 10/18 @ 4pm     Please follow up with Dr. Miranda at [facility] on 10/14 at 12:30

## 2021-10-13 NOTE — DISCHARGE NOTE NURSING/CASE MANAGEMENT/SOCIAL WORK - NSDCVIVACCINE_GEN_ALL_CORE_FT
Tdap; 30-Jun-2021 18:05; Lorrie Ziegler (IVELISSE); [employer]; [id number] (Exp. Date: 25-Nov-2022); IntraMuscular; Deltoid Left.; 0.5 milliLiter(s); VIS (VIS Published: 09-May-2013, VIS Presented: 30-Jun-2021);

## 2021-10-13 NOTE — DISCHARGE NOTE PROVIDER - NSDCMRMEDTOKEN_GEN_ALL_CORE_FT
atorvastatin 40 mg oral tablet: 1 tab(s) orally once a day  insulin glargine: 30 unit(s) subcutaneous once a day  Insulin Lispro KwikPen 100 units/mL injectable solution: 10 unit(s) injectable 3 times a day (with meals)  losartan 50 mg oral tablet: 1 tab(s) orally once a day  metFORMIN 750 mg oral tablet, extended release: 2 tab(s) orally once a day   atorvastatin 40 mg oral tablet: 1 tab(s) orally once a day  cephalexin 500 mg oral capsule: 1 cap(s) orally 4 times a day  cephalexin 500 mg oral capsule: Please take 1 capsule 4 times a day until finished  Meds to beds  insulin glargine: 30 unit(s) subcutaneous once a day  Insulin Lispro KwikPen 100 units/mL injectable solution: 10 unit(s) injectable 3 times a day (with meals)  losartan 50 mg oral tablet: 1 tab(s) orally once a day  metFORMIN 750 mg oral tablet, extended release: 2 tab(s) orally once a day

## 2021-10-13 NOTE — PHYSICAL THERAPY INITIAL EVALUATION ADULT - GENERAL OBSERVATIONS, REHAB EVAL
PT IE completed. Patient received semi supine in bed +heplock IV, +RLE cast, NAD, willing to work with PT

## 2021-10-13 NOTE — PHYSICAL THERAPY INITIAL EVALUATION ADULT - GAIT DEVIATIONS NOTED, PT EVAL
Demo steady gait with use of (B) axillary crutches, no LOB and good navigation of hallway obstacles. Patient verbalized and demo good understanding of gait pattern and RLE NWB precautions.

## 2021-10-13 NOTE — PHYSICAL THERAPY INITIAL EVALUATION ADULT - ACTIVE RANGE OF MOTION EXAMINATION, REHAB EVAL
(R) ankle not assessed 2/2 casting/bilateral upper extremity Active ROM was WFL (within functional limits)/bilateral  lower extremity Active ROM was WFL (within functional limits)

## 2021-10-13 NOTE — DISCHARGE NOTE PROVIDER - HOSPITAL COURSE
#Discharge: do not delete    ARIEL MOURA is a 46y Male with a past medical history of _____    Presented with _____, found to have _____    Problem List/Main Diagnoses (system-based):   #     #     #    Inpatient treatment course:     New medications:   Labs to be followed outpatient:   Exam to be followed outpatient:       LABS & STUDIES:  COVID-19 PCR: NotDetec (12 Oct 2021 14:00)  COVID-19 PCR: Negative (11 Oct 2021 13:27)  COVID-19 PCR: Negative (18 Sep 2021 18:17)  COVID-19 PCR: Negative (06 Jul 2021 05:46)   #Discharge: do not delete    46M PMHx of poorly controlled DM s/b severe diabetic foot and peripheral neuropathy, also with HTN, and HLD who presented to the ED with complaint of drainage and foul smelling discharge from a nonhealing diabetic right foot ulcer.     Problem List/Main Diagnoses (system-based):   # Diabetic foot ulcer   Patient with long history of poorly controlled DM (A1c 8.7%) c/b nonhealing diabetic foot ulcer and peripheral neuropathy. Patient   Pt being treated with Vancomycin and Zosyn for possible soft tissue infection. Inflammatory markers are elevated. X-ray preformed in ED was negative for OM.         New medications:   Labs to be followed outpatient:   Exam to be followed outpatient:       LABS & STUDIES:  COVID-19 PCR: NotDetec (12 Oct 2021 14:00)  COVID-19 PCR: Negative (11 Oct 2021 13:27)  COVID-19 PCR: Negative (18 Sep 2021 18:17)  COVID-19 PCR: Negative (06 Jul 2021 05:46)   #Discharge: do not delete    46M PMHx of poorly controlled DM s/b severe diabetic foot and peripheral neuropathy, also with HTN, and HLD who presented to the ED with complaint of drainage and foul smelling discharge from a nonhealing diabetic right foot ulcer.     Problem List/Main Diagnoses (system-based):   # Diabetic foot ulcer   Patient with long history of poorly controlled DM (A1c 8.7%) c/b nonhealing diabetic foot ulcer and peripheral neuropathy. Patient without SIRS criteria, mild leukocytosis, inflammatory markers are elevated, X-ray preformed in ED was negative for OM. He was treated with Vancomycin and Zosyn for possible soft tissue infection and was seen by podiatry that did a wound culture that showed . Patient was             New medications:   Labs to be followed outpatient:   Exam to be followed outpatient:       LABS & STUDIES:  COVID-19 PCR: NotDetec (12 Oct 2021 14:00)  COVID-19 PCR: Negative (11 Oct 2021 13:27)  COVID-19 PCR: Negative (18 Sep 2021 18:17)  COVID-19 PCR: Negative (06 Jul 2021 05:46)   #Discharge: do not delete    46M PMHx of poorly controlled DM s/b severe diabetic foot and peripheral neuropathy, also with HTN, and HLD who presented to the ED with complaint of drainage and foul smelling discharge from a nonhealing diabetic right foot ulcer.     Problem List/Main Diagnoses (system-based):   # Diabetic foot ulcer   Patient with long history of poorly controlled DM (A1c 8.7%) c/b nonhealing diabetic foot ulcer and peripheral neuropathy. His nonhealing distal plantar foot ulcer developed in 04/2021, and 3 weeks ago he was admitted for RLE wound debridement/graft placement, 2 weeks ago wound to RLE re-opened, had cast replaced, 1 week ago worsening drainage through the cast as well as worsening odor. Returned to podiatry Dr. Green who recommended coming to ED. Presented without SIRS criteria, mild leukocytosis, inflammatory markers are elevated, X-ray preformed in ED was negative for OM. He was treated with Vancomycin and Zosyn for possible soft tissue infection and was seen by podiatry that did a wound culture that showed 1 colony of GBS . Antibiotics were switched to keflex for a total of 7 days and podiatry placed cast on 10/13 and patient was instructed to follow up with podiatry as outpatient for repeat graft procedure.     #Diabetes  Patient takes metformin and insulin at home. Held metformin and decreased insulin while inpatient. Restarted on home regimen prior to discharge and instructed to follow up with endocrinology .    New Medications:  Keflex 500 mg q6hrs until 10/18    Things to be followed outpatient:   - Monitor for worsening signs of infection or progression of his ulcer   - Diabetes control     PHYSICAL EXAM:    Constitutional: NAD  HEENT: PERRL, EOMI, sclera non-icteric, neck supple, trachea midline, no masses, no JVD, MMM, good dentition, no erythema, no discharge, no lymphadenopathy   Respiratory: CTA b/l, good air entry b/l, no wheezing, no rhonchi, no rales, without accessory muscle use and no intercostal retractions  Cardiovascular: RRR, normal S1and S2 sounds, no M/R/G  Gastrointestinal: abdomen soft, NTND, no masses palpable, BS normal  Extremities: Warm, well perfused, pulses equal bilateral upper and lower extremities, no edema, no clubbing, no cyanosis or pallor. No erythema or drainage at site of right foot ulcer, b/l lower leg hair loss, decreased sensation on the soles of his feet b/l   Neurological: AAOx3, CN Grossly intact  Skin: Normal temperature, warm, dry, large right foot ulcer     LABS & STUDIES:  COVID-19 PCR: NotDetec (12 Oct 2021 14:00)  COVID-19 PCR: Negative (11 Oct 2021 13:27)  COVID-19 PCR: Negative (18 Sep 2021 18:17)  COVID-19 PCR: Negative (06 Jul 2021 05:46)

## 2021-10-13 NOTE — DISCHARGE NOTE PROVIDER - CARE PROVIDER_API CALL
David Green (DPM)  Surgery Orthopaedic Surgery  [address], [address]  [place] [address]  Phone: [phone]  Fax: [phone]  Follow Up Time:     Columba Miranda  ENDOCRINOLOGY/METAB/DIABETES  [address] [place] [address]  Phone: [phone]  Fax: [phone]  Scheduled Appointment: 10/14/2021 12:30 AM

## 2021-10-13 NOTE — DISCHARGE NOTE PROVIDER - NSDCFUADDINST_GEN_ALL_CORE_FT
Please use a chacko for ambulation in order to take pressure off your foot  Please use a crutch for ambulation in order to take pressure off your foot      Please apply saline and betadine soaked gauze to the bottom of foot and in between the digits

## 2021-10-13 NOTE — DISCHARGE NOTE PROVIDER - NSDCCPCAREPLAN_GEN_ALL_CORE_FT
PRINCIPAL DISCHARGE DIAGNOSIS  Diagnosis: Non-healing ulcer of right foot, unspecified ulcer stage  Assessment and Plan of Treatment: You came in with a nonhealing foot ulcer that you had treated with a graft procedure 3 weeks ago. Unfortunately the graft opened and you were experiencing some discharge. There initially was concern for infection so they treated you with antibiotics. The xray was normal and the wound culture grew positive for some bacteria, so on discharge we continued you on an oral form of antibiotics. Please continue to take this until Monday 10/20 (total of 10 days). Also please follow up with your podiatrist on Monday 10/18 at 4PM.      SECONDARY DISCHARGE DIAGNOSES  Diagnosis: T2DM (type 2 diabetes mellitus)  Assessment and Plan of Treatment: You have diabetes and your A1c is still too high, you should continue your insulin, metformin, and dieting in order to control this as much as possible. I would also suggest seeing your endocrinoligist soon in order to find additional ways to get your diabetes further under control. If you do not get your diabetes further under control the wounds in your feet can get worse.

## 2021-10-13 NOTE — DISCHARGE NOTE NURSING/CASE MANAGEMENT/SOCIAL WORK - PATIENT PORTAL LINK FT
You can access the FollowMyHealth Patient Portal offered by [facility] by registering at the following website: [email or web address]. By joining [employer]’s FollowMyHealth portal, you will also be able to view your health information using other applications (apps) compatible with our system.

## 2021-10-13 NOTE — PHYSICAL THERAPY INITIAL EVALUATION ADULT - ADDITIONAL COMMENTS
Patient is a community ambulator who lives in a 1st floor apartment with his parents with ~5 RENE. 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 Ambulates without any assistive devices and reports being independent with all ADLs. Of note, patient has previously ambulated with (B) axillary crutches in the past.

## 2021-10-13 NOTE — DISCHARGE NOTE PROVIDER - PROVIDER TOKENS
PROVIDER:[TOKEN:[[id number]]],PROVIDER:[TOKEN:[[id number]],SCHEDULEDAPPT:[10/14/2021],SCHEDULEDAPPTTIME:[12:30 AM]]

## 2021-10-14 LAB
-  AMPICILLIN: SIGNIFICANT CHANGE UP
-  CLINDAMYCIN: SIGNIFICANT CHANGE UP
-  ERYTHROMYCIN: SIGNIFICANT CHANGE UP
-  LEVOFLOXACIN: SIGNIFICANT CHANGE UP
-  PENICILLIN: SIGNIFICANT CHANGE UP
-  VANCOMYCIN: SIGNIFICANT CHANGE UP
CULTURE RESULTS: SIGNIFICANT CHANGE UP
METHOD TYPE: SIGNIFICANT CHANGE UP
ORGANISM # SPEC MICROSCOPIC CNT: SIGNIFICANT CHANGE UP
ORGANISM # SPEC MICROSCOPIC CNT: SIGNIFICANT CHANGE UP
SPECIMEN SOURCE: SIGNIFICANT CHANGE UP

## 2021-10-19 DIAGNOSIS — Z79.4 LONG TERM (CURRENT) USE OF INSULIN: ICD-10-CM

## 2021-10-19 DIAGNOSIS — L97.519 NON-PRESSURE CHRONIC ULCER OF OTHER PART OF RIGHT FOOT WITH UNSPECIFIED SEVERITY: ICD-10-CM

## 2021-10-19 DIAGNOSIS — Z41.8 ENCOUNTER FOR OTHER PROCEDURES FOR PURPOSES OTHER THAN REMEDYING HEALTH STATE: ICD-10-CM

## 2021-10-19 DIAGNOSIS — I10 ESSENTIAL (PRIMARY) HYPERTENSION: ICD-10-CM

## 2021-10-19 DIAGNOSIS — F14.99 COCAINE USE, UNSPECIFIED WITH UNSPECIFIED COCAINE-INDUCED DISORDER: ICD-10-CM

## 2021-10-19 DIAGNOSIS — E08.42 DIABETES MELLITUS DUE TO UNDERLYING CONDITION WITH DIABETIC POLYNEUROPATHY: ICD-10-CM

## 2021-10-19 DIAGNOSIS — E78.5 HYPERLIPIDEMIA, UNSPECIFIED: ICD-10-CM

## 2021-10-19 DIAGNOSIS — E08.40 DIABETES MELLITUS DUE TO UNDERLYING CONDITION WITH DIABETIC NEUROPATHY, UNSPECIFIED: ICD-10-CM

## 2021-10-19 DIAGNOSIS — E11.621 TYPE 2 DIABETES MELLITUS WITH FOOT ULCER: ICD-10-CM

## 2021-10-19 DIAGNOSIS — N17.9 ACUTE KIDNEY FAILURE, UNSPECIFIED: ICD-10-CM

## 2021-10-19 DIAGNOSIS — Z89.421 ACQUIRED ABSENCE OF OTHER RIGHT TOE(S): ICD-10-CM

## 2021-11-17 NOTE — PROGRESS NOTE ADULT - PROBLEM SELECTOR PLAN 3
Detail Level: Detailed - at home on metformin glipizide; no longer using insulin 2/2 pt running out of insulin a couple months ago  - hold PO antidiabetics  - sliding scale for now, calculate Lantus needs for Monday night  - c/b OM s/p 3 toe amp  - HbA1c: 12.3%  - f/u endo consult - recs appreciated - at home on metformin glipizide; no longer using insulin 2/2 pt running out of insulin a couple months ago  - hold PO antidiabetics  - c/b OM s/p 3 toe amp  - HbA1c: 12.3%  - Lantus 20, lispro 10 TID per endo recs  - f/u endo consult - recs appreciated

## 2021-12-03 NOTE — PATIENT PROFILE ADULT. - FUNCTIONAL SCREEN CURRENT LEVEL: TRANSFERRING, MLM
fungal-appearing rash in b/l groin with right side extending to lower abd and lateral hip  - keep area dry  - continue nystatin powder (0) independent

## 2021-12-24 ENCOUNTER — TRANSCRIPTION ENCOUNTER (OUTPATIENT)
Age: 46
End: 2021-12-24

## 2021-12-28 NOTE — PRE-OP CHECKLIST - ORDERS/MEDICATION ADMINISTRATION RECORD ON CHART
Received call from spouse Marlee, informing she and patient have been exposed to Covid-19. One of their daughters recently tested Positive.     Spouse is requesting if a clinician can come to their home and test her and pt as pt is homebound.     Notified KHLOE Guerin and RAVEN Fontana via [employer] msg.   done

## 2022-01-07 ENCOUNTER — EMERGENCY (EMERGENCY)
Facility: HOSPITAL | Age: 47
LOS: 1 days | Discharge: HOME | End: 2022-01-07
Attending: EMERGENCY MEDICINE | Admitting: EMERGENCY MEDICINE
Payer: COMMERCIAL

## 2022-01-07 VITALS
TEMPERATURE: 98 F | RESPIRATION RATE: 17 BRPM | WEIGHT: 220.02 LBS | HEIGHT: 72 IN | SYSTOLIC BLOOD PRESSURE: 120 MMHG | HEART RATE: 100 BPM | OXYGEN SATURATION: 99 % | DIASTOLIC BLOOD PRESSURE: 78 MMHG

## 2022-01-07 VITALS
SYSTOLIC BLOOD PRESSURE: 124 MMHG | RESPIRATION RATE: 17 BRPM | TEMPERATURE: 99 F | DIASTOLIC BLOOD PRESSURE: 78 MMHG | HEART RATE: 89 BPM | OXYGEN SATURATION: 97 %

## 2022-01-07 DIAGNOSIS — I10 ESSENTIAL (PRIMARY) HYPERTENSION: ICD-10-CM

## 2022-01-07 DIAGNOSIS — M79.605 PAIN IN LEFT LEG: ICD-10-CM

## 2022-01-07 DIAGNOSIS — E78.5 HYPERLIPIDEMIA, UNSPECIFIED: ICD-10-CM

## 2022-01-07 DIAGNOSIS — D72.829 ELEVATED WHITE BLOOD CELL COUNT, UNSPECIFIED: ICD-10-CM

## 2022-01-07 DIAGNOSIS — Z94.5 SKIN TRANSPLANT STATUS: Chronic | ICD-10-CM

## 2022-01-07 DIAGNOSIS — Z89.429 ACQUIRED ABSENCE OF OTHER TOE(S), UNSPECIFIED SIDE: Chronic | ICD-10-CM

## 2022-01-07 DIAGNOSIS — R50.9 FEVER, UNSPECIFIED: ICD-10-CM

## 2022-01-07 DIAGNOSIS — M79.604 PAIN IN RIGHT LEG: ICD-10-CM

## 2022-01-07 DIAGNOSIS — E11.9 TYPE 2 DIABETES MELLITUS WITHOUT COMPLICATIONS: ICD-10-CM

## 2022-01-07 DIAGNOSIS — M54.50 LOW BACK PAIN, UNSPECIFIED: ICD-10-CM

## 2022-01-07 DIAGNOSIS — R06.02 SHORTNESS OF BREATH: ICD-10-CM

## 2022-01-07 DIAGNOSIS — Z96.0 PRESENCE OF UROGENITAL IMPLANTS: Chronic | ICD-10-CM

## 2022-01-07 DIAGNOSIS — Z79.4 LONG TERM (CURRENT) USE OF INSULIN: ICD-10-CM

## 2022-01-07 LAB
ALBUMIN SERPL ELPH-MCNC: 3.8 G/DL — SIGNIFICANT CHANGE UP (ref 3.3–5)
ALP SERPL-CCNC: 84 U/L — SIGNIFICANT CHANGE UP (ref 40–120)
ALT FLD-CCNC: 19 U/L — SIGNIFICANT CHANGE UP (ref 10–45)
ANION GAP SERPL CALC-SCNC: 9 MMOL/L — SIGNIFICANT CHANGE UP (ref 5–17)
APPEARANCE UR: CLEAR — SIGNIFICANT CHANGE UP
AST SERPL-CCNC: 19 U/L — SIGNIFICANT CHANGE UP (ref 10–40)
BACTERIA # UR AUTO: SIGNIFICANT CHANGE UP /HPF
BASOPHILS # BLD AUTO: 0.06 K/UL — SIGNIFICANT CHANGE UP (ref 0–0.2)
BASOPHILS NFR BLD AUTO: 0.4 % — SIGNIFICANT CHANGE UP (ref 0–2)
BILIRUB SERPL-MCNC: 0.5 MG/DL — SIGNIFICANT CHANGE UP (ref 0.2–1.2)
BILIRUB UR-MCNC: NEGATIVE — SIGNIFICANT CHANGE UP
BUN SERPL-MCNC: 18 MG/DL — SIGNIFICANT CHANGE UP (ref 7–23)
CALCIUM SERPL-MCNC: 9.3 MG/DL — SIGNIFICANT CHANGE UP (ref 8.4–10.5)
CHLORIDE SERPL-SCNC: 100 MMOL/L — SIGNIFICANT CHANGE UP (ref 96–108)
CK MB CFR SERPL CALC: 2.8 NG/ML — SIGNIFICANT CHANGE UP (ref 0–6.7)
CK SERPL-CCNC: 159 U/L — SIGNIFICANT CHANGE UP (ref 30–200)
CO2 SERPL-SCNC: 26 MMOL/L — SIGNIFICANT CHANGE UP (ref 22–31)
COLOR SPEC: YELLOW — SIGNIFICANT CHANGE UP
CREAT SERPL-MCNC: 1.06 MG/DL — SIGNIFICANT CHANGE UP (ref 0.5–1.3)
D DIMER BLD IA.RAPID-MCNC: 167 NG/ML DDU — SIGNIFICANT CHANGE UP
DIFF PNL FLD: NEGATIVE — SIGNIFICANT CHANGE UP
EOSINOPHIL # BLD AUTO: 0.1 K/UL — SIGNIFICANT CHANGE UP (ref 0–0.5)
EOSINOPHIL NFR BLD AUTO: 0.6 % — SIGNIFICANT CHANGE UP (ref 0–6)
EPI CELLS # UR: SIGNIFICANT CHANGE UP /HPF (ref 0–5)
FLU A RESULT: SIGNIFICANT CHANGE UP
FLU A RESULT: SIGNIFICANT CHANGE UP
FLUAV AG NPH QL: SIGNIFICANT CHANGE UP
FLUBV AG NPH QL: SIGNIFICANT CHANGE UP
GLUCOSE SERPL-MCNC: 185 MG/DL — HIGH (ref 70–99)
GLUCOSE UR QL: >=1000
HCT VFR BLD CALC: 40.3 % — SIGNIFICANT CHANGE UP (ref 39–50)
HGB BLD-MCNC: 12.9 G/DL — LOW (ref 13–17)
IMM GRANULOCYTES NFR BLD AUTO: 0.6 % — SIGNIFICANT CHANGE UP (ref 0–1.5)
KETONES UR-MCNC: NEGATIVE — SIGNIFICANT CHANGE UP
LACTATE SERPL-SCNC: 1.5 MMOL/L — SIGNIFICANT CHANGE UP (ref 0.5–2)
LEUKOCYTE ESTERASE UR-ACNC: NEGATIVE — SIGNIFICANT CHANGE UP
LYMPHOCYTES # BLD AUTO: 1.96 K/UL — SIGNIFICANT CHANGE UP (ref 1–3.3)
LYMPHOCYTES # BLD AUTO: 12.3 % — LOW (ref 13–44)
MCHC RBC-ENTMCNC: 27.3 PG — SIGNIFICANT CHANGE UP (ref 27–34)
MCHC RBC-ENTMCNC: 32 GM/DL — SIGNIFICANT CHANGE UP (ref 32–36)
MCV RBC AUTO: 85.4 FL — SIGNIFICANT CHANGE UP (ref 80–100)
MONOCYTES # BLD AUTO: 1.25 K/UL — HIGH (ref 0–0.9)
MONOCYTES NFR BLD AUTO: 7.9 % — SIGNIFICANT CHANGE UP (ref 2–14)
NEUTROPHILS # BLD AUTO: 12.46 K/UL — HIGH (ref 1.8–7.4)
NEUTROPHILS NFR BLD AUTO: 78.2 % — HIGH (ref 43–77)
NITRITE UR-MCNC: NEGATIVE — SIGNIFICANT CHANGE UP
NRBC # BLD: 0 /100 WBCS — SIGNIFICANT CHANGE UP (ref 0–0)
PH UR: 6 — SIGNIFICANT CHANGE UP (ref 5–8)
PLATELET # BLD AUTO: 325 K/UL — SIGNIFICANT CHANGE UP (ref 150–400)
POTASSIUM SERPL-MCNC: 4.4 MMOL/L — SIGNIFICANT CHANGE UP (ref 3.5–5.3)
POTASSIUM SERPL-SCNC: 4.4 MMOL/L — SIGNIFICANT CHANGE UP (ref 3.5–5.3)
PROT SERPL-MCNC: 7.7 G/DL — SIGNIFICANT CHANGE UP (ref 6–8.3)
PROT UR-MCNC: ABNORMAL MG/DL
RBC # BLD: 4.72 M/UL — SIGNIFICANT CHANGE UP (ref 4.2–5.8)
RBC # FLD: 14.3 % — SIGNIFICANT CHANGE UP (ref 10.3–14.5)
RBC CASTS # UR COMP ASSIST: < 5 /HPF — SIGNIFICANT CHANGE UP
RSV RESULT: SIGNIFICANT CHANGE UP
RSV RNA RESP QL NAA+PROBE: SIGNIFICANT CHANGE UP
SARS-COV-2 RNA SPEC QL NAA+PROBE: SIGNIFICANT CHANGE UP
SODIUM SERPL-SCNC: 135 MMOL/L — SIGNIFICANT CHANGE UP (ref 135–145)
SP GR SPEC: 1.01 — SIGNIFICANT CHANGE UP (ref 1–1.03)
TROPONIN T SERPL-MCNC: <0.01 NG/ML — SIGNIFICANT CHANGE UP (ref 0–0.01)
UROBILINOGEN FLD QL: 0.2 E.U./DL — SIGNIFICANT CHANGE UP
WBC # BLD: 15.92 K/UL — HIGH (ref 3.8–10.5)
WBC # FLD AUTO: 15.92 K/UL — HIGH (ref 3.8–10.5)
WBC UR QL: < 5 /HPF — SIGNIFICANT CHANGE UP

## 2022-01-07 PROCEDURE — 99285 EMERGENCY DEPT VISIT HI MDM: CPT | Mod: 25

## 2022-01-07 PROCEDURE — U0005: CPT

## 2022-01-07 PROCEDURE — 71045 X-RAY EXAM CHEST 1 VIEW: CPT | Mod: 26

## 2022-01-07 PROCEDURE — 86140 C-REACTIVE PROTEIN: CPT

## 2022-01-07 PROCEDURE — 36415 COLL VENOUS BLD VENIPUNCTURE: CPT

## 2022-01-07 PROCEDURE — U0003: CPT

## 2022-01-07 PROCEDURE — 73630 X-RAY EXAM OF FOOT: CPT | Mod: 26,RT

## 2022-01-07 PROCEDURE — 85025 COMPLETE CBC W/AUTO DIFF WBC: CPT

## 2022-01-07 PROCEDURE — 93010 ELECTROCARDIOGRAM REPORT: CPT | Mod: 59

## 2022-01-07 PROCEDURE — 83605 ASSAY OF LACTIC ACID: CPT

## 2022-01-07 PROCEDURE — 93005 ELECTROCARDIOGRAM TRACING: CPT

## 2022-01-07 PROCEDURE — 87631 RESP VIRUS 3-5 TARGETS: CPT

## 2022-01-07 PROCEDURE — 84484 ASSAY OF TROPONIN QUANT: CPT

## 2022-01-07 PROCEDURE — 82553 CREATINE MB FRACTION: CPT

## 2022-01-07 PROCEDURE — 85379 FIBRIN DEGRADATION QUANT: CPT

## 2022-01-07 PROCEDURE — 82962 GLUCOSE BLOOD TEST: CPT

## 2022-01-07 PROCEDURE — 80053 COMPREHEN METABOLIC PANEL: CPT

## 2022-01-07 PROCEDURE — 96374 THER/PROPH/DIAG INJ IV PUSH: CPT

## 2022-01-07 PROCEDURE — 81001 URINALYSIS AUTO W/SCOPE: CPT

## 2022-01-07 PROCEDURE — 87040 BLOOD CULTURE FOR BACTERIA: CPT

## 2022-01-07 PROCEDURE — 71045 X-RAY EXAM CHEST 1 VIEW: CPT

## 2022-01-07 PROCEDURE — 99284 EMERGENCY DEPT VISIT MOD MDM: CPT | Mod: 25

## 2022-01-07 PROCEDURE — 82550 ASSAY OF CK (CPK): CPT

## 2022-01-07 PROCEDURE — 73630 X-RAY EXAM OF FOOT: CPT

## 2022-01-07 RX ORDER — KETOROLAC TROMETHAMINE 30 MG/ML
15 SYRINGE (ML) INJECTION ONCE
Refills: 0 | Status: DISCONTINUED | OUTPATIENT
Start: 2022-01-07 | End: 2022-01-07

## 2022-01-07 RX ORDER — ACETAMINOPHEN 500 MG
1000 TABLET ORAL ONCE
Refills: 0 | Status: COMPLETED | OUTPATIENT
Start: 2022-01-07 | End: 2022-01-07

## 2022-01-07 RX ADMIN — Medication 15 MILLIGRAM(S): at 09:04

## 2022-01-07 RX ADMIN — Medication 15 MILLIGRAM(S): at 13:52

## 2022-01-07 RX ADMIN — Medication 1000 MILLIGRAM(S): at 13:51

## 2022-01-07 NOTE — ED PROVIDER NOTE - NSFOLLOWUPINSTRUCTIONS_ED_ALL_ED_FT
Drink plenty of fluids.  Take tylenol or motrin as needed for aches and pains and fever.  Follow up with your primary care physician and podiatrist for re-evaluation.  Return to er for any new or worsening symptoms (persistently high fevers, chest pain, difficulty breathing at rest, dizziness, passing out...).      EnglishSpanish                                                                                                                                                                                                                                                                                                            Viral Illness, Adult      Viruses are tiny germs that can get into a person's body and cause illness. There are many different types of viruses, and they cause many types of illness. Viral illnesses can range from mild to severe. They can affect various parts of the body.    Short-term conditions that are caused by a virus include colds and the flu (influenza). Long-term conditions that are caused by a virus include herpes, shingles, and HIV (human immunodeficiency virus) infection. A few viruses have been linked to certain cancers.      What are the causes?    Many types of viruses can cause illness. Viruses invade cells in your body, multiply, and cause the infected cells to work abnormally or die. When these cells die, they release more of the virus. When this happens, you develop symptoms of the illness, and the virus continues to spread to other cells. If the virus takes over the function of the cell, it can cause the cell to divide and grow out of control. This happens when a virus causes cancer.  Different viruses get into the body in different ways. You can get a virus by:  •Swallowing food or water that has come in contact with the virus (is contaminated).      •Breathing in droplets that have been coughed or sneezed into the air by an infected person.      •Touching a surface that has been contaminated with the virus and then touching your eyes, nose, or mouth.      •Being bitten by an insect or animal that carries the virus.      •Having sexual contact with a person who is infected with the virus.      •Being exposed to blood or fluids that contain the virus, either through an open cut or during a transfusion.      If a virus enters your body, your body's defense system (immune system) will try to fight the virus. You may be at higher risk for a viral illness if your immune system is weak.      What are the signs or symptoms?  You may have these symptoms, depending on the type of virus and the location of the cells that it invades:•Cold and flu viruses:  •Fever.      •Headache.      •Sore throat.      •Muscle aches.      •Stuffy nose (nasal congestion).      •Cough.      •Digestive system (gastrointestinal) viruses:  •Fever.      •Pain in the abdomen.      •Nausea.      •Diarrhea.      •Liver viruses (hepatitis):  •Loss of appetite.      •Tiredness.      •Skin or the white parts of your eyes turning yellow (jaundice).      •Brain and spinal cord viruses:  •Fever.      •Headache.      •Stiff neck.      •Nausea and vomiting.      •Confusion or sleepiness.      •Skin viruses:  •Warts.      •Itching.      •Rash.      •Sexually transmitted viruses:  •Discharge.      •Swelling.      •Redness.      •Rash.          How is this diagnosed?  This condition may be diagnosed based on one or more of the following:  •Symptoms.      •Medical history.      •Physical exam.      •Blood test, sample of mucus from your lungs (sputum sample), stool sample, or a swab of body fluids or a skin sore (lesion).        How is this treated?  Viruses can be hard to treat because they live within cells. Antibiotic medicines do not treat viruses because these medicines do not get inside cells. Treatment for a viral illness may include:  •Resting and drinking plenty of fluids.      •Medicines to relieve symptoms. These can include over-the-counter medicine for pain and fever, medicines for cough or congestion, and medicines to relieve diarrhea.      •Antiviral medicines. These medicines are available only for certain types of viruses.      Some viral illnesses can be prevented with vaccinations. A common example is the flu shot.      Follow these instructions at home:    Medicines     •Take over-the-counter and prescription medicines only as told by your health care provider.      •If you were prescribed an antiviral medicine, take it as told by your health care provider. Do not stop taking the antiviral even if you start to feel better.    •Be aware of when antibiotics are needed and when they are not needed. Antibiotics do not treat viruses. You may get an antibiotic if your health care provider thinks that you may have, or are at risk for, a bacterial infection and you have a viral infection.  •Do not ask for an antibiotic prescription if you have been diagnosed with a viral illness. Antibiotics will not make your illness go away faster.      •Frequently taking antibiotics when they are not needed can lead to antibiotic resistance. When this develops, the medicine no longer works against the bacteria that it normally fights.          General instructions      •Drink enough fluids to keep your urine pale yellow.      •Rest as much as possible.      •Return to your normal activities as told by your health care provider. Ask your health care provider what activities are safe for you.      •Keep all follow-up visits as told by your health care provider. This is important.        How is this prevented?  To reduce your risk of viral illness:  •Wash your hands often with soap and water for at least 20 seconds. If soap and water are not available, use hand .      •Avoid touching your nose, eyes, and mouth, especially if you have not washed your hands recently.      •If anyone in your household has a viral infection, clean all household surfaces that may have been in contact with the virus. Use soap and hot water. You may also use bleach that you have added water to (diluted).      •Stay away from people who are sick with symptoms of a viral infection.      •Do not share items such as toothbrushes and water bottles with other people.      •Keep your vaccinations up to date. This includes getting a yearly flu shot.      •Eat a healthy diet and get plenty of rest.        Contact a health care provider if:    •You have symptoms of a viral illness that do not go away.      •Your symptoms come back after going away.      •Your symptoms get worse.        Get help right away if you have:    •Trouble breathing.      •A severe headache or a stiff neck.      •Severe vomiting or pain in your abdomen.      These symptoms may represent a serious problem that is an emergency. Do not wait to see if the symptoms will go away. Get medical help right away. Call your local emergency services (911 in the [place] Do not drive yourself to the hospital.       Summary    •Viruses are types of germs that can get into a person's body and cause illness. Viral illnesses can range from mild to severe. They can affect various parts of the body.      •Viruses can be hard to treat. There are medicines to relieve symptoms, and there are some antiviral medicines.      •If you were prescribed an antiviral medicine, take it as told by your health care provider. Do not stop taking the antiviral even if you start to feel better.      •Contact a health care provider if you have symptoms of a viral illness that do not go away.      This information is not intended to replace advice given to you by your health care provider. Make sure you discuss any questions you have with your health care provider.      Document Revised: 05/03/2021 Document Reviewed: 10/27/2020    Elsevier Patient Education © 2021 [employer]

## 2022-01-07 NOTE — CONSULT NOTE ADULT - ASSESSMENT
45 y/o M with PMHx of DM, HTN, HLD, osteomyelitis s/p amputation of R 3rd and 4th toes and partial amp of R 2nd toe, who was diagnosed w/ COVID on 12/24/21 and presents to the ED with complaints of recurrent fever starting 5 nights ago, w/ mild pains to b/l legs, R>l, and pains to lower back starting today, mild SOB with walking starting yesterday. Podiatry consulted for chronic wounds to R foot, seen by podiatrist Dr. Johnson 4 days ago. Wound clinically does not look infected as its does not probe to bone no malodor, no erythema or edema and no drainage     Plan:  Pt evaluated and chart reviewed   F/u X rays  Pts chronic r foot wound does not look acutely infected no need for abx for foot wound at this time  Recommend outpt follow up with Dr. Green next week (pt already has appointment for Monday  Instructed pt to continue daily dressing changed and stop using A & D ointment on his wound     Plan D/W attending     Patient should follow up with Dr. Maximiliano Green within 1 week of discharge.    Office information:          [place] Address- [address] [address] Phone: [phone]         [place] Address- [address], [place] [address] Phone: [phone]

## 2022-01-07 NOTE — ED PROVIDER NOTE - PHYSICAL EXAMINATION
CONSTITUTIONAL: Well-appearing; in no apparent distress.   HEAD: Normocephalic; atraumatic.   EYES:  conjunctiva and sclera clear  ENT: normal nose; no rhinorrhea; normal pharynx with no erythema or lesions.   NECK: Supple; non-tender;   CARDIOVASCULAR: Normal S1, S2; no murmurs, rubs, or gallops. Regular rate and rhythm.   RESPIRATORY: Breathing easily; breath sounds clear and equal bilaterally; no wheezes, rhonchi, or rales.  GI: Soft; non-distended; non-tender; no palpable organomegaly.   EXT: Mild TTP to muscles of R leg. + wounds to dorsum of R foot and plantar surface of R foot along metatarsal heads 2-4. Patient s/p amputation of 4th and 5th toes and partial amputation of 2nd toe. DP/PT pulses intact and equal bl. No cyanosis or edema; N/V intact  SKIN: Normal for age and race; warm; dry; good turgor; no apparent lesions or rash.   NEURO: A & O x 3; face symmetric; grossly unremarkable.   PSYCHOLOGICAL: The patient’s mood and manner are appropriate. CONSTITUTIONAL: Well-appearing; in no apparent distress.   HEAD: Normocephalic; atraumatic.   EYES:  conjunctiva and sclera clear  ENT: No rhinorrhea.   NECK: Supple; non-tender;   CARDIOVASCULAR: Normal S1, S2; no murmurs, rubs, or gallops. Regular rate and rhythm.   RESPIRATORY: Breathing easily; breath sounds clear and equal bilaterally; no wheezes, rhonchi, or rales.  GI: Soft; non-distended; non-tender; no palpable organomegaly.   EXT: Mild TTP to muscles of R leg. + wounds to dorsum of R foot and plantar surface of R foot along 2nd metatarsal head, s/p amputation of 4th and 5th toes and partial amputation of 2nd toe. DP/PT pulses intact and equal bl. No cyanosis, erythema, warmth or edema; no pus drainage.   SKIN: Normal for age and race; warm; dry; good turgor.  NEURO: A & O x 3; motor/ sensation grossly intact.   PSYCHOLOGICAL: The patient’s mood and manner are appropriate.

## 2022-01-07 NOTE — CONSULT NOTE ADULT - SUBJECTIVE AND OBJECTIVE BOX
Attending: Dr. Johnson    Patient is a 46y old  Male who presents with a chief complaint of Fevers and SOB    HPI:  45 y/o M with PMHx of DM, HTN, HLD, osteomyelitis s/p amputation of R 3rd and 4th toes and partial amp of R 2nd toe, who was diagnosed w/ COVID on 12/24/21 and presents to the ED with complaints of recurrent fever starting 5 nights ago, predominantly at nighttime w/ mild pains to b/l legs, R>l, and pains to lower back starting today, mild SOB with walking starting yesterday. Reporting chronic wounds to R foot, seen by podiatrist 4d ago and told the wounds are non-infected appearing. Patient is fully vaccinated for COVID. Pt follows regularly with outpt podiatrist, denies drainage or malodor to the ulceration. Denies N/V but admits to fever, chills, and SOB.       Review of systems negative except per HPI and as stated below  General:	 no weakness; no fevers, no chills  Skin/Breast: no rash  Respiratory and Thorax: no SOB, no cough  Cardiovascular:	No chest pain  Gastrointestinal:	 no nausea, vomiting , diarrhea  Genitourinary:	no dysuria, no difficulty urinating, no hematuria  Musculoskeletal:	no weakness, no joint swelling/pain  Neurological:	no focal weakness/numbness  Endocrine:	no polyuria, no polydipsia    PAST MEDICAL & SURGICAL HISTORY:  Diabetes    Osteomyelitis    Hyperlipidemia, unspecified hyperlipidemia type    Hypertension    History of cocaine use    Diabetes mellitus    Osteomyelitis  R foot    Toe amputation status  Right foot    H/O skin graft  Right foot    S/P insertion of penile implant      Home Medications:  atorvastatin 40 mg oral tablet: 1 tab(s) orally once a day (11 Oct 2021 17:30)  cephalexin 500 mg oral capsule: 1 cap(s) orally 4 times a day (13 Oct 2021 11:54)  insulin glargine: 30 unit(s) subcutaneous once a day (11 Oct 2021 17:30)  Insulin Lispro KwikPen 100 units/mL injectable solution: 10 unit(s) injectable 3 times a day (with meals) (11 Oct 2021 17:30)  losartan 50 mg oral tablet: 1 tab(s) orally once a day (11 Oct 2021 17:30)  metFORMIN 750 mg oral tablet, extended release: 2 tab(s) orally once a day (11 Oct 2021 17:30)    Allergies    No Known Allergies    Intolerances      FAMILY HISTORY:  FH: CAD (coronary artery disease) (Mother)      Social History:       LABS                        12.9   15.92 )-----------( 325      ( 07 Jan 2022 09:01 )             40.3     01-07    135  |  100  |  18  ----------------------------<  185<H>  4.4   |  26  |  1.06    Ca    9.3      07 Jan 2022 09:01    TPro  7.7  /  Alb  3.8  /  TBili  0.5  /  DBili  x   /  AST  19  /  ALT  19  /  AlkPhos  84  01-07      Vital Signs Last 24 Hrs  T(C): 36.9 (07 Jan 2022 07:43), Max: 36.9 (07 Jan 2022 07:43)  T(F): 98.4 (07 Jan 2022 07:43), Max: 98.4 (07 Jan 2022 07:43)  HR: 100 (07 Jan 2022 07:43) (100 - 100)  BP: 120/78 (07 Jan 2022 07:43) (120/78 - 120/78)  BP(mean): --  RR: 17 (07 Jan 2022 07:43) (17 - 17)  SpO2: 99% (07 Jan 2022 07:43) (99% - 99%)    PHYSICAL EXAM  General: NAD, AA0x3  Lower Extremity Right foot Focused:  Vasc: DP/PT 1/4, CFT<3secs, WWP, no edema  Derm:  Right foot- Submet 1 ulcer measuring 5.0cmx4.0cmx 1cm, Ulcer has granular/fibrotic base, no drainage, no malodor, -PTB, hyperkeratotic border, no erythema.    Neuro: Protective sensation diminished   MSK: 4th and 5th right partial ray amputation and digital amputation of 2nd digit of the R foot.   Hip Internal ROM R: 40  L:   45  Hip External ROM R:  40 L:    45  Reference: Internal (30-45); External (40-50)    Sagittal Knee Position:  Right: [x ] Normal, [ ] Flexed, [ ] Recurvatum  Left: [x ] Normal, [ ] Flexed, [ ] Recurvatum    Frontal Plane Leg:  Right: [x ] Normal, [ ] Varum, [ ] Valgum  Left: [x ] Normal, [ ] Varum, [ ] Valgum    Malleolar Position:  Right: [x ] External, [ ] Internal  Left: [x ] External, [ ] Internal    Limb Length Discrepancy: None  Right: [ ] Longer, [ ] Franklin  ___ cm  Left: [ ] Longer, [ ] Franklin  ___ cm    Ankle, knee extended  Right: [ ] Normal, [ x ] Limited, [ ] Crepitus  Left: [ ] Normal, [x  ] Limited, [ ] Crepitus    Ankle, knee flexed  Right: [ ] Normal, [x ] Limited, [ ] Crepitus  Left: [ ] Normal, [x ] Limited, [ ] Crepitus    RCSP  Right: [ ] Vertical, [ ] Varus, [x] Valgus  Left: [ ] Vertical, [ ] Varus, [x ] Valgus    NCSP:  Right: [ ] Vertical, [ ] Varus, [x ] Valgus  Left: [ ] Vertical, [ ] Varus, [x ] Valgus    STJ ROM:  Right: [ ] Normal, [ ] Limited, [ ] Crepitus  Left: [ ] Normal, [ ] Limited, [ ] Crepitus    MTJ ROM:  Right: [ ] Normal, [x ] Limited, [ ] Crepitus  Left: [ ] Normal, [x ] Limited, [ ] Crepitus    FF to RF Relationship  Right: [ ] Normal, [x] Varus, [ ] Valgus  Left: [ ] Normal, [x ] Varus, [ ] Valgus    1st Ray Position  Right: [ ] Neutral, [ ] Dorsiflexed, [ x] Plantarflexed, [ ] Hypermobile  Left: [ ] Neutral, [ ] Dorsiflexed, [x ] Plantarflexed, [ ] Hypermobile    1st MTP ROM, loaded  Right: [ ] Normal, [x ] Limited, [ ] Crepitus  Left: [ ] Normal, [x ] Limited, [ ] Crepitus    1st MTP ROM, unloaded  Right: [ ] Normal, [ x] Limited, [ ] Crepitus  Left: [ ] Normal, [x ] Limited, [ ] Crepitus    Muscle Strength  Posterior Group  R: [x ] 5, [ ] 4, [ ] 3, [ ] 2, [ ] 1  L: [x ] 5, [ ] 4, [ ] 3, [ ] 2, [ ] 1  Anterior Group  R: [x ] 5, [ ] 4, [ ] 3, [ ] 2, [ ] 1  L: [x ] 5, [ ] 4, [ ] 3, [ ] 2, [ ] 1  Medial Group  R: [x ] 5, [ ] 4, [ ] 3, [ ] 2, [ ] 1  L: [ x] 5, [ ] 4, [ ] 3, [ ] 2, [ ] 1  Lateral Group  R: [x ] 5, [ ] 4, [ ] 3, [ ] 2, [ ] 1  L: [x ] 5, [ ] 4, [ ] 3, [ ] 2, [ ] 1    Gait Examination: Antalgic gait    RADIOLOGY: Pending

## 2022-01-07 NOTE — ED ADULT TRIAGE NOTE - CHIEF COMPLAINT QUOTE
pt c/o fevers/ chills overnight since sunday (afebrile during the day), b/l leg pain, MAYO. "Im also concerned about a diabetic ulcer on my right foot." pt covid + on 1/1/22

## 2022-01-07 NOTE — ED PROVIDER NOTE - OBJECTIVE STATEMENT
45 y/o M with PMHx of DM, HTN, HLD, osteomyelitis s/p amputation of several toes of R foot who was diagnosed w/ COVID on 12/24/21 and presents to the ED with complaints of recurrent fever starting 5 nights ago, predominantly at nighttime w/ mild pains to b/l legs, R>l, and pains to lower back starting today, mild SOB with walking starting yesterday. No chest pain, admits to cough starting yesterday. No abdominal pain, vomiting, diarrhea, urinary symptoms. Reporting chronic wounds to R foot, patient seen by podiatrist 4d ago and told the wounds are non-infected appearing. Patient fully vaccinated for COVID. 45 y/o M with PMHx of DM, HTN, HLD, osteomyelitis s/p amputation of R 3rd and 4th toes and partial amp of R 2nd toe, who was diagnosed w/ COVID on 12/24/21 and presents to the ED with complaints of recurrent fever starting 5 nights ago, predominantly at nighttime w/ mild pains to b/l legs, R>l, and pains to lower back starting today, mild SOB with walking starting yesterday. No chest pain. Admits to cough starting yesterday. No abdominal pain, vomiting, diarrhea, urinary symptoms. Reporting chronic wounds to R foot, seen by podiatrist 4d ago and told the wounds are non-infected appearing. Patient is fully vaccinated for COVID. 47 y/o M with PMHx of DM, HTN, HLD, osteomyelitis s/p amputation of R 3rd and 4th toes and partial amp of R 2nd toe, who was diagnosed w/ COVID on 12/24/21 and presents to the ED with complaints of recurrent fever described as subjective warmth starting 5 nights ago, predominantly at nighttime w/ mild pains to b/l legs, R>l, and pains to lower back starting today, mild SOB with walking starting yesterday. No chest pain. Admits to cough starting yesterday. No abdominal pain, vomiting, diarrhea, urinary symptoms. Reporting chronic wounds to R foot, seen by podiatrist 4d ago and told the wounds are non-infected appearing. Patient is fully vaccinated for COVID.

## 2022-01-07 NOTE — ED PROVIDER NOTE - CARE PROVIDER_API CALL
David Green (AIMEE)  Surgery Orthopaedic Surgery  [address] [place] [address], [address]  [place] [address]  Phone: [phone]  Fax: [phone]  Follow Up Time:

## 2022-01-07 NOTE — ED PROVIDER NOTE - CLINICAL SUMMARY MEDICAL DECISION MAKING FREE TEXT BOX
Impression - 45 y/o M w/ recent diagnosis of COVID with fever restarting again 5 night sago, now with pain to b/l legs, R>L, also with lower back pain, cough, mild SOB with exertion. Patient afebrile, HD stable. Will check labs and obtain CXR, r/o PNA, check UA, r/o infection. Will also obtain podiatry consult for wound eval. Impression - 45 y/o M w/ recent diagnosis of COVID on 12/24, with fever starting again 5 nights ago, now with pain to b/l legs and lower back pain, cough, mild SOB with exertion. Patient afebrile, HD stable. Will check labs and obtain CXR to r/o PNA, UA to r/o infection. Will also obtain podiatry consult for wound eval.    Labs notable for mild leukocytosis to 15 with normal lactate and renal function. Trop and d-dimer neg. RSV/ Flu neg. UA neg for infection. CXR neg for i/e. Xray of foot neg for osteo. Pt seen by podiatry and wound re-dressed. Do not suspect foot infection at this time. Pt with likely non-specific viral illness. ED evaluation and management discussed with the patient in detail.  Supportive care and close PMD and podiatry follow up encouraged.  Strict ED return instructions discussed in detail and patient given the opportunity to ask any questions about their discharge diagnosis and instructions. Patient verbalized understanding.

## 2022-01-07 NOTE — ED ADULT NURSE NOTE - OBJECTIVE STATEMENT
45yo diabetic Male comes in c/o Fever, chills x 2 weeks. tested Positive for covid 12/25. Pt also noted redness and pain to Toe which he saw Podiatry for last this monday as per patient with wound cleaned and no infection noted.

## 2022-01-23 ENCOUNTER — EMERGENCY (EMERGENCY)
Facility: HOSPITAL | Age: 47
LOS: 1 days | Discharge: HOME | End: 2022-01-23
Attending: EMERGENCY MEDICINE | Admitting: EMERGENCY MEDICINE
Payer: COMMERCIAL

## 2022-01-23 VITALS
DIASTOLIC BLOOD PRESSURE: 69 MMHG | RESPIRATION RATE: 17 BRPM | HEIGHT: 72 IN | HEART RATE: 107 BPM | OXYGEN SATURATION: 100 % | SYSTOLIC BLOOD PRESSURE: 105 MMHG | WEIGHT: 220.02 LBS | TEMPERATURE: 98 F

## 2022-01-23 VITALS
TEMPERATURE: 98 F | DIASTOLIC BLOOD PRESSURE: 71 MMHG | RESPIRATION RATE: 16 BRPM | SYSTOLIC BLOOD PRESSURE: 121 MMHG | OXYGEN SATURATION: 100 % | HEART RATE: 75 BPM

## 2022-01-23 DIAGNOSIS — E78.5 HYPERLIPIDEMIA, UNSPECIFIED: ICD-10-CM

## 2022-01-23 DIAGNOSIS — Z20.822 CONTACT WITH AND (SUSPECTED) EXPOSURE TO COVID-19: ICD-10-CM

## 2022-01-23 DIAGNOSIS — E11.9 TYPE 2 DIABETES MELLITUS WITHOUT COMPLICATIONS: ICD-10-CM

## 2022-01-23 DIAGNOSIS — Z94.5 SKIN TRANSPLANT STATUS: Chronic | ICD-10-CM

## 2022-01-23 DIAGNOSIS — Z89.429 ACQUIRED ABSENCE OF OTHER TOE(S), UNSPECIFIED SIDE: Chronic | ICD-10-CM

## 2022-01-23 DIAGNOSIS — D72.829 ELEVATED WHITE BLOOD CELL COUNT, UNSPECIFIED: ICD-10-CM

## 2022-01-23 DIAGNOSIS — M54.50 LOW BACK PAIN, UNSPECIFIED: ICD-10-CM

## 2022-01-23 DIAGNOSIS — M51.26 OTHER INTERVERTEBRAL DISC DISPLACEMENT, LUMBAR REGION: ICD-10-CM

## 2022-01-23 DIAGNOSIS — I10 ESSENTIAL (PRIMARY) HYPERTENSION: ICD-10-CM

## 2022-01-23 DIAGNOSIS — Z79.4 LONG TERM (CURRENT) USE OF INSULIN: ICD-10-CM

## 2022-01-23 DIAGNOSIS — M79.604 PAIN IN RIGHT LEG: ICD-10-CM

## 2022-01-23 DIAGNOSIS — M79.605 PAIN IN LEFT LEG: ICD-10-CM

## 2022-01-23 DIAGNOSIS — Z96.0 PRESENCE OF UROGENITAL IMPLANTS: Chronic | ICD-10-CM

## 2022-01-23 LAB
ALBUMIN SERPL ELPH-MCNC: 3.7 G/DL — SIGNIFICANT CHANGE UP (ref 3.3–5)
ALP SERPL-CCNC: 65 U/L — SIGNIFICANT CHANGE UP (ref 40–120)
ALT FLD-CCNC: 18 U/L — SIGNIFICANT CHANGE UP (ref 10–45)
ANION GAP SERPL CALC-SCNC: 13 MMOL/L — SIGNIFICANT CHANGE UP (ref 5–17)
APTT BLD: 33.6 SEC — SIGNIFICANT CHANGE UP (ref 27.5–35.5)
AST SERPL-CCNC: 16 U/L — SIGNIFICANT CHANGE UP (ref 10–40)
BASOPHILS # BLD AUTO: 0.07 K/UL — SIGNIFICANT CHANGE UP (ref 0–0.2)
BASOPHILS NFR BLD AUTO: 0.5 % — SIGNIFICANT CHANGE UP (ref 0–2)
BILIRUB SERPL-MCNC: 0.3 MG/DL — SIGNIFICANT CHANGE UP (ref 0.2–1.2)
BUN SERPL-MCNC: 19 MG/DL — SIGNIFICANT CHANGE UP (ref 7–23)
CALCIUM SERPL-MCNC: 9.1 MG/DL — SIGNIFICANT CHANGE UP (ref 8.4–10.5)
CHLORIDE SERPL-SCNC: 99 MMOL/L — SIGNIFICANT CHANGE UP (ref 96–108)
CO2 SERPL-SCNC: 24 MMOL/L — SIGNIFICANT CHANGE UP (ref 22–31)
CREAT SERPL-MCNC: 1.76 MG/DL — HIGH (ref 0.5–1.3)
EOSINOPHIL # BLD AUTO: 0.22 K/UL — SIGNIFICANT CHANGE UP (ref 0–0.5)
EOSINOPHIL NFR BLD AUTO: 1.7 % — SIGNIFICANT CHANGE UP (ref 0–6)
GLUCOSE SERPL-MCNC: 179 MG/DL — HIGH (ref 70–99)
HCT VFR BLD CALC: 36.6 % — LOW (ref 39–50)
HGB BLD-MCNC: 11.8 G/DL — LOW (ref 13–17)
IMM GRANULOCYTES NFR BLD AUTO: 0.2 % — SIGNIFICANT CHANGE UP (ref 0–1.5)
INR BLD: 1.04 — SIGNIFICANT CHANGE UP (ref 0.88–1.16)
LYMPHOCYTES # BLD AUTO: 27.3 % — SIGNIFICANT CHANGE UP (ref 13–44)
LYMPHOCYTES # BLD AUTO: 3.58 K/UL — HIGH (ref 1–3.3)
MCHC RBC-ENTMCNC: 27.4 PG — SIGNIFICANT CHANGE UP (ref 27–34)
MCHC RBC-ENTMCNC: 32.2 GM/DL — SIGNIFICANT CHANGE UP (ref 32–36)
MCV RBC AUTO: 84.9 FL — SIGNIFICANT CHANGE UP (ref 80–100)
MONOCYTES # BLD AUTO: 0.79 K/UL — SIGNIFICANT CHANGE UP (ref 0–0.9)
MONOCYTES NFR BLD AUTO: 6 % — SIGNIFICANT CHANGE UP (ref 2–14)
NEUTROPHILS # BLD AUTO: 8.41 K/UL — HIGH (ref 1.8–7.4)
NEUTROPHILS NFR BLD AUTO: 64.3 % — SIGNIFICANT CHANGE UP (ref 43–77)
NRBC # BLD: 0 /100 WBCS — SIGNIFICANT CHANGE UP (ref 0–0)
PLATELET # BLD AUTO: 471 K/UL — HIGH (ref 150–400)
POTASSIUM SERPL-MCNC: 4.6 MMOL/L — SIGNIFICANT CHANGE UP (ref 3.5–5.3)
POTASSIUM SERPL-SCNC: 4.6 MMOL/L — SIGNIFICANT CHANGE UP (ref 3.5–5.3)
PROT SERPL-MCNC: 7.9 G/DL — SIGNIFICANT CHANGE UP (ref 6–8.3)
PROTHROM AB SERPL-ACNC: 12.5 SEC — SIGNIFICANT CHANGE UP (ref 10.6–13.6)
RBC # BLD: 4.31 M/UL — SIGNIFICANT CHANGE UP (ref 4.2–5.8)
RBC # FLD: 13.8 % — SIGNIFICANT CHANGE UP (ref 10.3–14.5)
SARS-COV-2 RNA SPEC QL NAA+PROBE: SIGNIFICANT CHANGE UP
SODIUM SERPL-SCNC: 136 MMOL/L — SIGNIFICANT CHANGE UP (ref 135–145)
WBC # BLD: 13.1 K/UL — HIGH (ref 3.8–10.5)
WBC # FLD AUTO: 13.1 K/UL — HIGH (ref 3.8–10.5)

## 2022-01-23 PROCEDURE — 80053 COMPREHEN METABOLIC PANEL: CPT

## 2022-01-23 PROCEDURE — 85730 THROMBOPLASTIN TIME PARTIAL: CPT

## 2022-01-23 PROCEDURE — 93970 EXTREMITY STUDY: CPT | Mod: 26

## 2022-01-23 PROCEDURE — 85610 PROTHROMBIN TIME: CPT

## 2022-01-23 PROCEDURE — 93970 EXTREMITY STUDY: CPT

## 2022-01-23 PROCEDURE — 99284 EMERGENCY DEPT VISIT MOD MDM: CPT | Mod: 25

## 2022-01-23 PROCEDURE — 72149 MRI LUMBAR SPINE W/DYE: CPT | Mod: MA

## 2022-01-23 PROCEDURE — 72158 MRI LUMBAR SPINE W/O & W/DYE: CPT | Mod: 26,MA

## 2022-01-23 PROCEDURE — 72158 MRI LUMBAR SPINE W/O & W/DYE: CPT | Mod: MA

## 2022-01-23 PROCEDURE — 87040 BLOOD CULTURE FOR BACTERIA: CPT

## 2022-01-23 PROCEDURE — 85025 COMPLETE CBC W/AUTO DIFF WBC: CPT

## 2022-01-23 PROCEDURE — U0005: CPT

## 2022-01-23 PROCEDURE — A9585: CPT

## 2022-01-23 PROCEDURE — 72131 CT LUMBAR SPINE W/O DYE: CPT | Mod: MA

## 2022-01-23 PROCEDURE — 72131 CT LUMBAR SPINE W/O DYE: CPT | Mod: 26,MA

## 2022-01-23 PROCEDURE — U0003: CPT

## 2022-01-23 PROCEDURE — 82962 GLUCOSE BLOOD TEST: CPT

## 2022-01-23 PROCEDURE — 36415 COLL VENOUS BLD VENIPUNCTURE: CPT

## 2022-01-23 PROCEDURE — 99285 EMERGENCY DEPT VISIT HI MDM: CPT

## 2022-01-23 RX ORDER — DIAZEPAM 5 MG
5 TABLET ORAL ONCE
Refills: 0 | Status: DISCONTINUED | OUTPATIENT
Start: 2022-01-23 | End: 2022-01-23

## 2022-01-23 RX ORDER — LIDOCAINE 4 G/100G
1 CREAM TOPICAL
Qty: 7 | Refills: 0
Start: 2022-01-23 | End: 2022-01-29

## 2022-01-23 RX ORDER — OXYCODONE AND ACETAMINOPHEN 5; 325 MG/1; MG/1
1 TABLET ORAL ONCE
Refills: 0 | Status: DISCONTINUED | OUTPATIENT
Start: 2022-01-23 | End: 2022-01-23

## 2022-01-23 RX ORDER — LIDOCAINE 4 G/100G
1 CREAM TOPICAL ONCE
Refills: 0 | Status: COMPLETED | OUTPATIENT
Start: 2022-01-23 | End: 2022-01-23

## 2022-01-23 RX ORDER — OXYCODONE AND ACETAMINOPHEN 5; 325 MG/1; MG/1
1 TABLET ORAL
Qty: 20 | Refills: 0
Start: 2022-01-23

## 2022-01-23 RX ADMIN — Medication 5 MILLIGRAM(S): at 16:26

## 2022-01-23 RX ADMIN — OXYCODONE AND ACETAMINOPHEN 1 TABLET(S): 5; 325 TABLET ORAL at 16:26

## 2022-01-23 RX ADMIN — LIDOCAINE 1 PATCH: 4 CREAM TOPICAL at 16:26

## 2022-01-23 NOTE — ED PROVIDER NOTE - CLINICAL SUMMARY MEDICAL DECISION MAKING FREE TEXT BOX
Impression: 47 y/o M pt w/ PMHx of DM, amputation of the right 4th and 5th toes and partial amputation of the left 2nd toe s/p infection, COVID-19 infection during the end of 12/21, here with lower back pain and b/l leg pain for the past 3 weeks that is worsened with sitting and attempting to stand up and better when laying down in the supine position. Patient does not have any numbness, weakness, or tingling of the extremities. Do not suspect cauda equina or any infectious process. Will obtain labs, CT lumbar and doppler of the lower extremities, medicate for pain, and reassess. Dispo pending workup and reevaluation. Impression: atraumatic lower back pain with radiation to b/l lower ext for the past 3 weeks that is worsened with sitting and attempting to stand up and better when laying down in the supine position. Patient does not have any numbness, weakness, or tingling of the extremities or buttocks. NO bowel/ bladder dysfunction to suggest cauda equina. Labs notable for leukocytosis to 13, with elev of cr to 1.76. Doppler of b/l lower ext neg for dvt. CT of L spine neg for fx/ malalignment. MR L spine notable for:  1. Broad-based posterior disc bulging at L4-L5 with a superimposed   posterior central disc extrusion showing inferior prolapse results in   severe central canal stenosis, AP canal dimension approximately 5 mm.  2. Posterior right paracentral disc extrusion with slight inferior   prolapse at L5-S1 produces severe asymmetric canal stenosis (AP canal   dimension approximately 6 mm) and obliteration of the right subarticular   recess with mass effect upon the traversing right S1 root.  3. Foraminal distortions also identified at L4-L5 and L5-S1 as above.  ED evaluation and management discussed with the patient in detail. Close spine follow up encouraged for re-evaluation and further management including possible surgery.  Strict ED return instructions discussed in detail and patient given the opportunity to ask any questions about their discharge diagnosis and instructions. Patient verbalized understanding.

## 2022-01-23 NOTE — ED ADULT TRIAGE NOTE - CHIEF COMPLAINT QUOTE
Pt reports lower back pain radiating down right leg x 1, denies injuries/falls. Denies numbness/tingling, weakness, no bowel/bladder incontinence. Pt able to ambulate. Pt also reports diabetic wound to right food with drainage, no fevers at home.

## 2022-01-23 NOTE — ED PROVIDER NOTE - OBJECTIVE STATEMENT
47 y/o M pt w/ PMHx of DM, amputation of the right 4th and 5th toes and partial amputation of the left 2nd toe s/p infection, COVID-19 infection during the end of 12/21, presents to the ED c/o back pain and b/l leg pain for the past 3 weeks. He describes the pain as across his lower back that radiates to the b/l thighs and calfs. The pain is worsened when sitting and attempting to stand up and is better when in the supine position. Denies leg swelling, numbness, weakness, and tingling of the extremities, bowel or bladder dysfunction, fevers, chills, fevers, chills, trauma, injury, dysuria, hematuria, urinary frequency, or any other complaints. Takes Motrin without relief of symptoms. 45 y/o M pt w/ PMHx of HTN, HLD, DM, osteomyelitis, amputation of the right 4th and 5th toes and partial amputation of the right 2nd toe, COVID, presents to the ED c/o back pain and b/l leg pain for the past 3 weeks. He describes the pain as across his lower back that radiates to the b/l thighs and calfs. The pain is worsened when sitting and attempting to stand up and is better when in the supine position. Denies leg swelling, numbness, weakness, and tingling of the extremities, bowel or bladder dysfunction, fevers, chills, trauma, injury/ falls, dysuria, hematuria, urinary frequency, or any other complaints. Takes Motrin without relief of symptoms.

## 2022-01-23 NOTE — ED PROVIDER NOTE - CARE PROVIDER_API CALL
Mian Herr)  Neurosurgery  [address], [place] [facility]  Phone: [phone]  Fax: [phone]  Follow Up Time:

## 2022-01-23 NOTE — ED PROVIDER NOTE - PATIENT PORTAL LINK FT
You can access the FollowMyHealth Patient Portal offered by [facility] by registering at the following website: [email or web address]. By joining [employer]’s FollowMyHealth portal, you will also be able to view your health information using other applications (apps) compatible with our system.

## 2022-01-23 NOTE — ED PROVIDER NOTE - NSFOLLOWUPINSTRUCTIONS_ED_ALL_ED_FT
Take percocet 1-2 tabs every 6 hours as needed for pain.  Take colace when taking percocet to prevent constipation.  Use lidoderm patch to your back once daily.  Take medrol dose pack as directed for 1 week.  Follow up with a spine specialist for re-evaluation and further management.  Return to er for any new or worsening symptoms (weakness, numbness, difficulty moving bowels/ urinating or incontinence, worsening back pain...).      EnglishSpanish                                                                                                             Herniated Disk       A herniated disk happens when a disk in the spine bulges out too far. There is an oval disk between each pair of bones (vertebrae) in the backbone or spine. The disks connect the bones, help the spine move, and keep the bones from rubbing against each other when you move.    A herniated disk can happen anywhere in the back or neck area. It most often affects the lower back.      What are the causes?    This condition may be caused by:  •Wear and tear as you age.      •Sudden injury, such as a strain or sprain.        What increases the risk?    The following factors may make you more likely to develop this condition:  •Age. The older you are the higher the risk.      •Being a man who is 30–50 years old.      •Doing activities that involve heavy lifting, bending, or twisting.      •Not getting enough exercise.      •Being overweight.      •Smoking or using tobacco.        What are the signs or symptoms?    Symptoms may vary depending on where the herniated disk is in your body.  •Sharp pain in the arm, hip, butt, or in the lower back. The pain can spread to the leg and foot.      •Dizziness.      • A feeling that things are moving when they are not (vertigo).       •Pain or weakness in the neck, shoulder, upper or lower arm, or fingers.    •Muscle weakness. You may not be able to:  •Lift your arm or leg.      •Stand on your toes.      •Squeeze with your hands.         •Loss of feeling (numbness) or tingling in the hands, arms, feet, or legs.      •Being unable to control when to poop or pee. This is rare but serious.        How is this treated?    This condition may be treated with:•Resting for a few days or several weeks.   •Do not do things that need a lot of effort. Do not go into complete bed rest. Do only light activities.      •If you have a herniated disk in your lower back, limit how much you sit. Sitting puts more pressure on the disk.        •Medicines for pain, swelling, or tense muscles.      •Ice or heat therapy.      •Steroid shots. These can reduce pain and swelling.      •Physical therapy to strengthen your back.        Follow these instructions at home:    Medicines     •Take over-the-counter and prescription medicines only as told by your doctor.    •If told, take steps to prevent problems with pooping (constipation). You may need to:   •Drink enough fluid to keep your pee (urine) pale yellow.      •Take over-the-counter or prescription medicines.      •Eat foods that are high in fiber. These include beans, whole grains, and fresh fruits and vegetables.      •Limit foods that are high in fat and processed sugars. These include fried or sweet foods.        •Ask your doctor if you should avoid driving or using machines while you are taking your medicines.        Managing pain, stiffness, and swelling                 •If told, put ice on the affected area. To do this:  •Put ice in a plastic bag.      •Place a towel between your skin and the bag.      •Leave the ice on for 20 minutes, 2–3 times a day.      •If told, put heat on the painful area. Use the heat source that your doctor recommends, such as a moist heat pack or a heating pad.  •Place a towel between your skin and the heat source.      •Leave the heat on for 20–30 minutes.        Take off the heat or ice if your skin turns bright red. If you cannot feel pain, heat, or cold, you have a greater risk of getting burned.    Activity     •Rest as told by your doctor. Avoid strict bed rest. Do only activities that do not cause pain.    •After your rest period:  •Return to your normal activities as told by your doctor. Slowly start doing exercises as told. Ask your doctor what activities and exercises are safe for you.      •Use good posture.      •Avoid movements that cause pain.      •Do not lift anything that is heavier than 10 lb (4.5 kg), or the limit that you are told.      •Do not sit or stand for a long time without moving.      •Do not sit for a long time without getting up and moving around.        •If exercises (physical therapy) were prescribed, do them as told by your doctor.      •Try to strengthen your back and belly with exercises such as swimming or walking.      General instructions     • Do not smoke or use any products that contain nicotine or tobacco. If you need help quitting, ask your doctor.      • Do not wear high-heeled shoes.      • Do not sleep on your belly.      •If you are overweight, work with your doctor to lose weight safely.      •Keep all follow-up visits.        How is this prevented?    •Stay at a healthy weight.      •Stay in shape. Do at least 150 minutes of moderate-intensity exercise each week, such as fast walking or water aerobics.    •When lifting objects:  •Keep your feet as far apart as your shoulders or farther apart.      •Tighten your belly muscles.      •Bend your knees and hips, and keep your spine neutral. Lift using the strength of your legs, not your back. Do not lock your knees straight out.      •Always ask for help to lift heavy or large objects.          Contact a doctor if:    •You have back pain or neck pain that does not get better after 6 weeks.      •You have very bad pain in your back, neck, legs, or arms.    •You get any of these problems in any part of your body:  •Tingling.      •Weakness.      •Loss of feeling.          Get help right away if:    •You cannot move your arms or legs.      •You cannot control when you pee or poop.      •You feel dizzy.      •You faint.      •You have trouble breathing.      These symptoms may be an emergency. Get help right away. Call your local emergency services (911 in the [place]    • Do not wait to see if the symptoms will go away.       • Do not drive yourself to the hospital.         Summary    •A herniated disk happens when a disk in your backbone bulges out too far.      •This condition may be caused by wear and tear as you age or a sudden injury.      •Symptoms may vary depending on where your herniated disk is in your body.      •Treatment may include rest, medicines, ice or heat therapy, steroid shots, and exercises.      This information is not intended to replace advice given to you by your health care provider. Make sure you discuss any questions you have with your health care provider.

## 2022-01-23 NOTE — ED PROVIDER NOTE - PHYSICAL EXAMINATION
VITAL SIGNS: I have reviewed nursing notes and confirm.  CONSTITUTIONAL: Well-developed; in no acute distress.   SKIN:  warm and dry, no acute rash.   HEAD:  normocephalic, atraumatic.  EYES: EOM intact; conjunctiva and sclera clear.  ENT: No nasal discharge; airway clear.   NECK: Supple; non tender.  CARD: S1, S2 normal; no murmurs, gallops, or rubs. Regular rate and rhythm.   RESP:  Clear to auscultation b/l, no wheezes, rales or rhonchi.  ABD: Normal bowel sounds; soft; non-distended; non-tender; no guarding/ rebound.  MSK: Mild tenderness to the lower lumbar spine and paralumbar area b/l; No CVAT.  EXT: Normal ROM. No clubbing, cyanosis or edema. 2+ pulses to b/l ue/le. Compartments are soft; s/p amputation of the right 4th and 5th toes and partial amputation of the left 2nd toe; chronic wounds noted to the dorsal aspect of the right foot, medial aspect of the right foot, plantar aspect of right foot, and region of the 1st MTP joint. Skin is noted to be dry.   NEURO: Alert, oriented, negative straight leg raise test; motor and sensation equal and intact b/l including hip and knee flexion, ankle dorsiflexion, dorsiflexion of the toes b/l;   PSYCH: Cooperative, mood and affect appropriate. VITAL SIGNS: I have reviewed nursing notes and confirm.  CONSTITUTIONAL: Well-developed; in no acute distress.   SKIN:  warm and dry, no acute rash.   HEAD:  normocephalic, atraumatic.  EYES: EOM intact; conjunctiva and sclera clear.  ENT: No nasal discharge; airway clear.   NECK: Supple; non tender.  CARD: S1, S2 normal; no murmurs, gallops, or rubs. Regular rate and rhythm.   RESP:  Clear to auscultation b/l, no wheezes, rales or rhonchi.  ABD: Normal bowel sounds; soft; non-distended; non-tender; no guarding/ rebound.  MSK: Mild tenderness to the lower lumbar spine and paralumbar areas b/l; No CVAT.  EXT: Normal ROM. No clubbing, cyanosis or edema. 2+ pulses to b/l ue/le. Compartments are soft; s/p amputation of the right 4th and 5th toes and partial amputation of the right 2nd toe; chronic wounds noted to the dorsal aspect of the right foot, medial and plantar aspect of right foot in region of the 1st MTP joint.   NEURO: Alert, oriented, + SLR, motor and sensation equal and intact b/l including hip and knee flexion/ ext, ankle dorsi/plantarflexion, dorsiflexion of the great toes b/l.  PSYCH: Cooperative, mood and affect appropriate.

## 2022-01-23 NOTE — ED ADULT NURSE NOTE - NSIMPLEMENTINTERV_GEN_ALL_ED
Implemented All Universal Safety Interventions:  [place] to call system. Call bell, personal items and telephone within reach. Instruct patient to call for assistance. Room bathroom lighting operational. Non-slip footwear when patient is off stretcher. Physically safe environment: no spills, clutter or unnecessary equipment. Stretcher in lowest position, wheels locked, appropriate side rails in place.

## 2022-01-23 NOTE — ED ADULT NURSE NOTE - OBJECTIVE STATEMENT
46 year old M patient, A+Ox3, ambulatory w steady gait presents to ED with c/o of back pain radiating down bilateral legs.  Denies loss of bowel/bladder.  C/o of wound to R foot, draining & foul smell.  NO distress noted.  DEnies fevers.  Hx of DM.  R foot noted to have 2nd last two digits amputated.

## 2022-01-24 ENCOUNTER — APPOINTMENT (OUTPATIENT)
Dept: NEUROSURGERY | Facility: CLINIC | Age: 47
End: 2022-01-24
Payer: COMMERCIAL

## 2022-01-24 VITALS
TEMPERATURE: 97 F | HEIGHT: 72 IN | DIASTOLIC BLOOD PRESSURE: 77 MMHG | SYSTOLIC BLOOD PRESSURE: 114 MMHG | WEIGHT: 220 LBS | RESPIRATION RATE: 18 BRPM | OXYGEN SATURATION: 96 % | HEART RATE: 106 BPM | BODY MASS INDEX: 29.8 KG/M2

## 2022-01-24 DIAGNOSIS — Z87.891 PERSONAL HISTORY OF NICOTINE DEPENDENCE: ICD-10-CM

## 2022-01-24 DIAGNOSIS — Z86.39 PERSONAL HISTORY OF OTHER ENDOCRINE, NUTRITIONAL AND METABOLIC DISEASE: ICD-10-CM

## 2022-01-24 DIAGNOSIS — M51.26 OTHER INTERVERTEBRAL DISC DISPLACEMENT, LUMBAR REGION: ICD-10-CM

## 2022-01-24 DIAGNOSIS — Z09 ENCOUNTER FOR FOLLOW-UP EXAMINATION AFTER COMPLETED TREATMENT FOR CONDITIONS OTHER THAN MALIGNANT NEOPLASM: ICD-10-CM

## 2022-01-24 DIAGNOSIS — M54.10 RADICULOPATHY, SITE UNSPECIFIED: ICD-10-CM

## 2022-01-24 DIAGNOSIS — Z86.79 PERSONAL HISTORY OF OTHER DISEASES OF THE CIRCULATORY SYSTEM: ICD-10-CM

## 2022-01-24 PROCEDURE — 99204 OFFICE O/P NEW MOD 45 MIN: CPT

## 2022-01-24 RX ORDER — METHYLPREDNISOLONE 4 MG/1
4 TABLET ORAL
Refills: 0 | Status: ACTIVE | COMMUNITY

## 2022-01-24 RX ORDER — GABAPENTIN 100 MG/1
100 CAPSULE ORAL 3 TIMES DAILY
Qty: 63 | Refills: 0 | Status: ACTIVE | COMMUNITY
Start: 2022-01-24 | End: 1900-01-01

## 2022-01-24 NOTE — PHYSICAL EXAM
[General Appearance - Alert] : alert [General Appearance - In No Acute Distress] : in no acute distress [General Appearance - Well-Appearing] : healthy appearing [Oriented To Time, Place, And Person] : oriented to person, place, and time [Impaired Insight] : insight and judgment were intact [Affect] : the affect was normal [Memory Recent] : recent memory was not impaired [Person] : oriented to person [Place] : oriented to place [Time] : oriented to time [Short Term Intact] : short term memory intact [Remote Intact] : remote memory intact [Cranial Nerves Optic (II)] : visual acuity intact bilaterally,  pupils equal round and reactive to light [Cranial Nerves Oculomotor (III)] : extraocular motion intact [Cranial Nerves Trigeminal (V)] : facial sensation intact symmetrically [Cranial Nerves Facial (VII)] : face symmetrical [Cranial Nerves Vestibulocochlear (VIII)] : hearing was intact bilaterally [Cranial Nerves Glossopharyngeal (IX)] : tongue and palate midline [Cranial Nerves Accessory (XI - Cranial And Spinal)] : head turning and shoulder shrug symmetric [Cranial Nerves Hypoglossal (XII)] : there was no tongue deviation with protrusion [Motor Strength] : muscle strength was normal in all four extremities [5] : S1 ankle flexors 5/5 [Sensation Tactile Decrease] : light touch was intact [Straight-Leg Raise Test - Left] : straight leg raise of the left leg was positive [Straight-Leg Raise Test - Right] : straight leg raise of the right leg was positive [Neck Appearance] : the appearance of the neck was normal [] : no respiratory distress [Respiration, Rhythm And Depth] : normal respiratory rhythm and effort [Heart Rate And Rhythm] : heart rate was normal and rhythm regular [Motor Tone] : muscle strength and tone were normal [Skin Color & Pigmentation] : normal skin color and pigmentation

## 2022-01-27 NOTE — REVIEW OF SYSTEMS
[Leg Weakness] : leg weakness [Numbness] : numbness [Tingling] : tingling [As Noted in HPI] : as noted in HPI [Fever] : no fever [Chills] : no chills [Confused or Disoriented] : no confusion [Memory Lapses or Loss] : no memory loss [Facial Weakness] : no facial weakness [Arm Weakness] : no arm weakness [Hand Weakness] : no hand weakness [Frequent Falls] : not falling [Chest Pain] : no chest pain [Palpitations] : no palpitations [Shortness Of Breath] : no shortness of breath [Cough] : no cough [SOB on Exertion] : no shortness of breath during exertion [Incontinence] : no incontinence [de-identified] : Difficulty walking s/t pain.

## 2022-01-27 NOTE — ADDENDUM
[FreeTextEntry1] : 47 yo M, hx diabetes poorly controlled s/p toe amputations in the past. Patient presents with approximately 2 weeks of low back pain radiating down bilateral thighs to knees. He went to ED where MRI showed L3-4 and L4-5 small disc herniations, L3-4 mostly central and L4-5 eccentric to the right with lateral recess stenosis. Patient has +straight leg raise bilaterally. He has not been optimized with conservative therapy yet. He did not fill the medrol dose-pack that was written by the ED. Plan for PT, referral to pain management, start gabapentin, medrol dose pack. if radicular pain persistence after trial of conswervative therapy then can consider discectomy. Followup in 4-6 weeks.\par \par Mian Herr M.D.

## 2022-01-27 NOTE — HISTORY OF PRESENT ILLNESS
[FreeTextEntry1] : Back pain [de-identified] : 47 y/o male with PMHx HTN, DM2 c/b diabetic foot ulcer with amputation of right 4th and 5th toes and partial amputation of right 2nd toe, COVID 12/2021, recent presentation to [facility] ER 1/23/22 with complaints of back pain radiating to bilateral legs x3 weeks. Lumbar MRI with L3-4 and L4-5 disc herniations. Patient discharged home with outpatient f/u. \par \par TODAY:\par Patient endorses progressively worsening back pain x 3 weeks that radiates to bilateral posterior thighs, wraps around back of calfs to ankles. Endorses pain is constant with associated numbness/ tingling. States pain is equal bilaterally. Patient cannot recall specific movement or moment when pain started. 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 \par Endorses that pain is better when lying down, exacerbated by movement, ambulation. \par States bilateral thigh weakness s/t pain. \par Patient is wearing back brace currently, states he feels better stability with back brace but that brace does not provide any pain relief. \par Denies bowel/ bladder dysfunction, chills/ fever. \par Endorses was discharged from [facility] ER yesterday with medrol 4mg dose pack that he has not yet filled.

## 2022-01-27 NOTE — ASSESSMENT
[FreeTextEntry1] : MRI lumbar spine 1/23/22 reviewed by Dr. Herr with patient with L3-4 and L4-5 disc herniations. \par \par Recommending patient to start physical therapy, pain management for conservative measures. \par Patient will fill medrol RX and notify if any issues and start gabapentin 100 mg TID for thigh and calf nerve pain. \par Cannot work for 4 weeks. \par \par Patient will RTC 4-6 weeks to re-evaluate. He will call office to schedule appointment. \par \par Patient verbalizes agreement and understanding with plan of care. \par \par I, Dr. Herr, personally performed the evaluation and management (E/M) services for this new patient. That E/M includes conducting the initial examination, assessing all conditions, and establishing the plan of care. Today, my ACP, Heather Pappas, was here to observe my evaluation and management services for this patient to be followed going forward.

## 2022-01-27 NOTE — DATA REVIEWED
[de-identified] : ACC: [id number] EXAM: CT LUMBAR SPINE\par \par PROCEDURE DATE: 01/23/2022\par \par \par \par INTERPRETATION: I, Haim Mace MD, have reviewed the images and the report and agree with the findings, with the following modifications:\par \par Agree that there is no acute fracture or malalignment.\par \par Additional Findings:\par \par There is transitional lumbosacral anatomy with partial lumbarization of the S1 vertebral body with a disc at S1-S2. There is a right hypoplastic rib at L1. There is mild dextrocurvature of the lumbar spine. The vertebral body heights are maintained. There is disc space narrowing at L4-5 and L5-S1. There is a partially imaged hypodensity along the lateral aspect of the urinary bladder which likely reflects penile implant reservoir\par \par There is congenital spinal canal stenosis.\par At the L1-L2 level there is a mild disc bulge without acquired spinal canal stenosis or neural foraminal narrowing.\par \par At the L2-L3 level there is a disc bulge with facet hypertrophy resulting in mild spinal canal stenosis with mild bilateral neural foraminal narrowing.\par \par At the L3-L4 level there is a disc bulge with facet and ligamentum flavum hypertrophy resulting in mild to moderate spinal canal stenosis with mild to moderate left and mild right neural foraminal narrowing.\par \par At the L4-L5 level there is a inferiorly directed disc extrusion with facet and ligamentum flavum hypertrophy resulting in moderate spinal canal stenosis and bilateral recess narrowing.. There is moderate to severe right and moderate left neural foraminal narrowing.\par \par At the L5-S1 level there is a right paracentral/lateral recess disc protrusion along with ligamentum flavum hypertrophy resulting in moderate spinal canal stenosis with right lateral recess narrowing there is moderate to severe left and severe right neural foraminal narrowing.\par \par \par ******PRELIMINARY REPORT******\par \par \par PROCEDURE: CT lumbar spine without contrast\par \par INDICATION: Back pain radiating to the bilateral legs.\par \par TECHNIQUE: Multiple axial sections were obtained from the thoracolumbar junction through the sacrum. Sagittal and coronal reformats were obtained from the axial data set. The images were reviewed in soft tissue and bone windows.\par \par COMPARISON: MRI Lumbar spine 1/23/2022.\par \par FINDINGS:\par \par ALIGNMENT: Vertebral alignment is normal.\par \par BONES: Vertebral body heights are maintained. The vertebral facets, spinous processes, and transverse processes are intact are intact. There is no osseous fracture.\par \par There is multilevel disc space narrowing, most severe at L3-4 and L4-L5 with disc vacuum phenomena. At L4-L5, there is broad-based posterior disc bulging at this level with a superimposed posterior central disc extrusion showing inferior prolapse and resulting in severe central canal stenosis. At L5-S1, there is posterior right paracentral disc extrusion resulting in severe asymmetric canal stenosis and obliteration of the right subarticular recess with mass effect upon the traversing right S1 root. There is multilevel facet hypertrophy with superimposed degenerative disc disease contributing to varying degrees of multilevel neural foraminal narrowing.\par \par Also noted: There is transitional lumbosacral anatomy.\par \par IMPRESSION:\par No fracture or malalignment.\par \par --- End of Report --- [de-identified] : ACC: [id number] EXAM: MR SPINE LUMBAR WAW IC\par \par PROCEDURE DATE: 2022\par \par \par \par INTERPRETATION: I, Haim Mace MD, have reviewed the images and the report and agree with the findings, with the following modifications:\par \par Technique: Multiplanar multi sequential MR images of the lumbar spine were obtained before and after the administration of 9 mL IV Gadavist\par \par Counting from C2 inferiorly using localizer sequences, there is transitional lumbosacral anatomy with lumbarization of the S1 vertebral body and into space at S1-S2.\par \par There is mild dextrocurvature of the lumbar spine. The vertebral body heights are maintained. There is disc desiccation disc space narrowing at L4-5 and L5-S1. The bone marrow signal is appropriate for patient's age. There is no evidence of pathologic enhancement.\par \par There is congenital spinal canal stenosis. The conus medullaris terminates at the L1-L2 level and is normal signal and morphology.\par \par At the L1-L2 level there is a mild disc bulge without acquired spinal canal stenosis or neural foraminal narrowing.\par \par At the L2-L3 level there is a disc bulge with facet hypertrophy resulting in mild spinal canal stenosis with mild bilateral neural foraminal narrowing.\par \par At the L3-L4 level there is a disc bulge with facet and ligamentum flavum hypertrophy resulting in mild to moderate spinal canal stenosis with mild to moderate left and mild right neural foraminal narrowing.\par \par At the L4-L5 level there is a inferiorly directed disc extrusion with facet and ligamentum flavum hypertrophy resulting in moderate spinal canal stenosis which is impinging on the descending bilateral L5 nerve roots. There is moderate to severe right and moderate left neural foraminal narrowing.\par \par At the L5-S1 level there is a right paracentral/lateral recess disc protrusion along with ligamentum flavum hypertrophy resulting in moderate spinal canal stenosis with right lateral recess narrowing and impingement on the descending right S1 nerve root. There is moderate to severe left and severe right neural foraminal narrowing with impingement on the right greater than left exiting nerve roots.\par \par ******PRELIMINARY REPORT******\par \par \par [facility]\par Preliminary Radiology Report  Call: [phone]\par assistance Online chat: [email or web address] Patient Name: ARIEL MOURA MRN: [id number]\par  (Age): 1975 46 Gender: M\par Date of Exam: 2022 Accession: [id number]\par Referring Physician: ARIELA MI # of Images: 225\par Ordered As: MR SPINE LUMBAR W\par \par PROCEDURE INFORMATION:\par Exam: MR Lumbar Spine With Contrast\par Exam date and time: 2022 6:39 PM\par Age: 46 years old\par Clinical indication: Other: Back pain radiating to legs, R/O epid abscess.\par \par TECHNIQUE:\par Imaging protocol: Multiplanar magnetic resonance images of the lumbar spine with intravenous contrast.\par \par COMPARISON:\par US SP IR PROCEDURE PICC 3/31/2021 3:20 PM\par \par FINDINGS:\par Vertebrae: Transitional segment at the lumbosacral junction is designated as a partially lumbarized S1 for the following description and numbering scheme. According to this numbering scheme, the tip of the conus is seen at the L1-L2 level. Vertebral body height is preserved throughout lumbar levels and no acute fractures or dislocations are identified. No significant marrow replacing lesions are seen at lumbar levels.\par Spinal cord: The distal cord and conus are normal in configuration and signal characteristics and no abnormal intramedullary or other intraspinal enhancement is detected on postcontrast sequences.\par L1-L2: There is no significant disc bulge or protrusion. No central canal or foraminal stenosis detected.\par L2-L3: There is no significant disc bulge or protrusion. No central canal or foraminal stenosis detected.\par L3-L4: There is no significant disc bulge or protrusion. No central canal or foraminal stenosis detected.\par L4-L5: There is broad-based posterior disc bulging at this level with a superimposed posterior central\par disc extrusion showing inferior prolapse and resulting in severe central canal stenosis, AP canal dimension approximately 5 mm. Foraminal bulges result in bilateral foraminal distortions at L4-L5.\par L5-S1: Posterior right paracentral disc extrusion with slight inferior prolapse produces severe asymmetric canal stenosis (AP canal dimension approximately 6 mm) and obliteration of the right subarticular recess with mass effect upon the traversing right S1 root. Foraminal bulging results in right foraminal distortion with the left neural foramen adequate at L5-S1.\par Soft tissues: No epidural abscess or other abnormal intraspinal enhancement is detected on postcontrast sequences.\par \par IMPRESSION:\par 1. Broad-based posterior disc bulging at L4-L5 with a superimposed posterior central disc extrusion showing inferior prolapse results in severe central canal stenosis, AP canal dimension approximately 5 mm.\par 2. Posterior right paracentral disc extrusion with slight inferior prolapse at L5-S1 produces severe asymmetric canal stenosis (AP canal dimension approximately 6 mm) and obliteration of the right subarticular recess with mass effect upon the traversing right S1 root.\par 3. Foraminal distortions also identified at L4-L5 and L5-S1 as above.\par Thank you for allowing us to participate in the care of your patient.\par \par Dictated and Authenticated by: Homar Hawk MD\par 2022 7:44 PM Eastern Time ([place] & [place])\par \par The above report was submitted by the ad attending radiologist and copied to PowerScribe by resident Mack Walters MD.\par \par --- End of Report ---

## 2022-02-01 ENCOUNTER — TRANSCRIPTION ENCOUNTER (OUTPATIENT)
Age: 47
End: 2022-02-01

## 2022-02-01 ENCOUNTER — INPATIENT (INPATIENT)
Facility: HOSPITAL | Age: 47
LOS: 9 days | Discharge: NURSING, REHAB OR LONG-TERM CARE | DRG: 854 | End: 2022-02-11
Attending: INTERNAL MEDICINE | Admitting: STUDENT IN AN ORGANIZED HEALTH CARE EDUCATION/TRAINING PROGRAM
Payer: COMMERCIAL

## 2022-02-01 VITALS
RESPIRATION RATE: 18 BRPM | HEIGHT: 72 IN | DIASTOLIC BLOOD PRESSURE: 86 MMHG | HEART RATE: 130 BPM | WEIGHT: 220.02 LBS | OXYGEN SATURATION: 98 % | SYSTOLIC BLOOD PRESSURE: 151 MMHG | TEMPERATURE: 99 F

## 2022-02-01 DIAGNOSIS — Z87.898 PERSONAL HISTORY OF OTHER SPECIFIED CONDITIONS: ICD-10-CM

## 2022-02-01 DIAGNOSIS — Z94.5 SKIN TRANSPLANT STATUS: Chronic | ICD-10-CM

## 2022-02-01 DIAGNOSIS — M86.9 OSTEOMYELITIS, UNSPECIFIED: ICD-10-CM

## 2022-02-01 DIAGNOSIS — Z96.0 PRESENCE OF UROGENITAL IMPLANTS: Chronic | ICD-10-CM

## 2022-02-01 DIAGNOSIS — R63.8 OTHER SYMPTOMS AND SIGNS CONCERNING FOOD AND FLUID INTAKE: ICD-10-CM

## 2022-02-01 DIAGNOSIS — E11.9 TYPE 2 DIABETES MELLITUS WITHOUT COMPLICATIONS: ICD-10-CM

## 2022-02-01 DIAGNOSIS — E11.621 TYPE 2 DIABETES MELLITUS WITH FOOT ULCER: ICD-10-CM

## 2022-02-01 DIAGNOSIS — A41.9 SEPSIS, UNSPECIFIED ORGANISM: ICD-10-CM

## 2022-02-01 DIAGNOSIS — I10 ESSENTIAL (PRIMARY) HYPERTENSION: ICD-10-CM

## 2022-02-01 DIAGNOSIS — E78.5 HYPERLIPIDEMIA, UNSPECIFIED: ICD-10-CM

## 2022-02-01 DIAGNOSIS — Z89.429 ACQUIRED ABSENCE OF OTHER TOE(S), UNSPECIFIED SIDE: Chronic | ICD-10-CM

## 2022-02-01 LAB
ALBUMIN SERPL ELPH-MCNC: 4.3 G/DL — SIGNIFICANT CHANGE UP (ref 3.3–5)
ALP SERPL-CCNC: 73 U/L — SIGNIFICANT CHANGE UP (ref 40–120)
ALT FLD-CCNC: 16 U/L — SIGNIFICANT CHANGE UP (ref 10–45)
ANION GAP SERPL CALC-SCNC: 15 MMOL/L — SIGNIFICANT CHANGE UP (ref 5–17)
ANISOCYTOSIS BLD QL: SLIGHT — SIGNIFICANT CHANGE UP
APTT BLD: 33.3 SEC — SIGNIFICANT CHANGE UP (ref 27.5–35.5)
AST SERPL-CCNC: 16 U/L — SIGNIFICANT CHANGE UP (ref 10–40)
BASOPHILS # BLD AUTO: 0 K/UL — SIGNIFICANT CHANGE UP (ref 0–0.2)
BASOPHILS NFR BLD AUTO: 0 % — SIGNIFICANT CHANGE UP (ref 0–2)
BILIRUB SERPL-MCNC: 0.8 MG/DL — SIGNIFICANT CHANGE UP (ref 0.2–1.2)
BLD GP AB SCN SERPL QL: NEGATIVE — SIGNIFICANT CHANGE UP
BUN SERPL-MCNC: 18 MG/DL — SIGNIFICANT CHANGE UP (ref 7–23)
CALCIUM SERPL-MCNC: 9.6 MG/DL — SIGNIFICANT CHANGE UP (ref 8.4–10.5)
CHLORIDE SERPL-SCNC: 98 MMOL/L — SIGNIFICANT CHANGE UP (ref 96–108)
CO2 SERPL-SCNC: 25 MMOL/L — SIGNIFICANT CHANGE UP (ref 22–31)
CREAT SERPL-MCNC: 1.18 MG/DL — SIGNIFICANT CHANGE UP (ref 0.5–1.3)
CRP SERPL-MCNC: 175.1 MG/L — HIGH (ref 0–4)
EOSINOPHIL # BLD AUTO: 0 K/UL — SIGNIFICANT CHANGE UP (ref 0–0.5)
EOSINOPHIL NFR BLD AUTO: 0 % — SIGNIFICANT CHANGE UP (ref 0–6)
ERYTHROCYTE [SEDIMENTATION RATE] IN BLOOD: 68 MM/HR — HIGH
GIANT PLATELETS BLD QL SMEAR: PRESENT — SIGNIFICANT CHANGE UP
GLUCOSE BLDC GLUCOMTR-MCNC: 111 MG/DL — HIGH (ref 70–99)
GLUCOSE SERPL-MCNC: 158 MG/DL — HIGH (ref 70–99)
GRAM STN FLD: SIGNIFICANT CHANGE UP
HCT VFR BLD CALC: 39.7 % — SIGNIFICANT CHANGE UP (ref 39–50)
HGB BLD-MCNC: 13.2 G/DL — SIGNIFICANT CHANGE UP (ref 13–17)
INR BLD: 1.26 — HIGH (ref 0.88–1.16)
LACTATE SERPL-SCNC: 0.8 MMOL/L — SIGNIFICANT CHANGE UP (ref 0.5–2)
LACTATE SERPL-SCNC: 2.4 MMOL/L — HIGH (ref 0.5–2)
LYMPHOCYTES # BLD AUTO: 1.48 K/UL — SIGNIFICANT CHANGE UP (ref 1–3.3)
LYMPHOCYTES # BLD AUTO: 7.8 % — LOW (ref 13–44)
MANUAL SMEAR VERIFICATION: SIGNIFICANT CHANGE UP
MCHC RBC-ENTMCNC: 28.1 PG — SIGNIFICANT CHANGE UP (ref 27–34)
MCHC RBC-ENTMCNC: 33.2 GM/DL — SIGNIFICANT CHANGE UP (ref 32–36)
MCV RBC AUTO: 84.6 FL — SIGNIFICANT CHANGE UP (ref 80–100)
MONOCYTES # BLD AUTO: 1.33 K/UL — HIGH (ref 0–0.9)
MONOCYTES NFR BLD AUTO: 7 % — SIGNIFICANT CHANGE UP (ref 2–14)
NEUTROPHILS # BLD AUTO: 15.69 K/UL — HIGH (ref 1.8–7.4)
NEUTROPHILS NFR BLD AUTO: 81.7 % — HIGH (ref 43–77)
NEUTS BAND # BLD: 0.9 % — SIGNIFICANT CHANGE UP (ref 0–8)
PLAT MORPH BLD: ABNORMAL
PLATELET # BLD AUTO: 471 K/UL — HIGH (ref 150–400)
POIKILOCYTOSIS BLD QL AUTO: SLIGHT — SIGNIFICANT CHANGE UP
POLYCHROMASIA BLD QL SMEAR: SLIGHT — SIGNIFICANT CHANGE UP
POTASSIUM SERPL-MCNC: 4.3 MMOL/L — SIGNIFICANT CHANGE UP (ref 3.5–5.3)
POTASSIUM SERPL-SCNC: 4.3 MMOL/L — SIGNIFICANT CHANGE UP (ref 3.5–5.3)
PROT SERPL-MCNC: 9.4 G/DL — HIGH (ref 6–8.3)
PROTHROM AB SERPL-ACNC: 15 SEC — HIGH (ref 10.6–13.6)
RBC # BLD: 4.69 M/UL — SIGNIFICANT CHANGE UP (ref 4.2–5.8)
RBC # FLD: 13.7 % — SIGNIFICANT CHANGE UP (ref 10.3–14.5)
RBC BLD AUTO: ABNORMAL
RH IG SCN BLD-IMP: POSITIVE — SIGNIFICANT CHANGE UP
SARS-COV-2 RNA SPEC QL NAA+PROBE: SIGNIFICANT CHANGE UP
SODIUM SERPL-SCNC: 138 MMOL/L — SIGNIFICANT CHANGE UP (ref 135–145)
SPECIMEN SOURCE: SIGNIFICANT CHANGE UP
SPHEROCYTES BLD QL SMEAR: SLIGHT — SIGNIFICANT CHANGE UP
VARIANT LYMPHS # BLD: 2.6 % — SIGNIFICANT CHANGE UP (ref 0–6)
WBC # BLD: 18.99 K/UL — HIGH (ref 3.8–10.5)
WBC # FLD AUTO: 18.99 K/UL — HIGH (ref 3.8–10.5)

## 2022-02-01 PROCEDURE — 93010 ELECTROCARDIOGRAM REPORT: CPT | Mod: 59

## 2022-02-01 PROCEDURE — 71045 X-RAY EXAM CHEST 1 VIEW: CPT | Mod: 26

## 2022-02-01 PROCEDURE — 99285 EMERGENCY DEPT VISIT HI MDM: CPT

## 2022-02-01 PROCEDURE — 99223 1ST HOSP IP/OBS HIGH 75: CPT | Mod: GC

## 2022-02-01 PROCEDURE — 73630 X-RAY EXAM OF FOOT: CPT | Mod: 26,50

## 2022-02-01 RX ORDER — INSULIN LISPRO 100/ML
VIAL (ML) SUBCUTANEOUS
Refills: 0 | Status: DISCONTINUED | OUTPATIENT
Start: 2022-02-01 | End: 2022-02-11

## 2022-02-01 RX ORDER — SODIUM CHLORIDE 9 MG/ML
1000 INJECTION, SOLUTION INTRAVENOUS ONCE
Refills: 0 | Status: DISCONTINUED | OUTPATIENT
Start: 2022-02-01 | End: 2022-02-01

## 2022-02-01 RX ORDER — ATORVASTATIN CALCIUM 80 MG/1
40 TABLET, FILM COATED ORAL AT BEDTIME
Refills: 0 | Status: DISCONTINUED | OUTPATIENT
Start: 2022-02-01 | End: 2022-02-01

## 2022-02-01 RX ORDER — DEXTROSE 50 % IN WATER 50 %
15 SYRINGE (ML) INTRAVENOUS ONCE
Refills: 0 | Status: DISCONTINUED | OUTPATIENT
Start: 2022-02-01 | End: 2022-02-11

## 2022-02-01 RX ORDER — SODIUM CHLORIDE 9 MG/ML
1000 INJECTION INTRAMUSCULAR; INTRAVENOUS; SUBCUTANEOUS ONCE
Refills: 0 | Status: COMPLETED | OUTPATIENT
Start: 2022-02-01 | End: 2022-02-01

## 2022-02-01 RX ORDER — PIPERACILLIN AND TAZOBACTAM 4; .5 G/20ML; G/20ML
4.5 INJECTION, POWDER, LYOPHILIZED, FOR SOLUTION INTRAVENOUS EVERY 6 HOURS
Refills: 0 | Status: DISCONTINUED | OUTPATIENT
Start: 2022-02-01 | End: 2022-02-03

## 2022-02-01 RX ORDER — GLUCAGON INJECTION, SOLUTION 0.5 MG/.1ML
1 INJECTION, SOLUTION SUBCUTANEOUS ONCE
Refills: 0 | Status: DISCONTINUED | OUTPATIENT
Start: 2022-02-01 | End: 2022-02-11

## 2022-02-01 RX ORDER — SODIUM CHLORIDE 9 MG/ML
1000 INJECTION, SOLUTION INTRAVENOUS
Refills: 0 | Status: DISCONTINUED | OUTPATIENT
Start: 2022-02-01 | End: 2022-02-11

## 2022-02-01 RX ORDER — VANCOMYCIN HCL 1 G
1500 VIAL (EA) INTRAVENOUS ONCE
Refills: 0 | Status: DISCONTINUED | OUTPATIENT
Start: 2022-02-01 | End: 2022-02-01

## 2022-02-01 RX ORDER — ACETAMINOPHEN 500 MG
1000 TABLET ORAL ONCE
Refills: 0 | Status: COMPLETED | OUTPATIENT
Start: 2022-02-01 | End: 2022-02-01

## 2022-02-01 RX ORDER — INSULIN GLARGINE 100 [IU]/ML
30 INJECTION, SOLUTION SUBCUTANEOUS AT BEDTIME
Refills: 0 | Status: DISCONTINUED | OUTPATIENT
Start: 2022-02-01 | End: 2022-02-07

## 2022-02-01 RX ORDER — DEXTROSE 50 % IN WATER 50 %
25 SYRINGE (ML) INTRAVENOUS ONCE
Refills: 0 | Status: DISCONTINUED | OUTPATIENT
Start: 2022-02-01 | End: 2022-02-11

## 2022-02-01 RX ORDER — INSULIN GLARGINE 100 [IU]/ML
30 INJECTION, SOLUTION SUBCUTANEOUS AT BEDTIME
Refills: 0 | Status: DISCONTINUED | OUTPATIENT
Start: 2022-02-01 | End: 2022-02-01

## 2022-02-01 RX ORDER — INSULIN LISPRO 100/ML
3 VIAL (ML) SUBCUTANEOUS
Refills: 0 | Status: DISCONTINUED | OUTPATIENT
Start: 2022-02-01 | End: 2022-02-08

## 2022-02-01 RX ORDER — SODIUM CHLORIDE 9 MG/ML
1000 INJECTION, SOLUTION INTRAVENOUS ONCE
Refills: 0 | Status: COMPLETED | OUTPATIENT
Start: 2022-02-01 | End: 2022-02-01

## 2022-02-01 RX ORDER — ATORVASTATIN CALCIUM 80 MG/1
40 TABLET, FILM COATED ORAL AT BEDTIME
Refills: 0 | Status: DISCONTINUED | OUTPATIENT
Start: 2022-02-01 | End: 2022-02-11

## 2022-02-01 RX ORDER — INSULIN LISPRO 100/ML
3 VIAL (ML) SUBCUTANEOUS
Refills: 0 | Status: DISCONTINUED | OUTPATIENT
Start: 2022-02-01 | End: 2022-02-01

## 2022-02-01 RX ORDER — DEXTROSE 50 % IN WATER 50 %
12.5 SYRINGE (ML) INTRAVENOUS ONCE
Refills: 0 | Status: DISCONTINUED | OUTPATIENT
Start: 2022-02-01 | End: 2022-02-11

## 2022-02-01 RX ORDER — VANCOMYCIN HCL 1 G
1500 VIAL (EA) INTRAVENOUS EVERY 12 HOURS
Refills: 0 | Status: DISCONTINUED | OUTPATIENT
Start: 2022-02-02 | End: 2022-02-02

## 2022-02-01 RX ORDER — PIPERACILLIN AND TAZOBACTAM 4; .5 G/20ML; G/20ML
3.38 INJECTION, POWDER, LYOPHILIZED, FOR SOLUTION INTRAVENOUS ONCE
Refills: 0 | Status: COMPLETED | OUTPATIENT
Start: 2022-02-01 | End: 2022-02-01

## 2022-02-01 RX ORDER — VANCOMYCIN HCL 1 G
1500 VIAL (EA) INTRAVENOUS ONCE
Refills: 0 | Status: COMPLETED | OUTPATIENT
Start: 2022-02-01 | End: 2022-02-01

## 2022-02-01 RX ORDER — ACETAMINOPHEN 500 MG
650 TABLET ORAL EVERY 6 HOURS
Refills: 0 | Status: DISCONTINUED | OUTPATIENT
Start: 2022-02-01 | End: 2022-02-11

## 2022-02-01 RX ADMIN — SODIUM CHLORIDE 1000 MILLILITER(S): 9 INJECTION INTRAMUSCULAR; INTRAVENOUS; SUBCUTANEOUS at 16:00

## 2022-02-01 RX ADMIN — SODIUM CHLORIDE 1000 MILLILITER(S): 9 INJECTION INTRAMUSCULAR; INTRAVENOUS; SUBCUTANEOUS at 18:08

## 2022-02-01 RX ADMIN — INSULIN GLARGINE 30 UNIT(S): 100 INJECTION, SOLUTION SUBCUTANEOUS at 21:51

## 2022-02-01 RX ADMIN — Medication 300 MILLIGRAM(S): at 00:50

## 2022-02-01 RX ADMIN — SODIUM CHLORIDE 100 MILLILITER(S): 9 INJECTION, SOLUTION INTRAVENOUS at 23:29

## 2022-02-01 RX ADMIN — SODIUM CHLORIDE 1000 MILLILITER(S): 9 INJECTION INTRAMUSCULAR; INTRAVENOUS; SUBCUTANEOUS at 16:55

## 2022-02-01 RX ADMIN — PIPERACILLIN AND TAZOBACTAM 200 GRAM(S): 4; .5 INJECTION, POWDER, LYOPHILIZED, FOR SOLUTION INTRAVENOUS at 16:55

## 2022-02-01 RX ADMIN — PIPERACILLIN AND TAZOBACTAM 200 GRAM(S): 4; .5 INJECTION, POWDER, LYOPHILIZED, FOR SOLUTION INTRAVENOUS at 23:30

## 2022-02-01 RX ADMIN — Medication 400 MILLIGRAM(S): at 18:36

## 2022-02-01 RX ADMIN — ATORVASTATIN CALCIUM 40 MILLIGRAM(S): 80 TABLET, FILM COATED ORAL at 21:52

## 2022-02-01 NOTE — H&P ADULT - PROBLEM SELECTOR PLAN 6
Cocaine use (in his youth, on the weekends; last use 12/31, only in special events). He denies any addiction now. On home med atorvastatin 40mg at bedtime.   PLAN:  - C/w atorvastatin.

## 2022-02-01 NOTE — H&P ADULT - ASSESSMENT
47 y/o M pt w/ PMHx of HTN, HLD, DM, osteomyelitis with amputation of the right 4th and 5th toes and partial amputation of the right 2nd toe, who was diagnosed w/ COVID on 12/24/21, presented on 1/7/22 to the ED with complaints of recurrent fever described as subjective warmth starting 5 nights ago, predominantly at nighttime w/ mild pains to b/l legs, R>l, and pains to lower back starting today, mild SOB with walking starting yesterday. presented on 1/23/2022 to the ED c/o back pain and b/l leg pain for the previous 3 weeks across his lower back that radiates to the b/l thighs and calfs. The pain is worsened when sitting and attempting to stand up and is better when in the supine position. Treated with Motrin without relief of symptoms. Last blood cultures on 1/23/22 negatives. 45 yo M pt with previous alcohol and cocaine use w/ PMHx of HTN, HLD, DM, osteomyelitis with amputation of the right 4th and 5th toes and partial amputation of the right 2nd toe, recently COVID diagnosis (12/2021) who has presented with diabetic foot wounds in the last year in both feet with complicated healing process R>L (last set back in his right foot the last week), and arrived to ED after having symptoms of chills and shaking in the last 2 days. After being managed in ED with fluids and antibiotics, pt is admitted for further management of severe sepsis likely 2/2 to right foot cellulitis.

## 2022-02-01 NOTE — H&P ADULT - NSICDXPASTMEDICALHX_GEN_ALL_CORE_FT
PAST MEDICAL HISTORY:  Diabetes mellitus     History of cocaine use     Hyperlipidemia, unspecified hyperlipidemia type     Hypertension     Osteomyelitis R foot

## 2022-02-01 NOTE — H&P ADULT - PROBLEM SELECTOR PLAN 4
No current home med. Previously prescribed losartan. Cocaine use (in his youth, on the weekends; last use 12/31, only in special events). He denies any addiction now.  PLAN:  - f/u Utox

## 2022-02-01 NOTE — H&P ADULT - HISTORY OF PRESENT ILLNESS
47 y/o M pt w/ PMHx of HTN, HLD, DM, osteomyelitis with amputation of the right 4th and 5th toes and partial amputation of the right 2nd toe, who was diagnosed w/ COVID on 12/24/21, presented on 1/7/22 to the ED with complaints of recurrent fever described as subjective warmth starting 5 nights ago, predominantly at nighttime w/ mild pains to b/l legs, R>l, and pains to lower back starting today, mild SOB with walking starting yesterday. presented on 1/23/2022 to the ED c/o back pain and b/l leg pain for the previous 3 weeks across his lower back that radiates to the b/l thighs and calfs. The pain is worsened when sitting and attempting to stand up and is better when in the supine position. Treated with Motrin without relief of symptoms. Last blood cultures on 1/23/22 negatives.    ED Course:  - Vitals: T 99.2  /86 RR 18 Sat 98 RA  - Labs: ESR 68 WBC 18.99 (81% neutro) Prot GAP elevated (prot 9.4 alb 4.3) CRP 2.4 Lact 2.4  - EKG: Sinus tachy 120s  - MGMT: Vanc 1g, Zosyn 3.375g, NS 1L in 1 h hours.   45 yo M pt with previous alcohol and cocaine use w/ PMHx of HTN (not treated), HLD (on meds), DM (started in 2021, not well control in the beginning, on OAD and insulin), osteomyelitis with amputation of the right 4th and 5th toes and partial amputation of the right 2nd toe, who was diagnosed w/ COVID on 12/24/21, in the last month has been for 2 times in ED: first time presented on 1/7/22 to the ED with complaints of recurrent fever described as subjective warmth starting 5 nights ago, predominantly at nighttime w/ mild pains to b/l legs, R>l, and pains to lower back starting today, mild SOB with walking starting yesterday. Second time presented on 1/23/2022 to the ED c/o back pain and b/l leg pain for the previous 3 weeks across his lower back that radiates to the b/l thighs and calfs. The pain is worsened when sitting and attempting to stand up and is better when in the supine position with MRI positive for hernial disc pathology. Treated with Motrin without relief of symptoms and today received infiltration in the lumbar area. On february 2021 stepped with the right foot on a screw and on february 2021 stepped on another one but with the right on while working. After visiting a few times his podiatrist (Dr. Barry Hodges) the right foot is healing progressively since then, however his left one has worsened many times, last time 1 week ago. He got his last blood cultures on 1/23/22 and were negatives. On 1/1/2022, in the evening, presented with chills and shaking and abdominal pain that resolved after Tylenol. Today went to the doctor to get infiltration for his lumbar pain and when he was leaving the consult, he experienced again same symptoms but more intense. He decided to go to ED for evaluation. He didn't take the temperature. He relates his problem in his left foot with this episode. He hasn't experienced chest pain, SOB, respiratory symptoms, abdominal symptoms.     ED Course:  - Vitals: T 99.2  /86 RR 18 Sat 98 RA  - Labs: ESR 68 WBC 18.99 (81% neutro) Prot GAP elevated (prot 9.4 alb 4.3) CRP 2.4 Lact 2.4  - EKG: Sinus tachy 120s  - MGMT: Vanc 1g, Zosyn 3.375g, NS 1L in 1 h hours. Podiatry intervention.

## 2022-02-01 NOTE — H&P ADULT - PROBLEM SELECTOR PLAN 5
On home med atorvastatin 40mg at bedtime.   PLAN:  - C/w atorvastatin. No current home med. Previously prescribed losartan.

## 2022-02-01 NOTE — H&P ADULT - PROBLEM SELECTOR PLAN 1
likely 2/2 to right foot cellulitis in pt with diabetic foot. Meet SIRS criteria 3/4 (tachycardia, fever and leukocytosis). Warmer foot R>L. Lactate elevated. Good response to Vanc 1g, Zosyn 3.375g, NS 1L in 1 h hours. Podiatry intervention.   PLAN:  - Monitor vitals.  - Trend lactate q6h to normalized.   - C/w vancomycin 1 g q24h IV.   - Vancomycin trough previous 4th dose.   - Consider blood cultures.  - Podiatry consulted and managing the case. likely 2/2 to right foot cellulitis in pt with diabetic foot. Meet SIRS criteria 3/4 (tachycardia, fever and leukocytosis). Warmer foot R>L. Lactate elevated. Good response to Vanc 1g, Zosyn 3.375g, NS 1L in 1 h hours. Podiatry intervention.   PLAN:  - Monitor vitals.  - Trend lactate q6h to normalized.   - C/w vancomycin 1 g q24h IV.   - Vancomycin trough previous 4th dose.   - Consider blood cultures. likely 2/2 to right foot cellulitis in pt with diabetic foot. Meet SIRS criteria 3/4 (tachycardia, fever and leukocytosis). Warmer foot R>L. Lactate elevated. Good response to Vanc 1g, Zosyn 3.375g, NS 1L in 1 h hours. Podiatry intervention.   PLAN:  - Monitor vitals.  - C/w vancomycin 1 g q24h IV and Zosyn 4.5mg q8h.   - Vancomycin trough previous 4th dose.   - f/u blood cultures.

## 2022-02-01 NOTE — PROGRESS NOTE ADULT - SUBJECTIVE AND OBJECTIVE BOX
PRE-OP NOTE    Pre-Op Diagnosis: Right foot infected ulceration   Planned Procedure: Right foot incision and drainage with washout and removal of all non-viable skin, soft tissue and bone with possible graft placement   Scheduled Date / Time: Add on 2/1 PM   Indication: Right foot infected ulceration, pt is septic   Labs:                       13.2   18.99 )-----------( 471      ( 01 Feb 2022 16:13 )             39.7   02-01    138  |  98  |  18  ----------------------------<  158<H>  4.3   |  25  |  1.18    Ca    9.6      01 Feb 2022 16:13    TPro  9.4<H>  /  Alb  4.3  /  TBili  0.8  /  DBili  x   /  AST  16  /  ALT  16  /  AlkPhos  73  02-01  LIVER FUNCTIONS - ( 01 Feb 2022 16:13 )  Alb: 4.3 g/dL / Pro: 9.4 g/dL / ALK PHOS: 73 U/L / ALT: 16 U/L / AST: 16 U/L / GGT: x           Vitals: Vital Signs Last 24 Hrs  T(C): 37.8 (01 Feb 2022 18:57), Max: 38.4 (01 Feb 2022 17:42)  T(F): 100.1 (01 Feb 2022 18:57), Max: 101.2 (01 Feb 2022 17:42)  HR: 90 (01 Feb 2022 18:57) (90 - 130)  BP: 141/85 (01 Feb 2022 18:57) (141/85 - 169/90)  BP(mean): --  RR: 18 (01 Feb 2022 18:57) (18 - 18)  SpO2: 97% (01 Feb 2022 18:57) (97% - 98%)  PE:   Official EKG reading: (On Chart)  Type and Cross/Screen:   NPO after MN  Antibiotics: Van/zosyn  Operative Consent (on chart)    PRE-OP NOTE    Pre-Op Diagnosis: Right foot infected ulceration   Planned Procedure: Right foot incision and drainage with washout and removal of all non-viable skin, soft tissue and bone with possible graft placement   Scheduled Date / Time: Add on 2/1 PM   Indication: Right foot infected ulceration, pt is septic   Labs:                       13.2   18.99 )-----------( 471      ( 01 Feb 2022 16:13 )             39.7   02-01    138  |  98  |  18  ----------------------------<  158<H>  4.3   |  25  |  1.18    Ca    9.6      01 Feb 2022 16:13    TPro  9.4<H>  /  Alb  4.3  /  TBili  0.8  /  DBili  x   /  AST  16  /  ALT  16  /  AlkPhos  73  02-01  LIVER FUNCTIONS - ( 01 Feb 2022 16:13 )  Alb: 4.3 g/dL / Pro: 9.4 g/dL / ALK PHOS: 73 U/L / ALT: 16 U/L / AST: 16 U/L / GGT: x           Vitals: Vital Signs Last 24 Hrs  T(C): 37.8 (01 Feb 2022 18:57), Max: 38.4 (01 Feb 2022 17:42)  T(F): 100.1 (01 Feb 2022 18:57), Max: 101.2 (01 Feb 2022 17:42)  HR: 90 (01 Feb 2022 18:57) (90 - 130)  BP: 141/85 (01 Feb 2022 18:57) (141/85 - 169/90)  BP(mean): --  RR: 18 (01 Feb 2022 18:57) (18 - 18)  SpO2: 97% (01 Feb 2022 18:57) (97% - 98%)  PE:   Chest xray on chart  Official EKG reading: (On Chart)  Type and Cross/Screen: x2  NPO after MN  Antibiotics: Van/zosyn  Operative Consent (on chart)

## 2022-02-01 NOTE — H&P ADULT - NSHPREVIEWOFSYSTEMS_GEN_ALL_CORE
CONSTITUTIONAL: No weakness, fevers or chills  EYES/ENT: No visual changes;  No vertigo or throat pain   NECK: No pain or stiffness  RESPIRATORY: No cough, wheezing, hemoptysis; No shortness of breath  CARDIOVASCULAR: No chest pain or palpitations  GASTROINTESTINAL: No abdominal or epigastric pain. No nausea, vomiting, or hematemesis; No diarrhea or constipation. No melena or hematochezia.  GENITOURINARY: No dysuria, frequency or hematuria  NEUROLOGICAL: No numbness or weakness  SKIN: No itching, rashes CONSTITUTIONAL: chills (+) No weakness, fevers or   EYES/ENT: No visual changes;  No vertigo or throat pain   NECK: No pain or stiffness  RESPIRATORY: No cough, wheezing, hemoptysis; No shortness of breath  CARDIOVASCULAR: No chest pain or palpitations  GASTROINTESTINAL: No abdominal or epigastric pain. No nausea, vomiting, or hematemesis; No diarrhea or constipation. No melena or hematochezia.  GENITOURINARY: No dysuria, frequency or hematuria  NEUROLOGICAL: No numbness or weakness  SKIN: right and left foot with ulcers healing (+). No itching, rashes

## 2022-02-01 NOTE — H&P ADULT - ATTENDING COMMENTS
reviewed pertinent data , h&p  patient seen and examined overnight     PE as above, both feet wrapped in dressings, findings per podiatry     1. severe sepsis 2/2 infected foot ulcer, on vanc/zosyn. followup ctxs. followup podiatry recs. pt reports good exercise tolerance, METS >4. no adverse reactions/ complication to prior surgeries; no acute ST-T changes noted on repeat EKG done on floor; pt medically optimized for pending OR procedure. Advice cocaine cessation, pt stated use is occasional, last time was 12/31.       rest of  plan as above reviewed pertinent data , h&p  patient seen and examined overnight     PE as above, both feet wrapped in dressings, findings per podiatry     1. severe sepsis 2/2 infected foot ulcer, on vanc/zosyn. followup ctxs. followup podiatry recs. pt reports good exercise tolerance, METS >4. no adverse reactions/ complication to prior surgeries; no acute ST-T changes noted on repeat EKG done on floor; pt medically optimized for pending OR procedure. Advise cocaine cessation, pt stated use is occasional, last time was 12/31.       rest of  plan as above

## 2022-02-01 NOTE — H&P ADULT - PROBLEM SELECTOR PLAN 2
On both feet right worse than left. On february 2021 stepped with the right foot on a screw and on february 2021 stepped on another one but with the right on while working. After visiting a few times his podiatrist (Dr. Barry Hodges) the right foot is healing progressively since then, however his left one has worsened many times, last time 1 week ago.  PLAN:  - Podiatry consulted and managing the case.  - Pregabalin ? q8h.  - Complete med rec tomorrow.

## 2022-02-01 NOTE — H&P ADULT - NSHPPHYSICALEXAM_GEN_ALL_CORE
93 VITALS:   T(C): 38.4 (02-01-22 @ 17:42), Max: 38.4 (02-01-22 @ 17:42)  HR: 105 (02-01-22 @ 17:33) (105 - 130)  BP: 169/90 (02-01-22 @ 17:33) (151/86 - 169/90)  RR: 18 (02-01-22 @ 17:33) (18 - 18)  SpO2: 98% (02-01-22 @ 17:33) (98% - 98%)    GENERAL: NAD, lying in bed comfortably  HEAD:  Atraumatic, normocephalic  EYES: EOMI, PERRLA, conjunctiva and sclera clear  ENT: Moist mucous membranes  NECK: Supple, no JVD  HEART: Regular rate and rhythm, no murmurs, rubs, or gallops  LUNGS: Unlabored respirations.  Clear to auscultation bilaterally, no crackles, wheezing, or rhonchi  ABDOMEN: Soft, nontender, nondistended, +BS  EXTREMITIES: 2+ peripheral pulses bilaterally. No clubbing, cyanosis, or edema  NERVOUS SYSTEM:  A&Ox3, no focal deficits   SKIN: No rashes or lesions VITALS:   T(C): 38.4 (02-01-22 @ 17:42), Max: 38.4 (02-01-22 @ 17:42)  HR: 105 (02-01-22 @ 17:33) (105 - 130)  BP: 169/90 (02-01-22 @ 17:33) (151/86 - 169/90)  RR: 18 (02-01-22 @ 17:33) (18 - 18)  SpO2: 98% (02-01-22 @ 17:33) (98% - 98%)    GENERAL: NAD, lying in bed comfortably, obese  HEAD:  Atraumatic, normocephalic  EYES: EOMI, PERRLA, conjunctiva and sclera clear  ENT: Moist mucous membranes  NECK: Supple, no JVD  HEART: Regular rate and rhythm, no murmurs, rubs, or gallops  LUNGS: Unlabored respirations.  Clear to auscultation bilaterally, no crackles, wheezing, or rhonchi  ABDOMEN: Soft, nontender, nondistended, +BS, globulus  EXTREMITIES: 2+ peripheral pulses bilaterally. No clubbing, cyanosis, or edema, R>L warmer, both feet wrapped, ulcered type II in the plant of right foot  NERVOUS SYSTEM:  A&Ox3, no focal deficits   SKIN: No rashes

## 2022-02-01 NOTE — H&P ADULT - NSHPSOCIALHISTORY_GEN_ALL_CORE
Cocaine abuse. Lives with mother. Work in construction. Cocaine use (in his youth, on the weekends; last use 12/31, only in special events). Alcohol use (in his youth, on the weekends; now 1 or 2 drinks per week). No tobacco use.

## 2022-02-01 NOTE — H&P ADULT - NSHPLABSRESULTS_GEN_ALL_CORE
LABS:                         13.2   18.99 )-----------( 471      ( 01 Feb 2022 16:13 )             39.7     02-01    138  |  98  |  18  ----------------------------<  158<H>  4.3   |  25  |  1.18    Ca    9.6      01 Feb 2022 16:13    TPro  9.4<H>  /  Alb  4.3  /  TBili  0.8  /  DBili  x   /  AST  16  /  ALT  16  /  AlkPhos  73  02-01    Lactate, Blood: 0.8 mmol/L (02-01 @ 18:09)  Lactate, Blood: 2.4 mmol/L (02-01 @ 16:13)

## 2022-02-01 NOTE — ED PROVIDER NOTE - OBJECTIVE STATEMENT
47 y/o M pt with PMHx of HTN (not treated), HLD (on meds), DM (started in 2021, not well control in the beginning, on OAD and insulin), osteomyelitis with amputation of the right 4th and 5th toes and partial amputation of the right 2nd toe presents to ED after missing his appointment with podiatry. Pt was previously here on 1/23 and was supposed to f/u with outpatient podiatry, but relates severe back pain caused him to miss the appointment as he was unable to get up secondary to the pain. His foot has slowly worsened over the past 2 weeks, and pt relates shaking chills with subjective fever.

## 2022-02-01 NOTE — CONSULT NOTE ADULT - ASSESSMENT
45 y/o M with PMHx of DM, HTN, HLD, osteomyelitis s/p amputation of R 3rd and 4th toes and partial amp of R 2nd toe, presents to the ED with complaints of Chills. Podiatry consulted for chronic wound to R foot, seen by podiatrist Dr. Johnson 2 weeks ago pt missed his last appointment. Pt now coming in septic With fever, chills, leukocytosis, elevated ESR and CRP. Wound looks clinically infected +draiange, +malodor, new wound to medial aspect of pts right foot.     Plan:   Pt seen and evaluated bedside   X rays reviewed  Rec IV abx Broad spectrum   Aseptic excisional debridement of 2 wounds on medial and plantar aspect of pts right foot and 1 wound on medial aspect of pts left foot down to level of bleeding dermis via sterile #15 blade.   f/u deep wound culture  Pt will be taken to OR tonight as add on around 9:00pm for right foot incision and drainage and washout      Please obtain pre-operative labs and optimize pt for OR   NPO for OR       Plan d/w attedning 45 y/o M with PMHx of DM, HTN, HLD, osteomyelitis s/p amputation of R 3rd and 4th toes and partial amp of R 2nd toe, presents to the ED with complaints of Chills. Podiatry consulted for chronic wound to R foot, seen by podiatrist Dr. Johnson 2 weeks ago pt missed his last appointment. Pt now coming in septic With fever, chills, leukocytosis, elevated ESR and CRP. Wound looks clinically infected +draiange, +malodor, new wound to medial aspect of pts right foot.     Plan:   Pt seen and evaluated bedside   X rays reviewed  Rec IV abx Broad spectrum   Aseptic excisional debridement of 2 wounds on medial and plantar aspect of pts right foot and 1 wound on medial aspect of pts left foot down to level of bleeding dermis via sterile #15 blade.   f/u deep wound culture  Pt will be taken to OR as add on 2/2 at either 7:30am or 5:30pm for right foot incision and drainage and washout      Please obtain pre-operative labs and optimize pt for OR   NPO after midnight for OR 2/2      Plan d/w attedning

## 2022-02-01 NOTE — H&P ADULT - PROBLEM SELECTOR PLAN 3
Started in 2021, not well control in the beginning, on OAD and insulin  PLAN:  - Monitor glucose  - HbA1c  - Lantus Insulin 30 UI at bedtime  - Humalog Insulin 2-10 UI in meals 3 times a day  - Metformin 750mg 2 pills at bedtime Started in 2021, not well control in the beginning, on OAD and insulin  PLAN:  - Monitor glucose  - F/u HbA1c  - Lantus Insulin 30 UI at bedtime  - Humalog Insulin 3 UI in meals 3 times a day  - Hold Metformin.

## 2022-02-01 NOTE — ED ADULT NURSE NOTE - OBJECTIVE STATEMENT
Pt presenting with fevers/chills. Pt has bilateral feet wounds. Denies CP/SOB. Iv and labs obtained, cultures sent, fluids and antibiotics running. Breathing unlabored on RA. Tachy, ekg done.

## 2022-02-01 NOTE — ED PROVIDER NOTE - CLINICAL SUMMARY MEDICAL DECISION MAKING FREE TEXT BOX
47 y/o M pt presents to ED with likely cellulitis. Pt with abnormal labs and VS in ED s/p broad spectrum abx. Cultures sent, podiatry consulted, plan to admit for monitoring and further management.

## 2022-02-01 NOTE — H&P ADULT - PROBLEM SELECTOR PLAN 7
N: Diabetic diet  F: 30 cc/h LR  E: replete  DVT prophylaxis: None.  GI prophylaxis: None.     Code: Full  Dispo: [facility] N: Diabetic diet  F: 200 cc/h LR  E: replete  DVT prophylaxis: None.  GI prophylaxis: None.     Code: Full  Dispo: [facility]

## 2022-02-01 NOTE — PROGRESS NOTE ADULT - ASSESSMENT
45 y/o M with PMHx of DM, HTN, HLD, osteomyelitis s/p amputation of R 3rd and 4th toes and partial amp of R 2nd toe, presents to the ED with complaints of Chills. Podiatry consulted for chronic wound to R foot, seen by podiatrist Dr. Johnson 2 weeks ago pt missed his last appointment. Pt now coming in septic With fever, chills, leukocytosis, elevated ESR and CRP. Wound looks clinically infected drainage +malodor, new wound to medial aspect of pts right foot.     Plan:   Pt seen and evaluated bedside   X rays reviewed  Rec IV abx broad spectrum   f/u deep wound culture  Pt will be taken to OR tonight as add on around 9:00pm for right foot incision and drainage and washout      Please obtain pre-operative labs and imaging and optimize pt for OR   NPO for OR   Hold DVT ppx      Plan d/w attedning   45 y/o M with PMHx of DM, HTN, HLD, osteomyelitis s/p amputation of R 3rd and 4th toes and partial amp of R 2nd toe, presents to the ED with complaints of Chills. Podiatry consulted for chronic wound to R foot, seen by podiatrist Dr. Johnson 2 weeks ago pt missed his last appointment. Pt now coming in septic With fever, chills, leukocytosis, elevated ESR and CRP. Wound looks clinically infected drainage +malodor, new wound to medial aspect of pts right foot. Pt will be added on to the OR tonight for I and D and washout.     Plan:   Pt seen and evaluated bedside   X rays reviewed  Rec IV abx broad spectrum   f/u deep wound culture  Pt will be taken to OR tonight as add on around 9:00pm for right foot incision and drainage and washout      Please obtain pre-operative labs and imaging and optimize pt for OR   NPO for OR   Hold DVT ppx      Plan d/w attedning   47 y/o M with PMHx of DM, HTN, HLD, osteomyelitis s/p amputation of R 3rd and 4th toes and partial amp of R 2nd toe, presents to the ED with complaints of Chills. Podiatry consulted for chronic wound to R foot, seen by podiatrist Dr. Johnson 2 weeks ago pt missed his last appointment. Pt now coming in septic With fever, chills, leukocytosis, elevated ESR and CRP. Wound looks clinically infected drainage +malodor, new wound to medial aspect of pts right foot. Pt will be added on to the OR 2/2/22 for I and D and washout.     Plan:   Pt seen and evaluated bedside   X rays reviewed  Rec IV abx broad spectrum   f/u deep wound culture  Pt will be taken to OR as add on 2/2 at either 7:30am or 5:30pm for right foot incision and drainage and washout      Please obtain pre-operative labs and imaging and optimize pt for OR   NPO after midnight for OR   Hold DVT ppx      Plan d/w attedning

## 2022-02-01 NOTE — ED ADULT TRIAGE NOTE - CHIEF COMPLAINT QUOTE
Pt c/o fever/chills, body aches and wound to right foot. Pt was seen in ED for same complaint last week. EKG in progress.

## 2022-02-02 ENCOUNTER — RESULT REVIEW (OUTPATIENT)
Age: 47
End: 2022-02-02

## 2022-02-02 LAB
A1C WITH ESTIMATED AVERAGE GLUCOSE RESULT: 9.8 % — HIGH (ref 4–5.6)
ALBUMIN SERPL ELPH-MCNC: 3.1 G/DL — LOW (ref 3.3–5)
ALP SERPL-CCNC: 66 U/L — SIGNIFICANT CHANGE UP (ref 40–120)
ALT FLD-CCNC: 14 U/L — SIGNIFICANT CHANGE UP (ref 10–45)
ANION GAP SERPL CALC-SCNC: 10 MMOL/L — SIGNIFICANT CHANGE UP (ref 5–17)
ANISOCYTOSIS BLD QL: SLIGHT — SIGNIFICANT CHANGE UP
APPEARANCE UR: ABNORMAL
AST SERPL-CCNC: 13 U/L — SIGNIFICANT CHANGE UP (ref 10–40)
BACTERIA # UR AUTO: SIGNIFICANT CHANGE UP /HPF
BASOPHILS # BLD AUTO: 0.12 K/UL — SIGNIFICANT CHANGE UP (ref 0–0.2)
BASOPHILS NFR BLD AUTO: 0.9 % — SIGNIFICANT CHANGE UP (ref 0–2)
BILIRUB SERPL-MCNC: 0.4 MG/DL — SIGNIFICANT CHANGE UP (ref 0.2–1.2)
BILIRUB UR-MCNC: NEGATIVE — SIGNIFICANT CHANGE UP
BUN SERPL-MCNC: 22 MG/DL — SIGNIFICANT CHANGE UP (ref 7–23)
CALCIUM SERPL-MCNC: 8.8 MG/DL — SIGNIFICANT CHANGE UP (ref 8.4–10.5)
CHLORIDE SERPL-SCNC: 104 MMOL/L — SIGNIFICANT CHANGE UP (ref 96–108)
CO2 SERPL-SCNC: 23 MMOL/L — SIGNIFICANT CHANGE UP (ref 22–31)
COLOR SPEC: YELLOW — SIGNIFICANT CHANGE UP
CREAT SERPL-MCNC: 1.02 MG/DL — SIGNIFICANT CHANGE UP (ref 0.5–1.3)
DIFF PNL FLD: ABNORMAL
EOSINOPHIL # BLD AUTO: 0 K/UL — SIGNIFICANT CHANGE UP (ref 0–0.5)
EOSINOPHIL NFR BLD AUTO: 0 % — SIGNIFICANT CHANGE UP (ref 0–6)
EPI CELLS # UR: SIGNIFICANT CHANGE UP /HPF (ref 0–5)
ESTIMATED AVERAGE GLUCOSE: 235 MG/DL — HIGH (ref 68–114)
GIANT PLATELETS BLD QL SMEAR: PRESENT — SIGNIFICANT CHANGE UP
GLUCOSE BLDC GLUCOMTR-MCNC: 100 MG/DL — HIGH (ref 70–99)
GLUCOSE BLDC GLUCOMTR-MCNC: 164 MG/DL — HIGH (ref 70–99)
GLUCOSE BLDC GLUCOMTR-MCNC: 187 MG/DL — HIGH (ref 70–99)
GLUCOSE BLDC GLUCOMTR-MCNC: 196 MG/DL — HIGH (ref 70–99)
GLUCOSE BLDC GLUCOMTR-MCNC: 73 MG/DL — SIGNIFICANT CHANGE UP (ref 70–99)
GLUCOSE BLDC GLUCOMTR-MCNC: 95 MG/DL — SIGNIFICANT CHANGE UP (ref 70–99)
GLUCOSE SERPL-MCNC: 185 MG/DL — HIGH (ref 70–99)
GLUCOSE UR QL: >=1000
GRAM STN FLD: SIGNIFICANT CHANGE UP
GRAM STN FLD: SIGNIFICANT CHANGE UP
HCT VFR BLD CALC: 35 % — LOW (ref 39–50)
HGB BLD-MCNC: 11.7 G/DL — LOW (ref 13–17)
KETONES UR-MCNC: NEGATIVE — SIGNIFICANT CHANGE UP
LACTATE SERPL-SCNC: 0.7 MMOL/L — SIGNIFICANT CHANGE UP (ref 0.5–2)
LEUKOCYTE ESTERASE UR-ACNC: NEGATIVE — SIGNIFICANT CHANGE UP
LYMPHOCYTES # BLD AUTO: 19.1 % — SIGNIFICANT CHANGE UP (ref 13–44)
LYMPHOCYTES # BLD AUTO: 2.47 K/UL — SIGNIFICANT CHANGE UP (ref 1–3.3)
MACROCYTES BLD QL: SLIGHT — SIGNIFICANT CHANGE UP
MAGNESIUM SERPL-MCNC: 1.9 MG/DL — SIGNIFICANT CHANGE UP (ref 1.6–2.6)
MANUAL SMEAR VERIFICATION: SIGNIFICANT CHANGE UP
MCHC RBC-ENTMCNC: 28.6 PG — SIGNIFICANT CHANGE UP (ref 27–34)
MCHC RBC-ENTMCNC: 33.4 GM/DL — SIGNIFICANT CHANGE UP (ref 32–36)
MCV RBC AUTO: 85.6 FL — SIGNIFICANT CHANGE UP (ref 80–100)
MICROCYTES BLD QL: SLIGHT — SIGNIFICANT CHANGE UP
MONOCYTES # BLD AUTO: 1.24 K/UL — HIGH (ref 0–0.9)
MONOCYTES NFR BLD AUTO: 9.6 % — SIGNIFICANT CHANGE UP (ref 2–14)
NEUTROPHILS # BLD AUTO: 9.12 K/UL — HIGH (ref 1.8–7.4)
NEUTROPHILS NFR BLD AUTO: 70.4 % — SIGNIFICANT CHANGE UP (ref 43–77)
NITRITE UR-MCNC: NEGATIVE — SIGNIFICANT CHANGE UP
OVALOCYTES BLD QL SMEAR: SLIGHT — SIGNIFICANT CHANGE UP
PH UR: 6 — SIGNIFICANT CHANGE UP (ref 5–8)
PHOSPHATE SERPL-MCNC: 2.6 MG/DL — SIGNIFICANT CHANGE UP (ref 2.5–4.5)
PLAT MORPH BLD: ABNORMAL
PLATELET # BLD AUTO: 443 K/UL — HIGH (ref 150–400)
POTASSIUM SERPL-MCNC: 4.3 MMOL/L — SIGNIFICANT CHANGE UP (ref 3.5–5.3)
POTASSIUM SERPL-SCNC: 4.3 MMOL/L — SIGNIFICANT CHANGE UP (ref 3.5–5.3)
PROT SERPL-MCNC: 7.7 G/DL — SIGNIFICANT CHANGE UP (ref 6–8.3)
PROT UR-MCNC: ABNORMAL MG/DL
RBC # BLD: 4.09 M/UL — LOW (ref 4.2–5.8)
RBC # FLD: 13.8 % — SIGNIFICANT CHANGE UP (ref 10.3–14.5)
RBC BLD AUTO: ABNORMAL
RBC CASTS # UR COMP ASSIST: < 5 /HPF — SIGNIFICANT CHANGE UP
SODIUM SERPL-SCNC: 137 MMOL/L — SIGNIFICANT CHANGE UP (ref 135–145)
SP GR SPEC: 1.01 — SIGNIFICANT CHANGE UP (ref 1–1.03)
SPECIMEN SOURCE: SIGNIFICANT CHANGE UP
SPECIMEN SOURCE: SIGNIFICANT CHANGE UP
SPHEROCYTES BLD QL SMEAR: SLIGHT — SIGNIFICANT CHANGE UP
UROBILINOGEN FLD QL: 0.2 E.U./DL — SIGNIFICANT CHANGE UP
VANCOMYCIN TROUGH SERPL-MCNC: 5 UG/ML — LOW (ref 10–20)
WBC # BLD: 12.95 K/UL — HIGH (ref 3.8–10.5)
WBC # FLD AUTO: 12.95 K/UL — HIGH (ref 3.8–10.5)
WBC UR QL: < 5 /HPF — SIGNIFICANT CHANGE UP

## 2022-02-02 PROCEDURE — 99233 SBSQ HOSP IP/OBS HIGH 50: CPT | Mod: GC

## 2022-02-02 PROCEDURE — 88304 TISSUE EXAM BY PATHOLOGIST: CPT | Mod: 26

## 2022-02-02 RX ORDER — GABAPENTIN 400 MG/1
100 CAPSULE ORAL EVERY 8 HOURS
Refills: 0 | Status: DISCONTINUED | OUTPATIENT
Start: 2022-02-02 | End: 2022-02-11

## 2022-02-02 RX ORDER — HYDROMORPHONE HYDROCHLORIDE 2 MG/ML
1 INJECTION INTRAMUSCULAR; INTRAVENOUS; SUBCUTANEOUS
Refills: 0 | Status: DISCONTINUED | OUTPATIENT
Start: 2022-02-02 | End: 2022-02-09

## 2022-02-02 RX ORDER — VANCOMYCIN HCL 1 G
1500 VIAL (EA) INTRAVENOUS EVERY 12 HOURS
Refills: 0 | Status: COMPLETED | OUTPATIENT
Start: 2022-02-02 | End: 2022-02-02

## 2022-02-02 RX ORDER — OXYCODONE HYDROCHLORIDE 5 MG/1
10 TABLET ORAL EVERY 6 HOURS
Refills: 0 | Status: DISCONTINUED | OUTPATIENT
Start: 2022-02-02 | End: 2022-02-08

## 2022-02-02 RX ORDER — OXYCODONE HYDROCHLORIDE 5 MG/1
5 TABLET ORAL EVERY 6 HOURS
Refills: 0 | Status: DISCONTINUED | OUTPATIENT
Start: 2022-02-02 | End: 2022-02-08

## 2022-02-02 RX ORDER — HYDROMORPHONE HYDROCHLORIDE 2 MG/ML
0.5 INJECTION INTRAMUSCULAR; INTRAVENOUS; SUBCUTANEOUS
Refills: 0 | Status: DISCONTINUED | OUTPATIENT
Start: 2022-02-02 | End: 2022-02-09

## 2022-02-02 RX ADMIN — Medication 300 MILLIGRAM(S): at 07:05

## 2022-02-02 RX ADMIN — PIPERACILLIN AND TAZOBACTAM 200 GRAM(S): 4; .5 INJECTION, POWDER, LYOPHILIZED, FOR SOLUTION INTRAVENOUS at 17:00

## 2022-02-02 RX ADMIN — Medication 3 UNIT(S): at 08:21

## 2022-02-02 RX ADMIN — PIPERACILLIN AND TAZOBACTAM 200 GRAM(S): 4; .5 INJECTION, POWDER, LYOPHILIZED, FOR SOLUTION INTRAVENOUS at 22:34

## 2022-02-02 RX ADMIN — INSULIN GLARGINE 30 UNIT(S): 100 INJECTION, SOLUTION SUBCUTANEOUS at 23:07

## 2022-02-02 RX ADMIN — GABAPENTIN 100 MILLIGRAM(S): 400 CAPSULE ORAL at 22:34

## 2022-02-02 RX ADMIN — PIPERACILLIN AND TAZOBACTAM 200 GRAM(S): 4; .5 INJECTION, POWDER, LYOPHILIZED, FOR SOLUTION INTRAVENOUS at 06:06

## 2022-02-02 RX ADMIN — GABAPENTIN 100 MILLIGRAM(S): 400 CAPSULE ORAL at 14:00

## 2022-02-02 RX ADMIN — PIPERACILLIN AND TAZOBACTAM 200 GRAM(S): 4; .5 INJECTION, POWDER, LYOPHILIZED, FOR SOLUTION INTRAVENOUS at 10:53

## 2022-02-02 RX ADMIN — ATORVASTATIN CALCIUM 40 MILLIGRAM(S): 80 TABLET, FILM COATED ORAL at 22:35

## 2022-02-02 RX ADMIN — Medication 2: at 08:19

## 2022-02-02 NOTE — PHYSICAL THERAPY INITIAL EVALUATION ADULT - ADDITIONAL COMMENTS
Pt lives in an apartment with his mother who recently had a stroke and takes care of her when she does not have assistance from nursing. Pt has 5 RENE and used crutches for a few weeks in 11/2021. Pt has an iwalk but has stopped using it since back pain began. He also can share a shower chair with his mother for using the tub shower. He reports it is difficult to continue NWB restrictions at home while trying to perform ADLs and believes he should go to rehab after discharge.

## 2022-02-02 NOTE — PACU DISCHARGE NOTE - NSCLINEINSERTRD_GEN_ALL_CORE
ID Progress Note      SUBJECTIVE:    Events noted. Intubated and requiring pressor. Sedated.    OBJECTIVE:  Tmax Temp (24hrs), Av °F (36.1 °C), Min:95.5 °F (35.3 °C), Max:98.2 °F (36.8 °C)     Last Vitals   Vitals:    21 1100   BP:    Pulse:    Resp:    Temp: 98.2 °F (36.8 °C)         Gen: Well developed, well nourished.    HEENT: EOMI, TOO, No oral lesions. Secretions are tan whitish  Neck:  Supple, No JVD, No bruits, No LAP.  CVS:  HR - RRR, S1, S2. No murmur  Lung: Clear to auscultation, no dullness to percussion.  Abd:  Soft, Non-tender. No organomegaly, No palpable masses, NABS  : Unremarkable  Extr:  No cyanosis, clubbing. Trace edema  Neuro: sedatedSkin: No rashes or ulcerations.      MEDICATIONS;    As per MAR and reviewed      LABS AND IMAGING: REVIEWED    Recent Labs     21  0754   WBC 8.0   RBC 4.55   HGB 13.6   HCT 40.8   PLT 99*         Microbiology Results     None           No results found.       ASSESSMENT:     # Septic shock due to COVID-19 pneumona and/or aspiration  # Acute respiratory failure  # Transamnitis - ? Due to COVID/rhabdo - R/O DILI  # Atrial fibrillation with rapid ventricular response  # S/p Leukocytosis.  Probably reactive  # Hypothyroidism  # DM2  # HTN  # CHF  # CVA      Recommendation:     # culturs reviewed   # cont DV and dexamethasone  # cont meropenem  # administer tocilizumab  # cont vent support     Andrea Ernst MD  2021     No

## 2022-02-02 NOTE — PROGRESS NOTE ADULT - SUBJECTIVE AND OBJECTIVE BOX
PRE-OP NOTE    Pre-Op Diagnosis: Right foot infected ulceration   Planned Procedure: Right foot incision and drainage with washout and removal of all non-viable skin, soft tissue and bone with possible graft placement   Scheduled Date / Time: Add on 2/2 after 5:30 PM     Labs:                       11.7   12.95 )-----------( 443      ( 02 Feb 2022 06:37 )             35.0   02-02    137  |  104  |  22  ----------------------------<  185<H>  4.3   |  23  |  1.02    Ca    8.8      02 Feb 2022 06:37  Phos  2.6     02-02  Mg     1.9     02-02    TPro  7.7  /  Alb  3.1<L>  /  TBili  0.4  /  DBili  x   /  AST  13  /  ALT  14  /  AlkPhos  66  02-02  LIVER FUNCTIONS - ( 02 Feb 2022 06:37 )  Alb: 3.1 g/dL / Pro: 7.7 g/dL / ALK PHOS: 66 U/L / ALT: 14 U/L / AST: 13 U/L / GGT: x           Vitals: Vital Signs Last 24 Hrs  T(C): 36.3 (02 Feb 2022 08:54), Max: 38.4 (01 Feb 2022 17:42)  T(F): 97.4 (02 Feb 2022 08:54), Max: 101.2 (01 Feb 2022 17:42)  HR: 71 (02 Feb 2022 08:54) (71 - 130)  BP: 134/79 (02 Feb 2022 08:54) (114/73 - 169/90)  BP(mean): --  RR: 18 (02 Feb 2022 08:54) (18 - 18)  SpO2: 97% (02 Feb 2022 08:54) (97% - 99%)  PE:   Official CXR reading: (On Chart)  Official EKG reading: (On Chart)  Type and Cross/Screen:   NPO after 9:30am  Antibiotics:   Anesthesia evaluation (on chart)  Operative Consent (on chart)

## 2022-02-02 NOTE — PROGRESS NOTE ADULT - ATTENDING COMMENTS
Severe sepsis ( POA) due to right foot ulcer / cellulitis vs abscess  Diabetic Foot Infection   Uncontrolled Insulin dependent Diabetes   Peripheral Neuropathy   HTN   HLD     - IV vancomycin , Podiatry to take patient to OR today , clarify need for MRI  and weight bearing status with them after procedure   -follow blood and intra op cultures   -PT eval  -Continue home diabetes , HTN , HLD regimen

## 2022-02-02 NOTE — PATIENT PROFILE ADULT - FALL HARM RISK - HARM RISK INTERVENTIONS

## 2022-02-02 NOTE — PROGRESS NOTE ADULT - SUBJECTIVE AND OBJECTIVE BOX
INTERVAL HPI/OVERNIGHT EVENTS:  As per night team, no overnight events. Patient seen and examined at bedside. Patient denies fever, chills, N/V/D. Denies pain in b/l lower extremities.     VITALS  Vital Signs Last 24 Hrs  T(C): 36.3 (02 Feb 2022 08:54), Max: 38.4 (01 Feb 2022 17:42)  T(F): 97.4 (02 Feb 2022 08:54), Max: 101.2 (01 Feb 2022 17:42)  HR: 71 (02 Feb 2022 08:54) (71 - 130)  BP: 134/79 (02 Feb 2022 08:54) (114/73 - 169/90)  BP(mean): --  RR: 18 (02 Feb 2022 08:54) (18 - 18)  SpO2: 97% (02 Feb 2022 08:54) (97% - 99%)    CAPILLARY BLOOD GLUCOSE      POCT Blood Glucose.: 196 mg/dL (02 Feb 2022 07:56)  POCT Blood Glucose.: 111 mg/dL (01 Feb 2022 20:35)      PHYSICAL EXAM  GENERAL: NAD, lying in bed comfortably  HEAD:  Atraumatic, normocephalic  EYES: EOMI, PERRLA, conjunctiva and sclera clear  ENT: Moist mucous membranes  NECK: Supple, no JVD  HEART: Regular rate and rhythm, no murmurs, rubs, or gallops  LUNGS: Unlabored respirations.  Clear to auscultation bilaterally, no crackles, wheezing, or rhonchi  ABDOMEN: Soft, nontender, nondistended, +BS, globulus  EXTREMITIES: 2+ peripheral pulses bilaterally. No clubbing, cyanosis, or edema, R>L warmer, both feet wrapped, ulcered type II in the plant of right foot  NERVOUS SYSTEM:  A&Ox3, no focal deficits   SKIN: No rashes    MEDICATIONS  (STANDING):  atorvastatin 40 milliGRAM(s) Oral at bedtime  dextrose 40% Gel 15 Gram(s) Oral once  dextrose 5%. 1000 milliLiter(s) (50 mL/Hr) IV Continuous <Continuous>  dextrose 5%. 1000 milliLiter(s) (100 mL/Hr) IV Continuous <Continuous>  dextrose 50% Injectable 25 Gram(s) IV Push once  dextrose 50% Injectable 12.5 Gram(s) IV Push once  dextrose 50% Injectable 25 Gram(s) IV Push once  glucagon  Injectable 1 milliGRAM(s) IntraMuscular once  insulin glargine Injectable (LANTUS) 30 Unit(s) SubCutaneous at bedtime  insulin lispro (ADMELOG) corrective regimen sliding scale   SubCutaneous Before meals and at bedtime  insulin lispro Injectable (ADMELOG) 3 Unit(s) SubCutaneous three times a day before meals  piperacillin/tazobactam IVPB.. 4.5 Gram(s) IV Intermittent every 6 hours  vancomycin  IVPB 1500 milliGRAM(s) IV Intermittent every 12 hours    MEDICATIONS  (PRN):  acetaminophen     Tablet .. 650 milliGRAM(s) Oral every 6 hours PRN Temp greater or equal to 38C (100.4F)      No Known Allergies      LABS                        11.7   12.95 )-----------( 443      ( 02 Feb 2022 06:37 )             35.0     02-02    137  |  104  |  22  ----------------------------<  185<H>  4.3   |  23  |  1.02    Ca    8.8      02 Feb 2022 06:37  Phos  2.6     02-02  Mg     1.9     02-02    TPro  7.7  /  Alb  3.1<L>  /  TBili  0.4  /  DBili  x   /  AST  13  /  ALT  14  /  AlkPhos  66  02-02    PT/INR - ( 01 Feb 2022 18:10 )   PT: 15.0 sec;   INR: 1.26          PTT - ( 01 Feb 2022 18:10 )  PTT:33.3 sec          RADIOLOGY & ADDITIONAL TESTS: Reviewed

## 2022-02-02 NOTE — PACU DISCHARGE NOTE - COMMENTS
Pt has met criteria for discharge to  floor.  VSS.  IV patent.  Dsg. clean and dry.  Report called to IVELISSE Araujo on 4 Uris.  Pain 0/10.  Pt transported via stretcher by transport team.

## 2022-02-02 NOTE — PHYSICAL THERAPY INITIAL EVALUATION ADULT - GENERAL OBSERVATIONS, REHAB EVAL
PT IE Completed. Pt received sitting EOB , A&Ox4, +RA,+heplock, b/l gauze dressing to both feet, in NAD and agreeable to work with PT, IVELISSE Correa notified, cleared for PT by MD Robb with NWB(Podiatry).Pt left sitting EOB, +bed alarm, +call canela within reach, IVELISSE Correa notified. Pt can benefit from PT in order to improve quality of life by increasing strength/endurance/balance with functional mobility and ADLS.

## 2022-02-02 NOTE — PROGRESS NOTE ADULT - ASSESSMENT
47 yo M pt with previous alcohol and cocaine use w/ PMHx of HTN, HLD, DM, osteomyelitis with amputation of the right 4th and 5th toes and partial amputation of the right 2nd toe, recently COVID diagnosis (12/2021) who has presented with diabetic foot wounds in the last year in both feet with complicated healing process R>L (last set back in his right foot the last week), and arrived to ED after having symptoms of chills and shaking in the last 2 days. After being managed in ED with fluids and antibiotics, pt is admitted for further management of severe sepsis likely 2/2 to right foot cellulitis.

## 2022-02-02 NOTE — PROGRESS NOTE ADULT - SUBJECTIVE AND OBJECTIVE BOX
POST OP NOTE    ARIEL MOURA  MRN-[id number]    Procedure: Right foot I&D, wash out, and graft application.  Surgeon: Dr. David Green DPM  Assistants: Bradly Moreno DPM    Patient tolerated procedure well without incident.  Patient transferred to PACU in stable condition. Pt is resting comfortably in bed w/ no pedal complaints at this time. Pt denies pain to RLE at this time. Dressings are C/D/I.     PE / Post Op Check:       GEN: NAD, AAOx3, resting comfortably with pain controlled      LE Focused: CFT shows adequate perfusion b/l with no signs of ischemic compromise.  No strikethrough is appreciated on surgical dressings.  Dressings were dry, clean, and intact.  No neuromuscular deficits appreciated.

## 2022-02-02 NOTE — BRIEF OPERATIVE NOTE - NSICDXBRIEFPROCEDURE_GEN_ALL_CORE_FT
PROCEDURES:  Incision and drainage of wound of right lower leg 02-Feb-2022 19:10:20 I&D of right foot wound Bradly Moreno

## 2022-02-02 NOTE — PHYSICAL THERAPY INITIAL EVALUATION ADULT - PERTINENT HX OF CURRENT PROBLEM, REHAB EVAL
45 y/o M with PMHx of DM, HTN, HLD, osteomyelitis s/p amputation of R 3rd and 4th toes and partial amp of R 2nd toe, presents to the ED with complaints of Chills. Podiatry consulted for chronic wound to R foot,Pt will be taken to OR 2/2 for right foot incision and drainage and washout.

## 2022-02-02 NOTE — PROGRESS NOTE ADULT - ASSESSMENT
45 y/o M with PMHx of DM, HTN, HLD, osteomyelitis s/p amputation of R 3rd and 4th toes and partial amp of R 2nd toe, presents to the ED with complaints of Chills. Podiatry consulted for chronic wound to R foot, seen by podiatrist Dr. Johnson 2 weeks ago pt missed his last appointment. Pt now coming in septic With fever, chills, leukocytosis, elevated ESR and CRP. Wound looks clinically infected drainage +malodor, new wound to medial aspect of pts right foot. Pt will be added on to the OR 2/2/22 for I and D and washout.     Plan:   Pt seen and evaluated bedside   X rays reviewed  Rec IV abx broad spectrum   f/u deep wound culture  Pt will be taken to OR as add on 2/2 after 5:30pm for right foot incision and drainage and washout      Please obtain pre-operative labs and imaging and optimize pt for OR   NPO after 9:30am 2/2  NWB to RLE  Hold DVT ppx      Plan d/w attending

## 2022-02-02 NOTE — PROGRESS NOTE ADULT - ASSESSMENT
47 y/o M with PMHx of DM, HTN, HLD, osteomyelitis s/p amputation of R 3rd and 4th toes and partial amp of R 2nd toe, presents to the ED with complaints of Chills. Podiatry consulted for chronic wound to R foot, seen by podiatrist Dr. Johnson 2 weeks ago pt missed his last appointment. Pt now coming in septic With fever, chills, leukocytosis, elevated ESR and CRP. Wound looks clinically infected drainage +malodor, new wound to medial aspect of pts right foot. Pt is now s/p right foot I&D, wash out, and wound graft application to plantar wound (2/2/22).    Plan:   - C/w IV ABX per primary  - f/u Deep wound Cx + OR Cx's  - Wound care: [facility] Derm-Freddy dermal matrix + Adaptic nonadherent is sutured on to plantar wound. DSD, Kerlix wrap + ACE wrap  - plan for Wound Vac to right foot wounds  - NWB to RLE  - Rest of care per primary team     Podiatry Following. Plan d/w attending

## 2022-02-02 NOTE — PHYSICAL THERAPY INITIAL EVALUATION ADULT - GAIT PATTERN USED, PT EVAL
Pt ambulated from bed to bathroom on RA. Pt able to navigate turns without hands on assistance but reported fatigue at end of gait distance./swing-through gait

## 2022-02-02 NOTE — PROGRESS NOTE ADULT - PROBLEM SELECTOR PLAN 7
N: Diabetic diet  F: none  E: replete  DVT prophylaxis: None.  GI prophylaxis: None.     Code: Full  Dispo: [facility]

## 2022-02-02 NOTE — BRIEF OPERATIVE NOTE - OPERATION/FINDINGS
Right foot prepped in usual aseptic manner. Dorsal and plantar wound edges sharply excised down to level and including the dermis to healthy bleeding tissue. Wound surface debrided to reveal bleeding tissue. Wounds irrigated with pulsavac. [facility] Derm-Freddy wound graft applied to plantar wound and sutured in place with adaptic sutured over it as well. Foot dressed with gauze and kerlix.

## 2022-02-03 LAB
ANION GAP SERPL CALC-SCNC: 10 MMOL/L — SIGNIFICANT CHANGE UP (ref 5–17)
BASOPHILS # BLD AUTO: 0.06 K/UL — SIGNIFICANT CHANGE UP (ref 0–0.2)
BASOPHILS NFR BLD AUTO: 0.7 % — SIGNIFICANT CHANGE UP (ref 0–2)
BUN SERPL-MCNC: 20 MG/DL — SIGNIFICANT CHANGE UP (ref 7–23)
CALCIUM SERPL-MCNC: 8.6 MG/DL — SIGNIFICANT CHANGE UP (ref 8.4–10.5)
CHLORIDE SERPL-SCNC: 107 MMOL/L — SIGNIFICANT CHANGE UP (ref 96–108)
CO2 SERPL-SCNC: 23 MMOL/L — SIGNIFICANT CHANGE UP (ref 22–31)
CREAT SERPL-MCNC: 1.09 MG/DL — SIGNIFICANT CHANGE UP (ref 0.5–1.3)
CULTURE RESULTS: NO GROWTH — SIGNIFICANT CHANGE UP
EOSINOPHIL # BLD AUTO: 0.19 K/UL — SIGNIFICANT CHANGE UP (ref 0–0.5)
EOSINOPHIL NFR BLD AUTO: 2.2 % — SIGNIFICANT CHANGE UP (ref 0–6)
GLUCOSE BLDC GLUCOMTR-MCNC: 111 MG/DL — HIGH (ref 70–99)
GLUCOSE BLDC GLUCOMTR-MCNC: 165 MG/DL — HIGH (ref 70–99)
GLUCOSE BLDC GLUCOMTR-MCNC: 213 MG/DL — HIGH (ref 70–99)
GLUCOSE BLDC GLUCOMTR-MCNC: 267 MG/DL — HIGH (ref 70–99)
GLUCOSE SERPL-MCNC: 194 MG/DL — HIGH (ref 70–99)
HCT VFR BLD CALC: 34.1 % — LOW (ref 39–50)
HGB BLD-MCNC: 11.1 G/DL — LOW (ref 13–17)
IMM GRANULOCYTES NFR BLD AUTO: 0.2 % — SIGNIFICANT CHANGE UP (ref 0–1.5)
LYMPHOCYTES # BLD AUTO: 3.15 K/UL — SIGNIFICANT CHANGE UP (ref 1–3.3)
LYMPHOCYTES # BLD AUTO: 36.2 % — SIGNIFICANT CHANGE UP (ref 13–44)
MAGNESIUM SERPL-MCNC: 1.7 MG/DL — SIGNIFICANT CHANGE UP (ref 1.6–2.6)
MCHC RBC-ENTMCNC: 28.2 PG — SIGNIFICANT CHANGE UP (ref 27–34)
MCHC RBC-ENTMCNC: 32.6 GM/DL — SIGNIFICANT CHANGE UP (ref 32–36)
MCV RBC AUTO: 86.5 FL — SIGNIFICANT CHANGE UP (ref 80–100)
MONOCYTES # BLD AUTO: 0.75 K/UL — SIGNIFICANT CHANGE UP (ref 0–0.9)
MONOCYTES NFR BLD AUTO: 8.6 % — SIGNIFICANT CHANGE UP (ref 2–14)
NEUTROPHILS # BLD AUTO: 4.54 K/UL — SIGNIFICANT CHANGE UP (ref 1.8–7.4)
NEUTROPHILS NFR BLD AUTO: 52.1 % — SIGNIFICANT CHANGE UP (ref 43–77)
NRBC # BLD: 0 /100 WBCS — SIGNIFICANT CHANGE UP (ref 0–0)
PHOSPHATE SERPL-MCNC: 3.2 MG/DL — SIGNIFICANT CHANGE UP (ref 2.5–4.5)
PLATELET # BLD AUTO: 445 K/UL — HIGH (ref 150–400)
POTASSIUM SERPL-MCNC: 4.3 MMOL/L — SIGNIFICANT CHANGE UP (ref 3.5–5.3)
POTASSIUM SERPL-SCNC: 4.3 MMOL/L — SIGNIFICANT CHANGE UP (ref 3.5–5.3)
RBC # BLD: 3.94 M/UL — LOW (ref 4.2–5.8)
RBC # FLD: 14.1 % — SIGNIFICANT CHANGE UP (ref 10.3–14.5)
SODIUM SERPL-SCNC: 140 MMOL/L — SIGNIFICANT CHANGE UP (ref 135–145)
SPECIMEN SOURCE: SIGNIFICANT CHANGE UP
VANCOMYCIN TROUGH SERPL-MCNC: 4 UG/ML — LOW (ref 10–20)
WBC # BLD: 8.71 K/UL — SIGNIFICANT CHANGE UP (ref 3.8–10.5)
WBC # FLD AUTO: 8.71 K/UL — SIGNIFICANT CHANGE UP (ref 3.8–10.5)

## 2022-02-03 PROCEDURE — 99233 SBSQ HOSP IP/OBS HIGH 50: CPT | Mod: GC

## 2022-02-03 RX ORDER — VANCOMYCIN HCL 1 G
1500 VIAL (EA) INTRAVENOUS EVERY 12 HOURS
Refills: 0 | Status: COMPLETED | OUTPATIENT
Start: 2022-02-03 | End: 2022-02-04

## 2022-02-03 RX ADMIN — Medication 300 MILLIGRAM(S): at 08:55

## 2022-02-03 RX ADMIN — Medication 3 UNIT(S): at 17:45

## 2022-02-03 RX ADMIN — Medication 3 UNIT(S): at 08:54

## 2022-02-03 RX ADMIN — OXYCODONE HYDROCHLORIDE 10 MILLIGRAM(S): 5 TABLET ORAL at 21:56

## 2022-02-03 RX ADMIN — ATORVASTATIN CALCIUM 40 MILLIGRAM(S): 80 TABLET, FILM COATED ORAL at 21:56

## 2022-02-03 RX ADMIN — GABAPENTIN 100 MILLIGRAM(S): 400 CAPSULE ORAL at 12:30

## 2022-02-03 RX ADMIN — GABAPENTIN 100 MILLIGRAM(S): 400 CAPSULE ORAL at 05:08

## 2022-02-03 RX ADMIN — Medication 4: at 12:29

## 2022-02-03 RX ADMIN — INSULIN GLARGINE 30 UNIT(S): 100 INJECTION, SOLUTION SUBCUTANEOUS at 21:57

## 2022-02-03 RX ADMIN — OXYCODONE HYDROCHLORIDE 10 MILLIGRAM(S): 5 TABLET ORAL at 05:14

## 2022-02-03 RX ADMIN — Medication 6: at 21:57

## 2022-02-03 RX ADMIN — Medication 300 MILLIGRAM(S): at 20:52

## 2022-02-03 RX ADMIN — PIPERACILLIN AND TAZOBACTAM 200 GRAM(S): 4; .5 INJECTION, POWDER, LYOPHILIZED, FOR SOLUTION INTRAVENOUS at 05:11

## 2022-02-03 RX ADMIN — Medication 2: at 08:54

## 2022-02-03 RX ADMIN — PIPERACILLIN AND TAZOBACTAM 200 GRAM(S): 4; .5 INJECTION, POWDER, LYOPHILIZED, FOR SOLUTION INTRAVENOUS at 12:29

## 2022-02-03 RX ADMIN — GABAPENTIN 100 MILLIGRAM(S): 400 CAPSULE ORAL at 21:56

## 2022-02-03 RX ADMIN — OXYCODONE HYDROCHLORIDE 10 MILLIGRAM(S): 5 TABLET ORAL at 22:50

## 2022-02-03 RX ADMIN — Medication 3 UNIT(S): at 12:29

## 2022-02-03 NOTE — OCCUPATIONAL THERAPY INITIAL EVALUATION ADULT - ADDITIONAL COMMENTS
Pt lives in apartment ([address]) with his mother who recently had a stroke. Pt's mother has home nursing assistance, however pt provides assistance to his mother when nurses are not present. Pt states that he has been able to complete ADLs/mobility independently, however with increasing difficulty 2/2 NWB status of R SAMEERA. Pt reports that he has been using axillary crutches for transfers/ambulation and uses his mothers shower chair for bathing.

## 2022-02-03 NOTE — OCCUPATIONAL THERAPY INITIAL EVALUATION ADULT - PERTINENT HX OF CURRENT PROBLEM, REHAB EVAL
47 y/o M with PMHx of DM, HTN, HLD, osteomyelitis s/p amputation of R 3rd and 4th toes and partial amp of R 2nd toe, presents to the ED with complaints of Chills. Podiatry consulted for chronic wound to R foot,Pt had right foot incision and drainage and washout on 2/2.

## 2022-02-03 NOTE — DIETITIAN INITIAL EVALUATION ADULT. - PROBLEM SELECTOR PLAN 3
Started in 2021, not well control in the beginning, on OAD and insulin  PLAN:  - Monitor glucose  - F/u HbA1c  - Lantus Insulin 30 UI at bedtime  - Humalog Insulin 3 UI in meals 3 times a day  - Hold Metformin.

## 2022-02-03 NOTE — PROGRESS NOTE ADULT - ASSESSMENT
45 yo M pt with previous alcohol and cocaine use w/ PMHx of HTN, HLD, DM, osteomyelitis with amputation of the right 4th and 5th toes and partial amputation of the right 2nd toe, recently COVID diagnosis (12/2021) who has presented with diabetic foot wounds in the last year in both feet with complicated healing process R>L (last set back in his right foot the last week), and arrived to ED after having symptoms of chills and shaking in the last 2 days. After being managed in ED with fluids and antibiotics, pt is admitted for further management of severe sepsis likely 2/2 to right foot cellulitis.

## 2022-02-03 NOTE — PROGRESS NOTE ADULT - ATTENDING COMMENTS
Severe sepsis ( POA) due to right foot ulcer / cellulitis vs abscess  Diabetic Foot Infection   Uncontrolled Insulin dependent Diabetes   Peripheral Neuropathy   HTN   HLD     - IV vancomycin , Podiatry to take patient to OR today , NWB right lower extremity , MRI ordered , f.u Podiatry recs, ID consult for antibiotic regimen and duration   -follow blood and intra op cultures   -PT eval  -Continue home diabetes , HTN , HLD regimen      Disp : [facility] Severe sepsis ( POA) due to right foot ulcer / cellulitis vs abscess  Diabetic Foot Infection   Uncontrolled Insulin dependent Diabetes   Peripheral Neuropathy   HTN   HLD     - IV vancomycin , Podiatry to take patient to OR today , NWB right lower extremity , MRI ordered , f.u Podiatry recs, ID consult for antibiotic regimen and duration   -follow blood and intra op cultures   -PT eval  -Continue home diabetes , HTN , HLD regimen  -pain control with IV Dilaudid and oxycodone       Disp : [facility]

## 2022-02-03 NOTE — DIETITIAN INITIAL EVALUATION ADULT. - PROBLEM SELECTOR PLAN 7
N: Diabetic diet  F: 200 cc/h LR  E: replete  DVT prophylaxis: None.  GI prophylaxis: None.     Code: Full  Dispo: [facility]

## 2022-02-03 NOTE — DIETITIAN INITIAL EVALUATION ADULT. - PROBLEM SELECTOR PLAN 1
likely 2/2 to right foot cellulitis in pt with diabetic foot. Meet SIRS criteria 3/4 (tachycardia, fever and leukocytosis). Warmer foot R>L. Lactate elevated. Good response to Vanc 1g, Zosyn 3.375g, NS 1L in 1 h hours. Podiatry intervention.   PLAN:  - Monitor vitals.  - C/w vancomycin 1 g q24h IV and Zosyn 4.5mg q8h.   - Vancomycin trough previous 4th dose.   - f/u blood cultures.

## 2022-02-03 NOTE — DIETITIAN INITIAL EVALUATION ADULT. - PROBLEM SELECTOR PLAN 4
Cocaine use (in his youth, on the weekends; last use 12/31, only in special events). He denies any addiction now.  PLAN:  - f/u Utox

## 2022-02-03 NOTE — DIETITIAN INITIAL EVALUATION ADULT. - OTHER INFO
45 yo M pt with previous alcohol and cocaine use w/ PMHx of HTN, HLD, DM, osteomyelitis with amputation of the right 4th and 5th toes and partial amputation of the right 2nd toe, recently COVID diagnosis (12/2021) who has presented with diabetic foot wounds in the last year in both feet with complicated healing process R>L (last set back in his right foot the last week), and arrived to ED after having symptoms of chills and shaking in the last 2 days. After being managed in ED with fluids and antibiotics, pt is admitted for further management of severe sepsis likely 2/2 to right foot cellulitis.    Pt seen in room, resting in bed. Pt reports decreased PO intake PTA d/t limited food access. States he received most of his food from restaurants d/t not having kitchen. Usually has ~1 meal per day. Despite decreased PO intake pt reports wt stability, states his clothes are fitting the same. Of note admit wt 145lbs-suspect error as pt visually appears to weigh more than this. Bedscale wt not working during visit. Per EMR: pt was 220lbs in October 2021, visually pt closer to this weight. Obtain re weight when possible. Pt denies N/V/D/C at present. Pt currently on consistent carbohydrate diet, eating well 100% of meals. Pt knowledgeable about features of diet, RD reinforced education. Pt expressed understanding. No pain noted. Please see full recs below. Will continue to follow per RD protocol.

## 2022-02-03 NOTE — OCCUPATIONAL THERAPY INITIAL EVALUATION ADULT - MODALITIES TREATMENT COMMENTS
Pt able to ambulate 3 b/l side/fwd/bwd steps using axillary crutches with SBA. Pt on IV bed pole at time of evaluation, and further ambulation/OOB ADLs are TBA.

## 2022-02-03 NOTE — OCCUPATIONAL THERAPY INITIAL EVALUATION ADULT - GENERAL OBSERVATIONS, REHAB EVAL
Pt received semi supine in bed, NAD, +IV (bed pole). Pt A&Ox4, agreeable to OT, and tolerated session well. Pt received semi supine in bed, NAD, +IV (bed pole), +R foot dressing C/D/I. Pt A&Ox4, agreeable to OT, and tolerated session well.

## 2022-02-03 NOTE — PROGRESS NOTE ADULT - SUBJECTIVE AND OBJECTIVE BOX
Patient is a 46y old  Male who presents with a chief complaint of Chills. (03 Feb 2022 08:11)      INTERVAL HPI/ OVERNIGHT EVENTS: Pt seen and evaluated bedside this morning POD-1. Pts dressing were clean, dry and intact. He has no pain to his right lower extremity. He admits to having trouble using the crutches due to his back pain. We stressed the importance of being non-WB for wound healing.       LABS                        11.1   8.71  )-----------( 445      ( 03 Feb 2022 08:06 )             34.1     02-03    140  |  107  |  20  ----------------------------<  194<H>  4.3   |  23  |  1.09    Ca    8.6      03 Feb 2022 08:06  Phos  3.2     02-03  Mg     1.7     02-03    TPro  7.7  /  Alb  3.1<L>  /  TBili  0.4  /  DBili  x   /  AST  13  /  ALT  14  /  AlkPhos  66  02-02    PT/INR - ( 01 Feb 2022 18:10 )   PT: 15.0 sec;   INR: 1.26          PTT - ( 01 Feb 2022 18:10 )  PTT:33.3 sec    ICU Vital Signs Last 24 Hrs  T(C): 36.7 (03 Feb 2022 04:55), Max: 36.8 (02 Feb 2022 16:33)  T(F): 98 (03 Feb 2022 04:55), Max: 98.2 (02 Feb 2022 16:33)  HR: 75 (03 Feb 2022 04:55) (67 - 78)  BP: 142/75 (03 Feb 2022 04:55) (114/74 - 162/84)  BP(mean): --  ABP: --  ABP(mean): --  RR: 18 (03 Feb 2022 04:55) (16 - 20)  SpO2: 97% (03 Feb 2022 04:55) (97% - 100%)      PHYSICAL EXAM  General: NAD, AA0x3  Lower Extremity Focused:  Vasc: D/PT 2/4 b/l, Right foot warmer than left, non-pitting edema to dorsum of right foot   Derm:  Right foot- Submet 1 ulcer measuring 5.0cmx5.5otb5bv, Ulcer has Graft and adaptic sutured on top-graft in place. , Right foot medial 1st MPJ ulceration 4x3cm granular base, -PTB.  Left foot submet 1 ulceration macerated base and border 1x1cm -PTB, malodor -draiange  Neuro: Protective sensation diminished   MSK: 4th and 5th right partial ray amputation and digital amputation of 2nd digit of the R foot.       RADIOLOGY: < from: Xray Foot AP + Lateral + Oblique, Bilat (02.01.22 @ 17:47) >  Impression:  Right foot:On the lateral view, appears that there is new fragmentation   of the hallux sesamoids. This could be secondary to fracture however   infection in this area is not excluded. Further evaluation with MRI may   be of use.  Left foot: No radiographic evidence of acute osteomyelitis.  --- End of Report ---  < end of copied text >

## 2022-02-04 ENCOUNTER — TRANSCRIPTION ENCOUNTER (OUTPATIENT)
Age: 47
End: 2022-02-04

## 2022-02-04 LAB
-  CEFAZOLIN: SIGNIFICANT CHANGE UP
-  CLINDAMYCIN: SIGNIFICANT CHANGE UP
-  DAPTOMYCIN: SIGNIFICANT CHANGE UP
-  ERYTHROMYCIN: SIGNIFICANT CHANGE UP
-  LEVOFLOXACIN: SIGNIFICANT CHANGE UP
-  LEVOFLOXACIN: SIGNIFICANT CHANGE UP
-  LINEZOLID: SIGNIFICANT CHANGE UP
-  OXACILLIN: SIGNIFICANT CHANGE UP
-  PENICILLIN: SIGNIFICANT CHANGE UP
-  PENICILLIN: SIGNIFICANT CHANGE UP
-  RIFAMPIN: SIGNIFICANT CHANGE UP
-  TETRACYCLINE: SIGNIFICANT CHANGE UP
-  TRIMETHOPRIM/SULFAMETHOXAZOLE: SIGNIFICANT CHANGE UP
-  VANCOMYCIN: SIGNIFICANT CHANGE UP
ANION GAP SERPL CALC-SCNC: 9 MMOL/L — SIGNIFICANT CHANGE UP (ref 5–17)
BASOPHILS # BLD AUTO: 0.05 K/UL — SIGNIFICANT CHANGE UP (ref 0–0.2)
BASOPHILS NFR BLD AUTO: 0.7 % — SIGNIFICANT CHANGE UP (ref 0–2)
BUN SERPL-MCNC: 12 MG/DL — SIGNIFICANT CHANGE UP (ref 7–23)
CALCIUM SERPL-MCNC: 9.1 MG/DL — SIGNIFICANT CHANGE UP (ref 8.4–10.5)
CHLORIDE SERPL-SCNC: 104 MMOL/L — SIGNIFICANT CHANGE UP (ref 96–108)
CO2 SERPL-SCNC: 28 MMOL/L — SIGNIFICANT CHANGE UP (ref 22–31)
CREAT SERPL-MCNC: 0.92 MG/DL — SIGNIFICANT CHANGE UP (ref 0.5–1.3)
CULTURE RESULTS: SIGNIFICANT CHANGE UP
EOSINOPHIL # BLD AUTO: 0.24 K/UL — SIGNIFICANT CHANGE UP (ref 0–0.5)
EOSINOPHIL NFR BLD AUTO: 3.2 % — SIGNIFICANT CHANGE UP (ref 0–6)
GLUCOSE BLDC GLUCOMTR-MCNC: 162 MG/DL — HIGH (ref 70–99)
GLUCOSE BLDC GLUCOMTR-MCNC: 212 MG/DL — HIGH (ref 70–99)
GLUCOSE BLDC GLUCOMTR-MCNC: 217 MG/DL — HIGH (ref 70–99)
GLUCOSE BLDC GLUCOMTR-MCNC: 69 MG/DL — LOW (ref 70–99)
GLUCOSE SERPL-MCNC: 76 MG/DL — SIGNIFICANT CHANGE UP (ref 70–99)
HCT VFR BLD CALC: 35.3 % — LOW (ref 39–50)
HGB BLD-MCNC: 11.2 G/DL — LOW (ref 13–17)
IMM GRANULOCYTES NFR BLD AUTO: 0.1 % — SIGNIFICANT CHANGE UP (ref 0–1.5)
LYMPHOCYTES # BLD AUTO: 3.42 K/UL — HIGH (ref 1–3.3)
LYMPHOCYTES # BLD AUTO: 45.7 % — HIGH (ref 13–44)
MAGNESIUM SERPL-MCNC: 1.6 MG/DL — SIGNIFICANT CHANGE UP (ref 1.6–2.6)
MCHC RBC-ENTMCNC: 27.1 PG — SIGNIFICANT CHANGE UP (ref 27–34)
MCHC RBC-ENTMCNC: 31.7 GM/DL — LOW (ref 32–36)
MCV RBC AUTO: 85.5 FL — SIGNIFICANT CHANGE UP (ref 80–100)
METHOD TYPE: SIGNIFICANT CHANGE UP
MONOCYTES # BLD AUTO: 0.68 K/UL — SIGNIFICANT CHANGE UP (ref 0–0.9)
MONOCYTES NFR BLD AUTO: 9.1 % — SIGNIFICANT CHANGE UP (ref 2–14)
NEUTROPHILS # BLD AUTO: 3.09 K/UL — SIGNIFICANT CHANGE UP (ref 1.8–7.4)
NEUTROPHILS NFR BLD AUTO: 41.2 % — LOW (ref 43–77)
NRBC # BLD: 0 /100 WBCS — SIGNIFICANT CHANGE UP (ref 0–0)
ORGANISM # SPEC MICROSCOPIC CNT: SIGNIFICANT CHANGE UP
PHOSPHATE SERPL-MCNC: 3.2 MG/DL — SIGNIFICANT CHANGE UP (ref 2.5–4.5)
PLATELET # BLD AUTO: 476 K/UL — HIGH (ref 150–400)
POTASSIUM SERPL-MCNC: 4 MMOL/L — SIGNIFICANT CHANGE UP (ref 3.5–5.3)
POTASSIUM SERPL-SCNC: 4 MMOL/L — SIGNIFICANT CHANGE UP (ref 3.5–5.3)
RBC # BLD: 4.13 M/UL — LOW (ref 4.2–5.8)
RBC # FLD: 13.8 % — SIGNIFICANT CHANGE UP (ref 10.3–14.5)
SODIUM SERPL-SCNC: 141 MMOL/L — SIGNIFICANT CHANGE UP (ref 135–145)
SPECIMEN SOURCE: SIGNIFICANT CHANGE UP
VANCOMYCIN TROUGH SERPL-MCNC: 13.3 UG/ML — SIGNIFICANT CHANGE UP (ref 10–20)
WBC # BLD: 7.49 K/UL — SIGNIFICANT CHANGE UP (ref 3.8–10.5)
WBC # FLD AUTO: 7.49 K/UL — SIGNIFICANT CHANGE UP (ref 3.8–10.5)

## 2022-02-04 PROCEDURE — 99222 1ST HOSP IP/OBS MODERATE 55: CPT

## 2022-02-04 PROCEDURE — 99232 SBSQ HOSP IP/OBS MODERATE 35: CPT | Mod: GC

## 2022-02-04 RX ORDER — GABAPENTIN 400 MG/1
1 CAPSULE ORAL
Qty: 0 | Refills: 0 | DISCHARGE
Start: 2022-02-04

## 2022-02-04 RX ORDER — VANCOMYCIN HCL 1 G
1750 VIAL (EA) INTRAVENOUS EVERY 12 HOURS
Refills: 0 | Status: DISCONTINUED | OUTPATIENT
Start: 2022-02-04 | End: 2022-02-05

## 2022-02-04 RX ORDER — ENOXAPARIN SODIUM 100 MG/ML
40 INJECTION SUBCUTANEOUS DAILY
Refills: 0 | Status: DISCONTINUED | OUTPATIENT
Start: 2022-02-04 | End: 2022-02-11

## 2022-02-04 RX ADMIN — OXYCODONE HYDROCHLORIDE 10 MILLIGRAM(S): 5 TABLET ORAL at 11:45

## 2022-02-04 RX ADMIN — Medication 3 UNIT(S): at 17:09

## 2022-02-04 RX ADMIN — GABAPENTIN 100 MILLIGRAM(S): 400 CAPSULE ORAL at 13:03

## 2022-02-04 RX ADMIN — INSULIN GLARGINE 30 UNIT(S): 100 INJECTION, SOLUTION SUBCUTANEOUS at 22:35

## 2022-02-04 RX ADMIN — Medication 3 UNIT(S): at 12:50

## 2022-02-04 RX ADMIN — GABAPENTIN 100 MILLIGRAM(S): 400 CAPSULE ORAL at 22:35

## 2022-02-04 RX ADMIN — Medication 2: at 17:09

## 2022-02-04 RX ADMIN — Medication 300 MILLIGRAM(S): at 11:09

## 2022-02-04 RX ADMIN — GABAPENTIN 100 MILLIGRAM(S): 400 CAPSULE ORAL at 05:49

## 2022-02-04 RX ADMIN — ENOXAPARIN SODIUM 40 MILLIGRAM(S): 100 INJECTION SUBCUTANEOUS at 13:03

## 2022-02-04 RX ADMIN — ATORVASTATIN CALCIUM 40 MILLIGRAM(S): 80 TABLET, FILM COATED ORAL at 22:34

## 2022-02-04 RX ADMIN — Medication 4: at 22:40

## 2022-02-04 RX ADMIN — OXYCODONE HYDROCHLORIDE 10 MILLIGRAM(S): 5 TABLET ORAL at 12:45

## 2022-02-04 RX ADMIN — Medication 4: at 12:50

## 2022-02-04 NOTE — DISCHARGE NOTE PROVIDER - NSDCMRMEDTOKEN_GEN_ALL_CORE_FT
atorvastatin 40 mg oral tablet: 1 tab(s) orally once a day  Endocet 5/325 oral tablet: 1 tab(s) orally every 6 hours MDD:4  gabapentin 100 mg oral capsule: 1 cap(s) orally every 8 hours  insulin glargine: 30 unit(s) subcutaneous once a day  Insulin Lispro KwikPen 100 units/mL injectable solution: 10 unit(s) injectable 3 times a day (with meals)  metFORMIN 750 mg oral tablet, extended release: 2 tab(s) orally once a day   atorvastatin 40 mg oral tablet: 1 tab(s) orally once a day  Endocet 5/325 oral tablet: 1 tab(s) orally every 6 hours MDD:4  gabapentin 100 mg oral capsule: 1 cap(s) orally every 8 hours  insulin glargine: 30 unit(s) subcutaneous once a day  Insulin Lispro KwikPen 100 units/mL injectable solution: 10 unit(s) injectable 3 times a day (with meals)  losartan 25 mg oral tablet: 1 tab(s) orally once a day  metFORMIN 750 mg oral tablet, extended release: 2 tab(s) orally once a day   atorvastatin 40 mg oral tablet: 1 tab(s) orally once a day  Endocet 5/325 oral tablet: 1 tab(s) orally every 6 hours MDD:4  gabapentin 100 mg oral capsule: 1 cap(s) orally every 8 hours  insulin glargine: 35 unit(s) subcutaneous once a day  Insulin Lispro KwikPen 100 units/mL injectable solution: 5  injectable 3 times a day  losartan 25 mg oral tablet: 1 tab(s) orally once a day  metFORMIN 750 mg oral tablet, extended release: 2 tab(s) orally once a day

## 2022-02-04 NOTE — DISCHARGE NOTE PROVIDER - NSDCCPCAREPLAN_GEN_ALL_CORE_FT
PRINCIPAL DISCHARGE DIAGNOSIS  Diagnosis: Diabetic foot ulcer associated with secondary diabetes mellitus  Assessment and Plan of Treatment:        PRINCIPAL DISCHARGE DIAGNOSIS  Diagnosis: Diabetic foot ulcer associated with secondary diabetes mellitus  Assessment and Plan of Treatment: You presented with fever, chills, bodyaches and bilateral foot pain. The podiatry team saw you, and you underwent an incision & drainage, wash out and graft placement of the ulcer on your RIGHT lower extremity because of an infection in your foot. Biopsy and cultures were taken, which grew 2 organisms. You were treated with an antibiotic called vancomycin. The infectious disease team followed your case and recommended __________.       PRINCIPAL DISCHARGE DIAGNOSIS  Diagnosis: Diabetic foot ulcer associated with secondary diabetes mellitus  Assessment and Plan of Treatment: You presented with fever, chills, bodyaches and bilateral foot pain. The podiatry team saw you, and you underwent an incision & drainage, wash out and graft placement of the ulcer on your RIGHT lower extremity because of an infection in your foot. Biopsy and cultures were taken, which grew 2 organisms. You were treated with an antibiotic called vancomycin. The infectious disease team followed your case and recommended Vancomycin 1750mg every 12 hours, last day March 16th 2022, and Flagyl 500mg every 8 hours, last day March 21st 2022. Please follow up with Dr. Mosley for further management. Please fax CBC, CMP, ESR, CRP, vanco trough weekly to Dr. Mosley.       PRINCIPAL DISCHARGE DIAGNOSIS  Diagnosis: Diabetic foot ulcer associated with secondary diabetes mellitus  Assessment and Plan of Treatment: You presented with fever, chills, bodyaches and bilateral foot pain. The podiatry team saw you, and you underwent an incision & drainage, wash out and graft placement of the ulcer on your RIGHT lower extremity because of an infection in your foot. Biopsy and cultures were taken, which grew 2 organisms. You were treated with an antibiotic called vancomycin. The infectious disease team followed your case and recommended Vancomycin 1750mg every 12 hours, last day March 16th 2022, and Flagyl 500mg every 8 hours, last day March 21st 2022. Please follow up with Dr. Mosley for further management. Please fax CBC, CMP, ESR, CRP, vanco trough weekly to Dr. Mosley. Please follow up for your Podiatry appointment on 1/21 at 2:45 pm.      SECONDARY DISCHARGE DIAGNOSES  Diagnosis: Diabetes  Assessment and Plan of Treatment: Please continue using your Lantus 35u and 5u Lispro daily. Continue to monitor your blood sugars and follow with your PCP.

## 2022-02-04 NOTE — DISCHARGE NOTE PROVIDER - NSDCFUADDAPPT_GEN_ALL_CORE_FT
Please bring your Insurance card, Photo ID and Discharge paperwork to the following appointment:    (1) Please follow up with your Infectious Disease Provider, Dr. Santosh Mosley at [address] [place] [address], [address] [place] [address] on 02/23/2022 at 10:40am.    Appointment was scheduled by Ms. VAISHNAVI Quintero, Referral Coordinator.

## 2022-02-04 NOTE — PROGRESS NOTE ADULT - SUBJECTIVE AND OBJECTIVE BOX
Patient is a 46y old  Male who presents with a chief complaint of Chills. (03 Feb 2022 15:03)      INTERVAL HPI/ OVERNIGHT EVENTS: Pt seen and evaluated bedside this AM. Pt has no pedal complaints this morning. Pt denies N/V/F/C/SOB.       LABS                        11.2   7.49  )-----------( 476      ( 04 Feb 2022 08:01 )             35.3     02-04    141  |  104  |  x   ----------------------------<  76  4.0   |  28  |  0.92    Ca    9.1      04 Feb 2022 08:01  Phos  3.2     02-04  Mg     1.6     02-04          ICU Vital Signs Last 24 Hrs  T(C): 36.6 (04 Feb 2022 05:49), Max: 37.1 (03 Feb 2022 21:17)  T(F): 97.9 (04 Feb 2022 05:49), Max: 98.8 (03 Feb 2022 21:17)  HR: 75 (04 Feb 2022 05:49) (68 - 88)  BP: 144/90 (04 Feb 2022 05:49) (144/90 - 169/85)  BP(mean): --  ABP: --  ABP(mean): --  RR: 18 (04 Feb 2022 05:49) (17 - 18)  SpO2: 98% (04 Feb 2022 05:49) (97% - 98%)      PHYSICAL EXAM  General: NAD, AA0x3  Lower Extremity Focused:  Vasc: D/PT 2/4 b/l, Right foot warmer than left, non-pitting edema to dorsum of right foot   Derm:  Right foot- Submet 1 ulcer measuring 5.0cmx5.2tio0lz, Ulcer has Graft and adaptic sutured on top-graft in place. , Right foot medial 1st MPJ ulceration 4x3cm granular base, -PTB.  Left foot submet 1 ulceration macerated base and border 1x1cm -PTB, malodor + serous drainage  Neuro: Protective sensation diminished   MSK: 4th and 5th right partial ray amputation and digital amputation of 2nd digit of the R foot.       RADIOLOGY: < from: Xray Foot AP + Lateral + Oblique, Bilat (02.01.22 @ 17:47) >  Impression:  Right foot:On the lateral view, appears that there is new fragmentation   of the hallux sesamoids. This could be secondary to fracture however   infection in this area is not excluded. Further evaluation with MRI may   be of use.  Left foot: No radiographic evidence of acute osteomyelitis.  --- End of Report ---  < end of copied text >    MICRO:  Culture - Surgical Swab (02.02.22 @ 21:06)    Gram Stain:   Few Gram positive cocci in pairs and clusters  Rare Gram Negative Rods  No WBC's seen.    Specimen Source: .Surgical Swab Right Foot 2    Culture Results:   Moderate Streptococcus agalactiae (Group B)  Susceptibility to follow.  Culture in progress

## 2022-02-04 NOTE — DISCHARGE NOTE PROVIDER - CARE PROVIDERS DIRECT ADDRESSES
,[email or web address]

## 2022-02-04 NOTE — PROGRESS NOTE ADULT - ATTENDING COMMENTS
Severe sepsis ( POA) due to right foot ulcer most likely osteomyelitis   Diabetic Foot Infection   Uncontrolled Insulin dependent Diabetes   Peripheral Neuropathy   HTN   HLD     -wound cx + MRSA and Group B strep , Continue Vancomycin , High likely romero of Osteomyelitis, ID consult for recommendation and course of antibiotics.     Disp : STEVEN + Wound Vac, pending ID and Podiatry final recs

## 2022-02-04 NOTE — CONSULT NOTE ADULT - ASSESSMENT
IMPRESSION:  Diabetic foot infection.  Suspected osteomyelitis per primary team.  OR cultures with MRSA, GBS and bacteroides    Recommend:  1. Continue Vancomycin.  Check vanco trough at 10:30 pm tonight.    2.  Add Metronidazole 500 mg PO q8hrs  3.  He will need 6 weeks of IV antibiotics. Ok to place PICC however would not discharge until therapeutic vanco trough is obtained    ID team 2 will follow.  Dr. Liu will cover the service tonight and this weekend.

## 2022-02-04 NOTE — PROGRESS NOTE ADULT - ASSESSMENT
45 y/o M with PMHx of DM, HTN, HLD, osteomyelitis s/p amputation of R 3rd and 4th toes and partial amp of R 2nd toe, presents to the ED with complaints of Chills. Podiatry consulted for chronic wound to R foot, seen by podiatrist Dr. Johnson 2 weeks ago pt missed his last appointment. In ED pt was septic with fever, chills, leukocytosis, elevated ESR and CRP. Wound looked clinically infected drainage +malodor, new wound to medial aspect of pts right foot. Pt is now s/p right foot I&D, wash out, and wound graft application to plantar wound (2/2/22), High suspicion of osteomyelitis, wound vac applied 2/4/22.     Plan:   - C/w IV ABX per ID  - f/u Deep wound Cx + OR Cx's  - Wound care: Lola Pirindola Derm-Freddy dermal matrix + Adaptic nonadherent is sutured on to plantar wound. DSD, Kerlix wrap + ACE wrap  - wound vac applied today to pts right foot wound will change M/W/F  - NWB to RLE, very important   - Rest of care per primary team     Podiatry Following. Plan d/w attending

## 2022-02-04 NOTE — DISCHARGE NOTE PROVIDER - PROVIDER TOKENS
PROVIDER:[TOKEN:[[id number]],FOLLOWUP:[1 week]] PROVIDER:[TOKEN:[[id number]],SCHEDULEDAPPT:[02/23/2022],SCHEDULEDAPPTTIME:[10:40 AM]] PROVIDER:[TOKEN:[[id number]],SCHEDULEDAPPT:[02/23/2022],SCHEDULEDAPPTTIME:[10:40 AM]],PROVIDER:[TOKEN:[[id number]],SCHEDULEDAPPT:[02/21/2022],SCHEDULEDAPPTTIME:[02:45 PM]]

## 2022-02-04 NOTE — DISCHARGE NOTE PROVIDER - HOSPITAL COURSE
#Discharge: do not delete    Patient is 45 yo M with past medical history of  previous alcohol and cocaine use w/ PMHx of HTN (not treated), HLD (on meds), DM (started in 2021, not well control in the beginning, on OAD and insulin), osteomyelitis with amputation of the right 4th and 5th toes and partial amputation of the right 2nd toe presented with fever, chills, body aches and b/l foot pain, found to have osteomyelitis.     Hospital course (by problem):     #SIRS: Likely 2/2 to right foot cellulitis in pt with diabetic foot. Meet SIRS criteria 3/4 (tachycardia, fever and leukocytosis). Warmer foot R>L. Lactate elevated, cleared s/p fluids. Started on vancomycin & zoysn. Podiatry consulted, underwent RLE I&D with wash out and graft placement. cultures growing GBS & MRSA - continued with vancomycin but d/c zoysn. ID consulted, recommended _________. wound vac placed. PT recommending ___      #Diabetes foot ulcer: see plan above. F/u podiatry outpatient (Dr. Barry Hodges)     #DM: Started in 2021, not well control in the beginning, on OAD and insulin. HbA1c 9.8. Lantus Insulin 30 UI at bedtime. Humalog Insulin 3 UI TID. Continue with Metformin and Gabapentin 100mg q8hr    #HTN: currently no meds. continue to monitor.     #HLD: c/w atorvastatin 40mg qhs      Patient was discharged to: [facility]    New medications:   Changes to old medications:  Medications that were stopped:    Items to follow up as outpatient: f/u podiatry outpatient    Physical exam at the time of discharge:  GENERAL: NAD, lying in bed comfortably  HEAD:  Atraumatic, normocephalic  EYES: EOMI, PERRLA, conjunctiva and sclera clear  ENT: Moist mucous membranes  NECK: Supple, no JVD  HEART: Regular rate and rhythm, no murmurs, rubs, or gallops  LUNGS: Unlabored respirations.  Clear to auscultation bilaterally, no crackles, wheezing, or rhonchi  ABDOMEN: Soft, nontender, nondistended, +BS, globulus  EXTREMITIES: 2+ peripheral pulses bilaterally. RLE wrapped in ace bandage, WWP, movement intact. LLE wrapped with gawze, WWP and movement intact.   NERVOUS SYSTEM:  A&Ox3, no focal deficits   SKIN: No rashes #Discharge: do not delete    Patient is 47 yo M with past medical history of  previous alcohol and cocaine use w/ PMHx of HTN (not treated), HLD (on meds), DM (started in 2021, not well control in the beginning, on OAD and insulin), osteomyelitis with amputation of the right 4th and 5th toes and partial amputation of the right 2nd toe presented with fever, chills, body aches and b/l foot pain, found to have osteomyelitis.     Hospital course (by problem):     #SIRS: Likely 2/2 to right foot cellulitis in pt with diabetic foot. Meet SIRS criteria 3/4 (tachycardia, fever and leukocytosis). Warmer foot R>L. Lactate elevated, cleared s/p fluids. Started on vancomycin & zoysn. Podiatry consulted, underwent RLE I&D with wash out and graft placement. cultures growing GBS & MRSA - continued with vancomycin but d/c zoysn. ID consulted, recommended c/w vanc and started flagyl 500mg q8hr wound vac placed. PT recommending [facility]. Please continue vancomycin 1500mg q12hr (2/2-3/16) and flagyl 500mg q8hr (2/7-3/21).      #Diabetes foot ulcer: see plan above. F/u podiatry outpatient (Dr. Barry Hodges)     #DM: Started in 2021, not well control in the beginning, on OAD and insulin. HbA1c 9.8. Lantus Insulin 30 UI at bedtime. Humalog Insulin 3 UI TID. Continue with Metformin and Gabapentin 100mg q8hr    #HTN: currently no meds. continue to monitor.     #HLD: c/w atorvastatin 40mg qhs      Patient was discharged to: [facility]    New medications: Vancomycin 1500mg q12hr, Flagyl 500mg q8hr   Changes to old medications:  Medications that were stopped:    Items to follow up as outpatient: f/u podiatry outpatient, f/u in    Physical exam at the time of discharge:  GENERAL: NAD, lying in bed comfortably  HEAD:  Atraumatic, normocephalic  EYES: EOMI, PERRLA, conjunctiva and sclera clear  ENT: Moist mucous membranes  NECK: Supple, no JVD  HEART: Regular rate and rhythm, no murmurs, rubs, or gallops  LUNGS: Unlabored respirations.  Clear to auscultation bilaterally, no crackles, wheezing, or rhonchi  ABDOMEN: Soft, nontender, nondistended, +BS, globulus  EXTREMITIES: 2+ peripheral pulses bilaterally. RLE wrapped in ace bandage, WWP, movement intact. LLE wrapped with gawze, WWP and movement intact.   NERVOUS SYSTEM:  A&Ox3, no focal deficits   SKIN: No rashes #Discharge: do not delete    Patient is 45 yo M with past medical history of  previous alcohol and cocaine use w/ PMHx of HTN (not treated), HLD (on meds), DM (started in 2021, not well control in the beginning, on OAD and insulin), osteomyelitis with amputation of the right 4th and 5th toes and partial amputation of the right 2nd toe presented with fever, chills, body aches and b/l foot pain, found to have osteomyelitis.     Hospital course (by problem):     #SIRS: Likely 2/2 to right foot cellulitis in pt with diabetic foot. Meet SIRS criteria 3/4 (tachycardia, fever and leukocytosis). Warmer foot R>L. Lactate elevated, cleared s/p fluids. Started on vancomycin & zoysn. Podiatry consulted, underwent RLE I&D with wash out and graft placement. cultures growing GBS & MRSA - continued with vancomycin but d/c zoysn. ID consulted, recommended c/w vanc and started flagyl 500mg q8hr wound vac placed. PT recommending [facility]. Please continue vancomycin 1750mg q12hr (2/2-3/16) and flagyl 500mg q8hr (2/7-3/21).      #Diabetes foot ulcer: see plan above. F/u podiatry outpatient (Dr. Barry Hodges)     #DM: Started in 2021, not well control in the beginning, on OAD and insulin. HbA1c 9.8. Lantus Insulin 30 UI at bedtime. Humalog Insulin 3 UI TID. Continue with Metformin and Gabapentin 100mg q8hr    #HTN: currently no meds. continue to monitor.     #HLD: c/w atorvastatin 40mg qhs      Patient was discharged to: [facility]    New medications: Vancomycin 1500mg q12hr, Flagyl 500mg q8hr   Changes to old medications:  Medications that were stopped:    Items to follow up as outpatient: f/u podiatry outpatient, f/u in    Physical exam at the time of discharge:  GENERAL: NAD, lying in bed comfortably  HEAD:  Atraumatic, normocephalic  EYES: EOMI, PERRLA, conjunctiva and sclera clear  ENT: Moist mucous membranes  NECK: Supple, no JVD  HEART: Regular rate and rhythm, no murmurs, rubs, or gallops  LUNGS: Unlabored respirations.  Clear to auscultation bilaterally, no crackles, wheezing, or rhonchi  ABDOMEN: Soft, nontender, nondistended, +BS, globulus  EXTREMITIES: 2+ peripheral pulses bilaterally. RLE wrapped in ace bandage, WWP, movement intact. LLE wrapped with gawze, WWP and movement intact.   NERVOUS SYSTEM:  A&Ox3, no focal deficits   SKIN: No rashes #Discharge: do not delete    Patient is 45 yo M with past medical history of  previous alcohol and cocaine use w/ PMHx of HTN (not treated), HLD (on meds), DM (started in 2021, not well control in the beginning, on OAD and insulin), osteomyelitis with amputation of the right 4th and 5th toes and partial amputation of the right 2nd toe presented with fever, chills, body aches and b/l foot pain, found to have osteomyelitis.     Hospital course (by problem):     #SIRS (improved): Likely 2/2 to right foot cellulitis in pt with diabetic foot. Meet SIRS criteria 3/4 (tachycardia, fever and leukocytosis). Warmer foot R>L. Lactate elevated, cleared s/p fluids. Started on vancomycin & zoysn. Podiatry consulted, underwent RLE I&D with wash out and graft placement. cultures growing GBS & MRSA - continued with vancomycin but d/c zoysn. ID consulted, recommended c/w vanc and started flagyl 500mg q8hr wound vac placed. PT recommending [facility]. Please continue vancomycin 1750mg q12hr (2/2-3/16) and flagyl 500mg q8hr (2/7-3/21). PICC line placed 2/7    Dressing instructions: Flush wounds x 2 right foot with sterile saline, DO NOT REMOVE ADAPTIC-sutured onto graft, Apply wound vac over medial and plantar right foot wounds. Vac can be changed every other day. Wrap with ACE bandage after vac placement.      #Diabetes foot ulcer: see plan above. F/u podiatry outpatient (Dr. Barry Hodges)     #DM: Started in 2021, not well control in the beginning, on OAD and insulin. HbA1c 9.8. Lantus Insulin 35 UI at bedtime. Humalog Insulin 5 UI TID. Continue with Metformin and Gabapentin 100mg q8hr    #HTN: currently no meds. continue to monitor.     #HLD: c/w atorvastatin 40mg qhs      Patient was discharged to: [facility]    New medications: Vancomycin 1500mg q12hr, Flagyl 500mg q8hr   Changes to old medications:  Medications that were stopped:    Items to follow up as outpatient: f/u podiatry outpatient, f/u in    Physical exam at the time of discharge:  GENERAL: NAD, lying in bed comfortably  HEAD:  Atraumatic, normocephalic  EYES: EOMI, PERRLA, conjunctiva and sclera clear  ENT: Moist mucous membranes  NECK: Supple, no JVD  HEART: Regular rate and rhythm, no murmurs, rubs, or gallops  LUNGS: Unlabored respirations.  Clear to auscultation bilaterally, no crackles, wheezing, or rhonchi  ABDOMEN: Soft, nontender, nondistended, +BS  EXTREMITIES: 2+ peripheral pulses bilaterally. RLE wrapped in ace bandage, WWP, movement intact. LLE wrapped with gawze, WWP and movement intact.   NERVOUS SYSTEM:  A&Ox3, no focal deficits   SKIN: No rashes #Discharge: do not delete    Patient is 47 yo M with past medical history of  previous alcohol and cocaine use w/ PMHx of HTN (not treated), HLD (on meds), DM (started in 2021, not well control in the beginning, on OAD and insulin), osteomyelitis with amputation of the right 4th and 5th toes and partial amputation of the right 2nd toe presented with fever, chills, body aches and b/l foot pain, found to have osteomyelitis.     Hospital course (by problem):     #SIRS (improved): Likely 2/2 to right foot cellulitis in pt with diabetic foot. Meet SIRS criteria 3/4 (tachycardia, fever and leukocytosis). Warmer foot R>L. Lactate elevated, cleared s/p fluids. Started on vancomycin & zoysn. Podiatry consulted, underwent RLE I&D with wash out and graft placement. cultures growing GBS & MRSA - continued with vancomycin but d/c zoysn. ID consulted, recommended c/w vanc and started flagyl 500mg q8hr wound vac placed. PT recommending [facility]. Please continue vancomycin 1750mg q12hr (2/2-3/16) and flagyl 500mg q8hr (2/7-3/21). PICC line placed 2/7    Dressing instructions: Flush wounds x 2 right foot with sterile saline, DO NOT REMOVE ADAPTIC-sutured onto graft, Apply wound vac over medial and plantar right foot wounds. Vac can be changed every other day. Wrap with ACE bandage after vac placement.      #Diabetes foot ulcer: see plan above. F/u podiatry outpatient (Dr. Barry Hodges)     #DM: Started in 2021, not well control in the beginning, on OAD and insulin. HbA1c 9.8. Lantus Insulin 35 UI at bedtime. Humalog Insulin 5 UI TID. Continue with Metformin and Gabapentin 100mg q8hr    #HTN: currently no meds. continue to monitor.     #HLD: c/w atorvastatin 40mg qhs      Patient was discharged to: [facility]    New medications: Vancomycin 1750mg q12hr, Flagyl 500mg q8hr   Changes to old medications:  Medications that were stopped:    Items to follow up as outpatient: f/u podiatry outpatient, f/u in    Physical exam at the time of discharge:  GENERAL: NAD, lying in bed comfortably  HEAD:  Atraumatic, normocephalic  EYES: EOMI, PERRLA, conjunctiva and sclera clear  ENT: Moist mucous membranes  NECK: Supple, no JVD  HEART: Regular rate and rhythm, no murmurs, rubs, or gallops  LUNGS: Unlabored respirations.  Clear to auscultation bilaterally, no crackles, wheezing, or rhonchi  ABDOMEN: Soft, nontender, nondistended, +BS  EXTREMITIES: 2+ peripheral pulses bilaterally. RLE wrapped in ace bandage, WWP, movement intact. LLE wrapped with gauze, WWP and movement intact.   NERVOUS SYSTEM:  A&Ox3, no focal deficits   SKIN: No rashes #Discharge: do not delete    Patient is 45 yo M with past medical history of  previous alcohol and cocaine use w/ PMHx of HTN (not treated), HLD (on meds), DM (started in 2021, not well control in the beginning, on OAD and insulin), osteomyelitis with amputation of the right 4th and 5th toes and partial amputation of the right 2nd toe presented with fever, chills, body aches and b/l foot pain, found to have osteomyelitis.     Hospital course (by problem):     #SIRS (improved): Likely 2/2 to right foot cellulitis in pt with diabetic foot. Warmer foot R>L. Lactate elevated, cleared s/p fluids. Started on vancomycin & zoysn. Podiatry consulted, underwent RLE I&D with wash out and graft placement. cultures growing GBS & MRSA - continued with vancomycin but d/c zoysn. ID consulted, recommended c/w vanc and started flagyl 500mg q8hr wound vac placed. PT recommending [facility]. Please continue vancomycin 1750mg q12hr (2/2-3/16) and flagyl 500mg q8hr (2/7-3/21). PICC line placed 2/7    Dressing instructions: Flush wounds x 2 right foot with sterile saline, DO NOT REMOVE ADAPTIC-sutured onto graft, Apply wound vac over medial and plantar right foot wounds. Vac can be changed every other day. Wrap with ACE bandage after vac placement.      #Diabetes foot ulcer: see plan above. F/u podiatry outpatient (Dr. Barry Hodges)     #DM: Started in 2021, not well control in the beginning, on OAD and insulin. HbA1c 9.8. Lantus Insulin 35 UI at bedtime. Humalog Insulin 5 UI TID. Continue with Metformin and Gabapentin 100mg q8hr    #HTN: currently no meds. continue to monitor.     #HLD: c/w atorvastatin 40mg qhs      Patient was discharged to: [facility]    New medications: Vancomycin 1750mg q12hr, Flagyl 500mg q8hr   Changes to old medications:  Medications that were stopped:    Items to follow up as outpatient: f/u podiatry outpatient, f/u in    Physical exam at the time of discharge:  GENERAL: NAD, lying in bed comfortably  HEAD:  Atraumatic, normocephalic  EYES: EOMI, PERRLA, conjunctiva and sclera clear  ENT: Moist mucous membranes  NECK: Supple, no JVD  HEART: Regular rate and rhythm, no murmurs, rubs, or gallops  LUNGS: Unlabored respirations.  Clear to auscultation bilaterally, no crackles, wheezing, or rhonchi  ABDOMEN: Soft, nontender, nondistended, +BS  EXTREMITIES: 2+ peripheral pulses bilaterally. RLE wrapped in ace bandage, WWP, movement intact. LLE wrapped with gauze, WWP and movement intact.   NERVOUS SYSTEM:  A&Ox3, no focal deficits   SKIN: No rashes

## 2022-02-04 NOTE — DISCHARGE NOTE PROVIDER - CARE PROVIDER_API CALL
Santosh Mosley)  Internal Medicine  [address], [address] [place] [address]  Phone: [phone]  Fax: [phone]  Follow Up Time: 1 week   Santosh Mosley)  Internal Medicine  [address], [address] [place] [address]  Phone: [phone]  Fax: [phone]  Scheduled Appointment: 02/23/2022 10:40 AM   Santosh Mosley)  Internal Medicine  [address], [address] [place] [address]  Phone: [phone]  Fax: [phone]  Scheduled Appointment: 02/23/2022 10:40 AM    David Green (AIMEE)  Surgery Orthopaedic Surgery  [address] [place] [address], [address]  [place] [address]  Phone: [phone]  Fax: [phone]  Scheduled Appointment: 02/21/2022 02:45 PM

## 2022-02-05 LAB
-  CEFAZOLIN: SIGNIFICANT CHANGE UP
-  CLINDAMYCIN: SIGNIFICANT CHANGE UP
-  ERYTHROMYCIN: SIGNIFICANT CHANGE UP
-  LINEZOLID: SIGNIFICANT CHANGE UP
-  OXACILLIN: SIGNIFICANT CHANGE UP
-  RIFAMPIN: SIGNIFICANT CHANGE UP
-  TRIMETHOPRIM/SULFAMETHOXAZOLE: SIGNIFICANT CHANGE UP
-  VANCOMYCIN: SIGNIFICANT CHANGE UP
ALBUMIN SERPL ELPH-MCNC: 3.1 G/DL — LOW (ref 3.3–5)
ALP SERPL-CCNC: 77 U/L — SIGNIFICANT CHANGE UP (ref 40–120)
ALT FLD-CCNC: 32 U/L — SIGNIFICANT CHANGE UP (ref 10–45)
ANION GAP SERPL CALC-SCNC: 11 MMOL/L — SIGNIFICANT CHANGE UP (ref 5–17)
AST SERPL-CCNC: 28 U/L — SIGNIFICANT CHANGE UP (ref 10–40)
BASOPHILS # BLD AUTO: 0.06 K/UL — SIGNIFICANT CHANGE UP (ref 0–0.2)
BASOPHILS NFR BLD AUTO: 0.8 % — SIGNIFICANT CHANGE UP (ref 0–2)
BILIRUB SERPL-MCNC: 0.2 MG/DL — SIGNIFICANT CHANGE UP (ref 0.2–1.2)
BUN SERPL-MCNC: 18 MG/DL — SIGNIFICANT CHANGE UP (ref 7–23)
CALCIUM SERPL-MCNC: 9.4 MG/DL — SIGNIFICANT CHANGE UP (ref 8.4–10.5)
CHLORIDE SERPL-SCNC: 102 MMOL/L — SIGNIFICANT CHANGE UP (ref 96–108)
CO2 SERPL-SCNC: 23 MMOL/L — SIGNIFICANT CHANGE UP (ref 22–31)
CREAT SERPL-MCNC: 0.91 MG/DL — SIGNIFICANT CHANGE UP (ref 0.5–1.3)
EOSINOPHIL # BLD AUTO: 0.3 K/UL — SIGNIFICANT CHANGE UP (ref 0–0.5)
EOSINOPHIL NFR BLD AUTO: 4 % — SIGNIFICANT CHANGE UP (ref 0–6)
GLUCOSE BLDC GLUCOMTR-MCNC: 162 MG/DL — HIGH (ref 70–99)
GLUCOSE BLDC GLUCOMTR-MCNC: 172 MG/DL — HIGH (ref 70–99)
GLUCOSE BLDC GLUCOMTR-MCNC: 187 MG/DL — HIGH (ref 70–99)
GLUCOSE BLDC GLUCOMTR-MCNC: 215 MG/DL — HIGH (ref 70–99)
GLUCOSE SERPL-MCNC: 142 MG/DL — HIGH (ref 70–99)
HCT VFR BLD CALC: 40.9 % — SIGNIFICANT CHANGE UP (ref 39–50)
HGB BLD-MCNC: 12.4 G/DL — LOW (ref 13–17)
IMM GRANULOCYTES NFR BLD AUTO: 0.3 % — SIGNIFICANT CHANGE UP (ref 0–1.5)
LYMPHOCYTES # BLD AUTO: 2.82 K/UL — SIGNIFICANT CHANGE UP (ref 1–3.3)
LYMPHOCYTES # BLD AUTO: 37.2 % — SIGNIFICANT CHANGE UP (ref 13–44)
MAGNESIUM SERPL-MCNC: 1.9 MG/DL — SIGNIFICANT CHANGE UP (ref 1.6–2.6)
MCHC RBC-ENTMCNC: 26.8 PG — LOW (ref 27–34)
MCHC RBC-ENTMCNC: 30.3 GM/DL — LOW (ref 32–36)
MCV RBC AUTO: 88.3 FL — SIGNIFICANT CHANGE UP (ref 80–100)
METHOD TYPE: SIGNIFICANT CHANGE UP
MONOCYTES # BLD AUTO: 0.63 K/UL — SIGNIFICANT CHANGE UP (ref 0–0.9)
MONOCYTES NFR BLD AUTO: 8.3 % — SIGNIFICANT CHANGE UP (ref 2–14)
NEUTROPHILS # BLD AUTO: 3.76 K/UL — SIGNIFICANT CHANGE UP (ref 1.8–7.4)
NEUTROPHILS NFR BLD AUTO: 49.4 % — SIGNIFICANT CHANGE UP (ref 43–77)
NRBC # BLD: 0 /100 WBCS — SIGNIFICANT CHANGE UP (ref 0–0)
PHOSPHATE SERPL-MCNC: 3.4 MG/DL — SIGNIFICANT CHANGE UP (ref 2.5–4.5)
PLATELET # BLD AUTO: 358 K/UL — SIGNIFICANT CHANGE UP (ref 150–400)
POTASSIUM SERPL-MCNC: 4.3 MMOL/L — SIGNIFICANT CHANGE UP (ref 3.5–5.3)
POTASSIUM SERPL-SCNC: 4.3 MMOL/L — SIGNIFICANT CHANGE UP (ref 3.5–5.3)
PROT SERPL-MCNC: 8 G/DL — SIGNIFICANT CHANGE UP (ref 6–8.3)
RBC # BLD: 4.63 M/UL — SIGNIFICANT CHANGE UP (ref 4.2–5.8)
RBC # FLD: 13.7 % — SIGNIFICANT CHANGE UP (ref 10.3–14.5)
SODIUM SERPL-SCNC: 136 MMOL/L — SIGNIFICANT CHANGE UP (ref 135–145)
WBC # BLD: 7.59 K/UL — SIGNIFICANT CHANGE UP (ref 3.8–10.5)
WBC # FLD AUTO: 7.59 K/UL — SIGNIFICANT CHANGE UP (ref 3.8–10.5)

## 2022-02-05 PROCEDURE — 99232 SBSQ HOSP IP/OBS MODERATE 35: CPT

## 2022-02-05 PROCEDURE — 99232 SBSQ HOSP IP/OBS MODERATE 35: CPT | Mod: GC

## 2022-02-05 RX ORDER — LOSARTAN POTASSIUM 100 MG/1
25 TABLET, FILM COATED ORAL DAILY
Refills: 0 | Status: DISCONTINUED | OUTPATIENT
Start: 2022-02-05 | End: 2022-02-11

## 2022-02-05 RX ORDER — VANCOMYCIN HCL 1 G
1750 VIAL (EA) INTRAVENOUS EVERY 12 HOURS
Refills: 0 | Status: DISCONTINUED | OUTPATIENT
Start: 2022-02-05 | End: 2022-02-05

## 2022-02-05 RX ORDER — LORATADINE 10 MG/1
10 TABLET ORAL ONCE
Refills: 0 | Status: COMPLETED | OUTPATIENT
Start: 2022-02-05 | End: 2022-02-05

## 2022-02-05 RX ORDER — VANCOMYCIN HCL 1 G
1750 VIAL (EA) INTRAVENOUS EVERY 12 HOURS
Refills: 0 | Status: COMPLETED | OUTPATIENT
Start: 2022-02-05 | End: 2022-02-05

## 2022-02-05 RX ADMIN — LOSARTAN POTASSIUM 25 MILLIGRAM(S): 100 TABLET, FILM COATED ORAL at 22:59

## 2022-02-05 RX ADMIN — Medication 250 MILLIGRAM(S): at 23:16

## 2022-02-05 RX ADMIN — ATORVASTATIN CALCIUM 40 MILLIGRAM(S): 80 TABLET, FILM COATED ORAL at 22:59

## 2022-02-05 RX ADMIN — Medication 4: at 17:37

## 2022-02-05 RX ADMIN — Medication 3 UNIT(S): at 12:22

## 2022-02-05 RX ADMIN — Medication 2: at 12:23

## 2022-02-05 RX ADMIN — Medication 2: at 22:59

## 2022-02-05 RX ADMIN — INSULIN GLARGINE 30 UNIT(S): 100 INJECTION, SOLUTION SUBCUTANEOUS at 22:56

## 2022-02-05 RX ADMIN — GABAPENTIN 100 MILLIGRAM(S): 400 CAPSULE ORAL at 14:57

## 2022-02-05 RX ADMIN — Medication 3 UNIT(S): at 17:37

## 2022-02-05 RX ADMIN — Medication 250 MILLIGRAM(S): at 00:18

## 2022-02-05 RX ADMIN — GABAPENTIN 100 MILLIGRAM(S): 400 CAPSULE ORAL at 22:58

## 2022-02-05 RX ADMIN — Medication 2: at 09:01

## 2022-02-05 RX ADMIN — LORATADINE 10 MILLIGRAM(S): 10 TABLET ORAL at 07:09

## 2022-02-05 RX ADMIN — ENOXAPARIN SODIUM 40 MILLIGRAM(S): 100 INJECTION SUBCUTANEOUS at 11:50

## 2022-02-05 RX ADMIN — Medication 250 MILLIGRAM(S): at 12:00

## 2022-02-05 RX ADMIN — GABAPENTIN 100 MILLIGRAM(S): 400 CAPSULE ORAL at 05:53

## 2022-02-05 RX ADMIN — Medication 3 UNIT(S): at 09:01

## 2022-02-05 NOTE — PROGRESS NOTE ADULT - SUBJECTIVE AND OBJECTIVE BOX
INTERVAL HPI/OVERNIGHT EVENTS:  As per night team, no overnight events. Patient seen and examined at bedside. Patient denies fever, chills, N/V/D/C. Denies pain to RLE and wound vac.     VITALS  Vital Signs Last 24 Hrs  T(C): 36.7 (05 Feb 2022 09:17), Max: 36.9 (04 Feb 2022 16:30)  T(F): 98 (05 Feb 2022 09:17), Max: 98.5 (04 Feb 2022 16:30)  HR: 84 (05 Feb 2022 09:17) (66 - 84)  BP: 120/78 (05 Feb 2022 09:17) (120/78 - 175/91)  BP(mean): --  RR: 16 (05 Feb 2022 09:17) (16 - 18)  SpO2: 98% (05 Feb 2022 09:17) (97% - 99%)    CAPILLARY BLOOD GLUCOSE      POCT Blood Glucose.: 162 mg/dL (05 Feb 2022 08:21)  POCT Blood Glucose.: 217 mg/dL (04 Feb 2022 22:07)  POCT Blood Glucose.: 162 mg/dL (04 Feb 2022 16:57)  POCT Blood Glucose.: 212 mg/dL (04 Feb 2022 11:57)      PHYSICAL EXAM  GENERAL: NAD, lying in bed comfortably  HEAD:  Atraumatic, normocephalic  EYES: EOMI, PERRLA, conjunctiva and sclera clear  ENT: Moist mucous membranes  NECK: Supple, no JVD  HEART: Regular rate and rhythm, no murmurs, rubs, or gallops  LUNGS: Unlabored respirations.  Clear to auscultation bilaterally, no crackles, wheezing, or rhonchi  ABDOMEN: Soft, nontender, nondistended, +BS, globulus  EXTREMITIES: 2+ peripheral pulses bilaterally. RLE wrapped in ace bandage + wound vac WWP, movement intact. LLE wrapped with gawze, WWP and movement intact.   NERVOUS SYSTEM:  A&Ox3, no focal deficits   SKIN: No rashes    MEDICATIONS  (STANDING):  atorvastatin 40 milliGRAM(s) Oral at bedtime  dextrose 40% Gel 15 Gram(s) Oral once  dextrose 5%. 1000 milliLiter(s) (50 mL/Hr) IV Continuous <Continuous>  dextrose 5%. 1000 milliLiter(s) (100 mL/Hr) IV Continuous <Continuous>  dextrose 50% Injectable 25 Gram(s) IV Push once  dextrose 50% Injectable 12.5 Gram(s) IV Push once  dextrose 50% Injectable 25 Gram(s) IV Push once  enoxaparin Injectable 40 milliGRAM(s) SubCutaneous daily  gabapentin 100 milliGRAM(s) Oral every 8 hours  glucagon  Injectable 1 milliGRAM(s) IntraMuscular once  insulin glargine Injectable (LANTUS) 30 Unit(s) SubCutaneous at bedtime  insulin lispro (ADMELOG) corrective regimen sliding scale   SubCutaneous Before meals and at bedtime  insulin lispro Injectable (ADMELOG) 3 Unit(s) SubCutaneous three times a day before meals  vancomycin  IVPB 1750 milliGRAM(s) IV Intermittent every 12 hours    MEDICATIONS  (PRN):  acetaminophen     Tablet .. 650 milliGRAM(s) Oral every 6 hours PRN Temp greater or equal to 38C (100.4F)  HYDROmorphone  Injectable 0.5 milliGRAM(s) IV Push every 15 minutes PRN Moderate Pain (4 - 6)  HYDROmorphone  Injectable 1 milliGRAM(s) IV Push every 15 minutes PRN Severe Pain (7 - 10)  oxyCODONE    IR 5 milliGRAM(s) Oral every 6 hours PRN Moderate Pain (4 - 6)  oxyCODONE    IR 10 milliGRAM(s) Oral every 6 hours PRN Severe Pain (7 - 10)      No Known Allergies      LABS                        12.4   7.59  )-----------( 358      ( 05 Feb 2022 08:06 )             40.9     02-05    136  |  102  |  18  ----------------------------<  142<H>  4.3   |  23  |  0.91    Ca    9.4      05 Feb 2022 08:06  Phos  3.4     02-05  Mg     1.9     02-05    TPro  8.0  /  Alb  3.1<L>  /  TBili  0.2  /  DBili  x   /  AST  28  /  ALT  32  /  AlkPhos  77  02-05              RADIOLOGY & ADDITIONAL TESTS: Reviewed

## 2022-02-05 NOTE — PROGRESS NOTE ADULT - ASSESSMENT
46M h/o DM p/w R DFI with presumed OM.  OR culture growing MRSA, MSSA, GBS and bacteroides.    - increase vancomycin to 1750mg IV q12h.  check trough before 4th dose (goal 14-18)  cont flagyl 500mg PO q8h  - plan for 6 weeks IV abx  - f/u OR culture    Team 2 will follow you.  Dr. Mosley will resume care on Monday  Case d/w primary team.    Romelia Liu MD, MS  Infectious Disease attending  work cell [phone]   For any questions during evening/weekend/holiday, please page ID on call

## 2022-02-05 NOTE — PROGRESS NOTE ADULT - SUBJECTIVE AND OBJECTIVE BOX
INFECTIOUS DISEASES CONSULT FOLLOW-UP NOTE    INTERVAL HPI/OVERNIGHT EVENTS:  no event overnight  patient reports feeling well      ROS:   Constitutional, eyes, ENT, cardiovascular, respiratory, gastrointestinal, genitourinary, integumentary, neurological, psychiatric and heme/lymph are otherwise negative other than noted above       ANTIBIOTICS/RELEVANT:    MEDICATIONS  (STANDING):  atorvastatin 40 milliGRAM(s) Oral at bedtime  dextrose 40% Gel 15 Gram(s) Oral once  dextrose 5%. 1000 milliLiter(s) (50 mL/Hr) IV Continuous <Continuous>  dextrose 5%. 1000 milliLiter(s) (100 mL/Hr) IV Continuous <Continuous>  dextrose 50% Injectable 25 Gram(s) IV Push once  dextrose 50% Injectable 12.5 Gram(s) IV Push once  dextrose 50% Injectable 25 Gram(s) IV Push once  enoxaparin Injectable 40 milliGRAM(s) SubCutaneous daily  gabapentin 100 milliGRAM(s) Oral every 8 hours  glucagon  Injectable 1 milliGRAM(s) IntraMuscular once  insulin glargine Injectable (LANTUS) 30 Unit(s) SubCutaneous at bedtime  insulin lispro (ADMELOG) corrective regimen sliding scale   SubCutaneous Before meals and at bedtime  insulin lispro Injectable (ADMELOG) 3 Unit(s) SubCutaneous three times a day before meals  vancomycin  IVPB 1750 milliGRAM(s) IV Intermittent every 12 hours    MEDICATIONS  (PRN):  acetaminophen     Tablet .. 650 milliGRAM(s) Oral every 6 hours PRN Temp greater or equal to 38C (100.4F)  HYDROmorphone  Injectable 0.5 milliGRAM(s) IV Push every 15 minutes PRN Moderate Pain (4 - 6)  HYDROmorphone  Injectable 1 milliGRAM(s) IV Push every 15 minutes PRN Severe Pain (7 - 10)  oxyCODONE    IR 5 milliGRAM(s) Oral every 6 hours PRN Moderate Pain (4 - 6)  oxyCODONE    IR 10 milliGRAM(s) Oral every 6 hours PRN Severe Pain (7 - 10)        Vital Signs Last 24 Hrs  T(C): 36.7 (05 Feb 2022 09:17), Max: 36.9 (04 Feb 2022 16:30)  T(F): 98 (05 Feb 2022 09:17), Max: 98.5 (04 Feb 2022 16:30)  HR: 84 (05 Feb 2022 09:17) (66 - 84)  BP: 120/78 (05 Feb 2022 09:17) (120/78 - 175/91)  BP(mean): --  RR: 16 (05 Feb 2022 09:17) (16 - 18)  SpO2: 98% (05 Feb 2022 09:17) (97% - 99%)    PHYSICAL EXAM:  Constitutional: alert, NAD  Eyes: the sclera and conjunctiva were normal.   ENT: the ears and nose were normal in appearance.   Neck: the appearance of the neck was normal and the neck was supple.   Pulmonary: no respiratory distress and lungs were clear to auscultation bilaterally.   Heart: heart rate was normal and rhythm regular, normal S1 and S2  Ext: R foot covered with wound vac   Abdomen: normal bowel sounds, soft, non-tender  Neurological: no focal deficits.   Psychiatric: the affect was normal        LABS:                        12.4   7.59  )-----------( 358      ( 05 Feb 2022 08:06 )             40.9     02-05    136  |  102  |  18  ----------------------------<  142<H>  4.3   |  23  |  0.91    Ca    9.4      05 Feb 2022 08:06  Phos  3.4     02-05  Mg     1.9     02-05    TPro  8.0  /  Alb  3.1<L>  /  TBili  0.2  /  DBili  x   /  AST  28  /  ALT  32  /  AlkPhos  77  02-05          MICROBIOLOGY:      RADIOLOGY & ADDITIONAL STUDIES:  Reviewed

## 2022-02-05 NOTE — PROGRESS NOTE ADULT - SUBJECTIVE AND OBJECTIVE BOX
Patient is a 46y old  Male who presents with a chief complaint of Chills. (05 Feb 2022 08:14)      INTERVAL HPI/ OVERNIGHT EVENTS: ANDREW O/N. Wound vac in place to right foot, continuous pressure therapy 125mmHg.       LABS                        12.4   7.59  )-----------( 358      ( 05 Feb 2022 08:06 )             40.9     02-05    136  |  102  |  18  ----------------------------<  142<H>  4.3   |  23  |  0.91    Ca    9.4      05 Feb 2022 08:06  Phos  3.4     02-05  Mg     1.9     02-05    TPro  8.0  /  Alb  3.1<L>  /  TBili  0.2  /  DBili  x   /  AST  28  /  ALT  32  /  AlkPhos  77  02-05      ICU Vital Signs Last 24 Hrs  T(C): 36.7 (05 Feb 2022 09:17), Max: 36.9 (04 Feb 2022 16:30)  T(F): 98 (05 Feb 2022 09:17), Max: 98.5 (04 Feb 2022 16:30)  HR: 84 (05 Feb 2022 09:17) (66 - 84)  BP: 120/78 (05 Feb 2022 09:17) (120/78 - 175/91)  BP(mean): --  ABP: --  ABP(mean): --  RR: 16 (05 Feb 2022 09:17) (16 - 18)  SpO2: 98% (05 Feb 2022 09:17) (97% - 99%)      RADIOLOGY  < from: Xray Foot AP + Lateral + Oblique, Bilat (02.01.22 @ 17:47) >  Impression:    Right foot:On the lateral view, appears that there is new fragmentation   of the hallux sesamoids. This could be secondary to fracture however   infection in this area is not excluded. Further evaluation with MRI may   be of use.    Left foot: No radiographic evidence of acute osteomyelitis.    < end of copied text >      MICROBIOLOGY    Culture - Fungal, Tissue (collected 03 Feb 2022 01:47)  Source: .Tissue Right Foot #2  Preliminary Report (03 Feb 2022 10:34):    Testing in progress    Culture - Fungal, Other (collected 03 Feb 2022 01:46)  Source: .Other Right Foot 1st Metarsal 1  Preliminary Report (03 Feb 2022 10:34):    Testing in progress    Culture - Surgical Swab (collected 02 Feb 2022 21:06)  Source: .Surgical Swab Right Foot 2  Gram Stain (02 Feb 2022 21:32):    Few Gram positive cocci in pairs and clusters    Rare Gram Negative Rods    No WBC's seen.  Preliminary Report (05 Feb 2022 10:16):    Rare Staphylococcus aureus    Moderate Streptococcus agalactiae (Group B)    Few Bacteroides fragilis    Culture in progress  Organism: Streptococcus agalactiae (Group B)  Staphylococcus aureus (05 Feb 2022 10:14)  Organism: Staphylococcus aureus (05 Feb 2022 10:14)  Organism: Streptococcus agalactiae (Group B) (04 Feb 2022 10:34)    Culture - Surgical Swab (collected 02 Feb 2022 21:06)  Source: .Surgical Swab Right Foot 1st Metarsal 1  Gram Stain (02 Feb 2022 21:35):    Few Gram positive cocci in pairs and clusters    Few White blood cells  Preliminary Report (04 Feb 2022 13:02):    Moderate Streptococcus agalactiae (Group B)    Rare Staphylococcus aureus    See previous culture    Culture in progress      PHYSICAL EXAM  General: NAD, A0x3  Lower Extremity Focused:  Vasc: D/PT 2/4 b/l, Right foot warmer than left, non-pitting edema to dorsum of right foot   Derm:  Right foot- Submet 1 ulcer measuring 5.0cmx5.3bmq9to, Ulcer has Graft and adaptic sutured on top-graft in place. , Right foot medial 1st MPJ ulceration 4x3cm granular base, -PTB.  Left foot submet 1 ulceration macerated base and border 1x1cm -PTB, malodor + serous drainage  Neuro: Protective sensation diminished   MSK: 4th and 5th right partial ray amputation and digital amputation of 2nd digit of the R foot.

## 2022-02-05 NOTE — PROGRESS NOTE ADULT - ASSESSMENT
45 y/o M with PMHx of DM, HTN, HLD, osteomyelitis s/p amputation of R 3rd and 4th toes and partial amp of R 2nd toe, presents to the ED with complaints of Chills. Podiatry consulted for chronic wound to R foot, seen by podiatrist Dr. Johnson 2 weeks ago pt missed his last appointment. In ED pt was septic with fever, chills, leukocytosis, elevated ESR and CRP. Wound looked clinically infected drainage +malodor, new wound to medial aspect of pts right foot. Pt is now s/p right foot I&D, wash out, and wound graft application to plantar wound (2/2/22), High suspicion of osteomyelitis, wound vac applied 2/4/22.     Plan:   - C/w IV ABX per ID  - f/u Deep wound Cx + OR Cx's - MRSA, GBS, Bacteroides  - Wound care: [employer] Derm-Freddy dermal matrix + Adaptic nonadherent is sutured on to plantar wound. DSD, Kerlix wrap + ACE wrap  - wound vac in place  to pts right foot wounds; will change M/W/F  - NWB to RLE, very important   - Rest of care per primary team     Podiatry Following. Plan d/w attending 47 y/o M with PMHx of DM, HTN, HLD, osteomyelitis s/p amputation of R 3rd and 4th toes and partial amp of R 2nd toe, presents to the ED with complaints of Chills. Podiatry consulted for chronic wound to R foot, seen by podiatrist Dr. Johnson 2 weeks ago pt missed his last appointment. In ED pt was septic with fever, chills, leukocytosis, elevated ESR and CRP. Wound looked clinically infected drainage +malodor, new wound to medial aspect of pts right foot. Pt is now s/p right foot I&D, wash out, and wound graft application to plantar wound (2/2/22), High suspicion of osteomyelitis, wound vac applied 2/4/22.     Plan:   - C/w IV ABX per ID  - f/u Deep wound Cx + OR Cx's - MRSA, GBS, Bacteroides  - Wound care: [employer] Derm-Freddy dermal matrix + Adaptic nonadherent is sutured on to plantar wound. DSD, Kerlix wrap + ACE wrap  - wound vac in place  to pts right foot wounds; will change M/W/F  - NWB to RLE, very important   - Sharp excisional debridement of hyperkeratotic lesion left 1st submetatarsal w/ sterile #15 blade. Pt tolerated debridement w/o AE. Covered w/ DSD.   - Rest of care per primary team     Podiatry Following. Plan d/w attending

## 2022-02-05 NOTE — PROGRESS NOTE ADULT - ATTENDING COMMENTS
45 yo M pt with previous alcohol and cocaine use w/ PMHx of HTN, HLD, DM, osteomyelitis with amputation of the right 4th and 5th toes and partial amputation of the right 2nd toe, recently COVID diagnosis (12/2021) who has presented with diabetic foot wounds in the last year in both feet with complicated healing process R>L (last set back in his right foot the last week), and arrived to ED after having symptoms of chills and shaking in the last 2 days. After being managed in ED with fluids and antibiotics, pt is admitted for further management of severe sepsis likely 2/2 to right foot cellulitis. Plan: cont wound vac, dc planning underway, f/u vtrough to goal, apprec podiatry and ID recs, monitor clinical course

## 2022-02-06 LAB
ANION GAP SERPL CALC-SCNC: 11 MMOL/L — SIGNIFICANT CHANGE UP (ref 5–17)
BASOPHILS # BLD AUTO: 0.06 K/UL — SIGNIFICANT CHANGE UP (ref 0–0.2)
BASOPHILS NFR BLD AUTO: 0.9 % — SIGNIFICANT CHANGE UP (ref 0–2)
BUN SERPL-MCNC: 16 MG/DL — SIGNIFICANT CHANGE UP (ref 7–23)
CALCIUM SERPL-MCNC: 9.4 MG/DL — SIGNIFICANT CHANGE UP (ref 8.4–10.5)
CHLORIDE SERPL-SCNC: 100 MMOL/L — SIGNIFICANT CHANGE UP (ref 96–108)
CO2 SERPL-SCNC: 23 MMOL/L — SIGNIFICANT CHANGE UP (ref 22–31)
CREAT SERPL-MCNC: 0.93 MG/DL — SIGNIFICANT CHANGE UP (ref 0.5–1.3)
CULTURE RESULTS: SIGNIFICANT CHANGE UP
EOSINOPHIL # BLD AUTO: 0.26 K/UL — SIGNIFICANT CHANGE UP (ref 0–0.5)
EOSINOPHIL NFR BLD AUTO: 4 % — SIGNIFICANT CHANGE UP (ref 0–6)
GLUCOSE BLDC GLUCOMTR-MCNC: 204 MG/DL — HIGH (ref 70–99)
GLUCOSE BLDC GLUCOMTR-MCNC: 219 MG/DL — HIGH (ref 70–99)
GLUCOSE BLDC GLUCOMTR-MCNC: 232 MG/DL — HIGH (ref 70–99)
GLUCOSE BLDC GLUCOMTR-MCNC: 270 MG/DL — HIGH (ref 70–99)
GLUCOSE SERPL-MCNC: 231 MG/DL — HIGH (ref 70–99)
HCT VFR BLD CALC: 41.5 % — SIGNIFICANT CHANGE UP (ref 39–50)
HGB BLD-MCNC: 12.6 G/DL — LOW (ref 13–17)
IMM GRANULOCYTES NFR BLD AUTO: 0.5 % — SIGNIFICANT CHANGE UP (ref 0–1.5)
LYMPHOCYTES # BLD AUTO: 2.42 K/UL — SIGNIFICANT CHANGE UP (ref 1–3.3)
LYMPHOCYTES # BLD AUTO: 37.1 % — SIGNIFICANT CHANGE UP (ref 13–44)
MAGNESIUM SERPL-MCNC: 1.6 MG/DL — SIGNIFICANT CHANGE UP (ref 1.6–2.6)
MCHC RBC-ENTMCNC: 27.2 PG — SIGNIFICANT CHANGE UP (ref 27–34)
MCHC RBC-ENTMCNC: 30.4 GM/DL — LOW (ref 32–36)
MCV RBC AUTO: 89.4 FL — SIGNIFICANT CHANGE UP (ref 80–100)
MONOCYTES # BLD AUTO: 0.56 K/UL — SIGNIFICANT CHANGE UP (ref 0–0.9)
MONOCYTES NFR BLD AUTO: 8.6 % — SIGNIFICANT CHANGE UP (ref 2–14)
NEUTROPHILS # BLD AUTO: 3.19 K/UL — SIGNIFICANT CHANGE UP (ref 1.8–7.4)
NEUTROPHILS NFR BLD AUTO: 48.9 % — SIGNIFICANT CHANGE UP (ref 43–77)
NRBC # BLD: 0 /100 WBCS — SIGNIFICANT CHANGE UP (ref 0–0)
PHOSPHATE SERPL-MCNC: 2.9 MG/DL — SIGNIFICANT CHANGE UP (ref 2.5–4.5)
PLATELET # BLD AUTO: 386 K/UL — SIGNIFICANT CHANGE UP (ref 150–400)
POTASSIUM SERPL-MCNC: 4.6 MMOL/L — SIGNIFICANT CHANGE UP (ref 3.5–5.3)
POTASSIUM SERPL-SCNC: 4.6 MMOL/L — SIGNIFICANT CHANGE UP (ref 3.5–5.3)
RBC # BLD: 4.64 M/UL — SIGNIFICANT CHANGE UP (ref 4.2–5.8)
RBC # FLD: 13.9 % — SIGNIFICANT CHANGE UP (ref 10.3–14.5)
SODIUM SERPL-SCNC: 134 MMOL/L — LOW (ref 135–145)
SPECIMEN SOURCE: SIGNIFICANT CHANGE UP
VANCOMYCIN TROUGH SERPL-MCNC: 15.8 UG/ML — SIGNIFICANT CHANGE UP (ref 10–20)
WBC # BLD: 6.52 K/UL — SIGNIFICANT CHANGE UP (ref 3.8–10.5)
WBC # FLD AUTO: 6.52 K/UL — SIGNIFICANT CHANGE UP (ref 3.8–10.5)

## 2022-02-06 PROCEDURE — 99233 SBSQ HOSP IP/OBS HIGH 50: CPT

## 2022-02-06 RX ORDER — MAGNESIUM SULFATE 500 MG/ML
2 VIAL (ML) INJECTION ONCE
Refills: 0 | Status: COMPLETED | OUTPATIENT
Start: 2022-02-06 | End: 2022-02-06

## 2022-02-06 RX ORDER — VANCOMYCIN HCL 1 G
1750 VIAL (EA) INTRAVENOUS EVERY 12 HOURS
Refills: 0 | Status: COMPLETED | OUTPATIENT
Start: 2022-02-06 | End: 2022-02-07

## 2022-02-06 RX ADMIN — Medication 6: at 22:31

## 2022-02-06 RX ADMIN — INSULIN GLARGINE 30 UNIT(S): 100 INJECTION, SOLUTION SUBCUTANEOUS at 22:31

## 2022-02-06 RX ADMIN — Medication 3 UNIT(S): at 17:30

## 2022-02-06 RX ADMIN — Medication 3 UNIT(S): at 12:18

## 2022-02-06 RX ADMIN — ENOXAPARIN SODIUM 40 MILLIGRAM(S): 100 INJECTION SUBCUTANEOUS at 12:17

## 2022-02-06 RX ADMIN — LOSARTAN POTASSIUM 25 MILLIGRAM(S): 100 TABLET, FILM COATED ORAL at 06:33

## 2022-02-06 RX ADMIN — Medication 250 MILLIGRAM(S): at 14:10

## 2022-02-06 RX ADMIN — GABAPENTIN 100 MILLIGRAM(S): 400 CAPSULE ORAL at 22:31

## 2022-02-06 RX ADMIN — Medication 4: at 17:29

## 2022-02-06 RX ADMIN — Medication 25 GRAM(S): at 12:17

## 2022-02-06 RX ADMIN — GABAPENTIN 100 MILLIGRAM(S): 400 CAPSULE ORAL at 13:35

## 2022-02-06 RX ADMIN — Medication 4: at 08:33

## 2022-02-06 RX ADMIN — Medication 3 UNIT(S): at 08:33

## 2022-02-06 RX ADMIN — Medication 4: at 12:17

## 2022-02-06 RX ADMIN — GABAPENTIN 100 MILLIGRAM(S): 400 CAPSULE ORAL at 06:33

## 2022-02-06 RX ADMIN — ATORVASTATIN CALCIUM 40 MILLIGRAM(S): 80 TABLET, FILM COATED ORAL at 22:31

## 2022-02-06 NOTE — PROGRESS NOTE ADULT - SUBJECTIVE AND OBJECTIVE BOX
Patient is a 46y old  Male who presents with a chief complaint of Chills. (05 Feb 2022 14:32)      INTERVAL HPI/ OVERNIGHT EVENTS: ANDREW O/N. Wound vac in place to right foot, set at continuous pressure therapy 125mmHg, no interruptions logged.       LABS                        12.6   6.52  )-----------( 386      ( 06 Feb 2022 08:43 )             41.5     02-06    134<L>  |  100  |  16  ----------------------------<  231<H>  4.6   |  23  |  0.93    Ca    9.4      06 Feb 2022 08:44  Phos  2.9     02-06  Mg     1.6     02-06    TPro  8.0  /  Alb  3.1<L>  /  TBili  0.2  /  DBili  x   /  AST  28  /  ALT  32  /  AlkPhos  77  02-05        ICU Vital Signs Last 24 Hrs  T(C): 36.4 (06 Feb 2022 05:47), Max: 37.1 (05 Feb 2022 15:51)  T(F): 97.6 (06 Feb 2022 05:47), Max: 98.7 (05 Feb 2022 15:51)  HR: 78 (06 Feb 2022 05:47) (75 - 78)  BP: 156/90 (06 Feb 2022 05:47) (148/95 - 184/103)  BP(mean): --  ABP: --  ABP(mean): --  RR: 17 (06 Feb 2022 05:47) (17 - 17)  SpO2: 98% (06 Feb 2022 05:47) (97% - 98%)      RADIOLOGY  RADIOLOGY  < from: Xray Foot AP + Lateral + Oblique, Bilat (02.01.22 @ 17:47) >  Impression:    Right foot:On the lateral view, appears that there is new fragmentation   of the hallux sesamoids. This could be secondary to fracture however   infection in this area is not excluded. Further evaluation with MRI may   be of use.    Left foot: No radiographic evidence of acute osteomyelitis.    < end of copied text >    Culture Results:   Rare Staphylococcus aureus   Moderate Streptococcus agalactiae (Group B)   Few Bacteroides fragilis   MICROBIOLOGY      PHYSICAL EXAM  Lower Extremity Focused  Vasc: D/PT 2/4 b/l, Right foot warmer than left, non-pitting edema to dorsum of right foot   Derm:  Right foot- Submet 1 ulcer measuring 5.0cmx5.4uuj6gl, Ulcer has Graft and adaptic sutured on top-graft in place. , Right foot medial 1st MPJ ulceration 4x3cm granular base, -PTB.  Left foot submet 1 ulceration macerated base and border 1x1cm -PTB, malodor + serous drainage  Neuro: Protective sensation diminished   MSK: 4th and 5th right partial ray amputation and digital amputation of 2nd digit of the R foot.

## 2022-02-06 NOTE — PROGRESS NOTE ADULT - SUBJECTIVE AND OBJECTIVE BOX
Patient is a 46y old  Male who presents with a chief complaint of Chills. (06 Feb 2022 09:38)      INTERVAL HPI:  OVERNIGHT EVENTS:  T(F): 97.2 (02-06-22 @ 16:10), Max: 98.6 (02-05-22 @ 21:26)  HR: 82 (02-06-22 @ 16:10) (76 - 82)  BP: 163/99 (02-06-22 @ 16:10) (134/87 - 184/103)  RR: 17 (02-06-22 @ 16:10) (16 - 17)  SpO2: 99% (02-06-22 @ 16:10) (97% - 99%)  I&O's Summary      REVIEW OF SYSTEMS:  CONSTITUTIONAL: No fever, weight loss, or fatigue  EYES: No eye pain, visual disturbances, or discharge  ENMT:  No difficulty hearing, tinnitus, vertigo; No sinus or throat pain  NECK: No pain or stiffness  BREASTS: No pain, masses, or nipple discharge  RESPIRATORY: No cough, wheezing, chills or hemoptysis; No shortness of breath  CARDIOVASCULAR: No chest pain, palpitations, dizziness, or leg swelling  GASTROINTESTINAL: No abdominal or epigastric pain. No nausea, vomiting, or hematemesis; No diarrhea or constipation. No melena or hematochezia.  GENITOURINARY: No dysuria, frequency, hematuria, or incontinence  NEUROLOGICAL: No headaches, memory loss, loss of strength, numbness, or tremors  SKIN: No itching, burning, rashes, or lesions   LYMPH NODES: No enlarged glands  ENDOCRINE: No heat or cold intolerance; No hair loss  MUSCULOSKELETAL: No joint pain or swelling; No muscle, back, or extremity pain  PSYCHIATRIC: No depression, anxiety, mood swings, or difficulty sleeping  HEME/LYMPH: No easy bruising, or bleeding gums  ALLERY AND IMMUNOLOGIC: No hives or eczema    PHYSICAL EXAM:  GENERAL: NAD, well-groomed, well-developed  HEAD:  Atraumatic, Normocephalic  EYES: EOMI, PERRLA, conjunctiva and sclera clear  ENMT: No tonsillar erythema, exudates, or enlargement; Moist mucous membranes, Good dentition, No lesions  NECK: Supple, No JVD, Normal thyroid  NERVOUS SYSTEM:  Alert & Oriented X3, Good concentration; Motor Strength 5/5 B/L upper and lower extremities; DTRs 2+ intact and symmetric  CHEST/LUNG: Clear to percussion bilaterally; No rales, rhonchi, wheezing, or rubs  HEART: Regular rate and rhythm; No murmurs, rubs, or gallops  ABDOMEN: Soft, Nontender, Nondistended; Bowel sounds present  EXTREMITIES:  2+ Peripheral Pulses, No clubbing, cyanosis, or edema  LYMPH: No lymphadenopathy noted  SKIN: No rashes or lesions    LABS:                        12.6   6.52  )-----------( 386      ( 06 Feb 2022 08:43 )             41.5     02-06    134<L>  |  100  |  16  ----------------------------<  231<H>  4.6   |  23  |  0.93    Ca    9.4      06 Feb 2022 08:44  Phos  2.9     02-06  Mg     1.6     02-06    TPro  8.0  /  Alb  3.1<L>  /  TBili  0.2  /  DBili  x   /  AST  28  /  ALT  32  /  AlkPhos  77  02-05        CAPILLARY BLOOD GLUCOSE      POCT Blood Glucose.: 232 mg/dL (06 Feb 2022 17:04)  POCT Blood Glucose.: 204 mg/dL (06 Feb 2022 12:05)  POCT Blood Glucose.: 219 mg/dL (06 Feb 2022 08:26)  POCT Blood Glucose.: 187 mg/dL (05 Feb 2022 21:53)  POCT Blood Glucose.: 215 mg/dL (05 Feb 2022 17:12)      02-03 @ 01:47   Testing in progress  --  --  02-03 @ 01:46   Testing in progress  --  --  02-02 @ 21:06   Moderate Streptococcus agalactiae (Group B)  Rare Staphylococcus aureus  See previous culture  Mixed Anaerobic Trang :  Rare Bacteroides fragilis  Few Gemella morbillorum  --  Streptococcus agalactiae (Group B)          MEDICATIONS  (STANDING):  atorvastatin 40 milliGRAM(s) Oral at bedtime  dextrose 40% Gel 15 Gram(s) Oral once  dextrose 5%. 1000 milliLiter(s) (50 mL/Hr) IV Continuous <Continuous>  dextrose 5%. 1000 milliLiter(s) (100 mL/Hr) IV Continuous <Continuous>  dextrose 50% Injectable 25 Gram(s) IV Push once  dextrose 50% Injectable 12.5 Gram(s) IV Push once  dextrose 50% Injectable 25 Gram(s) IV Push once  enoxaparin Injectable 40 milliGRAM(s) SubCutaneous daily  gabapentin 100 milliGRAM(s) Oral every 8 hours  glucagon  Injectable 1 milliGRAM(s) IntraMuscular once  insulin glargine Injectable (LANTUS) 30 Unit(s) SubCutaneous at bedtime  insulin lispro (ADMELOG) corrective regimen sliding scale   SubCutaneous Before meals and at bedtime  insulin lispro Injectable (ADMELOG) 3 Unit(s) SubCutaneous three times a day before meals  losartan 25 milliGRAM(s) Oral daily  vancomycin  IVPB 1750 milliGRAM(s) IV Intermittent every 12 hours    MEDICATIONS  (PRN):  acetaminophen     Tablet .. 650 milliGRAM(s) Oral every 6 hours PRN Temp greater or equal to 38C (100.4F)  HYDROmorphone  Injectable 0.5 milliGRAM(s) IV Push every 15 minutes PRN Moderate Pain (4 - 6)  HYDROmorphone  Injectable 1 milliGRAM(s) IV Push every 15 minutes PRN Severe Pain (7 - 10)  oxyCODONE    IR 5 milliGRAM(s) Oral every 6 hours PRN Moderate Pain (4 - 6)  oxyCODONE    IR 10 milliGRAM(s) Oral every 6 hours PRN Severe Pain (7 - 10)       Patient is a 46y old  Male who presents with a chief complaint of Chills. (06 Feb 2022 09:38)      INTERVAL HPI: Pt seen and examined. States he is feeling fine, denies any acute complaints at this time.     OVERNIGHT EVENTS: none noted  T(F): 97.2 (02-06-22 @ 16:10), Max: 98.6 (02-05-22 @ 21:26)  HR: 82 (02-06-22 @ 16:10) (76 - 82)  BP: 163/99 (02-06-22 @ 16:10) (134/87 - 184/103)  RR: 17 (02-06-22 @ 16:10) (16 - 17)  SpO2: 99% (02-06-22 @ 16:10) (97% - 99%)  I&O's Summary      REVIEW OF SYSTEMS: comprehensive ros negative other than that stated in hpi    PHYSICAL EXAM:  GENERAL: NAD, lying in bed comfortably  HEAD:  Atraumatic, normocephalic  EYES: EOMI, PERRLA, conjunctiva and sclera clear  ENT: Moist mucous membranes  NECK: Supple, no JVD  HEART: Regular rate and rhythm, no murmurs, rubs, or gallops  LUNGS: Unlabored respirations.  Clear to auscultation bilaterally, no crackles, wheezing, or rhonchi  ABDOMEN: Soft, nontender, nondistended, +BS, globulus  EXTREMITIES: 2+ peripheral pulses bilaterally. RLE wrapped in ace bandage + wound vac WWP, movement intact. LLE wrapped with gawze, WWP and movement intact.   NERVOUS SYSTEM:  A&Ox3, no focal deficits   SKIN: No rashes    LABS:                        12.6   6.52  )-----------( 386      ( 06 Feb 2022 08:43 )             41.5     02-06    134<L>  |  100  |  16  ----------------------------<  231<H>  4.6   |  23  |  0.93    Ca    9.4      06 Feb 2022 08:44  Phos  2.9     02-06  Mg     1.6     02-06    TPro  8.0  /  Alb  3.1<L>  /  TBili  0.2  /  DBili  x   /  AST  28  /  ALT  32  /  AlkPhos  77  02-05        CAPILLARY BLOOD GLUCOSE      POCT Blood Glucose.: 232 mg/dL (06 Feb 2022 17:04)  POCT Blood Glucose.: 204 mg/dL (06 Feb 2022 12:05)  POCT Blood Glucose.: 219 mg/dL (06 Feb 2022 08:26)  POCT Blood Glucose.: 187 mg/dL (05 Feb 2022 21:53)  POCT Blood Glucose.: 215 mg/dL (05 Feb 2022 17:12)      02-03 @ 01:47   Testing in progress  --  --  02-03 @ 01:46   Testing in progress  --  --  02-02 @ 21:06   Moderate Streptococcus agalactiae (Group B)  Rare Staphylococcus aureus  See previous culture  Mixed Anaerobic Trang :  Rare Bacteroides fragilis  Few Gemella morbillorum  --  Streptococcus agalactiae (Group B)          MEDICATIONS  (STANDING):  atorvastatin 40 milliGRAM(s) Oral at bedtime  dextrose 40% Gel 15 Gram(s) Oral once  dextrose 5%. 1000 milliLiter(s) (50 mL/Hr) IV Continuous <Continuous>  dextrose 5%. 1000 milliLiter(s) (100 mL/Hr) IV Continuous <Continuous>  dextrose 50% Injectable 25 Gram(s) IV Push once  dextrose 50% Injectable 12.5 Gram(s) IV Push once  dextrose 50% Injectable 25 Gram(s) IV Push once  enoxaparin Injectable 40 milliGRAM(s) SubCutaneous daily  gabapentin 100 milliGRAM(s) Oral every 8 hours  glucagon  Injectable 1 milliGRAM(s) IntraMuscular once  insulin glargine Injectable (LANTUS) 30 Unit(s) SubCutaneous at bedtime  insulin lispro (ADMELOG) corrective regimen sliding scale   SubCutaneous Before meals and at bedtime  insulin lispro Injectable (ADMELOG) 3 Unit(s) SubCutaneous three times a day before meals  losartan 25 milliGRAM(s) Oral daily  vancomycin  IVPB 1750 milliGRAM(s) IV Intermittent every 12 hours    MEDICATIONS  (PRN):  acetaminophen     Tablet .. 650 milliGRAM(s) Oral every 6 hours PRN Temp greater or equal to 38C (100.4F)  HYDROmorphone  Injectable 0.5 milliGRAM(s) IV Push every 15 minutes PRN Moderate Pain (4 - 6)  HYDROmorphone  Injectable 1 milliGRAM(s) IV Push every 15 minutes PRN Severe Pain (7 - 10)  oxyCODONE    IR 5 milliGRAM(s) Oral every 6 hours PRN Moderate Pain (4 - 6)  oxyCODONE    IR 10 milliGRAM(s) Oral every 6 hours PRN Severe Pain (7 - 10)

## 2022-02-06 NOTE — PROGRESS NOTE ADULT - ASSESSMENT
45 y/o M with PMHx of DM, HTN, HLD, osteomyelitis s/p amputation of R 3rd and 4th toes and partial amp of R 2nd toe, presents to the ED with complaints of Chills. Podiatry consulted for chronic wound to R foot, seen by podiatrist Dr. Johnson 2 weeks ago pt missed his last appointment. In ED pt was septic with fever, chills, leukocytosis, elevated ESR and CRP. Wound looked clinically infected drainage +malodor, new wound to medial aspect of pts right foot. Pt is now s/p right foot I&D, wash out, and wound graft application to plantar wound (2/2/22), High suspicion of osteomyelitis, wound vac applied 2/4/22.     Plan:   - C/w IV ABX per ID  - f/u Deep wound Cx + OR Cx's - MRSA, GBS, Bacteroides  - Wound care: [employer] Derm-Freddy dermal matrix + Adaptic nonadherent is sutured on to plantar wound. DSD, Kerlix wrap + ACE wrap  - wound vac in place  to pts right foot wounds; will change M/W/F  - NWB to RLE, very important   - Changed dressing to left foot, DSD  - Rest of care per primary team     Podiatry Following. Plan d/w attending

## 2022-02-07 LAB
ANION GAP SERPL CALC-SCNC: 9 MMOL/L — SIGNIFICANT CHANGE UP (ref 5–17)
BUN SERPL-MCNC: 21 MG/DL — SIGNIFICANT CHANGE UP (ref 7–23)
CALCIUM SERPL-MCNC: 9.3 MG/DL — SIGNIFICANT CHANGE UP (ref 8.4–10.5)
CHLORIDE SERPL-SCNC: 101 MMOL/L — SIGNIFICANT CHANGE UP (ref 96–108)
CO2 SERPL-SCNC: 25 MMOL/L — SIGNIFICANT CHANGE UP (ref 22–31)
CREAT SERPL-MCNC: 0.9 MG/DL — SIGNIFICANT CHANGE UP (ref 0.5–1.3)
CULTURE RESULTS: SIGNIFICANT CHANGE UP
GLUCOSE BLDC GLUCOMTR-MCNC: 224 MG/DL — HIGH (ref 70–99)
GLUCOSE BLDC GLUCOMTR-MCNC: 240 MG/DL — HIGH (ref 70–99)
GLUCOSE BLDC GLUCOMTR-MCNC: 258 MG/DL — HIGH (ref 70–99)
GLUCOSE BLDC GLUCOMTR-MCNC: 265 MG/DL — HIGH (ref 70–99)
GLUCOSE SERPL-MCNC: 249 MG/DL — HIGH (ref 70–99)
HCT VFR BLD CALC: 37 % — LOW (ref 39–50)
HGB BLD-MCNC: 12 G/DL — LOW (ref 13–17)
MAGNESIUM SERPL-MCNC: 1.5 MG/DL — LOW (ref 1.6–2.6)
MCHC RBC-ENTMCNC: 28 PG — SIGNIFICANT CHANGE UP (ref 27–34)
MCHC RBC-ENTMCNC: 32.4 GM/DL — SIGNIFICANT CHANGE UP (ref 32–36)
MCV RBC AUTO: 86.2 FL — SIGNIFICANT CHANGE UP (ref 80–100)
NRBC # BLD: 0 /100 WBCS — SIGNIFICANT CHANGE UP (ref 0–0)
ORGANISM # SPEC MICROSCOPIC CNT: SIGNIFICANT CHANGE UP
PLATELET # BLD AUTO: 518 K/UL — HIGH (ref 150–400)
POTASSIUM SERPL-MCNC: 4.2 MMOL/L — SIGNIFICANT CHANGE UP (ref 3.5–5.3)
POTASSIUM SERPL-SCNC: 4.2 MMOL/L — SIGNIFICANT CHANGE UP (ref 3.5–5.3)
RBC # BLD: 4.29 M/UL — SIGNIFICANT CHANGE UP (ref 4.2–5.8)
RBC # FLD: 13.9 % — SIGNIFICANT CHANGE UP (ref 10.3–14.5)
SODIUM SERPL-SCNC: 135 MMOL/L — SIGNIFICANT CHANGE UP (ref 135–145)
SPECIMEN SOURCE: SIGNIFICANT CHANGE UP
SURGICAL PATHOLOGY STUDY: SIGNIFICANT CHANGE UP
VANCOMYCIN TROUGH SERPL-MCNC: 16.9 UG/ML — SIGNIFICANT CHANGE UP (ref 10–20)
WBC # BLD: 9.14 K/UL — SIGNIFICANT CHANGE UP (ref 3.8–10.5)
WBC # FLD AUTO: 9.14 K/UL — SIGNIFICANT CHANGE UP (ref 3.8–10.5)

## 2022-02-07 PROCEDURE — 76937 US GUIDE VASCULAR ACCESS: CPT | Mod: 26,59

## 2022-02-07 PROCEDURE — 36573 INSJ PICC RS&I 5 YR+: CPT

## 2022-02-07 PROCEDURE — 99232 SBSQ HOSP IP/OBS MODERATE 35: CPT | Mod: GC

## 2022-02-07 PROCEDURE — 99232 SBSQ HOSP IP/OBS MODERATE 35: CPT

## 2022-02-07 RX ORDER — METRONIDAZOLE 500 MG
500 TABLET ORAL EVERY 8 HOURS
Refills: 0 | Status: DISCONTINUED | OUTPATIENT
Start: 2022-02-07 | End: 2022-02-11

## 2022-02-07 RX ORDER — CHLORHEXIDINE GLUCONATE 213 G/1000ML
1 SOLUTION TOPICAL
Refills: 0 | Status: DISCONTINUED | OUTPATIENT
Start: 2022-02-07 | End: 2022-02-10

## 2022-02-07 RX ORDER — SODIUM CHLORIDE 9 MG/ML
10 INJECTION INTRAMUSCULAR; INTRAVENOUS; SUBCUTANEOUS
Refills: 0 | Status: DISCONTINUED | OUTPATIENT
Start: 2022-02-07 | End: 2022-02-11

## 2022-02-07 RX ORDER — INSULIN GLARGINE 100 [IU]/ML
35 INJECTION, SOLUTION SUBCUTANEOUS AT BEDTIME
Refills: 0 | Status: DISCONTINUED | OUTPATIENT
Start: 2022-02-07 | End: 2022-02-11

## 2022-02-07 RX ORDER — MAGNESIUM SULFATE 500 MG/ML
2 VIAL (ML) INJECTION ONCE
Refills: 0 | Status: COMPLETED | OUTPATIENT
Start: 2022-02-07 | End: 2022-02-07

## 2022-02-07 RX ADMIN — Medication 250 MILLIGRAM(S): at 00:03

## 2022-02-07 RX ADMIN — ATORVASTATIN CALCIUM 40 MILLIGRAM(S): 80 TABLET, FILM COATED ORAL at 22:21

## 2022-02-07 RX ADMIN — Medication 500 MILLIGRAM(S): at 15:48

## 2022-02-07 RX ADMIN — Medication 25 GRAM(S): at 09:04

## 2022-02-07 RX ADMIN — Medication 3 UNIT(S): at 17:56

## 2022-02-07 RX ADMIN — GABAPENTIN 100 MILLIGRAM(S): 400 CAPSULE ORAL at 06:45

## 2022-02-07 RX ADMIN — Medication 250 MILLIGRAM(S): at 12:33

## 2022-02-07 RX ADMIN — INSULIN GLARGINE 35 UNIT(S): 100 INJECTION, SOLUTION SUBCUTANEOUS at 22:22

## 2022-02-07 RX ADMIN — Medication 4: at 12:32

## 2022-02-07 RX ADMIN — Medication 500 MILLIGRAM(S): at 22:21

## 2022-02-07 RX ADMIN — Medication 4: at 17:57

## 2022-02-07 RX ADMIN — Medication 6: at 22:22

## 2022-02-07 RX ADMIN — Medication 6: at 09:05

## 2022-02-07 RX ADMIN — Medication 3 UNIT(S): at 12:31

## 2022-02-07 RX ADMIN — Medication 3 UNIT(S): at 09:04

## 2022-02-07 RX ADMIN — ENOXAPARIN SODIUM 40 MILLIGRAM(S): 100 INJECTION SUBCUTANEOUS at 15:48

## 2022-02-07 RX ADMIN — GABAPENTIN 100 MILLIGRAM(S): 400 CAPSULE ORAL at 22:21

## 2022-02-07 RX ADMIN — LOSARTAN POTASSIUM 25 MILLIGRAM(S): 100 TABLET, FILM COATED ORAL at 06:45

## 2022-02-07 RX ADMIN — GABAPENTIN 100 MILLIGRAM(S): 400 CAPSULE ORAL at 15:48

## 2022-02-07 NOTE — PROGRESS NOTE ADULT - ASSESSMENT
47 y/o M with PMHx of DM, HTN, HLD, osteomyelitis s/p amputation of R 3rd and 4th toes and partial amp of R 2nd toe, presents to the ED with complaints of Chills. Podiatry consulted for chronic wound to R foot, seen by podiatrist Dr. Johnson 2 weeks ago pt missed his last appointment. In ED pt was septic with fever, chills, leukocytosis, elevated ESR and CRP. Wound looked clinically infected drainage +malodor, new wound to medial aspect of pts right foot. Pt is now s/p right foot I&D, wash out, and wound graft application to plantar wound (2/2/22), High suspicion of osteomyelitis, wound vac applied 2/4/22.     Plan:   - C/w IV ABX per ID  - f/u Deep wound Cx + OR Cx's - MRSA, GBS, Bacteroides  - Wound care: [employer] Derm-Freddy dermal matrix + Adaptic nonadherent is sutured on to plantar wound. DSD, Kerlix wrap + ACE wrap  - wound vac in place  to pts right foot wounds; will change M/W/F, Changed today  - NWB to RLE, very important   - Rest of care per primary team     Podiatry Following. Plan d/w attending

## 2022-02-07 NOTE — PROGRESS NOTE ADULT - SUBJECTIVE AND OBJECTIVE BOX
Patient is a 46y old  Male who presents with a chief complaint of Chills. (06 Feb 2022 09:38)      INTERVAL HPI/ OVERNIGHT EVENTS: Pt seen and examined bedside.      LABS                        12.6   6.52  )-----------( 386      ( 06 Feb 2022 08:43 )             41.5     02-06    134<L>  |  100  |  16  ----------------------------<  231<H>  4.6   |  23  |  0.93    Ca    9.4      06 Feb 2022 08:44  Phos  2.9     02-06  Mg     1.6     02-06    TPro  8.0  /  Alb  3.1<L>  /  TBili  0.2  /  DBili  x   /  AST  28  /  ALT  32  /  AlkPhos  77  02-05        ICU Vital Signs Last 24 Hrs  T(C): 36.6 (07 Feb 2022 06:01), Max: 36.9 (06 Feb 2022 21:28)  T(F): 97.8 (07 Feb 2022 06:01), Max: 98.4 (06 Feb 2022 21:28)  HR: 69 (07 Feb 2022 06:01) (69 - 82)  BP: 132/86 (07 Feb 2022 06:01) (132/86 - 163/99)  BP(mean): --  ABP: --  ABP(mean): --  RR: 18 (07 Feb 2022 06:01) (16 - 18)  SpO2: 98% (07 Feb 2022 06:01) (98% - 99%)      RADIOLOGY  RADIOLOGY  < from: Xray Foot AP + Lateral + Oblique, Bilat (02.01.22 @ 17:47) >  Impression:    Right foot:On the lateral view, appears that there is new fragmentation   of the hallux sesamoids. This could be secondary to fracture however   infection in this area is not excluded. Further evaluation with MRI may   be of use.    Left foot: No radiographic evidence of acute osteomyelitis.    < end of copied text >    Culture Results:   Rare Staphylococcus aureus   Moderate Streptococcus agalactiae (Group B)   Few Bacteroides fragilis   MICROBIOLOGY      PHYSICAL EXAM  Lower Extremity Focused  Vasc: D/PT 2/4 b/l, Right foot warmer than left, non-pitting edema to dorsum of right foot   Derm:  Right foot- Submet 1 ulcer measuring 5.0cmx5.7hlv4zt, Ulcer has Graft and adaptic sutured on top-graft in place. , Right foot medial 1st MPJ ulceration 4x3cm granular base, -PTB.  Left foot submet 1 ulceration macerated base and border 1x1cm -PTB, malodor + serous drainage  Neuro: Protective sensation diminished   MSK: 4th and 5th right partial ray amputation and digital amputation of 2nd digit of the R foot.

## 2022-02-07 NOTE — PROGRESS NOTE ADULT - ATTENDING COMMENTS
Severe sepsis ( POA) due to right foot ulcer most likely osteomyelitis   Diabetic Foot Infection   Uncontrolled Insulin dependent Diabetes - increase lantus and pre meal insulin, recheck blood sugar in am   Peripheral Neuropathy   HTN   HLD     -wound cx + MRSA and Group B strep , Continue Vancomycin , ID recommended IV vancomycin for 6 weeks, PICC line , wound vac     Disp : STEVEN + Wound Vac - case mgmt awaer

## 2022-02-07 NOTE — PROGRESS NOTE ADULT - SUBJECTIVE AND OBJECTIVE BOX
INTERVAL HPI/OVERNIGHT EVENTS:    Patient was seen at bedside.  S/P PICC line insertion     CONSTITUTIONAL:  Negative fever or chills, feels well, good appetite  EYES:  Negative  blurry vision or double vision  CARDIOVASCULAR:  Negative for chest pain or palpitations  RESPIRATORY:  Negative for cough, wheezing, or SOB   GASTROINTESTINAL:  Negative for nausea, vomiting, diarrhea, constipation, or abdominal pain  GENITOURINARY:  Negative frequency, urgency or dysuria  NEUROLOGIC:  No headache, confusion, dizziness, lightheadedness      ANTIBIOTICS/RELEVANT:    MEDICATIONS  (STANDING):  atorvastatin 40 milliGRAM(s) Oral at bedtime  chlorhexidine 2% Cloths 1 Application(s) Topical <User Schedule>  dextrose 40% Gel 15 Gram(s) Oral once  dextrose 5%. 1000 milliLiter(s) (50 mL/Hr) IV Continuous <Continuous>  dextrose 5%. 1000 milliLiter(s) (100 mL/Hr) IV Continuous <Continuous>  dextrose 50% Injectable 25 Gram(s) IV Push once  dextrose 50% Injectable 12.5 Gram(s) IV Push once  dextrose 50% Injectable 25 Gram(s) IV Push once  enoxaparin Injectable 40 milliGRAM(s) SubCutaneous daily  gabapentin 100 milliGRAM(s) Oral every 8 hours  glucagon  Injectable 1 milliGRAM(s) IntraMuscular once  insulin glargine Injectable (LANTUS) 35 Unit(s) SubCutaneous at bedtime  insulin lispro (ADMELOG) corrective regimen sliding scale   SubCutaneous Before meals and at bedtime  insulin lispro Injectable (ADMELOG) 3 Unit(s) SubCutaneous three times a day before meals  losartan 25 milliGRAM(s) Oral daily  vancomycin  IVPB 1750 milliGRAM(s) IV Intermittent every 12 hours    MEDICATIONS  (PRN):  acetaminophen     Tablet .. 650 milliGRAM(s) Oral every 6 hours PRN Temp greater or equal to 38C (100.4F)  HYDROmorphone  Injectable 0.5 milliGRAM(s) IV Push every 15 minutes PRN Moderate Pain (4 - 6)  HYDROmorphone  Injectable 1 milliGRAM(s) IV Push every 15 minutes PRN Severe Pain (7 - 10)  oxyCODONE    IR 5 milliGRAM(s) Oral every 6 hours PRN Moderate Pain (4 - 6)  oxyCODONE    IR 10 milliGRAM(s) Oral every 6 hours PRN Severe Pain (7 - 10)  sodium chloride 0.9% lock flush 10 milliLiter(s) IV Push every 1 hour PRN Pre/post blood products, medications, blood draw, and to maintain line patency        Vital Signs Last 24 Hrs  T(C): 36.8 (07 Feb 2022 10:10), Max: 36.9 (06 Feb 2022 21:28)  T(F): 98.3 (07 Feb 2022 10:10), Max: 98.4 (06 Feb 2022 21:28)  HR: 77 (07 Feb 2022 10:10) (69 - 82)  BP: 150/97 (07 Feb 2022 10:10) (132/86 - 163/99)  BP(mean): --  RR: 18 (07 Feb 2022 10:10) (17 - 18)  SpO2: 97% (07 Feb 2022 10:10) (97% - 99%)    PHYSICAL EXAM:  Constitutional:Well-developed, well nourished  Eyes:RONI, EOMI  Ear/Nose/Throat: no oral lesion, no sinus tenderness on percussion	  Neck supple  Respiratory: CTA maricruz  Cardiovascular: S1S2 RRR, no murmurs  Gastrointestinal:soft, (+) BS, no HSM  Extremities:no e/e/c  Vascular: DP Pulse:	right normal; left normal      LABS:                        12.0   9.14  )-----------( 518      ( 07 Feb 2022 06:51 )             37.0     02-07    135  |  101  |  21  ----------------------------<  249<H>  4.2   |  25  |  0.90    Ca    9.3      07 Feb 2022 06:51  Phos  2.9     02-06  Mg     1.5     02-07            MICROBIOLOGY:    Culture - Surgical Swab (02.02.22 @ 21:06)    Gram Stain:   Few Gram positive cocci in pairs and clusters  Rare Gram Negative Rods  No WBC's seen.    -  Cefazolin: S <=4    -  Clindamycin: S <=0.25    -  Clindamycin: S    -  Erythromycin: S    -  Erythromycin: S <=0.25    -  Levofloxacin: S    -  Linezolid: S 4    -  Oxacillin: S <=0.25 Oxacillin (or cefoxitin) results can be applied to the other penicillinase-stable penicillins (cloxacillin, dicloxacillin, methicillin, and nafcillin). For agents with established clinical efficacy and considering site of infection and appropriate dosing, methicillin (oxacillin)-susceptible staphylococci can be considered susceptible to: B-lactam combination agents, oral cephems, parenteral cephems including cephalosporins I, II, III and IV, and carbapenems.    -  Penicillin: S Predicts results for ampicillin, amoxicillin, amoxicillin/clavulanate, ampicillin/sulbactam, 1st, 2nd and 3rd generation cephalosporins and carbapenems.    -  Rifampin: S <=1 Should not be used as monotherapy    -  Trimethoprim/Sulfamethoxazole: S <=0.5/9.5    -  Vancomycin: S 2    -  Vancomycin: S    Specimen Source: .Surgical Swab Right Foot 2    Culture Results:   Rare Staphylococcus aureus  Few Streptococcus agalactiae (Group B)  Mixed Anaerobic Trang :  Moderate Bacteroides fragilis  Rare Gemella morbillorum    Organism Identification: Streptococcus agalactiae (Group B)  Staphylococcus aureus    Organism: Streptococcus agalactiae (Group B)    Organism: Staphylococcus aureus    Method Type: KB    Method Type: YUN        RADIOLOGY & ADDITIONAL STUDIES:

## 2022-02-07 NOTE — PROGRESS NOTE ADULT - TIME BILLING
treatment of osteomyelitis
care coordiantion, plan of care discussed with patient face to face
care coordination, plan of care discussed with patient face to face

## 2022-02-07 NOTE — PROGRESS NOTE ADULT - SUBJECTIVE AND OBJECTIVE BOX
INTERVAL HPI/OVERNIGHT EVENTS:  As per night team, no overnight events. Patient seen and examined at bedside. Patient denies fever, chills, dizziness, weakness, HA, CP, SOB, N/V/D/C    VITALS  Vital Signs Last 24 Hrs  T(C): 36.8 (07 Feb 2022 10:10), Max: 36.9 (06 Feb 2022 21:28)  T(F): 98.3 (07 Feb 2022 10:10), Max: 98.4 (06 Feb 2022 21:28)  HR: 77 (07 Feb 2022 10:10) (69 - 82)  BP: 150/97 (07 Feb 2022 10:10) (132/86 - 163/99)  BP(mean): --  RR: 18 (07 Feb 2022 10:10) (17 - 18)  SpO2: 97% (07 Feb 2022 10:10) (97% - 99%)    CAPILLARY BLOOD GLUCOSE      POCT Blood Glucose.: 265 mg/dL (07 Feb 2022 08:35)  POCT Blood Glucose.: 270 mg/dL (06 Feb 2022 21:47)  POCT Blood Glucose.: 232 mg/dL (06 Feb 2022 17:04)  POCT Blood Glucose.: 204 mg/dL (06 Feb 2022 12:05)      PHYSICAL EXAM  GENERAL: NAD, lying in bed comfortably  HEAD:  Atraumatic, normocephalic  EYES: EOMI, PERRLA, conjunctiva and sclera clear  ENT: Moist mucous membranes  NECK: Supple, no JVD  HEART: Regular rate and rhythm, no murmurs, rubs, or gallops  LUNGS: Unlabored respirations.  Clear to auscultation bilaterally, no crackles, wheezing, or rhonchi  ABDOMEN: Soft, nontender, nondistended, +BS, globulus  EXTREMITIES: 2+ peripheral pulses bilaterally. RLE wrapped in ace bandage + wound vac WWP, movement intact. LLE wrapped with gawze, WWP and movement intact.   NERVOUS SYSTEM:  A&Ox3, no focal deficits   SKIN: No rashes    MEDICATIONS  (STANDING):  atorvastatin 40 milliGRAM(s) Oral at bedtime  chlorhexidine 2% Cloths 1 Application(s) Topical <User Schedule>  dextrose 40% Gel 15 Gram(s) Oral once  dextrose 5%. 1000 milliLiter(s) (50 mL/Hr) IV Continuous <Continuous>  dextrose 5%. 1000 milliLiter(s) (100 mL/Hr) IV Continuous <Continuous>  dextrose 50% Injectable 25 Gram(s) IV Push once  dextrose 50% Injectable 12.5 Gram(s) IV Push once  dextrose 50% Injectable 25 Gram(s) IV Push once  enoxaparin Injectable 40 milliGRAM(s) SubCutaneous daily  gabapentin 100 milliGRAM(s) Oral every 8 hours  glucagon  Injectable 1 milliGRAM(s) IntraMuscular once  insulin glargine Injectable (LANTUS) 35 Unit(s) SubCutaneous at bedtime  insulin lispro (ADMELOG) corrective regimen sliding scale   SubCutaneous Before meals and at bedtime  insulin lispro Injectable (ADMELOG) 3 Unit(s) SubCutaneous three times a day before meals  losartan 25 milliGRAM(s) Oral daily  vancomycin  IVPB 1750 milliGRAM(s) IV Intermittent every 12 hours    MEDICATIONS  (PRN):  acetaminophen     Tablet .. 650 milliGRAM(s) Oral every 6 hours PRN Temp greater or equal to 38C (100.4F)  HYDROmorphone  Injectable 0.5 milliGRAM(s) IV Push every 15 minutes PRN Moderate Pain (4 - 6)  HYDROmorphone  Injectable 1 milliGRAM(s) IV Push every 15 minutes PRN Severe Pain (7 - 10)  oxyCODONE    IR 5 milliGRAM(s) Oral every 6 hours PRN Moderate Pain (4 - 6)  oxyCODONE    IR 10 milliGRAM(s) Oral every 6 hours PRN Severe Pain (7 - 10)  sodium chloride 0.9% lock flush 10 milliLiter(s) IV Push every 1 hour PRN Pre/post blood products, medications, blood draw, and to maintain line patency      No Known Allergies      LABS                        12.0   9.14  )-----------( 518      ( 07 Feb 2022 06:51 )             37.0     02-07    135  |  101  |  21  ----------------------------<  249<H>  4.2   |  25  |  0.90    Ca    9.3      07 Feb 2022 06:51  Phos  2.9     02-06  Mg     1.5     02-07                RADIOLOGY & ADDITIONAL TESTS: Reviewed

## 2022-02-07 NOTE — CONSULT NOTE ADULT - SUBJECTIVE AND OBJECTIVE BOX
Attending: Dr. Green    Patient is a 46y old  Male who presents with a chief complaint of Chills. (01 Feb 2022 17:41)      HPI:  45 y/o M pt w/ PMHx of HTN, HLD, DM, osteomyelitis with amputation of the right 4th and 5th toes and partial amputation of the right 2nd toe, who was diagnosed w/ COVID on 12/24/21, presented on 1/7/22 to the ED with complaints of recurrent fever described as subjective warmth starting 5 nights ago, predominantly at nighttime w/ mild pains to b/l legs, R>l, and pains to lower back starting today, mild SOB with walking starting yesterday. presented on 1/23/2022 to the ED c/o back pain and b/l leg pain for the previous 3 weeks across his lower back that radiates to the b/l thighs and calfs. The pain is worsened when sitting and attempting to stand up and is better when in the supine position. Treated with Motrin without relief of symptoms. Last blood cultures on 1/23/22 negatives. Pt follows with Dr. Green for local wound care however pt missed last appointment due to back pain.     ED Course:  - Vitals: T 99.2  /86 RR 18 Sat 98 RA  - Labs: ESR 68 WBC 18.99 (81% neutro) Prot GAP elevated (prot 9.4 alb 4.3) CRP 2.4 Lact 2.4  - EKG: Sinus tachy 120s  - MGMT: Vanc 1g, Zosyn 3.375g, NS 1L in 1 h hours.   (01 Feb 2022 17:41)      Review of systems negative except per HPI and as stated below  General:	 no weakness; +fevers, + chills  Skin/Breast: no rash  Respiratory and Thorax: no SOB, no cough  Cardiovascular:	No chest pain  Gastrointestinal:	 no nausea, vomiting , diarrhea  Genitourinary:	no dysuria, no difficulty urinating, no hematuria  Musculoskeletal:	no weakness, no joint swelling/pain  Neurological:	no focal weakness/numbness  Endocrine:	no polyuria, no polydipsia    PAST MEDICAL & SURGICAL HISTORY:  Hyperlipidemia, unspecified hyperlipidemia type    Hypertension    History of cocaine use    Diabetes mellitus    Osteomyelitis  R foot    Toe amputation status  Right foot    H/O skin graft  Right foot    S/P insertion of penile implant      Home Medications:  atorvastatin 40 mg oral tablet: 1 tab(s) orally once a day (11 Oct 2021 17:30)  cephalexin 500 mg oral capsule: 1 cap(s) orally 4 times a day (13 Oct 2021 11:54)  insulin glargine: 30 unit(s) subcutaneous once a day (11 Oct 2021 17:30)  Insulin Lispro KwikPen 100 units/mL injectable solution: 10 unit(s) injectable 3 times a day (with meals) (11 Oct 2021 17:30)  losartan 50 mg oral tablet: 1 tab(s) orally once a day (11 Oct 2021 17:30)  metFORMIN 750 mg oral tablet, extended release: 2 tab(s) orally once a day (11 Oct 2021 17:30)    Allergies    No Known Allergies    Intolerances      FAMILY HISTORY:  FH: CAD (coronary artery disease) (Mother)      Social History:       LABS                        13.2   18.99 )-----------( 471      ( 01 Feb 2022 16:13 )             39.7     02-01    138  |  98  |  18  ----------------------------<  158<H>  4.3   |  25  |  1.18    Ca    9.6      01 Feb 2022 16:13    TPro  9.4<H>  /  Alb  4.3  /  TBili  0.8  /  DBili  x   /  AST  16  /  ALT  16  /  AlkPhos  73  02-01    PT/INR - ( 01 Feb 2022 18:10 )   PT: 15.0 sec;   INR: 1.26          PTT - ( 01 Feb 2022 18:10 )  PTT:33.3 sec  ESR: 68  CRP: --  02-01 @ 16:13    Vital Signs Last 24 Hrs  T(C): 38.4 (01 Feb 2022 17:42), Max: 38.4 (01 Feb 2022 17:42)  T(F): 101.2 (01 Feb 2022 17:42), Max: 101.2 (01 Feb 2022 17:42)  HR: 105 (01 Feb 2022 17:33) (105 - 130)  BP: 169/90 (01 Feb 2022 17:33) (151/86 - 169/90)  BP(mean): --  RR: 18 (01 Feb 2022 17:33) (18 - 18)  SpO2: 98% (01 Feb 2022 17:33) (98% - 98%)    PHYSICAL EXAM  General: NAD, AA0x3  Lower Extremity Focused:  Vasc: D/PT 2/4 b/l, Right foot warmer than left, non-pitting edema to dorsum of right foot   Derm:  Right foot- Submet 1 ulcer measuring 5.0cmx5.1req5hf, Ulcer has granular/fibrotic base, serous drainage, + malodor, -PTB, hyperkeratotic border, surrounding erythema externding up dorsum of right foot, Right foot medial 1st MPJ ulceration 4x3cm communicated with plantar ulceration, fibrotic/granular base, +purulence, +malodor, -PTB.  Left foot submet 1 ulceration macerated base and border 1x1cm -PTB, malodor -draiange  Neuro: Protective sensation diminished   MSK: 4th and 5th right partial ray amputation and digital amputation of 2nd digit of the R foot.       RADIOLOGY: < from: Xray Foot AP + Lateral + Oblique, Bilat (02.01.22 @ 17:47) >  Impression:  Right foot:On the lateral view, appears that there is new fragmentation   of the hallux sesamoids. This could be secondary to fracture however   infection in this area is not excluded. Further evaluation with MRI may   be of use.  Left foot: No radiographic evidence of acute osteomyelitis.  --- End of Report ---  < end of copied text >                      
[facility] Service Consult Note    [facility]:         Diagnosis: diabetic foot ulcer    Indications for Vascular Access (Check all that apply)  [  x]  Antibiotic Therapy       Antibiotic Prescribed:      vancomycin x 6 weeks                                                                                   [  ]  IV Hydration  [  ]  Total Parenteral Nutrition  [  ]  Chemotherapy  [  ]  Difficult Venous Access  [  ]  CVP monitoring  [  ]  Medications with high potential for tissue necrosis on extravasation  [  ]  Other    Screening (Check all that apply)  Previous Radiation to chest  [  ] Yes      [ x ]  No  Breast Cancer                          [  ] Left     [  ]  Right    [ x ]  No  Lymph Node Dissection         [  ] Left     [  ]  Right    [x  ]  No  Pacemaker or ICD                   [  ] Left     [  ]  Right    [ x ]  No  Upper Extremity DVT             [  ] Left     [  ]  Right    [x ]  No  Chronic Kidney Disease         [  ]  Yes     [x  ]  No  Hemodialysis                           [  ]  Yes     [x  ]  No  AV Fistula/ Graft                     [  ]  Left    [  ]  Right    [  x]  No  Temp>101F in past 24 H       [  ]  Yes     [ x ]  No  H/O PICC/Midline                   [  ]  Yes     [ x ]  No    Lab data:                        12.0   9.14  )-----------( 518      ( 07 Feb 2022 06:51 )             37.0     02-07    135  |  101  |  21  ----------------------------<  249<H>  4.2   |  25  |  0.90    Ca    9.3      07 Feb 2022 06:51  Phos  2.9     02-06  Mg     1.5     02-07                I have reviewed the chart, interviewed and examined the patient and determined that this patient:  [ x ] Is a candidate for a PICC line  [  ] Is a candidate for a Midline  [  ] Is not a candidate for vascular access device (reason)    Lumens:    [x ] Single  [  ] Double
HPI:  45 yo M pt with previous alcohol and cocaine use w/ PMHx of HTN (not treated), HLD (on meds), DM (started in 2021, not well control in the beginning, on OAD and insulin), osteomyelitis with amputation of the right 4th and 5th toes and partial amputation of the right 2nd toe, who was diagnosed w/ COVID on 12/24/21, in the last month has been for 2 times in ED: first time presented on 1/7/22 to the ED with complaints of recurrent fever described as subjective warmth starting 5 nights ago, predominantly at nighttime w/ mild pains to b/l legs, R>l, and pains to lower back starting today, mild SOB with walking starting yesterday. Second time presented on 1/23/2022 to the ED c/o back pain and b/l leg pain for the previous 3 weeks across his lower back that radiates to the b/l thighs and calfs. The pain is worsened when sitting and attempting to stand up and is better when in the supine position with MRI positive for hernial disc pathology. Treated with Motrin without relief of symptoms and today received infiltration in the lumbar area. On february 2021 stepped with the right foot on a screw and on february 2021 stepped on another one but with the right on while working. After visiting a few times his podiatrist (Dr. Barry Hodges) the right foot is healing progressively since then, however his left one has worsened many times, last time 1 week ago. He got his last blood cultures on 1/23/22 and were negatives. On 1/1/2022, in the evening, presented with chills and shaking and abdominal pain that resolved after Tylenol. Today went to the doctor to get infiltration for his lumbar pain and when he was leaving the consult, he experienced again same symptoms but more intense. He decided to go to ED for evaluation. He didn't take the temperature. He relates his problem in his left foot with this episode. He hasn't experienced chest pain, SOB, respiratory symptoms, abdominal symptoms.     Patient had culture done which are now showing MRSA, group B strep and Bacteroides fragilis    ED Course:  - Vitals: T 99.2  /86 RR 18 Sat 98 RA  - Labs: ESR 68 WBC 18.99 (81% neutro) Prot GAP elevated (prot 9.4 alb 4.3) CRP 2.4 Lact 2.4  - EKG: Sinus tachy 120s  - MGMT: Vanc 1g, Zosyn 3.375g, NS 1L in 1 h hours. Podiatry intervention.    (01 Feb 2022 17:41)      PAST MEDICAL & SURGICAL HISTORY:  Hyperlipidemia, unspecified hyperlipidemia type    Hypertension    History of cocaine use    Diabetes mellitus    Osteomyelitis  R foot    Toe amputation status  Right foot    H/O skin graft  Right foot    S/P insertion of penile implant          REVIEW OF SYSTEMS:    General:	 no weakness; no fevers, no chills  Skin/Breast: no rash  Respiratory and Thorax: no SOB, no cough  Cardiovascular:	No chest pain  Gastrointestinal:	 no nausea, vomiting , diarrhea  Genitourinary:	no dysuria, no difficulty urinating, no hematuria  Musculoskeletal:	no weakness, no joint swelling/pain  Neurological:	no focal weakness/numbness  Endocrine:	no polyuria, no polydipsia      ANTIBIOTICS:  MEDICATIONS  (STANDING):  atorvastatin 40 milliGRAM(s) Oral at bedtime  dextrose 40% Gel 15 Gram(s) Oral once  dextrose 5%. 1000 milliLiter(s) (50 mL/Hr) IV Continuous <Continuous>  dextrose 5%. 1000 milliLiter(s) (100 mL/Hr) IV Continuous <Continuous>  dextrose 50% Injectable 25 Gram(s) IV Push once  dextrose 50% Injectable 12.5 Gram(s) IV Push once  dextrose 50% Injectable 25 Gram(s) IV Push once  enoxaparin Injectable 40 milliGRAM(s) SubCutaneous daily  gabapentin 100 milliGRAM(s) Oral every 8 hours  glucagon  Injectable 1 milliGRAM(s) IntraMuscular once  insulin glargine Injectable (LANTUS) 30 Unit(s) SubCutaneous at bedtime  insulin lispro (ADMELOG) corrective regimen sliding scale   SubCutaneous Before meals and at bedtime  insulin lispro Injectable (ADMELOG) 3 Unit(s) SubCutaneous three times a day before meals    MEDICATIONS  (PRN):  acetaminophen     Tablet .. 650 milliGRAM(s) Oral every 6 hours PRN Temp greater or equal to 38C (100.4F)  HYDROmorphone  Injectable 0.5 milliGRAM(s) IV Push every 15 minutes PRN Moderate Pain (4 - 6)  HYDROmorphone  Injectable 1 milliGRAM(s) IV Push every 15 minutes PRN Severe Pain (7 - 10)  oxyCODONE    IR 5 milliGRAM(s) Oral every 6 hours PRN Moderate Pain (4 - 6)  oxyCODONE    IR 10 milliGRAM(s) Oral every 6 hours PRN Severe Pain (7 - 10)      Allergies    No Known Allergies    Intolerances        SOCIAL HISTORY:    FAMILY HISTORY:  FH: CAD (coronary artery disease) (Mother)        Vital Signs Last 24 Hrs  T(C): 36.9 (04 Feb 2022 09:23), Max: 37.1 (03 Feb 2022 21:17)  T(F): 98.4 (04 Feb 2022 09:23), Max: 98.8 (03 Feb 2022 21:17)  HR: 86 (04 Feb 2022 09:23) (68 - 88)  BP: 122/86 (04 Feb 2022 09:23) (122/86 - 169/85)  BP(mean): --  RR: 18 (04 Feb 2022 09:23) (17 - 18)  SpO2: 97% (04 Feb 2022 09:23) (97% - 98%)    PHYSICAL EXAM:  Constitutional:Well-developed, well nourished  Eyes:RONI, EOMI  Ear/Nose/Throat: no oral lesion, no sinus tenderness on percussion	  Neck:  supple  Respiratory: CTA maricruz  Cardiovascular: S1S2 RRR, no murmurs  Gastrointestinal:soft, (+) BS, no HSM  Extremities: right foot with wound vac in place   Vascular: DP Pulse:	right normal; left normal            LABS:                        11.2   7.49  )-----------( 476      ( 04 Feb 2022 08:01 )             35.3     02-04    141  |  104  |  12  ----------------------------<  76  4.0   |  28  |  0.92    Ca    9.1      04 Feb 2022 08:01  Phos  3.2     02-04  Mg     1.6     02-04            MICROBIOLOGY:    Culture - Fungal, Tissue (02.03.22 @ 01:47)    Specimen Source: .Tissue Right Foot #2    Culture Results:   Testing in progress      Culture - Surgical Swab (02.02.22 @ 21:06)    -  Vancomycin: S    -  Penicillin: S Predicts results for ampicillin, amoxicillin, amoxicillin/clavulanate, ampicillin/sulbactam, 1st, 2nd and 3rd generation cephalosporins and carbapenems.    -  Clindamycin: S    -  Erythromycin: S    -  Levofloxacin: S    Gram Stain:   Few Gram positive cocci in pairs and clusters  Rare Gram Negative Rods  No WBC's seen.    Specimen Source: .Surgical Swab Right Foot 2    Culture Results:   Rare Staphylococcus aureus Presumptive Methicillin susceptible  Confirmation to follow within 24 hrs.  Susceptibility to follow.  Few Bacteroides fragilis  Moderate Streptococcus agalactiae (Group B)  Culture in progress    Organism Identification: Streptococcus agalactiae (Group B)    Organism: Streptococcus agalactiae (Group B)    Method Type: JULIOCESAR    RADIOLOGY & ADDITIONAL STUDIES:

## 2022-02-07 NOTE — PROGRESS NOTE ADULT - ASSESSMENT
IMPRESSION:  Diabetic foot infection.  Suspected osteomyelitis per primary team.  OR cultures with MRSA, GBS, gemella and bacteroides    Recommend:  1. Continue Vancomycin 1750 mg IV q12  2. Continue Metronidazole 500 mg PO q8hrs  3.  He will need 6 weeks of IV antibiotics.  Day 1 = 2/2/2022  4. Needs weekly CBC, CMP, ESR, CRP, vanco trough.  Can fax results to Dr. Mosley:  [phone]  5.  Can follow up with Dr. Mosley as outpatient:  [facility] [address], 4th floor [place], [place].  [phone], option 2    ID team 2 will sign off.  Reconsult as needed

## 2022-02-08 LAB
ANION GAP SERPL CALC-SCNC: 9 MMOL/L — SIGNIFICANT CHANGE UP (ref 5–17)
BASOPHILS # BLD AUTO: 0.08 K/UL — SIGNIFICANT CHANGE UP (ref 0–0.2)
BASOPHILS NFR BLD AUTO: 0.9 % — SIGNIFICANT CHANGE UP (ref 0–2)
BUN SERPL-MCNC: 21 MG/DL — SIGNIFICANT CHANGE UP (ref 7–23)
CALCIUM SERPL-MCNC: 9.3 MG/DL — SIGNIFICANT CHANGE UP (ref 8.4–10.5)
CHLORIDE SERPL-SCNC: 100 MMOL/L — SIGNIFICANT CHANGE UP (ref 96–108)
CO2 SERPL-SCNC: 25 MMOL/L — SIGNIFICANT CHANGE UP (ref 22–31)
CREAT SERPL-MCNC: 1 MG/DL — SIGNIFICANT CHANGE UP (ref 0.5–1.3)
EOSINOPHIL # BLD AUTO: 0.31 K/UL — SIGNIFICANT CHANGE UP (ref 0–0.5)
EOSINOPHIL NFR BLD AUTO: 3.7 % — SIGNIFICANT CHANGE UP (ref 0–6)
GLUCOSE BLDC GLUCOMTR-MCNC: 169 MG/DL — HIGH (ref 70–99)
GLUCOSE BLDC GLUCOMTR-MCNC: 211 MG/DL — HIGH (ref 70–99)
GLUCOSE BLDC GLUCOMTR-MCNC: 244 MG/DL — HIGH (ref 70–99)
GLUCOSE BLDC GLUCOMTR-MCNC: 306 MG/DL — HIGH (ref 70–99)
GLUCOSE SERPL-MCNC: 237 MG/DL — HIGH (ref 70–99)
HCT VFR BLD CALC: 37.7 % — LOW (ref 39–50)
HGB BLD-MCNC: 11.7 G/DL — LOW (ref 13–17)
IMM GRANULOCYTES NFR BLD AUTO: 0.4 % — SIGNIFICANT CHANGE UP (ref 0–1.5)
LACTATE SERPL-SCNC: 1.9 MMOL/L — SIGNIFICANT CHANGE UP (ref 0.5–2)
LYMPHOCYTES # BLD AUTO: 2.89 K/UL — SIGNIFICANT CHANGE UP (ref 1–3.3)
LYMPHOCYTES # BLD AUTO: 34.2 % — SIGNIFICANT CHANGE UP (ref 13–44)
MAGNESIUM SERPL-MCNC: 1.6 MG/DL — SIGNIFICANT CHANGE UP (ref 1.6–2.6)
MCHC RBC-ENTMCNC: 26.8 PG — LOW (ref 27–34)
MCHC RBC-ENTMCNC: 31 GM/DL — LOW (ref 32–36)
MCV RBC AUTO: 86.3 FL — SIGNIFICANT CHANGE UP (ref 80–100)
MONOCYTES # BLD AUTO: 0.75 K/UL — SIGNIFICANT CHANGE UP (ref 0–0.9)
MONOCYTES NFR BLD AUTO: 8.9 % — SIGNIFICANT CHANGE UP (ref 2–14)
NEUTROPHILS # BLD AUTO: 4.38 K/UL — SIGNIFICANT CHANGE UP (ref 1.8–7.4)
NEUTROPHILS NFR BLD AUTO: 51.9 % — SIGNIFICANT CHANGE UP (ref 43–77)
NRBC # BLD: 0 /100 WBCS — SIGNIFICANT CHANGE UP (ref 0–0)
PHOSPHATE SERPL-MCNC: 2.8 MG/DL — SIGNIFICANT CHANGE UP (ref 2.5–4.5)
PLATELET # BLD AUTO: 497 K/UL — HIGH (ref 150–400)
POTASSIUM SERPL-MCNC: 4.3 MMOL/L — SIGNIFICANT CHANGE UP (ref 3.5–5.3)
POTASSIUM SERPL-SCNC: 4.3 MMOL/L — SIGNIFICANT CHANGE UP (ref 3.5–5.3)
RBC # BLD: 4.37 M/UL — SIGNIFICANT CHANGE UP (ref 4.2–5.8)
RBC # FLD: 14 % — SIGNIFICANT CHANGE UP (ref 10.3–14.5)
SODIUM SERPL-SCNC: 134 MMOL/L — LOW (ref 135–145)
WBC # BLD: 8.44 K/UL — SIGNIFICANT CHANGE UP (ref 3.8–10.5)
WBC # FLD AUTO: 8.44 K/UL — SIGNIFICANT CHANGE UP (ref 3.8–10.5)

## 2022-02-08 PROCEDURE — 99233 SBSQ HOSP IP/OBS HIGH 50: CPT | Mod: GC

## 2022-02-08 RX ORDER — VANCOMYCIN HCL 1 G
1750 VIAL (EA) INTRAVENOUS EVERY 12 HOURS
Refills: 0 | Status: DISCONTINUED | OUTPATIENT
Start: 2022-02-08 | End: 2022-02-10

## 2022-02-08 RX ORDER — VANCOMYCIN HCL 1 G
1750 VIAL (EA) INTRAVENOUS EVERY 12 HOURS
Refills: 0 | Status: DISCONTINUED | OUTPATIENT
Start: 2022-02-08 | End: 2022-02-08

## 2022-02-08 RX ORDER — METRONIDAZOLE 500 MG
4 TABLET ORAL
Qty: 492 | Refills: 0
Start: 2022-02-08 | End: 2022-03-20

## 2022-02-08 RX ORDER — OXYCODONE HYDROCHLORIDE 5 MG/1
10 TABLET ORAL EVERY 6 HOURS
Refills: 0 | Status: DISCONTINUED | OUTPATIENT
Start: 2022-02-08 | End: 2022-02-11

## 2022-02-08 RX ORDER — VANCOMYCIN HCL 1 G
1750 VIAL (EA) INTRAVENOUS
Qty: 126000 | Refills: 0
Start: 2022-02-08 | End: 2022-03-15

## 2022-02-08 RX ORDER — OXYCODONE HYDROCHLORIDE 5 MG/1
5 TABLET ORAL EVERY 6 HOURS
Refills: 0 | Status: DISCONTINUED | OUTPATIENT
Start: 2022-02-08 | End: 2022-02-11

## 2022-02-08 RX ORDER — INSULIN LISPRO 100/ML
5 VIAL (ML) SUBCUTANEOUS
Refills: 0 | Status: DISCONTINUED | OUTPATIENT
Start: 2022-02-08 | End: 2022-02-11

## 2022-02-08 RX ORDER — LOSARTAN POTASSIUM 100 MG/1
1 TABLET, FILM COATED ORAL
Qty: 0 | Refills: 0 | DISCHARGE
Start: 2022-02-08

## 2022-02-08 RX ADMIN — ATORVASTATIN CALCIUM 40 MILLIGRAM(S): 80 TABLET, FILM COATED ORAL at 22:09

## 2022-02-08 RX ADMIN — Medication 500 MILLIGRAM(S): at 22:10

## 2022-02-08 RX ADMIN — Medication 5 UNIT(S): at 17:56

## 2022-02-08 RX ADMIN — Medication 3 UNIT(S): at 09:34

## 2022-02-08 RX ADMIN — Medication 4: at 13:08

## 2022-02-08 RX ADMIN — Medication 4: at 22:10

## 2022-02-08 RX ADMIN — Medication 8: at 09:34

## 2022-02-08 RX ADMIN — ENOXAPARIN SODIUM 40 MILLIGRAM(S): 100 INJECTION SUBCUTANEOUS at 13:08

## 2022-02-08 RX ADMIN — GABAPENTIN 100 MILLIGRAM(S): 400 CAPSULE ORAL at 06:00

## 2022-02-08 RX ADMIN — Medication 500 MILLIGRAM(S): at 06:00

## 2022-02-08 RX ADMIN — Medication 250 MILLIGRAM(S): at 14:05

## 2022-02-08 RX ADMIN — Medication 3 UNIT(S): at 13:08

## 2022-02-08 RX ADMIN — GABAPENTIN 100 MILLIGRAM(S): 400 CAPSULE ORAL at 22:10

## 2022-02-08 RX ADMIN — Medication 250 MILLIGRAM(S): at 01:50

## 2022-02-08 RX ADMIN — LOSARTAN POTASSIUM 25 MILLIGRAM(S): 100 TABLET, FILM COATED ORAL at 06:00

## 2022-02-08 RX ADMIN — GABAPENTIN 100 MILLIGRAM(S): 400 CAPSULE ORAL at 13:09

## 2022-02-08 RX ADMIN — CHLORHEXIDINE GLUCONATE 1 APPLICATION(S): 213 SOLUTION TOPICAL at 08:11

## 2022-02-08 RX ADMIN — Medication 500 MILLIGRAM(S): at 13:09

## 2022-02-08 RX ADMIN — INSULIN GLARGINE 35 UNIT(S): 100 INJECTION, SOLUTION SUBCUTANEOUS at 22:10

## 2022-02-08 RX ADMIN — Medication 2: at 17:56

## 2022-02-08 NOTE — PROGRESS NOTE ADULT - ASSESSMENT
47 y/o M with PMHx of DM, HTN, HLD, osteomyelitis s/p amputation of R 3rd and 4th toes and partial amp of R 2nd toe, presents to the ED with complaints of Chills. Podiatry consulted for chronic wound to R foot, seen by podiatrist Dr. Johnson 2 weeks ago pt missed his last appointment. In ED pt was septic with fever, chills, leukocytosis, elevated ESR and CRP. Wound looked clinically infected drainage +malodor, new wound to medial aspect of pts right foot. Pt is now s/p right foot I&D, wash out, and wound graft application to plantar wound (2/2/22), High suspicion of osteomyelitis, wound vac applied 2/4/22.     Plan:   - C/w IV ABX per ID  - Wound care: [employer] Derm-Freddy dermal matrix + Adaptic nonadherent is sutured on to plantar wound. DSD, Kerlix wrap + ACE wrap  - wound vac in place  to pts right foot wounds; will change M/W/F, Changed tomorrow  -Left foot dressing changed today  - NWB to RLE, very important   - Rest of care per primary team     Podiatry Following. Plan d/w attending

## 2022-02-08 NOTE — PROGRESS NOTE ADULT - SUBJECTIVE AND OBJECTIVE BOX
INTERVAL HPI/OVERNIGHT EVENTS:  As per night team, no overnight events. Patient seen and examined at bedside. Patient with no pain to the RLE. no SOB, chest pain, N/V, no constipation.         VITALS  Vital Signs Last 24 Hrs  T(C): 36.7 (08 Feb 2022 10:12), Max: 37.2 (07 Feb 2022 16:03)  T(F): 98.1 (08 Feb 2022 10:12), Max: 98.9 (07 Feb 2022 16:03)  HR: 79 (08 Feb 2022 10:12) (78 - 79)  BP: 147/81 (08 Feb 2022 10:12) (130/81 - 170/93)  BP(mean): --  RR: 18 (08 Feb 2022 10:12) (17 - 18)  SpO2: 97% (08 Feb 2022 10:12) (95% - 99%)    CAPILLARY BLOOD GLUCOSE      POCT Blood Glucose.: 211 mg/dL (08 Feb 2022 12:13)  POCT Blood Glucose.: 306 mg/dL (08 Feb 2022 08:42)  POCT Blood Glucose.: 258 mg/dL (07 Feb 2022 21:50)  POCT Blood Glucose.: 224 mg/dL (07 Feb 2022 17:01)      PHYSICAL EXAM  GENERAL: NAD, lying in bed comfortably  HEAD:  Atraumatic, normocephalic  EYES: EOMI, PERRLA, conjunctiva and sclera clear  ENT: Moist mucous membranes  NECK: Supple, no JVD  HEART: Regular rate and rhythm, no murmurs, rubs, or gallops  LUNGS: Unlabored respirations.  Clear to auscultation bilaterally, no crackles, wheezing, or rhonchi  ABDOMEN: Soft, nontender, nondistended, +BS, globulus  EXTREMITIES: 2+ peripheral pulses bilaterally. RLE wrapped in ace bandage + wound vac WWP, movement intact. LLE wrapped with gawze, WWP and movement intact. RUE PICC in place.   NERVOUS SYSTEM:  A&Ox3, no focal deficits   SKIN: No rashes    MEDICATIONS  (STANDING):  atorvastatin 40 milliGRAM(s) Oral at bedtime  chlorhexidine 2% Cloths 1 Application(s) Topical <User Schedule>  dextrose 40% Gel 15 Gram(s) Oral once  dextrose 5%. 1000 milliLiter(s) (50 mL/Hr) IV Continuous <Continuous>  dextrose 5%. 1000 milliLiter(s) (100 mL/Hr) IV Continuous <Continuous>  dextrose 50% Injectable 25 Gram(s) IV Push once  dextrose 50% Injectable 12.5 Gram(s) IV Push once  dextrose 50% Injectable 25 Gram(s) IV Push once  enoxaparin Injectable 40 milliGRAM(s) SubCutaneous daily  gabapentin 100 milliGRAM(s) Oral every 8 hours  glucagon  Injectable 1 milliGRAM(s) IntraMuscular once  insulin glargine Injectable (LANTUS) 35 Unit(s) SubCutaneous at bedtime  insulin lispro (ADMELOG) corrective regimen sliding scale   SubCutaneous Before meals and at bedtime  insulin lispro Injectable (ADMELOG) 3 Unit(s) SubCutaneous three times a day before meals  losartan 25 milliGRAM(s) Oral daily  metroNIDAZOLE    Tablet 500 milliGRAM(s) Oral every 8 hours  vancomycin  IVPB 1750 milliGRAM(s) IV Intermittent every 12 hours    MEDICATIONS  (PRN):  acetaminophen     Tablet .. 650 milliGRAM(s) Oral every 6 hours PRN Temp greater or equal to 38C (100.4F)  HYDROmorphone  Injectable 0.5 milliGRAM(s) IV Push every 15 minutes PRN Moderate Pain (4 - 6)  HYDROmorphone  Injectable 1 milliGRAM(s) IV Push every 15 minutes PRN Severe Pain (7 - 10)  oxyCODONE    IR 5 milliGRAM(s) Oral every 6 hours PRN Moderate Pain (4 - 6)  oxyCODONE    IR 10 milliGRAM(s) Oral every 6 hours PRN Severe Pain (7 - 10)  sodium chloride 0.9% lock flush 10 milliLiter(s) IV Push every 1 hour PRN Pre/post blood products, medications, blood draw, and to maintain line patency      No Known Allergies      LABS                        11.7   8.44  )-----------( 497      ( 08 Feb 2022 07:48 )             37.7     02-08    134<L>  |  100  |  21  ----------------------------<  237<H>  4.3   |  25  |  1.00    Ca    9.3      08 Feb 2022 07:48  Phos  2.8     02-08  Mg     1.6     02-08                RADIOLOGY & ADDITIONAL TESTS: Reviewed Hospital course:  47 yo M pt with previous alcohol and cocaine use w/ PMHx of HTN, HLD, DM, osteomyelitis with amputation of the right 4th and 5th toes and partial amputation of the right 2nd toe, hx of b/l foot wounds R>L presenting with 2 day history of fevers, chills & b/l foot pain. Pt started on vanc and underwent I&D withg graft placeement. Cultures+ for GBS, MRSA and bacteriodes. ID consulted, and pt now on vanc + flagyl with wound vac placed. Pt will continue Vanc (2/2-3/16) and flagyl (2/7-3/26) and is pending [facility].     INTERVAL HPI/OVERNIGHT EVENTS:  As per night team, no overnight events. Patient seen and examined at bedside. Patient with no pain to the RLE. no SOB, chest pain, N/V, no constipation.         VITALS  Vital Signs Last 24 Hrs  T(C): 36.7 (08 Feb 2022 10:12), Max: 37.2 (07 Feb 2022 16:03)  T(F): 98.1 (08 Feb 2022 10:12), Max: 98.9 (07 Feb 2022 16:03)  HR: 79 (08 Feb 2022 10:12) (78 - 79)  BP: 147/81 (08 Feb 2022 10:12) (130/81 - 170/93)  BP(mean): --  RR: 18 (08 Feb 2022 10:12) (17 - 18)  SpO2: 97% (08 Feb 2022 10:12) (95% - 99%)    CAPILLARY BLOOD GLUCOSE      POCT Blood Glucose.: 211 mg/dL (08 Feb 2022 12:13)  POCT Blood Glucose.: 306 mg/dL (08 Feb 2022 08:42)  POCT Blood Glucose.: 258 mg/dL (07 Feb 2022 21:50)  POCT Blood Glucose.: 224 mg/dL (07 Feb 2022 17:01)      PHYSICAL EXAM  GENERAL: NAD, lying in bed comfortably  HEAD:  Atraumatic, normocephalic  EYES: EOMI, PERRLA, conjunctiva and sclera clear  ENT: Moist mucous membranes  NECK: Supple, no JVD  HEART: Regular rate and rhythm, no murmurs, rubs, or gallops  LUNGS: Unlabored respirations.  Clear to auscultation bilaterally, no crackles, wheezing, or rhonchi  ABDOMEN: Soft, nontender, nondistended, +BS, globulus  EXTREMITIES: 2+ peripheral pulses bilaterally. RLE wrapped in ace bandage + wound vac WWP, movement intact. LLE wrapped with gawze, WWP and movement intact. RUE PICC in place.   NERVOUS SYSTEM:  A&Ox3, no focal deficits   SKIN: No rashes    MEDICATIONS  (STANDING):  atorvastatin 40 milliGRAM(s) Oral at bedtime  chlorhexidine 2% Cloths 1 Application(s) Topical <User Schedule>  dextrose 40% Gel 15 Gram(s) Oral once  dextrose 5%. 1000 milliLiter(s) (50 mL/Hr) IV Continuous <Continuous>  dextrose 5%. 1000 milliLiter(s) (100 mL/Hr) IV Continuous <Continuous>  dextrose 50% Injectable 25 Gram(s) IV Push once  dextrose 50% Injectable 12.5 Gram(s) IV Push once  dextrose 50% Injectable 25 Gram(s) IV Push once  enoxaparin Injectable 40 milliGRAM(s) SubCutaneous daily  gabapentin 100 milliGRAM(s) Oral every 8 hours  glucagon  Injectable 1 milliGRAM(s) IntraMuscular once  insulin glargine Injectable (LANTUS) 35 Unit(s) SubCutaneous at bedtime  insulin lispro (ADMELOG) corrective regimen sliding scale   SubCutaneous Before meals and at bedtime  insulin lispro Injectable (ADMELOG) 3 Unit(s) SubCutaneous three times a day before meals  losartan 25 milliGRAM(s) Oral daily  metroNIDAZOLE    Tablet 500 milliGRAM(s) Oral every 8 hours  vancomycin  IVPB 1750 milliGRAM(s) IV Intermittent every 12 hours    MEDICATIONS  (PRN):  acetaminophen     Tablet .. 650 milliGRAM(s) Oral every 6 hours PRN Temp greater or equal to 38C (100.4F)  HYDROmorphone  Injectable 0.5 milliGRAM(s) IV Push every 15 minutes PRN Moderate Pain (4 - 6)  HYDROmorphone  Injectable 1 milliGRAM(s) IV Push every 15 minutes PRN Severe Pain (7 - 10)  oxyCODONE    IR 5 milliGRAM(s) Oral every 6 hours PRN Moderate Pain (4 - 6)  oxyCODONE    IR 10 milliGRAM(s) Oral every 6 hours PRN Severe Pain (7 - 10)  sodium chloride 0.9% lock flush 10 milliLiter(s) IV Push every 1 hour PRN Pre/post blood products, medications, blood draw, and to maintain line patency      No Known Allergies      LABS                        11.7   8.44  )-----------( 497      ( 08 Feb 2022 07:48 )             37.7     02-08    134<L>  |  100  |  21  ----------------------------<  237<H>  4.3   |  25  |  1.00    Ca    9.3      08 Feb 2022 07:48  Phos  2.8     02-08  Mg     1.6     02-08                RADIOLOGY & ADDITIONAL TESTS: Reviewed

## 2022-02-08 NOTE — PROGRESS NOTE ADULT - SUBJECTIVE AND OBJECTIVE BOX
Patient is a 46y old  Male who presents with a chief complaint of Chills. (07 Feb 2022 12:11)      INTERVAL HPI/ OVERNIGHT EVENTS: Pt seen and evaluated bedside. Pt has no pain to b/l feet. He denies any N/V/F/C/SOB. Wound vac intact.       LABS                        12.0   9.14  )-----------( 518      ( 07 Feb 2022 06:51 )             37.0     02-07    135  |  101  |  21  ----------------------------<  249<H>  4.2   |  25  |  0.90    Ca    9.3      07 Feb 2022 06:51  Phos  2.9     02-06  Mg     1.5     02-07          ICU Vital Signs Last 24 Hrs  T(C): 36.4 (08 Feb 2022 06:15), Max: 37.2 (07 Feb 2022 16:03)  T(F): 97.6 (08 Feb 2022 06:15), Max: 98.9 (07 Feb 2022 16:03)  HR: 78 (08 Feb 2022 06:15) (77 - 79)  BP: 130/81 (08 Feb 2022 06:15) (130/81 - 170/93)  BP(mean): --  ABP: --  ABP(mean): --  RR: 17 (08 Feb 2022 06:15) (17 - 18)  SpO2: 99% (08 Feb 2022 06:15) (95% - 99%)      RADIOLOGY  RADIOLOGY  < from: Xray Foot AP + Lateral + Oblique, Bilat (02.01.22 @ 17:47) >  Impression:    Right foot:On the lateral view, appears that there is new fragmentation   of the hallux sesamoids. This could be secondary to fracture however   infection in this area is not excluded. Further evaluation with MRI may   be of use.    Left foot: No radiographic evidence of acute osteomyelitis.    < end of copied text >    Culture Results:   Rare Staphylococcus aureus   Moderate Streptococcus agalactiae (Group B)   Few Bacteroides fragilis   MICROBIOLOGY      PHYSICAL EXAM  Lower Extremity Focused  Vasc: D/PT 2/4 b/l, Right foot warmer than left, non-pitting edema to dorsum of right foot   Derm:  Right foot- Submet 1 ulcer measuring [id number], Ulcer has Graft and adaptic sutured on top-graft in place. , Right foot medial 1st MPJ ulceration 4x3cm granular base, -PTB.  Left foot submet 1 1x1cm Hyperkeratotic lesion  Neuro: Protective sensation diminished   MSK: 4th and 5th right partial ray amputation and digital amputation of 2nd digit of the R foot.

## 2022-02-08 NOTE — PROGRESS NOTE ADULT - ATTENDING COMMENTS
Severe sepsis ( POA) due to right foot ulcer most likely osteomyelitis   Diabetic Foot Infection   Uncontrolled Insulin dependent Diabetes - increase Lantus and pre meal insulin, recheck blood sugar in am   Peripheral Neuropathy   HTN   HLD     -wound cx + MRSA , Group B strep and Bacteroides  , Continue Vancomycin + metronidazole , ID recommended IV vancomycin for 6 weeks, PICC line , wound vac     Disp : [facility] + Wound Vac - case mgmt aware - dc pending insurance authorization

## 2022-02-09 LAB
CK MB CFR SERPL CALC: 1.6 NG/ML — SIGNIFICANT CHANGE UP (ref 0–6.7)
CK SERPL-CCNC: 39 U/L — SIGNIFICANT CHANGE UP (ref 30–200)
GLUCOSE BLDC GLUCOMTR-MCNC: 195 MG/DL — HIGH (ref 70–99)
GLUCOSE BLDC GLUCOMTR-MCNC: 231 MG/DL — HIGH (ref 70–99)
GLUCOSE BLDC GLUCOMTR-MCNC: 238 MG/DL — HIGH (ref 70–99)
GLUCOSE BLDC GLUCOMTR-MCNC: 258 MG/DL — HIGH (ref 70–99)
GLUCOSE BLDC GLUCOMTR-MCNC: 263 MG/DL — HIGH (ref 70–99)
SARS-COV-2 RNA SPEC QL NAA+PROBE: NEGATIVE — SIGNIFICANT CHANGE UP
TROPONIN T SERPL-MCNC: 0.01 NG/ML — SIGNIFICANT CHANGE UP (ref 0–0.01)

## 2022-02-09 PROCEDURE — 99232 SBSQ HOSP IP/OBS MODERATE 35: CPT | Mod: GC

## 2022-02-09 RX ADMIN — GABAPENTIN 100 MILLIGRAM(S): 400 CAPSULE ORAL at 14:02

## 2022-02-09 RX ADMIN — Medication 500 MILLIGRAM(S): at 05:34

## 2022-02-09 RX ADMIN — Medication 6: at 17:43

## 2022-02-09 RX ADMIN — Medication 500 MILLIGRAM(S): at 14:00

## 2022-02-09 RX ADMIN — LOSARTAN POTASSIUM 25 MILLIGRAM(S): 100 TABLET, FILM COATED ORAL at 05:34

## 2022-02-09 RX ADMIN — GABAPENTIN 100 MILLIGRAM(S): 400 CAPSULE ORAL at 23:32

## 2022-02-09 RX ADMIN — Medication 5 UNIT(S): at 14:01

## 2022-02-09 RX ADMIN — ATORVASTATIN CALCIUM 40 MILLIGRAM(S): 80 TABLET, FILM COATED ORAL at 23:32

## 2022-02-09 RX ADMIN — Medication 250 MILLIGRAM(S): at 14:58

## 2022-02-09 RX ADMIN — Medication 5 UNIT(S): at 09:17

## 2022-02-09 RX ADMIN — GABAPENTIN 100 MILLIGRAM(S): 400 CAPSULE ORAL at 05:34

## 2022-02-09 RX ADMIN — Medication 250 MILLIGRAM(S): at 01:25

## 2022-02-09 RX ADMIN — ENOXAPARIN SODIUM 40 MILLIGRAM(S): 100 INJECTION SUBCUTANEOUS at 14:01

## 2022-02-09 RX ADMIN — Medication 500 MILLIGRAM(S): at 23:32

## 2022-02-09 RX ADMIN — Medication 2: at 14:00

## 2022-02-09 RX ADMIN — INSULIN GLARGINE 35 UNIT(S): 100 INJECTION, SOLUTION SUBCUTANEOUS at 23:39

## 2022-02-09 RX ADMIN — Medication 5 UNIT(S): at 17:44

## 2022-02-09 RX ADMIN — CHLORHEXIDINE GLUCONATE 1 APPLICATION(S): 213 SOLUTION TOPICAL at 05:34

## 2022-02-09 RX ADMIN — Medication 6: at 23:45

## 2022-02-09 RX ADMIN — Medication 4: at 09:17

## 2022-02-09 NOTE — PROGRESS NOTE ADULT - ASSESSMENT
45 y/o M with PMHx of DM, HTN, HLD, osteomyelitis s/p amputation of R 3rd and 4th toes and partial amp of R 2nd toe, presents to the ED with complaints of Chills. Podiatry consulted for chronic wound to R foot, seen by podiatrist Dr. Johnson 2 weeks ago pt missed his last appointment. In ED pt was septic with fever, chills, leukocytosis, elevated ESR and CRP. Wound looked clinically infected drainage +malodor, new wound to medial aspect of pts right foot. Pt is now s/p right foot I&D, wash out, and wound graft application to plantar wound (2/2/22), High suspicion of osteomyelitis, wound vac applied 2/4/22.     Plan:   - C/w IV ABX per ID  - Wound care: [facility] Derm-Freddy dermal matrix + Adaptic nonadherent is sutured on to plantar wound. DSD, Kerlix wrap + ACE wrap  -[facility] Derm-Freddy dermal matrix + Adaptic nonadherent sutured bedside to medial right foot wound today-see procedure note for details   - wound vac in place  to pts right foot wounds; will change M/W/F, Changed today  - NWB to RLE, very important   - Rest of care per primary team     Podiatry Following. Plan d/w attending

## 2022-02-09 NOTE — PROGRESS NOTE ADULT - SUBJECTIVE AND OBJECTIVE BOX
Patient is a 46y old  Male who presents with a chief complaint of Chills. (09 Feb 2022 08:46)      INTERVAL HPI/ OVERNIGHT EVENTS: Pt seen and evaluated bedside. Pts wound vac is intact and not leaking. Pt has no pedal complaints this morning.       LABS                        11.7   8.44  )-----------( 497      ( 08 Feb 2022 07:48 )             37.7     02-08    134<L>  |  100  |  21  ----------------------------<  237<H>  4.3   |  25  |  1.00    Ca    9.3      08 Feb 2022 07:48  Phos  2.8     02-08  Mg     1.6     02-08          ICU Vital Signs Last 24 Hrs  T(C): 36.6 (09 Feb 2022 09:25), Max: 37 (08 Feb 2022 20:54)  T(F): 97.9 (09 Feb 2022 09:25), Max: 98.6 (08 Feb 2022 20:54)  HR: 84 (09 Feb 2022 09:25) (72 - 84)  BP: 141/75 (09 Feb 2022 09:25) (137/87 - 178/83)  BP(mean): --  ABP: --  ABP(mean): --  RR: 18 (09 Feb 2022 09:25) (17 - 19)  SpO2: 98% (09 Feb 2022 09:25) (97% - 98%)          RADIOLOGY  RADIOLOGY  < from: Xray Foot AP + Lateral + Oblique, Bilat (02.01.22 @ 17:47) >  Impression:    Right foot:On the lateral view, appears that there is new fragmentation   of the hallux sesamoids. This could be secondary to fracture however   infection in this area is not excluded. Further evaluation with MRI may   be of use.    Left foot: No radiographic evidence of acute osteomyelitis.    < end of copied text >    Culture Results:   Rare Staphylococcus aureus   Moderate Streptococcus agalactiae (Group B)   Few Bacteroides fragilis   MICROBIOLOGY      PHYSICAL EXAM  Lower Extremity Focused  Vasc: D/PT 2/4 b/l, Right foot warmer than left, non-pitting edema to dorsum of right foot   Derm:  Right foot- Submet 1 ulcer measuring [id number], Ulcer has Graft and adaptic sutured on top-graft in place. , Right foot medial 1st MPJ ulceration 4x3cm granular base, -PTB.  Left foot submet 1 1x1cm Hyperkeratotic lesion  Neuro: Protective sensation diminished   MSK: 4th and 5th right partial ray amputation and digital amputation of 2nd digit of the R foot.

## 2022-02-09 NOTE — PROGRESS NOTE ADULT - SUBJECTIVE AND OBJECTIVE BOX
OVERNIGHT EVENTS: ANDREW. afebrile    SUBJECTIVE:  Patient seen and examined at bedside. States he is feeling well. Improving pain in the foot. complains of slight herniated disc pain. amble to ambulate with the walker with slight hopping. Denies fevers, chills, chest pain, shortness of breath, abdominal pain, n/v/d.    ROS negative unless stated above    Vital Signs Last 12 Hrs  T(F): 97.9 (02-09-22 @ 09:25), Max: 97.9 (02-09-22 @ 09:25)  HR: 84 (02-09-22 @ 09:25) (72 - 84)  BP: 141/75 (02-09-22 @ 09:25) (141/75 - 146/85)  BP(mean): --  RR: 18 (02-09-22 @ 09:25) (17 - 18)  SpO2: 98% (02-09-22 @ 09:25) (97% - 98%)  I&O's Summary      PHYSICAL EXAM:  GENERAL: NAD, lying in bed comfortably  HEAD:  Atraumatic, normocephalic  EYES: EOMI, PERRLA, conjunctiva and sclera clear  ENT: Moist mucous membranes  NECK: Supple, no JVD  HEART: Regular rate and rhythm, no murmurs, rubs, or gallops  LUNGS: Unlabored respirations.  Clear to auscultation bilaterally, no crackles, wheezing, or rhonchi  ABDOMEN: Soft, nontender, nondistended, +BS, globulus  EXTREMITIES: 2+ peripheral pulses bilaterally. RLE wrapped in ace bandage + wound vac WWP, movement intact. LLE wrapped, WWP and movement intact. RUE PICC in place, no tenderness.   NERVOUS SYSTEM:  A&Ox3, no focal deficits   SKIN: No rashes        LABS:                        11.7   8.44  )-----------( 497      ( 08 Feb 2022 07:48 )             37.7     02-08    134<L>  |  100  |  21  ----------------------------<  237<H>  4.3   |  25  |  1.00    Ca    9.3      08 Feb 2022 07:48  Phos  2.8     02-08  Mg     1.6     02-08              RADIOLOGY & ADDITIONAL TESTS:    MEDICATIONS  (STANDING):  atorvastatin 40 milliGRAM(s) Oral at bedtime  chlorhexidine 2% Cloths 1 Application(s) Topical <User Schedule>  dextrose 40% Gel 15 Gram(s) Oral once  dextrose 5%. 1000 milliLiter(s) (50 mL/Hr) IV Continuous <Continuous>  dextrose 5%. 1000 milliLiter(s) (100 mL/Hr) IV Continuous <Continuous>  dextrose 50% Injectable 25 Gram(s) IV Push once  dextrose 50% Injectable 12.5 Gram(s) IV Push once  dextrose 50% Injectable 25 Gram(s) IV Push once  enoxaparin Injectable 40 milliGRAM(s) SubCutaneous daily  gabapentin 100 milliGRAM(s) Oral every 8 hours  glucagon  Injectable 1 milliGRAM(s) IntraMuscular once  insulin glargine Injectable (LANTUS) 35 Unit(s) SubCutaneous at bedtime  insulin lispro (ADMELOG) corrective regimen sliding scale   SubCutaneous Before meals and at bedtime  insulin lispro Injectable (ADMELOG) 5 Unit(s) SubCutaneous three times a day before meals  losartan 25 milliGRAM(s) Oral daily  metroNIDAZOLE    Tablet 500 milliGRAM(s) Oral every 8 hours  vancomycin  IVPB 1750 milliGRAM(s) IV Intermittent every 12 hours    MEDICATIONS  (PRN):  acetaminophen     Tablet .. 650 milliGRAM(s) Oral every 6 hours PRN Temp greater or equal to 38C (100.4F)  oxyCODONE    IR 10 milliGRAM(s) Oral every 6 hours PRN Severe Pain (7 - 10)  oxyCODONE    IR 5 milliGRAM(s) Oral every 6 hours PRN Moderate Pain (4 - 6)  sodium chloride 0.9% lock flush 10 milliLiter(s) IV Push every 1 hour PRN Pre/post blood products, medications, blood draw, and to maintain line patency

## 2022-02-09 NOTE — PROGRESS NOTE ADULT - ASSESSMENT
45 yo M pt with previous alcohol and cocaine use w/ PMHx of HTN, HLD, DM, osteomyelitis with amputation of the right 4th and 5th toes and partial amputation of the right 2nd toe, recently COVID diagnosis (12/2021) who has presented with diabetic foot wounds in the last year in both feet with complicated healing process R>L (last set back in his right foot the last week), and arrived to ED after having symptoms of chills and shaking in the last 2 days. After being managed in ED with fluids and antibiotics, pt is admitted for further management of severe sepsis 2/2 to right foot cellulitis. Now s/p PICC line for treatment with vanc, pending d/c to [facility].

## 2022-02-10 LAB
GLUCOSE BLDC GLUCOMTR-MCNC: 205 MG/DL — HIGH (ref 70–99)
GLUCOSE BLDC GLUCOMTR-MCNC: 216 MG/DL — HIGH (ref 70–99)
GLUCOSE BLDC GLUCOMTR-MCNC: 261 MG/DL — HIGH (ref 70–99)
GLUCOSE BLDC GLUCOMTR-MCNC: 283 MG/DL — HIGH (ref 70–99)

## 2022-02-10 PROCEDURE — 99232 SBSQ HOSP IP/OBS MODERATE 35: CPT | Mod: GC

## 2022-02-10 RX ORDER — VANCOMYCIN HCL 1 G
1750 VIAL (EA) INTRAVENOUS ONCE
Refills: 0 | Status: COMPLETED | OUTPATIENT
Start: 2022-02-10 | End: 2022-02-10

## 2022-02-10 RX ORDER — VANCOMYCIN HCL 1 G
1750 VIAL (EA) INTRAVENOUS EVERY 12 HOURS
Refills: 0 | Status: DISCONTINUED | OUTPATIENT
Start: 2022-02-10 | End: 2022-02-11

## 2022-02-10 RX ADMIN — Medication 4: at 17:45

## 2022-02-10 RX ADMIN — ATORVASTATIN CALCIUM 40 MILLIGRAM(S): 80 TABLET, FILM COATED ORAL at 22:35

## 2022-02-10 RX ADMIN — OXYCODONE HYDROCHLORIDE 10 MILLIGRAM(S): 5 TABLET ORAL at 22:50

## 2022-02-10 RX ADMIN — Medication 6: at 08:57

## 2022-02-10 RX ADMIN — Medication 5 UNIT(S): at 08:57

## 2022-02-10 RX ADMIN — Medication 4: at 12:56

## 2022-02-10 RX ADMIN — Medication 250 MILLIGRAM(S): at 02:04

## 2022-02-10 RX ADMIN — Medication 250 MILLIGRAM(S): at 22:36

## 2022-02-10 RX ADMIN — OXYCODONE HYDROCHLORIDE 10 MILLIGRAM(S): 5 TABLET ORAL at 07:15

## 2022-02-10 RX ADMIN — ENOXAPARIN SODIUM 40 MILLIGRAM(S): 100 INJECTION SUBCUTANEOUS at 12:56

## 2022-02-10 RX ADMIN — GABAPENTIN 100 MILLIGRAM(S): 400 CAPSULE ORAL at 14:53

## 2022-02-10 RX ADMIN — GABAPENTIN 100 MILLIGRAM(S): 400 CAPSULE ORAL at 22:35

## 2022-02-10 RX ADMIN — INSULIN GLARGINE 35 UNIT(S): 100 INJECTION, SOLUTION SUBCUTANEOUS at 22:50

## 2022-02-10 RX ADMIN — OXYCODONE HYDROCHLORIDE 10 MILLIGRAM(S): 5 TABLET ORAL at 23:30

## 2022-02-10 RX ADMIN — Medication 6: at 22:27

## 2022-02-10 RX ADMIN — Medication 500 MILLIGRAM(S): at 06:46

## 2022-02-10 RX ADMIN — OXYCODONE HYDROCHLORIDE 10 MILLIGRAM(S): 5 TABLET ORAL at 06:46

## 2022-02-10 RX ADMIN — Medication 250 MILLIGRAM(S): at 14:52

## 2022-02-10 RX ADMIN — GABAPENTIN 100 MILLIGRAM(S): 400 CAPSULE ORAL at 06:46

## 2022-02-10 RX ADMIN — Medication 500 MILLIGRAM(S): at 22:35

## 2022-02-10 RX ADMIN — Medication 500 MILLIGRAM(S): at 14:52

## 2022-02-10 RX ADMIN — Medication 5 UNIT(S): at 12:56

## 2022-02-10 RX ADMIN — LOSARTAN POTASSIUM 25 MILLIGRAM(S): 100 TABLET, FILM COATED ORAL at 06:46

## 2022-02-10 RX ADMIN — Medication 5 UNIT(S): at 17:45

## 2022-02-10 NOTE — PROGRESS NOTE ADULT - ASSESSMENT
47 yo M pt with previous alcohol and cocaine use w/ PMHx of HTN, HLD, DM, osteomyelitis with amputation of the right 4th and 5th toes and partial amputation of the right 2nd toe, recently COVID diagnosis (12/2021) who has presented with diabetic foot wounds in the last year in both feet with complicated healing process R>L (last set back in his right foot the last week), and arrived to ED after having symptoms of chills and shaking in the last 2 days. After being managed in ED with fluids and antibiotics, pt is admitted for further management of severe sepsis 2/2 to right foot cellulitis. Now s/p PICC line for treatment with vanc, pending d/c to [facility].

## 2022-02-10 NOTE — PROGRESS NOTE ADULT - SUBJECTIVE AND OBJECTIVE BOX
OVERNIGHT EVENTS: Patient hypertensive to 170/100, restarted home Losartan. Patient also complaining of chest pressure, EKG with TWI in III and aVF (similar to previous EKGs) with negative trop, CK and CKMB. Pain resolved after burping.     SUBJECTIVE:  Patient seen and examined at bedside. Pt states he feels well, denies chest pain, shortness of breath, abdominal pain, Reports the leg pain is controlled.    Vital Signs Last 12 Hrs  T(F): 98.2 (02-10-22 @ 08:45), Max: 98.2 (02-10-22 @ 08:45)  HR: 77 (02-10-22 @ 08:45) (71 - 77)  BP: 134/82 (02-10-22 @ 08:45) (124/77 - 134/82)  BP(mean): --  RR: 18 (02-10-22 @ 08:45) (18 - 18)  SpO2: 96% (02-10-22 @ 08:45) (96% - 98%)  I&O's Summary      PHYSICAL EXAM:  GENERAL: NAD, lying in bed comfortably  HEAD:  Atraumatic, normocephalic  EYES: EOMI, PERRLA, conjunctiva and sclera clear  ENT: Moist mucous membranes  NECK: Supple, no JVD  HEART: Regular rate and rhythm, no murmurs, rubs, or gallops  LUNGS: Unlabored respirations.  Clear to auscultation bilaterally, no crackles, wheezing, or rhonchi  ABDOMEN: Soft, nontender, nondistended, +BS, globulus  EXTREMITIES: 2+ peripheral pulses bilaterally. RLE wrapped in ace bandage + wound vac WWP, movement intact. LLE wrapped, WWP and movement intact. RUE PICC in place, no tenderness.   NERVOUS SYSTEM:  A&Ox3, no focal deficits   SKIN: No rashes        LABS:                  RADIOLOGY & ADDITIONAL TESTS:    MEDICATIONS  (STANDING):  atorvastatin 40 milliGRAM(s) Oral at bedtime  dextrose 40% Gel 15 Gram(s) Oral once  dextrose 5%. 1000 milliLiter(s) (50 mL/Hr) IV Continuous <Continuous>  dextrose 5%. 1000 milliLiter(s) (100 mL/Hr) IV Continuous <Continuous>  dextrose 50% Injectable 25 Gram(s) IV Push once  dextrose 50% Injectable 25 Gram(s) IV Push once  dextrose 50% Injectable 12.5 Gram(s) IV Push once  enoxaparin Injectable 40 milliGRAM(s) SubCutaneous daily  gabapentin 100 milliGRAM(s) Oral every 8 hours  glucagon  Injectable 1 milliGRAM(s) IntraMuscular once  insulin glargine Injectable (LANTUS) 35 Unit(s) SubCutaneous at bedtime  insulin lispro (ADMELOG) corrective regimen sliding scale   SubCutaneous Before meals and at bedtime  insulin lispro Injectable (ADMELOG) 5 Unit(s) SubCutaneous three times a day before meals  losartan 25 milliGRAM(s) Oral daily  metroNIDAZOLE    Tablet 500 milliGRAM(s) Oral every 8 hours  vancomycin  IVPB 1750 milliGRAM(s) IV Intermittent once  vancomycin  IVPB 1750 milliGRAM(s) IV Intermittent every 12 hours    MEDICATIONS  (PRN):  acetaminophen     Tablet .. 650 milliGRAM(s) Oral every 6 hours PRN Temp greater or equal to 38C (100.4F)  oxyCODONE    IR 10 milliGRAM(s) Oral every 6 hours PRN Severe Pain (7 - 10)  oxyCODONE    IR 5 milliGRAM(s) Oral every 6 hours PRN Moderate Pain (4 - 6)  sodium chloride 0.9% lock flush 10 milliLiter(s) IV Push every 1 hour PRN Pre/post blood products, medications, blood draw, and to maintain line patency

## 2022-02-10 NOTE — PROGRESS NOTE ADULT - SUBJECTIVE AND OBJECTIVE BOX
Patient is a 46y old  Male who presents with a chief complaint of Chills. (09 Feb 2022 11:12)      INTERVAL HPI/ OVERNIGHT EVENTS: Pt seen this morning by podiatry. Pt had minimal pain to graft application site last night which was relieved by pain meds. Pt denies N/V/F/C/SOB. Wound vac is intact and running.       LABS: No new labs      ICU Vital Signs Last 24 Hrs  T(C): 36.4 (10 Feb 2022 06:03), Max: 36.8 (09 Feb 2022 18:01)  T(F): 97.6 (10 Feb 2022 06:03), Max: 98.3 (09 Feb 2022 18:01)  HR: 71 (10 Feb 2022 06:03) (71 - 84)  BP: 124/77 (10 Feb 2022 06:03) (124/77 - 166/93)  BP(mean): --  ABP: --  ABP(mean): --  RR: 18 (10 Feb 2022 06:03) (17 - 18)  SpO2: 98% (10 Feb 2022 06:03) (97% - 98%)          RADIOLOGY  RADIOLOGY  < from: Xray Foot AP + Lateral + Oblique, Bilat (02.01.22 @ 17:47) >  Impression:    Right foot:On the lateral view, appears that there is new fragmentation   of the hallux sesamoids. This could be secondary to fracture however   infection in this area is not excluded. Further evaluation with MRI may   be of use.    Left foot: No radiographic evidence of acute osteomyelitis.    < end of copied text >    Culture Results:   Rare Staphylococcus aureus   Moderate Streptococcus agalactiae (Group B)   Few Bacteroides fragilis   MICROBIOLOGY      PHYSICAL EXAM  Lower Extremity Focused  Vasc: D/PT 2/4 b/l, Right foot warmer than left, non-pitting edema to dorsum of right foot   Derm:  Right foot- Submet 1 ulcer measuring [id number], Ulcer has Graft and adaptic sutured on top-graft in place. , Right foot medial 1st MPJ ulceration [id number] graft and adaptic sutured over top.  Left foot submet 1 1x1cm Hyperkeratotic lesion  Neuro: Protective sensation diminished   MSK: 4th and 5th right partial ray amputation and digital amputation of 2nd digit of the R foot.

## 2022-02-11 ENCOUNTER — INPATIENT (INPATIENT)
Facility: HOSPITAL | Age: 47
LOS: 4 days | Discharge: NURSING, REHAB OR LONG-TERM CARE | End: 2022-02-16
Attending: INTERNAL MEDICINE | Admitting: INTERNAL MEDICINE
Payer: COMMERCIAL

## 2022-02-11 ENCOUNTER — TRANSCRIPTION ENCOUNTER (OUTPATIENT)
Age: 47
End: 2022-02-11

## 2022-02-11 VITALS
RESPIRATION RATE: 18 BRPM | DIASTOLIC BLOOD PRESSURE: 102 MMHG | WEIGHT: 214.95 LBS | SYSTOLIC BLOOD PRESSURE: 162 MMHG | OXYGEN SATURATION: 97 % | HEART RATE: 89 BPM | TEMPERATURE: 98 F | HEIGHT: 72 IN

## 2022-02-11 VITALS
SYSTOLIC BLOOD PRESSURE: 149 MMHG | OXYGEN SATURATION: 96 % | DIASTOLIC BLOOD PRESSURE: 83 MMHG | TEMPERATURE: 98 F | RESPIRATION RATE: 18 BRPM | HEART RATE: 78 BPM

## 2022-02-11 DIAGNOSIS — Z96.0 PRESENCE OF UROGENITAL IMPLANTS: Chronic | ICD-10-CM

## 2022-02-11 DIAGNOSIS — Z89.429 ACQUIRED ABSENCE OF OTHER TOE(S), UNSPECIFIED SIDE: Chronic | ICD-10-CM

## 2022-02-11 DIAGNOSIS — Z94.5 SKIN TRANSPLANT STATUS: Chronic | ICD-10-CM

## 2022-02-11 LAB
ALBUMIN SERPL ELPH-MCNC: 2.9 G/DL — LOW (ref 3.3–5)
ALP SERPL-CCNC: 72 U/L — SIGNIFICANT CHANGE UP (ref 40–120)
ALT FLD-CCNC: 84 U/L — HIGH (ref 12–78)
ANION GAP SERPL CALC-SCNC: 6 MMOL/L — SIGNIFICANT CHANGE UP (ref 5–17)
AST SERPL-CCNC: 54 U/L — HIGH (ref 15–37)
BASOPHILS # BLD AUTO: 0.07 K/UL — SIGNIFICANT CHANGE UP (ref 0–0.2)
BASOPHILS NFR BLD AUTO: 0.8 % — SIGNIFICANT CHANGE UP (ref 0–2)
BILIRUB SERPL-MCNC: 0.3 MG/DL — SIGNIFICANT CHANGE UP (ref 0.2–1.2)
BUN SERPL-MCNC: 22 MG/DL — SIGNIFICANT CHANGE UP (ref 7–23)
CALCIUM SERPL-MCNC: 9.3 MG/DL — SIGNIFICANT CHANGE UP (ref 8.5–10.1)
CHLORIDE SERPL-SCNC: 103 MMOL/L — SIGNIFICANT CHANGE UP (ref 96–108)
CO2 SERPL-SCNC: 28 MMOL/L — SIGNIFICANT CHANGE UP (ref 22–31)
CREAT SERPL-MCNC: 1.17 MG/DL — SIGNIFICANT CHANGE UP (ref 0.5–1.3)
EOSINOPHIL # BLD AUTO: 0.24 K/UL — SIGNIFICANT CHANGE UP (ref 0–0.5)
EOSINOPHIL NFR BLD AUTO: 2.6 % — SIGNIFICANT CHANGE UP (ref 0–6)
FLUAV AG NPH QL: SIGNIFICANT CHANGE UP
FLUBV AG NPH QL: SIGNIFICANT CHANGE UP
GLUCOSE BLDC GLUCOMTR-MCNC: 222 MG/DL — HIGH (ref 70–99)
GLUCOSE BLDC GLUCOMTR-MCNC: 230 MG/DL — HIGH (ref 70–99)
GLUCOSE BLDC GLUCOMTR-MCNC: 239 MG/DL — HIGH (ref 70–99)
GLUCOSE SERPL-MCNC: 250 MG/DL — HIGH (ref 70–99)
HCT VFR BLD CALC: 38.8 % — LOW (ref 39–50)
HGB BLD-MCNC: 12.4 G/DL — LOW (ref 13–17)
IMM GRANULOCYTES NFR BLD AUTO: 0.3 % — SIGNIFICANT CHANGE UP (ref 0–1.5)
LYMPHOCYTES # BLD AUTO: 3.21 K/UL — SIGNIFICANT CHANGE UP (ref 1–3.3)
LYMPHOCYTES # BLD AUTO: 34.7 % — SIGNIFICANT CHANGE UP (ref 13–44)
MCHC RBC-ENTMCNC: 27.6 PG — SIGNIFICANT CHANGE UP (ref 27–34)
MCHC RBC-ENTMCNC: 32 G/DL — SIGNIFICANT CHANGE UP (ref 32–36)
MCV RBC AUTO: 86.2 FL — SIGNIFICANT CHANGE UP (ref 80–100)
MONOCYTES # BLD AUTO: 0.71 K/UL — SIGNIFICANT CHANGE UP (ref 0–0.9)
MONOCYTES NFR BLD AUTO: 7.7 % — SIGNIFICANT CHANGE UP (ref 2–14)
NEUTROPHILS # BLD AUTO: 5 K/UL — SIGNIFICANT CHANGE UP (ref 1.8–7.4)
NEUTROPHILS NFR BLD AUTO: 53.9 % — SIGNIFICANT CHANGE UP (ref 43–77)
NRBC # BLD: 0 /100 WBCS — SIGNIFICANT CHANGE UP (ref 0–0)
PLATELET # BLD AUTO: 470 K/UL — HIGH (ref 150–400)
POTASSIUM SERPL-MCNC: 4.9 MMOL/L — SIGNIFICANT CHANGE UP (ref 3.5–5.3)
POTASSIUM SERPL-SCNC: 4.9 MMOL/L — SIGNIFICANT CHANGE UP (ref 3.5–5.3)
PROT SERPL-MCNC: 8.7 GM/DL — HIGH (ref 6–8.3)
RBC # BLD: 4.5 M/UL — SIGNIFICANT CHANGE UP (ref 4.2–5.8)
RBC # FLD: 14.4 % — SIGNIFICANT CHANGE UP (ref 10.3–14.5)
SARS-COV-2 RNA SPEC QL NAA+PROBE: SIGNIFICANT CHANGE UP
SODIUM SERPL-SCNC: 137 MMOL/L — SIGNIFICANT CHANGE UP (ref 135–145)
WBC # BLD: 9.26 K/UL — SIGNIFICANT CHANGE UP (ref 3.8–10.5)
WBC # FLD AUTO: 9.26 K/UL — SIGNIFICANT CHANGE UP (ref 3.8–10.5)

## 2022-02-11 PROCEDURE — 80053 COMPREHEN METABOLIC PANEL: CPT

## 2022-02-11 PROCEDURE — 86850 RBC ANTIBODY SCREEN: CPT

## 2022-02-11 PROCEDURE — 93005 ELECTROCARDIOGRAM TRACING: CPT

## 2022-02-11 PROCEDURE — 97164 PT RE-EVAL EST PLAN CARE: CPT

## 2022-02-11 PROCEDURE — 87075 CULTR BACTERIA EXCEPT BLOOD: CPT

## 2022-02-11 PROCEDURE — 36573 INSJ PICC RS&I 5 YR+: CPT

## 2022-02-11 PROCEDURE — 36569 INSJ PICC 5 YR+ W/O IMAGING: CPT

## 2022-02-11 PROCEDURE — C1889: CPT

## 2022-02-11 PROCEDURE — 93010 ELECTROCARDIOGRAM REPORT: CPT

## 2022-02-11 PROCEDURE — 87635 SARS-COV-2 COVID-19 AMP PRB: CPT

## 2022-02-11 PROCEDURE — 83735 ASSAY OF MAGNESIUM: CPT

## 2022-02-11 PROCEDURE — 97161 PT EVAL LOW COMPLEX 20 MIN: CPT

## 2022-02-11 PROCEDURE — 80202 ASSAY OF VANCOMYCIN: CPT

## 2022-02-11 PROCEDURE — U0003: CPT

## 2022-02-11 PROCEDURE — 87184 SC STD DISK METHOD PER PLATE: CPT

## 2022-02-11 PROCEDURE — 85730 THROMBOPLASTIN TIME PARTIAL: CPT

## 2022-02-11 PROCEDURE — 87102 FUNGUS ISOLATION CULTURE: CPT

## 2022-02-11 PROCEDURE — 87086 URINE CULTURE/COLONY COUNT: CPT

## 2022-02-11 PROCEDURE — 82550 ASSAY OF CK (CPK): CPT

## 2022-02-11 PROCEDURE — 86901 BLOOD TYPING SEROLOGIC RH(D): CPT

## 2022-02-11 PROCEDURE — 85652 RBC SED RATE AUTOMATED: CPT

## 2022-02-11 PROCEDURE — 73630 X-RAY EXAM OF FOOT: CPT

## 2022-02-11 PROCEDURE — 84100 ASSAY OF PHOSPHORUS: CPT

## 2022-02-11 PROCEDURE — 71045 X-RAY EXAM CHEST 1 VIEW: CPT

## 2022-02-11 PROCEDURE — 86900 BLOOD TYPING SEROLOGIC ABO: CPT

## 2022-02-11 PROCEDURE — 99285 EMERGENCY DEPT VISIT HI MDM: CPT | Mod: 25

## 2022-02-11 PROCEDURE — 97110 THERAPEUTIC EXERCISES: CPT

## 2022-02-11 PROCEDURE — 87186 SC STD MICRODIL/AGAR DIL: CPT

## 2022-02-11 PROCEDURE — 87070 CULTURE OTHR SPECIMN AEROBIC: CPT

## 2022-02-11 PROCEDURE — 36415 COLL VENOUS BLD VENIPUNCTURE: CPT

## 2022-02-11 PROCEDURE — 83036 HEMOGLOBIN GLYCOSYLATED A1C: CPT

## 2022-02-11 PROCEDURE — 88304 TISSUE EXAM BY PATHOLOGIST: CPT

## 2022-02-11 PROCEDURE — 82553 CREATINE MB FRACTION: CPT

## 2022-02-11 PROCEDURE — 97535 SELF CARE MNGMENT TRAINING: CPT

## 2022-02-11 PROCEDURE — 85610 PROTHROMBIN TIME: CPT

## 2022-02-11 PROCEDURE — U0005: CPT

## 2022-02-11 PROCEDURE — 99239 HOSP IP/OBS DSCHRG MGMT >30: CPT | Mod: GC

## 2022-02-11 PROCEDURE — 85025 COMPLETE CBC W/AUTO DIFF WBC: CPT

## 2022-02-11 PROCEDURE — 87181 SC STD AGAR DILUTION PER AGT: CPT

## 2022-02-11 PROCEDURE — 87040 BLOOD CULTURE FOR BACTERIA: CPT

## 2022-02-11 PROCEDURE — 99285 EMERGENCY DEPT VISIT HI MDM: CPT

## 2022-02-11 PROCEDURE — 84484 ASSAY OF TROPONIN QUANT: CPT

## 2022-02-11 PROCEDURE — 97530 THERAPEUTIC ACTIVITIES: CPT

## 2022-02-11 PROCEDURE — 83605 ASSAY OF LACTIC ACID: CPT

## 2022-02-11 PROCEDURE — 80048 BASIC METABOLIC PNL TOTAL CA: CPT

## 2022-02-11 PROCEDURE — 85027 COMPLETE CBC AUTOMATED: CPT

## 2022-02-11 PROCEDURE — 86140 C-REACTIVE PROTEIN: CPT

## 2022-02-11 PROCEDURE — 82962 GLUCOSE BLOOD TEST: CPT

## 2022-02-11 PROCEDURE — C1751: CPT

## 2022-02-11 PROCEDURE — 97116 GAIT TRAINING THERAPY: CPT

## 2022-02-11 PROCEDURE — 81001 URINALYSIS AUTO W/SCOPE: CPT

## 2022-02-11 RX ORDER — INSULIN LISPRO 100/ML
5 VIAL (ML) SUBCUTANEOUS
Refills: 0 | Status: DISCONTINUED | OUTPATIENT
Start: 2022-02-11 | End: 2022-02-14

## 2022-02-11 RX ORDER — OXYCODONE AND ACETAMINOPHEN 5; 325 MG/1; MG/1
1 TABLET ORAL EVERY 6 HOURS
Refills: 0 | Status: DISCONTINUED | OUTPATIENT
Start: 2022-02-11 | End: 2022-02-16

## 2022-02-11 RX ORDER — LOSARTAN POTASSIUM 100 MG/1
25 TABLET, FILM COATED ORAL DAILY
Refills: 0 | Status: DISCONTINUED | OUTPATIENT
Start: 2022-02-11 | End: 2022-02-16

## 2022-02-11 RX ORDER — ATORVASTATIN CALCIUM 80 MG/1
40 TABLET, FILM COATED ORAL AT BEDTIME
Refills: 0 | Status: DISCONTINUED | OUTPATIENT
Start: 2022-02-11 | End: 2022-02-16

## 2022-02-11 RX ORDER — ACETAMINOPHEN 500 MG
650 TABLET ORAL EVERY 6 HOURS
Refills: 0 | Status: DISCONTINUED | OUTPATIENT
Start: 2022-02-11 | End: 2022-02-16

## 2022-02-11 RX ORDER — SODIUM CHLORIDE 9 MG/ML
1000 INJECTION, SOLUTION INTRAVENOUS
Refills: 0 | Status: DISCONTINUED | OUTPATIENT
Start: 2022-02-11 | End: 2022-02-16

## 2022-02-11 RX ORDER — GABAPENTIN 400 MG/1
100 CAPSULE ORAL EVERY 8 HOURS
Refills: 0 | Status: DISCONTINUED | OUTPATIENT
Start: 2022-02-11 | End: 2022-02-16

## 2022-02-11 RX ORDER — ONDANSETRON 8 MG/1
4 TABLET, FILM COATED ORAL EVERY 8 HOURS
Refills: 0 | Status: DISCONTINUED | OUTPATIENT
Start: 2022-02-11 | End: 2022-02-16

## 2022-02-11 RX ORDER — DEXTROSE 50 % IN WATER 50 %
15 SYRINGE (ML) INTRAVENOUS ONCE
Refills: 0 | Status: DISCONTINUED | OUTPATIENT
Start: 2022-02-11 | End: 2022-02-16

## 2022-02-11 RX ORDER — INSULIN LISPRO 100/ML
8 VIAL (ML) SUBCUTANEOUS
Qty: 0 | Refills: 0 | DISCHARGE
Start: 2022-02-11

## 2022-02-11 RX ORDER — DEXTROSE 50 % IN WATER 50 %
25 SYRINGE (ML) INTRAVENOUS ONCE
Refills: 0 | Status: DISCONTINUED | OUTPATIENT
Start: 2022-02-11 | End: 2022-02-16

## 2022-02-11 RX ORDER — INSULIN LISPRO 100/ML
10 VIAL (ML) SUBCUTANEOUS
Qty: 0 | Refills: 0 | DISCHARGE
Start: 2022-02-11

## 2022-02-11 RX ORDER — VANCOMYCIN HCL 1 G
1750 VIAL (EA) INTRAVENOUS EVERY 12 HOURS
Refills: 0 | Status: DISCONTINUED | OUTPATIENT
Start: 2022-02-11 | End: 2022-02-16

## 2022-02-11 RX ORDER — GLUCAGON INJECTION, SOLUTION 0.5 MG/.1ML
1 INJECTION, SOLUTION SUBCUTANEOUS ONCE
Refills: 0 | Status: DISCONTINUED | OUTPATIENT
Start: 2022-02-11 | End: 2022-02-16

## 2022-02-11 RX ORDER — INSULIN GLARGINE 100 [IU]/ML
35 INJECTION, SOLUTION SUBCUTANEOUS AT BEDTIME
Refills: 0 | Status: DISCONTINUED | OUTPATIENT
Start: 2022-02-11 | End: 2022-02-16

## 2022-02-11 RX ORDER — METRONIDAZOLE 500 MG
500 TABLET ORAL EVERY 8 HOURS
Refills: 0 | Status: DISCONTINUED | OUTPATIENT
Start: 2022-02-11 | End: 2022-02-16

## 2022-02-11 RX ORDER — DEXTROSE 50 % IN WATER 50 %
12.5 SYRINGE (ML) INTRAVENOUS ONCE
Refills: 0 | Status: DISCONTINUED | OUTPATIENT
Start: 2022-02-11 | End: 2022-02-16

## 2022-02-11 RX ORDER — INSULIN LISPRO 100/ML
5 VIAL (ML) SUBCUTANEOUS
Qty: 0 | Refills: 0 | DISCHARGE
Start: 2022-02-11

## 2022-02-11 RX ORDER — INSULIN LISPRO 100/ML
VIAL (ML) SUBCUTANEOUS AT BEDTIME
Refills: 0 | Status: DISCONTINUED | OUTPATIENT
Start: 2022-02-11 | End: 2022-02-16

## 2022-02-11 RX ORDER — INSULIN LISPRO 100/ML
VIAL (ML) SUBCUTANEOUS
Refills: 0 | Status: DISCONTINUED | OUTPATIENT
Start: 2022-02-11 | End: 2022-02-16

## 2022-02-11 RX ADMIN — GABAPENTIN 100 MILLIGRAM(S): 400 CAPSULE ORAL at 22:46

## 2022-02-11 RX ADMIN — ATORVASTATIN CALCIUM 40 MILLIGRAM(S): 80 TABLET, FILM COATED ORAL at 22:46

## 2022-02-11 RX ADMIN — Medication 500 MILLIGRAM(S): at 22:46

## 2022-02-11 RX ADMIN — GABAPENTIN 100 MILLIGRAM(S): 400 CAPSULE ORAL at 06:37

## 2022-02-11 RX ADMIN — INSULIN GLARGINE 35 UNIT(S): 100 INJECTION, SOLUTION SUBCUTANEOUS at 23:13

## 2022-02-11 RX ADMIN — Medication 4: at 09:38

## 2022-02-11 RX ADMIN — Medication 250 MILLIGRAM(S): at 22:57

## 2022-02-11 RX ADMIN — Medication 250 MILLIGRAM(S): at 10:25

## 2022-02-11 RX ADMIN — Medication 5 UNIT(S): at 09:38

## 2022-02-11 RX ADMIN — OXYCODONE AND ACETAMINOPHEN 1 TABLET(S): 5; 325 TABLET ORAL at 22:46

## 2022-02-11 RX ADMIN — LOSARTAN POTASSIUM 25 MILLIGRAM(S): 100 TABLET, FILM COATED ORAL at 23:14

## 2022-02-11 RX ADMIN — Medication 500 MILLIGRAM(S): at 06:36

## 2022-02-11 RX ADMIN — LOSARTAN POTASSIUM 25 MILLIGRAM(S): 100 TABLET, FILM COATED ORAL at 06:37

## 2022-02-11 RX ADMIN — Medication 5 UNIT(S): at 23:30

## 2022-02-11 NOTE — H&P ADULT - NSHPLABSRESULTS_GEN_ALL_CORE
1950 Verbal shift change report given to VIJAY Strong RN (oncoming nurse) by Donny Oneal RN (offgoing nurse). Report included the following information SBAR. Pt and visitors sleeping peacefully during shift change. 2110- pt's  expressed concerns about insurance and coverage.  Will forward to day shift about social work consult.    T(C): 36.9 (02-11-22 @ 18:48), Max: 36.9 (02-11-22 @ 18:48)  HR: 89 (02-11-22 @ 18:48) (72 - 89)  BP: 162/102 (02-11-22 @ 18:48) (131/80 - 162/102)  RR: 18 (02-11-22 @ 18:48) (18 - 18)  SpO2: 97% (02-11-22 @ 18:48) (96% - 98%)                        12.4   9.26  )-----------( 470      ( 11 Feb 2022 20:07 )             38.8     02-11    137  |  103  |  22  ----------------------------<  250<H>  4.9   |  28  |  1.17    Ca    9.3      11 Feb 2022 20:07    TPro  8.7<H>  /  Alb  2.9<L>  /  TBili  0.3  /  DBili  x   /  AST  54<H>  /  ALT  84<H>  /  AlkPhos  72  02-11    LIVER FUNCTIONS - ( 11 Feb 2022 20:07 )  Alb: 2.9 g/dL / Pro: 8.7 gm/dL / ALK PHOS: 72 U/L / ALT: 84 U/L / AST: 54 U/L / GGT: x                   acetaminophen     Tablet .. 650 milliGRAM(s) Oral every 6 hours PRN  atorvastatin 40 milliGRAM(s) Oral at bedtime  dextrose 40% Gel 15 Gram(s) Oral once  dextrose 5%. 1000 milliLiter(s) IV Continuous <Continuous>  dextrose 5%. 1000 milliLiter(s) IV Continuous <Continuous>  dextrose 50% Injectable 25 Gram(s) IV Push once  dextrose 50% Injectable 12.5 Gram(s) IV Push once  dextrose 50% Injectable 25 Gram(s) IV Push once  gabapentin 100 milliGRAM(s) Oral every 8 hours  glucagon  Injectable 1 milliGRAM(s) IntraMuscular once  insulin glargine Injectable (LANTUS) 35 Unit(s) SubCutaneous at bedtime  insulin lispro (ADMELOG) corrective regimen sliding scale   SubCutaneous three times a day before meals  insulin lispro (ADMELOG) corrective regimen sliding scale   SubCutaneous at bedtime  insulin lispro Injectable (ADMELOG) 5 Unit(s) SubCutaneous three times a day before meals  losartan 25 milliGRAM(s) Oral daily  metroNIDAZOLE    Tablet 500 milliGRAM(s) Oral every 8 hours  ondansetron Injectable 4 milliGRAM(s) IV Push every 8 hours PRN  oxycodone    5 mG/acetaminophen 325 mG 1 Tablet(s) Oral every 6 hours PRN  vancomycin  IVPB 1750 milliGRAM(s) IV Intermittent every 12 hours

## 2022-02-11 NOTE — ED ADULT NURSE NOTE - NSIMPLEMENTINTERV_GEN_ALL_ED
Implemented All Fall Risk Interventions:  [place] to call system. Call bell, personal items and telephone within reach. Instruct patient to call for assistance. Room bathroom lighting operational. Non-slip footwear when patient is off stretcher. Physically safe environment: no spills, clutter or unnecessary equipment. Stretcher in lowest position, wheels locked, appropriate side rails in place. Provide visual cue, wrist band, yellow gown, etc. Monitor gait and stability. Monitor for mental status changes and reorient to person, place, and time. Review medications for side effects contributing to fall risk. Reinforce activity limits and safety measures with patient and family.

## 2022-02-11 NOTE — PROGRESS NOTE ADULT - PROBLEM SELECTOR PLAN 2
On both feet right worse than left. On february 2021 stepped with the right foot on a screw and on february 2021 stepped on another one but with the right on while working. After visiting a few times his podiatrist (Dr. Barry Hodges) the right foot is healing progressively since then, however his left one has worsened many times, last time 1 week ago.  - see management above  - Podiatry following, recs appreciated  - wound vac placed 2/4  - Gabapentin 100mg q8hr
On both feet right worse than left. On february 2021 stepped with the right foot on a screw and on february 2021 stepped on another one but with the right on while working. After visiting a few times his podiatrist (Dr. Barry Hodges) the right foot is healing progressively since then, however his left one has worsened many times, last time 1 week ago.  - see management above  - Podiatry following, recs appreciated  - wound vac placement today 2/4  - Gabapentin 100mg q8hr
On both feet right worse than left. On february 2021 stepped with the right foot on a screw and on february 2021 stepped on another one but with the right on while working. After visiting a few times his podiatrist (Dr. Barry Hodges) the right foot is healing progressively since then, however his left one has worsened many times, last time 1 week ago.  - see management above  - Podiatry following, recs appreciated  - wound vac placed 2/4  - Gabapentin 100mg q8hr
On both feet right worse than left. On february 2021 stepped with the right foot on a screw and on february 2021 stepped on another one but with the right on while working. After visiting a few times his podiatrist (Dr. Barry Hodges) the right foot is healing progressively since then, however his left one has worsened many times, last time 1 week ago.  - see management above  - Podiatry following, recs appreciated  - wound vac placement today 2/4  - Gabapentin 100mg q8hr
On both feet right worse than left. On february 2021 stepped with the right foot on a screw and on february 2021 stepped on another one but with the right on while working. After visiting a few times his podiatrist (Dr. Barry Hodges) the right foot is healing progressively since then, however his left one has worsened many times, last time 1 week ago.  - see management above  - Podiatry following, recs appreciated  - wound vac placed 2/4  - Gabapentin 100mg q8hr
On both feet right worse than left. On february 2021 stepped with the right foot on a screw and on february 2021 stepped on another one but with the right on while working. After visiting a few times his podiatrist (Dr. Barry Hodges) the right foot is healing progressively since then, however his left one has worsened many times, last time 1 week ago.  - see management above  - Podiatry following, recs appreciated  - wound vac placement today 2/4  - Gabapentin 100mg q8hr
On both feet right worse than left. On february 2021 stepped with the right foot on a screw and on february 2021 stepped on another one but with the right on while working. After visiting a few times his podiatrist (Dr. Barry Hodges) the right foot is healing progressively since then, however his left one has worsened many times, last time 1 week ago.  - see management above  - Podiatry following, recs appreciated  - wound vac placement on fri 2/4  - Gabapentin 100mg q8hr
On both feet right worse than left. On february 2021 stepped with the right foot on a screw and on february 2021 stepped on another one but with the right on while working. After visiting a few times his podiatrist (Dr. Barry Hodges) the right foot is healing progressively since then, however his left one has worsened many times, last time 1 week ago.  - Podiatry following, I&D in OR today 2/2  - Gabapentin 100mg q8hr
On both feet right worse than left. On february 2021 stepped with the right foot on a screw and on february 2021 stepped on another one but with the right on while working. After visiting a few times his podiatrist (Dr. Barry Hodges) the right foot is healing progressively since then, however his left one has worsened many times, last time 1 week ago.  - see management above  - Podiatry following, recs appreciated  - Gabapentin 100mg q8hr
On both feet right worse than left. On february 2021 stepped with the right foot on a screw and on february 2021 stepped on another one but with the right on while working. After visiting a few times his podiatrist (Dr. Barry Hodges) the right foot is healing progressively since then, however his left one has worsened many times, last time 1 week ago.  - see management above  - Podiatry following, recs appreciated  - wound vac placed 2/4  - Gabapentin 100mg q8hr

## 2022-02-11 NOTE — ED PROVIDER NOTE - PROGRESS NOTE DETAILS
Dr winn spoke with social work. social work coming to see patient to discuss rehab placement process.

## 2022-02-11 NOTE — PROGRESS NOTE ADULT - PROBLEM SELECTOR PROBLEM 2
Diabetic foot ulcers

## 2022-02-11 NOTE — PROGRESS NOTE ADULT - PROBLEM SELECTOR PROBLEM 1
Severe sepsis

## 2022-02-11 NOTE — ED PROVIDER NOTE - ATTENDING CONTRIBUTION TO CARE
45 yo male with pMH DM s/p recent washout, for rehab placement. PE: NAD, RRR, CTA BL lungs A/P Labs, medicate, admit

## 2022-02-11 NOTE — ED ADULT NURSE NOTE - ED STAT RN HANDOFF DETAILS
Pt Aox4 in stretcher awake. PICC RUE, right foot wound, currently wrapped. Admitted to med/surg. Report given to COLIN Reynolds RN

## 2022-02-11 NOTE — PROGRESS NOTE ADULT - SUBJECTIVE AND OBJECTIVE BOX
OVERNIGHT EVENTS: ANDREW    SUBJECTIVE:  Patient seen and examined at bedside. States he feels well, pain controlled. No further episodes of chest pain. No shortness of breath, cough, abdominal pain. Last BM last night, which was normal.     ROS negative unless stated above    Vital Signs Last 12 Hrs  T(F): 97.8 (02-11-22 @ 09:53), Max: 97.8 (02-11-22 @ 09:53)  HR: 78 (02-11-22 @ 09:53) (72 - 78)  BP: 149/83 (02-11-22 @ 09:53) (131/80 - 149/83)  BP(mean): --  RR: 18 (02-11-22 @ 09:53) (18 - 18)  SpO2: 96% (02-11-22 @ 09:53) (96% - 98%)  I&O's Summary      PHYSICAL EXAM:  GENERAL: NAD, lying in bed comfortably  HEAD:  Atraumatic, normocephalic  EYES: EOMI, PERRLA, conjunctiva and sclera clear  ENT: Moist mucous membranes  NECK: Supple, no JVD  HEART: Regular rate and rhythm, no murmurs, rubs, or gallops  LUNGS: Unlabored respirations.  Clear to auscultation bilaterally, no crackles, wheezing, or rhonchi  ABDOMEN: Soft, nontender, nondistended, +BS, globulus  EXTREMITIES: 2+ peripheral pulses bilaterally. RLE wrapped in ace bandage + wound vac WWP, movement intact. LLE wrapped, WWP and movement intact. RUE PICC in place, no tenderness.   NERVOUS SYSTEM:  A&Ox3, no focal deficits   SKIN: No rashes        LABS:                  RADIOLOGY & ADDITIONAL TESTS:    MEDICATIONS  (STANDING):  atorvastatin 40 milliGRAM(s) Oral at bedtime  dextrose 40% Gel 15 Gram(s) Oral once  dextrose 5%. 1000 milliLiter(s) (50 mL/Hr) IV Continuous <Continuous>  dextrose 5%. 1000 milliLiter(s) (100 mL/Hr) IV Continuous <Continuous>  dextrose 50% Injectable 25 Gram(s) IV Push once  dextrose 50% Injectable 12.5 Gram(s) IV Push once  dextrose 50% Injectable 25 Gram(s) IV Push once  enoxaparin Injectable 40 milliGRAM(s) SubCutaneous daily  gabapentin 100 milliGRAM(s) Oral every 8 hours  glucagon  Injectable 1 milliGRAM(s) IntraMuscular once  insulin glargine Injectable (LANTUS) 35 Unit(s) SubCutaneous at bedtime  insulin lispro (ADMELOG) corrective regimen sliding scale   SubCutaneous Before meals and at bedtime  insulin lispro Injectable (ADMELOG) 5 Unit(s) SubCutaneous three times a day before meals  losartan 25 milliGRAM(s) Oral daily  metroNIDAZOLE    Tablet 500 milliGRAM(s) Oral every 8 hours  vancomycin  IVPB 1750 milliGRAM(s) IV Intermittent every 12 hours    MEDICATIONS  (PRN):  acetaminophen     Tablet .. 650 milliGRAM(s) Oral every 6 hours PRN Temp greater or equal to 38C (100.4F)  oxyCODONE    IR 10 milliGRAM(s) Oral every 6 hours PRN Severe Pain (7 - 10)  oxyCODONE    IR 5 milliGRAM(s) Oral every 6 hours PRN Moderate Pain (4 - 6)  sodium chloride 0.9% lock flush 10 milliLiter(s) IV Push every 1 hour PRN Pre/post blood products, medications, blood draw, and to maintain line patency

## 2022-02-11 NOTE — PATIENT PROFILE ADULT - SURGICAL SITE DESCRIPTION
s/p I&D  of right 4th 5th toes and wash out 2/1/2022 [facility] s/p I&D  of right 4th 5th toes and wash out  2/1/2022 [facility] /.amputation 4th&5th toe and partial on 2nd toe right foot

## 2022-02-11 NOTE — H&P ADULT - NSHPPHYSICALEXAM_GEN_ALL_CORE
PHYSICAL EXAM:    Vital Signs Last 24 Hrs  T(C): 36.9 (11 Feb 2022 18:48), Max: 36.9 (11 Feb 2022 18:48)  T(F): 98.4 (11 Feb 2022 18:48), Max: 98.4 (11 Feb 2022 18:48)  HR: 89 (11 Feb 2022 18:48) (72 - 89)  BP: 162/102 (11 Feb 2022 18:48) (131/80 - 162/102)  BP(mean): --  RR: 18 (11 Feb 2022 18:48) (18 - 18)  SpO2: 97% (11 Feb 2022 18:48) (96% - 98%)    GENERAL: Pt lying in bed comfortably in NAD  HEENT:  Atraumatic, EOMI, PERRL, conjunctiva and sclera clear, MMM  NECK: Supple, No JVD  CHEST/LUNG: Clear to auscultation bilaterally; No rales, rhonchi, wheezing or rubs. Unlabored respirations  HEART: Regular rate and rhythm; No murmurs, rubs, or gallops  ABDOMEN: Bowel sounds present; Soft, Nontender, Nondistended. No guarding or rigidity    EXTREMITIES:  2+ Peripheral Pulses, brisk capillary refill. No clubbing, cyanosis, or edema  NEUROLOGICAL:  Alert & Oriented X3, speech clear. Answers questions appropriately. Full and equal strength B/L upper and lower extremities. No deficits   MSK: FROM x 4 extremities   SKIN: No rashes or lesions PHYSICAL EXAM:    Vital Signs Last 24 Hrs  T(C): 36.9 (11 Feb 2022 18:48), Max: 36.9 (11 Feb 2022 18:48)  T(F): 98.4 (11 Feb 2022 18:48), Max: 98.4 (11 Feb 2022 18:48)  HR: 89 (11 Feb 2022 18:48) (72 - 89)  BP: 162/102 (11 Feb 2022 18:48) (131/80 - 162/102)  BP(mean): --  RR: 18 (11 Feb 2022 18:48) (18 - 18)  SpO2: 97% (11 Feb 2022 18:48) (96% - 98%)    GENERAL: Pt lying in bed comfortably in NAD  HEENT:  Atraumatic, EOMI, PERRL, conjunctiva and sclera clear, MMM  NECK: Supple,  CHEST/LUNG: Clear to auscultation bilaterally;   HEART: Regular rate and rhythm;   ABDOMEN: Bowel sounds present; Soft, Nontender, Nondistended.     EXTREMITIES:  RLE wound wrapped, dressing clean, LLE w/ small clean ulcer (pt reports he accidentally stepped on a nail), wrapped, no drainage  NEUROLOGICAL:  Alert & Oriented X3, No deficits   MSK: FROM x 4 extremities   SKIN: R PICC line, clean, no edema or erythema

## 2022-02-11 NOTE — PROGRESS NOTE ADULT - PROBLEM SELECTOR PROBLEM 6
Nutrition, metabolism, and development symptoms
Hyperlipidemia, unspecified hyperlipidemia type
Nutrition, metabolism, and development symptoms

## 2022-02-11 NOTE — ED PROVIDER NOTE - OBJECTIVE STATEMENT
45yo male with pmh of DM with recent debridement and wash out for chronic foot wound presents for rehab palcement. Was at [facility] and sent to [facility]. States he arrived today and was unhappy with the conditions. states they werent ready for him, did not have his medication and the room wasn't clean. reports he asked to leave and was told that he had to call an ambulance to go to the hospital for new placement if that's what he wanted. denies any new symptoms.

## 2022-02-11 NOTE — PROGRESS NOTE ADULT - PROBLEM SELECTOR PROBLEM 3
Diabetes mellitus

## 2022-02-11 NOTE — DISCHARGE NOTE NURSING/CASE MANAGEMENT/SOCIAL WORK - NSDCVIVACCINE_GEN_ALL_CORE_FT
Tdap; 30-Jun-2021 18:05; Lorrie Ziegler (IVELISSE); [employer]; [id number] (Exp. Date: 25-Nov-2022); IntraMuscular; Deltoid Left.; 0.5 milliLiter(s); VIS (VIS Published: 09-May-2013, VIS Presented: 30-Jun-2021);

## 2022-02-11 NOTE — PROGRESS NOTE ADULT - PROVIDER SPECIALTY LIST ADULT
Infectious Disease
Internal Medicine
Internal Medicine
Podiatry
Podiatry
Hospitalist
Internal Medicine
Internal Medicine
Podiatry
Internal Medicine
Internal Medicine
Podiatry
Internal Medicine
Podiatry
Podiatry
Infectious Disease
Internal Medicine
Internal Medicine

## 2022-02-11 NOTE — DISCHARGE NOTE NURSING/CASE MANAGEMENT/SOCIAL WORK - PATIENT PORTAL LINK FT
You can access the FollowMyHealth Patient Portal offered by [facility] by registering at the following website: [email or web address]. By joining [employer]’s FollowMyHealth portal, you will also be able to view your health information using other applications (apps) compatible with our system.

## 2022-02-11 NOTE — PROGRESS NOTE ADULT - PROBLEM SELECTOR PROBLEM 4
Hypertension
History of cocaine use
Hypertension

## 2022-02-11 NOTE — PATIENT PROFILE ADULT - NSPROHMSYMPCOND_GEN_A_NUR
Dr. Black notified of patient's prior pain and that patient is on peritoneal dialysis. MD was also notified that patient needs x-ray for placement once cortrak is placed but patient is on PD. MD was asked if it was ok to do the xray with PD running.  MD stated she would do some chart review on this patient.    2316: Dr. Black called back and stated patient can still get xray. MD stated opioids and NSAIDs are not appropriate for patient at this time.   diabetes/sleep

## 2022-02-11 NOTE — ED PROVIDER NOTE - PHYSICAL EXAMINATION
PE:   GEN: Awake, alert, interactive, NAD, non-toxic appearing.   HEAD AND NECK: NC/AT. Airway patent. Neck supple.   EYES: Clear b/l. PERRL  CARDIAC: RRR. S1, S2. No evident pedal edema.    RESP: Normal respiratory effort with no use of accessory muscles or retractions. Clear throughout on auscultation.  ABD: soft, non-distended, non-tender. No rebound, no guarding.   NEURO: AOx3, CN II-XII grossly intact, no focal deficits.   MSK: Moving all extremities with no apparent deformities.   SKIN: R foot dressed. Dressing clean. Warm, dry, intact normal color

## 2022-02-11 NOTE — PROGRESS NOTE ADULT - PROBLEM SELECTOR PLAN 6
N: Diabetic diet  F: none  E: replete  DVT prophylaxis: lovenox  GI prophylaxis: None.     Code: Full  Dispo: [facility]
N: Diabetic diet  F: none  E: replete  DVT prophylaxis: lovenox  GI prophylaxis: None.     Code: Full  Dispo: [facility], pending d/c to [facility]
N: Diabetic diet  F: none  E: replete  DVT prophylaxis: None.  GI prophylaxis: None.     Code: Full  Dispo: [facility]
N: Diabetic diet  F: none  E: replete  DVT prophylaxis: None.  GI prophylaxis: None.     Code: Full  Dispo: [facility]
N: Diabetic diet  F: none  E: replete  DVT prophylaxis: lovenox  GI prophylaxis: None.     Code: Full  Dispo: [facility]
N: Diabetic diet  F: none  E: replete  DVT prophylaxis: lovenox  GI prophylaxis: None.     Code: Full  Dispo: [facility]
On home med atorvastatin 40mg at bedtime.   - C/w atorvastatin.
N: Diabetic diet  F: none  E: replete  DVT prophylaxis: lovenox  GI prophylaxis: None.     Code: Full  Dispo: [facility]
N: Diabetic diet  F: none  E: replete  DVT prophylaxis: lovenox  GI prophylaxis: None.     Code: Full  Dispo: [facility], pending d/c to [facility]
N: Diabetic diet  F: none  E: replete  DVT prophylaxis: lovenox  GI prophylaxis: None.     Code: Full  Dispo: [facility], pending d/c to [facility]

## 2022-02-11 NOTE — ED ADULT NURSE NOTE - OBJECTIVE STATEMENT
Pt is a 47yo M, AOx4 pmhx DM, HTN, HLD, osteomyelitis right foot, states he had surgery on right foot 2/2 at [facility] and was discharged today to a rehab facility. p/w right foot pain, pt states "I had surgery on feb 2nd on my foot and discharged to rehab but I did not like the rehab, I felt they were not ready for me and they told me to call 911 if I wanted to leave." Foot is currently wrapped. Pt states he was supposed to get a wound vac at rehab. Pt arrived with PICC in [facility], hypertensive in triage. NKDA

## 2022-02-11 NOTE — ED ADULT TRIAGE NOTE - CHIEF COMPLAINT QUOTE
p/w right foot pain, pt states "I had surgery on feb 2nd on my foot and discharged to rehab but I did not like the rehab, I felt they were not ready for me and they told me to call 911 if I wanted to leave"

## 2022-02-11 NOTE — ED PROVIDER NOTE - NS ED ROS FT
Constitutional: (-) Fever, (-) Anorexia, (-) Generalized Malaise  Eyes: (-)Discharge, (-) Irritation,  (-) Visual changes  EARS: (-) Ear Pain, (-) Apparent hearing changes  NOSE: (-) Congestion, (-) Bloody nose  MOUTH/THROAT: (-) Vocal Changes, (-) Drooling, (-) Sore throat  NECK: (-) Lumps, (-) Stiffness, (-) Pain  CV: (-) Chest Pain, (-) Palpitations, (-) Edema   RESP:  (-) Cough, (-) SOB, (-) MAYO,  (-) Wheezing  GI: (-) Nausea, (-) Vomiting, (-) Abdominal Pain, (-) Diarrhea, (-) Constipation, (-) Bloody stools  : (-) Dysuria, (-) Frequency, (-) Hematuria, (-) Incontinence  MSK: (-) Joint Pain, (-) Back Pain, (-) Deformities  SKIN: (-) Wounds, (-) Color change, (-)Rash, (-) Swelling  NEURO:(-) Headache, (-) Dizziness, (-) Numbness/Tingling,  (-)LOC

## 2022-02-11 NOTE — H&P ADULT - ASSESSMENT
47 yo M with past medical history of previous alcohol and cocaine use w/ PMHx of HTN (not treated), HLD (on meds), DM (started in 2021, not well control in the beginning, on OAD and insulin), osteomyelitis with amputation of the right 4th and 5th toes and partial amputation of the right 2nd toe admitted w/ osteomyelitis at [facility] on 2/1 discharged to Rehab today, presents back to the due to inadequate amenities at the facility. Pt being admitted for another rehab placement    1) DM foot Ulcer w/ Osteomyelitis  - ID recommended continue vancomycin 1750mg q12hr (2/2-3/16) and flagyl 500mg q8hr (2/7-3/21)  - He will need 6 weeks of IV antibiotics.  Day 1 = 2/2/2022  - weekly CBC, CMP, ESR, CRP, Vanco trough. Can fax results to Dr. Mosley:  [phone]  - Outpt follow up with Dr. Mosley:  [address], 4th floor [place], [place].  [phone], option 2  - Dressing instructions: Flush wounds x 2 right foot with sterile saline, DO NOT REMOVE ADAPTIC-sutured onto graft, Apply wound vac over medial and plantar right foot wounds. Vac can be changed every other day. Wrap with ACE bandage after vac placement; will consult wound care while here.   - F/u podiatry outpatient (Dr. Barry Hodges)     2) DM type 2 w/ peripheral neuropathy  - last HbA1c 9.8.  - c/w Lantus Insulin 35 UI at bedtime. Humalog Insulin 5 UI TID.  - c/w Gabapentin 100mg q8hr    3) HTN  - c/w Losartan    4) HLD  - c/w atorvastatin    5) DVT ppx - Lovenox subq         47 yo M with past medical history of previous alcohol and cocaine use w/ PMHx of HTN (not treated), HLD (on meds), DM (started in 2021, not well control in the beginning, on OAD and insulin), osteomyelitis with amputation of the right 4th and 5th toes and partial amputation of the right 2nd toe admitted w/ osteomyelitis at [facility] on 2/1 discharged to Rehab today, presents back to the due to inadequate amenities at the facility. Pt being admitted for another rehab placement    1) DM foot Ulcer w/ Osteomyelitis  - ID recommendations; continue vancomycin 1750mg q12hr (2/2-3/16) and flagyl 500mg q8hr (2/7-3/21)  - He will need 6 weeks of IV antibiotics.  Day 1 = 2/2/2022  - weekly CBC, CMP, ESR, CRP, Vanco trough. Can fax results to Dr. Mosley:  [phone]; all ordered for the morning   - Outpt follow up with Dr. Mosely:  [address], 4th floor [place], [place].  [phone], option 2  - Dressing instructions: Flush wounds x 2 right foot with sterile saline, DO NOT REMOVE ADAPTIC-sutured onto graft, Apply wound vac over medial and plantar right foot wounds. Vac can be changed every other day. Wrap with ACE bandage after vac placement; will consult wound care while here.   - F/u podiatry outpatient (Dr. Barry Hodges)     2) DM type 2 w/ peripheral neuropathy  - last HbA1c 9.8.  - c/w Lantus Insulin 35 UI at bedtime. Humalog Insulin 5 UI TID.  - c/w Gabapentin 100mg q8hr    3) HTN  - c/w Losartan    4) HLD  - c/w atorvastatin    5) DVT ppx - Lovenox subq

## 2022-02-11 NOTE — ED PROVIDER NOTE - CLINICAL SUMMARY MEDICAL DECISION MAKING FREE TEXT BOX
45yo male with pmh of DM with recent debridement and wash out for chronic foot wound presents for rehab placement. No symptoms. Will call social work

## 2022-02-11 NOTE — PROGRESS NOTE ADULT - PROBLEM SELECTOR PLAN 5
On home med atorvastatin 40mg at bedtime.   - C/w atorvastatin.
On home med atorvastatin 40mg at bedtime.   - C/w atorvastatin.
No current home med. Previously prescribed losartan.  - continue to monitor  - consider adding losartan/lisinopril if BP persistently high
On home med atorvastatin 40mg at bedtime.   - C/w atorvastatin.

## 2022-02-11 NOTE — PATIENT PROFILE ADULT - FALL HARM RISK - HARM RISK INTERVENTIONS

## 2022-02-11 NOTE — PROGRESS NOTE ADULT - REASON FOR ADMISSION
[place].

## 2022-02-11 NOTE — PROGRESS NOTE ADULT - PROBLEM SELECTOR PLAN 4
No current home med. Previously prescribed losartan.  - continue to monitor  - consider adding losartan/lisinopril if BP persistently high
No current home med. Previously prescribed losartan.  - continue to monitor  - consider adding losartan/lisinopril if BP persistently high
Cocaine use (in his youth, on the weekends; last use 12/31, only in special events). He denies any addiction now.  - f/u [facility]
No current home med. Previously prescribed losartan.  - continue to monitor  - consider adding losartan/lisinopril if BP persistently high

## 2022-02-11 NOTE — H&P ADULT - HISTORY OF PRESENT ILLNESS
47 yo M with past medical history of previous alcohol and cocaine use w/ PMHx of HTN (not treated), HLD (on meds), DM (started in 2021, not well control in the beginning, on OAD and insulin), osteomyelitis with amputation of the right 4th and 5th toes and partial amputation of the right 2nd toe admitted w/ osteomyelitis at [facility] on 2/1 discharged to Rehab today, presents back to the due to inadequate amenities at the facility.      Hospital course   Pt started on vancomycin & zoysn. Podiatry consulted, underwent RLE I&D with wash out and graft placement. Cultures growing GBS & MRSA, ID consulted, recommended vanc and started flagyl 500mg q8hr wound vac placed. PT recommending STEVEN.               45 yo M with past medical history of previous alcohol and cocaine use w/ PMHx of HTN (not treated), HLD (on meds), DM (started in 2021, not well control in the beginning, on OAD and insulin), osteomyelitis with amputation of the right 4th and 5th toes and partial amputation of the right 2nd toe admitted w/ osteomyelitis at [facility] on 2/1 discharged to Rehab today, presents back to the due to inadequate amenities at the facility. Pt reports the facility was not clean, they had no idea where their wound vacs were, thus he called EMS to be brought back to the Hospital. Pt c/o pain at R foot but otherwise feels well.     [facility] Hospital course:   Pt started on vancomycin & zosyn Podiatry consulted, underwent RLE I&D with wash out and graft placement. Cultures growing GBS & MRSA, ID consulted, recommended vanc and started flagyl 500mg q8hr wound vac placed. PT recommending STEVEN.    In ED, SW attempted Rehab set up however was not able to.

## 2022-02-11 NOTE — PROGRESS NOTE ADULT - PROBLEM SELECTOR PROBLEM 5
Hyperlipidemia, unspecified hyperlipidemia type
Hypertension
Hyperlipidemia, unspecified hyperlipidemia type

## 2022-02-11 NOTE — DISCHARGE NOTE NURSING/CASE MANAGEMENT/SOCIAL WORK - NSDCPEFALRISK_GEN_ALL_CORE
For information on Fall & Injury Prevention, visit: [email or web address] OR  [email or web address] OR  https://www.cdc.gov/steadi/patient.html

## 2022-02-11 NOTE — PROGRESS NOTE ADULT - PROBLEM SELECTOR PLAN 3
Started in 2021, not well control in the beginning, on OAD and insulin  - HbA1c 9.8  - Lantus Insulin 35 UI at bedtime  - Humalog Insulin 5 UI TID  - Hold Metformin while inpatient  - Gabapentin 100mg q8hr
Started in 2021, not well control in the beginning, on OAD and insulin  - HbA1c 9.8  - Lantus Insulin 35 UI at bedtime  - Humalog Insulin 5 UI TID  - Hold Metformin while inpatient  - Gabapentin 100mg q8hr
Started in 2021, not well control in the beginning, on OAD and insulin  - HbA1c 9.8  - Lantus Insulin 30 UI at bedtime  - Humalog Insulin 3 UI TID  - Hold Metformin while inpatient  - Gabapentin 100mg q8hr
Started in 2021, not well control in the beginning, on OAD and insulin  - HbA1c 9.8  - Lantus Insulin 35 UI at bedtime  - Humalog Insulin 3 UI TID  - Hold Metformin while inpatient  - Gabapentin 100mg q8hr
Started in 2021, not well control in the beginning, on OAD and insulin  - HbA1c 9.8  - Lantus Insulin 30 UI at bedtime  - Humalog Insulin 3 UI TID  - Hold Metformin while inpatient  - Gabapentin 100mg q8hr
Started in 2021, not well control in the beginning, on OAD and insulin  - HbA1c 9.8  - Lantus Insulin 35 UI at bedtime  - Humalog Insulin 5 UI TID  - Hold Metformin while inpatient  - Gabapentin 100mg q8hr
Started in 2021, not well control in the beginning, on OAD and insulin  - HbA1c 9.8  - Lantus Insulin 35 UI at bedtime  - Humalog Insulin 3 UI TID  - Hold Metformin while inpatient  - Gabapentin 100mg q8hr
Started in 2021, not well control in the beginning, on OAD and insulin  - HbA1c 9.8  - Lantus Insulin 30 UI at bedtime  - Humalog Insulin 3 UI TID  - Hold Metformin while inpatient  - Gabapentin 100mg q8hr

## 2022-02-12 LAB
ALBUMIN SERPL ELPH-MCNC: 2.7 G/DL — LOW (ref 3.3–5)
ALP SERPL-CCNC: 61 U/L — SIGNIFICANT CHANGE UP (ref 40–120)
ALT FLD-CCNC: 77 U/L — SIGNIFICANT CHANGE UP (ref 12–78)
ANION GAP SERPL CALC-SCNC: 4 MMOL/L — LOW (ref 5–17)
AST SERPL-CCNC: 48 U/L — HIGH (ref 15–37)
BASOPHILS # BLD AUTO: 0.07 K/UL — SIGNIFICANT CHANGE UP (ref 0–0.2)
BASOPHILS NFR BLD AUTO: 0.9 % — SIGNIFICANT CHANGE UP (ref 0–2)
BILIRUB SERPL-MCNC: 0.4 MG/DL — SIGNIFICANT CHANGE UP (ref 0.2–1.2)
BUN SERPL-MCNC: 22 MG/DL — SIGNIFICANT CHANGE UP (ref 7–23)
CALCIUM SERPL-MCNC: 9.3 MG/DL — SIGNIFICANT CHANGE UP (ref 8.5–10.1)
CHLORIDE SERPL-SCNC: 105 MMOL/L — SIGNIFICANT CHANGE UP (ref 96–108)
CO2 SERPL-SCNC: 27 MMOL/L — SIGNIFICANT CHANGE UP (ref 22–31)
CREAT SERPL-MCNC: 1.06 MG/DL — SIGNIFICANT CHANGE UP (ref 0.5–1.3)
EOSINOPHIL # BLD AUTO: 0.3 K/UL — SIGNIFICANT CHANGE UP (ref 0–0.5)
EOSINOPHIL NFR BLD AUTO: 4 % — SIGNIFICANT CHANGE UP (ref 0–6)
ERYTHROCYTE [SEDIMENTATION RATE] IN BLOOD: 40 MM/HR — HIGH (ref 0–15)
GLUCOSE BLDC GLUCOMTR-MCNC: 143 MG/DL — HIGH (ref 70–99)
GLUCOSE BLDC GLUCOMTR-MCNC: 254 MG/DL — HIGH (ref 70–99)
GLUCOSE BLDC GLUCOMTR-MCNC: 262 MG/DL — HIGH (ref 70–99)
GLUCOSE BLDC GLUCOMTR-MCNC: 335 MG/DL — HIGH (ref 70–99)
GLUCOSE SERPL-MCNC: 186 MG/DL — HIGH (ref 70–99)
HCT VFR BLD CALC: 38.7 % — LOW (ref 39–50)
HGB BLD-MCNC: 12.7 G/DL — LOW (ref 13–17)
IMM GRANULOCYTES NFR BLD AUTO: 0.1 % — SIGNIFICANT CHANGE UP (ref 0–1.5)
LYMPHOCYTES # BLD AUTO: 3.82 K/UL — HIGH (ref 1–3.3)
LYMPHOCYTES # BLD AUTO: 51.3 % — HIGH (ref 13–44)
MAGNESIUM SERPL-MCNC: 2.3 MG/DL — SIGNIFICANT CHANGE UP (ref 1.6–2.6)
MCHC RBC-ENTMCNC: 27.7 PG — SIGNIFICANT CHANGE UP (ref 27–34)
MCHC RBC-ENTMCNC: 32.8 G/DL — SIGNIFICANT CHANGE UP (ref 32–36)
MCV RBC AUTO: 84.3 FL — SIGNIFICANT CHANGE UP (ref 80–100)
MONOCYTES # BLD AUTO: 0.72 K/UL — SIGNIFICANT CHANGE UP (ref 0–0.9)
MONOCYTES NFR BLD AUTO: 9.7 % — SIGNIFICANT CHANGE UP (ref 2–14)
NEUTROPHILS # BLD AUTO: 2.53 K/UL — SIGNIFICANT CHANGE UP (ref 1.8–7.4)
NEUTROPHILS NFR BLD AUTO: 34 % — LOW (ref 43–77)
NRBC # BLD: 0 /100 WBCS — SIGNIFICANT CHANGE UP (ref 0–0)
PHOSPHATE SERPL-MCNC: 3.3 MG/DL — SIGNIFICANT CHANGE UP (ref 2.5–4.5)
PLATELET # BLD AUTO: 414 K/UL — HIGH (ref 150–400)
POTASSIUM SERPL-MCNC: 4.1 MMOL/L — SIGNIFICANT CHANGE UP (ref 3.5–5.3)
POTASSIUM SERPL-SCNC: 4.1 MMOL/L — SIGNIFICANT CHANGE UP (ref 3.5–5.3)
PROT SERPL-MCNC: 8 GM/DL — SIGNIFICANT CHANGE UP (ref 6–8.3)
RBC # BLD: 4.59 M/UL — SIGNIFICANT CHANGE UP (ref 4.2–5.8)
RBC # FLD: 14.5 % — SIGNIFICANT CHANGE UP (ref 10.3–14.5)
SODIUM SERPL-SCNC: 136 MMOL/L — SIGNIFICANT CHANGE UP (ref 135–145)
VANCOMYCIN TROUGH SERPL-MCNC: 24.9 UG/ML — HIGH (ref 10–20)
WBC # BLD: 7.45 K/UL — SIGNIFICANT CHANGE UP (ref 3.8–10.5)
WBC # FLD AUTO: 7.45 K/UL — SIGNIFICANT CHANGE UP (ref 3.8–10.5)

## 2022-02-12 PROCEDURE — 99222 1ST HOSP IP/OBS MODERATE 55: CPT

## 2022-02-12 RX ORDER — ENOXAPARIN SODIUM 100 MG/ML
40 INJECTION SUBCUTANEOUS DAILY
Refills: 0 | Status: DISCONTINUED | OUTPATIENT
Start: 2022-02-12 | End: 2022-02-16

## 2022-02-12 RX ADMIN — INSULIN GLARGINE 35 UNIT(S): 100 INJECTION, SOLUTION SUBCUTANEOUS at 22:16

## 2022-02-12 RX ADMIN — Medication 250 MILLIGRAM(S): at 23:36

## 2022-02-12 RX ADMIN — Medication 3: at 15:59

## 2022-02-12 RX ADMIN — ENOXAPARIN SODIUM 40 MILLIGRAM(S): 100 INJECTION SUBCUTANEOUS at 11:43

## 2022-02-12 RX ADMIN — Medication 5 UNIT(S): at 08:15

## 2022-02-12 RX ADMIN — Medication 2: at 22:16

## 2022-02-12 RX ADMIN — GABAPENTIN 100 MILLIGRAM(S): 400 CAPSULE ORAL at 22:15

## 2022-02-12 RX ADMIN — Medication 500 MILLIGRAM(S): at 22:20

## 2022-02-12 RX ADMIN — Medication 5 UNIT(S): at 15:58

## 2022-02-12 RX ADMIN — ATORVASTATIN CALCIUM 40 MILLIGRAM(S): 80 TABLET, FILM COATED ORAL at 22:15

## 2022-02-12 RX ADMIN — Medication 250 MILLIGRAM(S): at 11:47

## 2022-02-12 RX ADMIN — Medication 500 MILLIGRAM(S): at 16:28

## 2022-02-12 RX ADMIN — Medication 3: at 11:44

## 2022-02-12 RX ADMIN — Medication 500 MILLIGRAM(S): at 05:10

## 2022-02-12 RX ADMIN — LOSARTAN POTASSIUM 25 MILLIGRAM(S): 100 TABLET, FILM COATED ORAL at 05:10

## 2022-02-12 RX ADMIN — GABAPENTIN 100 MILLIGRAM(S): 400 CAPSULE ORAL at 05:10

## 2022-02-12 RX ADMIN — OXYCODONE AND ACETAMINOPHEN 1 TABLET(S): 5; 325 TABLET ORAL at 11:49

## 2022-02-12 RX ADMIN — Medication 5 UNIT(S): at 11:44

## 2022-02-12 RX ADMIN — OXYCODONE AND ACETAMINOPHEN 1 TABLET(S): 5; 325 TABLET ORAL at 22:15

## 2022-02-12 RX ADMIN — GABAPENTIN 100 MILLIGRAM(S): 400 CAPSULE ORAL at 14:29

## 2022-02-12 NOTE — PHYSICAL THERAPY INITIAL EVALUATION ADULT - DISCHARGE DISPOSITION, PT EVAL
Pt may benefit from subacute rehab. to improve functional ability, balance, and prevent injury from fall./rehabilitation facility

## 2022-02-12 NOTE — PHYSICAL THERAPY INITIAL EVALUATION ADULT - BALANCE TRAINING, PT EVAL
Pt will increase static/dynamic sitting balance to good and static/dynamic standing balance to good, NWB to RLE c a Rolling Walker,  to perform all functional mobility without LOB, by 2weeks.

## 2022-02-12 NOTE — PHYSICAL THERAPY INITIAL EVALUATION ADULT - GENERAL OBSERVATIONS, REHAB EVAL
Pt was seen in supine c R foot dressing C/D/I, alert and Ox4. Pt was educated about NWB to the R foot to promote wound healing and pt c verbal understanding and return demonstration.

## 2022-02-12 NOTE — PHYSICAL THERAPY INITIAL EVALUATION ADULT - PERTINENT HX OF CURRENT PROBLEM, REHAB EVAL
/p  osteomyelitis with amputation of the right 4th and 5th toes and partial amputation of the right 2nd toe admitted w/ osteomyelitis at [facility] on 2/1. s/p  RLE I&D with wash out and graft placement.

## 2022-02-12 NOTE — PHYSICAL THERAPY INITIAL EVALUATION ADULT - GAIT TRAINING, PT EVAL
Pt will independently ambulate 200 feet with NWB to RLE with a rolling walker without loss of balance, by 2 weeks.

## 2022-02-12 NOTE — PHYSICAL THERAPY INITIAL EVALUATION ADULT - TRANSFER TRAINING, PT EVAL
Pt will independently perform sit to stand to sit transfers, NWB to RLE c a Rolling Walker,  without LOB using rolling walker by 2 weeks.

## 2022-02-12 NOTE — PHYSICAL THERAPY INITIAL EVALUATION ADULT - NAME OF CLINICIAN
PRINCIPAL PROCEDURE  Procedure: Insertion, implantable loop recorder  Findings and Treatment: Loop Recorder Incision Care:     - Do not touch the incision until it is completely healed.   - There is Dermabond (skin glue) on your incision, which will start to flake off on its own over the next 2-3 weeks. Do not pick at or peel off the Dermabond.   - Do not apply soaps, creams, lotions, ointments or powders to the incision until it is completely healed.  - You should call the doctor if you notice redness, drainage, swelling, increased tenderness, hot sensation around the  incision, bleeding or incision edges pulling apart. IVELISSE, Sandra

## 2022-02-13 ENCOUNTER — TRANSCRIPTION ENCOUNTER (OUTPATIENT)
Age: 47
End: 2022-02-13

## 2022-02-13 LAB
GLUCOSE BLDC GLUCOMTR-MCNC: 145 MG/DL — HIGH (ref 70–99)
GLUCOSE BLDC GLUCOMTR-MCNC: 154 MG/DL — HIGH (ref 70–99)
GLUCOSE BLDC GLUCOMTR-MCNC: 237 MG/DL — HIGH (ref 70–99)
GLUCOSE BLDC GLUCOMTR-MCNC: 362 MG/DL — HIGH (ref 70–99)
VANCOMYCIN TROUGH SERPL-MCNC: 18.8 UG/ML — SIGNIFICANT CHANGE UP (ref 10–20)

## 2022-02-13 PROCEDURE — 99232 SBSQ HOSP IP/OBS MODERATE 35: CPT

## 2022-02-13 RX ORDER — MUPIROCIN 20 MG/G
1 OINTMENT TOPICAL
Refills: 0 | Status: DISCONTINUED | OUTPATIENT
Start: 2022-02-13 | End: 2022-02-16

## 2022-02-13 RX ADMIN — INSULIN GLARGINE 35 UNIT(S): 100 INJECTION, SOLUTION SUBCUTANEOUS at 22:55

## 2022-02-13 RX ADMIN — GABAPENTIN 100 MILLIGRAM(S): 400 CAPSULE ORAL at 23:02

## 2022-02-13 RX ADMIN — Medication 3: at 22:59

## 2022-02-13 RX ADMIN — Medication 2: at 17:41

## 2022-02-13 RX ADMIN — GABAPENTIN 100 MILLIGRAM(S): 400 CAPSULE ORAL at 13:06

## 2022-02-13 RX ADMIN — Medication 250 MILLIGRAM(S): at 13:06

## 2022-02-13 RX ADMIN — Medication 1: at 08:12

## 2022-02-13 RX ADMIN — Medication 5 UNIT(S): at 08:11

## 2022-02-13 RX ADMIN — Medication 500 MILLIGRAM(S): at 13:06

## 2022-02-13 RX ADMIN — ATORVASTATIN CALCIUM 40 MILLIGRAM(S): 80 TABLET, FILM COATED ORAL at 22:58

## 2022-02-13 RX ADMIN — ENOXAPARIN SODIUM 40 MILLIGRAM(S): 100 INJECTION SUBCUTANEOUS at 11:33

## 2022-02-13 RX ADMIN — Medication 500 MILLIGRAM(S): at 05:30

## 2022-02-13 RX ADMIN — LOSARTAN POTASSIUM 25 MILLIGRAM(S): 100 TABLET, FILM COATED ORAL at 05:30

## 2022-02-13 RX ADMIN — Medication 5 UNIT(S): at 11:32

## 2022-02-13 RX ADMIN — MUPIROCIN 1 APPLICATION(S): 20 OINTMENT TOPICAL at 17:42

## 2022-02-13 RX ADMIN — Medication 5 UNIT(S): at 17:41

## 2022-02-13 RX ADMIN — GABAPENTIN 100 MILLIGRAM(S): 400 CAPSULE ORAL at 05:30

## 2022-02-13 RX ADMIN — OXYCODONE AND ACETAMINOPHEN 1 TABLET(S): 5; 325 TABLET ORAL at 23:09

## 2022-02-13 NOTE — DISCHARGE NOTE PROVIDER - NSDCFUADDINST_GEN_ALL_CORE_FT
It is important to see your primary physician as well as the physicians noted below within the next week to perform a comprehensive medical review.  Call their offices for an appointment as soon as you leave the hospital.  You will also need to see them for renewal of your medications.  If you do not have a primary physician, contact the [facility] Physician Referral Service at [phone].  To obtain your results, you can access the [facility] Patient Portal at [email or web address].  Your medical issues appear to be stable at this time, but if your symptoms recur or worsen, contact your physicians and/or return to the hospital if necessary.  If you encounter any issues or questions with your medication, call your physicians before stopping the medication.  Do not drive.  Limit your diet to 2 grams of sodium daily.    weekly CBC, CMP, ESR, CRP, Vanco trough. Can fax results to Dr. Mosley:  [phone];

## 2022-02-13 NOTE — DISCHARGE NOTE PROVIDER - PROVIDER TOKENS
FREE:[LAST:[[email or web address]],PHONE:[phone]],FAX:[(   )    -]],PROVIDER:[TOKEN:[[id number]]]

## 2022-02-13 NOTE — CONSULT NOTE ADULT - SUBJECTIVE AND OBJECTIVE BOX
Podiatry pager #: [phone] ([place])/ [phone] ([facility])    Patient is a 46y old  Male who presents with a chief complaint of Rehab placement (12 Feb 2022 13:19)      HPI:          47 yo M with past medical history of previous alcohol and cocaine use w/ PMHx of HTN (not treated), HLD (on meds), DM (started in 2021, not well control in the beginning, on OAD and insulin), osteomyelitis with amputation of the right 4th and 5th toes and partial amputation of the right 2nd toe admitted w/ osteomyelitis at [facility] on 2/1 discharged to Rehab today, presents back to the due to inadequate amenities at the facility. Pt reports the facility was not clean, they had no idea where their wound vacs were, thus he called EMS to be brought back to the Hospital. Pt c/o pain at R foot but otherwise feels well.     [facility] Hospital course:   Pt started on vancomycin & zosyn Podiatry consulted, underwent RLE I&D with wash out and graft placement. Cultures growing GBS & MRSA, ID consulted, recommended vanc and started flagyl 500mg q8hr wound vac placed. PT recommending STEVEN.    In ED, SW attempted Rehab set up however was not able to.        (11 Feb 2022 21:41)      PAST MEDICAL & SURGICAL HISTORY:  Hyperlipidemia, unspecified hyperlipidemia type    Hypertension    History of cocaine use    Diabetes mellitus    Osteomyelitis  R foot    Toe amputation status  Right foot    H/O skin graft  Right foot    S/P insertion of penile implant        MEDICATIONS  (STANDING):  atorvastatin 40 milliGRAM(s) Oral at bedtime  dextrose 40% Gel 15 Gram(s) Oral once  dextrose 5%. 1000 milliLiter(s) (50 mL/Hr) IV Continuous <Continuous>  dextrose 5%. 1000 milliLiter(s) (100 mL/Hr) IV Continuous <Continuous>  dextrose 50% Injectable 25 Gram(s) IV Push once  dextrose 50% Injectable 12.5 Gram(s) IV Push once  dextrose 50% Injectable 25 Gram(s) IV Push once  enoxaparin Injectable 40 milliGRAM(s) SubCutaneous daily  gabapentin 100 milliGRAM(s) Oral every 8 hours  glucagon  Injectable 1 milliGRAM(s) IntraMuscular once  insulin glargine Injectable (LANTUS) 35 Unit(s) SubCutaneous at bedtime  insulin lispro (ADMELOG) corrective regimen sliding scale   SubCutaneous three times a day before meals  insulin lispro (ADMELOG) corrective regimen sliding scale   SubCutaneous at bedtime  insulin lispro Injectable (ADMELOG) 5 Unit(s) SubCutaneous three times a day before meals  losartan 25 milliGRAM(s) Oral daily  metroNIDAZOLE    Tablet 500 milliGRAM(s) Oral every 8 hours  vancomycin  IVPB 1750 milliGRAM(s) IV Intermittent every 12 hours    MEDICATIONS  (PRN):  acetaminophen     Tablet .. 650 milliGRAM(s) Oral every 6 hours PRN Temp greater or equal to 38C (100.4F), Mild Pain (1 - 3)  ondansetron Injectable 4 milliGRAM(s) IV Push every 8 hours PRN Nausea and/or Vomiting  oxycodone    5 mG/acetaminophen 325 mG 1 Tablet(s) Oral every 6 hours PRN Moderate Pain (4 - 6)      Allergies    No Known Allergies    Intolerances        VITALS:    Vital Signs Last 24 Hrs  T(C): 36.4 (13 Feb 2022 05:39), Max: 37 (12 Feb 2022 11:49)  T(F): 97.6 (13 Feb 2022 05:39), Max: 98.6 (12 Feb 2022 11:49)  HR: 64 (13 Feb 2022 05:39) (64 - 84)  BP: 133/80 (13 Feb 2022 05:39) (126/80 - 159/82)  BP(mean): --  RR: 18 (13 Feb 2022 05:39) (18 - 18)  SpO2: 98% (13 Feb 2022 05:39) (97% - 98%)    LABS:                          12.7   7.45  )-----------( 414      ( 12 Feb 2022 07:07 )             38.7       02-12    136  |  105  |  22  ----------------------------<  186<H>  4.1   |  27  |  1.06    Ca    9.3      12 Feb 2022 07:07  Phos  3.3     02-12  Mg     2.3     02-12    TPro  8.0  /  Alb  2.7<L>  /  TBili  0.4  /  DBili  x   /  AST  48<H>  /  ALT  77  /  AlkPhos  61  02-12      CAPILLARY BLOOD GLUCOSE      POCT Blood Glucose.: 154 mg/dL (13 Feb 2022 07:34)  POCT Blood Glucose.: 335 mg/dL (12 Feb 2022 21:45)  POCT Blood Glucose.: 254 mg/dL (12 Feb 2022 15:56)  POCT Blood Glucose.: 262 mg/dL (12 Feb 2022 11:21)          LOWER EXTREMITY PHYSICAL EXAM:  Vasc: DP/PT 2/4 b/l, Temp gradient warm to cool b/l, CFT <3 seconds b/l   Derm:    Right foot- Submet 1 ulcer measuring [id number], Ulcer has Graft and adaptic sutured on top-graft in place. Right foot medial 1st MPJ ulceration [id number] graft and adaptic sutured over top.    Left foot submet 1 wound to dermis, no acute signs of infection, no probing to bone   Neuro: Protective sensation diminished to level of digits b/l  MSK: s/p 4th and 5th right partial ray amputation and digital amputation of 2nd digit of the R foot closed       RADIOLOGY & ADDITIONAL STUDIES:

## 2022-02-13 NOTE — DISCHARGE NOTE PROVIDER - CARE PROVIDERS DIRECT ADDRESSES
,DirectAddress_Unknown,[email or web address]

## 2022-02-13 NOTE — DISCHARGE NOTE PROVIDER - CARE PROVIDER_API CALL
Tracie,   Phone: [phone]  Fax: (   )    -  Follow Up Time:     Santosh Mosley)  Internal Medicine  [address] [place] [address]  Phone: [phone]  Fax: [phone]  Follow Up Time:

## 2022-02-13 NOTE — DISCHARGE NOTE PROVIDER - NSDCMRMEDTOKEN_GEN_ALL_CORE_FT
atorvastatin 40 mg oral tablet: 1 tab(s) orally once a day  Endocet 5/325 oral tablet: 1 tab(s) orally every 6 hours MDD:4  gabapentin 100 mg oral capsule: 1 cap(s) orally every 8 hours  insulin glargine: 35 unit(s) subcutaneous once a day  Insulin Lispro KwikPen 100 units/mL injectable solution: 5  injectable 3 times a day  losartan 25 mg oral tablet: 1 tab(s) orally once a day  metFORMIN 750 mg oral tablet, extended release: 2 tab(s) orally once a day   atorvastatin 40 mg oral tablet: 1 tab(s) orally once a day  gabapentin 100 mg oral capsule: 1 cap(s) orally every 8 hours  insulin glargine: 35 unit(s) subcutaneous once a day  Insulin Lispro KwikPen 100 units/mL injectable solution: 5  injectable 3 times a day  losartan 25 mg oral tablet: 1 tab(s) orally once a day  metFORMIN 750 mg oral tablet, extended release: 2 tab(s) orally once a day  mupirocin 2% topical ointment: 1 application topically 2 times a day L foot wound  oxycodone-acetaminophen 5 mg-325 mg oral tablet: 1 tab(s) orally every 6 hours, As needed, Moderate - severe Pain   atorvastatin 40 mg oral tablet: 1 tab(s) orally once a day  gabapentin 100 mg oral capsule: 1 cap(s) orally every 8 hours  insulin glargine: 35 unit(s) subcutaneous once a day  Insulin Lispro KwikPen 100 units/mL injectable solution: 8  injectable 3 times a day (before meals)  losartan 25 mg oral tablet: 1 tab(s) orally once a day  metFORMIN 750 mg oral tablet, extended release: 2 tab(s) orally once a day  mupirocin 2% topical ointment: 1 application topically 2 times a day L foot wound  oxycodone-acetaminophen 5 mg-325 mg oral tablet: 1 tab(s) orally every 6 hours, As needed, Moderate - severe Pain

## 2022-02-13 NOTE — DISCHARGE NOTE PROVIDER - NSDCCPCAREPLAN_GEN_ALL_CORE_FT
PRINCIPAL DISCHARGE DIAGNOSIS  Diagnosis: Chronic ulcer of right foot due to diabetes mellitus  Assessment and Plan of Treatment:       SECONDARY DISCHARGE DIAGNOSES  Diagnosis: Foot osteomyelitis, right  Assessment and Plan of Treatment:     Diagnosis: Diabetes  Assessment and Plan of Treatment:     Diagnosis: Hypertension  Assessment and Plan of Treatment:

## 2022-02-13 NOTE — DISCHARGE NOTE PROVIDER - HOSPITAL COURSE
47 yo M with past medical history of previous alcohol and cocaine use w/ PMHx of HTN (not treated), HLD (on meds), DM (started in 2021, not well control in the beginning, on OAD and insulin), osteomyelitis with amputation of the right 4th and 5th toes and partial amputation of the right 2nd toe admitted w/ osteomyelitis at [facility] on 2/1 discharged to Rehab, brought in to ED due to supposed inadequate amenities at the facility.   Awaiting rehab placement    1) DM foot Ulcer w/ Osteomyelitis  - ID recommendations; continue vancomycin 1750mg q12hr (2/2-3/16) and flagyl 500mg q8hr (2/7-3/21)  - He will need 6 weeks of IV antibiotics.  Day 1 = 2/2/2022  - weekly CBC, CMP, ESR, CRP, Vanco trough. Can fax results to Dr. Mosley:  [phone]; Outpt follow up with Dr. Mosley:  [address] floor [place], [place].  [phone], option 2  - Dressing instructions: Flush wounds x 2 right foot with sterile saline, DO NOT REMOVE ADAPTIC-sutured onto graft, Apply wound vac over medial and plantar right foot wounds. Vac can be changed every other day. Wrap with ACE bandage after vac placement; will consult wound care while here.   - F/u podiatry outpatient (Dr. Barry Hodges) - Wound vac in place, podiatry following.  - Stable for d/c    2) DM type 2 w/ peripheral neuropathy - last HbA1c 9.8. - c/w Lantus Insulin 35 UI at bedtime. Premeal insulin escalated.  c/w Gabapentin 100mg q8hr.  3) HTN - c/w Losartan  4) HLD - c/w atorvastatin  5) DVT ppx - Lovenox subq as inpatient.      47 yo M with past medical history of previous alcohol and cocaine use w/ PMHx of HTN (not treated), HLD (on meds), DM (started in 2021, not well control in the beginning, on OAD and insulin), osteomyelitis with amputation of the right 4th and 5th toes and partial amputation of the right 2nd toe admitted w/ osteomyelitis at [facility] on 2/1 discharged to Rehab, brought in to ED due to supposed inadequate amenities at the facility.   Awaiting rehab placement    1) DM foot Ulcer w/ Osteomyelitis  - ID recommendations; continue vancomycin 1750mg q12hr (2/2-3/16) and flagyl 500mg q8hr (2/7-3/21)  - He will need 6 weeks of IV antibiotics.  Day 1 = 2/2/2022  - weekly CBC, CMP, ESR, CRP, Vanco trough. Can fax results to Dr. Mosley:  [phone]; Outpt follow up with Dr. Mosley:  [address] floor [place], [place].  [phone], option 2  - Dressing instructions: Flush wounds x 2 right foot with sterile saline, DO NOT REMOVE ADAPTIC-sutured onto graft, Apply wound vac over medial and plantar right foot wounds. Vac can be changed every other day. Wrap with ACE bandage after vac placement; will consult wound care while here.   - F/u podiatry outpatient (Dr. Barry Hodges) - Wound vac in place, podiatry following.  - Stable for d/c    2) DM type 2 w/ peripheral neuropathy - last HbA1c 9.8. - c/w Lantus Insulin 35 UI at bedtime. Premeal insulin escalated.  c/w Gabapentin 100mg q8hr.  3) HTN - c/w Losartan  4) HLD - c/w atorvastatin  5) DVT ppx - Lovenox subq as inpatient.       Disposition: Stable for discharge.  Outpatient followup discussed.  Total time spent on discharge is  35  minutes.

## 2022-02-13 NOTE — DISCHARGE NOTE PROVIDER - NSDCFUADDAPPT_GEN_ALL_CORE_FT
Podiatry Discharge Instructions:  Follow up: Please follow up with Dr. Hodges or Dr. Bassett within 1 week of discharge from the hospital, please call [phone] for appointment and discuss that you recently were seen in the hospital.  Wound Care: Please manage wound VAC to right foot at 125mmHg to be changed 3x/week. Please apply mupirocin and allevyn pad to left foot sub met 1 wound daily.   Weight bearing: Please weight bear as tolerated in a surgical shoe.  Antibiotics: Please continue as instructed. Podiatry Discharge Instructions:  Follow up: Please follow up with Dr. Hodges or Dr. Bassett within 1 week of discharge from the hospital, please call [phone] for appointment and discuss that you recently were seen in the hospital.  Wound Care: Please manage wound VAC to right foot at 125mmHg Dressing instructions: Flush wounds x 2 right foot with sterile saline, DO NOT REMOVE ADAPTIC-sutured onto graft, Apply wound vac over medial and plantar right foot wounds. Vac can be changed every other day. Wrap with ACE bandage after vac placement  Weight bearing: Please weight bear as tolerated in a surgical shoe.  Antibiotics: Please continue as instructed.

## 2022-02-13 NOTE — CONSULT NOTE ADULT - ASSESSMENT
45yo M s/p right foot I&D w/ wound graft application submet 1 (at [facility] 2/2/22)   - pt seen and evaluated   - afebrile, WBC 7.45   - right foot wound VAC placed yesterday, left intact today   - left foot submet 1 wound to dermis, no probing to bone, no acute signs of infection  - no acute pod intervention necessary at this time  - recommend continue wound VAC to right foot  - recommend mupirocin to left foot wound   - pod plan to evaluate RF appearance tomorrow and re-apply VAC  - stable for discharge from pod standpoint, pt to f/u with his own pod Dr. Green for post op care   - discussed w/ attending

## 2022-02-14 LAB
GLUCOSE BLDC GLUCOMTR-MCNC: 219 MG/DL — HIGH (ref 70–99)
GLUCOSE BLDC GLUCOMTR-MCNC: 238 MG/DL — HIGH (ref 70–99)
GLUCOSE BLDC GLUCOMTR-MCNC: 252 MG/DL — HIGH (ref 70–99)
GLUCOSE BLDC GLUCOMTR-MCNC: 290 MG/DL — HIGH (ref 70–99)
GLUCOSE BLDC GLUCOMTR-MCNC: 307 MG/DL — HIGH (ref 70–99)

## 2022-02-14 PROCEDURE — 99232 SBSQ HOSP IP/OBS MODERATE 35: CPT

## 2022-02-14 RX ORDER — SODIUM CHLORIDE 9 MG/ML
10 INJECTION INTRAMUSCULAR; INTRAVENOUS; SUBCUTANEOUS
Refills: 0 | Status: DISCONTINUED | OUTPATIENT
Start: 2022-02-14 | End: 2022-02-16

## 2022-02-14 RX ORDER — CHLORHEXIDINE GLUCONATE 213 G/1000ML
1 SOLUTION TOPICAL DAILY
Refills: 0 | Status: DISCONTINUED | OUTPATIENT
Start: 2022-02-14 | End: 2022-02-16

## 2022-02-14 RX ORDER — CHLORHEXIDINE GLUCONATE 213 G/1000ML
1 SOLUTION TOPICAL
Refills: 0 | Status: DISCONTINUED | OUTPATIENT
Start: 2022-02-14 | End: 2022-02-14

## 2022-02-14 RX ORDER — INSULIN LISPRO 100/ML
6 VIAL (ML) SUBCUTANEOUS
Refills: 0 | Status: DISCONTINUED | OUTPATIENT
Start: 2022-02-14 | End: 2022-02-16

## 2022-02-14 RX ORDER — INSULIN LISPRO 100/ML
4 VIAL (ML) SUBCUTANEOUS
Refills: 0 | Status: DISCONTINUED | OUTPATIENT
Start: 2022-02-14 | End: 2022-02-16

## 2022-02-14 RX ADMIN — Medication 500 MILLIGRAM(S): at 00:18

## 2022-02-14 RX ADMIN — GABAPENTIN 100 MILLIGRAM(S): 400 CAPSULE ORAL at 05:26

## 2022-02-14 RX ADMIN — CHLORHEXIDINE GLUCONATE 1 APPLICATION(S): 213 SOLUTION TOPICAL at 17:30

## 2022-02-14 RX ADMIN — MUPIROCIN 1 APPLICATION(S): 20 OINTMENT TOPICAL at 05:21

## 2022-02-14 RX ADMIN — INSULIN GLARGINE 35 UNIT(S): 100 INJECTION, SOLUTION SUBCUTANEOUS at 22:01

## 2022-02-14 RX ADMIN — Medication 500 MILLIGRAM(S): at 17:30

## 2022-02-14 RX ADMIN — ATORVASTATIN CALCIUM 40 MILLIGRAM(S): 80 TABLET, FILM COATED ORAL at 22:00

## 2022-02-14 RX ADMIN — GABAPENTIN 100 MILLIGRAM(S): 400 CAPSULE ORAL at 22:00

## 2022-02-14 RX ADMIN — Medication 500 MILLIGRAM(S): at 05:26

## 2022-02-14 RX ADMIN — LOSARTAN POTASSIUM 25 MILLIGRAM(S): 100 TABLET, FILM COATED ORAL at 05:26

## 2022-02-14 RX ADMIN — Medication 250 MILLIGRAM(S): at 11:56

## 2022-02-14 RX ADMIN — OXYCODONE AND ACETAMINOPHEN 1 TABLET(S): 5; 325 TABLET ORAL at 22:58

## 2022-02-14 RX ADMIN — GABAPENTIN 100 MILLIGRAM(S): 400 CAPSULE ORAL at 17:31

## 2022-02-14 RX ADMIN — Medication 250 MILLIGRAM(S): at 00:18

## 2022-02-14 RX ADMIN — Medication 6 UNIT(S): at 17:32

## 2022-02-14 RX ADMIN — ENOXAPARIN SODIUM 40 MILLIGRAM(S): 100 INJECTION SUBCUTANEOUS at 11:55

## 2022-02-14 RX ADMIN — OXYCODONE AND ACETAMINOPHEN 1 TABLET(S): 5; 325 TABLET ORAL at 02:36

## 2022-02-14 RX ADMIN — Medication 6 UNIT(S): at 11:55

## 2022-02-14 RX ADMIN — MUPIROCIN 1 APPLICATION(S): 20 OINTMENT TOPICAL at 17:33

## 2022-02-14 RX ADMIN — Medication 4: at 11:55

## 2022-02-14 RX ADMIN — OXYCODONE AND ACETAMINOPHEN 1 TABLET(S): 5; 325 TABLET ORAL at 21:58

## 2022-02-14 RX ADMIN — Medication 250 MILLIGRAM(S): at 23:39

## 2022-02-14 RX ADMIN — Medication 3: at 17:32

## 2022-02-14 RX ADMIN — Medication 500 MILLIGRAM(S): at 23:12

## 2022-02-15 LAB
ANION GAP SERPL CALC-SCNC: 2 MMOL/L — LOW (ref 5–17)
BUN SERPL-MCNC: 14 MG/DL — SIGNIFICANT CHANGE UP (ref 7–23)
CALCIUM SERPL-MCNC: 9.3 MG/DL — SIGNIFICANT CHANGE UP (ref 8.5–10.1)
CHLORIDE SERPL-SCNC: 103 MMOL/L — SIGNIFICANT CHANGE UP (ref 96–108)
CO2 SERPL-SCNC: 31 MMOL/L — SIGNIFICANT CHANGE UP (ref 22–31)
CREAT SERPL-MCNC: 1 MG/DL — SIGNIFICANT CHANGE UP (ref 0.5–1.3)
GLUCOSE BLDC GLUCOMTR-MCNC: 140 MG/DL — HIGH (ref 70–99)
GLUCOSE BLDC GLUCOMTR-MCNC: 213 MG/DL — HIGH (ref 70–99)
GLUCOSE BLDC GLUCOMTR-MCNC: 214 MG/DL — HIGH (ref 70–99)
GLUCOSE BLDC GLUCOMTR-MCNC: 340 MG/DL — HIGH (ref 70–99)
GLUCOSE SERPL-MCNC: 144 MG/DL — HIGH (ref 70–99)
HCT VFR BLD CALC: 39.4 % — SIGNIFICANT CHANGE UP (ref 39–50)
HGB BLD-MCNC: 12.9 G/DL — LOW (ref 13–17)
MCHC RBC-ENTMCNC: 27.9 PG — SIGNIFICANT CHANGE UP (ref 27–34)
MCHC RBC-ENTMCNC: 32.7 G/DL — SIGNIFICANT CHANGE UP (ref 32–36)
MCV RBC AUTO: 85.3 FL — SIGNIFICANT CHANGE UP (ref 80–100)
NRBC # BLD: 0 /100 WBCS — SIGNIFICANT CHANGE UP (ref 0–0)
PLATELET # BLD AUTO: 387 K/UL — SIGNIFICANT CHANGE UP (ref 150–400)
POTASSIUM SERPL-MCNC: 4.1 MMOL/L — SIGNIFICANT CHANGE UP (ref 3.5–5.3)
POTASSIUM SERPL-SCNC: 4.1 MMOL/L — SIGNIFICANT CHANGE UP (ref 3.5–5.3)
RBC # BLD: 4.62 M/UL — SIGNIFICANT CHANGE UP (ref 4.2–5.8)
RBC # FLD: 14.6 % — HIGH (ref 10.3–14.5)
SODIUM SERPL-SCNC: 136 MMOL/L — SIGNIFICANT CHANGE UP (ref 135–145)
WBC # BLD: 7.26 K/UL — SIGNIFICANT CHANGE UP (ref 3.8–10.5)
WBC # FLD AUTO: 7.26 K/UL — SIGNIFICANT CHANGE UP (ref 3.8–10.5)

## 2022-02-15 PROCEDURE — 99232 SBSQ HOSP IP/OBS MODERATE 35: CPT

## 2022-02-15 RX ORDER — MUPIROCIN 20 MG/G
1 OINTMENT TOPICAL
Qty: 0 | Refills: 0 | DISCHARGE
Start: 2022-02-15

## 2022-02-15 RX ORDER — OXYCODONE AND ACETAMINOPHEN 5; 325 MG/1; MG/1
1 TABLET ORAL
Qty: 0 | Refills: 0 | DISCHARGE
Start: 2022-02-15

## 2022-02-15 RX ADMIN — OXYCODONE AND ACETAMINOPHEN 1 TABLET(S): 5; 325 TABLET ORAL at 13:45

## 2022-02-15 RX ADMIN — LOSARTAN POTASSIUM 25 MILLIGRAM(S): 100 TABLET, FILM COATED ORAL at 05:09

## 2022-02-15 RX ADMIN — GABAPENTIN 100 MILLIGRAM(S): 400 CAPSULE ORAL at 13:19

## 2022-02-15 RX ADMIN — MUPIROCIN 1 APPLICATION(S): 20 OINTMENT TOPICAL at 05:14

## 2022-02-15 RX ADMIN — OXYCODONE AND ACETAMINOPHEN 1 TABLET(S): 5; 325 TABLET ORAL at 05:09

## 2022-02-15 RX ADMIN — CHLORHEXIDINE GLUCONATE 1 APPLICATION(S): 213 SOLUTION TOPICAL at 13:07

## 2022-02-15 RX ADMIN — ATORVASTATIN CALCIUM 40 MILLIGRAM(S): 80 TABLET, FILM COATED ORAL at 21:36

## 2022-02-15 RX ADMIN — Medication 2: at 17:13

## 2022-02-15 RX ADMIN — Medication 6 UNIT(S): at 12:28

## 2022-02-15 RX ADMIN — Medication 500 MILLIGRAM(S): at 21:36

## 2022-02-15 RX ADMIN — Medication 250 MILLIGRAM(S): at 23:59

## 2022-02-15 RX ADMIN — OXYCODONE AND ACETAMINOPHEN 1 TABLET(S): 5; 325 TABLET ORAL at 22:50

## 2022-02-15 RX ADMIN — Medication 2: at 21:37

## 2022-02-15 RX ADMIN — MUPIROCIN 1 APPLICATION(S): 20 OINTMENT TOPICAL at 21:34

## 2022-02-15 RX ADMIN — Medication 500 MILLIGRAM(S): at 13:19

## 2022-02-15 RX ADMIN — GABAPENTIN 100 MILLIGRAM(S): 400 CAPSULE ORAL at 05:09

## 2022-02-15 RX ADMIN — OXYCODONE AND ACETAMINOPHEN 1 TABLET(S): 5; 325 TABLET ORAL at 12:34

## 2022-02-15 RX ADMIN — INSULIN GLARGINE 35 UNIT(S): 100 INJECTION, SOLUTION SUBCUTANEOUS at 21:36

## 2022-02-15 RX ADMIN — Medication 2: at 12:27

## 2022-02-15 RX ADMIN — ENOXAPARIN SODIUM 40 MILLIGRAM(S): 100 INJECTION SUBCUTANEOUS at 12:28

## 2022-02-15 RX ADMIN — Medication 250 MILLIGRAM(S): at 13:06

## 2022-02-15 RX ADMIN — GABAPENTIN 100 MILLIGRAM(S): 400 CAPSULE ORAL at 21:36

## 2022-02-15 RX ADMIN — Medication 6 UNIT(S): at 17:14

## 2022-02-15 RX ADMIN — Medication 500 MILLIGRAM(S): at 05:09

## 2022-02-16 ENCOUNTER — TRANSCRIPTION ENCOUNTER (OUTPATIENT)
Age: 47
End: 2022-02-16

## 2022-02-16 VITALS
OXYGEN SATURATION: 97 % | HEART RATE: 83 BPM | RESPIRATION RATE: 17 BRPM | SYSTOLIC BLOOD PRESSURE: 161 MMHG | DIASTOLIC BLOOD PRESSURE: 95 MMHG | TEMPERATURE: 98 F

## 2022-02-16 DIAGNOSIS — Z89.421 ACQUIRED ABSENCE OF OTHER RIGHT TOE(S): ICD-10-CM

## 2022-02-16 DIAGNOSIS — B95.62 METHICILLIN RESISTANT STAPHYLOCOCCUS AUREUS INFECTION AS THE CAUSE OF DISEASES CLASSIFIED ELSEWHERE: ICD-10-CM

## 2022-02-16 DIAGNOSIS — I10 ESSENTIAL (PRIMARY) HYPERTENSION: ICD-10-CM

## 2022-02-16 DIAGNOSIS — L97.529 NON-PRESSURE CHRONIC ULCER OF OTHER PART OF LEFT FOOT WITH UNSPECIFIED SEVERITY: ICD-10-CM

## 2022-02-16 DIAGNOSIS — E11.40 TYPE 2 DIABETES MELLITUS WITH DIABETIC NEUROPATHY, UNSPECIFIED: ICD-10-CM

## 2022-02-16 DIAGNOSIS — Z79.84 LONG TERM (CURRENT) USE OF ORAL HYPOGLYCEMIC DRUGS: ICD-10-CM

## 2022-02-16 DIAGNOSIS — B95.1 STREPTOCOCCUS, GROUP B, AS THE CAUSE OF DISEASES CLASSIFIED ELSEWHERE: ICD-10-CM

## 2022-02-16 DIAGNOSIS — E11.621 TYPE 2 DIABETES MELLITUS WITH FOOT ULCER: ICD-10-CM

## 2022-02-16 DIAGNOSIS — Z79.4 LONG TERM (CURRENT) USE OF INSULIN: ICD-10-CM

## 2022-02-16 DIAGNOSIS — Z86.59 PERSONAL HISTORY OF OTHER MENTAL AND BEHAVIORAL DISORDERS: ICD-10-CM

## 2022-02-16 DIAGNOSIS — L03.115 CELLULITIS OF RIGHT LOWER LIMB: ICD-10-CM

## 2022-02-16 DIAGNOSIS — Z86.16 PERSONAL HISTORY OF COVID-19: ICD-10-CM

## 2022-02-16 DIAGNOSIS — L97.519 NON-PRESSURE CHRONIC ULCER OF OTHER PART OF RIGHT FOOT WITH UNSPECIFIED SEVERITY: ICD-10-CM

## 2022-02-16 DIAGNOSIS — A41.9 SEPSIS, UNSPECIFIED ORGANISM: ICD-10-CM

## 2022-02-16 DIAGNOSIS — R65.20 SEVERE SEPSIS WITHOUT SEPTIC SHOCK: ICD-10-CM

## 2022-02-16 DIAGNOSIS — E78.5 HYPERLIPIDEMIA, UNSPECIFIED: ICD-10-CM

## 2022-02-16 LAB
CRP SERPL-MCNC: 8 MG/L — HIGH
FLUAV AG NPH QL: SIGNIFICANT CHANGE UP
FLUBV AG NPH QL: SIGNIFICANT CHANGE UP
GLUCOSE BLDC GLUCOMTR-MCNC: 192 MG/DL — HIGH (ref 70–99)
GLUCOSE BLDC GLUCOMTR-MCNC: 255 MG/DL — HIGH (ref 70–99)
GLUCOSE BLDC GLUCOMTR-MCNC: 266 MG/DL — HIGH (ref 70–99)
GLUCOSE BLDC GLUCOMTR-MCNC: 267 MG/DL — HIGH (ref 70–99)
SARS-COV-2 RNA SPEC QL NAA+PROBE: SIGNIFICANT CHANGE UP

## 2022-02-16 PROCEDURE — 99239 HOSP IP/OBS DSCHRG MGMT >30: CPT

## 2022-02-16 RX ADMIN — Medication 250 MILLIGRAM(S): at 12:32

## 2022-02-16 RX ADMIN — Medication 500 MILLIGRAM(S): at 06:17

## 2022-02-16 RX ADMIN — OXYCODONE AND ACETAMINOPHEN 1 TABLET(S): 5; 325 TABLET ORAL at 00:41

## 2022-02-16 RX ADMIN — ENOXAPARIN SODIUM 40 MILLIGRAM(S): 100 INJECTION SUBCUTANEOUS at 11:49

## 2022-02-16 RX ADMIN — MUPIROCIN 1 APPLICATION(S): 20 OINTMENT TOPICAL at 17:01

## 2022-02-16 RX ADMIN — Medication 6 UNIT(S): at 16:58

## 2022-02-16 RX ADMIN — OXYCODONE AND ACETAMINOPHEN 1 TABLET(S): 5; 325 TABLET ORAL at 15:30

## 2022-02-16 RX ADMIN — Medication 3: at 16:57

## 2022-02-16 RX ADMIN — Medication 4 UNIT(S): at 09:38

## 2022-02-16 RX ADMIN — GABAPENTIN 100 MILLIGRAM(S): 400 CAPSULE ORAL at 05:54

## 2022-02-16 RX ADMIN — MUPIROCIN 1 APPLICATION(S): 20 OINTMENT TOPICAL at 05:43

## 2022-02-16 RX ADMIN — Medication 500 MILLIGRAM(S): at 14:15

## 2022-02-16 RX ADMIN — GABAPENTIN 100 MILLIGRAM(S): 400 CAPSULE ORAL at 14:15

## 2022-02-16 RX ADMIN — CHLORHEXIDINE GLUCONATE 1 APPLICATION(S): 213 SOLUTION TOPICAL at 11:53

## 2022-02-16 RX ADMIN — Medication 3: at 11:49

## 2022-02-16 RX ADMIN — OXYCODONE AND ACETAMINOPHEN 1 TABLET(S): 5; 325 TABLET ORAL at 14:33

## 2022-02-16 RX ADMIN — Medication 6 UNIT(S): at 11:49

## 2022-02-16 NOTE — DISCHARGE NOTE NURSING/CASE MANAGEMENT/SOCIAL WORK - NSDCPEFALRISK_GEN_ALL_CORE
For information on Fall & Injury Prevention, visit: [email or web address] OR  [email or web address] OR  https://www.cdc.gov/steadi/patient.html

## 2022-02-16 NOTE — DISCHARGE NOTE NURSING/CASE MANAGEMENT/SOCIAL WORK - PATIENT PORTAL LINK FT
You can access the FollowMyHealth Patient Portal offered by [facility] by registering at the following website: [email or web address]. By joining [employer]’s FollowMyHealth portal, you will also be able to view your health information using other applications (apps) compatible with our system.

## 2022-02-16 NOTE — PROGRESS NOTE ADULT - SUBJECTIVE AND OBJECTIVE BOX
Patient: ARIEL MOURA [id number] 46y Male                            Hospitalist Attending Note    No complaints.  No pain / fever / chills.       ____________________PHYSICAL EXAM:  GENERAL:  NAD Alert and Oriented x 3   HEENT: NCAT  CARDIOVASCULAR:  S1, S2  LUNGS: CTAB  ABDOMEN:  soft, (-) tenderness, (-) distension, (+) bowel sounds, (-) guarding, (-) rebound (-) rigidity  EXTREMITIES:  no cyanosis / clubbing / edema.  R foot wound vac in place.   ____________________    VITALS:  Vital Signs Last 24 Hrs  T(C): 36.8 (15 Feb 2022 11:45), Max: 36.8 (15 Feb 2022 11:45)  T(F): 98.3 (15 Feb 2022 11:45), Max: 98.3 (15 Feb 2022 11:45)  HR: 83 (15 Feb 2022 11:45) (71 - 83)  BP: 142/87 (15 Feb 2022 11:45) (123/83 - 160/91)  BP(mean): --  RR: 18 (15 Feb 2022 11:45) (18 - 20)  SpO2: 99% (15 Feb 2022 11:45) (98% - 99%) Daily     Daily   CAPILLARY BLOOD GLUCOSE      POCT Blood Glucose.: 213 mg/dL (15 Feb 2022 11:39)  POCT Blood Glucose.: 140 mg/dL (15 Feb 2022 07:45)  POCT Blood Glucose.: 238 mg/dL (14 Feb 2022 21:13)  POCT Blood Glucose.: 290 mg/dL (14 Feb 2022 17:22)    I&O's Summary    14 Feb 2022 07:01  -  15 Feb 2022 07:00  --------------------------------------------------------  IN: 250 mL / OUT: 1500 mL / NET: -1250 mL        LABS:                        12.9   7.26  )-----------( 387      ( 15 Feb 2022 07:18 )             39.4     02-15    136  |  103  |  14  ----------------------------<  144<H>  4.1   |  31  |  1.00    Ca    9.3      15 Feb 2022 07:18                    MEDICATIONS:  acetaminophen     Tablet .. 650 milliGRAM(s) Oral every 6 hours PRN  atorvastatin 40 milliGRAM(s) Oral at bedtime  chlorhexidine 2% Cloths 1 Application(s) Topical daily  dextrose 40% Gel 15 Gram(s) Oral once  dextrose 5%. 1000 milliLiter(s) IV Continuous <Continuous>  dextrose 5%. 1000 milliLiter(s) IV Continuous <Continuous>  dextrose 50% Injectable 25 Gram(s) IV Push once  dextrose 50% Injectable 12.5 Gram(s) IV Push once  dextrose 50% Injectable 25 Gram(s) IV Push once  enoxaparin Injectable 40 milliGRAM(s) SubCutaneous daily  gabapentin 100 milliGRAM(s) Oral every 8 hours  glucagon  Injectable 1 milliGRAM(s) IntraMuscular once  insulin glargine Injectable (LANTUS) 35 Unit(s) SubCutaneous at bedtime  insulin lispro (ADMELOG) corrective regimen sliding scale   SubCutaneous three times a day before meals  insulin lispro (ADMELOG) corrective regimen sliding scale   SubCutaneous at bedtime  insulin lispro Injectable (ADMELOG) 4 Unit(s) SubCutaneous before breakfast  insulin lispro Injectable (ADMELOG) 6 Unit(s) SubCutaneous before lunch  insulin lispro Injectable (ADMELOG) 6 Unit(s) SubCutaneous before dinner  losartan 25 milliGRAM(s) Oral daily  metroNIDAZOLE    Tablet 500 milliGRAM(s) Oral every 8 hours  mupirocin 2% Ointment 1 Application(s) Topical two times a day  ondansetron Injectable 4 milliGRAM(s) IV Push every 8 hours PRN  oxycodone    5 mG/acetaminophen 325 mG 1 Tablet(s) Oral every 6 hours PRN  sodium chloride 0.9% lock flush 10 milliLiter(s) IV Push every 1 hour PRN  vancomycin  IVPB 1750 milliGRAM(s) IV Intermittent every 12 hours    
                          Patient: ARIEL MOURA [id number] 46y Male                            Hospitalist Attending Note    No complaints.  No pain / fever / chills.   Awaiting STEVEN.   Seen this am with PT / wound care at bedside.     ____________________PHYSICAL EXAM:  GENERAL:  NAD Alert and Oriented x 3   HEENT: NCAT  CARDIOVASCULAR:  S1, S2  LUNGS: CTAB  ABDOMEN:  soft, (-) tenderness, (-) distension, (+) bowel sounds, (-) guarding, (-) rebound (-) rigidity  EXTREMITIES:  no cyanosis / clubbing / edema.  R foot wound vac in place.   ____________________    VITALS:  Vital Signs Last 24 Hrs  T(C): 36.7 (16 Feb 2022 11:52), Max: 36.8 (15 Feb 2022 17:10)  T(F): 98.1 (16 Feb 2022 11:52), Max: 98.2 (15 Feb 2022 17:10)  HR: 81 (16 Feb 2022 11:52) (73 - 82)  BP: 158/98 (16 Feb 2022 11:52) (105/67 - 158/98)  BP(mean): --  RR: 18 (16 Feb 2022 11:52) (18 - 18)  SpO2: 98% (16 Feb 2022 11:52) (95% - 98%) Daily     Daily   CAPILLARY BLOOD GLUCOSE      POCT Blood Glucose.: 255 mg/dL (16 Feb 2022 11:27)  POCT Blood Glucose.: 267 mg/dL (16 Feb 2022 09:36)  POCT Blood Glucose.: 192 mg/dL (16 Feb 2022 07:51)  POCT Blood Glucose.: 340 mg/dL (15 Feb 2022 21:07)    I&O's Summary    15 Feb 2022 07:01  -  16 Feb 2022 07:00  --------------------------------------------------------  IN: 500 mL / OUT: 0 mL / NET: 500 mL        LABS:                        12.9   7.26  )-----------( 387      ( 15 Feb 2022 07:18 )             39.4     02-15    136  |  103  |  14  ----------------------------<  144<H>  4.1   |  31  |  1.00    Ca    9.3      15 Feb 2022 07:18                    MEDICATIONS:  acetaminophen     Tablet .. 650 milliGRAM(s) Oral every 6 hours PRN  atorvastatin 40 milliGRAM(s) Oral at bedtime  chlorhexidine 2% Cloths 1 Application(s) Topical daily  dextrose 40% Gel 15 Gram(s) Oral once  dextrose 5%. 1000 milliLiter(s) IV Continuous <Continuous>  dextrose 5%. 1000 milliLiter(s) IV Continuous <Continuous>  dextrose 50% Injectable 25 Gram(s) IV Push once  dextrose 50% Injectable 12.5 Gram(s) IV Push once  dextrose 50% Injectable 25 Gram(s) IV Push once  enoxaparin Injectable 40 milliGRAM(s) SubCutaneous daily  gabapentin 100 milliGRAM(s) Oral every 8 hours  glucagon  Injectable 1 milliGRAM(s) IntraMuscular once  insulin glargine Injectable (LANTUS) 35 Unit(s) SubCutaneous at bedtime  insulin lispro (ADMELOG) corrective regimen sliding scale   SubCutaneous three times a day before meals  insulin lispro (ADMELOG) corrective regimen sliding scale   SubCutaneous at bedtime  insulin lispro Injectable (ADMELOG) 4 Unit(s) SubCutaneous before breakfast  insulin lispro Injectable (ADMELOG) 6 Unit(s) SubCutaneous before lunch  insulin lispro Injectable (ADMELOG) 6 Unit(s) SubCutaneous before dinner  losartan 25 milliGRAM(s) Oral daily  metroNIDAZOLE    Tablet 500 milliGRAM(s) Oral every 8 hours  mupirocin 2% Ointment 1 Application(s) Topical two times a day  ondansetron Injectable 4 milliGRAM(s) IV Push every 8 hours PRN  oxycodone    5 mG/acetaminophen 325 mG 1 Tablet(s) Oral every 6 hours PRN  sodium chloride 0.9% lock flush 10 milliLiter(s) IV Push every 1 hour PRN  vancomycin  IVPB 1750 milliGRAM(s) IV Intermittent every 12 hours    
                          Patient: ARIEL MOURA [id number] 46y Male                            Hospitalist Attending Note    No complaints.  No pain / fever / chills.   Elevated FS last night.      ____________________PHYSICAL EXAM:  GENERAL:  NAD Alert and Oriented x 3   HEENT: NCAT  CARDIOVASCULAR:  S1, S2  LUNGS: CTAB  ABDOMEN:  soft, (-) tenderness, (-) distension, (+) bowel sounds, (-) guarding, (-) rebound (-) rigidity  EXTREMITIES:  no cyanosis / clubbing / edema.  R foot wound vac in place.   ____________________     VITALS:  Vital Signs Last 24 Hrs  T(C): 36.7 (14 Feb 2022 16:12), Max: 36.9 (13 Feb 2022 16:47)  T(F): 98.1 (14 Feb 2022 16:12), Max: 98.4 (13 Feb 2022 16:47)  HR: 83 (14 Feb 2022 16:12) (68 - 86)  BP: 123/83 (14 Feb 2022 16:12) (123/83 - 165/94)  BP(mean): --  RR: 20 (14 Feb 2022 16:12) (18 - 20)  SpO2: 98% (14 Feb 2022 16:12) (97% - 98%) Daily     Daily   CAPILLARY BLOOD GLUCOSE      POCT Blood Glucose.: 252 mg/dL (14 Feb 2022 15:25)  POCT Blood Glucose.: 307 mg/dL (14 Feb 2022 11:32)  POCT Blood Glucose.: 219 mg/dL (14 Feb 2022 08:15)  POCT Blood Glucose.: 362 mg/dL (13 Feb 2022 22:02)  POCT Blood Glucose.: 237 mg/dL (13 Feb 2022 16:50)    I&O's Summary    13 Feb 2022 07:01  -  14 Feb 2022 07:00  --------------------------------------------------------  IN: 250 mL / OUT: 1000 mL / NET: -750 mL        HISTORY:  PAST MEDICAL & SURGICAL HISTORY:  Hyperlipidemia, unspecified hyperlipidemia type    Hypertension    History of cocaine use    Diabetes mellitus    Osteomyelitis  R foot    Toe amputation status  Right foot    H/O skin graft  Right foot    S/P insertion of penile implant    Allergies    No Known Allergies    Intolerances       LABS:                        MEDICATIONS:  MEDICATIONS  (STANDING):  atorvastatin 40 milliGRAM(s) Oral at bedtime  chlorhexidine 2% Cloths 1 Application(s) Topical daily  dextrose 40% Gel 15 Gram(s) Oral once  dextrose 5%. 1000 milliLiter(s) (50 mL/Hr) IV Continuous <Continuous>  dextrose 5%. 1000 milliLiter(s) (100 mL/Hr) IV Continuous <Continuous>  dextrose 50% Injectable 25 Gram(s) IV Push once  dextrose 50% Injectable 12.5 Gram(s) IV Push once  dextrose 50% Injectable 25 Gram(s) IV Push once  enoxaparin Injectable 40 milliGRAM(s) SubCutaneous daily  gabapentin 100 milliGRAM(s) Oral every 8 hours  glucagon  Injectable 1 milliGRAM(s) IntraMuscular once  insulin glargine Injectable (LANTUS) 35 Unit(s) SubCutaneous at bedtime  insulin lispro (ADMELOG) corrective regimen sliding scale   SubCutaneous three times a day before meals  insulin lispro (ADMELOG) corrective regimen sliding scale   SubCutaneous at bedtime  insulin lispro Injectable (ADMELOG) 4 Unit(s) SubCutaneous before breakfast  insulin lispro Injectable (ADMELOG) 6 Unit(s) SubCutaneous before lunch  insulin lispro Injectable (ADMELOG) 6 Unit(s) SubCutaneous before dinner  losartan 25 milliGRAM(s) Oral daily  metroNIDAZOLE    Tablet 500 milliGRAM(s) Oral every 8 hours  mupirocin 2% Ointment 1 Application(s) Topical two times a day  vancomycin  IVPB 1750 milliGRAM(s) IV Intermittent every 12 hours    MEDICATIONS  (PRN):  acetaminophen     Tablet .. 650 milliGRAM(s) Oral every 6 hours PRN Temp greater or equal to 38C (100.4F), Mild Pain (1 - 3)  ondansetron Injectable 4 milliGRAM(s) IV Push every 8 hours PRN Nausea and/or Vomiting  oxycodone    5 mG/acetaminophen 325 mG 1 Tablet(s) Oral every 6 hours PRN Moderate Pain (4 - 6)  
Podiatry pager #: [phone] (Margaret)/ [phone] ([facility])    Patient is a 46y old  Male who presents with a chief complaint of Rehab placement (15 Feb 2022 15:54)       INTERVAL HPI/OVERNIGHT EVENTS:  Patient seen and evaluated at bedside.  Pt is resting comfortable in NAD. Denies N/V/F/C.     Allergies    No Known Allergies    Intolerances        Vital Signs Last 24 Hrs  T(C): 36.8 (16 Feb 2022 05:29), Max: 36.8 (15 Feb 2022 11:45)  T(F): 98.2 (16 Feb 2022 05:29), Max: 98.3 (15 Feb 2022 11:45)  HR: 73 (16 Feb 2022 05:29) (73 - 83)  BP: 105/67 (16 Feb 2022 05:29) (105/67 - 142/87)  BP(mean): --  RR: 18 (16 Feb 2022 05:29) (18 - 18)  SpO2: 95% (16 Feb 2022 05:29) (95% - 99%)    LABS:                        12.9   7.26  )-----------( 387      ( 15 Feb 2022 07:18 )             39.4     02-15    136  |  103  |  14  ----------------------------<  144<H>  4.1   |  31  |  1.00    Ca    9.3      15 Feb 2022 07:18          CAPILLARY BLOOD GLUCOSE      POCT Blood Glucose.: 267 mg/dL (16 Feb 2022 09:36)  POCT Blood Glucose.: 192 mg/dL (16 Feb 2022 07:51)  POCT Blood Glucose.: 340 mg/dL (15 Feb 2022 21:07)  POCT Blood Glucose.: 214 mg/dL (15 Feb 2022 16:14)  POCT Blood Glucose.: 213 mg/dL (15 Feb 2022 11:39)      Lower Extremity Physical Exam:  Vasc: DP/PT 2/4 b/l, Temp gradient warm to cool b/l, CFT <3 seconds b/l   Neuro: Protective sensation diminished to level of digits b/l  MSK: s/p 4th and 5th right partial ray amputation and digital amputation of 2nd digit of the R foot closed   Derm:    Right foot- Submet 1 ulcer measuring [id number], Ulcer has Graft and adaptic sutured on top-graft in place with mild proximal maceratrion. Right foot medial 1st MPJ ulceration [id number] graft and adaptic sutured over top, graft intact, no signs of infection.    Left foot submet 1 wound with scab, no acute signs of infection, no probing to bone     RADIOLOGY & ADDITIONAL TESTS:  
Patient is a 46y old  Male who presents with a chief complaint of Rehab placement (11 Feb 2022 21:41)    HPI:          45 yo M with past medical history of previous alcohol and cocaine use w/ PMHx of HTN (not treated), HLD (on meds), DM (started in 2021, not well control in the beginning, on OAD and insulin), osteomyelitis with amputation of the right 4th and 5th toes and partial amputation of the right 2nd toe admitted w/ osteomyelitis at [facility] on 2/1 discharged to Rehab today, presents back to the due to inadequate amenities at the facility. Pt reports the facility was not clean, they had no idea where their wound vacs were, thus he called EMS to be brought back to the Hospital. Pt c/o pain at R foot but otherwise feels well.     [facility] Hospital course:   Pt started on vancomycin & zosyn Podiatry consulted, underwent RLE I&D with wash out and graft placement. Cultures growing GBS & MRSA, ID consulted, recommended vanc and started flagyl 500mg q8hr wound vac placed. PT recommending STEVEN.    In ED, SW attempted Rehab set up however was not able to.        (11 Feb 2022 21:41)    SUBJECTIVE & OBJECTIVE: Pt seen and examined at bedside. He is doing well.  Has no complaints.   PHYSICAL EXAM:  Vital Signs Last 24 Hrs  T(C): 37 (12 Feb 2022 11:49), Max: 37 (12 Feb 2022 11:49)  T(F): 98.6 (12 Feb 2022 11:49), Max: 98.6 (12 Feb 2022 11:49)  HR: 84 (12 Feb 2022 11:49) (67 - 89)  BP: 126/80 (12 Feb 2022 11:49) (116/75 - 162/102)  RR: 18 (12 Feb 2022 11:49) (18 - 18)  SpO2: 97% (12 Feb 2022 11:49) (97% - 99%)  Daily Height in cm: 182.9 (12 Feb 2022 03:07)    Daily I&O's Detail    11 Feb 2022 07:01  -  12 Feb 2022 07:00  --------------------------------------------------------  IN:    Oral Fluid: 500 mL  Total IN: 500 mL    OUT:  Total OUT: 0 mL    Total NET: 500 mL        GENERAL: NAD, well-groomed, well-developed  HEAD:  Atraumatic, Normocephalic  EYES: EOMI, PERRLA, conjunctiva and sclera clear  ENMT: Moist mucous membranes  NECK: Supple, No JVD  NERVOUS SYSTEM:  Alert & Oriented X3, Motor Strength 5/5 B/L upper and lower extremities; DTRs 2+ intact and symmetric  CHEST/LUNG: Clear to auscultation bilaterally; No rales, rhonchi, wheezing, or rubs  HEART: Regular rate and rhythm; No murmurs, rubs, or gallops  ABDOMEN: Soft, Nontender, Nondistended; Bowel sounds present  EXTREMITIES:  right le with surgical dressing in place  LABS:                        12.7   7.45  )-----------( 414      ( 12 Feb 2022 07:07 )             38.7   CAPILLARY BLOOD GLUCOSE      POCT Blood Glucose.: 262 mg/dL (12 Feb 2022 11:21)  POCT Blood Glucose.: 143 mg/dL (12 Feb 2022 08:05)  POCT Blood Glucose.: 222 mg/dL (11 Feb 2022 22:50)    RECENT CULTURES:  RADIOLOGY & ADDITIONAL TESTS:  
Patient is a 46y old  Male who presents with a chief complaint of Rehab placement (13 Feb 2022 21:28)       INTERVAL HPI/OVERNIGHT EVENTS:  Patient seen and evaluated at bedside.  Pt is resting comfortable in NAD. Denies N/V/F/C.  Pain rated at X/10    Allergies    No Known Allergies    Intolerances        Vital Signs Last 24 Hrs  T(C): 36.2 (14 Feb 2022 05:22), Max: 36.9 (13 Feb 2022 16:47)  T(F): 97.1 (14 Feb 2022 05:22), Max: 98.4 (13 Feb 2022 16:47)  HR: 68 (14 Feb 2022 05:22) (68 - 86)  BP: 137/84 (14 Feb 2022 05:22) (121/84 - 165/94)  BP(mean): --  RR: 18 (14 Feb 2022 05:22) (18 - 18)  SpO2: 98% (14 Feb 2022 05:22) (97% - 98%)    LABS:              CAPILLARY BLOOD GLUCOSE      POCT Blood Glucose.: 219 mg/dL (14 Feb 2022 08:15)  POCT Blood Glucose.: 362 mg/dL (13 Feb 2022 22:02)  POCT Blood Glucose.: 237 mg/dL (13 Feb 2022 16:50)  POCT Blood Glucose.: 145 mg/dL (13 Feb 2022 10:54)      Lower Extremity Physical Exam:    Vasc: DP/PT 2/4 b/l, Temp gradient warm to cool b/l, CFT <3 seconds b/l   Derm:    Right foot- Submet 1 ulcer measuring [id number], Ulcer has Graft and adaptic sutured on top-graft in place. Right foot medial 1st MPJ ulceration [id number] graft and adaptic sutured over top.    Left foot submet 1 wound to dermis, no acute signs of infection, no probing to bone   Neuro: Protective sensation diminished to level of digits b/l  MSK: s/p 4th and 5th right partial ray amputation and digital amputation of 2nd digit of the R foot closed       RADIOLOGY & ADDITIONAL TESTS:  
Patient is a 46y old  Male who presents with a chief complaint of Rehab placement (2022 08:32)    HPI:          45 yo M with past medical history of previous alcohol and cocaine use w/ PMHx of HTN (not treated), HLD (on meds), DM (started in , not well control in the beginning, on OAD and insulin), osteomyelitis with amputation of the right 4th and 5th toes and partial amputation of the right 2nd toe admitted w/ osteomyelitis at [facility] on  discharged to Rehab today, presents back to the due to inadequate amenities at the facility. Pt reports the facility was not clean, they had no idea where their wound vacs were, thus he called EMS to be brought back to the Hospital. Pt c/o pain at R foot but otherwise feels well.     [facility] Hospital course:   Pt started on vancomycin & zosyn Podiatry consulted, underwent RLE I&D with wash out and graft placement. Cultures growing GBS & MRSA, ID consulted, recommended vanc and started flagyl 500mg q8hr wound vac placed. PT recommending STEVEN.    In ED, SW attempted Rehab set up however was not able to.        (2022 21:41)    SUBJECTIVE & OBJECTIVE: Pt seen and examined at bedside.   PHYSICAL EXAM:  T(C): 36.9 (22 @ 16:47), Max: 36.9 (22 @ 16:47)  HR: 86 (22 @ 16:47) (64 - 86)  BP: 132/92 (22 @ 16:47) (121/84 - 159/82)  RR: 18 (22 @ 16:47) (18 - 18)  SpO2: 97% (22 @ 16:47) (97% - 98%) Daily     Daily Weight in k.6 (2022 05:39)I&O's Detail    2022 07:01  -  2022 07:00  --------------------------------------------------------  IN:    IV PiggyBack: 250 mL  Total IN: 250 mL    OUT:    Voided (mL): 800 mL  Total OUT: 800 mL    Total NET: -550 mL      2022 07:01  -  2022 21:29  --------------------------------------------------------  IN:    IV PiggyBack: 250 mL  Total IN: 250 mL    OUT:    Voided (mL): 1000 mL  Total OUT: 1000 mL    Total NET: -750 mL        GENERAL: NAD, well-groomed, well-developed  HEAD:  Atraumatic, Normocephalic  EYES: EOMI, PERRLA, conjunctiva and sclera clear  ENMT: Moist mucous membranes  NECK: Supple, No JVD  NERVOUS SYSTEM:  Alert & Oriented X3, Motor Strength 5/5 B/L upper and lower extremities; DTRs 2+ intact and symmetric  CHEST/LUNG: Clear to auscultation bilaterally; No rales, rhonchi, wheezing, or rubs  HEART: Regular rate and rhythm; No murmurs, rubs, or gallops  ABDOMEN: Soft, Nontender, Nondistended; Bowel sounds present  EXTREMITIES:  right le in surgical dressing with wound vac applied.  LABS:                        12.7   7.45  )-----------( 414      ( 2022 07:07 )             38.7   CAPILLARY BLOOD GLUCOSE      POCT Blood Glucose.: 237 mg/dL (2022 16:50)  POCT Blood Glucose.: 145 mg/dL (2022 10:54)  POCT Blood Glucose.: 154 mg/dL (2022 07:34)  POCT Blood Glucose.: 335 mg/dL (2022 21:45)    RECENT CULTURES:   RADIOLOGY & ADDITIONAL TESTS:

## 2022-02-16 NOTE — PROGRESS NOTE ADULT - ASSESSMENT
45yo M s/p right foot I&D w/ wound graft application submet 1 (at [facility] 2/2/22)   - pt seen and evaluated   - afebrile, WBC 9.26  - Exam:  Submet 1 ulcer measuring 5.0cmx5.0goo8me, Ulcer has Graft and adaptic sutured on top-graft in place. Right foot medial 1st MPJ ulceration [id number] graft and adaptic sutured over top.  - left foot submet 1 wound to dermis, no probing to bone, no acute signs of infection  - no acute pod intervention necessary at this time  - recommend continue wound VAC to right foot  - recommend mupirocin to left foot wound   - stable for discharge from pod standpoint, pt to f/u with his own pod Dr. Green for post op care   - discussed w/ attending   
  45 yo M with past medical history of previous alcohol and cocaine use w/ PMHx of HTN (not treated), HLD (on meds), DM (started in 2021, not well control in the beginning, on OAD and insulin), osteomyelitis with amputation of the right 4th and 5th toes and partial amputation of the right 2nd toe admitted w/ osteomyelitis at [facility] on 2/1 discharged to Rehab, brought in to ED due to supposed inadequate amenities at the facility.   Awaiting rehab placement    1) DM foot Ulcer w/ Osteomyelitis  - ID recommendations; continue vancomycin 1750mg q12hr (2/2-3/16) and flagyl 500mg q8hr (2/7-3/21)  - He will need 6 weeks of IV antibiotics.  Day 1 = 2/2/2022  - weekly CBC, CMP, ESR, CRP, Vanco trough. Can fax results to Dr. Mosley:  [phone]; Outpt follow up with Dr. Mosley:  [address]th floor [place], [place].  [phone], option 2  - Dressing instructions: Flush wounds x 2 right foot with sterile saline, DO NOT REMOVE ADAPTIC-sutured onto graft, Apply wound vac over medial and plantar right foot wounds. Vac can be changed every other day. Wrap with ACE bandage after vac placement; will consult wound care while here.   - F/u podiatry outpatient (Dr. Barry Hodges) - Wound vac in place, podiatry following.  - Stable for d/c    2) DM type 2 w/ peripheral neuropathy - last HbA1c 9.8. - c/w Lantus Insulin 35 UI at bedtime. Premeal insulin escalated.  c/w Gabapentin 100mg q8hr.  3) HTN - c/w Losartan  4) HLD - c/w atorvastatin  5) DVT ppx - Lovenox subq
  47 yo M with past medical history of previous alcohol and cocaine use w/ PMHx of HTN (not treated), HLD (on meds), DM (started in 2021, not well control in the beginning, on OAD and insulin), osteomyelitis with amputation of the right 4th and 5th toes and partial amputation of the right 2nd toe admitted w/ osteomyelitis at [facility] on 2/1 discharged to Rehab today, presents back to the due to inadequate amenities at the facility. Pt being admitted for another rehab placement    1) DM foot Ulcer w/ Osteomyelitis  - ID recommendations; continue vancomycin 1750mg q12hr (2/2-3/16) and flagyl 500mg q8hr (2/7-3/21)  - He will need 6 weeks of IV antibiotics.  Day 1 = 2/2/2022  - weekly CBC, CMP, ESR, CRP, Vanco trough. Can fax results to Dr. Mosley:  [phone]; all ordered for the morning   - Outpt follow up with Dr. Mosley:  [address], 4th floor [place], [place].  [phone], option 2  - Dressing instructions: Flush wounds x 2 right foot with sterile saline, DO NOT REMOVE ADAPTIC-sutured onto graft, Apply wound vac over medial and plantar right foot wounds. Vac can be changed every other day. Wrap with ACE bandage after vac placement; will consult wound care while here.   - F/u podiatry outpatient (Dr. Barry Hodges)     Wound vac ordered today  Podiatry to see tomorrow  d/c planning       2) DM type 2 w/ peripheral neuropathy  - last HbA1c 9.8.  - c/w Lantus Insulin 35 UI at bedtime. Humalog Insulin 5 UI TID.  - c/w Gabapentin 100mg q8hr    3) HTN  - c/w Losartan    4) HLD  - c/w atorvastatin    5) DVT ppx - Lovenox subq
  45 yo M with past medical history of previous alcohol and cocaine use w/ PMHx of HTN (not treated), HLD (on meds), DM (started in 2021, not well control in the beginning, on OAD and insulin), osteomyelitis with amputation of the right 4th and 5th toes and partial amputation of the right 2nd toe admitted w/ osteomyelitis at [facility] on 2/1 discharged to Rehab, brought in to ED due to supposed inadequate amenities at the facility.   Awaiting rehab placement    1) DM foot Ulcer w/ Osteomyelitis  - ID recommendations; continue vancomycin 1750mg q12hr (2/2-3/16) and flagyl 500mg q8hr (2/7-3/21)  - He will need 6 weeks of IV antibiotics.  Day 1 = 2/2/2022  - weekly CBC, CMP, ESR, CRP, Vanco trough. Can fax results to Dr. Mosley:  [phone]; all ordered for the morning   - Outpt follow up with Dr. Mosley:  [address] [place] [address], 4th floor [place], [place].  [phone], option 2  - Dressing instructions: Flush wounds x 2 right foot with sterile saline, DO NOT REMOVE ADAPTIC-sutured onto graft, Apply wound vac over medial and plantar right foot wounds. Vac can be changed every other day. Wrap with ACE bandage after vac placement; will consult wound care while here.   - F/u podiatry outpatient (Dr. Barry Hodges) Spoke with Dr. Hodges who is ok with plan for d/c to [facility]  - Wound vac in place, podiatry following.  - Stable for d/c    2) DM type 2 w/ peripheral neuropathy - last HbA1c 9.8. - c/w Lantus Insulin 35 UI at bedtime. Humalog Insulin 5 UI TID.  - c/w Gabapentin 100mg q8hr.  FS elevated.  Escalate premeal insulin.   3) HTN - c/w Losartan  4) HLD - c/w atorvastatin  5) DVT ppx - Lovenox subq
  47 yo M with past medical history of previous alcohol and cocaine use w/ PMHx of HTN (not treated), HLD (on meds), DM (started in 2021, not well control in the beginning, on OAD and insulin), osteomyelitis with amputation of the right 4th and 5th toes and partial amputation of the right 2nd toe admitted w/ osteomyelitis at [facility] on 2/1 discharged to Rehab, brought in to ED due to supposed inadequate amenities at the facility.   Awaiting rehab placement    1) DM foot Ulcer w/ Osteomyelitis  - ID recommendations; continue vancomycin 1750mg q12hr (2/2-3/16) and flagyl 500mg q8hr (2/7-3/21)  - He will need 6 weeks of IV antibiotics.  Day 1 = 2/2/2022  - weekly CBC, CMP, ESR, CRP, Vanco trough. Can fax results to Dr. Mosley:  [phone]; Outpt follow up with Dr. Mosley:  [address], 4th floor [place], [place].  [phone], option 2  - Dressing instructions: Flush wounds x 2 right foot with sterile saline, DO NOT REMOVE ADAPTIC-sutured onto graft, Apply wound vac over medial and plantar right foot wounds. Vac can be changed every other day. Wrap with ACE bandage after vac placement; will consult wound care while here.   - F/u podiatry outpatient (Dr. Barry Hodges) - Wound vac in place, podiatry following.  - Stable for d/c    2) DM type 2 w/ peripheral neuropathy - last HbA1c 9.8. - c/w Lantus Insulin 35 UI at bedtime. FS slightly elevated, more towards evening.  Escalate premeal insulin.  c/w Gabapentin 100mg q8hr.  3) HTN - c/w Losartan  4) HLD - c/w atorvastatin  5) DVT ppx - Lovenox subq
45 yo M s/p right foot I&D w/ wound graft application submet 1 (at [facility] 2/2/22)   - pt seen and evaluated   - afebrile, no leukocytosis   - Right foot- Submet 1 ulcer measuring 5.0cmx5.1zst7vi, Ulcer has Graft and adaptic sutured on top-graft in place with mild proximal maceratrion. Right foot medial 1st MPJ ulceration [id number] graft and adaptic sutured over top, graft intact, no signs of infection.    - Left foot submet 1 wound with scab, no acute signs of infection, no probing to bone   - no acute pod intervention necessary at this time  - continue wound VAC to right foot, apply betadine to macerated area  - continue mupirocin to left foot wound   - stable for discharge from pod standpoint, pt to f/u with his own pod Dr. Green for post op care   - discussed w/ attending 
  45 yo M with past medical history of previous alcohol and cocaine use w/ PMHx of HTN (not treated), HLD (on meds), DM (started in 2021, not well control in the beginning, on OAD and insulin), osteomyelitis with amputation of the right 4th and 5th toes and partial amputation of the right 2nd toe admitted w/ osteomyelitis at [facility] on 2/1 discharged to Rehab today, presents back to the due to inadequate amenities at the facility. Pt being admitted for another rehab placement    1) DM foot Ulcer w/ Osteomyelitis  - ID recommendations; continue vancomycin 1750mg q12hr (2/2-3/16) and flagyl 500mg q8hr (2/7-3/21)  - He will need 6 weeks of IV antibiotics.  Day 1 = 2/2/2022  - weekly CBC, CMP, ESR, CRP, Vanco trough. Can fax results to Dr. Mosley:  [phone]; all ordered for the morning   - Outpt follow up with Dr. Mosley:  [address], 4th floor [place], [place].  [phone], option 2  - Dressing instructions: Flush wounds x 2 right foot with sterile saline, DO NOT REMOVE ADAPTIC-sutured onto graft, Apply wound vac over medial and plantar right foot wounds. Vac can be changed every other day. Wrap with ACE bandage after vac placement; will consult wound care while here.   - F/u podiatry outpatient (Dr. Barry Hodges) Spoke with Dr. Hodges who is ok with plan for d/c to [facility]    Wound vac placed today  Podiatry to see tomorrow  d/c planning       2) DM type 2 w/ peripheral neuropathy  - last HbA1c 9.8.  - c/w Lantus Insulin 35 UI at bedtime. Humalog Insulin 5 UI TID.  - c/w Gabapentin 100mg q8hr    3) HTN  - c/w Losartan    4) HLD  - c/w atorvastatin    5) DVT ppx - Lovenox subq

## 2022-02-16 NOTE — PROGRESS NOTE ADULT - REASON FOR ADMISSION
Rehab placement

## 2022-02-16 NOTE — DISCHARGE NOTE NURSING/CASE MANAGEMENT/SOCIAL WORK - NSDCFUADDAPPT_GEN_ALL_CORE_FT
Podiatry Discharge Instructions:  Follow up: Please follow up with Dr. Hodges or Dr. Bassett within 1 week of discharge from the hospital, please call [phone] for appointment and discuss that you recently were seen in the hospital.  Wound Care: Please manage wound VAC to right foot at 125mmHg Dressing instructions: Flush wounds x 2 right foot with sterile saline, DO NOT REMOVE ADAPTIC-sutured onto graft, Apply wound vac over medial and plantar right foot wounds. Vac can be changed every other day. Wrap with ACE bandage after vac placement  Weight bearing: Please weight bear as tolerated in a surgical shoe.  Antibiotics: Please continue as instructed.

## 2022-02-16 NOTE — DISCHARGE NOTE NURSING/CASE MANAGEMENT/SOCIAL WORK - NSDCVIVACCINE_GEN_ALL_CORE_FT
Tdap; 30-Jun-2021 18:05; Lorrie Ziegler (IVELISSE); [employer]; [id number] (Exp. Date: 25-Nov-2022); IntraMuscular; Deltoid Left.; 0.5 milliLiter(s); VIS (VIS Published: 09-May-2013, VIS Presented: 30-Jun-2021);

## 2022-02-23 ENCOUNTER — APPOINTMENT (OUTPATIENT)
Dept: INFECTIOUS DISEASE | Facility: CLINIC | Age: 47
End: 2022-02-23

## 2022-02-27 DIAGNOSIS — Z79.4 LONG TERM (CURRENT) USE OF INSULIN: ICD-10-CM

## 2022-02-27 DIAGNOSIS — Z75.1 PERSON AWAITING ADMISSION TO ADEQUATE FACILITY ELSEWHERE: ICD-10-CM

## 2022-02-27 DIAGNOSIS — Z89.421 ACQUIRED ABSENCE OF OTHER RIGHT TOE(S): ICD-10-CM

## 2022-02-27 DIAGNOSIS — E11.621 TYPE 2 DIABETES MELLITUS WITH FOOT ULCER: ICD-10-CM

## 2022-02-27 DIAGNOSIS — E11.69 TYPE 2 DIABETES MELLITUS WITH OTHER SPECIFIED COMPLICATION: ICD-10-CM

## 2022-02-27 DIAGNOSIS — M86.9 OSTEOMYELITIS, UNSPECIFIED: ICD-10-CM

## 2022-02-27 DIAGNOSIS — Z79.84 LONG TERM (CURRENT) USE OF ORAL HYPOGLYCEMIC DRUGS: ICD-10-CM

## 2022-02-27 DIAGNOSIS — I10 ESSENTIAL (PRIMARY) HYPERTENSION: ICD-10-CM

## 2022-02-27 DIAGNOSIS — E78.5 HYPERLIPIDEMIA, UNSPECIFIED: ICD-10-CM

## 2022-02-27 DIAGNOSIS — L97.529 NON-PRESSURE CHRONIC ULCER OF OTHER PART OF LEFT FOOT WITH UNSPECIFIED SEVERITY: ICD-10-CM

## 2022-02-27 DIAGNOSIS — L97.519 NON-PRESSURE CHRONIC ULCER OF OTHER PART OF RIGHT FOOT WITH UNSPECIFIED SEVERITY: ICD-10-CM

## 2022-02-27 DIAGNOSIS — E11.42 TYPE 2 DIABETES MELLITUS WITH DIABETIC POLYNEUROPATHY: ICD-10-CM

## 2022-03-21 ENCOUNTER — APPOINTMENT (OUTPATIENT)
Dept: INFECTIOUS DISEASE | Facility: CLINIC | Age: 47
End: 2022-03-21
Payer: COMMERCIAL

## 2022-03-21 VITALS
OXYGEN SATURATION: 99 % | DIASTOLIC BLOOD PRESSURE: 92 MMHG | HEART RATE: 94 BPM | SYSTOLIC BLOOD PRESSURE: 140 MMHG | HEIGHT: 72 IN | TEMPERATURE: 97.3 F

## 2022-03-21 DIAGNOSIS — E11.621 TYPE 2 DIABETES MELLITUS WITH FOOT ULCER: ICD-10-CM

## 2022-03-21 DIAGNOSIS — L97.509 TYPE 2 DIABETES MELLITUS WITH FOOT ULCER: ICD-10-CM

## 2022-03-21 PROCEDURE — 99213 OFFICE O/P EST LOW 20 MIN: CPT

## 2022-03-21 NOTE — HISTORY OF PRESENT ILLNESS
[FreeTextEntry1] : 45 yo M pt with previous alcohol and cocaine use w/ PMHx of HTN (not treated), HLD (on meds), DM (started in 2021, not well control in the beginning, on OAD and insulin), osteomyelitis with amputation of the right 4th and 5th toes and partial amputation of the right 2nd toe, who was diagnosed w/ COVID on 12/24/21. Admitted to [facility] Feb 2022 for diabetic foot infection after stepping on nail at work. On 2/2 he underwent I&D. OR cultures with MRSA, GBS, gemella and bacteroides. He was treated with vanco and metronidazole x 6 weeks, end date 3/16.  He has been off antibiotics since that time. PICC is still in place.

## 2022-03-21 NOTE — PHYSICAL EXAM
[General Appearance - Alert] : alert [Sclera] : the sclera and conjunctiva were normal [Outer Ear] : the ears and nose were normal in appearance [Neck Appearance] : the appearance of the neck was normal [Heart Rate And Rhythm] : heart rate was normal and rhythm regular [Heart Sounds] : normal S1 and S2 [Edema] : there was no peripheral edema [Abdomen Soft] : soft [No Palpable Adenopathy] : no palpable adenopathy [] : no rash [Motor Exam] : the motor exam was normal [Oriented To Time, Place, And Person] : oriented to person, place, and time [FreeTextEntry1] : left foot with well-healed ulcer.  No drainage, no erythema

## 2022-03-22 NOTE — PROCEDURE NOTE - NSSIZEINFR_GEN_A_CORE
4 Unna Boot Text: An Unna boot was placed to help immobilize the limb and facilitate more rapid healing.

## 2022-04-25 NOTE — ED PROVIDER NOTE - PENDING LAB RAD OPT OUT
[phone]
Exclude Pending Lab and Radiology orders from printing on the Patient's Discharge Instructions, due to Privacy Concerns.

## 2022-05-02 ENCOUNTER — INPATIENT (INPATIENT)
Facility: HOSPITAL | Age: 47
LOS: 16 days | Discharge: NURSING, REHAB OR LONG-TERM CARE | DRG: 617 | End: 2022-05-19
Attending: INTERNAL MEDICINE | Admitting: STUDENT IN AN ORGANIZED HEALTH CARE EDUCATION/TRAINING PROGRAM
Payer: COMMERCIAL

## 2022-05-02 ENCOUNTER — TRANSCRIPTION ENCOUNTER (OUTPATIENT)
Age: 47
End: 2022-05-02

## 2022-05-02 VITALS
HEART RATE: 90 BPM | DIASTOLIC BLOOD PRESSURE: 72 MMHG | HEIGHT: 72 IN | SYSTOLIC BLOOD PRESSURE: 105 MMHG | TEMPERATURE: 98 F | WEIGHT: 235.01 LBS | RESPIRATION RATE: 17 BRPM | OXYGEN SATURATION: 99 %

## 2022-05-02 DIAGNOSIS — Z89.429 ACQUIRED ABSENCE OF OTHER TOE(S), UNSPECIFIED SIDE: Chronic | ICD-10-CM

## 2022-05-02 DIAGNOSIS — Z96.0 PRESENCE OF UROGENITAL IMPLANTS: Chronic | ICD-10-CM

## 2022-05-02 DIAGNOSIS — M86.10 OTHER ACUTE OSTEOMYELITIS, UNSPECIFIED SITE: ICD-10-CM

## 2022-05-02 DIAGNOSIS — E11.9 TYPE 2 DIABETES MELLITUS WITHOUT COMPLICATIONS: ICD-10-CM

## 2022-05-02 DIAGNOSIS — Z94.5 SKIN TRANSPLANT STATUS: Chronic | ICD-10-CM

## 2022-05-02 DIAGNOSIS — I10 ESSENTIAL (PRIMARY) HYPERTENSION: ICD-10-CM

## 2022-05-02 DIAGNOSIS — L02.611 CUTANEOUS ABSCESS OF RIGHT FOOT: ICD-10-CM

## 2022-05-02 DIAGNOSIS — L97.519 NON-PRESSURE CHRONIC ULCER OF OTHER PART OF RIGHT FOOT WITH UNSPECIFIED SEVERITY: ICD-10-CM

## 2022-05-02 DIAGNOSIS — E78.5 HYPERLIPIDEMIA, UNSPECIFIED: ICD-10-CM

## 2022-05-02 DIAGNOSIS — M86.9 OSTEOMYELITIS, UNSPECIFIED: ICD-10-CM

## 2022-05-02 DIAGNOSIS — R63.8 OTHER SYMPTOMS AND SIGNS CONCERNING FOOD AND FLUID INTAKE: ICD-10-CM

## 2022-05-02 LAB
A1C WITH ESTIMATED AVERAGE GLUCOSE RESULT: 8.9 % — HIGH (ref 4–5.6)
ALBUMIN SERPL ELPH-MCNC: 3.1 G/DL — LOW (ref 3.3–5)
ALBUMIN SERPL ELPH-MCNC: 3.7 G/DL — SIGNIFICANT CHANGE UP (ref 3.3–5)
ALP SERPL-CCNC: 105 U/L — SIGNIFICANT CHANGE UP (ref 40–120)
ALP SERPL-CCNC: 124 U/L — HIGH (ref 40–120)
ALT FLD-CCNC: 33 U/L — SIGNIFICANT CHANGE UP (ref 10–45)
ALT FLD-CCNC: 33 U/L — SIGNIFICANT CHANGE UP (ref 10–45)
ANION GAP SERPL CALC-SCNC: 10 MMOL/L — SIGNIFICANT CHANGE UP (ref 5–17)
ANION GAP SERPL CALC-SCNC: 16 MMOL/L — SIGNIFICANT CHANGE UP (ref 5–17)
APTT BLD: 20.7 SEC — LOW (ref 27.5–35.5)
APTT BLD: 32.3 SEC — SIGNIFICANT CHANGE UP (ref 27.5–35.5)
AST SERPL-CCNC: 22 U/L — SIGNIFICANT CHANGE UP (ref 10–40)
AST SERPL-CCNC: 28 U/L — SIGNIFICANT CHANGE UP (ref 10–40)
B-OH-BUTYR SERPL-SCNC: 0.5 MMOL/L — HIGH
BASOPHILS # BLD AUTO: 0.03 K/UL — SIGNIFICANT CHANGE UP (ref 0–0.2)
BASOPHILS # BLD AUTO: 0.06 K/UL — SIGNIFICANT CHANGE UP (ref 0–0.2)
BASOPHILS NFR BLD AUTO: 0.2 % — SIGNIFICANT CHANGE UP (ref 0–2)
BASOPHILS NFR BLD AUTO: 0.3 % — SIGNIFICANT CHANGE UP (ref 0–2)
BILIRUB SERPL-MCNC: 0.6 MG/DL — SIGNIFICANT CHANGE UP (ref 0.2–1.2)
BILIRUB SERPL-MCNC: 0.6 MG/DL — SIGNIFICANT CHANGE UP (ref 0.2–1.2)
BLD GP AB SCN SERPL QL: NEGATIVE — SIGNIFICANT CHANGE UP
BUN SERPL-MCNC: 20 MG/DL — SIGNIFICANT CHANGE UP (ref 7–23)
BUN SERPL-MCNC: 22 MG/DL — SIGNIFICANT CHANGE UP (ref 7–23)
CALCIUM SERPL-MCNC: 9 MG/DL — SIGNIFICANT CHANGE UP (ref 8.4–10.5)
CALCIUM SERPL-MCNC: 9.4 MG/DL — SIGNIFICANT CHANGE UP (ref 8.4–10.5)
CHLORIDE SERPL-SCNC: 96 MMOL/L — SIGNIFICANT CHANGE UP (ref 96–108)
CHLORIDE SERPL-SCNC: 99 MMOL/L — SIGNIFICANT CHANGE UP (ref 96–108)
CO2 SERPL-SCNC: 20 MMOL/L — LOW (ref 22–31)
CO2 SERPL-SCNC: 25 MMOL/L — SIGNIFICANT CHANGE UP (ref 22–31)
CREAT SERPL-MCNC: 1.04 MG/DL — SIGNIFICANT CHANGE UP (ref 0.5–1.3)
CREAT SERPL-MCNC: 1.11 MG/DL — SIGNIFICANT CHANGE UP (ref 0.5–1.3)
CRP SERPL-MCNC: 252.8 MG/L — HIGH (ref 0–4)
EGFR: 83 ML/MIN/1.73M2 — SIGNIFICANT CHANGE UP
EGFR: 90 ML/MIN/1.73M2 — SIGNIFICANT CHANGE UP
EOSINOPHIL # BLD AUTO: 0.38 K/UL — SIGNIFICANT CHANGE UP (ref 0–0.5)
EOSINOPHIL # BLD AUTO: 0.42 K/UL — SIGNIFICANT CHANGE UP (ref 0–0.5)
EOSINOPHIL NFR BLD AUTO: 2.2 % — SIGNIFICANT CHANGE UP (ref 0–6)
EOSINOPHIL NFR BLD AUTO: 3.4 % — SIGNIFICANT CHANGE UP (ref 0–6)
ESTIMATED AVERAGE GLUCOSE: 209 MG/DL — HIGH (ref 68–114)
FERRITIN SERPL-MCNC: 189 NG/ML — SIGNIFICANT CHANGE UP (ref 30–400)
GLUCOSE BLDC GLUCOMTR-MCNC: 114 MG/DL — HIGH (ref 70–99)
GLUCOSE BLDC GLUCOMTR-MCNC: 201 MG/DL — HIGH (ref 70–99)
GLUCOSE BLDC GLUCOMTR-MCNC: 249 MG/DL — HIGH (ref 70–99)
GLUCOSE BLDC GLUCOMTR-MCNC: 279 MG/DL — HIGH (ref 70–99)
GLUCOSE BLDC GLUCOMTR-MCNC: 310 MG/DL — HIGH (ref 70–99)
GLUCOSE BLDC GLUCOMTR-MCNC: 313 MG/DL — HIGH (ref 70–99)
GLUCOSE SERPL-MCNC: 221 MG/DL — HIGH (ref 70–99)
GLUCOSE SERPL-MCNC: 415 MG/DL — HIGH (ref 70–99)
HCT VFR BLD CALC: 37.6 % — LOW (ref 39–50)
HCT VFR BLD CALC: 37.9 % — LOW (ref 39–50)
HGB BLD-MCNC: 12.4 G/DL — LOW (ref 13–17)
HGB BLD-MCNC: 12.6 G/DL — LOW (ref 13–17)
IMM GRANULOCYTES NFR BLD AUTO: 0.4 % — SIGNIFICANT CHANGE UP (ref 0–1.5)
IMM GRANULOCYTES NFR BLD AUTO: 0.5 % — SIGNIFICANT CHANGE UP (ref 0–1.5)
INR BLD: 0.92 — SIGNIFICANT CHANGE UP (ref 0.88–1.16)
INR BLD: 0.96 — SIGNIFICANT CHANGE UP (ref 0.88–1.16)
IRON SATN MFR SERPL: 20 UG/DL — LOW (ref 45–165)
IRON SATN MFR SERPL: 9 % — LOW (ref 16–55)
LACTATE SERPL-SCNC: 1.8 MMOL/L — SIGNIFICANT CHANGE UP (ref 0.5–2)
LYMPHOCYTES # BLD AUTO: 16.5 % — SIGNIFICANT CHANGE UP (ref 13–44)
LYMPHOCYTES # BLD AUTO: 2.06 K/UL — SIGNIFICANT CHANGE UP (ref 1–3.3)
LYMPHOCYTES # BLD AUTO: 21 % — SIGNIFICANT CHANGE UP (ref 13–44)
LYMPHOCYTES # BLD AUTO: 3.61 K/UL — HIGH (ref 1–3.3)
MAGNESIUM SERPL-MCNC: 2.2 MG/DL — SIGNIFICANT CHANGE UP (ref 1.6–2.6)
MCHC RBC-ENTMCNC: 29 PG — SIGNIFICANT CHANGE UP (ref 27–34)
MCHC RBC-ENTMCNC: 29.1 PG — SIGNIFICANT CHANGE UP (ref 27–34)
MCHC RBC-ENTMCNC: 33 GM/DL — SIGNIFICANT CHANGE UP (ref 32–36)
MCHC RBC-ENTMCNC: 33.2 GM/DL — SIGNIFICANT CHANGE UP (ref 32–36)
MCV RBC AUTO: 87.1 FL — SIGNIFICANT CHANGE UP (ref 80–100)
MCV RBC AUTO: 88.3 FL — SIGNIFICANT CHANGE UP (ref 80–100)
MONOCYTES # BLD AUTO: 1.16 K/UL — HIGH (ref 0–0.9)
MONOCYTES # BLD AUTO: 1.57 K/UL — HIGH (ref 0–0.9)
MONOCYTES NFR BLD AUTO: 9.1 % — SIGNIFICANT CHANGE UP (ref 2–14)
MONOCYTES NFR BLD AUTO: 9.3 % — SIGNIFICANT CHANGE UP (ref 2–14)
NEUTROPHILS # BLD AUTO: 11.53 K/UL — HIGH (ref 1.8–7.4)
NEUTROPHILS # BLD AUTO: 8.76 K/UL — HIGH (ref 1.8–7.4)
NEUTROPHILS NFR BLD AUTO: 66.9 % — SIGNIFICANT CHANGE UP (ref 43–77)
NEUTROPHILS NFR BLD AUTO: 70.2 % — SIGNIFICANT CHANGE UP (ref 43–77)
NRBC # BLD: 0 /100 WBCS — SIGNIFICANT CHANGE UP (ref 0–0)
NRBC # BLD: 0 /100 WBCS — SIGNIFICANT CHANGE UP (ref 0–0)
PHOSPHATE SERPL-MCNC: 3.4 MG/DL — SIGNIFICANT CHANGE UP (ref 2.5–4.5)
PLATELET # BLD AUTO: 351 K/UL — SIGNIFICANT CHANGE UP (ref 150–400)
PLATELET # BLD AUTO: 378 K/UL — SIGNIFICANT CHANGE UP (ref 150–400)
POTASSIUM SERPL-MCNC: 4.3 MMOL/L — SIGNIFICANT CHANGE UP (ref 3.5–5.3)
POTASSIUM SERPL-MCNC: 4.7 MMOL/L — SIGNIFICANT CHANGE UP (ref 3.5–5.3)
POTASSIUM SERPL-SCNC: 4.3 MMOL/L — SIGNIFICANT CHANGE UP (ref 3.5–5.3)
POTASSIUM SERPL-SCNC: 4.7 MMOL/L — SIGNIFICANT CHANGE UP (ref 3.5–5.3)
PROT SERPL-MCNC: 7.5 G/DL — SIGNIFICANT CHANGE UP (ref 6–8.3)
PROT SERPL-MCNC: 8.1 G/DL — SIGNIFICANT CHANGE UP (ref 6–8.3)
PROTHROM AB SERPL-ACNC: 10.9 SEC — SIGNIFICANT CHANGE UP (ref 10.5–13.4)
PROTHROM AB SERPL-ACNC: 11.4 SEC — SIGNIFICANT CHANGE UP (ref 10.5–13.4)
RBC # BLD: 4.26 M/UL — SIGNIFICANT CHANGE UP (ref 4.2–5.8)
RBC # BLD: 4.35 M/UL — SIGNIFICANT CHANGE UP (ref 4.2–5.8)
RBC # FLD: 14 % — SIGNIFICANT CHANGE UP (ref 10.3–14.5)
RBC # FLD: 14 % — SIGNIFICANT CHANGE UP (ref 10.3–14.5)
RH IG SCN BLD-IMP: POSITIVE — SIGNIFICANT CHANGE UP
SARS-COV-2 RNA SPEC QL NAA+PROBE: NEGATIVE — SIGNIFICANT CHANGE UP
SODIUM SERPL-SCNC: 132 MMOL/L — LOW (ref 135–145)
SODIUM SERPL-SCNC: 134 MMOL/L — LOW (ref 135–145)
TIBC SERPL-MCNC: 219 UG/DL — LOW (ref 220–430)
TRANSFERRIN SERPL-MCNC: 180 MG/DL — LOW (ref 200–360)
UIBC SERPL-MCNC: 199 UG/DL — SIGNIFICANT CHANGE UP (ref 110–370)
WBC # BLD: 12.48 K/UL — HIGH (ref 3.8–10.5)
WBC # BLD: 17.23 K/UL — HIGH (ref 3.8–10.5)
WBC # FLD AUTO: 12.48 K/UL — HIGH (ref 3.8–10.5)
WBC # FLD AUTO: 17.23 K/UL — HIGH (ref 3.8–10.5)

## 2022-05-02 PROCEDURE — 99222 1ST HOSP IP/OBS MODERATE 55: CPT

## 2022-05-02 PROCEDURE — 99223 1ST HOSP IP/OBS HIGH 75: CPT

## 2022-05-02 PROCEDURE — 99285 EMERGENCY DEPT VISIT HI MDM: CPT

## 2022-05-02 PROCEDURE — 73600 X-RAY EXAM OF ANKLE: CPT | Mod: 26,RT

## 2022-05-02 PROCEDURE — 73620 X-RAY EXAM OF FOOT: CPT | Mod: 26,RT

## 2022-05-02 RX ORDER — LOSARTAN POTASSIUM 100 MG/1
25 TABLET, FILM COATED ORAL DAILY
Refills: 0 | Status: DISCONTINUED | OUTPATIENT
Start: 2022-05-02 | End: 2022-05-02

## 2022-05-02 RX ORDER — VANCOMYCIN HCL 1 G
2000 VIAL (EA) INTRAVENOUS EVERY 12 HOURS
Refills: 0 | Status: DISCONTINUED | OUTPATIENT
Start: 2022-05-02 | End: 2022-05-02

## 2022-05-02 RX ORDER — ACETAMINOPHEN 500 MG
650 TABLET ORAL EVERY 6 HOURS
Refills: 0 | Status: DISCONTINUED | OUTPATIENT
Start: 2022-05-02 | End: 2022-05-04

## 2022-05-02 RX ORDER — PIPERACILLIN AND TAZOBACTAM 4; .5 G/20ML; G/20ML
4.5 INJECTION, POWDER, LYOPHILIZED, FOR SOLUTION INTRAVENOUS EVERY 6 HOURS
Refills: 0 | Status: DISCONTINUED | OUTPATIENT
Start: 2022-05-02 | End: 2022-05-05

## 2022-05-02 RX ORDER — PIPERACILLIN AND TAZOBACTAM 4; .5 G/20ML; G/20ML
4.5 INJECTION, POWDER, LYOPHILIZED, FOR SOLUTION INTRAVENOUS EVERY 6 HOURS
Refills: 0 | Status: DISCONTINUED | OUTPATIENT
Start: 2022-05-02 | End: 2022-05-02

## 2022-05-02 RX ORDER — ATORVASTATIN CALCIUM 80 MG/1
40 TABLET, FILM COATED ORAL AT BEDTIME
Refills: 0 | Status: DISCONTINUED | OUTPATIENT
Start: 2022-05-02 | End: 2022-05-19

## 2022-05-02 RX ORDER — GABAPENTIN 400 MG/1
100 CAPSULE ORAL EVERY 8 HOURS
Refills: 0 | Status: DISCONTINUED | OUTPATIENT
Start: 2022-05-02 | End: 2022-05-19

## 2022-05-02 RX ORDER — INSULIN HUMAN 100 [IU]/ML
10 INJECTION, SOLUTION SUBCUTANEOUS ONCE
Refills: 0 | Status: COMPLETED | OUTPATIENT
Start: 2022-05-02 | End: 2022-05-02

## 2022-05-02 RX ORDER — INSULIN LISPRO 100/ML
VIAL (ML) SUBCUTANEOUS
Refills: 0 | Status: DISCONTINUED | OUTPATIENT
Start: 2022-05-02 | End: 2022-05-19

## 2022-05-02 RX ORDER — LOSARTAN POTASSIUM 100 MG/1
25 TABLET, FILM COATED ORAL DAILY
Refills: 0 | Status: DISCONTINUED | OUTPATIENT
Start: 2022-05-02 | End: 2022-05-06

## 2022-05-02 RX ORDER — PIPERACILLIN AND TAZOBACTAM 4; .5 G/20ML; G/20ML
3.38 INJECTION, POWDER, LYOPHILIZED, FOR SOLUTION INTRAVENOUS ONCE
Refills: 0 | Status: COMPLETED | OUTPATIENT
Start: 2022-05-02 | End: 2022-05-02

## 2022-05-02 RX ORDER — INSULIN GLARGINE 100 [IU]/ML
25 INJECTION, SOLUTION SUBCUTANEOUS AT BEDTIME
Refills: 0 | Status: DISCONTINUED | OUTPATIENT
Start: 2022-05-02 | End: 2022-05-08

## 2022-05-02 RX ORDER — VANCOMYCIN HCL 1 G
2000 VIAL (EA) INTRAVENOUS ONCE
Refills: 0 | Status: COMPLETED | OUTPATIENT
Start: 2022-05-02 | End: 2022-05-02

## 2022-05-02 RX ORDER — VANCOMYCIN HCL 1 G
2000 VIAL (EA) INTRAVENOUS EVERY 12 HOURS
Refills: 0 | Status: COMPLETED | OUTPATIENT
Start: 2022-05-02 | End: 2022-05-03

## 2022-05-02 RX ADMIN — INSULIN HUMAN 10 UNIT(S): 100 INJECTION, SOLUTION SUBCUTANEOUS at 06:08

## 2022-05-02 RX ADMIN — Medication 650 MILLIGRAM(S): at 21:35

## 2022-05-02 RX ADMIN — LOSARTAN POTASSIUM 25 MILLIGRAM(S): 100 TABLET, FILM COATED ORAL at 17:21

## 2022-05-02 RX ADMIN — PIPERACILLIN AND TAZOBACTAM 200 GRAM(S): 4; .5 INJECTION, POWDER, LYOPHILIZED, FOR SOLUTION INTRAVENOUS at 17:15

## 2022-05-02 RX ADMIN — Medication 650 MILLIGRAM(S): at 22:35

## 2022-05-02 RX ADMIN — PIPERACILLIN AND TAZOBACTAM 200 GRAM(S): 4; .5 INJECTION, POWDER, LYOPHILIZED, FOR SOLUTION INTRAVENOUS at 11:05

## 2022-05-02 RX ADMIN — Medication 250 MILLIGRAM(S): at 18:15

## 2022-05-02 RX ADMIN — INSULIN GLARGINE 25 UNIT(S): 100 INJECTION, SOLUTION SUBCUTANEOUS at 23:52

## 2022-05-02 RX ADMIN — Medication 250 MILLIGRAM(S): at 05:27

## 2022-05-02 RX ADMIN — Medication 4: at 11:06

## 2022-05-02 RX ADMIN — PIPERACILLIN AND TAZOBACTAM 200 GRAM(S): 4; .5 INJECTION, POWDER, LYOPHILIZED, FOR SOLUTION INTRAVENOUS at 05:04

## 2022-05-02 RX ADMIN — GABAPENTIN 100 MILLIGRAM(S): 400 CAPSULE ORAL at 21:35

## 2022-05-02 RX ADMIN — PIPERACILLIN AND TAZOBACTAM 200 GRAM(S): 4; .5 INJECTION, POWDER, LYOPHILIZED, FOR SOLUTION INTRAVENOUS at 23:05

## 2022-05-02 RX ADMIN — Medication 6: at 23:03

## 2022-05-02 RX ADMIN — ATORVASTATIN CALCIUM 40 MILLIGRAM(S): 80 TABLET, FILM COATED ORAL at 21:34

## 2022-05-02 NOTE — H&P ADULT - PROBLEM SELECTOR PLAN 3
History of diabetes, glucose 416 on admission. A1c 9.8 in Feb 2022. Home meds: lantus 35, lispro 8 TID, metformin 750 BID  - Ordered lantus 25 and mISS inpatient  - F/u AM A1c  - Home gabapentin 100 for nerve pain History of diabetes, glucose 416 on admission. A1c 9.8 in Feb 2022. Home meds: lantus 35, lispro 8 TID, metformin 750 BID  - Ordered lantus 25 and mISS inpatient  - F/u AM A1c  - Home gabapentin 100 TID for nerve pain History of diabetes, glucose 416 on admission. A1c 9.8 in Feb 2022. Home meds: lantus 35, lispro 8 TID, metformin 750 BID. Gave additional 10u regular insulin in AM 5/2 due to missed lantus dose last night.  - Ordered lantus 25 and mISS inpatient  - F/u AM A1c  - Home gabapentin 100 TID for nerve pain

## 2022-05-02 NOTE — CONSULT NOTE ADULT - SUBJECTIVE AND OBJECTIVE BOX
Attending: Dr. Green    Patient is a 46y old  Male who presents with a chief complaint of right foot wound.     HPI: 45 yo M, history of previous alcohol and cocaine use w/ PMHx of HTN (not treated), HLD (on meds), DM (started in 2021 on OAD and insulin), osteomyelitis with amputation of the right 4th and 5th rays and partial amputation of the right 2nd digit, presents to the Emergency Department with foot pain / wound drainage. Pt with recent admission for osteomyelitis (2/11-2/15/2022) discharged to rehab following for continued IV abx (Vanc/zosyn) and wound vac, finished IV abx in March. Discharged from rehab 2-3 weeks ago. Since then he has been doing daily dressing changes, reports wound was clean, dry, improving. Now pt has had three days of fatigue and chills. Tonight when doing dressing change pt noticed an area on his medial great toe that was actively draining pus, noticed some increased pain / swelling / redness at the area which prompted his visit. Pt well know to Podiatry and sees Dr. Green outpt weekly, pt last saw Dr. Green on Apirl 18th, of note he missed his appointment last Monday.       Review of systems negative except per HPI and as stated below  General:	 no weakness; no fevers, no chills  Skin/Breast: no rash  Respiratory and Thorax: no SOB, no cough  Cardiovascular:	No chest pain  Gastrointestinal:	 no nausea, vomiting , diarrhea  Genitourinary:	no dysuria, no difficulty urinating, no hematuria  Musculoskeletal:	no weakness, no joint swelling/pain  Neurological:	no focal weakness/numbness  Endocrine:	no polyuria, no polydipsia    PAST MEDICAL & SURGICAL HISTORY:  Hyperlipidemia, unspecified hyperlipidemia type    Hypertension    History of cocaine use    Diabetes mellitus    Osteomyelitis  R foot    Toe amputation status  Right foot    H/O skin graft  Right foot    S/P insertion of penile implant      Home Medications:  atorvastatin 40 mg oral tablet: 1 tab(s) orally once a day (11 Feb 2022 20:14)  gabapentin 100 mg oral capsule: 1 cap(s) orally every 8 hours (11 Feb 2022 20:14)  insulin glargine: 35 unit(s) subcutaneous once a day (11 Feb 2022 20:14)  Insulin Lispro KwikPen 100 units/mL injectable solution: 8  injectable 3 times a day (before meals) (16 Feb 2022 16:41)  losartan 25 mg oral tablet: 1 tab(s) orally once a day (11 Feb 2022 20:14)  metFORMIN 750 mg oral tablet, extended release: 2 tab(s) orally once a day (11 Feb 2022 20:14)  mupirocin 2% topical ointment: 1 application topically 2 times a day L foot wound (15 Feb 2022 16:02)  oxycodone-acetaminophen 5 mg-325 mg oral tablet: 1 tab(s) orally every 6 hours, As needed, Moderate - severe Pain (15 Feb 2022 16:02)    Allergies    No Known Allergies    Intolerances      FAMILY HISTORY:  FH: CAD (coronary artery disease) (Mother)      Social History:       LABS                        12.4   17.23 )-----------( 351      ( 02 May 2022 03:03 )             37.6     05-02    132<L>  |  96  |  22  ----------------------------<  415<H>  4.7   |  20<L>  |  1.11    Ca    9.0      02 May 2022 03:03    TPro  7.5  /  Alb  3.1<L>  /  TBili  0.6  /  DBili  x   /  AST  28  /  ALT  33  /  AlkPhos  124<H>  05-02    PT/INR - ( 02 May 2022 03:15 )   PT: 10.9 sec;   INR: 0.92          PTT - ( 02 May 2022 03:15 )  PTT:20.7 sec    Vital Signs Last 24 Hrs  T(C): 36.5 (02 May 2022 01:45), Max: 36.5 (02 May 2022 01:45)  T(F): 97.7 (02 May 2022 01:45), Max: 97.7 (02 May 2022 01:45)  HR: 90 (02 May 2022 01:45) (90 - 90)  BP: 105/72 (02 May 2022 01:45) (105/72 - 105/72)  BP(mean): --  RR: 17 (02 May 2022 01:45) (17 - 17)  SpO2: 99% (02 May 2022 01:45) (99% - 99%)    PHYSICAL EXAM:   General: NAD, AA0x3  Lower Extremity Focused: RIGHT  Vasc: D/PT 2/4 b/l, Right foot warmer than left, non-pitting edema to dorsum of right foot over hallux  Derm:  Right foot- Submet 1 ulcer measuring 2.0cmx2.0cm, fibrotic base, Right foot medial 1st MPJ ulceration 1cm granular area of tissue, right foot dorsomedial 0.5x0.5cm wound probes deep to bone, 3ccs purulent drainage expressed, Area of fluctuance noted over dorsal hallux near MPJ  Neuro: protective sensation grossly diminished   MSK: S/p 4th and 5th ray amputations, s/p 2nd digit distal Symes amputation       RADIOLOGY: Right foot x ray: WET READ: medial aspect of right 1st metatarsal head and base of 1st proximal phalanx show cortical erosion, rest of x ray without interval change from previous comparison in February 2022.

## 2022-05-02 NOTE — H&P ADULT - ASSESSMENT
46M w/ history of previous alcohol and cocaine use and PMHx of HTN, HLD, DM, and right foot osteomyelitis with amputation of the right 4th and 5th toes and partial amputation of the right 2nd digit, p/w 1-day h/o right foot wound drainage of medial great toe.

## 2022-05-02 NOTE — H&P ADULT - NSHPSOCIALHISTORY_GEN_ALL_CORE
Lives in [place] with his mother. Denies smoking or illicit drug use. Last alcohol drink was 1 week ago.

## 2022-05-02 NOTE — H&P ADULT - NSHPLABSRESULTS_GEN_ALL_CORE
.  LABS:                         12.4   17.23 )-----------( 351      ( 02 May 2022 03:03 )             37.6     05-02    132<L>  |  96  |  22  ----------------------------<  415<H>  4.7   |  20<L>  |  1.11    Ca    9.0      02 May 2022 03:03    TPro  7.5  /  Alb  3.1<L>  /  TBili  0.6  /  DBili  x   /  AST  28  /  ALT  33  /  AlkPhos  124<H>  05-02    PT/INR - ( 02 May 2022 03:15 )   PT: 10.9 sec;   INR: 0.92          PTT - ( 02 May 2022 03:15 )  PTT:20.7 sec          Lactate, Blood: 1.8 mmol/L (05-02 @ 03:09)      RADIOLOGY, EKG & ADDITIONAL TESTS: Reviewed.

## 2022-05-02 NOTE — CONSULT NOTE ADULT - ASSESSMENT
47 yo M, history of previous alcohol and cocaine use w/ PMHx of HTN (not treated), HLD (on meds), DM (started in 2021 on OAD and insulin), osteomyelitis with amputation of the right 4th and 5th rays and partial amputation of the right 2nd digit, presents to the Emergency Department with foot pain / wound drainage. Podiatry consulted for right foot wound. VSS, leukocytosis present, elevated CRP, medial 1st MPJ wound actively draining purulence, pt found to have dorsal hallux abscess with 5ccs expressed after bedside Incision and drainage. Xray showing cortical changes of the medial 1st metatarsal head and 1st proximal phalanx high likelihood of OM.     Plan:   Pt seen and evaluated  Pts x rays reviewed   F/u final x ray read  Bedside incision and drainage of right dorsal hallux abscess performed in ED with copious betadine/saline irrigation, small incision made dorsally over right hallux area of fluctuance-5ccs drained   Deep culture taken from right foot medial MPJ wound   F/u deep culture   F/U ESR  Dorsal hallux incision and medial wound packed with betadine soaked adaptic   Rec admit to medicine   Rec broad spectrum Vanc/zosyn   Please keep pts NPO overnight podiatry will re-evaluate wounds during morning rounds and assess need for OR, possible OR washout this week pending clinical course   Rest of plan pending ongoing discussion with attending     Podiatry following        45 yo M, history of previous alcohol and cocaine use w/ PMHx of HTN (not treated), HLD (on meds), DM (started in 2021 on OAD and insulin), osteomyelitis with amputation of the right 4th and 5th rays and partial amputation of the right 2nd digit, presents to the Emergency Department with foot pain / wound drainage. Podiatry consulted for right foot wound. VSS, leukocytosis present, elevated CRP, medial 1st MPJ wound actively draining purulence, pt found to have dorsal hallux abscess with 5ccs expressed after bedside Incision and drainage. Xray showing cortical changes of the medial 1st metatarsal head and 1st proximal phalanx high likelihood of OM.     Plan:   Pt seen and evaluated  Pts x rays reviewed   F/u final x ray read  Bedside incision and drainage of right dorsal hallux abscess performed in ED with copious betadine/saline irrigation, small incision made dorsally over right hallux area of fluctuance-5ccs drained   Deep culture taken from right foot medial MPJ wound   F/u deep culture   F/U ESR  Dorsal hallux incision and medial wound packed with betadine soaked adaptic   Rec admit to medicine   Rec broad spectrum Vanc/zosyn   Please keep pts NPO overnight podiatry will re-evaluate wounds during morning rounds and assess need for OR, possible OR washout this week pending clinical course   Pre-op labs ordered in ED   Rest of plan pending ongoing discussion with attending     Podiatry following        45 yo M, history of previous alcohol and cocaine use w/ PMHx of HTN (not treated), HLD (on meds), DM (started in 2021 on OAD and insulin), osteomyelitis with amputation of the right 4th and 5th rays and partial amputation of the right 2nd digit, presents to the Emergency Department with foot pain / wound drainage. Podiatry consulted for right foot wound. VSS, leukocytosis present, elevated CRP, medial 1st MPJ wound actively draining purulence, pt found to have dorsal hallux abscess with 5ccs expressed after bedside Incision and drainage. Xray showing cortical changes of the medial 1st metatarsal head and 1st proximal phalanx high likelihood of OM.     Plan:   Pt seen and evaluated  Pts x rays reviewed   F/u final x ray read  Bedside incision and drainage of right dorsal hallux abscess performed in ED with copious betadine/saline irrigation, small incision made dorsally over right hallux area of fluctuance-5ccs drained   Deep culture taken from right foot medial MPJ wound   F/u deep culture   F/U ESR  Dorsal hallux incision and medial wound packed with betadine soaked adaptic and dressed with DSD +ACE  Rec admit to medicine   Rec broad spectrum Vanc/zosyn   Please keep pt NPO overnight podiatry will re-evaluate wounds during morning rounds and assess need for OR, possible OR washout this week pending clinical course   Pre-op labs ordered in ED   Rest of plan pending ongoing discussion with attending     Podiatry following

## 2022-05-02 NOTE — ED PROVIDER NOTE - PROGRESS NOTE DETAILS
seen by podiatry team at bedside.  wbc count elevated, CRP elevated.   I&D on top of foot drained, culture sent.   ok to start vanc / zosyn.   admit to medicine  keep NPO in case OR today pending final podiatry recs.

## 2022-05-02 NOTE — H&P ADULT - NSHPPHYSICALEXAM_GEN_ALL_CORE
.  VITAL SIGNS:  T(C): 36.5 (05-02-22 @ 01:45), Max: 36.5 (05-02-22 @ 01:45)  T(F): 97.7 (05-02-22 @ 01:45), Max: 97.7 (05-02-22 @ 01:45)  HR: 90 (05-02-22 @ 01:45) (90 - 90)  BP: 105/72 (05-02-22 @ 01:45) (105/72 - 105/72)  BP(mean): --  RR: 17 (05-02-22 @ 01:45) (17 - 17)  SpO2: 99% (05-02-22 @ 01:45) (99% - 99%)  Wt(kg): --    PHYSICAL EXAM:    Constitutional: WDWN resting comfortably in bed; NAD  Head: NC/AT  Eyes: PERRL, EOMI, anicteric sclera  ENT: no nasal discharge; uvula midline, no oropharyngeal erythema or exudates; MMM  Neck: supple; no JVD or thyromegaly  Respiratory: CTA B/L; no W/R/R, no retractions  Cardiac: +S1/S2; RRR; no M/R/G; PMI non-displaced  Gastrointestinal: abdomen soft, NT/ND; no rebound or guarding; +BSx4  Back: spine midline, no bony tenderness or step-offs; no CVAT B/L  Extremities: WWP, no clubbing or cyanosis; no peripheral edema  Musculoskeletal: NROM x4; no joint swelling, tenderness or erythema  Vascular: 2+ radial, femoral, DP/PT pulses B/L  Dermatologic: skin warm, dry and intact; +right foot wrapped in wound dressing  Lymphatic: no submandibular or cervical LAD  Neurologic: AAOx3; CNII-XII grossly intact; no focal deficits  Psychiatric: affect and characteristics of appearance, verbalizations, behaviors are appropriate

## 2022-05-02 NOTE — H&P ADULT - PROBLEM SELECTOR PLAN 6
F: None   E: Replete for K<4, Mag<2  N: NPO in case of podiatry procedure today  A: None  Code status: Full  Dispo: RMF

## 2022-05-02 NOTE — H&P ADULT - HISTORY OF PRESENT ILLNESS
46M w/ history of previous alcohol and cocaine use and PMHx of HTN, HLD, DM, and right foot osteomyelitis with amputation of the right 4th and 5th toes and partial amputation of the right 2nd digit, p/w 1-day h/o right foot wound drainage of medial great toe. Pt with recent admission for osteomyelitis (2/11-2/15/2022), was discharged to rehab for continued IV Vanc/Zosyn and wound vac. Finished IV abx in March and discharged home from rehab 2-3 weeks ago. Since then he has been doing daily dressing changes, reports wound was clean, dry, improving. When doing dressing change tonight, pt noticed an area on his medial great toe that was actively draining pus, with some increased swelling and redness at the area. Denies foot pain at this time. Follows with podiatry Dr. Green outpatient weekly. Last saw Dr. Green on [address]. Of note, he missed his appointment last Monday. Denies CP, SOB, n/v/d/c, change in urination or BMs, swelling.     In the ED:  Initial vital signs: T: 97.7 F, HR: 90, BP: 105/72, R: 17, SpO2: 99% on RA  Labs: significant for WBC 17.23, BHB 0.5, Na 132, bicarb 20, glucose 415, AlkPhos 124, ESR 40, .8   Imaging:  CXR: None  EKG: Pending  Medications: Zosyn 3.375, Vanc 2g  Consults: Podiatry 46M w/ history of previous alcohol and cocaine use and PMHx of HTN, HLD, DM, and right foot osteomyelitis with amputation of the right 4th and 5th toes and partial amputation of the right 2nd digit, p/w 1-day h/o right foot wound drainage of medial great toe. Pt with recent admission for osteomyelitis (2/11-2/15/2022), was discharged to rehab for continued IV Vanc/Zosyn and wound vac. Finished IV abx in March and discharged home from rehab 2-3 weeks ago. Since then he has been doing daily dressing changes, reports wound was clean, dry, improving. When doing dressing change tonight, pt noticed an area on his medial great toe that was actively draining pus, with some increased swelling and redness at the area. Denies foot pain at this time. Follows with podiatry Dr. Green outpatient weekly. Last saw Dr. Green on [address]. Of note, he missed his appointment last Monday. Denies CP, SOB, n/v/d/c, change in urination or BMs, swelling.     In the ED:  Initial vital signs: T: 97.7 F, HR: 90, BP: 105/72, R: 17, SpO2: 99% on RA  Labs: significant for WBC 17.23, BHB 0.5, Na 132, bicarb 20, glucose 415, AlkPhos 124, ESR 40, .8   Imaging:  CXR: None  X-ray right foot: pending final read  EKG: Pending  Medications: Zosyn 3.375, Vanc 2g  Consults: Podiatry 46M w/ history of previous alcohol and cocaine use and PMHx of HTN, HLD, DM, and right foot osteomyelitis with amputation of the right 4th and 5th toes and partial amputation of the right 2nd digit, p/w 1-day h/o right foot wound drainage of medial great toe. Pt with recent admission for osteomyelitis (2/11-2/15/2022), was discharged to rehab for continued IV Vanc/Zosyn and wound vac. Finished IV abx in March and discharged home from rehab 2-3 weeks ago. Since then he has been doing daily dressing changes, reports wound was clean, dry, and improving. However when doing dressing change tonight, pt noticed an area on his medial great toe that was actively draining pus, with some increased swelling and redness at the area. Denies foot pain at this time. Follows with podiatry Dr. Green outpatient weekly. Last saw Dr. Green on [address]. Of note, he missed his appointment last Monday. Denies CP, SOB, n/v/d/c, change in urination or BMs, swelling.     In the ED:  Initial vital signs: T: 97.7 F, HR: 90, BP: 105/72, R: 17, SpO2: 99% on RA  Labs: significant for WBC 17.23, BHB 0.5, Na 132, bicarb 20, glucose 415, AlkPhos 124, ESR 40, .8   Imaging:  CXR: None  X-ray right foot: pending final read  EKG: Pending  Medications: Zosyn 3.375, Vanc 2g  Consults: Podiatry 46M w/ history of previous alcohol and cocaine use and PMHx of HTN, HLD, DM, and right foot osteomyelitis with amputation of the right 4th and 5th toes and partial amputation of the right 2nd digit, p/w 1-day h/o right foot wound drainage of medial great toe. Pt with recent admission for osteomyelitis (2/11-2/15/2022), was discharged to rehab for continued IV Vanc/Zosyn and wound vac. Finished IV abx in March and discharged home from rehab 2-3 weeks ago. Since then he has been doing daily dressing changes, reports wound was clean, dry, and improving. However when doing dressing change tonight, pt noticed an area on his medial great toe that was actively draining pus, with some increased swelling and redness at the area. Denies foot pain at this time. Follows with podiatry Dr. Green outpatient weekly. Last saw Dr. Green on [address]. Of note, he missed his appointment last Monday. Denies CP, SOB, n/v/d/c, change in urination or BMs, swelling.     In the ED:  Initial vital signs: T: 97.7 F, HR: 90, BP: 105/72, R: 17, SpO2: 99% on RA  Labs: significant for WBC 17.23, BHB 0.5, Na 132 (Corrected 137), bicarb 20, glucose 415, AlkPhos 124, ESR 40, .8   Imaging:  CXR: None  X-ray right foot: pending final read  EKG: Pending  Medications: Zosyn 3.375, Vanc 2g  Consults: Podiatry

## 2022-05-02 NOTE — H&P ADULT - PROBLEM SELECTOR PLAN 2
H/o right foot osteomyelitis with amputation of the right 4th and 5th toes and partial amputation of the right 2nd digit.  - Management for right foot abscess as above  - F/u final read x-ray foot  - F/u podiatry recs

## 2022-05-02 NOTE — ED ADULT NURSE NOTE - SUICIDE SCREENING QUESTION 3
Patient discharged denies suicidal ideation and has stable mood. Instructions gone over with patient regarding medications and follow up plan.  . Copies of discharge instructions ( After Visit Summary) given to patient.Pt still deals with anxiety but much improved. Pt encouraged to exercise moderately attend IOP and allow herself to slowly heal.   No

## 2022-05-02 NOTE — ED PROVIDER NOTE - PHYSICAL EXAMINATION
Right foot - 1cm x 1cm wound to medial aspect of proximal phalynx of great toe. active purulent drainage, some surrounding redness, swelling noted to midfoot. 1cm x 1cm wound to to plantar aspect of medial foot.    s/p 4th and 5th right partial amputation and digital amputation of 2nd digit of the R foot    No bony tenderness. Full ROM ankle/knee. sensation intact throughout, pulses 2+, cap refill <2sec.    Exam -   awake, A&Ox3  no acute distress  Normocephalic, no evident trauma.  neck supple  respirations even and unlabored  radial pulses 2+

## 2022-05-02 NOTE — H&P ADULT - PROBLEM SELECTOR PLAN 1
Patient presenting with purulent drainage from right great toe noted on home dressing change tonight. Denies pain. S/p bedside incision and drainage of right dorsal hallux abscess performed in ED, 5cc drained, per podiatry.  - F/u Final read X-ray right foot  - F/u Deep culture right foot medial MPJ  - C/w Vanc/Zosyn  - Podiatry will re-evaluate wounds during morning rounds and assess need for OR washout this week Patient presenting with purulent drainage from right great toe noted on home dressing change tonight. Denies pain. S/p bedside incision and drainage of right dorsal hallux abscess performed in ED, 5cc drained, per podiatry.  - Podiatry team preliminary read x-ray right foot: cortical changes of the medial 1st metatarsal head and 1st proximal phalanx high likelihood of OM   - F/u Final read X-ray right foot  - F/u Deep culture right foot medial MPJ  - C/w Vanc/Zosyn  - Podiatry will re-evaluate wounds during morning rounds and assess need for OR washout this week Patient presenting with purulent drainage from right great toe noted on home dressing change tonight. Denies pain. S/p bedside incision and drainage of right dorsal hallux abscess performed in ED, 5cc drained, per podiatry.  - Podiatry team preliminary read x-ray right foot: cortical changes of the medial 1st metatarsal head and 1st proximal phalanx high likelihood of OM   - F/u Final read X-ray right foot  - F/u Deep culture right foot medial MPJ  - C/w Vanc/Zosyn  - Podiatry will re-evaluate wounds during morning rounds 5/2 and assess need for OR washout this week  - Consider ID team 2 consult Patient presenting with purulent drainage from right great toe noted on home dressing change tonight. Denies pain. S/p bedside incision and drainage of right dorsal hallux abscess performed in ED, 5cc drained, per podiatry. ESR 40, .8. WBC 17. VSS, no fever.  - Podiatry team preliminary read x-ray right foot: cortical changes of the medial 1st metatarsal head and 1st proximal phalanx high likelihood of OM   - F/u Final read X-ray right foot  - F/u Deep culture right foot medial MPJ  - C/w Vanc/Zosyn  - Podiatry will re-evaluate wounds during morning rounds 5/2 and assess need for OR washout this week  - Consider ID team 2 consult

## 2022-05-02 NOTE — PATIENT PROFILE ADULT - FALL HARM RISK - HARM RISK INTERVENTIONS

## 2022-05-02 NOTE — ED PROVIDER NOTE - CLINICAL SUMMARY MEDICAL DECISION MAKING FREE TEXT BOX
Pt w known diabetic foot ulcers, recently admitted for osteomyelitis and long term IV abx treatment and wound vac. here w few days of subjective fevers / body aches and now new purulent drainage from foot ulcer. Pt w known diabetic foot ulcers, recently admitted for osteomyelitis and long term IV abx treatment and wound vac. here w few days of subjective fevers / body aches and now new purulent drainage from foot ulcer. s/p abx. bcx pending. podiatry consulted. will admit per reccs.

## 2022-05-02 NOTE — ED PROVIDER NOTE - NS ED ATTENDING STATEMENT MOD
"  Gastroenterology Progress Note     Author: Rufina HECTOR Mars   Date & Time Created: 1/23/2020 10:59 AM    Chief Complaint:   Hematemesis, Hematochezia, Alcoholism.    Interval History:  Patient reports she still feels \"ok\", has some diffuse abdominal pain, mild.  Tolerating small amounts of diet without nausea of vomiting.      Review of Systems:  Review of Systems   Constitutional: Positive for malaise/fatigue.   Gastrointestinal: Positive for abdominal pain. Negative for blood in stool, constipation, diarrhea, heartburn, melena, nausea and vomiting.   Neurological: Positive for weakness.       Physical Exam:  Physical Exam  Constitutional:       Appearance: She is ill-appearing.   HENT:      Head: Normocephalic.   Eyes:      Pupils: Pupils are equal, round, and reactive to light.   Cardiovascular:      Rate and Rhythm: Normal rate.      Pulses: Normal pulses.      Heart sounds: Normal heart sounds.   Pulmonary:      Effort: Pulmonary effort is normal. No respiratory distress.   Abdominal:      General: Abdomen is flat. Bowel sounds are normal. There is no distension.      Palpations: Abdomen is soft.      Tenderness: There is tenderness.   Skin:     General: Skin is warm and dry.   Neurological:      General: No focal deficit present.      Mental Status: She is alert and oriented to person, place, and time.   Psychiatric:         Mood and Affect: Mood normal.         Behavior: Behavior normal.         Thought Content: Thought content normal.         Judgment: Judgment normal.         Labs:  Recent Labs     01/20/20  1321 01/20/20  1714 01/20/20  2247   [id number] 7.24*  --   --    NYPECG096A 16.5*  --   --    SDMVK101R 198.1*  --   --    TRMY1SXS 98.8  --   --    ARTHCO3 7*  --   --    F4ZDDSQPA 4.0  --   --    ARTBE -19*  --   --    ISTATAPH  --   --  7.598*   ISTATAPCO2  --   --  24.2*   ISTATAPO2  --   --  76   ISTATATCO2  --   --  24   [id number]  --   --  97   ISTATARTHCO3  --   --  23.7 "   ISTATARTBE  --   --  2   ISTATTEMP  --  33.2 C 36.9 C   ISTATFIO2  --   --  28   ISTATSPEC  --  Venous Arterial   ISTATAPHTC  --   --  7.599*   FDQDMDKK2II  --   --  75     Recent Labs     01/20/20  1251   CPKTOTAL 337*     Recent Labs     01/20/20  1234  01/21/20  0229 01/21/20  0840 01/22/20  0350 01/23/20  0530   SODIUM 124*   < > 129* 127* 132* 132*   POTASSIUM 6.9*   < > 3.7 3.9 4.3 4.0   CHLORIDE 77*   < > 85* 85* 87* 90*   CO2 7*   < > 23 24 24 21   *   < > 78* 86* 89* 100*   CREATININE 13.41*   < > 5.55* 5.98* 6.38* 6.45*   MAGNESIUM 2.8*  --  1.8  --   --  2.5   PHOSPHORUS 15.4*  --  6.5*  --   --  7.9*   CALCIUM 5.0*   < > 6.6* 6.8* 6.8* 6.4*    < > = values in this interval not displayed.     Recent Labs     01/21/20  0229 01/21/20  0840 01/21/20  1500 01/22/20  0350 01/23/20  0530   ALTSGPT 23  --   --  28 30   ASTSGOT 142*  --   --  162* 139*   ALKPHOSPHAT 197*  --   --  223* 195*   TBILIRUBIN 8.5*  --   --  9.5* 10.4*   LIPASE  --  360* 374*  --   --    GLUCOSE 253* 254*  --  171* 140*     Recent Labs     01/20/20  1234 01/20/20  1251  01/21/20  0229 01/21/20  0519  01/21/20 2125 01/22/20  0350 01/23/20  0530   RBC 1.14*  --   --  2.46*  --   --   --   --  2.60*   HEMOGLOBIN 4.0*  --    < > 7.9* 8.0*   < > 8.1* 8.2* 8.5*   HEMATOCRIT 13.3*  --   --  23.0*  --   --   --   --  25.9*   PLATELETCT 215  --   --  178  --   --   --   --  127*   PROTHROMBTM 23.6* 24.0*  --   --  19.4*  --   --   --   --    APTT 63.4*  --   --   --   --   --   --   --   --    INR 2.03* 2.07*  --   --  1.58*  --   --   --   --    IRON 118  --   --   --   --   --   --   --   --    FERRITIN 1806.3*  --   --   --   --   --   --   --   --    TOTIRONBC 139*  --   --   --   --   --   --   --   --     < > = values in this interval not displayed.     Recent Labs     01/20/20  1234 01/21/20  0229 01/22/20  0350 01/23/20  0530   WBC 24.0* 20.7*  --  27.0*   NEUTSPOLYS 92.20* 92.90*  --  96.60*   LYMPHOCYTES 2.50* 1.50*  --   3.40*   MONOCYTES 3.40 4.00  --  0.00   EOSINOPHILS 0.00 0.00  --  0.00   BASOPHILS 0.40 0.50  --  0.00   ASTSGOT 145* 142* 162* 139*   ALTSGPT 23 23 28 30   ALKPHOSPHAT 212* 197* 223* 195*   TBILIRUBIN 7.9* 8.5* 9.5* 10.4*     Hemodynamics:  No data recorded.  Monitored Temp: 36.2 °C (97.2 °F)  Pulse  Av.4  Min: 67  Max: 117   Blood Pressure: (!) 84/61, Arterial BP: (!) 95/55     Respiratory:    Respiration: 15, Pulse Oximetry: 95 %     Work Of Breathing / Effort: Shallow;Mild  RUL Breath Sounds: Clear, RML Breath Sounds: Clear;Diminished, RLL Breath Sounds: Diminished, NILDA Breath Sounds: Clear, LLL Breath Sounds: Diminished  Fluids:    Intake/Output Summary (Last 24 hours) at 2020 0823  Last data filed at 2020 0635  Gross per 24 hour   Intake 1446.8 ml   Output 0 ml   Net 1446.8 ml        GI/Nutrition:  Orders Placed This Encounter   Procedures   • Diet Order Full Liquid     Standing Status:   Standing     Number of Occurrences:   1     Order Specific Question:   Diet:     Answer:   Full Liquid [11]     Medical Decision Making, by Problem:  Active Hospital Problems    Diagnosis   • *Septic shock (HCC) [A41.9, R65.21]   • Hyperkalemia [E87.5]   • GENESIS (acute kidney injury) (HCC) [N17.9]   • Upper GI bleed [K92.2]   • Hemorrhagic shock (HCC) [R57.8]   • Seizure (HCC) [R56.9]   • Alcohol abuse [F10.10]   • Metabolic acidosis [E87.2]   • Lactic acidosis [E87.2]   • Coagulopathy (HCC) [D68.9]   • Elevated troponin [R79.89]   • Increased ammonia level [R79.89]       Plan:  EGD  revealed LA grade D esophagitis with erosive esophageal ulcers, a small hiatal hernia, mild portal hypertensive gastropathy with some clots and blood throughout the stomach, but no varices.      1.  Protonix 40 mg b.i.d.  Continue PO PPI BID outpatient at discharge x 8 weeks.    2.  Follow up in the outpatient setting for repeat EGD in about 2 months.  3.  Follow up in the outpatient setting with [facility] for  "further evaluation of her liver disease, advanced liver disease versus alcoholic hepatitis.  Elevated transaminases now likely related to alcoholic hepatitis vs liver shock.   4.  Ultrasound of the abdomen revealed \"Ill-defined hypodense hepatic lesions identified on recent CT are not visualized on ultrasound presumably secondary to markedly increased hepatic echogenicity secondary to hepatic steatosis and/or diffuse hepatocellular disease. Hepatomegaly.  Mild dilation of the main portal vein with hepatopedal flow.\"   MRI pending.  5.  Continue to monitor liver function test.  Transaminases improving, bilirubin continues to worsen.  In the setting of alcoholic hepatitis, bili may rise further in next weeks.    Quality-Core Measures  " This was a shared visit with the HAI[facility]. I reviewed and verified the documentation and independently performed the documented:

## 2022-05-02 NOTE — PATIENT PROFILE ADULT - INTERNATIONAL TRAVEL
No Gabapentin Pregnancy And Lactation Text: This medication is Pregnancy Category C and isn't considered safe during pregnancy. It is excreted in breast milk.

## 2022-05-02 NOTE — ED PROVIDER NOTE - NS ED ROS FT
Constitutional: No fever  Eyes: No visual changes, eye pain or discharge.  ENMT: No sore throat  Cardiac: No chest pain, SOB or edema.   Respiratory: No cough or respiratory distress.   GI: No nausea, vomiting, diarrhea or abdominal pain  MS: Right foot pain  Neuro: No headache or weakness. No LOC. No focal neuro changes.  Skin: Right foot wound. No rash

## 2022-05-02 NOTE — CONSULT NOTE ADULT - ATTENDING COMMENTS
Agree with above.  Diabetic foot infection.  Suspect acute on chronic osteomyelitis.  From OR, please send bacterial, fungal and AFB cultures + tissue for pathology.  Ok to continue Vancomycin + Zosyn while culture results are pending.

## 2022-05-02 NOTE — ED ADULT NURSE NOTE - OBJECTIVE STATEMENT
pt presented to the ED c/o swelling, redness, and oozing to the R foot. as per pt, the oozing and redness started yesterday. pt states that he has been changing the dressings everyday s/p debridement. able to ambulate without assistance. AOx4, lungs clear bilaterally upon auscultation. no use of accessory muscles noted. pt c/o fever, chills and bodyaches for the past few days. denies nausea vomiting or diarrhea at this time

## 2022-05-02 NOTE — CONSULT NOTE ADULT - SUBJECTIVE AND OBJECTIVE BOX
***************INCOMPLETE*****************    INFECTIOUS DISEASES INITIAL CONSULT NOTE    HPI:  46M w/ history of previous alcohol and cocaine use and PMHx of HTN, HLD, DM, and right foot osteomyelitis with amputation of the right 4th and 5th toes and partial amputation of the right 2nd digit, p/w 1-day h/o right foot wound drainage of medial great toe. Pt with recent admission for osteomyelitis (2/11-2/15/2022), was discharged to rehab for continued IV Vanc/Zosyn and wound vac. Finished IV abx in March and discharged home from rehab 2-3 weeks ago. Since then he has been doing daily dressing changes, reports wound was clean, dry, and improving. However when doing dressing change tonight, pt noticed an area on his medial great toe that was actively draining pus, with some increased swelling and redness at the area. Denies foot pain at this time. Follows with podiatry Dr. Green outpatient weekly. Last saw Dr. Green on April 18th. Of note, he missed his appointment last Monday. Pt also states he has been taking penicillin for a few days due to a tooth infection. Denies CP, SOB, N/V/F/C/SOB, change in urination or BMs, swelling.     In the ED:  Initial vital signs: T: 97.7 F, HR: 90, BP: 105/72, R: 17, SpO2: 99% on RA  Labs: significant for WBC 17.23, BHB 0.5, Na 132 (Corrected 137), bicarb 20, glucose 415, AlkPhos 124, ESR 40, .8   Imaging:  CXR: None  X-ray right foot: pending final read  EKG: Pending  Medications: Zosyn 3.375, Vanc 2g  Consults: Podiatry (02 May 2022 05:26)      PAST MEDICAL & SURGICAL HISTORY:  Hyperlipidemia, unspecified hyperlipidemia type    Hypertension    History of cocaine use    Diabetes mellitus    Osteomyelitis  R foot    Toe amputation status  Right foot    H/O skin graft  Right foot    S/P insertion of penile implant          Review of Systems:   Constitutional, eyes, ENT, cardiovascular, respiratory, gastrointestinal, genitourinary, integumentary, neurological, psychiatric and heme/lymph are otherwise negative other than noted above       ANTIBIOTICS:  MEDICATIONS  (STANDING):  atorvastatin 40 milliGRAM(s) Oral at bedtime  gabapentin 100 milliGRAM(s) Oral every 8 hours  insulin glargine Injectable (LANTUS) 25 Unit(s) SubCutaneous at bedtime  insulin lispro (ADMELOG) corrective regimen sliding scale   SubCutaneous Before meals and at bedtime  losartan 25 milliGRAM(s) Oral daily  piperacillin/tazobactam IVPB.. 4.5 Gram(s) IV Intermittent every 6 hours  vancomycin  IVPB 2000 milliGRAM(s) IV Intermittent every 12 hours    MEDICATIONS  (PRN):  acetaminophen     Tablet .. 650 milliGRAM(s) Oral every 6 hours PRN Temp greater or equal to 38C (100.4F), Mild Pain (1 - 3)    Allergies  No Known Allergies  Intolerances      SOCIAL HISTORY:  Lives in [place] with his mother. Denies smoking or illicit drug use. Last alcohol drink was 1 week ago    FAMILY HISTORY:  FH: CAD (coronary artery disease) (Mother)    no FH leading to current infection    Vital Signs Last 24 Hrs  T(C): 36.8 (02 May 2022 11:19), Max: 36.8 (02 May 2022 11:19)  T(F): 98.3 (02 May 2022 11:19), Max: 98.3 (02 May 2022 11:19)  HR: 97 (02 May 2022 11:19) (78 - 97)  BP: 127/74 (02 May 2022 11:19) (105/72 - 128/78)  BP(mean): --  RR: 18 (02 May 2022 11:19) (17 - 18)  SpO2: 98% (02 May 2022 11:19) (98% - 99%)      PHYSICAL EXAM:  Constitutional: alert, NAD  Eyes: the sclera and conjunctiva were normal.   ENT: the ears and nose were normal in appearance.   Neck: the appearance of the neck was normal and the neck was supple.   Pulmonary: no respiratory distress and lungs were clear to auscultation bilaterally.   Heart: heart rate was normal and rhythm regular, normal S1 and S2  Vascular: there was no peripheral edema  Abdomen: normal bowel sounds, soft, non-tender  Neurological: no focal deficits  Psychiatric: the affect was normal  Derm: Right foot- Submet 1 ulcer measuring 2.0cmx2.0cm, fibrotic base, Right foot medial 1st MPJ ulceration 1cm granular area of tissue, right foot dorsomedial 0.5x0.5cm wound probes deep to bone, 3ccs purulent drainage expressed, Area of fluctuance noted over dorsal hallux near MPJ        LABS:                        12.6   12.48 )-----------( 378      ( 02 May 2022 10:22 )             37.9     05-02    134<L>  |  99  |  20  ----------------------------<  221<H>  4.3   |  25  |  1.04    Ca    9.4      02 May 2022 10:22  Phos  3.4     05-02  Mg     2.2     05-02    TPro  8.1  /  Alb  3.7  /  TBili  0.6  /  DBili  x   /  AST  22  /  ALT  33  /  AlkPhos  105  05-02    PT/INR - ( 02 May 2022 10:22 )   PT: 11.4 sec;   INR: 0.96          PTT - ( 02 May 2022 10:22 )  PTT:32.3 sec      MICROBIOLOGY: ED deep wound culture form 5/1 pending     RADIOLOGY & ADDITIONAL STUDIES: WET READ: medial aspect of right 1st metatarsal head and base of 1st proximal phalanx show cortical erosion, rest of x ray without interval change from previous comparison in February 2022   INFECTIOUS DISEASES INITIAL CONSULT NOTE    HPI:  46M w/ history of previous alcohol and cocaine use and PMHx of HTN, HLD, DM, and right foot osteomyelitis with amputation of the right 4th and 5th toes and partial amputation of the right 2nd digit, p/w 1-day h/o right foot wound drainage of medial great toe. Pt with recent admission for osteomyelitis (2/11-2/15/2022), was discharged to rehab for continued IV Vanc/Zosyn and wound vac. Finished IV abx in 3/21 (pt missed all f/u appointments with Dr. oMsley) discharged home from rehab 2-3 weeks ago. Since then he has been doing daily dressing changes, reports wound was clean, dry, and improving. However when doing dressing change tonight, pt noticed an area on his medial great toe that was actively draining pus, with some increased swelling and redness at the area. Denies foot pain at this time. Follows with podiatry Dr. Green outpatient weekly. Last saw Dr. Green on April 18th. Of note, he missed his appointment last Monday. Pt also states he took penicillin Friday and Saturday for a toothache that he thought was a  tooth infection. Denies CP, SOB, N/V/F/C/SOB, change in urination or BMs, swelling.     In the ED:  Initial vital signs: T: 97.7 F, HR: 90, BP: 105/72, R: 17, SpO2: 99% on RA  Labs: significant for WBC 17.23, BHB 0.5, Na 132 (Corrected 137), bicarb 20, glucose 415, AlkPhos 124, ESR 40, .8   Imaging:  CXR: None  X ray right foot: see below  EKG: Pending  Medications: Zosyn 3.375, Vanc 2g  Consults: Podiatry (02 May 2022 05:26)      PAST MEDICAL & SURGICAL HISTORY:  Hyperlipidemia, unspecified hyperlipidemia type    Hypertension    History of cocaine use    Diabetes mellitus    Osteomyelitis  R foot    Toe amputation status  Right foot    H/O skin graft  Right foot    S/P insertion of penile implant          Review of Systems:   Constitutional, eyes, ENT, cardiovascular, respiratory, gastrointestinal, genitourinary, integumentary, neurological, psychiatric and heme/lymph are otherwise negative other than noted above       ANTIBIOTICS:  MEDICATIONS  (STANDING):  atorvastatin 40 milliGRAM(s) Oral at bedtime  gabapentin 100 milliGRAM(s) Oral every 8 hours  insulin glargine Injectable (LANTUS) 25 Unit(s) SubCutaneous at bedtime  insulin lispro (ADMELOG) corrective regimen sliding scale   SubCutaneous Before meals and at bedtime  losartan 25 milliGRAM(s) Oral daily  piperacillin/tazobactam IVPB.. 4.5 Gram(s) IV Intermittent every 6 hours  vancomycin  IVPB 2000 milliGRAM(s) IV Intermittent every 12 hours    MEDICATIONS  (PRN):  acetaminophen     Tablet .. 650 milliGRAM(s) Oral every 6 hours PRN Temp greater or equal to 38C (100.4F), Mild Pain (1 - 3)    Allergies  No Known Allergies  Intolerances      SOCIAL HISTORY:  Lives in [place] with his mother. Denies smoking or illicit drug use. Last alcohol drink was 1 week ago    FAMILY HISTORY:  FH: CAD (coronary artery disease) (Mother)    no FH leading to current infection    Vital Signs Last 24 Hrs  T(C): 36.8 (02 May 2022 11:19), Max: 36.8 (02 May 2022 11:19)  T(F): 98.3 (02 May 2022 11:19), Max: 98.3 (02 May 2022 11:19)  HR: 97 (02 May 2022 11:19) (78 - 97)  BP: 127/74 (02 May 2022 11:19) (105/72 - 128/78)  BP(mean): --  RR: 18 (02 May 2022 11:19) (17 - 18)  SpO2: 98% (02 May 2022 11:19) (98% - 99%)      PHYSICAL EXAM:  Constitutional: alert, NAD  Eyes: the sclera and conjunctiva were normal.   ENT: the ears and nose were normal in appearance.   Neck: the appearance of the neck was normal and the neck was supple.   Pulmonary: no respiratory distress and lungs were clear to auscultation bilaterally.   Heart: heart rate was normal and rhythm regular, normal S1 and S2  Vascular: there was no peripheral edema  Abdomen: normal bowel sounds, soft, non-tender  Neurological: no focal deficits  Psychiatric: the affect was normal  Derm: Right foot- Submet 1 ulcer measuring 2.0cmx2.0cm, fibrotic base, Right foot medial 1st MPJ ulceration 1cm granular area of tissue, right foot dorsomedial 0.5x0.5cm wound probes deep to bone, 3-5ccs purulent drainage expressed, Area of fluctuance noted over dorsal hallux near MPJ        LABS:                        12.6   12.48 )-----------( 378      ( 02 May 2022 10:22 )             37.9     05-02    134<L>  |  99  |  20  ----------------------------<  221<H>  4.3   |  25  |  1.04    Ca    9.4      02 May 2022 10:22  Phos  3.4     05-02  Mg     2.2     05-02    TPro  8.1  /  Alb  3.7  /  TBili  0.6  /  DBili  x   /  AST  22  /  ALT  33  /  AlkPhos  105  05-02    PT/INR - ( 02 May 2022 10:22 )   PT: 11.4 sec;   INR: 0.96          PTT - ( 02 May 2022 10:22 )  PTT:32.3 sec      MICROBIOLOGY: ED deep wound culture from 5/1 pending     RADIOLOGY & ADDITIONAL STUDIES: < from: Xray Foot AP + Lateral, Right (05.02.22 @ 03:56) >  IMPRESSION: Frontal and lateral views of the right foot and right ankle   demonstrate soft tissue defect adjacent to the fourth metatarsal stump.   Transmetatarsal amputation of the fourth and fifth metatarsals. Absence   of the distal phalanges of the second digit. Chronic arthropathy of the   third PIP joint. Ankylosis of the third PIP joint. Charcot arthropathy   and periarticular osteopenia surrounding the Lisfranc joint. Hindfoot   valgus and pes planus. Arterial vascular calcification. Soft tissue   defect along the medial aspect of the foot adjacent to the MCP joint.   Fragmentation of the sesamoid. Tibiotalar joint is in appropriate   alignment.  MRI is more sensitive examination for detection of osteomyelitis.  < end of copied text >

## 2022-05-02 NOTE — CONSULT NOTE ADULT - ASSESSMENT
46M w/ history of previous alcohol and cocaine use and PMHx of HTN, HLD, DM, and right foot osteomyelitis with amputation of the right 4th and 5th toes and partial amputation of the right 2nd digit, p/w 1-day h/o right foot wound drainage of medial great toe, and hallux abscess. Previous wound cultures from Feb 2022 grew MRSA, GBS, Gemella and Bacteroides, still awaiting results from deep wound culture taken in ED 5/1. Suspected OM per podiatry.     Plan:              46M w/ history of previous alcohol and cocaine use and PMHx of HTN, HLD, DM, and right foot osteomyelitis with amputation of the right 4th and 5th toes and partial amputation of the right 2nd digit, p/w 1-day h/o right foot wound drainage of medial great toe, and hallux abscess. Previous wound cultures from Feb 2022 grew MRSA, GBS, Gemella and Bacteroides-pt finished 6 weeks IV Vanc and flagyl 3/21, still awaiting results from deep wound culture taken in ED 5/1. Suspected acute on chronic OM, podiatry taking pt for washout in OR 5/2.     Plan:   1. Continue Vancomycin and Zosyn until deep wound culture results  2. Check vanco trough   3. Rec source control- Podiatry taking pt to OR for washout 5/2  4. Rec deep OR cultures be obtained    ID team 1 will follow.              46M w/ history of previous alcohol and cocaine use and PMHx of HTN, HLD, DM, and right foot osteomyelitis with amputation of the right 4th and 5th toes and partial amputation of the right 2nd digit, p/w 1-day h/o right foot wound drainage of medial great toe, and hallux abscess. Previous wound cultures from Feb 2022 grew MRSA, GBS, Gemella and Bacteroides-pt finished 6 weeks IV Vanc and flagyl 3/21, still awaiting results from deep wound culture taken in ED 5/1. Treatment of diabetic foot infection, suspected acute on chronic OM, podiatry taking pt for washout in OR 5/2.     Plan:   1. Continue Vancomycin and Zosyn until deep wound culture results  2. Check vanco trough   3. Rec source control- Podiatry taking pt to OR for washout 5/2  4. Rec deep OR cultures be obtained    ID team 1 will follow.              46M w/ history of previous alcohol and cocaine use and PMHx of HTN, HLD, DM, and right foot osteomyelitis with amputation of the right 4th and 5th toes and partial amputation of the right 2nd digit, p/w 1-day h/o right foot wound drainage of medial great toe, and hallux abscess. Previous wound cultures from Feb 2022 grew MRSA, GBS, Gemella and Bacteroides-pt finished 6 weeks IV Vanc and flagyl 3/21, still awaiting results from deep wound culture taken in ED 5/1. Treatment of diabetic foot infection, suspected acute on chronic OM, podiatry taking pt for washout in OR 5/2.     Plan:   1. Continue Vancomycin and Zosyn until deep wound culture results.    2. Check vanco trough 30 minutes before the 4th dose   3. Rec source control- Podiatry taking pt to OR for washout 5/2  4. Rec deep OR cultures be obtained.  Please send for AFB, fungal and bacterial culture    ID team 1 will follow.

## 2022-05-03 ENCOUNTER — TRANSCRIPTION ENCOUNTER (OUTPATIENT)
Age: 47
End: 2022-05-03

## 2022-05-03 ENCOUNTER — RESULT REVIEW (OUTPATIENT)
Age: 47
End: 2022-05-03

## 2022-05-03 LAB
ANION GAP SERPL CALC-SCNC: 10 MMOL/L — SIGNIFICANT CHANGE UP (ref 5–17)
BASOPHILS # BLD AUTO: 0.04 K/UL — SIGNIFICANT CHANGE UP (ref 0–0.2)
BASOPHILS NFR BLD AUTO: 0.4 % — SIGNIFICANT CHANGE UP (ref 0–2)
BUN SERPL-MCNC: 20 MG/DL — SIGNIFICANT CHANGE UP (ref 7–23)
CALCIUM SERPL-MCNC: 8.8 MG/DL — SIGNIFICANT CHANGE UP (ref 8.4–10.5)
CHLORIDE SERPL-SCNC: 101 MMOL/L — SIGNIFICANT CHANGE UP (ref 96–108)
CO2 SERPL-SCNC: 24 MMOL/L — SIGNIFICANT CHANGE UP (ref 22–31)
CREAT SERPL-MCNC: 1.07 MG/DL — SIGNIFICANT CHANGE UP (ref 0.5–1.3)
EGFR: 87 ML/MIN/1.73M2 — SIGNIFICANT CHANGE UP
EOSINOPHIL # BLD AUTO: 0.33 K/UL — SIGNIFICANT CHANGE UP (ref 0–0.5)
EOSINOPHIL NFR BLD AUTO: 3.5 % — SIGNIFICANT CHANGE UP (ref 0–6)
GLUCOSE BLDC GLUCOMTR-MCNC: 171 MG/DL — HIGH (ref 70–99)
GLUCOSE BLDC GLUCOMTR-MCNC: 185 MG/DL — HIGH (ref 70–99)
GLUCOSE BLDC GLUCOMTR-MCNC: 188 MG/DL — HIGH (ref 70–99)
GLUCOSE BLDC GLUCOMTR-MCNC: 202 MG/DL — HIGH (ref 70–99)
GLUCOSE SERPL-MCNC: 211 MG/DL — HIGH (ref 70–99)
GRAM STN FLD: SIGNIFICANT CHANGE UP
HCT VFR BLD CALC: 36.5 % — LOW (ref 39–50)
HGB BLD-MCNC: 11.9 G/DL — LOW (ref 13–17)
IMM GRANULOCYTES NFR BLD AUTO: 0.3 % — SIGNIFICANT CHANGE UP (ref 0–1.5)
LYMPHOCYTES # BLD AUTO: 2.08 K/UL — SIGNIFICANT CHANGE UP (ref 1–3.3)
LYMPHOCYTES # BLD AUTO: 21.9 % — SIGNIFICANT CHANGE UP (ref 13–44)
MAGNESIUM SERPL-MCNC: 2.1 MG/DL — SIGNIFICANT CHANGE UP (ref 1.6–2.6)
MCHC RBC-ENTMCNC: 28.1 PG — SIGNIFICANT CHANGE UP (ref 27–34)
MCHC RBC-ENTMCNC: 32.6 GM/DL — SIGNIFICANT CHANGE UP (ref 32–36)
MCV RBC AUTO: 86.3 FL — SIGNIFICANT CHANGE UP (ref 80–100)
MONOCYTES # BLD AUTO: 0.87 K/UL — SIGNIFICANT CHANGE UP (ref 0–0.9)
MONOCYTES NFR BLD AUTO: 9.2 % — SIGNIFICANT CHANGE UP (ref 2–14)
NEUTROPHILS # BLD AUTO: 6.15 K/UL — SIGNIFICANT CHANGE UP (ref 1.8–7.4)
NEUTROPHILS NFR BLD AUTO: 64.7 % — SIGNIFICANT CHANGE UP (ref 43–77)
NRBC # BLD: 0 /100 WBCS — SIGNIFICANT CHANGE UP (ref 0–0)
PHOSPHATE SERPL-MCNC: 3.3 MG/DL — SIGNIFICANT CHANGE UP (ref 2.5–4.5)
PLATELET # BLD AUTO: 371 K/UL — SIGNIFICANT CHANGE UP (ref 150–400)
POTASSIUM SERPL-MCNC: 4.8 MMOL/L — SIGNIFICANT CHANGE UP (ref 3.5–5.3)
POTASSIUM SERPL-SCNC: 4.8 MMOL/L — SIGNIFICANT CHANGE UP (ref 3.5–5.3)
RBC # BLD: 4.23 M/UL — SIGNIFICANT CHANGE UP (ref 4.2–5.8)
RBC # FLD: 14.1 % — SIGNIFICANT CHANGE UP (ref 10.3–14.5)
SODIUM SERPL-SCNC: 135 MMOL/L — SIGNIFICANT CHANGE UP (ref 135–145)
SPECIMEN SOURCE: SIGNIFICANT CHANGE UP
VANCOMYCIN TROUGH SERPL-MCNC: 20 UG/ML — SIGNIFICANT CHANGE UP (ref 10–20)
VANCOMYCIN TROUGH SERPL-MCNC: 20.5 UG/ML — HIGH (ref 10–20)
WBC # BLD: 9.5 K/UL — SIGNIFICANT CHANGE UP (ref 3.8–10.5)
WBC # FLD AUTO: 9.5 K/UL — SIGNIFICANT CHANGE UP (ref 3.8–10.5)

## 2022-05-03 PROCEDURE — 99233 SBSQ HOSP IP/OBS HIGH 50: CPT

## 2022-05-03 PROCEDURE — 99232 SBSQ HOSP IP/OBS MODERATE 35: CPT | Mod: GC

## 2022-05-03 PROCEDURE — 88304 TISSUE EXAM BY PATHOLOGIST: CPT | Mod: 26

## 2022-05-03 PROCEDURE — 88311 DECALCIFY TISSUE: CPT | Mod: 26

## 2022-05-03 RX ORDER — SENNA PLUS 8.6 MG/1
2 TABLET ORAL AT BEDTIME
Refills: 0 | Status: DISCONTINUED | OUTPATIENT
Start: 2022-05-03 | End: 2022-05-19

## 2022-05-03 RX ORDER — VANCOMYCIN HCL 1 G
1750 VIAL (EA) INTRAVENOUS EVERY 12 HOURS
Refills: 0 | Status: DISCONTINUED | OUTPATIENT
Start: 2022-05-03 | End: 2022-05-04

## 2022-05-03 RX ORDER — MORPHINE SULFATE 50 MG/1
1 CAPSULE, EXTENDED RELEASE ORAL ONCE
Refills: 0 | Status: DISCONTINUED | OUTPATIENT
Start: 2022-05-03 | End: 2022-05-03

## 2022-05-03 RX ORDER — INSULIN LISPRO 100/ML
10 VIAL (ML) SUBCUTANEOUS
Refills: 0 | Status: DISCONTINUED | OUTPATIENT
Start: 2022-05-03 | End: 2022-05-08

## 2022-05-03 RX ORDER — MORPHINE SULFATE 50 MG/1
2 CAPSULE, EXTENDED RELEASE ORAL ONCE
Refills: 0 | Status: DISCONTINUED | OUTPATIENT
Start: 2022-05-03 | End: 2022-05-03

## 2022-05-03 RX ADMIN — INSULIN GLARGINE 25 UNIT(S): 100 INJECTION, SOLUTION SUBCUTANEOUS at 21:37

## 2022-05-03 RX ADMIN — Medication 4: at 17:12

## 2022-05-03 RX ADMIN — Medication 2: at 12:38

## 2022-05-03 RX ADMIN — GABAPENTIN 100 MILLIGRAM(S): 400 CAPSULE ORAL at 06:28

## 2022-05-03 RX ADMIN — PIPERACILLIN AND TAZOBACTAM 200 GRAM(S): 4; .5 INJECTION, POWDER, LYOPHILIZED, FOR SOLUTION INTRAVENOUS at 18:12

## 2022-05-03 RX ADMIN — Medication 2: at 21:34

## 2022-05-03 RX ADMIN — GABAPENTIN 100 MILLIGRAM(S): 400 CAPSULE ORAL at 21:36

## 2022-05-03 RX ADMIN — Medication 650 MILLIGRAM(S): at 22:36

## 2022-05-03 RX ADMIN — Medication 250 MILLIGRAM(S): at 06:28

## 2022-05-03 RX ADMIN — MORPHINE SULFATE 1 MILLIGRAM(S): 50 CAPSULE, EXTENDED RELEASE ORAL at 22:27

## 2022-05-03 RX ADMIN — Medication 650 MILLIGRAM(S): at 21:36

## 2022-05-03 RX ADMIN — MORPHINE SULFATE 2 MILLIGRAM(S): 50 CAPSULE, EXTENDED RELEASE ORAL at 15:36

## 2022-05-03 RX ADMIN — Medication 250 MILLIGRAM(S): at 22:15

## 2022-05-03 RX ADMIN — Medication 10 UNIT(S): at 17:12

## 2022-05-03 RX ADMIN — LOSARTAN POTASSIUM 25 MILLIGRAM(S): 100 TABLET, FILM COATED ORAL at 06:28

## 2022-05-03 RX ADMIN — Medication 2: at 08:51

## 2022-05-03 RX ADMIN — ATORVASTATIN CALCIUM 40 MILLIGRAM(S): 80 TABLET, FILM COATED ORAL at 21:36

## 2022-05-03 RX ADMIN — PIPERACILLIN AND TAZOBACTAM 200 GRAM(S): 4; .5 INJECTION, POWDER, LYOPHILIZED, FOR SOLUTION INTRAVENOUS at 13:01

## 2022-05-03 RX ADMIN — MORPHINE SULFATE 1 MILLIGRAM(S): 50 CAPSULE, EXTENDED RELEASE ORAL at 23:27

## 2022-05-03 RX ADMIN — PIPERACILLIN AND TAZOBACTAM 200 GRAM(S): 4; .5 INJECTION, POWDER, LYOPHILIZED, FOR SOLUTION INTRAVENOUS at 04:33

## 2022-05-03 RX ADMIN — MORPHINE SULFATE 2 MILLIGRAM(S): 50 CAPSULE, EXTENDED RELEASE ORAL at 16:07

## 2022-05-03 RX ADMIN — GABAPENTIN 100 MILLIGRAM(S): 400 CAPSULE ORAL at 12:40

## 2022-05-03 NOTE — BRIEF OPERATIVE NOTE - OPERATION/FINDINGS
Linear incision made along medial aspect of 1st MTPJ of right foot for drainage/washout. Approx 5cc purulence expressed.   Sharp excisional debridement of plantar wound with #15 blade; wound does probe to 1st metatarsal but does NOT communication with the medial incision.   Medial 1st MTPJ incision and plantar wound packed with 1/4" iodoform packing. Right foot dressed w/ gauze, kerlix, ACE.   Microbio specimens: devitalized soft tissue, 1st metatarsal bone, deep wound swab   Path specimens: devitalized soft tissue, 1st metatarsal bone Linear incision made along medial aspect of 1st MTPJ of right foot for drainage/washout, soft tissue dissected down to 1st metatarsal. Approx 5cc purulence expressed.   Sharp excisional debridement of plantar wound, up to and including bleeding subcutaneous tissue; wound does probe to 1st metatarsal but does NOT communicate with the medial incision.   Medial 1st MTPJ incision and plantar wound packed with 1/4" iodoform packing. Right foot dressed w/ gauze, kerlix, ACE.   Microbio specimens: devitalized soft tissue, 1st metatarsal bone, deep wound swab   Path specimens: devitalized soft tissue, 1st metatarsal bone

## 2022-05-03 NOTE — PROGRESS NOTE ADULT - SUBJECTIVE AND OBJECTIVE BOX
Patient is a 46y old  Male who presents with a chief complaint of Diabetic foot infection (02 May 2022 12:59)      INTERVAL HPI/ OVERNIGHT EVENTS: ANDREW o/n. Pt notes feeling unwell and was unable to sleep. Pt awaiting add-on for I&D.       LABS                        12.6   12.48 )-----------( 378      ( 02 May 2022 10:22 )             37.9     05-02    134<L>  |  99  |  20  ----------------------------<  221<H>  4.3   |  25  |  1.04    Ca    9.4      02 May 2022 10:22  Phos  3.4     05-02  Mg     2.2     05-02    TPro  8.1  /  Alb  3.7  /  TBili  0.6  /  DBili  x   /  AST  22  /  ALT  33  /  AlkPhos  105  05-02    PT/INR - ( 02 May 2022 10:22 )   PT: 11.4 sec;   INR: 0.96          PTT - ( 02 May 2022 10:22 )  PTT:32.3 sec    ICU Vital Signs Last 24 Hrs  T(C): 37.1 (03 May 2022 06:05), Max: 37.2 (02 May 2022 20:50)  T(F): 98.8 (03 May 2022 06:05), Max: 99 (02 May 2022 20:50)  HR: 90 (03 May 2022 06:05) (77 - 105)  BP: 153/81 (03 May 2022 06:05) (115/72 - 153/81)  BP(mean): --  ABP: --  ABP(mean): --  RR: 19 (03 May 2022 06:05) (17 - 19)  SpO2: 99% (03 May 2022 06:05) (98% - 99%)      RADIOLOGY    MICROBIOLOGY    PHYSICAL EXAM  Lower Extremity Focused  General: NAD, AA0x3  Lower Extremity Focused: RIGHT  Vasc: D/PT 2/4 b/l, Right foot warmer than left, non-pitting edema to dorsum of right foot over hallux  Derm:  Right foot- Submet 1 ulcer measuring 2.0cmx2.0cm, fibrotic base, Right foot medial 1st MPJ ulceration 1cm granular area of tissue, right foot dorsomedial 0.5x0.5cm wound probes deep to bone, 3ccs purulent drainage expressed, Area of fluctuance noted over dorsal hallux near MPJ  Neuro: protective sensation grossly diminished   MSK: S/p 4th and 5th ray amputations, s/p 2nd digit distal Symes amputation

## 2022-05-03 NOTE — PROGRESS NOTE ADULT - PROBLEM SELECTOR PLAN 3
History of diabetes, glucose 416 on admission. A1c 9.8 in Feb 2022. Home meds: lantus 35, lispro 8 TID, metformin 750 BID. Gave additional 10u regular insulin in AM 5/2 due to missed lantus dose last night.  - Ordered lantus 25 and mISS inpatient  - F/u AM A1c  - Home gabapentin 100 TID for nerve pain

## 2022-05-03 NOTE — PROGRESS NOTE ADULT - PROBLEM SELECTOR PLAN 1
Patient presenting with purulent drainage from right great toe noted on home dressing change tonight. Denies pain. S/p bedside incision and drainage of right dorsal hallux abscess performed in ED, 5cc drained, per podiatry. ESR 40, .8. WBC 17. VSS, no fever.  - Podiatry team preliminary read x-ray right foot: cortical changes of the medial 1st metatarsal head and 1st proximal phalanx high likelihood of OM   - F/u Final read X-ray right foot  - F/u Deep culture right foot medial MPJ  - C/w Vanc/Zosyn  - Podiatry will re-evaluate wounds during morning rounds 5/2 and assess need for OR washout this week  - Consider ID team 2 consult Patient presenting with purulent drainage from right great toe noted on home dressing change tonight. Denies pain. S/p bedside incision and drainage of right dorsal hallux abscess performed in ED, 5cc drained, per podiatry. ESR 40, .8. WBC 17. VSS, no fever.  - Podiatry team preliminary read x-ray right foot: cortical changes of the medial 1st metatarsal head and 1st proximal phalanx high likelihood of OM   - F/u Final read X-ray right foot  - F/u Deep culture right foot medial MPJ  - C/w Vanc/Zosyn  - Podiatry will re-evaluate wounds during morning rounds 5/2 and assess need for OR washout this week  - OR washout on 5/3,   - Consider ID team 2 consult

## 2022-05-03 NOTE — PROGRESS NOTE ADULT - ASSESSMENT
46M w/ history of previous alcohol and cocaine use and PMHx of HTN, HLD, DM, and right foot osteomyelitis with amputation of the right 4th and 5th toes and partial amputation of the right 2nd digit, p/w 1-day h/o right foot wound drainage of medial great toe, and hallux abscess. Previous wound cultures from Feb 2022 grew MRSA, GBS, Gemella and Bacteroides-pt finished 6 weeks IV Vanc and flagyl 3/21, still awaiting results from deep wound culture taken in ED 5/1. Treatment of diabetic foot infection, suspected acute on chronic OM, podiatry took pt for washout in OR 5/3.     Plan:   1. Continue Vancomycin and Zosyn until deep wound culture results   2. Check Vanc trough before the 4th dose Goal is 14-18  3. F/u OR culture results (wound culture, tissue culture and bone culture)    ID team 1 will follow.

## 2022-05-03 NOTE — PROGRESS NOTE ADULT - SUBJECTIVE AND OBJECTIVE BOX
***INCOMPLETE***    OVERNIGHT EVENTS:   Podiatry placed NPO order    SUBJECTIVE:    Patient was resting comfortably in bed. Reports not sleeping well, but weakness and fatigue feels better from yesterday and diffuse stomach pain. Pt passed small stool yesterday night and had diarrhea on Saturday. Denies fever, chills, chest pain, cough, N/V/C/D, and SOB.    OBJECTIVE:  Vital Signs Last 24 Hrs  T(C): 37.1 (03 May 2022 06:05), Max: 37.2 (02 May 2022 20:50)  T(F): 98.8 (03 May 2022 06:05), Max: 99 (02 May 2022 20:50)  HR: 90 (03 May 2022 06:05) (77 - 105)  BP: 153/81 (03 May 2022 06:05) (115/72 - 153/81)  BP(mean): --  RR: 19 (03 May 2022 06:05) (17 - 19)  SpO2: 99% (03 May 2022 06:05) (98% - 99%)    Physical Exam:  Gen: NAD, laying in bed, well appearing, alert, interactive  HEENT: PERRL, anicteric sclera, no JVD, no thyromegaly  Cardio: +S1/S2, RRR, no murmurs  Resp: CTA b/l, no w/r/r  GI: +BS x4, tender to palpation all 4 quadrants, ND  Ext: no peripheral edema, NROM x4  Vasc: 3+ peripheral pulses  Skin: warm, dry, and intact. R foot wrapped in wound dressing  Neuro: AAOx3, CN II-XII intact, no focal deficits      Labs:                        12.6   12.48 )-----------( 378      ( 02 May 2022 10:22 )             37.9     05-02    134<L>  |  99  |  20  ----------------------------<  221<H>  4.3   |  25  |  1.04    Ca    9.4      02 May 2022 10:22  Phos  3.4     05-02  Mg     2.2     05-02    TPro  8.1  /  Alb  3.7  /  TBili  0.6  /  DBili  x   /  AST  22  /  ALT  33  /  AlkPhos  105  05-02    PT/INR - ( 02 May 2022 10:22 )   PT: 11.4 sec;   INR: 0.96          PTT - ( 02 May 2022 10:22 )  PTT:32.3 sec  Fingerstick  glucose: POCT Blood Glucose.: 185 mg/dL (03 May 2022 08:49)      MEDICATIONS  (STANDING):  atorvastatin 40 milliGRAM(s) Oral at bedtime  gabapentin 100 milliGRAM(s) Oral every 8 hours  insulin glargine Injectable (LANTUS) 25 Unit(s) SubCutaneous at bedtime  insulin lispro (ADMELOG) corrective regimen sliding scale   SubCutaneous Before meals and at bedtime  losartan 25 milliGRAM(s) Oral daily  piperacillin/tazobactam IVPB.. 4.5 Gram(s) IV Intermittent every 6 hours    MEDICATIONS  (PRN):  acetaminophen     Tablet .. 650 milliGRAM(s) Oral every 6 hours PRN Temp greater or equal to 38C (100.4F), Mild Pain (1 - 3)      Allergies    No Known Allergies    Intolerances        RADIOLOGY & ADDITIONAL TESTS: Reviewed.

## 2022-05-03 NOTE — PROGRESS NOTE ADULT - ATTENDING COMMENTS
46M with PMH of HTN, HLD, DM2 and prior cocaine and alcohol use presenting with purulent drainage from R foot wound, concerning for osteomyelitis.  Podiatry and ID are following.    - s/p washout today in the OR.  Continuing on vancomycin and zosyn.  Follow up cultures.  patient has remained afebrile.   - will need PICC line for long term antibiotics.  Therapy can be tailored to culture data.

## 2022-05-03 NOTE — PROGRESS NOTE ADULT - ASSESSMENT
45 yo M, history of previous alcohol and cocaine use w/ PMHx of HTN (not treated), HLD (on meds), DM (started in 2021 on OAD and insulin), osteomyelitis with amputation of the right 4th and 5th rays and partial amputation of the right 2nd digit, presents to the Emergency Department with foot pain / wound drainage. Podiatry consulted for right foot wound. VSS, leukocytosis present, elevated CRP, medial 1st MPJ wound actively draining purulence, pt found to have dorsal hallux abscess with 5ccs expressed after bedside Incision and drainage. Xray showing cortical changes of the medial 1st metatarsal head and 1st proximal phalanx high likelihood of OM.     Pt is s/p R foot I&D + debridement of plantar wound, approx 5cc purulence expressed.   Possible RTOR at a later date for addn debridement, graft placement.    Plan:   - f/u deep wound ED Cx: NGTD   - f/u 5/3 OR deep wound cx, tissue cx & path, bone cx & path  -Local wound care: packing, DSD, ACE wrap  - c/w IV ABX per primary   - Resume diet  - WBAT R heel in surgical shoe  - Rest of care per primary team    Podiatry following. Plan d/w attending. Plan d/w primary team.

## 2022-05-03 NOTE — PROGRESS NOTE ADULT - SUBJECTIVE AND OBJECTIVE BOX
POST OP NOTE    ARIEL MOURA  MRN-[id number]    Procedure: Right foot incision & drainage of 1st MTPJ, excisional debridement of plantar right foot wound  Surgeon: Rowan Peña DPM  Assistants: Mini Murhpy DPM (PGY2)    Patient tolerated procedure well without incident.  Patient transferred to PACU in stable condition.       PE / Post Op Check:       GEN: NAD, AAOx3, resting comfortably with pain controlled      LE Focused: Incision located medially over 1st MPTJ is open, filled w/ 1/4" iodoform packing. Right foot plantar wound also probes to 1st met. Medial incision and plantar wound do NOT communicate. CFT shows adequate perfusion to remaining right great toe and 3rd digit with no signs of ischemic compromise.  No strikethrough is appreciated on surgical dressings.  Dressings were dry, clean, and intact.  No neuromuscular deficits appreciated.

## 2022-05-03 NOTE — PROGRESS NOTE ADULT - SUBJECTIVE AND OBJECTIVE BOX
Infectious Diseases Progress Note: Pt seen and examined bedside this AM. Pt denies nausea, vomiting, fever, chills, pain.     SUBJECTIVE: Patient seen and examined at bedside. Patient denies fever, chills, HA, nausea, vomiting, abdominal pain, dysuria, diarrhea.    DIABETIC FOOT ULCERS      Hypertension    Diabetes mellitus    HLD (hyperlipidemia)    Acute osteomyelitis    Nutrition, metabolism, and development symptoms    Osteomyelitis    Right foot ulcer    Foot abscess, right    Allergies    No Known Allergies    Intolerances      ANTIBIOTICS/RELEVANT:  antimicrobials  piperacillin/tazobactam IVPB.. 4.5 Gram(s) IV Intermittent every 6 hours    immunologic:      OTHER:  acetaminophen     Tablet .. 650 milliGRAM(s) Oral every 6 hours PRN  atorvastatin 40 milliGRAM(s) Oral at bedtime  gabapentin 100 milliGRAM(s) Oral every 8 hours  insulin glargine Injectable (LANTUS) 25 Unit(s) SubCutaneous at bedtime  insulin lispro (ADMELOG) corrective regimen sliding scale   SubCutaneous Before meals and at bedtime  insulin lispro Injectable (ADMELOG) 10 Unit(s) SubCutaneous three times a day before meals  losartan 25 milliGRAM(s) Oral daily  senna 2 Tablet(s) Oral at bedtime      Objective:  Vital Signs Last 24 Hrs  T(C): 36.4 (03 May 2022 11:16), Max: 37.2 (02 May 2022 20:50)  T(F): 97.5 (03 May 2022 11:16), Max: 99 (02 May 2022 20:50)  HR: 84 (03 May 2022 11:46) (77 - 105)  BP: 161/86 (03 May 2022 11:46) (115/72 - 167/88)  BP(mean): 122 (03 May 2022 11:46) (109 - 125)  RR: 12 (03 May 2022 11:46) (12 - 19)  SpO2: 100% (03 May 2022 11:46) (98% - 100%)    PHYSICAL EXAM:  Constitutional: Well-developed, well nourished  Eyes: PERRLA, EOMI  Ear/Nose/Throat: no oral lesion, no sinus tenderness on percussion	  Neck:no JVD, no lymphadenopathy, supple  Respiratory: CTA bilateral  Cardiovascular: S1S2, RRR, no murmurs  Gastrointestinal: soft, NTND, (+) BS, no HSM  Extremities: no c/e/e  Skin: no rashes    LABS:                        11.9   9.50  )-----------( 371      ( 03 May 2022 08:53 )             36.5     05-03    135  |  101  |  20  ----------------------------<  211<H>  4.8   |  24  |  1.07    Ca    8.8      03 May 2022 08:52  Phos  3.3     05-03  Mg     2.1     05-03    TPro  8.1  /  Alb  3.7  /  TBili  0.6  /  DBili  x   /  AST  22  /  ALT  33  /  AlkPhos  105  05-02    PT/INR - ( 02 May 2022 10:22 )   PT: 11.4 sec;   INR: 0.96          PTT - ( 02 May 2022 10:22 )  PTT:32.3 sec      MICROBIOLOGY:    Culture - Other (collected 02 May 2022 08:31)  Source: Wound Wound  Gram Stain (03 May 2022 00:59):    No organisms seen    Few White blood cells    Culture - Blood (collected 02 May 2022 06:07)  Source: .Blood Blood  Preliminary Report (03 May 2022 07:00):    No growth at 1 day.    Culture - Blood (collected 02 May 2022 06:07)  Source: .Blood Blood  Preliminary Report (03 May 2022 07:00):    No growth at 1 day.      RADIOLOGY & ADDITIONAL STUDIES:  < from: Xray Foot AP + Lateral, Right (05.02.22 @ 03:56) >  IMPRESSION: Frontal and lateral views of the right foot and right ankle   demonstrate soft tissue defect adjacent to the fourth metatarsal stump.   Transmetatarsal amputation of the fourth and fifth metatarsals. Absence   of the distal phalanges of the second digit. Chronic arthropathy of the   third PIP joint. Ankylosis of the third PIP joint. Charcot arthropathy   and periarticular osteopenia surrounding the Lisfranc joint. Hindfoot   valgus and pes planus. Arterial vascular calcification. Soft tissue   defect along the medial aspect of the foot adjacent to the MCP joint.   Fragmentation of the sesamoid. Tibiotalar joint is in appropriate   alignment.  MRI is more sensitive examination for detection of osteomyelitis.  < end of copied text >    < from: Xray Ankle 2 Views, Right (05.02.22 @ 03:55) >  IMPRESSION: Frontal and lateral views of the right foot and right ankle   demonstrate soft tissue defect adjacent to the fourth metatarsal stump.   Transmetatarsal amputation of the fourth and fifth metatarsals. Absence   of the distal phalanges of the second digit. Chronic arthropathy of the   third PIP joint. Ankylosis of the third PIP joint. Charcot arthropathy   and periarticular osteopenia surrounding the Lisfranc joint. Hindfoot   valgus and pes planus. Arterial vascular calcification. Soft tissue   defect along the medial aspect of the foot adjacent to the MCP joint.   Fragmentation of the sesamoid. Tibiotalar joint is in appropriate   alignment.  MRI is more sensitive examination for detection of osteomyelitis.  < end of copied text >

## 2022-05-03 NOTE — PROGRESS NOTE ADULT - ATTENDING COMMENTS
Agree with above.  Continue Vancomycin and Zosyn.  Check trough before 4th dose.  Follow up OR cultures

## 2022-05-03 NOTE — BRIEF OPERATIVE NOTE - COMMENTS
PreOp Local Nerve Block 20cc 1% lidocaine plain   PostOp Local Nerve Block 10cc 50/50 mixture 1% lidocaine plain + 0.5% marcaine plain

## 2022-05-03 NOTE — PROGRESS NOTE ADULT - ASSESSMENT
47 yo M, history of previous alcohol and cocaine use w/ PMHx of HTN (not treated), HLD (on meds), DM (started in 2021 on OAD and insulin), osteomyelitis with amputation of the right 4th and 5th rays and partial amputation of the right 2nd digit, presents to the Emergency Department with foot pain / wound drainage. Podiatry consulted for right foot wound. VSS, leukocytosis present, elevated CRP, medial 1st MPJ wound actively draining purulence, pt found to have dorsal hallux abscess with 5ccs expressed after bedside Incision and drainage. Xray showing cortical changes of the medial 1st metatarsal head and 1st proximal phalanx high likelihood of OM.     Plan:   - f/u deep wound ED Cx: NGTD   -Local wound care: betadine WTD, DSD, ACE wrap  - c/w IV ABX per primary   - Pt is NPO at midnight, was able to have dinner night of 5/2 after case was delayed to 5/3 secondary to multiple emergent cases pushing pt's surgery back further to today  - pt added on for OR after 12pm today; pending confirmation for exact time w/ OR scheduling desk  - Rest of care per primary team    Podiatry following. Plan d/w attending. Plan d/w primary team.

## 2022-05-04 ENCOUNTER — TRANSCRIPTION ENCOUNTER (OUTPATIENT)
Age: 47
End: 2022-05-04

## 2022-05-04 LAB
-  CEFAZOLIN: SIGNIFICANT CHANGE UP
-  CLINDAMYCIN: SIGNIFICANT CHANGE UP
-  DAPTOMYCIN: SIGNIFICANT CHANGE UP
-  ERYTHROMYCIN: SIGNIFICANT CHANGE UP
-  LINEZOLID: SIGNIFICANT CHANGE UP
-  OXACILLIN: SIGNIFICANT CHANGE UP
-  RIFAMPIN: SIGNIFICANT CHANGE UP
-  TETRACYCLINE: SIGNIFICANT CHANGE UP
-  TRIMETHOPRIM/SULFAMETHOXAZOLE: SIGNIFICANT CHANGE UP
-  VANCOMYCIN: SIGNIFICANT CHANGE UP
ANION GAP SERPL CALC-SCNC: 9 MMOL/L — SIGNIFICANT CHANGE UP (ref 5–17)
BASOPHILS # BLD AUTO: 0.06 K/UL — SIGNIFICANT CHANGE UP (ref 0–0.2)
BASOPHILS NFR BLD AUTO: 0.7 % — SIGNIFICANT CHANGE UP (ref 0–2)
BUN SERPL-MCNC: 19 MG/DL — SIGNIFICANT CHANGE UP (ref 7–23)
CALCIUM SERPL-MCNC: 9.2 MG/DL — SIGNIFICANT CHANGE UP (ref 8.4–10.5)
CHLORIDE SERPL-SCNC: 101 MMOL/L — SIGNIFICANT CHANGE UP (ref 96–108)
CO2 SERPL-SCNC: 25 MMOL/L — SIGNIFICANT CHANGE UP (ref 22–31)
CREAT SERPL-MCNC: 1.14 MG/DL — SIGNIFICANT CHANGE UP (ref 0.5–1.3)
CULTURE RESULTS: SIGNIFICANT CHANGE UP
EGFR: 80 ML/MIN/1.73M2 — SIGNIFICANT CHANGE UP
EOSINOPHIL # BLD AUTO: 0.3 K/UL — SIGNIFICANT CHANGE UP (ref 0–0.5)
EOSINOPHIL NFR BLD AUTO: 3.4 % — SIGNIFICANT CHANGE UP (ref 0–6)
GLUCOSE BLDC GLUCOMTR-MCNC: 131 MG/DL — HIGH (ref 70–99)
GLUCOSE BLDC GLUCOMTR-MCNC: 144 MG/DL — HIGH (ref 70–99)
GLUCOSE BLDC GLUCOMTR-MCNC: 193 MG/DL — HIGH (ref 70–99)
GLUCOSE BLDC GLUCOMTR-MCNC: 278 MG/DL — HIGH (ref 70–99)
GLUCOSE SERPL-MCNC: 210 MG/DL — HIGH (ref 70–99)
HCT VFR BLD CALC: 38.5 % — LOW (ref 39–50)
HGB BLD-MCNC: 12.7 G/DL — LOW (ref 13–17)
IMM GRANULOCYTES NFR BLD AUTO: 0.5 % — SIGNIFICANT CHANGE UP (ref 0–1.5)
LYMPHOCYTES # BLD AUTO: 3.17 K/UL — SIGNIFICANT CHANGE UP (ref 1–3.3)
LYMPHOCYTES # BLD AUTO: 35.9 % — SIGNIFICANT CHANGE UP (ref 13–44)
MAGNESIUM SERPL-MCNC: 2.2 MG/DL — SIGNIFICANT CHANGE UP (ref 1.6–2.6)
MCHC RBC-ENTMCNC: 28.5 PG — SIGNIFICANT CHANGE UP (ref 27–34)
MCHC RBC-ENTMCNC: 33 GM/DL — SIGNIFICANT CHANGE UP (ref 32–36)
MCV RBC AUTO: 86.3 FL — SIGNIFICANT CHANGE UP (ref 80–100)
METHOD TYPE: SIGNIFICANT CHANGE UP
METHOD TYPE: SIGNIFICANT CHANGE UP
MONOCYTES # BLD AUTO: 0.91 K/UL — HIGH (ref 0–0.9)
MONOCYTES NFR BLD AUTO: 10.3 % — SIGNIFICANT CHANGE UP (ref 2–14)
NEUTROPHILS # BLD AUTO: 4.34 K/UL — SIGNIFICANT CHANGE UP (ref 1.8–7.4)
NEUTROPHILS NFR BLD AUTO: 49.2 % — SIGNIFICANT CHANGE UP (ref 43–77)
NIGHT BLUE STAIN TISS: SIGNIFICANT CHANGE UP
NRBC # BLD: 0 /100 WBCS — SIGNIFICANT CHANGE UP (ref 0–0)
ORGANISM # SPEC MICROSCOPIC CNT: SIGNIFICANT CHANGE UP
PHOSPHATE SERPL-MCNC: 3.4 MG/DL — SIGNIFICANT CHANGE UP (ref 2.5–4.5)
PLATELET # BLD AUTO: 406 K/UL — HIGH (ref 150–400)
POTASSIUM SERPL-MCNC: 4.6 MMOL/L — SIGNIFICANT CHANGE UP (ref 3.5–5.3)
POTASSIUM SERPL-SCNC: 4.6 MMOL/L — SIGNIFICANT CHANGE UP (ref 3.5–5.3)
RBC # BLD: 4.46 M/UL — SIGNIFICANT CHANGE UP (ref 4.2–5.8)
RBC # FLD: 13.9 % — SIGNIFICANT CHANGE UP (ref 10.3–14.5)
SODIUM SERPL-SCNC: 135 MMOL/L — SIGNIFICANT CHANGE UP (ref 135–145)
SPECIMEN SOURCE: SIGNIFICANT CHANGE UP
WBC # BLD: 8.82 K/UL — SIGNIFICANT CHANGE UP (ref 3.8–10.5)
WBC # FLD AUTO: 8.82 K/UL — SIGNIFICANT CHANGE UP (ref 3.8–10.5)

## 2022-05-04 PROCEDURE — 99233 SBSQ HOSP IP/OBS HIGH 50: CPT

## 2022-05-04 PROCEDURE — 99232 SBSQ HOSP IP/OBS MODERATE 35: CPT | Mod: GC

## 2022-05-04 RX ORDER — ENOXAPARIN SODIUM 100 MG/ML
40 INJECTION SUBCUTANEOUS EVERY 24 HOURS
Refills: 0 | Status: DISCONTINUED | OUTPATIENT
Start: 2022-05-05 | End: 2022-05-09

## 2022-05-04 RX ORDER — TRAMADOL HYDROCHLORIDE 50 MG/1
25 TABLET ORAL EVERY 6 HOURS
Refills: 0 | Status: DISCONTINUED | OUTPATIENT
Start: 2022-05-04 | End: 2022-05-10

## 2022-05-04 RX ORDER — POLYETHYLENE GLYCOL 3350 17 G/17G
17 POWDER, FOR SOLUTION ORAL
Refills: 0 | Status: DISCONTINUED | OUTPATIENT
Start: 2022-05-04 | End: 2022-05-19

## 2022-05-04 RX ORDER — SIMETHICONE 80 MG/1
80 TABLET, CHEWABLE ORAL DAILY
Refills: 0 | Status: DISCONTINUED | OUTPATIENT
Start: 2022-05-04 | End: 2022-05-19

## 2022-05-04 RX ORDER — ACETAMINOPHEN 500 MG
650 TABLET ORAL EVERY 6 HOURS
Refills: 0 | Status: DISCONTINUED | OUTPATIENT
Start: 2022-05-04 | End: 2022-05-19

## 2022-05-04 RX ORDER — ENOXAPARIN SODIUM 100 MG/ML
40 INJECTION SUBCUTANEOUS ONCE
Refills: 0 | Status: COMPLETED | OUTPATIENT
Start: 2022-05-04 | End: 2022-05-04

## 2022-05-04 RX ORDER — VANCOMYCIN HCL 1 G
1750 VIAL (EA) INTRAVENOUS EVERY 12 HOURS
Refills: 0 | Status: COMPLETED | OUTPATIENT
Start: 2022-05-04 | End: 2022-05-04

## 2022-05-04 RX ADMIN — ENOXAPARIN SODIUM 40 MILLIGRAM(S): 100 INJECTION SUBCUTANEOUS at 12:49

## 2022-05-04 RX ADMIN — PIPERACILLIN AND TAZOBACTAM 200 GRAM(S): 4; .5 INJECTION, POWDER, LYOPHILIZED, FOR SOLUTION INTRAVENOUS at 06:05

## 2022-05-04 RX ADMIN — TRAMADOL HYDROCHLORIDE 25 MILLIGRAM(S): 50 TABLET ORAL at 22:24

## 2022-05-04 RX ADMIN — ATORVASTATIN CALCIUM 40 MILLIGRAM(S): 80 TABLET, FILM COATED ORAL at 21:24

## 2022-05-04 RX ADMIN — SIMETHICONE 80 MILLIGRAM(S): 80 TABLET, CHEWABLE ORAL at 12:49

## 2022-05-04 RX ADMIN — INSULIN GLARGINE 25 UNIT(S): 100 INJECTION, SOLUTION SUBCUTANEOUS at 22:10

## 2022-05-04 RX ADMIN — Medication 650 MILLIGRAM(S): at 17:00

## 2022-05-04 RX ADMIN — SENNA PLUS 2 TABLET(S): 8.6 TABLET ORAL at 21:24

## 2022-05-04 RX ADMIN — GABAPENTIN 100 MILLIGRAM(S): 400 CAPSULE ORAL at 06:05

## 2022-05-04 RX ADMIN — Medication 10 UNIT(S): at 12:55

## 2022-05-04 RX ADMIN — PIPERACILLIN AND TAZOBACTAM 200 GRAM(S): 4; .5 INJECTION, POWDER, LYOPHILIZED, FOR SOLUTION INTRAVENOUS at 17:19

## 2022-05-04 RX ADMIN — Medication 650 MILLIGRAM(S): at 13:00

## 2022-05-04 RX ADMIN — POLYETHYLENE GLYCOL 3350 17 GRAM(S): 17 POWDER, FOR SOLUTION ORAL at 21:25

## 2022-05-04 RX ADMIN — Medication 650 MILLIGRAM(S): at 12:49

## 2022-05-04 RX ADMIN — PIPERACILLIN AND TAZOBACTAM 200 GRAM(S): 4; .5 INJECTION, POWDER, LYOPHILIZED, FOR SOLUTION INTRAVENOUS at 00:41

## 2022-05-04 RX ADMIN — Medication 2: at 09:37

## 2022-05-04 RX ADMIN — LOSARTAN POTASSIUM 25 MILLIGRAM(S): 100 TABLET, FILM COATED ORAL at 06:05

## 2022-05-04 RX ADMIN — TRAMADOL HYDROCHLORIDE 25 MILLIGRAM(S): 50 TABLET ORAL at 21:24

## 2022-05-04 RX ADMIN — GABAPENTIN 100 MILLIGRAM(S): 400 CAPSULE ORAL at 13:40

## 2022-05-04 RX ADMIN — Medication 250 MILLIGRAM(S): at 09:38

## 2022-05-04 RX ADMIN — POLYETHYLENE GLYCOL 3350 17 GRAM(S): 17 POWDER, FOR SOLUTION ORAL at 12:55

## 2022-05-04 RX ADMIN — Medication 250 MILLIGRAM(S): at 22:25

## 2022-05-04 RX ADMIN — Medication 10 UNIT(S): at 17:44

## 2022-05-04 RX ADMIN — Medication 650 MILLIGRAM(S): at 17:18

## 2022-05-04 RX ADMIN — Medication 10 UNIT(S): at 09:36

## 2022-05-04 RX ADMIN — PIPERACILLIN AND TAZOBACTAM 200 GRAM(S): 4; .5 INJECTION, POWDER, LYOPHILIZED, FOR SOLUTION INTRAVENOUS at 12:50

## 2022-05-04 RX ADMIN — GABAPENTIN 100 MILLIGRAM(S): 400 CAPSULE ORAL at 21:25

## 2022-05-04 RX ADMIN — Medication 6: at 12:54

## 2022-05-04 NOTE — PHYSICAL THERAPY INITIAL EVALUATION ADULT - ACTIVE RANGE OF MOTION EXAMINATION, REHAB EVAL
maricruz. upper extremity Active ROM was WNL (within normal limits)/bilateral lower extremity Active ROM was WNL (within normal limits)

## 2022-05-04 NOTE — PROGRESS NOTE ADULT - PROBLEM SELECTOR PLAN 6
F: None   E: Replete for K<4, Mag<2  N: NPO in case of podiatry procedure today  A: None  Code status: Full  Dispo: [facility] F: None   E: Replete for K<4, Mag<2  N: CC diet  DVT ppx: holding in setting of recent surgery; pending podiatry recs to restart   Full Code   Dispo: [facility] F: None   E: Replete for K<4, Mag<2  N: CC diet  DVT ppx: lovenox  Full Code   Dispo: [facility]

## 2022-05-04 NOTE — PHYSICAL THERAPY INITIAL EVALUATION ADULT - ADDITIONAL COMMENTS
Pt lives with mom in an apartment building, 5 [address] to enter. Pt reports previous independence when he left rehab a few weeks ago, has a cane, walker, and crutches at home but hasn't been using them, denies any falls. Pt reports he was navigating community and subway. Pt lives with mother in an apartment building, 5 [address] to enter. Pt reports previous independence when he left rehab a few weeks ago, has a cane, walker, and crutches at home but hasn't been using them, denies any falls. Pt reports he was navigating community and subway.

## 2022-05-04 NOTE — PROGRESS NOTE ADULT - ASSESSMENT
46M w/ history of previous alcohol and cocaine use and PMHx of HTN, HLD, DM, and right foot osteomyelitis with amputation of the right 4th and 5th toes and partial amputation of the right 2nd digit, p/w 1-day h/o right foot wound drainage of medial great toe. 46M w/ history of previous alcohol and cocaine use and PMHx of HTN, HLD, DM, and right foot osteomyelitis with amputation of the right 4th and 5th toes and partial amputation of the right 2nd digit, p/w 1-day h/o right foot wound drainage of medial great toe now here for management of post-washout (4/3) of R foot abscess. Mr Valentin is a 46M w/ history of previous alcohol and cocaine use and PMHx of HTN, HLD, DM, and right foot osteomyelitis with amputation of the right 4th and 5th toes and partial amputation of the right 2nd digit, p/w 1-day h/o right foot wound drainage of medial great toe now here for management of post-washout (4/3) of R foot abscess.

## 2022-05-04 NOTE — PROGRESS NOTE ADULT - SUBJECTIVE AND OBJECTIVE BOX
***INCOMPLETE***    OVERNIGHT EVENTS:   Oral temp 99.8F, refused rectal temp. Gave 975mg tylenol for fever and pain. Pt insisted that pain continued and refused oral options was given 1mg IV morphine last night.    SUBJECTIVE:        OBJECTIVE:  Vital Signs Last 24 Hrs  T(C): 36.6 (04 May 2022 05:32), Max: 37.7 (03 May 2022 21:08)  T(F): 97.9 (04 May 2022 05:32), Max: 99.8 (03 May 2022 21:08)  HR: 74 (04 May 2022 05:32) (74 - 91)  BP: 156/84 (04 May 2022 05:32) (140/87 - 167/88)  BP(mean): 119 (03 May 2022 11:53) (109 - 125)  RR: 19 (04 May 2022 05:32) (12 - 19)  SpO2: 98% (04 May 2022 05:32) (96% - 100%)    Physical Exam:  Gen: NAD, laying in bed, well appearing, alert, interactive  HEENT: PERRL, anicteric sclera, no JVD, no thyromegaly  Cardio: +S1/S2, RRR, no murmurs  Resp: CTA b/l, no w/r/r  GI: +BS x4, NT/ND  Ext: no peripheral edema, NROM x4, +right foot wrapped in wound dressing  Vasc: 3+ peripheral pulses  Skin: warm, dry, and intact. no rashes, wounds or scars  Neuro: AAOx3, CN II-XII intact, no focal deficits      Labs:                        11.9   9.50  )-----------( 371      ( 03 May 2022 08:53 )             36.5     05-03    135  |  101  |  20  ----------------------------<  211<H>  4.8   |  24  |  1.07    Ca    8.8      03 May 2022 08:52  Phos  3.3     05-03  Mg     2.1     05-03    TPro  8.1  /  Alb  3.7  /  TBili  0.6  /  DBili  x   /  AST  22  /  ALT  33  /  AlkPhos  105  05-02    PT/INR - ( 02 May 2022 10:22 )   PT: 11.4 sec;   INR: 0.96          PTT - ( 02 May 2022 10:22 )  PTT:32.3 sec  Fingerstick  glucose: POCT Blood Glucose.: 188 mg/dL (03 May 2022 21:14)      MEDICATIONS  (STANDING):  atorvastatin 40 milliGRAM(s) Oral at bedtime  gabapentin 100 milliGRAM(s) Oral every 8 hours  insulin glargine Injectable (LANTUS) 25 Unit(s) SubCutaneous at bedtime  insulin lispro (ADMELOG) corrective regimen sliding scale   SubCutaneous Before meals and at bedtime  insulin lispro Injectable (ADMELOG) 10 Unit(s) SubCutaneous three times a day before meals  losartan 25 milliGRAM(s) Oral daily  piperacillin/tazobactam IVPB.. 4.5 Gram(s) IV Intermittent every 6 hours  senna 2 Tablet(s) Oral at bedtime  vancomycin  IVPB 1750 milliGRAM(s) IV Intermittent every 12 hours    MEDICATIONS  (PRN):  acetaminophen     Tablet .. 650 milliGRAM(s) Oral every 6 hours PRN Temp greater or equal to 38C (100.4F), Mild Pain (1 - 3)      Allergies    No Known Allergies    Intolerances        RADIOLOGY & ADDITIONAL TESTS: Reviewed.                       ***INCOMPLETE***    OVERNIGHT EVENTS:   Oral temp 99.8F, refused rectal temp. Gave 975mg tylenol for fever and pain. Pt insisted that pain continued and refused oral options was given 1mg IV morphine last night.    SUBJECTIVE:    Patient was resting comfortably in bed. He notes that the diffuse abdominal pain is same as yesterday, sweating last night and the day before, weakness and fatigue improved from yesterday. Had not had a BM for the past 2 days, has felt the need to have BM, but fails on attempt. Denies fever, chills, HA, N/V/C/D, chest pain, paresthesias, cough, and palpitations.     OBJECTIVE:  Vital Signs Last 24 Hrs  T(C): 36.6 (04 May 2022 05:32), Max: 37.7 (03 May 2022 21:08)  T(F): 97.9 (04 May 2022 05:32), Max: 99.8 (03 May 2022 21:08)  HR: 74 (04 May 2022 05:32) (74 - 91)  BP: 156/84 (04 May 2022 05:32) (140/87 - 167/88)  BP(mean): 119 (03 May 2022 11:53) (109 - 125)  RR: 19 (04 May 2022 05:32) (12 - 19)  SpO2: 98% (04 May 2022 05:32) (96% - 100%)    Physical Exam:  Gen: NAD, laying in bed, well appearing, alert, interactive  HEENT: PERRL, anicteric sclera, no JVD, no thyromegaly  Cardio: +S1/S2, RRR, no murmurs  Resp: CTA b/l, no w/r/r  GI: +BS x4, TTP in all 4 quadrants, ND  Ext: no peripheral edema, NROM x4, +right foot wrapped in wound dressing  Vasc: 3+ peripheral pulses  Skin: warm, dry, and intact. no rashes, wounds or scars  Neuro: AAOx3, CN II-XII intact, no focal deficits      Labs:                        12.7 [L]  8.82  )-----------( 406 [H]      ( 04 May 2022 07:11 )             38.5 [L]     05-03    135  |  101  |  20  ----------------------------<  211<H>  4.8   |  24  |  1.07    Ca    8.8      03 May 2022 08:52  Phos  3.3     05-03  Mg     2.1     05-03    TPro  8.1  /  Alb  3.7  /  TBili  0.6  /  DBili  x   /  AST  22  /  ALT  33  /  AlkPhos  105  05-02    PT/INR - ( 02 May 2022 10:22 )   PT: 11.4 sec;   INR: 0.96          PTT - ( 02 May 2022 10:22 )  PTT:32.3 sec  Fingerstick  glucose: POCT Blood Glucose.: 188 mg/dL (03 May 2022 21:14)      MEDICATIONS  (STANDING):  atorvastatin 40 milliGRAM(s) Oral at bedtime  gabapentin 100 milliGRAM(s) Oral every 8 hours  insulin glargine Injectable (LANTUS) 25 Unit(s) SubCutaneous at bedtime  insulin lispro (ADMELOG) corrective regimen sliding scale   SubCutaneous Before meals and at bedtime  insulin lispro Injectable (ADMELOG) 10 Unit(s) SubCutaneous three times a day before meals  losartan 25 milliGRAM(s) Oral daily  piperacillin/tazobactam IVPB.. 4.5 Gram(s) IV Intermittent every 6 hours  senna 2 Tablet(s) Oral at bedtime  vancomycin  IVPB 1750 milliGRAM(s) IV Intermittent every 12 hours    MEDICATIONS  (PRN):  acetaminophen     Tablet .. 650 milliGRAM(s) Oral every 6 hours PRN Temp greater or equal to 38C (100.4F), Mild Pain (1 - 3)      Allergies    No Known Allergies    Intolerances        RADIOLOGY & ADDITIONAL TESTS: Reviewed.                   OVERNIGHT EVENTS:   Oral temp 99.8F, refused rectal temp. Gave 975mg tylenol for fever and pain. Pt insisted that pain continued and refused oral options was given 1mg IV morphine last night.    SUBJECTIVE:    Patient was resting comfortably in bed. He notes that the diffuse abdominal pain is same as yesterday, sweating last night and the day before, weakness and fatigue improved from yesterday. Had not had a BM for the past 2 days, has felt the need to have BM, but fails on attempt. Denies fever, chills, HA, N/V/C/D, chest pain, paresthesias, cough, and palpitations.     OBJECTIVE:  Vital Signs Last 24 Hrs  T(C): 36.6 (04 May 2022 05:32), Max: 37.7 (03 May 2022 21:08)  T(F): 97.9 (04 May 2022 05:32), Max: 99.8 (03 May 2022 21:08)  HR: 74 (04 May 2022 05:32) (74 - 91)  BP: 156/84 (04 May 2022 05:32) (140/87 - 167/88)  BP(mean): 119 (03 May 2022 11:53) (109 - 125)  RR: 19 (04 May 2022 05:32) (12 - 19)  SpO2: 98% (04 May 2022 05:32) (96% - 100%)    Physical Exam:  Gen: NAD, laying in bed, well appearing, alert, interactive  HEENT: PERRL, anicteric sclera, no JVD, no thyromegaly  Cardio: +S1/S2, RRR, no murmurs  Resp: CTA b/l, no w/r/r  GI: +BS x4, TTP in all 4 quadrants, ND  Ext: no peripheral edema, NROM x4, +right foot wrapped in wound dressing  Vasc: 3+ peripheral pulses  Skin: warm, dry, and intact. no rashes, wounds or scars  Neuro: AAOx3, CN II-XII intact, no focal deficits      Labs:                        12.7 [L]  8.82  )-----------( 406 [H]      ( 04 May 2022 07:11 )             38.5 [L]     05-03    135  |  101  |  20  ----------------------------<  211<H>  4.8   |  24  |  1.07    Ca    8.8      03 May 2022 08:52  Phos  3.3     05-03  Mg     2.1     05-03    TPro  8.1  /  Alb  3.7  /  TBili  0.6  /  DBili  x   /  AST  22  /  ALT  33  /  AlkPhos  105  05-02    PT/INR - ( 02 May 2022 10:22 )   PT: 11.4 sec;   INR: 0.96          PTT - ( 02 May 2022 10:22 )  PTT:32.3 sec  Fingerstick  glucose: POCT Blood Glucose.: 188 mg/dL (03 May 2022 21:14)      MEDICATIONS  (STANDING):  atorvastatin 40 milliGRAM(s) Oral at bedtime  gabapentin 100 milliGRAM(s) Oral every 8 hours  insulin glargine Injectable (LANTUS) 25 Unit(s) SubCutaneous at bedtime  insulin lispro (ADMELOG) corrective regimen sliding scale   SubCutaneous Before meals and at bedtime  insulin lispro Injectable (ADMELOG) 10 Unit(s) SubCutaneous three times a day before meals  losartan 25 milliGRAM(s) Oral daily  piperacillin/tazobactam IVPB.. 4.5 Gram(s) IV Intermittent every 6 hours  senna 2 Tablet(s) Oral at bedtime  vancomycin  IVPB 1750 milliGRAM(s) IV Intermittent every 12 hours    MEDICATIONS  (PRN):  acetaminophen     Tablet .. 650 milliGRAM(s) Oral every 6 hours PRN Temp greater or equal to 38C (100.4F), Mild Pain (1 - 3)      Allergies    No Known Allergies    Intolerances        RADIOLOGY & ADDITIONAL TESTS: Reviewed.                   OVERNIGHT EVENTS: Oral temp 99.8F, refused rectal temp. Gave 975mg tylenol for fever and pain. Pt insisted that pain continued and refused oral options was given 1mg IV morphine.    SUBJECTIVE: Patient seen and examined at bedside. Patient was resting comfortably in bed. Reports he is feeling so-so. Endorses still some pain in his right foot however better controlled now with the morphine. He notes that the diffuse abdominal pain is same as yesterday, says his 'stomach wall feels bruised.' Has not had a BM for the past 2 days, has felt the need to have BM, but fails on attempt. Denies fever, chills, HA, N/V/C/D, chest pain, SOB, cough, and palpitations.   Remaining ROS negative.    OBJECTIVE:  Vital Signs Last 24 Hrs  T(C): 36.6 (04 May 2022 05:32), Max: 37.7 (03 May 2022 21:08)  T(F): 97.9 (04 May 2022 05:32), Max: 99.8 (03 May 2022 21:08)  HR: 74 (04 May 2022 05:32) (74 - 91)  BP: 156/84 (04 May 2022 05:32) (140/87 - 167/88)  BP(mean): 119 (03 May 2022 11:53) (109 - 125)  RR: 19 (04 May 2022 05:32) (12 - 19)  SpO2: 98% (04 May 2022 05:32) (96% - 100%)    Physical Exam:  Gen: NAD, laying in bed, well appearing, alert, interactive  HEENT: NC/AT, PERRL, anicteric sclera, MMM  Neck: supple, no JVD, no thyromegaly  Cardiac: +S1/S2, RRR, no murmurs  Resp: CTA b/l, no w/r/r, satting well on RA   Abd: soft, non-distended, +TTP diffusely, no rebound or guarding. +BS x4.  Ext: WWP; no edema, NROM x4. no tenderness. right foot wrapped in wound dressing from toes to ankle.   Vasc: 2+ peripheral pulses  Skin: warm, dry, and intact. no rashes.  Neuro: AAOx3, no focal deficits      Labs:                        12.7 [L]  8.82  )-----------( 406 [H]      ( 04 May 2022 07:11 )             38.5 [L]     05-03    135  |  101  |  20  ----------------------------<  211<H>  4.8   |  24  |  1.07    Ca    8.8      03 May 2022 08:52  Phos  3.3     05-03  Mg     2.1     05-03    TPro  8.1  /  Alb  3.7  /  TBili  0.6  /  DBili  x   /  AST  22  /  ALT  33  /  AlkPhos  105  05-02    PT/INR - ( 02 May 2022 10:22 )   PT: 11.4 sec;   INR: 0.96          PTT - ( 02 May 2022 10:22 )  PTT:32.3 sec  Fingerstick  glucose: POCT Blood Glucose.: 188 mg/dL (03 May 2022 21:14)      MEDICATIONS  (STANDING):  atorvastatin 40 milliGRAM(s) Oral at bedtime  gabapentin 100 milliGRAM(s) Oral every 8 hours  insulin glargine Injectable (LANTUS) 25 Unit(s) SubCutaneous at bedtime  insulin lispro (ADMELOG) corrective regimen sliding scale   SubCutaneous Before meals and at bedtime  insulin lispro Injectable (ADMELOG) 10 Unit(s) SubCutaneous three times a day before meals  losartan 25 milliGRAM(s) Oral daily  piperacillin/tazobactam IVPB.. 4.5 Gram(s) IV Intermittent every 6 hours  senna 2 Tablet(s) Oral at bedtime  vancomycin  IVPB 1750 milliGRAM(s) IV Intermittent every 12 hours    MEDICATIONS  (PRN):  acetaminophen     Tablet .. 650 milliGRAM(s) Oral every 6 hours PRN Temp greater or equal to 38C (100.4F), Mild Pain (1 - 3)      Allergies    No Known Allergies    Intolerances        RADIOLOGY & ADDITIONAL TESTS: Reviewed.

## 2022-05-04 NOTE — PHYSICAL THERAPY INITIAL EVALUATION ADULT - GENERAL OBSERVATIONS, REHAB EVAL
Pt S/p R foot incision & drainage of 1st MTPJ, excisional debridement of plantar right foot wound on 5/3. IVELISSE Franklin cleared pt for PT. Pt A&Ox4, reports 6/10 R foot pain, agreeable to PT. Pt rcvd semi supine +contact +IV +R foot dressing and ACE wrap CDI. Pt toelrated session fairly well, demo. independence with bed mobility, requries RW for WBAT on R heel for transfers/amb. Pt left OOB to chair NAD with needs in reach +call bell POD #1 R 1st MTPJ I&D, debridement of plantar wound, now R Heel WBAT. RN Rigoberto cleared pt for PT. Pt A&Ox4, reports 6/10 R foot pain, agreeable to PT. Pt rcvd semi supine +contact +IV +R foot gauze and ACE wrap CDI. Pt tolerated session fairly well

## 2022-05-04 NOTE — PROGRESS NOTE ADULT - ASSESSMENT
47 yo M, history of previous alcohol and cocaine use w/ PMHx of HTN (not treated), HLD (on meds), DM (started in 2021 on OAD and insulin), osteomyelitis with amputation of the right 4th and 5th rays and partial amputation of the right 2nd digit, presents to the Emergency Department with foot pain / wound drainage. Podiatry consulted for right foot wound. VSS, leukocytosis present, elevated CRP, medial 1st MPJ wound actively draining purulence, pt found to have dorsal hallux abscess with 5ccs expressed after bedside Incision and drainage. Xray showing cortical changes of the medial 1st metatarsal head and 1st proximal phalanx high likelihood of OM. Pt is s/p R foot I&D + debridement of plantar wound (5/3), approx 5cc purulence expressed. Possible RTOR during this admission at a later date for addn debridement, graft placement.    Plan:   - IV ABX per primary team  - f/u deep wound ED Cx: MRSA   - f/u 5/3 OR deep wound cx, tissue cx & path, bone cx & path  -Local wound care: flushed surgical site w/ saline, iodoform packing, DSD, ACE wrap  - Recc WBAT R heel in surgical shoe  - Rest of care per primary team    Podiatry following. Plan d/w attending. Plan d/w primary team.

## 2022-05-04 NOTE — DISCHARGE NOTE PROVIDER - HOSPITAL COURSE
#Discharge: do not delete    Mr Valentin is a 46M w/ history of previous alcohol and cocaine use and PMHx of HTN, HLD, DM, and right foot osteomyelitis with amputation of the right 4th and 5th toes and partial amputation of the right 2nd digit, p/w 1-day h/o right foot wound drainage of medial great toe now here for management of R foot abscess.    Hospital course (by problem):   #Foot abscess, right.   Patient presenting with purulent drainage from right great toe noted on home dressing change. S/p bedside incision and drainage of right dorsal hallux abscess performed in ED, 5cc drained, per podiatry.   -Xray right foot/ankle showing soft tissue defect adjacent to the fourth metatarsal stump  -podiatry followed   -pt now s/p right foot I&D and debridement in OR on 5/3; deep wound and bone cultures obtained showing   - started on Vanc/Zosyn  -ID consulted  -BCs 5/2 NGTD     #Osteomyelitis.   H/o right foot osteomyelitis with amputation of the right 4th and 5th toes and partial amputation of the right 2nd digit.  - Management for right foot abscess as above    #Diabetes mellitus.   History of DM2. Home meds: lantus 35, lispro 8 TID, metformin 750 BID. Gave additional 10u regular insulin in AM 5/2 due to missed lantus dose.  -A1C 8.9 5/2  - lantus 25 units bedtime, lispro 10 units TID premeal, and mISS while inpatient   -held home PO diabetic meds while inpatient   -c/w home gabapentin 100 TID for nerve pain  -f/u with PCP outpatient for DM management      #Hypertension.   Patient with HTN on home losartan 25mg daily   - C/w home losartan    #HLD (hyperlipidemia).   Home med: Atorvastatin 40  - C/w home statin    Inpatient treatment course: 46M w/ history of previous alcohol and cocaine use and PMHx of HTN, HLD, DM, and right foot osteomyelitis with amputation of the right 4th and 5th toes and partial amputation of the right 2nd digit, p/w 1-day h/o right foot wound drainage of medial great toe. Pt with recent admission for osteomyelitis (2/11-2/15/2022), was discharged to rehab for continued IV Vanc/Zosyn and wound vac. Finished IV abx in March and discharged home from rehab 2-3 weeks ago. Since then he has been doing daily dressing changes, reports wound was clean, dry, and improving. However when doing dressing change on day of admission, pt noticed an area on his medial great toe that was actively draining pus, with some increased swelling and redness at the area. Follows with podiatry Dr. Green outpatient weekly. In the ED patient HD stable, xray right foot/ankle showing cortical changes of the medial 1st metatarsal head and 1st proximal phalanx high likelihood of OM. Podiatry did beside incision and drainage of right dorsal hallux abscess in ED with copious betadine/saline irrigation, small incision made dorsally over right hallux area of fluctuance-5ccs drained. Patient was continued on vancomycin and zosyn. ID consulted and recommended to continue to vancomycin and zosyn until final deep wound cultures result. Patient was then taken to OR on 5/3 for right foot I&D and debridement by podiatry and deep wound and bone cultures obtained at that time to r/o osteo.     Patient was discharged to: (home/[facility]/acute rehab/hospice, etc, and with what services – home health PT/RN? Home O2?)    New medications:   Changes to old medications: none  Medications that were stopped: none     Items to follow up as outpatient: PCP appointment, ID appointment     Physical exam at the time of discharge:       #Discharge: do not delete    Mr Valentin is a 46M w/ history of previous alcohol and cocaine use and PMHx of HTN, HLD, DM, and right foot osteomyelitis with amputation of the right 4th and 5th toes and partial amputation of the right 2nd digit, p/w 1-day h/o right foot wound drainage of great toe, admitted for management of R foot abscess and acute right foot OM now s/p right foot TMA.     #Foot abscess, right.   Patient presenting with purulent drainage from right great toe noted on home dressing change. S/p bedside incision and drainage of right dorsal hallux abscess performed in ED, 5cc drained, per podiatry.   -Xray right foot/ankle showing soft tissue defect adjacent to the fourth metatarsal stump  -podiatry following , appreciate recs    -pt now s/p right foot I&D and debridement in OR on 5/3; deep wound and bone cultures obtained growing MRSA   -surgical path report revealing acute osteomyelitis of right foot  -C/w Vancomycin    -ID consulted, recs appreciated  -s/p right TMA and achilles tendon lengthening by podiatry on   -[facility] 5/2 NGTD      #Osteomyelitis.   H/o right foot osteomyelitis with amputation of the right 4th and 5th toes and partial amputation of the right 2nd digit. now with new acute osteo as bone cultures from right great toe growing MRSA   - Management as above     #Diabetes mellitus.   History of DM2. Home meds: lantus 35, lispro 8 TID, metformin 750 BID. Gave additional 10u regular insulin in AM 5/2 due to missed lantus dose.  -A1C 8.9 5/2  - lantus 25 units bedtime, lispro 10 units TID premeal, and mISS while inpatient   -held home PO diabetic meds while inpatient   -c/w home gabapentin 100 TID for nerve pain  -f/u with PCP outpatient for DM management      #Hypertension.   Patient with HTN on home losartan 25mg daily   - losartan increased to 50mg daily d/t elevated BP readings inpatient  -c/w losartan 50mg daily     #HLD (hyperlipidemia).   Home med: Atorvastatin 40  - C/w home statin    Inpatient treatment course: 46M w/ history of previous alcohol and cocaine use and PMHx of HTN, HLD, DM, and right foot osteomyelitis with amputation of the right 4th and 5th toes and partial amputation of the right 2nd digit, p/w 1-day h/o right foot wound drainage of medial great toe. Pt with recent admission for osteomyelitis (2/11-2/15/2022), was discharged to rehab for continued IV Vanc/Zosyn and wound vac. Finished IV abx in March and discharged home from rehab 2-3 weeks ago. Since then he has been doing daily dressing changes, reports wound was clean, dry, and improving. However when doing dressing change on day of admission, pt noticed an area on his medial great toe that was actively draining pus, with some increased swelling and redness at the area. Follows with podiatry Dr. Green outpatient weekly. In the ED patient HD stable, xray right foot/ankle showing cortical changes of the medial 1st metatarsal head and 1st proximal phalanx high likelihood of OM. Podiatry did beside incision and drainage of right dorsal hallux abscess in ED with copious betadine/saline irrigation, small incision made dorsally over right hallux area of fluctuance-5ccs drained. Patient was continued on vancomycin and zosyn. ID consulted and recommended to continue to vancomycin and zosyn until final deep wound cultures result. Patient was then taken to OR on 5/3 for right foot I&D and debridement by podiatry and deep wound and bone cultures obtained at that time grew MRSA. surgical pathology confirmed acute osteomyelitis. patient was taken to OR on     for right TMA by podiatry.      Patient was discharged to: (home/[facility]/acute rehab/hospice, etc, and with what services – home health PT/RN? Home O2?)    New medications:   Changes to old medications: losartan increased to 50mg daily   Medications that were stopped: none     Items to follow up as outpatient: PCP appointment    Physical exam at the time of discharge:       #Discharge: do not delete    Mr Valentin is a 46M w/ history of previous alcohol and cocaine use and PMHx of HTN, HLD, DM, and right foot osteomyelitis with amputation of the right 4th and 5th toes and partial amputation of the right 2nd digit, p/w 1-day h/o right foot wound drainage of great toe, admitted for management of R foot abscess and acute right foot OM now s/p right foot TMA.    #acute osteomyelitis   #Foot abscess, right.   Patient presenting with purulent drainage from right great toe noted on home dressing change. S/p bedside incision and drainage of right dorsal hallux abscess performed in ED, 5cc drained, per podiatry.   -Xray right foot/ankle showing soft tissue defect adjacent to the fourth metatarsal stump  -s/p right foot I&D and debridement in OR on 5/3; deep wound and bone cultures obtained growing MRSA; surgical path report revealing acute osteomyelitis of right foot  -s/p right foot TMA 5/10 in OR by podiatry with repeat culture and pathology on clean margins sent  -OR Cx and Path (forefoot specimen) 5/10 growing   -s/p Vancomycin while inpatient for acute MRSA osteomyelitis   -ID consulted   -BCs 5/2 NGTD   -NWB to RLE , can heel weight bear as needed but minimize pressure   -PT consulted, recs      #Osteomyelitis.   H/o right foot osteomyelitis with amputation of the right 4th and 5th toes and partial amputation of the right 2nd digit. now s/p right great toe TMA for new acute osteo as bone cultures from right great toe growing MRSA   - Management as above     #Diabetes mellitus.   History of DM2. Home meds: lantus 35, lispro 8 TID, metformin 750 BID. Gave additional 10u regular insulin in AM 5/2 due to missed lantus dose.  -A1C 8.9 5/2  - lantus 29 units bedtime, lispro 14 units TID premeal, and mISS while inpatient   -held home PO diabetic meds while inpatient   -c/w home gabapentin 100 TID for nerve pain  -f/u with PCP outpatient for DM management      #Hypertension.   Patient with HTN on home losartan 25mg daily   - losartan increased to 75mg daily d/t elevated BP readings inpatient  -c/w losartan 75mg daily   -f/u with PCP     #HLD (hyperlipidemia).   Home med: Atorvastatin 40  - C/w home statin    Inpatient treatment course: 46M w/ history of previous alcohol and cocaine use and PMHx of HTN, HLD, DM, and right foot osteomyelitis with amputation of the right 4th and 5th toes and partial amputation of the right 2nd digit, p/w 1-day h/o right foot wound drainage of medial great toe. Pt with recent admission for osteomyelitis (2/11-2/15/2022), was discharged to rehab for continued IV Vanc/Zosyn and wound vac. Finished IV abx in March and discharged home from rehab 2-3 weeks ago. Since then he has been doing daily dressing changes, reports wound was clean, dry, and improving. However when doing dressing change on day of admission, pt noticed an area on his medial great toe that was actively draining pus, with some increased swelling and redness at the area. Follows with podiatry Dr. Green outpatient weekly. In the ED patient HD stable, xray right foot/ankle showing cortical changes of the medial 1st metatarsal head and 1st proximal phalanx high likelihood of OM. Podiatry did beside incision and drainage of right dorsal hallux abscess in ED with copious betadine/saline irrigation, small incision made dorsally over right hallux area of fluctuance-5ccs drained. Patient was continued on vancomycin and zosyn. ID consulted and recommended to continue to vancomycin and zosyn until final deep wound cultures result. Patient was then taken to OR on 5/3 for right foot I&D and debridement by podiatry and deep wound and bone cultures obtained at that time grew MRSA. zosyn was d/austen and pt was continued on vancomycin. surgical pathology confirmed acute osteomyelitis. patient was taken to OR on 5/10 for right great toe TMA by podiatry with margin cultures and pathology growing        Patient was discharged to: (home/[facility]/acute rehab/hospice, etc, and with what services – home health PT/RN? Home O2?)    New medications:   Changes to old medications: losartan increased to 75mg daily   Medications that were stopped: none     Items to follow up as outpatient: PCP appointment    Physical exam at the time of discharge:  Gen: sitting up in chair comfortably in NAD with feet elevated   HEENT: NC/AT, PERRL, anicteric sclera, no JVD, no thyromegaly, MMM  Cardio: +S1/S2, RRR, no murmurs  Resp: CTA b/l, no w/r/r, satting well on RA  GI: +BS x4, soft NT/ND  Ext: WWP, no peripheral edema, NROM x4, no cyanosis or clubbing. +R foot s/p TMA of great toe (and prior TMA of other 4 digits) wrapped in dressing up to ankle.    Vasc: 2+ peripheral pulses b/l  Skin: warm, dry, and intact. No rashes.  Neuro: AAOx3, no focal deficits         #Discharge: do not delete    Mr Valentin is a 46M w/ history of previous alcohol and cocaine use and PMHx of HTN, HLD, DM, and right foot osteomyelitis with amputation of the right 4th and 5th toes and partial amputation of the right 2nd digit, p/w 1-day h/o right foot wound drainage of great toe, admitted for management of R foot abscess and acute right foot OM now s/p right foot TMA.    #acute osteomyelitis   #Foot abscess, right.   Patient presenting with purulent drainage from right great toe noted on home dressing change. S/p bedside incision and drainage of right dorsal hallux abscess performed in ED, 5cc drained, per podiatry.   -Xray right foot/ankle showing soft tissue defect adjacent to the fourth metatarsal stump  -s/p right foot I&D and debridement in OR on 5/3; deep wound and bone cultures obtained growing MRSA; surgical path report revealing acute osteomyelitis of right foot  -s/p right foot TMA 5/10 in OR by podiatry with repeat culture and pathology on clean margins sent  -OR Cx and Path (forefoot specimen) 5/10 growing   -s/p Vancomycin while inpatient for acute MRSA osteomyelitis   -ID consulted   -BCs 5/2 NGTD   -NWB to RLE , can heel weight bear as needed but minimize pressure   -PT consulted, recs   -WOUND CARE: Local wound care: DSD, ABD, Kerlix, and ACE wrap     #Osteomyelitis.   H/o right foot osteomyelitis with amputation of the right 4th and 5th toes and partial amputation of the right 2nd digit. now s/p right great toe TMA for new acute osteo as bone cultures from right great toe growing MRSA   - Management as above     #Diabetes mellitus.   History of DM2. Home meds: lantus 35, lispro 8 TID, metformin 750 BID. Gave additional 10u regular insulin in AM 5/2 due to missed lantus dose.  -A1C 8.9 5/2  - lantus 29 units bedtime, lispro 14 units TID premeal, and mISS while inpatient   -held home PO diabetic meds while inpatient   -c/w home gabapentin 100 TID for nerve pain  -f/u with PCP outpatient for DM management      #Hypertension.   Patient with HTN on home losartan 25mg daily   - losartan increased to 75mg daily d/t elevated BP readings inpatient  -c/w losartan 75mg daily   -f/u with PCP     #HLD (hyperlipidemia).   Home med: Atorvastatin 40  - C/w home statin    Inpatient treatment course: 46M w/ history of previous alcohol and cocaine use and PMHx of HTN, HLD, DM, and right foot osteomyelitis with amputation of the right 4th and 5th toes and partial amputation of the right 2nd digit, p/w 1-day h/o right foot wound drainage of medial great toe. Pt with recent admission for osteomyelitis (2/11-2/15/2022), was discharged to rehab for continued IV Vanc/Zosyn and wound vac. Finished IV abx in March and discharged home from rehab 2-3 weeks ago. Since then he has been doing daily dressing changes, reports wound was clean, dry, and improving. However when doing dressing change on day of admission, pt noticed an area on his medial great toe that was actively draining pus, with some increased swelling and redness at the area. Follows with podiatry Dr. Green outpatient weekly. In the ED patient HD stable, xray right foot/ankle showing cortical changes of the medial 1st metatarsal head and 1st proximal phalanx high likelihood of OM. Podiatry did beside incision and drainage of right dorsal hallux abscess in ED with copious betadine/saline irrigation, small incision made dorsally over right hallux area of fluctuance-5ccs drained. Patient was continued on vancomycin and zosyn. ID consulted and recommended to continue to vancomycin and zosyn until final deep wound cultures result. Patient was then taken to OR on 5/3 for right foot I&D and debridement by podiatry and deep wound and bone cultures obtained at that time grew MRSA. zosyn was d/austen and pt was continued on vancomycin. surgical pathology confirmed acute osteomyelitis. patient was taken to OR on 5/10 for right great toe TMA by podiatry with margin cultures and pathology growing        Patient was discharged to: (home/[facility]/acute rehab/hospice, etc, and with what services – home health PT/RN? Home O2?)    New medications:   Changes to old medications: losartan increased to 75mg daily   Medications that were stopped: none     Items to follow up as outpatient: PCP appointment    Physical exam at the time of discharge:  Gen: sitting up in chair comfortably in NAD with feet elevated   HEENT: NC/AT, PERRL, anicteric sclera, no JVD, no thyromegaly, MMM  Cardio: +S1/S2, RRR, no murmurs  Resp: CTA b/l, no w/r/r, satting well on RA  GI: +BS x4, soft NT/ND  Ext: WWP, no peripheral edema, NROM x4, no cyanosis or clubbing. +R foot s/p TMA of great toe (and prior TMA of other 4 digits) wrapped in dressing up to ankle.    Vasc: 2+ peripheral pulses b/l  Skin: warm, dry, and intact. No rashes.  Neuro: AAOx3, no focal deficits         #Discharge: do not delete    Mr Valentin is a 46M w/ history of previous alcohol and cocaine use and PMHx of HTN, HLD, DM, and right foot osteomyelitis with amputation of the right 4th and 5th toes and partial amputation of the right 2nd digit, p/w 1-day h/o right foot wound drainage of great toe, admitted for management of R foot abscess and acute right foot OM now s/p right foot TMA.    #acute osteomyelitis   #Foot abscess, right.   Patient presenting with purulent drainage from right great toe noted on home dressing change. S/p bedside incision and drainage of right dorsal hallux abscess performed in ED, 5cc drained, per podiatry.   -Xray right foot/ankle showing soft tissue defect adjacent to the fourth metatarsal stump  -s/p right foot I&D and debridement in OR on 5/3; deep wound and bone cultures obtained growing MRSA; surgical path report revealing acute osteomyelitis of right foot  -s/p right foot TMA 5/10 in OR by podiatry with repeat culture and pathology on clean margins sent  -OR Cx and Path (forefoot specimen) 5/10 no growth , clean margins   -s/p Vancomycin while inpatient for acute MRSA osteomyelitis   -ID consulted   -BCs 5/2 NGTD   -NWB to RLE , can heel weight bear as needed but minimize pressure   -PT consulted, recs home with no needs  -WOUND CARE: Local wound care: DSD, ABD, Kerlix, and ACE wrap     #Osteomyelitis.   H/o right foot osteomyelitis with amputation of the right 4th and 5th toes and partial amputation of the right 2nd digit. now s/p right great toe TMA for new acute osteo as bone cultures from right great toe growing MRSA   - Management as above     #Diabetes mellitus.   History of DM2. Home meds: lantus 35, lispro 8 TID, metformin 750 BID. Gave additional 10u regular insulin in AM 5/2 due to missed lantus dose.  -A1C 8.9 5/2  - lantus 29 units bedtime, lispro 14 units TID premeal, and mISS while inpatient   -held home PO diabetic meds while inpatient   -c/w home gabapentin 100 TID for nerve pain  -c/w lantus 29 units bedtime and lispro 14 units TID upon discharge   -f/u with PCP outpatient for DM management      #Hypertension.   Patient with HTN on home losartan 25mg daily   - losartan increased to 75mg daily d/t elevated BP readings inpatient  -c/w losartan 75mg daily   -f/u with PCP     #HLD (hyperlipidemia).   Home med: Atorvastatin 40  - C/w home statin    Inpatient treatment course: 46M w/ history of previous alcohol and cocaine use and PMHx of HTN, HLD, DM, and right foot osteomyelitis with amputation of the right 4th and 5th toes and partial amputation of the right 2nd digit, p/w 1-day h/o right foot wound drainage of medial great toe. Pt with recent admission for osteomyelitis (2/11-2/15/2022), was discharged to rehab for continued IV Vanc/Zosyn and wound vac. Finished IV abx in March and discharged home from rehab 2-3 weeks ago. Since then he has been doing daily dressing changes, reports wound was clean, dry, and improving. However when doing dressing change on day of admission, pt noticed an area on his medial great toe that was actively draining pus, with some increased swelling and redness at the area. Follows with podiatry Dr. Green outpatient weekly. In the ED patient HD stable, xray right foot/ankle showing cortical changes of the medial 1st metatarsal head and 1st proximal phalanx high likelihood of OM. Podiatry did beside incision and drainage of right dorsal hallux abscess in ED with copious betadine/saline irrigation, small incision made dorsally over right hallux area of fluctuance-5ccs drained. Patient was continued on vancomycin and zosyn. ID consulted and recommended to continue to vancomycin and zosyn until final deep wound cultures result. Patient was then taken to OR on 5/3 for right foot I&D and debridement by podiatry and deep wound and bone cultures obtained at that time grew MRSA. zosyn was d/austen and pt was continued on vancomycin. surgical pathology confirmed acute osteomyelitis. patient was taken to OR on 5/10 for right great toe TMA by podiatry with margin cultures no growth. Patient stable for discharge home with podiatry follow up.    Patient was discharged to: home    New medications: none  Changes to old medications: losartan increased to 75mg daily , lantus 29 units bedtime, lispro 14 units TID premeal  Medications that were stopped: none     Items to follow up as outpatient: PCP appointment, podiatry appt    Physical exam at the time of discharge:  Gen: sitting up in chair comfortably in NAD with feet elevated   HEENT: NC/AT, PERRL, anicteric sclera, no JVD, no thyromegaly, MMM  Cardio: +S1/S2, RRR, no murmurs  Resp: CTA b/l, no w/r/r, satting well on RA  GI: +BS x4, soft NT/ND  Ext: WWP, no peripheral edema, NROM x4, no cyanosis or clubbing. +R foot s/p TMA of great toe (and prior TMA of other 4 digits) wrapped in dressing up to ankle.    Vasc: 2+ peripheral pulses b/l  Skin: warm, dry, and intact. No rashes.  Neuro: AAOx3, no focal deficits         #Discharge: do not delete    Mr Valentin is a 46M w/ history of previous alcohol and cocaine use and PMHx of HTN, HLD, DM, and right foot osteomyelitis with amputation of the right 4th and 5th toes and partial amputation of the right 2nd digit, p/w 1-day h/o right foot wound drainage of great toe, admitted for management of R foot abscess and acute right foot OM now s/p right foot TMA.    #acute osteomyelitis   #Foot abscess, right.   Patient presenting with purulent drainage from right great toe noted on home dressing change. S/p bedside incision and drainage of right dorsal hallux abscess performed in ED, 5cc drained, per podiatry.   -Xray right foot/ankle showing soft tissue defect adjacent to the fourth metatarsal stump  -s/p right foot I&D and debridement in OR on 5/3; deep wound and bone cultures obtained growing MRSA; surgical path report revealing acute osteomyelitis of right foot  -s/p right foot TMA 5/10 in OR by podiatry with repeat culture and pathology on clean margins sent  -OR Cx and Path (forefoot specimen) 5/10 no growth , clean margins   -s/p Vancomycin while inpatient for acute MRSA osteomyelitis   -ID consulted   -BCs 5/2 NGTD   -NWB to RLE , can heel weight bear as needed but minimize pressure   -PT consulted, recs home with no needs  -WOUND CARE: Dressing instructions: Daily dressing changes Flush incision with saline, pat dry with gauze, cover with dry sterile gauze kerlix, and ace bandage      #Osteomyelitis.   H/o right foot osteomyelitis with amputation of the right 4th and 5th toes and partial amputation of the right 2nd digit. now s/p right great toe TMA for new acute osteo as bone cultures from right great toe growing MRSA   - Management as above     #Diabetes mellitus.   History of DM2. Home meds: lantus 35, lispro 8 TID, metformin 750 BID. Gave additional 10u regular insulin in AM 5/2 due to missed lantus dose.  -A1C 8.9 5/2  - lantus 29 units bedtime, lispro 14 units TID premeal, and mISS while inpatient   -held home PO diabetic meds while inpatient   -c/w home gabapentin 100 TID for nerve pain  -c/w lantus 29 units bedtime and lispro 14 units TID upon discharge   -f/u with PCP outpatient for DM management      #Hypertension.   Patient with HTN on home losartan 25mg daily   - losartan increased to 75mg daily d/t elevated BP readings inpatient  -c/w losartan 75mg daily   -f/u with PCP     #HLD (hyperlipidemia).   Home med: Atorvastatin 40  - C/w home statin    Inpatient treatment course: 46M w/ history of previous alcohol and cocaine use and PMHx of HTN, HLD, DM, and right foot osteomyelitis with amputation of the right 4th and 5th toes and partial amputation of the right 2nd digit, p/w 1-day h/o right foot wound drainage of medial great toe. Pt with recent admission for osteomyelitis (2/11-2/15/2022), was discharged to rehab for continued IV Vanc/Zosyn and wound vac. Finished IV abx in March and discharged home from rehab 2-3 weeks ago. Since then he has been doing daily dressing changes, reports wound was clean, dry, and improving. However when doing dressing change on day of admission, pt noticed an area on his medial great toe that was actively draining pus, with some increased swelling and redness at the area. Follows with podiatry Dr. Green outpatient weekly. In the ED patient HD stable, xray right foot/ankle showing cortical changes of the medial 1st metatarsal head and 1st proximal phalanx high likelihood of OM. Podiatry did beside incision and drainage of right dorsal hallux abscess in ED with copious betadine/saline irrigation, small incision made dorsally over right hallux area of fluctuance-5ccs drained. Patient was continued on vancomycin and zosyn. ID consulted and recommended to continue to vancomycin and zosyn until final deep wound cultures result. Patient was then taken to OR on 5/3 for right foot I&D and debridement by podiatry and deep wound and bone cultures obtained at that time grew MRSA. zosyn was d/austen and pt was continued on vancomycin. surgical pathology confirmed acute osteomyelitis. patient was taken to OR on 5/10 for right great toe TMA by podiatry with margin cultures no growth. Patient stable for discharge home with podiatry follow up.    Patient was discharged to:     New medications: none  Changes to old medications: losartan increased to 75mg daily , lantus 29 units bedtime, lispro 14 units TID premeal  Medications that were stopped: none     Items to follow up as outpatient: PCP appointment, podiatry appt    Physical exam at the time of discharge:  Gen: sitting up in chair comfortably in NAD with feet elevated   HEENT: NC/AT, PERRL, anicteric sclera, no JVD, no thyromegaly, MMM  Cardio: +S1/S2, RRR, no murmurs  Resp: CTA b/l, no w/r/r, satting well on RA  GI: +BS x4, soft NT/ND  Ext: WWP, no peripheral edema, NROM x4, no cyanosis or clubbing. +R foot s/p TMA of great toe (and prior TMA of other 4 digits) wrapped in dressing up to ankle.    Vasc: 2+ peripheral pulses b/l  Skin: warm, dry, and intact. No rashes.  Neuro: AAOx3, no focal deficits         #Discharge: do not delete    Mr Valentin is a 46M w/ history of previous alcohol and cocaine use and PMHx of HTN, HLD, DM, and right foot osteomyelitis with amputation of the right 4th and 5th toes and partial amputation of the right 2nd digit, p/w 1-day h/o right foot wound drainage of great toe, admitted for management of R foot abscess and acute right foot OM now s/p right foot TMA.    #acute osteomyelitis   #Foot abscess, right.   PPatient presenting with purulent drainage from right great toe noted on home dressing change. S/p bedside incision and drainage of right dorsal hallux abscess performed in ED, 5cc drained, per podiatry.   XR right foot/ankle showing soft tissue defect adjacent to the fourth metatarsal stump  -podiatry following, appreciate recs          - NWB to RLE         - Local wound care: DSD, ABD, Kerlix, and ACE wrap        -s/p right foot I&D and debridement in OR on 5/3              - deep wound and bone cultures obtained growing MRSA              - surgical path report revealing acute osteomyelitis of right foot        -s/p right foot TMA 5/10 in OR by podiatry with repeat culture and pathology on clean margins sent              - OR Cx and Path (forefoot specimen) 5/10 NGTD  - ID following, appreciate recs         - vanc d/austen, as wound cultures from TMA margin no growth x72 hrs   - BCx 5/2 NGTD   - pain regimen: standing tylenol q6h         - tramadol 25 q6 prn mild pain         - oxycodone 5mg q6 prn mod pain         - oxycodone 10mg q6 prn severe pain  -WOUND CARE: Dressing instructions: Daily dressing changes Flush incision with saline, pat dry with gauze, cover with dry sterile gauze kerlix, and ace bandage     #Diabetes mellitus.   History of DM2. Home meds: lantus 35, lispro 8 TID, metformin 750 BID.   -A1C 8.9 5/2  -c/w lantus 29 units bedtime   -c/w lispro 14 units TID pre-meal  -c/w mISS   -hold home PO diabetic meds while inpatient   -c/w home gabapentin 100 TID for nerve pain  -monitor fingerstick glucose.     #Hypertension.   Patient with HTN on home losartan 25mg daily   - c/w increased losartan 75mg daily  -f/u with PCP     #HLD (hyperlipidemia).   Home med: Atorvastatin 40  - C/w home statin    Inpatient treatment course: 46M w/ history of previous alcohol and cocaine use and PMHx of HTN, HLD, DM, and right foot osteomyelitis with amputation of the right 4th and 5th toes and partial amputation of the right 2nd digit, p/w 1-day h/o right foot wound drainage of medial great toe. Pt with recent admission for osteomyelitis (2/11-2/15/2022), was discharged to rehab for continued IV Vanc/Zosyn and wound vac. Finished IV abx in March and discharged home from rehab 2-3 weeks ago. Since then he has been doing daily dressing changes, reports wound was clean, dry, and improving. However when doing dressing change on day of admission, pt noticed an area on his medial great toe that was actively draining pus, with some increased swelling and redness at the area. Follows with podiatry Dr. Green outpatient weekly. In the ED patient HD stable, xray right foot/ankle showing cortical changes of the medial 1st metatarsal head and 1st proximal phalanx high likelihood of OM. Podiatry did beside incision and drainage of right dorsal hallux abscess in ED with copious betadine/saline irrigation, small incision made dorsally over right hallux area of fluctuance-5ccs drained. Patient was continued on vancomycin and zosyn. ID consulted and recommended to continue to vancomycin and zosyn until final deep wound cultures result. Patient was then taken to OR on 5/3 for right foot I&D and debridement by podiatry and deep wound and bone cultures obtained at that time grew MRSA. zosyn was d/austen and pt was continued on vancomycin. surgical pathology confirmed acute osteomyelitis. patient was taken to OR on 5/10 for right great toe TMA by podiatry with margin cultures no growth, and thus Vancomycin discontinued after 72 hours. Patient stable for discharge to [facility] with podiatry follow up.    Patient was discharged to: [facility]    New medications: none  Changes to old medications: losartan increased to 75mg daily , lantus 29 units bedtime, lispro 14 units TID premeal  Medications that were stopped: none     Items to follow up as outpatient: PCP appointment, podiatry appt    Physical exam at the time of discharge:  General: in no acute distress, speaking in full sentences on room air  Eyes: EOMI intact bilaterally. Anicteric sclerae, moist conjunctivae  HENT: Moist mucous membranes  Neck: Trachea midline, supple  Lungs: CTA B/L. No wheezes, rales, or ronchi  Cardiovascular: RRR. No murmurs, rubs, or gallops  Abdomen: Soft, non-tender non-distended; No rebound or guarding  Extremities: WWP, No clubbing, cyanosis or edema. +R foot s/p TMA with ACE wrap, CDI.  MSK: No midline bony tenderness. No CVA tenderness bilaterally  Neurological: Alert and oriented x3  Skin: Warm and dry. No obvious rash         #Discharge: do not delete    Mr Bhagat is a 46M w/ history of previous alcohol and cocaine use and PMHx of HTN, HLD, DM, and right foot osteomyelitis with amputation of the right 4th and 5th toes and partial amputation of the right 2nd digit, p/w 1-day h/o right foot wound drainage of great toe, admitted for management of R foot abscess and acute right foot OM now s/p right foot TMA.    #acute osteomyelitis   #Foot abscess, right.   PPatient presenting with purulent drainage from right great toe noted on home dressing change. S/p bedside incision and drainage of right dorsal hallux abscess performed in ED, 5cc drained, per podiatry.   XR right foot/ankle showing soft tissue defect adjacent to the fourth metatarsal stump  -podiatry following, appreciate recs          - NWB to RLE         - Local wound care: as below        -s/p right foot I&D and debridement in OR on 5/3              - deep wound and bone cultures obtained growing MRSA              - surgical path report revealing acute osteomyelitis of right foot        -s/p right foot TMA 5/10 in OR by podiatry with repeat culture and pathology on clean margins sent              - OR Cx and Path (forefoot specimen) 5/10 NGTD  - ID following, appreciate recs         - vanc d/austen, as wound cultures from TMA margin no growth x72 hrs   - BCx 5/2 NGTD   - pain regimen: standing tylenol q6h         - tramadol 25 q6 prn mild pain         - oxycodone 5mg q6 prn mod pain         - oxycodone 10mg q6 prn severe pain  -WOUND CARE: Dressing instructions: Daily dressing changes Flush incision with saline, pat dry with gauze, cover with dry sterile gauze kerlix, and ace bandage     #Diabetes mellitus.   History of DM2. Home meds: lantus 35, lispro 8 TID, metformin 750 BID.   -A1C 8.9 5/2  -c/w lantus 29 units bedtime   -c/w lispro 14 units TID pre-meal  -c/w mISS   -hold home PO diabetic meds while inpatient   -c/w home gabapentin 100 TID for nerve pain  -monitor fingerstick glucose.     #Hypertension.   Patient with HTN on home losartan 25mg daily   - c/w increased losartan 75mg daily  -f/u with PCP     #HLD (hyperlipidemia).   Home med: Atorvastatin 40  - C/w home statin    Inpatient treatment course: 46M w/ history of previous alcohol and cocaine use and PMHx of HTN, HLD, DM, and right foot osteomyelitis with amputation of the right 4th and 5th toes and partial amputation of the right 2nd digit, p/w 1-day h/o right foot wound drainage of medial great toe. Pt with recent admission for osteomyelitis (2/11-2/15/2022), was discharged to rehab for continued IV Vanc/Zosyn and wound vac. Finished IV abx in March and discharged home from rehab 2-3 weeks ago. Since then he has been doing daily dressing changes, reports wound was clean, dry, and improving. However when doing dressing change on day of admission, pt noticed an area on his medial great toe that was actively draining pus, with some increased swelling and redness at the area. Follows with podiatry Dr. Green outpatient weekly. In the ED patient HD stable, xray right foot/ankle showing cortical changes of the medial 1st metatarsal head and 1st proximal phalanx high likelihood of OM. Podiatry did beside incision and drainage of right dorsal hallux abscess in ED with copious betadine/saline irrigation, small incision made dorsally over right hallux area of fluctuance-5ccs drained. Patient was continued on vancomycin and zosyn. ID consulted and recommended to continue to vancomycin and zosyn until final deep wound cultures result. Patient was then taken to OR on 5/3 for right foot I&D and debridement by podiatry and deep wound and bone cultures obtained at that time grew MRSA. zosyn was d/austen and pt was continued on vancomycin. surgical pathology confirmed acute osteomyelitis. patient was taken to OR on 5/10 for right great toe TMA by podiatry with margin cultures no growth, and thus Vancomycin discontinued after 72 hours. Patient stable for discharge to [facility] with podiatry follow up.    Patient was discharged to: [facility]    New medications: none  Changes to old medications: losartan increased to 75mg daily , lantus 29 units bedtime, lispro 14 units TID premeal  Medications that were stopped: none     Items to follow up as outpatient: PCP appointment, podiatry appt    Physical exam at the time of discharge:  General: in no acute distress, speaking in full sentences on room air  Eyes: EOMI intact bilaterally. Anicteric sclerae, moist conjunctivae  HENT: Moist mucous membranes  Neck: Trachea midline, supple  Lungs: CTA B/L. No wheezes, rales, or ronchi  Cardiovascular: RRR. No murmurs, rubs, or gallops  Abdomen: Soft, non-tender non-distended; No rebound or guarding  Extremities: WWP, No clubbing, cyanosis or edema. +R foot s/p TMA with ACE wrap, CDI.  MSK: No midline bony tenderness. No CVA tenderness bilaterally  Neurological: Alert and oriented x3  Skin: Warm and dry. No obvious rash

## 2022-05-04 NOTE — PROGRESS NOTE ADULT - SUBJECTIVE AND OBJECTIVE BOX
************INCOMPLETE************      Infectious Diseases Progress Note:     SUBJECTIVE: Patient seen and examined at bedside. Patient denies fever, chills, HA, nausea, vomiting, abdominal pain, dysuria, diarrhea.    DIABETIC FOOT ULCERS      Hypertension    Diabetes mellitus    HLD (hyperlipidemia)    Acute osteomyelitis    Nutrition, metabolism, and development symptoms    Osteomyelitis    Right foot ulcer    Foot abscess, right        Allergies    No Known Allergies    Intolerances        ANTIBIOTICS/RELEVANT:  antimicrobials  piperacillin/tazobactam IVPB.. 4.5 Gram(s) IV Intermittent every 6 hours  vancomycin  IVPB 1750 milliGRAM(s) IV Intermittent every 12 hours    immunologic:      OTHER:  acetaminophen     Tablet .. 650 milliGRAM(s) Oral every 6 hours  atorvastatin 40 milliGRAM(s) Oral at bedtime  gabapentin 100 milliGRAM(s) Oral every 8 hours  insulin glargine Injectable (LANTUS) 25 Unit(s) SubCutaneous at bedtime  insulin lispro (ADMELOG) corrective regimen sliding scale   SubCutaneous Before meals and at bedtime  insulin lispro Injectable (ADMELOG) 10 Unit(s) SubCutaneous three times a day before meals  losartan 25 milliGRAM(s) Oral daily  senna 2 Tablet(s) Oral at bedtime      Objective:  Vital Signs Last 24 Hrs  T(C): 36.6 (04 May 2022 05:32), Max: 37.7 (03 May 2022 21:08)  T(F): 97.9 (04 May 2022 05:32), Max: 99.8 (03 May 2022 21:08)  HR: 74 (04 May 2022 05:32) (74 - 91)  BP: 156/84 (04 May 2022 05:32) (140/87 - 167/88)  BP(mean): 119 (03 May 2022 11:53) (109 - 125)  RR: 19 (04 May 2022 05:32) (12 - 19)  SpO2: 98% (04 May 2022 05:32) (96% - 100%)    PHYSICAL EXAM:  Constitutional: alert, NAD  Eyes: the sclera and conjunctiva were normal.   ENT: the ears and nose were normal in appearance.   Neck: the appearance of the neck was normal and the neck was supple.   Pulmonary: no respiratory distress and lungs were clear to auscultation bilaterally.   Heart: heart rate was normal and rhythm regular, normal S1 and S2  Vascular: there was no peripheral edema  Abdomen: normal bowel sounds, soft, non-tender  Neurological: no focal deficits  Psychiatric: the affect was normal  Derm: Right foot- Submet 1 ulcer measuring 2.0cmx2.0cm, fibrotic base, Right foot medial 1st MPJ ulceration 1cm granular area of tissue, right foot dorsomedial 0.5x0.5cm wound probes deep to bone, 3-5ccs purulent drainage expressed, Area of fluctuance noted over dorsal hallux near MPJ      LABS:                        12.7   8.82  )-----------( 406      ( 04 May 2022 07:11 )             38.5     05-04    135  |  101  |  19  ----------------------------<  210<H>  4.6   |  25  |  1.14    Ca    9.2      04 May 2022 07:11  Phos  3.4     05-04  Mg     2.2     05-04    TPro  8.1  /  Alb  3.7  /  TBili  0.6  /  DBili  x   /  AST  22  /  ALT  33  /  AlkPhos  105  05-02    PT/INR - ( 02 May 2022 10:22 )   PT: 11.4 sec;   INR: 0.96          PTT - ( 02 May 2022 10:22 )  PTT:32.3 sec      MICROBIOLOGY:    Culture - Fungal, Other (collected 05-04-22 @ 02:10)  Source: .Other Right Foot Deep Wound - OR  Preliminary Report (05-04-22 @ 08:48):    Testing in progress    Culture - Fungal, Tissue (collected 05-03-22 @ 20:48)  Source: Bone Right Foot Bone - OR  Preliminary Report (05-04-22 @ 08:24):    Testing in progress    Culture - Fungal, Tissue (collected 05-03-22 @ 20:48)  Source: .Tissue Right Foot Tissue - OR  Preliminary Report (05-04-22 @ 08:24):    Testing in progress    Culture - Surgical Swab (collected 05-03-22 @ 15:20)  Source: .Surgical Swab Right Foot Deep Wound - OR  Gram Stain (05-03-22 @ 17:04):    No organisms seen    Rare WBC's    Culture - Tissue with Gram Stain (collected 05-03-22 @ 15:19)  Source: Bone Right Foot Bone - OR  Gram Stain (05-03-22 @ 17:05):    No organisms seen    No WBC's seen.    Culture - Tissue with Gram Stain (collected 05-03-22 @ 15:18)  Source: .Tissue Right Foot Tissue - OR  Gram Stain (05-03-22 @ 16:56):    No organisms seen    No WBC's seen.      Culture Results:   Testing in progress (05-04 @ 02:10)  Culture Results:   Testing in progress (05-03 @ 20:48)  Culture Results:   Testing in progress (05-03 @ 20:48)          RADIOLOGY & ADDITIONAL STUDIES:   Infectious Diseases Progress Note: Diabetic foot     SUBJECTIVE: Patient seen and examined at bedside. Patient denies fever, chills, HA, nausea, vomiting, admits to mild abdominal pain, dysuria, diarrhea.     DIABETIC FOOT ULCERS      Hypertension    Diabetes mellitus    HLD (hyperlipidemia)    Acute osteomyelitis    Nutrition, metabolism, and development symptoms    Osteomyelitis    Right foot ulcer    Foot abscess, right        Allergies    No Known Allergies    Intolerances        ANTIBIOTICS/RELEVANT:  antimicrobials  piperacillin/tazobactam IVPB.. 4.5 Gram(s) IV Intermittent every 6 hours  vancomycin  IVPB 1750 milliGRAM(s) IV Intermittent every 12 hours    immunologic:      OTHER:  acetaminophen     Tablet .. 650 milliGRAM(s) Oral every 6 hours  atorvastatin 40 milliGRAM(s) Oral at bedtime  gabapentin 100 milliGRAM(s) Oral every 8 hours  insulin glargine Injectable (LANTUS) 25 Unit(s) SubCutaneous at bedtime  insulin lispro (ADMELOG) corrective regimen sliding scale   SubCutaneous Before meals and at bedtime  insulin lispro Injectable (ADMELOG) 10 Unit(s) SubCutaneous three times a day before meals  losartan 25 milliGRAM(s) Oral daily  senna 2 Tablet(s) Oral at bedtime      Objective:  Vital Signs Last 24 Hrs  T(C): 36.6 (04 May 2022 05:32), Max: 37.7 (03 May 2022 21:08)  T(F): 97.9 (04 May 2022 05:32), Max: 99.8 (03 May 2022 21:08)  HR: 74 (04 May 2022 05:32) (74 - 91)  BP: 156/84 (04 May 2022 05:32) (140/87 - 167/88)  BP(mean): 119 (03 May 2022 11:53) (109 - 125)  RR: 19 (04 May 2022 05:32) (12 - 19)  SpO2: 98% (04 May 2022 05:32) (96% - 100%)    PHYSICAL EXAM:  Constitutional: alert, NAD  Eyes: the sclera and conjunctiva were normal.   ENT: the ears and nose were normal in appearance.   Neck: the appearance of the neck was normal and the neck was supple.   Pulmonary: no respiratory distress and lungs were clear to auscultation bilaterally.   Heart: heart rate was normal and rhythm regular, normal S1 and S2  Vascular: there was no peripheral edema  Abdomen: normal bowel sounds, soft, non-tender  Neurological: no focal deficits  Psychiatric: the affect was normal  Derm: Right foot- Submet 1 ulcer measuring 2.0cmx2.0cm, fibrotic base, Right foot medial 1st MPJ ulceration 1cm granular area of tissue, right foot dorsomedial 0.5x0.5cm wound probes deep to bone, 3-5ccs purulent drainage expressed, Area of fluctuance noted over dorsal hallux near MPJ  5/3 - s/p right medial 1st MPJ I&D; negative for purulence since drainage w/ surgical incision present. Packing material present in I&D site.  Neuro: protective sensation grossly diminished   MSK: S/p 4th and 5th ray amputations, s/p 2nd digit distal Symes amputation       LABS:                        12.7   8.82  )-----------( 406      ( 04 May 2022 07:11 )             38.5     05-04    135  |  101  |  19  ----------------------------<  210<H>  4.6   |  25  |  1.14    Ca    9.2      04 May 2022 07:11  Phos  3.4     05-04  Mg     2.2     05-04    TPro  8.1  /  Alb  3.7  /  TBili  0.6  /  DBili  x   /  AST  22  /  ALT  33  /  AlkPhos  105  05-02    PT/INR - ( 02 May 2022 10:22 )   PT: 11.4 sec;   INR: 0.96          PTT - ( 02 May 2022 10:22 )  PTT:32.3 sec      MICROBIOLOGY:    Culture - Fungal, Other (collected 05-04-22 @ 02:10)  Source: .Other Right Foot Deep Wound - OR  Preliminary Report (05-04-22 @ 08:48):    Testing in progress    Culture - Fungal, Tissue (collected 05-03-22 @ 20:48)  Source: Bone Right Foot Bone - OR  Preliminary Report (05-04-22 @ 08:24):    Testing in progress    Culture - Fungal, Tissue (collected 05-03-22 @ 20:48)  Source: .Tissue Right Foot Tissue - OR  Preliminary Report (05-04-22 @ 08:24):    Testing in progress    Culture - Surgical Swab (collected 05-03-22 @ 15:20)  Source: .Surgical Swab Right Foot Deep Wound - OR  Gram Stain (05-03-22 @ 17:04):    No organisms seen    Rare WBC's    Culture - Tissue with Gram Stain (collected 05-03-22 @ 15:19)  Source: Bone Right Foot Bone - OR  Gram Stain (05-03-22 @ 17:05):    No organisms seen    No WBC's seen.    Culture - Tissue with Gram Stain (collected 05-03-22 @ 15:18)  Source: .Tissue Right Foot Tissue - OR  Gram Stain (05-03-22 @ 16:56):    No organisms seen    No WBC's seen.      Culture Results:   Testing in progress (05-04 @ 02:10)  Culture Results:   Testing in progress (05-03 @ 20:48)  Culture Results:   Testing in progress (05-03 @ 20:48)          RADIOLOGY & ADDITIONAL STUDIES:  < from: Xray Foot AP + Lateral, Right (05.02.22 @ 03:56) >    IMPRESSION: Frontal and lateral views of the right foot and right ankle   demonstrate soft tissue defect adjacent to the fourth metatarsal stump.   Transmetatarsal amputation of the fourth and fifth metatarsals. Absence   of the distal phalanges of the second digit. Chronic arthropathy of the   third PIP joint. Ankylosis of the third PIP joint. Charcot arthropathy   and periarticular osteopenia surrounding the Lisfranc joint. Hindfoot   valgus and pes planus. Arterial vascular calcification. Soft tissue   defect along the medial aspect of the foot adjacent to the MCP joint.   Fragmentation of the sesamoid. Tibiotalar joint is in appropriate   alignment.  MRI is more sensitive examination for detection of osteomyelitis.  < end of copied text >

## 2022-05-04 NOTE — DISCHARGE NOTE PROVIDER - NSDCCPCAREPLAN_GEN_ALL_CORE_FT
PRINCIPAL DISCHARGE DIAGNOSIS  Diagnosis: Osteomyelitis of ankle or foot, acute  Assessment and Plan of Treatment: You were admitted to the hospital due to right foot pain and swelling and you were found to have osteomyelitis. Osteomyelitis is a severe bone infection. It can develop in any bone, but often involves the long bones, such as your arm and leg bones, or the bones of the spine. Osteomyelitis is caused by different types of germs, such as bacteria or a fungus. You were treated with IV antibiotics and received an amputation of the infected bone on your foot. Please continue to take      and make sure to follow up with primary care at [facility] on May 16th at 12pm. You should make sure to control your blood sugars as best you can to promote good wound healing.  Please return to the ED if:  Your symptoms return.  You have increased swelling, pain, or redness of your infected area.  You have new drainage or an odor from your wound.  You have questions or concerns about your condition or care.       PRINCIPAL DISCHARGE DIAGNOSIS  Diagnosis: Osteomyelitis of ankle or foot, acute  Assessment and Plan of Treatment: You were admitted to the hospital due to right foot pain and swelling and you were found to have osteomyelitis. Osteomyelitis is a severe bone infection. It can develop in any bone, but often involves the long bones, such as your arm and leg bones, or the bones of the spine. Osteomyelitis is caused by different types of germs, such as bacteria or a fungus. You were treated with IV antibiotics and received an amputation of the infected bone on your foot. Please continue to take      and make sure to follow up with primary care at [facility] on May 16th at 12pm. You should make sure to control your blood sugars as best you can to promote good wound healing.  Please return to the ED if:  Your symptoms return.  You have increased swelling, pain, or redness of your infected area.  You have new drainage or an odor from your wound.  You have questions or concerns about your condition or care.      SECONDARY DISCHARGE DIAGNOSES  Diagnosis: Diabetes mellitus  Assessment and Plan of Treatment: You have a known history of diabetes mellitus prior to your admission. This condition results from blood sugar levels getting too high because your body is more resistant to insulin. Uncontrolled blood sugar levels can lead to kidney and heart damage, pain/numbness/paralysis in your hands and feet, and increased rates of infections.  To manage this you are on a medication called ____. It is important that you continue to take this medication when you are discharged so that you can continue to control your blood sugar levels. Additionally be sure to follow up with your primary care physician, podiatrist, and ophthalmologist on a regular basis.      Diagnosis: Hypertension  Assessment and Plan of Treatment: Hypertension is the medical term for high blood pressure. Blood pressure refers to the pressure that blood applies to the inner walls of the arteries. Arteries carry blood from the heart to other organs and parts of the body. Untreated high blood pressure increases the strain on the heart and arteries, eventually causing organ damage. High blood pressure increases the risk of heart failure, heart attack (myocardial infarction), stroke, and kidney failure. High blood pressure does not usually cause any symptoms. Treatment of hypertension usually begins with lifestyle changes. Making these lifestyle changes involves little or no risk. Recommended changes often include reducing the amount of salt in your diet, losing weight if you are overweight or obese, avoiding drinking too much alcohol, stopping smoking and exercising at least 30 minutes per day most days of the week. During your hospital stay your blood pressure was consistently elevated so your losartan was increased to 75mg daily. Please continue to take losartan 75mg daily and follow up with your primary care provider.       PRINCIPAL DISCHARGE DIAGNOSIS  Diagnosis: Osteomyelitis of ankle or foot, acute  Assessment and Plan of Treatment: You were admitted to the hospital due to right foot pain and swelling and you were found to have osteomyelitis. Osteomyelitis is a severe bone infection. It can develop in any bone, but often involves the long bones, such as your arm and leg bones, or the bones of the spine. Osteomyelitis is caused by different types of germs, such as bacteria or a fungus. You were treated with IV antibiotics and received an amputation of the infected bone on your foot. Make sure to follow up with primary care at [facility] on May 16th at 12pm. Please also make sure to follow up with podiatry on May 19th at 3pm. You should make sure to control your blood sugars as best you can to promote good wound healing.  Please return to the ED if:  Your symptoms return.  You have increased swelling, pain, or redness of your infected area.  You have new drainage or an odor from your wound.  You have questions or concerns about your condition or care.      SECONDARY DISCHARGE DIAGNOSES  Diagnosis: Diabetes mellitus  Assessment and Plan of Treatment: You have a known history of diabetes mellitus prior to your admission. This condition results from blood sugar levels getting too high because your body is more resistant to insulin. Uncontrolled blood sugar levels can lead to kidney and heart damage, pain/numbness/paralysis in your hands and feet, and increased rates of infections.  To manage this you are on insulin. Your insulin regimen was adjusted in the hospital. Please take lantus 29 units at bedtime and lispro 14 units three times a day before meals. Additionally be sure to follow up with your primary care physician, podiatrist, and ophthalmologist on a regular basis.      Diagnosis: Hypertension  Assessment and Plan of Treatment: Hypertension is the medical term for high blood pressure. Blood pressure refers to the pressure that blood applies to the inner walls of the arteries. Arteries carry blood from the heart to other organs and parts of the body. Untreated high blood pressure increases the strain on the heart and arteries, eventually causing organ damage. High blood pressure increases the risk of heart failure, heart attack (myocardial infarction), stroke, and kidney failure. High blood pressure does not usually cause any symptoms. Treatment of hypertension usually begins with lifestyle changes. Making these lifestyle changes involves little or no risk. Recommended changes often include reducing the amount of salt in your diet, losing weight if you are overweight or obese, avoiding drinking too much alcohol, stopping smoking and exercising at least 30 minutes per day most days of the week. During your hospital stay your blood pressure was consistently elevated so your losartan was increased to 75mg daily. Please continue to take losartan 75mg daily and follow up with your primary care provider.       PRINCIPAL DISCHARGE DIAGNOSIS  Diagnosis: Osteomyelitis of ankle or foot, acute  Assessment and Plan of Treatment: You were admitted to the hospital due to right foot pain and swelling and you were found to have osteomyelitis. Osteomyelitis is a severe bone infection. It can develop in any bone, but often involves the long bones, such as your arm and leg bones, or the bones of the spine. Osteomyelitis is caused by different types of germs, such as bacteria or a fungus. You were treated with IV antibiotics and received an amputation of the infected bone on your foot. Make sure to follow up with primary care at [facility] on May 24th at 12:30pm. Please also make sure to follow up with podiatry on May 19th at 3pm. You should make sure to control your blood sugars as best you can to promote good wound healing.  Dressing instructions: Daily dressing changes Flush incision with saline, pat dry with gauze, cover with dry sterile gauze kerlix, and ace bandage   Please return to the ED if:  Your symptoms return.  You have increased swelling, pain, or redness of your infected area.  You have new drainage or an odor from your wound.  You have questions or concerns about your condition or care.      SECONDARY DISCHARGE DIAGNOSES  Diagnosis: Diabetes mellitus  Assessment and Plan of Treatment: You have a known history of diabetes mellitus prior to your admission. This condition results from blood sugar levels getting too high because your body is more resistant to insulin. Uncontrolled blood sugar levels can lead to kidney and heart damage, pain/numbness/paralysis in your hands and feet, and increased rates of infections.  To manage this you are on insulin. Your insulin regimen was adjusted in the hospital. Please take lantus 29 units at bedtime and lispro 14 units three times a day before meals. Additionally be sure to follow up with your primary care physician, podiatrist, and ophthalmologist on a regular basis.      Diagnosis: Hypertension  Assessment and Plan of Treatment: Hypertension is the medical term for high blood pressure. Blood pressure refers to the pressure that blood applies to the inner walls of the arteries. Arteries carry blood from the heart to other organs and parts of the body. Untreated high blood pressure increases the strain on the heart and arteries, eventually causing organ damage. High blood pressure increases the risk of heart failure, heart attack (myocardial infarction), stroke, and kidney failure. High blood pressure does not usually cause any symptoms. Treatment of hypertension usually begins with lifestyle changes. Making these lifestyle changes involves little or no risk. Recommended changes often include reducing the amount of salt in your diet, losing weight if you are overweight or obese, avoiding drinking too much alcohol, stopping smoking and exercising at least 30 minutes per day most days of the week. During your hospital stay your blood pressure was consistently elevated so your losartan was increased to 75mg daily. Please continue to take losartan 75mg daily and follow up with your primary care provider.       PRINCIPAL DISCHARGE DIAGNOSIS  Diagnosis: Osteomyelitis of ankle or foot, acute  Assessment and Plan of Treatment: You were admitted to the hospital due to right foot pain and swelling and you were found to have osteomyelitis. Osteomyelitis is a severe bone infection. It can develop in any bone, but often involves the long bones, such as your arm and leg bones, or the bones of the spine. Osteomyelitis is caused by different types of germs, such as bacteria or a fungus. You were treated with IV antibiotics and received an amputation of the infected bone on your foot. Make sure to follow up with primary care at [facility] on May 24th at 12:30pm. Please also make sure to follow up with podiatry on May 19th at 3pm. You should make sure to control your blood sugars as best you can to promote good wound healing.  Dressing instructions: Daily dressing changes Flush incision with saline, pat dry with gauze, cover with dry sterile gauze kerlix, and ace bandage   Please return to the ED if:  Your symptoms return.  You have increased swelling, pain, or redness of your infected area.  You have new drainage or an odor from your wound.  You have questions or concerns about your condition or care.      SECONDARY DISCHARGE DIAGNOSES  Diagnosis: Hypertension  Assessment and Plan of Treatment: Hypertension is the medical term for high blood pressure. Blood pressure refers to the pressure that blood applies to the inner walls of the arteries. Arteries carry blood from the heart to other organs and parts of the body. Untreated high blood pressure increases the strain on the heart and arteries, eventually causing organ damage. High blood pressure increases the risk of heart failure, heart attack (myocardial infarction), stroke, and kidney failure. High blood pressure does not usually cause any symptoms. Treatment of hypertension usually begins with lifestyle changes. Making these lifestyle changes involves little or no risk. Recommended changes often include reducing the amount of salt in your diet, losing weight if you are overweight or obese, avoiding drinking too much alcohol, stopping smoking and exercising at least 30 minutes per day most days of the week. During your hospital stay your blood pressure was consistently elevated so your losartan was increased to 75mg daily. Please continue to take losartan 75mg daily and follow up with your primary care provider.      Diagnosis: Diabetes mellitus  Assessment and Plan of Treatment: You have a known history of diabetes mellitus prior to your admission. This condition results from blood sugar levels getting too high because your body is more resistant to insulin. Uncontrolled blood sugar levels can lead to kidney and heart damage, pain/numbness/paralysis in your hands and feet, and increased rates of infections.  To manage this you are on insulin. Your insulin regimen was adjusted in the hospital. Please take lantus 29 units at bedtime and lispro 14 units three times a day before meals. Additionally be sure to follow up with your primary care physician, podiatrist, and ophthalmologist on a regular basis.       PRINCIPAL DISCHARGE DIAGNOSIS  Diagnosis: Osteomyelitis of ankle or foot, acute  Assessment and Plan of Treatment: You were admitted to the hospital due to right foot pain and swelling and you were found to have osteomyelitis. Osteomyelitis is a severe bone infection. It can develop in any bone, but often involves the long bones, such as your arm and leg bones, or the bones of the spine. Osteomyelitis is caused by different types of germs, such as bacteria or a fungus. You were treated with IV antibiotics and received an amputation of the infected bone on your foot.   :::  Make sure to follow up with primary care at [facility] on May 24th at 12:30pm.   :::  Please also make sure to follow up with podiatry on May 26th at 3pm. You should make sure to control your blood sugars as best you can to promote good wound healing.  :::  Dressing instructions: Daily dressing changes Flush incision with saline, pat dry with gauze, cover with dry sterile gauze kerlix, and ace bandage   :::  Please return to the ED if:  Your symptoms return.  You have increased swelling, pain, or redness of your infected area.  You have new drainage or an odor from your wound.  You have questions or concerns about your condition or care.      SECONDARY DISCHARGE DIAGNOSES  Diagnosis: Hypertension  Assessment and Plan of Treatment: Hypertension is the medical term for high blood pressure. Blood pressure refers to the pressure that blood applies to the inner walls of the arteries. Arteries carry blood from the heart to other organs and parts of the body. Untreated high blood pressure increases the strain on the heart and arteries, eventually causing organ damage. High blood pressure increases the risk of heart failure, heart attack (myocardial infarction), stroke, and kidney failure. High blood pressure does not usually cause any symptoms. Treatment of hypertension usually begins with lifestyle changes. Making these lifestyle changes involves little or no risk. Recommended changes often include reducing the amount of salt in your diet, losing weight if you are overweight or obese, avoiding drinking too much alcohol, stopping smoking and exercising at least 30 minutes per day most days of the week.  :::  During your hospital stay your blood pressure was consistently elevated so your losartan was increased to 75mg daily. Please continue to take losartan 75mg daily and follow up with your primary care provider.      Diagnosis: Diabetes mellitus  Assessment and Plan of Treatment: You have a known history of diabetes mellitus prior to your admission. This condition results from blood sugar levels getting too high because your body is more resistant to insulin. Uncontrolled blood sugar levels can lead to kidney and heart damage, pain/numbness/paralysis in your hands and feet, and increased rates of infections.  To manage this you are on insulin. Your insulin regimen was adjusted in the hospital.   :::  Please take lantus 29 units at bedtime and lispro 14 units three times a day before meals. Additionally be sure to follow up with your primary care physician, podiatrist, and ophthalmologist on a regular basis.

## 2022-05-04 NOTE — DISCHARGE NOTE PROVIDER - NSDCMRMEDTOKEN_GEN_ALL_CORE_FT
atorvastatin 40 mg oral tablet: 1 tab(s) orally once a day  gabapentin 100 mg oral capsule: 1 cap(s) orally every 8 hours  insulin glargine: 45 unit(s) subcutaneous once a day  Insulin Lispro KwikPen 100 units/mL injectable solution: 10 unit(s) subcutaneous 3 times a day (before meals)  losartan 25 mg oral tablet: 1 tab(s) orally once a day  metFORMIN 750 mg oral tablet, extended release: 2 tab(s) orally once a day   atorvastatin 40 mg oral tablet: 1 tab(s) orally once a day  gabapentin 100 mg oral capsule: 1 cap(s) orally every 8 hours  insulin glargine 100 units/mL subcutaneous solution: 29 unit(s) subcutaneous once a day (at bedtime)  insulin lispro 100 units/mL injectable solution: 14 unit(s) injectable 3 times a day (before meals)  losartan 25 mg oral tablet: 3 tab(s) orally once a day  metFORMIN 750 mg oral tablet, extended release: 2 tab(s) orally once a day

## 2022-05-04 NOTE — PROVIDER CONTACT NOTE (CRITICAL VALUE NOTIFICATION) - SITUATION
Rt foot bone culture collected on 5/3/22 positive for Staph Aureus rare to few and positive for MRSA

## 2022-05-04 NOTE — PROGRESS NOTE ADULT - PROBLEM SELECTOR PLAN 2
H/o right foot osteomyelitis with amputation of the right 4th and 5th toes and partial amputation of the right 2nd digit.  - Management for right foot abscess as above  - F/u final read x-ray foot  - F/u podiatry recs H/o right foot osteomyelitis with amputation of the right 4th and 5th toes and partial amputation of the right 2nd digit.  - Management for right foot abscess as above  - F/u podiatry recs

## 2022-05-04 NOTE — PHYSICAL THERAPY INITIAL EVALUATION ADULT - PERTINENT HX OF CURRENT PROBLEM, REHAB EVAL
46M w/ history of previous alcohol and cocaine use and PMHx of HTN, HLD, DM, and right foot osteomyelitis with amputation of the right 4th and 5th toes and partial amputation of the right 2nd digit, p/w 1-day h/o right foot wound drainage of medial great toe now here for management of post-washout (4/3) of R foot abscess

## 2022-05-04 NOTE — PROGRESS NOTE ADULT - PROBLEM SELECTOR PLAN 3
History of diabetes, glucose 416 on admission. A1c 9.8 in Feb 2022. Home meds: lantus 35, lispro 8 TID, metformin 750 BID. Gave additional 10u regular insulin in AM 5/2 due to missed lantus dose last night.  - Ordered lantus 25 and mISS inpatient  - F/u AM A1c  - Home gabapentin 100 TID for nerve pain History of DM2. Home meds: lantus 35, lispro 8 TID, metformin 750 BID. Gave additional 10u regular insulin in AM 5/2 due to missed lantus dose.  -A1C 8.9 5/2  - c/w lantus 25 units bedtime   -c/w lispro 10 units TID premeal  -c/w mISS   -hold home PO diabetic meds while inpatient   -c/w home gabapentin 100 TID for nerve pain  -monitor fingerstick glucose

## 2022-05-04 NOTE — PROGRESS NOTE ADULT - SUBJECTIVE AND OBJECTIVE BOX
Patient is a 46y old  Male who presents with a chief complaint of Right draining foot abscess (04 May 2022 07:13)      INTERVAL HPI/ OVERNIGHT EVENTS: ANDREW o/n. Pt notes he feels mild pain to right foot but is manageable. No new pedal complaints at this time. Denies N/V/F/SOB/CP today.      LABS                        12.7   8.82  )-----------( 406      ( 04 May 2022 07:11 )             38.5     05-04    135  |  101  |  19  ----------------------------<  210<H>  4.6   |  25  |  1.14    Ca    9.2      04 May 2022 07:11  Phos  3.4     05-04  Mg     2.2     05-04          ICU Vital Signs Last 24 Hrs  T(C): 36.6 (04 May 2022 05:32), Max: 37.7 (03 May 2022 21:08)  T(F): 97.9 (04 May 2022 05:32), Max: 99.8 (03 May 2022 21:08)  HR: 87 (04 May 2022 10:35) (74 - 91)  BP: 143/91 (04 May 2022 10:35) (137/83 - 167/88)  BP(mean): 119 (03 May 2022 11:53) (109 - 125)  ABP: --  ABP(mean): --  RR: 18 (04 May 2022 10:35) (12 - 19)  SpO2: 97% (04 May 2022 10:35) (96% - 100%)      RADIOLOGY    MICROBIOLOGY    PHYSICAL EXAM  Lower Extremity Focused  General: NAD, AA0x3  Lower Extremity Focused: RIGHT  Vasc: D/PT 2/4 b/l, Right foot warmer than left, non-pitting edema to dorsum of right foot over hallux  Derm:  Right foot- Submet 1 ulcer measuring 2.0cmx2.0cm, fibrotic base, Right foot medial 1st MPJ ulceration 1cm granular area of tissue, right foot dorsomedial 0.5x0.5cm wound probes deep to bone, 3ccs purulent drainage expressed, Area of fluctuance noted over dorsal hallux near MPJ  5/3 - s/p right medial 1st MPJ I&D; negative for purulence since drainage w/ surgical incision present. Packing material present in I&D site.  Neuro: protective sensation grossly diminished   MSK: S/p 4th and 5th ray amputations, s/p 2nd digit distal Symes amputation

## 2022-05-04 NOTE — PROGRESS NOTE ADULT - ATTENDING COMMENTS
Agree with above.  Wound cultures with MRSA.  Vancomycin will cover this.  Can continue Zosyn for now as cultures may be polymicrobial.  If no further growth, Zosyn can be stopped.  Agree with reducing vancomycin to 1750 mg IV q12 given elevated trough

## 2022-05-04 NOTE — PROGRESS NOTE ADULT - PROBLEM SELECTOR PLAN 4
Normotensive on admission. Home med: losartan 25  - C/w home losartan Patient with HTN on home losartan 25mg daily   - C/w home losartan  -monitor BPs

## 2022-05-04 NOTE — DISCHARGE NOTE PROVIDER - NSDCACTIVITY_GEN_ALL_CORE
No restrictions No restrictions/Follow Instructions Provided by your Surgical Team Walking - Indoors allowed/Walking - Outdoors allowed/Follow Instructions Provided by your Surgical Team

## 2022-05-04 NOTE — PROGRESS NOTE ADULT - PROBLEM SELECTOR PLAN 1
Patient presenting with purulent drainage from right great toe noted on home dressing change tonight. Denies pain. S/p bedside incision and drainage of right dorsal hallux abscess performed in ED, 5cc drained, per podiatry. ESR 40, .8. WBC 17. VSS, no fever.  - Podiatry team preliminary read x-ray right foot: cortical changes of the medial 1st metatarsal head and 1st proximal phalanx high likelihood of OM   - F/u Final read X-ray right foot  - F/u Deep culture right foot medial MPJ  - C/w Vanc/Zosyn  - Podiatry will re-evaluate wounds during morning rounds 5/2 and assess need for OR washout this week  - OR washout on 5/3,   - Consider ID team 2 consult Patient presenting with purulent drainage from right great toe noted on home dressing change tonight. Denies pain. S/p bedside incision and drainage of right dorsal hallux abscess performed in ED, 5cc drained, per podiatry. ESR 40, .8. WBC 17. VSS, no fever.  - Podiatry team preliminary read x-ray right foot: cortical changes of the medial 1st metatarsal head and 1st proximal phalanx high likelihood of OM   - F/u Final read X-ray right foot  - Deep culture right foot medial MPJ, no organisms seen  - C/w Vanc/Zosyn  - Podiatry will re-evaluate wounds during morning rounds 5/2 and assess need for OR washout this week  - OR washout on 5/3  - Consider ID team 2 consult Patient presenting with purulent drainage from right great toe noted on home dressing change. S/p bedside incision and drainage of right dorsal hallux abscess performed in ED, 5cc drained, per podiatry.   -Xray right foot/ankle showing soft tissue defect adjacent to the fourth metatarsal stump  -podiatry following , appreciate recs    -pt now s/p right foot I&D and debridement in OR on 5/3; deep wound and bone cultures obtained to r/o osteo  -f/u deep wound and bone cultures   - C/w Vanc/Zosyn  -ID consulted, appreciate recs   -will assess abx regimen and duration depending on culture results   -BCs 5/2 NGTD   -standing tylenol and prn tramadol for pain

## 2022-05-04 NOTE — DISCHARGE NOTE PROVIDER - NSDCFUSCHEDAPPT_GEN_ALL_CORE_FT
[facility]  INTMED [address]  Scheduled Appointment: 05/16/2022     [facility]  INTMED [address]  Scheduled Appointment: 05/24/2022

## 2022-05-04 NOTE — DISCHARGE NOTE PROVIDER - CARE PROVIDER_API CALL
Obed Olivo)  Internal Medicine  [address], [address]nd [place] [address]  Phone: [phone]  Fax: [phone]  Scheduled Appointment: 05/16/2022 12:00 PM   Obed Olivo)  Internal Medicine  [address], [address]nd [place] [address]  Phone: [phone]  Fax: [phone]  Scheduled Appointment: 05/16/2022 12:00 PM    David Green)  Surgery Orthopaedic Surgery  [address] [place] [address], [address]  [place] [address]  Phone: [phone]  Fax: [phone]  Established Patient  Follow Up Time: 2 weeks   Obed Olivo)  Internal Medicine  [address]  [place] [address]  Phone: [phone]  Fax: [phone]  Scheduled Appointment: 05/16/2022 12:00 PM    David Green)  Surgery Orthopaedic Surgery  [address] [place] [address], [address]  [place] [address]  Phone: [phone]  Fax: [phone]  Established Patient  Scheduled Appointment: 05/19/2022 03:00 PM   Obed Olivo)  Internal Medicine  [address], [address]  [place] [address]  Phone: [phone]  Fax: [phone]  Scheduled Appointment: 05/24/2022 12:30 PM    David Green)  Surgery Orthopaedic Surgery  [address] [place] [address], [address]  [place] [address]  Phone: [phone]  Fax: [phone]  Established Patient  Scheduled Appointment: 05/19/2022 03:00 PM   Obed Olivo)  Internal Medicine  [address]  [place] [address]  Phone: [phone]  Fax: [phone]  Scheduled Appointment: 05/24/2022 12:30 PM    David Green)  Surgery Orthopaedic Surgery  [address] [place] [address], [address]  [place] [address]  Phone: [phone]  Fax: [phone]  Established Patient  Scheduled Appointment: 05/26/2022 03:00 PM

## 2022-05-04 NOTE — DISCHARGE NOTE PROVIDER - CARE PROVIDERS DIRECT ADDRESSES
,[email or web address],DirectAddress_Unknown

## 2022-05-04 NOTE — PHYSICAL THERAPY INITIAL EVALUATION ADULT - PLANNED THERAPY INTERVENTIONS, PT EVAL
balance training/gait training/motor coordination training/neuromuscular re-education/transfer training

## 2022-05-04 NOTE — PHYSICAL THERAPY INITIAL EVALUATION ADULT - DIAGNOSIS, PT EVAL
5A: Primary Prevention/Risk Reduction for Loss of Balance and Falling 5A: Primary Prevention/Risk Reduction for Loss of Balance and Falling, 4I: Impaired Joint Mobility, Motor Function, Muscle Performance, and Range of Motion Associated with Bony or Soft Tissue Surgery

## 2022-05-04 NOTE — PROGRESS NOTE ADULT - ATTENDING COMMENTS
46M with PMH of HTN, HLD, DM2 and prior cocaine and alcohol use presenting with purulent drainage from R foot wound, concerning for osteomyelitis.  Podiatry and ID are following.    - s/p washout yesterday in the OR.  Continuing on vancomycin and zosyn.  Follow up cultures.  patient has remained afebrile. Appreciate input from ID and podiatry.  Patient expressing frustration about the next steps.  He states that he has a lot to think about, particularly the thought of having another amputation.  He really hoped that the last course of antibiotics cleared the infection.   - will need PICC line for long term antibiotics.  Therapy can be tailored to culture data.   - complains of gas pain, with improvement since receiving simethicone - continue to monitor abdominal pain.  No alarming/red flag signs.  Not an acute abdomen. If continues to have pain, can start with an abdominal xray and potential further workup if indicated

## 2022-05-04 NOTE — DISCHARGE NOTE PROVIDER - PROVIDER TOKENS
PROVIDER:[TOKEN:[[id number]],SCHEDULEDAPPT:[05/16/2022],SCHEDULEDAPPTTIME:[12:00 PM]] PROVIDER:[TOKEN:[[id number]],SCHEDULEDAPPT:[05/16/2022],SCHEDULEDAPPTTIME:[12:00 PM]],PROVIDER:[TOKEN:[[id number]],FOLLOWUP:[2 weeks],ESTABLISHEDPATIENT:[T]] PROVIDER:[TOKEN:[[id number]],SCHEDULEDAPPT:[05/16/2022],SCHEDULEDAPPTTIME:[12:00 PM]],PROVIDER:[TOKEN:[[id number]],SCHEDULEDAPPT:[05/19/2022],SCHEDULEDAPPTTIME:[03:00 PM],ESTABLISHEDPATIENT:[T]] PROVIDER:[TOKEN:[[id number]],SCHEDULEDAPPT:[05/24/2022],SCHEDULEDAPPTTIME:[12:30 PM]],PROVIDER:[TOKEN:[[id number]],SCHEDULEDAPPT:[05/19/2022],SCHEDULEDAPPTTIME:[03:00 PM],ESTABLISHEDPATIENT:[T]] PROVIDER:[TOKEN:[[id number]],SCHEDULEDAPPT:[05/24/2022],SCHEDULEDAPPTTIME:[12:30 PM]],PROVIDER:[TOKEN:[[id number]],SCHEDULEDAPPT:[05/26/2022],SCHEDULEDAPPTTIME:[03:00 PM],ESTABLISHEDPATIENT:[T]]

## 2022-05-05 LAB
-  CEFAZOLIN: SIGNIFICANT CHANGE UP
-  CEFAZOLIN: SIGNIFICANT CHANGE UP
-  CLINDAMYCIN: SIGNIFICANT CHANGE UP
-  CLINDAMYCIN: SIGNIFICANT CHANGE UP
-  DAPTOMYCIN: SIGNIFICANT CHANGE UP
-  DAPTOMYCIN: SIGNIFICANT CHANGE UP
-  ERYTHROMYCIN: SIGNIFICANT CHANGE UP
-  ERYTHROMYCIN: SIGNIFICANT CHANGE UP
-  LINEZOLID: SIGNIFICANT CHANGE UP
-  LINEZOLID: SIGNIFICANT CHANGE UP
-  OXACILLIN: SIGNIFICANT CHANGE UP
-  OXACILLIN: SIGNIFICANT CHANGE UP
-  RIFAMPIN: SIGNIFICANT CHANGE UP
-  RIFAMPIN: SIGNIFICANT CHANGE UP
-  TETRACYCLINE: SIGNIFICANT CHANGE UP
-  TETRACYCLINE: SIGNIFICANT CHANGE UP
-  TRIMETHOPRIM/SULFAMETHOXAZOLE: SIGNIFICANT CHANGE UP
-  TRIMETHOPRIM/SULFAMETHOXAZOLE: SIGNIFICANT CHANGE UP
-  VANCOMYCIN: SIGNIFICANT CHANGE UP
ANION GAP SERPL CALC-SCNC: 12 MMOL/L — SIGNIFICANT CHANGE UP (ref 5–17)
BUN SERPL-MCNC: 20 MG/DL — SIGNIFICANT CHANGE UP (ref 7–23)
CALCIUM SERPL-MCNC: 8.8 MG/DL — SIGNIFICANT CHANGE UP (ref 8.4–10.5)
CHLORIDE SERPL-SCNC: 102 MMOL/L — SIGNIFICANT CHANGE UP (ref 96–108)
CO2 SERPL-SCNC: 25 MMOL/L — SIGNIFICANT CHANGE UP (ref 22–31)
CREAT SERPL-MCNC: 1.06 MG/DL — SIGNIFICANT CHANGE UP (ref 0.5–1.3)
CULTURE RESULTS: SIGNIFICANT CHANGE UP
EGFR: 88 ML/MIN/1.73M2 — SIGNIFICANT CHANGE UP
GLUCOSE BLDC GLUCOMTR-MCNC: 152 MG/DL — HIGH (ref 70–99)
GLUCOSE BLDC GLUCOMTR-MCNC: 181 MG/DL — HIGH (ref 70–99)
GLUCOSE BLDC GLUCOMTR-MCNC: 260 MG/DL — HIGH (ref 70–99)
GLUCOSE BLDC GLUCOMTR-MCNC: 93 MG/DL — SIGNIFICANT CHANGE UP (ref 70–99)
GLUCOSE SERPL-MCNC: 197 MG/DL — HIGH (ref 70–99)
HCT VFR BLD CALC: 35.9 % — LOW (ref 39–50)
HGB BLD-MCNC: 11.9 G/DL — LOW (ref 13–17)
MAGNESIUM SERPL-MCNC: 1.7 MG/DL — SIGNIFICANT CHANGE UP (ref 1.6–2.6)
MCHC RBC-ENTMCNC: 28.4 PG — SIGNIFICANT CHANGE UP (ref 27–34)
MCHC RBC-ENTMCNC: 33.1 GM/DL — SIGNIFICANT CHANGE UP (ref 32–36)
MCV RBC AUTO: 85.7 FL — SIGNIFICANT CHANGE UP (ref 80–100)
METHOD TYPE: SIGNIFICANT CHANGE UP
NRBC # BLD: 0 /100 WBCS — SIGNIFICANT CHANGE UP (ref 0–0)
ORGANISM # SPEC MICROSCOPIC CNT: SIGNIFICANT CHANGE UP
PLATELET # BLD AUTO: 416 K/UL — HIGH (ref 150–400)
POTASSIUM SERPL-MCNC: 4.4 MMOL/L — SIGNIFICANT CHANGE UP (ref 3.5–5.3)
POTASSIUM SERPL-SCNC: 4.4 MMOL/L — SIGNIFICANT CHANGE UP (ref 3.5–5.3)
RBC # BLD: 4.19 M/UL — LOW (ref 4.2–5.8)
RBC # FLD: 13.6 % — SIGNIFICANT CHANGE UP (ref 10.3–14.5)
SODIUM SERPL-SCNC: 139 MMOL/L — SIGNIFICANT CHANGE UP (ref 135–145)
SPECIMEN SOURCE: SIGNIFICANT CHANGE UP
SURGICAL PATHOLOGY STUDY: SIGNIFICANT CHANGE UP
VANCOMYCIN TROUGH SERPL-MCNC: 21.2 UG/ML — HIGH (ref 10–20)
WBC # BLD: 7.7 K/UL — SIGNIFICANT CHANGE UP (ref 3.8–10.5)
WBC # FLD AUTO: 7.7 K/UL — SIGNIFICANT CHANGE UP (ref 3.8–10.5)

## 2022-05-05 PROCEDURE — 99233 SBSQ HOSP IP/OBS HIGH 50: CPT

## 2022-05-05 PROCEDURE — 99232 SBSQ HOSP IP/OBS MODERATE 35: CPT | Mod: GC

## 2022-05-05 RX ORDER — VANCOMYCIN HCL 1 G
1500 VIAL (EA) INTRAVENOUS EVERY 12 HOURS
Refills: 0 | Status: COMPLETED | OUTPATIENT
Start: 2022-05-05 | End: 2022-05-06

## 2022-05-05 RX ORDER — MAGNESIUM SULFATE 500 MG/ML
2 VIAL (ML) INJECTION ONCE
Refills: 0 | Status: COMPLETED | OUTPATIENT
Start: 2022-05-05 | End: 2022-05-05

## 2022-05-05 RX ADMIN — ATORVASTATIN CALCIUM 40 MILLIGRAM(S): 80 TABLET, FILM COATED ORAL at 22:55

## 2022-05-05 RX ADMIN — GABAPENTIN 100 MILLIGRAM(S): 400 CAPSULE ORAL at 22:54

## 2022-05-05 RX ADMIN — Medication 10 UNIT(S): at 18:21

## 2022-05-05 RX ADMIN — GABAPENTIN 100 MILLIGRAM(S): 400 CAPSULE ORAL at 05:53

## 2022-05-05 RX ADMIN — Medication 650 MILLIGRAM(S): at 00:59

## 2022-05-05 RX ADMIN — TRAMADOL HYDROCHLORIDE 25 MILLIGRAM(S): 50 TABLET ORAL at 22:55

## 2022-05-05 RX ADMIN — Medication 650 MILLIGRAM(S): at 18:20

## 2022-05-05 RX ADMIN — Medication 650 MILLIGRAM(S): at 12:13

## 2022-05-05 RX ADMIN — Medication 650 MILLIGRAM(S): at 05:52

## 2022-05-05 RX ADMIN — LOSARTAN POTASSIUM 25 MILLIGRAM(S): 100 TABLET, FILM COATED ORAL at 06:10

## 2022-05-05 RX ADMIN — GABAPENTIN 100 MILLIGRAM(S): 400 CAPSULE ORAL at 13:40

## 2022-05-05 RX ADMIN — PIPERACILLIN AND TAZOBACTAM 200 GRAM(S): 4; .5 INJECTION, POWDER, LYOPHILIZED, FOR SOLUTION INTRAVENOUS at 06:10

## 2022-05-05 RX ADMIN — PIPERACILLIN AND TAZOBACTAM 200 GRAM(S): 4; .5 INJECTION, POWDER, LYOPHILIZED, FOR SOLUTION INTRAVENOUS at 00:59

## 2022-05-05 RX ADMIN — Medication 650 MILLIGRAM(S): at 13:13

## 2022-05-05 RX ADMIN — SIMETHICONE 80 MILLIGRAM(S): 80 TABLET, CHEWABLE ORAL at 12:12

## 2022-05-05 RX ADMIN — Medication 10 UNIT(S): at 09:35

## 2022-05-05 RX ADMIN — Medication 300 MILLIGRAM(S): at 23:49

## 2022-05-05 RX ADMIN — Medication 10 UNIT(S): at 12:14

## 2022-05-05 RX ADMIN — Medication 650 MILLIGRAM(S): at 19:20

## 2022-05-05 RX ADMIN — TRAMADOL HYDROCHLORIDE 25 MILLIGRAM(S): 50 TABLET ORAL at 23:30

## 2022-05-05 RX ADMIN — Medication 300 MILLIGRAM(S): at 10:36

## 2022-05-05 RX ADMIN — Medication 650 MILLIGRAM(S): at 06:52

## 2022-05-05 RX ADMIN — Medication 650 MILLIGRAM(S): at 01:54

## 2022-05-05 RX ADMIN — PIPERACILLIN AND TAZOBACTAM 200 GRAM(S): 4; .5 INJECTION, POWDER, LYOPHILIZED, FOR SOLUTION INTRAVENOUS at 12:13

## 2022-05-05 RX ADMIN — INSULIN GLARGINE 25 UNIT(S): 100 INJECTION, SOLUTION SUBCUTANEOUS at 22:54

## 2022-05-05 RX ADMIN — Medication 6: at 12:13

## 2022-05-05 RX ADMIN — Medication 25 GRAM(S): at 10:36

## 2022-05-05 RX ADMIN — ENOXAPARIN SODIUM 40 MILLIGRAM(S): 100 INJECTION SUBCUTANEOUS at 12:12

## 2022-05-05 NOTE — PROGRESS NOTE ADULT - PROBLEM SELECTOR PLAN 6
F: None   E: Replete for K<4, Mag<2  N: CC diet  DVT ppx: lovenox  Full Code   Dispo: [facility]

## 2022-05-05 NOTE — PROGRESS NOTE ADULT - ASSESSMENT
46M w/ history of previous alcohol and cocaine use and PMHx of HTN, HLD, DM, and right foot osteomyelitis with amputation of the right 4th and 5th toes and partial amputation of the right 2nd digit, p/w 1-day h/o right foot wound drainage of medial great toe, and hallux abscess. Previous wound cultures from Feb 2022 grew MRSA, GBS, Gemella and Bacteroides-pt finished 6 weeks IV Vanc and flagyl 3/21, deep wound culture taken in ED 5/1-MRSA. Treatment of diabetic foot infection, suspected acute on chronic OM, OR cultures (tissue, wound, and bone) from 5/3 growing MRSA, no acid fast bacilli. Pt clinically improving, downtrending WBC, afebrile. Podiatry will take pt for TMA.     Plan:   1. Continue Vancomycin- will cover MRSA found in cultures  2 D/c zosyn as MRSA is the only organism growing   3. Vanc trough high (21.2) not within goal (14-18) agree with decreasing vanc dose to 1500mg Q12  4. Check vanc trough before the 4th dose  5. F/u OR pathology and fungal results  6. No acid fast bacilli on OR cultures    ID team 1 will follow. Recommendations not final until attending attestation.

## 2022-05-05 NOTE — PROGRESS NOTE ADULT - PROBLEM SELECTOR PLAN 1
Patient presenting with purulent drainage from right great toe noted on home dressing change. S/p bedside incision and drainage of right dorsal hallux abscess performed in ED, 5cc drained, per podiatry.   -Xray right foot/ankle showing soft tissue defect adjacent to the fourth metatarsal stump  -podiatry following , appreciate recs    -pt now s/p right foot I&D and debridement in OR on 5/3; deep wound and bone cultures obtained   -both deep wound and bone cultures from OR growing MRSA   - C/w Vanc/Zosyn  -ID consulted, appreciate recs   -patient now interested in further amputation of right great toe as bone cultures revealing evidence of osteo. per podiatry possible RTOR during this admission at a later date amputation  -BCs 5/2 NGTD   -standing tylenol and prn tramadol for pain

## 2022-05-05 NOTE — PROGRESS NOTE ADULT - ASSESSMENT
*************INCOMPLETE**********    45 yo M, history of previous alcohol and cocaine use w/ PMHx of HTN (not treated), HLD (on meds), DM (started in 2021 on OAD and insulin), osteomyelitis with amputation of the right 4th and 5th rays and partial amputation of the right 2nd digit, presents to the Emergency Department with foot pain / wound drainage. Podiatry consulted for right foot wound. VSS, leukocytosis present, elevated CRP, medial 1st MPJ wound actively draining purulence, pt found to have dorsal hallux abscess with 5ccs expressed after bedside Incision and drainage. Xray showing cortical changes of the medial 1st metatarsal head and 1st proximal phalanx high likelihood of OM.  Deep wound ED Cx: MRSA. Pt is s/p R foot I&D + debridement of plantar wound (5/3), approx 5cc purulence expressed. Possible RTOR during this admission at a later date Amputation.    Plan:   - IV ABX per primary team  - f/u OR wound, tissue and bone path  -OR deep wound cx, tissue cx  bone cx- all MRSA  - Local wound care: flushed surgical site w/ saline, iodoform packing, DSD, ACE wrap  - Recc WBAT R heel in surgical shoe  - Rest of care per primary team  - Possible RTOR this week pending discussion with attending     Podiatry following.  47 yo M, history of previous alcohol and cocaine use w/ PMHx of HTN (not treated), HLD (on meds), DM (started in 2021 on OAD and insulin), osteomyelitis with amputation of the right 4th and 5th rays and partial amputation of the right 2nd digit, presents to the Emergency Department with foot pain / wound drainage. Podiatry consulted for right foot wound. VSS, leukocytosis present, elevated CRP, medial 1st MPJ wound actively draining purulence, pt found to have dorsal hallux abscess with 5ccs expressed after bedside Incision and drainage. Xray showing cortical changes of the medial 1st metatarsal head and 1st proximal phalanx high likelihood of OM.  Deep wound ED Cx: MRSA. Pt is s/p R foot I&D + debridement of plantar wound (5/3), approx 5cc purulence expressed. Possible RTOR during this admission at a later date Amputation. OR Cx growing MRSA in bone and deep clean Cx's.     Plan:   - Plan for OR Tuesday afternoon for possible TMA and achilles tendon lengthening.   - IV ABX per primary team  - f/u OR wound, tissue and bone path  - Local wound care: flushed surgical site w/ saline, iodoform packing, DSD, ACE wrap  - Recc WBAT R heel in surgical shoe  - Rest of care per primary team    Podiatry following. Plan d/w attending.

## 2022-05-05 NOTE — PROGRESS NOTE ADULT - PROBLEM SELECTOR PLAN 4
Patient with HTN on home losartan 25mg daily   -C/w home losartan  -monitor BPs; consider increasing losartan dose as needed

## 2022-05-05 NOTE — PROGRESS NOTE ADULT - ATTENDING COMMENTS
46M with PMH of HTN, HLD, DM2 and prior cocaine and alcohol use presenting with purulent drainage from R foot wound, concerning for osteomyelitis.  Podiatry and ID are following.    - s/p washout inOR.  Continuing on vancomycin and zosyn.  Cultures, including from bone, are growing MRSA.  Anticipate de-escalation to vancomycin monotherapy.  Patient is interested in toe amputation, and states that the podiatry team mentioned either doing that on Sunday or Monday.  Will follow up with podiatry team for further input.    - abdominal pain significantly improved after large bowel movement and initiation of simethicone. No imaging required at this time.

## 2022-05-05 NOTE — PROGRESS NOTE ADULT - SUBJECTIVE AND OBJECTIVE BOX
INFECTIOUS DISEASES CONSULT FOLLOW-UP NOTE    INTERVAL HPI/OVERNIGHT EVENTS: Pt seen and evaluated bedside this AM. Pts abdominal pain has subsided and he denies any N/V/F/C/SOB.     ROS:   Constitutional, eyes, ENT, cardiovascular, respiratory, gastrointestinal, genitourinary, integumentary, neurological, psychiatric and heme/lymph are otherwise negative other than noted above       ANTIBIOTICS/RELEVANT:    MEDICATIONS  (STANDING):  acetaminophen     Tablet .. 650 milliGRAM(s) Oral every 6 hours  atorvastatin 40 milliGRAM(s) Oral at bedtime  enoxaparin Injectable 40 milliGRAM(s) SubCutaneous every 24 hours  gabapentin 100 milliGRAM(s) Oral every 8 hours  insulin glargine Injectable (LANTUS) 25 Unit(s) SubCutaneous at bedtime  insulin lispro (ADMELOG) corrective regimen sliding scale   SubCutaneous Before meals and at bedtime  insulin lispro Injectable (ADMELOG) 10 Unit(s) SubCutaneous three times a day before meals  losartan 25 milliGRAM(s) Oral daily  piperacillin/tazobactam IVPB.. 4.5 Gram(s) IV Intermittent every 6 hours  polyethylene glycol 3350 17 Gram(s) Oral two times a day  senna 2 Tablet(s) Oral at bedtime  simethicone 80 milliGRAM(s) Chew daily  vancomycin  IVPB 1500 milliGRAM(s) IV Intermittent every 12 hours    MEDICATIONS  (PRN):  traMADol 25 milliGRAM(s) Oral every 6 hours PRN Moderate Pain (4 - 6)        Vital Signs Last 24 Hrs  T(C): 36.4 (05 May 2022 06:05), Max: 36.8 (04 May 2022 14:45)  T(F): 97.6 (05 May 2022 06:05), Max: 98.3 (04 May 2022 14:45)  HR: 71 (05 May 2022 06:05) (71 - 82)  BP: 163/89 (05 May 2022 06:05) (122/80 - 163/89)  BP(mean): --  RR: 18 (05 May 2022 06:05) (18 - 19)  SpO2: 98% (05 May 2022 06:05) (98% - 98%)      PHYSICAL EXAM:  Constitutional: alert, NAD  Eyes: the sclera and conjunctiva were normal.   ENT: the ears and nose were normal in appearance.   Neck: the appearance of the neck was normal and the neck was supple.   Pulmonary: no respiratory distress and lungs were clear to auscultation bilaterally.   Heart: heart rate was normal and rhythm regular, normal S1 and S2  Vascular: there was no peripheral edema  Abdomen: normal bowel sounds, soft, non-tender  Neurological: no focal deficits  Psychiatric: the affect was normal  Derm: Right foot- Submet 1 ulcer measuring 2.0cmx2.0cm, fibrotic base, Right foot medial 1st MPJ ulceration 1cm granular area of tissue, right foot dorsomedial 0.5x0.5cm wound probes deep to bone, 3-5ccs purulent drainage expressed, Area of fluctuance noted over dorsal hallux near MPJ  5/3 - s/p right medial 1st MPJ I&D; negative for purulence since drainage w/ surgical incision present. Packing material present in I&D site.  Neuro: protective sensation grossly diminished   MSK: S/p 4th and 5th ray amputations, s/p 2nd digit distal Symes amputation             LABS:                        11.9   7.70  )-----------( 416      ( 05 May 2022 07:59 )             35.9     05-05    139  |  102  |  20  ----------------------------<  197<H>  4.4   |  25  |  1.06    Ca    8.8      05 May 2022 07:59  Phos  3.4     05-04  Mg     1.7     05-05      MICROBIOLOGY:    Culture - Acid Fast - Other w/Smear (collected 04 May 2022 02:10)  Source: .Other Right Foot Deep Wound - OR    Culture - Fungal, Other (collected 04 May 2022 02:10)  Source: .Other Right Foot Deep Wound - OR  Preliminary Report (04 May 2022 08:48):    Testing in progress    Culture - Fungal, Tissue (collected 03 May 2022 20:48)  Source: Bone Right Foot Bone - OR  Preliminary Report (04 May 2022 08:24):    Testing in progress    Culture - Acid Fast - Tissue w/Smear (collected 03 May 2022 20:48)  Source: Bone Right Foot Bone - OR    Culture - Fungal, Tissue (collected 03 May 2022 20:48)  Source: .Tissue Right Foot Tissue - OR  Preliminary Report (04 May 2022 08:24):    Testing in progress    Culture - Acid Fast - Tissue w/Smear (collected 03 May 2022 20:48)  Source: .Tissue Right Foot Tissue - OR    Culture - Surgical Swab (collected 03 May 2022 15:20)  Source: .Surgical Swab Right Foot Deep Wound - OR  Gram Stain (03 May 2022 17:04):    No organisms seen    Rare WBC's  Final Report (05 May 2022 11:38):    Few Methicillin Resistant Staphylococcus aureus    Result called to and read back by_ OLIVA Florez RN  05/04/2022 16:37:06  Organism: Methicillin resistant Staphylococcus aureus  Methicillin resistant Staphylococcus aureus (05 May 2022 11:38)  Organism: Methicillin resistant Staphylococcus aureus (05 May 2022 11:38)  Organism: Methicillin resistant Staphylococcus aureus (05 May 2022 11:38)    Culture - Tissue with Gram Stain (collected 03 May 2022 15:19)  Source: Bone Right Foot Bone - OR  Gram Stain (03 May 2022 17:05):    No organisms seen    No WBC's seen.  Final Report (05 May 2022 11:40):    Rare to few Methicillin Resistant Staphylococcus aureus    Result called to and read back by_ OLIVA Florez RN  05/04/2022 16:26:34  Organism: Methicillin resistant Staphylococcus aureus  Methicillin resistant Staphylococcus aureus (05 May 2022 11:40)  Organism: Methicillin resistant Staphylococcus aureus (05 May 2022 11:40)  Organism: Methicillin resistant Staphylococcus aureus (05 May 2022 11:40)    Culture - Tissue with Gram Stain (collected 03 May 2022 15:18)  Source: .Tissue Right Foot Tissue - OR  Gram Stain (03 May 2022 16:56):    No organisms seen    No WBC's seen.  Final Report (05 May 2022 11:54):    Rare Staphylococcus aureus    Floor previously notified.    See previous culture for susceptibility results    RADIOLOGY & ADDITIONAL STUDIES:  Reviewed

## 2022-05-05 NOTE — PROGRESS NOTE ADULT - ATTENDING COMMENTS
Agree with above.  Patient with diabetic foot infection secondary to MRSA Can stop Zosyn and continue Vancomycin at 1500 mg IV q12.  If TMA done next week it might be curative of this infection

## 2022-05-05 NOTE — PROGRESS NOTE ADULT - PROBLEM SELECTOR PLAN 2
H/o right foot osteomyelitis with amputation of the right 4th and 5th toes and partial amputation of the right 2nd digit. now with new osteo as bone cultures from right great toe growing MRSA   - Management as above   - F/u podiatry recs for possible amputation

## 2022-05-05 NOTE — PROGRESS NOTE ADULT - ASSESSMENT
Mr Valentin is a 46M w/ history of previous alcohol and cocaine use and PMHx of HTN, HLD, DM, and right foot osteomyelitis with amputation of the right 4th and 5th toes and partial amputation of the right 2nd digit, p/w 1-day h/o right foot wound drainage of medial great toe now here for management of R foot abscess with concerns for OM.

## 2022-05-05 NOTE — PROGRESS NOTE ADULT - PROBLEM SELECTOR PLAN 5
Home med: Atorvastatin 40  - C/w home statin      #Anemia , normocytic   patient with hgb remaining 11-12 throughout admission; baseline 13  -iron studies showing mixed ASHOK and AOCD   -likely in setting of active infection and chronic osteomyelitis vs blood loss from OR   -no signs of active bleeding on exam  -maintain active T&S; transfuse for hgb <7  -continue to monitor

## 2022-05-05 NOTE — PROGRESS NOTE ADULT - PROBLEM SELECTOR PLAN 3
History of DM2. Home meds: lantus 35, lispro 8 TID, metformin 750 BID. Gave additional 10u regular insulin in AM 5/2 due to missed lantus dose.  -A1C 8.9 5/2  - c/w lantus 25 units bedtime   -c/w lispro 10 units TID premeal  -c/w mISS   -hold home PO diabetic meds while inpatient   -c/w home gabapentin 100 TID for nerve pain  -monitor fingerstick glucose

## 2022-05-05 NOTE — PROGRESS NOTE ADULT - SUBJECTIVE AND OBJECTIVE BOX
Patient is a 46y old  Male who presents with a chief complaint of right foot wound infection (04 May 2022 14:03)      INTERVAL HPI/ OVERNIGHT EVENTS: Pt seen and evaluated bedside this AM by podiatry. Pt has no pedal complaints at this time and his dressing is C/D/I. Pt denies N/V/F/C/SOB and states his stomach pains have improved.       LABS                        11.9   7.70  )-----------( 416      ( 05 May 2022 07:59 )             35.9     05-05    139  |  102  |  20  ----------------------------<  197<H>  4.4   |  25  |  1.06    Ca    8.8      05 May 2022 07:59  Phos  3.4     05-04  Mg     1.7     05-05          ICU Vital Signs Last 24 Hrs  T(C): 36.4 (05 May 2022 06:05), Max: 36.8 (04 May 2022 14:45)  T(F): 97.6 (05 May 2022 06:05), Max: 98.3 (04 May 2022 14:45)  HR: 71 (05 May 2022 06:05) (71 - 87)  BP: 163/89 (05 May 2022 06:05) (122/80 - 163/89)  BP(mean): --  ABP: --  ABP(mean): --  RR: 18 (05 May 2022 06:05) (18 - 19)  SpO2: 98% (05 May 2022 06:05) (97% - 98%)      RADIOLOGY    MICROBIOLOGY    PHYSICAL EXAM  Lower Extremity Focused  General: NAD, AA0x3  Lower Extremity Focused: RIGHT  Vasc: D/PT 2/4 b/l, Right foot warmer than left, non-pitting edema to dorsum of right foot over hallux  Derm:  Right foot- Submet 1 ulcer measuring 2.0cmx2.0cm, fibrotic base, Right foot medial 1st MPJ ulceration 1cm granular area of tissue, right foot dorsomedial 0.5x0.5cm wound probes deep to bone, 3ccs purulent drainage expressed, Area of fluctuance noted over dorsal hallux near MPJ  5/3 - s/p right medial 1st MPJ I&D; negative for purulence since drainage w/ surgical incision present. Packing material present in I&D site.  Neuro: protective sensation grossly diminished   MSK: S/p 4th and 5th ray amputations, s/p 2nd digit distal Symes amputation

## 2022-05-05 NOTE — PROGRESS NOTE ADULT - SUBJECTIVE AND OBJECTIVE BOX
OVERNIGHT EVENTS: NAEO    SUBJECTIVE / INTERVAL HPI: Patient seen and examined at bedside. Endorses continued pain in right foot, worst at night, however controlled with tramadol. Ambulating with walker bearing minimal weight on right foot. After discussion with podiatry patient now considering amputation of right great toe. Endorsing continued mild stomach discomfort and gassiness however much improved today compared to yesterday. Endorses good PO intake. LBM this morning. Patient denying chest pain, SOB, palpitations, cough. Patient denies fever, chills, HA, Dizziness, change in vision/hearing, N/V, diarrhea, constipation.  Remaining ROS negative       PHYSICAL EXAM:    Gen: resting in bed in NAD   HEENT: NC/AT, PERRL, anicteric sclera, MMM  Neck: supple, no JVD, no thyromegaly  Cardiac: +S1/S2, RRR, no murmurs  Resp: CTA b/l, no w/r/r, satting well on RA   Abd: soft, non-distended, mild +TTP diffusely reported as 'pressure', no rebound or guarding. +BS x4.  Ext: WWP; no edema, NROM x4. no tenderness. right foot wrapped in wound dressing from toes to ankle. no sensation in right great toe.   Vasc: 2+ radial pulses  Skin: warm, dry, and intact. no rashes.  Neuro: AAOx3, no focal deficits    VITAL SIGNS:  Vital Signs Last 24 Hrs  T(C): 36.4 (05 May 2022 06:05), Max: 36.8 (04 May 2022 14:45)  T(F): 97.6 (05 May 2022 06:05), Max: 98.3 (04 May 2022 14:45)  HR: 71 (05 May 2022 06:05) (71 - 82)  BP: 163/89 (05 May 2022 06:05) (122/80 - 163/89)  BP(mean): --  RR: 18 (05 May 2022 06:05) (18 - 19)  SpO2: 98% (05 May 2022 06:05) (98% - 98%)      MEDICATIONS:  MEDICATIONS  (STANDING):  acetaminophen     Tablet .. 650 milliGRAM(s) Oral every 6 hours  atorvastatin 40 milliGRAM(s) Oral at bedtime  enoxaparin Injectable 40 milliGRAM(s) SubCutaneous every 24 hours  gabapentin 100 milliGRAM(s) Oral every 8 hours  insulin glargine Injectable (LANTUS) 25 Unit(s) SubCutaneous at bedtime  insulin lispro (ADMELOG) corrective regimen sliding scale   SubCutaneous Before meals and at bedtime  insulin lispro Injectable (ADMELOG) 10 Unit(s) SubCutaneous three times a day before meals  losartan 25 milliGRAM(s) Oral daily  piperacillin/tazobactam IVPB.. 4.5 Gram(s) IV Intermittent every 6 hours  polyethylene glycol 3350 17 Gram(s) Oral two times a day  senna 2 Tablet(s) Oral at bedtime  simethicone 80 milliGRAM(s) Chew daily  vancomycin  IVPB 1500 milliGRAM(s) IV Intermittent every 12 hours    MEDICATIONS  (PRN):  traMADol 25 milliGRAM(s) Oral every 6 hours PRN Moderate Pain (4 - 6)      ALLERGIES:  Allergies    No Known Allergies    Intolerances        LABS:                        11.9   7.70  )-----------( 416      ( 05 May 2022 07:59 )             35.9     05-05    139  |  102  |  20  ----------------------------<  197<H>  4.4   |  25  |  1.06    Ca    8.8      05 May 2022 07:59  Phos  3.4     05-04  Mg     1.7     05-05          CAPILLARY BLOOD GLUCOSE      POCT Blood Glucose.: 181 mg/dL (05 May 2022 08:28)      RADIOLOGY & ADDITIONAL TESTS: Reviewed.

## 2022-05-06 LAB
ANION GAP SERPL CALC-SCNC: 9 MMOL/L — SIGNIFICANT CHANGE UP (ref 5–17)
BASOPHILS # BLD AUTO: 0.07 K/UL — SIGNIFICANT CHANGE UP (ref 0–0.2)
BASOPHILS NFR BLD AUTO: 0.7 % — SIGNIFICANT CHANGE UP (ref 0–2)
BUN SERPL-MCNC: 24 MG/DL — HIGH (ref 7–23)
CALCIUM SERPL-MCNC: 9.1 MG/DL — SIGNIFICANT CHANGE UP (ref 8.4–10.5)
CHLORIDE SERPL-SCNC: 102 MMOL/L — SIGNIFICANT CHANGE UP (ref 96–108)
CO2 SERPL-SCNC: 26 MMOL/L — SIGNIFICANT CHANGE UP (ref 22–31)
CREAT SERPL-MCNC: 1.09 MG/DL — SIGNIFICANT CHANGE UP (ref 0.5–1.3)
EGFR: 85 ML/MIN/1.73M2 — SIGNIFICANT CHANGE UP
EOSINOPHIL # BLD AUTO: 0.39 K/UL — SIGNIFICANT CHANGE UP (ref 0–0.5)
EOSINOPHIL NFR BLD AUTO: 4 % — SIGNIFICANT CHANGE UP (ref 0–6)
GLUCOSE BLDC GLUCOMTR-MCNC: 172 MG/DL — HIGH (ref 70–99)
GLUCOSE BLDC GLUCOMTR-MCNC: 176 MG/DL — HIGH (ref 70–99)
GLUCOSE BLDC GLUCOMTR-MCNC: 226 MG/DL — HIGH (ref 70–99)
GLUCOSE BLDC GLUCOMTR-MCNC: 243 MG/DL — HIGH (ref 70–99)
GLUCOSE SERPL-MCNC: 227 MG/DL — HIGH (ref 70–99)
HCT VFR BLD CALC: 38.8 % — LOW (ref 39–50)
HGB BLD-MCNC: 12.4 G/DL — LOW (ref 13–17)
IMM GRANULOCYTES NFR BLD AUTO: 0.4 % — SIGNIFICANT CHANGE UP (ref 0–1.5)
LYMPHOCYTES # BLD AUTO: 3.57 K/UL — HIGH (ref 1–3.3)
LYMPHOCYTES # BLD AUTO: 36.5 % — SIGNIFICANT CHANGE UP (ref 13–44)
MAGNESIUM SERPL-MCNC: 1.6 MG/DL — SIGNIFICANT CHANGE UP (ref 1.6–2.6)
MCHC RBC-ENTMCNC: 28.5 PG — SIGNIFICANT CHANGE UP (ref 27–34)
MCHC RBC-ENTMCNC: 32 GM/DL — SIGNIFICANT CHANGE UP (ref 32–36)
MCV RBC AUTO: 89.2 FL — SIGNIFICANT CHANGE UP (ref 80–100)
MONOCYTES # BLD AUTO: 0.87 K/UL — SIGNIFICANT CHANGE UP (ref 0–0.9)
MONOCYTES NFR BLD AUTO: 8.9 % — SIGNIFICANT CHANGE UP (ref 2–14)
NEUTROPHILS # BLD AUTO: 4.84 K/UL — SIGNIFICANT CHANGE UP (ref 1.8–7.4)
NEUTROPHILS NFR BLD AUTO: 49.5 % — SIGNIFICANT CHANGE UP (ref 43–77)
NRBC # BLD: 0 /100 WBCS — SIGNIFICANT CHANGE UP (ref 0–0)
PLATELET # BLD AUTO: 470 K/UL — HIGH (ref 150–400)
POTASSIUM SERPL-MCNC: 4.5 MMOL/L — SIGNIFICANT CHANGE UP (ref 3.5–5.3)
POTASSIUM SERPL-SCNC: 4.5 MMOL/L — SIGNIFICANT CHANGE UP (ref 3.5–5.3)
RBC # BLD: 4.35 M/UL — SIGNIFICANT CHANGE UP (ref 4.2–5.8)
RBC # FLD: 13.6 % — SIGNIFICANT CHANGE UP (ref 10.3–14.5)
SODIUM SERPL-SCNC: 137 MMOL/L — SIGNIFICANT CHANGE UP (ref 135–145)
VANCOMYCIN TROUGH SERPL-MCNC: 18 UG/ML — SIGNIFICANT CHANGE UP (ref 10–20)
WBC # BLD: 9.78 K/UL — SIGNIFICANT CHANGE UP (ref 3.8–10.5)
WBC # FLD AUTO: 9.78 K/UL — SIGNIFICANT CHANGE UP (ref 3.8–10.5)

## 2022-05-06 PROCEDURE — 99232 SBSQ HOSP IP/OBS MODERATE 35: CPT | Mod: GC

## 2022-05-06 PROCEDURE — 99233 SBSQ HOSP IP/OBS HIGH 50: CPT

## 2022-05-06 RX ORDER — VANCOMYCIN HCL 1 G
1500 VIAL (EA) INTRAVENOUS EVERY 12 HOURS
Refills: 0 | Status: COMPLETED | OUTPATIENT
Start: 2022-05-06 | End: 2022-05-07

## 2022-05-06 RX ORDER — LOSARTAN POTASSIUM 100 MG/1
25 TABLET, FILM COATED ORAL ONCE
Refills: 0 | Status: COMPLETED | OUTPATIENT
Start: 2022-05-06 | End: 2022-05-06

## 2022-05-06 RX ORDER — MAGNESIUM SULFATE 500 MG/ML
2 VIAL (ML) INJECTION ONCE
Refills: 0 | Status: COMPLETED | OUTPATIENT
Start: 2022-05-06 | End: 2022-05-06

## 2022-05-06 RX ORDER — LOSARTAN POTASSIUM 100 MG/1
50 TABLET, FILM COATED ORAL DAILY
Refills: 0 | Status: DISCONTINUED | OUTPATIENT
Start: 2022-05-07 | End: 2022-05-08

## 2022-05-06 RX ADMIN — Medication 4: at 08:57

## 2022-05-06 RX ADMIN — Medication 10 UNIT(S): at 08:57

## 2022-05-06 RX ADMIN — Medication 2: at 22:00

## 2022-05-06 RX ADMIN — Medication 650 MILLIGRAM(S): at 13:56

## 2022-05-06 RX ADMIN — Medication 4: at 17:08

## 2022-05-06 RX ADMIN — SIMETHICONE 80 MILLIGRAM(S): 80 TABLET, CHEWABLE ORAL at 12:56

## 2022-05-06 RX ADMIN — Medication 2: at 13:03

## 2022-05-06 RX ADMIN — GABAPENTIN 100 MILLIGRAM(S): 400 CAPSULE ORAL at 21:58

## 2022-05-06 RX ADMIN — LOSARTAN POTASSIUM 25 MILLIGRAM(S): 100 TABLET, FILM COATED ORAL at 07:04

## 2022-05-06 RX ADMIN — GABAPENTIN 100 MILLIGRAM(S): 400 CAPSULE ORAL at 07:05

## 2022-05-06 RX ADMIN — Medication 650 MILLIGRAM(S): at 07:04

## 2022-05-06 RX ADMIN — Medication 650 MILLIGRAM(S): at 07:34

## 2022-05-06 RX ADMIN — Medication 10 UNIT(S): at 13:04

## 2022-05-06 RX ADMIN — Medication 650 MILLIGRAM(S): at 12:56

## 2022-05-06 RX ADMIN — Medication 25 GRAM(S): at 17:08

## 2022-05-06 RX ADMIN — LOSARTAN POTASSIUM 25 MILLIGRAM(S): 100 TABLET, FILM COATED ORAL at 08:58

## 2022-05-06 RX ADMIN — TRAMADOL HYDROCHLORIDE 25 MILLIGRAM(S): 50 TABLET ORAL at 22:13

## 2022-05-06 RX ADMIN — TRAMADOL HYDROCHLORIDE 25 MILLIGRAM(S): 50 TABLET ORAL at 07:04

## 2022-05-06 RX ADMIN — INSULIN GLARGINE 25 UNIT(S): 100 INJECTION, SOLUTION SUBCUTANEOUS at 21:59

## 2022-05-06 RX ADMIN — ATORVASTATIN CALCIUM 40 MILLIGRAM(S): 80 TABLET, FILM COATED ORAL at 21:58

## 2022-05-06 RX ADMIN — Medication 650 MILLIGRAM(S): at 18:09

## 2022-05-06 RX ADMIN — GABAPENTIN 100 MILLIGRAM(S): 400 CAPSULE ORAL at 13:39

## 2022-05-06 RX ADMIN — Medication 10 UNIT(S): at 17:08

## 2022-05-06 RX ADMIN — ENOXAPARIN SODIUM 40 MILLIGRAM(S): 100 INJECTION SUBCUTANEOUS at 12:56

## 2022-05-06 RX ADMIN — TRAMADOL HYDROCHLORIDE 25 MILLIGRAM(S): 50 TABLET ORAL at 23:13

## 2022-05-06 RX ADMIN — Medication 300 MILLIGRAM(S): at 11:12

## 2022-05-06 RX ADMIN — Medication 650 MILLIGRAM(S): at 17:09

## 2022-05-06 NOTE — PROGRESS NOTE ADULT - ASSESSMENT
46M w/ history of previous alcohol and cocaine use and PMHx of HTN, HLD, DM, and right foot osteomyelitis with amputation of the right 4th and 5th toes and partial amputation of the right 2nd digit, p/w 1-day h/o right foot wound drainage of medial great toe, and hallux abscess. Previous wound cultures from Feb 2022 grew MRSA, GBS, Gemella and Bacteroides-pt finished 6 weeks IV Vanc and flagyl 3/21, deep wound culture taken in ED 5/1-MRSA. Treatment of diabetic foot infection, suspected acute on chronic OM, OR cultures (tissue, wound, and bone) from 5/3 growing MRSA, no acid fast bacilli. Pt clinically improving, downtrending WBC, afebrile. Podiatry will take pt for TMA for further source control.     Plan:   1. Continue Vancomycin 1500mg Q12- will cover MRSA found in cultures  2. Check Vanc trough before the 4th dose 5/6 at 10pm   3. OR pathology shows Acute OM of the right 1st metatarsal head   4. Rec sending clean margin for both culture and pathology during TMA procedure- will dictate antibiotics duration and route       ID team 1 will follow. Recommendations not final until attending attestation.

## 2022-05-06 NOTE — PROGRESS NOTE ADULT - ATTENDING COMMENTS
Agree with above.  Diabetic foot infection secondary to MRSA.  Continue Vancomycin for now.  Check vanco trough tonight at 8 pm

## 2022-05-06 NOTE — PROGRESS NOTE ADULT - SUBJECTIVE AND OBJECTIVE BOX
INFECTIOUS DISEASES CONSULT FOLLOW-UP NOTE    INTERVAL HPI/OVERNIGHT EVENTS: Pt seen and evaluated by ID team this AM during rounds. Pt has no complaints at this time. Pt understands he is still getting IV abx due to the infection in his bone.       ROS:   Constitutional, eyes, ENT, cardiovascular, respiratory, gastrointestinal, genitourinary, integumentary, neurological, psychiatric and heme/lymph are otherwise negative other than noted above       ANTIBIOTICS/RELEVANT:    MEDICATIONS  (STANDING):  acetaminophen     Tablet .. 650 milliGRAM(s) Oral every 6 hours  atorvastatin 40 milliGRAM(s) Oral at bedtime  enoxaparin Injectable 40 milliGRAM(s) SubCutaneous every 24 hours  gabapentin 100 milliGRAM(s) Oral every 8 hours  insulin glargine Injectable (LANTUS) 25 Unit(s) SubCutaneous at bedtime  insulin lispro (ADMELOG) corrective regimen sliding scale   SubCutaneous Before meals and at bedtime  insulin lispro Injectable (ADMELOG) 10 Unit(s) SubCutaneous three times a day before meals  magnesium sulfate  IVPB 2 Gram(s) IV Intermittent once  polyethylene glycol 3350 17 Gram(s) Oral two times a day  senna 2 Tablet(s) Oral at bedtime  simethicone 80 milliGRAM(s) Chew daily    MEDICATIONS  (PRN):  traMADol 25 milliGRAM(s) Oral every 6 hours PRN Moderate Pain (4 - 6)        Vital Signs Last 24 Hrs  T(C): 36.6 (06 May 2022 05:44), Max: 36.6 (05 May 2022 21:12)  T(F): 97.8 (06 May 2022 05:44), Max: 97.9 (05 May 2022 21:12)  HR: 78 (06 May 2022 05:44) (75 - 78)  BP: 173/93 (06 May 2022 05:44) (135/78 - 173/93)  BP(mean): --  RR: 19 (06 May 2022 05:44) (17 - 19)  SpO2: 97% (06 May 2022 05:44) (96% - 98%)        PHYSICAL EXAM:  Constitutional: alert, NAD  Eyes: the sclera and conjunctiva were normal.   ENT: the ears and nose were normal in appearance.   Neck: the appearance of the neck was normal and the neck was supple.   Pulmonary: no respiratory distress and lungs were clear to auscultation bilaterally.   Heart: heart rate was normal and rhythm regular, normal S1 and S2  Vascular: there was no peripheral edema  Abdomen: normal bowel sounds, soft, non-tender  Neurological: no focal deficits  Psychiatric: the affect was normal  Derm: Right foot- Submet 1 ulcer measuring 2.0cmx2.0cm, fibrotic base, Right foot medial 1st MPJ ulceration 1cm granular area of tissue, right foot dorsomedial 0.5x0.5cm wound probes deep to bone, 3-5ccs purulent drainage expressed, Area of fluctuance noted over dorsal hallux near MPJ  5/3 - s/p right medial 1st MPJ I&D; negative for purulence since drainage w/ surgical incision present. Packing material present in I&D site.  Neuro: protective sensation grossly diminished   MSK: S/p 4th and 5th ray amputations, s/p 2nd digit distal Symes amputation           LABS:                        12.4   9.78  )-----------( 470      ( 06 May 2022 06:53 )             38.8     05-06    137  |  102  |  24<H>  ----------------------------<  227<H>  4.5   |  26  |  1.09    Ca    9.1      06 May 2022 06:53  Mg     1.6     05-06      MICROBIOLOGY:    Culture - Acid Fast - Other w/Smear (collected 04 May 2022 02:10)  Source: .Other Right Foot Deep Wound - OR    Culture - Fungal, Other (collected 04 May 2022 02:10)  Source: .Other Right Foot Deep Wound - OR  Preliminary Report (04 May 2022 08:48):    Testing in progress    Culture - Fungal, Tissue (collected 03 May 2022 20:48)  Source: Bone Right Foot Bone - OR  Preliminary Report (04 May 2022 08:24):    Testing in progress    Culture - Acid Fast - Tissue w/Smear (collected 03 May 2022 20:48)  Source: Bone Right Foot Bone - OR    Culture - Fungal, Tissue (collected 03 May 2022 20:48)  Source: .Tissue Right Foot Tissue - OR  Preliminary Report (04 May 2022 08:24):    Testing in progress    Culture - Acid Fast - Tissue w/Smear (collected 03 May 2022 20:48)  Source: .Tissue Right Foot Tissue - OR    Culture - Surgical Swab (collected 03 May 2022 15:20)  Source: .Surgical Swab Right Foot Deep Wound - OR  Gram Stain (03 May 2022 17:04):    No organisms seen    Rare WBC's  Final Report (05 May 2022 11:38):    Few Methicillin Resistant Staphylococcus aureus    Result called to and read back by_ OLIVA Florez RN  05/04/2022 16:37:06  Organism: Methicillin resistant Staphylococcus aureus  Methicillin resistant Staphylococcus aureus (05 May 2022 11:38)  Organism: Methicillin resistant Staphylococcus aureus (05 May 2022 11:38)  Organism: Methicillin resistant Staphylococcus aureus (05 May 2022 11:38)    Culture - Tissue with Gram Stain (collected 03 May 2022 15:19)  Source: Bone Right Foot Bone - OR  Gram Stain (03 May 2022 17:05):    No organisms seen    No WBC's seen.  Final Report (05 May 2022 11:40):    Rare to few Methicillin Resistant Staphylococcus aureus    Result called to and read back by_ OLIVA Florez RN  05/04/2022 16:26:34  Organism: Methicillin resistant Staphylococcus aureus  Methicillin resistant Staphylococcus aureus (05 May 2022 11:40)  Organism: Methicillin resistant Staphylococcus aureus (05 May 2022 11:40)  Organism: Methicillin resistant Staphylococcus aureus (05 May 2022 11:40)    Culture - Tissue with Gram Stain (collected 03 May 2022 15:18)  Source: .Tissue Right Foot Tissue - OR  Gram Stain (03 May 2022 16:56):    No organisms seen    No WBC's seen.  Final Report (05 May 2022 11:54):    Rare Staphylococcus aureus    Floor previously notified.    See previous culture for susceptibility results      RADIOLOGY & ADDITIONAL STUDIES:  Reviewed

## 2022-05-06 NOTE — PROGRESS NOTE ADULT - SUBJECTIVE AND OBJECTIVE BOX
Patient is a 46y old  Male who presents with a chief complaint of R foot abscess (06 May 2022 08:08)      INTERVAL HPI/ OVERNIGHT EVENTS: Pt seen and evaluated by podiatry this AM. Pt has no complaints at this time and understands he is awaiting TMA on Tuesday. Pts dressing C/D/I.       LABS                        12.4   9.78  )-----------( 470      ( 06 May 2022 06:53 )             38.8     05-06    137  |  102  |  24<H>  ----------------------------<  227<H>  4.5   |  26  |  1.09    Ca    9.1      06 May 2022 06:53  Mg     1.6     05-06          ICU Vital Signs Last 24 Hrs  T(C): 36.6 (06 May 2022 05:44), Max: 36.6 (05 May 2022 21:12)  T(F): 97.8 (06 May 2022 05:44), Max: 97.9 (05 May 2022 21:12)  HR: 78 (06 May 2022 05:44) (75 - 78)  BP: 173/93 (06 May 2022 05:44) (135/78 - 173/93)  BP(mean): --  ABP: --  ABP(mean): --  RR: 19 (06 May 2022 05:44) (17 - 19)  SpO2: 97% (06 May 2022 05:44) (96% - 98%)      RADIOLOGY    MICROBIOLOGY    PHYSICAL EXAM  Lower Extremity Focused  General: NAD, AA0x3  Lower Extremity Focused: RIGHT  Vasc: D/PT 2/4 b/l, Right foot warmer than left, non-pitting edema to dorsum of right foot over hallux  Derm:  Right foot- Submet 1 ulcer measuring 2.0cmx2.0cm, fibrotic base, Right foot medial 1st MPJ ulceration 1cm granular area of tissue, right foot dorsomedial 0.5x0.5cm wound probes deep to bone, 3ccs purulent drainage expressed, Area of fluctuance noted over dorsal hallux near MPJ  5/3 - s/p right medial 1st MPJ I&D; negative for purulence since drainage w/ surgical incision present. Packing material present in I&D site.  Neuro: protective sensation grossly diminished   MSK: S/p 4th and 5th ray amputations, s/p 2nd digit distal Symes amputation

## 2022-05-06 NOTE — PROGRESS NOTE ADULT - PROBLEM SELECTOR PLAN 2
H/o right foot osteomyelitis with amputation of the right 4th and 5th toes and partial amputation of the right 2nd digit. now with new osteo as bone cultures from right great toe growing MRSA   - Management as above   - F/u podiatry recs for possible amputation H/o right foot osteomyelitis with amputation of the right 4th and 5th toes and partial amputation of the right 2nd digit. now with new acute osteo as bone cultures from right great toe growing MRSA   - Management as above   - F/u podiatry recs; pending right foot TMA in OR next week

## 2022-05-06 NOTE — PROGRESS NOTE ADULT - ASSESSMENT
Mr Valentin is a 46M w/ history of previous alcohol and cocaine use and PMHx of HTN, HLD, DM, and right foot osteomyelitis with amputation of the right 4th and 5th toes and partial amputation of the right 2nd digit, p/w 1-day h/o right foot wound drainage of medial great toe now here for management of R foot abscess and amputation of the medial great toe due to confirmed OM growing MRSA. Mr Valentin is a 46M w/ history of previous alcohol and cocaine use and PMHx of HTN, HLD, DM, and right foot osteomyelitis with amputation of the right 4th and 5th toes and partial amputation of the right 2nd digit, p/w 1-day h/o right foot wound drainage of great toe, admitted for management of R foot abscess and acute right foot OM now pending TMA.

## 2022-05-06 NOTE — DIETITIAN INITIAL EVALUATION ADULT - PERTINENT MEDS FT
MEDICATIONS  (STANDING):  acetaminophen     Tablet .. 650 milliGRAM(s) Oral every 6 hours  atorvastatin 40 milliGRAM(s) Oral at bedtime  enoxaparin Injectable 40 milliGRAM(s) SubCutaneous every 24 hours  gabapentin 100 milliGRAM(s) Oral every 8 hours  insulin glargine Injectable (LANTUS) 25 Unit(s) SubCutaneous at bedtime  insulin lispro (ADMELOG) corrective regimen sliding scale   SubCutaneous Before meals and at bedtime  insulin lispro Injectable (ADMELOG) 10 Unit(s) SubCutaneous three times a day before meals  magnesium sulfate  IVPB 2 Gram(s) IV Intermittent once  polyethylene glycol 3350 17 Gram(s) Oral two times a day  senna 2 Tablet(s) Oral at bedtime  simethicone 80 milliGRAM(s) Chew daily    MEDICATIONS  (PRN):  traMADol 25 milliGRAM(s) Oral every 6 hours PRN Moderate Pain (4 - 6)

## 2022-05-06 NOTE — PROGRESS NOTE ADULT - PROBLEM SELECTOR PLAN 4
Patient with HTN on home losartan 25mg daily   -C/w home losartan  -Increased 25mg losartan on 5/6 due to consistently elevated BP  -monitor BPs; consider increasing losartan dose as needed Patient with HTN on home losartan 25mg daily   -losartan increased to 50mg daily due to consistently elevated BPs  -monitor BPs; consider further increasing losartan dose as needed

## 2022-05-06 NOTE — PROGRESS NOTE ADULT - PROBLEM SELECTOR PLAN 6
F: None   E: Replete for K<4, Mag<2  N: CC diet  DVT ppx: lovenox  Full Code   Dispo: [facility]

## 2022-05-06 NOTE — PROGRESS NOTE ADULT - ASSESSMENT
45 yo M, history of previous alcohol and cocaine use w/ PMHx of HTN (not treated), HLD (on meds), DM (started in 2021 on OAD and insulin), osteomyelitis with amputation of the right 4th and 5th rays and partial amputation of the right 2nd digit, presents to the Emergency Department with foot pain / wound drainage. Podiatry consulted for right foot wound. VSS, leukocytosis present, elevated CRP, medial 1st MPJ wound actively draining purulence, pt found to have dorsal hallux abscess with 5ccs expressed after bedside Incision and drainage. Xray showing cortical changes of the medial 1st metatarsal head and 1st proximal phalanx high likelihood of OM.  Deep wound ED Cx: MRSA. Pt is s/p R foot I&D + debridement of plantar wound (5/3), approx 5cc purulence expressed. Possible RTOR during this admission at a later date Amputation. OR Cx growing MRSA in bone and deep Cx's, Bone pathology showing acute OM.     Plan:   - Plan for OR Tuesday afternoon TMA and achilles tendon lengthening, case will be scheduled   - IV ABX per ID  - Local wound care: flushed surgical site w/ saline, iodoform packing, DSD, ACE wrap  - Recc WBAT R heel in surgical shoe  - Rest of care per primary team    Podiatry following. Plan d/w attending.

## 2022-05-06 NOTE — PROGRESS NOTE ADULT - ATTENDING COMMENTS
46M with PMH of HTN, HLD, DM2 and prior cocaine and alcohol use presenting with purulent drainage from R foot wound, concerning for osteomyelitis.  Podiatry and ID are following.    - s/p washout inOR.  Now on vancomycin monotherapy.  Planning for toe amputation next Tuesday with podiatry.   - abdomen nontender this morning.  Simethicone is helping a lot.  No acute abdominal findings on exam.

## 2022-05-06 NOTE — PROGRESS NOTE ADULT - PROBLEM SELECTOR PLAN 1
Patient presenting with purulent drainage from right great toe noted on home dressing change. S/p bedside incision and drainage of right dorsal hallux abscess performed in ED, 5cc drained, per podiatry.   -Xray right foot/ankle showing soft tissue defect adjacent to the fourth metatarsal stump  -podiatry following , appreciate recs    -pt now s/p right foot I&D and debridement in OR on 5/3; deep wound and bone cultures obtained   -both deep wound and bone cultures from OR growing MRSA   -C/w Vanc  -ID consulted, appreciate recs   -ID rec: Zosyn stopped on 5/5 after confirmed OM due to MRSA  -patient now interested in further amputation of right great toe as bone cultures revealing evidence of osteo growing MRSA. per podiatry possible RTOR during this admission at a later date amputation  -BCs 5/2 NGTD   -standing tylenol and prn tramadol for pain Patient presenting with purulent drainage from right great toe noted on home dressing change. S/p bedside incision and drainage of right dorsal hallux abscess performed in ED, 5cc drained, per podiatry.   -Xray right foot/ankle showing soft tissue defect adjacent to the fourth metatarsal stump  -podiatry following , appreciate recs    -pt now s/p right foot I&D and debridement in OR on 5/3; deep wound and bone cultures obtained growing MRSA   -surgical path report revealing acute osteomyelitis of right foot  -C/w Vancomycin ; zosyn d/austen as cultures confirmed MRSA   -ID consulted, recs appreciated  -pt agreeable to amputation; per podiatry plan to return to OR on tuesday 5/10 for right TMA and achilles tendon lengthening   -[facility] 5/2 NGTD   -standing tylenol and prn tramadol for pain

## 2022-05-06 NOTE — PROGRESS NOTE ADULT - SUBJECTIVE AND OBJECTIVE BOX
***INCOMPLETE***    OVERNIGHT EVENTS:   /78--> 159/91 yesterday, 173/93 this morning. Gave an additional 25mg losartan.    SUBJECTIVE:    Patient seen and examined at bedside. Reports continued pain in right foot, but controlled with tramadol. Continuing to ambulate with walking with minimal weight on right foot. Reports improved mild stomach discomfort since yesterday, BM last night and this morning, and good PO intake. Patient reports HA since yesterday quantified as 3/10 localized in the L side of this face that spans from his head down to his jaw, which he attributes to a tooth that he cracked last night. Patient denies fever, dizziness, changes in vision/hearing CP, SOB, cough, palpitations, N/V/D/C, parasthesia, and weakness.    OBJECTIVE:  Vital Signs Last 24 Hrs  T(C): 36.6 (06 May 2022 05:44), Max: 36.6 (05 May 2022 21:12)  T(F): 97.8 (06 May 2022 05:44), Max: 97.9 (05 May 2022 21:12)  HR: 78 (06 May 2022 05:44) (75 - 78)  BP: 173/93 (06 May 2022 05:44) (135/78 - 173/93)  BP(mean): --  RR: 19 (06 May 2022 05:44) (17 - 19)  SpO2: 97% (06 May 2022 05:44) (96% - 98%)    Physical Exam:  Gen: resting in bed in NAD, well appearing, alert, interactive  HEENT: PERRL, anicteric sclera, no JVD, no thyromegaly, MMM  Cardio: +S1/S2, RRR, no murmurs  Resp: CTA b/l, no w/r/r, satting well on RA  GI: +BS x4, soft NT/ND  Ext: WWP, no peripheral edema, NROM x4, no cyanosis or clubbing. +R foot wrapped in dressing from toes to ankle with no sensation in right great toe  Vasc: 3+ peripheral pulses b/l  Skin: warm, dry, and intact. No rashes or scars.  Neuro: AAOx3, CN II-XII intact, no focal deficits      Labs:                        12.4   9.78  )-----------( 470      ( 06 May 2022 06:53 )             38.8     05-06    137  |  102  |  24<H>  ----------------------------<  227<H>  4.5   |  26  |  1.09    Ca    9.1      06 May 2022 06:53  Mg     1.6     05-06        Fingerstick  glucose: POCT Blood Glucose.: 152 mg/dL (05 May 2022 22:27)      MEDICATIONS  (STANDING):  acetaminophen     Tablet .. 650 milliGRAM(s) Oral every 6 hours  atorvastatin 40 milliGRAM(s) Oral at bedtime  enoxaparin Injectable 40 milliGRAM(s) SubCutaneous every 24 hours  gabapentin 100 milliGRAM(s) Oral every 8 hours  insulin glargine Injectable (LANTUS) 25 Unit(s) SubCutaneous at bedtime  insulin lispro (ADMELOG) corrective regimen sliding scale   SubCutaneous Before meals and at bedtime  insulin lispro Injectable (ADMELOG) 10 Unit(s) SubCutaneous three times a day before meals  losartan 25 milliGRAM(s) Oral daily  polyethylene glycol 3350 17 Gram(s) Oral two times a day  senna 2 Tablet(s) Oral at bedtime  simethicone 80 milliGRAM(s) Chew daily  vancomycin  IVPB 1500 milliGRAM(s) IV Intermittent every 12 hours    MEDICATIONS  (PRN):  traMADol 25 milliGRAM(s) Oral every 6 hours PRN Moderate Pain (4 - 6)      Allergies    No Known Allergies    Intolerances        RADIOLOGY & ADDITIONAL TESTS: Reviewed.                       ***INCOMPLETE***    OVERNIGHT EVENTS:   /78--> 159/91 yesterday, 173/93 this morning. Gave an additional 25mg losartan after morning dose.    SUBJECTIVE:    Patient seen and examined at bedside. Reports continued pain in right foot, but controlled with tramadol. Continuing to ambulate with walking with minimal weight on right foot. Reports improved mild stomach discomfort since yesterday, BM last night and this morning, and good PO intake. Patient reports HA since yesterday quantified as 3/10 localized in the L side of this face that spans from his head down to his jaw, which he attributes to a tooth that he cracked last night. Patient denies fever, dizziness, changes in vision/hearing CP, SOB, cough, palpitations, N/V/D/C, parasthesia, and weakness.    OBJECTIVE:  Vital Signs Last 24 Hrs  T(C): 36.6 (06 May 2022 05:44), Max: 36.6 (05 May 2022 21:12)  T(F): 97.8 (06 May 2022 05:44), Max: 97.9 (05 May 2022 21:12)  HR: 78 (06 May 2022 05:44) (75 - 78)  BP: 173/93 (06 May 2022 05:44) (135/78 - 173/93)  BP(mean): --  RR: 19 (06 May 2022 05:44) (17 - 19)  SpO2: 97% (06 May 2022 05:44) (96% - 98%)    Physical Exam:  Gen: resting in bed in NAD, well appearing, alert, interactive  HEENT: PERRL, anicteric sclera, no JVD, no thyromegaly, MMM  Cardio: +S1/S2, RRR, no murmurs  Resp: CTA b/l, no w/r/r, satting well on RA  GI: +BS x4, soft NT/ND  Ext: WWP, no peripheral edema, NROM x4, no cyanosis or clubbing. +R foot wrapped in dressing from toes to ankle with no sensation in right great toe  Vasc: 3+ peripheral pulses b/l  Skin: warm, dry, and intact. No rashes or scars.  Neuro: AAOx3, CN II-XII intact, no focal deficits      Labs:                        12.4   9.78  )-----------( 470      ( 06 May 2022 06:53 )             38.8     05-06    137  |  102  |  24<H>  ----------------------------<  227<H>  4.5   |  26  |  1.09    Ca    9.1      06 May 2022 06:53  Mg     1.6     05-06        Fingerstick  glucose: POCT Blood Glucose.: 152 mg/dL (05 May 2022 22:27)      MEDICATIONS  (STANDING):  acetaminophen     Tablet .. 650 milliGRAM(s) Oral every 6 hours  atorvastatin 40 milliGRAM(s) Oral at bedtime  enoxaparin Injectable 40 milliGRAM(s) SubCutaneous every 24 hours  gabapentin 100 milliGRAM(s) Oral every 8 hours  insulin glargine Injectable (LANTUS) 25 Unit(s) SubCutaneous at bedtime  insulin lispro (ADMELOG) corrective regimen sliding scale   SubCutaneous Before meals and at bedtime  insulin lispro Injectable (ADMELOG) 10 Unit(s) SubCutaneous three times a day before meals  losartan 25 milliGRAM(s) Oral daily  polyethylene glycol 3350 17 Gram(s) Oral two times a day  senna 2 Tablet(s) Oral at bedtime  simethicone 80 milliGRAM(s) Chew daily  vancomycin  IVPB 1500 milliGRAM(s) IV Intermittent every 12 hours    MEDICATIONS  (PRN):  traMADol 25 milliGRAM(s) Oral every 6 hours PRN Moderate Pain (4 - 6)      Allergies    No Known Allergies    Intolerances        RADIOLOGY & ADDITIONAL TESTS: Reviewed.                   OVERNIGHT EVENTS: NAEO    SUBJECTIVE: Patient seen and examined at bedside. Reports continued pain in right foot, but controlled with tramadol. Continuing to ambulate with walking with minimal weight on right foot. Reports stomach discomfort much improved, good PO intake. Patient reports HA since yesterday quantified as 3/10 localized in the L side of this face that spans from his head down to his jaw, which he attributes to a tooth that he cracked last night. Patient denies fever, dizziness, changes in vision/hearing CP, SOB, cough, palpitations, N/V/D/C, paresthesia and weakness. Spoke to podiatry and agrees with plan for TMA in OR next week.  Remaining ROS negative.    OBJECTIVE:  Vital Signs Last 24 Hrs  T(C): 36.6 (06 May 2022 05:44), Max: 36.6 (05 May 2022 21:12)  T(F): 97.8 (06 May 2022 05:44), Max: 97.9 (05 May 2022 21:12)  HR: 78 (06 May 2022 05:44) (75 - 78)  BP: 173/93 (06 May 2022 05:44) (135/78 - 173/93)  BP(mean): --  RR: 19 (06 May 2022 05:44) (17 - 19)  SpO2: 97% (06 May 2022 05:44) (96% - 98%)    Physical Exam:  Gen: resting in bed in NAD, well appearing, alert, interactive  HEENT: NC/AT, PERRL, anicteric sclera, no JVD, no thyromegaly, MMM  Cardio: +S1/S2, RRR, no murmurs  Resp: CTA b/l, no w/r/r, satting well on RA  GI: +BS x4, soft NT/ND  Ext: WWP, no peripheral edema, NROM x4, no cyanosis or clubbing. +R foot wrapped in dressing from toes to ankle with no sensation in right great toe  Vasc: 2+ peripheral pulses b/l  Skin: warm, dry, and intact. No rashes.  Neuro: AAOx3, no focal deficits      Labs:                        12.4   9.78  )-----------( 470      ( 06 May 2022 06:53 )             38.8     05-06    137  |  102  |  24<H>  ----------------------------<  227<H>  4.5   |  26  |  1.09    Ca    9.1      06 May 2022 06:53  Mg     1.6     05-06        Fingerstick  glucose: POCT Blood Glucose.: 152 mg/dL (05 May 2022 22:27)      MEDICATIONS  (STANDING):  acetaminophen     Tablet .. 650 milliGRAM(s) Oral every 6 hours  atorvastatin 40 milliGRAM(s) Oral at bedtime  enoxaparin Injectable 40 milliGRAM(s) SubCutaneous every 24 hours  gabapentin 100 milliGRAM(s) Oral every 8 hours  insulin glargine Injectable (LANTUS) 25 Unit(s) SubCutaneous at bedtime  insulin lispro (ADMELOG) corrective regimen sliding scale   SubCutaneous Before meals and at bedtime  insulin lispro Injectable (ADMELOG) 10 Unit(s) SubCutaneous three times a day before meals  losartan 25 milliGRAM(s) Oral daily  polyethylene glycol 3350 17 Gram(s) Oral two times a day  senna 2 Tablet(s) Oral at bedtime  simethicone 80 milliGRAM(s) Chew daily  vancomycin  IVPB 1500 milliGRAM(s) IV Intermittent every 12 hours    MEDICATIONS  (PRN):  traMADol 25 milliGRAM(s) Oral every 6 hours PRN Moderate Pain (4 - 6)      Allergies    No Known Allergies    Intolerances        RADIOLOGY & ADDITIONAL TESTS: Reviewed.                   OVERNIGHT EVENTS: Patient was consistently hypertensive from yesterday o/n (159/91) to this morning (173/93), added additional 25mg losartan after morning dose.    SUBJECTIVE: Patient seen and examined at bedside. Reports continued pain in right foot, but controlled with tramadol. Continuing to ambulate with walking with minimal weight on right foot. Reports stomach discomfort much improved, good PO intake. Patient reports HA since yesterday quantified as 3/10 localized in the L side of this face that spans from his head down to his jaw, which he attributes to a tooth that he cracked last night. Patient denies fever, dizziness, changes in vision/hearing CP, SOB, cough, palpitations, N/V/D/C, paresthesia and weakness. Spoke to podiatry and agrees with plan for TMA in OR next week.  Remaining ROS negative.    OBJECTIVE:  Vital Signs Last 24 Hrs  T(C): 36.6 (06 May 2022 05:44), Max: 36.6 (05 May 2022 21:12)  T(F): 97.8 (06 May 2022 05:44), Max: 97.9 (05 May 2022 21:12)  HR: 78 (06 May 2022 05:44) (75 - 78)  BP: 173/93 (06 May 2022 05:44) (135/78 - 173/93)  BP(mean): --  RR: 19 (06 May 2022 05:44) (17 - 19)  SpO2: 97% (06 May 2022 05:44) (96% - 98%)    Physical Exam:  Gen: resting in bed in NAD, well appearing, alert, interactive  HEENT: NC/AT, PERRL, anicteric sclera, no JVD, no thyromegaly, MMM  Cardio: +S1/S2, RRR, no murmurs  Resp: CTA b/l, no w/r/r, satting well on RA  GI: +BS x4, soft NT/ND  Ext: WWP, no peripheral edema, NROM x4, no cyanosis or clubbing. +R foot wrapped in dressing from toes to ankle with no sensation in right great toe  Vasc: 2+ peripheral pulses b/l  Skin: warm, dry, and intact. No rashes.  Neuro: AAOx3, no focal deficits      Labs:                        12.4   9.78  )-----------( 470      ( 06 May 2022 06:53 )             38.8     05-06    137  |  102  |  24<H>  ----------------------------<  227<H>  4.5   |  26  |  1.09    Ca    9.1      06 May 2022 06:53  Mg     1.6     05-06        Fingerstick  glucose: POCT Blood Glucose.: 152 mg/dL (05 May 2022 22:27)      MEDICATIONS  (STANDING):  acetaminophen     Tablet .. 650 milliGRAM(s) Oral every 6 hours  atorvastatin 40 milliGRAM(s) Oral at bedtime  enoxaparin Injectable 40 milliGRAM(s) SubCutaneous every 24 hours  gabapentin 100 milliGRAM(s) Oral every 8 hours  insulin glargine Injectable (LANTUS) 25 Unit(s) SubCutaneous at bedtime  insulin lispro (ADMELOG) corrective regimen sliding scale   SubCutaneous Before meals and at bedtime  insulin lispro Injectable (ADMELOG) 10 Unit(s) SubCutaneous three times a day before meals  losartan 25 milliGRAM(s) Oral daily  polyethylene glycol 3350 17 Gram(s) Oral two times a day  senna 2 Tablet(s) Oral at bedtime  simethicone 80 milliGRAM(s) Chew daily  vancomycin  IVPB 1500 milliGRAM(s) IV Intermittent every 12 hours    MEDICATIONS  (PRN):  traMADol 25 milliGRAM(s) Oral every 6 hours PRN Moderate Pain (4 - 6)      Allergies    No Known Allergies    Intolerances        RADIOLOGY & ADDITIONAL TESTS: Reviewed.

## 2022-05-06 NOTE — DIETITIAN INITIAL EVALUATION ADULT - ORAL INTAKE PTA/DIET HISTORY
inconsistent with CHO compliance. Noted HGBA1C.Per diet recall patient appears to eat increased CHO sources

## 2022-05-06 NOTE — DIETITIAN INITIAL EVALUATION ADULT - PERTINENT LABORATORY DATA
05-06    137  |  102  |  24<H>  ----------------------------<  227<H>  4.5   |  26  |  1.09    Ca    9.1      06 May 2022 06:53  Mg     1.6     05-06    POCT Blood Glucose.: 176 mg/dL (05-06-22 @ 12:37)  A1C with Estimated Average Glucose Result: 8.9 % (05-02-22 @ 10:22)  A1C with Estimated Average Glucose Result: 9.8 % (02-02-22 @ 06:37)  A1C with Estimated Average Glucose Result: 8.6 % (10-12-21 @ 08:18)

## 2022-05-06 NOTE — DIETITIAN INITIAL EVALUATION ADULT - OTHER INFO
Mr Valentin is a 46M w/ history of previous alcohol and cocaine use and PMHx of HTN, HLD, DM, and right foot osteomyelitis with amputation of the right 4th and 5th toes and partial amputation of the right 2nd digit, p/w 1-day h/o right foot wound drainage of medial great toe now here for management of R foot abscess with concerns for OM. Plan for OR Tuesday afternoon TMA and achilles tendon lengthening.  This am, eating 100% of meals.No N/V/D/C.BM 5/6.Skin with amputations of right foot 4th and 5th toes and debridement.Patient admitted to fair C-CHO compliance.

## 2022-05-07 LAB
ANION GAP SERPL CALC-SCNC: 9 MMOL/L — SIGNIFICANT CHANGE UP (ref 5–17)
BUN SERPL-MCNC: 18 MG/DL — SIGNIFICANT CHANGE UP (ref 7–23)
CALCIUM SERPL-MCNC: 9.4 MG/DL — SIGNIFICANT CHANGE UP (ref 8.4–10.5)
CHLORIDE SERPL-SCNC: 103 MMOL/L — SIGNIFICANT CHANGE UP (ref 96–108)
CO2 SERPL-SCNC: 26 MMOL/L — SIGNIFICANT CHANGE UP (ref 22–31)
CREAT SERPL-MCNC: 0.96 MG/DL — SIGNIFICANT CHANGE UP (ref 0.5–1.3)
CULTURE RESULTS: SIGNIFICANT CHANGE UP
CULTURE RESULTS: SIGNIFICANT CHANGE UP
EGFR: 99 ML/MIN/1.73M2 — SIGNIFICANT CHANGE UP
GLUCOSE BLDC GLUCOMTR-MCNC: 159 MG/DL — HIGH (ref 70–99)
GLUCOSE BLDC GLUCOMTR-MCNC: 211 MG/DL — HIGH (ref 70–99)
GLUCOSE BLDC GLUCOMTR-MCNC: 218 MG/DL — HIGH (ref 70–99)
GLUCOSE BLDC GLUCOMTR-MCNC: 292 MG/DL — HIGH (ref 70–99)
GLUCOSE SERPL-MCNC: 221 MG/DL — HIGH (ref 70–99)
HCT VFR BLD CALC: 38.7 % — LOW (ref 39–50)
HGB BLD-MCNC: 12.6 G/DL — LOW (ref 13–17)
MAGNESIUM SERPL-MCNC: 1.6 MG/DL — SIGNIFICANT CHANGE UP (ref 1.6–2.6)
MCHC RBC-ENTMCNC: 28.5 PG — SIGNIFICANT CHANGE UP (ref 27–34)
MCHC RBC-ENTMCNC: 32.6 GM/DL — SIGNIFICANT CHANGE UP (ref 32–36)
MCV RBC AUTO: 87.6 FL — SIGNIFICANT CHANGE UP (ref 80–100)
NRBC # BLD: 0 /100 WBCS — SIGNIFICANT CHANGE UP (ref 0–0)
PLATELET # BLD AUTO: 537 K/UL — HIGH (ref 150–400)
POTASSIUM SERPL-MCNC: 4.7 MMOL/L — SIGNIFICANT CHANGE UP (ref 3.5–5.3)
POTASSIUM SERPL-SCNC: 4.7 MMOL/L — SIGNIFICANT CHANGE UP (ref 3.5–5.3)
RBC # BLD: 4.42 M/UL — SIGNIFICANT CHANGE UP (ref 4.2–5.8)
RBC # FLD: 13.8 % — SIGNIFICANT CHANGE UP (ref 10.3–14.5)
SODIUM SERPL-SCNC: 138 MMOL/L — SIGNIFICANT CHANGE UP (ref 135–145)
SPECIMEN SOURCE: SIGNIFICANT CHANGE UP
SPECIMEN SOURCE: SIGNIFICANT CHANGE UP
WBC # BLD: 10.72 K/UL — HIGH (ref 3.8–10.5)
WBC # FLD AUTO: 10.72 K/UL — HIGH (ref 3.8–10.5)

## 2022-05-07 PROCEDURE — 99233 SBSQ HOSP IP/OBS HIGH 50: CPT

## 2022-05-07 RX ORDER — MAGNESIUM SULFATE 500 MG/ML
2 VIAL (ML) INJECTION ONCE
Refills: 0 | Status: DISCONTINUED | OUTPATIENT
Start: 2022-05-07 | End: 2022-05-07

## 2022-05-07 RX ORDER — LOSARTAN POTASSIUM 100 MG/1
50 TABLET, FILM COATED ORAL ONCE
Refills: 0 | Status: COMPLETED | OUTPATIENT
Start: 2022-05-07 | End: 2022-05-07

## 2022-05-07 RX ORDER — MAGNESIUM SULFATE 500 MG/ML
2 VIAL (ML) INJECTION ONCE
Refills: 0 | Status: COMPLETED | OUTPATIENT
Start: 2022-05-07 | End: 2022-05-07

## 2022-05-07 RX ADMIN — Medication 650 MILLIGRAM(S): at 12:08

## 2022-05-07 RX ADMIN — Medication 6: at 12:06

## 2022-05-07 RX ADMIN — Medication 300 MILLIGRAM(S): at 11:31

## 2022-05-07 RX ADMIN — Medication 10 UNIT(S): at 08:53

## 2022-05-07 RX ADMIN — Medication 650 MILLIGRAM(S): at 08:06

## 2022-05-07 RX ADMIN — Medication 25 GRAM(S): at 14:29

## 2022-05-07 RX ADMIN — SIMETHICONE 80 MILLIGRAM(S): 80 TABLET, CHEWABLE ORAL at 11:32

## 2022-05-07 RX ADMIN — ENOXAPARIN SODIUM 40 MILLIGRAM(S): 100 INJECTION SUBCUTANEOUS at 11:31

## 2022-05-07 RX ADMIN — GABAPENTIN 100 MILLIGRAM(S): 400 CAPSULE ORAL at 14:29

## 2022-05-07 RX ADMIN — Medication 650 MILLIGRAM(S): at 18:14

## 2022-05-07 RX ADMIN — Medication 650 MILLIGRAM(S): at 19:15

## 2022-05-07 RX ADMIN — INSULIN GLARGINE 25 UNIT(S): 100 INJECTION, SOLUTION SUBCUTANEOUS at 21:39

## 2022-05-07 RX ADMIN — GABAPENTIN 100 MILLIGRAM(S): 400 CAPSULE ORAL at 07:06

## 2022-05-07 RX ADMIN — Medication 650 MILLIGRAM(S): at 12:03

## 2022-05-07 RX ADMIN — Medication 650 MILLIGRAM(S): at 07:06

## 2022-05-07 RX ADMIN — Medication 10 UNIT(S): at 17:36

## 2022-05-07 RX ADMIN — GABAPENTIN 100 MILLIGRAM(S): 400 CAPSULE ORAL at 21:39

## 2022-05-07 RX ADMIN — Medication 300 MILLIGRAM(S): at 00:47

## 2022-05-07 RX ADMIN — Medication 4: at 08:52

## 2022-05-07 RX ADMIN — Medication 4: at 21:40

## 2022-05-07 RX ADMIN — Medication 2: at 17:36

## 2022-05-07 RX ADMIN — ATORVASTATIN CALCIUM 40 MILLIGRAM(S): 80 TABLET, FILM COATED ORAL at 21:39

## 2022-05-07 RX ADMIN — LOSARTAN POTASSIUM 50 MILLIGRAM(S): 100 TABLET, FILM COATED ORAL at 21:38

## 2022-05-07 RX ADMIN — Medication 10 UNIT(S): at 12:05

## 2022-05-07 RX ADMIN — Medication 300 MILLIGRAM(S): at 23:01

## 2022-05-07 RX ADMIN — TRAMADOL HYDROCHLORIDE 25 MILLIGRAM(S): 50 TABLET ORAL at 23:30

## 2022-05-07 NOTE — PROGRESS NOTE ADULT - PROBLEM SELECTOR PLAN 5
Home med: Atorvastatin 40  - C/w home statin      #Anemia , normocytic   hb stable 11-12s  -iron studies showing mixed ASHOK and AOCD   -maintain active T&S; transfuse for hgb <7

## 2022-05-07 NOTE — PROGRESS NOTE ADULT - SUBJECTIVE AND OBJECTIVE BOX
Patient is a 46y old  Male who presents with a chief complaint of DIABETIC FOOT ULCERS     (06 May 2022 12:38)      INTERVAL HPI/ OVERNIGHT EVENTS: Pt seen at bedside. ANDREW o/n. Denies N/V/F/SOB/CP at this time. No new pedal complaints.       LABS                        12.6   10.72 )-----------( 537      ( 07 May 2022 08:51 )             38.7     05-07    138  |  103  |  18  ----------------------------<  221<H>  4.7   |  26  |  0.96    Ca    9.4      07 May 2022 08:51  Mg     1.6     05-07          ICU Vital Signs Last 24 Hrs  T(C): 36.6 (07 May 2022 06:06), Max: 36.6 (06 May 2022 13:04)  T(F): 97.8 (07 May 2022 06:06), Max: 97.9 (06 May 2022 13:04)  HR: 71 (07 May 2022 06:06) (68 - 78)  BP: 173/99 (07 May 2022 06:06) (131/83 - 173/99)  BP(mean): --  ABP: --  ABP(mean): --  RR: 18 (07 May 2022 06:06) (18 - 18)  SpO2: 98% (07 May 2022 06:06) (97% - 98%)      RADIOLOGY    MICROBIOLOGY    PHYSICAL EXAM  Lower Extremity Focused  General: NAD, AA0x3  Lower Extremity Focused: RIGHT  Vasc: D/PT 2/4 b/l, Right foot warmer than left, non-pitting edema to dorsum of right foot over hallux  Derm:  Right foot- Submet 1 ulcer measuring 2.0cmx2.0cm, fibrotic base, Right foot medial 1st MPJ ulceration 1cm granular area of tissue, right foot dorsomedial 0.5x0.5cm wound probes deep to bone, 3ccs purulent drainage expressed, Area of fluctuance noted over dorsal hallux near MPJ  5/3 - s/p right medial 1st MPJ I&D; negative for purulence since drainage w/ surgical incision present. Packing material present in I&D site.  Neuro: protective sensation grossly diminished   MSK: S/p 4th and 5th ray amputations, s/p 2nd digit distal Symes amputation

## 2022-05-07 NOTE — PROGRESS NOTE ADULT - PROBLEM SELECTOR PLAN 4
Patient with HTN on home losartan 25mg daily   -losartan increased to 50mg daily due to consistently elevated BPs  -monitor BPs; consider further increasing losartan dose as needed

## 2022-05-07 NOTE — PROGRESS NOTE ADULT - PROBLEM SELECTOR PLAN 2
H/o right foot osteomyelitis with amputation of the right 4th and 5th toes and partial amputation of the right 2nd digit. now with new acute osteo as bone cultures from right great toe growing MRSA   - Management as above   - F/u podiatry recs; pending right foot TMA in OR next week

## 2022-05-07 NOTE — PROGRESS NOTE ADULT - ASSESSMENT
47 yo M, history of previous alcohol and cocaine use w/ PMHx of HTN (not treated), HLD (on meds), DM (started in 2021 on OAD and insulin), osteomyelitis with amputation of the right 4th and 5th rays and partial amputation of the right 2nd digit, presents to the Emergency Department with foot pain / wound drainage. Podiatry consulted for right foot wound. VSS, leukocytosis present, elevated CRP, medial 1st MPJ wound actively draining purulence, pt found to have dorsal hallux abscess with 5ccs expressed after bedside Incision and drainage. Xray showing cortical changes of the medial 1st metatarsal head and 1st proximal phalanx high likelihood of OM.  Deep wound ED Cx: MRSA. Pt is s/p R foot I&D + debridement of plantar wound (5/3), approx 5cc purulence expressed. Possible RTOR during this admission at a later date Amputation. OR Cx growing MRSA in bone and deep Cx's, Bone pathology showing acute OM.     Plan:   - Plan for OR Tuesday afternoon TMA and achilles tendon lengthening, case will be scheduled   - IV ABX per ID  - Local wound care: flushed surgical site w/ saline, iodoform packing, betadine soaked DSD, ACE wrap  - Recc WBAT R heel in surgical shoe  - Rest of care per primary team    Podiatry following. Plan d/w attending.

## 2022-05-07 NOTE — PROGRESS NOTE ADULT - PROBLEM SELECTOR PLAN 1
Patient presenting with purulent drainage from right great toe noted on home dressing change. S/p bedside incision and drainage of right dorsal hallux abscess performed in ED, 5cc drained, per podiatry.   -Xray right foot/ankle showing soft tissue defect adjacent to the fourth metatarsal stump  -podiatry following , appreciate recs    -pt now s/p right foot I&D and debridement in OR on 5/3; deep wound and bone cultures obtained growing MRSA   -surgical path report revealing acute osteomyelitis of right foot  -C/w Vancomycin. Continue dosing per trough.  Monitor renal function. No longer on zosyn. Appreciate recs from ID.  -pt agreeable to amputation; per podiatry plan to return to OR on tuesday 5/10 for right TMA and achilles tendon lengthening   -[facility] 5/2 NGTD   -standing tylenol and prn tramadol for pain

## 2022-05-07 NOTE — PROGRESS NOTE ADULT - PROBLEM SELECTOR PLAN 3
History of DM2. Home meds: lantus 35, lispro 8 TID, metformin 750 BID. Gave additional 10u regular insulin in AM 5/2 due to missed lantus dose.  -A1C 8.9 5/2  - c/w lantus 25 units bedtime   -c/w lispro 10 units TID premeal  -c/w mISS   -hold PO diabetic meds  -c/w home gabapentin 100 TID for nerve pain  -monitor fingerstick glucose

## 2022-05-07 NOTE — PROGRESS NOTE ADULT - ASSESSMENT
OVERNIGHT EVENTS: NAEO      Remaining ROS negative       PHYSICAL EXAM:    General: WDWN, resting  HEENT: NC/AT;  MMM  Cardiovascular: +S1/S2, RRR  Respiratory: CTA B/L; no W/R/R  Gastrointestinal: soft, NT/ND; +BSx4  Extremities: R foot is wrapped  Neurological: no focal deficits    VITAL SIGNS:  Vital Signs Last 24 Hrs  T(C): 36.6 (07 May 2022 06:06), Max: 36.6 (06 May 2022 20:35)  T(F): 97.8 (07 May 2022 06:06), Max: 97.8 (06 May 2022 20:35)  HR: 71 (07 May 2022 06:06) (68 - 71)  BP: 173/99 (07 May 2022 06:06) (147/96 - 173/99)  BP(mean): --  RR: 18 (07 May 2022 06:06) (18 - 18)  SpO2: 98% (07 May 2022 06:06) (97% - 98%)      MEDICATIONS:  MEDICATIONS  (STANDING):  acetaminophen     Tablet .. 650 milliGRAM(s) Oral every 6 hours  atorvastatin 40 milliGRAM(s) Oral at bedtime  enoxaparin Injectable 40 milliGRAM(s) SubCutaneous every 24 hours  gabapentin 100 milliGRAM(s) Oral every 8 hours  insulin glargine Injectable (LANTUS) 25 Unit(s) SubCutaneous at bedtime  insulin lispro (ADMELOG) corrective regimen sliding scale   SubCutaneous Before meals and at bedtime  insulin lispro Injectable (ADMELOG) 10 Unit(s) SubCutaneous three times a day before meals  losartan 50 milliGRAM(s) Oral daily  magnesium sulfate  IVPB 2 Gram(s) IV Intermittent once  polyethylene glycol 3350 17 Gram(s) Oral two times a day  senna 2 Tablet(s) Oral at bedtime  simethicone 80 milliGRAM(s) Chew daily  vancomycin  IVPB 1500 milliGRAM(s) IV Intermittent every 12 hours    MEDICATIONS  (PRN):  traMADol 25 milliGRAM(s) Oral every 6 hours PRN Moderate Pain (4 - 6)      ALLERGIES:  Allergies    No Known Allergies    Intolerances        LABS:                        12.6   10.72 )-----------( 537      ( 07 May 2022 08:51 )             38.7     05-07    138  |  103  |  18  ----------------------------<  221<H>  4.7   |  26  |  0.96    Ca    9.4      07 May 2022 08:51  Mg     1.6     05-07          CAPILLARY BLOOD GLUCOSE      POCT Blood Glucose.: 292 mg/dL (07 May 2022 11:42)      RADIOLOGY & ADDITIONAL TESTS: Reviewed.

## 2022-05-07 NOTE — PROGRESS NOTE ADULT - PROBLEM SELECTOR PLAN 6
F: None   E: Replete for K<4, Mag<2  N: CC diet  DVT ppx: lovenox  Full Code   Dispo: [facility]

## 2022-05-08 LAB
ANION GAP SERPL CALC-SCNC: 9 MMOL/L — SIGNIFICANT CHANGE UP (ref 5–17)
BUN SERPL-MCNC: 18 MG/DL — SIGNIFICANT CHANGE UP (ref 7–23)
CALCIUM SERPL-MCNC: 9.5 MG/DL — SIGNIFICANT CHANGE UP (ref 8.4–10.5)
CHLORIDE SERPL-SCNC: 101 MMOL/L — SIGNIFICANT CHANGE UP (ref 96–108)
CO2 SERPL-SCNC: 25 MMOL/L — SIGNIFICANT CHANGE UP (ref 22–31)
CREAT SERPL-MCNC: 0.92 MG/DL — SIGNIFICANT CHANGE UP (ref 0.5–1.3)
EGFR: 104 ML/MIN/1.73M2 — SIGNIFICANT CHANGE UP
GLUCOSE BLDC GLUCOMTR-MCNC: 204 MG/DL — HIGH (ref 70–99)
GLUCOSE BLDC GLUCOMTR-MCNC: 218 MG/DL — HIGH (ref 70–99)
GLUCOSE BLDC GLUCOMTR-MCNC: 246 MG/DL — HIGH (ref 70–99)
GLUCOSE BLDC GLUCOMTR-MCNC: 309 MG/DL — HIGH (ref 70–99)
GLUCOSE SERPL-MCNC: 251 MG/DL — HIGH (ref 70–99)
HCT VFR BLD CALC: 38.7 % — LOW (ref 39–50)
HGB BLD-MCNC: 12.7 G/DL — LOW (ref 13–17)
MAGNESIUM SERPL-MCNC: 1.7 MG/DL — SIGNIFICANT CHANGE UP (ref 1.6–2.6)
MCHC RBC-ENTMCNC: 28.1 PG — SIGNIFICANT CHANGE UP (ref 27–34)
MCHC RBC-ENTMCNC: 32.8 GM/DL — SIGNIFICANT CHANGE UP (ref 32–36)
MCV RBC AUTO: 85.6 FL — SIGNIFICANT CHANGE UP (ref 80–100)
NRBC # BLD: 0 /100 WBCS — SIGNIFICANT CHANGE UP (ref 0–0)
PLATELET # BLD AUTO: 547 K/UL — HIGH (ref 150–400)
POTASSIUM SERPL-MCNC: 4.6 MMOL/L — SIGNIFICANT CHANGE UP (ref 3.5–5.3)
POTASSIUM SERPL-SCNC: 4.6 MMOL/L — SIGNIFICANT CHANGE UP (ref 3.5–5.3)
RBC # BLD: 4.52 M/UL — SIGNIFICANT CHANGE UP (ref 4.2–5.8)
RBC # FLD: 13.5 % — SIGNIFICANT CHANGE UP (ref 10.3–14.5)
SARS-COV-2 RNA SPEC QL NAA+PROBE: SIGNIFICANT CHANGE UP
SODIUM SERPL-SCNC: 135 MMOL/L — SIGNIFICANT CHANGE UP (ref 135–145)
VANCOMYCIN TROUGH SERPL-MCNC: 20.3 UG/ML — HIGH (ref 10–20)
WBC # BLD: 9.16 K/UL — SIGNIFICANT CHANGE UP (ref 3.8–10.5)
WBC # FLD AUTO: 9.16 K/UL — SIGNIFICANT CHANGE UP (ref 3.8–10.5)

## 2022-05-08 PROCEDURE — 99233 SBSQ HOSP IP/OBS HIGH 50: CPT

## 2022-05-08 PROCEDURE — 99232 SBSQ HOSP IP/OBS MODERATE 35: CPT

## 2022-05-08 RX ORDER — LOSARTAN POTASSIUM 100 MG/1
75 TABLET, FILM COATED ORAL DAILY
Refills: 0 | Status: DISCONTINUED | OUTPATIENT
Start: 2022-05-09 | End: 2022-05-19

## 2022-05-08 RX ORDER — MAGNESIUM OXIDE 400 MG ORAL TABLET 241.3 MG
400 TABLET ORAL ONCE
Refills: 0 | Status: COMPLETED | OUTPATIENT
Start: 2022-05-08 | End: 2022-05-08

## 2022-05-08 RX ORDER — INSULIN GLARGINE 100 [IU]/ML
27 INJECTION, SOLUTION SUBCUTANEOUS AT BEDTIME
Refills: 0 | Status: DISCONTINUED | OUTPATIENT
Start: 2022-05-08 | End: 2022-05-10

## 2022-05-08 RX ORDER — INSULIN LISPRO 100/ML
12 VIAL (ML) SUBCUTANEOUS
Refills: 0 | Status: DISCONTINUED | OUTPATIENT
Start: 2022-05-08 | End: 2022-05-10

## 2022-05-08 RX ORDER — VANCOMYCIN HCL 1 G
1500 VIAL (EA) INTRAVENOUS ONCE
Refills: 0 | Status: COMPLETED | OUTPATIENT
Start: 2022-05-08 | End: 2022-05-08

## 2022-05-08 RX ORDER — LOSARTAN POTASSIUM 100 MG/1
25 TABLET, FILM COATED ORAL DAILY
Refills: 0 | Status: DISCONTINUED | OUTPATIENT
Start: 2022-05-08 | End: 2022-05-08

## 2022-05-08 RX ADMIN — TRAMADOL HYDROCHLORIDE 25 MILLIGRAM(S): 50 TABLET ORAL at 23:47

## 2022-05-08 RX ADMIN — Medication 4: at 18:00

## 2022-05-08 RX ADMIN — ATORVASTATIN CALCIUM 40 MILLIGRAM(S): 80 TABLET, FILM COATED ORAL at 22:48

## 2022-05-08 RX ADMIN — Medication 300 MILLIGRAM(S): at 11:41

## 2022-05-08 RX ADMIN — Medication 650 MILLIGRAM(S): at 06:23

## 2022-05-08 RX ADMIN — Medication 10 UNIT(S): at 08:47

## 2022-05-08 RX ADMIN — Medication 12 UNIT(S): at 12:01

## 2022-05-08 RX ADMIN — Medication 8: at 12:02

## 2022-05-08 RX ADMIN — Medication 650 MILLIGRAM(S): at 12:01

## 2022-05-08 RX ADMIN — Medication 12 UNIT(S): at 18:00

## 2022-05-08 RX ADMIN — Medication 650 MILLIGRAM(S): at 13:19

## 2022-05-08 RX ADMIN — GABAPENTIN 100 MILLIGRAM(S): 400 CAPSULE ORAL at 06:23

## 2022-05-08 RX ADMIN — LOSARTAN POTASSIUM 25 MILLIGRAM(S): 100 TABLET, FILM COATED ORAL at 09:42

## 2022-05-08 RX ADMIN — Medication 4: at 22:47

## 2022-05-08 RX ADMIN — ENOXAPARIN SODIUM 40 MILLIGRAM(S): 100 INJECTION SUBCUTANEOUS at 11:41

## 2022-05-08 RX ADMIN — Medication 650 MILLIGRAM(S): at 07:24

## 2022-05-08 RX ADMIN — GABAPENTIN 100 MILLIGRAM(S): 400 CAPSULE ORAL at 14:18

## 2022-05-08 RX ADMIN — INSULIN GLARGINE 27 UNIT(S): 100 INJECTION, SOLUTION SUBCUTANEOUS at 22:00

## 2022-05-08 RX ADMIN — Medication 650 MILLIGRAM(S): at 18:03

## 2022-05-08 RX ADMIN — Medication 650 MILLIGRAM(S): at 18:01

## 2022-05-08 RX ADMIN — SIMETHICONE 80 MILLIGRAM(S): 80 TABLET, CHEWABLE ORAL at 11:41

## 2022-05-08 RX ADMIN — MAGNESIUM OXIDE 400 MG ORAL TABLET 400 MILLIGRAM(S): 241.3 TABLET ORAL at 09:38

## 2022-05-08 RX ADMIN — Medication 4: at 08:48

## 2022-05-08 RX ADMIN — GABAPENTIN 100 MILLIGRAM(S): 400 CAPSULE ORAL at 22:48

## 2022-05-08 RX ADMIN — LOSARTAN POTASSIUM 50 MILLIGRAM(S): 100 TABLET, FILM COATED ORAL at 06:23

## 2022-05-08 NOTE — PROGRESS NOTE ADULT - SUBJECTIVE AND OBJECTIVE BOX
INTERVAL HPI/OVERNIGHT EVENTS:    Patient was seen and examined at bedside.  No events    CONSTITUTIONAL:  Negative fever or chills, feels well, good appetite  EYES:  Negative  blurry vision or double vision  CARDIOVASCULAR:  Negative for chest pain or palpitations  RESPIRATORY:  Negative for cough, wheezing, or SOB   GASTROINTESTINAL:  Negative for nausea, vomiting, diarrhea, constipation, or abdominal pain  GENITOURINARY:  Negative frequency, urgency or dysuria  NEUROLOGIC:  No headache, confusion, dizziness, lightheadedness      ANTIBIOTICS/RELEVANT:    MEDICATIONS  (STANDING):  acetaminophen     Tablet .. 650 milliGRAM(s) Oral every 6 hours  atorvastatin 40 milliGRAM(s) Oral at bedtime  enoxaparin Injectable 40 milliGRAM(s) SubCutaneous every 24 hours  gabapentin 100 milliGRAM(s) Oral every 8 hours  insulin glargine Injectable (LANTUS) 27 Unit(s) SubCutaneous at bedtime  insulin lispro (ADMELOG) corrective regimen sliding scale   SubCutaneous Before meals and at bedtime  insulin lispro Injectable (ADMELOG) 12 Unit(s) SubCutaneous three times a day before meals  polyethylene glycol 3350 17 Gram(s) Oral two times a day  senna 2 Tablet(s) Oral at bedtime  simethicone 80 milliGRAM(s) Chew daily    MEDICATIONS  (PRN):  traMADol 25 milliGRAM(s) Oral every 6 hours PRN Moderate Pain (4 - 6)        Vital Signs Last 24 Hrs  T(C): 36.7 (08 May 2022 12:15), Max: 36.8 (07 May 2022 21:30)  T(F): 98.1 (08 May 2022 12:15), Max: 98.3 (07 May 2022 21:30)  HR: 84 (08 May 2022 12:15) (73 - 85)  BP: 146/82 (08 May 2022 12:15) (146/82 - 191/109)  BP(mean): --  RR: 18 (08 May 2022 12:15) (18 - 18)  SpO2: 96% (08 May 2022 12:15) (96% - 98%)    PHYSICAL EXAM:  Constitutional:Well-developed, well nourished  Eyes:RONI, EOMI  Ear/Nose/Throat: no oral lesion, no sinus tenderness on percussion	  Neck:  supple  Respiratory: CTA maricruz  Cardiovascular: S1S2 RRR, no murmurs  Gastrointestinal:soft, (+) BS, no HSM  Extremities:no e/e/c  Vascular: DP Pulse:	right normal; left normal      LABS:                        12.7   9.16  )-----------( 547      ( 08 May 2022 08:36 )             38.7     05-08    135  |  101  |  18  ----------------------------<  251<H>  4.6   |  25  |  0.92    Ca    9.5      08 May 2022 08:36  Mg     1.7     05-08            MICROBIOLOGY:    Culture - Surgical Swab (05.03.22 @ 15:20)    -  Rifampin: S <=1 Should not be used as monotherapy    -  Tetra/Doxy: R >8    -  Trimethoprim/Sulfamethoxazole: S <=0.5/9.5    -  Vancomycin: S 2    -  Vancomycin: S 1.5    Gram Stain:   No organisms seen  Rare WBC's    -  Cefazolin: R <=4    -  Clindamycin: S <=0.25    -  Daptomycin: S 0.5    -  Erythromycin: R >4    -  Linezolid: S 2    -  Oxacillin: R >2    Specimen Source: .Surgical Swab Right Foot Deep Wound - OR    Culture Results:   Few Methicillin Resistant Staphylococcus aureus  Result called to and read back byErnie Florez RN  05/04/2022 16:37:06    Organism Identification: Methicillin resistant Staphylococcus aureus  Methicillin resistant Staphylococcus aureus    Organism: Methicillin resistant Staphylococcus aureus    Organism: Methicillin resistant Staphylococcus aureus    Method Type: ETEST    Method Type: YUN        Culture - Tissue with Gram Stain (05.03.22 @ 15:19)    Gram Stain:   No organisms seen  No WBC's seen.    -  Cefazolin: R <=4    -  Linezolid: S 2    -  Oxacillin: R >2    -  Daptomycin: S 0.5    -  Erythromycin: R >4    -  Clindamycin: S 0.5    -  Trimethoprim/Sulfamethoxazole: S <=0.5/9.5    -  Vancomycin: S 2    -  Vancomycin: S 1.5    -  Rifampin: S <=1 Should not be used as monotherapy    -  Tetra/Doxy: R >8    Specimen Source: Bone Right Foot Bone - OR    Culture Results:   Rare to few Methicillin Resistant Staphylococcus aureus  Result called to and read back byErnie Florez RN  05/04/2022 16:26:34    Organism Identification: Methicillin resistant Staphylococcus aureus  Methicillin resistant Staphylococcus aureus    Organism: Methicillin resistant Staphylococcus aureus    Organism: Methicillin resistant Staphylococcus aureus    Method Type: ETEST    Method Type: YUN    RADIOLOGY & ADDITIONAL STUDIES:

## 2022-05-08 NOTE — PROGRESS NOTE ADULT - PROBLEM SELECTOR PLAN 6
F: None   E: Replete for K<4, Mag<2  N: CC diet  DVT ppx: lovenox  Full Code   Dispo: [facility]

## 2022-05-08 NOTE — PROGRESS NOTE ADULT - ASSESSMENT
46M w/ history of previous alcohol and cocaine use and PMHx of HTN, HLD, DM, and right foot osteomyelitis with amputation of the right 4th and 5th toes and partial amputation of the right 2nd digit, p/w 1-day h/o right foot wound drainage of medial great toe, and hallux abscess. Previous wound cultures from Feb 2022 grew MRSA, GBS, Gemella and Bacteroides-pt finished 6 weeks IV Vanc and flagyl 3/21, deep wound culture taken in ED 5/1-MRSA. Treatment of diabetic foot infection, suspected acute on chronic OM, OR cultures (tissue, wound, and bone) from 5/3 growing MRSA, no acid fast bacilli. Pt clinically improving, downtrending WBC, afebrile. Podiatry will take pt for TMA for further source control.     Plan:   1. Continue Vancomycin 1500mg Q12- will cover MRSA found in cultures  2.  Trough this AM was checked too soon and therefore is falsely elevated.    3. OR pathology shows Acute OM of the right 1st metatarsal head   4. Rec sending clean margin for both culture and pathology during TMA procedure- will dictate antibiotics duration and route       ID team 1 will follow. Recommendations not final until attending attestation.

## 2022-05-08 NOTE — PROGRESS NOTE ADULT - PROBLEM SELECTOR PLAN 1
Patient presenting with purulent drainage from right great toe noted on home dressing change. S/p bedside incision and drainage of right dorsal hallux abscess performed in ED, 5cc drained, per podiatry.   -Xray right foot/ankle showing soft tissue defect adjacent to the fourth metatarsal stump  -podiatry following , appreciate recs    -pt now s/p right foot I&D and debridement in OR on 5/3; deep wound and bone cultures obtained growing MRSA   -surgical path report revealing acute osteomyelitis of right foot  -C/w Vancomycin; zosyn d/austen as cultures confirmed MRSA   -ID consulted, recs appreciated  -pt agreeable to amputation; per podiatry plan to return to OR on tuesday 5/10 for right TMA and achilles tendon lengthening   -[facility] 5/2 NGTD   -standing tylenol and prn tramadol for pain

## 2022-05-08 NOTE — PROGRESS NOTE ADULT - SUBJECTIVE AND OBJECTIVE BOX
Patient is a 46y old  Male who presents with a chief complaint of DIABETIC FOOT ULCERS     (06 May 2022 12:38)      INTERVAL HPI/ OVERNIGHT EVENTS: ANDREW o/n. Pt evaluated at bedside this AM. No new pedal complaints. Awaiting surgery planned for Tuesday 5/10 afternoon.       LABS                        12.7   9.16  )-----------( 547      ( 08 May 2022 08:36 )             38.7     05-08    135  |  101  |  18  ----------------------------<  251<H>  4.6   |  25  |  0.92    Ca    9.5      08 May 2022 08:36  Mg     1.7     05-08          ICU Vital Signs Last 24 Hrs  T(C): 36.7 (08 May 2022 12:15), Max: 36.8 (07 May 2022 21:30)  T(F): 98.1 (08 May 2022 12:15), Max: 98.3 (07 May 2022 21:30)  HR: 84 (08 May 2022 12:15) (73 - 85)  BP: 146/82 (08 May 2022 12:15) (146/82 - 191/109)  BP(mean): --  ABP: --  ABP(mean): --  RR: 18 (08 May 2022 12:15) (18 - 18)  SpO2: 96% (08 May 2022 12:15) (96% - 98%)      RADIOLOGY    MICROBIOLOGY    PHYSICAL EXAM  Lower Extremity Focused  General: NAD, AA0x3  Lower Extremity Focused: RIGHT  Vasc: D/PT 2/4 b/l, Right foot warmer than left, non-pitting edema to dorsum of right foot over hallux  Derm:  Right foot- Submet 1 ulcer measuring 2.0cmx2.0cm, fibrotic base, Right foot medial 1st MPJ ulceration 1cm granular area of tissue, right foot dorsomedial 0.5x0.5cm wound probes deep to bone, 3ccs purulent drainage expressed, Area of fluctuance noted over dorsal hallux near MPJ  5/3 - s/p right medial 1st MPJ I&D; negative for purulence since drainage w/ surgical incision present. Packing material present in I&D site.  Neuro: protective sensation grossly diminished   MSK: S/p 4th and 5th ray amputations, s/p 2nd digit distal Symes amputation

## 2022-05-08 NOTE — PROGRESS NOTE ADULT - ATTENDING COMMENTS
46m with PMH Of DM2, and HTN presenting with R foot osteomyelitis, awaiting amputation with podiatry.    - continues to be hypertensive, escalating dose of losartan to 75mg daily  - continue with pain management, tylenol and tramadol.  Patient states that he only really takes the tylenol at night when pain   - continue vancomycin.  Amputation planned for next Tuesday.

## 2022-05-08 NOTE — PROGRESS NOTE ADULT - SUBJECTIVE AND OBJECTIVE BOX
OVERNIGHT EVENTS: ANDREW    SUBJECTIVE / INTERVAL HPI: Patient seen and examined at bedside. Patient denies HA, cough, SOB, dyspnea, CP, abdominal pain/v/n/d.    VITAL SIGNS:  Vital Signs Last 24 Hrs  T(C): 36.4 (08 May 2022 06:11), Max: 36.8 (07 May 2022 21:30)  T(F): 97.5 (08 May 2022 06:11), Max: 98.3 (07 May 2022 21:30)  HR: 73 (08 May 2022 06:11) (73 - 85)  BP: 160/89 (08 May 2022 06:11) (122/78 - 191/109)  BP(mean): --  RR: 18 (08 May 2022 06:11) (18 - 18)  SpO2: 98% (08 May 2022 06:11) (97% - 99%)    PHYSICAL EXAM:    General: NAD  Gen: resting in bed in NAD, well appearing, alert, interactive  HEENT: NC/AT, PERRL, anicteric sclera, no JVD, no thyromegaly, MMM  Cardio: +S1/S2, RRR, no murmurs  Resp: CTA b/l, no w/r/r, satting well on RA  GI: +BS x4, soft NT/ND  Ext: WWP, no peripheral edema, NROM x4, no cyanosis or clubbing. +R foot wrapped in dressing from toes to ankle with no sensation in right great toe  Vasc: 2+ peripheral pulses b/l  Skin: warm, dry, and intact. No rashes.  Neuro: AAOx3, no focal deficits    MEDICATIONS:  MEDICATIONS  (STANDING):  acetaminophen     Tablet .. 650 milliGRAM(s) Oral every 6 hours  atorvastatin 40 milliGRAM(s) Oral at bedtime  enoxaparin Injectable 40 milliGRAM(s) SubCutaneous every 24 hours  gabapentin 100 milliGRAM(s) Oral every 8 hours  insulin glargine Injectable (LANTUS) 25 Unit(s) SubCutaneous at bedtime  insulin lispro (ADMELOG) corrective regimen sliding scale   SubCutaneous Before meals and at bedtime  insulin lispro Injectable (ADMELOG) 10 Unit(s) SubCutaneous three times a day before meals  losartan 50 milliGRAM(s) Oral daily  polyethylene glycol 3350 17 Gram(s) Oral two times a day  senna 2 Tablet(s) Oral at bedtime  simethicone 80 milliGRAM(s) Chew daily    MEDICATIONS  (PRN):  traMADol 25 milliGRAM(s) Oral every 6 hours PRN Moderate Pain (4 - 6)      ALLERGIES:  Allergies    No Known Allergies    Intolerances        LABS:                        12.6   10.72 )-----------( 537      ( 07 May 2022 08:51 )             38.7     05-07    138  |  103  |  18  ----------------------------<  221<H>  4.7   |  26  |  0.96    Ca    9.4      07 May 2022 08:51  Mg     1.6     05-07          CAPILLARY BLOOD GLUCOSE      POCT Blood Glucose.: 218 mg/dL (08 May 2022 08:28)      RADIOLOGY & ADDITIONAL TESTS: Reviewed.

## 2022-05-08 NOTE — PROGRESS NOTE ADULT - ASSESSMENT
Mr Valentin is a 46M w/ history of previous alcohol and cocaine use and PMHx of HTN, HLD, DM, and right foot osteomyelitis with amputation of the right 4th and 5th toes and partial amputation of the right 2nd digit, p/w 1-day h/o right foot wound drainage of great toe, admitted for management of R foot abscess and acute right foot OM now pending TMA.

## 2022-05-08 NOTE — PROGRESS NOTE ADULT - PROBLEM SELECTOR PLAN 3
History of DM2. Home meds: lantus 35, lispro 8 TID, metformin 750 BID. Gave additional 10u regular insulin in AM 5/2 due to missed lantus dose.  -A1C 8.9 5/2  -c/w lantus 25 units bedtime   -c/w lispro 10 units TID premeal  -c/w mISS   -hold home PO diabetic meds while inpatient   -c/w home gabapentin 100 TID for nerve pain  -monitor fingerstick glucose

## 2022-05-09 ENCOUNTER — TRANSCRIPTION ENCOUNTER (OUTPATIENT)
Age: 47
End: 2022-05-09

## 2022-05-09 LAB
ALBUMIN SERPL ELPH-MCNC: 3.4 G/DL — SIGNIFICANT CHANGE UP (ref 3.3–5)
ALP SERPL-CCNC: 92 U/L — SIGNIFICANT CHANGE UP (ref 40–120)
ALT FLD-CCNC: 53 U/L — HIGH (ref 10–45)
ANION GAP SERPL CALC-SCNC: 9 MMOL/L — SIGNIFICANT CHANGE UP (ref 5–17)
AST SERPL-CCNC: 41 U/L — HIGH (ref 10–40)
BASOPHILS # BLD AUTO: 0.07 K/UL — SIGNIFICANT CHANGE UP (ref 0–0.2)
BASOPHILS NFR BLD AUTO: 0.8 % — SIGNIFICANT CHANGE UP (ref 0–2)
BILIRUB SERPL-MCNC: <0.2 MG/DL — SIGNIFICANT CHANGE UP (ref 0.2–1.2)
BUN SERPL-MCNC: 18 MG/DL — SIGNIFICANT CHANGE UP (ref 7–23)
CALCIUM SERPL-MCNC: 9.4 MG/DL — SIGNIFICANT CHANGE UP (ref 8.4–10.5)
CHLORIDE SERPL-SCNC: 103 MMOL/L — SIGNIFICANT CHANGE UP (ref 96–108)
CO2 SERPL-SCNC: 25 MMOL/L — SIGNIFICANT CHANGE UP (ref 22–31)
CREAT SERPL-MCNC: 1.01 MG/DL — SIGNIFICANT CHANGE UP (ref 0.5–1.3)
EGFR: 93 ML/MIN/1.73M2 — SIGNIFICANT CHANGE UP
EOSINOPHIL # BLD AUTO: 0.33 K/UL — SIGNIFICANT CHANGE UP (ref 0–0.5)
EOSINOPHIL NFR BLD AUTO: 3.6 % — SIGNIFICANT CHANGE UP (ref 0–6)
GLUCOSE BLDC GLUCOMTR-MCNC: 170 MG/DL — HIGH (ref 70–99)
GLUCOSE BLDC GLUCOMTR-MCNC: 172 MG/DL — HIGH (ref 70–99)
GLUCOSE BLDC GLUCOMTR-MCNC: 198 MG/DL — HIGH (ref 70–99)
GLUCOSE BLDC GLUCOMTR-MCNC: 250 MG/DL — HIGH (ref 70–99)
GLUCOSE SERPL-MCNC: 139 MG/DL — HIGH (ref 70–99)
HCT VFR BLD CALC: 39.6 % — SIGNIFICANT CHANGE UP (ref 39–50)
HGB BLD-MCNC: 12.7 G/DL — LOW (ref 13–17)
IMM GRANULOCYTES NFR BLD AUTO: 0.6 % — SIGNIFICANT CHANGE UP (ref 0–1.5)
LYMPHOCYTES # BLD AUTO: 3.81 K/UL — HIGH (ref 1–3.3)
LYMPHOCYTES # BLD AUTO: 41.9 % — SIGNIFICANT CHANGE UP (ref 13–44)
MAGNESIUM SERPL-MCNC: 1.6 MG/DL — SIGNIFICANT CHANGE UP (ref 1.6–2.6)
MCHC RBC-ENTMCNC: 28.3 PG — SIGNIFICANT CHANGE UP (ref 27–34)
MCHC RBC-ENTMCNC: 32.1 GM/DL — SIGNIFICANT CHANGE UP (ref 32–36)
MCV RBC AUTO: 88.4 FL — SIGNIFICANT CHANGE UP (ref 80–100)
MONOCYTES # BLD AUTO: 0.81 K/UL — SIGNIFICANT CHANGE UP (ref 0–0.9)
MONOCYTES NFR BLD AUTO: 8.9 % — SIGNIFICANT CHANGE UP (ref 2–14)
NEUTROPHILS # BLD AUTO: 4.02 K/UL — SIGNIFICANT CHANGE UP (ref 1.8–7.4)
NEUTROPHILS NFR BLD AUTO: 44.2 % — SIGNIFICANT CHANGE UP (ref 43–77)
NRBC # BLD: 0 /100 WBCS — SIGNIFICANT CHANGE UP (ref 0–0)
PHOSPHATE SERPL-MCNC: 3.6 MG/DL — SIGNIFICANT CHANGE UP (ref 2.5–4.5)
PLATELET # BLD AUTO: 573 K/UL — HIGH (ref 150–400)
POTASSIUM SERPL-MCNC: 4.2 MMOL/L — SIGNIFICANT CHANGE UP (ref 3.5–5.3)
POTASSIUM SERPL-SCNC: 4.2 MMOL/L — SIGNIFICANT CHANGE UP (ref 3.5–5.3)
PROT SERPL-MCNC: 7.6 G/DL — SIGNIFICANT CHANGE UP (ref 6–8.3)
RBC # BLD: 4.48 M/UL — SIGNIFICANT CHANGE UP (ref 4.2–5.8)
RBC # FLD: 13.9 % — SIGNIFICANT CHANGE UP (ref 10.3–14.5)
SODIUM SERPL-SCNC: 137 MMOL/L — SIGNIFICANT CHANGE UP (ref 135–145)
WBC # BLD: 9.09 K/UL — SIGNIFICANT CHANGE UP (ref 3.8–10.5)
WBC # FLD AUTO: 9.09 K/UL — SIGNIFICANT CHANGE UP (ref 3.8–10.5)

## 2022-05-09 PROCEDURE — 99233 SBSQ HOSP IP/OBS HIGH 50: CPT

## 2022-05-09 PROCEDURE — 99232 SBSQ HOSP IP/OBS MODERATE 35: CPT | Mod: GC

## 2022-05-09 RX ORDER — MAGNESIUM SULFATE 500 MG/ML
2 VIAL (ML) INJECTION ONCE
Refills: 0 | Status: COMPLETED | OUTPATIENT
Start: 2022-05-09 | End: 2022-05-09

## 2022-05-09 RX ORDER — VANCOMYCIN HCL 1 G
1500 VIAL (EA) INTRAVENOUS EVERY 12 HOURS
Refills: 0 | Status: COMPLETED | OUTPATIENT
Start: 2022-05-09 | End: 2022-05-09

## 2022-05-09 RX ADMIN — Medication 4: at 22:24

## 2022-05-09 RX ADMIN — GABAPENTIN 100 MILLIGRAM(S): 400 CAPSULE ORAL at 13:55

## 2022-05-09 RX ADMIN — Medication 12 UNIT(S): at 09:35

## 2022-05-09 RX ADMIN — INSULIN GLARGINE 27 UNIT(S): 100 INJECTION, SOLUTION SUBCUTANEOUS at 22:32

## 2022-05-09 RX ADMIN — GABAPENTIN 100 MILLIGRAM(S): 400 CAPSULE ORAL at 07:09

## 2022-05-09 RX ADMIN — TRAMADOL HYDROCHLORIDE 25 MILLIGRAM(S): 50 TABLET ORAL at 21:40

## 2022-05-09 RX ADMIN — Medication 650 MILLIGRAM(S): at 17:33

## 2022-05-09 RX ADMIN — TRAMADOL HYDROCHLORIDE 25 MILLIGRAM(S): 50 TABLET ORAL at 00:47

## 2022-05-09 RX ADMIN — TRAMADOL HYDROCHLORIDE 25 MILLIGRAM(S): 50 TABLET ORAL at 22:40

## 2022-05-09 RX ADMIN — Medication 300 MILLIGRAM(S): at 09:23

## 2022-05-09 RX ADMIN — Medication 12 UNIT(S): at 13:56

## 2022-05-09 RX ADMIN — Medication 2: at 17:27

## 2022-05-09 RX ADMIN — Medication 2: at 13:55

## 2022-05-09 RX ADMIN — Medication 25 GRAM(S): at 12:24

## 2022-05-09 RX ADMIN — Medication 650 MILLIGRAM(S): at 07:09

## 2022-05-09 RX ADMIN — ATORVASTATIN CALCIUM 40 MILLIGRAM(S): 80 TABLET, FILM COATED ORAL at 21:34

## 2022-05-09 RX ADMIN — Medication 2: at 09:35

## 2022-05-09 RX ADMIN — GABAPENTIN 100 MILLIGRAM(S): 400 CAPSULE ORAL at 21:34

## 2022-05-09 RX ADMIN — Medication 650 MILLIGRAM(S): at 23:20

## 2022-05-09 RX ADMIN — SIMETHICONE 80 MILLIGRAM(S): 80 TABLET, CHEWABLE ORAL at 12:24

## 2022-05-09 RX ADMIN — Medication 12 UNIT(S): at 17:28

## 2022-05-09 RX ADMIN — Medication 650 MILLIGRAM(S): at 12:23

## 2022-05-09 RX ADMIN — LOSARTAN POTASSIUM 75 MILLIGRAM(S): 100 TABLET, FILM COATED ORAL at 07:09

## 2022-05-09 RX ADMIN — Medication 300 MILLIGRAM(S): at 18:00

## 2022-05-09 NOTE — PROGRESS NOTE ADULT - SUBJECTIVE AND OBJECTIVE BOX
Patient is a 46y old  Male who presents with a chief complaint of right toe wound drainage (09 May 2022 11:14)      INTERVAL HPI/ OVERNIGHT EVENTS: ANDREW o/n. Pt awaiting his TMA surgery tomorrow afternoon.       LABS                        12.7   9.09  )-----------( 573      ( 09 May 2022 07:04 )             39.6     05-09    137  |  103  |  18  ----------------------------<  139<H>  4.2   |  25  |  1.01    Ca    9.4      09 May 2022 07:04  Phos  3.6     05-09  Mg     1.6     05-09    TPro  7.6  /  Alb  3.4  /  TBili  <0.2  /  DBili  x   /  AST  41<H>  /  ALT  53<H>  /  AlkPhos  92  05-09        ICU Vital Signs Last 24 Hrs  T(C): 36.4 (09 May 2022 06:07), Max: 36.9 (08 May 2022 20:40)  T(F): 97.5 (09 May 2022 06:07), Max: 98.4 (08 May 2022 20:40)  HR: 69 (09 May 2022 06:07) (69 - 84)  BP: 136/79 (09 May 2022 06:07) (136/79 - 146/82)  BP(mean): --  ABP: --  ABP(mean): --  RR: 17 (09 May 2022 06:07) (17 - 18)  SpO2: 98% (09 May 2022 06:07) (96% - 98%)      RADIOLOGY    MICROBIOLOGY    PHYSICAL EXAM  Lower Extremity Focused  General: NAD, AA0x3  Lower Extremity Focused: RIGHT  Vasc: D/PT 2/4 b/l, Right foot warmer than left, non-pitting edema to dorsum of right foot over hallux  Derm:  Right foot- Submet 1 ulcer measuring 2.0cmx2.0cm, fibrotic base, Right foot medial 1st MPJ ulceration 1cm granular area of tissue, right foot dorsomedial 0.5x0.5cm wound probes deep to bone, 3ccs purulent drainage expressed, Area of fluctuance noted over dorsal hallux near MPJ  5/3 - s/p right medial 1st MPJ I&D; negative for purulence since drainage w/ surgical incision present. Packing material present in I&D site.  Neuro: protective sensation grossly diminished   MSK: S/p 4th and 5th ray amputations, s/p 2nd digit distal Symes amputation

## 2022-05-09 NOTE — PROGRESS NOTE ADULT - SUBJECTIVE AND OBJECTIVE BOX
OVERNIGHT EVENTS: NAEO    SUBJECTIVE / INTERVAL HPI: Patient seen and examined at bedside. Reports he is feeling ok and his right foot pain is controlled. Good PO intake. LBM last night. Ambulating. Denies any complaints. Patient denying chest pain, SOB, palpitations, cough. Patient denies fever, chills, HA, Dizziness, change in vision/hearing, N/V, abdominal pain, diarrhea, constipation, hematochezia/melena, dysuria, hematuria, new onset weakness/numbness.  Remaining ROS negative       PHYSICAL EXAM:    Gen: resting in bed in NAD, well appearing, alert, interactive  HEENT: NC/AT, PERRL, anicteric sclera, no JVD, no thyromegaly, MMM  Cardio: +S1/S2, RRR, no murmurs  Resp: CTA b/l, no w/r/r, satting well on RA  GI: +BS x4, soft NT/ND  Ext: WWP, no peripheral edema, NROM x4, no cyanosis or clubbing. +R foot wrapped in dressing from toes to ankle with no sensation in right great toe  Vasc: 2+ peripheral pulses b/l  Skin: warm, dry, and intact. No rashes.  Neuro: AAOx3, no focal deficits    VITAL SIGNS:  Vital Signs Last 24 Hrs  T(C): 36.4 (09 May 2022 06:07), Max: 36.9 (08 May 2022 20:40)  T(F): 97.5 (09 May 2022 06:07), Max: 98.4 (08 May 2022 20:40)  HR: 69 (09 May 2022 06:07) (69 - 84)  BP: 136/79 (09 May 2022 06:07) (136/79 - 146/82)  BP(mean): --  RR: 17 (09 May 2022 06:07) (17 - 18)  SpO2: 98% (09 May 2022 06:07) (96% - 98%)      MEDICATIONS:  MEDICATIONS  (STANDING):  acetaminophen     Tablet .. 650 milliGRAM(s) Oral every 6 hours  atorvastatin 40 milliGRAM(s) Oral at bedtime  gabapentin 100 milliGRAM(s) Oral every 8 hours  insulin glargine Injectable (LANTUS) 27 Unit(s) SubCutaneous at bedtime  insulin lispro (ADMELOG) corrective regimen sliding scale   SubCutaneous Before meals and at bedtime  insulin lispro Injectable (ADMELOG) 12 Unit(s) SubCutaneous three times a day before meals  losartan 75 milliGRAM(s) Oral daily  magnesium sulfate  IVPB 2 Gram(s) IV Intermittent once  polyethylene glycol 3350 17 Gram(s) Oral two times a day  senna 2 Tablet(s) Oral at bedtime  simethicone 80 milliGRAM(s) Chew daily  vancomycin  IVPB 1500 milliGRAM(s) IV Intermittent every 12 hours    MEDICATIONS  (PRN):  traMADol 25 milliGRAM(s) Oral every 6 hours PRN Moderate Pain (4 - 6)      ALLERGIES:  Allergies    No Known Allergies    Intolerances        LABS:                        12.7   9.09  )-----------( 573      ( 09 May 2022 07:04 )             39.6     05-09    137  |  103  |  18  ----------------------------<  139<H>  4.2   |  25  |  1.01    Ca    9.4      09 May 2022 07:04  Phos  3.6     05-09  Mg     1.6     05-09    TPro  7.6  /  Alb  3.4  /  TBili  <0.2  /  DBili  x   /  AST  41<H>  /  ALT  53<H>  /  AlkPhos  92  05-09        CAPILLARY BLOOD GLUCOSE      POCT Blood Glucose.: 172 mg/dL (09 May 2022 08:40)      RADIOLOGY & ADDITIONAL TESTS: Reviewed.

## 2022-05-09 NOTE — PROVIDER CONTACT NOTE (OTHER) - SITUATION
patient said Dr. Abdullahi - resident Podiatrist - texted him saying he was allowed to have breakfast on 5/10.
Patient IV infiltrated and pt. c/o R foot pain. Pt. stated Tylenol is not working and would like something stronger.
Pt. BP elevated 154/83 HR 90 temp 99.8

## 2022-05-09 NOTE — PROGRESS NOTE ADULT - PROBLEM SELECTOR PLAN 4
Patient with HTN on home losartan 25mg daily   -losartan increased to 75mg daily due to consistently elevated BPs  -monitor BPs      #transaminitis   -patient with mild elevation in ALT/AST today , likely 2/2 fatty liver changes in setting of uncontrolled DM   -continue to trend

## 2022-05-09 NOTE — PROGRESS NOTE ADULT - PROBLEM SELECTOR PLAN 2
H/o right foot osteomyelitis with amputation of the right 4th and 5th toes and partial amputation of the right 2nd digit. now with new acute osteo as bone cultures from right great toe growing MRSA   - Management as above   - F/u podiatry recs; pending right foot TMA in OR tomorrow

## 2022-05-09 NOTE — PROGRESS NOTE ADULT - PROBLEM SELECTOR PLAN 6
F: None   E: Replete for K<4, Mag<2  N: CC diet; NPO after MN for surgery tomorrow   DVT ppx: holding for surgery tomorrow   Full Code   Dispo: [facility]

## 2022-05-09 NOTE — PROGRESS NOTE ADULT - SUBJECTIVE AND OBJECTIVE BOX
INFECTIOUS DISEASES CONSULT FOLLOW-UP NOTE    INTERVAL HPI/OVERNIGHT EVENTS: No acute events. Patient seen and examined at bedside. Says he is comfortable but with intermittent RLE discomfort. Denies fevers, chills, cough, abdominal pain, diarrhea.      ANTIBIOTICS/RELEVANT:    MEDICATIONS  (STANDING):  acetaminophen     Tablet .. 650 milliGRAM(s) Oral every 6 hours  atorvastatin 40 milliGRAM(s) Oral at bedtime  gabapentin 100 milliGRAM(s) Oral every 8 hours  insulin glargine Injectable (LANTUS) 27 Unit(s) SubCutaneous at bedtime  insulin lispro (ADMELOG) corrective regimen sliding scale   SubCutaneous Before meals and at bedtime  insulin lispro Injectable (ADMELOG) 12 Unit(s) SubCutaneous three times a day before meals  losartan 75 milliGRAM(s) Oral daily  magnesium sulfate  IVPB 2 Gram(s) IV Intermittent once  polyethylene glycol 3350 17 Gram(s) Oral two times a day  senna 2 Tablet(s) Oral at bedtime  simethicone 80 milliGRAM(s) Chew daily  vancomycin  IVPB 1500 milliGRAM(s) IV Intermittent every 12 hours    MEDICATIONS  (PRN):  traMADol 25 milliGRAM(s) Oral every 6 hours PRN Moderate Pain (4 - 6)        Vital Signs Last 24 Hrs  T(C): 36.4 (09 May 2022 06:07), Max: 36.9 (08 May 2022 20:40)  T(F): 97.5 (09 May 2022 06:07), Max: 98.4 (08 May 2022 20:40)  HR: 69 (09 May 2022 06:07) (69 - 84)  BP: 136/79 (09 May 2022 06:07) (136/79 - 146/82)  BP(mean): --  RR: 17 (09 May 2022 06:07) (17 - 18)  SpO2: 98% (09 May 2022 06:07) (96% - 98%)      PHYSICAL EXAM  Constitutional: alert, NAD  Eyes: the sclera and conjunctiva were normal.   ENT: the ears and nose were normal in appearance.   Neck: the appearance of the neck was normal and the neck was supple.   Pulmonary: no respiratory distress and lungs CTA bilaterally.   Heart: heart rate was normal and rhythm regular, normal S1 and S2  Vascular: no peripheral edema  Extremities: R foot ACE wrapped, could not visibly appreciate any oozing  Abdomen: normal bowel sounds, soft, non-tender  Neurological: no focal deficits  Psychiatric: the affect was normal      LABS:                        12.7   9.09  )-----------( 573      ( 09 May 2022 07:04 )             39.6     05-09    137  |  103  |  18  ----------------------------<  139<H>  4.2   |  25  |  1.01    Ca    9.4      09 May 2022 07:04  Phos  3.6     05-09  Mg     1.6     05-09    TPro  7.6  /  Alb  3.4  /  TBili  <0.2  /  DBili  x   /  AST  41<H>  /  ALT  53<H>  /  AlkPhos  92  05-09          MICROBIOLOGY:      RADIOLOGY & ADDITIONAL STUDIES:  Reviewed

## 2022-05-09 NOTE — PROGRESS NOTE ADULT - ATTENDING COMMENTS
46m with PMH Of DM2, and HTN presenting with R foot osteomyelitis, awaiting amputation with podiatry.    - continue vancomycin.  Amputation planned for Tuesday  - blood pressures improved with escalated doses of losartan

## 2022-05-09 NOTE — PROGRESS NOTE ADULT - ATTENDING COMMENTS
Agree with above.  Diabetic foot infection due to MRSA.  Can continue Vancomycin.  If bone culture and pathology without evidence of infection following TMA, can likely stop antibiotics shortly after the surgery

## 2022-05-09 NOTE — PROGRESS NOTE ADULT - ASSESSMENT
46M w/ history of previous alcohol and cocaine use and PMHx of HTN, HLD, DM, and right foot osteomyelitis with amputation of the right 4th and 5th toes and partial amputation of the right 2nd digit, p/w 1-day h/o right foot wound drainage of medial great toe, and hallux abscess. Previous wound cultures from Feb 2022 grew MRSA, GBS, Gemella and Bacteroides-pt finished 6 weeks IV Vanc and flagyl 3/21, deep wound culture taken in ED 5/1-MRSA. Treatment of diabetic foot infection, suspected acute on chronic OM, OR cultures (tissue, wound, and bone) from 5/3 growing MRSA, no acid fast bacilli. Pt clinically improving, downtrending WBC, afebrile. Podiatry plans for TMA on 5/10 (Tuesday) for further source control.     Recommendations: 46M w/ history of previous alcohol and cocaine use and PMHx of HTN, HLD, DM, and right foot osteomyelitis with amputation of the right 4th and 5th toes and partial amputation of the right 2nd digit, p/w 1-day h/o right foot wound drainage of medial great toe, and hallux abscess. Previous wound cultures from Feb 2022 grew MRSA, GBS, Gemella and Bacteroides-pt finished 6 weeks IV Vanc and flagyl 3/21, deep wound culture taken in ED 5/1-MRSA. Treatment of diabetic foot infection, suspected acute on chronic OM, OR cultures (tissue, wound, and bone) from 5/3 growing MRSA, no acid fast bacilli. Pt clinically improving, downtrending WBC, afebrile. Podiatry plans for TMA on 5/10 (Tuesday) for further source control.     Recommendations:  1. Continue Vancomycin 1500mg Q12- will cover MRSA found in cultures  2. Check vanc trough prior to 4th dose  3. Tentatively scheduled for OR tomorrow 5/10. Please send clean margin for both culture and pathology during TMA procedure- will dictate antibiotics duration and route     ID Team 1 will follow.     Note not final until attending attestation

## 2022-05-09 NOTE — PROVIDER CONTACT NOTE (OTHER) - ACTION/TREATMENT ORDERED:
No further interventions, will continue to monitor.
Per MD Suarez new IV inserted and morphine given for pain.
Dr. gaspar said keep order - NPO at midnight - as is & to give Lantus as per ordered.

## 2022-05-09 NOTE — PROGRESS NOTE ADULT - ASSESSMENT
45 yo M, history of previous alcohol and cocaine use w/ PMHx of HTN (not treated), HLD (on meds), DM (started in 2021 on OAD and insulin), osteomyelitis with amputation of the right 4th and 5th rays and partial amputation of the right 2nd digit, presents to the Emergency Department with foot pain / wound drainage. Podiatry consulted for right foot wound. VSS, leukocytosis present, elevated CRP, medial 1st MPJ wound actively draining purulence, pt found to have dorsal hallux abscess with 5ccs expressed after bedside Incision and drainage. Xray showing cortical changes of the medial 1st metatarsal head and 1st proximal phalanx high likelihood of OM.  Deep wound ED Cx: MRSA. Pt is s/p R foot I&D + debridement of plantar wound (5/3), approx 5cc purulence expressed. Possible RTOR during this admission at a later date Amputation. OR Cx growing MRSA in bone and deep Cx's, Bone pathology showing acute OM.     Plan:   - Plan for OR Tuesday afternoon around 1pm: TMA and achilles tendon lengthening  - please obtain pre-op testing (T&S, Coags, CBC, BMP, CXR, EKG, COVID PCR swab morning of 5/10)   - IV ABX per ID  - Local wound care: flushed surgical site w/ saline, iodoform packing, betadine soaked DSD, ACE wrap  - Recc WBAT R heel in surgical shoe  - Rest of care per primary team    Podiatry following. Plan d/w attending.

## 2022-05-09 NOTE — PROGRESS NOTE ADULT - PROBLEM SELECTOR PLAN 3
History of DM2. Home meds: lantus 35, lispro 8 TID, metformin 750 BID. Gave additional 10u regular insulin in AM 5/2 due to missed lantus dose.  -A1C 8.9 5/2  -c/w lantus 27 units bedtime   -c/w lispro 12 units TID premeal  -c/w mISS   -hold home PO diabetic meds while inpatient   -c/w home gabapentin 100 TID for nerve pain  -monitor fingerstick glucose

## 2022-05-09 NOTE — PROGRESS NOTE ADULT - PROBLEM SELECTOR PLAN 5
Home med: Atorvastatin 40  - C/w home statin      #Anemia , normocytic   patient with hgb remaining 11-12 throughout admission; baseline 13  -iron studies showing mixed ASHOK and AOCD   -likely in setting of active infection and chronic osteomyelitis   -no signs of active bleeding on exam  -maintain active T&S; transfuse for hgb <7  -continue to monitor

## 2022-05-09 NOTE — PROGRESS NOTE ADULT - PROBLEM SELECTOR PLAN 1
Patient presenting with purulent drainage from right great toe noted on home dressing change. S/p bedside incision and drainage of right dorsal hallux abscess performed in ED, 5cc drained, per podiatry.   -Xray right foot/ankle showing soft tissue defect adjacent to the fourth metatarsal stump  -podiatry following , appreciate recs    -pt now s/p right foot I&D and debridement in OR on 5/3; deep wound and bone cultures obtained growing MRSA   -surgical path report revealing acute osteomyelitis of right foot  -C/w Vancomycin  -ID consulted, recs appreciated  -pt agreeable to amputation; per podiatry plan to return to OR on tuesday 5/10 for right TMA and achilles tendon lengthening   -send repeat culture and pathology on clean margins in OR after TMA, will dictate further abx duration and route   -BCs 5/2 NGTD   -standing tylenol and prn tramadol for pain

## 2022-05-10 ENCOUNTER — TRANSCRIPTION ENCOUNTER (OUTPATIENT)
Age: 47
End: 2022-05-10

## 2022-05-10 ENCOUNTER — RESULT REVIEW (OUTPATIENT)
Age: 47
End: 2022-05-10

## 2022-05-10 LAB
ALBUMIN SERPL ELPH-MCNC: 3.6 G/DL — SIGNIFICANT CHANGE UP (ref 3.3–5)
ALP SERPL-CCNC: 106 U/L — SIGNIFICANT CHANGE UP (ref 40–120)
ALT FLD-CCNC: 64 U/L — HIGH (ref 10–45)
ANION GAP SERPL CALC-SCNC: 9 MMOL/L — SIGNIFICANT CHANGE UP (ref 5–17)
APTT BLD: 39.2 SEC — HIGH (ref 27.5–35.5)
AST SERPL-CCNC: 43 U/L — HIGH (ref 10–40)
BILIRUB SERPL-MCNC: 0.2 MG/DL — SIGNIFICANT CHANGE UP (ref 0.2–1.2)
BLD GP AB SCN SERPL QL: NEGATIVE — SIGNIFICANT CHANGE UP
BUN SERPL-MCNC: 21 MG/DL — SIGNIFICANT CHANGE UP (ref 7–23)
CALCIUM SERPL-MCNC: 9.5 MG/DL — SIGNIFICANT CHANGE UP (ref 8.4–10.5)
CHLORIDE SERPL-SCNC: 103 MMOL/L — SIGNIFICANT CHANGE UP (ref 96–108)
CO2 SERPL-SCNC: 26 MMOL/L — SIGNIFICANT CHANGE UP (ref 22–31)
CREAT SERPL-MCNC: 1.04 MG/DL — SIGNIFICANT CHANGE UP (ref 0.5–1.3)
EGFR: 90 ML/MIN/1.73M2 — SIGNIFICANT CHANGE UP
GLUCOSE BLDC GLUCOMTR-MCNC: 106 MG/DL — HIGH (ref 70–99)
GLUCOSE BLDC GLUCOMTR-MCNC: 197 MG/DL — HIGH (ref 70–99)
GLUCOSE BLDC GLUCOMTR-MCNC: 218 MG/DL — HIGH (ref 70–99)
GLUCOSE BLDC GLUCOMTR-MCNC: 236 MG/DL — HIGH (ref 70–99)
GLUCOSE SERPL-MCNC: 209 MG/DL — HIGH (ref 70–99)
HCT VFR BLD CALC: 41.4 % — SIGNIFICANT CHANGE UP (ref 39–50)
HGB BLD-MCNC: 13.3 G/DL — SIGNIFICANT CHANGE UP (ref 13–17)
INR BLD: 1.01 — SIGNIFICANT CHANGE UP (ref 0.88–1.16)
MAGNESIUM SERPL-MCNC: 1.8 MG/DL — SIGNIFICANT CHANGE UP (ref 1.6–2.6)
MCHC RBC-ENTMCNC: 28.1 PG — SIGNIFICANT CHANGE UP (ref 27–34)
MCHC RBC-ENTMCNC: 32.1 GM/DL — SIGNIFICANT CHANGE UP (ref 32–36)
MCV RBC AUTO: 87.3 FL — SIGNIFICANT CHANGE UP (ref 80–100)
NRBC # BLD: 0 /100 WBCS — SIGNIFICANT CHANGE UP (ref 0–0)
PHOSPHATE SERPL-MCNC: 3.2 MG/DL — SIGNIFICANT CHANGE UP (ref 2.5–4.5)
PLATELET # BLD AUTO: 594 K/UL — HIGH (ref 150–400)
POTASSIUM SERPL-MCNC: 4.6 MMOL/L — SIGNIFICANT CHANGE UP (ref 3.5–5.3)
POTASSIUM SERPL-SCNC: 4.6 MMOL/L — SIGNIFICANT CHANGE UP (ref 3.5–5.3)
PROT SERPL-MCNC: 8.1 G/DL — SIGNIFICANT CHANGE UP (ref 6–8.3)
PROTHROM AB SERPL-ACNC: 12 SEC — SIGNIFICANT CHANGE UP (ref 10.5–13.4)
RBC # BLD: 4.74 M/UL — SIGNIFICANT CHANGE UP (ref 4.2–5.8)
RBC # FLD: 13.9 % — SIGNIFICANT CHANGE UP (ref 10.3–14.5)
RH IG SCN BLD-IMP: POSITIVE — SIGNIFICANT CHANGE UP
SARS-COV-2 RNA SPEC QL NAA+PROBE: SIGNIFICANT CHANGE UP
SODIUM SERPL-SCNC: 138 MMOL/L — SIGNIFICANT CHANGE UP (ref 135–145)
VANCOMYCIN TROUGH SERPL-MCNC: 14.8 UG/ML — SIGNIFICANT CHANGE UP (ref 10–20)
WBC # BLD: 8.36 K/UL — SIGNIFICANT CHANGE UP (ref 3.8–10.5)
WBC # FLD AUTO: 8.36 K/UL — SIGNIFICANT CHANGE UP (ref 3.8–10.5)

## 2022-05-10 PROCEDURE — 71045 X-RAY EXAM CHEST 1 VIEW: CPT | Mod: 26

## 2022-05-10 PROCEDURE — 88305 TISSUE EXAM BY PATHOLOGIST: CPT | Mod: 26

## 2022-05-10 PROCEDURE — 99233 SBSQ HOSP IP/OBS HIGH 50: CPT

## 2022-05-10 PROCEDURE — 93010 ELECTROCARDIOGRAM REPORT: CPT

## 2022-05-10 PROCEDURE — 99232 SBSQ HOSP IP/OBS MODERATE 35: CPT | Mod: GC

## 2022-05-10 RX ORDER — VANCOMYCIN HCL 1 G
1500 VIAL (EA) INTRAVENOUS EVERY 12 HOURS
Refills: 0 | Status: COMPLETED | OUTPATIENT
Start: 2022-05-10 | End: 2022-05-11

## 2022-05-10 RX ORDER — INSULIN GLARGINE 100 [IU]/ML
29 INJECTION, SOLUTION SUBCUTANEOUS AT BEDTIME
Refills: 0 | Status: DISCONTINUED | OUTPATIENT
Start: 2022-05-10 | End: 2022-05-19

## 2022-05-10 RX ORDER — INSULIN LISPRO 100/ML
14 VIAL (ML) SUBCUTANEOUS
Refills: 0 | Status: DISCONTINUED | OUTPATIENT
Start: 2022-05-10 | End: 2022-05-19

## 2022-05-10 RX ORDER — MAGNESIUM SULFATE 500 MG/ML
2 VIAL (ML) INJECTION ONCE
Refills: 0 | Status: COMPLETED | OUTPATIENT
Start: 2022-05-10 | End: 2022-05-10

## 2022-05-10 RX ORDER — TRAMADOL HYDROCHLORIDE 50 MG/1
25 TABLET ORAL EVERY 6 HOURS
Refills: 0 | Status: DISCONTINUED | OUTPATIENT
Start: 2022-05-10 | End: 2022-05-13

## 2022-05-10 RX ADMIN — Medication 650 MILLIGRAM(S): at 05:38

## 2022-05-10 RX ADMIN — Medication 4: at 09:20

## 2022-05-10 RX ADMIN — LOSARTAN POTASSIUM 75 MILLIGRAM(S): 100 TABLET, FILM COATED ORAL at 05:38

## 2022-05-10 RX ADMIN — Medication 300 MILLIGRAM(S): at 21:45

## 2022-05-10 RX ADMIN — Medication 300 MILLIGRAM(S): at 10:02

## 2022-05-10 RX ADMIN — Medication 25 GRAM(S): at 12:55

## 2022-05-10 RX ADMIN — INSULIN GLARGINE 29 UNIT(S): 100 INJECTION, SOLUTION SUBCUTANEOUS at 21:45

## 2022-05-10 RX ADMIN — Medication 2: at 16:10

## 2022-05-10 RX ADMIN — Medication 650 MILLIGRAM(S): at 06:38

## 2022-05-10 RX ADMIN — GABAPENTIN 100 MILLIGRAM(S): 400 CAPSULE ORAL at 05:38

## 2022-05-10 RX ADMIN — Medication 650 MILLIGRAM(S): at 00:20

## 2022-05-10 RX ADMIN — Medication 4: at 12:55

## 2022-05-10 NOTE — PROGRESS NOTE ADULT - ASSESSMENT
47 yo M, history of previous alcohol and cocaine use w/ PMHx of HTN (not treated), HLD (on meds), DM (started in 2021 on OAD and insulin), osteomyelitis with amputation of the right 4th and 5th rays and partial amputation of the right 2nd digit, presents to the Emergency Department with foot pain / wound drainage. Podiatry consulted for right foot wound. VSS, leukocytosis present, elevated CRP, medial 1st MPJ wound actively draining purulence, pt found to have dorsal hallux abscess with 5ccs expressed after bedside Incision and drainage. Xray showing cortical changes of the medial 1st metatarsal head and 1st proximal phalanx high likelihood of OM.  Deep wound ED Cx: MRSA. Pt is s/p R foot I&D + debridement of plantar wound (5/3), approx 5cc purulence expressed. Possible RTOR during this admission at a later date Amputation. OR Cx growing MRSA in bone and deep Cx's, Bone pathology showing acute OM.     Plan:   - Plan for OR today 5/10: TMA and achilles tendon lengthening; is now likely going at 5:30pm  - pre-op testing obtained; please keep pt NPO at 9AM today (5/10)  - IV ABX per ID  - Local wound care: flushed surgical site w/ saline, iodoform packing, betadine soaked DSD, ACE wrap  - Recc WBAT R heel in surgical shoe  - Rest of care per primary team    Podiatry following. Plan d/w attending.

## 2022-05-10 NOTE — PROGRESS NOTE ADULT - PROBLEM SELECTOR PLAN 2
H/o right foot osteomyelitis with amputation of the right 4th and 5th toes and partial amputation of the right 2nd digit. now with new acute osteo as bone cultures from right great toe growing MRSA   - Management as above   - F/u podiatry recs; pending right foot TMA in OR today

## 2022-05-10 NOTE — PROGRESS NOTE ADULT - PROBLEM SELECTOR PLAN 3
History of DM2. Home meds: lantus 35, lispro 8 TID, metformin 750 BID.   -A1C 8.9 5/2  -increased lantus to 29 units bedtime   -increased lispro to 14 units TID premeal  -c/w mISS   -hold home PO diabetic meds while inpatient   -c/w home gabapentin 100 TID for nerve pain  -monitor fingerstick glucose

## 2022-05-10 NOTE — BRIEF OPERATIVE NOTE - NSICDXBRIEFPREOP_GEN_ALL_CORE_FT
PRE-OP DIAGNOSIS:  Osteomyelitis of right foot 03-May-2022 11:12:22  De Mini Ramos  
PRE-OP DIAGNOSIS:  Osteomyelitis of ankle or foot, acute 11-May-2022 00:19:33  Bradly Moreno

## 2022-05-10 NOTE — BRIEF OPERATIVE NOTE - ELECTIVE PROCEDURE
Called patient and advised we would cancel tomorrow's appointment with Christopher - and follow up next Tuesday to see how she is feeling.   Patient agreed.   
No
No

## 2022-05-10 NOTE — PROGRESS NOTE ADULT - ATTENDING COMMENTS
Agree with above.  Continue Vancomycin for now.  Trough of 14.8 is therapeutic.  Follow up results of TMA:  pathology and clean bone margins. These will help determine duration of post-op antibiotics

## 2022-05-10 NOTE — PROGRESS NOTE ADULT - PROBLEM SELECTOR PLAN 1
Patient presenting with purulent drainage from right great toe noted on home dressing change. S/p bedside incision and drainage of right dorsal hallux abscess performed in ED, 5cc drained, per podiatry.   -Xray right foot/ankle showing soft tissue defect adjacent to the fourth metatarsal stump  -podiatry following , appreciate recs    -pt now s/p right foot I&D and debridement in OR on 5/3; deep wound and bone cultures obtained growing MRSA   -surgical path report revealing acute osteomyelitis of right foot  -C/w Vancomycin  -ID consulted, recs appreciated  -pt agreeable to amputation; per podiatry plan for OR today for right TMA and achilles tendon lengthening   -send repeat culture and pathology on clean margins in OR after TMA, will dictate further abx duration and route   -BCs 5/2 NGTD   -standing tylenol and prn tramadol for pain

## 2022-05-10 NOTE — PROGRESS NOTE ADULT - SUBJECTIVE AND OBJECTIVE BOX
PRE-OP NOTE    Pre-Op Diagnosis: right foot wound  Planned Procedure: Right TMA  Scheduled Date / Time: 5/10 at 5:30pm    Labs:                       12.7   9.09  )-----------( 573      ( 09 May 2022 07:04 )             39.6   05-09    137  |  103  |  18  ----------------------------<  139<H>  4.2   |  25  |  1.01    Ca    9.4      09 May 2022 07:04  Phos  3.6     05-09  Mg     1.6     05-09    TPro  7.6  /  Alb  3.4  /  TBili  <0.2  /  DBili  x   /  AST  41<H>  /  ALT  53<H>  /  AlkPhos  92  05-09  LIVER FUNCTIONS - ( 09 May 2022 07:04 )  Alb: 3.4 g/dL / Pro: 7.6 g/dL / ALK PHOS: 92 U/L / ALT: 53 U/L / AST: 41 U/L / GGT: x           Vitals: Vital Signs Last 24 Hrs  T(C): 36.5 (10 May 2022 05:45), Max: 37.2 (09 May 2022 20:35)  T(F): 97.7 (10 May 2022 05:45), Max: 99 (09 May 2022 20:35)  HR: 70 (10 May 2022 05:45) (70 - 81)  BP: 160/95 (10 May 2022 05:45) (123/79 - 160/95)  BP(mean): --  RR: 18 (10 May 2022 05:45) (18 - 18)  SpO2: 98% (10 May 2022 05:45) (98% - 99%)  PE:   Official CXR reading: (On Chart)  Official EKG reading: (On Chart)  Type and Cross/Screen: on chart  NPO after MN  Antibiotics: Vancomycin  Anesthesia evaluation (on chart)  Operative Consent (on chart)

## 2022-05-10 NOTE — PROGRESS NOTE ADULT - SUBJECTIVE AND OBJECTIVE BOX
OVERNIGHT EVENTS: NAEO    SUBJECTIVE / INTERVAL HPI: Patient seen and examined at bedside. Feeling well and denies any complaints this morning. Prepped and ready for the OR later today. Right foot pain well-controlled. Patient denying chest pain, SOB, palpitations, cough. Patient denies fever, chills, HA, Dizziness, change in vision/hearing, N/V, abdominal pain, diarrhea, constipation, hematochezia/melena, dysuria, hematuria.  Remaining ROS negative       PHYSICAL EXAM:    Gen: sitting up in chair comfortably in NAD   HEENT: NC/AT, PERRL, anicteric sclera, no JVD, no thyromegaly, MMM  Cardio: +S1/S2, RRR, no murmurs  Resp: CTA b/l, no w/r/r, satting well on RA  GI: +BS x4, soft NT/ND  Ext: WWP, no peripheral edema, NROM x4, no cyanosis or clubbing. +R foot wrapped in dressing from toes to ankle with no sensation in right great toe  Vasc: 2+ peripheral pulses b/l  Skin: warm, dry, and intact. No rashes.  Neuro: AAOx3, no focal deficits    VITAL SIGNS:  Vital Signs Last 24 Hrs  T(C): 36.5 (10 May 2022 05:45), Max: 37.2 (09 May 2022 20:35)  T(F): 97.7 (10 May 2022 05:45), Max: 99 (09 May 2022 20:35)  HR: 70 (10 May 2022 05:45) (70 - 81)  BP: 160/95 (10 May 2022 05:45) (123/79 - 160/95)  BP(mean): --  RR: 18 (10 May 2022 05:45) (18 - 18)  SpO2: 98% (10 May 2022 05:45) (98% - 99%)      MEDICATIONS:  MEDICATIONS  (STANDING):  acetaminophen     Tablet .. 650 milliGRAM(s) Oral every 6 hours  atorvastatin 40 milliGRAM(s) Oral at bedtime  gabapentin 100 milliGRAM(s) Oral every 8 hours  insulin glargine Injectable (LANTUS) 29 Unit(s) SubCutaneous at bedtime  insulin lispro (ADMELOG) corrective regimen sliding scale   SubCutaneous Before meals and at bedtime  insulin lispro Injectable (ADMELOG) 14 Unit(s) SubCutaneous three times a day before meals  losartan 75 milliGRAM(s) Oral daily  magnesium sulfate  IVPB 2 Gram(s) IV Intermittent once  polyethylene glycol 3350 17 Gram(s) Oral two times a day  senna 2 Tablet(s) Oral at bedtime  simethicone 80 milliGRAM(s) Chew daily  vancomycin  IVPB 1500 milliGRAM(s) IV Intermittent every 12 hours    MEDICATIONS  (PRN):  traMADol 25 milliGRAM(s) Oral every 6 hours PRN Moderate Pain (4 - 6)      ALLERGIES:  Allergies    No Known Allergies    Intolerances        LABS:                        13.3   8.36  )-----------( 594      ( 10 May 2022 08:23 )             41.4     05-10    138  |  103  |  21  ----------------------------<  209<H>  4.6   |  26  |  1.04    Ca    9.5      10 May 2022 08:23  Phos  3.2     05-10  Mg     1.8     05-10    TPro  8.1  /  Alb  3.6  /  TBili  0.2  /  DBili  x   /  AST  43<H>  /  ALT  64<H>  /  AlkPhos  106  05-10    PT/INR - ( 10 May 2022 08:23 )   PT: 12.0 sec;   INR: 1.01          PTT - ( 10 May 2022 08:23 )  PTT:39.2 sec    CAPILLARY BLOOD GLUCOSE      POCT Blood Glucose.: 218 mg/dL (10 May 2022 08:30)      RADIOLOGY & ADDITIONAL TESTS: Reviewed.

## 2022-05-10 NOTE — BRIEF OPERATIVE NOTE - NSICDXBRIEFPROCEDURE_GEN_ALL_CORE_FT
PROCEDURES:  Transmetatarsal amputation 11-May-2022 00:19:15  Bradly Moreno  
PROCEDURES:  Deep incision and drainage of multiple areas of foot 03-May-2022 11:12:02  De Mini Ramos

## 2022-05-10 NOTE — PROGRESS NOTE ADULT - SUBJECTIVE AND OBJECTIVE BOX
INFECTIOUS DISEASES CONSULT FOLLOW-UP NOTE    INTERVAL HPI/OVERNIGHT EVENTS: Afebrile. Plan for TMA by podiatry. Patient otherwise in great spirits. Not reporting fevers, chills, cough, abdominal pain, diarrhea.      ANTIBIOTICS/RELEVANT:    MEDICATIONS  (STANDING):  acetaminophen     Tablet .. 650 milliGRAM(s) Oral every 6 hours  atorvastatin 40 milliGRAM(s) Oral at bedtime  gabapentin 100 milliGRAM(s) Oral every 8 hours  insulin glargine Injectable (LANTUS) 29 Unit(s) SubCutaneous at bedtime  insulin lispro (ADMELOG) corrective regimen sliding scale   SubCutaneous Before meals and at bedtime  insulin lispro Injectable (ADMELOG) 14 Unit(s) SubCutaneous three times a day before meals  losartan 75 milliGRAM(s) Oral daily  magnesium sulfate  IVPB 2 Gram(s) IV Intermittent once  polyethylene glycol 3350 17 Gram(s) Oral two times a day  senna 2 Tablet(s) Oral at bedtime  simethicone 80 milliGRAM(s) Chew daily  vancomycin  IVPB 1500 milliGRAM(s) IV Intermittent every 12 hours    MEDICATIONS  (PRN):  traMADol 25 milliGRAM(s) Oral every 6 hours PRN Moderate Pain (4 - 6)        Vital Signs Last 24 Hrs  T(C): 36.5 (10 May 2022 05:45), Max: 37.2 (09 May 2022 20:35)  T(F): 97.7 (10 May 2022 05:45), Max: 99 (09 May 2022 20:35)  HR: 70 (10 May 2022 05:45) (70 - 81)  BP: 160/95 (10 May 2022 05:45) (123/79 - 160/95)  BP(mean): --  RR: 18 (10 May 2022 05:45) (18 - 18)  SpO2: 98% (10 May 2022 05:45) (98% - 98%)      PHYSICAL EXAM  Constitutional: alert, NAD  Eyes: the sclera and conjunctiva were normal.   ENT: the ears and nose were normal in appearance.   Neck: the appearance of the neck was normal and the neck was supple.   Pulmonary: no respiratory distress and lungs CTA bilaterally.   Heart: heart rate was normal and rhythm regular, normal S1 and S2  Vascular: no peripheral edema  Extremities: R foot ACE wrapped  Abdomen: normal bowel sounds, soft, non-tender  Neurological: no focal deficits  Psychiatric: the affect was normal      LABS:                        13.3   8.36  )-----------( 594      ( 10 May 2022 08:23 )             41.4     05-10    138  |  103  |  21  ----------------------------<  209<H>  4.6   |  26  |  1.04    Ca    9.5      10 May 2022 08:23  Phos  3.2     05-10  Mg     1.8     05-10    TPro  8.1  /  Alb  3.6  /  TBili  0.2  /  DBili  x   /  AST  43<H>  /  ALT  64<H>  /  AlkPhos  106  05-10    PT/INR - ( 10 May 2022 08:23 )   PT: 12.0 sec;   INR: 1.01          PTT - ( 10 May 2022 08:23 )  PTT:39.2 sec      MICROBIOLOGY:      RADIOLOGY & ADDITIONAL STUDIES:  Reviewed

## 2022-05-10 NOTE — PROGRESS NOTE ADULT - ATTENDING COMMENTS
46m with PMH Of DM2, and HTN presenting with R foot osteomyelitis, awaiting amputation with podiatry.    - continue vancomycin - going to the OR today for amputation with podiatry.  Need clean margins to help determine duration of antibiotics.   - blood pressures improved with escalated doses of losartan .

## 2022-05-10 NOTE — BRIEF OPERATIVE NOTE - NSICDXBRIEFPOSTOP_GEN_ALL_CORE_FT
POST-OP DIAGNOSIS:  Osteomyelitis of right foot 03-May-2022 11:12:33  De Mini Ramos  
POST-OP DIAGNOSIS:  Osteomyelitis of right foot 03-May-2022 11:12:33  De Mini Ramos

## 2022-05-10 NOTE — PROGRESS NOTE ADULT - PROBLEM SELECTOR PLAN 6
F: None   E: Replete for K<4, Mag<2  N: NPO for surgery today   DVT ppx: holding for surgery today  Full Code   Dispo: JUNIOR

## 2022-05-10 NOTE — PROGRESS NOTE ADULT - ASSESSMENT
46M w/ history of previous alcohol and cocaine use and PMHx of HTN, HLD, DM, and right foot osteomyelitis with amputation of the right 4th and 5th toes and partial amputation of the right 2nd digit, p/w 1-day h/o right foot wound drainage of medial great toe, and hallux abscess. Previous wound cultures from Feb 2022 grew MRSA, GBS, Gemella and Bacteroides-pt finished 6 weeks IV Vanc and flagyl 3/21, deep wound culture taken in ED 5/1-MRSA. Treatment of diabetic foot infection, suspected acute on chronic OM, OR cultures (tissue, wound, and bone) from 5/3 growing MRSA, no acid fast bacilli. Pt clinically improving, downtrending WBC, afebrile. Podiatry plans for TMA on 5/10 (Tuesday) for further source control.     Recommendations:  1. Continue Vancomycin 1500mg Q12- will cover MRSA found in cultures  2. Check vanc trough prior to 4th dose  3. Please send clean margin for both culture and pathology during TMA procedure. It will determine antibiotics duration and route     ID Team 1 will follow.   Note not final until attending attestation 46M w/ history of previous alcohol and cocaine use and PMHx of HTN, HLD, DM, and right foot osteomyelitis with amputation of the right 4th and 5th toes and partial amputation of the right 2nd digit, p/w 1-day h/o right foot wound drainage of medial great toe, and hallux abscess. Previous wound cultures from Feb 2022 grew MRSA, GBS, Gemella and Bacteroides-pt finished 6 weeks IV Vanc and flagyl 3/21, deep wound culture taken in ED 5/1-MRSA. Treatment of diabetic foot infection, suspected acute on chronic OM, OR cultures (tissue, wound, and bone) from 5/3 growing MRSA, no acid fast bacilli. Pt clinically improving, downtrending WBC, afebrile. Podiatry plans for TMA on 5/10 (Tuesday) for further source control.     Recommendations:  1. Continue Vancomycin 1500mg Q12- will cover MRSA found in cultures  2. Please send clean margin for both culture and pathology during TMA procedure. It will determine antibiotics duration and route     ID Team 1 will follow.   Note not final until attending attestation

## 2022-05-10 NOTE — PROGRESS NOTE ADULT - PROBLEM SELECTOR PLAN 4
Patient with HTN on home losartan 25mg daily   -losartan increased to 75mg daily due to consistently elevated BPs  -monitor BPs      #transaminitis   -patient with mild elevation in ALT/AST , likely 2/2 fatty liver changes in setting of uncontrolled DM   -continue to trend

## 2022-05-10 NOTE — BRIEF OPERATIVE NOTE - OPERATION/FINDINGS
Right foot prepped in usual aseptic manner. TMA incision marked w/ skin marker. Scalpel used to cut down to bone. Sagittal saw used to cut MT's 1-3 in line with the parabola of previous MT 4 and 5 resection. Irrigated and achieved adequate hemostasis. Plantar skin flapped to close TMA site; closed w/ nylon suture. Penrose drain placed. Dressed xeroform over incision, then DSD, ABD, Kerlix, and ACE bandage.

## 2022-05-11 LAB
ALBUMIN SERPL ELPH-MCNC: 3.4 G/DL — SIGNIFICANT CHANGE UP (ref 3.3–5)
ALP SERPL-CCNC: 59 U/L — SIGNIFICANT CHANGE UP (ref 40–120)
ALT FLD-CCNC: 55 U/L — HIGH (ref 10–45)
ANION GAP SERPL CALC-SCNC: 9 MMOL/L — SIGNIFICANT CHANGE UP (ref 5–17)
AST SERPL-CCNC: 36 U/L — SIGNIFICANT CHANGE UP (ref 10–40)
BILIRUB SERPL-MCNC: 0.2 MG/DL — SIGNIFICANT CHANGE UP (ref 0.2–1.2)
BUN SERPL-MCNC: 20 MG/DL — SIGNIFICANT CHANGE UP (ref 7–23)
CALCIUM SERPL-MCNC: 8.9 MG/DL — SIGNIFICANT CHANGE UP (ref 8.4–10.5)
CHLORIDE SERPL-SCNC: 102 MMOL/L — SIGNIFICANT CHANGE UP (ref 96–108)
CO2 SERPL-SCNC: 26 MMOL/L — SIGNIFICANT CHANGE UP (ref 22–31)
CREAT SERPL-MCNC: 1.08 MG/DL — SIGNIFICANT CHANGE UP (ref 0.5–1.3)
EGFR: 86 ML/MIN/1.73M2 — SIGNIFICANT CHANGE UP
GLUCOSE BLDC GLUCOMTR-MCNC: 104 MG/DL — HIGH (ref 70–99)
GLUCOSE BLDC GLUCOMTR-MCNC: 152 MG/DL — HIGH (ref 70–99)
GLUCOSE BLDC GLUCOMTR-MCNC: 199 MG/DL — HIGH (ref 70–99)
GLUCOSE BLDC GLUCOMTR-MCNC: 207 MG/DL — HIGH (ref 70–99)
GLUCOSE SERPL-MCNC: 226 MG/DL — HIGH (ref 70–99)
GRAM STN FLD: SIGNIFICANT CHANGE UP
HCT VFR BLD CALC: 37 % — LOW (ref 39–50)
HGB BLD-MCNC: 12 G/DL — LOW (ref 13–17)
MAGNESIUM SERPL-MCNC: 1.7 MG/DL — SIGNIFICANT CHANGE UP (ref 1.6–2.6)
MCHC RBC-ENTMCNC: 28.4 PG — SIGNIFICANT CHANGE UP (ref 27–34)
MCHC RBC-ENTMCNC: 32.4 GM/DL — SIGNIFICANT CHANGE UP (ref 32–36)
MCV RBC AUTO: 87.5 FL — SIGNIFICANT CHANGE UP (ref 80–100)
NRBC # BLD: 0 /100 WBCS — SIGNIFICANT CHANGE UP (ref 0–0)
PLATELET # BLD AUTO: 569 K/UL — HIGH (ref 150–400)
POTASSIUM SERPL-MCNC: 4.5 MMOL/L — SIGNIFICANT CHANGE UP (ref 3.5–5.3)
POTASSIUM SERPL-SCNC: 4.5 MMOL/L — SIGNIFICANT CHANGE UP (ref 3.5–5.3)
PROT SERPL-MCNC: 7.3 G/DL — SIGNIFICANT CHANGE UP (ref 6–8.3)
RBC # BLD: 4.23 M/UL — SIGNIFICANT CHANGE UP (ref 4.2–5.8)
RBC # FLD: 13.9 % — SIGNIFICANT CHANGE UP (ref 10.3–14.5)
SODIUM SERPL-SCNC: 137 MMOL/L — SIGNIFICANT CHANGE UP (ref 135–145)
SPECIMEN SOURCE: SIGNIFICANT CHANGE UP
VANCOMYCIN TROUGH SERPL-MCNC: 20.6 UG/ML — HIGH (ref 10–20)
WBC # BLD: 12.49 K/UL — HIGH (ref 3.8–10.5)
WBC # FLD AUTO: 12.49 K/UL — HIGH (ref 3.8–10.5)

## 2022-05-11 PROCEDURE — 99232 SBSQ HOSP IP/OBS MODERATE 35: CPT | Mod: GC

## 2022-05-11 PROCEDURE — 73630 X-RAY EXAM OF FOOT: CPT | Mod: 26,RT

## 2022-05-11 RX ORDER — HYDROMORPHONE HYDROCHLORIDE 2 MG/ML
1 INJECTION INTRAMUSCULAR; INTRAVENOUS; SUBCUTANEOUS
Refills: 0 | Status: DISCONTINUED | OUTPATIENT
Start: 2022-05-11 | End: 2022-05-11

## 2022-05-11 RX ORDER — HYDROMORPHONE HYDROCHLORIDE 2 MG/ML
0.5 INJECTION INTRAMUSCULAR; INTRAVENOUS; SUBCUTANEOUS
Refills: 0 | Status: DISCONTINUED | OUTPATIENT
Start: 2022-05-11 | End: 2022-05-11

## 2022-05-11 RX ORDER — HYDROMORPHONE HYDROCHLORIDE 2 MG/ML
0.5 INJECTION INTRAMUSCULAR; INTRAVENOUS; SUBCUTANEOUS ONCE
Refills: 0 | Status: DISCONTINUED | OUTPATIENT
Start: 2022-05-11 | End: 2022-05-11

## 2022-05-11 RX ORDER — OXYCODONE HYDROCHLORIDE 5 MG/1
10 TABLET ORAL EVERY 6 HOURS
Refills: 0 | Status: DISCONTINUED | OUTPATIENT
Start: 2022-05-11 | End: 2022-05-13

## 2022-05-11 RX ORDER — MAGNESIUM SULFATE 500 MG/ML
2 VIAL (ML) INJECTION ONCE
Refills: 0 | Status: COMPLETED | OUTPATIENT
Start: 2022-05-11 | End: 2022-05-11

## 2022-05-11 RX ORDER — ENOXAPARIN SODIUM 100 MG/ML
40 INJECTION SUBCUTANEOUS EVERY 24 HOURS
Refills: 0 | Status: DISCONTINUED | OUTPATIENT
Start: 2022-05-11 | End: 2022-05-19

## 2022-05-11 RX ORDER — VANCOMYCIN HCL 1 G
1250 VIAL (EA) INTRAVENOUS EVERY 12 HOURS
Refills: 0 | Status: COMPLETED | OUTPATIENT
Start: 2022-05-11 | End: 2022-05-12

## 2022-05-11 RX ORDER — OXYCODONE HYDROCHLORIDE 5 MG/1
5 TABLET ORAL EVERY 6 HOURS
Refills: 0 | Status: DISCONTINUED | OUTPATIENT
Start: 2022-05-11 | End: 2022-05-13

## 2022-05-11 RX ADMIN — LOSARTAN POTASSIUM 75 MILLIGRAM(S): 100 TABLET, FILM COATED ORAL at 05:54

## 2022-05-11 RX ADMIN — ENOXAPARIN SODIUM 40 MILLIGRAM(S): 100 INJECTION SUBCUTANEOUS at 10:03

## 2022-05-11 RX ADMIN — Medication 25 GRAM(S): at 12:32

## 2022-05-11 RX ADMIN — Medication 14 UNIT(S): at 17:09

## 2022-05-11 RX ADMIN — Medication 650 MILLIGRAM(S): at 05:53

## 2022-05-11 RX ADMIN — Medication 650 MILLIGRAM(S): at 12:54

## 2022-05-11 RX ADMIN — Medication 650 MILLIGRAM(S): at 18:09

## 2022-05-11 RX ADMIN — HYDROMORPHONE HYDROCHLORIDE 0.5 MILLIGRAM(S): 2 INJECTION INTRAMUSCULAR; INTRAVENOUS; SUBCUTANEOUS at 18:58

## 2022-05-11 RX ADMIN — HYDROMORPHONE HYDROCHLORIDE 1 MILLIGRAM(S): 2 INJECTION INTRAMUSCULAR; INTRAVENOUS; SUBCUTANEOUS at 00:37

## 2022-05-11 RX ADMIN — Medication 4: at 08:53

## 2022-05-11 RX ADMIN — GABAPENTIN 100 MILLIGRAM(S): 400 CAPSULE ORAL at 05:53

## 2022-05-11 RX ADMIN — Medication 2: at 12:32

## 2022-05-11 RX ADMIN — GABAPENTIN 100 MILLIGRAM(S): 400 CAPSULE ORAL at 22:13

## 2022-05-11 RX ADMIN — HYDROMORPHONE HYDROCHLORIDE 0.5 MILLIGRAM(S): 2 INJECTION INTRAMUSCULAR; INTRAVENOUS; SUBCUTANEOUS at 19:16

## 2022-05-11 RX ADMIN — GABAPENTIN 100 MILLIGRAM(S): 400 CAPSULE ORAL at 13:02

## 2022-05-11 RX ADMIN — HYDROMORPHONE HYDROCHLORIDE 1 MILLIGRAM(S): 2 INJECTION INTRAMUSCULAR; INTRAVENOUS; SUBCUTANEOUS at 00:22

## 2022-05-11 RX ADMIN — Medication 14 UNIT(S): at 12:32

## 2022-05-11 RX ADMIN — Medication 650 MILLIGRAM(S): at 19:00

## 2022-05-11 RX ADMIN — Medication 650 MILLIGRAM(S): at 06:53

## 2022-05-11 RX ADMIN — Medication 2: at 17:09

## 2022-05-11 RX ADMIN — OXYCODONE HYDROCHLORIDE 10 MILLIGRAM(S): 5 TABLET ORAL at 06:53

## 2022-05-11 RX ADMIN — Medication 300 MILLIGRAM(S): at 10:03

## 2022-05-11 RX ADMIN — Medication 650 MILLIGRAM(S): at 13:02

## 2022-05-11 RX ADMIN — SIMETHICONE 80 MILLIGRAM(S): 80 TABLET, CHEWABLE ORAL at 12:54

## 2022-05-11 RX ADMIN — ATORVASTATIN CALCIUM 40 MILLIGRAM(S): 80 TABLET, FILM COATED ORAL at 22:13

## 2022-05-11 RX ADMIN — OXYCODONE HYDROCHLORIDE 10 MILLIGRAM(S): 5 TABLET ORAL at 05:53

## 2022-05-11 RX ADMIN — HYDROMORPHONE HYDROCHLORIDE 0.5 MILLIGRAM(S): 2 INJECTION INTRAMUSCULAR; INTRAVENOUS; SUBCUTANEOUS at 09:26

## 2022-05-11 RX ADMIN — HYDROMORPHONE HYDROCHLORIDE 0.5 MILLIGRAM(S): 2 INJECTION INTRAMUSCULAR; INTRAVENOUS; SUBCUTANEOUS at 08:54

## 2022-05-11 RX ADMIN — INSULIN GLARGINE 29 UNIT(S): 100 INJECTION, SOLUTION SUBCUTANEOUS at 22:13

## 2022-05-11 RX ADMIN — Medication 14 UNIT(S): at 08:53

## 2022-05-11 NOTE — PROGRESS NOTE ADULT - ASSESSMENT
45 yo M, history of previous alcohol and cocaine use w/ PMHx of HTN (not treated), HLD (on meds), DM (started in 2021 on OAD and insulin), osteomyelitis with amputation of the right 4th and 5th rays and partial amputation of the right 2nd digit, presents to the Emergency Department with foot pain / wound drainage. Podiatry consulted for right foot wound. VSS, leukocytosis present, elevated CRP, medial 1st MPJ wound actively draining purulence, pt found to have dorsal hallux abscess with 5ccs expressed after bedside Incision and drainage. Xray showing cortical changes of the medial 1st metatarsal head and 1st proximal phalanx high likelihood of OM.  Deep wound ED Cx: MRSA. Pt is s/p R foot I&D + debridement of plantar wound (5/3), approx 5cc purulence expressed. Possible RTOR during this admission at a later date Amputation. OR Cx growing MRSA in bone and deep Cx's, Bone pathology showing acute OM. Pt is s/p right foot TMA, surgical site is closed w/ penrose drain in place (5/10).    Plan:   - Abx per ID  - f/u OR Cx and Path (forefoot specimen)  - Local wound care: DSD, ABD, Kerlix, and ACE wrap  - NWB to RLE  - Rest of care per primary team    Podiatry following. Plan d/w attending.

## 2022-05-11 NOTE — PROGRESS NOTE ADULT - PROBLEM SELECTOR PLAN 4
Patient with HTN on home losartan 25mg daily   -losartan increased to 75mg daily due to consistently elevated BPs  -monitor BPs      #transaminitis   -patient with mild elevation in ALT/AST , likely 2/2 fatty liver changes in setting of uncontrolled DM   -continue to trend, downtrended today

## 2022-05-11 NOTE — PROGRESS NOTE ADULT - ASSESSMENT
Mr Valentin is a 46M w/ history of previous alcohol and cocaine use and PMHx of HTN, HLD, DM, and right foot osteomyelitis with amputation of the right 4th and 5th toes and partial amputation of the right 2nd digit, p/w 1-day h/o right foot wound drainage of great toe, admitted for management of R foot abscess and acute right foot OM now s/p right great toe TMA.

## 2022-05-11 NOTE — PROGRESS NOTE ADULT - SUBJECTIVE AND OBJECTIVE BOX
POST OP NOTE    ARIEL MOURA  MRN-[id number]    Procedure: TMA right foot  Surgeon: Dr. Norma DPM  Assistants: Bradly Moreno DPM PGY1    Patient tolerated procedure well without incident.  Patient transferred to PACU in stable condition. Pt evaluated at bedside. Noted to be in a lot of pain post op; improved w/ IV Dilaudid Dressings are C/D/I.    PE / Post Op Check:       GEN: NAD, AAOx3, resting comfortably with pain controlled      LE Focused: CFT shows adequate perfusion b/l with no signs of ischemic compromise.  No strikethrough is appreciated on surgical dressings.  Dressings were dry, clean, and intact.  No neuromuscular deficits appreciated.

## 2022-05-11 NOTE — PROGRESS NOTE ADULT - ASSESSMENT
47 yo M, history of previous alcohol and cocaine use w/ PMHx of HTN (not treated), HLD (on meds), DM (started in 2021 on OAD and insulin), osteomyelitis with amputation of the right 4th and 5th rays and partial amputation of the right 2nd digit, presents to the Emergency Department with foot pain / wound drainage. Podiatry consulted for right foot wound. VSS, leukocytosis present, elevated CRP, medial 1st MPJ wound actively draining purulence, pt found to have dorsal hallux abscess with 5ccs expressed after bedside Incision and drainage. Xray showing cortical changes of the medial 1st metatarsal head and 1st proximal phalanx high likelihood of OM.  Deep wound ED Cx: MRSA. Pt is s/p R foot I&D + debridement of plantar wound (5/3), approx 5cc purulence expressed. Possible RTOR during this admission at a later date Amputation. OR Cx growing MRSA in bone and deep Cx's, Bone pathology showing acute OM. Pt is s/p right foot TMA, surgical site is closed w/ penrose drain in place (5/10).    Plan:   - c/w IV ABX per ID  - will f/u OR Cx and Path (forefoot specimen)  - Local wound care: DSD, ABD, Kerlix, and ACE wrap  - Recc NWB to RLE; can heel WB for PT as needed, but recc minimizing pressure to R foot  - Rest of care per primary team    Podiatry following. Plan d/w attending.

## 2022-05-11 NOTE — PROGRESS NOTE ADULT - SUBJECTIVE AND OBJECTIVE BOX
OVERNIGHT EVENTS: Patient taken to OR for right foot TMA. Diet restarted afterwards.     SUBJECTIVE / INTERVAL HPI: Patient seen and examined at bedside. Reports he is feeling well and the surgery went well. Mild nausea post-op however resolved. Ate little bit last night after surgery. Endorsing severe pain in his right foot post-op. Took 10mg PO oxycodone around 5am without significant relief. Otherwise denies any complaints. Patient denying chest pain, SOB, palpitations, cough. Patient denies fever, chills, HA, Dizziness, change in vision/hearing, N/V, abdominal pain, diarrhea, constipation, hematochezia/melena, dysuria, hematuria, new onset weakness/numbness.  Remaining ROS negative       PHYSICAL EXAM:    Gen: sitting up in chair comfortably in NAD with feet elevated   HEENT: NC/AT, PERRL, anicteric sclera, no JVD, no thyromegaly, MMM  Cardio: +S1/S2, RRR, no murmurs  Resp: CTA b/l, no w/r/r, satting well on RA  GI: +BS x4, soft NT/ND  Ext: WWP, no peripheral edema, NROM x4, no cyanosis or clubbing. +R foot s/p TMA of great toe (and prior TMA of other 4 digits) wrapped in dressing up to ankle.    Vasc: 2+ peripheral pulses b/l  Skin: warm, dry, and intact. No rashes.  Neuro: AAOx3, no focal deficits    VITAL SIGNS:  Vital Signs Last 24 Hrs  T(C): 36.7 (11 May 2022 05:45), Max: 36.7 (10 May 2022 23:57)  T(F): 98 (11 May 2022 05:45), Max: 98.1 (10 May 2022 23:57)  HR: 87 (11 May 2022 05:45) (72 - 87)  BP: 137/87 (11 May 2022 05:45) (137/87 - 174/97)  BP(mean): 101 (11 May 2022 00:59) (101 - 110)  RR: 18 (11 May 2022 05:45) (17 - 20)  SpO2: 98% (11 May 2022 05:45) (98% - 100%)      MEDICATIONS:  MEDICATIONS  (STANDING):  acetaminophen     Tablet .. 650 milliGRAM(s) Oral every 6 hours  atorvastatin 40 milliGRAM(s) Oral at bedtime  enoxaparin Injectable 40 milliGRAM(s) SubCutaneous every 24 hours  gabapentin 100 milliGRAM(s) Oral every 8 hours  insulin glargine Injectable (LANTUS) 29 Unit(s) SubCutaneous at bedtime  insulin lispro (ADMELOG) corrective regimen sliding scale   SubCutaneous Before meals and at bedtime  insulin lispro Injectable (ADMELOG) 14 Unit(s) SubCutaneous three times a day before meals  losartan 75 milliGRAM(s) Oral daily  polyethylene glycol 3350 17 Gram(s) Oral two times a day  senna 2 Tablet(s) Oral at bedtime  simethicone 80 milliGRAM(s) Chew daily    MEDICATIONS  (PRN):  oxyCODONE    IR 5 milliGRAM(s) Oral every 6 hours PRN Moderate Pain (4 - 6)  oxyCODONE    IR 10 milliGRAM(s) Oral every 6 hours PRN Severe Pain (7 - 10)  traMADol 25 milliGRAM(s) Oral every 6 hours PRN Mild Pain (1 - 3)      ALLERGIES:  Allergies    No Known Allergies    Intolerances        LABS:                        12.0   12.49 )-----------( 569      ( 11 May 2022 06:18 )             37.0     05-11    137  |  102  |  20  ----------------------------<  226<H>  4.5   |  26  |  1.08    Ca    8.9      11 May 2022 06:18  Phos  3.2     05-10  Mg     1.7     05-11    TPro  7.3  /  Alb  3.4  /  TBili  0.2  /  DBili  x   /  AST  36  /  ALT  55<H>  /  AlkPhos  59  05-11    PT/INR - ( 10 May 2022 08:23 )   PT: 12.0 sec;   INR: 1.01          PTT - ( 10 May 2022 08:23 )  PTT:39.2 sec    CAPILLARY BLOOD GLUCOSE      POCT Blood Glucose.: 199 mg/dL (11 May 2022 11:44)      RADIOLOGY & ADDITIONAL TESTS: Reviewed.

## 2022-05-11 NOTE — PROGRESS NOTE ADULT - ASSESSMENT
46M w/ history of previous alcohol and cocaine use and PMHx of HTN, HLD, DM, and right foot osteomyelitis with amputation of the right 4th and 5th toes and partial amputation of the right 2nd digit, p/w 1-day h/o right foot wound drainage of medial great toe, and hallux abscess. Previous wound cultures from Feb 2022 grew MRSA, GBS, Gemella and Bacteroides-pt finished 6 weeks IV Vanc and flagyl 3/21, deep wound culture taken in ED 5/1-MRSA. Treatment of diabetic foot infection, suspected acute on chronic OM, OR cultures (tissue, wound, and bone) from 5/3 growing MRSA, no acid fast bacilli. Pt clinically improving, downtrending WBC, afebrile. Now s/p TMA 5/10 of R toes 1-3 with plantar skin flap to close TMA site.    Recommendations:  1. Continue Vancomycin 1500mg Q12- will cover MRSA found in cultures  2. f/up 5/10 OR clean margin for both culture and pathology. It will determine antibiotics duration and route     ID Team 1 will follow.   Note not final until attending attestation

## 2022-05-11 NOTE — PROGRESS NOTE ADULT - ATTENDING COMMENTS
Agree with above.  He is s/p TMA on 5/10.  Will follow up tissue culture from proximal margin and pathology.  If no evidence of infection, can likely stop antibiotics.  If there is evidence of infection he will need an additional 6 weeks of antibiotics

## 2022-05-11 NOTE — PROGRESS NOTE ADULT - PROBLEM SELECTOR PLAN 6
F: None   E: Replete for K<4, Mag<2  N: DASH/TLC CC diet   DVT ppx: lovenox   Full Code   Dispo: JUNIOR

## 2022-05-11 NOTE — PROGRESS NOTE ADULT - SUBJECTIVE AND OBJECTIVE BOX
Patient is a 46y old  Male who presents with a chief complaint of right foot wound drainage (10 May 2022 11:45)      INTERVAL HPI/ OVERNIGHT EVENTS: Pt seen and examined bedside.       LABS                        12.0   12.49 )-----------( 569      ( 11 May 2022 06:18 )             37.0     05-11    137  |  102  |  20  ----------------------------<  226<H>  4.5   |  26  |  1.08    Ca    8.9      11 May 2022 06:18  Phos  3.2     05-10  Mg     1.7     05-11    TPro  7.3  /  Alb  3.4  /  TBili  0.2  /  DBili  x   /  AST  36  /  ALT  55<H>  /  AlkPhos  59  05-11    PT/INR - ( 10 May 2022 08:23 )   PT: 12.0 sec;   INR: 1.01          PTT - ( 10 May 2022 08:23 )  PTT:39.2 sec    ICU Vital Signs Last 24 Hrs  T(C): 36.7 (11 May 2022 05:45), Max: 36.7 (10 May 2022 12:30)  T(F): 98 (11 May 2022 05:45), Max: 98.1 (10 May 2022 12:30)  HR: 87 (11 May 2022 05:45) (72 - 87)  BP: 137/87 (11 May 2022 05:45) (137/87 - 174/97)  BP(mean): 101 (11 May 2022 00:59) (101 - 110)  ABP: --  ABP(mean): --  RR: 18 (11 May 2022 05:45) (17 - 20)  SpO2: 98% (11 May 2022 05:45) (98% - 100%)    RADIOLOGY  < from: Xray Foot AP + Lateral, Right (05.02.22 @ 03:56) >  IMPRESSION: Frontal and lateral views of the right foot and right ankle   demonstrate soft tissue defect adjacent to the fourth metatarsal stump.   Transmetatarsal amputation of the fourth and fifth metatarsals. Absence   of the distal phalanges of the second digit. Chronic arthropathy of the   third PIP joint. Ankylosis of the third PIP joint. Charcot arthropathy   and periarticular osteopenia surrounding the Lisfranc joint. Hindfoot   valgus and pes planus. Arterial vascular calcification. Soft tissue   defect along the medial aspect of the foot adjacent to the MCP joint.   Fragmentation of the sesamoid. Tibiotalar joint is in appropriate   alignment.    < end of copied text >    PHYSICAL EXAM  Lower Extremity Focused  -S/p TMA, dressing c/d/i

## 2022-05-11 NOTE — PACU DISCHARGE NOTE - COMMENTS
pt met PACU discharge criteria report given to RN, pt transferred to 70 pt met PACU discharge criteria report given to Elisha OVIEDO, pt transferred to 02

## 2022-05-11 NOTE — PROGRESS NOTE ADULT - PROBLEM SELECTOR PLAN 2
H/o right foot osteomyelitis with amputation of the right 4th and 5th toes and partial amputation of the right 2nd digit. now s/p right great toe TMA for new acute osteo as bone cultures from right great toe growing MRSA   - Management as above   - F/u podiatry recs

## 2022-05-11 NOTE — PROGRESS NOTE ADULT - TIME BILLING
treatment of diabetic foot infection
treatment of osteomyelitis
treatment of diabetic foot infection

## 2022-05-11 NOTE — PROGRESS NOTE ADULT - PROBLEM SELECTOR PLAN 1
Patient presenting with purulent drainage from right great toe noted on home dressing change. S/p bedside incision and drainage of right dorsal hallux abscess performed in ED, 5cc drained, per podiatry.   -Xray right foot/ankle showing soft tissue defect adjacent to the fourth metatarsal stump  -podiatry following , appreciate recs    -s/p right foot I&D and debridement in OR on 5/3; deep wound and bone cultures obtained growing MRSA; surgical path report revealing acute osteomyelitis of right foot  -s/p right foot TMA last night in OR by podiatry with repeat culture and pathology on clean margins sent  -f/u OR Cx and Path (forefoot specimen) 5/10, results will dictate further abx duration and route   -C/w Vancomycin for now   -ID following, appreciate recs   -BCs 5/2 NGTD   -pain regimen: standing tylenol, tramadol 25 q6 prn mild pain, oxycodone 5mg q6 prn mod pain, oxycodone 10mg q6 prn severe pain  -NWB to RLE , can heel weight bear as needed but minimize pressure   -PT consulted  -new leukocytosis today likely reactive post-op

## 2022-05-11 NOTE — PROGRESS NOTE ADULT - SUBJECTIVE AND OBJECTIVE BOX
INFECTIOUS DISEASES CONSULT FOLLOW-UP NOTE    INTERVAL HPI/OVERNIGHT EVENTS: status-post OR for TMA yesterday. Afebrile overnight. Patient reports pain at surgical site that is intermittently tolerable with oxycodone. Denies fevers, chills, cough, abdominal pain, diarrhea.      ANTIBIOTICS/RELEVANT:    MEDICATIONS  (STANDING):  acetaminophen     Tablet .. 650 milliGRAM(s) Oral every 6 hours  atorvastatin 40 milliGRAM(s) Oral at bedtime  gabapentin 100 milliGRAM(s) Oral every 8 hours  insulin glargine Injectable (LANTUS) 29 Unit(s) SubCutaneous at bedtime  insulin lispro (ADMELOG) corrective regimen sliding scale   SubCutaneous Before meals and at bedtime  insulin lispro Injectable (ADMELOG) 14 Unit(s) SubCutaneous three times a day before meals  losartan 75 milliGRAM(s) Oral daily  polyethylene glycol 3350 17 Gram(s) Oral two times a day  senna 2 Tablet(s) Oral at bedtime  simethicone 80 milliGRAM(s) Chew daily  vancomycin  IVPB 1500 milliGRAM(s) IV Intermittent every 12 hours    MEDICATIONS  (PRN):  HYDROmorphone  Injectable 0.5 milliGRAM(s) IV Push every 15 minutes PRN Moderate Pain (4 - 6)  HYDROmorphone  Injectable 1 milliGRAM(s) IV Push every 15 minutes PRN Severe Pain (7 - 10)  oxyCODONE    IR 5 milliGRAM(s) Oral every 6 hours PRN Moderate Pain (4 - 6)  oxyCODONE    IR 10 milliGRAM(s) Oral every 6 hours PRN Severe Pain (7 - 10)  traMADol 25 milliGRAM(s) Oral every 6 hours PRN Mild Pain (1 - 3)        Vital Signs Last 24 Hrs  T(C): 36.7 (11 May 2022 05:45), Max: 36.7 (10 May 2022 12:30)  T(F): 98 (11 May 2022 05:45), Max: 98.1 (10 May 2022 12:30)  HR: 87 (11 May 2022 05:45) (72 - 87)  BP: 137/87 (11 May 2022 05:45) (137/87 - 174/97)  BP(mean): 101 (11 May 2022 00:59) (101 - 110)  RR: 18 (11 May 2022 05:45) (17 - 20)  SpO2: 98% (11 May 2022 05:45) (98% - 100%)      PHYSICAL EXAM  Constitutional: alert, NAD  Eyes: the sclera and conjunctiva were normal.   ENT: the ears and nose were normal in appearance.   Neck: the appearance of the neck was normal and the neck was supple.   Pulmonary: no respiratory distress and lungs CTA bilaterally.   Heart: heart rate was normal and rhythm regular, normal S1 and S2  Extremities: R foot ACE wrapped  Abdomen: normal bowel sounds, soft, non-tender  Neurological: no focal deficits  Psychiatric: the affect was normal      LABS:                        12.0   12.49 )-----------( 569      ( 11 May 2022 06:18 )             37.0     05-11    137  |  102  |  20  ----------------------------<  226<H>  4.5   |  26  |  1.08    Ca    8.9      11 May 2022 06:18  Phos  3.2     05-10  Mg     1.7     05-11    TPro  7.3  /  Alb  3.4  /  TBili  0.2  /  DBili  x   /  AST  36  /  ALT  55<H>  /  AlkPhos  59  05-11    PT/INR - ( 10 May 2022 08:23 )   PT: 12.0 sec;   INR: 1.01          PTT - ( 10 May 2022 08:23 )  PTT:39.2 sec      MICROBIOLOGY:      RADIOLOGY & ADDITIONAL STUDIES:  Reviewed

## 2022-05-11 NOTE — PROGRESS NOTE ADULT - PROBLEM SELECTOR PLAN 3
History of DM2. Home meds: lantus 35, lispro 8 TID, metformin 750 BID.   -A1C 8.9 5/2  -c/w lantus to 29 units bedtime   -c/w lispro to 14 units TID premeal  -c/w mISS   -hold home PO diabetic meds while inpatient   -c/w home gabapentin 100 TID for nerve pain  -monitor fingerstick glucose

## 2022-05-12 LAB
ALBUMIN SERPL ELPH-MCNC: 3.5 G/DL — SIGNIFICANT CHANGE UP (ref 3.3–5)
ALP SERPL-CCNC: 88 U/L — SIGNIFICANT CHANGE UP (ref 40–120)
ALT FLD-CCNC: 42 U/L — SIGNIFICANT CHANGE UP (ref 10–45)
ANION GAP SERPL CALC-SCNC: 8 MMOL/L — SIGNIFICANT CHANGE UP (ref 5–17)
AST SERPL-CCNC: 20 U/L — SIGNIFICANT CHANGE UP (ref 10–40)
BILIRUB SERPL-MCNC: 0.2 MG/DL — SIGNIFICANT CHANGE UP (ref 0.2–1.2)
BUN SERPL-MCNC: 20 MG/DL — SIGNIFICANT CHANGE UP (ref 7–23)
CALCIUM SERPL-MCNC: 9.3 MG/DL — SIGNIFICANT CHANGE UP (ref 8.4–10.5)
CHLORIDE SERPL-SCNC: 102 MMOL/L — SIGNIFICANT CHANGE UP (ref 96–108)
CO2 SERPL-SCNC: 26 MMOL/L — SIGNIFICANT CHANGE UP (ref 22–31)
CREAT SERPL-MCNC: 1.06 MG/DL — SIGNIFICANT CHANGE UP (ref 0.5–1.3)
EGFR: 88 ML/MIN/1.73M2 — SIGNIFICANT CHANGE UP
GLUCOSE BLDC GLUCOMTR-MCNC: 115 MG/DL — HIGH (ref 70–99)
GLUCOSE BLDC GLUCOMTR-MCNC: 130 MG/DL — HIGH (ref 70–99)
GLUCOSE BLDC GLUCOMTR-MCNC: 140 MG/DL — HIGH (ref 70–99)
GLUCOSE BLDC GLUCOMTR-MCNC: 148 MG/DL — HIGH (ref 70–99)
GLUCOSE SERPL-MCNC: 173 MG/DL — HIGH (ref 70–99)
HCT VFR BLD CALC: 36 % — LOW (ref 39–50)
HGB BLD-MCNC: 11.6 G/DL — LOW (ref 13–17)
MAGNESIUM SERPL-MCNC: 1.6 MG/DL — SIGNIFICANT CHANGE UP (ref 1.6–2.6)
MCHC RBC-ENTMCNC: 29 PG — SIGNIFICANT CHANGE UP (ref 27–34)
MCHC RBC-ENTMCNC: 32.2 GM/DL — SIGNIFICANT CHANGE UP (ref 32–36)
MCV RBC AUTO: 90 FL — SIGNIFICANT CHANGE UP (ref 80–100)
NRBC # BLD: 0 /100 WBCS — SIGNIFICANT CHANGE UP (ref 0–0)
PLATELET # BLD AUTO: 531 K/UL — HIGH (ref 150–400)
POTASSIUM SERPL-MCNC: 4.7 MMOL/L — SIGNIFICANT CHANGE UP (ref 3.5–5.3)
POTASSIUM SERPL-SCNC: 4.7 MMOL/L — SIGNIFICANT CHANGE UP (ref 3.5–5.3)
PROT SERPL-MCNC: 7.6 G/DL — SIGNIFICANT CHANGE UP (ref 6–8.3)
RBC # BLD: 4 M/UL — LOW (ref 4.2–5.8)
RBC # FLD: 14 % — SIGNIFICANT CHANGE UP (ref 10.3–14.5)
SODIUM SERPL-SCNC: 136 MMOL/L — SIGNIFICANT CHANGE UP (ref 135–145)
WBC # BLD: 8.58 K/UL — SIGNIFICANT CHANGE UP (ref 3.8–10.5)
WBC # FLD AUTO: 8.58 K/UL — SIGNIFICANT CHANGE UP (ref 3.8–10.5)

## 2022-05-12 PROCEDURE — 99232 SBSQ HOSP IP/OBS MODERATE 35: CPT | Mod: GC

## 2022-05-12 PROCEDURE — 99233 SBSQ HOSP IP/OBS HIGH 50: CPT

## 2022-05-12 RX ORDER — MAGNESIUM SULFATE 500 MG/ML
2 VIAL (ML) INJECTION ONCE
Refills: 0 | Status: COMPLETED | OUTPATIENT
Start: 2022-05-12 | End: 2022-05-12

## 2022-05-12 RX ADMIN — Medication 650 MILLIGRAM(S): at 12:47

## 2022-05-12 RX ADMIN — Medication 14 UNIT(S): at 08:44

## 2022-05-12 RX ADMIN — Medication 650 MILLIGRAM(S): at 07:22

## 2022-05-12 RX ADMIN — LOSARTAN POTASSIUM 75 MILLIGRAM(S): 100 TABLET, FILM COATED ORAL at 05:58

## 2022-05-12 RX ADMIN — OXYCODONE HYDROCHLORIDE 10 MILLIGRAM(S): 5 TABLET ORAL at 01:20

## 2022-05-12 RX ADMIN — Medication 650 MILLIGRAM(S): at 01:13

## 2022-05-12 RX ADMIN — OXYCODONE HYDROCHLORIDE 10 MILLIGRAM(S): 5 TABLET ORAL at 02:56

## 2022-05-12 RX ADMIN — GABAPENTIN 100 MILLIGRAM(S): 400 CAPSULE ORAL at 05:58

## 2022-05-12 RX ADMIN — OXYCODONE HYDROCHLORIDE 10 MILLIGRAM(S): 5 TABLET ORAL at 22:51

## 2022-05-12 RX ADMIN — Medication 166.67 MILLIGRAM(S): at 21:52

## 2022-05-12 RX ADMIN — ENOXAPARIN SODIUM 40 MILLIGRAM(S): 100 INJECTION SUBCUTANEOUS at 09:49

## 2022-05-12 RX ADMIN — Medication 14 UNIT(S): at 17:12

## 2022-05-12 RX ADMIN — Medication 166.67 MILLIGRAM(S): at 00:48

## 2022-05-12 RX ADMIN — Medication 166.67 MILLIGRAM(S): at 09:50

## 2022-05-12 RX ADMIN — INSULIN GLARGINE 29 UNIT(S): 100 INJECTION, SOLUTION SUBCUTANEOUS at 22:05

## 2022-05-12 RX ADMIN — OXYCODONE HYDROCHLORIDE 10 MILLIGRAM(S): 5 TABLET ORAL at 09:49

## 2022-05-12 RX ADMIN — Medication 14 UNIT(S): at 12:55

## 2022-05-12 RX ADMIN — Medication 650 MILLIGRAM(S): at 05:58

## 2022-05-12 RX ADMIN — Medication 650 MILLIGRAM(S): at 13:30

## 2022-05-12 RX ADMIN — Medication 650 MILLIGRAM(S): at 00:11

## 2022-05-12 RX ADMIN — OXYCODONE HYDROCHLORIDE 10 MILLIGRAM(S): 5 TABLET ORAL at 21:51

## 2022-05-12 RX ADMIN — Medication 25 GRAM(S): at 11:30

## 2022-05-12 RX ADMIN — GABAPENTIN 100 MILLIGRAM(S): 400 CAPSULE ORAL at 21:52

## 2022-05-12 RX ADMIN — SIMETHICONE 80 MILLIGRAM(S): 80 TABLET, CHEWABLE ORAL at 12:47

## 2022-05-12 RX ADMIN — GABAPENTIN 100 MILLIGRAM(S): 400 CAPSULE ORAL at 13:33

## 2022-05-12 RX ADMIN — Medication 650 MILLIGRAM(S): at 17:55

## 2022-05-12 RX ADMIN — Medication 650 MILLIGRAM(S): at 17:12

## 2022-05-12 RX ADMIN — OXYCODONE HYDROCHLORIDE 10 MILLIGRAM(S): 5 TABLET ORAL at 10:30

## 2022-05-12 RX ADMIN — ATORVASTATIN CALCIUM 40 MILLIGRAM(S): 80 TABLET, FILM COATED ORAL at 22:11

## 2022-05-12 NOTE — PROGRESS NOTE ADULT - SUBJECTIVE AND OBJECTIVE BOX
Patient is a 46y old  Male who presents with a chief complaint of right wound drainage (11 May 2022 12:37)      INTERVAL HPI/ OVERNIGHT EVENTS: Pt seen and examined bedside.      LABS                        11.6   8.58  )-----------( 531      ( 12 May 2022 07:47 )             36.0     05-12    136  |  102  |  20  ----------------------------<  173<H>  4.7   |  26  |  1.06    Ca    9.3      12 May 2022 07:47  Mg     1.6     05-12    TPro  7.6  /  Alb  3.5  /  TBili  0.2  /  DBili  x   /  AST  20  /  ALT  42  /  AlkPhos  88  05-12    ICU Vital Signs Last 24 Hrs  T(C): 36.7 (12 May 2022 06:07), Max: 36.7 (12 May 2022 06:07)  T(F): 98.1 (12 May 2022 06:07), Max: 98.1 (12 May 2022 06:07)  HR: 79 (12 May 2022 06:07) (72 - 84)  BP: 124/80 (12 May 2022 06:07) (118/77 - 146/82)  BP(mean): --  ABP: --  ABP(mean): --  RR: 17 (12 May 2022 06:07) (17 - 18)  SpO2: 97% (12 May 2022 06:07) (97% - 99%)    RADIOLOGY  < from: Xray Foot AP + Lateral, Right (05.02.22 @ 03:56) >  IMPRESSION: Frontal and lateral views of the right foot and right ankle   demonstrate soft tissue defect adjacent to the fourth metatarsal stump.   Transmetatarsal amputation of the fourth and fifth metatarsals. Absence   of the distal phalanges of the second digit. Chronic arthropathy of the   third PIP joint. Ankylosis of the third PIP joint. Charcot arthropathy   and periarticular osteopenia surrounding the Lisfranc joint. Hindfoot   valgus and pes planus. Arterial vascular calcification. Soft tissue   defect along the medial aspect of the foot adjacent to the MCP joint.   Fragmentation of the sesamoid. Tibiotalar joint is in appropriate   alignment.    MRI is more sensitive examination for detection of osteomyelitis.    < end of copied text >    MICROBIOLOGY  Culture - Tissue with Gram Stain (05.11.22 @ 08:56)    Gram Stain:   No organisms seen  No White blood cells    Specimen Source: .Tissue Right Forefoot For Culture    Culture Results:   No growth to date    PHYSICAL EXAM  Lower Extremity Focused  R foot TMA w/ sutures intact. Penrose drain noted. Mild sanguinous strikethrough noted in dressing. Extremity WWP. No localized signs of infection.

## 2022-05-12 NOTE — PROGRESS NOTE ADULT - SUBJECTIVE AND OBJECTIVE BOX
OVERNIGHT EVENTS: NAEO    SUBJECTIVE / INTERVAL HPI: Patient seen and examined at bedside. Reports he is overall feeling well and the pain in his right foot is better controlled this morning. Received 2 doses of IV dilaudid yesterday which helped the pain greatly, agreed to try to transition to only taking tramadol or PO oxycodone today. Ambulating with NWB to RLE and OOBTC. Denies any other complaints. LBM this morning. Good PO intake. Patient denying chest pain, SOB, palpitations, cough. Patient denies fever, chills, HA, Dizziness, change in vision/hearing, N/V, abdominal pain, diarrhea, constipation, hematochezia/melena, dysuria, hematuria, new onset weakness/numbness.  Remaining ROS negative       PHYSICAL EXAM:    Gen: lying in bed comfortably in NAD   HEENT: NC/AT, PERRL, anicteric sclera, no JVD, no thyromegaly, MMM  Cardio: +S1/S2, RRR, no murmurs  Resp: CTA b/l, no w/r/r, satting well on RA  GI: +BS x4, soft NT/ND  Ext: WWP, no peripheral edema, NROM x4, no cyanosis or clubbing. +R foot s/p TMA of great toe (and prior TMA of other 4 digits) wrapped in dressing up to ankle.    Vasc: 2+ peripheral pulses b/l  Skin: warm, dry, and intact. No rashes.  Neuro: AAOx3, no focal deficits    VITAL SIGNS:  Vital Signs Last 24 Hrs  T(C): 36.7 (12 May 2022 06:07), Max: 36.7 (12 May 2022 06:07)  T(F): 98.1 (12 May 2022 06:07), Max: 98.1 (12 May 2022 06:07)  HR: 79 (12 May 2022 06:07) (72 - 84)  BP: 124/80 (12 May 2022 06:07) (118/77 - 146/82)  BP(mean): --  RR: 17 (12 May 2022 06:07) (17 - 18)  SpO2: 97% (12 May 2022 06:07) (97% - 99%)      MEDICATIONS:  MEDICATIONS  (STANDING):  acetaminophen     Tablet .. 650 milliGRAM(s) Oral every 6 hours  atorvastatin 40 milliGRAM(s) Oral at bedtime  enoxaparin Injectable 40 milliGRAM(s) SubCutaneous every 24 hours  gabapentin 100 milliGRAM(s) Oral every 8 hours  insulin glargine Injectable (LANTUS) 29 Unit(s) SubCutaneous at bedtime  insulin lispro (ADMELOG) corrective regimen sliding scale   SubCutaneous Before meals and at bedtime  insulin lispro Injectable (ADMELOG) 14 Unit(s) SubCutaneous three times a day before meals  losartan 75 milliGRAM(s) Oral daily  magnesium sulfate  IVPB 2 Gram(s) IV Intermittent once  polyethylene glycol 3350 17 Gram(s) Oral two times a day  senna 2 Tablet(s) Oral at bedtime  simethicone 80 milliGRAM(s) Chew daily  vancomycin  IVPB 1250 milliGRAM(s) IV Intermittent every 12 hours    MEDICATIONS  (PRN):  oxyCODONE    IR 5 milliGRAM(s) Oral every 6 hours PRN Moderate Pain (4 - 6)  oxyCODONE    IR 10 milliGRAM(s) Oral every 6 hours PRN Severe Pain (7 - 10)  traMADol 25 milliGRAM(s) Oral every 6 hours PRN Mild Pain (1 - 3)      ALLERGIES:  Allergies    No Known Allergies    Intolerances        LABS:                        11.6   8.58  )-----------( 531      ( 12 May 2022 07:47 )             36.0     05-12    136  |  102  |  20  ----------------------------<  173<H>  4.7   |  26  |  1.06    Ca    9.3      12 May 2022 07:47  Mg     1.6     05-12    TPro  7.6  /  Alb  3.5  /  TBili  0.2  /  DBili  x   /  AST  20  /  ALT  42  /  AlkPhos  88  05-12        CAPILLARY BLOOD GLUCOSE      POCT Blood Glucose.: 140 mg/dL (12 May 2022 08:37)      RADIOLOGY & ADDITIONAL TESTS: Reviewed.

## 2022-05-12 NOTE — PROGRESS NOTE ADULT - ATTENDING COMMENTS
47 yo M with HTN, DM, HLD and recurrent R foot OM s/p I&D on 5/3 - OR cultures with MRSA.  He now is s/p TMA late on 5/10, OR culture (5/11) NGTD.  Vancomycin trough was supratherapeutic and appropriately timed.  Agree with dose adjustment as done by primary team.  Would continue to f/u OR culture, continue vancomycin, monitoring as above.  Will follow with you - team 1.

## 2022-05-12 NOTE — PROGRESS NOTE ADULT - ATTENDING COMMENTS
46M w/ history of previous alcohol and cocaine use and PMHx of HTN, HLD, DM, and right foot osteomyelitis with amputation of the right 4th and 5th toes and partial amputation of the right 2nd digit, p/w 1-day h/o right foot wound drainage of great toe, admitted for management of R foot abscess and acute right foot OM now s/p right great toe TMA.    Patient was seen and examined today. PT was lying in bed comfortably. Right foot in clean and dry dressing. He denied any pain.     # R foot osteomyelitis: s/p right foot TMA 5/10.   awaiting culture from clean margarine after amputation.   Continue vancomycin 1250 mg q12 for now.

## 2022-05-12 NOTE — PROGRESS NOTE ADULT - PROBLEM SELECTOR PLAN 5
Home med: Atorvastatin 40  - C/w home statin      #anemia, normocytic   patient with hgb remaining 11-12 throughout admission; baseline 13  -iron studies showing mixed ASHOK and AOCD   -likely in setting of active infection and chronic osteomyelitis   -no signs of active bleeding on exam  -maintain active T&S; transfuse for hgb <7  -continue to monitor.

## 2022-05-12 NOTE — PROGRESS NOTE ADULT - PROBLEM SELECTOR PLAN 1
Patient presenting with purulent drainage from right great toe noted on home dressing change. S/p bedside incision and drainage of right dorsal hallux abscess performed in ED, 5cc drained, per podiatry.   -Xray right foot/ankle showing soft tissue defect adjacent to the fourth metatarsal stump  -podiatry following , appreciate recs    -s/p right foot I&D and debridement in OR on 5/3; deep wound and bone cultures obtained growing MRSA; surgical path report revealing acute osteomyelitis of right foot  -s/p right foot TMA 5/10 in OR by podiatry with repeat culture and pathology on clean margins sent  -f/u OR Cx and Path (forefoot specimen) 5/10, results will dictate further abx duration and route   -C/w Vancomycin for now   -ID following, appreciate recs   -BCs 5/2 NGTD   -pain regimen: standing tylenol, tramadol 25 q6 prn mild pain, oxycodone 5mg q6 prn mod pain, oxycodone 10mg q6 prn severe pain  -NWB to RLE , can heel weight bear as needed but minimize pressure   -PT consulted

## 2022-05-12 NOTE — PROGRESS NOTE ADULT - PROBLEM SELECTOR PLAN 2
H/o right foot osteomyelitis with amputation of the right 4th and 5th toes and partial amputation of the right 2nd digit. now s/p right foot TMA for new acute osteo as bone cultures from right great toe growing MRSA   - Management as above   - F/u podiatry recs

## 2022-05-12 NOTE — PROGRESS NOTE ADULT - ASSESSMENT
47 yo M, history of previous alcohol and cocaine use w/ PMHx of HTN (not treated), HLD (on meds), DM (started in 2021 on OAD and insulin), osteomyelitis with amputation of the right 4th and 5th rays and partial amputation of the right 2nd digit, presents to the Emergency Department with foot pain / wound drainage. Podiatry consulted for right foot wound. VSS, leukocytosis present, elevated CRP, medial 1st MPJ wound actively draining purulence, pt found to have dorsal hallux abscess with 5ccs expressed after bedside Incision and drainage. Xray showing cortical changes of the medial 1st metatarsal head and 1st proximal phalanx high likelihood of OM.  Deep wound ED Cx: MRSA. Pt is s/p R foot I&D + debridement of plantar wound (5/3), approx 5cc purulence expressed. Possible RTOR during this admission at a later date Amputation. OR Cx growing MRSA in bone and deep Cx's, Bone pathology showing acute OM. Pt is s/p right foot TMA, surgical site is closed w/ penrose drain in place (5/10).    Plan:   - Abx per ID  - f/u OR Cx and Path (forefoot specimen)  - Local wound care: DSD, ABD, Kerlix, and ACE wrap  - NWB to RLE  - Rest of care per primary team    Podiatry following. Plan d/w attending.

## 2022-05-12 NOTE — PROGRESS NOTE ADULT - SUBJECTIVE AND OBJECTIVE BOX
INFECTIOUS DISEASES CONSULT FOLLOW-UP NOTE    INTERVAL HPI/OVERNIGHT EVENTS: 5/10 OR cultures thus far no growth. Patient reports improvement with R foot pain. Has not worked with physical therapy yet. Denies fevers, chills, cough, abdominal pain, diarrhea.      ANTIBIOTICS/RELEVANT:    MEDICATIONS  (STANDING):  acetaminophen     Tablet .. 650 milliGRAM(s) Oral every 6 hours  atorvastatin 40 milliGRAM(s) Oral at bedtime  enoxaparin Injectable 40 milliGRAM(s) SubCutaneous every 24 hours  gabapentin 100 milliGRAM(s) Oral every 8 hours  insulin glargine Injectable (LANTUS) 29 Unit(s) SubCutaneous at bedtime  insulin lispro (ADMELOG) corrective regimen sliding scale   SubCutaneous Before meals and at bedtime  insulin lispro Injectable (ADMELOG) 14 Unit(s) SubCutaneous three times a day before meals  losartan 75 milliGRAM(s) Oral daily  polyethylene glycol 3350 17 Gram(s) Oral two times a day  senna 2 Tablet(s) Oral at bedtime  simethicone 80 milliGRAM(s) Chew daily  vancomycin  IVPB 1250 milliGRAM(s) IV Intermittent every 12 hours    MEDICATIONS  (PRN):  oxyCODONE    IR 5 milliGRAM(s) Oral every 6 hours PRN Moderate Pain (4 - 6)  oxyCODONE    IR 10 milliGRAM(s) Oral every 6 hours PRN Severe Pain (7 - 10)  traMADol 25 milliGRAM(s) Oral every 6 hours PRN Mild Pain (1 - 3)        Vital Signs Last 24 Hrs  T(C): 36.7 (12 May 2022 06:07), Max: 36.7 (12 May 2022 06:07)  T(F): 98.1 (12 May 2022 06:07), Max: 98.1 (12 May 2022 06:07)  HR: 79 (12 May 2022 06:07) (72 - 84)  BP: 124/80 (12 May 2022 06:07) (118/77 - 146/82)  BP(mean): --  RR: 17 (12 May 2022 06:07) (17 - 18)  SpO2: 97% (12 May 2022 06:07) (97% - 99%)      PHYSICAL EXAM  Constitutional: alert, NAD  Eyes: the sclera and conjunctiva were normal.   ENT: the ears and nose were normal in appearance.   Neck: the appearance of the neck was normal and the neck was supple.   Pulmonary: no respiratory distress and lungs CTA bilaterally.   Heart: heart rate was normal and rhythm regular, normal S1 and S2  Vascular: no peripheral edema  Extremities: R foot ACE wrapped  Abdomen: normal bowel sounds, soft, non-tender  Neurological: no focal deficits  Psychiatric: the affect was normal      LABS:                        11.6   8.58  )-----------( 531      ( 12 May 2022 07:47 )             36.0     05-12    136  |  102  |  20  ----------------------------<  173<H>  4.7   |  26  |  1.06    Ca    9.3      12 May 2022 07:47  Phos  3.2     05-10  Mg     1.6     05-12    TPro  7.6  /  Alb  3.5  /  TBili  0.2  /  DBili  x   /  AST  20  /  ALT  42  /  AlkPhos  88  05-12    PT/INR - ( 10 May 2022 08:23 )   PT: 12.0 sec;   INR: 1.01          PTT - ( 10 May 2022 08:23 )  PTT:39.2 sec      MICROBIOLOGY:    Culture - Tissue with Gram Stain (collected 11 May 2022 08:56)  Source: .Tissue Right Forefoot For Culture  Gram Stain (11 May 2022 17:01):    No organisms seen    No White blood cells        RADIOLOGY & ADDITIONAL STUDIES:  Reviewed INFECTIOUS DISEASES CONSULT FOLLOW-UP NOTE    INTERVAL HPI/OVERNIGHT EVENTS: 5/10 OR cultures thus far no growth. Vanco trough 5/11 8pm 20.6 so dose decreased from 1500 BID to 1250mg BID. Patient reports improvement with R foot pain. Has not worked with physical therapy yet. Denies fevers, chills, cough, abdominal pain, diarrhea.      ANTIBIOTICS/RELEVANT:    MEDICATIONS  (STANDING):  acetaminophen     Tablet .. 650 milliGRAM(s) Oral every 6 hours  atorvastatin 40 milliGRAM(s) Oral at bedtime  enoxaparin Injectable 40 milliGRAM(s) SubCutaneous every 24 hours  gabapentin 100 milliGRAM(s) Oral every 8 hours  insulin glargine Injectable (LANTUS) 29 Unit(s) SubCutaneous at bedtime  insulin lispro (ADMELOG) corrective regimen sliding scale   SubCutaneous Before meals and at bedtime  insulin lispro Injectable (ADMELOG) 14 Unit(s) SubCutaneous three times a day before meals  losartan 75 milliGRAM(s) Oral daily  polyethylene glycol 3350 17 Gram(s) Oral two times a day  senna 2 Tablet(s) Oral at bedtime  simethicone 80 milliGRAM(s) Chew daily  vancomycin  IVPB 1250 milliGRAM(s) IV Intermittent every 12 hours    MEDICATIONS  (PRN):  oxyCODONE    IR 5 milliGRAM(s) Oral every 6 hours PRN Moderate Pain (4 - 6)  oxyCODONE    IR 10 milliGRAM(s) Oral every 6 hours PRN Severe Pain (7 - 10)  traMADol 25 milliGRAM(s) Oral every 6 hours PRN Mild Pain (1 - 3)        Vital Signs Last 24 Hrs  T(C): 36.7 (12 May 2022 06:07), Max: 36.7 (12 May 2022 06:07)  T(F): 98.1 (12 May 2022 06:07), Max: 98.1 (12 May 2022 06:07)  HR: 79 (12 May 2022 06:07) (72 - 84)  BP: 124/80 (12 May 2022 06:07) (118/77 - 146/82)  BP(mean): --  RR: 17 (12 May 2022 06:07) (17 - 18)  SpO2: 97% (12 May 2022 06:07) (97% - 99%)      PHYSICAL EXAM  Constitutional: alert, NAD  Eyes: the sclera and conjunctiva were normal.   ENT: the ears and nose were normal in appearance.   Neck: the appearance of the neck was normal and the neck was supple.   Pulmonary: no respiratory distress and lungs CTA bilaterally.   Heart: heart rate was normal and rhythm regular, normal S1 and S2  Vascular: no peripheral edema  Extremities: R foot ACE wrapped  Abdomen: normal bowel sounds, soft, non-tender  Neurological: no focal deficits  Psychiatric: the affect was normal      LABS:                        11.6   8.58  )-----------( 531      ( 12 May 2022 07:47 )             36.0     05-12    136  |  102  |  20  ----------------------------<  173<H>  4.7   |  26  |  1.06    Ca    9.3      12 May 2022 07:47  Phos  3.2     05-10  Mg     1.6     05-12    TPro  7.6  /  Alb  3.5  /  TBili  0.2  /  DBili  x   /  AST  20  /  ALT  42  /  AlkPhos  88  05-12    PT/INR - ( 10 May 2022 08:23 )   PT: 12.0 sec;   INR: 1.01          PTT - ( 10 May 2022 08:23 )  PTT:39.2 sec      MICROBIOLOGY:    Culture - Tissue with Gram Stain (collected 11 May 2022 08:56)  Source: .Tissue Right Forefoot For Culture  Gram Stain (11 May 2022 17:01):    No organisms seen    No White blood cells        RADIOLOGY & ADDITIONAL STUDIES:  Reviewed

## 2022-05-12 NOTE — PROGRESS NOTE ADULT - ASSESSMENT
46M w/ history of previous alcohol and cocaine use and PMHx of HTN, HLD, DM, and right foot osteomyelitis with amputation of the right 4th and 5th toes and partial amputation of the right 2nd digit, p/w 1-day h/o right foot wound drainage of medial great toe, and hallux abscess. Previous wound cultures from Feb 2022 grew MRSA, GBS, Gemella and Bacteroides-pt finished 6 weeks IV Vanc and flagyl 3/21, deep wound culture taken in ED 5/1-MRSA. Treatment of diabetic foot infection, suspected acute on chronic OM, OR cultures (tissue, wound, and bone) from 5/3 growing MRSA, no acid fast bacilli. Pt clinically improving, downtrending WBC, afebrile. Now s/p TMA 5/10 of R toes 1-3 with plantar skin flap to close TMA site.    Recommendations:      ID Team 1 will follow.   Note not final until attending attestation 46M w/ history of previous alcohol and cocaine use and PMHx of HTN, HLD, DM, and right foot osteomyelitis with amputation of the right 4th and 5th toes and partial amputation of the right 2nd digit, p/w 1-day h/o right foot wound drainage of medial great toe, and hallux abscess. Previous wound cultures from Feb 2022 grew MRSA, GBS, Gemella and Bacteroides-pt finished 6 weeks IV Vanc and flagyl 3/21, deep wound culture taken in ED 5/1-MRSA. Treatment of diabetic foot infection, suspected acute on chronic OM, OR cultures (tissue, wound, and bone) from 5/3 growing MRSA, no acid fast bacilli. Pt clinically improving, downtrending WBC, afebrile. Now s/p TMA 5/10 of R toes 1-3 with plantar skin flap to close TMA site.     Recommendations:  1. Continue Vancomycin 1250mg q12h. Next vanco trough due 5/13 @10am.   2. f/up 5/10 OR clean margin for both culture and pathology until 72hours. If negative, he may not need antibiotics.      D/W primary team. ID Team 1 will follow.   Note not final until attending attestation

## 2022-05-12 NOTE — PROGRESS NOTE ADULT - PROBLEM SELECTOR PLAN 4
Patient with HTN on home losartan 25mg daily   -losartan increased to 75mg daily due to consistently elevated BPs  -monitor BPs

## 2022-05-13 LAB
ANION GAP SERPL CALC-SCNC: 9 MMOL/L — SIGNIFICANT CHANGE UP (ref 5–17)
BUN SERPL-MCNC: 17 MG/DL — SIGNIFICANT CHANGE UP (ref 7–23)
CALCIUM SERPL-MCNC: 9.4 MG/DL — SIGNIFICANT CHANGE UP (ref 8.4–10.5)
CHLORIDE SERPL-SCNC: 102 MMOL/L — SIGNIFICANT CHANGE UP (ref 96–108)
CO2 SERPL-SCNC: 25 MMOL/L — SIGNIFICANT CHANGE UP (ref 22–31)
CREAT SERPL-MCNC: 0.92 MG/DL — SIGNIFICANT CHANGE UP (ref 0.5–1.3)
EGFR: 104 ML/MIN/1.73M2 — SIGNIFICANT CHANGE UP
GLUCOSE BLDC GLUCOMTR-MCNC: 100 MG/DL — HIGH (ref 70–99)
GLUCOSE BLDC GLUCOMTR-MCNC: 132 MG/DL — HIGH (ref 70–99)
GLUCOSE BLDC GLUCOMTR-MCNC: 166 MG/DL — HIGH (ref 70–99)
GLUCOSE BLDC GLUCOMTR-MCNC: 223 MG/DL — HIGH (ref 70–99)
GLUCOSE SERPL-MCNC: 159 MG/DL — HIGH (ref 70–99)
HCT VFR BLD CALC: 36.9 % — LOW (ref 39–50)
HGB BLD-MCNC: 11.8 G/DL — LOW (ref 13–17)
MAGNESIUM SERPL-MCNC: 1.6 MG/DL — SIGNIFICANT CHANGE UP (ref 1.6–2.6)
MCHC RBC-ENTMCNC: 28.3 PG — SIGNIFICANT CHANGE UP (ref 27–34)
MCHC RBC-ENTMCNC: 32 GM/DL — SIGNIFICANT CHANGE UP (ref 32–36)
MCV RBC AUTO: 88.5 FL — SIGNIFICANT CHANGE UP (ref 80–100)
NRBC # BLD: 0 /100 WBCS — SIGNIFICANT CHANGE UP (ref 0–0)
PLATELET # BLD AUTO: 544 K/UL — HIGH (ref 150–400)
POTASSIUM SERPL-MCNC: SIGNIFICANT CHANGE UP MMOL/L (ref 3.5–5.3)
POTASSIUM SERPL-SCNC: SIGNIFICANT CHANGE UP MMOL/L (ref 3.5–5.3)
RBC # BLD: 4.17 M/UL — LOW (ref 4.2–5.8)
RBC # FLD: 14.1 % — SIGNIFICANT CHANGE UP (ref 10.3–14.5)
SODIUM SERPL-SCNC: 136 MMOL/L — SIGNIFICANT CHANGE UP (ref 135–145)
VANCOMYCIN TROUGH SERPL-MCNC: 19.5 UG/ML — SIGNIFICANT CHANGE UP (ref 10–20)
WBC # BLD: 9 K/UL — SIGNIFICANT CHANGE UP (ref 3.8–10.5)
WBC # FLD AUTO: 9 K/UL — SIGNIFICANT CHANGE UP (ref 3.8–10.5)

## 2022-05-13 PROCEDURE — 99232 SBSQ HOSP IP/OBS MODERATE 35: CPT | Mod: GC

## 2022-05-13 RX ORDER — INSULIN LISPRO 100/ML
14 VIAL (ML) SUBCUTANEOUS
Qty: 0 | Refills: 0 | DISCHARGE
Start: 2022-05-13

## 2022-05-13 RX ORDER — INSULIN GLARGINE 100 [IU]/ML
29 INJECTION, SOLUTION SUBCUTANEOUS
Qty: 0 | Refills: 0 | DISCHARGE
Start: 2022-05-13

## 2022-05-13 RX ORDER — VANCOMYCIN HCL 1 G
1000 VIAL (EA) INTRAVENOUS ONCE
Refills: 0 | Status: COMPLETED | OUTPATIENT
Start: 2022-05-14 | End: 2022-05-14

## 2022-05-13 RX ORDER — MAGNESIUM SULFATE 500 MG/ML
2 VIAL (ML) INJECTION ONCE
Refills: 0 | Status: COMPLETED | OUTPATIENT
Start: 2022-05-13 | End: 2022-05-13

## 2022-05-13 RX ORDER — VANCOMYCIN HCL 1 G
1000 VIAL (EA) INTRAVENOUS ONCE
Refills: 0 | Status: COMPLETED | OUTPATIENT
Start: 2022-05-13 | End: 2022-05-13

## 2022-05-13 RX ORDER — LOSARTAN POTASSIUM 100 MG/1
3 TABLET, FILM COATED ORAL
Qty: 90 | Refills: 0
Start: 2022-05-13 | End: 2022-06-11

## 2022-05-13 RX ORDER — OXYCODONE HYDROCHLORIDE 5 MG/1
10 TABLET ORAL EVERY 6 HOURS
Refills: 0 | Status: DISCONTINUED | OUTPATIENT
Start: 2022-05-13 | End: 2022-05-16

## 2022-05-13 RX ORDER — OXYCODONE HYDROCHLORIDE 5 MG/1
5 TABLET ORAL EVERY 6 HOURS
Refills: 0 | Status: DISCONTINUED | OUTPATIENT
Start: 2022-05-13 | End: 2022-05-16

## 2022-05-13 RX ORDER — TRAMADOL HYDROCHLORIDE 50 MG/1
25 TABLET ORAL EVERY 6 HOURS
Refills: 0 | Status: DISCONTINUED | OUTPATIENT
Start: 2022-05-13 | End: 2022-05-19

## 2022-05-13 RX ORDER — VANCOMYCIN HCL 1 G
1000 VIAL (EA) INTRAVENOUS EVERY 12 HOURS
Refills: 0 | Status: DISCONTINUED | OUTPATIENT
Start: 2022-05-13 | End: 2022-05-13

## 2022-05-13 RX ORDER — LIDOCAINE 4 G/100G
1 CREAM TOPICAL ONCE
Refills: 0 | Status: COMPLETED | OUTPATIENT
Start: 2022-05-13 | End: 2022-05-13

## 2022-05-13 RX ADMIN — OXYCODONE HYDROCHLORIDE 10 MILLIGRAM(S): 5 TABLET ORAL at 22:03

## 2022-05-13 RX ADMIN — SIMETHICONE 80 MILLIGRAM(S): 80 TABLET, CHEWABLE ORAL at 11:48

## 2022-05-13 RX ADMIN — LIDOCAINE 1 PATCH: 4 CREAM TOPICAL at 09:15

## 2022-05-13 RX ADMIN — Medication 4: at 13:18

## 2022-05-13 RX ADMIN — Medication 14 UNIT(S): at 09:33

## 2022-05-13 RX ADMIN — GABAPENTIN 100 MILLIGRAM(S): 400 CAPSULE ORAL at 13:54

## 2022-05-13 RX ADMIN — TRAMADOL HYDROCHLORIDE 25 MILLIGRAM(S): 50 TABLET ORAL at 00:25

## 2022-05-13 RX ADMIN — Medication 650 MILLIGRAM(S): at 07:36

## 2022-05-13 RX ADMIN — GABAPENTIN 100 MILLIGRAM(S): 400 CAPSULE ORAL at 06:37

## 2022-05-13 RX ADMIN — Medication 650 MILLIGRAM(S): at 06:36

## 2022-05-13 RX ADMIN — OXYCODONE HYDROCHLORIDE 10 MILLIGRAM(S): 5 TABLET ORAL at 21:03

## 2022-05-13 RX ADMIN — Medication 14 UNIT(S): at 17:43

## 2022-05-13 RX ADMIN — Medication 650 MILLIGRAM(S): at 01:19

## 2022-05-13 RX ADMIN — GABAPENTIN 100 MILLIGRAM(S): 400 CAPSULE ORAL at 21:02

## 2022-05-13 RX ADMIN — Medication 14 UNIT(S): at 13:18

## 2022-05-13 RX ADMIN — LOSARTAN POTASSIUM 75 MILLIGRAM(S): 100 TABLET, FILM COATED ORAL at 06:36

## 2022-05-13 RX ADMIN — TRAMADOL HYDROCHLORIDE 25 MILLIGRAM(S): 50 TABLET ORAL at 01:25

## 2022-05-13 RX ADMIN — ATORVASTATIN CALCIUM 40 MILLIGRAM(S): 80 TABLET, FILM COATED ORAL at 21:02

## 2022-05-13 RX ADMIN — ENOXAPARIN SODIUM 40 MILLIGRAM(S): 100 INJECTION SUBCUTANEOUS at 09:41

## 2022-05-13 RX ADMIN — Medication 650 MILLIGRAM(S): at 00:19

## 2022-05-13 RX ADMIN — Medication 250 MILLIGRAM(S): at 11:47

## 2022-05-13 RX ADMIN — Medication 25 GRAM(S): at 13:54

## 2022-05-13 RX ADMIN — INSULIN GLARGINE 29 UNIT(S): 100 INJECTION, SOLUTION SUBCUTANEOUS at 22:48

## 2022-05-13 RX ADMIN — OXYCODONE HYDROCHLORIDE 5 MILLIGRAM(S): 5 TABLET ORAL at 12:53

## 2022-05-13 RX ADMIN — Medication 2: at 09:33

## 2022-05-13 RX ADMIN — OXYCODONE HYDROCHLORIDE 5 MILLIGRAM(S): 5 TABLET ORAL at 11:53

## 2022-05-13 NOTE — PROGRESS NOTE ADULT - SUBJECTIVE AND OBJECTIVE BOX
INFECTIOUS DISEASES CONSULT FOLLOW-UP NOTE    INTERVAL HPI/OVERNIGHT EVENTS: No acute overnight events. Patient reports he worked with physical therapy and tolerated session well. RLE discomfort also improving. Informed him that OR cultures need to be negative x72 hours to determine if he needs further antibiotics. Denies fevers, chills, cough, abdominal pain, diarrhea.      ANTIBIOTICS/RELEVANT:    MEDICATIONS  (STANDING):  acetaminophen     Tablet .. 650 milliGRAM(s) Oral every 6 hours  atorvastatin 40 milliGRAM(s) Oral at bedtime  enoxaparin Injectable 40 milliGRAM(s) SubCutaneous every 24 hours  gabapentin 100 milliGRAM(s) Oral every 8 hours  insulin glargine Injectable (LANTUS) 29 Unit(s) SubCutaneous at bedtime  insulin lispro (ADMELOG) corrective regimen sliding scale   SubCutaneous Before meals and at bedtime  insulin lispro Injectable (ADMELOG) 14 Unit(s) SubCutaneous three times a day before meals  losartan 75 milliGRAM(s) Oral daily  polyethylene glycol 3350 17 Gram(s) Oral two times a day  senna 2 Tablet(s) Oral at bedtime  simethicone 80 milliGRAM(s) Chew daily    MEDICATIONS  (PRN):  oxyCODONE    IR 5 milliGRAM(s) Oral every 6 hours PRN Moderate Pain (4 - 6)  oxyCODONE    IR 10 milliGRAM(s) Oral every 6 hours PRN Severe Pain (7 - 10)  traMADol 25 milliGRAM(s) Oral every 6 hours PRN Mild Pain (1 - 3)        Vital Signs Last 24 Hrs  T(C): 36.4 (13 May 2022 06:14), Max: 36.9 (12 May 2022 13:00)  T(F): 97.5 (13 May 2022 06:14), Max: 98.4 (12 May 2022 13:00)  HR: 76 (13 May 2022 06:14) (76 - 87)  BP: 152/88 (13 May 2022 06:14) (109/72 - 163/93)  BP(mean): --  RR: 18 (13 May 2022 06:14) (16 - 18)  SpO2: 97% (13 May 2022 06:14) (97% - 98%)      PHYSICAL EXAM  Constitutional: alert, NAD  Eyes: the sclera and conjunctiva were normal.   ENT: the ears and nose were normal in appearance.   Neck: the appearance of the neck was normal and the neck was supple.   Pulmonary: no respiratory distress and lungs CTA bilaterally.   Heart: heart rate was normal and rhythm regular, normal S1 and S2  Vascular: no peripheral edema  Extremities: R foot ACE wrapped  Abdomen: normal bowel sounds, soft, non-tender  Neurological: no focal deficits  Psychiatric: the affect was normal      LABS:                        11.6   8.58  )-----------( 531      ( 12 May 2022 07:47 )             36.0     05-12    136  |  102  |  20  ----------------------------<  173<H>  4.7   |  26  |  1.06    Ca    9.3      12 May 2022 07:47  Mg     1.6     05-12    TPro  7.6  /  Alb  3.5  /  TBili  0.2  /  DBili  x   /  AST  20  /  ALT  42  /  AlkPhos  88  05-12          MICROBIOLOGY:    Culture - Tissue with Gram Stain (collected 11 May 2022 08:56)  Source: .Tissue Right Forefoot For Culture  Gram Stain (11 May 2022 17:01):    No organisms seen    No White blood cells  Preliminary Report (12 May 2022 09:32):    No growth to date        RADIOLOGY & ADDITIONAL STUDIES:  Reviewed

## 2022-05-13 NOTE — PROGRESS NOTE ADULT - PROBLEM SELECTOR PLAN 5
Home med: Atorvastatin 40  - C/w home statin      #anemia, normocytic   patient with hgb remaining 11-12 throughout admission; baseline 13  -iron studies showing mixed ASHOK and AOCD   -likely in setting of active infection and chronic osteomyelitis   -no signs of active bleeding on exam  -maintain active T&S; transfuse for hgb <7  -continue to monitor

## 2022-05-13 NOTE — PROGRESS NOTE ADULT - PROBLEM SELECTOR PLAN 1
Patient presenting with purulent drainage from right great toe noted on home dressing change. S/p bedside incision and drainage of right dorsal hallux abscess performed in ED, 5cc drained, per podiatry.   -Xray right foot/ankle showing soft tissue defect adjacent to the fourth metatarsal stump  -podiatry following , appreciate recs    -s/p right foot I&D and debridement in OR on 5/3; deep wound and bone cultures obtained growing MRSA; surgical path report revealing acute osteomyelitis of right foot  -s/p right foot TMA 5/10 in OR by podiatry with repeat culture and pathology on clean margins sent  -f/u OR Cx and Path (forefoot specimen) 5/10, results will dictate further abx duration and route. so far NGTD; will observe for 72hrs   -C/w Vancomycin for now   -ID following, appreciate recs   -BCs 5/2 NGTD   -pain regimen: standing tylenol, tramadol 25 q6 prn mild pain, oxycodone 5mg q6 prn mod pain, oxycodone 10mg q6 prn severe pain  -NWB to RLE , can heel weight bear as needed but minimize pressure   -PT consulted  -Local wound care: DSD, ABD, Kerlix, and ACE wrap. nursing to educate patient on home wound care

## 2022-05-13 NOTE — PROGRESS NOTE ADULT - SUBJECTIVE AND OBJECTIVE BOX
OVERNIGHT EVENTS: NAEO    SUBJECTIVE / INTERVAL HPI: Patient seen and examined at bedside. Reports he is feeling ok and his pain is well-controlled during the day however gets worse at night. Worked with PT yesterday and 'hopped' around using walker. Reports good PO intake. Endorsing neck pain and stiffness, likely from hospital bed, ordered for lidocaine patch and heat pack. Expressed reservations about going home instead of [facility] as he is nervous about home wound care and getting errands done. Patient denying chest pain, SOB, palpitations, cough. Patient denies fever, chills, HA, Dizziness, change in vision/hearing, N/V, abdominal pain, diarrhea, constipation, hematochezia/melena, dysuria, hematuria, new onset weakness/numbness.  Remaining ROS negative       PHYSICAL EXAM:    Gen: sitting up in chair comfortable in NAD   HEENT: NC/AT, PERRL, anicteric sclera, no JVD, no thyromegaly, MMM  Cardio: +S1/S2, RRR, no murmurs  Resp: CTA b/l, no w/r/r, satting well on RA  GI: +BS x4, soft NT/ND  Ext: WWP, no peripheral edema, NROM x4, no cyanosis or clubbing. +R foot s/p TMA of great toe (and prior TMA of other 4 digits) wrapped in dressing up to ankle.    Vasc: 2+ peripheral pulses b/l  Skin: warm, dry, and intact. No rashes.  Neuro: AAOx3, no focal deficits    VITAL SIGNS:  Vital Signs Last 24 Hrs  T(C): 36.4 (13 May 2022 06:14), Max: 36.8 (12 May 2022 21:56)  T(F): 97.5 (13 May 2022 06:14), Max: 98.3 (12 May 2022 21:56)  HR: 76 (13 May 2022 06:14) (76 - 85)  BP: 152/88 (13 May 2022 06:14) (152/88 - 163/93)  BP(mean): --  RR: 18 (13 May 2022 06:14) (16 - 18)  SpO2: 97% (13 May 2022 06:14) (97% - 97%)      MEDICATIONS:  MEDICATIONS  (STANDING):  acetaminophen     Tablet .. 650 milliGRAM(s) Oral every 6 hours  atorvastatin 40 milliGRAM(s) Oral at bedtime  enoxaparin Injectable 40 milliGRAM(s) SubCutaneous every 24 hours  gabapentin 100 milliGRAM(s) Oral every 8 hours  insulin glargine Injectable (LANTUS) 29 Unit(s) SubCutaneous at bedtime  insulin lispro (ADMELOG) corrective regimen sliding scale   SubCutaneous Before meals and at bedtime  insulin lispro Injectable (ADMELOG) 14 Unit(s) SubCutaneous three times a day before meals  losartan 75 milliGRAM(s) Oral daily  polyethylene glycol 3350 17 Gram(s) Oral two times a day  senna 2 Tablet(s) Oral at bedtime  simethicone 80 milliGRAM(s) Chew daily  vancomycin  IVPB 1000 milliGRAM(s) IV Intermittent once    MEDICATIONS  (PRN):  oxyCODONE    IR 5 milliGRAM(s) Oral every 6 hours PRN Moderate Pain (4 - 6)  oxyCODONE    IR 10 milliGRAM(s) Oral every 6 hours PRN Severe Pain (7 - 10)  traMADol 25 milliGRAM(s) Oral every 6 hours PRN Mild Pain (1 - 3)      ALLERGIES:  Allergies    No Known Allergies    Intolerances        LABS:                        11.8   9.00  )-----------( 544      ( 13 May 2022 08:58 )             36.9     05-13    136  |  102  |  17  ----------------------------<  159<H>  see note   |  25  |  0.92    Ca    9.4      13 May 2022 08:58  Mg     1.6     05-13    TPro  7.6  /  Alb  3.5  /  TBili  0.2  /  DBili  x   /  AST  20  /  ALT  42  /  AlkPhos  88  05-12        CAPILLARY BLOOD GLUCOSE      POCT Blood Glucose.: 223 mg/dL (13 May 2022 12:32)      RADIOLOGY & ADDITIONAL TESTS: Reviewed.

## 2022-05-13 NOTE — PROGRESS NOTE ADULT - ATTENDING COMMENTS
As above.  DFI - MRSA.  He is s/p TMA, OR culture of clean margin NGTD.  Vancomycin trough too high and appropriately timed - agree with decreased dose by primary team.  If culture remains negative at 72 h, likely will recommend d/c antibiotics.  Will follow with you - team 1.

## 2022-05-13 NOTE — PROGRESS NOTE ADULT - SUBJECTIVE AND OBJECTIVE BOX
Patient is a 46y old  Male who presents with a chief complaint of right foot wound (12 May 2022 11:24)      INTERVAL HPI/ OVERNIGHT EVENTS: ADNREW o/n. Pt notes pain to his neck. No new pedal complaints at this time. Dressings are c/d/i.       LABS                        11.8   9.00  )-----------( 544      ( 13 May 2022 08:58 )             36.9     05-13    136  |  102  |  17  ----------------------------<  159<H>  see note   |  25  |  0.92    Ca    9.4      13 May 2022 08:58  Mg     1.6     05-13    TPro  7.6  /  Alb  3.5  /  TBili  0.2  /  DBili  x   /  AST  20  /  ALT  42  /  AlkPhos  88  05-12        ICU Vital Signs Last 24 Hrs  T(C): 36.4 (13 May 2022 06:14), Max: 36.9 (12 May 2022 13:00)  T(F): 97.5 (13 May 2022 06:14), Max: 98.4 (12 May 2022 13:00)  HR: 76 (13 May 2022 06:14) (76 - 87)  BP: 152/88 (13 May 2022 06:14) (109/72 - 163/93)  BP(mean): --  ABP: --  ABP(mean): --  RR: 18 (13 May 2022 06:14) (16 - 18)  SpO2: 97% (13 May 2022 06:14) (97% - 98%)      RADIOLOGY    MICROBIOLOGY    PHYSICAL EXAM  Lower Extremity Focused: RIGHT  Vasc: D/PT 2/4 b/l, Right foot warmer than left, non-pitting edema to dorsum of right foot over hallux  Derm:  s/p R foot TMA (5/10/22). Incision sites are intact, w/ maceration at medial incision. Sutures intact. Negative for purulence, malodor, or erythema at this time  Neuro: protective sensation grossly diminished   MSK: S/p 4th and 5th ray amputations, s/p 2nd digit distal Symes amputation

## 2022-05-13 NOTE — PROGRESS NOTE ADULT - ASSESSMENT
45 yo M, history of previous alcohol and cocaine use w/ PMHx of HTN (not treated), HLD (on meds), DM (started in 2021 on OAD and insulin), osteomyelitis with amputation of the right 4th and 5th rays and partial amputation of the right 2nd digit, presents to the Emergency Department with foot pain / wound drainage. Podiatry consulted for right foot wound. VSS, leukocytosis present, elevated CRP, medial 1st MPJ wound actively draining purulence, pt found to have dorsal hallux abscess with 5ccs expressed after bedside Incision and drainage. Xray showing cortical changes of the medial 1st metatarsal head and 1st proximal phalanx high likelihood of OM.  Deep wound ED Cx: MRSA. Pt is s/p R foot I&D + debridement of plantar wound (5/3), approx 5cc purulence expressed. Possible RTOR during this admission at a later date Amputation. OR Cx growing MRSA in bone and deep Cx's, Bone pathology showing acute OM. Pt is s/p right foot TMA, surgical site is closed w/ penrose drain in place (5/10). Penrose drain pulled (5/12).     Plan:   - Abx per ID; if negative by day 3, safe for d/c ABX  - f/u OR Cx and Path (forefoot specimen): NTD x 2 days  - Local wound care: DSD, ABD, Kerlix, and ACE wrap  - NWB to RLE  - Rest of care per primary team    Podiatry following. Plan d/w attending.     Patient should follow up with Dr. Maximiliano Green within 1 week of discharge.    Office information:          [place] Address- [address], [place] [address] Phone: [phone]         [place] Address- [address] [address] Phone: [phone]

## 2022-05-13 NOTE — PROGRESS NOTE ADULT - ASSESSMENT
46M w/ history of previous alcohol and cocaine use and PMHx of HTN, HLD, DM, and right foot osteomyelitis with amputation of the right 4th and 5th toes and partial amputation of the right 2nd digit, p/w 1-day h/o right foot wound drainage of medial great toe, and hallux abscess. Previous wound cultures from Feb 2022 grew MRSA, GBS, Gemella and Bacteroides-pt finished 6 weeks IV Vanc and flagyl 3/21, deep wound culture taken in ED 5/1-MRSA. Treatment of diabetic foot infection, suspected acute on chronic OM, OR cultures (tissue, wound, and bone) from 5/3 growing MRSA, no acid fast bacilli. Pt clinically improving, downtrending WBC, afebrile. Now s/p TMA 5/10 of R toes 1-3 with plantar skin flap to close TMA site. OR cultures thus far NGTD.    Recommendations:  1. Continue Vancomycin 1250mg q12h. Next vanco trough due 5/13 @10am.   2. f/up 5/10 OR clean margin for both culture and pathology until 72hours (5/14 @9am). If negative, he may not need antibiotics.      D/W primary team. ID Team 1 will follow.   Note not final until attending attestation   46M w/ history of previous alcohol and cocaine use and PMHx of HTN, HLD, DM, and right foot osteomyelitis with amputation of the right 4th and 5th toes and partial amputation of the right 2nd digit, p/w 1-day h/o right foot wound drainage of medial great toe, and hallux abscess. Previous wound cultures from Feb 2022 grew MRSA, GBS, Gemella and Bacteroides-pt finished 6 weeks IV Vanc and flagyl 3/21, deep wound culture taken in ED 5/1-MRSA. Treatment of diabetic foot infection, suspected acute on chronic OM, OR cultures (tissue, wound, and bone) from 5/3 growing MRSA, no acid fast bacilli. Pt clinically improving, downtrending WBC, afebrile. Now s/p TMA 5/10 of R toes 1-3 with plantar skin flap to close TMA site. OR cultures thus far NGTD.    Recommendations:  1. Continue Vancomycin 1250mg q12h. Vanco trough 19.5 on 5/13 @9am.   2. Please ensure troughs collected prior to 4th dose.  3. f/up 5/10 OR clean margin for both culture and pathology until 72hours (5/14 @9am). If negative, he may not need antibiotics.      D/W primary team. ID Team 1 will follow.   Note not final until attending attestation   46M w/ history of previous alcohol and cocaine use and PMHx of HTN, HLD, DM, and right foot osteomyelitis with amputation of the right 4th and 5th toes and partial amputation of the right 2nd digit, p/w 1-day h/o right foot wound drainage of medial great toe, and hallux abscess. Previous wound cultures from Feb 2022 grew MRSA, GBS, Gemella and Bacteroides-pt finished 6 weeks IV Vanc and flagyl 3/21, deep wound culture taken in ED 5/1-MRSA. Treatment of diabetic foot infection, suspected acute on chronic OM, OR cultures (tissue, wound, and bone) from 5/3 growing MRSA, no acid fast bacilli. Pt clinically improving, downtrending WBC, afebrile. Now s/p TMA 5/10 of R toes 1-3 with plantar skin flap to close TMA site. OR cultures thus far NGTD.    Recommendations:  1. agree with decreasing Vancomycin from 1250mg to 1g q12h after Vanco trough 19.5 on 5/13 @9am.   2. Please ensure troughs collected prior to 4th dose.  3. f/up 5/10 OR clean margin for both culture and pathology until 72hours (5/14 @9am). If negative, he may not need antibiotics.      D/W primary team. ID Team 1 will follow.   Note not final until attending attestation

## 2022-05-14 LAB
ANION GAP SERPL CALC-SCNC: 8 MMOL/L — SIGNIFICANT CHANGE UP (ref 5–17)
BUN SERPL-MCNC: 17 MG/DL — SIGNIFICANT CHANGE UP (ref 7–23)
CALCIUM SERPL-MCNC: 9.2 MG/DL — SIGNIFICANT CHANGE UP (ref 8.4–10.5)
CHLORIDE SERPL-SCNC: 103 MMOL/L — SIGNIFICANT CHANGE UP (ref 96–108)
CO2 SERPL-SCNC: 27 MMOL/L — SIGNIFICANT CHANGE UP (ref 22–31)
CREAT SERPL-MCNC: 0.91 MG/DL — SIGNIFICANT CHANGE UP (ref 0.5–1.3)
EGFR: 105 ML/MIN/1.73M2 — SIGNIFICANT CHANGE UP
GLUCOSE BLDC GLUCOMTR-MCNC: 125 MG/DL — HIGH (ref 70–99)
GLUCOSE BLDC GLUCOMTR-MCNC: 150 MG/DL — HIGH (ref 70–99)
GLUCOSE BLDC GLUCOMTR-MCNC: 165 MG/DL — HIGH (ref 70–99)
GLUCOSE BLDC GLUCOMTR-MCNC: 216 MG/DL — HIGH (ref 70–99)
GLUCOSE SERPL-MCNC: 113 MG/DL — HIGH (ref 70–99)
HCT VFR BLD CALC: 34.8 % — LOW (ref 39–50)
HGB BLD-MCNC: 11.3 G/DL — LOW (ref 13–17)
MAGNESIUM SERPL-MCNC: 1.6 MG/DL — SIGNIFICANT CHANGE UP (ref 1.6–2.6)
MCHC RBC-ENTMCNC: 28.6 PG — SIGNIFICANT CHANGE UP (ref 27–34)
MCHC RBC-ENTMCNC: 32.5 GM/DL — SIGNIFICANT CHANGE UP (ref 32–36)
MCV RBC AUTO: 88.1 FL — SIGNIFICANT CHANGE UP (ref 80–100)
NRBC # BLD: 0 /100 WBCS — SIGNIFICANT CHANGE UP (ref 0–0)
PLATELET # BLD AUTO: 545 K/UL — HIGH (ref 150–400)
POTASSIUM SERPL-MCNC: 4.3 MMOL/L — SIGNIFICANT CHANGE UP (ref 3.5–5.3)
POTASSIUM SERPL-SCNC: 4.3 MMOL/L — SIGNIFICANT CHANGE UP (ref 3.5–5.3)
RBC # BLD: 3.95 M/UL — LOW (ref 4.2–5.8)
RBC # FLD: 13.8 % — SIGNIFICANT CHANGE UP (ref 10.3–14.5)
SODIUM SERPL-SCNC: 138 MMOL/L — SIGNIFICANT CHANGE UP (ref 135–145)
WBC # BLD: 8.3 K/UL — SIGNIFICANT CHANGE UP (ref 3.8–10.5)
WBC # FLD AUTO: 8.3 K/UL — SIGNIFICANT CHANGE UP (ref 3.8–10.5)

## 2022-05-14 PROCEDURE — 99233 SBSQ HOSP IP/OBS HIGH 50: CPT

## 2022-05-14 PROCEDURE — 99232 SBSQ HOSP IP/OBS MODERATE 35: CPT

## 2022-05-14 RX ORDER — MAGNESIUM OXIDE 400 MG ORAL TABLET 241.3 MG
400 TABLET ORAL ONCE
Refills: 0 | Status: COMPLETED | OUTPATIENT
Start: 2022-05-14 | End: 2022-05-14

## 2022-05-14 RX ADMIN — Medication 650 MILLIGRAM(S): at 23:06

## 2022-05-14 RX ADMIN — Medication 250 MILLIGRAM(S): at 00:24

## 2022-05-14 RX ADMIN — Medication 14 UNIT(S): at 09:56

## 2022-05-14 RX ADMIN — Medication 14 UNIT(S): at 18:54

## 2022-05-14 RX ADMIN — Medication 250 MILLIGRAM(S): at 13:33

## 2022-05-14 RX ADMIN — Medication 650 MILLIGRAM(S): at 13:30

## 2022-05-14 RX ADMIN — Medication 650 MILLIGRAM(S): at 18:25

## 2022-05-14 RX ADMIN — Medication 2: at 22:17

## 2022-05-14 RX ADMIN — ATORVASTATIN CALCIUM 40 MILLIGRAM(S): 80 TABLET, FILM COATED ORAL at 22:04

## 2022-05-14 RX ADMIN — SIMETHICONE 80 MILLIGRAM(S): 80 TABLET, CHEWABLE ORAL at 13:30

## 2022-05-14 RX ADMIN — MAGNESIUM OXIDE 400 MG ORAL TABLET 400 MILLIGRAM(S): 241.3 TABLET ORAL at 09:56

## 2022-05-14 RX ADMIN — GABAPENTIN 100 MILLIGRAM(S): 400 CAPSULE ORAL at 22:03

## 2022-05-14 RX ADMIN — LOSARTAN POTASSIUM 75 MILLIGRAM(S): 100 TABLET, FILM COATED ORAL at 06:49

## 2022-05-14 RX ADMIN — GABAPENTIN 100 MILLIGRAM(S): 400 CAPSULE ORAL at 06:49

## 2022-05-14 RX ADMIN — INSULIN GLARGINE 29 UNIT(S): 100 INJECTION, SOLUTION SUBCUTANEOUS at 22:09

## 2022-05-14 RX ADMIN — Medication 14 UNIT(S): at 13:53

## 2022-05-14 RX ADMIN — Medication 650 MILLIGRAM(S): at 14:00

## 2022-05-14 RX ADMIN — GABAPENTIN 100 MILLIGRAM(S): 400 CAPSULE ORAL at 13:31

## 2022-05-14 RX ADMIN — ENOXAPARIN SODIUM 40 MILLIGRAM(S): 100 INJECTION SUBCUTANEOUS at 09:56

## 2022-05-14 RX ADMIN — Medication 4: at 13:53

## 2022-05-14 NOTE — PROGRESS NOTE ADULT - ASSESSMENT
47 yo M with HTN, DM, HLD and recurrent R foot OM s/p I&D on 5/3 - OR cultures with MRSA.  He now is s/p TMA late on 5/10, OR culture (5/11) NGTD.   Suggest:  - Continue to f/u OR clean margin culture and pathology  - Would d/c vancomycin at this time.  Recommendations discussed with primary team.  Please recall if further ID input is desired - team 1.

## 2022-05-14 NOTE — PROGRESS NOTE ADULT - SUBJECTIVE AND OBJECTIVE BOX
INTERVAL EVENTS: No o/n events. Denies CP, dyspnea, palpitations, presyncope, syncope, f/c/n/v.     REVIEW OF SYSTEMS:  Constitutional:     [X] negative [ ] fevers [ ] chills [ ] weight loss [ ] weight gain  HEENT:                  [X] negative [ ] dry eyes [ ] eye irritation [ ] postnasal drip [ ] nasal congestion  CV:                         [X] negative  [ ] chest pain [ ] orthopnea [ ] palpitations [ ] murmur  Resp:                     [X] negative [ ] cough [ ] shortness of breath [ ] wheezing [ ] sputum [ ] hemoptysis  GI:                          [X] negative [ ] nausea [ ] vomiting [ ] diarrhea [ ] constipation [ ] abd pain [ ] dysphagia   :                        [X] negative [ ] dysuria [ ] nocturia [ ] hematuria [ ] increased urinary frequency  MSK:                      [X] negative [ ] back pain [ ] myalgias [ ] arthralgias [ ] fracture  Skin:                       [X] negative [ ] rash [ ] itch  Neuro:                   [X] negative [ ] headache [ ] dizziness [ ] syncope [ ] weakness [ ] numbness  Psych:                    [X] negative [ ] anxiety [ ] depression  Endo:                     [X] negative [ ] diabetes [ ] thyroid problem  Heme/Lymph:      [X] negative [ ] anemia [ ] bleeding problem  Allergic/Immune: [X] negative [ ] itchy eyes [ ] nasal discharge [ ] hives [ ] angioedema    [X] All other systems negative or otherwise described above.  [ ] Unable to assess ROS because ________.    PAST MEDICAL & SURGICAL HISTORY:  Diabetes    Osteomyelitis    Hyperlipidemia, unspecified hyperlipidemia type    Hypertension    History of cocaine use    Diabetes mellitus    Osteomyelitis  R foot    No significant past surgical history    Toe amputation status  Right foot    H/O skin graft  Right foot    S/P insertion of penile implant      MEDICATIONS  (STANDING):  acetaminophen     Tablet .. 650 milliGRAM(s) Oral every 6 hours  atorvastatin 40 milliGRAM(s) Oral at bedtime  enoxaparin Injectable 40 milliGRAM(s) SubCutaneous every 24 hours  gabapentin 100 milliGRAM(s) Oral every 8 hours  insulin glargine Injectable (LANTUS) 29 Unit(s) SubCutaneous at bedtime  insulin lispro (ADMELOG) corrective regimen sliding scale   SubCutaneous Before meals and at bedtime  insulin lispro Injectable (ADMELOG) 14 Unit(s) SubCutaneous three times a day before meals  losartan 75 milliGRAM(s) Oral daily  polyethylene glycol 3350 17 Gram(s) Oral two times a day  senna 2 Tablet(s) Oral at bedtime  simethicone 80 milliGRAM(s) Chew daily    MEDICATIONS  (PRN):  oxyCODONE    IR 5 milliGRAM(s) Oral every 6 hours PRN Moderate Pain (4 - 6)  oxyCODONE    IR 10 milliGRAM(s) Oral every 6 hours PRN Severe Pain (7 - 10)  traMADol 25 milliGRAM(s) Oral every 6 hours PRN Mild Pain (1 - 3)    ICU Vital Signs Last 24 Hrs  T(C): 37 (14 May 2022 05:22), Max: 37 (14 May 2022 05:22)  T(F): 98.6 (14 May 2022 05:22), Max: 98.6 (14 May 2022 05:22)  HR: 77 (14 May 2022 05:22) (77 - 82)  BP: 139/81 (14 May 2022 05:22) (139/81 - 156/91)  BP(mean): --  ABP: --  ABP(mean): --  RR: 16 (14 May 2022 05:22) (16 - 17)  SpO2: 97% (14 May 2022 05:22) (97% - 97%)    Orthostatic VS    Daily     Daily   I&O's Summary    13 May 2022 07:01  -  14 May 2022 07:00  --------------------------------------------------------  IN: 0 mL / OUT: 400 mL / NET: -400 mL        PHYSICAL EXAM:  GENERAL: No acute distress, well-developed  HEAD:  Atraumatic, Normocephalic  ENT: EOMI, conjunctiva and sclera clear, Neck supple, No JVD, moist mucosa  CHEST/LUNG: Clear to auscultation bilaterally; No wheeze, equal breath sounds bilaterally   BACK: No spinal tenderness  HEART: Regular rate and rhythm; No murmurs, rubs, or gallops, radial and DP 2+ b/l, euvolemic  ABDOMEN: Soft, Nontender, Nondistended  EXTREMITIES:  No clubbing, cyanosis, or edema  PSYCH: Nl behavior, nl affect  NEUROLOGY: AAOx3, non-focal  SKIN: Normal color, No rashes or lesions  LINES: no central lines present     LABS:                        11.3   8.30  )-----------( 545      ( 14 May 2022 07:30 )             34.8       05-14    138  |  103  |  17  ----------------------------<  113<H>  4.3   |  27  |  0.91    Ca    9.2      14 May 2022 07:30  Mg     1.6     05-14          RADIOLOGY & ADDITIONAL STUDIES:    CXR: Personally reviewed  < from: Xray Chest 1 View- PORTABLE-Routine (Xray Chest 1 View- PORTABLE-Routine in AM.) (05.10.22 @ 07:31) >  IMPRESSION: No acute cardiopulmonary disease process.    --- End of Report ---    < end of copied text >    Other misc imaging:   < from: Xray Foot AP + Lateral + Oblique, Right (05.11.22 @ 08:25) >  IMPRESSION:    There are postsurgical changes of the right foot associated with interval   transmetatarsal amputation. There is postsurgical soft tissue swelling   and air.    --- End of Report ---    < end of copied text >

## 2022-05-14 NOTE — PROGRESS NOTE ADULT - SUBJECTIVE AND OBJECTIVE BOX
Patient is a 46y old  Male who presents with a chief complaint of right foot wound drainage (13 May 2022 14:22)      INTERVAL HPI/ OVERNIGHT EVENTS: Pt seen and evaluated by podiatry this AM. Pt has no pedal complaints at this time. Pts dressing is C/D/I and he denies N/V/F/C/SOB.       LABS                        11.3   8.30  )-----------( 545      ( 14 May 2022 07:30 )             34.8     05-14    138  |  103  |  17  ----------------------------<  113<H>  4.3   |  27  |  0.91    Ca    9.2      14 May 2022 07:30  Mg     1.6     05-14          ICU Vital Signs Last 24 Hrs  T(C): 37 (14 May 2022 05:22), Max: 37 (14 May 2022 05:22)  T(F): 98.6 (14 May 2022 05:22), Max: 98.6 (14 May 2022 05:22)  HR: 77 (14 May 2022 05:22) (77 - 82)  BP: 139/81 (14 May 2022 05:22) (139/81 - 156/91)  BP(mean): --  ABP: --  ABP(mean): --  RR: 16 (14 May 2022 05:22) (16 - 17)  SpO2: 97% (14 May 2022 05:22) (97% - 97%)       Patient is a 46y old  Male who presents with a chief complaint of right foot wound drainage (13 May 2022 14:22)      INTERVAL HPI/ OVERNIGHT EVENTS: Pt seen and evaluated by podiatry this AM. Pt has no pedal complaints at this time. Pts dressing is C/D/I and he denies N/V/F/C/SOB.       LABS                        11.3   8.30  )-----------( 545      ( 14 May 2022 07:30 )             34.8     05-14    138  |  103  |  17  ----------------------------<  113<H>  4.3   |  27  |  0.91    Ca    9.2      14 May 2022 07:30  Mg     1.6     05-14          ICU Vital Signs Last 24 Hrs  T(C): 37 (14 May 2022 05:22), Max: 37 (14 May 2022 05:22)  T(F): 98.6 (14 May 2022 05:22), Max: 98.6 (14 May 2022 05:22)  HR: 77 (14 May 2022 05:22) (77 - 82)  BP: 139/81 (14 May 2022 05:22) (139/81 - 156/91)  BP(mean): --  ABP: --  ABP(mean): --  RR: 16 (14 May 2022 05:22) (16 - 17)  SpO2: 97% (14 May 2022 05:22) (97% - 97%)    PHYSICAL EXAM  Lower Extremity Focused: RIGHT  Vasc: D/PT 2/4 b/l, Right foot warmer than left, non-pitting edema to dorsum of right foot over hallux  Derm:  s/p R foot TMA (5/10/22). Incision sites are intact, w/ maceration at lateral incision. Sutures intact. Negative for purulence, malodor, or erythema at this time  Neuro: protective sensation grossly diminished   MSK: S/p TMA 5/10

## 2022-05-14 NOTE — PROGRESS NOTE ADULT - PROBLEM SELECTOR PLAN 1
Patient presenting with purulent drainage from right great toe noted on home dressing change. S/p bedside incision and drainage of right dorsal hallux abscess performed in ED, 5cc drained, per podiatry.   -Xray right foot/ankle showing soft tissue defect adjacent to the fourth metatarsal stump  -podiatry following, appreciate recs    -s/p right foot I&D and debridement in OR on 5/3; deep wound and bone cultures obtained growing MRSA; surgical path report revealing acute osteomyelitis of right foot  -s/p right foot TMA 5/10 in OR by podiatry with repeat culture and pathology on clean margins sent  -f/u OR Cx and Path (forefoot specimen) 5/10, results will dictate further abx duration and route. so far NGTD; will observe for 72hrs   -C/w Vancomycin for now   -ID following, appreciate recs   -BCs 5/2 NGTD   -pain regimen: standing tylenol, tramadol 25 q6 prn mild pain, oxycodone 5mg q6 prn mod pain, oxycodone 10mg q6 prn severe pain  -NWB to RLE , can heel weight bear as needed but minimize pressure   -PT consulted  -Local wound care: DSD, ABD, Kerlix, and ACE wrap. nursing to educate patient on home wound care

## 2022-05-14 NOTE — PROGRESS NOTE ADULT - SUBJECTIVE AND OBJECTIVE BOX
INTERVAL HPI/OVERNIGHT EVENTS:  Afebrile.  R foot pain 5/10    CONSTITUTIONAL:  No fever, chills, night sweats  EYES:  No photophobia or visual changes  CARDIOVASCULAR:  No chest pain  RESPIRATORY:  No cough, wheezing, or SOB   GASTROINTESTINAL:  No nausea, vomiting, diarrhea, constipation, or abdominal pain  GENITOURINARY:  No frequency, urgency, dysuria or hematuria  NEUROLOGIC:  No headache, lightheadedness      ANTIBIOTICS/RELEVANT:          Vital Signs Last 24 Hrs  T(C): 36.6 (14 May 2022 13:00), Max: 37 (14 May 2022 05:22)  T(F): 97.9 (14 May 2022 13:00), Max: 98.6 (14 May 2022 05:22)  HR: 80 (14 May 2022 13:00) (77 - 82)  BP: 132/88 (14 May 2022 13:00) (132/88 - 156/91)  BP(mean): --  RR: 18 (14 May 2022 13:00) (16 - 18)  SpO2: 97% (14 May 2022 13:00) (97% - 97%)    PHYSICAL EXAM:  Constitutional:  Well-developed, well nourished  Eyes:  Sclerae anicteric, conjunctivae clear, PERRL  Ear/Nose/Throat:  No nasal exudate or sinus tenderness;  No buccal mucosal lesions, no pharyngeal erythema or exudate	  Neck:  Supple, no adenopathy  Respiratory:  Clear bilaterally  Cardiovascular:  RRR, S1S2, no murmur appreciated  Gastrointestinal:  Symmetric, normoactive BS, soft, NT, no masses, guarding or rebound.  No HSM  Extremities:  No edema.  R foot s/p TMA, wrapped by Podiatry, no proximal warmth      LABS:                        11.3   8.30  )-----------( 545      ( 14 May 2022 07:30 )             34.8         05-14    138  |  103  |  17  ----------------------------<  113<H>  4.3   |  27  |  0.91    Ca    9.2      14 May 2022 07:30  Mg     1.6     05-14            MICROBIOLOGY:        RADIOLOGY & ADDITIONAL STUDIES:

## 2022-05-14 NOTE — PROGRESS NOTE ADULT - ASSESSMENT
47 yo M, history of previous alcohol and cocaine use w/ PMHx of HTN (not treated), HLD (on meds), DM (started in 2021 on OAD and insulin), osteomyelitis with amputation of the right 4th and 5th rays and partial amputation of the right 2nd digit, presents to the Emergency Department with foot pain / wound drainage. Podiatry consulted for right foot wound. VSS, leukocytosis present, elevated CRP, medial 1st MPJ wound actively draining purulence, pt found to have dorsal hallux abscess with 5ccs expressed after bedside Incision and drainage. Xray showing cortical changes of the medial 1st metatarsal head and 1st proximal phalanx high likelihood of OM.  Deep wound ED Cx: MRSA. Pt is s/p R foot I&D + debridement of plantar wound (5/3), approx 5cc purulence expressed. Possible RTOR during this admission at a later date Amputation. OR Cx growing MRSA in bone and deep Cx's, Bone pathology showing acute OM. Pt is s/p right foot TMA, surgical site is closed w/ penrose drain in place (5/10). Penrose drain pulled (5/12).     Plan:   - Abx per ID  - f/u OR Cx and Path (forefoot specimen): NTD x 3 days  - Local wound care: DSD, ABD, Kerlix, and ACE wrap  - NWB to RLE  - Rest of care per primary team    Dressing instructions: Daily dressing changes Flush incision with saline, pat dry with gauze, cover with dry sterile gauze kerlix, and ace bandage     Podiatry following. Plan d/w attending.     Patient should follow up with Dr. Maximiliano Green within 1 week of discharge.    Office information:          [place] Address- [address], [place] [address] Phone: [phone]         [place] Address- [address] [facility] [address], [place] [address] Phone: [phone]

## 2022-05-15 LAB
ANION GAP SERPL CALC-SCNC: 9 MMOL/L — SIGNIFICANT CHANGE UP (ref 5–17)
BUN SERPL-MCNC: 23 MG/DL — SIGNIFICANT CHANGE UP (ref 7–23)
CALCIUM SERPL-MCNC: 9.6 MG/DL — SIGNIFICANT CHANGE UP (ref 8.4–10.5)
CHLORIDE SERPL-SCNC: 102 MMOL/L — SIGNIFICANT CHANGE UP (ref 96–108)
CO2 SERPL-SCNC: 26 MMOL/L — SIGNIFICANT CHANGE UP (ref 22–31)
CREAT SERPL-MCNC: 0.98 MG/DL — SIGNIFICANT CHANGE UP (ref 0.5–1.3)
EGFR: 96 ML/MIN/1.73M2 — SIGNIFICANT CHANGE UP
GLUCOSE BLDC GLUCOMTR-MCNC: 145 MG/DL — HIGH (ref 70–99)
GLUCOSE BLDC GLUCOMTR-MCNC: 166 MG/DL — HIGH (ref 70–99)
GLUCOSE BLDC GLUCOMTR-MCNC: 172 MG/DL — HIGH (ref 70–99)
GLUCOSE BLDC GLUCOMTR-MCNC: 222 MG/DL — HIGH (ref 70–99)
GLUCOSE SERPL-MCNC: 182 MG/DL — HIGH (ref 70–99)
HCT VFR BLD CALC: 38.7 % — LOW (ref 39–50)
HGB BLD-MCNC: 12.2 G/DL — LOW (ref 13–17)
MAGNESIUM SERPL-MCNC: 1.4 MG/DL — LOW (ref 1.6–2.6)
MCHC RBC-ENTMCNC: 28.5 PG — SIGNIFICANT CHANGE UP (ref 27–34)
MCHC RBC-ENTMCNC: 31.5 GM/DL — LOW (ref 32–36)
MCV RBC AUTO: 90.4 FL — SIGNIFICANT CHANGE UP (ref 80–100)
NRBC # BLD: 0 /100 WBCS — SIGNIFICANT CHANGE UP (ref 0–0)
PLATELET # BLD AUTO: 609 K/UL — HIGH (ref 150–400)
POTASSIUM SERPL-MCNC: 4.6 MMOL/L — SIGNIFICANT CHANGE UP (ref 3.5–5.3)
POTASSIUM SERPL-SCNC: 4.6 MMOL/L — SIGNIFICANT CHANGE UP (ref 3.5–5.3)
RBC # BLD: 4.28 M/UL — SIGNIFICANT CHANGE UP (ref 4.2–5.8)
RBC # FLD: 13.7 % — SIGNIFICANT CHANGE UP (ref 10.3–14.5)
SARS-COV-2 RNA SPEC QL NAA+PROBE: SIGNIFICANT CHANGE UP
SODIUM SERPL-SCNC: 137 MMOL/L — SIGNIFICANT CHANGE UP (ref 135–145)
WBC # BLD: 9.47 K/UL — SIGNIFICANT CHANGE UP (ref 3.8–10.5)
WBC # FLD AUTO: 9.47 K/UL — SIGNIFICANT CHANGE UP (ref 3.8–10.5)

## 2022-05-15 PROCEDURE — 99233 SBSQ HOSP IP/OBS HIGH 50: CPT | Mod: GC

## 2022-05-15 RX ORDER — MAGNESIUM SULFATE 500 MG/ML
4 VIAL (ML) INJECTION ONCE
Refills: 0 | Status: COMPLETED | OUTPATIENT
Start: 2022-05-15 | End: 2022-05-15

## 2022-05-15 RX ADMIN — GABAPENTIN 100 MILLIGRAM(S): 400 CAPSULE ORAL at 22:24

## 2022-05-15 RX ADMIN — Medication 650 MILLIGRAM(S): at 14:00

## 2022-05-15 RX ADMIN — INSULIN GLARGINE 29 UNIT(S): 100 INJECTION, SOLUTION SUBCUTANEOUS at 22:24

## 2022-05-15 RX ADMIN — Medication 2: at 09:05

## 2022-05-15 RX ADMIN — Medication 4: at 13:04

## 2022-05-15 RX ADMIN — Medication 650 MILLIGRAM(S): at 13:03

## 2022-05-15 RX ADMIN — GABAPENTIN 100 MILLIGRAM(S): 400 CAPSULE ORAL at 06:22

## 2022-05-15 RX ADMIN — Medication 2: at 17:35

## 2022-05-15 RX ADMIN — Medication 14 UNIT(S): at 13:04

## 2022-05-15 RX ADMIN — Medication 25 GRAM(S): at 13:04

## 2022-05-15 RX ADMIN — LOSARTAN POTASSIUM 75 MILLIGRAM(S): 100 TABLET, FILM COATED ORAL at 06:21

## 2022-05-15 RX ADMIN — ATORVASTATIN CALCIUM 40 MILLIGRAM(S): 80 TABLET, FILM COATED ORAL at 22:24

## 2022-05-15 RX ADMIN — Medication 650 MILLIGRAM(S): at 07:21

## 2022-05-15 RX ADMIN — Medication 14 UNIT(S): at 17:35

## 2022-05-15 RX ADMIN — Medication 650 MILLIGRAM(S): at 18:29

## 2022-05-15 RX ADMIN — Medication 650 MILLIGRAM(S): at 23:15

## 2022-05-15 RX ADMIN — ENOXAPARIN SODIUM 40 MILLIGRAM(S): 100 INJECTION SUBCUTANEOUS at 09:47

## 2022-05-15 RX ADMIN — SIMETHICONE 80 MILLIGRAM(S): 80 TABLET, CHEWABLE ORAL at 13:03

## 2022-05-15 RX ADMIN — Medication 650 MILLIGRAM(S): at 17:34

## 2022-05-15 RX ADMIN — Medication 650 MILLIGRAM(S): at 00:06

## 2022-05-15 RX ADMIN — Medication 650 MILLIGRAM(S): at 06:21

## 2022-05-15 RX ADMIN — GABAPENTIN 100 MILLIGRAM(S): 400 CAPSULE ORAL at 13:05

## 2022-05-15 RX ADMIN — Medication 14 UNIT(S): at 09:06

## 2022-05-15 NOTE — PROGRESS NOTE ADULT - ASSESSMENT
47 yo M, history of previous alcohol and cocaine use w/ PMHx of HTN (not treated), HLD (on meds), DM (started in 2021 on OAD and insulin), osteomyelitis with amputation of the right 4th and 5th rays and partial amputation of the right 2nd digit, presents to the Emergency Department with foot pain / wound drainage. Podiatry consulted for right foot wound. VSS, leukocytosis present, elevated CRP, medial 1st MPJ wound actively draining purulence, pt found to have dorsal hallux abscess with 5ccs expressed after bedside Incision and drainage. Xray showing cortical changes of the medial 1st metatarsal head and 1st proximal phalanx high likelihood of OM.  Deep wound ED Cx: MRSA. Pt is s/p R foot I&D + debridement of plantar wound (5/3), approx 5cc purulence expressed. Possible RTOR during this admission at a later date Amputation. OR Cx growing MRSA in bone and deep Cx's, Bone pathology showing acute OM. Pt is s/p right foot TMA, surgical site is closed w/ penrose drain in place (5/10). Penrose drain pulled (5/12).     Plan:   - f/u OR Cx and Path (forefoot specimen): NTD  - Local wound care: DSD, ABD, Kerlix, and ACE wrap  - NWB to RLE- extremely important pt stays NWB to RLE even after discharge as pt has history of porr wound healing in the past  - Rest of care per primary team    Dressing instructions: Daily dressing changes Flush incision with saline, pat dry with gauze, cover with dry sterile gauze kerlix, and ace bandage     Podiatry following. Plan d/w attending.     Patient should follow up with Dr. Maximiliano Green within 1 week of discharge.    Office information:          [place] Address- [address], [place] [address] Phone: [phone]         [place] Address- [address], [place] [address] Phone: [phone]

## 2022-05-15 NOTE — PROGRESS NOTE ADULT - SUBJECTIVE AND OBJECTIVE BOX
Patient is a 46y old  Male who presents with a chief complaint of right foot wound drainage (13 May 2022 14:22)      INTERVAL HPI/ OVERNIGHT EVENTS: Pt seen and evaluated bedside this AM by podiatry. Pt has no complaints this morning, he would like to got to a [facility] as he does not think he can be non-WB at home on his own, he understands the importance of NWB to his healing progression. Pt denies any N/V/F/C/SOB. Pts dressing is C/D/I.        LABS                        12.2   9.47  )-----------( 609      ( 15 May 2022 06:58 )             38.7     05-15    137  |  102  |  23  ----------------------------<  182<H>  4.6   |  26  |  0.98    Ca    9.6      15 May 2022 06:58  Mg     1.4     05-15          ICU Vital Signs Last 24 Hrs  T(C): 36.3 (15 May 2022 05:47), Max: 36.7 (14 May 2022 20:39)  T(F): 97.4 (15 May 2022 05:47), Max: 98 (14 May 2022 20:39)  HR: 74 (15 May 2022 05:47) (74 - 80)  BP: 157/83 (15 May 2022 05:47) (132/85 - 157/83)  BP(mean): --  ABP: --  ABP(mean): --  RR: 17 (15 May 2022 05:47) (17 - 18)  SpO2: 99% (15 May 2022 05:47) (97% - 99%)      PHYSICAL EXAM  Lower Extremity Focused: RIGHT  Vasc: D/PT 2/4 b/l, Right foot warmer than left, non-pitting edema to dorsum of right foot over hallux  Derm:  s/p R foot TMA (5/10/22). Incision sites are intact, w/ maceration at lateral incision. Sutures intact. Negative for purulence, malodor, or erythema at this time  Neuro: protective sensation grossly diminished   MSK: S/p TMA 5/10    RAD:  < from: Xray Foot AP + Lateral + Oblique, Right (05.11.22 @ 08:25) >  FINDINGS/  IMPRESSION:  There are postsurgical changes of the right foot associated with interval   transmetatarsal amputation. There is postsurgical soft tissue swelling   and air.  --- End of Report ---  < end of copied text >

## 2022-05-15 NOTE — PROGRESS NOTE ADULT - PROBLEM SELECTOR PLAN 1
Patient presenting with purulent drainage from right great toe noted on home dressing change. S/p bedside incision and drainage of right dorsal hallux abscess performed in ED, 5cc drained, per podiatry.   -Xray right foot/ankle showing soft tissue defect adjacent to the fourth metatarsal stump  -podiatry following , appreciate recs    -s/p right foot I&D and debridement in OR on 5/3; deep wound and bone cultures obtained growing MRSA; surgical path report revealing acute osteomyelitis of right foot  -s/p right foot TMA 5/10 in OR by podiatry with repeat culture and pathology on clean margins sent  -f/u OR Cx and Path (forefoot specimen) 5/10 so far NGTD  -ID following, appreciate recs   -vanc d/[place] as wound cultures from TMA margin no growth x72 hrs   -BCs 5/2 NGTD   -pain regimen: standing tylenol, tramadol 25 q6 prn mild pain, oxycodone 5mg q6 prn mod pain, oxycodone 10mg q6 prn severe pain  -NWB to RLE   -PT consulted  -Local wound care: DSD, ABD, Kerlix, and ACE wrap. nursing to educate patient on home wound care. pt interesting in going to wound care center.

## 2022-05-15 NOTE — PROGRESS NOTE ADULT - ATTENDING COMMENTS
Pt is a 46M w/ history of previous alcohol and cocaine use and PMHx of HTN, HLD, DM, and right foot osteomyelitis with amputation of the right 4th and 5th toes and partial amputation of the right 2nd digit, p/w 1-day h/o right foot wound drainage of great toe, admitted for management of R foot abscess and acute right foot OM now s/p right great toe TMA.    Plan  appreciate podiatry recs  ABR for HTN  working w/ SW to arrange for wound care center placement Pt is a 46M w/ history of previous alcohol and cocaine use and PMHx of HTN, HLD, DM, and right foot osteomyelitis with amputation of the right 4th and 5th toes and partial amputation of the right 2nd digit, p/w 1-day h/o right foot wound drainage of great toe, admitted for management of R foot abscess and acute right foot OM now s/p right great toe TMA.    Plan  appreciate podiatry recs  ABR for HTN  working w/ SW to arrange for wound care center placement as patient unable to care for himself at home due to NWB restrictions Pt is a 46M w/ history of previous alcohol and cocaine use and PMHx of HTN, HLD, DM, and right foot osteomyelitis with amputation of the right 4th and 5th toes and partial amputation of the right 2nd digit, p/w 1-day h/o right foot wound drainage of great toe, admitted for management of R foot abscess and acute right foot OM now s/p right great toe TMA.    Plan  appreciate podiatry recs  ARB for HTN  c/w statin  c/w ISS, basal, premeal  working w/ SW to arrange for wound care center placement as patient unable to care for himself at home due to NWB restrictions

## 2022-05-15 NOTE — PROGRESS NOTE ADULT - SUBJECTIVE AND OBJECTIVE BOX
OVERNIGHT EVENTS: NAEO    SUBJECTIVE / INTERVAL HPI: Patient seen and examined at bedside. Reports he is feeling well today. Pain in right foot controlled. Denies any complaints. Interested in going to wound care center. Patient denying chest pain, SOB, palpitations, cough. Patient denies fever, chills, HA, Dizziness, change in vision/hearing, N/V, abdominal pain, diarrhea, constipation, hematochezia/melena, dysuria, hematuria, new onset weakness/numbness.  Remaining ROS negative       PHYSICAL EXAM:    Gen: sitting up in chair comfortable in NAD   HEENT: NC/AT, PERRL, anicteric sclera, no JVD, no thyromegaly, MMM  Cardio: +S1/S2, RRR, no murmurs  Resp: CTA b/l, no w/r/r, satting well on RA  GI: +BS x4, soft NT/ND  Ext: WWP, no peripheral edema, NROM x4, no cyanosis or clubbing. +R foot s/p TMA of great toe (and prior TMA of other 4 digits) wrapped in dressing up to ankle.    Vasc: 2+ peripheral pulses b/l  Skin: warm, dry, and intact. No rashes.  Neuro: AAOx3, no focal deficits    VITAL SIGNS:  Vital Signs Last 24 Hrs  T(C): 36.6 (15 May 2022 11:47), Max: 36.7 (14 May 2022 20:39)  T(F): 97.9 (15 May 2022 11:47), Max: 98 (14 May 2022 20:39)  HR: 74 (15 May 2022 11:47) (74 - 77)  BP: 130/82 (15 May 2022 11:47) (130/82 - 157/83)  BP(mean): --  RR: 18 (15 May 2022 11:47) (17 - 18)  SpO2: 99% (15 May 2022 11:47) (97% - 99%)      MEDICATIONS:  MEDICATIONS  (STANDING):  acetaminophen     Tablet .. 650 milliGRAM(s) Oral every 6 hours  atorvastatin 40 milliGRAM(s) Oral at bedtime  enoxaparin Injectable 40 milliGRAM(s) SubCutaneous every 24 hours  gabapentin 100 milliGRAM(s) Oral every 8 hours  insulin glargine Injectable (LANTUS) 29 Unit(s) SubCutaneous at bedtime  insulin lispro (ADMELOG) corrective regimen sliding scale   SubCutaneous Before meals and at bedtime  insulin lispro Injectable (ADMELOG) 14 Unit(s) SubCutaneous three times a day before meals  losartan 75 milliGRAM(s) Oral daily  polyethylene glycol 3350 17 Gram(s) Oral two times a day  senna 2 Tablet(s) Oral at bedtime  simethicone 80 milliGRAM(s) Chew daily    MEDICATIONS  (PRN):  oxyCODONE    IR 5 milliGRAM(s) Oral every 6 hours PRN Moderate Pain (4 - 6)  oxyCODONE    IR 10 milliGRAM(s) Oral every 6 hours PRN Severe Pain (7 - 10)  traMADol 25 milliGRAM(s) Oral every 6 hours PRN Mild Pain (1 - 3)      ALLERGIES:  Allergies    No Known Allergies    Intolerances        LABS:                        12.2   9.47  )-----------( 609      ( 15 May 2022 06:58 )             38.7     05-15    137  |  102  |  23  ----------------------------<  182<H>  4.6   |  26  |  0.98    Ca    9.6      15 May 2022 06:58  Mg     1.4     05-15          CAPILLARY BLOOD GLUCOSE      POCT Blood Glucose.: 222 mg/dL (15 May 2022 12:41)      RADIOLOGY & ADDITIONAL TESTS: Reviewed.

## 2022-05-16 ENCOUNTER — APPOINTMENT (OUTPATIENT)
Dept: INTERNAL MEDICINE | Facility: CLINIC | Age: 47
End: 2022-05-16

## 2022-05-16 LAB
ANION GAP SERPL CALC-SCNC: 9 MMOL/L — SIGNIFICANT CHANGE UP (ref 5–17)
BUN SERPL-MCNC: 25 MG/DL — HIGH (ref 7–23)
CALCIUM SERPL-MCNC: 9.6 MG/DL — SIGNIFICANT CHANGE UP (ref 8.4–10.5)
CHLORIDE SERPL-SCNC: 102 MMOL/L — SIGNIFICANT CHANGE UP (ref 96–108)
CO2 SERPL-SCNC: 26 MMOL/L — SIGNIFICANT CHANGE UP (ref 22–31)
CREAT SERPL-MCNC: 0.97 MG/DL — SIGNIFICANT CHANGE UP (ref 0.5–1.3)
CULTURE RESULTS: NO GROWTH — SIGNIFICANT CHANGE UP
EGFR: 98 ML/MIN/1.73M2 — SIGNIFICANT CHANGE UP
GLUCOSE BLDC GLUCOMTR-MCNC: 109 MG/DL — HIGH (ref 70–99)
GLUCOSE BLDC GLUCOMTR-MCNC: 118 MG/DL — HIGH (ref 70–99)
GLUCOSE BLDC GLUCOMTR-MCNC: 165 MG/DL — HIGH (ref 70–99)
GLUCOSE BLDC GLUCOMTR-MCNC: 95 MG/DL — SIGNIFICANT CHANGE UP (ref 70–99)
GLUCOSE SERPL-MCNC: 169 MG/DL — HIGH (ref 70–99)
HCT VFR BLD CALC: 38.2 % — LOW (ref 39–50)
HGB BLD-MCNC: 12 G/DL — LOW (ref 13–17)
MAGNESIUM SERPL-MCNC: 1.6 MG/DL — SIGNIFICANT CHANGE UP (ref 1.6–2.6)
MCHC RBC-ENTMCNC: 28.4 PG — SIGNIFICANT CHANGE UP (ref 27–34)
MCHC RBC-ENTMCNC: 31.4 GM/DL — LOW (ref 32–36)
MCV RBC AUTO: 90.3 FL — SIGNIFICANT CHANGE UP (ref 80–100)
NRBC # BLD: 0 /100 WBCS — SIGNIFICANT CHANGE UP (ref 0–0)
PLATELET # BLD AUTO: 561 K/UL — HIGH (ref 150–400)
POTASSIUM SERPL-MCNC: 4.7 MMOL/L — SIGNIFICANT CHANGE UP (ref 3.5–5.3)
POTASSIUM SERPL-SCNC: 4.7 MMOL/L — SIGNIFICANT CHANGE UP (ref 3.5–5.3)
RBC # BLD: 4.23 M/UL — SIGNIFICANT CHANGE UP (ref 4.2–5.8)
RBC # FLD: 13.8 % — SIGNIFICANT CHANGE UP (ref 10.3–14.5)
SODIUM SERPL-SCNC: 137 MMOL/L — SIGNIFICANT CHANGE UP (ref 135–145)
SPECIMEN SOURCE: SIGNIFICANT CHANGE UP
WBC # BLD: 7.85 K/UL — SIGNIFICANT CHANGE UP (ref 3.8–10.5)
WBC # FLD AUTO: 7.85 K/UL — SIGNIFICANT CHANGE UP (ref 3.8–10.5)

## 2022-05-16 PROCEDURE — 99233 SBSQ HOSP IP/OBS HIGH 50: CPT

## 2022-05-16 RX ORDER — OXYCODONE HYDROCHLORIDE 5 MG/1
10 TABLET ORAL EVERY 6 HOURS
Refills: 0 | Status: DISCONTINUED | OUTPATIENT
Start: 2022-05-16 | End: 2022-05-16

## 2022-05-16 RX ORDER — OXYCODONE HYDROCHLORIDE 5 MG/1
5 TABLET ORAL EVERY 6 HOURS
Refills: 0 | Status: DISCONTINUED | OUTPATIENT
Start: 2022-05-16 | End: 2022-05-16

## 2022-05-16 RX ORDER — MAGNESIUM SULFATE 500 MG/ML
2 VIAL (ML) INJECTION ONCE
Refills: 0 | Status: COMPLETED | OUTPATIENT
Start: 2022-05-16 | End: 2022-05-16

## 2022-05-16 RX ADMIN — Medication 650 MILLIGRAM(S): at 06:19

## 2022-05-16 RX ADMIN — SIMETHICONE 80 MILLIGRAM(S): 80 TABLET, CHEWABLE ORAL at 11:04

## 2022-05-16 RX ADMIN — ATORVASTATIN CALCIUM 40 MILLIGRAM(S): 80 TABLET, FILM COATED ORAL at 22:00

## 2022-05-16 RX ADMIN — OXYCODONE HYDROCHLORIDE 5 MILLIGRAM(S): 5 TABLET ORAL at 23:14

## 2022-05-16 RX ADMIN — GABAPENTIN 100 MILLIGRAM(S): 400 CAPSULE ORAL at 13:45

## 2022-05-16 RX ADMIN — Medication 650 MILLIGRAM(S): at 17:36

## 2022-05-16 RX ADMIN — OXYCODONE HYDROCHLORIDE 10 MILLIGRAM(S): 5 TABLET ORAL at 00:37

## 2022-05-16 RX ADMIN — Medication 650 MILLIGRAM(S): at 11:04

## 2022-05-16 RX ADMIN — Medication 650 MILLIGRAM(S): at 11:54

## 2022-05-16 RX ADMIN — ENOXAPARIN SODIUM 40 MILLIGRAM(S): 100 INJECTION SUBCUTANEOUS at 09:02

## 2022-05-16 RX ADMIN — Medication 14 UNIT(S): at 17:36

## 2022-05-16 RX ADMIN — Medication 650 MILLIGRAM(S): at 18:21

## 2022-05-16 RX ADMIN — OXYCODONE HYDROCHLORIDE 5 MILLIGRAM(S): 5 TABLET ORAL at 22:14

## 2022-05-16 RX ADMIN — Medication 2: at 08:54

## 2022-05-16 RX ADMIN — Medication 650 MILLIGRAM(S): at 00:15

## 2022-05-16 RX ADMIN — INSULIN GLARGINE 29 UNIT(S): 100 INJECTION, SOLUTION SUBCUTANEOUS at 22:26

## 2022-05-16 RX ADMIN — LOSARTAN POTASSIUM 75 MILLIGRAM(S): 100 TABLET, FILM COATED ORAL at 06:19

## 2022-05-16 RX ADMIN — OXYCODONE HYDROCHLORIDE 10 MILLIGRAM(S): 5 TABLET ORAL at 01:37

## 2022-05-16 RX ADMIN — GABAPENTIN 100 MILLIGRAM(S): 400 CAPSULE ORAL at 22:00

## 2022-05-16 RX ADMIN — Medication 14 UNIT(S): at 12:41

## 2022-05-16 RX ADMIN — Medication 25 GRAM(S): at 10:21

## 2022-05-16 RX ADMIN — Medication 650 MILLIGRAM(S): at 07:19

## 2022-05-16 RX ADMIN — Medication 14 UNIT(S): at 08:54

## 2022-05-16 RX ADMIN — GABAPENTIN 100 MILLIGRAM(S): 400 CAPSULE ORAL at 06:19

## 2022-05-16 NOTE — PROGRESS NOTE ADULT - ATTENDING COMMENTS
- pending insurance authorization for wound care [facility]  - s/p amputation with podiatry.  No longer on antibiotic therapy

## 2022-05-16 NOTE — PROGRESS NOTE ADULT - ASSESSMENT
45 yo M, history of previous alcohol and cocaine use w/ PMHx of HTN (not treated), HLD (on meds), DM (started in 2021 on OAD and insulin), osteomyelitis with amputation of the right 4th and 5th rays and partial amputation of the right 2nd digit, presents to the Emergency Department with foot pain / wound drainage. Podiatry consulted for right foot wound. VSS, leukocytosis present, elevated CRP, medial 1st MPJ wound actively draining purulence, pt found to have dorsal hallux abscess with 5ccs expressed after bedside Incision and drainage. Xray showing cortical changes of the medial 1st metatarsal head and 1st proximal phalanx high likelihood of OM.  Deep wound ED Cx: MRSA. Pt is s/p R foot I&D + debridement of plantar wound (5/3), approx 5cc purulence expressed. Possible RTOR during this admission at a later date Amputation. OR Cx growing MRSA in bone and deep Cx's, Bone pathology showing acute OM. Pt is s/p right foot TMA, surgical site is closed w/ penrose drain in place (5/10). Penrose drain pulled (5/12). TMA incision edges well coapting as of 5/16, negative for signs of purulence, erythema.     Plan:   - Abx per ID (stopped vanc/zosyn at this time)  - f/u OR Cx and Path (forefoot specimen): NTD x 4 days  - Local wound care: DSD, ABD, Kerlix, and ACE wrap  - NWB to RLE  - Rest of care per primary team    Podiatry following. Plan d/w attending.     Dressing instructions: Daily dressing changes Flush incision with saline, pat dry with gauze, cover with dry sterile gauze kerlix, and ace bandage     Patient should follow up with Dr. Maximiliano Green within 1 week of discharge.    Office information:          [place] Address- [address], [place] [address] Phone: [phone]         [place] Address- [address] [facility] [address], [place] [address] Phone: [phone]

## 2022-05-16 NOTE — PROGRESS NOTE ADULT - SUBJECTIVE AND OBJECTIVE BOX
Patient is a 46y old  Male who presents with a chief complaint of right foot wound drainage (15 May 2022 13:49)      INTERVAL HPI/ OVERNIGHT EVENTS: ANDREW o/n. Pt awaiting auth for [facility]/ wound care center.       LABS                        12.0   7.85  )-----------( 561      ( 16 May 2022 08:05 )             38.2     05-16    137  |  102  |  25<H>  ----------------------------<  169<H>  4.7   |  26  |  0.97    Ca    9.6      16 May 2022 08:05  Mg     1.6     05-16          ICU Vital Signs Last 24 Hrs  T(C): 36.4 (16 May 2022 05:30), Max: 36.7 (15 May 2022 21:02)  T(F): 97.5 (16 May 2022 05:30), Max: 98.1 (15 May 2022 21:02)  HR: 75 (16 May 2022 05:30) (74 - 80)  BP: 137/89 (16 May 2022 05:30) (130/82 - 150/91)  BP(mean): --  ABP: --  ABP(mean): --  RR: 17 (16 May 2022 05:30) (17 - 19)  SpO2: 98% (16 May 2022 05:30) (96% - 99%)      RADIOLOGY    MICROBIOLOGY    PHYSICAL EXAM  Lower Extremity Focused: RIGHT  Vasc: D/PT 2/4 b/l, Right foot warmer than left, non-pitting edema to dorsum of right foot over hallux  Derm:  s/p R foot TMA (5/10/22). Incision sites are intact, w/ maceration at lateral incision. Sutures intact. Negative for purulence, malodor, or erythema at this time  Neuro: protective sensation grossly diminished   MSK: S/p TMA 5/10

## 2022-05-16 NOTE — PROGRESS NOTE ADULT - SUBJECTIVE AND OBJECTIVE BOX
OVERNIGHT EVENTS: NAEO    SUBJECTIVE / INTERVAL HPI: Patient seen and examined at bedside. Reports he is feeling fine and denies any pain. Just pain worse at night. Good PO intake. Ambulating "hopping" with walker. Interested in going to wound care center. Patient denying chest pain, SOB, palpitations, cough. Patient denies fever, chills, HA, Dizziness, change in vision/hearing, N/V, abdominal pain, diarrhea, constipation, hematochezia/melena, dysuria, hematuria.  Remaining ROS negative       PHYSICAL EXAM:    Gen: sitting up in chair comfortable in NAD   HEENT: NC/AT, PERRL, anicteric sclera, no JVD, no thyromegaly, MMM  Cardio: +S1/S2, RRR, no murmurs  Resp: CTA b/l, no w/r/r, satting well on RA  GI: +BS x4, soft NT/ND  Ext: WWP, no peripheral edema, NROM x4, no cyanosis or clubbing. +R foot s/p TMA of great toe (and prior TMA of other 4 digits) wrapped in dressing up to ankle.    Vasc: 2+ peripheral pulses b/l  Skin: warm, dry, and intact. No rashes.  Neuro: AAOx3, no focal deficits    VITAL SIGNS:  Vital Signs Last 24 Hrs  T(C): 36.9 (16 May 2022 12:33), Max: 36.9 (16 May 2022 12:33)  T(F): 98.5 (16 May 2022 12:33), Max: 98.5 (16 May 2022 12:33)  HR: 69 (16 May 2022 12:33) (69 - 80)  BP: 119/76 (16 May 2022 12:33) (119/76 - 150/91)  BP(mean): --  RR: 19 (16 May 2022 12:33) (17 - 19)  SpO2: 95% (16 May 2022 12:33) (95% - 98%)      MEDICATIONS:  MEDICATIONS  (STANDING):  acetaminophen     Tablet .. 650 milliGRAM(s) Oral every 6 hours  atorvastatin 40 milliGRAM(s) Oral at bedtime  enoxaparin Injectable 40 milliGRAM(s) SubCutaneous every 24 hours  gabapentin 100 milliGRAM(s) Oral every 8 hours  insulin glargine Injectable (LANTUS) 29 Unit(s) SubCutaneous at bedtime  insulin lispro (ADMELOG) corrective regimen sliding scale   SubCutaneous Before meals and at bedtime  insulin lispro Injectable (ADMELOG) 14 Unit(s) SubCutaneous three times a day before meals  losartan 75 milliGRAM(s) Oral daily  polyethylene glycol 3350 17 Gram(s) Oral two times a day  senna 2 Tablet(s) Oral at bedtime  simethicone 80 milliGRAM(s) Chew daily    MEDICATIONS  (PRN):  oxyCODONE    IR 5 milliGRAM(s) Oral every 6 hours PRN Moderate Pain (4 - 6)  oxyCODONE    IR 10 milliGRAM(s) Oral every 6 hours PRN Severe Pain (7 - 10)  traMADol 25 milliGRAM(s) Oral every 6 hours PRN Mild Pain (1 - 3)      ALLERGIES:  Allergies    No Known Allergies    Intolerances        LABS:                        12.0   7.85  )-----------( 561      ( 16 May 2022 08:05 )             38.2     05-16    137  |  102  |  25<H>  ----------------------------<  169<H>  4.7   |  26  |  0.97    Ca    9.6      16 May 2022 08:05  Mg     1.6     05-16          CAPILLARY BLOOD GLUCOSE      POCT Blood Glucose.: 109 mg/dL (16 May 2022 12:37)      RADIOLOGY & ADDITIONAL TESTS: Reviewed.

## 2022-05-16 NOTE — PROGRESS NOTE ADULT - PROBLEM SELECTOR PLAN 1
Patient presenting with purulent drainage from right great toe noted on home dressing change. S/p bedside incision and drainage of right dorsal hallux abscess performed in ED, 5cc drained, per podiatry.   -Xray right foot/ankle showing soft tissue defect adjacent to the fourth metatarsal stump  -podiatry following , appreciate recs    -s/p right foot I&D and debridement in OR on 5/3; deep wound and bone cultures obtained growing MRSA; surgical path report revealing acute osteomyelitis of right foot  -s/p right foot TMA 5/10 in OR by podiatry with repeat culture and pathology on clean margins sent  -f/u OR Cx and Path (forefoot specimen) 5/10 so far NGTD  -ID following, appreciate recs   -vanc d/austen as wound cultures from TMA margin no growth x72 hrs   -BCs 5/2 NGTD   -pain regimen: standing tylenol, tramadol 25 q6 prn mild pain, oxycodone 5mg q6 prn mod pain, oxycodone 10mg q6 prn severe pain  -NWB to RLE   -PT consulted  -Local wound care: Daily dressing changes Flush incision with saline, pat dry with gauze, cover with dry sterile gauze kerlix, and ace bandage   -patient interested in going to wound care center upon discharge

## 2022-05-17 LAB
GLUCOSE BLDC GLUCOMTR-MCNC: 137 MG/DL — HIGH (ref 70–99)
GLUCOSE BLDC GLUCOMTR-MCNC: 159 MG/DL — HIGH (ref 70–99)
GLUCOSE BLDC GLUCOMTR-MCNC: 230 MG/DL — HIGH (ref 70–99)
GLUCOSE BLDC GLUCOMTR-MCNC: 75 MG/DL — SIGNIFICANT CHANGE UP (ref 70–99)

## 2022-05-17 PROCEDURE — 99233 SBSQ HOSP IP/OBS HIGH 50: CPT

## 2022-05-17 RX ADMIN — SIMETHICONE 80 MILLIGRAM(S): 80 TABLET, CHEWABLE ORAL at 11:25

## 2022-05-17 RX ADMIN — Medication 650 MILLIGRAM(S): at 18:39

## 2022-05-17 RX ADMIN — Medication 650 MILLIGRAM(S): at 17:40

## 2022-05-17 RX ADMIN — Medication 650 MILLIGRAM(S): at 06:50

## 2022-05-17 RX ADMIN — ATORVASTATIN CALCIUM 40 MILLIGRAM(S): 80 TABLET, FILM COATED ORAL at 23:00

## 2022-05-17 RX ADMIN — ENOXAPARIN SODIUM 40 MILLIGRAM(S): 100 INJECTION SUBCUTANEOUS at 09:11

## 2022-05-17 RX ADMIN — Medication 14 UNIT(S): at 09:01

## 2022-05-17 RX ADMIN — Medication 650 MILLIGRAM(S): at 00:34

## 2022-05-17 RX ADMIN — GABAPENTIN 100 MILLIGRAM(S): 400 CAPSULE ORAL at 13:00

## 2022-05-17 RX ADMIN — LOSARTAN POTASSIUM 75 MILLIGRAM(S): 100 TABLET, FILM COATED ORAL at 06:49

## 2022-05-17 RX ADMIN — GABAPENTIN 100 MILLIGRAM(S): 400 CAPSULE ORAL at 06:49

## 2022-05-17 RX ADMIN — Medication 650 MILLIGRAM(S): at 11:25

## 2022-05-17 RX ADMIN — Medication 14 UNIT(S): at 17:38

## 2022-05-17 RX ADMIN — Medication 14 UNIT(S): at 12:51

## 2022-05-17 RX ADMIN — Medication 4: at 17:38

## 2022-05-17 RX ADMIN — INSULIN GLARGINE 29 UNIT(S): 100 INJECTION, SOLUTION SUBCUTANEOUS at 23:00

## 2022-05-17 RX ADMIN — GABAPENTIN 100 MILLIGRAM(S): 400 CAPSULE ORAL at 23:00

## 2022-05-17 RX ADMIN — Medication 650 MILLIGRAM(S): at 07:56

## 2022-05-17 RX ADMIN — Medication 650 MILLIGRAM(S): at 12:25

## 2022-05-17 RX ADMIN — Medication 2: at 12:51

## 2022-05-17 RX ADMIN — SENNA PLUS 2 TABLET(S): 8.6 TABLET ORAL at 22:59

## 2022-05-17 RX ADMIN — Medication 650 MILLIGRAM(S): at 01:35

## 2022-05-17 NOTE — PROGRESS NOTE ADULT - PROBLEM SELECTOR PLAN 1
Patient presenting with purulent drainage from right great toe noted on home dressing change. S/p bedside incision and drainage of right dorsal hallux abscess performed in ED, 5cc drained, per podiatry.   -Xray right foot/ankle showing soft tissue defect adjacent to the fourth metatarsal stump  -podiatry following , appreciate recs    -s/p right foot I&D and debridement in OR on 5/3; deep wound and bone cultures obtained growing MRSA; surgical path report revealing acute osteomyelitis of right foot  -s/p right foot TMA 5/10 in OR by podiatry with repeat culture and pathology on clean margins sent; OR Cx and Path (forefoot specimen) 5/10 NGTD  -ID following, appreciate recs   -vanc d/austen as wound cultures from TMA margin no growth x72 hrs   -BCs 5/2 NGTD   -pain regimen: standing tylenol, tramadol 25 q6 prn mild pain, oxycodone 5mg q6 prn mod pain, oxycodone 10mg q6 prn severe pain  -NWB to RLE   -PT consulted  -Local wound care: Daily dressing changes Flush incision with saline, pat dry with gauze, cover with dry sterile gauze kerlix, and ace bandage   -patient interested in going to wound care center upon discharge

## 2022-05-17 NOTE — PROGRESS NOTE ADULT - REASON FOR ADMISSION
right wound drainage
Diabetic foot infection
Right foot wound
right foot wound drainage
Right draining food abscess
right foot wound drainage
R foot abscess
right foot wound drainage
right foot abscess
Right draining foot abscess
right foot wound
right toe wound drainage

## 2022-05-17 NOTE — PROGRESS NOTE ADULT - SUBJECTIVE AND OBJECTIVE BOX
Patient is a 46y old  Male who presents with a chief complaint of right foot wound drainage (16 May 2022 17:05)      INTERVAL HPI/ OVERNIGHT EVENTS: Pt seen and examined bedside. NAEO.      LABS                        12.0   7.85  )-----------( 561      ( 16 May 2022 08:05 )             38.2     05-16    137  |  102  |  25<H>  ----------------------------<  169<H>  4.7   |  26  |  0.97    Ca    9.6      16 May 2022 08:05  Mg     1.6     05-16    ICU Vital Signs Last 24 Hrs  T(C): 36.6 (17 May 2022 06:05), Max: 36.9 (16 May 2022 12:33)  T(F): 97.9 (17 May 2022 06:05), Max: 98.5 (16 May 2022 12:33)  HR: 64 (17 May 2022 06:05) (64 - 78)  BP: 133/84 (17 May 2022 06:05) (119/76 - 133/84)  BP(mean): --  ABP: --  ABP(mean): --  RR: 18 (17 May 2022 06:05) (18 - 19)  SpO2: 97% (17 May 2022 06:05) (95% - 98%)    RADIOLOGY    MICROBIOLOGY    PHYSICAL EXAM  Lower Extremity Focused: RIGHT  Vasc: D/PT 2/4 b/l, Right foot warmer than left, non-pitting edema to dorsum of right foot over hallux  Derm:  s/p R foot TMA (5/10/22). Incision sites are intact, w/ maceration at lateral incision. Sutures intact. Negative for purulence, malodor, or erythema at this time  Neuro: protective sensation grossly diminished   MSK: S/p TMA 5/10

## 2022-05-17 NOTE — PROGRESS NOTE ADULT - SUBJECTIVE AND OBJECTIVE BOX
OVERNIGHT EVENTS: NAEO    SUBJECTIVE / INTERVAL HPI: Patient seen and examined at bedside. Reports he is feeling fine and ambulating with the walker. Pain well controlled. Patient denying chest pain, SOB, palpitations, cough. Patient denies fever, chills, HA, Dizziness, change in vision/hearing, N/V, abdominal pain, diarrhea, constipation, hematochezia/melena, dysuria, hematuria.  Remaining ROS negative       PHYSICAL EXAM:    Gen: sitting up in chair comfortable in NAD   HEENT: NC/AT, PERRL, anicteric sclera, no JVD, no thyromegaly, MMM  Cardio: +S1/S2, RRR, no murmurs  Resp: CTA b/l, no w/r/r, satting well on RA  GI: +BS x4, soft NT/ND  Ext: WWP, no peripheral edema, NROM x4, no cyanosis or clubbing. +R foot s/p TMA of great toe (and prior TMA of other 4 digits) wrapped in dressing up to ankle. RLE with CAM boot.    Vasc: 2+ peripheral pulses b/l  Skin: warm, dry, and intact. No rashes.  Neuro: AAOx3, no focal deficits    VITAL SIGNS:  Vital Signs Last 24 Hrs  T(C): 36.7 (17 May 2022 13:12), Max: 36.9 (16 May 2022 20:45)  T(F): 98.1 (17 May 2022 13:12), Max: 98.4 (16 May 2022 20:45)  HR: 87 (17 May 2022 13:12) (64 - 87)  BP: 126/78 (17 May 2022 13:12) (126/78 - 133/84)  BP(mean): --  RR: 18 (17 May 2022 13:12) (18 - 18)  SpO2: 98% (17 May 2022 13:12) (97% - 98%)      MEDICATIONS:  MEDICATIONS  (STANDING):  acetaminophen     Tablet .. 650 milliGRAM(s) Oral every 6 hours  atorvastatin 40 milliGRAM(s) Oral at bedtime  enoxaparin Injectable 40 milliGRAM(s) SubCutaneous every 24 hours  gabapentin 100 milliGRAM(s) Oral every 8 hours  insulin glargine Injectable (LANTUS) 29 Unit(s) SubCutaneous at bedtime  insulin lispro (ADMELOG) corrective regimen sliding scale   SubCutaneous Before meals and at bedtime  insulin lispro Injectable (ADMELOG) 14 Unit(s) SubCutaneous three times a day before meals  losartan 75 milliGRAM(s) Oral daily  polyethylene glycol 3350 17 Gram(s) Oral two times a day  senna 2 Tablet(s) Oral at bedtime  simethicone 80 milliGRAM(s) Chew daily    MEDICATIONS  (PRN):  oxyCODONE    IR 5 milliGRAM(s) Oral every 6 hours PRN Moderate Pain (4 - 6)  oxyCODONE    IR 10 milliGRAM(s) Oral every 6 hours PRN Severe Pain (7 - 10)  traMADol 25 milliGRAM(s) Oral every 6 hours PRN Mild Pain (1 - 3)      ALLERGIES:  Allergies    No Known Allergies    Intolerances        LABS:                        12.0   7.85  )-----------( 561      ( 16 May 2022 08:05 )             38.2     05-16    137  |  102  |  25<H>  ----------------------------<  169<H>  4.7   |  26  |  0.97    Ca    9.6      16 May 2022 08:05  Mg     1.6     05-16          CAPILLARY BLOOD GLUCOSE      POCT Blood Glucose.: 159 mg/dL (17 May 2022 12:25)      RADIOLOGY & ADDITIONAL TESTS: Reviewed.

## 2022-05-17 NOTE — PROGRESS NOTE ADULT - ASSESSMENT
45 yo M, history of previous alcohol and cocaine use w/ PMHx of HTN (not treated), HLD (on meds), DM (started in 2021 on OAD and insulin), osteomyelitis with amputation of the right 4th and 5th rays and partial amputation of the right 2nd digit, presents to the Emergency Department with foot pain / wound drainage. Podiatry consulted for right foot wound. VSS, leukocytosis present, elevated CRP, medial 1st MPJ wound actively draining purulence, pt found to have dorsal hallux abscess with 5ccs expressed after bedside Incision and drainage. Xray showing cortical changes of the medial 1st metatarsal head and 1st proximal phalanx high likelihood of OM.  Deep wound ED Cx: MRSA. Pt is s/p R foot I&D + debridement of plantar wound (5/3), approx 5cc purulence expressed. Possible RTOR during this admission at a later date Amputation. OR Cx growing MRSA in bone and deep Cx's, Bone pathology showing acute OM. Pt is s/p right foot TMA, surgical site is closed w/ penrose drain in place (5/10). Penrose drain pulled (5/12). TMA incision edges well coapting as of 5/16, negative for signs of purulence, erythema.     Plan:   - Abx per ID  - f/u OR Cx and Path  - Local wound care: DSD, ABD, Kerlix, and ACE wrap  - NWB to RLE  - Rest of care per primary team    Podiatry following. Plan d/w attending.     Dressing instructions: Daily dressing changes Flush incision with saline, pat dry with gauze, cover with dry sterile gauze kerlix, and ace bandage     Patient should follow up with Dr. Maximiliano Green within 1 week of discharge.    Office information:          [place] Address- [address], [place] [address] Phone: [phone]         [place] Address- [address],  [address] Phone: [phone]

## 2022-05-17 NOTE — PROGRESS NOTE ADULT - ATTENDING COMMENTS
- no significant medical updates.  Awaiting wound care STEVEN placement.  Off of antibiotics, s/p toe amputation by podiatry

## 2022-05-18 LAB
GLUCOSE BLDC GLUCOMTR-MCNC: 167 MG/DL — HIGH (ref 70–99)
GLUCOSE BLDC GLUCOMTR-MCNC: 186 MG/DL — HIGH (ref 70–99)
GLUCOSE BLDC GLUCOMTR-MCNC: 195 MG/DL — HIGH (ref 70–99)
GLUCOSE BLDC GLUCOMTR-MCNC: 84 MG/DL — SIGNIFICANT CHANGE UP (ref 70–99)
SARS-COV-2 RNA SPEC QL NAA+PROBE: SIGNIFICANT CHANGE UP

## 2022-05-18 PROCEDURE — 99233 SBSQ HOSP IP/OBS HIGH 50: CPT

## 2022-05-18 RX ADMIN — Medication 14 UNIT(S): at 13:04

## 2022-05-18 RX ADMIN — ATORVASTATIN CALCIUM 40 MILLIGRAM(S): 80 TABLET, FILM COATED ORAL at 22:53

## 2022-05-18 RX ADMIN — Medication 650 MILLIGRAM(S): at 18:11

## 2022-05-18 RX ADMIN — Medication 650 MILLIGRAM(S): at 06:35

## 2022-05-18 RX ADMIN — SIMETHICONE 80 MILLIGRAM(S): 80 TABLET, CHEWABLE ORAL at 13:03

## 2022-05-18 RX ADMIN — GABAPENTIN 100 MILLIGRAM(S): 400 CAPSULE ORAL at 14:26

## 2022-05-18 RX ADMIN — Medication 650 MILLIGRAM(S): at 07:35

## 2022-05-18 RX ADMIN — Medication 650 MILLIGRAM(S): at 14:00

## 2022-05-18 RX ADMIN — GABAPENTIN 100 MILLIGRAM(S): 400 CAPSULE ORAL at 06:36

## 2022-05-18 RX ADMIN — Medication 14 UNIT(S): at 09:31

## 2022-05-18 RX ADMIN — INSULIN GLARGINE 29 UNIT(S): 100 INJECTION, SOLUTION SUBCUTANEOUS at 22:52

## 2022-05-18 RX ADMIN — Medication 2: at 09:30

## 2022-05-18 RX ADMIN — Medication 14 UNIT(S): at 17:45

## 2022-05-18 RX ADMIN — LOSARTAN POTASSIUM 75 MILLIGRAM(S): 100 TABLET, FILM COATED ORAL at 06:35

## 2022-05-18 RX ADMIN — Medication 2: at 22:53

## 2022-05-18 RX ADMIN — Medication 2: at 13:03

## 2022-05-18 RX ADMIN — Medication 650 MILLIGRAM(S): at 13:03

## 2022-05-18 RX ADMIN — ENOXAPARIN SODIUM 40 MILLIGRAM(S): 100 INJECTION SUBCUTANEOUS at 09:30

## 2022-05-18 RX ADMIN — GABAPENTIN 100 MILLIGRAM(S): 400 CAPSULE ORAL at 22:53

## 2022-05-18 RX ADMIN — Medication 650 MILLIGRAM(S): at 19:10

## 2022-05-18 NOTE — PROGRESS NOTE ADULT - PROBLEM SELECTOR PROBLEM 2
Osteomyelitis

## 2022-05-18 NOTE — PROGRESS NOTE ADULT - PROBLEM SELECTOR PROBLEM 6
Nutrition, metabolism, and development symptoms

## 2022-05-18 NOTE — PROGRESS NOTE ADULT - PROBLEM SELECTOR PLAN 4
Patient with HTN on home losartan 25mg daily   -losartan increased to 75mg daily due to consistently elevated BPs  -monitor BPs Patient with HTN on home losartan 25mg daily   - c/w increased losartan 75mg daily

## 2022-05-18 NOTE — PROGRESS NOTE ADULT - PROBLEM SELECTOR PROBLEM 3
Diabetes mellitus

## 2022-05-18 NOTE — PROGRESS NOTE ADULT - PROBLEM SELECTOR PLAN 5
Home med: Atorvastatin 40  - C/w home statin      #anemia, normocytic   patient with hgb remaining 11-12 throughout admission; baseline 13  -iron studies showing mixed ASHOK and AOCD   -likely in setting of active infection and chronic osteomyelitis   -no signs of active bleeding on exam  -maintain active T&S; transfuse for hgb <7  -continue to monitor Home med: Atorvastatin 40  - c/w home statin      #anemia, normocytic   patient with hgb remaining 11-12 throughout admission; baseline 13  -iron studies showing mixed ASHOK and AOCD   -likely in setting of active infection and chronic osteomyelitis   -no signs of active bleeding on exam  -maintain active T&S; transfuse for hgb <7

## 2022-05-18 NOTE — PROGRESS NOTE ADULT - SUBJECTIVE AND OBJECTIVE BOX
Patient is a 46y old  Male who presents with a chief complaint of right foot wound drainage (17 May 2022 16:04)      INTERVAL HPI/ OVERNIGHT EVENTS: Pt seen and examined bedside. Pending STEVEN placement.      LABS              ICU Vital Signs Last 24 Hrs  T(C): 36.8 (18 May 2022 06:18), Max: 36.8 (17 May 2022 20:52)  T(F): 98.2 (18 May 2022 06:18), Max: 98.2 (17 May 2022 20:52)  HR: 81 (18 May 2022 06:18) (81 - 87)  BP: 146/82 (18 May 2022 06:18) (124/82 - 146/82)  BP(mean): --  ABP: --  ABP(mean): --  RR: 17 (18 May 2022 06:18) (17 - 18)  SpO2: 98% (18 May 2022 06:18) (98% - 98%)      RADIOLOGY    MICROBIOLOGY    PHYSICAL EXAM  Lower Extremity Focused: RIGHT  Vasc: D/PT 2/4 b/l, Right foot warmer than left, non-pitting edema to dorsum of right foot over hallux  Derm:  s/p R foot TMA (5/10/22). Incision sites are intact. Sutures intact. Negative for purulence, malodor, or erythema at this time  Neuro: protective sensation grossly diminished   MSK: S/p TMA 5/10

## 2022-05-18 NOTE — PROGRESS NOTE ADULT - SUBJECTIVE AND OBJECTIVE BOX
Internal Medicine Progress Note  Avinash Danielle, PGY-1  Pager: [phone]    ******INCOMPLETE******    Patient is a 46y old  Male who presents with a chief complaint of right foot wound drainage (17 May 2022 16:04)    OVERNIGHT EVENTS/INTERVAL HPI:    REVIEW OF SYSTEMS:  All other review of systems is negative unless indicated above.    OBJECTIVE:  T(C): 36.8 (05-18-22 @ 06:18), Max: 36.8 (05-17-22 @ 20:52)  HR: 81 (05-18-22 @ 06:18) (81 - 87)  BP: 146/82 (05-18-22 @ 06:18) (124/82 - 146/82)  RR: 17 (05-18-22 @ 06:18) (17 - 18)  SpO2: 98% (05-18-22 @ 06:18) (98% - 98%)  Daily     Daily     Physical Exam:  General: in no acute distress  Eyes: EOMI intact bilaterally. Anicteric sclerae, moist conjunctivae  HENT: Moist mucous membranes  Neck: Trachea midline, supple  Lungs: CTA B/L. No wheezes, rales, or ronchi  Cardiovascular: RRR. No murmurs, rubs, or gallops  Abdomen: Soft, non-tender non-distended; No rebound or guarding  Extremities: WWP, No clubbing, cyanosis or edema  MSK: No midline bony tenderness. No CVA tenderness bilaterally  Neurological: Alert and oriented x3  Skin: Warm and dry. No obvious rash     Medications:  MEDICATIONS  (STANDING):  acetaminophen     Tablet .. 650 milliGRAM(s) Oral every 6 hours  atorvastatin 40 milliGRAM(s) Oral at bedtime  enoxaparin Injectable 40 milliGRAM(s) SubCutaneous every 24 hours  gabapentin 100 milliGRAM(s) Oral every 8 hours  insulin glargine Injectable (LANTUS) 29 Unit(s) SubCutaneous at bedtime  insulin lispro (ADMELOG) corrective regimen sliding scale   SubCutaneous Before meals and at bedtime  insulin lispro Injectable (ADMELOG) 14 Unit(s) SubCutaneous three times a day before meals  losartan 75 milliGRAM(s) Oral daily  polyethylene glycol 3350 17 Gram(s) Oral two times a day  senna 2 Tablet(s) Oral at bedtime  simethicone 80 milliGRAM(s) Chew daily    MEDICATIONS  (PRN):  oxyCODONE    IR 5 milliGRAM(s) Oral every 6 hours PRN Moderate Pain (4 - 6)  oxyCODONE    IR 10 milliGRAM(s) Oral every 6 hours PRN Severe Pain (7 - 10)  traMADol 25 milliGRAM(s) Oral every 6 hours PRN Mild Pain (1 - 3)      Labs:                        12.0   7.85  )-----------( 561      ( 16 May 2022 08:05 )             38.2     05-16    137  |  102  |  25<H>  ----------------------------<  169<H>  4.7   |  26  |  0.97    Ca    9.6      16 May 2022 08:05  Mg     1.6     05-16          COVID-19 PCR: NotDetec (15 May 2022 07:03)  COVID-19 PCR: NotDetec (10 May 2022 06:35)  COVID-19 PCR: NotDetec (08 May 2022 06:37)  COVID-19 PCR: Negative (02 May 2022 05:12)  COVID-19 PCR: Negative (09 Feb 2022 10:24)  COVID-19 PCR: NotDetec (01 Feb 2022 16:13)  COVID-19 PCR: NotDetec (23 Jan 2022 15:41)  COVID-19 PCR: NotDetec (07 Jan 2022 08:54)      Radiology: Reviewed Internal Medicine Progress Note  Avinash Danielle, PGY-1  Pager: [phone]    Patient is a 46y old  Male who presents with a chief complaint of right foot wound drainage (17 May 2022 16:04)    OVERNIGHT EVENTS/INTERVAL HPI: As per night team, no overnight events. Patient seen and examined at bedside. States that he feels well, notes some residual right foot pain, which is mild. Otherwise, no complaints at this time. Patient denies fevers, sweats, chills, SOB, dyspnea, chest pain, abdominal pain.    REVIEW OF SYSTEMS:  All other review of systems is negative unless indicated above.    OBJECTIVE:  T(C): 36.8 (05-18-22 @ 06:18), Max: 36.8 (05-17-22 @ 20:52)  HR: 81 (05-18-22 @ 06:18) (81 - 87)  BP: 146/82 (05-18-22 @ 06:18) (124/82 - 146/82)  RR: 17 (05-18-22 @ 06:18) (17 - 18)  SpO2: 98% (05-18-22 @ 06:18) (98% - 98%)  Daily     Daily     Physical Exam:  General: in no acute distress, speaking in full sentences on room air  Eyes: EOMI intact bilaterally. Anicteric sclerae, moist conjunctivae  HENT: Moist mucous membranes  Neck: Trachea midline, supple  Lungs: CTA B/L. No wheezes, rales, or ronchi  Cardiovascular: RRR. No murmurs, rubs, or gallops  Abdomen: Soft, non-tender non-distended; No rebound or guarding  Extremities: WWP, No clubbing, cyanosis or edema. +R foot s/p TMA with ACE wrap, CDI.  MSK: No midline bony tenderness. No CVA tenderness bilaterally  Neurological: Alert and oriented x3  Skin: Warm and dry. No obvious rash     Medications:  MEDICATIONS  (STANDING):  acetaminophen     Tablet .. 650 milliGRAM(s) Oral every 6 hours  atorvastatin 40 milliGRAM(s) Oral at bedtime  enoxaparin Injectable 40 milliGRAM(s) SubCutaneous every 24 hours  gabapentin 100 milliGRAM(s) Oral every 8 hours  insulin glargine Injectable (LANTUS) 29 Unit(s) SubCutaneous at bedtime  insulin lispro (ADMELOG) corrective regimen sliding scale   SubCutaneous Before meals and at bedtime  insulin lispro Injectable (ADMELOG) 14 Unit(s) SubCutaneous three times a day before meals  losartan 75 milliGRAM(s) Oral daily  polyethylene glycol 3350 17 Gram(s) Oral two times a day  senna 2 Tablet(s) Oral at bedtime  simethicone 80 milliGRAM(s) Chew daily    MEDICATIONS  (PRN):  oxyCODONE    IR 5 milliGRAM(s) Oral every 6 hours PRN Moderate Pain (4 - 6)  oxyCODONE    IR 10 milliGRAM(s) Oral every 6 hours PRN Severe Pain (7 - 10)  traMADol 25 milliGRAM(s) Oral every 6 hours PRN Mild Pain (1 - 3)      Labs:                        12.0   7.85  )-----------( 561      ( 16 May 2022 08:05 )             38.2     05-16    137  |  102  |  25<H>  ----------------------------<  169<H>  4.7   |  26  |  0.97    Ca    9.6      16 May 2022 08:05  Mg     1.6     05-16    COVID-19 PCR: NotDetec (15 May 2022 07:03)  COVID-19 PCR: NotDetec (10 May 2022 06:35)  COVID-19 PCR: NotDetec (08 May 2022 06:37)  COVID-19 PCR: Negative (02 May 2022 05:12)  COVID-19 PCR: Negative (09 Feb 2022 10:24)  COVID-19 PCR: NotDetec (01 Feb 2022 16:13)  COVID-19 PCR: NotDetec (23 Jan 2022 15:41)  COVID-19 PCR: NotDetec (07 Jan 2022 08:54)      Radiology: Reviewed

## 2022-05-18 NOTE — PROGRESS NOTE ADULT - PROBLEM SELECTOR PLAN 3
History of DM2. Home meds: lantus 35, lispro 8 TID, metformin 750 BID.   -A1C 8.9 5/2  -c/w lantus to 29 units bedtime   -c/w lispro to 14 units TID premeal  -c/w mISS   -hold home PO diabetic meds while inpatient   -c/w home gabapentin 100 TID for nerve pain  -monitor fingerstick glucose History of DM2. Home meds: lantus 35, lispro 8 TID, metformin 750 BID.   -A1C 8.9 5/2  -c/w lantus 29 units bedtime   -c/w lispro 14 units TID pre-meal  -c/w mISS   -hold home PO diabetic meds while inpatient   -c/w home gabapentin 100 TID for nerve pain  -monitor fingerstick glucose

## 2022-05-18 NOTE — PROGRESS NOTE ADULT - ASSESSMENT
47 yo M, history of previous alcohol and cocaine use w/ PMHx of HTN (not treated), HLD (on meds), DM (started in 2021 on OAD and insulin), osteomyelitis with amputation of the right 4th and 5th rays and partial amputation of the right 2nd digit, presents to the Emergency Department with foot pain / wound drainage. Podiatry consulted for right foot wound. VSS, leukocytosis present, elevated CRP, medial 1st MPJ wound actively draining purulence, pt found to have dorsal hallux abscess with 5ccs expressed after bedside Incision and drainage. Xray showing cortical changes of the medial 1st metatarsal head and 1st proximal phalanx high likelihood of OM.  Deep wound ED Cx: MRSA. Pt is s/p R foot I&D + debridement of plantar wound (5/3), approx 5cc purulence expressed. Possible RTOR during this admission at a later date Amputation. OR Cx growing MRSA in bone and deep Cx's, Bone pathology showing acute OM. Pt is s/p right foot TMA, surgical site is closed w/ penrose drain in place (5/10). Penrose drain pulled (5/12). TMA incision edges well coapting as of 5/16, negative for signs of purulence, erythema.     Plan:   - Abx per ID  - f/u OR Cx and Path  - Local wound care: DSD, ABD, Kerlix, and ACE wrap  - NWB to RLE  - Rest of care per primary team    Podiatry following. Plan d/w attending.     Dressing instructions: Daily dressing changes Flush incision with saline, pat dry with gauze, cover with dry sterile gauze kerlix, and ace bandage     Patient should follow up with Dr. Maximiliano Green within 1 week of discharge.    Office information:          [place] Address- [address], [place] [address] Phone: [phone]         [place] Address- [address], [place] [address] Phone: [phone]

## 2022-05-18 NOTE — PROGRESS NOTE ADULT - PROBLEM SELECTOR PLAN 1
Patient presenting with purulent drainage from right great toe noted on home dressing change. S/p bedside incision and drainage of right dorsal hallux abscess performed in ED, 5cc drained, per podiatry.   -Xray right foot/ankle showing soft tissue defect adjacent to the fourth metatarsal stump  -podiatry following , appreciate recs    -s/p right foot I&D and debridement in OR on 5/3; deep wound and bone cultures obtained growing MRSA; surgical path report revealing acute osteomyelitis of right foot  -s/p right foot TMA 5/10 in OR by podiatry with repeat culture and pathology on clean margins sent; OR Cx and Path (forefoot specimen) 5/10 NGTD  -ID following, appreciate recs   -vanc d/austen as wound cultures from TMA margin no growth x72 hrs   -BCs 5/2 NGTD   -pain regimen: standing tylenol, tramadol 25 q6 prn mild pain, oxycodone 5mg q6 prn mod pain, oxycodone 10mg q6 prn severe pain  -NWB to RLE   -PT consulted  -Local wound care: Daily dressing changes Flush incision with saline, pat dry with gauze, cover with dry sterile gauze kerlix, and ace bandage   -patient interested in going to wound care center upon discharge Patient presenting with purulent drainage from right great toe noted on home dressing change. S/p bedside incision and drainage of right dorsal hallux abscess performed in ED, 5cc drained, per podiatry.   XR right foot/ankle showing soft tissue defect adjacent to the fourth metatarsal stump  -podiatry following, appreciate recs          - NWB to RLE         - Local wound care: DSD, ABD, Kerlix, and ACE wrap        -s/p right foot I&D and debridement in OR on 5/3              - deep wound and bone cultures obtained growing MRSA              - surgical path report revealing acute osteomyelitis of right foot        -s/p right foot TMA 5/10 in OR by podiatry with repeat culture and pathology on clean margins sent              - OR Cx and Path (forefoot specimen) 5/10 NGTD  - ID following, appreciate recs         - vanc d/austen, as wound cultures from TMA margin no growth x72 hrs   - BCx 5/2 NGTD   - pain regimen: standing tylenol q6h         - tramadol 25 q6 prn mild pain         - oxycodone 5mg q6 prn mod pain         - oxycodone 10mg q6 prn severe pain  -PT consulted  -patient interested in going to wound care center upon discharge

## 2022-05-18 NOTE — PROGRESS NOTE ADULT - PROBLEM SELECTOR PLAN 6
F: None   E: Replete for K<4, Mag<2  N: DASH/TLC CC diet   DVT ppx: lovenox   Full Code   Dispo: [facility] F: None   E: Replete for K<4, Mag<2  N: DASH/TLC CC diet   DVT ppx: lovenox 40mg sq daily  Full Code   Dispo: [facility]

## 2022-05-18 NOTE — PROGRESS NOTE ADULT - PROBLEM SELECTOR PROBLEM 5
HLD (hyperlipidemia)

## 2022-05-18 NOTE — PROGRESS NOTE ADULT - ATTENDING COMMENTS
46M with PMH of HTN, DM2, prior cocaine use, osteomyelitis, presenting with osteomyelitis of the R foot, s/p amputation.     - completed antibiotic therapy  - pending insurance authorization for wound care ST[facility]  - s/p amputation with podiatry

## 2022-05-18 NOTE — PROGRESS NOTE ADULT - PROBLEM SELECTOR PLAN 2
H/o right foot osteomyelitis with amputation of the right 4th and 5th toes and partial amputation of the right 2nd digit. now s/p right foot TMA for new acute osteo as bone cultures from right great toe growing MRSA   - Management as above   - F/u podiatry recs H/o right foot osteomyelitis with amputation of the right 4th and 5th toes and partial amputation of the right 2nd digit. now s/p right foot TMA for new acute osteo as bone cultures from right great toe growing MRSA   - Management as above   - f/u podiatry recs

## 2022-05-18 NOTE — PROGRESS NOTE ADULT - PROBLEM SELECTOR PROBLEM 1
Foot abscess, right

## 2022-05-18 NOTE — PROGRESS NOTE ADULT - ATTENDING SUPERVISION STATEMENT
Resident

## 2022-05-19 ENCOUNTER — TRANSCRIPTION ENCOUNTER (OUTPATIENT)
Age: 47
End: 2022-05-19

## 2022-05-19 VITALS
SYSTOLIC BLOOD PRESSURE: 130 MMHG | TEMPERATURE: 98 F | OXYGEN SATURATION: 97 % | HEART RATE: 86 BPM | RESPIRATION RATE: 19 BRPM | DIASTOLIC BLOOD PRESSURE: 81 MMHG

## 2022-05-19 LAB
GLUCOSE BLDC GLUCOMTR-MCNC: 167 MG/DL — HIGH (ref 70–99)
GLUCOSE BLDC GLUCOMTR-MCNC: 174 MG/DL — HIGH (ref 70–99)

## 2022-05-19 PROCEDURE — 87206 SMEAR FLUORESCENT/ACID STAI: CPT

## 2022-05-19 PROCEDURE — 83036 HEMOGLOBIN GLYCOSYLATED A1C: CPT

## 2022-05-19 PROCEDURE — 87181 SC STD AGAR DILUTION PER AGT: CPT

## 2022-05-19 PROCEDURE — 87040 BLOOD CULTURE FOR BACTERIA: CPT

## 2022-05-19 PROCEDURE — 82728 ASSAY OF FERRITIN: CPT

## 2022-05-19 PROCEDURE — 87102 FUNGUS ISOLATION CULTURE: CPT

## 2022-05-19 PROCEDURE — 83605 ASSAY OF LACTIC ACID: CPT

## 2022-05-19 PROCEDURE — 80048 BASIC METABOLIC PNL TOTAL CA: CPT

## 2022-05-19 PROCEDURE — 86140 C-REACTIVE PROTEIN: CPT

## 2022-05-19 PROCEDURE — 87015 SPECIMEN INFECT AGNT CONCNTJ: CPT

## 2022-05-19 PROCEDURE — 73620 X-RAY EXAM OF FOOT: CPT

## 2022-05-19 PROCEDURE — 97164 PT RE-EVAL EST PLAN CARE: CPT

## 2022-05-19 PROCEDURE — 97116 GAIT TRAINING THERAPY: CPT

## 2022-05-19 PROCEDURE — 99239 HOSP IP/OBS DSCHRG MGMT >30: CPT

## 2022-05-19 PROCEDURE — 71045 X-RAY EXAM CHEST 1 VIEW: CPT

## 2022-05-19 PROCEDURE — 86900 BLOOD TYPING SEROLOGIC ABO: CPT

## 2022-05-19 PROCEDURE — 82010 KETONE BODYS QUAN: CPT

## 2022-05-19 PROCEDURE — U0005: CPT

## 2022-05-19 PROCEDURE — 86850 RBC ANTIBODY SCREEN: CPT

## 2022-05-19 PROCEDURE — 85610 PROTHROMBIN TIME: CPT

## 2022-05-19 PROCEDURE — 97110 THERAPEUTIC EXERCISES: CPT

## 2022-05-19 PROCEDURE — 80202 ASSAY OF VANCOMYCIN: CPT

## 2022-05-19 PROCEDURE — 88311 DECALCIFY TISSUE: CPT

## 2022-05-19 PROCEDURE — 93005 ELECTROCARDIOGRAM TRACING: CPT

## 2022-05-19 PROCEDURE — U0003: CPT

## 2022-05-19 PROCEDURE — 73600 X-RAY EXAM OF ANKLE: CPT

## 2022-05-19 PROCEDURE — 87116 MYCOBACTERIA CULTURE: CPT

## 2022-05-19 PROCEDURE — 97161 PT EVAL LOW COMPLEX 20 MIN: CPT

## 2022-05-19 PROCEDURE — 84466 ASSAY OF TRANSFERRIN: CPT

## 2022-05-19 PROCEDURE — 85025 COMPLETE CBC W/AUTO DIFF WBC: CPT

## 2022-05-19 PROCEDURE — 85027 COMPLETE CBC AUTOMATED: CPT

## 2022-05-19 PROCEDURE — 83540 ASSAY OF IRON: CPT

## 2022-05-19 PROCEDURE — 87070 CULTURE OTHR SPECIMN AEROBIC: CPT

## 2022-05-19 PROCEDURE — 85730 THROMBOPLASTIN TIME PARTIAL: CPT

## 2022-05-19 PROCEDURE — 80053 COMPREHEN METABOLIC PANEL: CPT

## 2022-05-19 PROCEDURE — 84100 ASSAY OF PHOSPHORUS: CPT

## 2022-05-19 PROCEDURE — 88305 TISSUE EXAM BY PATHOLOGIST: CPT

## 2022-05-19 PROCEDURE — 87075 CULTR BACTERIA EXCEPT BLOOD: CPT

## 2022-05-19 PROCEDURE — 73630 X-RAY EXAM OF FOOT: CPT

## 2022-05-19 PROCEDURE — 36415 COLL VENOUS BLD VENIPUNCTURE: CPT

## 2022-05-19 PROCEDURE — 83735 ASSAY OF MAGNESIUM: CPT

## 2022-05-19 PROCEDURE — 83550 IRON BINDING TEST: CPT

## 2022-05-19 PROCEDURE — 87635 SARS-COV-2 COVID-19 AMP PRB: CPT

## 2022-05-19 PROCEDURE — 87186 SC STD MICRODIL/AGAR DIL: CPT

## 2022-05-19 PROCEDURE — 97530 THERAPEUTIC ACTIVITIES: CPT

## 2022-05-19 PROCEDURE — 82962 GLUCOSE BLOOD TEST: CPT

## 2022-05-19 PROCEDURE — 86901 BLOOD TYPING SEROLOGIC RH(D): CPT

## 2022-05-19 PROCEDURE — 88304 TISSUE EXAM BY PATHOLOGIST: CPT

## 2022-05-19 PROCEDURE — 99285 EMERGENCY DEPT VISIT HI MDM: CPT

## 2022-05-19 RX ADMIN — Medication 650 MILLIGRAM(S): at 11:21

## 2022-05-19 RX ADMIN — SIMETHICONE 80 MILLIGRAM(S): 80 TABLET, CHEWABLE ORAL at 11:21

## 2022-05-19 RX ADMIN — LOSARTAN POTASSIUM 75 MILLIGRAM(S): 100 TABLET, FILM COATED ORAL at 06:26

## 2022-05-19 RX ADMIN — Medication 14 UNIT(S): at 09:26

## 2022-05-19 RX ADMIN — Medication 650 MILLIGRAM(S): at 12:21

## 2022-05-19 RX ADMIN — ENOXAPARIN SODIUM 40 MILLIGRAM(S): 100 INJECTION SUBCUTANEOUS at 11:21

## 2022-05-19 RX ADMIN — Medication 650 MILLIGRAM(S): at 06:26

## 2022-05-19 RX ADMIN — GABAPENTIN 100 MILLIGRAM(S): 400 CAPSULE ORAL at 06:27

## 2022-05-19 RX ADMIN — Medication 2: at 09:25

## 2022-05-19 NOTE — DISCHARGE NOTE NURSING/CASE MANAGEMENT/SOCIAL WORK - NSDCVIVACCINE_GEN_ALL_CORE_FT
Tdap; 30-Jun-2021 18:05; Lorrie Ziegler (IVELISSE); [employer]; [id number] (Exp. Date: 25-Nov-2022); IntraMuscular; Deltoid Left.; 0.5 milliLiter(s); VIS (VIS Published: 09-May-2013, VIS Presented: 30-Jun-2021);

## 2022-05-19 NOTE — PROGRESS NOTE ADULT - ASSESSMENT
47 yo M, history of previous alcohol and cocaine use w/ PMHx of HTN (not treated), HLD (on meds), DM (started in 2021 on OAD and insulin), osteomyelitis with amputation of the right 4th and 5th rays and partial amputation of the right 2nd digit, presents to the Emergency Department with foot pain / wound drainage. Podiatry consulted for right foot wound. VSS, leukocytosis present, elevated CRP, medial 1st MPJ wound actively draining purulence, pt found to have dorsal hallux abscess with 5ccs expressed after bedside Incision and drainage. Xray showing cortical changes of the medial 1st metatarsal head and 1st proximal phalanx high likelihood of OM.  Deep wound ED Cx: MRSA. Pt is s/p R foot I&D + debridement of plantar wound (5/3), approx 5cc purulence expressed. Possible RTOR during this admission at a later date Amputation. OR Cx growing MRSA in bone and deep Cx's, Bone pathology showing acute OM. Pt is s/p right foot TMA, surgical site is closed w/ penrose drain in place (5/10). Penrose drain pulled (5/12). TMA incision edges well coapting as of 5/16, negative for signs of purulence, erythema.     Plan:   - Abx per ID  - f/u OR Cx and Path: No growth.   - Local wound care: DSD, ABD, Kerlix, and ACE wrap  - NWB to RLE  - Rest of care per primary team    Podiatry following. Plan d/w attending.     Dressing instructions: Daily dressing changes Flush incision with saline, pat dry with gauze, cover with dry sterile gauze kerlix, and ace bandage     Patient should follow up with Dr. Maximiliano Green within 1 week of discharge.    Office information:          [place] Address- [address], [place] [address] Phone: [phone]         [place] Address- [address], [place] [address] Phone: [phone]

## 2022-05-19 NOTE — DISCHARGE NOTE NURSING/CASE MANAGEMENT/SOCIAL WORK - PATIENT PORTAL LINK FT
You can access the FollowMyHealth Patient Portal offered by [facility] by registering at the following website: [email or web address]. By joining [employer]’s FollowMyHealth portal, you will also be able to view your health information using other applications (apps) compatible with our system.

## 2022-05-19 NOTE — PROGRESS NOTE ADULT - SUBJECTIVE AND OBJECTIVE BOX
Patient is a 46y old  Male who presents with a chief complaint of right foot wound drainage (17 May 2022 16:04)      INTERVAL HPI/ OVERNIGHT EVENTS: ANDREW o/n. Pt awaiting d/c to [facility] today. No new pedal complaints.      LABS      ICU Vital Signs Last 24 Hrs  T(C): 36.7 (19 May 2022 06:00), Max: 37.1 (18 May 2022 21:07)  T(F): 98.1 (19 May 2022 06:00), Max: 98.7 (18 May 2022 21:07)  HR: 69 (19 May 2022 06:00) (69 - 76)  BP: 157/92 (19 May 2022 06:00) (113/75 - 157/92)  BP(mean): --  ABP: --  ABP(mean): --  RR: 18 (19 May 2022 06:00) (18 - 18)  SpO2: 98% (19 May 2022 06:00) (98% - 98%)      RADIOLOGY    MICROBIOLOGY    PHYSICAL EXAM  Lower Extremity Focused: RIGHT  Vasc: D/PT 2/4 b/l, Right foot warmer than left, non-pitting edema to dorsum of right foot over hallux  Derm:  s/p R foot TMA (5/10/22). Incision sites are intact. Sutures intact. Negative for purulence, malodor, or erythema at this time  Neuro: protective sensation grossly diminished   MSK: S/p TMA 5/10

## 2022-05-19 NOTE — PROGRESS NOTE ADULT - PROVIDER SPECIALTY LIST ADULT
Infectious Disease
Podiatry
Infectious Disease
Internal Medicine
Internal Medicine
Podiatry
Infectious Disease
Infectious Disease
Internal Medicine
Podiatry
Internal Medicine
Internal Medicine
Infectious Disease
Hospitalist
Internal Medicine
Hospitalist
Internal Medicine

## 2022-05-19 NOTE — DISCHARGE NOTE NURSING/CASE MANAGEMENT/SOCIAL WORK - NSDCPEFALRISK_GEN_ALL_CORE
For information on Fall & Injury Prevention, visit: [email or web address] OR  [email or web address] OR  https://www.cdc.gov/steadi/patient.html

## 2022-05-24 ENCOUNTER — APPOINTMENT (OUTPATIENT)
Dept: INTERNAL MEDICINE | Facility: CLINIC | Age: 47
End: 2022-05-24

## 2022-05-26 DIAGNOSIS — Z89.421 ACQUIRED ABSENCE OF OTHER RIGHT TOE(S): ICD-10-CM

## 2022-05-26 DIAGNOSIS — G83.4 CAUDA EQUINA SYNDROME: ICD-10-CM

## 2022-05-26 DIAGNOSIS — I10 ESSENTIAL (PRIMARY) HYPERTENSION: ICD-10-CM

## 2022-05-26 DIAGNOSIS — D50.9 IRON DEFICIENCY ANEMIA, UNSPECIFIED: ICD-10-CM

## 2022-05-26 DIAGNOSIS — E78.5 HYPERLIPIDEMIA, UNSPECIFIED: ICD-10-CM

## 2022-05-26 DIAGNOSIS — E11.69 TYPE 2 DIABETES MELLITUS WITH OTHER SPECIFIED COMPLICATION: ICD-10-CM

## 2022-05-26 DIAGNOSIS — Z79.84 LONG TERM (CURRENT) USE OF ORAL HYPOGLYCEMIC DRUGS: ICD-10-CM

## 2022-05-26 DIAGNOSIS — E66.9 OBESITY, UNSPECIFIED: ICD-10-CM

## 2022-05-26 DIAGNOSIS — R10.9 UNSPECIFIED ABDOMINAL PAIN: ICD-10-CM

## 2022-05-26 DIAGNOSIS — B95.62 METHICILLIN RESISTANT STAPHYLOCOCCUS AUREUS INFECTION AS THE CAUSE OF DISEASES CLASSIFIED ELSEWHERE: ICD-10-CM

## 2022-05-26 DIAGNOSIS — D63.8 ANEMIA IN OTHER CHRONIC DISEASES CLASSIFIED ELSEWHERE: ICD-10-CM

## 2022-05-26 DIAGNOSIS — L97.519 NON-PRESSURE CHRONIC ULCER OF OTHER PART OF RIGHT FOOT WITH UNSPECIFIED SEVERITY: ICD-10-CM

## 2022-05-26 DIAGNOSIS — L02.611 CUTANEOUS ABSCESS OF RIGHT FOOT: ICD-10-CM

## 2022-05-26 DIAGNOSIS — K76.0 FATTY (CHANGE OF) LIVER, NOT ELSEWHERE CLASSIFIED: ICD-10-CM

## 2022-05-26 DIAGNOSIS — Z79.4 LONG TERM (CURRENT) USE OF INSULIN: ICD-10-CM

## 2022-05-26 DIAGNOSIS — E11.621 TYPE 2 DIABETES MELLITUS WITH FOOT ULCER: ICD-10-CM

## 2022-05-26 DIAGNOSIS — N28.9 DISORDER OF KIDNEY AND URETER, UNSPECIFIED: ICD-10-CM

## 2022-05-26 DIAGNOSIS — M86.171 OTHER ACUTE OSTEOMYELITIS, RIGHT ANKLE AND FOOT: ICD-10-CM

## 2022-05-26 DIAGNOSIS — M79.671 PAIN IN RIGHT FOOT: ICD-10-CM

## 2022-05-26 DIAGNOSIS — R50.9 FEVER, UNSPECIFIED: ICD-10-CM

## 2022-06-01 LAB
CULTURE RESULTS: SIGNIFICANT CHANGE UP
SPECIMEN SOURCE: SIGNIFICANT CHANGE UP

## 2022-06-14 LAB — SURGICAL PATHOLOGY STUDY: SIGNIFICANT CHANGE UP

## 2022-06-22 LAB
CULTURE RESULTS: SIGNIFICANT CHANGE UP
SPECIMEN SOURCE: SIGNIFICANT CHANGE UP

## 2022-09-09 NOTE — ED ADULT NURSE NOTE - OTHER COMPLAINTS
SOB, lightheaded x2-3days no cp. No cough, no fevers nor chills. HX of DM. EKG in progress. Statement Selected

## 2022-09-17 NOTE — OCCUPATIONAL THERAPY INITIAL EVALUATION ADULT - DIAGNOSIS, OT EVAL
Pt presenting with NWB status of R LE, pain in lower back during functional mobility, decreased sensation to b/l feet, and decreased standing balance, impacting ability to perform functional mobility/ADLs.
no concerns

## 2022-10-21 NOTE — PHYSICAL THERAPY INITIAL EVALUATION ADULT - GAIT DISTANCE, PT EVAL
----- Message from Brii Noble PA-C sent at 10/21/2022  8:42 AM CDT -----  CT cardiac score has increased from previous. LAD in 2013 was 6 and is now 93. Any family history of CAD? Could consider exercise stress test due to change in CT cardiac calcium score.     25ft

## 2022-10-25 NOTE — ED PROVIDER NOTE - PATIENT PORTAL LINK FT
9 You can access the FollowMyHealth Patient Portal offered by [facility] by registering at the following website: [email or web address]. By joining [employer]’s FollowMyHealth portal, you will also be able to view your health information using other applications (apps) compatible with our system.

## 2022-11-04 ENCOUNTER — EMERGENCY (EMERGENCY)
Facility: HOSPITAL | Age: 47
LOS: 1 days | Discharge: HOME | End: 2022-11-04
Attending: EMERGENCY MEDICINE | Admitting: EMERGENCY MEDICINE
Payer: COMMERCIAL

## 2022-11-04 VITALS
WEIGHT: 229.94 LBS | HEIGHT: 70 IN | SYSTOLIC BLOOD PRESSURE: 113 MMHG | RESPIRATION RATE: 18 BRPM | TEMPERATURE: 98 F | OXYGEN SATURATION: 100 % | HEART RATE: 100 BPM | DIASTOLIC BLOOD PRESSURE: 76 MMHG

## 2022-11-04 VITALS
SYSTOLIC BLOOD PRESSURE: 115 MMHG | HEART RATE: 87 BPM | OXYGEN SATURATION: 99 % | TEMPERATURE: 99 F | RESPIRATION RATE: 18 BRPM | DIASTOLIC BLOOD PRESSURE: 76 MMHG

## 2022-11-04 DIAGNOSIS — M25.512 PAIN IN LEFT SHOULDER: ICD-10-CM

## 2022-11-04 DIAGNOSIS — R42 DIZZINESS AND GIDDINESS: ICD-10-CM

## 2022-11-04 DIAGNOSIS — Z89.421 ACQUIRED ABSENCE OF OTHER RIGHT TOE(S): ICD-10-CM

## 2022-11-04 DIAGNOSIS — Z96.0 PRESENCE OF UROGENITAL IMPLANTS: Chronic | ICD-10-CM
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
## 2022-11-04 DIAGNOSIS — I10 ESSENTIAL (PRIMARY) HYPERTENSION: ICD-10-CM

## 2022-11-04 DIAGNOSIS — E11.9 TYPE 2 DIABETES MELLITUS WITHOUT COMPLICATIONS: ICD-10-CM

## 2022-11-04 DIAGNOSIS — Z87.39 PERSONAL HISTORY OF OTHER DISEASES OF THE MUSCULOSKELETAL SYSTEM AND CONNECTIVE TISSUE: ICD-10-CM

## 2022-11-04 DIAGNOSIS — M54.2 CERVICALGIA: ICD-10-CM

## 2022-11-04 DIAGNOSIS — Z89.429 ACQUIRED ABSENCE OF OTHER TOE(S), UNSPECIFIED SIDE: Chronic | ICD-10-CM

## 2022-11-04 DIAGNOSIS — Z79.84 LONG TERM (CURRENT) USE OF ORAL HYPOGLYCEMIC DRUGS: ICD-10-CM
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
## 2022-11-04 DIAGNOSIS — Z79.4 LONG TERM (CURRENT) USE OF INSULIN: ICD-10-CM

## 2022-11-04 DIAGNOSIS — E78.5 HYPERLIPIDEMIA, UNSPECIFIED: ICD-10-CM

## 2022-11-04 DIAGNOSIS — Z20.822 CONTACT WITH AND (SUSPECTED) EXPOSURE TO COVID-19: ICD-10-CM

## 2022-11-04 DIAGNOSIS — Z94.5 SKIN TRANSPLANT STATUS: Chronic | ICD-10-CM

## 2022-11-04 LAB
ANION GAP SERPL CALC-SCNC: 9 MMOL/L — SIGNIFICANT CHANGE UP (ref 5–17)
BASOPHILS # BLD AUTO: 0.04 K/UL — SIGNIFICANT CHANGE UP (ref 0–0.2)
BASOPHILS NFR BLD AUTO: 0.4 % — SIGNIFICANT CHANGE UP (ref 0–2)
BUN SERPL-MCNC: 15 MG/DL — SIGNIFICANT CHANGE UP (ref 7–23)
CALCIUM SERPL-MCNC: 9.3 MG/DL — SIGNIFICANT CHANGE UP (ref 8.4–10.5)
CHLORIDE SERPL-SCNC: 98 MMOL/L — SIGNIFICANT CHANGE UP (ref 96–108)
CO2 SERPL-SCNC: 28 MMOL/L — SIGNIFICANT CHANGE UP (ref 22–31)
CREAT SERPL-MCNC: 1.2 MG/DL — SIGNIFICANT CHANGE UP (ref 0.5–1.3)
EGFR: 75 ML/MIN/1.73M2 — SIGNIFICANT CHANGE UP
EOSINOPHIL # BLD AUTO: 0.15 K/UL — SIGNIFICANT CHANGE UP (ref 0–0.5)
EOSINOPHIL NFR BLD AUTO: 1.6 % — SIGNIFICANT CHANGE UP (ref 0–6)
GLUCOSE SERPL-MCNC: 167 MG/DL — HIGH (ref 70–99)
HCT VFR BLD CALC: 41.8 % — SIGNIFICANT CHANGE UP (ref 39–50)
HGB BLD-MCNC: 13.8 G/DL — SIGNIFICANT CHANGE UP (ref 13–17)
IMM GRANULOCYTES NFR BLD AUTO: 0.3 % — SIGNIFICANT CHANGE UP (ref 0–0.9)
LYMPHOCYTES # BLD AUTO: 2.56 K/UL — SIGNIFICANT CHANGE UP (ref 1–3.3)
LYMPHOCYTES # BLD AUTO: 26.5 % — SIGNIFICANT CHANGE UP (ref 13–44)
MCHC RBC-ENTMCNC: 28.8 PG — SIGNIFICANT CHANGE UP (ref 27–34)
MCHC RBC-ENTMCNC: 33 GM/DL — SIGNIFICANT CHANGE UP (ref 32–36)
MCV RBC AUTO: 87.1 FL — SIGNIFICANT CHANGE UP (ref 80–100)
MONOCYTES # BLD AUTO: 0.57 K/UL — SIGNIFICANT CHANGE UP (ref 0–0.9)
MONOCYTES NFR BLD AUTO: 5.9 % — SIGNIFICANT CHANGE UP (ref 2–14)
NEUTROPHILS # BLD AUTO: 6.32 K/UL — SIGNIFICANT CHANGE UP (ref 1.8–7.4)
NEUTROPHILS NFR BLD AUTO: 65.3 % — SIGNIFICANT CHANGE UP (ref 43–77)
NRBC # BLD: 0 /100 WBCS — SIGNIFICANT CHANGE UP (ref 0–0)
PLATELET # BLD AUTO: 352 K/UL — SIGNIFICANT CHANGE UP (ref 150–400)
POTASSIUM SERPL-MCNC: 4.9 MMOL/L — SIGNIFICANT CHANGE UP (ref 3.5–5.3)
POTASSIUM SERPL-SCNC: 4.9 MMOL/L — SIGNIFICANT CHANGE UP (ref 3.5–5.3)
RBC # BLD: 4.8 M/UL — SIGNIFICANT CHANGE UP (ref 4.2–5.8)
RBC # FLD: 13.6 % — SIGNIFICANT CHANGE UP (ref 10.3–14.5)
SARS-COV-2 RNA SPEC QL NAA+PROBE: NEGATIVE — SIGNIFICANT CHANGE UP
SODIUM SERPL-SCNC: 135 MMOL/L — SIGNIFICANT CHANGE UP (ref 135–145)
WBC # BLD: 9.67 K/UL — SIGNIFICANT CHANGE UP (ref 3.8–10.5)
WBC # FLD AUTO: 9.67 K/UL — SIGNIFICANT CHANGE UP (ref 3.8–10.5)

## 2022-11-04 PROCEDURE — 70496 CT ANGIOGRAPHY HEAD: CPT | Mod: 26,MA

## 2022-11-04 PROCEDURE — 87635 SARS-COV-2 COVID-19 AMP PRB: CPT

## 2022-11-04 PROCEDURE — 73030 X-RAY EXAM OF SHOULDER: CPT | Mod: 26

## 2022-11-04 PROCEDURE — 96361 HYDRATE IV INFUSION ADD-ON: CPT

## 2022-11-04 PROCEDURE — 93005 ELECTROCARDIOGRAM TRACING: CPT

## 2022-11-04 PROCEDURE — 73030 X-RAY EXAM OF SHOULDER: CPT

## 2022-11-04 PROCEDURE — 70498 CT ANGIOGRAPHY NECK: CPT | Mod: 26,MA

## 2022-11-04 PROCEDURE — 80048 BASIC METABOLIC PNL TOTAL CA: CPT

## 2022-11-04 PROCEDURE — 96374 THER/PROPH/DIAG INJ IV PUSH: CPT | Mod: XU

## 2022-11-04 PROCEDURE — 36415 COLL VENOUS BLD VENIPUNCTURE: CPT

## 2022-11-04 PROCEDURE — 70450 CT HEAD/BRAIN W/O DYE: CPT | Mod: MA

## 2022-11-04 PROCEDURE — 72126 CT NECK SPINE W/DYE: CPT | Mod: MA

## 2022-11-04 PROCEDURE — 99285 EMERGENCY DEPT VISIT HI MDM: CPT

## 2022-11-04 PROCEDURE — 70450 CT HEAD/BRAIN W/O DYE: CPT | Mod: 26,MA,59

## 2022-11-04 PROCEDURE — 70496 CT ANGIOGRAPHY HEAD: CPT | Mod: MA

## 2022-11-04 PROCEDURE — 71046 X-RAY EXAM CHEST 2 VIEWS: CPT

## 2022-11-04 PROCEDURE — 71046 X-RAY EXAM CHEST 2 VIEWS: CPT | Mod: 26

## 2022-11-04 PROCEDURE — 72126 CT NECK SPINE W/DYE: CPT | Mod: 26,MA,59

## 2022-11-04 PROCEDURE — 85025 COMPLETE CBC W/AUTO DIFF WBC: CPT

## 2022-11-04 PROCEDURE — 70498 CT ANGIOGRAPHY NECK: CPT | Mod: MA

## 2022-11-04 PROCEDURE — 99285 EMERGENCY DEPT VISIT HI MDM: CPT | Mod: 25

## 2022-11-04 RX ORDER — KETOROLAC TROMETHAMINE 30 MG/ML
15 SYRINGE (ML) INJECTION ONCE
Refills: 0 | Status: DISCONTINUED | OUTPATIENT
Start: 2022-11-04 | End: 2022-11-04

## 2022-11-04 RX ORDER — LIDOCAINE 4 G/100G
1 CREAM TOPICAL
Qty: 10 | Refills: 0
Start: 2022-11-04

## 2022-11-04 RX ORDER — SODIUM CHLORIDE 9 MG/ML
1000 INJECTION INTRAMUSCULAR; INTRAVENOUS; SUBCUTANEOUS ONCE
Refills: 0 | Status: COMPLETED | OUTPATIENT
Start: 2022-11-04 | End: 2022-11-04

## 2022-11-04 RX ORDER — CYCLOBENZAPRINE HYDROCHLORIDE 10 MG/1
1 TABLET, FILM COATED ORAL
Qty: 15 | Refills: 0
Start: 2022-11-04

## 2022-11-04 RX ORDER — LIDOCAINE 4 G/100G
1 CREAM TOPICAL ONCE
Refills: 0 | Status: COMPLETED | OUTPATIENT
Start: 2022-11-04 | End: 2022-11-04

## 2022-11-04 RX ORDER — IBUPROFEN 200 MG
1 TABLET ORAL
Qty: 15 | Refills: 0
Start: 2022-11-04

## 2022-11-04 RX ORDER — CYCLOBENZAPRINE HYDROCHLORIDE 10 MG/1
10 TABLET, FILM COATED ORAL ONCE
Refills: 0 | Status: COMPLETED | OUTPATIENT
Start: 2022-11-04 | End: 2022-11-04

## 2022-11-04 RX ADMIN — SODIUM CHLORIDE 1000 MILLILITER(S): 9 INJECTION INTRAMUSCULAR; INTRAVENOUS; SUBCUTANEOUS at 14:56

## 2022-11-04 RX ADMIN — LIDOCAINE 1 PATCH: 4 CREAM TOPICAL at 12:43

## 2022-11-04 RX ADMIN — Medication 15 MILLIGRAM(S): at 17:31

## 2022-11-04 RX ADMIN — SODIUM CHLORIDE 1000 MILLILITER(S): 9 INJECTION INTRAMUSCULAR; INTRAVENOUS; SUBCUTANEOUS at 17:31

## 2022-11-04 RX ADMIN — Medication 15 MILLIGRAM(S): at 12:43

## 2022-11-04 RX ADMIN — CYCLOBENZAPRINE HYDROCHLORIDE 10 MILLIGRAM(S): 10 TABLET, FILM COATED ORAL at 12:43

## 2022-11-04 NOTE — ED ADULT TRIAGE NOTE - CHIEF COMPLAINT QUOTE
Pt presents to ED c/o fall. Reports fell down stairs 5 days ago. Now reporting L sided shoulder pain, L leg pain and lower back pain. Denies head strike, LOC, use of blood thinners. Ambulatory w steady gait, no obvious deformities. denies numbness, tingling, weakness.

## 2022-11-04 NOTE — ED PROVIDER NOTE - PRO INTERPRETER NEED 2
[de-identified] : Constitutional:Well nourished , well developed and in no acute distress\par Psychiatric: Alert and oriented to time place and person.Appropriate affect \par Skin:Head, neck, arms and lower extremities:no lesions or discoloration\par HEENT: Normocephalic, EOM intact, Nasal septum midline,\par Respiratory: Unlabored respirations,no audible wheezing ,no tachypnea, no cyanosis\par Cardiovascular: no leg swelling  no ankle edema no JVD, pulse regular\par Vascular: no calf or thigh tenderness, \par Peripheral pulses; intact\par Lymphatics:No groin adenopathy,no lymphedema lower  or upper extremities\par Right knee is normal alignment effusion 1+ flexion 0/120 left 0/130+\par \par  English

## 2022-11-04 NOTE — ED PROVIDER NOTE - CARE PROVIDERS DIRECT ADDRESSES
[email or web address]

## 2022-11-04 NOTE — ED PROVIDER NOTE - OBJECTIVE STATEMENT
47 M pmh dm p/w L neck/shoulder pain x 5 days s/p fall down approx 10 steps.  pt reports tripped while walking down steps Sunday, fell and hit L shoulder/neck on wall then slid down approx 10 steps, denies head trauma or loc.  since pt has been experience pain in L shoulder worse w/ ROM LUE and L posterior and lateral neck pain.  states he feels dizzy described as lightheaded and off balance when looking up or to the right resolves after few seconds.  he has been taking tylenol w/ little relief.  denies f/c, neck stiffness, low back pain, chest pain, sob, MAYO, abd pain, nvd, bowel/bladder dysfunction, numbness/weakness, paresthesia, other injuries, use of AC

## 2022-11-04 NOTE — ED PROVIDER NOTE - NSICDXFAMILYHX_GEN_ALL_CORE_FT
No FAMILY HISTORY:  Mother  Still living? Unknown  FH: CAD (coronary artery disease), Age at diagnosis: Age Unknown

## 2022-11-04 NOTE — ED PROVIDER NOTE - PATIENT PORTAL LINK FT
You can access the FollowMyHealth Patient Portal offered by [facility] by registering at the following website: [email or web address]. By joining [employer]’s FollowMyHealth portal, you will also be able to view your health information using other applications (apps) compatible with our system.

## 2022-11-04 NOTE — ED PROVIDER NOTE - PHYSICAL EXAMINATION
Vitals reviewed  Gen: comfortable appearing, nad, speaking in full sentences  Skin: wwp, no rash/lesions  HEENT: ncat, eomi, mmm  Neck: no midline ttp, + ttp over L paraspinal/SCM, pain w/ looking to R but supple, no bruit  Back: no midline ttp/step off  CV: rrr, no audible m/r/g  Resp: symmetrical expansion, ctab, no w/r/r  Abd: nondistended, soft/nt  Ext: limited abduction L shoulder w/ mild ttp over posterior aspect AC joint- no deformity, otherwise FROM throughout, no peripheral edema, distal pulses 2+, SILT  Neuro: alert/oriented x3, no focal deficits, steady gait without assistance, no ataxia

## 2022-11-04 NOTE — ED PROVIDER NOTE - CARE PROVIDER_API CALL
Alhaji [place])  Orthopedics  [address] [address]  Phone: [phone]  Fax: [phone]  Follow Up Time:

## 2022-11-04 NOTE — ED PROVIDER NOTE - CLINICAL SUMMARY MEDICAL DECISION MAKING FREE TEXT BOX
47 M pmh dm p/w L neck/shoulder pain x 5 days s/p fall down approx 10 steps. also w/ dizziness when looking up and to the R.  on exam pt comfortable appearing, hrrr, lungs ctab, no midline ttp, + ttp over L paraspinal/SCM, pain w/ looking to R but supple, no bruit, limited abduction L shoulder w/ ttp over L posterior AC joint, NVI x4 ext.  c/f vert art dissection as pt w/ dizziness w/ certain movements neck and + trauma, also r/o fracture L shoulder.  will obtain labs, L shoulder xray, cxr, CTA head/neck, treat pain

## 2022-11-04 NOTE — ED ADULT NURSE NOTE - OBJECTIVE STATEMENT
47 yr old M patient, A+OX3, ambulatory w steady gait speaking full clear sentences presents to ED w c/o of L sided neck and shoulder pain and L leg pain.  Pt states he fell down 10 stairs 5 dys ago and since then experienced pain.  Also c/o of dizziness when turning head to R side.  No distress noted.  Breathing appears easy and unlabored.  Denies dizziness at rest, denies change of vision, headache.  Denies chest pain.  Denies loc at time of fall.

## 2022-11-04 NOTE — ED PROVIDER NOTE - NS ED ATTENDING STATEMENT MOD
I have seen and examined this patient and fully participated in the care of this patient as the teaching attending.  The service was shared with the HAILEY.  I reviewed and verified the documentation and independently performed the documented:

## 2022-11-04 NOTE — ED PROVIDER NOTE - NSFOLLOWUPINSTRUCTIONS_ED_ALL_ED_FT
Take tylenol 650mg or motrin 600mg for pain every 4-6 hours.  You can also take flexeril (muscle relaxer) as prescribed for pain but do not drive/operate heavy machinery as it can make you drowsy    Call to arrange follow up with primary care doctor within one week   You can also follow up with ortho spine specialist if you have persistent pain   You may call our referrals coordinator at [phone] Monday to Friday 11am-7pm for assistance with making an appointment      Cervical Sprain      A cervical sprain is a stretch or tear in one or more of the ligaments in the neck. Ligaments are the tissues that connect bones. Cervical sprains can range from mild to severe. Severe cervical sprains can cause the spinal bones (vertebrae) in the neck to be unstable. This can result in spinal cord damage and in serious nervous system problems.    The time that it takes for a cervical sprain to heal depends on the cause and extent of the injury. Most cervical sprains heal in 4–6 weeks.      What are the causes?    Cervical sprains may be caused by trauma, such as an injury from a motor vehicle accident, a fall, or a sudden forward and backward whipping movement of the head and neck (whiplash injury). Mild cervical sprains may be caused by wear and tear over time.      What increases the risk?    The following factors may make you more likely to develop this condition:  •Participating in activities that have a high risk of trauma to the neck. These include contact sports, auto racing, gymnastics, and diving.      •Taking risks when driving or riding in a motor vehicle.      •Osteoarthritis of the spine.      •Poor strength and flexibility of the neck.      •A previous neck injury.      •Poor posture.      •Spending long periods in certain positions that put stress on the neck, such as sitting at a computer for a long time.        What are the signs or symptoms?    Symptoms of this condition include:  •Pain, soreness, stiffness, tenderness, swelling, or a burning sensation in the front, back, or sides of the neck, shoulders, or upper back.      •Sudden tightening of neck muscles (spasms).      •Limited ability to move the neck.      •Headache.      •Dizziness.      •Nausea or vomiting.      •Weakness, numbness, or tingling in a hand or an arm.      Symptoms may develop right away after injury, or they may develop over a few days. In some cases, symptoms may go away with treatment and return (recur) over time.      How is this diagnosed?    This condition may be diagnosed based on:  •Your medical history.      •Your symptoms.      •Any recent injuries or known neck problems that you have, such as arthritis in the neck.      •A physical exam.      •Imaging tests, such as X-rays, MRI, and CT scan.        How is this treated?    This condition is treated by resting and icing the injured area and doing physical therapy exercises. Heat therapy may be used 2–3 days after the injury occurred if there is no swelling. Depending on the severity of your condition, treatment may also include:•Keeping your neck in place (immobilized) for periods of time. This may be done using:  •A cervical collar. This supports your chin and the back of your head.      •A cervical traction device. This is a sling that holds up your head. The device removes weight and pressure from your neck, and it may help to relieve pain.        •Medicines that help to relieve pain and inflammation.      •Medicines that help to relax your muscles (muscle relaxants).      •Surgery. This is rare.        Follow these instructions at home:      Medicines      •Take over-the-counter and prescription medicines only as told by your health care provider.    •Ask your health care provider if the medicine prescribed to you:  •Requires you to avoid driving or using heavy machinery.    •Can cause constipation. You may need to take these actions to prevent or treat constipation:  •Drink enough fluid to keep your urine pale yellow.      •Take over-the-counter or prescription medicines.      •Eat foods that are high in fiber, such as beans, whole grains, and fresh fruits and vegetables.      •Limit foods that are high in fat and processed sugars, such as fried or sweet foods.          If you have a cervical collar:     •Wear the collar as told by your health care provider. Do not remove it unless told.      •Ask before making any adjustments to your collar.      •If you have long hair, keep it outside of the collar.    •Ask your health care provider if you may remove the collar for cleaning and bathing. If so:  •Follow instructions about how to remove it safely.      •Clean it by hand with mild soap and water and air-dry it completely.      •If your collar has removable pads, remove them every 1–2 days and wash them by hand with soap and water. Let them air-dry completely before putting them back in the collar.        •Tell your health care provider if your skin under the collar has irritation or sores.        Managing pain, stiffness, and swelling                   •If directed, use a cervical traction device as told.    •If directed, put ice on the affected area. To do this:  •Put ice in a plastic bag.      •Place a towel between your skin and the bag.      •Leave the ice on for 20 minutes, 2–3 times a day.      •If directed, apply heat to the affected area before you do your physical therapy or as often as told by your health care provider. Use the heat source that your health care provider recommends, such as a moist heat pack or a heating pad.  •Place a towel between your skin and the heat source.      •Leave the heat on for 20–30 minutes.      •Remove the heat if your skin turns bright red. This is especially important if you are unable to feel pain, heat, or cold. You may have a greater risk of getting burned.        Activity     • Do not drive while wearing a cervical collar. If you do not have a cervical collar, ask if it is safe to drive while your neck heals.      • Do not lift anything that is heavier than 10 lb (4.5 kg), or the limit that you are told, until your health care provider says that it is safe.      •Rest as told by your health care provider.      •If physical therapy was prescribed, do exercises as told by your health care provider or physical therapist.      •Return to your normal activities as told by your health care provider. Avoid positions and activities that make your symptoms worse. Ask your health care provider what activities are safe for you.      General instructions     • Do not use any products that contain nicotine or tobacco, such as cigarettes, e-cigarettes, and chewing tobacco. These can delay healing. If you need help quitting, ask your health care provider.      •Keep all follow-up visits as told by your health care provider or physical therapist. This is important.        How is this prevented?    To prevent a cervical sprain from happening again:  •Use and maintain good posture. Make any needed adjustments to your workstation to help you do this.      •Exercise regularly as told by your health care provider or physical therapist.      •Avoid risky activities that may cause a cervical sprain.        Contact a health care provider if you have:    •Symptoms that get worse or do not get better after 2 weeks of treatment.      •Pain that gets worse or does not get better with medicine.      •New, unexplained symptoms.      •Sores or irritated skin on your neck from wearing your cervical collar.        Get help right away if:    •You have severe pain.      •You develop numbness, tingling, or weakness in any part of your body.      •You cannot move a part of your body (you have paralysis).      •You have neck pain along with severe dizziness or headache.        Summary    •A cervical sprain is a stretch or tear in one or more of the ligaments in the neck.      •Cervical sprains may be caused by trauma, such as an injury from a motor vehicle accident, a fall, or a sudden forward and backward whipping movement of the head and neck (whiplash injury).      •Symptoms may develop right away after injury, or they may develop over a few days.      •This condition may be treated with rest, ice, heat, medicines, physical therapy, and surgery.      This information is not intended to replace advice given to you by your health care provider. Make sure you discuss any questions you have with your health care provider.

## 2022-11-04 NOTE — ED PROVIDER NOTE - ATTENDING CONTRIBUTION TO CARE
47 male hx of DM w L neck/shoulder pain x 5 days s/p fall down approx 10 steps, assoc  dizziness when looking up and to the Right, Well appearing, nad, nc/at, lung cta, heart reg, abd soft, nt, ext no gross deformity, no gross neuro deficits, pending imaging, reassess.

## 2022-11-04 NOTE — ED PROVIDER NOTE - PROGRESS NOTE DETAILS
d/w radiology, with obstructed view L vert art origin there are no sxs c/f dissection and rest of vessel wnl.  radiology will recon imaged for cspine. rest of imaging all unremarkable.  pt feels well s/p meds and IVF.  will dc w/ rxs and can f/u with pmd.  discussed strict return parameters

## 2023-02-02 NOTE — PROGRESS NOTE ADULT - ASSESSMENT
47 y/o M with PMHx of DM, HTN, HLD, osteomyelitis s/p amputation of R 3rd and 4th toes and partial amp of R 2nd toe, presents to the ED with complaints of Chills. Podiatry consulted for chronic wound to R foot, seen by podiatrist Dr. Johnson 2 weeks ago pt missed his last appointment. Pt now coming in septic With fever, chills, leukocytosis, elevated ESR and CRP. Wound looks clinically infected drainage +malodor, new wound to medial aspect of pts right foot. Pt is now s/p right foot I&D, wash out, and wound graft application to plantar wound (2/2/22).    Plan:   - C/w IV ABX per primary  - f/u Deep wound Cx + OR Cx's  - Wound care: [facility] Derm-[facility]dy dermal matrix + Adaptic nonadherent is sutured on to plantar wound. DSD, Kerlix wrap + ACE wrap  - NWB to RLE, very important   - Rest of care per primary team     Podiatry Following. Plan d/w attending Ivermectin Pregnancy And Lactation Text: This medication is Pregnancy Category C and it isn't known if it is safe during pregnancy. It is also excreted in breast milk.

## 2023-02-16 NOTE — ED ADULT NURSE NOTE - NSFALLRSKOUTCOME_ED_ALL_ED
BEFORE YOUR PROCEDURE:  TAKE: Metoprolol succinate (Toprol-XL), Norvasc (Amlodipine), Ranexa (Ranolazine) the day of your procedure.        
Universal Safety Interventions

## 2023-02-18 NOTE — ED PROVIDER NOTE - HIV OFFER
RN phoned patient to advised antibiotics were prescribed and sent to pharmacy, understanding verbalized.    Previously Declined (within the last year)

## 2023-03-07 NOTE — PHYSICAL THERAPY INITIAL EVALUATION ADULT - TRANSFER SAFETY CONCERNS NOTED: SIT/STAND, REHAB EVAL
Speech Therapy Treatment Note      Today's date: 3/7/2023  Patient name: Tiffany Jimenez  : 2020  MRN: [id number]  Referring provider: Mary Jo Ingram MD  Dx:   Encounter Diagnosis     ICD-10-CM    1  Expressive language impairment  F80 1       2  Mixed receptive-expressive language disorder  F80 2           Start Time: 0800  Stop Time: 9513  Total time in clinic (min): 45 minutes    Visit Number: 118 [address]     [facility]    Subjective/Behavioral:  Rubia Guevara was accompanied to therapy by his mother  He transitioned without difficulty and participated well in therapy tasks  Therapy consisted of play-based tasks with language embedded  Seen with PT    Short-term Goals:   Goal 1: Pt will ID and state answers to simple [address] questions related to self, environment and/or therapy activities on  opp across 3 sessions  Given pictured scene (kitchen) pt was able to answer environmental questions related to daily routines and activities on 3/5 opp  He continued to demonstrate difficulty with personal questions related to picture (e g  what kind of cereal do you like? What do you like to drink?)  Goal 2: Pt will ID and state function of items and/or items required for tasks of daily living (e g  what do we you use to brush your teeth; what do you need to dry your hands off)  opp during structured tasks  Given visual field of 3-5 options pt was able to ID objects based on function on  opp independently; when given verbal cues, lead-in prompting and binary choice he was able to find appropriate object  Goal 3: Pt will ID and communicate environmental needs (e g  I need the glue; can I have the basketball, I need help opening the marker) on minimum of 80% of opp when given min support   During play with familiar peer pt was able to communicate needs (can you fix the net, can you hold the ball, where is the ball, etc) with some verbal prompts    Initially pt preferred to voice his needs to therapist rather than peer; however, as game progressed he became more comfortable with interactions and communication with peer  Goal 4: Pt will ID and accurately sequence 2-3 steps of daily routine task  NDT    Other:Discussed session and patient progress with caregiver/family member after today's session    Recommendations:Continue with Plan of Care Pt able to perform transfer from sitting EOB to standing. Required 1 VC for NWB precautions and able to maintain without assist afterwards.

## 2023-05-21 ENCOUNTER — INPATIENT (INPATIENT)
Facility: HOSPITAL | Age: 48
LOS: 1 days | Discharge: HOME | DRG: 69 | End: 2023-05-23
Attending: PSYCHIATRY & NEUROLOGY | Admitting: PSYCHIATRY & NEUROLOGY
Payer: COMMERCIAL

## 2023-05-21 VITALS
HEART RATE: 89 BPM | RESPIRATION RATE: 24 BRPM | SYSTOLIC BLOOD PRESSURE: 107 MMHG | DIASTOLIC BLOOD PRESSURE: 77 MMHG | TEMPERATURE: 99 F | OXYGEN SATURATION: 99 %

## 2023-05-21 DIAGNOSIS — Z94.5 SKIN TRANSPLANT STATUS: Chronic | ICD-10-CM

## 2023-05-21 DIAGNOSIS — Z96.0 PRESENCE OF UROGENITAL IMPLANTS: Chronic | ICD-10-CM

## 2023-05-21 DIAGNOSIS — Z89.429 ACQUIRED ABSENCE OF OTHER TOE(S), UNSPECIFIED SIDE: Chronic | ICD-10-CM

## 2023-05-21 DIAGNOSIS — I26.99 OTHER PULMONARY EMBOLISM WITHOUT ACUTE COR PULMONALE: ICD-10-CM

## 2023-05-21 LAB
ALBUMIN SERPL ELPH-MCNC: 3.7 G/DL — SIGNIFICANT CHANGE UP (ref 3.3–5)
ALP SERPL-CCNC: 64 U/L — SIGNIFICANT CHANGE UP (ref 40–120)
ALT FLD-CCNC: 23 U/L — SIGNIFICANT CHANGE UP (ref 10–45)
AMPHET UR-MCNC: NEGATIVE — SIGNIFICANT CHANGE UP
ANION GAP SERPL CALC-SCNC: 11 MMOL/L — SIGNIFICANT CHANGE UP (ref 5–17)
APTT BLD: 32.7 SEC — SIGNIFICANT CHANGE UP (ref 27.5–35.5)
AST SERPL-CCNC: 40 U/L — SIGNIFICANT CHANGE UP (ref 10–40)
BARBITURATES UR SCN-MCNC: NEGATIVE — SIGNIFICANT CHANGE UP
BASOPHILS # BLD AUTO: 0.03 K/UL — SIGNIFICANT CHANGE UP (ref 0–0.2)
BASOPHILS NFR BLD AUTO: 0.5 % — SIGNIFICANT CHANGE UP (ref 0–2)
BENZODIAZ UR-MCNC: NEGATIVE — SIGNIFICANT CHANGE UP
BILIRUB SERPL-MCNC: 0.2 MG/DL — SIGNIFICANT CHANGE UP (ref 0.2–1.2)
BUN SERPL-MCNC: 17 MG/DL — SIGNIFICANT CHANGE UP (ref 7–23)
CALCIUM SERPL-MCNC: 9 MG/DL — SIGNIFICANT CHANGE UP (ref 8.4–10.5)
CHLORIDE SERPL-SCNC: 103 MMOL/L — SIGNIFICANT CHANGE UP (ref 96–108)
CO2 SERPL-SCNC: 25 MMOL/L — SIGNIFICANT CHANGE UP (ref 22–31)
COCAINE METAB.OTHER UR-MCNC: NEGATIVE — SIGNIFICANT CHANGE UP
CREAT SERPL-MCNC: 1.3 MG/DL — SIGNIFICANT CHANGE UP (ref 0.5–1.3)
EGFR: 68 ML/MIN/1.73M2 — SIGNIFICANT CHANGE UP
EOSINOPHIL # BLD AUTO: 0.19 K/UL — SIGNIFICANT CHANGE UP (ref 0–0.5)
EOSINOPHIL NFR BLD AUTO: 3 % — SIGNIFICANT CHANGE UP (ref 0–6)
ETHANOL SERPL-MCNC: <10 MG/DL — SIGNIFICANT CHANGE UP (ref 0–10)
GLUCOSE BLDC GLUCOMTR-MCNC: 87 MG/DL — SIGNIFICANT CHANGE UP (ref 70–99)
GLUCOSE BLDC GLUCOMTR-MCNC: 92 MG/DL — SIGNIFICANT CHANGE UP (ref 70–99)
GLUCOSE CSF-MCNC: 74 MG/DL — HIGH (ref 40–70)
GLUCOSE SERPL-MCNC: 128 MG/DL — HIGH (ref 70–99)
HCT VFR BLD CALC: 41.2 % — SIGNIFICANT CHANGE UP (ref 39–50)
HGB BLD-MCNC: 13.7 G/DL — SIGNIFICANT CHANGE UP (ref 13–17)
IMM GRANULOCYTES NFR BLD AUTO: 0.3 % — SIGNIFICANT CHANGE UP (ref 0–0.9)
INR BLD: 0.98 — SIGNIFICANT CHANGE UP (ref 0.88–1.16)
LYMPHOCYTES # BLD AUTO: 2.48 K/UL — SIGNIFICANT CHANGE UP (ref 1–3.3)
LYMPHOCYTES # BLD AUTO: 39.7 % — SIGNIFICANT CHANGE UP (ref 13–44)
MCHC RBC-ENTMCNC: 29.7 PG — SIGNIFICANT CHANGE UP (ref 27–34)
MCHC RBC-ENTMCNC: 33.3 GM/DL — SIGNIFICANT CHANGE UP (ref 32–36)
MCV RBC AUTO: 89.4 FL — SIGNIFICANT CHANGE UP (ref 80–100)
METHADONE UR-MCNC: NEGATIVE — SIGNIFICANT CHANGE UP
MONOCYTES # BLD AUTO: 0.87 K/UL — SIGNIFICANT CHANGE UP (ref 0–0.9)
MONOCYTES NFR BLD AUTO: 13.9 % — SIGNIFICANT CHANGE UP (ref 2–14)
NEUTROPHILS # BLD AUTO: 2.65 K/UL — SIGNIFICANT CHANGE UP (ref 1.8–7.4)
NEUTROPHILS NFR BLD AUTO: 42.6 % — LOW (ref 43–77)
NRBC # BLD: 0 /100 WBCS — SIGNIFICANT CHANGE UP (ref 0–0)
OPIATES UR-MCNC: NEGATIVE — SIGNIFICANT CHANGE UP
PCP SPEC-MCNC: SIGNIFICANT CHANGE UP
PCP UR-MCNC: NEGATIVE — SIGNIFICANT CHANGE UP
PLATELET # BLD AUTO: 331 K/UL — SIGNIFICANT CHANGE UP (ref 150–400)
POTASSIUM SERPL-MCNC: 3.9 MMOL/L — SIGNIFICANT CHANGE UP (ref 3.5–5.3)
POTASSIUM SERPL-SCNC: 3.9 MMOL/L — SIGNIFICANT CHANGE UP (ref 3.5–5.3)
PROT CSF-MCNC: 62 MG/DL — HIGH (ref 15–45)
PROT SERPL-MCNC: 7.1 G/DL — SIGNIFICANT CHANGE UP (ref 6–8.3)
PROTHROM AB SERPL-ACNC: 11.6 SEC — SIGNIFICANT CHANGE UP (ref 10.5–13.4)
RBC # BLD: 4.61 M/UL — SIGNIFICANT CHANGE UP (ref 4.2–5.8)
RBC # FLD: 14.6 % — HIGH (ref 10.3–14.5)
SARS-COV-2 RNA SPEC QL NAA+PROBE: NEGATIVE — SIGNIFICANT CHANGE UP
SODIUM SERPL-SCNC: 139 MMOL/L — SIGNIFICANT CHANGE UP (ref 135–145)
THC UR QL: NEGATIVE — SIGNIFICANT CHANGE UP
TROPONIN T SERPL-MCNC: <0.01 NG/ML — SIGNIFICANT CHANGE UP (ref 0–0.01)
WBC # BLD: 6.24 K/UL — SIGNIFICANT CHANGE UP (ref 3.8–10.5)
WBC # FLD AUTO: 6.24 K/UL — SIGNIFICANT CHANGE UP (ref 3.8–10.5)

## 2023-05-21 PROCEDURE — 70498 CT ANGIOGRAPHY NECK: CPT | Mod: 26,MA

## 2023-05-21 PROCEDURE — 99291 CRITICAL CARE FIRST HOUR: CPT | Mod: 25

## 2023-05-21 PROCEDURE — 70496 CT ANGIOGRAPHY HEAD: CPT | Mod: 26,MA

## 2023-05-21 PROCEDURE — 0042T: CPT | Mod: MA

## 2023-05-21 PROCEDURE — 62270 DX LMBR SPI PNXR: CPT

## 2023-05-21 PROCEDURE — 71045 X-RAY EXAM CHEST 1 VIEW: CPT | Mod: 26

## 2023-05-21 RX ORDER — GLUCAGON INJECTION, SOLUTION 0.5 MG/.1ML
1 INJECTION, SOLUTION SUBCUTANEOUS ONCE
Refills: 0 | Status: DISCONTINUED | OUTPATIENT
Start: 2023-05-21 | End: 2023-05-23

## 2023-05-21 RX ORDER — DEXTROSE 50 % IN WATER 50 %
25 SYRINGE (ML) INTRAVENOUS ONCE
Refills: 0 | Status: DISCONTINUED | OUTPATIENT
Start: 2023-05-21 | End: 2023-05-23

## 2023-05-21 RX ORDER — SODIUM CHLORIDE 9 MG/ML
1000 INJECTION, SOLUTION INTRAVENOUS
Refills: 0 | Status: DISCONTINUED | OUTPATIENT
Start: 2023-05-21 | End: 2023-05-23

## 2023-05-21 RX ORDER — INSULIN LISPRO 100/ML
VIAL (ML) SUBCUTANEOUS
Refills: 0 | Status: DISCONTINUED | OUTPATIENT
Start: 2023-05-21 | End: 2023-05-23

## 2023-05-21 RX ORDER — ACETAMINOPHEN 500 MG
1000 TABLET ORAL ONCE
Refills: 0 | Status: COMPLETED | OUTPATIENT
Start: 2023-05-21 | End: 2023-05-21

## 2023-05-21 RX ORDER — DEXTROSE 50 % IN WATER 50 %
15 SYRINGE (ML) INTRAVENOUS ONCE
Refills: 0 | Status: DISCONTINUED | OUTPATIENT
Start: 2023-05-21 | End: 2023-05-23

## 2023-05-21 RX ORDER — SODIUM CHLORIDE 9 MG/ML
1000 INJECTION INTRAMUSCULAR; INTRAVENOUS; SUBCUTANEOUS ONCE
Refills: 0 | Status: COMPLETED | OUTPATIENT
Start: 2023-05-21 | End: 2023-05-21

## 2023-05-21 RX ORDER — DEXTROSE 50 % IN WATER 50 %
12.5 SYRINGE (ML) INTRAVENOUS ONCE
Refills: 0 | Status: DISCONTINUED | OUTPATIENT
Start: 2023-05-21 | End: 2023-05-23

## 2023-05-21 RX ORDER — METOCLOPRAMIDE HCL 10 MG
10 TABLET ORAL ONCE
Refills: 0 | Status: COMPLETED | OUTPATIENT
Start: 2023-05-21 | End: 2023-05-21

## 2023-05-21 RX ORDER — ASPIRIN/CALCIUM CARB/MAGNESIUM 324 MG
81 TABLET ORAL DAILY
Refills: 0 | Status: DISCONTINUED | OUTPATIENT
Start: 2023-05-21 | End: 2023-05-23

## 2023-05-21 RX ORDER — ENOXAPARIN SODIUM 100 MG/ML
40 INJECTION SUBCUTANEOUS EVERY 24 HOURS
Refills: 0 | Status: DISCONTINUED | OUTPATIENT
Start: 2023-05-21 | End: 2023-05-23

## 2023-05-21 RX ORDER — INSULIN LISPRO 100/ML
VIAL (ML) SUBCUTANEOUS AT BEDTIME
Refills: 0 | Status: DISCONTINUED | OUTPATIENT
Start: 2023-05-21 | End: 2023-05-23

## 2023-05-21 RX ORDER — ATORVASTATIN CALCIUM 80 MG/1
80 TABLET, FILM COATED ORAL AT BEDTIME
Refills: 0 | Status: DISCONTINUED | OUTPATIENT
Start: 2023-05-21 | End: 2023-05-23

## 2023-05-21 RX ADMIN — Medication 104 MILLIGRAM(S): at 07:19

## 2023-05-21 RX ADMIN — ENOXAPARIN SODIUM 40 MILLIGRAM(S): 100 INJECTION SUBCUTANEOUS at 13:27

## 2023-05-21 RX ADMIN — Medication 81 MILLIGRAM(S): at 13:27

## 2023-05-21 RX ADMIN — ATORVASTATIN CALCIUM 80 MILLIGRAM(S): 80 TABLET, FILM COATED ORAL at 21:03

## 2023-05-21 RX ADMIN — SODIUM CHLORIDE 1000 MILLILITER(S): 9 INJECTION INTRAMUSCULAR; INTRAVENOUS; SUBCUTANEOUS at 07:19

## 2023-05-21 RX ADMIN — Medication 400 MILLIGRAM(S): at 07:19

## 2023-05-21 NOTE — ED PROVIDER NOTE - SHIFT CHANGE DETAILS
47M nonsmoker, cocaine abuse (last used 5/14/23), etoh abuse (no WD/DT), htn, hld, dm, R foot OM s/p R partial 2nd and full 4th-5th toe amputations, c/o L thunderclap ha w/ assoc lightheadedness/double vision since 4:15a today. avss. fs wnl. stroke code at triage.     dispo: pending labs, cxr, ct reads, stroke reccs

## 2023-05-21 NOTE — ED ADULT NURSE NOTE - CHPI ED NUR SYMPTOMS NEG
no change in level of consciousness/no confusion/no fever/no loss of consciousness/no nausea/no vomiting

## 2023-05-21 NOTE — ED PROVIDER NOTE - CLINICAL SUMMARY MEDICAL DECISION MAKING FREE TEXT BOX
47M nonsmoker, cocaine abuse (last used 5/14/23), etoh abuse (no WD/DT), htn, hld, dm, R foot OM s/p R partial 2nd and full 4th-5th toe amputations, c/o L thunderclap ha w/ assoc lightheadedness/double vision since 4:15a today. avss. fs wnl. stroke code at triage. s/o'd overnight team stable pending labs, cxr, ct reads, stroke reccs.

## 2023-05-21 NOTE — ED ADULT NURSE REASSESSMENT NOTE - NSFALLUNIVINTERV_ED_ALL_ED
Bed/Stretcher in lowest position, wheels locked, appropriate side rails in place/Call bell, personal items and telephone in reach/Instruct patient to call for assistance before getting out of bed/chair/stretcher/Non-slip footwear applied when patient is off stretcher/[place] to call system/Physically safe environment - no spills, clutter or unnecessary equipment/Purposeful proactive rounding/Room/bathroom lighting operational, light cord in reach

## 2023-05-21 NOTE — H&P ADULT - HISTORY OF PRESENT ILLNESS
**STROKE HPI***    HPI: 7y Male with PMHx of cocaine use disorder (has used IN cocaine 5x over the past 2 weeks), occasional alcohol use (no WD/DT), HTN, HLD, DM, R foot OM s/p R partial 2nd and full 4th-5th toe amputations, c/o L thunderclap headache w/ associated lightheadedness/bilateral double vision since 4:15am today. [place]. Reporting generalized weakness since his flight but denies focal weakness, speech or gait changes. Does report neck pain. NIHSS 1 for decreased sensation in L face and L leg. +nuchal rigidity. CTH/CTP/CTA head and neck negative for acute abnormalities.   LP performed in ED due to concern for possible SAH; results do not show concern for SAH as etiology of symptoms. Now being admitted to stroke/tele for further work up.     PAST MEDICAL & SURGICAL HISTORY:  Hyperlipidemia, unspecified hyperlipidemia type      Hypertension      History of cocaine use      Diabetes mellitus      Osteomyelitis  R foot      Toe amputation status  Right foot      H/O skin graft  Right foot      S/P insertion of penile implant      FAMILY HISTORY:  FH: CAD (coronary artery disease) (Mother)      T(C): 36.9 (05-21-23 @ 11:00), Max: 37.2 (05-21-23 @ 06:40)  HR: 82 (05-21-23 @ 11:00) (82 - 89)  BP: 125/74 (05-21-23 @ 11:00) (107/77 - 158/97)  RR: 18 (05-21-23 @ 11:00) (18 - 24)  SpO2: 99% (05-21-23 @ 11:00) (99% - 100%)    MEDICATION RECONCILIATION   MEDICATIONS  (STANDING):  aspirin enteric coated 81 milliGRAM(s) Oral daily  atorvastatin 80 milliGRAM(s) Oral at bedtime  enoxaparin Injectable 40 milliGRAM(s) SubCutaneous every 24 hours    MEDICATIONS  (PRN):    Allergies    No Known Allergies    Intolerances      Vital Signs Last 24 Hrs  T(C): 36.9 (21 May 2023 11:00), Max: 37.2 (21 May 2023 06:40)  T(F): 98.4 (21 May 2023 11:00), Max: 98.9 (21 May 2023 06:40)  HR: 82 (21 May 2023 11:00) (82 - 89)  BP: 125/74 (21 May 2023 11:00) (107/77 - 158/97)  BP(mean): --  RR: 18 (21 May 2023 11:00) (18 - 24)  SpO2: 99% (21 May 2023 11:00) (99% - 100%)    Parameters below as of 21 May 2023 11:00  Patient On (Oxygen Delivery Method): room air          **STROKE HPI***    HPI: 7y Male with PMHx of cocaine use disorder (has used IN cocaine 5x over the past 2 weeks), occasional alcohol use (no WD/DT), HTN, HLD, DM, R foot OM s/p R partial 2nd and full 4th-5th toe amputations, c/o L thunderclap headache w/ associated lightheadedness/bilateral double vision since 4:15am today. [place] from [place]. Reporting generalized weakness since his flight but denies focal weakness, speech or gait changes. Does report neck pain when placing chin to chest but able to perform without difficulty. NIHSS 1 for decreased sensation in L face and L leg. CTH/CTP/CTA head and neck negative for acute abnormalities.   LP performed in ED due to concern for possible SAH; results do not show concern for SAH as etiology of symptoms. Now being admitted to stroke/tele for further work up.     PAST MEDICAL & SURGICAL HISTORY:  Hyperlipidemia, unspecified hyperlipidemia type      Hypertension      History of cocaine use      Diabetes mellitus      Osteomyelitis  R foot      Toe amputation status  Right foot      H/O skin graft  Right foot      S/P insertion of penile implant      FAMILY HISTORY:  FH: CAD (coronary artery disease) (Mother)      T(C): 36.9 (05-21-23 @ 11:00), Max: 37.2 (05-21-23 @ 06:40)  HR: 82 (05-21-23 @ 11:00) (82 - 89)  BP: 125/74 (05-21-23 @ 11:00) (107/77 - 158/97)  RR: 18 (05-21-23 @ 11:00) (18 - 24)  SpO2: 99% (05-21-23 @ 11:00) (99% - 100%)    MEDICATION RECONCILIATION   MEDICATIONS  (STANDING):  aspirin enteric coated 81 milliGRAM(s) Oral daily  atorvastatin 80 milliGRAM(s) Oral at bedtime  enoxaparin Injectable 40 milliGRAM(s) SubCutaneous every 24 hours    MEDICATIONS  (PRN):    Allergies    No Known Allergies    Intolerances      Vital Signs Last 24 Hrs  T(C): 36.9 (21 May 2023 11:00), Max: 37.2 (21 May 2023 06:40)  T(F): 98.4 (21 May 2023 11:00), Max: 98.9 (21 May 2023 06:40)  HR: 82 (21 May 2023 11:00) (82 - 89)  BP: 125/74 (21 May 2023 11:00) (107/77 - 158/97)  BP(mean): --  RR: 18 (21 May 2023 11:00) (18 - 24)  SpO2: 99% (21 May 2023 11:00) (99% - 100%)    Parameters below as of 21 May 2023 11:00  Patient On (Oxygen Delivery Method): room air

## 2023-05-21 NOTE — H&P ADULT - NSHPSOCIALHISTORY_GEN_ALL_CORE
SOCIAL HISTORY:   Patient lives with son  Smoking status: denies  Drinking: occasional   Drug Use: cocaine use (over the past 2 weeks)

## 2023-05-21 NOTE — H&P ADULT - NSHPPHYSICALEXAM_GEN_ALL_CORE
Neurologic:  -Mental status: Awake, alert, oriented to person, place, and time. Speech is fluent with intact naming, repetition, and comprehension, no dysarthria. Recent and remote memory intact. Follows commands. Attention/concentration intact.   -Cranial nerves:   II: Visual fields are full to confrontation.  III, IV, VI: Extraocular movements are intact without nystagmus.    V:  decreased sensation L face  VII: Slight L NLFF  VIII: Hearing is bilaterally intact   Motor: Normal bulk and tone. No pronator drift. Strength bilateral upper extremity 5/5, bilateral lower extremities 5/5.  Sensation: decreased sensation L leg. No neglect or extinction on double simultaneous testing.  Coordination: No dysmetria on finger-to-nose bilaterally  Gait: Narrow gait and steady    NIHSS: 2

## 2023-05-21 NOTE — H&P ADULT - NSHPLABSRESULTS_GEN_ALL_CORE
Fingerstick Blood Glucose: CAPILLARY BLOOD GLUCOSE  111 (21 May 2023 08:06)      POCT Blood Glucose.: 111 mg/dL (21 May 2023 06:40)    LABS:                        13.7   6.24  )-----------( 331      ( 21 May 2023 06:36 )             41.2     05-21    139  |  103  |  17  ----------------------------<  128<H>  3.9   |  25  |  1.30    Ca    9.0      21 May 2023 06:36    TPro  7.1  /  Alb  3.7  /  TBili  0.2  /  DBili  x   /  AST  40  /  ALT  23  /  AlkPhos  64  05-21    PT/INR - ( 21 May 2023 06:36 )   PT: 11.6 sec;   INR: 0.98          PTT - ( 21 May 2023 06:36 )  PTT:32.7 sec  CARDIAC MARKERS ( 21 May 2023 06:36 )  x     / <0.01 ng/mL / x     / x     / x        < from: CT Brain Stroke Protocol (05.21.23 @ 07:08) >    IMPRESSION: No acute intracranial hemorrhage or transcortical.    < end of copied text >    < from: CT Angio Neck Stroke Protocol w/ IV Cont (05.21.23 @ 07:18) >    IMPRESSION:  No large vessel stenosis or occlusion.  No aneurysm.    < end of copied text >    < from: CT Angio Neck Stroke Protocol w/ IV Cont (05.21.23 @ 07:18) >    IMPRESSION:  No stenosis or occlusion.  No dissection.    < end of copied text >    < from: CT Brain Perfusion Maps Stroke (05.21.23 @ 07:09) >      IMPRESSION:    Negative CT perfusion of the brain.    < end of copied text >

## 2023-05-21 NOTE — PATIENT PROFILE ADULT - FALL HARM RISK - UNIVERSAL INTERVENTIONS
Bed in lowest position, wheels locked, appropriate side rails in place/Call bell, personal items and telephone in reach/Instruct patient to call for assistance before getting out of bed or chair/Non-slip footwear when patient is out of bed/[place] to call system/Physically safe environment - no spills, clutter or unnecessary equipment/Purposeful Proactive Rounding/Room/bathroom lighting operational, light cord in reach

## 2023-05-21 NOTE — ED PROVIDER NOTE - CADM POA URETHRAL CATHETER
Neuro: A&Ox4. Intermittently restless/anxious.  Following commands.  Cardiac: A.fib HR 90-120s. VSS. Declines chest pain.   Respiratory: Sating above 90% on 2 L N/C.  Lung sounds diminished   GI/: Adequate urine output via traylor placed for retention. No BM noted during shift.  Patient declined PRN senna offer.  Diet/appetite: Tolerating regular diet.  Poor appetite.  Encouraged oral intake, however, patient declined. Declines N/V  Activity:  Assist of 1x, up to chair and and 2x with turning and repositioning in bed.  Pain: Pt stated increased pain to right side of chest with CT.  Primary team aware.  CT removed and PRN tramadol given 1x.  Skin: No new deficits noted.  LDA's: Left PIV-SL, Right PIV-SL, and right 2x lumen midline-SL  Pt had 2x CT placed in IR this AM. Removed this afternoon per CVTS.  Chest x-ray follow up competed.  Gave extra 1 mg dose of Bumex. Continue to monitor urine output.    Plan: Continue with POC. Notify primary team with changes.    No

## 2023-05-21 NOTE — ED ADULT TRIAGE NOTE - CHIEF COMPLAINT QUOTE
"I have been feeling weak since I got back from [place] one week ago. This morning, I got up at 0230 this morning. I called an [employer] to work. I had a headache, got dizzy and almost fainted. I don't thing I can even walk right at the moment. My vision is also blurry."

## 2023-05-21 NOTE — ED ADULT NURSE NOTE - OBJECTIVE STATEMENT
Patient states arrived from [place] last week and consumed cocainne and intermittent alcohol, states had been feeling weak X 1 week.  States woke up 2:30 am today and went to work at 4am, noted at 4:15 am left sided headache with lightheadedness and dizziness, double vision.  Immediate upgrade to Dr. Murphy on arrival to ED.  Code Stroke activated.  No facial asymmetry, slurred speech, noted some unsteady gait.  pMHx  DM, Cocaine use, HTN, Osteomylitis right foot s/p amputation of right toes, DM.

## 2023-05-21 NOTE — ED PROCEDURE NOTE - PROCEDURE ADDITIONAL DETAILS
After obtaining tube 2, needle had to be repositioned to maintain flow and tap became slightly traumatic most likely contributing to increase in RBCs.

## 2023-05-21 NOTE — ED ADULT NURSE NOTE - CHIEF COMPLAINT QUOTE
"I have been feeling weak since I got back from the [place] one week ago. This morning, I got up at 0230 this morning. I called an [employer] to work. I had a headache, got dizzy and almost fainted. I don't thing I can even walk right at the moment. My vision is also blurry."

## 2023-05-21 NOTE — ED PROVIDER NOTE - PHYSICAL EXAMINATION
CONST: nontoxic NAD speaking in full sentences  HEAD: atraumatic  EYES: conjunctivae clear, PERRL, EOMI  ENT: mmm  NECK: supple/FROM, nttp  CARD: rrr no murmurs  CHEST: ctab no r/r/w  ABD: soft, nd, nttp, no rebound/guarding  EXT: FROM, symmetric distal pulses intact  SKIN: warm, dry, no rash, cap refill <2sec  NEURO: a+ox3, no facial asymmetry, no aphasia, PERRL, EOMI, no neglect, CN II-XII intact, neg pronator, 5/5 strength x4, gross sensation intact x4

## 2023-05-21 NOTE — ED PROVIDER NOTE - OBJECTIVE STATEMENT
47M nonsmoker, cocaine abuse (last used 5/14/23), etoh abuse (no WD/DT), htn, hld, dm, R foot OM s/p R partial 2nd and full 4th-5th toe amputations, c/o L thunderclap ha w/ assoc lightheadedness/double vision since 4:15a today. recently returned from [facility] 1w ago (5/14/23). woke up at baseline 2:30a today for work. called [employer] at 4a. while in [facility], sx began around 4:15a. came to ED. no fever/chills, no neck pain/stiffness, no uri/cough, no cp/sob, no abd pain/n/v, no diarrhea, no rash, no trauma, no ivdu, no phx acs/mi vs tia/cva, no antiplatelet/AC.

## 2023-05-21 NOTE — H&P ADULT - ASSESSMENT
47y Male with PMHx of cocaine use disorder (has used IN cocaine 5x over the past 2 weeks), occasional alcohol use (no WD/DT), HTN, HLD, DM, R foot OM s/p R partial 2nd and full 4th-5th toe amputations, c/o L thunderclap headache w/ associated lightheadedness/bilateral double vision since 4:15am today. Recently returned from [place] on [place]14[place]23. Does report neck pain. NIHSS 1 for decreased sensation in L face and L leg. +nuchal rigidity. CTH/CTP/CTA head and neck negative for acute abnormalities. Not a candidate for acute intervention. LP performed in ED due to concern for possible SAH; results do not show concern for SAH as etiology of symptoms. Now being admitted to stroke/tele for further work up.     Neuro  #CVA workup  - just ASA 81mg QD daily for now  - start atorvastatin 80mg daily  - q4hr stroke neuro checks and vitals  - obtain MRI Brain without contrast  - Stroke Code HCT Results: negative  - Stroke Code CTA Results: negative  - Stroke education    #c/f SAH while in ED; LP performed on 5/21  - Cerebrospinal Fluid Cell Count-1 (05.21.23 @ 10:18)    Appearance Spun: Colorless   Total Nucleated Cell Count, CSF: 3 /uL   RBC Count - Spinal Fluid: 488 /uL   CSF Color: No Color   Tube Type: Tube 1   CSF Appearance: Clear   CSF Comments: Differential is based on 3 cells counted after cytocentrifugation   CSF Lymphocytes: 2 %   CSF Monocytes/Macrophages: 1 %   CSF Neutrophils: 0 %      Cards  #HTN  - permissive hypertension, Goal -180  - hold home blood pressure medication for now  - obtain TTE with bubble  - Stroke Code EKG Results:    #HLD  - high dose statin as above in CVA  - LDL results: pending    Pulm  - call provider if SPO2 < 94%    GI  #Nutrition/Fluids/Electrolytes   - replete K<4 and Mg <2  - Diet: CCD     Renal  - daily BMP    Infectious Disease  - Stroke Code CXR results:     Endocrine  #DM  - A1C results: pending  - ISS    - TSH results:    DVT Prophylaxis  - lovenox sq for DVT prophylaxis   - SCDs for DVT prophylaxis       Dispo: pending eval     Discussed daily hospital plans and goals with patient.     Discussed with Neurology Attending Dr. Alisha Calderon 47y Male with PMHx of cocaine use disorder (has used IN cocaine 5x over the past 2 weeks), occasional alcohol use (no WD/DT), HTN, HLD, DM, R foot OM s/p R partial 2nd and full 4th-5th toe amputations, c/o L thunderclap headache w/ associated lightheadedness/bilateral double vision since 4:15am today. Recently returned from [place] on [place]14[place]23. Does report neck pain when placing chin to chest but able to perform without difficulty. NIHSS 1 for decreased sensation in L face and L leg. CTH/CTP/CTA head and neck negative for acute abnormalities. Not a candidate for acute intervention. LP performed in ED due to concern for possible SAH; results do not show concern for SAH as etiology of symptoms. Now being admitted to stroke/tele for further work up.     Neuro  #CVA workup  - just ASA 81mg QD daily for now  - start atorvastatin 80mg daily  - q4hr stroke neuro checks and vitals  - obtain MRI Brain without contrast  - Stroke Code HCT Results: negative  - Stroke Code CTA Results: negative  - Stroke education    #c/f SAH while in ED; LP performed on 5/21  - Cerebrospinal Fluid Cell Count-1 (05.21.23 @ 10:18)    Appearance Spun: Colorless   Total Nucleated Cell Count, CSF: 3 /uL   RBC Count - Spinal Fluid: 488 /uL   CSF Color: No Color   Tube Type: Tube 1   CSF Appearance: Clear   CSF Comments: Differential is based on 3 cells counted after cytocentrifugation   CSF Lymphocytes: 2 %   CSF Monocytes/Macrophages: 1 %   CSF Neutrophils: 0 %      Cards  #HTN  - permissive hypertension, Goal -180  - hold home blood pressure medication for now  - obtain TTE with bubble  - Stroke Code EKG Results:    #HLD  - high dose statin as above in CVA  - LDL results: pending    Pulm  - call provider if SPO2 < 94%    GI  #Nutrition/Fluids/Electrolytes   - replete K<4 and Mg <2  - Diet: CCD     Renal  - daily BMP    Infectious Disease  - Stroke Code CXR results:     Endocrine  #DM  - A1C results: pending  - ISS    - TSH results:    DVT Prophylaxis  - lovenox sq for DVT prophylaxis   - SCDs for DVT prophylaxis       Dispo: pending eval     Discussed daily hospital plans and goals with patient.     Discussed with Neurology Attending Dr. Alisha Calderon

## 2023-05-21 NOTE — ED PROVIDER NOTE - PROGRESS NOTE DETAILS
Juan Ramon - signed out to me by Dr. Murphy pending imaging and stroke dispo.  Pt feeling better after reglan, toradol, fluids.  As per stroke, requesting LP to r/o SAH.  Verbal consent obtained from pt.  See procedure note.  No complications.  Reviewed labs with Dr. Calderon including CSF fluid and because 1st tube has RBC of 480 most likely not SAH, tube 4 increased to 9000 because when doing LP, fluid stopped dripping and needle had to be readjusted.  Tap then became slightly traumatic when collecting tube 4.  Pt admitted to stroke tele feeling moderately better with pain and headache.

## 2023-05-21 NOTE — ED ADULT TRIAGE NOTE - SOURCE OF INFORMATION
Spiritual Plan of Care    Pt Name: Evie Garg  Pt : 1927  Date: 2022    Visit Type: Phone    Referral Source: Interdisciplinary team    Reason for Visit: Consult,Comfort care/withdrawal of life support    Visited With: Family/Friend    Length of Visit: 30 minutes    Spiritual Care Consult Needed: Spiritual Care eval completed    Spiritual Care Visit Preference: [facility]    Taxonomy:    · Intended Effects: Build relationship of care and support,Aligning care plan with patient's values  · Methods: Collaborate with care team member,Explore inge and values,Offer support  · Interventions: Acknowledge current situation,Active listening,Ask guided questions about inge,Ask questions to bring forth feelings,Discuss plan of care,Connect someone with their inge community/clergy,Facilitate preparing for end of life    Family/Friend Name: Elida (nimichelle)  Family/Friend Affect at Time of Visit: Cooperative,Expressive,Open to  visit,Pleasant  Family/Friend Assessment: Acceptance,Coping,Support system  Family/Friend  Intervention: Affirmation, Support,Empathic Listening    Other Assessment: Lisa (jelly)  Other Affect at Time of Visit: Cooperative,Expressive,Open to  visit,Pleasant  Other Assessment: Acceptance,Coping,Support system  Other  Intervention: Affirmation, Support,Empathic Listening    Spiritual Plan of Care: Follow up if requested,Contact patient's inge community per patient request    Patient Reported Outcome:  Coping techniques strengthened    [place] Community:  [facility] Phone Number:   [phone] (main number for community), [phone] (to reach [facility] during the week, [place])    Per RNchaplain reached out to pt's nieces, Elida and Lisa.   provided phone support to both.  Per Elida's request,  contacted Lisa to get name of pt's inge community.  Per  Lisa's request,  left message with  re: pt's status and to notify of likely request from family for Sacrament of the Sick/last rites in next 24 hours.  Per Lisa, family not ready to make that request at this time, but will very soon.    Additional support available PRN.     Patient

## 2023-05-21 NOTE — ED ADULT NURSE NOTE - NSFALLUNIVINTERV_ED_ALL_ED
Bed/Stretcher in lowest position, wheels locked, appropriate side rails in place/Call bell, personal items and telephone in reach/Instruct patient to call for assistance before getting out of bed/chair/stretcher/Non-slip footwear applied when patient is off stretcher/[place] to call system/Physically safe environment - no spills, clutter or unnecessary equipment/Purposeful proactive rounding/Room/bathroom lighting operational, light cord in reach

## 2023-05-22 DIAGNOSIS — E83.42 HYPOMAGNESEMIA: ICD-10-CM

## 2023-05-22 DIAGNOSIS — R51.9 HEADACHE, UNSPECIFIED: ICD-10-CM

## 2023-05-22 DIAGNOSIS — F14.90 COCAINE USE, UNSPECIFIED, UNCOMPLICATED: ICD-10-CM

## 2023-05-22 DIAGNOSIS — E11.9 TYPE 2 DIABETES MELLITUS WITHOUT COMPLICATIONS: ICD-10-CM

## 2023-05-22 DIAGNOSIS — I10 ESSENTIAL (PRIMARY) HYPERTENSION: ICD-10-CM

## 2023-05-22 LAB
A1C WITH ESTIMATED AVERAGE GLUCOSE RESULT: 6.5 % — HIGH (ref 4–5.6)
ANION GAP SERPL CALC-SCNC: 9 MMOL/L — SIGNIFICANT CHANGE UP (ref 5–17)
BUN SERPL-MCNC: 18 MG/DL — SIGNIFICANT CHANGE UP (ref 7–23)
CALCIUM SERPL-MCNC: 8.7 MG/DL — SIGNIFICANT CHANGE UP (ref 8.4–10.5)
CHLORIDE SERPL-SCNC: 102 MMOL/L — SIGNIFICANT CHANGE UP (ref 96–108)
CHOLEST SERPL-MCNC: 120 MG/DL — SIGNIFICANT CHANGE UP
CO2 SERPL-SCNC: 25 MMOL/L — SIGNIFICANT CHANGE UP (ref 22–31)
CREAT SERPL-MCNC: 1.13 MG/DL — SIGNIFICANT CHANGE UP (ref 0.5–1.3)
EGFR: 81 ML/MIN/1.73M2 — SIGNIFICANT CHANGE UP
ESTIMATED AVERAGE GLUCOSE: 140 MG/DL — HIGH (ref 68–114)
GLUCOSE BLDC GLUCOMTR-MCNC: 101 MG/DL — HIGH (ref 70–99)
GLUCOSE BLDC GLUCOMTR-MCNC: 102 MG/DL — HIGH (ref 70–99)
GLUCOSE BLDC GLUCOMTR-MCNC: 104 MG/DL — HIGH (ref 70–99)
GLUCOSE BLDC GLUCOMTR-MCNC: 91 MG/DL — SIGNIFICANT CHANGE UP (ref 70–99)
GLUCOSE SERPL-MCNC: 107 MG/DL — HIGH (ref 70–99)
HCT VFR BLD CALC: 41.1 % — SIGNIFICANT CHANGE UP (ref 39–50)
HDLC SERPL-MCNC: 48 MG/DL — SIGNIFICANT CHANGE UP
HGB BLD-MCNC: 13.5 G/DL — SIGNIFICANT CHANGE UP (ref 13–17)
LIPID PNL WITH DIRECT LDL SERPL: 55 MG/DL — SIGNIFICANT CHANGE UP
MAGNESIUM SERPL-MCNC: 1.5 MG/DL — LOW (ref 1.6–2.6)
MCHC RBC-ENTMCNC: 29.5 PG — SIGNIFICANT CHANGE UP (ref 27–34)
MCHC RBC-ENTMCNC: 32.8 GM/DL — SIGNIFICANT CHANGE UP (ref 32–36)
MCV RBC AUTO: 89.7 FL — SIGNIFICANT CHANGE UP (ref 80–100)
NON HDL CHOLESTEROL: 72 MG/DL — SIGNIFICANT CHANGE UP
NRBC # BLD: 0 /100 WBCS — SIGNIFICANT CHANGE UP (ref 0–0)
PHOSPHATE SERPL-MCNC: 3.3 MG/DL — SIGNIFICANT CHANGE UP (ref 2.5–4.5)
PLATELET # BLD AUTO: 337 K/UL — SIGNIFICANT CHANGE UP (ref 150–400)
POTASSIUM SERPL-MCNC: 3.9 MMOL/L — SIGNIFICANT CHANGE UP (ref 3.5–5.3)
POTASSIUM SERPL-SCNC: 3.9 MMOL/L — SIGNIFICANT CHANGE UP (ref 3.5–5.3)
RBC # BLD: 4.58 M/UL — SIGNIFICANT CHANGE UP (ref 4.2–5.8)
RBC # FLD: 14.4 % — SIGNIFICANT CHANGE UP (ref 10.3–14.5)
SODIUM SERPL-SCNC: 136 MMOL/L — SIGNIFICANT CHANGE UP (ref 135–145)
TRIGL SERPL-MCNC: 86 MG/DL — SIGNIFICANT CHANGE UP
TSH SERPL-MCNC: 0.4 UIU/ML — SIGNIFICANT CHANGE UP (ref 0.27–4.2)
WBC # BLD: 6.12 K/UL — SIGNIFICANT CHANGE UP (ref 3.8–10.5)
WBC # FLD AUTO: 6.12 K/UL — SIGNIFICANT CHANGE UP (ref 3.8–10.5)

## 2023-05-22 PROCEDURE — 99221 1ST HOSP IP/OBS SF/LOW 40: CPT

## 2023-05-22 PROCEDURE — 70551 MRI BRAIN STEM W/O DYE: CPT | Mod: 26

## 2023-05-22 PROCEDURE — 99233 SBSQ HOSP IP/OBS HIGH 50: CPT

## 2023-05-22 RX ORDER — MAGNESIUM SULFATE 500 MG/ML
2 VIAL (ML) INJECTION ONCE
Refills: 0 | Status: COMPLETED | OUTPATIENT
Start: 2023-05-22 | End: 2023-05-22

## 2023-05-22 RX ADMIN — ENOXAPARIN SODIUM 40 MILLIGRAM(S): 100 INJECTION SUBCUTANEOUS at 12:41

## 2023-05-22 RX ADMIN — ATORVASTATIN CALCIUM 80 MILLIGRAM(S): 80 TABLET, FILM COATED ORAL at 21:53

## 2023-05-22 RX ADMIN — Medication 81 MILLIGRAM(S): at 12:41

## 2023-05-22 RX ADMIN — Medication 25 GRAM(S): at 10:18

## 2023-05-22 NOTE — OCCUPATIONAL THERAPY INITIAL EVALUATION ADULT - DIAGNOSIS, OT EVAL
Pt presents with no deficits impacting his/her ability to independently complete ADLs, functional transfers, and functional mobility.

## 2023-05-22 NOTE — PHYSICAL THERAPY INITIAL EVALUATION ADULT - ADDITIONAL COMMENTS
As per pt, PTA she was completely independent with all functional mobility, ADLs, and IADLs with no AD and is a community ambulator. 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 Pt is R hand dominant and wears glasses for distance. Pt received a few months of PT in Feb 2022 s/p s/p R partial 2nd and full 4th-5th toe amputations.

## 2023-05-22 NOTE — PROVIDER CONTACT NOTE (OTHER) - BACKGROUND
pt admitted for headache and double vision which has resolved. CT negative, lumbar puncture insignificant findings.

## 2023-05-22 NOTE — OCCUPATIONAL THERAPY INITIAL EVALUATION ADULT - GENERAL OBSERVATIONS, REHAB EVAL
OT IE Completed. MRS 0. Pt's RN cleared pt for OT. Pt received semisupine in bed, +heplock, room air, NAD, agreeable to OT. Pt tolerated session well, left as found, all lines in tact, needs in reach. RN aware.

## 2023-05-22 NOTE — PROGRESS NOTE ADULT - ASSESSMENT
47y Male with PMHx of cocaine use disorder (has used IN cocaine 5x over the past 2 weeks), occasional alcohol use (no WD/DT), HTN, HLD, DM, R foot OM s/p R partial 2nd and full 4th-5th toe amputations, c/o L thunderclap headache w/ associated lightheadedness/bilateral double vision since 4:15am today. Recently returned from [place] on 5[place]14[place]. Does report neck pain when placing chin to chest but able to perform without difficulty. NIHSS 1 for decreased sensation in L face and L leg. CTH/CTP/CTA head and neck negative for acute abnormalities. Not a candidate for acute intervention. LP performed in ED due to concern for possible SAH; results do not show concern for SAH as etiology of symptoms. Now being admitted to stroke/tele for further work up.     Neuro  #CVA workup  - just ASA 81mg QD daily for now  - start atorvastatin 80mg daily  - q4hr stroke neuro checks and vitals  - obtain MRI Brain without contrast  - Stroke Code HCT Results: negative  - Stroke Code CTA Results: negative  - Stroke education    #c/f SAH while in ED; LP performed on 5/21  - Cerebrospinal Fluid Cell Count-1 (05.21.23 @ 10:18)    Appearance Spun: Colorless   Total Nucleated Cell Count, CSF: 3 /uL   RBC Count - Spinal Fluid: 488 /uL   CSF Color: No Color   Tube Type: Tube 1   CSF Appearance: Clear   CSF Comments: Differential is based on 3 cells counted after cytocentrifugation   CSF Lymphocytes: 2 %   CSF Monocytes/Macrophages: 1 %   CSF Neutrophils: 0 %      Cards  #HTN  - permissive hypertension, Goal -180  - hold home blood pressure medication for now  - obtain TTE with bubble  - Stroke Code EKG Results:    #HLD  - high dose statin as above in CVA  - LDL results: pending    Pulm  - call provider if SPO2 < 94%    GI  #Nutrition/Fluids/Electrolytes   - replete K<4 and Mg <2  - Diet: CCD     Renal  - daily BMP    Infectious Disease  - Stroke Code CXR results:     Endocrine  #DM  - A1C results: pending  - ISS    - TSH results:    DVT Prophylaxis  - lovenox sq for DVT prophylaxis   - SCDs for DVT prophylaxis       Dispo: pending eval     Discussed daily hospital plans and goals with patient.     Discussed with Neurology Attending Dr. Alisha Calderon 47y Male with PMHx of cocaine use disorder (has used IN cocaine 5x over the past 2 weeks), occasional alcohol use (no WD/DT), HTN, HLD, DM, R foot OM s/p R partial 2nd and full 4th-5th toe amputations, c/o L thunderclap headache w/ associated lightheadedness/bilateral double vision since 4:15am today. Recently returned from [place] on 5/14/23. Does report neck pain when placing chin to chest but able to perform without difficulty. NIHSS 1 for decreased sensation in L face and L leg. CTH/CTP/CTA head and neck negative for acute abnormalities. Not a candidate for acute intervention. LP performed in ED due to concern for possible SAH; results do not show concern for SAH as etiology of symptoms. Now being admitted to stroke/tele for further work up.     Neuro  #CVA workup  - just ASA 81mg QD daily for now  - start atorvastatin 80mg daily  - q4hr stroke neuro checks and vitals  - f/u MRI Brain without contrast  - Stroke Code HCT Results: negative  - Stroke Code CTA Results: negative  - Stroke education    #c/f SAH while in ED; LP performed on 5/21  - Cerebrospinal Fluid Cell Count-1 (05.21.23 @ 10:18)    Appearance Spun: Colorless   Total Nucleated Cell Count, CSF: 3 /uL   RBC Count - Spinal Fluid: 488 /uL   CSF Color: No Color   Tube Type: Tube 1   CSF Appearance: Clear   CSF Comments: Differential is based on 3 cells counted after cytocentrifugation   CSF Lymphocytes: 2 %   CSF Monocytes/Macrophages: 1 %   CSF Neutrophils: 0 %      Cards  #HTN  - permissive hypertension, Goal -180  - hold home blood pressure medication for now  - f/u TTE with bubble  - Stroke Code EKG Results:    #HLD  - high dose statin as above in CVA  - LDL results: 55    Pulm  - call provider if SPO2 < 94%    GI  #Nutrition/Fluids/Electrolytes   - replete K<4 and Mg <2  - Diet: CCD     Renal  - daily BMP    Infectious Disease  - Stroke Code CXR results:     Endocrine  #DM  - A1C results: 6.5  - ISS    - TSH results:    DVT Prophylaxis  - lovenox sq for DVT prophylaxis   - SCDs for DVT prophylaxis       Dispo: pending eval     Discussed daily hospital plans and goals with patient.     Discussed with Neurology Attending Dr. Cunningham

## 2023-05-22 NOTE — CONSULT NOTE ADULT - PROBLEM SELECTOR RECOMMENDATION 9
resolved; unclear etiology, possible RCVS in setting of cocaine use? s/p LP, CSF unremarkable; cont. work-up and mgmt per Neuro; on ASA + statin; plan for MRI brain, TTE w/ bubble

## 2023-05-22 NOTE — PROGRESS NOTE ADULT - SUBJECTIVE AND OBJECTIVE BOX
Neurology Stroke Progress Note    INTERVAL HPI/OVERNIGHT EVENTS:  Patient seen and examined.     MEDICATIONS  (STANDING):  aspirin enteric coated 81 milliGRAM(s) Oral daily  atorvastatin 80 milliGRAM(s) Oral at bedtime  dextrose 5%. 1000 milliLiter(s) (50 mL/Hr) IV Continuous <Continuous>  dextrose 5%. 1000 milliLiter(s) (100 mL/Hr) IV Continuous <Continuous>  dextrose 50% Injectable 25 Gram(s) IV Push once  dextrose 50% Injectable 12.5 Gram(s) IV Push once  dextrose 50% Injectable 25 Gram(s) IV Push once  enoxaparin Injectable 40 milliGRAM(s) SubCutaneous every 24 hours  glucagon  Injectable 1 milliGRAM(s) IntraMuscular once  insulin lispro (ADMELOG) corrective regimen sliding scale   SubCutaneous at bedtime  insulin lispro (ADMELOG) corrective regimen sliding scale   SubCutaneous three times a day before meals    MEDICATIONS  (PRN):  dextrose Oral Gel 15 Gram(s) Oral once PRN Blood Glucose LESS THAN 70 milliGRAM(s)/deciliter    Allergies  No Known Allergies    Vital Signs Last 24 Hrs  T(C): 37.1 (22 May 2023 14:03), Max: 37.2 (22 May 2023 09:06)  T(F): 98.7 (22 May 2023 14:03), Max: 98.9 (22 May 2023 09:06)  HR: 85 (22 May 2023 12:45) (84 - 102)  BP: 135/78 (22 May 2023 12:45) (108/63 - 166/90)  BP(mean): 100 (22 May 2023 12:45) (78 - 119)  RR: 18 (22 May 2023 12:45) (18 - 18)  SpO2: 98% (22 May 2023 12:45) (98% - 100%)    Parameters below as of 22 May 2023 12:45  Patient On (Oxygen Delivery Method): room air        Physical exam:  General: No acute distress, awake and alert  Eyes: Anicteric sclerae, moist conjunctivae, see below for CNs  Neck: trachea midline, FROM, supple, no thyromegaly or lymphadenopathy  Cardiovascular: Regular rate and rhythm, no murmurs, rubs, or gallops. No carotid bruits.   Pulmonary: Anterior breath sounds clear bilaterally, no crackles or wheezing. No use of accessory muscles  GI: Abdomen soft, non-distended, non-tender  Extremities: Radial and DP pulses +2, no edema    Neurologic:  -Mental status: Awake, alert, oriented to person, place, and time. Speech is fluent with intact naming, repetition, and comprehension, no dysarthria. Recent and remote memory intact. Follows commands. Attention/concentration intact. Fund of knowledge appropriate.  -Cranial nerves:   II: Visual fields are full to confrontation.  III, IV, VI: Extraocular movements are intact without nystagmus. Pupils equally round and reactive to light  V:  Facial sensation V1-V3 equal and intact   VII: Face is symmetric with normal eye closure and smile  VIII: Hearing is bilaterally intact to finger rub  IX, X: Uvula is midline and soft palate rises symmetrically  XI: Head turning and shoulder shrug are intact.  XII: Tongue protrudes midline  Motor: Normal bulk and tone. No pronator drift. Strength bilateral upper extremity 5/5, bilateral lower extremities 5/5.  Rapid alternating movements intact and symmetric  Sensation: Intact to light touch bilaterally. No neglect or extinction on double simultaneous testing.  Coordination: No dysmetria on finger-to-nose and heel-to-shin bilaterally  Reflexes: Downgoing toes bilaterally   Gait: Narrow gait and steady    LABS:                        13.5   6.12  )-----------( 337      ( 22 May 2023 05:30 )             41.1     05-22    136  |  102  |  18  ----------------------------<  107<H>  3.9   |  25  |  1.13    Ca    8.7      22 May 2023 05:30  Phos  3.3     05-22  Mg     1.5     05-22    TPro  7.1  /  Alb  3.7  /  TBili  0.2  /  DBili  x   /  AST  40  /  ALT  23  /  AlkPhos  64  05-21    PT/INR - ( 21 May 2023 06:36 )   PT: 11.6 sec;   INR: 0.98          PTT - ( 21 May 2023 06:36 )  PTT:32.7 sec      RADIOLOGY & ADDITIONAL TESTS:     Neurology Stroke Progress Note    INTERVAL HPI/OVERNIGHT EVENTS:  Patient seen and examined. Clinically stable.     MEDICATIONS  (STANDING):  aspirin enteric coated 81 milliGRAM(s) Oral daily  atorvastatin 80 milliGRAM(s) Oral at bedtime  dextrose 5%. 1000 milliLiter(s) (50 mL/Hr) IV Continuous <Continuous>  dextrose 5%. 1000 milliLiter(s) (100 mL/Hr) IV Continuous <Continuous>  dextrose 50% Injectable 25 Gram(s) IV Push once  dextrose 50% Injectable 12.5 Gram(s) IV Push once  dextrose 50% Injectable 25 Gram(s) IV Push once  enoxaparin Injectable 40 milliGRAM(s) SubCutaneous every 24 hours  glucagon  Injectable 1 milliGRAM(s) IntraMuscular once  insulin lispro (ADMELOG) corrective regimen sliding scale   SubCutaneous at bedtime  insulin lispro (ADMELOG) corrective regimen sliding scale   SubCutaneous three times a day before meals    MEDICATIONS  (PRN):  dextrose Oral Gel 15 Gram(s) Oral once PRN Blood Glucose LESS THAN 70 milliGRAM(s)/deciliter    Allergies  No Known Allergies    Vital Signs Last 24 Hrs  T(C): 37.1 (22 May 2023 14:03), Max: 37.2 (22 May 2023 09:06)  T(F): 98.7 (22 May 2023 14:03), Max: 98.9 (22 May 2023 09:06)  HR: 85 (22 May 2023 12:45) (84 - 102)  BP: 135/78 (22 May 2023 12:45) (108/63 - 166/90)  BP(mean): 100 (22 May 2023 12:45) (78 - 119)  RR: 18 (22 May 2023 12:45) (18 - 18)  SpO2: 98% (22 May 2023 12:45) (98% - 100%)    Parameters below as of 22 May 2023 12:45  Patient On (Oxygen Delivery Method): room air    Neurologic:  -Mental status: Awake, alert, oriented to person, place, and time. Speech is fluent with intact naming, repetition, and comprehension, no dysarthria. Recent and remote memory intact. Follows commands. Attention/concentration intact.   -Cranial nerves:   II: Visual fields are full to confrontation.  III, IV, VI: Extraocular movements are intact without nystagmus.    V:  decreased sensation L face  VII: Slight L NLFF  VIII: Hearing is bilaterally intact   Motor: Normal bulk and tone. No pronator drift. Strength bilateral upper extremity 5/5, bilateral lower extremities 5/5.  Sensation: decreased sensation L leg. No neglect or extinction on double simultaneous testing.  Coordination: No dysmetria on finger-to-nose bilaterally  Gait: Narrow gait and steady    NIHSS: 1    LABS:                        13.5   6.12  )-----------( 337      ( 22 May 2023 05:30 )             41.1     05-22    136  |  102  |  18  ----------------------------<  107<H>  3.9   |  25  |  1.13    Ca    8.7      22 May 2023 05:30  Phos  3.3     05-22  Mg     1.5     05-22    TPro  7.1  /  Alb  3.7  /  TBili  0.2  /  DBili  x   /  AST  40  /  ALT  23  /  AlkPhos  64  05-21    PT/INR - ( 21 May 2023 06:36 )   PT: 11.6 sec;   INR: 0.98          PTT - ( 21 May 2023 06:36 )  PTT:32.7 sec      RADIOLOGY & ADDITIONAL TESTS:     Neurology Stroke Progress Note    INTERVAL HPI/OVERNIGHT EVENTS:  Patient seen and examined. Clinically stable. No sensory deficits now. No previous migraines. Patient acknowledging excessive cocaine use during the previous 3 days and lack of sleep, he associates this with this episode.     MEDICATIONS  (STANDING):  aspirin enteric coated 81 milliGRAM(s) Oral daily  atorvastatin 80 milliGRAM(s) Oral at bedtime  dextrose 5%. 1000 milliLiter(s) (50 mL/Hr) IV Continuous <Continuous>  dextrose 5%. 1000 milliLiter(s) (100 mL/Hr) IV Continuous <Continuous>  dextrose 50% Injectable 25 Gram(s) IV Push once  dextrose 50% Injectable 12.5 Gram(s) IV Push once  dextrose 50% Injectable 25 Gram(s) IV Push once  enoxaparin Injectable 40 milliGRAM(s) SubCutaneous every 24 hours  glucagon  Injectable 1 milliGRAM(s) IntraMuscular once  insulin lispro (ADMELOG) corrective regimen sliding scale   SubCutaneous at bedtime  insulin lispro (ADMELOG) corrective regimen sliding scale   SubCutaneous three times a day before meals    MEDICATIONS  (PRN):  dextrose Oral Gel 15 Gram(s) Oral once PRN Blood Glucose LESS THAN 70 milliGRAM(s)/deciliter    Allergies  No Known Allergies    Vital Signs Last 24 Hrs  T(C): 37.1 (22 May 2023 14:03), Max: 37.2 (22 May 2023 09:06)  T(F): 98.7 (22 May 2023 14:03), Max: 98.9 (22 May 2023 09:06)  HR: 85 (22 May 2023 12:45) (84 - 102)  BP: 135/78 (22 May 2023 12:45) (108/63 - 166/90)  BP(mean): 100 (22 May 2023 12:45) (78 - 119)  RR: 18 (22 May 2023 12:45) (18 - 18)  SpO2: 98% (22 May 2023 12:45) (98% - 100%)    Parameters below as of 22 May 2023 12:45  Patient On (Oxygen Delivery Method): room air    Neurologic:  -Mental status: Awake, alert, oriented to person, place, and time. Speech is fluent with intact naming, repetition, and comprehension, no dysarthria. Recent and remote memory intact. Follows commands. Attention/concentration intact.   -Cranial nerves:   II: Visual fields are full to confrontation.  III, IV, VI: Extraocular movements are intact without nystagmus.    V:  Normal  VII: Slight L NLFF  VIII: Hearing is bilaterally intact   Motor: Normal bulk and tone. No pronator drift. Strength bilateral upper extremity 5/5, bilateral lower extremities 5/5.  Sensation: No neglect or extinction on double simultaneous testing.  Coordination: No dysmetria on finger-to-nose bilaterally  Gait: Narrow gait and steady    NIHSS: 0    LABS:                        13.5   6.12  )-----------( 337      ( 22 May 2023 05:30 )             41.1     05-22    136  |  102  |  18  ----------------------------<  107<H>  3.9   |  25  |  1.13    Ca    8.7      22 May 2023 05:30  Phos  3.3     05-22  Mg     1.5     05-22    TPro  7.1  /  Alb  3.7  /  TBili  0.2  /  DBili  x   /  AST  40  /  ALT  23  /  AlkPhos  64  05-21    PT/INR - ( 21 May 2023 06:36 )   PT: 11.6 sec;   INR: 0.98          PTT - ( 21 May 2023 06:36 )  PTT:32.7 sec      RADIOLOGY & ADDITIONAL TESTS:

## 2023-05-22 NOTE — OCCUPATIONAL THERAPY INITIAL EVALUATION ADULT - PERTINENT HX OF CURRENT PROBLEM, REHAB EVAL
47y Male with PMHx of cocaine use disorder (has used IN cocaine 5x over the past 2 weeks), occasional alcohol use (no WD/DT), HTN, HLD, DM, R foot OM s/p R partial 2nd and full 4th-5th toe amputations, c/o L thunderclap headache w/ associated lightheadedness/bilateral double vision since 4:15am today. [place] from [place]. Does report neck pain when placing chin to chest but able to perform without difficulty. NIHSS 1 for decreased sensation in L face and L leg. CTH/CTP/CTA head and neck negative for acute abnormalities. Not a candidate for acute intervention. LP performed in ED due to concern for possible SAH; results do not show concern for SAH as etiology of symptoms. Now being admitted to stroke/tele for further work up.

## 2023-05-22 NOTE — PHYSICAL THERAPY INITIAL EVALUATION ADULT - RANGE OF MOTION EXAMINATION, REHAB EVAL
Of note pt s/p R partial 2nd and full 4th-5th toe amputations Feb 2022, R ankle ROM WNL/no ROM deficits were identified

## 2023-05-22 NOTE — CONSULT NOTE ADULT - SUBJECTIVE AND OBJECTIVE BOX
normal appearance , no deformities
 **STROKE CODE CONSULT NOTE**    Last known well time/Time of onset of symptoms: 4:15am on 5/21/23    HPI: 47y Male with PMHx of cocaine use disorder (has used IN cocaine 5x over the past 2 weeks), occasional alcohol use (no WD/DT), HTN, HLD, DM, R foot OM s/p R partial 2nd and full 4th-5th toe amputations, c/o L thunderclap headache w/ associated lightheadedness/bilateral double vision since 4:15am today. Recently returned from [place]. Reporting generalized weakness since his flight but denies focal weakness, speech or gait changes. Does report neck pain. NIHSS 1 for decreased sensation in L face and L leg. +nuchal rigidity. CTH/CTP/CTA head and neck negative for acute abnormalities.       T(C): 37.2 (05-21-23 @ 07:00), Max: 37.2 (05-21-23 @ 06:40)  HR: 86 (05-21-23 @ 08:00) (82 - 89)  BP: 146/- (05-21-23 @ 08:00) (107/77 - 146/-)  RR: 18 (05-21-23 @ 08:00) (18 - 24)  SpO2: 100% (05-21-23 @ 08:00) (99% - 100%)    PAST MEDICAL & SURGICAL HISTORY:  Hyperlipidemia, unspecified hyperlipidemia type      Hypertension      History of cocaine use      Diabetes mellitus      Osteomyelitis  R foot      Toe amputation status  Right foot      H/O skin graft  Right foot      S/P insertion of penile implant          FAMILY HISTORY:  FH: CAD (coronary artery disease) (Mother)        SOCIAL HISTORY:   Patient lives with son  Smoking status: denies  Drinking: occasional   Drug Use: cocaine use (over the past 2 weeks)    ROS:   see HPI    MEDICATIONS  (STANDING):    MEDICATIONS  (PRN):    Allergies    No Known Allergies    Intolerances      Vital Signs Last 24 Hrs  T(C): 37.2 (21 May 2023 07:00), Max: 37.2 (21 May 2023 06:40)  T(F): 98.9 (21 May 2023 07:00), Max: 98.9 (21 May 2023 06:40)  HR: 86 (21 May 2023 08:00) (82 - 89)  BP: 146/- (21 May 2023 08:00) (107/77 - 146/-)  BP(mean): --  RR: 18 (21 May 2023 08:00) (18 - 24)  SpO2: 100% (21 May 2023 08:00) (99% - 100%)    Parameters below as of 21 May 2023 08:00  Patient On (Oxygen Delivery Method): room air        Physical exam:  Neurologic:  -Mental status: Awake, alert, oriented to person, place, and time. Speech is fluent with intact naming, repetition, and comprehension, no dysarthria. Recent and remote memory intact. Follows commands. Attention/concentration intact.   -Cranial nerves:   II: Visual fields are full to confrontation.  III, IV, VI: Extraocular movements are intact without nystagmus.    V:  decreased sensation L face  VII: Face is symmetric with normal eye closure and smile  VIII: Hearing is bilaterally intact   Motor: Normal bulk and tone. No pronator drift. Strength bilateral upper extremity 5/5, bilateral lower extremities 5/5.  Sensation: decreased sensation L leg. No neglect or extinction on double simultaneous testing.  Coordination: No dysmetria on finger-to-nose bilaterally  Gait: Narrow gait and steady    NIHSS: 1      Fingerstick Blood Glucose: CAPILLARY BLOOD GLUCOSE  111 (21 May 2023 08:06)      POCT Blood Glucose.: 111 mg/dL (21 May 2023 06:40)    LABS:                        13.7   6.24  )-----------( 331      ( 21 May 2023 06:36 )             41.2     05-21    139  |  103  |  17  ----------------------------<  128<H>  3.9   |  25  |  1.30    Ca    9.0      21 May 2023 06:36    TPro  7.1  /  Alb  3.7  /  TBili  0.2  /  DBili  x   /  AST  40  /  ALT  23  /  AlkPhos  64  05-21    PT/INR - ( 21 May 2023 06:36 )   PT: 11.6 sec;   INR: 0.98          PTT - ( 21 May 2023 06:36 )  PTT:32.7 sec  CARDIAC MARKERS ( 21 May 2023 06:36 )  x     / <0.01 ng/mL / x     / x     / x          RADIOLOGY & ADDITIONAL STUDIES:    < from: CT Brain Stroke Protocol (05.21.23 @ 07:08) >  IMPRESSION: No acute intracranial hemorrhage or transcortical.    < from: CT Angio Neck Stroke Protocol w/ IV Cont (05.21.23 @ 07:18) >  IMPRESSION:  No large vessel stenosis or occlusion.  No aneurysm.    < from: CT Angio Neck Stroke Protocol w/ IV Cont (05.21.23 @ 07:18) >  IMPRESSION:  No stenosis or occlusion.  No dissection.    -----------------------------------------------------------------------------------------------------------------  IV-tenecteplase (Y/N):   no, no disabling deficits  
  Patient is a 47y old  Male who presents with a chief complaint of headache and blurry vision (22 May 2023 13:07)    HPI:   **STROKE HPI***    HPI: 7y Male with PMHx of cocaine use disorder (has used IN cocaine 5x over the past 2 weeks), occasional alcohol use (no WD/DT), HTN, HLD, DM, R foot OM s/p R partial 2nd and full 4th-5th toe amputations, c/o L thunderclap headache w/ associated lightheadedness/bilateral double vision since 4:15am today. Recently [place] from [place]23. Reporting generalized weakness since his flight but denies focal weakness, speech or gait changes. Does report neck pain when placing chin to chest but able to perform without difficulty. NIHSS 1 for decreased sensation in L face and L leg. CTH/CTP/CTA head and neck negative for acute abnormalities.   LP performed in ED due to concern for possible SAH; results do not show concern for SAH as etiology of symptoms. Now being admitted to stroke/tele for further work up.      (21 May 2023 12:50)    Review of Systems: 12 point review of systems otherwise negative    PAST MEDICAL & SURGICAL HISTORY:  Hyperlipidemia, unspecified hyperlipidemia type      Hypertension      History of cocaine use      Diabetes mellitus      Osteomyelitis  R foot      Toe amputation status  Right foot      H/O skin graft  Right foot      S/P insertion of penile implant    Social History:  SOCIAL HISTORY:   Patient lives with son  Smoking status: denies  Drinking: occasional   Drug Use: cocaine use (over the past 2 weeks) (21 May 2023 12:50)    FAMILY HISTORY:  FH: CAD (coronary artery disease) (Mother)      MEDICATIONS  (STANDING):  aspirin enteric coated 81 milliGRAM(s) Oral daily  atorvastatin 80 milliGRAM(s) Oral at bedtime  dextrose 5%. 1000 milliLiter(s) (50 mL/Hr) IV Continuous <Continuous>  dextrose 5%. 1000 milliLiter(s) (100 mL/Hr) IV Continuous <Continuous>  dextrose 50% Injectable 25 Gram(s) IV Push once  dextrose 50% Injectable 12.5 Gram(s) IV Push once  dextrose 50% Injectable 25 Gram(s) IV Push once  enoxaparin Injectable 40 milliGRAM(s) SubCutaneous every 24 hours  glucagon  Injectable 1 milliGRAM(s) IntraMuscular once  insulin lispro (ADMELOG) corrective regimen sliding scale   SubCutaneous three times a day before meals  insulin lispro (ADMELOG) corrective regimen sliding scale   SubCutaneous at bedtime    MEDICATIONS  (PRN):  dextrose Oral Gel 15 Gram(s) Oral once PRN Blood Glucose LESS THAN 70 milliGRAM(s)/deciliter      Allergies    No Known Allergies    Intolerances          Vital Signs Last 24 Hrs  T(C): 37.1 (22 May 2023 14:03), Max: 37.2 (22 May 2023 09:06)  T(F): 98.7 (22 May 2023 14:03), Max: 98.9 (22 May 2023 09:06)  HR: 85 (22 May 2023 12:45) (84 - 102)  BP: 135/78 (22 May 2023 12:45) (108/63 - 166/90)  BP(mean): 100 (22 May 2023 12:45) (78 - 119)  RR: 18 (22 May 2023 12:45) (18 - 18)  SpO2: 98% (22 May 2023 12:45) (98% - 100%)    Parameters below as of 22 May 2023 12:45  Patient On (Oxygen Delivery Method): room air      CAPILLARY BLOOD GLUCOSE      POCT Blood Glucose.: 104 mg/dL (22 May 2023 11:17)  POCT Blood Glucose.: 91 mg/dL (22 May 2023 06:39)  POCT Blood Glucose.: 87 mg/dL (21 May 2023 21:35)  POCT Blood Glucose.: 92 mg/dL (21 May 2023 16:15)      05-21 @ 07:01  -  05-22 @ 07:00  --------------------------------------------------------  IN: 620 mL / OUT: 0 mL / NET: 620 mL    05-22 @ 07:01  -  05-22 @ 14:17  --------------------------------------------------------  IN: 820 mL / OUT: 0 mL / NET: 820 mL        Physical Exam:  (earlier today)  Daily     Daily   General:  well-appearing in NAD  HEENT:  MMM  CV:  RRR  Lungs:  CTA B/L  Abdomen:  soft NT ND  Extremities:  no edema B/L LE  s/p R partial 2nd and full 4th-5th toe amputations  Skin:  WWP  Neuro:  AAOx3, no dysarthria     LABS:                        13.5   6.12  )-----------( 337      ( 22 May 2023 05:30 )             41.1     05-22    136  |  102  |  18  ----------------------------<  107<H>  3.9   |  25  |  1.13    Ca    8.7      22 May 2023 05:30  Phos  3.3     05-22  Mg     1.5     05-22    TPro  7.1  /  Alb  3.7  /  TBili  0.2  /  DBili  x   /  AST  40  /  ALT  23  /  AlkPhos  64  05-21    PT/INR - ( 21 May 2023 06:36 )   PT: 11.6 sec;   INR: 0.98          PTT - ( 21 May 2023 06:36 )  PTT:32.7 sec

## 2023-05-22 NOTE — OCCUPATIONAL THERAPY INITIAL EVALUATION ADULT - ADDITIONAL COMMENTS
Pt lives with his mother in an apartment with 5STE. Pt reports that prior to admission, pt was independent in all ADLs and IADLs, and ambulates with no device. Pt is R hand dominant and wears glasses for distance.

## 2023-05-22 NOTE — OCCUPATIONAL THERAPY INITIAL EVALUATION ADULT - NSACTIVITYREC_GEN_A_OT
As pt is independent with ADLs, functional transfers, and functional mobility with no device, will d/c pt from inpatient OT services at this time. Recommend home with no skilled OT services and family support as needed.

## 2023-05-22 NOTE — SBIRT NOTE ADULT - NSSBIRTUNABLESCROTHER_GEN_A_CORE
patient reported that he usually does not consume etoh or substance but admits to cocaine and etoh use during vacation.

## 2023-05-22 NOTE — PHYSICAL THERAPY INITIAL EVALUATION ADULT - PERTINENT HX OF CURRENT PROBLEM, REHAB EVAL
47y Male with PMHx of cocaine use disorder (has used IN cocaine 5x over the past 2 weeks), occasional alcohol use (no WD/DT), HTN, HLD, DM, R foot OM s/p R partial 2nd and full 4th-5th toe amputations, c/o L thunderclap headache w/ associated lightheadedness/bilateral double vision since 4:15am today. Recently returned from [place] on [place]14[place]23. Reporting generalized weakness since his flight but denies focal weakness, speech or gait changes. Does report neck pain when placing chin to chest but able to perform without difficulty. NIHSS 1 for decreased sensation in L face and L leg. CTH/CTP/CTA head and neck negative for acute abnormalities.  LP performed in ED due to concern for possible SAH; results do not show concern for SAH as etiology of symptoms. Now being admitted to stroke/tele for further work up.

## 2023-05-23 ENCOUNTER — TRANSCRIPTION ENCOUNTER (OUTPATIENT)
Age: 48
End: 2023-05-23

## 2023-05-23 VITALS — TEMPERATURE: 98 F

## 2023-05-23 LAB
ANION GAP SERPL CALC-SCNC: 8 MMOL/L — SIGNIFICANT CHANGE UP (ref 5–17)
BUN SERPL-MCNC: 17 MG/DL — SIGNIFICANT CHANGE UP (ref 7–23)
CALCIUM SERPL-MCNC: 8.6 MG/DL — SIGNIFICANT CHANGE UP (ref 8.4–10.5)
CHLORIDE SERPL-SCNC: 102 MMOL/L — SIGNIFICANT CHANGE UP (ref 96–108)
CO2 SERPL-SCNC: 26 MMOL/L — SIGNIFICANT CHANGE UP (ref 22–31)
CREAT SERPL-MCNC: 0.95 MG/DL — SIGNIFICANT CHANGE UP (ref 0.5–1.3)
EGFR: 99 ML/MIN/1.73M2 — SIGNIFICANT CHANGE UP
GLUCOSE BLDC GLUCOMTR-MCNC: 97 MG/DL — SIGNIFICANT CHANGE UP (ref 70–99)
GLUCOSE BLDC GLUCOMTR-MCNC: 99 MG/DL — SIGNIFICANT CHANGE UP (ref 70–99)
GLUCOSE SERPL-MCNC: 114 MG/DL — HIGH (ref 70–99)
HCT VFR BLD CALC: 41.1 % — SIGNIFICANT CHANGE UP (ref 39–50)
HGB BLD-MCNC: 13.7 G/DL — SIGNIFICANT CHANGE UP (ref 13–17)
MAGNESIUM SERPL-MCNC: 1.6 MG/DL — SIGNIFICANT CHANGE UP (ref 1.6–2.6)
MCHC RBC-ENTMCNC: 29.8 PG — SIGNIFICANT CHANGE UP (ref 27–34)
MCHC RBC-ENTMCNC: 33.3 GM/DL — SIGNIFICANT CHANGE UP (ref 32–36)
MCV RBC AUTO: 89.3 FL — SIGNIFICANT CHANGE UP (ref 80–100)
NRBC # BLD: 0 /100 WBCS — SIGNIFICANT CHANGE UP (ref 0–0)
PHOSPHATE SERPL-MCNC: 3.2 MG/DL — SIGNIFICANT CHANGE UP (ref 2.5–4.5)
PLATELET # BLD AUTO: 343 K/UL — SIGNIFICANT CHANGE UP (ref 150–400)
POTASSIUM SERPL-MCNC: 3.7 MMOL/L — SIGNIFICANT CHANGE UP (ref 3.5–5.3)
POTASSIUM SERPL-SCNC: 3.7 MMOL/L — SIGNIFICANT CHANGE UP (ref 3.5–5.3)
RBC # BLD: 4.6 M/UL — SIGNIFICANT CHANGE UP (ref 4.2–5.8)
RBC # FLD: 14.1 % — SIGNIFICANT CHANGE UP (ref 10.3–14.5)
SODIUM SERPL-SCNC: 136 MMOL/L — SIGNIFICANT CHANGE UP (ref 135–145)
WBC # BLD: 5.34 K/UL — SIGNIFICANT CHANGE UP (ref 3.8–10.5)
WBC # FLD AUTO: 5.34 K/UL — SIGNIFICANT CHANGE UP (ref 3.8–10.5)

## 2023-05-23 PROCEDURE — 89051 BODY FLUID CELL COUNT: CPT

## 2023-05-23 PROCEDURE — 84484 ASSAY OF TROPONIN QUANT: CPT

## 2023-05-23 PROCEDURE — 99285 EMERGENCY DEPT VISIT HI MDM: CPT | Mod: 25

## 2023-05-23 PROCEDURE — 80053 COMPREHEN METABOLIC PANEL: CPT

## 2023-05-23 PROCEDURE — 96375 TX/PRO/DX INJ NEW DRUG ADDON: CPT

## 2023-05-23 PROCEDURE — 82945 GLUCOSE OTHER FLUID: CPT

## 2023-05-23 PROCEDURE — 85610 PROTHROMBIN TIME: CPT

## 2023-05-23 PROCEDURE — 70450 CT HEAD/BRAIN W/O DYE: CPT | Mod: MA

## 2023-05-23 PROCEDURE — 70496 CT ANGIOGRAPHY HEAD: CPT | Mod: MA

## 2023-05-23 PROCEDURE — 83036 HEMOGLOBIN GLYCOSYLATED A1C: CPT

## 2023-05-23 PROCEDURE — 84443 ASSAY THYROID STIM HORMONE: CPT

## 2023-05-23 PROCEDURE — 84157 ASSAY OF PROTEIN OTHER: CPT

## 2023-05-23 PROCEDURE — 96374 THER/PROPH/DIAG INJ IV PUSH: CPT

## 2023-05-23 PROCEDURE — 99232 SBSQ HOSP IP/OBS MODERATE 35: CPT

## 2023-05-23 PROCEDURE — 80061 LIPID PANEL: CPT

## 2023-05-23 PROCEDURE — 85730 THROMBOPLASTIN TIME PARTIAL: CPT

## 2023-05-23 PROCEDURE — 84100 ASSAY OF PHOSPHORUS: CPT

## 2023-05-23 PROCEDURE — 70551 MRI BRAIN STEM W/O DYE: CPT

## 2023-05-23 PROCEDURE — 80048 BASIC METABOLIC PNL TOTAL CA: CPT

## 2023-05-23 PROCEDURE — 80307 DRUG TEST PRSMV CHEM ANLYZR: CPT

## 2023-05-23 PROCEDURE — 97161 PT EVAL LOW COMPLEX 20 MIN: CPT

## 2023-05-23 PROCEDURE — 36415 COLL VENOUS BLD VENIPUNCTURE: CPT

## 2023-05-23 PROCEDURE — 0042T: CPT | Mod: MA

## 2023-05-23 PROCEDURE — 85027 COMPLETE CBC AUTOMATED: CPT

## 2023-05-23 PROCEDURE — 82962 GLUCOSE BLOOD TEST: CPT

## 2023-05-23 PROCEDURE — 99239 HOSP IP/OBS DSCHRG MGMT >30: CPT

## 2023-05-23 PROCEDURE — 71045 X-RAY EXAM CHEST 1 VIEW: CPT

## 2023-05-23 PROCEDURE — 85025 COMPLETE CBC W/AUTO DIFF WBC: CPT

## 2023-05-23 PROCEDURE — 83735 ASSAY OF MAGNESIUM: CPT

## 2023-05-23 PROCEDURE — 97165 OT EVAL LOW COMPLEX 30 MIN: CPT

## 2023-05-23 PROCEDURE — 70498 CT ANGIOGRAPHY NECK: CPT | Mod: MA

## 2023-05-23 PROCEDURE — 87635 SARS-COV-2 COVID-19 AMP PRB: CPT

## 2023-05-23 RX ADMIN — ENOXAPARIN SODIUM 40 MILLIGRAM(S): 100 INJECTION SUBCUTANEOUS at 11:42

## 2023-05-23 NOTE — DISCHARGE NOTE PROVIDER - NSDCMRMEDTOKEN_GEN_ALL_CORE_FT
atorvastatin 40 mg oral tablet: 1 tab(s) orally once a day  cyclobenzaprine 10 mg oral tablet: 1 tab(s) orally every 8 hours, As Needed for muscle spasm   gabapentin 100 mg oral capsule: 1 cap(s) orally every 8 hours  ibuprofen 600 mg oral tablet: 1 tab(s) orally every 8 hours, As Needed -for moderate pain   insulin glargine 100 units/mL subcutaneous solution: 29 unit(s) subcutaneous once a day (at bedtime)  insulin lispro 100 units/mL injectable solution: 14 unit(s) injectable 3 times a day (before meals)  Lidoderm 5% topical film: Apply topically to affected area once a day   losartan 25 mg oral tablet: 3 tab(s) orally once a day  metFORMIN 750 mg oral tablet, extended release: 2 tab(s) orally once a day   atorvastatin 40 mg oral tablet: 1 tab(s) orally once a day  Jardiance 25 mg oral tablet: 1 tab(s) orally once a day  losartan 50 mg oral tablet: 1 tab(s) orally once a day  metFORMIN 750 mg oral tablet, extended release: 2 tab(s) orally once a day  Ozempic 8 mg/3 mL (2 mg dose) subcutaneous solution: 2 milligram(s) subcutaneously once a week  Topeter SoloStar 300 units/mL subcutaneous solution: 20 unit(s) subcutaneous once a day

## 2023-05-23 NOTE — DISCHARGE NOTE PROVIDER - HOSPITAL COURSE
Hospital course:  47y Male with PMH     During this hospital course, patient had a (ischemic/hemorrhagic) stroke located in (left/right.....) as seen on (MRI/CT).   The stroke etiology is likely secondary to:  []atrial fibrillation  []small vessel disease from atherosclerotic risk factors  []other:  []etiology workup still in progress    Patient had the following workup done in house:  CT Head:   MR Head Non Contrast:  CT Angio Head:  CT Angio Neck:  []echo  []labs  []other    Physical exam at discharge:    New medications on discharge:  Labs to be followed up:  Imaging to be done as outpatient:  Further outpatient workup:   Hospital course:  47y Male with PMHx of cocaine use disorder (has used IN cocaine 5x over the past 2 weeks), occasional alcohol use (no WD/DT), HTN, HLD, DM, R foot OM s/p R partial 2nd and full 4th-5th toe amputations, c/o L thunderclap headache w/ associated lightheadedness/bilateral double vision since 4:15am today. Recently returned from [place]14[place]23. Does report neck pain when placing chin to chest but able to perform without difficulty. NIHSS 1 for decreased sensation in L face and L leg. CTH/CTP/CTA head and neck negative for acute abnormalities. Not a candidate for acute intervention. LP performed in ED due to concern for possible SAH; results do not show concern for SAH as etiology of symptoms. Now being admitted to stroke/tele for further work up. During his admission in telemetry in [facility], patient improving symptoms. Headache, numbness and diplopia resolved. MRI     During this hospital course, patient had a thunderclap headache asociated with left sensory faciobrachiocrural deficit +/- binocular diplopia likely related to RCVS vs migraine with aura 2/2 cocaine use and sleep deprivation    Patient had the following workup done in house:  [x] LP CSF: prot elevated, no cells  [x] HCT: negative  [x] CTA head/Neck: negative  [x] MRI negative  [x] PT/OT    CORE MEASURES:  [6.5] A1c     [55] LDL   [0.3] TSH     [ASA] AC   [lovenox] DVT ppx  [x] orders    Physical exam at discharge:  Physical exam:  General: No acute distress, awake and alert  Eyes: Anicteric sclerae, moist conjunctivae, see below for CNs  Neck: trachea midline, FROM, supple, no thyromegaly or lymphadenopathy  Cardiovascular: Regular rate and rhythm, no murmurs, rubs, or gallops. No carotid bruits.   Pulmonary: Anterior breath sounds clear bilaterally, no crackles or wheezing. No use of accessory muscles  GI: Abdomen soft, non-distended, non-tender  Extremities: Radial and DP pulses +2, no edema    Neurologic:  -Mental status: Awake, alert, oriented to person, place, and time. Speech is fluent with intact naming, repetition, and comprehension, no dysarthria. Recent and remote memory intact. Follows commands. Attention/concentration intact.   -Cranial nerves:   II: Visual fields are full to confrontation.  III, IV, VI: Extraocular movements are intact without nystagmus.    V:  Normal  VII: Slight L NLFF  VIII: Hearing is bilaterally intact   Motor: Normal bulk and tone. No pronator drift. Strength bilateral upper extremity 5/5, bilateral lower extremities 5/5.  Sensation: No neglect or extinction on double simultaneous testing.  Coordination: No dysmetria on finger-to-nose bilaterally  Gait: Narrow gait and steady    New medications on discharge: None.  Labs to be followed up: None  Imaging to be done as outpatient: TTE   Further outpatient workup: PCP. Vascular Neurology (Dr. Cunningham)

## 2023-05-23 NOTE — PROGRESS NOTE ADULT - ATTENDING COMMENTS
The patient is a 47-year-old male with a history of hypertension, hyperlipidemia, type 2 diabetes, right foot osteomyelitis s/p surgical debridement, cocaine use, and alcohol use.  He is admitted after presenting with diplopia, left-sided numbness followed by severe headache which was associated with lightheadedness in the setting of cocaine use and extreme exhaustion. Numbness/diplopia lasted 30 minutes. Headache lasted several hours. He now describes progressive headache over 20-30 minutes rather than acute onset. Regardless, MRI was negative. CTA was unremarkable.  CSF was negative for xanthochromia. +Cocaine. ETiology not entirely clear-  ?RCVS (though atypical for neuro symptoms to proceed headache) vs atypical migraine.  Will need outpatient f/u with d/c aspirin, statin therapy.
The patient is a 47-year-old male with a history of hypertension, hyperlipidemia, type 2 diabetes, right foot osteomyelitis, cocaine use, and alcohol use.  He is admitted after presenting with diplopia, left-sided numbness followed by sudden onset, severe headache associated with lightheadedness and double vision.  CT, CTA were unremarkable.  CSF negative for xanthochromia. +Cocaine. ?RCVS. MRI brain pending.  For now, continue aspirin, statin.

## 2023-05-23 NOTE — DISCHARGE NOTE PROVIDER - CARE PROVIDER_API CALL
Isabel Cunningham)  Neurology  [address] [address]  Phone: [phone]  Fax: [phone]  Follow Up Time: 2 weeks

## 2023-05-23 NOTE — PROGRESS NOTE ADULT - TIME BILLING
Review of chart information, interpretation of data, evaluation of patient, coordination of care.
Review of hospital course, labs, vitals, medical records.   Bedside exam and interview    Discussed plan of care with primary team   Documenting the encounter

## 2023-05-23 NOTE — DISCHARGE NOTE PROVIDER - NSDCCPCAREPLAN_GEN_ALL_CORE_FT
PRINCIPAL DISCHARGE DIAGNOSIS  Diagnosis: Essential hypertension  Assessment and Plan of Treatment:       SECONDARY DISCHARGE DIAGNOSES  Diagnosis: Cocaine use  Assessment and Plan of Treatment:     Diagnosis: Type 2 diabetes mellitus, with long-term current use of insulin  Assessment and Plan of Treatment:     Diagnosis: Essential hypertension  Assessment and Plan of Treatment:      PRINCIPAL DISCHARGE DIAGNOSIS  Diagnosis: Reversible cerebrovascular vasoconstriction syndrome  Assessment and Plan of Treatment: Thunderclap headaches live up to their name, striking suddenly like a clap of thunder. The pain of these severe headaches peaks within 60 seconds.  Thunderclap headaches are uncommon, but they can warn of potentially life-threatening conditions — usually having to do with bleeding in and around the brain. Seek emergency medical attention for a thunderclap headache.  MRI was negative for acute stroke. You likely had a reversible cerebrovascular vasoconstriccion syndrome now asymptomatic. The main reasons associated are likely sleep deprivation and cocaine use.  Follow up with primary care physician and with vascular neurology (Dr. Cunningham)        SECONDARY DISCHARGE DIAGNOSES  Diagnosis: Cocaine use  Assessment and Plan of Treatment: Diagnosing drug addiction (substance use disorder) requires a thorough evaluation and often includes an assessment by a psychiatrist, a psychologist, or a licensed alcohol and drug counselor. Blood, urine or other lab tests are used to assess drug use, but they're not a diagnostic test for addiction. However, these tests may be used for monitoring treatment and recovery.  We gave couseling against cocaine use. Feel free to reach us if you are considering stop its use and you need any help.    Diagnosis: Type 2 diabetes mellitus, with long-term current use of insulin  Assessment and Plan of Treatment: Continue your current management.    Diagnosis: Essential hypertension  Assessment and Plan of Treatment: Continue your current management.

## 2023-05-23 NOTE — PROGRESS NOTE ADULT - ASSESSMENT
47y Male with PMHx of cocaine use disorder (has used IN cocaine 5x over the past 2 weeks), occasional alcohol use (no WD/DT), HTN, HLD, DM, R foot OM s/p R partial 2nd and full 4th-5th toe amputations, c/o L thunderclap headache w/ associated lightheadedness/bilateral double vision since 4:15am today. Recently returned from [place]. Does report neck pain when placing chin to chest but able to perform without difficulty. NIHSS 1 for decreased sensation in L face and L leg. CTH/CTP/CTA head and neck negative for acute abnormalities. Not a candidate for acute intervention. LP performed in ED due to concern for possible SAH; results do not show concern for SAH as etiology of symptoms. Now being admitted to stroke/tele for further work up.     Neuro  #Probable RCVS  - just ASA 81mg QD daily for now  - start atorvastatin 80mg daily  - q4hr stroke neuro checks and vitals  - MRI Brain without contrast negative  - Stroke Code HCT Results: negative  - Stroke Code CTA Results: negative  - Stroke education    #c/f SAH while in ED; LP performed on 5/21  - Cerebrospinal Fluid Cell Count-1 (05.21.23 @ 10:18)    Appearance Spun: Colorless   Total Nucleated Cell Count, CSF: 3 /uL   RBC Count - Spinal Fluid: 488 /uL   CSF Color: No Color   Tube Type: Tube 1   CSF Appearance: Clear   CSF Comments: Differential is based on 3 cells counted after cytocentrifugation   CSF Lymphocytes: 2 %   CSF Monocytes/Macrophages: 1 %   CSF Neutrophils: 0 %      Cards  #HTN  - permissive hypertension, Goal -180  - hold home blood pressure medication for now  - f/u TTE with bubble  - Stroke Code EKG Results:    #HLD  - high dose statin as above in CVA  - LDL results: 55    Pulm  - call provider if SPO2 < 94%    GI  #Nutrition/Fluids/Electrolytes   - replete K<4 and Mg <2  - Diet: CCD     Renal  - daily BMP    Infectious Disease  - Stroke Code CXR results:     Endocrine  #DM  - A1C results: 6.5  - ISS    - TSH results:    DVT Prophylaxis  - lovenox sq for DVT prophylaxis   - SCDs for DVT prophylaxis       Dispo: pending eval     Discussed daily hospital plans and goals with patient.     Discussed with Neurology Attending Dr. Cunningham

## 2023-05-23 NOTE — PROGRESS NOTE ADULT - SUBJECTIVE AND OBJECTIVE BOX
Neurology Stroke Progress Note    INTERVAL HPI/OVERNIGHT EVENTS:  Patient seen and examined. No changes clinically. Patient asymptomatic.     MEDICATIONS  (STANDING):  aspirin enteric coated 81 milliGRAM(s) Oral daily  atorvastatin 80 milliGRAM(s) Oral at bedtime  dextrose 5%. 1000 milliLiter(s) (50 mL/Hr) IV Continuous <Continuous>  dextrose 5%. 1000 milliLiter(s) (100 mL/Hr) IV Continuous <Continuous>  dextrose 50% Injectable 25 Gram(s) IV Push once  dextrose 50% Injectable 12.5 Gram(s) IV Push once  dextrose 50% Injectable 25 Gram(s) IV Push once  enoxaparin Injectable 40 milliGRAM(s) SubCutaneous every 24 hours  glucagon  Injectable 1 milliGRAM(s) IntraMuscular once  insulin lispro (ADMELOG) corrective regimen sliding scale   SubCutaneous three times a day before meals  insulin lispro (ADMELOG) corrective regimen sliding scale   SubCutaneous at bedtime    MEDICATIONS  (PRN):  dextrose Oral Gel 15 Gram(s) Oral once PRN Blood Glucose LESS THAN 70 milliGRAM(s)/deciliter      Allergies    No Known Allergies    Intolerances        Vital Signs Last 24 Hrs  T(C): 36.6 (23 May 2023 05:05), Max: 37.2 (22 May 2023 09:06)  T(F): 97.9 (23 May 2023 05:05), Max: 98.9 (22 May 2023 09:06)  HR: 76 (23 May 2023 04:30) (76 - 102)  BP: 147/92 (23 May 2023 04:30) (108/63 - 147/92)  BP(mean): 114 (23 May 2023 04:30) (78 - 114)  RR: 20 (23 May 2023 04:30) (18 - 20)  SpO2: 98% (23 May 2023 04:30) (98% - 98%)    Parameters below as of 23 May 2023 04:30  Patient On (Oxygen Delivery Method): room air        Physical exam:  General: No acute distress, awake and alert  Eyes: Anicteric sclerae, moist conjunctivae, see below for CNs  Neck: trachea midline, FROM, supple, no thyromegaly or lymphadenopathy  Cardiovascular: Regular rate and rhythm, no murmurs, rubs, or gallops. No carotid bruits.   Pulmonary: Anterior breath sounds clear bilaterally, no crackles or wheezing. No use of accessory muscles  GI: Abdomen soft, non-distended, non-tender  Extremities: Radial and DP pulses +2, no edema    Neurologic:  -Mental status: Awake, alert, oriented to person, place, and time. Speech is fluent with intact naming, repetition, and comprehension, no dysarthria. Recent and remote memory intact. Follows commands. Attention/concentration intact.   -Cranial nerves:   II: Visual fields are full to confrontation.  III, IV, VI: Extraocular movements are intact without nystagmus.    V:  Normal  VII: Slight L NLFF  VIII: Hearing is bilaterally intact   Motor: Normal bulk and tone. No pronator drift. Strength bilateral upper extremity 5/5, bilateral lower extremities 5/5.  Sensation: No neglect or extinction on double simultaneous testing.  Coordination: No dysmetria on finger-to-nose bilaterally  Gait: Narrow gait and steady    LABS:                        13.7   5.34  )-----------( 343      ( 23 May 2023 05:30 )             41.1     05-23    136  |  102  |  17  ----------------------------<  114<H>  3.7   |  26  |  0.95    Ca    8.6      23 May 2023 05:30  Phos  3.2     05-23  Mg     1.6     05-23            RADIOLOGY & ADDITIONAL TESTS:

## 2023-05-23 NOTE — DISCHARGE NOTE NURSING/CASE MANAGEMENT/SOCIAL WORK - PATIENT PORTAL LINK FT
You can access the FollowMyHealth Patient Portal offered by [facility] by registering at the following website: [email or web address]. By joining [employer]’s FollowMyHealth portal, you will also be able to view your health information using other applications (apps) compatible with our system.

## 2023-05-23 NOTE — PROGRESS NOTE ADULT - PROBLEM SELECTOR PLAN 1
resolved. Workup per Neuro team  - unclear etiology, possible RCVS in setting of cocaine use?   - s/p LP, CSF unremarkable  - MRI brain wnl  - TTE w/ bubble pending

## 2023-05-23 NOTE — PROGRESS NOTE ADULT - ASSESSMENT
46 y/o M w/hx of cocaine use disorder, occasional alcohol use (no WD/DT), HTN, HLD, DM, R foot OM s/p R partial 2nd and full 4th-5th toe amputations who presented with c/o L thunderclap headache w/ associated lightheadedness/bilateral double vision.  MRI negative for infarct

## 2023-05-23 NOTE — DISCHARGE NOTE NURSING/CASE MANAGEMENT/SOCIAL WORK - NSDCVIVACCINE_GEN_ALL_CORE_FT
Tdap; 30-Jun-2021 18:05; Lorrie Ziegler (IVELISSE); [employer]; [id number] (Exp. Date: 25-Nov-2022); IntraMuscular; Deltoid Left.; 0.5 milliLiter(s); VIS (VIS Published: 09-May-2013, VIS Presented: 30-Jun-2021);

## 2023-05-23 NOTE — PROGRESS NOTE ADULT - PROBLEM SELECTOR PLAN 3
HbA1c 6.5%; monitor blood glucose; cont. insulin, hold metformin inpatient  - resume Metformin on DC

## 2023-05-23 NOTE — CHART NOTE - NSCHARTNOTEFT_GEN_A_CORE
23-May-2023    [facility]  [address] [place] [address]      To Whom It May Concern,   Orion Guido was admitted on 21-May-2023, treated and discharged on 23-May-2023 from [facility] without any restriction activity and he is cleared to return to work tomorrow.    If there are any questions or concerns please call.       Sincerely,    Luis Frye MD  [facility] Department of Neurology

## 2023-05-23 NOTE — PROGRESS NOTE ADULT - SUBJECTIVE AND OBJECTIVE BOX
Patient is a 47y old  Male who presents with a chief complaint of headache and blurry vision (23 May 2023 09:36)      SUBJECTIVE:  Patient seen and examined at bedside.  Vision changes have fully resolved.  Had slight headache this AM but non currently    ROS:  Denies fevers, chills, neck pain, cough, SOB, chest pain, Abdominal pain, N/V, dysuria or new rash.  All other ROS negative except as above    Vital Signs Last 12 Hrs  T(F): 98.4 (05-23-23 @ 10:17), Max: 98.4 (05-23-23 @ 10:17)  HR: 82 (05-23-23 @ 08:20) (76 - 82)  BP: 132/80 (05-23-23 @ 08:20) (132/80 - 147/92)  BP(mean): 101 (05-23-23 @ 08:20) (101 - 114)  RR: 20 (05-23-23 @ 08:20) (20 - 20)  SpO2: 98% (05-23-23 @ 08:20) (98% - 98%)  I&O's Summary    22 May 2023 07:01  -  23 May 2023 07:00  --------------------------------------------------------  IN: 820 mL / OUT: 0 mL / NET: 820 mL    23 May 2023 07:01  -  23 May 2023 10:26  --------------------------------------------------------  IN: 240 mL / OUT: 0 mL / NET: 240 mL        PHYSICAL EXAM:  Constitutional: NAD, comfortable in bed.  HEENT: PERRLA, EOMI, MMM  Neck: Supple  Respiratory: CTA B/L. No w/r/r.   Cardiovascular: RRR, normal S1 and S2, no m/r/g.   Gastrointestinal: +BS, soft NTND   Extremities: wwp; no cyanosis, clubbing or edema. s/p 4th and 5th toe amputation on R   Vascular: Pulses equal and strong throughout.   Neurological: AAOx3, no focal deficits  Skin: No gross skin abnormalities or rashes        LABS:                        13.7   5.34  )-----------( 343      ( 23 May 2023 05:30 )             41.1     05-23    136  |  102  |  17  ----------------------------<  114<H>  3.7   |  26  |  0.95    Ca    8.6      23 May 2023 05:30  Phos  3.2     05-23  Mg     1.6     05-23              RADIOLOGY & ADDITIONAL TESTS:  < from: MR Head No Cont (05.22.23 @ 20:24) >  IMPRESSION:    Negative for recent infarct, and essentially unremarkable MRI brain.    < end of copied text >      MEDICATIONS  (STANDING):  aspirin enteric coated 81 milliGRAM(s) Oral daily  atorvastatin 80 milliGRAM(s) Oral at bedtime  dextrose 5%. 1000 milliLiter(s) (50 mL/Hr) IV Continuous <Continuous>  dextrose 5%. 1000 milliLiter(s) (100 mL/Hr) IV Continuous <Continuous>  dextrose 50% Injectable 25 Gram(s) IV Push once  dextrose 50% Injectable 12.5 Gram(s) IV Push once  dextrose 50% Injectable 25 Gram(s) IV Push once  enoxaparin Injectable 40 milliGRAM(s) SubCutaneous every 24 hours  glucagon  Injectable 1 milliGRAM(s) IntraMuscular once  insulin lispro (ADMELOG) corrective regimen sliding scale   SubCutaneous three times a day before meals  insulin lispro (ADMELOG) corrective regimen sliding scale   SubCutaneous at bedtime    MEDICATIONS  (PRN):  dextrose Oral Gel 15 Gram(s) Oral once PRN Blood Glucose LESS THAN 70 milliGRAM(s)/deciliter

## 2023-05-26 DIAGNOSIS — Z79.4 LONG TERM (CURRENT) USE OF INSULIN: ICD-10-CM

## 2023-05-26 DIAGNOSIS — Z89.421 ACQUIRED ABSENCE OF OTHER RIGHT TOE(S): ICD-10-CM

## 2023-05-26 DIAGNOSIS — R51.9 HEADACHE, UNSPECIFIED: ICD-10-CM

## 2023-05-26 DIAGNOSIS — E83.42 HYPOMAGNESEMIA: ICD-10-CM

## 2023-05-26 DIAGNOSIS — Z79.84 LONG TERM (CURRENT) USE OF ORAL HYPOGLYCEMIC DRUGS: ICD-10-CM

## 2023-05-26 DIAGNOSIS — E11.9 TYPE 2 DIABETES MELLITUS WITHOUT COMPLICATIONS: ICD-10-CM

## 2023-05-26 DIAGNOSIS — I10 ESSENTIAL (PRIMARY) HYPERTENSION: ICD-10-CM

## 2023-05-26 DIAGNOSIS — G44.53 PRIMARY THUNDERCLAP HEADACHE: ICD-10-CM

## 2023-05-26 DIAGNOSIS — E78.5 HYPERLIPIDEMIA, UNSPECIFIED: ICD-10-CM

## 2023-05-26 DIAGNOSIS — F14.90 COCAINE USE, UNSPECIFIED, UNCOMPLICATED: ICD-10-CM

## 2023-05-26 DIAGNOSIS — I67.841 REVERSIBLE CEREBROVASCULAR VASOCONSTRICTION SYNDROME: ICD-10-CM

## 2023-05-30 ENCOUNTER — EMERGENCY (EMERGENCY)
Facility: HOSPITAL | Age: 48
LOS: 1 days | Discharge: HOME | End: 2023-05-30
Attending: STUDENT IN AN ORGANIZED HEALTH CARE EDUCATION/TRAINING PROGRAM | Admitting: STUDENT IN AN ORGANIZED HEALTH CARE EDUCATION/TRAINING PROGRAM
Payer: COMMERCIAL

## 2023-05-30 VITALS
RESPIRATION RATE: 18 BRPM | DIASTOLIC BLOOD PRESSURE: 102 MMHG | HEART RATE: 92 BPM | OXYGEN SATURATION: 99 % | TEMPERATURE: 98 F | SYSTOLIC BLOOD PRESSURE: 189 MMHG

## 2023-05-30 VITALS
HEART RATE: 101 BPM | SYSTOLIC BLOOD PRESSURE: 116 MMHG | RESPIRATION RATE: 18 BRPM | OXYGEN SATURATION: 99 % | TEMPERATURE: 98 F | DIASTOLIC BLOOD PRESSURE: 70 MMHG | HEIGHT: 71 IN
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
## 2023-05-30 DIAGNOSIS — M25.512 PAIN IN LEFT SHOULDER: ICD-10-CM

## 2023-05-30 DIAGNOSIS — Z94.5 SKIN TRANSPLANT STATUS: Chronic | ICD-10-CM

## 2023-05-30 DIAGNOSIS — Z89.429 ACQUIRED ABSENCE OF OTHER TOE(S), UNSPECIFIED SIDE: Chronic | ICD-10-CM

## 2023-05-30 DIAGNOSIS — Z79.4 LONG TERM (CURRENT) USE OF INSULIN: ICD-10-CM

## 2023-05-30 DIAGNOSIS — I10 ESSENTIAL (PRIMARY) HYPERTENSION: ICD-10-CM

## 2023-05-30 DIAGNOSIS — Z96.0 PRESENCE OF UROGENITAL IMPLANTS: Chronic | ICD-10-CM
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
## 2023-05-30 DIAGNOSIS — M86.9 OSTEOMYELITIS, UNSPECIFIED: ICD-10-CM

## 2023-05-30 DIAGNOSIS — E11.69 TYPE 2 DIABETES MELLITUS WITH OTHER SPECIFIED COMPLICATION: ICD-10-CM

## 2023-05-30 DIAGNOSIS — E78.5 HYPERLIPIDEMIA, UNSPECIFIED: ICD-10-CM

## 2023-05-30 DIAGNOSIS — Z86.59 PERSONAL HISTORY OF OTHER MENTAL AND BEHAVIORAL DISORDERS: ICD-10-CM

## 2023-05-30 DIAGNOSIS — R42 DIZZINESS AND GIDDINESS: ICD-10-CM
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
## 2023-05-30 DIAGNOSIS — Z79.84 LONG TERM (CURRENT) USE OF ORAL HYPOGLYCEMIC DRUGS: ICD-10-CM

## 2023-05-30 LAB
ANION GAP SERPL CALC-SCNC: 9 MMOL/L — SIGNIFICANT CHANGE UP (ref 5–17)
BASOPHILS # BLD AUTO: 0.07 K/UL — SIGNIFICANT CHANGE UP (ref 0–0.2)
BASOPHILS NFR BLD AUTO: 0.9 % — SIGNIFICANT CHANGE UP (ref 0–2)
BUN SERPL-MCNC: 23 MG/DL — SIGNIFICANT CHANGE UP (ref 7–23)
CALCIUM SERPL-MCNC: 9.4 MG/DL — SIGNIFICANT CHANGE UP (ref 8.4–10.5)
CHLORIDE SERPL-SCNC: 101 MMOL/L — SIGNIFICANT CHANGE UP (ref 96–108)
CO2 SERPL-SCNC: 28 MMOL/L — SIGNIFICANT CHANGE UP (ref 22–31)
CREAT SERPL-MCNC: 1.24 MG/DL — SIGNIFICANT CHANGE UP (ref 0.5–1.3)
EGFR: 72 ML/MIN/1.73M2 — SIGNIFICANT CHANGE UP
EOSINOPHIL # BLD AUTO: 0.07 K/UL — SIGNIFICANT CHANGE UP (ref 0–0.5)
EOSINOPHIL NFR BLD AUTO: 0.9 % — SIGNIFICANT CHANGE UP (ref 0–6)
GLUCOSE SERPL-MCNC: 87 MG/DL — SIGNIFICANT CHANGE UP (ref 70–99)
HCT VFR BLD CALC: 40.7 % — SIGNIFICANT CHANGE UP (ref 39–50)
HGB BLD-MCNC: 13.6 G/DL — SIGNIFICANT CHANGE UP (ref 13–17)
IMM GRANULOCYTES NFR BLD AUTO: 0.1 % — SIGNIFICANT CHANGE UP (ref 0–0.9)
LYMPHOCYTES # BLD AUTO: 3.49 K/UL — HIGH (ref 1–3.3)
LYMPHOCYTES # BLD AUTO: 47.2 % — HIGH (ref 13–44)
MCHC RBC-ENTMCNC: 29.2 PG — SIGNIFICANT CHANGE UP (ref 27–34)
MCHC RBC-ENTMCNC: 33.4 GM/DL — SIGNIFICANT CHANGE UP (ref 32–36)
MCV RBC AUTO: 87.3 FL — SIGNIFICANT CHANGE UP (ref 80–100)
MONOCYTES # BLD AUTO: 0.56 K/UL — SIGNIFICANT CHANGE UP (ref 0–0.9)
MONOCYTES NFR BLD AUTO: 7.6 % — SIGNIFICANT CHANGE UP (ref 2–14)
NEUTROPHILS # BLD AUTO: 3.19 K/UL — SIGNIFICANT CHANGE UP (ref 1.8–7.4)
NEUTROPHILS NFR BLD AUTO: 43.3 % — SIGNIFICANT CHANGE UP (ref 43–77)
NRBC # BLD: 0 /100 WBCS — SIGNIFICANT CHANGE UP (ref 0–0)
PLATELET # BLD AUTO: 375 K/UL — SIGNIFICANT CHANGE UP (ref 150–400)
POTASSIUM SERPL-MCNC: 4.7 MMOL/L — SIGNIFICANT CHANGE UP (ref 3.5–5.3)
POTASSIUM SERPL-SCNC: 4.7 MMOL/L — SIGNIFICANT CHANGE UP (ref 3.5–5.3)
RBC # BLD: 4.66 M/UL — SIGNIFICANT CHANGE UP (ref 4.2–5.8)
RBC # FLD: 14 % — SIGNIFICANT CHANGE UP (ref 10.3–14.5)
SODIUM SERPL-SCNC: 138 MMOL/L — SIGNIFICANT CHANGE UP (ref 135–145)
WBC # BLD: 7.39 K/UL — SIGNIFICANT CHANGE UP (ref 3.8–10.5)
WBC # FLD AUTO: 7.39 K/UL — SIGNIFICANT CHANGE UP (ref 3.8–10.5)

## 2023-05-30 PROCEDURE — 80048 BASIC METABOLIC PNL TOTAL CA: CPT

## 2023-05-30 PROCEDURE — 73030 X-RAY EXAM OF SHOULDER: CPT | Mod: 26,LT

## 2023-05-30 PROCEDURE — 93005 ELECTROCARDIOGRAM TRACING: CPT

## 2023-05-30 PROCEDURE — 99285 EMERGENCY DEPT VISIT HI MDM: CPT | Mod: 25

## 2023-05-30 PROCEDURE — 73030 X-RAY EXAM OF SHOULDER: CPT

## 2023-05-30 PROCEDURE — 99285 EMERGENCY DEPT VISIT HI MDM: CPT

## 2023-05-30 PROCEDURE — 82962 GLUCOSE BLOOD TEST: CPT

## 2023-05-30 PROCEDURE — 36415 COLL VENOUS BLD VENIPUNCTURE: CPT

## 2023-05-30 PROCEDURE — 85025 COMPLETE CBC W/AUTO DIFF WBC: CPT

## 2023-05-30 RX ORDER — METHOCARBAMOL 500 MG/1
2 TABLET, FILM COATED ORAL
Qty: 30 | Refills: 0
Start: 2023-05-30 | End: 2023-06-03

## 2023-05-30 RX ORDER — SODIUM CHLORIDE 9 MG/ML
1000 INJECTION, SOLUTION INTRAVENOUS ONCE
Refills: 0 | Status: COMPLETED | OUTPATIENT
Start: 2023-05-30 | End: 2023-05-30

## 2023-05-30 RX ORDER — LIDOCAINE 4 G/100G
1 CREAM TOPICAL
Qty: 7 | Refills: 0
Start: 2023-05-30 | End: 2023-06-05

## 2023-05-30 RX ADMIN — SODIUM CHLORIDE 1000 MILLILITER(S): 9 INJECTION, SOLUTION INTRAVENOUS at 16:09

## 2023-05-30 NOTE — ED PROVIDER NOTE - CLINICAL SUMMARY MEDICAL DECISION MAKING FREE TEXT BOX
Symptoms sound like orthostatic presyncope. Will get EKG, check labs, and hydrate. No signs or symptoms suggestive of CNS cause of symptoms. Pt also requesting XR left shoulder.

## 2023-05-30 NOTE — ED ADULT NURSE NOTE - CHIEF COMPLAINT QUOTE
Pt endorses "I had stroke symptoms on Sunday and I was discharged last Tuesday. I still feel dizzy and I still have dark vision. It has not gone away since I left the hospital. I also have felt short of breath since I left the hospital." Pt denies any acute symptoms within the last 24 hours.

## 2023-05-30 NOTE — ED PROVIDER NOTE - NSDCPRINTRESULTS_ED_ALL_ED
Reassured Mom that César is well appearing on exam today w/ normal WOB and lungs are clear.  Discussed expected clinical course and explained sx should continue to resolve in the next week.  Instructed to monitor for fevers, which could be a sign of a new infxn.  Recommended flu vaccine though mom will hold off until next week.  
Patient requests all Lab, Cardiology, and Radiology Results on their Discharge Instructions

## 2023-05-30 NOTE — ED ADULT NURSE NOTE - NSFALLUNIVINTERV_ED_ALL_ED
Bed/Stretcher in lowest position, wheels locked, appropriate side rails in place/Call bell, personal items and telephone in reach/Instruct patient to call for assistance before getting out of bed/chair/stretcher/Non-slip footwear applied when patient is off stretcher/[place] to call system/Physically safe environment - no spills, clutter or unnecessary equipment/Purposeful proactive rounding/Room/bathroom lighting operational, light cord in reach

## 2023-05-30 NOTE — ED PROVIDER NOTE - OBJECTIVE STATEMENT
48 y/o M with PMHx cocaine use disorder (last used 2 weeks ago), HTN, HLD, and DM presents to ED c/o left sided shoulder pain and lightheadedness that occurs when he stands up. Pt was admitted to [facility] from 5/21-5/23 for severe headache with diplopia. He underwent CT, LP, and MRI at that time with no signs of stroke or SAH. Since then, pt states he has had pain over his left trapezius muscle that worsens with movement. Also feels like his vision darkens and he becomes lightheaded when standing but improves somewhat when sitting. Denies headache, nausea, vomiting, changes in mental status, or vision changes at this time. Pt reports he lifts a lot of heavy objects at work and thinks he may have torn a muscle. 46 y/o M with PMHx cocaine use disorder (last used 2 weeks ago), HTN, HLD, and DM presents to ED c/o left sided shoulder pain and lightheadedness that occurs when he has L shoulder pain. Pt was admitted to [facility] from 5/21-5/23 for severe headache with diplopia. He underwent CT, LP, and MRI at that time with no signs of stroke or SAH. Since then, pt states he has had pain over his left trapezius muscle that worsens with movement. After he has the pain he feels like his vision darkens and he becomes lightheaded, also sometimes happens when standing but improves somewhat when sitting or when holding his shoulder still. Denies headache, nausea, vomiting, changes in mental status, or vision changes at this time. Pt reports he lifts a lot of heavy objects at work and thinks he may have torn a muscle.

## 2023-05-30 NOTE — ED PROVIDER NOTE - PATIENT PORTAL LINK FT
You can access the FollowMyHealth Patient Portal offered by [facility] by registering at the following website: [email or web address]. By joining [employer]’s FollowMyHealth portal, you will also be able to view your health information using other applications (apps) compatible with our system.

## 2023-05-30 NOTE — ED ADULT NURSE NOTE - OBJECTIVE STATEMENT
.  47years male alert mental state (AOX3) received on foot.  pt is ambulatory himself  -complain of dizziness and shoulder pain.  -denied fever, chest pain, n/v/d, abdomen pain.    Pt is in the chair comfortably at this time. Will continue to monitor and document any changes.

## 2023-05-30 NOTE — ED ADULT TRIAGE NOTE - CHIEF COMPLAINT QUOTE
Pt endorses "I had stroke symptoms on Sunday and I was discharged last Tuesday. I still feel dizzy and I still have dark vision. It has not gone away since I left the hospital. I also have felt short of breath since I left the hospital." Pt endorses "I had stroke symptoms on Sunday and I was discharged last Tuesday. I still feel dizzy and I still have dark vision. It has not gone away since I left the hospital. I also have felt short of breath since I left the hospital." Pt denies any acute symptoms within the last 24 hours.

## 2023-05-30 NOTE — ED PROVIDER NOTE - PHYSICAL EXAMINATION
CONST: nontoxic NAD speaking in full sentences  HEAD: atraumatic  EYES: conjunctivae clear, PERRL, EOMI  ENT: mmm  NECK: supple/FROM  CARD: rrr  CHEST: no stridor/retractions/tripoding  ABD: soft, nd, nttp, no rebound/guarding  EXT: FROM, symmetric distal pulses intact  SKIN: warm, dry, no rash  NEURO: a+ox3, no facial asymmetry, no aphasia, PERRL, EOMI, no neglect, CN II-XII intact, ftn wnl, neg pronator, 5/5 strength x4, gross sensation intact x4, normal gait

## 2023-06-28 NOTE — PROGRESS NOTE ADULT - PROBLEM SELECTOR PLAN 8
1) PCP Contacted on Admission: (Y/N) --> Name & Phone #: NA PATIENT DOES NOT HAVE A PCP  2) Date of Contact with PCP:  3) PCP Contacted at Discharge: (Y/N)  4) Summary of Handoff Given to PCP:   5) Post-Discharge Appointment Date and Location:
1) PCP Contacted on Admission: (Y/N) --> Name & Phone #: NA PATIENT DOES NOT HAVE A PCP  2) Date of Contact with PCP:  3) PCP Contacted at Discharge: (Y/N)  4) Summary of Handoff Given to PCP:   5) Post-Discharge Appointment Date and Location:
Eucrisa Counseling: Patient may experience a mild burning sensation during topical application. Eucrisa is not approved in children less than 2 years of age.

## 2023-07-26 NOTE — CONSULT NOTE ADULT - ASSESSMENT
47y Male with PMHx of cocaine use disorder (has used IN cocaine 5x over the past 2 weeks), occasional alcohol use (no WD/DT), HTN, HLD, DM, R foot OM s/p R partial 2nd and full 4th-5th toe amputations, c/o L thunderclap headache w/ associated lightheadedness/bilateral double vision since 4:15am today. Recently [place]. Does report neck pain. NIHSS 1 for decreased sensation in L face and L leg. +nuchal rigidity. CTH/CTP/CTA head and neck negative for acute abnormalities. Not a candidate for acute intervention.    Plan:  - Lumbar puncture in ED to r/o SAH  - hold off on aspirin/plavix  - dispo pending results    Discussed with Dr. Calderon
Gynecology  Dr. Miki Harris    [facility]  [address]  [place] [address]    [facility]   [address], [address]  [place] [address]    Telephone [phone]  Fax [phone]    
48 y/o M w/

## 2023-12-13 NOTE — DIETITIAN INITIAL EVALUATION ADULT. - PROBLEM SELECTOR PROBLEM 5
See follow up visit from  10/16/2023   .  brDate / Results of last TB test  8/21/2023   -   Negative  brLabs and/or Additional orders: CBC &amp CMP due in MarchbrInsurance authorization: Sukh CASTANEDA approved start of Remicade 8/28/23-8/27/24(BB)brPharmacy:  Buy and Bill  brRate of Infusion:  Standard   
br   Infusion order placed on:  8/22/2023   
br  Nutrition, metabolism, and development symptoms

## 2024-01-11 NOTE — SBIRT NOTE ADULT - NSSBIRTALCTYPDAY_GEN_A_CORE
**For crisis resources, please see the information at the end of this document**     Thank you for coming to the [facility] [place] [facility].    TREATMENT PLAN:    MEDICATIONS:   - continue sertraline at 100 mg    -PSYCHOEDUCATION:      CONSULTS/REFERRALS:   Continue therapy     LABS/PROCEDURES:    Please call your [place] clinic and ask for a lab only appointment at your earliest convenience.  If your results are reassuring or normal they will be mailed to you or sent through [facility] within 7 days. If the lab tests need quick action we will call you with the results. The phone number we will call with results is # [phone].      FOLLOW UP: Schedule an appointment with me in 3 months or sooner as needed.  The intake team should be calling you to schedule.  If you dont hear from them, or they were unable to reach you, please call [phone] to schedule.  Follow up with primary care provider as planned or for acute medical concerns.  Call the psychiatric nurse line with medication questions or concerns at [phone].      Medication Refills:  If you need any refills please call your pharmacy and they will contact us. Our fax number for refills is [phone]. Please allow three business for refill processing. If you need to  your refill at a new pharmacy, please contact the new pharmacy directly. The new pharmacy will help you get your medications transferred.     [facility] Assistance [phone]  Financial Assistance [phone]  [employer] Billing [phone]  Central Billing Office, ealth: [phone]  [place] Billing [phone]  Medical Records [phone]  [place] Patient Bill of Rights [email or web address]       MENTAL HEALTH CRISIS RESOURCES:  For a emergency help, please call 911 or go to the nearest Emergency Department.     Emergency Walk-In Options:   [facility] [place] [facility]  ([place]): [phone] - Specialized mental health emergency area designed to be calming  [facility] ([place]): [phone]  [facility] ([place]): [phone]  [facility] ([place]): [phone]    National Crisis Information: Call 988 Suicide and Crisis Lifeline  Crisis Text Line (free 24/7):  call **CRISIS (**836428) Crisis or use the texting option by texting 748554.   National Suicide Prevention Lifeline: Call 988  Poison Control Center: 1-465-997-4595  Trans Lifeline: 4-696-170-6118 - Hotline for transgender people of all ages  [employer]: 1-082-962-8721 - Hotline for LGBT youth   List of all [facility] resources:   https://[place].gov/dhs/people-we-serve/adults/health-care/mental-health/resources/crisis-contacts.jsp    For Non-Emergency Support:   Fast Tracker: Mental Health & Substance Use Disorder Resources -   https://www.fasttrackermn.org/       Again thank you for choosing [facility] [place] [facility] and please let us know how we can best partner with you to improve you and your family's health.    You may be receiving a survey regarding this appointment. We would love to have your feedback, both positive and negative. The survey is done by an external company, so your answers are anonymous.    Patient Education   Collaborative Care Psychiatry Service  What to Expect  Here's what to expect from your Collaborative Care Psychiatry Service (CCPS).   About CCPS  CCPS means 2 people work together to help you get better. You'll meet with a behavioral health clinician and a psychiatric doctor. A behavioral health clinician helps people with mental health problems by talking with them. A psychiatric doctor helps people by giving them medicine.  How it works  At every visit, you'll see the behavioral health clinician (BHC) first. They'll talk with you about how you're doing and teach you how to feel better.   Then you'll  "see the psychiatric doctor. This doctor can help you deal with troubling thoughts and feelings by giving you medicine. They'll make sure you know the plan for your care.   CCPS usually takes 3 to 6 visits. If you need more visits, we may have you start seeing a different psychiatric doctor for ongoing care.  If you have any questions or concerns, we'll be glad to talk with you.  About visits  Be open  At your visits, please talk openly about your problems. It may feel hard, but it's the best way for us to help you.  Cancelling visits  If you can't come to your visit, please call us right away at [phone]. If you don't cancel at least 24 hours (1 full day) before your visit, that's \"late cancellation.\"  Being late to visits  Being very late is the same as not showing up. You will be a \"no show\" if:  Your appointment starts with a [facility], and you're more than 15 minutes late for a 30-minute (half hour) visit. This will also cancel your appointment with the psychiatric doctor.  Your appointment is with a psychiatric doctor only, and you're more than 15 minutes late for a 30-minute (half hour) visit.  Your appointment is with a psychiatric doctor only, and you're more than 30 minutes late for a 60-minute (full hour) visit.  If you cancel late or don't show up 2 times within 6 months, we may end your care.   Getting help between visits  If you need help between visits, you can call us Monday to Friday from 8 a.m. to 4:30 p.m. at [phone].  Emergency care  Call 911 or go to the nearest emergency department if your life or someone else's life is in danger.  Call 988 anytime to reach the national Suicide and Crisis hotline.  Medicine refills  To refill your medicine, call your pharmacy. You can also call [facility]'s Behavioral Access at [phone], Monday to Friday, 8 a.m. to 4:30 p.m. It can take 1 to 3 business days to get a refill.   Forms, letters, and tests  You may have papers to fill out, like " FMLA, short-term disability, and workability. We can help you with these forms at your visits, but you must have an appointment. You may need more than 1 visit for this, to be in an intensive therapy program, or both.  Before we can give you medicine for ADHD, we may refer you to get tested for it or confirm it another way.  We may not be able to give you an emotional support animal letter.  We don't do mental health checks ordered by the court.   We don't do mental health testing, but we can refer you to get tested.   Thank you for choosing us for your care.  For informational purposes only. Not to replace the advice of your health care provider. Copyright   2022 [facility]. All rights reserved. [place] [id number] - 12/22.        10 or more

## 2024-02-14 ENCOUNTER — INPATIENT (INPATIENT)
Facility: HOSPITAL | Age: 49
LOS: 6 days | Discharge: HOME HEALTH | DRG: 854 | End: 2024-02-21
Attending: STUDENT IN AN ORGANIZED HEALTH CARE EDUCATION/TRAINING PROGRAM | Admitting: STUDENT IN AN ORGANIZED HEALTH CARE EDUCATION/TRAINING PROGRAM
Payer: COMMERCIAL

## 2024-02-14 VITALS
HEART RATE: 92 BPM | DIASTOLIC BLOOD PRESSURE: 85 MMHG | TEMPERATURE: 98 F | RESPIRATION RATE: 16 BRPM | OXYGEN SATURATION: 98 % | WEIGHT: 199.96 LBS | SYSTOLIC BLOOD PRESSURE: 140 MMHG

## 2024-02-14 DIAGNOSIS — I10 ESSENTIAL (PRIMARY) HYPERTENSION: ICD-10-CM

## 2024-02-14 DIAGNOSIS — Z96.0 PRESENCE OF UROGENITAL IMPLANTS: Chronic | ICD-10-CM

## 2024-02-14 DIAGNOSIS — R63.8 OTHER SYMPTOMS AND SIGNS CONCERNING FOOD AND FLUID INTAKE: ICD-10-CM

## 2024-02-14 DIAGNOSIS — Z89.429 ACQUIRED ABSENCE OF OTHER TOE(S), UNSPECIFIED SIDE: Chronic | ICD-10-CM

## 2024-02-14 DIAGNOSIS — E11.9 TYPE 2 DIABETES MELLITUS WITHOUT COMPLICATIONS: ICD-10-CM

## 2024-02-14 DIAGNOSIS — M86.10 OTHER ACUTE OSTEOMYELITIS, UNSPECIFIED SITE: ICD-10-CM

## 2024-02-14 DIAGNOSIS — Z94.5 SKIN TRANSPLANT STATUS: Chronic | ICD-10-CM

## 2024-02-14 LAB
ADD ON TEST-SPECIMEN IN LAB: SIGNIFICANT CHANGE UP
ALBUMIN SERPL ELPH-MCNC: 3.5 G/DL — SIGNIFICANT CHANGE UP (ref 3.3–5)
ALBUMIN SERPL ELPH-MCNC: 3.8 G/DL — SIGNIFICANT CHANGE UP (ref 3.3–5)
ALP SERPL-CCNC: 56 U/L — SIGNIFICANT CHANGE UP (ref 40–120)
ALP SERPL-CCNC: SIGNIFICANT CHANGE UP U/L (ref 40–120)
ALT FLD-CCNC: 12 U/L — SIGNIFICANT CHANGE UP (ref 10–45)
ALT FLD-CCNC: SIGNIFICANT CHANGE UP U/L (ref 10–45)
ANION GAP SERPL CALC-SCNC: 10 MMOL/L — SIGNIFICANT CHANGE UP (ref 5–17)
ANION GAP SERPL CALC-SCNC: 11 MMOL/L — SIGNIFICANT CHANGE UP (ref 5–17)
APPEARANCE UR: CLEAR — SIGNIFICANT CHANGE UP
AST SERPL-CCNC: 18 U/L — SIGNIFICANT CHANGE UP (ref 10–40)
AST SERPL-CCNC: SIGNIFICANT CHANGE UP U/L (ref 10–40)
BASOPHILS # BLD AUTO: 0.05 K/UL — SIGNIFICANT CHANGE UP (ref 0–0.2)
BASOPHILS NFR BLD AUTO: 0.3 % — SIGNIFICANT CHANGE UP (ref 0–2)
BILIRUB SERPL-MCNC: 0.5 MG/DL — SIGNIFICANT CHANGE UP (ref 0.2–1.2)
BILIRUB SERPL-MCNC: 0.6 MG/DL — SIGNIFICANT CHANGE UP (ref 0.2–1.2)
BILIRUB UR-MCNC: NEGATIVE — SIGNIFICANT CHANGE UP
BUN SERPL-MCNC: 17 MG/DL — SIGNIFICANT CHANGE UP (ref 7–23)
BUN SERPL-MCNC: 20 MG/DL — SIGNIFICANT CHANGE UP (ref 7–23)
CALCIUM SERPL-MCNC: 8.6 MG/DL — SIGNIFICANT CHANGE UP (ref 8.4–10.5)
CALCIUM SERPL-MCNC: 9.6 MG/DL — SIGNIFICANT CHANGE UP (ref 8.4–10.5)
CHLORIDE SERPL-SCNC: 100 MMOL/L — SIGNIFICANT CHANGE UP (ref 96–108)
CHLORIDE SERPL-SCNC: 94 MMOL/L — LOW (ref 96–108)
CO2 SERPL-SCNC: 26 MMOL/L — SIGNIFICANT CHANGE UP (ref 22–31)
CO2 SERPL-SCNC: 27 MMOL/L — SIGNIFICANT CHANGE UP (ref 22–31)
COLOR SPEC: YELLOW — SIGNIFICANT CHANGE UP
CREAT SERPL-MCNC: 0.99 MG/DL — SIGNIFICANT CHANGE UP (ref 0.5–1.3)
CREAT SERPL-MCNC: 1.09 MG/DL — SIGNIFICANT CHANGE UP (ref 0.5–1.3)
CRP SERPL-MCNC: 73.1 MG/L — HIGH (ref 0–4)
DIFF PNL FLD: NEGATIVE — SIGNIFICANT CHANGE UP
EGFR: 84 ML/MIN/1.73M2 — SIGNIFICANT CHANGE UP
EGFR: 94 ML/MIN/1.73M2 — SIGNIFICANT CHANGE UP
EOSINOPHIL # BLD AUTO: 0.17 K/UL — SIGNIFICANT CHANGE UP (ref 0–0.5)
EOSINOPHIL NFR BLD AUTO: 1.2 % — SIGNIFICANT CHANGE UP (ref 0–6)
ERYTHROCYTE [SEDIMENTATION RATE] IN BLOOD: 40 MM/HR — HIGH
FLUAV AG NPH QL: SIGNIFICANT CHANGE UP
FLUBV AG NPH QL: SIGNIFICANT CHANGE UP
GLUCOSE SERPL-MCNC: 152 MG/DL — HIGH (ref 70–99)
GLUCOSE SERPL-MCNC: 193 MG/DL — HIGH (ref 70–99)
GLUCOSE UR QL: >=1000 MG/DL
GRAM STN FLD: ABNORMAL
HCT VFR BLD CALC: 44.2 % — SIGNIFICANT CHANGE UP (ref 39–50)
HGB BLD-MCNC: 14.6 G/DL — SIGNIFICANT CHANGE UP (ref 13–17)
IMM GRANULOCYTES NFR BLD AUTO: 0.3 % — SIGNIFICANT CHANGE UP (ref 0–0.9)
KETONES UR-MCNC: NEGATIVE MG/DL — SIGNIFICANT CHANGE UP
LEUKOCYTE ESTERASE UR-ACNC: NEGATIVE — SIGNIFICANT CHANGE UP
LIDOCAIN IGE QN: 39 U/L — SIGNIFICANT CHANGE UP (ref 7–60)
LYMPHOCYTES # BLD AUTO: 19.8 % — SIGNIFICANT CHANGE UP (ref 13–44)
LYMPHOCYTES # BLD AUTO: 2.84 K/UL — SIGNIFICANT CHANGE UP (ref 1–3.3)
MCHC RBC-ENTMCNC: 29.6 PG — SIGNIFICANT CHANGE UP (ref 27–34)
MCHC RBC-ENTMCNC: 33 GM/DL — SIGNIFICANT CHANGE UP (ref 32–36)
MCV RBC AUTO: 89.5 FL — SIGNIFICANT CHANGE UP (ref 80–100)
MONOCYTES # BLD AUTO: 1.1 K/UL — HIGH (ref 0–0.9)
MONOCYTES NFR BLD AUTO: 7.7 % — SIGNIFICANT CHANGE UP (ref 2–14)
NEUTROPHILS # BLD AUTO: 10.11 K/UL — HIGH (ref 1.8–7.4)
NEUTROPHILS NFR BLD AUTO: 70.7 % — SIGNIFICANT CHANGE UP (ref 43–77)
NITRITE UR-MCNC: NEGATIVE — SIGNIFICANT CHANGE UP
NRBC # BLD: 0 /100 WBCS — SIGNIFICANT CHANGE UP (ref 0–0)
PH UR: 5.5 — SIGNIFICANT CHANGE UP (ref 5–8)
PLATELET # BLD AUTO: 427 K/UL — HIGH (ref 150–400)
POTASSIUM SERPL-MCNC: 4.5 MMOL/L — SIGNIFICANT CHANGE UP (ref 3.5–5.3)
POTASSIUM SERPL-MCNC: SIGNIFICANT CHANGE UP MMOL/L (ref 3.5–5.3)
POTASSIUM SERPL-SCNC: 4.5 MMOL/L — SIGNIFICANT CHANGE UP (ref 3.5–5.3)
POTASSIUM SERPL-SCNC: SIGNIFICANT CHANGE UP MMOL/L (ref 3.5–5.3)
PROT SERPL-MCNC: 7.5 G/DL — SIGNIFICANT CHANGE UP (ref 6–8.3)
PROT SERPL-MCNC: 9 G/DL — HIGH (ref 6–8.3)
PROT UR-MCNC: SIGNIFICANT CHANGE UP MG/DL
RBC # BLD: 4.94 M/UL — SIGNIFICANT CHANGE UP (ref 4.2–5.8)
RBC # FLD: 12.9 % — SIGNIFICANT CHANGE UP (ref 10.3–14.5)
RSV RNA NPH QL NAA+NON-PROBE: SIGNIFICANT CHANGE UP
SARS-COV-2 RNA SPEC QL NAA+PROBE: SIGNIFICANT CHANGE UP
SODIUM SERPL-SCNC: 131 MMOL/L — LOW (ref 135–145)
SODIUM SERPL-SCNC: 137 MMOL/L — SIGNIFICANT CHANGE UP (ref 135–145)
SP GR SPEC: >1.03 — HIGH (ref 1–1.03)
SPECIMEN SOURCE: SIGNIFICANT CHANGE UP
UROBILINOGEN FLD QL: 0.2 MG/DL — SIGNIFICANT CHANGE UP (ref 0.2–1)
WBC # BLD: 14.31 K/UL — HIGH (ref 3.8–10.5)
WBC # FLD AUTO: 14.31 K/UL — HIGH (ref 3.8–10.5)

## 2024-02-14 PROCEDURE — 76705 ECHO EXAM OF ABDOMEN: CPT | Mod: 26

## 2024-02-14 PROCEDURE — 99285 EMERGENCY DEPT VISIT HI MDM: CPT

## 2024-02-14 PROCEDURE — 93970 EXTREMITY STUDY: CPT | Mod: 26

## 2024-02-14 PROCEDURE — 99223 1ST HOSP IP/OBS HIGH 75: CPT | Mod: GC

## 2024-02-14 PROCEDURE — 73630 X-RAY EXAM OF FOOT: CPT | Mod: 26,LT

## 2024-02-14 RX ORDER — ACETAMINOPHEN 500 MG
1000 TABLET ORAL ONCE
Refills: 0 | Status: COMPLETED | OUTPATIENT
Start: 2024-02-14 | End: 2024-02-14

## 2024-02-14 RX ORDER — PIPERACILLIN AND TAZOBACTAM 4; .5 G/20ML; G/20ML
4.5 INJECTION, POWDER, LYOPHILIZED, FOR SOLUTION INTRAVENOUS ONCE
Refills: 0 | Status: DISCONTINUED | OUTPATIENT
Start: 2024-02-14 | End: 2024-02-15

## 2024-02-14 RX ORDER — FAMOTIDINE 10 MG/ML
20 INJECTION INTRAVENOUS ONCE
Refills: 0 | Status: COMPLETED | OUTPATIENT
Start: 2024-02-14 | End: 2024-02-14

## 2024-02-14 RX ORDER — SODIUM CHLORIDE 9 MG/ML
1000 INJECTION INTRAMUSCULAR; INTRAVENOUS; SUBCUTANEOUS ONCE
Refills: 0 | Status: COMPLETED | OUTPATIENT
Start: 2024-02-14 | End: 2024-02-14

## 2024-02-14 RX ORDER — ONDANSETRON 8 MG/1
4 TABLET, FILM COATED ORAL ONCE
Refills: 0 | Status: COMPLETED | OUTPATIENT
Start: 2024-02-14 | End: 2024-02-14

## 2024-02-14 RX ORDER — VANCOMYCIN HCL 1 G
1500 VIAL (EA) INTRAVENOUS ONCE
Refills: 0 | Status: COMPLETED | OUTPATIENT
Start: 2024-02-14 | End: 2024-02-14

## 2024-02-14 RX ORDER — VANCOMYCIN HCL 1 G
1250 VIAL (EA) INTRAVENOUS EVERY 8 HOURS
Refills: 0 | Status: COMPLETED | OUTPATIENT
Start: 2024-02-15 | End: 2024-02-15

## 2024-02-14 RX ORDER — PIPERACILLIN AND TAZOBACTAM 4; .5 G/20ML; G/20ML
3.38 INJECTION, POWDER, LYOPHILIZED, FOR SOLUTION INTRAVENOUS ONCE
Refills: 0 | Status: COMPLETED | OUTPATIENT
Start: 2024-02-14 | End: 2024-02-14

## 2024-02-14 RX ADMIN — SODIUM CHLORIDE 1000 MILLILITER(S): 9 INJECTION INTRAMUSCULAR; INTRAVENOUS; SUBCUTANEOUS at 21:56

## 2024-02-14 RX ADMIN — Medication 1000 MILLIGRAM(S): at 20:24

## 2024-02-14 RX ADMIN — Medication 30 MILLILITER(S): at 11:26

## 2024-02-14 RX ADMIN — SODIUM CHLORIDE 1000 MILLILITER(S): 9 INJECTION INTRAMUSCULAR; INTRAVENOUS; SUBCUTANEOUS at 11:25

## 2024-02-14 RX ADMIN — Medication 400 MILLIGRAM(S): at 16:42

## 2024-02-14 RX ADMIN — FAMOTIDINE 20 MILLIGRAM(S): 10 INJECTION INTRAVENOUS at 11:26

## 2024-02-14 RX ADMIN — ONDANSETRON 4 MILLIGRAM(S): 8 TABLET, FILM COATED ORAL at 11:25

## 2024-02-14 RX ADMIN — Medication 250 MILLIGRAM(S): at 16:15

## 2024-02-14 RX ADMIN — Medication 1500 MILLIGRAM(S): at 20:24

## 2024-02-14 RX ADMIN — PIPERACILLIN AND TAZOBACTAM 200 GRAM(S): 4; .5 INJECTION, POWDER, LYOPHILIZED, FOR SOLUTION INTRAVENOUS at 15:20

## 2024-02-14 RX ADMIN — PIPERACILLIN AND TAZOBACTAM 3.38 GRAM(S): 4; .5 INJECTION, POWDER, LYOPHILIZED, FOR SOLUTION INTRAVENOUS at 20:28

## 2024-02-14 NOTE — H&P ADULT - NSHPLABSRESULTS_GEN_ALL_CORE
.  LABS:                         14.6   14.31 )-----------( 427      ( 14 Feb 2024 10:43 )             44.2     02-14    137  |  100  |  17  ----------------------------<  152<H>  4.5   |  27  |  0.99    Ca    8.6      14 Feb 2024 12:33    TPro  7.5  /  Alb  3.5  /  TBili  0.5  /  DBili  x   /  AST  18  /  ALT  12  /  AlkPhos  56  02-14      Urinalysis Basic - ( 14 Feb 2024 12:33 )    Color: x / Appearance: x / SG: x / pH: x  Gluc: 152 mg/dL / Ketone: x  / Bili: x / Urobili: x   Blood: x / Protein: x / Nitrite: x   Leuk Esterase: x / RBC: x / WBC x   Sq Epi: x / Non Sq Epi: x / Bacteria: x            RADIOLOGY, EKG & ADDITIONAL TESTS: Reviewed.

## 2024-02-14 NOTE — H&P ADULT - PROBLEM SELECTOR PLAN 2
Hx of IDT2DM however patient unable to afford and access his insulin. A1c on admission 10.6. Glucose 258.  - c/w mISS  - endocrine consult in AM for T2DM management  - consistent carb diet Hx of IDT2DM however patient unable to access his insulin. A1c on admission 10.6. Glucose 258. States he takes Metformin and Jardiance   - c/w mISS  - endocrine consult in AM for T2DM management  - consistent carb diet Pt with hx of R foot ulcer. seen by podiatry in May 2022 and treated for OM and underwent R foot TMA w/ skin graft. Pt presenting now for chronic L hallux foot wound that began in Oct 2023. WBC 14.31, ESR 40, CRP 73.1. Per Podiatry, no concerns for gas or acute fx on imaging however cannot rule out OM. S/p Vanc 1g and Zosyn 3.375mg IV x1 in ED.  - c/w broad spectrum abx -- Vancomycin 1500g IV q12hr -- trough on 2/15 @ 2am and Zosyn 4.5 (pseudomonal dosing) q8hr IV   - f/u MR Foot   - patient underwent excisional debridement down to and including subcutaneous tissue at bedside with serosanguinous drainage exposed. Cultures sent - f/u wound cx  - wound care: continue with WTD dressing, gauze and Kerlix per podiatry note  - continue to f/u podiatry recommendations for further management Pt with hx of R foot ulcer. seen by podiatry in May 2022 and treated for OM and underwent R foot TMA w/ skin graft. Pt presenting now for chronic L hallux foot wound that began in Oct 2023. WBC 14.31, ESR 40, CRP 73.1. Per Podiatry, no concerns for gas or acute fx on imaging however cannot rule out OM. S/p Vanc 1g and Zosyn 3.375mg IV x1 in ED.  - c/w broad spectrum abx -- Vancomycin 1250g IV q8hr  -- trough on 2/14 @ 6pm  and Zosyn 4.5 (pseudomonal dosing) q8hr IV   - f/u MR Foot w/ contrast   - patient underwent excisional debridement down to and including subcutaneous tissue at bedside with serosanguinous drainage exposed. Cultures sent - f/u wound cx  - wound care: continue with WTD dressing, gauze and Kerlix per podiatry note  - continue to f/u podiatry recommendations for further management

## 2024-02-14 NOTE — H&P ADULT - NSICDXPASTMEDICALHX_GEN_ALL_CORE_FT
PAST MEDICAL HISTORY:  Diabetes mellitus     History of cocaine use     Hyperlipidemia, unspecified hyperlipidemia type     Hypertension     Osteomyelitis R foot     97.7

## 2024-02-14 NOTE — H&P ADULT - NSHPSOCIALHISTORY_GEN_ALL_CORE
Denies smoking, occasionally drinks alcohol. Previous cocaine use, currently denies illicit drug use.

## 2024-02-14 NOTE — ED PROVIDER NOTE - CLINICAL SUMMARY MEDICAL DECISION MAKING FREE TEXT BOX
Pt is afebrile and hemodynamically stable. He is non-toxic appearing. He has no abdominal tenderness on exam. Pt is afebrile and hemodynamically stable. He is non-toxic appearing. He has no abdominal tenderness on exam. Initially given IVF, pepcid, maalox, zofran for suspected gastritis. He has wbc 14k, no acute metabolic abnormalities, lipase wnl. No evidence of infection on UA. He has mild RUQ tenderness on exam so US ordered for further evaluation and without evidence of acute abnormality. On reevaluation, pt reported non-healing L great toe wound that is concerning for diabetic foot infection. Added ESR and CRP which were elevated. Blood cultures sent and pending. XR L foot notable for suggestion of a soft tissue ulcer along the distal aspect of the first toe. No definite cortical erosion or periostitis to suggest osteomyelitis. However, MRI would be a more sensitive test to evaluate for osteomyelitis. No acute displaced fracture or dislocation. Osteoarthritic changes seen throughout the forefoot. Soft tissue swelling about the visualized foot. Vascular calcifications noted. Podiatry consulted and evaluated pt in ED. Sent wound culture. Recommend admission for IV antibiotics and OM work up. Pt given IV vancomycin and IV zosyn while in ED. Admit to Medicine.

## 2024-02-14 NOTE — ED PROVIDER NOTE - PHYSICAL EXAMINATION
VITAL SIGNS: I have reviewed nursing notes and confirm.  CONSTITUTIONAL: Well-developed; in no acute distress.   SKIN:  warm and dry, no acute rash.   HEAD:  normocephalic, atraumatic.  EYES: PERRL, EOM intact; conjunctiva and sclera clear.  ENT: No nasal discharge; airway clear.   NECK: Supple; non tender.  CARD: S1, S2 normal; no murmurs, gallops, or rubs. Regular rate and rhythm.   RESP:  Clear to auscultation b/l, no wheezes, rales or rhonchi.  ABD: Normal bowel sounds; soft; non-distended; non-tender; no guarding/ rebound.  EXT: Normal ROM. No clubbing, cyanosis or edema. 2+ pulses to b/l ue/le.  NEURO: Alert, oriented, grossly unremarkable  PSYCH: Cooperative, mood and affect appropriate. VITAL SIGNS: I have reviewed nursing notes and confirm.  CONSTITUTIONAL: Well-developed; in no acute distress.   SKIN:  warm and dry, no acute rash.   HEAD:  normocephalic, atraumatic.  EYES: PERRL, EOM intact; conjunctiva and sclera clear.  ENT: No nasal discharge; airway clear.   NECK: Supple; non tender.  CARD: S1, S2 normal; no murmurs, gallops, or rubs. Regular rate and rhythm.   RESP:  Clear to auscultation b/l, no wheezes, rales or rhonchi.  ABD: Normal bowel sounds; soft; non-distended; non-tender; no guarding/ rebound.  EXT: Normal ROM. L foot 1st toe with 2cm x 2cm wound along plantar aspect with macerated tissue noted over the wound bed and hyperkeratotic periwound. Has small opening that probes deeper. There is erythema on the plantar aspect of the toe. 2+ pulses to b/l ue/le.  NEURO: Alert, oriented, grossly unremarkable  PSYCH: Cooperative, mood and affect appropriate.

## 2024-02-14 NOTE — H&P ADULT - PROBLEM SELECTOR PLAN 4
F: NS 1L   E: replete as needed; Keep K>4, Mg >2   D: Consistent Carb    Dispo: RMF Hx of HTN. Home medications: Losartan 50mg qd   - c/w current management

## 2024-02-14 NOTE — H&P ADULT - PROBLEM SELECTOR PLAN 3
Hx of HTN. Home medications:   - c/w current management Hx of HTN. Home medications: Losartan 50mg qd   - c/w current management Hx of IDT2DM however patient unable to access his insulin. A1c on admission 10.6. Glucose 258. States he takes Metformin and Jardiance   - c/w mISS  - endocrine consult in AM for T2DM management  - consistent carb diet  - per weight base, will begin patient on Lispro 4 units pre-meal

## 2024-02-14 NOTE — H&P ADULT - HISTORY OF PRESENT ILLNESS
49yo male with PMHx of IDDM (not currently on insulin due to insurance issues) presents with generalized abdominal pain and nausea x2 days. Feels fatigued. Denies fever, chest pain, shortness of breath, vomiting, diarrhea, constipation, urinary symptoms, blood in stool. He completed antibiotics (does not know name) for dental infection 2 days ago. He denies recent travel. Pt also with hx of non-healing ulcer to L plantar aspect of great toe. Pt noticing yellow drainage with nausea,       ED Course:  Vitals: /85, HR 92, T 98.1 Oral, SpO2 98% on RA, RR 16  Labs: WBC 14.31, ESR 40, Hgb 14.6, Plt, 427, Na 137, K 4.5, Cr 0.99, Lipase 39; UA negative; RVP negative;   RUQ US: no acute pathology; possible hemangioma  L foot xr: soft tissue ulcer along distal first toe; no definite cortical erosion to suggest OM however MR recommended; no acute displaced fracture; osteoarthritic changes throughout the forefoot  Interventions: Ofirmev 1g IV x1, Maalox 30mg PO x1, Pepcid 20mg IVP x1, Zofran 4mg IVP x1, Vancomycin 1g IV x1, Zosyn 3.375mg IV x1, NS 1L IV x1   Consults in ED: Podiatry     Interventions:  49yo male with PMHx of PMHx of cocaine use disorder occasional alcohol use, HTN, HLD, DM, R foot OM s/p R partial 2nd and full 4th-5th toe amputations and non-healing plantar L great toe ulcer presents to ED i/s/o abdominal pain and nausea for the past two days with associated fatigue. Pt stating he recently completed antibiotics for dental infection 2 days prior. Pt has been completing wound care for foot ulcer but states his wound has been intermittenty opening and he noticed yellow drainage from the site.      ED Course:  Vitals: /85, HR 92, T 98.1 Oral, SpO2 98% on RA, RR 16  Labs: WBC 14.31, ESR 40, Hgb 14.6, Plt, 427, Na 137, K 4.5, Cr 0.99, Lipase 39; UA negative; RVP negative;   RUQ US: no acute pathology; possible hemangioma  L foot xr: soft tissue ulcer along distal first toe; no definite cortical erosion to suggest OM however MR recommended; no acute displaced fracture; osteoarthritic changes throughout the forefoot  Interventions: Ofirmev 1g IV x1, Maalox 30mg PO x1, Pepcid 20mg IVP x1, Zofran 4mg IVP x1, Vancomycin 1g IV x1, Zosyn 3.375mg IV x1, NS 1L IV x1   Consults in ED: Podiatry     Interventions:  47yo male with PMHx of cocaine use disorder occasional alcohol use, HTN, HLD, DM, R foot OM s/p R partial 2nd and full 4th-5th toe amputations and non-healing plantar L great toe ulcer presents to ED i/s/o abdominal pain and nausea for the past two days with associated fatigue. Pt stating he recently completed antibiotics for dental infection 2 days prior. Pt has been completing wound care for foot ulcer but states his wound has been intermittent opening and he noticed yellow drainage from the site.      ED Course:  Vitals: /85, HR 92, T 98.1 Oral, SpO2 98% on RA, RR 16  Labs: WBC 14.31, ESR 40, Hgb 14.6, Plt, 427, Na 137, K 4.5, Cr 0.99, Lipase 39; UA negative; RVP negative;   RUQ US: no acute pathology; possible hemangioma  L foot xr: soft tissue ulcer along distal first toe; no definite cortical erosion to suggest OM however MR recommended; no acute displaced fracture; osteoarthritic changes throughout the forefoot  Interventions: Ofirmev 1g IV x1, Maalox 30mg PO x1, Pepcid 20mg IVP x1, Zofran 4mg IVP x1, Vancomycin 1g IV x1, Zosyn 3.375mg IV x1, NS 1L IV x1   Consults in ED: Podiatry     Interventions:  49yo male with PMHx of cocaine use disorder occasional alcohol use, HTN, HLD, DM, R foot OM s/p R partial 2nd and full 4th-5th toe amputations and non-healing plantar L great toe ulcer presents to ED i/s/o abdominal pain and nausea for the past two days with associated fatigue. Pt stating he recently completed antibiotics (name unknown) for dental infection 2 days prior. Pt has been completing wound care for foot ulcer but states his wound has been intermittent opening and he noticed yellow drainage from the site. Pt stating he has been unable to gain access to insulin so therefore he is only taking Metformin and Januvia. Pt stating that he has been having some abdominal pain for the past day which is unspecified however no emesis, nausea or vomiting noted. Pt has been eating less i/s/o tooth pain and he believes he has more abdominal pain now that he began eating.     ED Course:  Vitals: /85, HR 92, T 98.1 Oral, SpO2 98% on RA, RR 16  Labs: WBC 14.31, ESR 40, Hgb 14.6, Plt, 427, Na 137, K 4.5, Cr 0.99, Lipase 39; UA negative; RVP negative;   RUQ US: no acute pathology; possible hemangioma  L foot xr: soft tissue ulcer along distal first toe; no definite cortical erosion to suggest OM however MR recommended; no acute displaced fracture; osteoarthritic changes throughout the forefoot  Interventions: Ofirmev 1g IV x1, Maalox 30mg PO x1, Pepcid 20mg IVP x1, Zofran 4mg IVP x1, Vancomycin 1g IV x1, Zosyn 3.375mg IV x1, NS 1L IV x1   Consults in ED: Podiatry     Interventions:

## 2024-02-14 NOTE — CONSULT NOTE ADULT - ASSESSMENT
Podiatry was consulted for a L hallux wound that began back in October 2023. At the time of consult WBC 14.31 ESR 40 CRP 73.1. Imaging showed no obvious signs of OM, GAS, or acute fx. Based on the clinical presentation OM cannot be r/o at this time. Pt can benefit from being admitted for further OM workup and IV abx.       Plan:   Recommend admission to Medicine for OM workup and IV abx  Please order R foot MRI to r/o OM   Pt may begin broad spectrum IV abx   Performed excisional debridement down to and including subcutaneous tissue with a sterile 15 blade. Sero sang drainage was expressed   F/u ED cx taken post debridement   Reviewed XR and read   Wound care: Area wrapped with WTD dressing, gauze, and kerlix   L foot heel WBAT, R foot WBAT     Plan discussed with attending, podiatry will follow      Podiatry was consulted for a L hallux wound that began back in October 2023. At the time of consult WBC 14.31 ESR 40 CRP 73.1. Imaging showed no obvious signs of OM, GAS, or acute fx. Based on the clinical presentation OM cannot be r/o at this time. Pt can benefit from being admitted for further OM workup and IV abx.       Plan:   Recommend admission to Medicine for OM workup and IV abx  Please order R foot MRI to r/o OM   Pt may begin broad spectrum IV abx   Recommend LLE venous doppler to r/o DVT   Performed excisional debridement down to and including subcutaneous tissue with a sterile 15 blade. Sero sang drainage was expressed   F/u ED cx taken post debridement   Reviewed XR and read   Wound care: Area wrapped with WTD dressing, gauze, and kerlix   L foot heel WBAT, R foot WBAT     Plan discussed with attending, podiatry will follow      Podiatry was consulted for a L hallux wound that began back in October 2023. At the time of consult WBC 14.31 ESR 40 CRP 73.1. Imaging showed no obvious signs of OM, GAS, or acute fx. Based on the clinical presentation OM cannot be r/o at this time. Pt can benefit from being admitted for further OM workup and IV abx.       Plan:   Recommend admission to Medicine for OM workup and IV abx  Please order R foot MRI to r/o OM   Pt may begin broad spectrum IV abx   Recommend LLE venous duplex ultrasound to r/o DVT   Performed excisional debridement down to and including subcutaneous tissue with a sterile 15 blade. Sero sang drainage was expressed   F/u ED cx taken post debridement   Reviewed XR and read   Wound care: Area wrapped with WTD dressing, gauze, and kerlix   L foot heel WBAT, R foot WBAT     Plan discussed with attending, podiatry will follow      Podiatry was consulted for a L hallux wound that began back in October 2023. At the time of consult WBC 14.31 ESR 40 CRP 73.1. Imaging showed no obvious signs of OM, GAS, or acute fx. Based on the clinical presentation OM cannot be r/o at this time. Pt can benefit from being admitted for further OM workup and IV abx. Discussed possible treatment options witht the pt and he stated that he would like to try long term IV abx as a treatment option before considering podiatric surgical intervention.       Plan:   Recommend admission to Medicine for OM workup and IV abx  Please order R foot MRI to r/o OM   Pt may begin broad spectrum IV abx   Recommend LLE venous duplex ultrasound to r/o DVT   Performed excisional debridement down to and including subcutaneous tissue with a sterile 15 blade. Sero sang drainage was expressed   F/u ED cx taken post debridement   Reviewed XR and read   Wound care: Area wrapped with WTD dressing, gauze, and kerlix   L foot heel WBAT, R foot WBAT     Plan discussed with attending, podiatry will follow      Podiatry was consulted for a L hallux wound that began back in October 2023. At the time of consult WBC 14.31 ESR 40 CRP 73.1. Imaging showed no obvious signs of OM, GAS, or acute fx. Based on the clinical presentation OM cannot be r/o at this time. Pt can benefit from being admitted for further OM workup and IV abx. Discussed possible treatment options witht the pt and he stated that he would like to try long term IV abx as a treatment option before considering podiatric surgical intervention.       Plan:   Recommend admission to Medicine for OM workup and IV abx  Please order L foot MRI to r/o OM   Pt may begin broad spectrum IV abx   Recommend LLE venous duplex ultrasound to r/o DVT   Performed excisional debridement down to and including subcutaneous tissue with a sterile 15 blade. Sero sang drainage was expressed   F/u ED cx taken post debridement   Reviewed XR and read   Wound care: Area wrapped with WTD dressing, gauze, and kerlix   L foot heel WBAT, R foot WBAT     Plan discussed with attending, podiatry will follow

## 2024-02-14 NOTE — H&P ADULT - PROBLEM/PLAN-4
Noted. Thank you.  
OK for video or tele visit; video as provider preference but patient to decide on type of visit.  Thank you.  
Patient contacted and 11/17 appt changed to video visit. Instructions sent to patient  
Patient states she is covid positive(date of test 11/13/2020)     9 month follow up 11/17/2020    Patient open to video or tele visit, please advise if that is an option or cesar      Patient can be reached at [phone]  
DISPLAY PLAN FREE TEXT

## 2024-02-14 NOTE — ED PROVIDER NOTE - ATTENDING APP SHARED VISIT CONTRIBUTION OF CARE
49yo male with pmhx of IDDM (not currently on insulin due to insurance issues) presents with generalized abdominal pain and nausea x2 days. Also with toe infection, will obtain labs, us for ro gallbladder pathology, xr, esr/crp for ro osteo, podiatry consult, reassess

## 2024-02-14 NOTE — H&P ADULT - PROBLEM SELECTOR PLAN 1
Pt with hx of Pt with hx of R foot ulcer. seen by podiatry in May 2022 and treated for OM and underwent R foot TMA w/ skin graft. Pt presenting now for chronic L hallux foot wound that began in Oct 2023. Pt with hx of R foot ulcer. seen by podiatry in May 2022 and treated for OM and underwent R foot TMA w/ skin graft. Pt presenting now for chronic L hallux foot wound that began in Oct 2023. WBC 14.31, ESR 40, CRP 73.1. Per Podiatry, no concerns for gas or acute fx on imaging however cannot rule out OM. S/p Vanc 1g and Zosyn 3.375mg IV x1 in ED. Pt with hx of R foot ulcer. seen by podiatry in May 2022 and treated for OM and underwent R foot TMA w/ skin graft. Pt presenting now for chronic L hallux foot wound that began in Oct 2023. WBC 14.31, ESR 40, CRP 73.1. Per Podiatry, no concerns for gas or acute fx on imaging however cannot rule out OM. S/p Vanc 1g and Zosyn 3.375mg IV x1 in ED.  - c/w broad spectrum abx -- Vancomycin 1500g IV q12hr -- trough on 2/15 @ 2am and Zosyn 4.5 (pseudomonal dosing) q8hr IV   - f/u MR Foot   - patient underwent excisional debridement down to and including subcutaneous tissue at bedside with serosanguinous drainage exposed. Cultures sent - f/u wound cx  - wound care: continue with WTD dressing, gauze and Kerlix per podiatry note  - continue to f/u podiatry recommendations for further management HR 92, WBC 14.31 on admission. Likely i/s/o active LLE infection. Per podiatry documentation, patient underwent debridement and had probe to bone during physical exam. SR 40, CRP 73.1.   - see management for acute OM as below

## 2024-02-14 NOTE — ED ADULT NURSE NOTE - NSFALLUNIVINTERV_ED_ALL_ED
Bed/Stretcher in lowest position, wheels locked, appropriate side rails in place/Call bell, personal items and telephone in reach/Instruct patient to call for assistance before getting out of bed/chair/stretcher/Non-slip footwear applied when patient is off stretcher/[place] to call system/Physically safe environment - no spills, clutter or unnecessary equipment/Purposeful proactive rounding/Room/bathroom lighting operational, light cord in reach

## 2024-02-14 NOTE — H&P ADULT - ATTENDING COMMENTS
#Sepsis: 2/2 LLE purulent cellulitis c/f OM. c/w Vanc/zosyn, f/up wound cx, blood cx, MRI. Podiatry recs      #OM: p/w purulent drainage of L hallux diabetic ulcer w/ elevated ESR/CRP, +PTB c/f OM. c/w vanc/zosyn. F/up MRI, poditry recs.      #T2DM: uncontrolled, prescribed insulin however not currently on it due to insurance issues. c/w weight based insulin, mod ISS, endocrine consult in am. Monitor FSG.

## 2024-02-14 NOTE — ED ADULT NURSE NOTE - OBJECTIVE STATEMENT
Pt A&Ox4, ambulatory with steady gait, speaking in clear/full sentences, no acute distress, vital signs stable. Pt presents to the ED for epigastric pain x 2 days with fever yesterday. Pt states "I had a cold last weekend and I also had a tooth infection so I was taking  a lot of ibuprofen and I don't know if I over did it." Pt endorses nausea. No vomiting or diarrhea. Pt denies PSH.

## 2024-02-14 NOTE — ED PROVIDER NOTE - CCCP TRG CHIEF CMPLNT
Is This A New Presentation, Or A Follow-Up?: Follow Up Humira
How Severe Is It?: mild
Additional History: * labs done 10/20
abdominal pain

## 2024-02-14 NOTE — CONSULT NOTE ADULT - SUBJECTIVE AND OBJECTIVE BOX
Attending: Dr. Porras    Patient is a 48y old  Male who presents with a chief complaint of     HPI: 47yo male with pmhx of IDDM (not currently on insulin due to insurance issues) presents with generalized abdominal pain and nausea x2 days. Feels fatigued. Denies fever, chest pain, shortness of breath, vomiting, diarrhea, constipation, urinary symptoms, blood in stool. He completed antibiotics (does not know name) for dental infection 2 days ago. He denies recent travel. He has not taken any medication for symptoms at home.    Podiatry Addendum: Podiatry was consulted for L hallux wound that initially started in October 2023. Pt was last seen by his podiatrist Dr. Green about a year ago. Pt states that he has not been able to follow up with him due to distance. He states that he has been compliant with his wound care regimen at home however he notes that the wound keeps opening back up. He states that a few days ago he noticed yellow drainage from the wound site and began to feel nauseous soon after. He states that he also feels soreness on the postior aspect of his leg which also began a few days ago. He denies any chills/v/SOB/CP at this time.       Review of systems negative except per HPI and as stated below  General:	 no weakness; no fevers, no chills  Skin/Breast: no rash  Respiratory and Thorax: no SOB, no cough  Cardiovascular:	No chest pain  Gastrointestinal:	 no nausea, vomiting , diarrhea  Genitourinary:	no dysuria, no difficulty urinating, no hematuria  Musculoskeletal:	no weakness, no joint swelling/pain  Neurological:	no focal weakness/numbness  Endocrine:	no polyuria, no polydipsia    PAST MEDICAL & SURGICAL HISTORY:  Hyperlipidemia, unspecified hyperlipidemia type      Hypertension      History of cocaine use      Diabetes mellitus      Osteomyelitis  R foot      Toe amputation status  Right foot      H/O skin graft  Right foot      S/P insertion of penile implant        Home Medications:  atorvastatin 40 mg oral tablet: 1 tab(s) orally once a day (02 May 2022 05:49)  Jardiance 25 mg oral tablet: 1 tab(s) orally once a day (23 May 2023 14:37)  losartan 50 mg oral tablet: 1 tab(s) orally once a day (23 May 2023 14:35)  metFORMIN 750 mg oral tablet, extended release: 2 tab(s) orally once a day (02 May 2022 05:22)  Ozempic 8 mg/3 mL (2 mg dose) subcutaneous solution: 2 milligram(s) subcutaneously once a week (23 May 2023 14:39)  Toujeo SoloStar 300 units/mL subcutaneous solution: 20 unit(s) subcutaneous once a day (23 May 2023 14:36)    Allergies    No Known Allergies    Intolerances      FAMILY HISTORY:  FH: CAD (coronary artery disease) (Mother)      Social History:       LABS                        14.6   14.31 )-----------( 427      ( 14 Feb 2024 10:43 )             44.2     02-14    137  |  100  |  17  ----------------------------<  152<H>  4.5   |  27  |  0.99    Ca    8.6      14 Feb 2024 12:33    TPro  7.5  /  Alb  3.5  /  TBili  0.5  /  DBili  x   /  AST  18  /  ALT  12  /  AlkPhos  56  02-14      ESR: 40  CRP: --  02-14 @ 12:52    Vital Signs Last 24 Hrs  T(C): 36.7 (14 Feb 2024 10:00), Max: 36.7 (14 Feb 2024 10:00)  T(F): 98.1 (14 Feb 2024 10:00), Max: 98.1 (14 Feb 2024 10:00)  HR: 92 (14 Feb 2024 10:00) (92 - 92)  BP: 140/85 (14 Feb 2024 10:00) (140/85 - 140/85)  BP(mean): --  RR: 16 (14 Feb 2024 10:00) (16 - 16)  SpO2: 98% (14 Feb 2024 10:00) (98% - 98%)    Parameters below as of 14 Feb 2024 10:00  Patient On (Oxygen Delivery Method): room air        PHYSICAL EXAM  General: NAD, AA0x3    Lower Extremity Focused(left):  Vasc: 2/4 PT&DP, TG warm to warm, localized edema noted at the site of the wound, (-)erythema   Derm: ~2.4cmx2.3cm wound at the distal aspect of hallux, macerated tissue noted over the wound bed and hyperkeratotic periwound. Post debridement showed granulation tissue and SeroSang drainage, (+)PTB, (+)undermining, (-) malodor  Neuro: Protective sensation diminished   MSK: Muscle strength 5/5 in all compartments     RADIOLOGY  < from: Xray Foot AP + Lateral + Oblique, Left (02.14.24 @ 13:56) >  IMPRESSION:    Suggestion of a soft tissue ulcer along the distal aspect of the first   toe. No definite cortical erosion or periostitis to suggest   osteomyelitis. However, MRI would be a more sensitive test to evaluate   for osteomyelitis.    No acute displaced fracture or dislocation. Osteoarthritic changes seen   throughout the forefoot.    Soft tissue swelling about the visualized foot. Vascular calcifications   noted.    --- End of Report ---    < end of copied text >                       Attending: Dr. Porras    Patient is a 48y old  Male who presents with a chief complaint of     HPI: 49yo male with pmhx of IDDM (not currently on insulin due to insurance issues) presents with generalized abdominal pain and nausea x2 days. Feels fatigued. Denies fever, chest pain, shortness of breath, vomiting, diarrhea, constipation, urinary symptoms, blood in stool. He completed antibiotics (does not know name) for dental infection 2 days ago. He denies recent travel. He has not taken any medication for symptoms at home.    Podiatry Addendum: Podiatry was consulted for L hallux wound that initially started in October 2023. Pt was last seen by his podiatrist Dr. Green about a year ago. Pt states that he has not been able to follow up with him due to distance. He states that he has been compliant with his wound care regimen at home however he notes that the wound keeps opening back up. He states that a few days ago he noticed yellow drainage from the wound site and began to feel nauseous soon after. He states that he also feels soreness on the posterior aspect of his leg which also began a few days ago. He denies any chills/v/SOB/CP at this time.       Review of systems negative except per HPI and as stated below  General:	 no weakness; no fevers, no chills  Skin/Breast: no rash  Respiratory and Thorax: no SOB, no cough  Cardiovascular:	No chest pain  Gastrointestinal:	 no nausea, vomiting , diarrhea  Genitourinary:	no dysuria, no difficulty urinating, no hematuria  Musculoskeletal:	no weakness, no joint swelling/pain  Neurological:	no focal weakness/numbness  Endocrine:	no polyuria, no polydipsia    PAST MEDICAL & SURGICAL HISTORY:  Hyperlipidemia, unspecified hyperlipidemia type      Hypertension      History of cocaine use      Diabetes mellitus      Osteomyelitis  R foot      Toe amputation status  Right foot      H/O skin graft  Right foot      S/P insertion of penile implant        Home Medications:  atorvastatin 40 mg oral tablet: 1 tab(s) orally once a day (02 May 2022 05:49)  Jardiance 25 mg oral tablet: 1 tab(s) orally once a day (23 May 2023 14:37)  losartan 50 mg oral tablet: 1 tab(s) orally once a day (23 May 2023 14:35)  metFORMIN 750 mg oral tablet, extended release: 2 tab(s) orally once a day (02 May 2022 05:22)  Ozempic 8 mg/3 mL (2 mg dose) subcutaneous solution: 2 milligram(s) subcutaneously once a week (23 May 2023 14:39)  Toujeo SoloStar 300 units/mL subcutaneous solution: 20 unit(s) subcutaneous once a day (23 May 2023 14:36)    Allergies    No Known Allergies    Intolerances      FAMILY HISTORY:  FH: CAD (coronary artery disease) (Mother)      Social History:       LABS                        14.6   14.31 )-----------( 427      ( 14 Feb 2024 10:43 )             44.2     02-14    137  |  100  |  17  ----------------------------<  152<H>  4.5   |  27  |  0.99    Ca    8.6      14 Feb 2024 12:33    TPro  7.5  /  Alb  3.5  /  TBili  0.5  /  DBili  x   /  AST  18  /  ALT  12  /  AlkPhos  56  02-14      ESR: 40  CRP: --  02-14 @ 12:52    Vital Signs Last 24 Hrs  T(C): 36.7 (14 Feb 2024 10:00), Max: 36.7 (14 Feb 2024 10:00)  T(F): 98.1 (14 Feb 2024 10:00), Max: 98.1 (14 Feb 2024 10:00)  HR: 92 (14 Feb 2024 10:00) (92 - 92)  BP: 140/85 (14 Feb 2024 10:00) (140/85 - 140/85)  BP(mean): --  RR: 16 (14 Feb 2024 10:00) (16 - 16)  SpO2: 98% (14 Feb 2024 10:00) (98% - 98%)    Parameters below as of 14 Feb 2024 10:00  Patient On (Oxygen Delivery Method): room air        PHYSICAL EXAM  General: NAD, AA0x3    Lower Extremity Focused(left):  Vasc: 2/4 PT&DP, TG warm to warm, localized edema noted at the site of the wound, (-)erythema   Derm: ~2.4cmx2.3cm wound at the distal aspect of hallux, macerated tissue noted over the wound bed and hyperkeratotic periwound. Post debridement showed granulation tissue and Sero Sang drainage (+)PTB, (+)undermining, (-) malodor  Neuro: Protective sensation diminished   MSK: Muscle strength 5/5 in all compartments     RADIOLOGY  < from: Xray Foot AP + Lateral + Oblique, Left (02.14.24 @ 13:56) >  IMPRESSION:    Suggestion of a soft tissue ulcer along the distal aspect of the first   toe. No definite cortical erosion or periostitis to suggest   osteomyelitis. However, MRI would be a more sensitive test to evaluate   for osteomyelitis.    No acute displaced fracture or dislocation. Osteoarthritic changes seen   throughout the forefoot.    Soft tissue swelling about the visualized foot. Vascular calcifications   noted.    --- End of Report ---    < end of copied text >

## 2024-02-14 NOTE — ED PROVIDER NOTE - OBJECTIVE STATEMENT
47yo male with pmhx of IDDM (not currently on insulin due to insurance issues) presents with generalized abdominal pain and nausea x2 days. Feels fatigued. Denies fever, chest pain, shortness of breath, vomiting, diarrhea, constipation, urinary symptoms, blood in stool. He completed antibiotics (does not know name) for dental infection 2 days ago. He denies recent travel. He has not taken any medication for symptoms at home. 47yo male with pmhx of IDDM (not currently on insulin due to insurance issues) presents with generalized abdominal pain and nausea x2 days. Feels fatigued. Denies fever, chest pain, shortness of breath, vomiting, diarrhea, constipation, urinary symptoms, blood in stool. He completed antibiotics (does not know name) for dental infection 2 days ago. He denies recent travel. He has not taken any medication for symptoms at home.    On repeat evaluation, pt reported non-healing wound to plantar aspect of left great toe. He is followed by Dr. Green, last seen about a year ago. He states that he has been compliant with his wound care regimen at home however he notes that the wound keeps opening back up. Now noticing yellow drainage from site and states nausea began shortly after.

## 2024-02-14 NOTE — H&P ADULT - ASSESSMENT
43 yo male with PMHx of T2DM (non compliant) and R foot OM s/p TMA and skin graft in 2022 presents to ED i/s/o non-healing L hallux ulcer. Seen by podiatry in ED, concern for OM. Admitted for further management.

## 2024-02-14 NOTE — ED ADULT TRIAGE NOTE - CHIEF COMPLAINT QUOTE
Pt c/o abdominal discomfort, nausea- recently prescribed abx for dental infection 2 days ago. Denies cp, sob.

## 2024-02-14 NOTE — ED PROVIDER NOTE - ED STEMI HIDDEN
----- Message from Sulema Rock MD sent at 9/23/2023  8:35 AM CDT -----  A1c 7.3, cholesterol ok, kidney function ok,    hide

## 2024-02-14 NOTE — H&P ADULT - NSHPPHYSICALEXAM_GEN_ALL_CORE
.  VITAL SIGNS:  T(C): 37 (02-14-24 @ 20:24), Max: 37.3 (02-14-24 @ 15:24)  T(F): 98.6 (02-14-24 @ 20:24), Max: 99.1 (02-14-24 @ 15:24)  HR: 82 (02-14-24 @ 20:24) (82 - 92)  BP: 104/68 (02-14-24 @ 20:24) (104/68 - 143/91)  BP(mean): --  RR: 16 (02-14-24 @ 20:24) (16 - 18)  SpO2: 98% (02-14-24 @ 20:24) (98% - 100%)  Wt(kg): --    PHYSICAL EXAM:    Constitutional: resting comfortably in bed; NAD  Head: NC/AT  Eyes: PERRL, EOMI, clear conjunctiva  ENT: no nasal discharge; uvula midline, no oropharyngeal erythema or exudates; MMM  Respiratory: CTA B/L; no W/R/R  Cardiac: +S1/S2; RRR; no M/R/G  Gastrointestinal: soft, ND; mild tenderness in all quadrants; no rebound   Extremities: WWP; RLE wrapped in ace bandage extending up to mid shin   Musculoskeletal: NROM x4; no joint swelling, tenderness or erythema  Neurologic: AAOx3; no focal deficits  Psychiatric: affect and characteristics of appearance, verbalizations, behaviors are appropriate

## 2024-02-14 NOTE — ED ADULT NURSE NOTE - NSICDXPASTMEDICALHX_GEN_ALL_CORE_FT
PAST MEDICAL HISTORY:  Diabetes mellitus     History of cocaine use     Hyperlipidemia, unspecified hyperlipidemia type     Hypertension     Osteomyelitis R foot     No deformity or limitation of movement

## 2024-02-14 NOTE — ED ADULT TRIAGE NOTE - NS ED NURSE BANDS TYPE
HISTORY OF PRESENT ILLNESS  Nic Remy is a 35 y.o. female. Cough   The history is provided by the patient. This is a new problem. Episode onset: 2 weeks ago  The problem has not changed since onset. Associated symptoms include headaches (persistent for the last 2-3 days ). Associated symptoms comments: Chest wall pain from cough   . She has tried acetaminophen for the symptoms. Patient is currently 5 months pregnant    No Known Allergies  Current Outpatient Medications   Medication Sig Dispense Refill    PRENATAL VITAMIN PLUS LOW IRON 27 mg iron- 1 mg tab       fluticasone (FLONASE) 50 mcg/actuation nasal spray 2 Sprays by Both Nostrils route daily. 1 Bottle 0    cyclobenzaprine (FLEXERIL) 10 mg tablet Take 0.5 Tabs by mouth two (2) times daily as needed for Muscle Spasm(s). 30 Tab 0    ibuprofen (MOTRIN) 600 mg tablet Take 1 Tab by mouth every six (6) hours as needed for Pain. 30 Tab 0    loratadine (CLARITIN) 10 mg tablet Take 10 mg by mouth daily.  allergy injection every seven (7) days. No past medical history on file.   Social History     Socioeconomic History    Marital status:      Spouse name: Not on file    Number of children: Not on file    Years of education: Not on file    Highest education level: Not on file   Social Needs    Financial resource strain: Not on file    Food insecurity - worry: Not on file    Food insecurity - inability: Not on file    Transportation needs - medical: Not on file   [facility] needs - non-medical: Not on file   Occupational History    Not on file   Tobacco Use    Smoking status: Never Smoker    Smokeless tobacco: Never Used   Substance and Sexual Activity    Alcohol use: No    Drug use: No    Sexual activity: Yes     Partners: Male     Birth control/protection: None   Other Topics Concern     Service Not Asked    Blood Transfusions No    Caffeine Concern Yes     Comment: rarely    Occupational Exposure Not Asked    Hobby Hazards Not Asked    Sleep Concern No    Stress Concern No    Weight Concern No    Special Diet No    Back Care Not Asked    Exercise No    Bike Helmet Not Asked    Seat Belt Yes    Self-Exams No     Comment: occasionally    Social History Narrative    Not on file     Review of Systems   Constitutional: Positive for chills (yesterday). Negative for fever. HENT: Positive for congestion. Negative for sinus pain and sore throat. Respiratory: Positive for cough. Neurological: Positive for headaches (persistent for the last 2-3 days ). /71 (BP 1 Location: Left arm)   Pulse 76   Temp 98.4 °F (36.9 °C) (Oral)   Resp 16   Ht 5' 5\" (1.651 m)   Wt 220 lb (99.8 kg)   SpO2 98%   BMI 36.61 kg/m²   Physical Exam   Constitutional: She appears well-developed and well-nourished. No distress. HENT:   Head: Normocephalic and atraumatic. Right Ear: Tympanic membrane normal.   Left Ear: Tympanic membrane normal.   Nose: Mucosal edema present. Right sinus exhibits no maxillary sinus tenderness and no frontal sinus tenderness. Left sinus exhibits no maxillary sinus tenderness and no frontal sinus tenderness. Mouth/Throat: Uvula is midline and mucous membranes are normal.   Cobblestoning to post oropharynx   Neck: Normal range of motion. Neck supple. Cardiovascular: Normal rate, regular rhythm and normal heart sounds. Exam reveals no gallop and no friction rub. No murmur heard. Pulmonary/Chest: Effort normal and breath sounds normal. She has no wheezes. She has no rhonchi. She has no rales. Lymphadenopathy:     She has cervical adenopathy (shotty). Results for orders placed or performed in visit on 03/06/19   AMB POC RAPID STREP A   Result Value Ref Range    VALID INTERNAL CONTROL POC Yes     Group A Strep Ag Negative Negative     ASSESSMENT and PLAN    ICD-10-CM ICD-9-CM    1. Acute nasopharyngitis J00 460    2. Generalized headache R51 784.0    3.  Sore throat J02.9 462 AMB POC RAPID STREP A     Informed to speak with ob to inquire if she may take fioricet. If appropriate will send to pharmacy. Symptomatic relief discussed  I have discussed the diagnosis with the patient and the intended plan as seen in the above orders. The patient has received an after-visit summary and questions were answered concerning future plans. I have discussed medication side effects and warnings with the patient as well. Patient agreeable with above plan and verbalizes understanding. Follow-up Disposition:  Return if symptoms worsen or fail to improve. Name band;

## 2024-02-15 ENCOUNTER — TRANSCRIPTION ENCOUNTER (OUTPATIENT)
Age: 49
End: 2024-02-15

## 2024-02-15 DIAGNOSIS — R65.10 SYSTEMIC INFLAMMATORY RESPONSE SYNDROME (SIRS) OF NON-INFECTIOUS ORIGIN WITHOUT ACUTE ORGAN DYSFUNCTION: ICD-10-CM

## 2024-02-15 LAB
GLUCOSE BLDC GLUCOMTR-MCNC: 124 MG/DL — HIGH (ref 70–99)
GLUCOSE BLDC GLUCOMTR-MCNC: 130 MG/DL — HIGH (ref 70–99)
GLUCOSE BLDC GLUCOMTR-MCNC: 135 MG/DL — HIGH (ref 70–99)
GLUCOSE BLDC GLUCOMTR-MCNC: 175 MG/DL — HIGH (ref 70–99)
GLUCOSE BLDC GLUCOMTR-MCNC: 272 MG/DL — HIGH (ref 70–99)
GRAM STN FLD: ABNORMAL
SPECIMEN SOURCE: SIGNIFICANT CHANGE UP
VANCOMYCIN TROUGH SERPL-MCNC: 14.8 UG/ML — SIGNIFICANT CHANGE UP (ref 10–20)

## 2024-02-15 PROCEDURE — 70487 CT MAXILLOFACIAL W/DYE: CPT | Mod: 26

## 2024-02-15 PROCEDURE — 99255 IP/OBS CONSLTJ NEW/EST HI 80: CPT | Mod: GC

## 2024-02-15 PROCEDURE — 99222 1ST HOSP IP/OBS MODERATE 55: CPT | Mod: GC

## 2024-02-15 PROCEDURE — 73720 MRI LWR EXTREMITY W/O&W/DYE: CPT | Mod: 26,LT

## 2024-02-15 PROCEDURE — 99233 SBSQ HOSP IP/OBS HIGH 50: CPT | Mod: GC

## 2024-02-15 RX ORDER — DEXTROSE 50 % IN WATER 50 %
15 SYRINGE (ML) INTRAVENOUS ONCE
Refills: 0 | Status: DISCONTINUED | OUTPATIENT
Start: 2024-02-15 | End: 2024-02-21

## 2024-02-15 RX ORDER — PANTOPRAZOLE SODIUM 20 MG/1
1 TABLET, DELAYED RELEASE ORAL
Qty: 30 | Refills: 1
Start: 2024-02-15 | End: 2024-04-14

## 2024-02-15 RX ORDER — GLUCAGON INJECTION, SOLUTION 0.5 MG/.1ML
1 INJECTION, SOLUTION SUBCUTANEOUS ONCE
Refills: 0 | Status: DISCONTINUED | OUTPATIENT
Start: 2024-02-15 | End: 2024-02-21

## 2024-02-15 RX ORDER — VANCOMYCIN HCL 1 G
1250 VIAL (EA) INTRAVENOUS EVERY 8 HOURS
Refills: 0 | Status: COMPLETED | OUTPATIENT
Start: 2024-02-15 | End: 2024-02-16

## 2024-02-15 RX ORDER — PIPERACILLIN AND TAZOBACTAM 4; .5 G/20ML; G/20ML
4.5 INJECTION, POWDER, LYOPHILIZED, FOR SOLUTION INTRAVENOUS ONCE
Refills: 0 | Status: DISCONTINUED | OUTPATIENT
Start: 2024-02-15 | End: 2024-02-15

## 2024-02-15 RX ORDER — INSULIN LISPRO 100/ML
4 VIAL (ML) SUBCUTANEOUS ONCE
Refills: 0 | Status: COMPLETED | OUTPATIENT
Start: 2024-02-15 | End: 2024-02-15

## 2024-02-15 RX ORDER — INSULIN LISPRO 100/ML
6 VIAL (ML) SUBCUTANEOUS
Refills: 0 | Status: DISCONTINUED | OUTPATIENT
Start: 2024-02-15 | End: 2024-02-20

## 2024-02-15 RX ORDER — PIPERACILLIN AND TAZOBACTAM 4; .5 G/20ML; G/20ML
4.5 INJECTION, POWDER, LYOPHILIZED, FOR SOLUTION INTRAVENOUS EVERY 8 HOURS
Refills: 0 | Status: DISCONTINUED | OUTPATIENT
Start: 2024-02-15 | End: 2024-02-16

## 2024-02-15 RX ORDER — INSULIN LISPRO 100/ML
VIAL (ML) SUBCUTANEOUS
Refills: 0 | Status: DISCONTINUED | OUTPATIENT
Start: 2024-02-15 | End: 2024-02-20

## 2024-02-15 RX ORDER — PANTOPRAZOLE SODIUM 20 MG/1
40 TABLET, DELAYED RELEASE ORAL EVERY 12 HOURS
Refills: 0 | Status: DISCONTINUED | OUTPATIENT
Start: 2024-02-15 | End: 2024-02-21

## 2024-02-15 RX ORDER — SODIUM CHLORIDE 9 MG/ML
1000 INJECTION, SOLUTION INTRAVENOUS
Refills: 0 | Status: DISCONTINUED | OUTPATIENT
Start: 2024-02-15 | End: 2024-02-21

## 2024-02-15 RX ORDER — DEXTROSE 50 % IN WATER 50 %
12.5 SYRINGE (ML) INTRAVENOUS ONCE
Refills: 0 | Status: DISCONTINUED | OUTPATIENT
Start: 2024-02-15 | End: 2024-02-21

## 2024-02-15 RX ORDER — INSULIN GLARGINE 100 [IU]/ML
10 INJECTION, SOLUTION SUBCUTANEOUS AT BEDTIME
Refills: 0 | Status: DISCONTINUED | OUTPATIENT
Start: 2024-02-15 | End: 2024-02-16

## 2024-02-15 RX ORDER — PIPERACILLIN AND TAZOBACTAM 4; .5 G/20ML; G/20ML
4.5 INJECTION, POWDER, LYOPHILIZED, FOR SOLUTION INTRAVENOUS ONCE
Refills: 0 | Status: COMPLETED | OUTPATIENT
Start: 2024-02-15 | End: 2024-02-15

## 2024-02-15 RX ORDER — INSULIN LISPRO 100/ML
4 VIAL (ML) SUBCUTANEOUS
Refills: 0 | Status: DISCONTINUED | OUTPATIENT
Start: 2024-02-15 | End: 2024-02-15

## 2024-02-15 RX ORDER — DEXTROSE 50 % IN WATER 50 %
25 SYRINGE (ML) INTRAVENOUS ONCE
Refills: 0 | Status: DISCONTINUED | OUTPATIENT
Start: 2024-02-15 | End: 2024-02-21

## 2024-02-15 RX ADMIN — Medication 4 UNIT(S): at 02:24

## 2024-02-15 RX ADMIN — Medication 166.67 MILLIGRAM(S): at 12:14

## 2024-02-15 RX ADMIN — INSULIN GLARGINE 10 UNIT(S): 100 INJECTION, SOLUTION SUBCUTANEOUS at 22:30

## 2024-02-15 RX ADMIN — Medication 4 UNIT(S): at 13:47

## 2024-02-15 RX ADMIN — Medication 166.67 MILLIGRAM(S): at 03:54

## 2024-02-15 RX ADMIN — PANTOPRAZOLE SODIUM 40 MILLIGRAM(S): 20 TABLET, DELAYED RELEASE ORAL at 22:30

## 2024-02-15 RX ADMIN — Medication 2: at 10:00

## 2024-02-15 RX ADMIN — PANTOPRAZOLE SODIUM 40 MILLIGRAM(S): 20 TABLET, DELAYED RELEASE ORAL at 10:01

## 2024-02-15 RX ADMIN — PIPERACILLIN AND TAZOBACTAM 25 GRAM(S): 4; .5 INJECTION, POWDER, LYOPHILIZED, FOR SOLUTION INTRAVENOUS at 05:40

## 2024-02-15 RX ADMIN — Medication 166.67 MILLIGRAM(S): at 22:29

## 2024-02-15 RX ADMIN — Medication 6 UNIT(S): at 18:56

## 2024-02-15 RX ADMIN — Medication 4 UNIT(S): at 10:00

## 2024-02-15 RX ADMIN — PIPERACILLIN AND TAZOBACTAM 25 GRAM(S): 4; .5 INJECTION, POWDER, LYOPHILIZED, FOR SOLUTION INTRAVENOUS at 14:00

## 2024-02-15 NOTE — DISCHARGE NOTE PROVIDER - CARE PROVIDER_API CALL
Ami Perez  Endocrinology/Metab/Diabetes  [address] [address]  Phone: [phone]  Fax: [phone]  Follow Up Time: 2 weeks    David Green  Foot and Ankle Surgery  [address] [place] [address], [address] [address]  Phone: [phone]  Fax: [phone]  Follow Up Time: 2 weeks   Ami Perez  Endocrinology/Metab/Diabetes  [address] [address]  Phone: [phone]  Fax: [phone]  Scheduled Appointment: 03/04/2024 01:30 PM    David Green  Foot and Ankle Surgery  [address] [place] [address], [address] [place] [address]  Phone: [phone]  Fax: [phone]  Follow Up Time: 2 weeks   Ami Perez  Endocrinology/Metab/Diabetes  [address] [address]  Phone: [phone]  Fax: [phone]  Scheduled Appointment: 03/04/2024 01:30 PM    David Green  Foot and Ankle Surgery  [address] [place] [address], [address]  [place] [address]  Phone: [phone]  Fax: [phone]  Follow Up Time: 2 weeks    Fredis Armendariz  Vascular Surgery  [address] [address]  Phone: [phone]  Fax: [phone]  Follow Up Time: 1 week   Ami Perez  Endocrinology/Metab/Diabetes  [address] [address]  Phone: [phone]  Fax: [phone]  Scheduled Appointment: 03/04/2024 01:30 PM    David Green  Foot and Ankle Surgery  [address] [place] [address], [address]  [place] [address]  Phone: [phone]  Fax: [phone]  Follow Up Time: 2 weeks    Fredis Armendariz  Vascular Surgery  [address] [address]  Phone: [phone]  Fax: [phone]  Follow Up Time: 1 week    Gibran Arora  Infectious Disease  [address]  [place] [address]  Phone: [phone]  Fax: [phone]  Follow Up Time: 1 week

## 2024-02-15 NOTE — DISCHARGE NOTE PROVIDER - NPI NUMBER (FOR SYSADMIN USE ONLY) :
[[id number] [[id number] [[id number]]

## 2024-02-15 NOTE — DIETITIAN INITIAL EVALUATION ADULT - NSPROEDALEARNPREF_GEN_A_NUR
consistent carbohydrate diet, portion control of carbohydrates, importance of adequate protein and fiber, sugar-free desserts/verbal instruction/written material

## 2024-02-15 NOTE — PATIENT PROFILE ADULT - FALL HARM RISK - HARM RISK INTERVENTIONS

## 2024-02-15 NOTE — DISCHARGE NOTE PROVIDER - PROVIDER TOKENS
PROVIDER:[TOKEN:[[id number]],FOLLOWUP:[2 weeks]],PROVIDER:[TOKEN:[[id number]],FOLLOWUP:[2 weeks]] PROVIDER:[TOKEN:[[id number]],SCHEDULEDAPPT:[03/04/2024],SCHEDULEDAPPTTIME:[01:30 PM]],PROVIDER:[TOKEN:[[id number]],FOLLOWUP:[2 weeks]] PROVIDER:[TOKEN:[[id number]],SCHEDULEDAPPT:[03/04/2024],SCHEDULEDAPPTTIME:[01:30 PM]],PROVIDER:[TOKEN:[[id number]],FOLLOWUP:[2 weeks]],PROVIDER:[TOKEN:[[id number]],FOLLOWUP:[1 week]] PROVIDER:[TOKEN:[[id number]],SCHEDULEDAPPT:[03/04/2024],SCHEDULEDAPPTTIME:[01:30 PM]],PROVIDER:[TOKEN:[[id number]],FOLLOWUP:[2 weeks]],PROVIDER:[TOKEN:[[id number]],FOLLOWUP:[1 week]],PROVIDER:[TOKEN:[[id number]],FOLLOWUP:[1 week]]

## 2024-02-15 NOTE — DIETITIAN INITIAL EVALUATION ADULT - PERTINENT LABORATORY DATA
02-14    137  |  100  |  17  ----------------------------<  152<H>  4.5   |  27  |  0.99    Ca    8.6      14 Feb 2024 12:33    TPro  7.5  /  Alb  3.5  /  TBili  0.5  /  DBili  x   /  AST  18  /  ALT  12  /  AlkPhos  56  02-14  POCT Blood Glucose.: 175 mg/dL (02-15-24 @ 09:18)  A1C with Estimated Average Glucose Result: 10.6 % (02-14-24 @ 12:52)  A1C with Estimated Average Glucose Result: 6.5 % (05-22-23 @ 05:30)

## 2024-02-15 NOTE — PROGRESS NOTE ADULT - ASSESSMENT
45 yo male with PMHx of T2DM (non compliant) and R foot OM s/p TMA and skin graft in 2022 presents to ED i/s/o non-healing L hallux ulcer. Seen by podiatry in ED, concern for OM. Admitted for further management.

## 2024-02-15 NOTE — DIETITIAN NUTRITION RISK NOTIFICATION - ADDITIONAL COMMENTS/DIETITIAN RECOMMENDATIONS
Continue with consistent carbohydrate diet. Provided nutrition education. Consider adding Ensure Max Protein 1x/day (150 kcal, 30 g protein per serving) if PO intake does not improve.

## 2024-02-15 NOTE — CONSULT NOTE ADULT - ASSESSMENT
ASSESSMENT / RECOMMENDATIONS    A1C: 10.6 %  BUN: 17  Creatinine: 0.99  GFR: 94  Weight: 104  BMI: 32.9    # Type 2 diabetes mellitus with hyperglycemia  - Please start Lantus 10u at bedtime  - Please increase Lispro to 6u before meals  - Continue lispro moderate dose sliding scale before meals and at bedtime.  - Patient's fingerstick glucose goal is 100-180 mg/dL.    - Discharge recommendations to be discussed.   - Patient can follow up at discharge with [facility] by calling [phone] to make an appointment.      Case discussed with Dr. Hernandez. Primary team updated.    ASSESSMENT / RECOMMENDATIONS    A1C: 10.6 %  BUN: 17  Creatinine: 0.99  GFR: 94  Weight: 104  BMI: 32.9    # Type 2 diabetes mellitus with hyperglycemia  - Please start Lantus 10u at bedtime  - Please increase Lispro to 6u before meals  - Continue lispro moderate dose sliding scale before meals and at bedtime.  - Patient's fingerstick glucose goal is 100-180 mg/dL.    - recommend vascular consult for LLE  - Discharge recommendations to be discussed.   - Patient can follow up at discharge with [facility] by calling [phone] to make an appointment.      Case discussed with Dr. Hernandez. Primary team updated.

## 2024-02-15 NOTE — DISCHARGE NOTE PROVIDER - HOSPITAL COURSE
#Discharge: do not delete    45 yo male with PMHx of T2DM (non compliant) and R foot OM s/p TMA and skin graft in 2022 presents to ED i/s/o non-healing L hallux ulcer. Seen by podiatry in ED, concern for OM. Admitted for further management.     Problem List/Main Diagnoses:   #Systemic inflammatory response syndrome (SIRS) without organ dysfunction.   ·  Plan: HR 92, WBC 14.31 on admission. Likely i/s/o active LLE infection. Per podiatry documentation, patient underwent debridement and had probe to bone during physical exam. SR 40, CRP 73.1.   - see management for acute OM as below.    #Acute osteomyelitis.   ·  Plan: Pt with hx of R foot ulcer. seen by podiatry in May 2022 and treated for OM and underwent R foot TMA w/ skin graft. Pt presenting now for chronic L hallux foot wound that began in Oct 2023. WBC 14.31, ESR 40, CRP 73.1. Per Podiatry, no concerns for gas or acute fx on imaging however cannot rule out OM. S/p Vanc 1g and Zosyn 3.375mg IV x1 in ED. Received vancomycin and zosyn. Received excisional debridement w/ cultures sent.    #T2DM (type 2 diabetes mellitus).   ·  Plan: Hx of IDT2DM however patient unable to access his insulin. A1c on admission 10.6. Glucose 258. States he takes Metformin and Jardiance. Endocrinology consulted    - f/u with endocrinologist outpatient.    #HTN (hypertension).   ·  Plan: Hx of HTN. Home medications: Losartan 50mg qd   - c/w current management    New medications/therapies: none  New lines/hardware: none  Labs to be followed outpatient: none  Exam to be followed outpatient: none    Discharge plan: discharge to home    Physical Exam Upon Discharge:  General: in no acute distress  Eyes: EOMI intact bilaterally  HENT: Moist mucous membranes  Neck: Trachea midline, supple  Lungs: CTA B/L  Cardiovascular: RRR  Abdomen: Soft, non-tender non-distended; No rebound or guarding  Extremities: WWP, No clubbing, cyanosis or edema  Neurological: Alert and oriented  Skin: Warm and dry. 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 Seen by podiatry in ED, concern for OM. Admitted for further management.      #Systemic inflammatory response syndrome (SIRS) without organ dysfunction.   ·  Plan: HR 92, WBC 14.31 on admission. Likely i/s/o active LLE infection. Per podiatry documentation, patient underwent debridement and had probe to bone during physical exam. SR 40, CRP 73.1.   - see management for acute OM as below.    #Acute osteomyelitis.   ·  Plan: Pt with hx of R foot ulcer. seen by podiatry in May 2022 and treated for OM and underwent R foot TMA w/ skin graft. Pt presenting now for chronic L hallux foot wound that began in Oct 2023. WBC 14.31, ESR 40, CRP 73.1. Per Podiatry, no concerns for gas or acute fx on imaging however cannot rule out OM. S/p Vanc 1g and Zosyn 3.375mg IV x1 in ED. Received vancomycin and zosyn. Received excisional debridement w/ cultures sent. Bone biopsy negative    - f/u with infectious disease outpatient.    #T2DM (type 2 diabetes mellitus).   ·  Plan: Hx of IDT2DM however patient unable to access his insulin. A1c on admission 10.6. Glucose 258. States he takes Metformin and Jardiance. Endocrinology consulted    - f/u with endocrinologist outpatient.    #HTN (hypertension).   ·  Plan: Hx of HTN. Home medications: Losartan 50mg qd   - c/w current management    New medications/therapies: none  New lines/hardware: none  Labs to be followed outpatient: none  Exam to be followed outpatient: none    Discharge plan: discharge to home    Physical Exam Upon Discharge:  General: in no acute distress  Eyes: EOMI intact bilaterally  HENT: Moist mucous membranes  Neck: Trachea midline, supple  Lungs: CTA B/L  Cardiovascular: RRR  Abdomen: Soft, non-tender non-distended; No rebound or guarding  Extremities: WWP, No clubbing, cyanosis or edema  Neurological: Alert and oriented  Skin: Warm and dry. 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 Treated w/ IV Abx of Vancomycin, CTX 2g, and Flagyl 500mg. Patient was medically optimized, stable and ready for discharge. Plan of care and return precautions were discussed with the patient who verbally stated understanding     #Acute osteomyelitis.   Found to have acute OM of Lt hallux. MRI: soft tissue tissue ulcer along the distal aspect of the first toe with findings most consistent with osteomyelitis of the first distal phalanx.   Podiatry saw, appreciated probe to bone, and performed a bedside debridement. Patient treated w/ CTX 2g q24, Flagyl 500mg q8 ->q12 and vancomycin 1g. Patient was recommended to STEVEN for IV Abx however given [facility] deductible insurance, patient would prefer to finish up treatment with PO Abx. Informed patient that PO antibiotics are suboptimal and patient understands the risk.   PLAN:  - Abx????  - f/u with infectious disease outpatient.  - f/u podiatry outpatient.   - Discharge dressing instructions: Discharge dressing instructions: Cleanse wound with normal saline daily, pat dry with sterile guaze, apply saline soaked gauze to wound base, cover with dry sterile gauze, ABD pad, wrap with Kerlix and light ACE bandage    #T2DM (type 2 diabetes mellitus).   Hx of IDT2DM however patient unable to access his insulin. A1c on admission 10.6. Glucose 258. States he takes Metformin and Jardiance. Endocrinology consulted for glucose management.  PLAN:  - Endocrine recs???  - f/u with endocrinologist outpatient.    #(Resolved) Sepsis 2/2 Cellulitis and OM of Lt hallux.  HR 92, WBC 14.31 on admission. Likely i/s/o active LLE infection.  - see management for acute OM as below.    #HTN (hypertension).   Hx of HTN. Home medications: Losartan 50mg qd   - c/w home meds    New medications/therapies: Abx??? Endo recs???  New lines/hardware: none  Labs to be followed outpatient: none  Exam to be followed outpatient: Podiatry, Endocrinology, ID    Discharge plan: discharge to home    Physical Exam Upon Discharge:  General: in no acute distress  Eyes: EOMI intact bilaterally  HENT: Moist mucous membranes  Neck: Trachea midline, supple  Lungs: CTA B/L  Cardiovascular: RRR  Abdomen: Soft, non-tender non-distended; No rebound or guarding  Extremities: Lt hallux dry wound w/o drainage, erythema or warmth.   Neurological: Alert and oriented x3   Skin: Warm and dry. 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 Treated w/ IV Abx of Vancomycin, CTX 2g, and Flagyl 500mg. Patient was medically optimized, stable and ready for discharge. Plan of care and return precautions were discussed with the patient who verbally stated understanding     #Acute osteomyelitis.   Found to have acute OM of Lt hallux. MRI: soft tissue tissue ulcer along the distal aspect of the first toe with findings most consistent with osteomyelitis of the first distal phalanx.   Podiatry saw, appreciated probe to bone, and performed a bedside debridement. Patient treated w/ CTX 2g q24, Flagyl 500mg q8 ->q12 and vancomycin 1g. Patient was recommended to STEVEN for IV Abx however given [facility] deductible insurance, patient would prefer to finish up treatment with PO Abx. Informed patient that PO antibiotics are suboptimal and patient understands the risk.   PLAN:  - Bactrim DS 2tabs BID [id number]  - f/u with infectious disease outpatient.  - f/u podiatry outpatient.   - Discharge dressing instructions: Discharge dressing instructions: Cleanse wound with normal saline daily, pat dry with sterile guaze, apply saline soaked gauze to wound base, cover with dry sterile gauze, ABD pad, wrap with Kerlix and light ACE bandage    #T2DM (type 2 diabetes mellitus).   Hx of IDT2DM however patient unable to access his insulin. A1c on admission 10.6. Glucose 258. States he takes Metformin and Jardiance. Endocrinology consulted for glucose management.  PLAN:  - c/w Metformin 750mg BID and jardiance 25mg qd.   - Start toujeo 28u qhs  - Start lispro 12u w/ meals  - restart Ozempic 0.25mg qw.  - f/u with endocrinologist outpatient.    #(Resolved) Sepsis 2/2 Cellulitis and OM of Lt hallux.  HR 92, WBC 14.31 on admission. Likely i/s/o active LLE infection.  - see management for acute OM as below.    #HTN (hypertension).   Hx of HTN. Home medications: Losartan 50mg qd   - c/w home meds    New medications/therapies: Bactrim DS 2 tab BID [id number]  New lines/hardware: none  Labs to be followed outpatient: none  Exam to be followed outpatient: Podiatry, Endocrinology, ID    Discharge plan: discharge to home    Physical Exam Upon Discharge:  General: in no acute distress  Eyes: EOMI intact bilaterally  HENT: Moist mucous membranes  Neck: Trachea midline, supple  Lungs: CTA B/L  Cardiovascular: RRR  Abdomen: Soft, non-tender non-distended; No rebound or guarding  Extremities: Lt hallux dry wound w/o drainage, erythema or warmth.   Neurological: Alert and oriented x3   Skin: Warm and dry. No obvious rash

## 2024-02-15 NOTE — CONSULT NOTE ADULT - ATTENDING COMMENTS
Pt seen on rounds this afternoon.  48-yo man with type 2 DM, previous TMA R foot, now presenting with ulceration on his L first toe.    Podiatry service is following pt, and MRI pending to evaluate for OM  DM was diagnosed in the 1990s.  Pt's home regimen is a combination of metformin, Jardiance and Toujeo (20 units qhs).  Had previously been on Ozempic, but has been unable to get this for the past 3 months  Reports fingersticks at home of "100-150" but monitors only once a day and A1c level is markedly elevated at 10.6%  Diet is mostly takeout food, a lot of it fried  Glucoses 272/175/135 since admission--on sliding scale only  Unable to examine the wound due to dressing.  I initially thought that I felt a DP pulse at the instep of the foot, but then could not on repeat exam.    R foot s/p TMA  Sensation to touch over the exposed area of the left foot, as well as the distal R foot, is significantly decreased  --Would begin standing insulin with Lantus 10 units hs/premeal lispro 6 units  --Podiatry notes a "2/4" DP pulse.  Given that the wound is on the most critical toe, and given his existing limitations from the TMA, would suggest that Vascular Surgery evaluate the pt.  (Even if the toe is not frankly ischemic, need to be sure circulation is adequate for optimal antibiotic delivery to the area if he does have OM)

## 2024-02-15 NOTE — PROGRESS NOTE ADULT - ASSESSMENT
Podiatry was consulted for a L hallux wound that began back in October 2023. At the time of consult WBC 14.31 ESR 40 CRP 73.1. Imaging showed no obvious signs of OM, GAS, or acute fx. Based on the clinical presentation OM cannot be r/o at this time. Pt can benefit from being admitted for further OM workup and IV abx. Discussed possible treatment options witht the pt and he stated that he would like to try long term IV abx as a treatment option before considering podiatric surgical intervention.       Plan:   F/u L foot MRI to r/o OM   C/w broad spectrum IV abx   Reviewed LLE venous duplex ultrasound read - No DVT    F/u ED cx taken post debridement  Wound care: Area wrapped with WTD dressing, gauze, and kerlix   L foot heel WBAT, R foot WBAT     Plan discussed with attending, podiatry will follow

## 2024-02-15 NOTE — DIETITIAN INITIAL EVALUATION ADULT - OTHER INFO
47yo male with PMHx of cocaine use disorder occasional alcohol use, HTN, HLD, DM, R foot OM s/p R partial 2nd and full 4th-5th toe amputations and non-healing plantar L great toe ulcer presents to ED i/s/o abdominal pain and nausea for the past two days with associated fatigue. Pt stating he recently completed antibiotics (name unknown) for dental infection 2 days prior. Pt has been completing wound care for foot ulcer but states his wound has been intermittent opening and he noticed yellow drainage from the site. Pt stating he has been unable to gain access to insulin so therefore he is only taking Metformin and Januvia. Pt stating that he has been having some abdominal pain for the past day which is unspecified however no emesis, nausea or vomiting noted. Pt has been eating less i/s/o tooth pain and he believes he has more abdominal pain now that he began eating.    Patient seen at bedside for nutrition assessment. Current diet order: consistent carbohydrates. No known food allergies. Reports he had a tooth infection that caused some difficulty chewing but has resolved now. Reports fair appetite today. Had not received breakfast tray yet. Reports ~2 weeks of poor PO intake related to having a cold, tooth infection, then abdominal pain and nausea. Reports he is still experiencing the nausea. Provided nutrition education discussing consistent carbohydrate diet, portion control of carbohydrates, importance of adequate protein and fiber, sugar-free desserts. Provided handout "Carbohydrate counting for people with DM." Reports that he has had some trouble obtaining his insulin recently. Dosing weight: 229 pounds, BMI 32.9, reports UBW of 220 pounds. Feels as though he has lost some weight recently due to decreased PO intake, but unable to quantify. No pressure injuries documented. +2 edema to L foot. Labs: blood sugar 152-272 x24 hours, HgbA1c 10.6%. Meds: antibiotics, insulin, protonix. Observed with no overt signs and symptoms of muscle or fat wasting. Based on ASPEN guidelines, pt meets criteria for moderate malnutrition. No cultural, ethnic, Anglican food preferences reported. See nutrition recommendations below.

## 2024-02-15 NOTE — PROGRESS NOTE ADULT - ATTENDING COMMENTS
Pt is 47 yo man with poorly controlled DM, left foot infection of the fallax, comes with complains of GI upset after taking NSAIDS for dental pain. On PE noted to have evidence of dental abscess, soft nontender abdomen and left toe with purulent discharge through the ulcer. On labs pt is noted to have WBC of 14, and ESR 40, .   ---pt recommended for MRI foot by Podiatry team. Meanwhile pending results of the wound culture and keeping pt on broad spectrum antibiotics  ---asking for OMFS team to provide recs on the need to proceed with dental imaging in inpatient setting  ---for DM pt is on sliding scale insulin for now

## 2024-02-15 NOTE — CONSULT NOTE ADULT - SUBJECTIVE AND OBJECTIVE BOX
HISTORY OF PRESENT ILLNESS  ARIEL MOURA is a 48y Male with a past medical history of DM, hx of R foot OM s/p TMA in 2022 now presenting for L foot ulcer. Pt on IV antibiotics, pending MRI to r/o OM.    Endocrinology has been consulted for Diabetes Management.    DIABETES HISTORY  - Diagnosis in late 1990s  - Current Therapy: Jardiance 25mg PO qD, Metformin 750mg PO BID, Toujeo 20u once daily. previously was on ozempic, discontinued ~3 months ago as pharmacy has been out of it  - Home glucose readings: 100-150s  - Diet: primarily fast food   - Diabetes managed outpatient by: Dr. Eduardo    ALLERGIES  No Known Allergies      CURRENT MEDICATIONS  dextrose 5%. 1000 milliLiter(s) IV Continuous <Continuous>  dextrose 5%. 1000 milliLiter(s) IV Continuous <Continuous>  dextrose 50% Injectable 12.5 Gram(s) IV Push once  dextrose 50% Injectable 25 Gram(s) IV Push once  dextrose 50% Injectable 25 Gram(s) IV Push once  dextrose Oral Gel 15 Gram(s) Oral once PRN  glucagon  Injectable 1 milliGRAM(s) IntraMuscular once  insulin glargine Injectable (LANTUS) 10 Unit(s) SubCutaneous at bedtime  insulin lispro (ADMELOG) corrective regimen sliding scale   SubCutaneous three times a day before meals  insulin lispro Injectable (ADMELOG) 6 Unit(s) SubCutaneous three times a day before meals  pantoprazole    Tablet 40 milliGRAM(s) Oral every 12 hours  piperacillin/tazobactam IVPB.. 4.5 Gram(s) IV Intermittent every 8 hours      REVIEW OF SYSTEMS  Constitutional:  Negative fever, chills  Eyes:  Negative blurry vision or double vision.  Cardiovascular:  Negative for chest pain or palpitations.  Respiratory:  Negative for cough, wheezing, or shortness of breath.   Gastrointestinal:  +abdominal cramping +nausea, No vomiting diarrhea, constipation  Genitourinary:  Negative frequency, urgency or dysuria.  Neurologic:  No headache, confusion, dizziness, lightheadedness.    PHYSICAL EXAM  Vital Signs Last 24 Hrs  T(C): 36.6 (15 Feb 2024 15:18), Max: 37.1 (15 Feb 2024 05:59)  T(F): 97.8 (15 Feb 2024 15:18), Max: 98.8 (15 Feb 2024 05:59)  HR: 84 (15 Feb 2024 15:18) (75 - 84)  BP: 159/94 (15 Feb 2024 15:18) (104/68 - 159/94)  BP(mean): 90 (15 Feb 2024 00:08) (90 - 90)  RR: 16 (15 Feb 2024 15:18) (16 - 17)  SpO2: 98% (15 Feb 2024 15:18) (96% - 98%)    Parameters below as of 15 Feb 2024 15:18  Patient On (Oxygen Delivery Method): room air    Constitutional: Awake, alert, in no acute distress.   HEENT: Normocephalic, atraumatic.   Neck: supple  Respiratory: Lungs clear to ausculation bilaterally.   Cardiovascular: regular rhythm, normal S1 and S2, no audible murmurs.   GI: soft, non-tender, non-distended, bowel sounds present, no masses appreciated.  Extremities: No lower extremity edema. LLE wrapped in kerlix dressing. difficult to palpate pulses, b/l LE warm    Psychiatric: AAO x 3. Normal affect/mood.     LABS  CBC - WBC/HGB/HTC/PLT: 14.31/14.6/44.2/427 (02-14-24)  BMP: Na/K/Cl/Bicarb/BUN/Cr/Gluc: 137/4.5/100/27/17/0.99/152 (02-14-24)  Anion Gap: 10 (02-14-24)  eGFR: 94 (02-14-24)  Calcium: 8.6 (02-14-24)  Phosphorus: -- (02-14-24)  Magnesium: -- (02-14-24)  LFT - Alb/Tprot/Tbili/Dbili/AlkPhos/ALT/AST: 3.5/--/0.5/--/56/12/18 (02-14-24)    Thyroid Stimulating Hormone, Serum: 0.397 (05-22-23)    CBC - WBC/HGB/HTC/PLT: 14.31/14.6/44.2/427 (02-14-24)BMP: Na/K/Cl/Bicarb/BUN/Cr/Gluc: 137/4.5/100/27/17/0.99/152 (02-14-24)  Anion Gap: 10 (02-14-24)  eGFR: 94 (02-14-24)  Calcium: 8.6 (02-14-24)  Phosphorus: -- (02-14-24)  Magnesium: -- (02-14-24)    CAPILLARY BLOOD GLUCOSE & INSULIN RECEIVED  180 mg/dL (02-14 @ 10:39)  272 mg/dL (02-15 @ 01:49)  175 mg/dL (02-15 @ 09:18)  135 mg/dL (02-15 @ 13:18)        02-15-24 @ 07:01  -  02-15-24 @ 17:15  --------------------------------------------------------  IN: 350 mL / OUT: 0 mL / NET: 350 mL

## 2024-02-15 NOTE — DIETITIAN INITIAL EVALUATION ADULT - OTHER CALCULATIONS
Based on Standards of Care pt above % IBW (138%) thus ideal body weight used for all calculations. Needs adjusted for age and malnutrition. Fluid recs per team.

## 2024-02-15 NOTE — DISCHARGE NOTE PROVIDER - ATTENDING DISCHARGE PHYSICAL EXAMINATION:
I have read and agree with the resident Discharge Note above.  Patient seen and discussed with resident team on the day of discharge.     Briefly, 43 yo M with Hx of T2DM (non compliant) and R foot OM s/p TMA and skin graft in 2022 presents with sepsis due to non-healing L hallux ulcer found to have OM    #Sepsis - POA due to tachycardia, WBC elevation and source LLE OM  #LLE OM    - podiatry following and wound cxs with MRSA, S.anginosus, S.intermedius, S.cohnii, C.aurimucosum, Prevotella disiens and anaerococcus spp  - rec for 6 weeks of Vanc, CTX and PO flagyl for OM but patient cannot afford copay and refusing STEVEN for IV abx so DC on suboptimal PO regimen Bactrim 2 DS tabs PO q12h and Augmentin 875-125mg PO q12h. Please provide 2 week supply upon discharge. Ultimate duration in 6 weeks (EOT 3/27).  Patient deferring surgical treatment    #DM, poorly controlled  - A1c 10.6  - endo following and DM educator consulted  - f/u outpatient Toujeo 28 units at bedtime. Start lispro 12 units with meals. Continue metformin 750mg BID and Jardiance 25mg daily. Restart Ozempic 0.25mg weekly. Continue freestyle Leo. (Ozempic with high copay due to high deductible, but pt able to pay thru [employer] at work and [facility] can order for patient. He was having difficulty obtaining from retail pharmacy due to supply issues.)    Attending exam on day of discharge:   Gen: NAD, resting comfortably in bed  HEENT: NCAT, MMM  Neck: supple, trachea at midline  CV: RRR, no m/g/r appreciated  Pulm: CTA B, no w/r/r, normal work of breathing  Abd: +BS, soft, NTND  : deferred  Ext: WWP, no c/c/e, LLE wrapped with dressing c/d/i  MSK: 5/5 strength, normal range of motion  Neuro: AOx3, no change from baseline  Psych: pleasant, conversant and appropriate    I was physically present for the evaluation and management services provided. I agree with the included history, physical, and plan which I reviewed and edited where appropriate. I spent > 30 minutes with the patient on direct patient care and discharge planning with more than 50% of the visit spent on counseling and/or coordination of care.     Danilo Genao  [phone]

## 2024-02-15 NOTE — PROGRESS NOTE ADULT - SUBJECTIVE AND OBJECTIVE BOX
Patient is a 48y old  Male who presents with a chief complaint of osteomyelitis (15 Feb 2024 06:15)      INTERVAL HPI/ OVERNIGHT EVENTS Pt was seen bedside with chief present. Pt was resting comfortably in bed. Dressings were found to be clean, dry, and intact. He notes new sharp pain in the area of his L TMA site that began minutes before AM rounds. No other pedal complaints at this time.       LABS                        14.6   14.31 )-----------( 427      ( 14 Feb 2024 10:43 )             44.2     02-14    137  |  100  |  17  ----------------------------<  152<H>  4.5   |  27  |  0.99    Ca    8.6      14 Feb 2024 12:33    TPro  7.5  /  Alb  3.5  /  TBili  0.5  /  DBili  x   /  AST  18  /  ALT  12  /  AlkPhos  56  02-14      ESR: 40  CRP: --  02-14 @ 12:52    ICU Vital Signs Last 24 Hrs  T(C): 37.1 (15 Feb 2024 05:59), Max: 37.3 (14 Feb 2024 15:24)  T(F): 98.8 (15 Feb 2024 05:59), Max: 99.1 (14 Feb 2024 15:24)  HR: 75 (15 Feb 2024 05:59) (75 - 92)  BP: 145/78 (15 Feb 2024 05:59) (104/68 - 145/78)  BP(mean): 90 (15 Feb 2024 00:08) (90 - 90)  ABP: --  ABP(mean): --  RR: 17 (15 Feb 2024 05:59) (16 - 18)  SpO2: 96% (15 Feb 2024 05:59) (96% - 100%)    O2 Parameters below as of 14 Feb 2024 20:24  Patient On (Oxygen Delivery Method): room air    RADIOLOGY  < from: US Duplex Venous Lower Ext Complete, Bilateral (02.14.24 @ 22:22) >    TECHNIQUE: Duplex sonography of the BILATERAL LOWER extremity veins with   color and spectral Doppler, with and without compression.    FINDINGS:    RIGHT:  Normal compressibility of the RIGHT common femoral, femoral and popliteal   veins.  Doppler examination shows normal spontaneous and phasic flow.  No RIGHT calf vein thrombosis is detected.    LEFT:  Normal compressibility of the LEFT common femoral, femoral and popliteal   veins.  Doppler examination shows normal spontaneous and phasic flow.  No LEFT calf vein thrombosis is detected.    IMPRESSION:  No evidence of deep venous thrombosis in either lower extremity.    < end of copied text >    < from: Xray Foot AP + Lateral + Oblique, Left (02.14.24 @ 13:56) >  TECHNIQUE: 3 views of the left foot.    COMPARISON: Bilateral foot radiographs dated 2/1/2022    IMPRESSION:    Suggestion of a soft tissue ulcer along the distal aspect of the first   toe. No definite cortical erosion or periostitis to suggest   osteomyelitis. However, MRI would be a more sensitive test to evaluate   for osteomyelitis.    No acute displaced fracture or dislocation. Osteoarthritic changes seen   throughout the forefoot.    Soft tissue swelling about the visualized foot. Vascular calcifications   noted.    --- End of Report ---    < end of copied text >    MICROBIOLOGY  Culture - Other (02.14.24 @ 14:38)    Gram Stain:   Moderate Gram positive cocci in pairs  Few Gram Negative Rods  Rare White blood cells   Specimen Source: Wound Wound - left foot deep wound      PHYSICAL EXAM  Lower Extremity Focused  Vasc: 2/4 PT&DP, TG warm to warm, localized edema noted at the site of the wound, (-)erythema   Derm: ~2.4cmx2.3cm wound at the distal aspect of hallux, macerated tissue noted over the wound bed and hyperkeratotic periwound. Post debridement showed granulation tissue and Sero Sang drainage (+)PTB - noticeable fibrotic tissue covering deep would, (+)undermining, (-) malodor  Neuro: Protective sensation diminished   MSK: Muscle strength 5/5 in all compartments

## 2024-02-15 NOTE — PROGRESS NOTE ADULT - SUBJECTIVE AND OBJECTIVE BOX
*****INCOMPLETE NOTE*****    INTERVAL HPI/OVERNIGHT EVENTS:    SUBJECTIVE: Patient seen and examined at bedside, resting comfortably in bed, and does not appear to be in any acute distress. Patient states  Patient denies any recent fevers, chills, nausea, vomiting, headache, acute sob, chest pain, abdominal pain, genitourinary sx, extremity pain or swelling.     ANTIBIOTICS/RELEVANT:    MEDICATIONS  (STANDING):  dextrose 5%. 1000 milliLiter(s) (100 mL/Hr) IV Continuous <Continuous>  dextrose 5%. 1000 milliLiter(s) (50 mL/Hr) IV Continuous <Continuous>  dextrose 50% Injectable 25 Gram(s) IV Push once  dextrose 50% Injectable 25 Gram(s) IV Push once  dextrose 50% Injectable 12.5 Gram(s) IV Push once  glucagon  Injectable 1 milliGRAM(s) IntraMuscular once  insulin lispro (ADMELOG) corrective regimen sliding scale   SubCutaneous three times a day before meals  insulin lispro Injectable (ADMELOG) 4 Unit(s) SubCutaneous three times a day before meals  piperacillin/tazobactam IVPB.. 4.5 Gram(s) IV Intermittent every 8 hours  vancomycin  IVPB 1250 milliGRAM(s) IV Intermittent every 8 hours    MEDICATIONS  (PRN):  dextrose Oral Gel 15 Gram(s) Oral once PRN Blood Glucose LESS THAN 70 milliGRAM(s)/deciliter      Vital Signs Last 24 Hrs  T(C): 37.1 (15 Feb 2024 05:59), Max: 37.3 (14 Feb 2024 15:24)  T(F): 98.8 (15 Feb 2024 05:59), Max: 99.1 (14 Feb 2024 15:24)  HR: 75 (15 Feb 2024 05:59) (75 - 92)  BP: 145/78 (15 Feb 2024 05:59) (104/68 - 145/78)  BP(mean): 90 (15 Feb 2024 00:08) (90 - 90)  RR: 17 (15 Feb 2024 05:59) (16 - 18)  SpO2: 96% (15 Feb 2024 05:59) (96% - 100%)    Parameters below as of 14 Feb 2024 20:24  Patient On (Oxygen Delivery Method): room air        PHYSICAL EXAM:  General: in no acute distress  Eyes: EOMI intact bilaterally. Anicteric sclerae, moist conjunctivae  HENT: Moist mucous membranes  Neck: Trachea midline, supple  Lungs: CTA B/L. No wheezes, rales, or rhonchi  Cardiovascular: RRR. No murmurs, rubs, or gallops  Abdomen: Soft, non-tender non-distended; No rebound or guarding  Extremities: WWP, No clubbing, cyanosis or edema  Neurological: Alert and oriented  Skin: Warm and dry. No obvious rash     LABS:                        14.6   14.31 )-----------( 427      ( 14 Feb 2024 10:43 )             44.2     02-14    137  |  100  |  17  ----------------------------<  152<H>  4.5   |  27  |  0.99    Ca    8.6      14 Feb 2024 12:33    TPro  7.5  /  Alb  3.5  /  TBili  0.5  /  DBili  x   /  AST  18  /  ALT  12  /  AlkPhos  56  02-14      Urinalysis Basic - ( 14 Feb 2024 12:33 )    Color: x / Appearance: x / SG: x / pH: x  Gluc: 152 mg/dL / Ketone: x  / Bili: x / Urobili: x   Blood: x / Protein: x / Nitrite: x   Leuk Esterase: x / RBC: x / WBC x   Sq Epi: x / Non Sq Epi: x / Bacteria: x        MICROBIOLOGY:    RADIOLOGY & ADDITIONAL STUDIES: INTERVAL HPI/OVERNIGHT EVENTS: alireza    SUBJECTIVE: Patient seen and examined at bedside, resting comfortably in bed, and does not appear to be in any acute distress. Patient states he has jaw pain and abdominal pain. Denies any nausea or vomiting at this time.    MEDICATIONS  (STANDING):  dextrose 5%. 1000 milliLiter(s) (100 mL/Hr) IV Continuous <Continuous>  dextrose 5%. 1000 milliLiter(s) (50 mL/Hr) IV Continuous <Continuous>  dextrose 50% Injectable 25 Gram(s) IV Push once  dextrose 50% Injectable 25 Gram(s) IV Push once  dextrose 50% Injectable 12.5 Gram(s) IV Push once  glucagon  Injectable 1 milliGRAM(s) IntraMuscular once  insulin lispro (ADMELOG) corrective regimen sliding scale   SubCutaneous three times a day before meals  insulin lispro Injectable (ADMELOG) 4 Unit(s) SubCutaneous three times a day before meals  piperacillin/tazobactam IVPB.. 4.5 Gram(s) IV Intermittent every 8 hours  vancomycin  IVPB 1250 milliGRAM(s) IV Intermittent every 8 hours    MEDICATIONS  (PRN):  dextrose Oral Gel 15 Gram(s) Oral once PRN Blood Glucose LESS THAN 70 milliGRAM(s)/deciliter    Vital Signs Last 24 Hrs  T(C): 37.1 (15 Feb 2024 05:59), Max: 37.3 (14 Feb 2024 15:24)  T(F): 98.8 (15 Feb 2024 05:59), Max: 99.1 (14 Feb 2024 15:24)  HR: 75 (15 Feb 2024 05:59) (75 - 92)  BP: 145/78 (15 Feb 2024 05:59) (104/68 - 145/78)  BP(mean): 90 (15 Feb 2024 00:08) (90 - 90)  RR: 17 (15 Feb 2024 05:59) (16 - 18)  SpO2: 96% (15 Feb 2024 05:59) (96% - 100%)    Parameters below as of 14 Feb 2024 20:24  Patient On (Oxygen Delivery Method): room air        PHYSICAL EXAM:  General: in no acute distress  Eyes: EOMI intact bilaterally  HENT: Moist mucous membranes  Neck: Trachea midline, supple  Lungs: CTA B/L  Cardiovascular: RRR  Abdomen: Soft, non-tender non-distended; No rebound or guarding  Extremities: WWP, No clubbing, cyanosis or edema  Neurological: Alert and oriented  Skin: Warm and dry. No obvious rash     LABS:                        14.6   14.31 )-----------( 427      ( 14 Feb 2024 10:43 )             44.2     02-14    137  |  100  |  17  ----------------------------<  152<H>  4.5   |  27  |  0.99    Ca    8.6      14 Feb 2024 12:33    TPro  7.5  /  Alb  3.5  /  TBili  0.5  /  DBili  x   /  AST  18  /  ALT  12  /  AlkPhos  56  02-14      Urinalysis Basic - ( 14 Feb 2024 12:33 )    Color: x / Appearance: x / SG: x / pH: x  Gluc: 152 mg/dL / Ketone: x  / Bili: x / Urobili: x   Blood: x / Protein: x / Nitrite: x   Leuk Esterase: x / RBC: x / WBC x   Sq Epi: x / Non Sq Epi: x / Bacteria: x        MICROBIOLOGY:    RADIOLOGY & ADDITIONAL STUDIES: INTERVAL HPI/OVERNIGHT EVENTS: alireza    SUBJECTIVE: Patient seen and examined at bedside, resting comfortably in bed, and does not appear to be in any acute distress. Patient states he has jaw pain and abdominal pain. Denies any nausea or vomiting at this time.    MEDICATIONS  (STANDING):  dextrose 5%. 1000 milliLiter(s) (100 mL/Hr) IV Continuous <Continuous>  dextrose 5%. 1000 milliLiter(s) (50 mL/Hr) IV Continuous <Continuous>  dextrose 50% Injectable 25 Gram(s) IV Push once  dextrose 50% Injectable 25 Gram(s) IV Push once  dextrose 50% Injectable 12.5 Gram(s) IV Push once  glucagon  Injectable 1 milliGRAM(s) IntraMuscular once  insulin lispro (ADMELOG) corrective regimen sliding scale   SubCutaneous three times a day before meals  insulin lispro Injectable (ADMELOG) 4 Unit(s) SubCutaneous three times a day before meals  piperacillin/tazobactam IVPB.. 4.5 Gram(s) IV Intermittent every 8 hours  vancomycin  IVPB 1250 milliGRAM(s) IV Intermittent every 8 hours    MEDICATIONS  (PRN):  dextrose Oral Gel 15 Gram(s) Oral once PRN Blood Glucose LESS THAN 70 milliGRAM(s)/deciliter    Vital Signs Last 24 Hrs  T(C): 37.1 (15 Feb 2024 05:59), Max: 37.3 (14 Feb 2024 15:24)  T(F): 98.8 (15 Feb 2024 05:59), Max: 99.1 (14 Feb 2024 15:24)  HR: 75 (15 Feb 2024 05:59) (75 - 92)  BP: 145/78 (15 Feb 2024 05:59) (104/68 - 145/78)  BP(mean): 90 (15 Feb 2024 00:08) (90 - 90)  RR: 17 (15 Feb 2024 05:59) (16 - 18)  SpO2: 96% (15 Feb 2024 05:59) (96% - 100%)    Parameters below as of 14 Feb 2024 20:24  Patient On (Oxygen Delivery Method): room air        PHYSICAL EXAM:  General: in no acute distress  Eyes: EOMI intact bilaterally  HENT: Moist mucous membranes, poor dentition, cracked tooth, no drainage  Neck: Trachea midline, supple  Lungs: CTA B/L  Cardiovascular: RRR  Abdomen: Soft, non-tender non-distended; No rebound or guarding  Extremities: WWP, left hallux swollen with wound on anterior aspect with serosanguinous drainage.  Neurological: Alert and oriented  Skin: Warm and dry. No obvious rash     LABS:                        14.6   14.31 )-----------( 427      ( 14 Feb 2024 10:43 )             44.2     02-14    137  |  100  |  17  ----------------------------<  152<H>  4.5   |  27  |  0.99    Ca    8.6      14 Feb 2024 12:33    TPro  7.5  /  Alb  3.5  /  TBili  0.5  /  DBili  x   /  AST  18  /  ALT  12  /  AlkPhos  56  02-14      Urinalysis Basic - ( 14 Feb 2024 12:33 )    Color: x / Appearance: x / SG: x / pH: x  Gluc: 152 mg/dL / Ketone: x  / Bili: x / Urobili: x   Blood: x / Protein: x / Nitrite: x   Leuk Esterase: x / RBC: x / WBC x   Sq Epi: x / Non Sq Epi: x / Bacteria: x        MICROBIOLOGY:    RADIOLOGY & ADDITIONAL STUDIES:

## 2024-02-15 NOTE — DIETITIAN INITIAL EVALUATION ADULT - ADD RECOMMEND
1. Continue with consistent carbohydrate diet. Provided nutrition education. Consider adding Ensure Max Protein 1x/day (150 kcal, 30 g protein per serving) if PO intake does not improve.  2. Encourage pt to meet nutritional needs as able   3. Monitor labs: electrolytes, cmp  4. Monitor weights   5. Pain and bowel regimen per team   6. Will continue to assess/honor food preferences as able  7. Monitor adherence to diet education

## 2024-02-15 NOTE — DIETITIAN INITIAL EVALUATION ADULT - PERTINENT MEDS FT
MEDICATIONS  (STANDING):  dextrose 5%. 1000 milliLiter(s) (100 mL/Hr) IV Continuous <Continuous>  dextrose 5%. 1000 milliLiter(s) (50 mL/Hr) IV Continuous <Continuous>  dextrose 50% Injectable 12.5 Gram(s) IV Push once  dextrose 50% Injectable 25 Gram(s) IV Push once  dextrose 50% Injectable 25 Gram(s) IV Push once  glucagon  Injectable 1 milliGRAM(s) IntraMuscular once  insulin lispro (ADMELOG) corrective regimen sliding scale   SubCutaneous three times a day before meals  insulin lispro Injectable (ADMELOG) 4 Unit(s) SubCutaneous three times a day before meals  pantoprazole    Tablet 40 milliGRAM(s) Oral every 12 hours  piperacillin/tazobactam IVPB.. 4.5 Gram(s) IV Intermittent every 8 hours  vancomycin  IVPB 1250 milliGRAM(s) IV Intermittent every 8 hours    MEDICATIONS  (PRN):  dextrose Oral Gel 15 Gram(s) Oral once PRN Blood Glucose LESS THAN 70 milliGRAM(s)/deciliter

## 2024-02-15 NOTE — DISCHARGE NOTE PROVIDER - CARE PROVIDERS DIRECT ADDRESSES
,[email or web address]

## 2024-02-15 NOTE — DISCHARGE NOTE PROVIDER - NSDCCPCAREPLAN_GEN_ALL_CORE_FT
PRINCIPAL DISCHARGE DIAGNOSIS  Diagnosis: Diabetic foot infection  Assessment and Plan of Treatment: Patients with diabetes and vascular compromise, peripheral neuropathy, and impaired immune function are at high risk of developing foot infections. You received antibiotics during your admission  Medications  Please follow up with your endocrinologist within 1-2 weeks.     PRINCIPAL DISCHARGE DIAGNOSIS  Diagnosis: Osteomyelitis  Assessment and Plan of Treatment: Osteomyelitis is an infection in a bone. Infections can reach a bone by traveling through the bloodstream or spreading from nearby tissue. Infections can also begin in the bone itself if an injury exposes the bone to germs.  Smokers and people with chronic health conditions, such as diabetes or kidney failure, are more at risk of developing osteomyelitis. People who have diabetes may develop osteomyelitis in their feet if they have foot ulcers.  Although once considered incurable, osteomyelitis can now be successfully treated. Most people need surgery to remove areas of the bone that have . After surgery, strong intravenous antibiotics are typically needed.  ---  Please start taking your oral antibiotics.   Wound care dressing instructions: Discharge dressing instructions: Discharge dressing instructions: Cleanse wound with normal saline daily, pat dry with sterile guaze, apply saline soaked gauze to wound base, cover with dry sterile gauze, ABD pad, wrap with Kerlix and light ACE bandage      SECONDARY DISCHARGE DIAGNOSES  Diagnosis: DM (diabetes mellitus)  Assessment and Plan of Treatment: You have a known history of diabetes mellitus prior to your admission. This condition results from blood sugar levels getting too high because your body is more resistant to insulin. Uncontrolled blood sugar levels can lead to kidney and heart damage, pain/numbness/paralysis in your hands and feet, and increased rates of infections. It is important that you continue to take your diabetic medication when you are discharged so that you can continue to control your blood sugar levels. Additionally be sure to follow up with your primary care physician, podiatrist, and ophthalmologist on a regular basis.  Please follow up with your scheduled endocrinology appointment.

## 2024-02-15 NOTE — PROGRESS NOTE ADULT - PROBLEM SELECTOR PLAN 2
Pt with hx of R foot ulcer. seen by podiatry in May 2022 and treated for OM and underwent R foot TMA w/ skin graft. Pt presenting now for chronic L hallux foot wound that began in Oct 2023. WBC 14.31, ESR 40, CRP 73.1. Per Podiatry, no concerns for gas or acute fx on imaging however cannot rule out OM. S/p Vanc 1g and Zosyn 3.375mg IV x1 in ED.  - c/w broad spectrum abx -- Vancomycin 1250g IV q8hr  -- trough on 2/14 @ 6pm  and Zosyn 4.5 (pseudomonal dosing) q8hr IV   - f/u MR Foot w/ contrast   - patient underwent excisional debridement down to and including subcutaneous tissue at bedside with serosanguinous drainage exposed. Cultures sent - f/u wound cx  - wound care: continue with WTD dressing, gauze and Kerlix per podiatry note  - continue to f/u podiatry recommendations for further management

## 2024-02-15 NOTE — PROGRESS NOTE ADULT - PROBLEM SELECTOR PLAN 3
Hx of IDT2DM however patient unable to access his insulin. A1c on admission 10.6. Glucose 258. States he takes Metformin and Jardiance   - c/w mISS  - endocrine consult in AM for T2DM management  - consistent carb diet  - per weight base, will begin patient on Lispro 4 units pre-meal

## 2024-02-15 NOTE — DISCHARGE NOTE PROVIDER - NSDCFUADDAPPT_GEN_ALL_CORE_FT
Please bring your Insurance card, Photo ID and Discharge paperwork to the following appointment:    (1) Please follow up with your Endocrinology Provider, Dr. Ami Perez at [address] [place] [address] on 3/4/2024 at 1:30pm.    Appointment was scheduled by Ms. VAISHNAVI Quintero, Referral Coordinator.   Please bring your Insurance card, Photo ID and Discharge paperwork to the following appointment:    (1) Please follow up with your Endocrinology Provider, Dr. Ami Perez at [address] [place] [address] on 3/4/2024 at 1:30pm.    Appointment was scheduled by Ms. VAISHNAVI Quintero, Referral Coordinator.    (2) Please follow up w. Dr. Maximiliano Porras. Please contact their office at [phone] to schedule an appointment 1-2 weeks after being discharged.     (3) Please follow up w/ Dr. Arora Infectious disease. Their office will contact you to schedule an appointment with them.        Please bring your Insurance card, Photo ID and Discharge paperwork to the following appointment:    (1) Please follow up with your Endocrinology Provider, Dr. Ami Perez at [address] [place] [address] on 3/4/2024 at 1:30pm.    Appointment was scheduled by Ms. VAISHNAVI Quintero, Referral Coordinator.    (2) Please follow up w. Dr. Maximiliano Porras. Please contact their office at [phone] to schedule an appointment 1-2 weeks after being discharged.     (3) Please follow up w/ Dr. Arora Infectious disease. Their office will contact you to schedule an appointment with them.     (4) Please follow up with your primary care physician within 1 week of discharge.

## 2024-02-15 NOTE — DISCHARGE NOTE PROVIDER - NSDCMRMEDTOKEN_GEN_ALL_CORE_FT
atorvastatin 40 mg oral tablet: 1 tab(s) orally once a day  Jardiance 25 mg oral tablet: 1 tab(s) orally once a day  lidocaine 4% topical film: Apply topically to affected area once a day  losartan 50 mg oral tablet: 1 tab(s) orally once a day  metFORMIN 750 mg oral tablet, extended release: 2 tab(s) orally once a day  Protonix 40 mg oral delayed release tablet: 1 tab(s) orally once a day  Toujeo SoloStar 300 units/mL subcutaneous solution: 20 unit(s) subcutaneous once a day   atorvastatin 40 mg oral tablet: 1 tab(s) orally once a day  Dexcom G7 [place]: Use as directed  Dexcom G7 Sensors: Change every 10 days  Jardiance 25 mg oral tablet: 1 tab(s) orally once a day  lidocaine 4% topical film: Apply topically to affected area once a day  losartan 50 mg oral tablet: 1 tab(s) orally once a day  metFORMIN 750 mg oral tablet, extended release: 2 tab(s) orally once a day  Protonix 40 mg oral delayed release tablet: 1 tab(s) orally once a day  Toujeo SoloStar 300 units/mL subcutaneous solution: 20 unit(s) subcutaneous once a day   atorvastatin 40 mg oral tablet: 1 tab(s) orally once a day  Dexcom G7 [place]: Use as directed  Dexcom G7 Sensors: Change every 10 days  Jardiance 25 mg oral tablet: 1 tab(s) orally once a day  lidocaine 4% topical film: Apply topically to affected area once a day  losartan 50 mg oral tablet: 1 tab(s) orally once a day  metFORMIN 750 mg oral tablet, extended release: 2 tab(s) orally once a day  Ozempic 2 mg/3 mL (0.25 mg or 0.5 mg dose) subcutaneous solution: 0.25 milligram(s) subcutaneously once a week Inject 0.25mg SC once weekly for 4 weeks, and then if tolerating increase to 0.5mg weekly.  Protonix 40 mg oral delayed release tablet: 1 tab(s) orally once a day  Toujeo SoloStar 300 units/mL subcutaneous solution: 20 unit(s) subcutaneous once a day   atorvastatin 40 mg oral tablet: 1 tab(s) orally once a day  Dexcom G7 [place]: Use as directed  Dexcom G7 Sensors: Change every 10 days  insulin lispro 100 units/mL injectable solution: 12 unit(s) injectable 3 times a day Take 12 units with meals  Jardiance 25 mg oral tablet: 1 tab(s) orally once a day  lidocaine 4% topical film: Apply topically to affected area once a day  losartan 50 mg oral tablet: 1 tab(s) orally once a day  metFORMIN 750 mg oral tablet, extended release: 1 tab(s) orally 2 times a day  Ozempic 2 mg/3 mL (0.25 mg or 0.5 mg dose) subcutaneous solution: 0.25 milligram(s) subcutaneously once a week Inject 0.25mg SC once weekly for 4 weeks, and then if tolerating increase to 0.5mg weekly.  Protonix 40 mg oral delayed release tablet: 1 tab(s) orally once a day  sulfamethoxazole-trimethoprim 800 mg-160 mg oral tablet: 2 tab(s) orally 2 times a day  Toujeo SoloStar 300 units/mL subcutaneous solution: 28 unit(s) subcutaneous once a day (at bedtime)   amoxicillin-clavulanate 875 mg-125 mg oral tablet: 875 milligram(s) orally 2 times a day  atorvastatin 40 mg oral tablet: 1 tab(s) orally once a day  Dexcom G7 [place]: Use as directed  Dexcom G7 Sensors: Change every 10 days  insulin lispro 100 units/mL injectable solution: 12 unit(s) injectable 3 times a day Take 12 units with meals  Jardiance 25 mg oral tablet: 1 tab(s) orally once a day  lidocaine 4% topical film: Apply topically to affected area once a day  losartan 50 mg oral tablet: 1 tab(s) orally once a day  metFORMIN 750 mg oral tablet, extended release: 1 tab(s) orally 2 times a day  Ozempic 2 mg/3 mL (0.25 mg or 0.5 mg dose) subcutaneous solution: 0.25 milligram(s) subcutaneously once a week Inject 0.25mg SC once weekly for 4 weeks, and then if tolerating increase to 0.5mg weekly.  Protonix 40 mg oral delayed release tablet: 1 tab(s) orally once a day  sulfamethoxazole-trimethoprim 800 mg-160 mg oral tablet: 2 tab(s) orally 2 times a day  Toujeo SoloStar 300 units/mL subcutaneous solution: 28 unit(s) subcutaneous once a day (at bedtime)

## 2024-02-15 NOTE — PROGRESS NOTE ADULT - PROBLEM SELECTOR PLAN 1
HR 92, WBC 14.31 on admission. Likely i/s/o active LLE infection. Per podiatry documentation, patient underwent debridement and had probe to bone during physical exam. SR 40, CRP 73.1.   - see management for acute OM as below

## 2024-02-16 ENCOUNTER — RESULT REVIEW (OUTPATIENT)
Age: 49
End: 2024-02-16

## 2024-02-16 DIAGNOSIS — A41.9 SEPSIS, UNSPECIFIED ORGANISM: ICD-10-CM

## 2024-02-16 LAB
-  CEFTRIAXONE: SIGNIFICANT CHANGE UP
-  CLINDAMYCIN: SIGNIFICANT CHANGE UP
-  DAPTOMYCIN: SIGNIFICANT CHANGE UP
-  ERYTHROMYCIN: SIGNIFICANT CHANGE UP
-  LINEZOLID: SIGNIFICANT CHANGE UP
-  OXACILLIN: SIGNIFICANT CHANGE UP
-  PENICILLIN: SIGNIFICANT CHANGE UP
-  RIFAMPIN: SIGNIFICANT CHANGE UP
-  TETRACYCLINE: SIGNIFICANT CHANGE UP
-  TRIMETHOPRIM/SULFAMETHOXAZOLE: SIGNIFICANT CHANGE UP
-  VANCOMYCIN: SIGNIFICANT CHANGE UP
ADD ON TEST-SPECIMEN IN LAB: SIGNIFICANT CHANGE UP
ALBUMIN SERPL ELPH-MCNC: 3.3 G/DL — SIGNIFICANT CHANGE UP (ref 3.3–5)
ALP SERPL-CCNC: 97 U/L — SIGNIFICANT CHANGE UP (ref 40–120)
ALT FLD-CCNC: 14 U/L — SIGNIFICANT CHANGE UP (ref 10–45)
ANION GAP SERPL CALC-SCNC: 11 MMOL/L — SIGNIFICANT CHANGE UP (ref 5–17)
AST SERPL-CCNC: 15 U/L — SIGNIFICANT CHANGE UP (ref 10–40)
BASOPHILS # BLD AUTO: 0.05 K/UL — SIGNIFICANT CHANGE UP (ref 0–0.2)
BASOPHILS NFR BLD AUTO: 0.5 % — SIGNIFICANT CHANGE UP (ref 0–2)
BILIRUB SERPL-MCNC: 0.2 MG/DL — SIGNIFICANT CHANGE UP (ref 0.2–1.2)
BUN SERPL-MCNC: 19 MG/DL — SIGNIFICANT CHANGE UP (ref 7–23)
CALCIUM SERPL-MCNC: 8.8 MG/DL — SIGNIFICANT CHANGE UP (ref 8.4–10.5)
CHLORIDE SERPL-SCNC: 102 MMOL/L — SIGNIFICANT CHANGE UP (ref 96–108)
CO2 SERPL-SCNC: 27 MMOL/L — SIGNIFICANT CHANGE UP (ref 22–31)
CREAT SERPL-MCNC: 1.19 MG/DL — SIGNIFICANT CHANGE UP (ref 0.5–1.3)
CRP SERPL-MCNC: 63.7 MG/L — HIGH (ref 0–4)
CULTURE RESULTS: ABNORMAL
EGFR: 75 ML/MIN/1.73M2 — SIGNIFICANT CHANGE UP
EOSINOPHIL # BLD AUTO: 0.24 K/UL — SIGNIFICANT CHANGE UP (ref 0–0.5)
EOSINOPHIL NFR BLD AUTO: 2.5 % — SIGNIFICANT CHANGE UP (ref 0–6)
GLUCOSE BLDC GLUCOMTR-MCNC: 137 MG/DL — HIGH (ref 70–99)
GLUCOSE BLDC GLUCOMTR-MCNC: 212 MG/DL — HIGH (ref 70–99)
GLUCOSE BLDC GLUCOMTR-MCNC: 265 MG/DL — HIGH (ref 70–99)
GLUCOSE BLDC GLUCOMTR-MCNC: 99 MG/DL — SIGNIFICANT CHANGE UP (ref 70–99)
GLUCOSE SERPL-MCNC: 284 MG/DL — HIGH (ref 70–99)
GRAM STN FLD: SIGNIFICANT CHANGE UP
HCT VFR BLD CALC: 38.4 % — LOW (ref 39–50)
HGB BLD-MCNC: 13 G/DL — SIGNIFICANT CHANGE UP (ref 13–17)
IMM GRANULOCYTES NFR BLD AUTO: 0.2 % — SIGNIFICANT CHANGE UP (ref 0–0.9)
LYMPHOCYTES # BLD AUTO: 2.61 K/UL — SIGNIFICANT CHANGE UP (ref 1–3.3)
LYMPHOCYTES # BLD AUTO: 27.6 % — SIGNIFICANT CHANGE UP (ref 13–44)
MAGNESIUM SERPL-MCNC: 2 MG/DL — SIGNIFICANT CHANGE UP (ref 1.6–2.6)
MCHC RBC-ENTMCNC: 29.8 PG — SIGNIFICANT CHANGE UP (ref 27–34)
MCHC RBC-ENTMCNC: 33.9 GM/DL — SIGNIFICANT CHANGE UP (ref 32–36)
MCV RBC AUTO: 88.1 FL — SIGNIFICANT CHANGE UP (ref 80–100)
METHOD TYPE: SIGNIFICANT CHANGE UP
MONOCYTES # BLD AUTO: 0.87 K/UL — SIGNIFICANT CHANGE UP (ref 0–0.9)
MONOCYTES NFR BLD AUTO: 9.2 % — SIGNIFICANT CHANGE UP (ref 2–14)
NEUTROPHILS # BLD AUTO: 5.67 K/UL — SIGNIFICANT CHANGE UP (ref 1.8–7.4)
NEUTROPHILS NFR BLD AUTO: 60 % — SIGNIFICANT CHANGE UP (ref 43–77)
NRBC # BLD: 0 /100 WBCS — SIGNIFICANT CHANGE UP (ref 0–0)
ORGANISM # SPEC MICROSCOPIC CNT: ABNORMAL
ORGANISM # SPEC MICROSCOPIC CNT: SIGNIFICANT CHANGE UP
PHOSPHATE SERPL-MCNC: 2.4 MG/DL — LOW (ref 2.5–4.5)
PLATELET # BLD AUTO: 421 K/UL — HIGH (ref 150–400)
POTASSIUM SERPL-MCNC: 4 MMOL/L — SIGNIFICANT CHANGE UP (ref 3.5–5.3)
POTASSIUM SERPL-SCNC: 4 MMOL/L — SIGNIFICANT CHANGE UP (ref 3.5–5.3)
PROT SERPL-MCNC: 7.3 G/DL — SIGNIFICANT CHANGE UP (ref 6–8.3)
RBC # BLD: 4.36 M/UL — SIGNIFICANT CHANGE UP (ref 4.2–5.8)
RBC # FLD: 13 % — SIGNIFICANT CHANGE UP (ref 10.3–14.5)
SODIUM SERPL-SCNC: 140 MMOL/L — SIGNIFICANT CHANGE UP (ref 135–145)
SPECIMEN SOURCE: SIGNIFICANT CHANGE UP
SPECIMEN SOURCE: SIGNIFICANT CHANGE UP
VANCOMYCIN TROUGH SERPL-MCNC: 22.1 UG/ML — HIGH (ref 10–20)
WBC # BLD: 9.46 K/UL — SIGNIFICANT CHANGE UP (ref 3.8–10.5)
WBC # FLD AUTO: 9.46 K/UL — SIGNIFICANT CHANGE UP (ref 3.8–10.5)

## 2024-02-16 PROCEDURE — 74018 RADEX ABDOMEN 1 VIEW: CPT | Mod: 26

## 2024-02-16 PROCEDURE — 99254 IP/OBS CNSLTJ NEW/EST MOD 60: CPT

## 2024-02-16 PROCEDURE — 99232 SBSQ HOSP IP/OBS MODERATE 35: CPT

## 2024-02-16 PROCEDURE — 99233 SBSQ HOSP IP/OBS HIGH 50: CPT | Mod: GC

## 2024-02-16 PROCEDURE — 88307 TISSUE EXAM BY PATHOLOGIST: CPT | Mod: 26

## 2024-02-16 PROCEDURE — 88311 DECALCIFY TISSUE: CPT | Mod: 26

## 2024-02-16 PROCEDURE — 99222 1ST HOSP IP/OBS MODERATE 55: CPT

## 2024-02-16 RX ORDER — ACETAMINOPHEN 500 MG
1000 TABLET ORAL ONCE
Refills: 0 | Status: COMPLETED | OUTPATIENT
Start: 2024-02-16 | End: 2024-02-16

## 2024-02-16 RX ORDER — SODIUM,POTASSIUM PHOSPHATES 278-250MG
2 POWDER IN PACKET (EA) ORAL ONCE
Refills: 0 | Status: COMPLETED | OUTPATIENT
Start: 2024-02-16 | End: 2024-02-16

## 2024-02-16 RX ORDER — METRONIDAZOLE 500 MG
500 TABLET ORAL EVERY 8 HOURS
Refills: 0 | Status: DISCONTINUED | OUTPATIENT
Start: 2024-02-16 | End: 2024-02-20

## 2024-02-16 RX ORDER — VANCOMYCIN HCL 1 G
1000 VIAL (EA) INTRAVENOUS EVERY 8 HOURS
Refills: 0 | Status: COMPLETED | OUTPATIENT
Start: 2024-02-17 | End: 2024-02-17

## 2024-02-16 RX ORDER — ACETAMINOPHEN 500 MG
650 TABLET ORAL EVERY 6 HOURS
Refills: 0 | Status: DISCONTINUED | OUTPATIENT
Start: 2024-02-16 | End: 2024-02-21

## 2024-02-16 RX ORDER — SENNA PLUS 8.6 MG/1
2 TABLET ORAL AT BEDTIME
Refills: 0 | Status: DISCONTINUED | OUTPATIENT
Start: 2024-02-16 | End: 2024-02-21

## 2024-02-16 RX ORDER — ACETAMINOPHEN 500 MG
650 TABLET ORAL EVERY 6 HOURS
Refills: 0 | Status: DISCONTINUED | OUTPATIENT
Start: 2024-02-16 | End: 2024-02-16

## 2024-02-16 RX ORDER — OXYCODONE HYDROCHLORIDE 5 MG/1
2.5 TABLET ORAL
Refills: 0 | Status: DISCONTINUED | OUTPATIENT
Start: 2024-02-16 | End: 2024-02-17

## 2024-02-16 RX ORDER — LIDOCAINE 4 G/100G
1 CREAM TOPICAL DAILY
Refills: 0 | Status: DISCONTINUED | OUTPATIENT
Start: 2024-02-16 | End: 2024-02-21

## 2024-02-16 RX ORDER — LIDOCAINE HCL 20 MG/ML
20 VIAL (ML) INJECTION ONCE
Refills: 0 | Status: COMPLETED | OUTPATIENT
Start: 2024-02-16 | End: 2024-02-16

## 2024-02-16 RX ORDER — CEFTRIAXONE 500 MG/1
2000 INJECTION, POWDER, FOR SOLUTION INTRAMUSCULAR; INTRAVENOUS EVERY 24 HOURS
Refills: 0 | Status: DISCONTINUED | OUTPATIENT
Start: 2024-02-16 | End: 2024-02-20

## 2024-02-16 RX ORDER — POLYETHYLENE GLYCOL 3350 17 G/17G
17 POWDER, FOR SOLUTION ORAL EVERY 12 HOURS
Refills: 0 | Status: DISCONTINUED | OUTPATIENT
Start: 2024-02-16 | End: 2024-02-21

## 2024-02-16 RX ORDER — INSULIN GLARGINE 100 [IU]/ML
16 INJECTION, SOLUTION SUBCUTANEOUS AT BEDTIME
Refills: 0 | Status: DISCONTINUED | OUTPATIENT
Start: 2024-02-16 | End: 2024-02-20

## 2024-02-16 RX ADMIN — PANTOPRAZOLE SODIUM 40 MILLIGRAM(S): 20 TABLET, DELAYED RELEASE ORAL at 22:34

## 2024-02-16 RX ADMIN — PIPERACILLIN AND TAZOBACTAM 25 GRAM(S): 4; .5 INJECTION, POWDER, LYOPHILIZED, FOR SOLUTION INTRAVENOUS at 08:22

## 2024-02-16 RX ADMIN — Medication 400 MILLIGRAM(S): at 22:34

## 2024-02-16 RX ADMIN — Medication 2 PACKET(S): at 11:10

## 2024-02-16 RX ADMIN — SENNA PLUS 2 TABLET(S): 8.6 TABLET ORAL at 22:34

## 2024-02-16 RX ADMIN — POLYETHYLENE GLYCOL 3350 17 GRAM(S): 17 POWDER, FOR SOLUTION ORAL at 11:10

## 2024-02-16 RX ADMIN — Medication 6 UNIT(S): at 14:03

## 2024-02-16 RX ADMIN — CEFTRIAXONE 100 MILLIGRAM(S): 500 INJECTION, POWDER, FOR SOLUTION INTRAMUSCULAR; INTRAVENOUS at 18:47

## 2024-02-16 RX ADMIN — PANTOPRAZOLE SODIUM 40 MILLIGRAM(S): 20 TABLET, DELAYED RELEASE ORAL at 09:56

## 2024-02-16 RX ADMIN — Medication 6: at 09:55

## 2024-02-16 RX ADMIN — Medication 100 MILLIGRAM(S): at 18:57

## 2024-02-16 RX ADMIN — OXYCODONE HYDROCHLORIDE 2.5 MILLIGRAM(S): 5 TABLET ORAL at 19:51

## 2024-02-16 RX ADMIN — Medication 20 MILLILITER(S): at 11:30

## 2024-02-16 RX ADMIN — Medication 166.67 MILLIGRAM(S): at 06:08

## 2024-02-16 RX ADMIN — PIPERACILLIN AND TAZOBACTAM 25 GRAM(S): 4; .5 INJECTION, POWDER, LYOPHILIZED, FOR SOLUTION INTRAVENOUS at 00:46

## 2024-02-16 RX ADMIN — Medication 650 MILLIGRAM(S): at 15:19

## 2024-02-16 RX ADMIN — Medication 6 UNIT(S): at 09:56

## 2024-02-16 RX ADMIN — INSULIN GLARGINE 16 UNIT(S): 100 INJECTION, SOLUTION SUBCUTANEOUS at 22:34

## 2024-02-16 RX ADMIN — Medication 1000 MILLIGRAM(S): at 23:34

## 2024-02-16 RX ADMIN — Medication 166.67 MILLIGRAM(S): at 14:02

## 2024-02-16 RX ADMIN — OXYCODONE HYDROCHLORIDE 2.5 MILLIGRAM(S): 5 TABLET ORAL at 18:57

## 2024-02-16 RX ADMIN — Medication 650 MILLIGRAM(S): at 16:16

## 2024-02-16 NOTE — PROGRESS NOTE ADULT - ASSESSMENT
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     Endocrine consulted for uncontrolled type 2 diabetes.    A1C: 10.6 %  BUN: 17  Creatinine: 0.99  GFR: 94  Weight: 104  BMI: 32.9

## 2024-02-16 NOTE — PROGRESS NOTE ADULT - PROBLEM SELECTOR PLAN 1
Type 2 diabetes mellitus with hyperglycemia  - Please start Lantus u at bedtime  - Please increase Lispro to u before meals  - Continue lispro moderate dose sliding scale before meals and at bedtime.  - Patient's fingerstick glucose goal is 100-180 mg/dL.    - recommend vascular consult for LLE  - Discharge recommendations to be discussed.   - Patient can follow up at discharge with [facility] by calling [phone] to make an appointment.      Case discussed with Dr. Hernandez. Primary team updated. Type 2 diabetes mellitus with hyperglycemia  - Please start Lantus u at bedtime  - Please increase Lispro to u before meals  - Continue lispro moderate dose sliding scale before meals and at bedtime.  - Patient's fingerstick glucose goal is 100-180 mg/dL.    - recommend vascular consult for LLE  - CDCES consulted.  - Discharge recommendations to be discussed. Dexcom is note covered.  - Patient can follow up at discharge with [facility] by calling [phone] to make an appointment.      Case discussed with Dr. Hernandez. Primary team updated. Type 2 diabetes mellitus with hyperglycemia  - Please start Lantus 16 u at bedtime  - Please increase Lispro to 6 u before meals  - Continue lispro moderate dose sliding scale before meals and at bedtime.  - Patient's fingerstick glucose goal is 100-180 mg/dL.    - recommend vascular consult for LLE  - CDCES consulted.  - Discharge recommendations to be discussed. Dexcom is note covered.  - Patient can follow up at discharge with [facility] by calling [phone] to make an appointment.      Case discussed with Dr. Hernandez. Primary team updated.

## 2024-02-16 NOTE — PROGRESS NOTE ADULT - ASSESSMENT
Podiatry was consulted for a L hallux wound that began back in October 2023. At the time of consult WBC 14.31 ESR 40 CRP 73.1. Imaging showed no obvious signs of OM, GAS, or acute fx. Based on the clinical presentation OM cannot be r/o at this time. Pt can benefit from being admitted for further OM workup and IV abx. Discussed possible treatment options witht the pt and he stated that he would like to try long term IV abx as a treatment option before considering podiatric surgical intervention.       Plan:   MRI imaging reviewed    C/w broad spectrum IV abx     F/u ED cx taken post debridement  Wound care: Area wrapped with WTD dressing, gauze, and kerlix   L foot heel WBAT, R foot WBAT     Plan discussed with attending, podiatry will follow    Podiatry was consulted for a L hallux wound that began back in October 2023. At the time of consult WBC 14.31 ESR 40 CRP 73.1. Imaging showed no obvious signs of OM, GAS, or acute fx. Based on the clinical presentation OM cannot be r/o at this time. Pt can benefit from being admitted for further OM workup and IV abx. Discussed possible treatment options with the pt and he stated that he would like to try long term IV abx as a treatment option before considering podiatric surgical intervention.       Plan:   MRI imaging reviewed    C/w broad spectrum IV abx     F/u ED cx taken post debridement  Wound care: Area wrapped with WTD dressing, gauze, and kerlix   L foot heel WBAT, R foot WBAT     Plan discussed with attending, podiatry will follow    Podiatry was consulted for a L hallux wound that began back in October 2023. At the time of consult WBC 14.31 ESR 40 CRP 73.1. Imaging showed no obvious signs of OM, GAS, or acute fx. Based on the clinical presentation OM cannot be r/o at this time. Pt can benefit from being admitted for further OM workup and IV abx. Discussed possible treatment options with the pt and he stated that he would like to try long term IV abx as a treatment option before considering podiatric surgical intervention.       Plan:   MRI imaging reviewed    Will perform biopsy today - please see procedure note  Recommend PICC on d/c   Recommend ID consult for long term IV abx reccs   C/w broad spectrum IV abx     F/u ED cx taken post debridement  Wound care: Area wrapped with WTD dressing, gauze, and kerlix   L foot heel WBAT, R foot WBAT     Plan discussed with attending, podiatry will follow    Podiatry was consulted for a L hallux wound that began back in October 2023. At the time of consult WBC 14.31 ESR 40 CRP 73.1. Imaging showed no obvious signs of OM, GAS, or acute fx. Based on the clinical presentation OM cannot be r/o at this time. Pt can benefit from being admitted for further OM workup and IV abx. Discussed possible treatment options with the pt and he stated that he would like to try long term IV abx as a treatment option before considering podiatric surgical intervention.       Plan:   MRI imaging reviewed    Will perform biopsy today - please see procedure note  F/u bone cx and path   Recommend PICC on d/c   Recommend ID consult for long term IV abx reccs   C/w broad spectrum IV abx     F/u ED cx taken post debridement  Wound care: Area wrapped with WTD dressing, gauze, and kerlix   L foot heel WBAT, R foot WBAT     Plan discussed with attending, podiatry will follow      Discharge dressing instructions: Cleanse wound with normal saline daily, pat dry with sterile guaze, apply saline soaked gauze to wound base, cover with dry sterile gauze, ABD pad, wrap with Kerlix and light ACE bandage.     Patient should follow up with Dr. Maximiliano Green within 1 week of discharge.    Office information:          [place] Address- [address], [place] [address] Phone: [phone]         [place] Address- [address] [place] [address] Phone: [phone]     Podiatry was consulted for a L hallux wound that began back in October 2023. At the time of consult WBC 14.31 ESR 40 CRP 73.1. Imaging showed no obvious signs of OM, GAS, or acute fx. Based on the clinical presentation OM cannot be r/o at this time. Pt can benefit from being admitted for further OM workup and IV abx. Discussed possible treatment options with the pt and he stated that he would like to try long term IV abx as a treatment option before considering podiatric surgical intervention.       Plan:   MRI imaging reviewed    Performed bone biopsy today - see procedure note  F/u bone cx and path   Recommend PICC on d/c   Recommend ID consult for long term IV abx reccs   C/w broad spectrum IV abx     F/u ED cx taken post debridement  Wound care: Area wrapped with WTD dressing, gauze, and kerlix   L foot heel WBAT, R foot WBAT     Plan discussed with attending, podiatry will follow      Discharge dressing instructions: Cleanse wound with normal saline daily, pat dry with sterile guaze, apply saline soaked gauze to wound base, cover with dry sterile gauze, ABD pad, wrap with Kerlix and light ACE bandage.     Patient should follow up with Dr. Maximiliano Green within 1 week of discharge.    Office information:          [place] Address- [address], [place] [address] Phone: [phone]         [place] Address- [address] [place] [address] Phone: [phone]

## 2024-02-16 NOTE — PROGRESS NOTE ADULT - SUBJECTIVE AND OBJECTIVE BOX
SUBJECTIVE / INTERVAL HPI: Patient was seen and examined this morning. S/P bone biopsy this morning. MRI with OM. MRSA positive. Pending PICC placement. Denies pain. Eating well.    CAPILLARY BLOOD GLUCOSE & INSULIN RECEIVED  124 mg/dL (02-15 @ 18:28) - Lispro 6. Ate chicken sandwich.  130 mg/dL (02-15 @ 22:05) - Lantus 10. Ate Pb& J at bedtime.  284 mg/dL (02-16 @ 08:38)  265 mg/dL (02-16 @ 09:27) - Lispro 6+6. Ate Turkish toast and eggs.      REVIEW OF SYSTEMS  Constitutional:  Negative fever, chills or loss of appetite.  Eyes:  Negative blurry vision or double vision.  Cardiovascular:  Negative for chest pain or palpitations.  Respiratory:  Negative for cough, wheezing, or shortness of breath.    Gastrointestinal:  Negative for nausea, vomiting, diarrhea, constipation, or abdominal pain.  Genitourinary:  Negative frequency, urgency or dysuria.  Neurologic:  No headache, confusion, dizziness, lightheadedness.    PHYSICAL EXAM  Vital Signs Last 24 Hrs  T(C): 36.7 (16 Feb 2024 06:31), Max: 37.4 (15 Feb 2024 17:35)  T(F): 98 (16 Feb 2024 06:31), Max: 99.3 (15 Feb 2024 17:35)  HR: 75 (16 Feb 2024 06:31) (75 - 84)  BP: 117/75 (16 Feb 2024 06:31) (117/75 - 165/87)  BP(mean): --  RR: 16 (16 Feb 2024 06:31) (16 - 17)  SpO2: 96% (16 Feb 2024 06:31) (95% - 98%)    Parameters below as of 16 Feb 2024 06:31  Patient On (Oxygen Delivery Method): room air    Constitutional: Awake, alert, in no acute distress.   HEENT: Normocephalic, atraumatic.   Neck: supple  Respiratory: Lungs clear to ausculation bilaterally.   Cardiovascular: regular rhythm, normal S1 and S2, no audible murmurs.   GI: soft, non-tender, non-distended, bowel sounds present, no masses appreciated.  Extremities: No lower extremity edema. LLE wrapped in kerlix dressing. difficult to palpate pulses, R LES warm.   Psychiatric: AAO x 3. Normal affect/mood.     LABS  CBC - WBC/HGB/HTC/PLT: 9.46/13.0/38.4/421 (02-16-24)  BMP - Na/K/Cl/Bicarb/BUN/Cr/Gluc/AG/eGFR: 140/4.0/102/27/19/1.19/284/11/75 (02-16-24)  Ca - 8.8 (02-16-24)  Phos - 2.4 (02-16-24)  Mg - 2.0 (02-16-24)  LFT - Alb/Tprot/Tbili/Dbili/AlkPhos/ALT/AST: 3.3/--/0.2/--/97/14/15 (02-16-24)        MEDICATIONS  MEDICATIONS  (STANDING):  dextrose 5%. 1000 milliLiter(s) (50 mL/Hr) IV Continuous <Continuous>  dextrose 5%. 1000 milliLiter(s) (100 mL/Hr) IV Continuous <Continuous>  dextrose 50% Injectable 12.5 Gram(s) IV Push once  dextrose 50% Injectable 25 Gram(s) IV Push once  dextrose 50% Injectable 25 Gram(s) IV Push once  glucagon  Injectable 1 milliGRAM(s) IntraMuscular once  insulin glargine Injectable (LANTUS) 10 Unit(s) SubCutaneous at bedtime  insulin lispro (ADMELOG) corrective regimen sliding scale   SubCutaneous three times a day before meals  insulin lispro Injectable (ADMELOG) 6 Unit(s) SubCutaneous three times a day before meals  pantoprazole    Tablet 40 milliGRAM(s) Oral every 12 hours  polyethylene glycol 3350 17 Gram(s) Oral every 12 hours  senna 2 Tablet(s) Oral at bedtime  vancomycin  IVPB 1250 milliGRAM(s) IV Intermittent every 8 hours    MEDICATIONS  (PRN):  dextrose Oral Gel 15 Gram(s) Oral once PRN Blood Glucose LESS THAN 70 milliGRAM(s)/deciliter     SUBJECTIVE / INTERVAL HPI: Patient was seen and examined this morning. S/P bone biopsy this morning. MRI with OM. MRSA positive. Pending PICC placement. Denies pain. Eating well.    CAPILLARY BLOOD GLUCOSE & INSULIN RECEIVED  124 mg/dL (02-15 @ 18:28) - Lispro 6. Ate chicken sandwich.  130 mg/dL (02-15 @ 22:05) - Lantus 10. Ate Pb& J at bedtime.  284 mg/dL (02-16 @ 08:38)  265 mg/dL (02-16 @ 09:27) - Lispro 6+6. Ate Cameroonian toast and eggs.  137 mg/dL (02-16 @ lunch)      REVIEW OF SYSTEMS  Constitutional:  Negative fever, chills or loss of appetite.  Eyes:  Negative blurry vision or double vision.  Cardiovascular:  Negative for chest pain or palpitations.  Respiratory:  Negative for cough, wheezing, or shortness of breath.    Gastrointestinal:  Negative for nausea, vomiting, diarrhea, constipation, or abdominal pain.  Genitourinary:  Negative frequency, urgency or dysuria.  Neurologic:  No headache, confusion, dizziness, lightheadedness.    PHYSICAL EXAM  Vital Signs Last 24 Hrs  T(C): 36.7 (16 Feb 2024 06:31), Max: 37.4 (15 Feb 2024 17:35)  T(F): 98 (16 Feb 2024 06:31), Max: 99.3 (15 Feb 2024 17:35)  HR: 75 (16 Feb 2024 06:31) (75 - 84)  BP: 117/75 (16 Feb 2024 06:31) (117/75 - 165/87)  BP(mean): --  RR: 16 (16 Feb 2024 06:31) (16 - 17)  SpO2: 96% (16 Feb 2024 06:31) (95% - 98%)    Parameters below as of 16 Feb 2024 06:31  Patient On (Oxygen Delivery Method): room air    Constitutional: Awake, alert, in no acute distress.   HEENT: Normocephalic, atraumatic.   Neck: supple  Respiratory: Lungs clear to ausculation bilaterally.   Cardiovascular: regular rhythm, normal S1 and S2, no audible murmurs.   GI: soft, non-tender, non-distended, bowel sounds present, no masses appreciated.  Extremities: No lower extremity edema. LLE wrapped in kerlix dressing. difficult to palpate pulses, R LES warm.   Psychiatric: AAO x 3. Normal affect/mood.     LABS  CBC - WBC/HGB/HTC/PLT: 9.46/13.0/38.4/421 (02-16-24)  BMP - Na/K/Cl/Bicarb/BUN/Cr/Gluc/AG/eGFR: 140/4.0/102/27/19/1.19/284/11/75 (02-16-24)  Ca - 8.8 (02-16-24)  Phos - 2.4 (02-16-24)  Mg - 2.0 (02-16-24)  LFT - Alb/Tprot/Tbili/Dbili/AlkPhos/ALT/AST: 3.3/--/0.2/--/97/14/15 (02-16-24)        MEDICATIONS  MEDICATIONS  (STANDING):  dextrose 5%. 1000 milliLiter(s) (50 mL/Hr) IV Continuous <Continuous>  dextrose 5%. 1000 milliLiter(s) (100 mL/Hr) IV Continuous <Continuous>  dextrose 50% Injectable 12.5 Gram(s) IV Push once  dextrose 50% Injectable 25 Gram(s) IV Push once  dextrose 50% Injectable 25 Gram(s) IV Push once  glucagon  Injectable 1 milliGRAM(s) IntraMuscular once  insulin glargine Injectable (LANTUS) 10 Unit(s) SubCutaneous at bedtime  insulin lispro (ADMELOG) corrective regimen sliding scale   SubCutaneous three times a day before meals  insulin lispro Injectable (ADMELOG) 6 Unit(s) SubCutaneous three times a day before meals  pantoprazole    Tablet 40 milliGRAM(s) Oral every 12 hours  polyethylene glycol 3350 17 Gram(s) Oral every 12 hours  senna 2 Tablet(s) Oral at bedtime  vancomycin  IVPB 1250 milliGRAM(s) IV Intermittent every 8 hours    MEDICATIONS  (PRN):  dextrose Oral Gel 15 Gram(s) Oral once PRN Blood Glucose LESS THAN 70 milliGRAM(s)/deciliter

## 2024-02-16 NOTE — CONSULT NOTE ADULT - ASSESSMENT
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 Recommend decrease to 1g IV q8h and repeat trough before third dose (tomorrow at ~noon)  - Stop zosyn  - Start ceftriaxone 2g IV q24h and flagyl 500mg PO q12h  - Plan is for trial of IV abx rather than surgical management. Plan for PICC line for 6 weeks of IV abx.    # Constipation  - Pending KUB. Doubt complication of recent PO abx regimen but will monitor for any increasing abdominal pain, leukocytosis, or development of diarrhea.    ID Team 2 will follow. Dr. Hunter will cover through the holiday weekend and I will resume care on Tuesday. 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 Recommend decrease to 1g IV q8h and repeat trough before third dose (tomorrow at ~noon)  - Stop zosyn  - Start ceftriaxone 2g IV q24h and flagyl 500mg PO q12h  - Plan is for trial of IV abx rather than surgical management. Plan for PICC line for 6 weeks of IV abx.    # Constipation  - Pending KUB. Doubt complication of recent PO abx regimen but will monitor for any increasing abdominal pain, leukocytosis, or development of diarrhea.    # Periapical lucencies on CT max/face  - No acute signs of odontogenic infection on exam, but patient should have close outpatient follow up with dentist for further management.    ID Team 2 will follow. Dr. Hunter will cover through the holiday weekend and I will resume care on Tuesday.

## 2024-02-16 NOTE — CONSULT NOTE ADULT - SUBJECTIVE AND OBJECTIVE BOX
INFECTIOUS DISEASES INITIAL CONSULT NOTE    HPI:  48M w/ cocaine use d/o, metabolic syndrome (A1c 10.6%) with history R foot OM with prior cx +MRSA in 5/2022, s/p TMA 5/10/2022 with negative bone margins, who presented on 2/14 with fatigue, nausea and abdominal pain. He reports having a distal hallux ulcer that first developed in 11/2023 while he was in the [place] (wore shoes always, no bites/puncture wounds, no water exposure) and healed spontaneously, then recurred briefly 12/2023 and self-resolved, and then recurred again a few weeks ago and at that time was associated with purulent drainage with subsequently resolved. Then one week ago he developed R maxillary tooth pain which resolved after taking some leftover antibiotics he had at home (he is unsure of which abx). Then 3 days ago (on 2/13) he developed increased R foot swelling associated with fatigue, but no fevers/chills/sweats. He also developed constipation (but not obstipation) and crampy abdominal pain, prompting him to present to ED on 2/14.    Since admission he hs been afebrile, initial WBC 14k but quickly normalized, ESR 40, CRP 73, BCx x2 ngtd. Podiatry evaluated patient for L hallux wound. Podiatry’s exam showed 2.4 x 2.3 cm wound at distal hallux with +PTB, and s/s drainage, w/o surrounding erythema. MRI L foot w/wo contrast with findings c/w OM of first distal phalanx, with possible 1st interphalangeal joint septic arthritis, and subQ edema around dorsal foot. Also with mild marrow edema of proximal 2nd and third metatarsals and intermediate/lateral cuneiforms, favored stress reaction. Excisional debridement performed by podiatry and culture taken from deep wound, +MRSA, S.anginosus, S.intermedius, S.cohnii, C.aurimucosum, Prevotella disiens and anaerococcus spp. S/p bone biopsy of L hallux on 2/16, results pending. Plan is for non-operative management at this time, with trial of IV abx first. He is currently on vancomycin and zosyn.    For recent dental infection, a CT max/face with IV contrast was performed which showed periapical lucency of R maxillary premolar tooth without discrete abscess with R inferior maxillary sinusistis, as well as L mandibular molar periapical lucency.       PAST MEDICAL & SURGICAL HISTORY:  Hyperlipidemia, unspecified hyperlipidemia type      Hypertension      History of cocaine use      Diabetes mellitus      Osteomyelitis  R foot      Toe amputation status  Right foot      H/O skin graft  Right foot      S/P insertion of penile implant      Review of Systems:   Constitutional, eyes, ENT, cardiovascular, respiratory, gastrointestinal, genitourinary, integumentary, neurological, psychiatric and heme/lymph are otherwise negative other than noted above       ANTIBIOTICS:  MEDICATIONS  (STANDING):  acetaminophen     Tablet .. 650 milliGRAM(s) Oral every 6 hours  cefTRIAXone   IVPB 2000 milliGRAM(s) IV Intermittent every 24 hours  dextrose 5%. 1000 milliLiter(s) (50 mL/Hr) IV Continuous <Continuous>  dextrose 5%. 1000 milliLiter(s) (100 mL/Hr) IV Continuous <Continuous>  dextrose 50% Injectable 25 Gram(s) IV Push once  dextrose 50% Injectable 25 Gram(s) IV Push once  dextrose 50% Injectable 12.5 Gram(s) IV Push once  glucagon  Injectable 1 milliGRAM(s) IntraMuscular once  insulin glargine Injectable (LANTUS) 16 Unit(s) SubCutaneous at bedtime  insulin lispro (ADMELOG) corrective regimen sliding scale   SubCutaneous three times a day before meals  insulin lispro Injectable (ADMELOG) 6 Unit(s) SubCutaneous three times a day before meals  lidocaine   4% Patch 1 Patch Transdermal daily  metroNIDAZOLE  IVPB 500 milliGRAM(s) IV Intermittent every 8 hours  pantoprazole    Tablet 40 milliGRAM(s) Oral every 12 hours  polyethylene glycol 3350 17 Gram(s) Oral every 12 hours  senna 2 Tablet(s) Oral at bedtime    MEDICATIONS  (PRN):  dextrose Oral Gel 15 Gram(s) Oral once PRN Blood Glucose LESS THAN 70 milliGRAM(s)/deciliter  oxyCODONE    IR 2.5 milliGRAM(s) Oral two times a day PRN Severe Pain (7 - 10)      Allergies    No Known Allergies    Intolerances        SOCIAL HISTORY:  Lives in [place] with mother  Works as   No pets  Occasional EtOH binge drinking (6 pack of beer 1x/month) and intranasal cocaine use  No tobacco    FAMILY HISTORY:  FH: CAD (coronary artery disease) (Mother)     no FH leading to current infection    Vital Signs Last 24 Hrs  T(C): 36.8 (16 Feb 2024 14:11), Max: 36.8 (16 Feb 2024 14:11)  T(F): 98.3 (16 Feb 2024 14:11), Max: 98.3 (16 Feb 2024 14:11)  HR: 71 (16 Feb 2024 14:11) (71 - 75)  BP: 154/91 (16 Feb 2024 14:11) (117/75 - 154/91)  BP(mean): --  RR: 16 (16 Feb 2024 14:11) (16 - 17)  SpO2: 96% (16 Feb 2024 14:11) (95% - 96%)    Parameters below as of 16 Feb 2024 14:11  Patient On (Oxygen Delivery Method): room air        02-15-24 @ 07:01  -  02-16-24 @ 07:00  --------------------------------------------------------  IN: 350 mL / OUT: 0 mL / NET: 350 mL    02-16-24 @ 07:01  -  02-16-24 @ 19:24  --------------------------------------------------------  IN: 300 mL / OUT: 0 mL / NET: 300 mL        PHYSICAL EXAM:  Constitutional: alert, NAD  Eyes: the sclera and conjunctiva were normal.   ENT: poor dentition, caries of R maxillary molars, no acute swelling/redness/tenderness  Neck: the appearance of the neck was normal and the neck was supple.   Pulmonary: no respiratory distress and lungs were clear to auscultation bilaterally.   Heart: heart rate was normal and rhythm regular, normal S1 and S2  Vascular:. there was no peripheral edema  Abdomen: normal bowel sounds, soft, non-tender  Neurological: no focal deficits.   Psychiatric: the affect was normal  Ext: L foot in clean ACE bandage. R foot TMA site without ulcerations      LABS:                        13.0   9.46  )-----------( 421      ( 16 Feb 2024 08:38 )             38.4     02-16    140  |  102  |  19  ----------------------------<  284<H>  4.0   |  27  |  1.19    Ca    8.8      16 Feb 2024 08:38  Phos  2.4     02-16  Mg     2.0     02-16    TPro  7.3  /  Alb  3.3  /  TBili  0.2  /  DBili  x   /  AST  15  /  ALT  14  /  AlkPhos  97  02-16      Urinalysis Basic - ( 16 Feb 2024 08:38 )    Color: x / Appearance: x / SG: x / pH: x  Gluc: 284 mg/dL / Ketone: x  / Bili: x / Urobili: x   Blood: x / Protein: x / Nitrite: x   Leuk Esterase: x / RBC: x / WBC x   Sq Epi: x / Non Sq Epi: x / Bacteria: x        MICROBIOLOGY:  Reviewed    RADIOLOGY & ADDITIONAL STUDIES:  Reviewed

## 2024-02-16 NOTE — PROGRESS NOTE ADULT - ATTENDING COMMENTS
Patient was seen and examined at bedside on 2/16/2024 at 130 pm. Patient reports feeling improved. Denies SOB, CP, N/V. ROS is otherwise negative. Vitals, labwork and pertinent imaging reviewed. Exam - NAD, AAO x 4, PERRLA, EOMI, MMM, supple neck, chest - CTA b/l,, CV - rrr, s1s2, no m/r/g, abd - soft, NTND, + BS, back - midline, ext - wwp, wound at distal hallux, LUE - hand swollen, psych - normal affect    Plan:  -Abd pain - check AXR  -CRP is downtrending  -ID consulted given findings c/w Osteomyelitis  -C/w Vancomycin at this time given sensitivities  -If abd pain does not improve will consider GI consult given use of Motrin and EtOH

## 2024-02-16 NOTE — PROCEDURE NOTE - GENERAL PROCEDURE DETAILS
[employer] bone biopsy kit was used to obtain two pieces of bone. Pieces were sent to path and micro for further evaluation.

## 2024-02-16 NOTE — PROGRESS NOTE ADULT - SUBJECTIVE AND OBJECTIVE BOX
Patient is a 48y old  Male who presents with a chief complaint of osteomyelitis (16 Feb 2024 06:17)      INTERVAL HPI/ OVERNIGHT EVENTS Pt was seen bedside with chief present. Pt was resting comfortably in bed. Dressings were found to be clean, dry, and intact. MRI imaging was discussed with the patient. No other pedal complaints at this time.       LABS                        14.6   14.31 )-----------( 427      ( 14 Feb 2024 10:43 )             44.2     02-14    137  |  100  |  17  ----------------------------<  152<H>  4.5   |  27  |  0.99    Ca    8.6      14 Feb 2024 12:33    TPro  7.5  /  Alb  3.5  /  TBili  0.5  /  DBili  x   /  AST  18  /  ALT  12  /  AlkPhos  56  02-14        ICU Vital Signs Last 24 Hrs  T(C): 36.7 (16 Feb 2024 06:31), Max: 37.4 (15 Feb 2024 17:35)  T(F): 98 (16 Feb 2024 06:31), Max: 99.3 (15 Feb 2024 17:35)  HR: 75 (16 Feb 2024 06:31) (75 - 84)  BP: 117/75 (16 Feb 2024 06:31) (117/75 - 165/87)  BP(mean): --  ABP: --  ABP(mean): --  RR: 16 (16 Feb 2024 06:31) (16 - 17)  SpO2: 96% (16 Feb 2024 06:31) (95% - 98%)    O2 Parameters below as of 16 Feb 2024 06:31  Patient On (Oxygen Delivery Method): room air    RADIOLOGY  Wet read MRI: No obvious signs of OM at the 1st ray       < from: US Duplex Venous Lower Ext Complete, Bilateral (02.14.24 @ 22:22) >  COMPARISON: Bilateral lowerextremity venous duplex 1/23/2022.    TECHNIQUE: Duplex sonography of the BILATERAL LOWER extremity veins with   color and spectral Doppler, with and without compression.    FINDINGS:    RIGHT:  Normal compressibility of the RIGHT common femoral, femoral and popliteal   veins.  Doppler examination shows normal spontaneous and phasic flow.  No RIGHT calf vein thrombosis is detected.    LEFT:  Normal compressibility of the LEFT common femoral, femoral and popliteal   veins.  Doppler examination shows normal spontaneous and phasic flow.  No LEFT calf vein thrombosis is detected.    IMPRESSION:  No evidence of deep venous thrombosis in either lower extremity.      --- End of Report ---        < from: Xray Foot AP + Lateral + Oblique, Left (02.14.24 @ 13:56) >    INTERPRETATION:  HISTORY: Toe ulceration in the setting of diabetes.    TECHNIQUE: 3 views of the left foot.    COMPARISON: Bilateral foot radiographs dated 2/1/2022    IMPRESSION:    Suggestion of a soft tissue ulcer along the distal aspect of the first   toe. No definite cortical erosion or periostitis to suggest   osteomyelitis. However, MRI would be a more sensitive test to evaluate   for osteomyelitis.    No acute displaced fracture or dislocation. Osteoarthritic changes seen   throughout the forefoot.    Soft tissue swelling about the visualized foot. Vascular calcifications   noted.    --- End of Report ---    < end of copied text >        MICROBIOLOGY  Culture - Other (02.15.24 @ 12:10)    Gram Stain:   Few Gram positive cocci in pairs  Moderate White blood cells   Specimen Source: Wound Wound          PHYSICAL EXAM  Lower Extremity Focused  Vasc: 2/4 PT&DP, TG warm to warm, improved localized edema noted at the site of the wound, (-)erythema   Derm: ~2.4cmx2.3cm wound at the distal aspect of hallux, macerated tissue noted over the wound bed and hyperkeratotic periwound. Post debridement showed granulation tissue and Sero Sang drainage (+)PTB - noticeable fibrotic tissue covering deep would, (+)undermining, (-) malodor  Neuro: Protective sensation diminished   MSK: Muscle strength 5/5 in all compartments    Patient is a 48y old  Male who presents with a chief complaint of osteomyelitis (16 Feb 2024 06:17)      INTERVAL HPI/ OVERNIGHT EVENTS Pt was seen bedside with chief present. Pt was resting comfortably in bed. Dressings were found to be clean, dry, and intact. MRI imaging was discussed with the patient. No other pedal complaints at this time.       LABS                        14.6   14.31 )-----------( 427      ( 14 Feb 2024 10:43 )             44.2     02-14    137  |  100  |  17  ----------------------------<  152<H>  4.5   |  27  |  0.99    Ca    8.6      14 Feb 2024 12:33    TPro  7.5  /  Alb  3.5  /  TBili  0.5  /  DBili  x   /  AST  18  /  ALT  12  /  AlkPhos  56  02-14        ICU Vital Signs Last 24 Hrs  T(C): 36.7 (16 Feb 2024 06:31), Max: 37.4 (15 Feb 2024 17:35)  T(F): 98 (16 Feb 2024 06:31), Max: 99.3 (15 Feb 2024 17:35)  HR: 75 (16 Feb 2024 06:31) (75 - 84)  BP: 117/75 (16 Feb 2024 06:31) (117/75 - 165/87)  BP(mean): --  ABP: --  ABP(mean): --  RR: 16 (16 Feb 2024 06:31) (16 - 17)  SpO2: 96% (16 Feb 2024 06:31) (95% - 98%)    O2 Parameters below as of 16 Feb 2024 06:31  Patient On (Oxygen Delivery Method): room air    RADIOLOGY  Wet read MRI: No obvious signs of OM at the 1st ray       < from: US Duplex Venous Lower Ext Complete, Bilateral (02.14.24 @ 22:22) >  COMPARISON: Bilateral lowerextremity venous duplex 1/23/2022.    TECHNIQUE: Duplex sonography of the BILATERAL LOWER extremity veins with   color and spectral Doppler, with and without compression.    FINDINGS:    RIGHT:  Normal compressibility of the RIGHT common femoral, femoral and popliteal   veins.  Doppler examination shows normal spontaneous and phasic flow.  No RIGHT calf vein thrombosis is detected.    LEFT:  Normal compressibility of the LEFT common femoral, femoral and popliteal   veins.  Doppler examination shows normal spontaneous and phasic flow.  No LEFT calf vein thrombosis is detected.    IMPRESSION:  No evidence of deep venous thrombosis in either lower extremity.      --- End of Report ---        < from: Xray Foot AP + Lateral + Oblique, Left (02.14.24 @ 13:56) >    INTERPRETATION:  HISTORY: Toe ulceration in the setting of diabetes.    TECHNIQUE: 3 views of the left foot.    COMPARISON: Bilateral foot radiographs dated 2/1/2022    IMPRESSION:    Suggestion of a soft tissue ulcer along the distal aspect of the first   toe. No definite cortical erosion or periostitis to suggest   osteomyelitis. However, MRI would be a more sensitive test to evaluate   for osteomyelitis.    No acute displaced fracture or dislocation. Osteoarthritic changes seen   throughout the forefoot.    Soft tissue swelling about the visualized foot. Vascular calcifications   noted.    --- End of Report ---    < end of copied text >        MICROBIOLOGY  Culture - Other (02.15.24 @ 12:10)    Gram Stain:   Few Gram positive cocci in pairs  Moderate White blood cells   Specimen Source: Wound Wound    Culture - Other (02.14.24 @ 14:38)    -  Linezolid: S 4   -  Oxacillin: R >2   -  Rifampin: S <=1 Should not be used as monotherapy   -  Tetracycline: S <=1   -  Trimethoprim/Sulfamethoxazole: S <=0.5/9.5   -  Vancomycin: S 2   -  Vancomycin: S 2   Gram Stain:   Moderate Gram positive cocci in pairs  Few Gram Negative Rods  Rare White blood cells   -  Clindamycin: S 0.5   -  Daptomycin: S 1   -  Erythromycin: R >4   Specimen Source: Wound Wound - left foot deep wound   Culture Results:   Numerous Streptococcus anginosus Susceptibility to follow.  Few Staphylococcus aureus for PBP2 confirmation Susceptibility to follow.   Organism Identification: Methicillin resistant Staphylococcus aureus  Methicillin resistant Staphylococcus aureus   Organism: Methicillin resistant Staphylococcus aureus   Organism: Methicillin resistant Staphylococcus aureus   Method Type: ETEST   Method Type: YUN                PHYSICAL EXAM  Lower Extremity Focused  Vasc: 2/4 PT&DP, TG warm to warm, improved localized edema noted at the site of the wound, (-)erythema   Derm: ~2.4cmx2.3cm wound at the distal aspect of hallux, macerated tissue noted over the wound bed and hyperkeratotic periwound. Post debridement showed granulation tissue and Sero Sang drainage (+)PTB - noticeable fibrotic tissue covering deep would, (+)undermining, (-) malodor  Neuro: Protective sensation diminished   MSK: Muscle strength 5/5 in all compartments

## 2024-02-16 NOTE — PROGRESS NOTE ADULT - SUBJECTIVE AND OBJECTIVE BOX
*****INCOMPLETE NOTE*****    INTERVAL HPI/OVERNIGHT EVENTS:    SUBJECTIVE: Patient seen and examined at bedside, resting comfortably in bed, and does not appear to be in any acute distress. Patient states  Patient denies any recent fevers, chills, nausea, vomiting, headache, acute sob, chest pain, abdominal pain, genitourinary sx, extremity pain or swelling.     ANTIBIOTICS/RELEVANT:    MEDICATIONS  (STANDING):  dextrose 5%. 1000 milliLiter(s) (50 mL/Hr) IV Continuous <Continuous>  dextrose 5%. 1000 milliLiter(s) (100 mL/Hr) IV Continuous <Continuous>  dextrose 50% Injectable 12.5 Gram(s) IV Push once  dextrose 50% Injectable 25 Gram(s) IV Push once  dextrose 50% Injectable 25 Gram(s) IV Push once  glucagon  Injectable 1 milliGRAM(s) IntraMuscular once  insulin glargine Injectable (LANTUS) 10 Unit(s) SubCutaneous at bedtime  insulin lispro (ADMELOG) corrective regimen sliding scale   SubCutaneous three times a day before meals  insulin lispro Injectable (ADMELOG) 6 Unit(s) SubCutaneous three times a day before meals  pantoprazole    Tablet 40 milliGRAM(s) Oral every 12 hours  piperacillin/tazobactam IVPB.. 4.5 Gram(s) IV Intermittent every 8 hours  vancomycin  IVPB 1250 milliGRAM(s) IV Intermittent every 8 hours    MEDICATIONS  (PRN):  dextrose Oral Gel 15 Gram(s) Oral once PRN Blood Glucose LESS THAN 70 milliGRAM(s)/deciliter      Vital Signs Last 24 Hrs  T(C): 36.7 (15 Feb 2024 22:30), Max: 37.4 (15 Feb 2024 17:35)  T(F): 98.1 (15 Feb 2024 22:30), Max: 99.3 (15 Feb 2024 17:35)  HR: 75 (15 Feb 2024 22:30) (75 - 84)  BP: 127/77 (15 Feb 2024 22:30) (127/77 - 165/87)  BP(mean): --  RR: 17 (15 Feb 2024 22:30) (16 - 17)  SpO2: 95% (15 Feb 2024 22:30) (95% - 98%)    Parameters below as of 15 Feb 2024 22:30  Patient On (Oxygen Delivery Method): room air        PHYSICAL EXAM:  General: in no acute distress  Eyes: EOMI intact bilaterally. Anicteric sclerae, moist conjunctivae  HENT: Moist mucous membranes  Neck: Trachea midline, supple  Lungs: CTA B/L. No wheezes, rales, or rhonchi  Cardiovascular: RRR. No murmurs, rubs, or gallops  Abdomen: Soft, non-tender non-distended; No rebound or guarding  Extremities: WWP, No clubbing, cyanosis or edema  Neurological: Alert and oriented  Skin: Warm and dry. No obvious rash     LABS:                        14.6   14.31 )-----------( 427      ( 14 Feb 2024 10:43 )             44.2     02-14    137  |  100  |  17  ----------------------------<  152<H>  4.5   |  27  |  0.99    Ca    8.6      14 Feb 2024 12:33    TPro  7.5  /  Alb  3.5  /  TBili  0.5  /  DBili  x   /  AST  18  /  ALT  12  /  AlkPhos  56  02-14      Urinalysis Basic - ( 14 Feb 2024 12:33 )    Color: x / Appearance: x / SG: x / pH: x  Gluc: 152 mg/dL / Ketone: x  / Bili: x / Urobili: x   Blood: x / Protein: x / Nitrite: x   Leuk Esterase: x / RBC: x / WBC x   Sq Epi: x / Non Sq Epi: x / Bacteria: x        MICROBIOLOGY:    RADIOLOGY & ADDITIONAL STUDIES: INTERVAL HPI/OVERNIGHT EVENTS: alireza    SUBJECTIVE: Patient seen and examined at bedside, resting comfortably in bed, and does not appear to be in any acute distress. Patient states that his abdominal pain is the same, denies any diarrhea or black/tarry stools. Patient states that h    ANTIBIOTICS/RELEVANT:    MEDICATIONS  (STANDING):  dextrose 5%. 1000 milliLiter(s) (50 mL/Hr) IV Continuous <Continuous>  dextrose 5%. 1000 milliLiter(s) (100 mL/Hr) IV Continuous <Continuous>  dextrose 50% Injectable 12.5 Gram(s) IV Push once  dextrose 50% Injectable 25 Gram(s) IV Push once  dextrose 50% Injectable 25 Gram(s) IV Push once  glucagon  Injectable 1 milliGRAM(s) IntraMuscular once  insulin glargine Injectable (LANTUS) 10 Unit(s) SubCutaneous at bedtime  insulin lispro (ADMELOG) corrective regimen sliding scale   SubCutaneous three times a day before meals  insulin lispro Injectable (ADMELOG) 6 Unit(s) SubCutaneous three times a day before meals  pantoprazole    Tablet 40 milliGRAM(s) Oral every 12 hours  piperacillin/tazobactam IVPB.. 4.5 Gram(s) IV Intermittent every 8 hours  vancomycin  IVPB 1250 milliGRAM(s) IV Intermittent every 8 hours    MEDICATIONS  (PRN):  dextrose Oral Gel 15 Gram(s) Oral once PRN Blood Glucose LESS THAN 70 milliGRAM(s)/deciliter      Vital Signs Last 24 Hrs  T(C): 36.7 (15 Feb 2024 22:30), Max: 37.4 (15 Feb 2024 17:35)  T(F): 98.1 (15 Feb 2024 22:30), Max: 99.3 (15 Feb 2024 17:35)  HR: 75 (15 Feb 2024 22:30) (75 - 84)  BP: 127/77 (15 Feb 2024 22:30) (127/77 - 165/87)  BP(mean): --  RR: 17 (15 Feb 2024 22:30) (16 - 17)  SpO2: 95% (15 Feb 2024 22:30) (95% - 98%)    Parameters below as of 15 Feb 2024 22:30  Patient On (Oxygen Delivery Method): room air        PHYSICAL EXAM:  General: in no acute distress  Eyes: EOMI intact bilaterally. Anicteric sclerae, moist conjunctivae  HENT: Moist mucous membranes  Neck: Trachea midline, supple  Lungs: CTA B/L. No wheezes, rales, or rhonchi  Cardiovascular: RRR. No murmurs, rubs, or gallops  Abdomen: Soft, non-tender non-distended; No rebound or guarding  Extremities: WWP, No clubbing, cyanosis or edema  Neurological: Alert and oriented  Skin: Warm and dry. No obvious rash     LABS:                        14.6   14.31 )-----------( 427      ( 14 Feb 2024 10:43 )             44.2     02-14    137  |  100  |  17  ----------------------------<  152<H>  4.5   |  27  |  0.99    Ca    8.6      14 Feb 2024 12:33    TPro  7.5  /  Alb  3.5  /  TBili  0.5  /  DBili  x   /  AST  18  /  ALT  12  /  AlkPhos  56  02-14      Urinalysis Basic - ( 14 Feb 2024 12:33 )    Color: x / Appearance: x / SG: x / pH: x  Gluc: 152 mg/dL / Ketone: x  / Bili: x / Urobili: x   Blood: x / Protein: x / Nitrite: x   Leuk Esterase: x / RBC: x / WBC x   Sq Epi: x / Non Sq Epi: x / Bacteria: x        MICROBIOLOGY:    RADIOLOGY & ADDITIONAL STUDIES:

## 2024-02-17 DIAGNOSIS — K29.60 OTHER GASTRITIS WITHOUT BLEEDING: ICD-10-CM

## 2024-02-17 LAB
-  CEFTRIAXONE: SIGNIFICANT CHANGE UP
-  CLINDAMYCIN: SIGNIFICANT CHANGE UP
-  DAPTOMYCIN: SIGNIFICANT CHANGE UP
-  ERYTHROMYCIN: SIGNIFICANT CHANGE UP
-  LINEZOLID: SIGNIFICANT CHANGE UP
-  OXACILLIN: SIGNIFICANT CHANGE UP
-  PENICILLIN: SIGNIFICANT CHANGE UP
-  RIFAMPIN: SIGNIFICANT CHANGE UP
-  TETRACYCLINE: SIGNIFICANT CHANGE UP
-  TRIMETHOPRIM/SULFAMETHOXAZOLE: SIGNIFICANT CHANGE UP
-  VANCOMYCIN: SIGNIFICANT CHANGE UP
ANION GAP SERPL CALC-SCNC: 11 MMOL/L — SIGNIFICANT CHANGE UP (ref 5–17)
BASOPHILS # BLD AUTO: 0.04 K/UL — SIGNIFICANT CHANGE UP (ref 0–0.2)
BASOPHILS NFR BLD AUTO: 0.4 % — SIGNIFICANT CHANGE UP (ref 0–2)
BUN SERPL-MCNC: 20 MG/DL — SIGNIFICANT CHANGE UP (ref 7–23)
CALCIUM SERPL-MCNC: 9.2 MG/DL — SIGNIFICANT CHANGE UP (ref 8.4–10.5)
CHLORIDE SERPL-SCNC: 99 MMOL/L — SIGNIFICANT CHANGE UP (ref 96–108)
CO2 SERPL-SCNC: 26 MMOL/L — SIGNIFICANT CHANGE UP (ref 22–31)
CREAT SERPL-MCNC: 1.07 MG/DL — SIGNIFICANT CHANGE UP (ref 0.5–1.3)
CRP SERPL-MCNC: 59.8 MG/L — HIGH (ref 0–4)
CULTURE RESULTS: ABNORMAL
EGFR: 86 ML/MIN/1.73M2 — SIGNIFICANT CHANGE UP
EOSINOPHIL # BLD AUTO: 0.21 K/UL — SIGNIFICANT CHANGE UP (ref 0–0.5)
EOSINOPHIL NFR BLD AUTO: 2.2 % — SIGNIFICANT CHANGE UP (ref 0–6)
GLUCOSE BLDC GLUCOMTR-MCNC: 121 MG/DL — HIGH (ref 70–99)
GLUCOSE BLDC GLUCOMTR-MCNC: 151 MG/DL — HIGH (ref 70–99)
GLUCOSE BLDC GLUCOMTR-MCNC: 196 MG/DL — HIGH (ref 70–99)
GLUCOSE BLDC GLUCOMTR-MCNC: 233 MG/DL — HIGH (ref 70–99)
GLUCOSE SERPL-MCNC: 231 MG/DL — HIGH (ref 70–99)
HCT VFR BLD CALC: 39.6 % — SIGNIFICANT CHANGE UP (ref 39–50)
HGB BLD-MCNC: 13.2 G/DL — SIGNIFICANT CHANGE UP (ref 13–17)
IMM GRANULOCYTES NFR BLD AUTO: 0.3 % — SIGNIFICANT CHANGE UP (ref 0–0.9)
LYMPHOCYTES # BLD AUTO: 2.87 K/UL — SIGNIFICANT CHANGE UP (ref 1–3.3)
LYMPHOCYTES # BLD AUTO: 30.6 % — SIGNIFICANT CHANGE UP (ref 13–44)
MAGNESIUM SERPL-MCNC: 1.8 MG/DL — SIGNIFICANT CHANGE UP (ref 1.6–2.6)
MCHC RBC-ENTMCNC: 29.7 PG — SIGNIFICANT CHANGE UP (ref 27–34)
MCHC RBC-ENTMCNC: 33.3 GM/DL — SIGNIFICANT CHANGE UP (ref 32–36)
MCV RBC AUTO: 89 FL — SIGNIFICANT CHANGE UP (ref 80–100)
METHOD TYPE: SIGNIFICANT CHANGE UP
MONOCYTES # BLD AUTO: 0.97 K/UL — HIGH (ref 0–0.9)
MONOCYTES NFR BLD AUTO: 10.3 % — SIGNIFICANT CHANGE UP (ref 2–14)
NEUTROPHILS # BLD AUTO: 5.27 K/UL — SIGNIFICANT CHANGE UP (ref 1.8–7.4)
NEUTROPHILS NFR BLD AUTO: 56.2 % — SIGNIFICANT CHANGE UP (ref 43–77)
NRBC # BLD: 0 /100 WBCS — SIGNIFICANT CHANGE UP (ref 0–0)
ORGANISM # SPEC MICROSCOPIC CNT: ABNORMAL
ORGANISM # SPEC MICROSCOPIC CNT: SIGNIFICANT CHANGE UP
PHOSPHATE SERPL-MCNC: 3 MG/DL — SIGNIFICANT CHANGE UP (ref 2.5–4.5)
PLATELET # BLD AUTO: 428 K/UL — HIGH (ref 150–400)
POTASSIUM SERPL-MCNC: 4.2 MMOL/L — SIGNIFICANT CHANGE UP (ref 3.5–5.3)
POTASSIUM SERPL-SCNC: 4.2 MMOL/L — SIGNIFICANT CHANGE UP (ref 3.5–5.3)
RBC # BLD: 4.45 M/UL — SIGNIFICANT CHANGE UP (ref 4.2–5.8)
RBC # FLD: 12.7 % — SIGNIFICANT CHANGE UP (ref 10.3–14.5)
SODIUM SERPL-SCNC: 136 MMOL/L — SIGNIFICANT CHANGE UP (ref 135–145)
SPECIMEN SOURCE: SIGNIFICANT CHANGE UP
VANCOMYCIN TROUGH SERPL-MCNC: 15.6 UG/ML — SIGNIFICANT CHANGE UP (ref 10–20)
WBC # BLD: 9.39 K/UL — SIGNIFICANT CHANGE UP (ref 3.8–10.5)
WBC # FLD AUTO: 9.39 K/UL — SIGNIFICANT CHANGE UP (ref 3.8–10.5)

## 2024-02-17 PROCEDURE — 99232 SBSQ HOSP IP/OBS MODERATE 35: CPT | Mod: GC

## 2024-02-17 RX ADMIN — Medication 10 MILLIGRAM(S): at 17:12

## 2024-02-17 RX ADMIN — INSULIN GLARGINE 16 UNIT(S): 100 INJECTION, SOLUTION SUBCUTANEOUS at 22:54

## 2024-02-17 RX ADMIN — Medication 2: at 18:55

## 2024-02-17 RX ADMIN — SENNA PLUS 2 TABLET(S): 8.6 TABLET ORAL at 21:58

## 2024-02-17 RX ADMIN — Medication 6 UNIT(S): at 09:53

## 2024-02-17 RX ADMIN — Medication 6 UNIT(S): at 13:27

## 2024-02-17 RX ADMIN — POLYETHYLENE GLYCOL 3350 17 GRAM(S): 17 POWDER, FOR SOLUTION ORAL at 17:11

## 2024-02-17 RX ADMIN — PANTOPRAZOLE SODIUM 40 MILLIGRAM(S): 20 TABLET, DELAYED RELEASE ORAL at 12:09

## 2024-02-17 RX ADMIN — Medication 100 MILLIGRAM(S): at 14:46

## 2024-02-17 RX ADMIN — Medication 100 MILLIGRAM(S): at 06:54

## 2024-02-17 RX ADMIN — CEFTRIAXONE 100 MILLIGRAM(S): 500 INJECTION, POWDER, FOR SOLUTION INTRAMUSCULAR; INTRAVENOUS at 17:13

## 2024-02-17 RX ADMIN — Medication 166.67 MILLIGRAM(S): at 12:10

## 2024-02-17 RX ADMIN — Medication 650 MILLIGRAM(S): at 06:54

## 2024-02-17 RX ADMIN — Medication 2: at 09:52

## 2024-02-17 RX ADMIN — Medication 4: at 13:27

## 2024-02-17 RX ADMIN — Medication 6 UNIT(S): at 18:55

## 2024-02-17 RX ADMIN — Medication 166.67 MILLIGRAM(S): at 22:54

## 2024-02-17 RX ADMIN — Medication 166.67 MILLIGRAM(S): at 01:57

## 2024-02-17 RX ADMIN — POLYETHYLENE GLYCOL 3350 17 GRAM(S): 17 POWDER, FOR SOLUTION ORAL at 06:54

## 2024-02-17 RX ADMIN — Medication 100 MILLIGRAM(S): at 22:46

## 2024-02-17 NOTE — PROGRESS NOTE ADULT - PROBLEM SELECTOR PLAN 4
Hx of HTN. Home medications: Losartan 50mg qd   - c/w current management Hx of IDT2DM however patient unable to access his insulin. A1c on admission 10.6. Glucose 258. States he takes Metformin and Jardiance   - c/w mISS  - endocrine consult, f/u recs  - consistent carb diet  - c/w lantus 16, lispro 6

## 2024-02-17 NOTE — PROGRESS NOTE ADULT - SUBJECTIVE AND OBJECTIVE BOX
*****INCOMPLETE NOTE*****    INTERVAL HPI/OVERNIGHT EVENTS:    SUBJECTIVE: Patient seen and examined at bedside, resting comfortably in bed, and does not appear to be in any acute distress. Patient states  Patient denies any recent fevers, chills, nausea, vomiting, headache, acute sob, chest pain, abdominal pain, genitourinary sx, extremity pain or swelling.     ANTIBIOTICS/RELEVANT:    MEDICATIONS  (STANDING):  acetaminophen     Tablet .. 650 milliGRAM(s) Oral every 6 hours  cefTRIAXone   IVPB 2000 milliGRAM(s) IV Intermittent every 24 hours  dextrose 5%. 1000 milliLiter(s) (100 mL/Hr) IV Continuous <Continuous>  dextrose 5%. 1000 milliLiter(s) (50 mL/Hr) IV Continuous <Continuous>  dextrose 50% Injectable 12.5 Gram(s) IV Push once  dextrose 50% Injectable 25 Gram(s) IV Push once  dextrose 50% Injectable 25 Gram(s) IV Push once  glucagon  Injectable 1 milliGRAM(s) IntraMuscular once  insulin glargine Injectable (LANTUS) 16 Unit(s) SubCutaneous at bedtime  insulin lispro (ADMELOG) corrective regimen sliding scale   SubCutaneous three times a day before meals  insulin lispro Injectable (ADMELOG) 6 Unit(s) SubCutaneous three times a day before meals  lidocaine   4% Patch 1 Patch Transdermal daily  metroNIDAZOLE  IVPB 500 milliGRAM(s) IV Intermittent every 8 hours  pantoprazole    Tablet 40 milliGRAM(s) Oral every 12 hours  polyethylene glycol 3350 17 Gram(s) Oral every 12 hours  senna 2 Tablet(s) Oral at bedtime  vancomycin  IVPB 1000 milliGRAM(s) IV Intermittent every 8 hours    MEDICATIONS  (PRN):  dextrose Oral Gel 15 Gram(s) Oral once PRN Blood Glucose LESS THAN 70 milliGRAM(s)/deciliter  oxyCODONE    IR 2.5 milliGRAM(s) Oral two times a day PRN Severe Pain (7 - 10)      Vital Signs Last 24 Hrs  T(C): 36.4 (17 Feb 2024 06:00), Max: 37.7 (16 Feb 2024 20:49)  T(F): 97.5 (17 Feb 2024 06:00), Max: 99.9 (16 Feb 2024 20:49)  HR: 75 (17 Feb 2024 06:00) (71 - 82)  BP: 157/85 (17 Feb 2024 06:00) (117/75 - 161/74)  BP(mean): --  RR: 18 (17 Feb 2024 06:00) (16 - 18)  SpO2: 93% (17 Feb 2024 06:00) (93% - 96%)    Parameters below as of 17 Feb 2024 06:00  Patient On (Oxygen Delivery Method): room air        PHYSICAL EXAM:  General: in no acute distress  Eyes: EOMI intact bilaterally. Anicteric sclerae, moist conjunctivae  HENT: Moist mucous membranes  Neck: Trachea midline, supple  Lungs: CTA B/L. No wheezes, rales, or rhonchi  Cardiovascular: RRR. No murmurs, rubs, or gallops  Abdomen: Soft, non-tender non-distended; No rebound or guarding  Extremities: WWP, No clubbing, cyanosis or edema  Neurological: Alert and oriented  Skin: Warm and dry. No obvious rash     LABS:                        13.0   9.46  )-----------( 421      ( 16 Feb 2024 08:38 )             38.4     02-16    140  |  102  |  19  ----------------------------<  284<H>  4.0   |  27  |  1.19    Ca    8.8      16 Feb 2024 08:38  Phos  2.4     02-16  Mg     2.0     02-16    TPro  7.3  /  Alb  3.3  /  TBili  0.2  /  DBili  x   /  AST  15  /  ALT  14  /  AlkPhos  97  02-16      Urinalysis Basic - ( 16 Feb 2024 08:38 )    Color: x / Appearance: x / SG: x / pH: x  Gluc: 284 mg/dL / Ketone: x  / Bili: x / Urobili: x   Blood: x / Protein: x / Nitrite: x   Leuk Esterase: x / RBC: x / WBC x   Sq Epi: x / Non Sq Epi: x / Bacteria: x        MICROBIOLOGY:    RADIOLOGY & ADDITIONAL STUDIES: INTERVAL HPI/OVERNIGHT EVENTS: alireza     SUBJECTIVE: Patient seen and examined at bedside, resting comfortably in bed, and does not appear to be in any acute distress. Patient states that his abdominal pain is much improved. Had a small bowel movement.    ANTIBIOTICS/RELEVANT:    MEDICATIONS  (STANDING):  acetaminophen     Tablet .. 650 milliGRAM(s) Oral every 6 hours  cefTRIAXone   IVPB 2000 milliGRAM(s) IV Intermittent every 24 hours  dextrose 5%. 1000 milliLiter(s) (100 mL/Hr) IV Continuous <Continuous>  dextrose 5%. 1000 milliLiter(s) (50 mL/Hr) IV Continuous <Continuous>  dextrose 50% Injectable 12.5 Gram(s) IV Push once  dextrose 50% Injectable 25 Gram(s) IV Push once  dextrose 50% Injectable 25 Gram(s) IV Push once  glucagon  Injectable 1 milliGRAM(s) IntraMuscular once  insulin glargine Injectable (LANTUS) 16 Unit(s) SubCutaneous at bedtime  insulin lispro (ADMELOG) corrective regimen sliding scale   SubCutaneous three times a day before meals  insulin lispro Injectable (ADMELOG) 6 Unit(s) SubCutaneous three times a day before meals  lidocaine   4% Patch 1 Patch Transdermal daily  metroNIDAZOLE  IVPB 500 milliGRAM(s) IV Intermittent every 8 hours  pantoprazole    Tablet 40 milliGRAM(s) Oral every 12 hours  polyethylene glycol 3350 17 Gram(s) Oral every 12 hours  senna 2 Tablet(s) Oral at bedtime  vancomycin  IVPB 1000 milliGRAM(s) IV Intermittent every 8 hours    MEDICATIONS  (PRN):  dextrose Oral Gel 15 Gram(s) Oral once PRN Blood Glucose LESS THAN 70 milliGRAM(s)/deciliter  oxyCODONE    IR 2.5 milliGRAM(s) Oral two times a day PRN Severe Pain (7 - 10)      Vital Signs Last 24 Hrs  T(C): 36.4 (17 Feb 2024 06:00), Max: 37.7 (16 Feb 2024 20:49)  T(F): 97.5 (17 Feb 2024 06:00), Max: 99.9 (16 Feb 2024 20:49)  HR: 75 (17 Feb 2024 06:00) (71 - 82)  BP: 157/85 (17 Feb 2024 06:00) (117/75 - 161/74)  BP(mean): --  RR: 18 (17 Feb 2024 06:00) (16 - 18)  SpO2: 93% (17 Feb 2024 06:00) (93% - 96%)    Parameters below as of 17 Feb 2024 06:00  Patient On (Oxygen Delivery Method): room air        PHYSICAL EXAM:  General: in no acute distress  Eyes: EOMI intact bilaterally. Anicteric sclerae, moist conjunctivae  HENT: Moist mucous membranes  Neck: Trachea midline, supple  Lungs: CTA B/L. No wheezes, rales, or rhonchi  Cardiovascular: RRR. No murmurs, rubs, or gallops  Abdomen: Soft, non-tender non-distended; No rebound or guarding  Extremities: WWP, No clubbing, cyanosis or edema  Neurological: Alert and oriented  Skin: Warm and dry. No obvious rash     LABS:                        13.0   9.46  )-----------( 421      ( 16 Feb 2024 08:38 )             38.4     02-16    140  |  102  |  19  ----------------------------<  284<H>  4.0   |  27  |  1.19    Ca    8.8      16 Feb 2024 08:38  Phos  2.4     02-16  Mg     2.0     02-16    TPro  7.3  /  Alb  3.3  /  TBili  0.2  /  DBili  x   /  AST  15  /  ALT  14  /  AlkPhos  97  02-16      Urinalysis Basic - ( 16 Feb 2024 08:38 )    Color: x / Appearance: x / SG: x / pH: x  Gluc: 284 mg/dL / Ketone: x  / Bili: x / Urobili: x   Blood: x / Protein: x / Nitrite: x   Leuk Esterase: x / RBC: x / WBC x   Sq Epi: x / Non Sq Epi: x / Bacteria: x        MICROBIOLOGY:    RADIOLOGY & ADDITIONAL STUDIES: INTERVAL HPI/OVERNIGHT EVENTS: alireza     SUBJECTIVE: Patient seen and examined at bedside, resting comfortably in bed, and does not appear to be in any acute distress. Patient states that his abdominal pain is much improved. Had a small bowel movement.    MEDICATIONS  (STANDING):  acetaminophen     Tablet .. 650 milliGRAM(s) Oral every 6 hours  cefTRIAXone   IVPB 2000 milliGRAM(s) IV Intermittent every 24 hours  dextrose 5%. 1000 milliLiter(s) (100 mL/Hr) IV Continuous <Continuous>  dextrose 5%. 1000 milliLiter(s) (50 mL/Hr) IV Continuous <Continuous>  dextrose 50% Injectable 12.5 Gram(s) IV Push once  dextrose 50% Injectable 25 Gram(s) IV Push once  dextrose 50% Injectable 25 Gram(s) IV Push once  glucagon  Injectable 1 milliGRAM(s) IntraMuscular once  insulin glargine Injectable (LANTUS) 16 Unit(s) SubCutaneous at bedtime  insulin lispro (ADMELOG) corrective regimen sliding scale   SubCutaneous three times a day before meals  insulin lispro Injectable (ADMELOG) 6 Unit(s) SubCutaneous three times a day before meals  lidocaine   4% Patch 1 Patch Transdermal daily  metroNIDAZOLE  IVPB 500 milliGRAM(s) IV Intermittent every 8 hours  pantoprazole    Tablet 40 milliGRAM(s) Oral every 12 hours  polyethylene glycol 3350 17 Gram(s) Oral every 12 hours  senna 2 Tablet(s) Oral at bedtime  vancomycin  IVPB 1000 milliGRAM(s) IV Intermittent every 8 hours    MEDICATIONS  (PRN):  dextrose Oral Gel 15 Gram(s) Oral once PRN Blood Glucose LESS THAN 70 milliGRAM(s)/deciliter  oxyCODONE    IR 2.5 milliGRAM(s) Oral two times a day PRN Severe Pain (7 - 10)    Vital Signs Last 24 Hrs  T(C): 36.4 (17 Feb 2024 06:00), Max: 37.7 (16 Feb 2024 20:49)  T(F): 97.5 (17 Feb 2024 06:00), Max: 99.9 (16 Feb 2024 20:49)  HR: 75 (17 Feb 2024 06:00) (71 - 82)  BP: 157/85 (17 Feb 2024 06:00) (117/75 - 161/74)  BP(mean): --  RR: 18 (17 Feb 2024 06:00) (16 - 18)  SpO2: 93% (17 Feb 2024 06:00) (93% - 96%)    Parameters below as of 17 Feb 2024 06:00  Patient On (Oxygen Delivery Method): room air    PHYSICAL EXAM:  General: in no acute distress  Eyes: EOMI intact bilaterally  HENT: Moist mucous membranes  Neck: Trachea midline  Lungs: CTA B/L  Cardiovascular: RRR  Abdomen: Soft, mildly distended, no pain on palpation  Extremities: WWP, left extremity with swelling,  left hallux swollen with wound on anterior aspect with serosanguinous drainage, erythema improved  Neurological: Alert and oriented  Skin: Warm and dry    LABS:                        13.0   9.46  )-----------( 421      ( 16 Feb 2024 08:38 )             38.4     02-16    140  |  102  |  19  ----------------------------<  284<H>  4.0   |  27  |  1.19    Ca    8.8      16 Feb 2024 08:38  Phos  2.4     02-16  Mg     2.0     02-16    TPro  7.3  /  Alb  3.3  /  TBili  0.2  /  DBili  x   /  AST  15  /  ALT  14  /  AlkPhos  97  02-16      Urinalysis Basic - ( 16 Feb 2024 08:38 )    Color: x / Appearance: x / SG: x / pH: x  Gluc: 284 mg/dL / Ketone: x  / Bili: x / Urobili: x   Blood: x / Protein: x / Nitrite: x   Leuk Esterase: x / RBC: x / WBC x   Sq Epi: x / Non Sq Epi: x / Bacteria: x

## 2024-02-17 NOTE — PROGRESS NOTE ADULT - ATTENDING COMMENTS
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  There is no change in the cells on her cervix.  We can be less aggressive because of that.  She does have an infection for bacterial vaginosis.  A prescription is sent to her pharmacy for that.  We will repeat her Pap smear in 3 months.   Patient was seen and examined at bedside on 2/17/2024 at 1130 am. Patient reports feeling improved but still has continued abdominal pain. Denies SOB, CP, N/V. ROS is otherwise negative. Vitals, labwork and pertinent imaging reviewed. Exam - NAD, AAO x 4, PERRLA, EOMI, MMM, supple neck, chest - CTA b/l,, CV - rrr, s1s2, no m/r/g, abd - soft, NTND, + BS, back - midline, ext - wwp, wound at distal hallux, LUE - hand swollen, psych - normal affect    Plan:  -Abd pain - check AXR  -CRP is downtrending  -ID consulted given findings c/w Osteomyelitis, rec Vancomycin, Flagyl, CTX  -If abd pain does not improve will consider GI consult given use of Motrin and EtOH

## 2024-02-17 NOTE — PROGRESS NOTE ADULT - PROBLEM SELECTOR PLAN 3
Hx of IDT2DM however patient unable to access his insulin. A1c on admission 10.6. Glucose 258. States he takes Metformin and Jardiance   - c/w mISS  - endocrine consult in AM for T2DM management  - consistent carb diet  - per weight base, will begin patient on Lispro 4 units pre-meal Hx of IDT2DM however patient unable to access his insulin. A1c on admission 10.6. Glucose 258. States he takes Metformin and Jardiance   - c/w mISS  - endocrine consult, f/u recs  - consistent carb diet  - c/w lantus 16, lispro 6 Hx of multiple nsaids    - c/w ppi    #Constipation  AXR with stool burden    - aggressive bowel regimen

## 2024-02-17 NOTE — PROGRESS NOTE ADULT - SUBJECTIVE AND OBJECTIVE BOX
Patient is a 48y old  Male who presents with a chief complaint of osteomyelitis (17 Feb 2024 06:29)      INTERVAL HPI/ OVERNIGHT EVENTS Pt was seen bedside during AM rounds. Pt was resting comfortably in bed. Dressings were found to be clean, dry, and intact. No other pedal complaints today.       LABS                        13.2   9.39  )-----------( 428      ( 17 Feb 2024 06:37 )             39.6     02-17    136  |  99  |  20  ----------------------------<  231<H>  4.2   |  26  |  1.07    Ca    9.2      17 Feb 2024 06:37  Phos  3.0     02-17  Mg     1.8     02-17    TPro  7.3  /  Alb  3.3  /  TBili  0.2  /  DBili  x   /  AST  15  /  ALT  14  /  AlkPhos  97  02-16        ICU Vital Signs Last 24 Hrs  T(C): 36.4 (17 Feb 2024 06:00), Max: 37.7 (16 Feb 2024 20:49)  T(F): 97.5 (17 Feb 2024 06:00), Max: 99.9 (16 Feb 2024 20:49)  HR: 75 (17 Feb 2024 06:00) (71 - 82)  BP: 157/85 (17 Feb 2024 06:00) (154/91 - 161/74)  BP(mean): --  ABP: --  ABP(mean): --  RR: 18 (17 Feb 2024 06:00) (16 - 18)  SpO2: 93% (17 Feb 2024 06:00) (93% - 96%)    O2 Parameters below as of 17 Feb 2024 06:00  Patient On (Oxygen Delivery Method): room air      RADIOLOGY  < from: MR Foot w/wo IV Cont, Left (02.15.24 @ 21:34) >    INTERPRETATION:  History: Nonhealing hallux ulcer. Evaluation for   osteomyelitis.    Technique: Multiplanar and multisequence MRI images were obtained through   the left forefoot without and with contrast. Approximately 9 cc   intravenous Gadavist was administered.    Comparison: Left foot radiographs dated 2/14/2024    Findings:    Evaluation limited due to motion artifact.    Soft tissue ulcer along the distal aspect of the first toe. Marrow edema   with T1 marrow signal abnormality throughout the first distal phalanx,   most consistent with osteomyelitis. Small joint effusion with synovitis   within the first interphalangeal joint, which may be reactive or be   related to septic arthritis of the first interphalangeal joint.    Mild marrow edema within the proximal aspect of the second and third   metatarsals, which may represent stress reaction. Mild marrow edema   within the intermediate/lateral cuneiforms, which may be traction-related   or be related to stress reaction.    Osteoarthritic changes within the first metatarsophalangeal joint.    Lisfranc ligamentous complex is grossly intact. Mild edema with mild   fatty infiltration throughout the intrinsic muscles of the forefoot,   which may be related to disuse. Small second and third webspace   interdigital neuromas.    Nonspecific subcutaneous edema about the dorsal foot, which may be   related to lower extremity edema or cellulitis. No drainable fluid   collection seen within the soft tissues.    Impression:    Suggestion of a soft tissue tissue ulcer along the distal aspect of the   first toe with findings most consistent with osteomyelitis of the first   distalphalanx. Small joint effusion with synovitis within the first   interphalangeal joint, which may be reactive or be related to septic   arthritis of the first interphalangeal joint.    Nonspecific subcutaneous edema about the dorsal foot, which may be  related to lower extremity edema or cellulitis. No drainable fluid   collection seen within the soft tissues.    Mild marrow edema within the proximal aspect of the second and third   metatarsals, which may represent stress reaction. Mild marrow edema   within the intermediate/lateral cuneiforms, which may be traction-related   or be related to stress reaction.    Other findings as above.    --- End of Report ---    < end of copied text >    < from: Xray Foot AP + Lateral + Oblique, Left (02.14.24 @ 13:56) >      INTERPRETATION:  HISTORY: Toe ulceration in the setting of diabetes.    TECHNIQUE: 3 views of the left foot.    COMPARISON: Bilateral foot radiographs dated 2/1/2022    IMPRESSION:    Suggestion of a soft tissue ulcer along the distal aspect of the first   toe. No definite cortical erosion or periostitis to suggest   osteomyelitis. However, MRI would be a more sensitive test to evaluate   for osteomyelitis.    No acute displaced fracture or dislocation. Osteoarthritic changes seen   throughout the forefoot.    Soft tissue swelling about the visualized foot. Vascular calcifications   noted.    --- End of Report ---    < end of copied text >      MICROBIOLOGY  Culture - Tissue with Gram Stain (02.16.24 @ 11:53)    Gram Stain:   No organisms seen  No White blood cells   Specimen Source: .Tissue Other, L foot bone   Culture Results:   No growth to date    Culture - Other (02.15.24 @ 12:10)    -  Erythromycin: R >4   -  Linezolid: S 2   -  Oxacillin: R >2   -  Penicillin: S 0.064   -  Rifampin: S <=1 Should not be used as monotherapy   -  Tetracycline: S <=1   -  Trimethoprim/Sulfamethoxazole: S <=0.5/9.5   -  Vancomycin: S 2   Gram Stain:   Few Gram positive cocci in pairs  Moderate White blood cells   -  Ceftriaxone: S 0.25   -  Clindamycin: S <=0.25   -  Daptomycin: S 1   Specimen Source: Wound Wound   Culture Results:   Moderate to Numerous Streptococcus intermedius  Rare to few Methicillin Resistant Staphylococcus aureus  Floor previously notified.  Culture in progress   Organism Identification: Streptococcus intermedius  Streptococcus intermedius  Methicillin resistant Staphylococcus aureus  Methicillin resistant Staphylococcus aureus   Organism: Methicillin resistant Staphylococcus aureus   Organism: Streptococcus intermedius   Organism: Methicillin resistant Staphylococcus aureus   Organism: Streptococcus intermedius   Method Type: YUN   Method Type: ETEST   Method Type: ETEST   Method Type: KB      PHYSICAL EXAM  Lower Extremity Focused  Vasc: 2/4 PT&DP, TG warm to warm, improved localized edema noted at the site of the wound, (-)erythema   Derm: ~2.4cmx2.3cm wound at the distal aspect of hallux, macerated tissue noted over the wound bed and hyperkeratotic periwound. Post debridement showed granulation tissue and Sero Sang drainage (+)PTB - noticeable fibrotic tissue covering deep would, (+)undermining, (-) malodor  Neuro: Protective sensation diminished   MSK: Muscle strength 5/5 in all compartments

## 2024-02-17 NOTE — PROGRESS NOTE ADULT - PROBLEM SELECTOR PLAN 2
Pt with hx of R foot ulcer. seen by podiatry in May 2022 and treated for OM and underwent R foot TMA w/ skin graft. Pt presenting now for chronic L hallux foot wound that began in Oct 2023. WBC 14.31, ESR 40, CRP 73.1. Per Podiatry, no concerns for gas or acute fx on imaging however cannot rule out OM. S/p Vanc 1g and Zosyn 3.375mg IV x1 in ED.  - c/w broad spectrum abx -- Vancomycin 1250g IV q8hr  -- trough on 2/14 @ 6pm  and Zosyn 4.5 (pseudomonal dosing) q8hr IV   - f/u MR Foot w/ contrast   - patient underwent excisional debridement down to and including subcutaneous tissue at bedside with serosanguinous drainage exposed. Cultures sent - f/u wound cx  - wound care: continue with WTD dressing, gauze and Kerlix per podiatry note  - continue to f/u podiatry recommendations for further management Pt with hx of R foot ulcer. seen by podiatry in May 2022 and treated for OM and underwent R foot TMA w/ skin graft. Pt presenting now for chronic L hallux foot wound that began in Oct 2023. WBC 14.31, ESR 40, CRP 73.1. Per Podiatry, no concerns for gas or acute fx on imaging however cannot rule out OM. S/p Vanc 1g and Zosyn 3.375mg IV x1 in ED.  patient underwent excisional debridement down to and including subcutaneous tissue at bedside with serosanguinous drainage exposed. Cultures sent - f/u wound cx  MR with osteomyleitis  bone biopsy 2/16  - ID consulted, f/u recs  - c/w Vancomycin 1000 IV q8hr  - c/w ctx  - wound care: continue with WTD dressing, gauze and Kerlix per podiatry note    - continue to f/u podiatry recommendations for further management

## 2024-02-17 NOTE — PROGRESS NOTE ADULT - ASSESSMENT
Podiatry was consulted for a L hallux wound that began back in October 2023. At the time of consult WBC 14.31 ESR 40 CRP 73.1. Imaging showed no obvious signs of OM, GAS, or acute fx. Based on the clinical presentation OM cannot be r/o at this time. Pt can benefit from being admitted for further OM workup and IV abx. Discussed possible treatment options with the pt and he stated that he would like to try long term IV abx as a treatment option before considering podiatric surgical intervention.       Plan:   F/u bone cx and path   Pending PICC on d/c   Appreciate ID reccs    C/w IV abx     F/u ED cx taken post debridement  Wound care: Area wrapped with WTD dressing, gauze, and kerlix   L foot heel WBAT, R foot WBAT     Plan discussed with attending, podiatry will follow      Discharge dressing instructions: Cleanse wound with normal saline daily, pat dry with sterile guaze, apply saline soaked gauze to wound base, cover with dry sterile gauze, ABD pad, wrap with Kerlix and light ACE bandage.     Patient should follow up with Dr. Maximiliano Green within 1 week of discharge.    Office information:          [facility] Address- [address], [place] [address] Phone: [phone]         [facility] Address- [address] [place] [address] Phone: [phone]         Podiatry was consulted for a L hallux wound that began back in October 2023. At the time of consult WBC 14.31 ESR 40 CRP 73.1. Imaging showed no obvious signs of OM, GAS, or acute fx. Based on the clinical presentation OM cannot be r/o at this time. Pt can benefit from being admitted for further OM workup and IV abx. Discussed possible treatment options with the pt and he stated that he would like to try long term IV abx as a treatment option before considering podiatric surgical intervention.       Plan:   Patient will be seen by podiatry team M/W/F   F/u bone cx and path   Pending PICC on d/c   Appreciate ID reccs    C/w IV abx     F/u ED cx taken post debridement  Wound care: Area wrapped with WTD dressing, gauze, and kerlix   L foot heel WBAT, R foot WBAT     Plan discussed with attending, podiatry will follow      Discharge dressing instructions: Cleanse wound with normal saline daily, pat dry with sterile guaze, apply saline soaked gauze to wound base, cover with dry sterile gauze, ABD pad, wrap with Kerlix and light ACE bandage.     Patient should follow up with Dr. Maximiliano Green within 1 week of discharge.    Office information:          [facility] Address- [address], [place] [address] Phone: [phone]         [facility] Address- [address] [place] [address] Phone: [phone]

## 2024-02-18 DIAGNOSIS — E11.65 TYPE 2 DIABETES MELLITUS WITH HYPERGLYCEMIA: ICD-10-CM

## 2024-02-18 LAB
ANION GAP SERPL CALC-SCNC: 9 MMOL/L — SIGNIFICANT CHANGE UP (ref 5–17)
BASOPHILS # BLD AUTO: 0.06 K/UL — SIGNIFICANT CHANGE UP (ref 0–0.2)
BASOPHILS NFR BLD AUTO: 0.6 % — SIGNIFICANT CHANGE UP (ref 0–2)
BUN SERPL-MCNC: 16 MG/DL — SIGNIFICANT CHANGE UP (ref 7–23)
CALCIUM SERPL-MCNC: 9.7 MG/DL — SIGNIFICANT CHANGE UP (ref 8.4–10.5)
CHLORIDE SERPL-SCNC: 99 MMOL/L — SIGNIFICANT CHANGE UP (ref 96–108)
CO2 SERPL-SCNC: 27 MMOL/L — SIGNIFICANT CHANGE UP (ref 22–31)
CREAT SERPL-MCNC: 0.96 MG/DL — SIGNIFICANT CHANGE UP (ref 0.5–1.3)
EGFR: 98 ML/MIN/1.73M2 — SIGNIFICANT CHANGE UP
EOSINOPHIL # BLD AUTO: 0.23 K/UL — SIGNIFICANT CHANGE UP (ref 0–0.5)
EOSINOPHIL NFR BLD AUTO: 2.1 % — SIGNIFICANT CHANGE UP (ref 0–6)
GLUCOSE BLDC GLUCOMTR-MCNC: 127 MG/DL — HIGH (ref 70–99)
GLUCOSE BLDC GLUCOMTR-MCNC: 151 MG/DL — HIGH (ref 70–99)
GLUCOSE BLDC GLUCOMTR-MCNC: 214 MG/DL — HIGH (ref 70–99)
GLUCOSE BLDC GLUCOMTR-MCNC: 270 MG/DL — HIGH (ref 70–99)
GLUCOSE SERPL-MCNC: 269 MG/DL — HIGH (ref 70–99)
GRAM STN FLD: ABNORMAL
HCT VFR BLD CALC: 41.9 % — SIGNIFICANT CHANGE UP (ref 39–50)
HGB BLD-MCNC: 13.9 G/DL — SIGNIFICANT CHANGE UP (ref 13–17)
IMM GRANULOCYTES NFR BLD AUTO: 0.3 % — SIGNIFICANT CHANGE UP (ref 0–0.9)
LYMPHOCYTES # BLD AUTO: 2.64 K/UL — SIGNIFICANT CHANGE UP (ref 1–3.3)
LYMPHOCYTES # BLD AUTO: 24.6 % — SIGNIFICANT CHANGE UP (ref 13–44)
MAGNESIUM SERPL-MCNC: 1.7 MG/DL — SIGNIFICANT CHANGE UP (ref 1.6–2.6)
MCHC RBC-ENTMCNC: 29.4 PG — SIGNIFICANT CHANGE UP (ref 27–34)
MCHC RBC-ENTMCNC: 33.2 GM/DL — SIGNIFICANT CHANGE UP (ref 32–36)
MCV RBC AUTO: 88.8 FL — SIGNIFICANT CHANGE UP (ref 80–100)
MONOCYTES # BLD AUTO: 0.86 K/UL — SIGNIFICANT CHANGE UP (ref 0–0.9)
MONOCYTES NFR BLD AUTO: 8 % — SIGNIFICANT CHANGE UP (ref 2–14)
NEUTROPHILS # BLD AUTO: 6.93 K/UL — SIGNIFICANT CHANGE UP (ref 1.8–7.4)
NEUTROPHILS NFR BLD AUTO: 64.4 % — SIGNIFICANT CHANGE UP (ref 43–77)
NRBC # BLD: 0 /100 WBCS — SIGNIFICANT CHANGE UP (ref 0–0)
PHOSPHATE SERPL-MCNC: 2.6 MG/DL — SIGNIFICANT CHANGE UP (ref 2.5–4.5)
PLATELET # BLD AUTO: 528 K/UL — HIGH (ref 150–400)
POTASSIUM SERPL-MCNC: 4.5 MMOL/L — SIGNIFICANT CHANGE UP (ref 3.5–5.3)
POTASSIUM SERPL-SCNC: 4.5 MMOL/L — SIGNIFICANT CHANGE UP (ref 3.5–5.3)
RBC # BLD: 4.72 M/UL — SIGNIFICANT CHANGE UP (ref 4.2–5.8)
RBC # FLD: 12.5 % — SIGNIFICANT CHANGE UP (ref 10.3–14.5)
SODIUM SERPL-SCNC: 135 MMOL/L — SIGNIFICANT CHANGE UP (ref 135–145)
VANCOMYCIN TROUGH SERPL-MCNC: 10.9 UG/ML — SIGNIFICANT CHANGE UP (ref 10–20)
VANCOMYCIN TROUGH SERPL-MCNC: 15.3 UG/ML — SIGNIFICANT CHANGE UP (ref 10–20)
WBC # BLD: 10.75 K/UL — HIGH (ref 3.8–10.5)
WBC # FLD AUTO: 10.75 K/UL — HIGH (ref 3.8–10.5)

## 2024-02-18 PROCEDURE — 99232 SBSQ HOSP IP/OBS MODERATE 35: CPT | Mod: GC

## 2024-02-18 PROCEDURE — 74018 RADEX ABDOMEN 1 VIEW: CPT | Mod: 26

## 2024-02-18 RX ORDER — SOD SULF/SODIUM/NAHCO3/KCL/PEG
1000 SOLUTION, RECONSTITUTED, ORAL ORAL ONCE
Refills: 0 | Status: COMPLETED | OUTPATIENT
Start: 2024-02-18 | End: 2024-02-18

## 2024-02-18 RX ORDER — VANCOMYCIN HCL 1 G
1000 VIAL (EA) INTRAVENOUS EVERY 8 HOURS
Refills: 0 | Status: COMPLETED | OUTPATIENT
Start: 2024-02-18 | End: 2024-02-19

## 2024-02-18 RX ORDER — MAGNESIUM SULFATE 500 MG/ML
2 VIAL (ML) INJECTION ONCE
Refills: 0 | Status: COMPLETED | OUTPATIENT
Start: 2024-02-18 | End: 2024-02-18

## 2024-02-18 RX ADMIN — Medication 250 MILLIGRAM(S): at 15:09

## 2024-02-18 RX ADMIN — Medication 650 MILLIGRAM(S): at 15:08

## 2024-02-18 RX ADMIN — PANTOPRAZOLE SODIUM 40 MILLIGRAM(S): 20 TABLET, DELAYED RELEASE ORAL at 00:02

## 2024-02-18 RX ADMIN — Medication 100 MILLIGRAM(S): at 13:08

## 2024-02-18 RX ADMIN — Medication 1000 MILLILITER(S): at 15:46

## 2024-02-18 RX ADMIN — Medication 6 UNIT(S): at 13:09

## 2024-02-18 RX ADMIN — Medication 6 UNIT(S): at 18:22

## 2024-02-18 RX ADMIN — Medication 650 MILLIGRAM(S): at 00:03

## 2024-02-18 RX ADMIN — Medication 25 GRAM(S): at 10:07

## 2024-02-18 RX ADMIN — Medication 10 MILLIGRAM(S): at 08:15

## 2024-02-18 RX ADMIN — INSULIN GLARGINE 16 UNIT(S): 100 INJECTION, SOLUTION SUBCUTANEOUS at 22:15

## 2024-02-18 RX ADMIN — SENNA PLUS 2 TABLET(S): 8.6 TABLET ORAL at 22:16

## 2024-02-18 RX ADMIN — Medication 6 UNIT(S): at 10:07

## 2024-02-18 RX ADMIN — CEFTRIAXONE 100 MILLIGRAM(S): 500 INJECTION, POWDER, FOR SOLUTION INTRAMUSCULAR; INTRAVENOUS at 18:22

## 2024-02-18 RX ADMIN — Medication 4: at 10:06

## 2024-02-18 RX ADMIN — Medication 6: at 13:08

## 2024-02-18 RX ADMIN — PANTOPRAZOLE SODIUM 40 MILLIGRAM(S): 20 TABLET, DELAYED RELEASE ORAL at 13:09

## 2024-02-18 RX ADMIN — POLYETHYLENE GLYCOL 3350 17 GRAM(S): 17 POWDER, FOR SOLUTION ORAL at 07:01

## 2024-02-18 RX ADMIN — Medication 100 MILLIGRAM(S): at 22:16

## 2024-02-18 RX ADMIN — Medication 650 MILLIGRAM(S): at 17:00

## 2024-02-18 RX ADMIN — Medication 650 MILLIGRAM(S): at 00:33

## 2024-02-18 RX ADMIN — Medication 100 MILLIGRAM(S): at 07:01

## 2024-02-18 RX ADMIN — Medication 5 MILLIGRAM(S): at 22:16

## 2024-02-18 NOTE — PROGRESS NOTE ADULT - PROBLEM SELECTOR PLAN 4
Hx of IDT2DM however patient unable to access his insulin. A1c on admission 10.6. Glucose 258. States he takes Metformin and Jardiance   - c/w mISS  - endocrine consult, f/u recs  - consistent carb diet  - c/w lantus 16, lispro 6

## 2024-02-18 NOTE — PROGRESS NOTE ADULT - PROBLEM SELECTOR PLAN 2
Pt with hx of R foot ulcer. seen by podiatry in May 2022 and treated for OM and underwent R foot TMA w/ skin graft. Pt presenting now for chronic L hallux foot wound that began in Oct 2023. WBC 14.31, ESR 40, CRP 73.1. Per Podiatry, no concerns for gas or acute fx on imaging however cannot rule out OM. S/p Vanc 1g and Zosyn 3.375mg IV x1 in ED.  patient underwent excisional debridement down to and including subcutaneous tissue at bedside with serosanguinous drainage exposed. Cultures sent - f/u wound cx  MR with osteomyleitis  bone biopsy 2/16  - ID consulted, f/u recs  - c/w Vancomycin 1000 IV q8hr  - c/w ctx  - wound care: continue with WTD dressing, gauze and Kerlix per podiatry note    - continue to f/u podiatry recommendations for further management

## 2024-02-18 NOTE — PROGRESS NOTE ADULT - PROBLEM SELECTOR PLAN 3
Hx of multiple nsaids    - c/w ppi    #Constipation  AXR with stool burden    - c/w aggressive bowel regimen

## 2024-02-18 NOTE — PROGRESS NOTE ADULT - SUBJECTIVE AND OBJECTIVE BOX
INTERVAL HPI/OVERNIGHT EVENTS: alireza     SUBJECTIVE: Patient seen and examined at bedside, resting comfortably in bed, and does not appear to be in any acute distress. Patient states that his abdominal pain is improved. Had a small bowel movement. Denies SOB, CP. ROS is otherwise negative.         MEDICATIONS  (STANDING):  acetaminophen     Tablet .. 650 milliGRAM(s) Oral every 6 hours  cefTRIAXone   IVPB 2000 milliGRAM(s) IV Intermittent every 24 hours  dextrose 5%. 1000 milliLiter(s) (100 mL/Hr) IV Continuous <Continuous>  dextrose 5%. 1000 milliLiter(s) (50 mL/Hr) IV Continuous <Continuous>  dextrose 50% Injectable 25 Gram(s) IV Push once  dextrose 50% Injectable 25 Gram(s) IV Push once  dextrose 50% Injectable 12.5 Gram(s) IV Push once  glucagon  Injectable 1 milliGRAM(s) IntraMuscular once  insulin glargine Injectable (LANTUS) 16 Unit(s) SubCutaneous at bedtime  insulin lispro (ADMELOG) corrective regimen sliding scale   SubCutaneous three times a day before meals  insulin lispro Injectable (ADMELOG) 6 Unit(s) SubCutaneous three times a day before meals  lidocaine   4% Patch 1 Patch Transdermal daily  metroNIDAZOLE  IVPB 500 milliGRAM(s) IV Intermittent every 8 hours  pantoprazole    Tablet 40 milliGRAM(s) Oral every 12 hours  polyethylene glycol 3350 17 Gram(s) Oral every 12 hours  senna 2 Tablet(s) Oral at bedtime  vancomycin  IVPB 1000 milliGRAM(s) IV Intermittent every 8 hours    MEDICATIONS  (PRN):  dextrose Oral Gel 15 Gram(s) Oral once PRN Blood Glucose LESS THAN 70 milliGRAM(s)/deciliter        Vital Signs Last 24 Hrs  T(C): 37.1 (18 Feb 2024 09:45), Max: 37.1 (17 Feb 2024 15:35)  T(F): 98.8 (18 Feb 2024 09:45), Max: 98.8 (17 Feb 2024 15:35)  HR: 79 (18 Feb 2024 09:45) (76 - 80)  BP: 155/90 (18 Feb 2024 09:45) (118/71 - 184/99)  BP(mean): --  RR: 18 (18 Feb 2024 09:45) (18 - 20)  SpO2: 97% (18 Feb 2024 09:45) (96% - 100%)    Parameters below as of 18 Feb 2024 09:45  Patient On (Oxygen Delivery Method): room air            PHYSICAL EXAM:  General: in no acute distress  Eyes: EOMI intact bilaterally  HENT: Moist mucous membranes  Neck: Trachea midline  Lungs: CTA B/L  Cardiovascular: RRR  Abdomen: Soft, mildly distended, no pain on palpation  Extremities: WWP, left extremity with swelling,  left hallux swollen with wound on anterior aspect with serosanguinous drainage, erythema improved  Neurological: Alert and oriented  Skin: Warm and dry  Psych: normal affect    LABS:                                   13.9   10.75 )-----------( 528      ( 18 Feb 2024 05:30 )             41.9   02-18    135  |  99  |  16  ----------------------------<  269<H>  4.5   |  27  |  0.96    Ca    9.7      18 Feb 2024 05:30  Phos  2.6     02-18  Mg     1.7     02-18            Urinalysis Basic - ( 16 Feb 2024 08:38 )    Color: x / Appearance: x / SG: x / pH: x  Gluc: 284 mg/dL / Ketone: x  / Bili: x / Urobili: x   Blood: x / Protein: x / Nitrite: x   Leuk Esterase: x / RBC: x / WBC x   Sq Epi: x / Non Sq Epi: x / Bacteria: x

## 2024-02-19 LAB
-  CLINDAMYCIN: SIGNIFICANT CHANGE UP
-  CLINDAMYCIN: SIGNIFICANT CHANGE UP
-  ERYTHROMYCIN: SIGNIFICANT CHANGE UP
-  ERYTHROMYCIN: SIGNIFICANT CHANGE UP
-  LINEZOLID: SIGNIFICANT CHANGE UP
-  LINEZOLID: SIGNIFICANT CHANGE UP
-  OXACILLIN: SIGNIFICANT CHANGE UP
-  OXACILLIN: SIGNIFICANT CHANGE UP
-  RIFAMPIN: SIGNIFICANT CHANGE UP
-  RIFAMPIN: SIGNIFICANT CHANGE UP
-  TRIMETHOPRIM/SULFAMETHOXAZOLE: SIGNIFICANT CHANGE UP
-  TRIMETHOPRIM/SULFAMETHOXAZOLE: SIGNIFICANT CHANGE UP
-  VANCOMYCIN: SIGNIFICANT CHANGE UP
-  VANCOMYCIN: SIGNIFICANT CHANGE UP
ADD ON TEST-SPECIMEN IN LAB: SIGNIFICANT CHANGE UP
ALBUMIN SERPL ELPH-MCNC: 3.3 G/DL — SIGNIFICANT CHANGE UP (ref 3.3–5)
ALP SERPL-CCNC: 87 U/L — SIGNIFICANT CHANGE UP (ref 40–120)
ALT FLD-CCNC: 18 U/L — SIGNIFICANT CHANGE UP (ref 10–45)
ANION GAP SERPL CALC-SCNC: 9 MMOL/L — SIGNIFICANT CHANGE UP (ref 5–17)
AST SERPL-CCNC: 22 U/L — SIGNIFICANT CHANGE UP (ref 10–40)
BASOPHILS # BLD AUTO: 0.06 K/UL — SIGNIFICANT CHANGE UP (ref 0–0.2)
BASOPHILS NFR BLD AUTO: 0.6 % — SIGNIFICANT CHANGE UP (ref 0–2)
BILIRUB SERPL-MCNC: 0.2 MG/DL — SIGNIFICANT CHANGE UP (ref 0.2–1.2)
BUN SERPL-MCNC: 15 MG/DL — SIGNIFICANT CHANGE UP (ref 7–23)
CALCIUM SERPL-MCNC: 9.3 MG/DL — SIGNIFICANT CHANGE UP (ref 8.4–10.5)
CHLORIDE SERPL-SCNC: 100 MMOL/L — SIGNIFICANT CHANGE UP (ref 96–108)
CO2 SERPL-SCNC: 25 MMOL/L — SIGNIFICANT CHANGE UP (ref 22–31)
CREAT SERPL-MCNC: 0.84 MG/DL — SIGNIFICANT CHANGE UP (ref 0.5–1.3)
CRP SERPL-MCNC: 45.3 MG/L — HIGH (ref 0–4)
CULTURE RESULTS: ABNORMAL
CULTURE RESULTS: SIGNIFICANT CHANGE UP
CULTURE RESULTS: SIGNIFICANT CHANGE UP
EGFR: 108 ML/MIN/1.73M2 — SIGNIFICANT CHANGE UP
EOSINOPHIL # BLD AUTO: 0.22 K/UL — SIGNIFICANT CHANGE UP (ref 0–0.5)
EOSINOPHIL NFR BLD AUTO: 2.3 % — SIGNIFICANT CHANGE UP (ref 0–6)
ERYTHROCYTE [SEDIMENTATION RATE] IN BLOOD: 46 MM/HR — HIGH
GLUCOSE BLDC GLUCOMTR-MCNC: 182 MG/DL — HIGH (ref 70–99)
GLUCOSE BLDC GLUCOMTR-MCNC: 193 MG/DL — HIGH (ref 70–99)
GLUCOSE BLDC GLUCOMTR-MCNC: 200 MG/DL — HIGH (ref 70–99)
GLUCOSE BLDC GLUCOMTR-MCNC: 217 MG/DL — HIGH (ref 70–99)
GLUCOSE SERPL-MCNC: 254 MG/DL — HIGH (ref 70–99)
HCT VFR BLD CALC: 39.9 % — SIGNIFICANT CHANGE UP (ref 39–50)
HGB BLD-MCNC: 13.5 G/DL — SIGNIFICANT CHANGE UP (ref 13–17)
IMM GRANULOCYTES NFR BLD AUTO: 0.1 % — SIGNIFICANT CHANGE UP (ref 0–0.9)
LYMPHOCYTES # BLD AUTO: 2.55 K/UL — SIGNIFICANT CHANGE UP (ref 1–3.3)
LYMPHOCYTES # BLD AUTO: 27 % — SIGNIFICANT CHANGE UP (ref 13–44)
MAGNESIUM SERPL-MCNC: 2 MG/DL — SIGNIFICANT CHANGE UP (ref 1.6–2.6)
MCHC RBC-ENTMCNC: 29.7 PG — SIGNIFICANT CHANGE UP (ref 27–34)
MCHC RBC-ENTMCNC: 33.8 GM/DL — SIGNIFICANT CHANGE UP (ref 32–36)
MCV RBC AUTO: 87.9 FL — SIGNIFICANT CHANGE UP (ref 80–100)
METHOD TYPE: SIGNIFICANT CHANGE UP
METHOD TYPE: SIGNIFICANT CHANGE UP
MONOCYTES # BLD AUTO: 0.85 K/UL — SIGNIFICANT CHANGE UP (ref 0–0.9)
MONOCYTES NFR BLD AUTO: 9 % — SIGNIFICANT CHANGE UP (ref 2–14)
NEUTROPHILS # BLD AUTO: 5.76 K/UL — SIGNIFICANT CHANGE UP (ref 1.8–7.4)
NEUTROPHILS NFR BLD AUTO: 61 % — SIGNIFICANT CHANGE UP (ref 43–77)
NRBC # BLD: 0 /100 WBCS — SIGNIFICANT CHANGE UP (ref 0–0)
ORGANISM # SPEC MICROSCOPIC CNT: ABNORMAL
ORGANISM # SPEC MICROSCOPIC CNT: ABNORMAL
ORGANISM # SPEC MICROSCOPIC CNT: SIGNIFICANT CHANGE UP
PHOSPHATE SERPL-MCNC: 2.8 MG/DL — SIGNIFICANT CHANGE UP (ref 2.5–4.5)
PLATELET # BLD AUTO: 503 K/UL — HIGH (ref 150–400)
POTASSIUM SERPL-MCNC: 4.3 MMOL/L — SIGNIFICANT CHANGE UP (ref 3.5–5.3)
POTASSIUM SERPL-SCNC: 4.3 MMOL/L — SIGNIFICANT CHANGE UP (ref 3.5–5.3)
PROT SERPL-MCNC: 7.4 G/DL — SIGNIFICANT CHANGE UP (ref 6–8.3)
RBC # BLD: 4.54 M/UL — SIGNIFICANT CHANGE UP (ref 4.2–5.8)
RBC # FLD: 12.8 % — SIGNIFICANT CHANGE UP (ref 10.3–14.5)
SODIUM SERPL-SCNC: 134 MMOL/L — LOW (ref 135–145)
SPECIMEN SOURCE: SIGNIFICANT CHANGE UP
SURGICAL PATHOLOGY STUDY: SIGNIFICANT CHANGE UP
VANCOMYCIN TROUGH SERPL-MCNC: 12.3 UG/ML — SIGNIFICANT CHANGE UP (ref 10–20)
WBC # BLD: 9.45 K/UL — SIGNIFICANT CHANGE UP (ref 3.8–10.5)
WBC # FLD AUTO: 9.45 K/UL — SIGNIFICANT CHANGE UP (ref 3.8–10.5)

## 2024-02-19 PROCEDURE — 99232 SBSQ HOSP IP/OBS MODERATE 35: CPT | Mod: GC

## 2024-02-19 PROCEDURE — 74018 RADEX ABDOMEN 1 VIEW: CPT | Mod: 26

## 2024-02-19 RX ORDER — SODIUM,POTASSIUM PHOSPHATES 278-250MG
1 POWDER IN PACKET (EA) ORAL ONCE
Refills: 0 | Status: COMPLETED | OUTPATIENT
Start: 2024-02-19 | End: 2024-02-19

## 2024-02-19 RX ORDER — VANCOMYCIN HCL 1 G
1000 VIAL (EA) INTRAVENOUS EVERY 8 HOURS
Refills: 0 | Status: COMPLETED | OUTPATIENT
Start: 2024-02-19 | End: 2024-02-20

## 2024-02-19 RX ADMIN — Medication 650 MILLIGRAM(S): at 08:00

## 2024-02-19 RX ADMIN — Medication 100 MILLIGRAM(S): at 13:35

## 2024-02-19 RX ADMIN — Medication 250 MILLIGRAM(S): at 00:39

## 2024-02-19 RX ADMIN — SENNA PLUS 2 TABLET(S): 8.6 TABLET ORAL at 21:52

## 2024-02-19 RX ADMIN — Medication 250 MILLIGRAM(S): at 18:01

## 2024-02-19 RX ADMIN — Medication 650 MILLIGRAM(S): at 11:32

## 2024-02-19 RX ADMIN — POLYETHYLENE GLYCOL 3350 17 GRAM(S): 17 POWDER, FOR SOLUTION ORAL at 07:00

## 2024-02-19 RX ADMIN — Medication 1 PACKET(S): at 09:56

## 2024-02-19 RX ADMIN — Medication 5 MILLIGRAM(S): at 21:52

## 2024-02-19 RX ADMIN — Medication 650 MILLIGRAM(S): at 17:11

## 2024-02-19 RX ADMIN — INSULIN GLARGINE 16 UNIT(S): 100 INJECTION, SOLUTION SUBCUTANEOUS at 21:52

## 2024-02-19 RX ADMIN — Medication 650 MILLIGRAM(S): at 07:00

## 2024-02-19 RX ADMIN — Medication 100 MILLIGRAM(S): at 21:52

## 2024-02-19 RX ADMIN — PANTOPRAZOLE SODIUM 40 MILLIGRAM(S): 20 TABLET, DELAYED RELEASE ORAL at 11:33

## 2024-02-19 RX ADMIN — Medication 650 MILLIGRAM(S): at 00:38

## 2024-02-19 RX ADMIN — Medication 2: at 13:35

## 2024-02-19 RX ADMIN — Medication 250 MILLIGRAM(S): at 09:56

## 2024-02-19 RX ADMIN — Medication 6 UNIT(S): at 18:28

## 2024-02-19 RX ADMIN — Medication 650 MILLIGRAM(S): at 01:38

## 2024-02-19 RX ADMIN — CEFTRIAXONE 100 MILLIGRAM(S): 500 INJECTION, POWDER, FOR SOLUTION INTRAMUSCULAR; INTRAVENOUS at 17:11

## 2024-02-19 RX ADMIN — PANTOPRAZOLE SODIUM 40 MILLIGRAM(S): 20 TABLET, DELAYED RELEASE ORAL at 00:39

## 2024-02-19 RX ADMIN — Medication 2: at 18:28

## 2024-02-19 RX ADMIN — Medication 100 MILLIGRAM(S): at 07:00

## 2024-02-19 RX ADMIN — Medication 6 UNIT(S): at 09:56

## 2024-02-19 RX ADMIN — Medication 650 MILLIGRAM(S): at 12:30

## 2024-02-19 RX ADMIN — POLYETHYLENE GLYCOL 3350 17 GRAM(S): 17 POWDER, FOR SOLUTION ORAL at 17:11

## 2024-02-19 RX ADMIN — Medication 2: at 09:56

## 2024-02-19 RX ADMIN — Medication 6 UNIT(S): at 13:35

## 2024-02-19 NOTE — PROGRESS NOTE ADULT - ASSESSMENT
Podiatry was consulted for a L hallux wound that began back in October 2023. At the time of consult WBC 14.31 ESR 40 CRP 73.1. Imaging showed no obvious signs of OM, GAS, or acute fx. Based on the clinical presentation OM cannot be r/o at this time. Pt can benefit from being admitted for further OM workup and IV abx. Discussed possible treatment options with the pt and he stated that he would like to try long term IV abx as a treatment option before considering podiatric surgical intervention.       Plan:   Patient will be seen by podiatry team M/W/F   F/u bone cx and path   Pending PICC on d/c   Appreciate ID reccs    C/w IV abx     F/u ED cx taken post debridement  Wound care: Area wrapped with WTD dressing, gauze, and kerlix   L foot heel WBAT, R foot WBAT     Plan discussed with attending, podiatry will follow      Discharge dressing instructions: Cleanse wound with normal saline daily, pat dry with sterile guaze, apply saline soaked gauze to wound base, cover with dry sterile gauze, ABD pad, wrap with Kerlix and light ACE bandage.     Patient should follow up with Dr. Maximiliano Green within 1 week of discharge.    Office information:          [facility] [place] Address- [address], [place] [address] Phone: [phone]         [facility] Address- [address] [place] [address] Phone: [phone]     Podiatry was consulted for a L hallux wound that began back in October 2023. At the time of consult WBC 14.31 ESR 40 CRP 73.1. Imaging showed no obvious signs of OM, GAS, or acute fx. Based on the clinical presentation OM cannot be r/o at this time. Pt can benefit from being admitted for further OM workup and IV abx. Discussed possible treatment options with the pt and he stated that he would like to try long term IV abx as a treatment option before considering podiatric surgical intervention.       Plan:   Patient will be seen by podiatry team M/W/F   F/u bone cx and path   Pending PICC on d/c   Appreciate ID reccs    C/w IV abx     F/u ED cx taken post debridement  Wound care: Area wrapped with WTD dressing, gauze, and kerlix   Nursing orders placed for wound care  L foot heel WBAT, R foot WBAT     Plan discussed with attending, podiatry will sign off. Please reconsult us if needed.     Discharge dressing instructions: Cleanse wound with normal saline daily, pat dry with sterile guaze, apply saline soaked gauze to wound base, cover with dry sterile gauze, ABD pad, wrap with Kerlix and light ACE bandage.     Patient should follow up with Dr. Maximiliano Green within 1 week of discharge.    Office information:          [facility] Address- [address], [place] [address] Phone: [phone]         [facility] Address- [address] [place] [address] Phone: [phone]

## 2024-02-19 NOTE — PROGRESS NOTE ADULT - PROBLEM SELECTOR PLAN 3
Hx of multiple nsaids    - c/w ppi    #Constipation  AXR with stool burden    - c/w aggressive bowel regimen Hx of multiple nsaids    - c/w ppi    #Constipation  AXR with stool burden  Received [facility] 2/18  - c/w aggressive bowel regimen

## 2024-02-19 NOTE — PROGRESS NOTE ADULT - ATTENDING COMMENTS
Patient was seen and examined at bedside on 2/19/2024 at 1130 am. Patient reports feeling improved; abdominal pain has resolved. Denies SOB, CP, N/V. ROS is otherwise negative. Vitals, labwork and pertinent imaging reviewed. Exam - NAD, AAO x 4, PERRLA, EOMI, MMM, supple neck, chest - CTA b/l,, CV - rrr, s1s2, no m/r/g, abd - soft, NTND, + BS, back - midline, ext - wwp, wound at distal hallux, psych - normal affect    Plan:  -CRP is downtrending  -ID consulted given findings c/w Osteomyelitis, rec Vancomycin, Flagyl, CTX - will need long term IV abx  -c/w aggressive bowel regimen, repeat AXR  -Patient is medically ready for d/c Patient was seen and examined at bedside on 2/19/2024 at 930 am. Patient reports feeling improved; abdominal pain has resolved. Denies SOB, CP, N/V. ROS is otherwise negative. Vitals, labwork and pertinent imaging reviewed. Exam - NAD, AAO x 4, PERRLA, EOMI, MMM, supple neck, chest - CTA b/l,, CV - rrr, s1s2, no m/r/g, abd - soft, NTND, + BS, back - midline, ext - wwp, wound at distal hallux, psych - normal affect    Plan:  -CRP is downtrending  -ID consulted given findings c/w Osteomyelitis, rec Vancomycin, Flagyl, CTX - will need long term IV abx  -c/w aggressive bowel regimen, repeat AXR  -Patient is medically ready for d/c

## 2024-02-19 NOTE — PROGRESS NOTE ADULT - SUBJECTIVE AND OBJECTIVE BOX
Patient is a 48y old  Male who presents with a chief complaint of osteomyelitis (19 Feb 2024 06:11)      INTERVAL HPI/ OVERNIGHT EVENTS Pt was seen bedside during AM rounds. Pt was resting comfortably in bed. Dressings were found to be clean, dry, and intact. Pt notes ambulating on his toe over the weekend and has no other pedal complaints at this time. All questions were answered to the patient's satisfaction.       LABS                        13.5   9.45  )-----------( 503      ( 19 Feb 2024 05:30 )             39.9     02-19    134<L>  |  100  |  15  ----------------------------<  254<H>  4.3   |  25  |  0.84    Ca    9.3      19 Feb 2024 05:30  Phos  2.8     02-19  Mg     2.0     02-19    TPro  7.4  /  Alb  3.3  /  TBili  0.2  /  DBili  x   /  AST  22  /  ALT  18  /  AlkPhos  87  02-19      ESR: 46  CRP: --  02-19 @ 05:30    ICU Vital Signs Last 24 Hrs  T(C): 36.6 (19 Feb 2024 05:44), Max: 37.1 (18 Feb 2024 09:45)  T(F): 97.9 (19 Feb 2024 05:44), Max: 98.8 (18 Feb 2024 09:45      HR: 71 (19 Feb 2024 05:44) (71 - 104)  BP: 121/68 (19 Feb 2024 05:44) (121/68 - 171/96)  BP(mean): --  ABP: --  ABP(mean): --  RR: 17 (19 Feb 2024 05:44) (17 - 20)  SpO2: 97% (19 Feb 2024 05:44) (96% - 99%)    O2 Parameters below as of 19 Feb 2024 01:21  Patient On (Oxygen Delivery Method): room air    RADIOLOGY    < from: MR Foot w/wo IV Cont, Left (02.15.24 @ 21:34) >  INTERPRETATION:  History: Nonhealing hallux ulcer. Evaluation for   osteomyelitis.    Technique: Multiplanar and multisequence MRI images were obtained through   the left forefoot without and with contrast. Approximately 9 cc   intravenous Gadavist was administered.    Comparison: Left foot radiographs dated 2/14/2024    Findings:    Evaluation limited due to motion artifact.    Soft tissue ulcer along the distal aspect of the first toe. Marrow edema   with T1 marrow signal abnormality throughout the first distal phalanx,   most consistent with osteomyelitis. Small joint effusion with synovitis   within the first interphalangeal joint, which may be reactive or be   related to septic arthritis of the first interphalangeal joint.    Mild marrow edema within the proximal aspect of the second and third   metatarsals, which may represent stress reaction. Mild marrow edema   within the intermediate/lateral cuneiforms, which may be traction-related   or be related to stress reaction.    Osteoarthritic changes within the first metatarsophalangeal joint.    Lisfranc ligamentous complex is grossly intact. Mild edema with mild   fatty infiltration throughout the intrinsic muscles of the forefoot,   which may be related to disuse. Small second and third webspace   interdigital neuromas.    Nonspecific subcutaneous edema about the dorsal foot, which may be   related to lower extremity edema or cellulitis. No drainable fluid   collection seen within the soft tissues.    Impression:    Suggestion of a soft tissue tissue ulcer along the distal aspect of the   first toe with findings most consistent with osteomyelitis of the first   distalphalanx. Small joint effusion with synovitis within the first   interphalangeal joint, which may be reactive or be related to septic   arthritis of the first interphalangeal joint.    Nonspecific subcutaneous edema about the dorsal foot, which may be  related to lower extremity edema or cellulitis. No drainable fluid   collection seen within the soft tissues.    Mild marrow edema within the proximal aspect of the second and third   metatarsals, which may represent stress reaction. Mild marrow edema   within the intermediate/lateral cuneiforms, which may be traction-related   or be related to stress reaction.    Other findings as above.    < end of copied text >    < from: Xray Foot AP + Lateral + Oblique, Left (02.14.24 @ 13:56) >    INTERPRETATION:  HISTORY: Toe ulceration in the setting of diabetes.    TECHNIQUE: 3 views of the left foot.    COMPARISON: Bilateral foot radiographs dated 2/1/2022    IMPRESSION:    Suggestion of a soft tissue ulcer along the distal aspect of the first   toe. No definite cortical erosion or periostitis to suggest   osteomyelitis. However, MRI would be a more sensitive test to evaluate   for osteomyelitis.    No acute displaced fracture or dislocation. Osteoarthritic changes seen   throughout the forefoot.    Soft tissue swelling about the visualized foot. Vascular calcifications   noted.    --- End of Report ---    < end of copied text >      MICROBIOLOGY  Culture - Tissue with Gram Stain (02.16.24 @ 11:53)    Gram Stain:   No organisms seen  No White blood cells   Specimen Source: .Tissue Other, L foot bone   Culture Results:   Few Staphylococcus cohnii  Few Staphylococcus pettenkoferi  Culture in progress    Culture - Other (02.15.24 @ 12:10)    -  Vancomycin: S 2   -  Clindamycin: S <=0.25   -  Daptomycin: S 1   -  Erythromycin: R >4   -  Linezolid: S 2   -  Oxacillin: R >2   -  Penicillin: S 0.064   -  Rifampin: S <=1 Should not be used as monotherapy   -  Tetracycline: S <=1   -  Trimethoprim/Sulfamethoxazole: S <=0.5/9.5   -  Ceftriaxone: S 0.25   Gram Stain:   Few Gram positive cocci in pairs  Moderate White blood cells   Specimen Source: Wound Wound   Culture Results:   Moderate to Numerous Streptococcus intermedius  Rare to few Methicillin Resistant Staphylococcus aureus  Floor previously notified.  Rare Staphylococcus cohnii   Organism Identification: Streptococcus intermedius  Streptococcus intermedius  Methicillin resistant Staphylococcus aureus  Methicillin resistant Staphylococcus aureus   Organism: Streptococcus intermedius   Organism: Streptococcus intermedius   Organism: Methicillin resistant Staphylococcus aureus   Organism: Methicillin resistant Staphylococcus aureus   Method Type: ETEST   Method Type: KB   Method Type: ETEST   Method Type: YUN      PHYSICAL EXAM  Lower Extremity Focused  Vasc: 2/4 PT&DP, TG warm to warm, improved localized edema noted at the site of the wound, (-)erythema   Derm: ~2.4cmx2.3cm wound at the distal aspect of hallux, hyperkeratotic wound base and periwound. (+) Sang drainage (+)PTB, (-)undermining, (-) malodor  Neuro: Protective sensation diminished   MSK: Muscle strength 5/5 in all compartments

## 2024-02-19 NOTE — PROGRESS NOTE ADULT - SUBJECTIVE AND OBJECTIVE BOX
*****INCOMPLETE NOTE*****    INTERVAL HPI/OVERNIGHT EVENTS:    SUBJECTIVE: Patient seen and examined at bedside, resting comfortably in bed, and does not appear to be in any acute distress. Patient states  Patient denies any recent fevers, chills, nausea, vomiting, headache, acute sob, chest pain, abdominal pain, genitourinary sx, extremity pain or swelling.     ANTIBIOTICS/RELEVANT:    MEDICATIONS  (STANDING):  acetaminophen     Tablet .. 650 milliGRAM(s) Oral every 6 hours  bisacodyl 5 milliGRAM(s) Oral at bedtime  cefTRIAXone   IVPB 2000 milliGRAM(s) IV Intermittent every 24 hours  dextrose 5%. 1000 milliLiter(s) (50 mL/Hr) IV Continuous <Continuous>  dextrose 5%. 1000 milliLiter(s) (100 mL/Hr) IV Continuous <Continuous>  dextrose 50% Injectable 12.5 Gram(s) IV Push once  dextrose 50% Injectable 25 Gram(s) IV Push once  dextrose 50% Injectable 25 Gram(s) IV Push once  glucagon  Injectable 1 milliGRAM(s) IntraMuscular once  insulin glargine Injectable (LANTUS) 16 Unit(s) SubCutaneous at bedtime  insulin lispro (ADMELOG) corrective regimen sliding scale   SubCutaneous three times a day before meals  insulin lispro Injectable (ADMELOG) 6 Unit(s) SubCutaneous three times a day before meals  lidocaine   4% Patch 1 Patch Transdermal daily  metroNIDAZOLE  IVPB 500 milliGRAM(s) IV Intermittent every 8 hours  pantoprazole    Tablet 40 milliGRAM(s) Oral every 12 hours  polyethylene glycol 3350 17 Gram(s) Oral every 12 hours  senna 2 Tablet(s) Oral at bedtime    MEDICATIONS  (PRN):  dextrose Oral Gel 15 Gram(s) Oral once PRN Blood Glucose LESS THAN 70 milliGRAM(s)/deciliter      Vital Signs Last 24 Hrs  T(C): 36.6 (19 Feb 2024 05:44), Max: 37.1 (18 Feb 2024 09:45)  T(F): 97.9 (19 Feb 2024 05:44), Max: 98.8 (18 Feb 2024 09:45)  HR: 71 (19 Feb 2024 05:44) (71 - 104)  BP: 121/68 (19 Feb 2024 05:44) (121/68 - 171/96)  BP(mean): --  RR: 17 (19 Feb 2024 05:44) (17 - 20)  SpO2: 97% (19 Feb 2024 05:44) (96% - 99%)    Parameters below as of 19 Feb 2024 01:21  Patient On (Oxygen Delivery Method): room air        PHYSICAL EXAM:  General: in no acute distress  Eyes: EOMI intact bilaterally. Anicteric sclerae, moist conjunctivae  HENT: Moist mucous membranes  Neck: Trachea midline, supple  Lungs: CTA B/L. No wheezes, rales, or rhonchi  Cardiovascular: RRR. No murmurs, rubs, or gallops  Abdomen: Soft, non-tender non-distended; No rebound or guarding  Extremities: WWP, No clubbing, cyanosis or edema  Neurological: Alert and oriented  Skin: Warm and dry. No obvious rash     LABS:                        13.9   10.75 )-----------( 528      ( 18 Feb 2024 05:30 )             41.9     02-18    135  |  99  |  16  ----------------------------<  269<H>  4.5   |  27  |  0.96    Ca    9.7      18 Feb 2024 05:30  Phos  2.6     02-18  Mg     1.7     02-18        Urinalysis Basic - ( 18 Feb 2024 05:30 )    Color: x / Appearance: x / SG: x / pH: x  Gluc: 269 mg/dL / Ketone: x  / Bili: x / Urobili: x   Blood: x / Protein: x / Nitrite: x   Leuk Esterase: x / RBC: x / WBC x   Sq Epi: x / Non Sq Epi: x / Bacteria: x        MICROBIOLOGY:    RADIOLOGY & ADDITIONAL STUDIES: INTERVAL HPI/OVERNIGHT EVENTS: chills, and tachy and htnsive, oral/rectal afebrile    SUBJECTIVE: Patient seen and examined at bedside, resting comfortably in bed, and does not appear to be in any acute distress. Patient states his abdominal pain has much improved, he was able to have multiple bowel movements.    MEDICATIONS  (STANDING):  acetaminophen     Tablet .. 650 milliGRAM(s) Oral every 6 hours  bisacodyl 5 milliGRAM(s) Oral at bedtime  cefTRIAXone   IVPB 2000 milliGRAM(s) IV Intermittent every 24 hours  dextrose 5%. 1000 milliLiter(s) (50 mL/Hr) IV Continuous <Continuous>  dextrose 5%. 1000 milliLiter(s) (100 mL/Hr) IV Continuous <Continuous>  dextrose 50% Injectable 12.5 Gram(s) IV Push once  dextrose 50% Injectable 25 Gram(s) IV Push once  dextrose 50% Injectable 25 Gram(s) IV Push once  glucagon  Injectable 1 milliGRAM(s) IntraMuscular once  insulin glargine Injectable (LANTUS) 16 Unit(s) SubCutaneous at bedtime  insulin lispro (ADMELOG) corrective regimen sliding scale   SubCutaneous three times a day before meals  insulin lispro Injectable (ADMELOG) 6 Unit(s) SubCutaneous three times a day before meals  lidocaine   4% Patch 1 Patch Transdermal daily  metroNIDAZOLE  IVPB 500 milliGRAM(s) IV Intermittent every 8 hours  pantoprazole    Tablet 40 milliGRAM(s) Oral every 12 hours  polyethylene glycol 3350 17 Gram(s) Oral every 12 hours  senna 2 Tablet(s) Oral at bedtime    MEDICATIONS  (PRN):  dextrose Oral Gel 15 Gram(s) Oral once PRN Blood Glucose LESS THAN 70 milliGRAM(s)/deciliter      Vital Signs Last 24 Hrs  T(C): 36.6 (19 Feb 2024 05:44), Max: 37.1 (18 Feb 2024 09:45)  T(F): 97.9 (19 Feb 2024 05:44), Max: 98.8 (18 Feb 2024 09:45)  HR: 71 (19 Feb 2024 05:44) (71 - 104)  BP: 121/68 (19 Feb 2024 05:44) (121/68 - 171/96)  BP(mean): --  RR: 17 (19 Feb 2024 05:44) (17 - 20)  SpO2: 97% (19 Feb 2024 05:44) (96% - 99%)    Parameters below as of 19 Feb 2024 01:21  Patient On (Oxygen Delivery Method): room air        PHYSICAL EXAM:  General: in no acute distress  Eyes: EOMI intact bilaterally.  HENT: Moist mucous membranes  Neck: Trachea midline  Lungs: CTA B/L  Cardiovascular: RRR  Abdomen: Soft, non-tender, less distended  Extremities: WWP, left upper extremity with improved swelling, left hallux swollen with wound on anterior aspect with serosanguinous drainage, erythema improved  Neurological: Alert and oriented  Skin: Warm and dry    LABS:                        13.9   10.75 )-----------( 528      ( 18 Feb 2024 05:30 )             41.9     02-18    135  |  99  |  16  ----------------------------<  269<H>  4.5   |  27  |  0.96    Ca    9.7      18 Feb 2024 05:30  Phos  2.6     02-18  Mg     1.7     02-18        Urinalysis Basic - ( 18 Feb 2024 05:30 )    Color: x / Appearance: x / SG: x / pH: x  Gluc: 269 mg/dL / Ketone: x  / Bili: x / Urobili: x   Blood: x / Protein: x / Nitrite: x   Leuk Esterase: x / RBC: x / WBC x   Sq Epi: x / Non Sq Epi: x / Bacteria: x

## 2024-02-20 LAB
GLUCOSE BLDC GLUCOMTR-MCNC: 178 MG/DL — HIGH (ref 70–99)
GLUCOSE BLDC GLUCOMTR-MCNC: 215 MG/DL — HIGH (ref 70–99)
GLUCOSE BLDC GLUCOMTR-MCNC: 220 MG/DL — HIGH (ref 70–99)
GLUCOSE BLDC GLUCOMTR-MCNC: 263 MG/DL — HIGH (ref 70–99)

## 2024-02-20 PROCEDURE — 99232 SBSQ HOSP IP/OBS MODERATE 35: CPT

## 2024-02-20 PROCEDURE — 99232 SBSQ HOSP IP/OBS MODERATE 35: CPT | Mod: GC

## 2024-02-20 RX ORDER — CEFTRIAXONE 500 MG/1
2000 INJECTION, POWDER, FOR SOLUTION INTRAMUSCULAR; INTRAVENOUS EVERY 24 HOURS
Refills: 0 | Status: DISCONTINUED | OUTPATIENT
Start: 2024-02-20 | End: 2024-02-21

## 2024-02-20 RX ORDER — INSULIN LISPRO 100/ML
VIAL (ML) SUBCUTANEOUS
Refills: 0 | Status: DISCONTINUED | OUTPATIENT
Start: 2024-02-20 | End: 2024-02-21

## 2024-02-20 RX ORDER — INSULIN LISPRO 100/ML
10 VIAL (ML) SUBCUTANEOUS ONCE
Refills: 0 | Status: COMPLETED | OUTPATIENT
Start: 2024-02-20 | End: 2024-02-20

## 2024-02-20 RX ORDER — INSULIN GLARGINE 100 [IU]/ML
22 INJECTION, SOLUTION SUBCUTANEOUS AT BEDTIME
Refills: 0 | Status: DISCONTINUED | OUTPATIENT
Start: 2024-02-20 | End: 2024-02-21

## 2024-02-20 RX ORDER — CHLORHEXIDINE GLUCONATE 213 G/1000ML
1 SOLUTION TOPICAL
Refills: 0 | Status: DISCONTINUED | OUTPATIENT
Start: 2024-02-20 | End: 2024-02-21

## 2024-02-20 RX ORDER — INSULIN LISPRO 100/ML
10 VIAL (ML) SUBCUTANEOUS
Refills: 0 | Status: DISCONTINUED | OUTPATIENT
Start: 2024-02-20 | End: 2024-02-21

## 2024-02-20 RX ORDER — VANCOMYCIN HCL 1 G
1000 VIAL (EA) INTRAVENOUS EVERY 8 HOURS
Refills: 0 | Status: DISCONTINUED | OUTPATIENT
Start: 2024-02-20 | End: 2024-02-21

## 2024-02-20 RX ORDER — SEMAGLUTIDE 0.68 MG/ML
0.25 INJECTION, SOLUTION SUBCUTANEOUS
Qty: 1 | Refills: 1
Start: 2024-02-20

## 2024-02-20 RX ORDER — METRONIDAZOLE 500 MG
500 TABLET ORAL EVERY 8 HOURS
Refills: 0 | Status: DISCONTINUED | OUTPATIENT
Start: 2024-02-20 | End: 2024-02-21

## 2024-02-20 RX ADMIN — Medication 250 MILLIGRAM(S): at 22:17

## 2024-02-20 RX ADMIN — Medication 100 MILLIGRAM(S): at 05:57

## 2024-02-20 RX ADMIN — Medication 4: at 22:48

## 2024-02-20 RX ADMIN — PANTOPRAZOLE SODIUM 40 MILLIGRAM(S): 20 TABLET, DELAYED RELEASE ORAL at 01:03

## 2024-02-20 RX ADMIN — CEFTRIAXONE 100 MILLIGRAM(S): 500 INJECTION, POWDER, FOR SOLUTION INTRAMUSCULAR; INTRAVENOUS at 19:16

## 2024-02-20 RX ADMIN — Medication 250 MILLIGRAM(S): at 01:03

## 2024-02-20 RX ADMIN — Medication 10 UNIT(S): at 19:16

## 2024-02-20 RX ADMIN — Medication 6 UNIT(S): at 13:16

## 2024-02-20 RX ADMIN — Medication 6: at 13:16

## 2024-02-20 RX ADMIN — Medication 650 MILLIGRAM(S): at 01:00

## 2024-02-20 RX ADMIN — Medication 5 MILLIGRAM(S): at 22:17

## 2024-02-20 RX ADMIN — Medication 2: at 19:16

## 2024-02-20 RX ADMIN — PANTOPRAZOLE SODIUM 40 MILLIGRAM(S): 20 TABLET, DELAYED RELEASE ORAL at 13:17

## 2024-02-20 RX ADMIN — Medication 250 MILLIGRAM(S): at 11:00

## 2024-02-20 RX ADMIN — Medication 100 MILLIGRAM(S): at 13:17

## 2024-02-20 RX ADMIN — INSULIN GLARGINE 22 UNIT(S): 100 INJECTION, SOLUTION SUBCUTANEOUS at 22:47

## 2024-02-20 RX ADMIN — Medication 500 MILLIGRAM(S): at 22:17

## 2024-02-20 RX ADMIN — Medication 4: at 09:45

## 2024-02-20 RX ADMIN — Medication 6 UNIT(S): at 09:46

## 2024-02-20 NOTE — PROGRESS NOTE ADULT - NS ATTEST RISK PROBLEM GEN_ALL_CORE FT
Exacerbation of hyperglycemia
Need optimal glycemic control in the setting of diabetic foot infection

## 2024-02-20 NOTE — PROGRESS NOTE ADULT - ASSESSMENT
# L hallux DFU with likely underlying first distal phalanx OM with possible 1st interphalangeal joint septic arthritis  - Deep wound cx (2/14 and 2/15) after bedside debridement with MRSA, S.anginosus, S.intermedius, S.cohnii, C.aurimucosum, Prevotella disiens and anaerococcus spp  - S/p bone biopsy of L hallux from 2/16 - cx with S.cohnii and pettenkoferi, path neg, however was on broad spectrum abx prior to sampling.  - Continue vancomycin 1g IV q8h  - Continue ceftriaxone 2g IV q24h   - Continue flagyl 500mg PO q12h  - Plan is for trial of IV abx rather than surgical management. Plan for PICC line for 6 weeks of IV abx. Home IV abx is not an option due to cost, pending possible STEVEN placement. If this is not possible, may consider bactrim/augmentin as suboptimal alternative therapy.    # Periapical lucencies on CT max/face  - No acute signs of odontogenic infection on exam, but patient should have close outpatient follow up with dentist for further management.    ID Team 2 will follow.

## 2024-02-20 NOTE — PROGRESS NOTE ADULT - NS ATTEND AMEND GEN_ALL_CORE FT
Pt seen on rounds this afternoon and events of the weekend reviewed.  He has continued on an insulin regimen of 16 Lantus qhs, 6 units lispro premeal  Has remained afebrile (Tmax 99.3F) with normal WBC (9500) but described what seems to have been rigors in the early AM 2/19.  His glucoses have been distinctly higher in the past 24 hours, all of the last 4 fingersticks in the 200 range.   On exam, cannot fully examine his foot because of the dressing, but I was able to feel a distinct (and full) DP pulse in the instep.  His lower leg is nonetheless distinctly warmer than the R lower leg, suggesting a response to what may be a more proximal infection in the foot.  Will increase the Lantus to 22 units qhs, the premeal lispro to 10 units.  The increased glucoses are not simply a response to improved PO intake--this has been consistently fairly good and stable  If his glucoses remain > 200 despite the increase in insulin, would be very concerned about more proximal extension of the infection.  Podiatry should re-evaluate
Pt seen on rounds this afternoon.   Bone biopsy done today, and pt still reports residual pain from the procedure.  MRI is positive for OM  Foot is dressed--could not be examined  PO intake has been good, but he also ate a sandwich at bedtime   --AM fingerstick was up to 265 today, though it is unclear to what degree the sandwich at bedtime contributed to this.  Will increase the Lantus, but only moderately--to 16 units  --His other (post-prandial) fingersticks have been excellent--124-137 since supper yesterday.  Continue 6 units premeal lispro  --Again suggest Vascular Surgery evaluation

## 2024-02-20 NOTE — PROGRESS NOTE ADULT - PROBLEM SELECTOR PLAN 1
Type 2 diabetes mellitus with hyperglycemia  - Please start Lantus  u at bedtime  - Please increase Lispro to  u before meals  - Continue lispro moderate dose sliding scale before meals and at bedtime.  - Patient's fingerstick glucose goal is 100-180 mg/dL.    - recommend vascular consult for LLE  - CDCES consulted.  - Discharge recommendations to be discussed. Dexcom is note covered. Ozempic with high copay due to high deductible.  - Patient can follow up at discharge with [facility] by calling [phone] to make an appointment.      Case discussed with Dr. Henrandez. Primary team updated. Type 2 diabetes mellitus with hyperglycemia  - Please increase Lantus to 22 units at bedtime  - Please increase Lispro to 10 units before meals  - Continue lispro moderate dose sliding scale before meals and at bedtime.  - Patient's fingerstick glucose goal is 100-180 mg/dL.    - recommend vascular consult for LLE  - CDCES consulted.  - Discharge recommendations to be discussed. Dexcom is not covered. Ozempic with high copay due to high deductible, but pt able to pay thru FSA at work if there is supply.  - Patient can follow up at discharge with [facility] by calling [phone] to make an appointment.      Case discussed with Dr. Hernandez. Primary team updated.

## 2024-02-20 NOTE — PROGRESS NOTE ADULT - PROBLEM SELECTOR PLAN 3
Hx of multiple nsaids    - c/w ppi    #Constipation  AXR with stool burden  Received Golytely 2/18  - c/w aggressive bowel regimen

## 2024-02-20 NOTE — PROGRESS NOTE ADULT - ASSESSMENT
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     Endocrine consulted for uncontrolled type 2 diabetes.    A1C: 10.6 %  BUN: 15  Creatinine: 0.84  GFR: 108  Weight: 104  BMI: 32.9

## 2024-02-20 NOTE — PROGRESS NOTE ADULT - SUBJECTIVE AND OBJECTIVE BOX
INFECTIOUS DISEASES CONSULT FOLLOW-UP NOTE    INTERVAL HPI/OVERNIGHT EVENTS:  Afebrile, vitals normal, WBC wnl  Tolerating abx, no infectious symptoms    ROS:   Constitutional, eyes, ENT, cardiovascular, respiratory, gastrointestinal, genitourinary, integumentary, neurological, psychiatric and heme/lymph are otherwise negative other than noted above       ANTIBIOTICS/RELEVANT:    MEDICATIONS  (STANDING):  acetaminophen     Tablet .. 650 milliGRAM(s) Oral every 6 hours  bisacodyl 5 milliGRAM(s) Oral at bedtime  cefTRIAXone   IVPB 2000 milliGRAM(s) IV Intermittent every 24 hours  chlorhexidine 2% Cloths 1 Application(s) Topical <User Schedule>  dextrose 5%. 1000 milliLiter(s) (50 mL/Hr) IV Continuous <Continuous>  dextrose 5%. 1000 milliLiter(s) (100 mL/Hr) IV Continuous <Continuous>  dextrose 50% Injectable 25 Gram(s) IV Push once  dextrose 50% Injectable 25 Gram(s) IV Push once  dextrose 50% Injectable 12.5 Gram(s) IV Push once  glucagon  Injectable 1 milliGRAM(s) IntraMuscular once  insulin glargine Injectable (LANTUS) 16 Unit(s) SubCutaneous at bedtime  insulin lispro (ADMELOG) corrective regimen sliding scale   SubCutaneous three times a day before meals  insulin lispro Injectable (ADMELOG) 6 Unit(s) SubCutaneous three times a day before meals  lidocaine   4% Patch 1 Patch Transdermal daily  metroNIDAZOLE  IVPB 500 milliGRAM(s) IV Intermittent every 8 hours  pantoprazole    Tablet 40 milliGRAM(s) Oral every 12 hours  polyethylene glycol 3350 17 Gram(s) Oral every 12 hours  senna 2 Tablet(s) Oral at bedtime  vancomycin  IVPB 1000 milliGRAM(s) IV Intermittent every 8 hours    MEDICATIONS  (PRN):  dextrose Oral Gel 15 Gram(s) Oral once PRN Blood Glucose LESS THAN 70 milliGRAM(s)/deciliter        Vital Signs Last 24 Hrs  T(C): 37.4 (20 Feb 2024 10:17), Max: 37.4 (20 Feb 2024 10:17)  T(F): 99.3 (20 Feb 2024 10:17), Max: 99.3 (20 Feb 2024 10:17)  HR: 90 (20 Feb 2024 10:17) (64 - 90)  BP: 137/87 (20 Feb 2024 10:17) (101/71 - 160/90)  BP(mean): --  RR: 18 (20 Feb 2024 10:17) (18 - 18)  SpO2: 98% (20 Feb 2024 10:17) (96% - 98%)    Parameters below as of 20 Feb 2024 10:17  Patient On (Oxygen Delivery Method): room air        PHYSICAL EXAM:  Constitutional: alert, NAD  Eyes: the sclera and conjunctiva were normal.   ENT: poor dentition, caries of R maxillary molars, no acute swelling/redness/tenderness  Neck: the appearance of the neck was normal and the neck was supple.   Pulmonary: no respiratory distress and lungs were clear to auscultation bilaterally.   Heart: heart rate was normal and rhythm regular, normal S1 and S2  Vascular:. there was no peripheral edema  Abdomen: normal bowel sounds, soft, non-tender  Neurological: no focal deficits.   Psychiatric: the affect was normal  Ext: L foot in clean dressing. R foot TMA site without ulcerations      LABS:                        13.5   9.45  )-----------( 503      ( 19 Feb 2024 05:30 )             39.9     02-19    134<L>  |  100  |  15  ----------------------------<  254<H>  4.3   |  25  |  0.84    Ca    9.3      19 Feb 2024 05:30  Phos  2.8     02-19  Mg     2.0     02-19    TPro  7.4  /  Alb  3.3  /  TBili  0.2  /  DBili  x   /  AST  22  /  ALT  18  /  AlkPhos  87  02-19      Urinalysis Basic - ( 19 Feb 2024 05:30 )    Color: x / Appearance: x / SG: x / pH: x  Gluc: 254 mg/dL / Ketone: x  / Bili: x / Urobili: x   Blood: x / Protein: x / Nitrite: x   Leuk Esterase: x / RBC: x / WBC x   Sq Epi: x / Non Sq Epi: x / Bacteria: x        MICROBIOLOGY:  Reviewed    RADIOLOGY & ADDITIONAL STUDIES:  Reviewed

## 2024-02-20 NOTE — CHART NOTE - NSCHARTNOTEFT_GEN_A_CORE
Admitting Diagnosis:   Patient is a 48y old  Male who presents with a chief complaint of osteomyelitis (20 Feb 2024 15:23)      PAST MEDICAL & SURGICAL HISTORY:  Hyperlipidemia, unspecified hyperlipidemia type  Hypertension  History of cocaine use  Diabetes mellitus  Osteomyelitis  R foot  Toe amputation status  Right foot  H/O skin graft  Right foot  S/P insertion of penile implant    Current Nutrition Order: consistent carbohydrates       PO Intake: Good (%) [ x  ]  Fair (50-75%) [   ] Poor (<25%) [   ]    GI Issues: last BM 2/19 per chart    Skin Integrity: +2 edema to L foot    Labs:   02-19    134<L>  |  100  |  15  ----------------------------<  254<H>  4.3   |  25  |  0.84    Ca    9.3      19 Feb 2024 05:30  Phos  2.8     02-19  Mg     2.0     02-19    TPro  7.4  /  Alb  3.3  /  TBili  0.2  /  DBili  x   /  AST  22  /  ALT  18  /  AlkPhos  87  02-19    CAPILLARY BLOOD GLUCOSE      POCT Blood Glucose.: 263 mg/dL (20 Feb 2024 12:57)  POCT Blood Glucose.: 215 mg/dL (20 Feb 2024 09:21)  POCT Blood Glucose.: 217 mg/dL (19 Feb 2024 21:49)  POCT Blood Glucose.: 200 mg/dL (19 Feb 2024 18:04)      Medications:  MEDICATIONS  (STANDING):  acetaminophen     Tablet .. 650 milliGRAM(s) Oral every 6 hours  bisacodyl 5 milliGRAM(s) Oral at bedtime  cefTRIAXone   IVPB 2000 milliGRAM(s) IV Intermittent every 24 hours  chlorhexidine 2% Cloths 1 Application(s) Topical <User Schedule>  dextrose 5%. 1000 milliLiter(s) (50 mL/Hr) IV Continuous <Continuous>  dextrose 5%. 1000 milliLiter(s) (100 mL/Hr) IV Continuous <Continuous>  dextrose 50% Injectable 12.5 Gram(s) IV Push once  dextrose 50% Injectable 25 Gram(s) IV Push once  dextrose 50% Injectable 25 Gram(s) IV Push once  glucagon  Injectable 1 milliGRAM(s) IntraMuscular once  insulin glargine Injectable (LANTUS) 16 Unit(s) SubCutaneous at bedtime  insulin lispro (ADMELOG) corrective regimen sliding scale   SubCutaneous three times a day before meals  insulin lispro Injectable (ADMELOG) 6 Unit(s) SubCutaneous three times a day before meals  lidocaine   4% Patch 1 Patch Transdermal daily  metroNIDAZOLE  IVPB 500 milliGRAM(s) IV Intermittent every 8 hours  pantoprazole    Tablet 40 milliGRAM(s) Oral every 12 hours  polyethylene glycol 3350 17 Gram(s) Oral every 12 hours  senna 2 Tablet(s) Oral at bedtime  vancomycin  IVPB 1000 milliGRAM(s) IV Intermittent every 8 hours    MEDICATIONS  (PRN):  dextrose Oral Gel 15 Gram(s) Oral once PRN Blood Glucose LESS THAN 70 milliGRAM(s)/deciliter      Weight:  Height for BMI (FEET)	5 Feet  Height for BMI (INCHES)	10 Inch(s)  Height for BMI (CENTIMETERS)	177.8 Centimeter(s)  Weight for BMI (lbs)	229 lb  Weight for BMI (kg)	103.9 kg  Body Mass Index	32.8    Weight Change: no new weights to assess    Estimated energy needs:   Estimated Energy Needs Weight (lbs)	166 lb  Estimated Energy Needs Weight (kg)	75.2 kg  Estimated Energy Needs From (claude/kg)	27  Estimated Energy Needs To (claude/kg)	32  Estimated Energy Needs Calculated From (claude/kg)	2030  Estimated Energy Needs Calculated To (claude/kg)	2406    Estimated Protein Needs Weight (lbs)	166 lb  Estimated Protein Needs Weight (kg)	75.2 kg  Estimated Protein Needs From (g/kg)	1.2  Estimated Protein Needs To (g/kg)	1.5  Estimated Protein Needs Calculated From (g/kg)	90.24  Estimated Protein Needs Calculated To (g/kg)	112.8  Other Calculations	Based on Standards of Care pt above % IBW (138%) thus ideal body weight used for all calculations. Needs adjusted for age and malnutrition. Fluid recs per team.    Subjective: 47yo male with PMHx of cocaine use disorder occasional alcohol use, HTN, HLD, DM, R foot OM s/p R partial 2nd and full 4th-5th toe amputations and non-healing plantar L great toe ulcer presents to ED i/s/o abdominal pain and nausea for the past two days with associated fatigue. Pt stating he recently completed antibiotics (name unknown) for dental infection 2 days prior. Pt has been completing wound care for foot ulcer but states his wound has been intermittent opening and he noticed yellow drainage from the site. Pt stating he has been unable to gain access to insulin so therefore he is only taking Metformin and Januvia. Pt stating that he has been having some abdominal pain for the past day which is unspecified however no emesis, nausea or vomiting noted. Pt has been eating less i/s/o tooth pain and he believes he has more abdominal pain now that he began eating.    Patient seen at bedside for follow up assessment. Current diet order: consistent carbohydrates. Patient reports good appetite, consumed 100% of breakfast tray which included banana, pancakes, eggs. Pt identified on food insecurity screen. RD provided education on food resources in community, handout provided. Blood sugar 200-263 x24 hours. Meds: antibiotics, insulin, bowel regimen. RD to f/u prn.    Previous Nutrition Diagnosis: Moderate protein calorie malnutrition in context of acute illness related to decreased PO intake as evidenced by <75% of nutrition needs x2 weeks, mild edema.    Active [ x  ]  Resolved [   ]    Goal: Patient to meet at least 75% of nutritional needs consistently     Recommendations:  1. Continue with consistent carbohydrate diet.   2. Encourage pt to meet nutritional needs as able   3. Monitor labs: electrolytes, cmp, fingersticks  4. Monitor weights   5. Pain and bowel regimen per team   6. Will continue to assess/honor food preferences as able    Risk Level: High [   ] Moderate [ x  ] Low [   ]

## 2024-02-20 NOTE — PROGRESS NOTE ADULT - SUBJECTIVE AND OBJECTIVE BOX
*****INCOMPLETE NOTE*****    INTERVAL HPI/OVERNIGHT EVENTS:    SUBJECTIVE: Patient seen and examined at bedside, resting comfortably in bed, and does not appear to be in any acute distress. Patient states  Patient denies any recent fevers, chills, nausea, vomiting, headache, acute sob, chest pain, abdominal pain, genitourinary sx, extremity pain or swelling.     ANTIBIOTICS/RELEVANT:    MEDICATIONS  (STANDING):  acetaminophen     Tablet .. 650 milliGRAM(s) Oral every 6 hours  bisacodyl 5 milliGRAM(s) Oral at bedtime  cefTRIAXone   IVPB 2000 milliGRAM(s) IV Intermittent every 24 hours  dextrose 5%. 1000 milliLiter(s) (100 mL/Hr) IV Continuous <Continuous>  dextrose 5%. 1000 milliLiter(s) (50 mL/Hr) IV Continuous <Continuous>  dextrose 50% Injectable 25 Gram(s) IV Push once  dextrose 50% Injectable 25 Gram(s) IV Push once  dextrose 50% Injectable 12.5 Gram(s) IV Push once  glucagon  Injectable 1 milliGRAM(s) IntraMuscular once  insulin glargine Injectable (LANTUS) 16 Unit(s) SubCutaneous at bedtime  insulin lispro (ADMELOG) corrective regimen sliding scale   SubCutaneous three times a day before meals  insulin lispro Injectable (ADMELOG) 6 Unit(s) SubCutaneous three times a day before meals  lidocaine   4% Patch 1 Patch Transdermal daily  metroNIDAZOLE  IVPB 500 milliGRAM(s) IV Intermittent every 8 hours  pantoprazole    Tablet 40 milliGRAM(s) Oral every 12 hours  polyethylene glycol 3350 17 Gram(s) Oral every 12 hours  senna 2 Tablet(s) Oral at bedtime    MEDICATIONS  (PRN):  dextrose Oral Gel 15 Gram(s) Oral once PRN Blood Glucose LESS THAN 70 milliGRAM(s)/deciliter      Vital Signs Last 24 Hrs  T(C): 36.8 (20 Feb 2024 05:40), Max: 36.8 (20 Feb 2024 05:40)  T(F): 98.3 (20 Feb 2024 05:40), Max: 98.3 (20 Feb 2024 05:40)  HR: 74 (20 Feb 2024 05:40) (64 - 86)  BP: 160/90 (20 Feb 2024 05:40) (101/71 - 160/90)  BP(mean): --  RR: 18 (20 Feb 2024 05:40) (18 - 18)  SpO2: 96% (20 Feb 2024 05:40) (96% - 98%)    Parameters below as of 20 Feb 2024 05:40  Patient On (Oxygen Delivery Method): room air        PHYSICAL EXAM:  General: in no acute distress  Eyes: EOMI intact bilaterally. Anicteric sclerae, moist conjunctivae  HENT: Moist mucous membranes  Neck: Trachea midline, supple  Lungs: CTA B/L. No wheezes, rales, or rhonchi  Cardiovascular: RRR. No murmurs, rubs, or gallops  Abdomen: Soft, non-tender non-distended; No rebound or guarding  Extremities: WWP, No clubbing, cyanosis or edema  Neurological: Alert and oriented  Skin: Warm and dry. No obvious rash     LABS:                        13.5   9.45  )-----------( 503      ( 19 Feb 2024 05:30 )             39.9     02-19    134<L>  |  100  |  15  ----------------------------<  254<H>  4.3   |  25  |  0.84    Ca    9.3      19 Feb 2024 05:30  Phos  2.8     02-19  Mg     2.0     02-19    TPro  7.4  /  Alb  3.3  /  TBili  0.2  /  DBili  x   /  AST  22  /  ALT  18  /  AlkPhos  87  02-19      Urinalysis Basic - ( 19 Feb 2024 05:30 )    Color: x / Appearance: x / SG: x / pH: x  Gluc: 254 mg/dL / Ketone: x  / Bili: x / Urobili: x   Blood: x / Protein: x / Nitrite: x   Leuk Esterase: x / RBC: x / WBC x   Sq Epi: x / Non Sq Epi: x / Bacteria: x        MICROBIOLOGY:    RADIOLOGY & ADDITIONAL STUDIES: INTERVAL HPI/OVERNIGHT EVENTS: alireza    SUBJECTIVE: Patient seen and examined at bedside, resting comfortably in bed, and does not appear to be in any acute distress. Patient states he has not had a bowelmovement. States his abdominal pain has improved.    MEDICATIONS  (STANDING):  acetaminophen     Tablet .. 650 milliGRAM(s) Oral every 6 hours  bisacodyl 5 milliGRAM(s) Oral at bedtime  cefTRIAXone   IVPB 2000 milliGRAM(s) IV Intermittent every 24 hours  dextrose 5%. 1000 milliLiter(s) (100 mL/Hr) IV Continuous <Continuous>  dextrose 5%. 1000 milliLiter(s) (50 mL/Hr) IV Continuous <Continuous>  dextrose 50% Injectable 25 Gram(s) IV Push once  dextrose 50% Injectable 25 Gram(s) IV Push once  dextrose 50% Injectable 12.5 Gram(s) IV Push once  glucagon  Injectable 1 milliGRAM(s) IntraMuscular once  insulin glargine Injectable (LANTUS) 16 Unit(s) SubCutaneous at bedtime  insulin lispro (ADMELOG) corrective regimen sliding scale   SubCutaneous three times a day before meals  insulin lispro Injectable (ADMELOG) 6 Unit(s) SubCutaneous three times a day before meals  lidocaine   4% Patch 1 Patch Transdermal daily  metroNIDAZOLE  IVPB 500 milliGRAM(s) IV Intermittent every 8 hours  pantoprazole    Tablet 40 milliGRAM(s) Oral every 12 hours  polyethylene glycol 3350 17 Gram(s) Oral every 12 hours  senna 2 Tablet(s) Oral at bedtime    MEDICATIONS  (PRN):  dextrose Oral Gel 15 Gram(s) Oral once PRN Blood Glucose LESS THAN 70 milliGRAM(s)/deciliter    Vital Signs Last 24 Hrs  T(C): 36.8 (20 Feb 2024 05:40), Max: 36.8 (20 Feb 2024 05:40)  T(F): 98.3 (20 Feb 2024 05:40), Max: 98.3 (20 Feb 2024 05:40)  HR: 74 (20 Feb 2024 05:40) (64 - 86)  BP: 160/90 (20 Feb 2024 05:40) (101/71 - 160/90)  BP(mean): --  RR: 18 (20 Feb 2024 05:40) (18 - 18)  SpO2: 96% (20 Feb 2024 05:40) (96% - 98%)    Parameters below as of 20 Feb 2024 05:40  Patient On (Oxygen Delivery Method): room air    PHYSICAL EXAM:  General: in no acute distress  Eyes: EOMI intact bilaterally.  HENT: Moist mucous membranes  Neck: Trachea midline  Lungs: CTA B/L  Cardiovascular: RRR  Abdomen: Soft, non-tender, less distended  Extremities: WWP, left upper extremity with improved swelling, left hallux swollen with wound on anterior aspect with serosanguinous drainage, erythema improving, Pulses intact  Neurological: Alert and oriented  Skin: Warm and dry    LABS:                        13.5   9.45  )-----------( 503      ( 19 Feb 2024 05:30 )             39.9     02-19    134<L>  |  100  |  15  ----------------------------<  254<H>  4.3   |  25  |  0.84    Ca    9.3      19 Feb 2024 05:30  Phos  2.8     02-19  Mg     2.0     02-19    TPro  7.4  /  Alb  3.3  /  TBili  0.2  /  DBili  x   /  AST  22  /  ALT  18  /  AlkPhos  87  02-19      Urinalysis Basic - ( 19 Feb 2024 05:30 )    Color: x / Appearance: x / SG: x / pH: x  Gluc: 254 mg/dL / Ketone: x  / Bili: x / Urobili: x   Blood: x / Protein: x / Nitrite: x   Leuk Esterase: x / RBC: x / WBC x   Sq Epi: x / Non Sq Epi: x / Bacteria: x

## 2024-02-20 NOTE — PROGRESS NOTE ADULT - PROBLEM SELECTOR PLAN 2
Pt with hx of R foot ulcer. seen by podiatry in May 2022 and treated for OM and underwent R foot TMA w/ skin graft. Pt presenting now for chronic L hallux foot wound that began in Oct 2023. WBC 14.31, ESR 40, CRP 73.1. Per Podiatry, no concerns for gas or acute fx on imaging however cannot rule out OM. S/p Vanc 1g and Zosyn 3.375mg IV x1 in ED.  patient underwent excisional debridement down to and including subcutaneous tissue at bedside with serosanguinous drainage exposed. Cultures sent - f/u wound cx  MR with osteomyleitis  bone biopsy 2/16  - ID consulted, f/u recs  - c/w Vancomycin 1000 IV q8hr  - c/w ctx  - wound care: continue with WTD dressing, gauze and Kerlix per podiatry note    - continue to f/u podiatry recommendations for further management Pt with hx of R foot ulcer. seen by podiatry in May 2022 and treated for OM and underwent R foot TMA w/ skin graft. Pt presenting now for chronic L hallux foot wound that began in Oct 2023. WBC 14.31, ESR 40, CRP 73.1. Per Podiatry, no concerns for gas or acute fx on imaging however cannot rule out OM. S/p Vanc 1g and Zosyn 3.375mg IV x1 in ED.  patient underwent excisional debridement down to and including subcutaneous tissue at bedside with serosanguinous drainage exposed. Cultures sent - f/u wound cx  MR with osteomyleitis  bone biopsy 2/16  - ID consulted, f/u recs  - c/w Vancomycin 1000 IV q8hr  - c/w ctx  - c/w flagyl  - wound care: continue with WTD dressing, gauze and Kerlix per podiatry note    - continue to f/u podiatry recommendations for further management

## 2024-02-20 NOTE — PROGRESS NOTE ADULT - ATTENDING COMMENTS
43 yo M with Hx of T2DM (non compliant) and R foot OM s/p TMA and skin graft in 2022 presents with sepsis due to non-healing L hallux ulcer found to have OM    #Sepsis - POA due to tachycardia, WBC elevation and source LLE OM  #LLE OM    - podiatry following and wound cxs with MRSA, S.anginosus, S.intermedius, S.cohnii, C.aurimucosum, Prevotella disiens and anaerococcus spp  - c/w 6 weeks of Vanc, CTX and PO flagyl for OM  - ID following, appreciate recs  - pending finance eval for STEVEN vs home infusions    #DM, poorly controlled  - A1c 10.6  - endo following and DM educator consulted  - c/w basal bolus insulin while inpatient    Remainder as above

## 2024-02-20 NOTE — PROGRESS NOTE ADULT - SUBJECTIVE AND OBJECTIVE BOX
SUBJECTIVE / INTERVAL HPI: Patient was seen and examined this morning.     CAPILLARY BLOOD GLUCOSE & INSULIN RECEIVED  254 mg/dL (02-19 @ 05:30)  193 mg/dL (02-19 @ 09:49)  182 mg/dL (02-19 @ 13:23)  200 mg/dL (02-19 @ 18:04)  217 mg/dL (02-19 @ 21:49)  215 mg/dL (02-20 @ 09:21)      REVIEW OF SYSTEMS  Constitutional:  Negative fever, chills or loss of appetite.  Eyes:  Negative blurry vision or double vision.  Cardiovascular:  Negative for chest pain or palpitations.  Respiratory:  Negative for cough, wheezing, or shortness of breath.    Gastrointestinal:  Negative for nausea, vomiting, diarrhea, constipation, or abdominal pain.  Genitourinary:  Negative frequency, urgency or dysuria.  Neurologic:  No headache, confusion, dizziness, lightheadedness.    PHYSICAL EXAM  Vital Signs Last 24 Hrs  T(C): 37.4 (20 Feb 2024 10:17), Max: 37.4 (20 Feb 2024 10:17)  T(F): 99.3 (20 Feb 2024 10:17), Max: 99.3 (20 Feb 2024 10:17)  HR: 90 (20 Feb 2024 10:17) (64 - 90)  BP: 137/87 (20 Feb 2024 10:17) (101/71 - 160/90)  BP(mean): --  RR: 18 (20 Feb 2024 10:17) (18 - 18)  SpO2: 98% (20 Feb 2024 10:17) (96% - 98%)    Parameters below as of 20 Feb 2024 10:17  Patient On (Oxygen Delivery Method): room air        Constitutional: Awake, alert, in no acute distress.   HEENT: Normocephalic, atraumatic.   Neck: supple  Respiratory: Lungs clear to ausculation bilaterally.   Cardiovascular: regular rhythm, normal S1 and S2, no audible murmurs.   GI: soft, non-tender, non-distended, bowel sounds present, no masses appreciated.  Extremities: No lower extremity edema. LLE wrapped in kerlix dressing. difficult to palpate pulses, R LES warm.   Psychiatric: AAO x 3. Normal affect/mood.   d.     LABS  CBC - WBC/HGB/HTC/PLT: 9.45/13.5/39.9/503 (02-19-24)  BMP - Na/K/Cl/Bicarb/BUN/Cr/Gluc/AG/eGFR: 134/4.3/100/25/15/0.84/254/9/108 (02-19-24)  Ca - 9.3 (02-19-24)  Phos - 2.8 (02-19-24)  Mg - 2.0 (02-19-24)  LFT - Alb/Tprot/Tbili/Dbili/AlkPhos/ALT/AST: 3.3/--/0.2/--/87/18/22 (02-19-24)        MEDICATIONS  MEDICATIONS  (STANDING):  acetaminophen     Tablet .. 650 milliGRAM(s) Oral every 6 hours  bisacodyl 5 milliGRAM(s) Oral at bedtime  cefTRIAXone   IVPB 2000 milliGRAM(s) IV Intermittent every 24 hours  chlorhexidine 2% Cloths 1 Application(s) Topical <User Schedule>  dextrose 5%. 1000 milliLiter(s) (50 mL/Hr) IV Continuous <Continuous>  dextrose 5%. 1000 milliLiter(s) (100 mL/Hr) IV Continuous <Continuous>  dextrose 50% Injectable 12.5 Gram(s) IV Push once  dextrose 50% Injectable 25 Gram(s) IV Push once  dextrose 50% Injectable 25 Gram(s) IV Push once  glucagon  Injectable 1 milliGRAM(s) IntraMuscular once  insulin glargine Injectable (LANTUS) 16 Unit(s) SubCutaneous at bedtime  insulin lispro (ADMELOG) corrective regimen sliding scale   SubCutaneous three times a day before meals  insulin lispro Injectable (ADMELOG) 6 Unit(s) SubCutaneous three times a day before meals  lidocaine   4% Patch 1 Patch Transdermal daily  metroNIDAZOLE  IVPB 500 milliGRAM(s) IV Intermittent every 8 hours  pantoprazole    Tablet 40 milliGRAM(s) Oral every 12 hours  polyethylene glycol 3350 17 Gram(s) Oral every 12 hours  senna 2 Tablet(s) Oral at bedtime  vancomycin  IVPB 1000 milliGRAM(s) IV Intermittent every 8 hours    MEDICATIONS  (PRN):  dextrose Oral Gel 15 Gram(s) Oral once PRN Blood Glucose LESS THAN 70 milliGRAM(s)/deciliter     SUBJECTIVE / INTERVAL HPI: Patient was seen and examined this morning. Events of weekend reviewed. Reports chills 2 nights ago, but afebrile. Left LES warm to touch. Glucose increasing. Pending PICC.    CAPILLARY BLOOD GLUCOSE & INSULIN RECEIVED  200 mg/dL (02-19 @ 18:04) - Lispro 6+2. Ate turkey burger.  217 mg/dL (02-19 @ 21:49) - Lantus 16. No snacks overnight.  215 mg/dL (02-20 @ 09:21) - Lispro 6+4. Ate pancake and banana.  263 mg/dL (02-20 @ lunch)      REVIEW OF SYSTEMS  Constitutional:  Negative fever, chills or loss of appetite.  Eyes:  Negative blurry vision or double vision.  Cardiovascular:  Negative for chest pain or palpitations.  Respiratory:  Negative for cough, wheezing, or shortness of breath.    Gastrointestinal:  Negative for nausea, vomiting, diarrhea, constipation, or abdominal pain.  Genitourinary:  Negative frequency, urgency or dysuria.  Neurologic:  No headache, confusion, dizziness, lightheadedness.    PHYSICAL EXAM  Vital Signs Last 24 Hrs  T(C): 37.4 (20 Feb 2024 10:17), Max: 37.4 (20 Feb 2024 10:17)  T(F): 99.3 (20 Feb 2024 10:17), Max: 99.3 (20 Feb 2024 10:17)  HR: 90 (20 Feb 2024 10:17) (64 - 90)  BP: 137/87 (20 Feb 2024 10:17) (101/71 - 160/90)  BP(mean): --  RR: 18 (20 Feb 2024 10:17) (18 - 18)  SpO2: 98% (20 Feb 2024 10:17) (96% - 98%)    Parameters below as of 20 Feb 2024 10:17  Patient On (Oxygen Delivery Method): room air        Constitutional: Awake, alert, in no acute distress.   HEENT: Normocephalic, atraumatic.   Neck: supple  Respiratory: Lungs clear to ausculation bilaterally.   Cardiovascular: regular rhythm, normal S1 and S2, no audible murmurs.   GI: soft, non-tender, non-distended, bowel sounds present, no masses appreciated.  Extremities: No lower extremity edema. LLE wrapped in kerlix dressing. Pulses palpated, R LES warm.   Psychiatric: AAO x 3. Normal affect/mood.       LABS  CBC - WBC/HGB/HTC/PLT: 9.45/13.5/39.9/503 (02-19-24)  BMP - Na/K/Cl/Bicarb/BUN/Cr/Gluc/AG/eGFR: 134/4.3/100/25/15/0.84/254/9/108 (02-19-24)  Ca - 9.3 (02-19-24)  Phos - 2.8 (02-19-24)  Mg - 2.0 (02-19-24)  LFT - Alb/Tprot/Tbili/Dbili/AlkPhos/ALT/AST: 3.3/--/0.2/--/87/18/22 (02-19-24)        MEDICATIONS  MEDICATIONS  (STANDING):  acetaminophen     Tablet .. 650 milliGRAM(s) Oral every 6 hours  bisacodyl 5 milliGRAM(s) Oral at bedtime  cefTRIAXone   IVPB 2000 milliGRAM(s) IV Intermittent every 24 hours  chlorhexidine 2% Cloths 1 Application(s) Topical <User Schedule>  dextrose 5%. 1000 milliLiter(s) (50 mL/Hr) IV Continuous <Continuous>  dextrose 5%. 1000 milliLiter(s) (100 mL/Hr) IV Continuous <Continuous>  dextrose 50% Injectable 12.5 Gram(s) IV Push once  dextrose 50% Injectable 25 Gram(s) IV Push once  dextrose 50% Injectable 25 Gram(s) IV Push once  glucagon  Injectable 1 milliGRAM(s) IntraMuscular once  insulin glargine Injectable (LANTUS) 16 Unit(s) SubCutaneous at bedtime  insulin lispro (ADMELOG) corrective regimen sliding scale   SubCutaneous three times a day before meals  insulin lispro Injectable (ADMELOG) 6 Unit(s) SubCutaneous three times a day before meals  lidocaine   4% Patch 1 Patch Transdermal daily  metroNIDAZOLE  IVPB 500 milliGRAM(s) IV Intermittent every 8 hours  pantoprazole    Tablet 40 milliGRAM(s) Oral every 12 hours  polyethylene glycol 3350 17 Gram(s) Oral every 12 hours  senna 2 Tablet(s) Oral at bedtime  vancomycin  IVPB 1000 milliGRAM(s) IV Intermittent every 8 hours    MEDICATIONS  (PRN):  dextrose Oral Gel 15 Gram(s) Oral once PRN Blood Glucose LESS THAN 70 milliGRAM(s)/deciliter

## 2024-02-21 ENCOUNTER — TRANSCRIPTION ENCOUNTER (OUTPATIENT)
Age: 49
End: 2024-02-21

## 2024-02-21 VITALS
OXYGEN SATURATION: 98 % | HEART RATE: 84 BPM | RESPIRATION RATE: 18 BRPM | SYSTOLIC BLOOD PRESSURE: 139 MMHG | DIASTOLIC BLOOD PRESSURE: 91 MMHG | TEMPERATURE: 98 F

## 2024-02-21 LAB
ANION GAP SERPL CALC-SCNC: 9 MMOL/L — SIGNIFICANT CHANGE UP (ref 5–17)
BASOPHILS # BLD AUTO: 0.08 K/UL — SIGNIFICANT CHANGE UP (ref 0–0.2)
BASOPHILS NFR BLD AUTO: 0.8 % — SIGNIFICANT CHANGE UP (ref 0–2)
BUN SERPL-MCNC: 18 MG/DL — SIGNIFICANT CHANGE UP (ref 7–23)
CALCIUM SERPL-MCNC: 9.2 MG/DL — SIGNIFICANT CHANGE UP (ref 8.4–10.5)
CHLORIDE SERPL-SCNC: 98 MMOL/L — SIGNIFICANT CHANGE UP (ref 96–108)
CO2 SERPL-SCNC: 27 MMOL/L — SIGNIFICANT CHANGE UP (ref 22–31)
CREAT SERPL-MCNC: 1.01 MG/DL — SIGNIFICANT CHANGE UP (ref 0.5–1.3)
CRP SERPL-MCNC: 24.6 MG/L — HIGH (ref 0–4)
EGFR: 92 ML/MIN/1.73M2 — SIGNIFICANT CHANGE UP
EOSINOPHIL # BLD AUTO: 0.2 K/UL — SIGNIFICANT CHANGE UP (ref 0–0.5)
EOSINOPHIL NFR BLD AUTO: 2 % — SIGNIFICANT CHANGE UP (ref 0–6)
ERYTHROCYTE [SEDIMENTATION RATE] IN BLOOD: 48 MM/HR — HIGH
GLUCOSE BLDC GLUCOMTR-MCNC: 175 MG/DL — HIGH (ref 70–99)
GLUCOSE BLDC GLUCOMTR-MCNC: 223 MG/DL — HIGH (ref 70–99)
GLUCOSE SERPL-MCNC: 252 MG/DL — HIGH (ref 70–99)
HCT VFR BLD CALC: 40 % — SIGNIFICANT CHANGE UP (ref 39–50)
HGB BLD-MCNC: 13.4 G/DL — SIGNIFICANT CHANGE UP (ref 13–17)
IMM GRANULOCYTES NFR BLD AUTO: 0.3 % — SIGNIFICANT CHANGE UP (ref 0–0.9)
LYMPHOCYTES # BLD AUTO: 2.71 K/UL — SIGNIFICANT CHANGE UP (ref 1–3.3)
LYMPHOCYTES # BLD AUTO: 27.1 % — SIGNIFICANT CHANGE UP (ref 13–44)
MAGNESIUM SERPL-MCNC: 1.6 MG/DL — SIGNIFICANT CHANGE UP (ref 1.6–2.6)
MCHC RBC-ENTMCNC: 29.4 PG — SIGNIFICANT CHANGE UP (ref 27–34)
MCHC RBC-ENTMCNC: 33.5 GM/DL — SIGNIFICANT CHANGE UP (ref 32–36)
MCV RBC AUTO: 87.7 FL — SIGNIFICANT CHANGE UP (ref 80–100)
MONOCYTES # BLD AUTO: 0.81 K/UL — SIGNIFICANT CHANGE UP (ref 0–0.9)
MONOCYTES NFR BLD AUTO: 8.1 % — SIGNIFICANT CHANGE UP (ref 2–14)
NEUTROPHILS # BLD AUTO: 6.18 K/UL — SIGNIFICANT CHANGE UP (ref 1.8–7.4)
NEUTROPHILS NFR BLD AUTO: 61.7 % — SIGNIFICANT CHANGE UP (ref 43–77)
NRBC # BLD: 0 /100 WBCS — SIGNIFICANT CHANGE UP (ref 0–0)
PHOSPHATE SERPL-MCNC: 2.5 MG/DL — SIGNIFICANT CHANGE UP (ref 2.5–4.5)
PLATELET # BLD AUTO: 525 K/UL — HIGH (ref 150–400)
POTASSIUM SERPL-MCNC: 4.6 MMOL/L — SIGNIFICANT CHANGE UP (ref 3.5–5.3)
POTASSIUM SERPL-SCNC: 4.6 MMOL/L — SIGNIFICANT CHANGE UP (ref 3.5–5.3)
RBC # BLD: 4.56 M/UL — SIGNIFICANT CHANGE UP (ref 4.2–5.8)
RBC # FLD: 12.8 % — SIGNIFICANT CHANGE UP (ref 10.3–14.5)
SODIUM SERPL-SCNC: 134 MMOL/L — LOW (ref 135–145)
VANCOMYCIN TROUGH SERPL-MCNC: 15.5 UG/ML — SIGNIFICANT CHANGE UP (ref 10–20)
WBC # BLD: 10.01 K/UL — SIGNIFICANT CHANGE UP (ref 3.8–10.5)
WBC # FLD AUTO: 10.01 K/UL — SIGNIFICANT CHANGE UP (ref 3.8–10.5)

## 2024-02-21 PROCEDURE — 99285 EMERGENCY DEPT VISIT HI MDM: CPT

## 2024-02-21 PROCEDURE — 99232 SBSQ HOSP IP/OBS MODERATE 35: CPT

## 2024-02-21 PROCEDURE — 73720 MRI LWR EXTREMITY W/O&W/DYE: CPT

## 2024-02-21 PROCEDURE — 93926 LOWER EXTREMITY STUDY: CPT

## 2024-02-21 PROCEDURE — 74018 RADEX ABDOMEN 1 VIEW: CPT

## 2024-02-21 PROCEDURE — 87637 SARSCOV2&INF A&B&RSV AMP PRB: CPT

## 2024-02-21 PROCEDURE — 88307 TISSUE EXAM BY PATHOLOGIST: CPT

## 2024-02-21 PROCEDURE — 83036 HEMOGLOBIN GLYCOSYLATED A1C: CPT

## 2024-02-21 PROCEDURE — 86140 C-REACTIVE PROTEIN: CPT

## 2024-02-21 PROCEDURE — 80202 ASSAY OF VANCOMYCIN: CPT

## 2024-02-21 PROCEDURE — 84100 ASSAY OF PHOSPHORUS: CPT

## 2024-02-21 PROCEDURE — 93926 LOWER EXTREMITY STUDY: CPT | Mod: 26,LT

## 2024-02-21 PROCEDURE — 85025 COMPLETE CBC W/AUTO DIFF WBC: CPT

## 2024-02-21 PROCEDURE — 87075 CULTR BACTERIA EXCEPT BLOOD: CPT

## 2024-02-21 PROCEDURE — 87040 BLOOD CULTURE FOR BACTERIA: CPT

## 2024-02-21 PROCEDURE — 87070 CULTURE OTHR SPECIMN AEROBIC: CPT

## 2024-02-21 PROCEDURE — 83690 ASSAY OF LIPASE: CPT

## 2024-02-21 PROCEDURE — 80048 BASIC METABOLIC PNL TOTAL CA: CPT

## 2024-02-21 PROCEDURE — 70487 CT MAXILLOFACIAL W/DYE: CPT

## 2024-02-21 PROCEDURE — A9585: CPT

## 2024-02-21 PROCEDURE — 83735 ASSAY OF MAGNESIUM: CPT

## 2024-02-21 PROCEDURE — 36415 COLL VENOUS BLD VENIPUNCTURE: CPT

## 2024-02-21 PROCEDURE — 80053 COMPREHEN METABOLIC PANEL: CPT

## 2024-02-21 PROCEDURE — 81003 URINALYSIS AUTO W/O SCOPE: CPT

## 2024-02-21 PROCEDURE — 87181 SC STD AGAR DILUTION PER AGT: CPT

## 2024-02-21 PROCEDURE — 96375 TX/PRO/DX INJ NEW DRUG ADDON: CPT

## 2024-02-21 PROCEDURE — 76705 ECHO EXAM OF ABDOMEN: CPT

## 2024-02-21 PROCEDURE — 96374 THER/PROPH/DIAG INJ IV PUSH: CPT

## 2024-02-21 PROCEDURE — 99239 HOSP IP/OBS DSCHRG MGMT >30: CPT | Mod: GC

## 2024-02-21 PROCEDURE — 85652 RBC SED RATE AUTOMATED: CPT

## 2024-02-21 PROCEDURE — 87186 SC STD MICRODIL/AGAR DIL: CPT

## 2024-02-21 PROCEDURE — 93970 EXTREMITY STUDY: CPT

## 2024-02-21 PROCEDURE — 88311 DECALCIFY TISSUE: CPT

## 2024-02-21 PROCEDURE — 82962 GLUCOSE BLOOD TEST: CPT

## 2024-02-21 PROCEDURE — 87184 SC STD DISK METHOD PER PLATE: CPT

## 2024-02-21 PROCEDURE — 73630 X-RAY EXAM OF FOOT: CPT

## 2024-02-21 RX ORDER — INSULIN GLARGINE 100 [IU]/ML
28 INJECTION, SOLUTION SUBCUTANEOUS
Qty: 1 | Refills: 0
Start: 2024-02-21 | End: 2024-03-21

## 2024-02-21 RX ORDER — CEFTRIAXONE 500 MG/1
2000 INJECTION, POWDER, FOR SOLUTION INTRAMUSCULAR; INTRAVENOUS EVERY 24 HOURS
Refills: 0 | Status: DISCONTINUED | OUTPATIENT
Start: 2024-02-21 | End: 2024-02-21

## 2024-02-21 RX ORDER — METFORMIN HYDROCHLORIDE 850 MG/1
1 TABLET ORAL
Qty: 60 | Refills: 0
Start: 2024-02-21 | End: 2024-03-21

## 2024-02-21 RX ORDER — METRONIDAZOLE 500 MG
500 TABLET ORAL EVERY 12 HOURS
Refills: 0 | Status: DISCONTINUED | OUTPATIENT
Start: 2024-02-21 | End: 2024-02-21

## 2024-02-21 RX ORDER — INSULIN LISPRO 100/ML
12 VIAL (ML) SUBCUTANEOUS
Qty: 1 | Refills: 0
Start: 2024-02-21 | End: 2024-03-21

## 2024-02-21 RX ORDER — VANCOMYCIN HCL 1 G
1000 VIAL (EA) INTRAVENOUS EVERY 8 HOURS
Refills: 0 | Status: DISCONTINUED | OUTPATIENT
Start: 2024-02-21 | End: 2024-02-21

## 2024-02-21 RX ADMIN — Medication 4: at 10:26

## 2024-02-21 RX ADMIN — Medication 250 MILLIGRAM(S): at 06:56

## 2024-02-21 RX ADMIN — Medication 500 MILLIGRAM(S): at 06:55

## 2024-02-21 RX ADMIN — Medication 650 MILLIGRAM(S): at 00:13

## 2024-02-21 RX ADMIN — Medication 10 UNIT(S): at 10:25

## 2024-02-21 NOTE — PROGRESS NOTE ADULT - NUTRITIONAL ASSESSMENT
This patient has been assessed with a concern for Malnutrition and has been determined to have a diagnosis/diagnoses of Moderate protein-calorie malnutrition.    This patient is being managed with:   Diet Consistent Carbohydrate/No Snacks-  Entered: Feb 15 2024 12:47AM  

## 2024-02-21 NOTE — PROGRESS NOTE ADULT - ASSESSMENT
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     Endocrine consulted for uncontrolled type 2 diabetes.    A1C: 10.6 %  BUN: 18  Creatinine: 1.01  GFR: 92  Weight: 104  BMI: 32.9

## 2024-02-21 NOTE — PROGRESS NOTE ADULT - PROVIDER SPECIALTY LIST ADULT
Internal Medicine
Podiatry
Podiatry
Infectious Disease
Podiatry
Internal Medicine
Podiatry
Endocrinology
Endocrinology
Infectious Disease
Internal Medicine
Endocrinology
Internal Medicine
Internal Medicine
Hospitalist
Internal Medicine

## 2024-02-21 NOTE — DISCHARGE NOTE NURSING/CASE MANAGEMENT/SOCIAL WORK - PATIENT PORTAL LINK FT
You can access the FollowMyHealth Patient Portal offered by [facility] by registering at the following website: [email or web address]. By joining [employer]’s FollowMyHealth portal, you will also be able to view your health information using other applications (apps) compatible with our system.

## 2024-02-21 NOTE — PROGRESS NOTE ADULT - SUBJECTIVE AND OBJECTIVE BOX
SUBJECTIVE / INTERVAL HPI: Patient was seen and examined this morning. Reports slight chill last night. Otherwise afebrile and no white count. Glucose still elevated despite increase in insulin. He has infiltrated IV on right arm. Abx are in dextrose IVF. Left leg is warm, but less so than last week.     CAPILLARY BLOOD GLUCOSE & INSULIN RECEIVED  178 mg/dL (02-20 @ 18:24) - Lispro 10+2. Ate meatloaf, rice, and pudding.  220 mg/dL (02-20 @ 22:30) - Lantus 22 + lispro 4. No snacks overnight.  223 mg/dL (02-21 @ 09:29) - Lispro 10+4. Ate Mosoth[place] toast and banana.   252 mg/dL (02-21 @ 12:00)  175 mg/dL (02-21 @ 13:36)      REVIEW OF SYSTEMS  Constitutional:  Negative fever, chills or loss of appetite.  Eyes:  Negative blurry vision or double vision.  Cardiovascular:  Negative for chest pain or palpitations.  Respiratory:  Negative for cough, wheezing, or shortness of breath.    Gastrointestinal:  Negative for nausea, vomiting, diarrhea, constipation, or abdominal pain.  Genitourinary:  Negative frequency, urgency or dysuria.  Neurologic:  No headache, confusion, dizziness, lightheadedness.    PHYSICAL EXAM  Vital Signs Last 24 Hrs  T(C): 36.9 (21 Feb 2024 05:20), Max: 37.1 (20 Feb 2024 17:16)  T(F): 98.4 (21 Feb 2024 05:20), Max: 98.8 (20 Feb 2024 17:16)  HR: 79 (21 Feb 2024 05:20) (78 - 79)  BP: 125/78 (21 Feb 2024 05:20) (125/78 - 158/90)  BP(mean): 93 (21 Feb 2024 05:20) (93 - 93)  RR: 18 (21 Feb 2024 05:20) (18 - 18)  SpO2: 98% (21 Feb 2024 05:20) (97% - 98%)    Parameters below as of 21 Feb 2024 05:20  Patient On (Oxygen Delivery Method): room air    Constitutional: Awake, alert, in no acute distress.   HEENT: Normocephalic, atraumatic, RONI.  Respiratory: Lungs clear to ausculation bilaterally.   Cardiovascular: regular rhythm, normal S1 and S2, no audible murmurs.   GI: soft, non-tender, non-distended, bowel sounds present.  Extremities: No lower extremity edema.  Psychiatric: AAO x 3. Normal affect/mood.     LABS  CBC - WBC/HGB/HTC/PLT: 10.01/13.4/40.0/525 (02-21-24)  BMP - Na/K/Cl/Bicarb/BUN/Cr/Gluc/AG/eGFR: 134/4.6/98/27/18/1.01/252/9/92 (02-21-24)  Ca - 9.2 (02-21-24)  Phos - 2.5 (02-21-24)  Mg - 1.6 (02-21-24)  LFT - Alb/Tprot/Tbili/Dbili/AlkPhos/ALT/AST: 3.3/--/0.2/--/87/18/22 (02-19-24)    MEDICATIONS  MEDICATIONS  (STANDING):  acetaminophen     Tablet .. 650 milliGRAM(s) Oral every 6 hours  bisacodyl 5 milliGRAM(s) Oral at bedtime  cefTRIAXone   IVPB 2000 milliGRAM(s) IV Intermittent every 24 hours  chlorhexidine 2% Cloths 1 Application(s) Topical <User Schedule>  dextrose 5%. 1000 milliLiter(s) (50 mL/Hr) IV Continuous <Continuous>  dextrose 5%. 1000 milliLiter(s) (100 mL/Hr) IV Continuous <Continuous>  dextrose 50% Injectable 25 Gram(s) IV Push once  dextrose 50% Injectable 25 Gram(s) IV Push once  dextrose 50% Injectable 12.5 Gram(s) IV Push once  glucagon  Injectable 1 milliGRAM(s) IntraMuscular once  insulin glargine Injectable (LANTUS) 22 Unit(s) SubCutaneous at bedtime  insulin lispro (ADMELOG) corrective regimen sliding scale   SubCutaneous Before meals and at bedtime  insulin lispro Injectable (ADMELOG) 10 Unit(s) SubCutaneous three times a day before meals  lidocaine   4% Patch 1 Patch Transdermal daily  metroNIDAZOLE    Tablet 500 milliGRAM(s) Oral every 12 hours  pantoprazole    Tablet 40 milliGRAM(s) Oral every 12 hours  polyethylene glycol 3350 17 Gram(s) Oral every 12 hours  senna 2 Tablet(s) Oral at bedtime  vancomycin  IVPB 1000 milliGRAM(s) IV Intermittent every 8 hours    MEDICATIONS  (PRN):  dextrose Oral Gel 15 Gram(s) Oral once PRN Blood Glucose LESS THAN 70 milliGRAM(s)/deciliter         SUBJECTIVE / INTERVAL HPI: Patient was seen and examined this morning. Reports slight chill last night. Otherwise afebrile and no white count. Glucose still elevated despite increase in insulin. He has infiltrated IV on right arm. Abx are in dextrose IVF. Left leg is warm, but less so than last week.     CAPILLARY BLOOD GLUCOSE & INSULIN RECEIVED  178 mg/dL (02-20 @ 18:24) - Lispro 10+2. Ate meatloaf, rice, and pudding.  220 mg/dL (02-20 @ 22:30) - Lantus 22 + lispro 4. No snacks overnight.  223 mg/dL (02-21 @ 09:29) - Lispro 10+4. Ate Malagasy toast and banana.   252 mg/dL (02-21 @ 12:00)  175 mg/dL (02-21 @ 13:36)      REVIEW OF SYSTEMS  Constitutional:  Negative fever, chills or loss of appetite.  Eyes:  Negative blurry vision or double vision.  Cardiovascular:  Negative for chest pain or palpitations.  Respiratory:  Negative for cough, wheezing, or shortness of breath.    Gastrointestinal:  Negative for nausea, vomiting, diarrhea, constipation, or abdominal pain.  Genitourinary:  Negative frequency, urgency or dysuria.  Neurologic:  No headache, confusion, dizziness, lightheadedness.    PHYSICAL EXAM  Vital Signs Last 24 Hrs  T(C): 36.9 (21 Feb 2024 05:20), Max: 37.1 (20 Feb 2024 17:16)  T(F): 98.4 (21 Feb 2024 05:20), Max: 98.8 (20 Feb 2024 17:16)  HR: 79 (21 Feb 2024 05:20) (78 - 79)  BP: 125/78 (21 Feb 2024 05:20) (125/78 - 158/90)  BP(mean): 93 (21 Feb 2024 05:20) (93 - 93)  RR: 18 (21 Feb 2024 05:20) (18 - 18)  SpO2: 98% (21 Feb 2024 05:20) (97% - 98%)    Parameters below as of 21 Feb 2024 05:20  Patient On (Oxygen Delivery Method): room air    Constitutional: Awake, alert, in no acute distress.   HEENT: Normocephalic, atraumatic.   Neck: supple  Respiratory: Lungs clear to ausculation bilaterally.   Cardiovascular: regular rhythm, normal S1 and S2, no audible murmurs.   GI: soft, non-tender, non-distended, bowel sounds present, no masses appreciated.  Extremities: No lower extremity edema. LLE wrapped in kerlix dressing. Pulses palpated, R LES warm.   Psychiatric: AAO x 3. Normal affect/mood.     LABS  CBC - WBC/HGB/HTC/PLT: 10.01/13.4/40.0/525 (02-21-24)  BMP - Na/K/Cl/Bicarb/BUN/Cr/Gluc/AG/eGFR: 134/4.6/98/27/18/1.01/252/9/92 (02-21-24)  Ca - 9.2 (02-21-24)  Phos - 2.5 (02-21-24)  Mg - 1.6 (02-21-24)  LFT - Alb/Tprot/Tbili/Dbili/AlkPhos/ALT/AST: 3.3/--/0.2/--/87/18/22 (02-19-24)    MEDICATIONS  MEDICATIONS  (STANDING):  acetaminophen     Tablet .. 650 milliGRAM(s) Oral every 6 hours  bisacodyl 5 milliGRAM(s) Oral at bedtime  cefTRIAXone   IVPB 2000 milliGRAM(s) IV Intermittent every 24 hours  chlorhexidine 2% Cloths 1 Application(s) Topical <User Schedule>  dextrose 5%. 1000 milliLiter(s) (50 mL/Hr) IV Continuous <Continuous>  dextrose 5%. 1000 milliLiter(s) (100 mL/Hr) IV Continuous <Continuous>  dextrose 50% Injectable 25 Gram(s) IV Push once  dextrose 50% Injectable 25 Gram(s) IV Push once  dextrose 50% Injectable 12.5 Gram(s) IV Push once  glucagon  Injectable 1 milliGRAM(s) IntraMuscular once  insulin glargine Injectable (LANTUS) 22 Unit(s) SubCutaneous at bedtime  insulin lispro (ADMELOG) corrective regimen sliding scale   SubCutaneous Before meals and at bedtime  insulin lispro Injectable (ADMELOG) 10 Unit(s) SubCutaneous three times a day before meals  lidocaine   4% Patch 1 Patch Transdermal daily  metroNIDAZOLE    Tablet 500 milliGRAM(s) Oral every 12 hours  pantoprazole    Tablet 40 milliGRAM(s) Oral every 12 hours  polyethylene glycol 3350 17 Gram(s) Oral every 12 hours  senna 2 Tablet(s) Oral at bedtime  vancomycin  IVPB 1000 milliGRAM(s) IV Intermittent every 8 hours    MEDICATIONS  (PRN):  dextrose Oral Gel 15 Gram(s) Oral once PRN Blood Glucose LESS THAN 70 milliGRAM(s)/deciliter

## 2024-02-21 NOTE — DISCHARGE NOTE NURSING/CASE MANAGEMENT/SOCIAL WORK - NSDCPEFALRISK_GEN_ALL_CORE
For information on Fall & Injury Prevention, visit: [email or web address] OR  [email or web address] OR  https://www.cdc.gov/steadi/patient.html

## 2024-02-21 NOTE — PROGRESS NOTE ADULT - SUBJECTIVE AND OBJECTIVE BOX
INFECTIOUS DISEASES CONSULT FOLLOW-UP NOTE    INTERVAL HPI/OVERNIGHT EVENTS:  Afebrile, tolerating abx, no acute complaints    ROS:   Constitutional, eyes, ENT, cardiovascular, respiratory, gastrointestinal, genitourinary, integumentary, neurological, psychiatric and heme/lymph are otherwise negative other than noted above       ANTIBIOTICS/RELEVANT:    MEDICATIONS  (STANDING):  acetaminophen     Tablet .. 650 milliGRAM(s) Oral every 6 hours  bisacodyl 5 milliGRAM(s) Oral at bedtime  cefTRIAXone   IVPB 2000 milliGRAM(s) IV Intermittent every 24 hours  chlorhexidine 2% Cloths 1 Application(s) Topical <User Schedule>  dextrose 5%. 1000 milliLiter(s) (50 mL/Hr) IV Continuous <Continuous>  dextrose 5%. 1000 milliLiter(s) (100 mL/Hr) IV Continuous <Continuous>  dextrose 50% Injectable 25 Gram(s) IV Push once  dextrose 50% Injectable 25 Gram(s) IV Push once  dextrose 50% Injectable 12.5 Gram(s) IV Push once  glucagon  Injectable 1 milliGRAM(s) IntraMuscular once  insulin glargine Injectable (LANTUS) 22 Unit(s) SubCutaneous at bedtime  insulin lispro (ADMELOG) corrective regimen sliding scale   SubCutaneous Before meals and at bedtime  insulin lispro Injectable (ADMELOG) 10 Unit(s) SubCutaneous three times a day before meals  lidocaine   4% Patch 1 Patch Transdermal daily  metroNIDAZOLE    Tablet 500 milliGRAM(s) Oral every 12 hours  pantoprazole    Tablet 40 milliGRAM(s) Oral every 12 hours  polyethylene glycol 3350 17 Gram(s) Oral every 12 hours  senna 2 Tablet(s) Oral at bedtime  vancomycin  IVPB 1000 milliGRAM(s) IV Intermittent every 8 hours    MEDICATIONS  (PRN):  dextrose Oral Gel 15 Gram(s) Oral once PRN Blood Glucose LESS THAN 70 milliGRAM(s)/deciliter        Vital Signs Last 24 Hrs  T(C): 36.8 (21 Feb 2024 16:26), Max: 37.1 (20 Feb 2024 17:16)  T(F): 98.3 (21 Feb 2024 16:26), Max: 98.8 (20 Feb 2024 17:16)  HR: 84 (21 Feb 2024 16:26) (78 - 84)  BP: 139/91 (21 Feb 2024 16:26) (125/78 - 158/90)  BP(mean): 93 (21 Feb 2024 05:20) (93 - 93)  RR: 18 (21 Feb 2024 16:26) (18 - 18)  SpO2: 98% (21 Feb 2024 16:26) (97% - 98%)    Parameters below as of 21 Feb 2024 16:26  Patient On (Oxygen Delivery Method): room air        PHYSICAL EXAM:  Constitutional: alert, NAD  Eyes: the sclera and conjunctiva were normal.   ENT: poor dentition, caries of R maxillary molars, no acute swelling/redness/tenderness  Neck: the appearance of the neck was normal and the neck was supple.   Pulmonary: no respiratory distress and lungs were clear to auscultation bilaterally.   Heart: heart rate was normal and rhythm regular, normal S1 and S2  Vascular:. there was no peripheral edema  Abdomen: normal bowel sounds, soft, non-tender  Neurological: no focal deficits.   Psychiatric: the affect was normal  Ext: L foot in clean dressing. R foot TMA site without ulcerations        LABS:                        13.4   10.01 )-----------( 525      ( 21 Feb 2024 12:00 )             40.0     02-21    134<L>  |  98  |  18  ----------------------------<  252<H>  4.6   |  27  |  1.01    Ca    9.2      21 Feb 2024 12:00  Phos  2.5     02-21  Mg     1.6     02-21        Urinalysis Basic - ( 21 Feb 2024 12:00 )    Color: x / Appearance: x / SG: x / pH: x  Gluc: 252 mg/dL / Ketone: x  / Bili: x / Urobili: x   Blood: x / Protein: x / Nitrite: x   Leuk Esterase: x / RBC: x / WBC x   Sq Epi: x / Non Sq Epi: x / Bacteria: x        MICROBIOLOGY:  Reviewed    RADIOLOGY & ADDITIONAL STUDIES:  Reviewed

## 2024-02-21 NOTE — PROGRESS NOTE ADULT - SUBJECTIVE AND OBJECTIVE BOX
*****INCOMPLETE NOTE*****    INTERVAL HPI/OVERNIGHT EVENTS:    SUBJECTIVE: Pt seen and examined at bedside. ANDERW.   Denies f/c/n/v/d, abd pain, SOB, CP,  sxs,     ANTIBIOTICS/RELEVANT:    MEDICATIONS  (STANDING):  acetaminophen     Tablet .. 650 milliGRAM(s) Oral every 6 hours  bisacodyl 5 milliGRAM(s) Oral at bedtime  cefTRIAXone   IVPB 2000 milliGRAM(s) IV Intermittent every 24 hours  chlorhexidine 2% Cloths 1 Application(s) Topical <User Schedule>  dextrose 5%. 1000 milliLiter(s) (100 mL/Hr) IV Continuous <Continuous>  dextrose 5%. 1000 milliLiter(s) (50 mL/Hr) IV Continuous <Continuous>  dextrose 50% Injectable 12.5 Gram(s) IV Push once  dextrose 50% Injectable 25 Gram(s) IV Push once  dextrose 50% Injectable 25 Gram(s) IV Push once  glucagon  Injectable 1 milliGRAM(s) IntraMuscular once  insulin glargine Injectable (LANTUS) 22 Unit(s) SubCutaneous at bedtime  insulin lispro (ADMELOG) corrective regimen sliding scale   SubCutaneous Before meals and at bedtime  insulin lispro Injectable (ADMELOG) 10 Unit(s) SubCutaneous three times a day before meals  lidocaine   4% Patch 1 Patch Transdermal daily  metroNIDAZOLE    Tablet 500 milliGRAM(s) Oral every 12 hours  pantoprazole    Tablet 40 milliGRAM(s) Oral every 12 hours  polyethylene glycol 3350 17 Gram(s) Oral every 12 hours  senna 2 Tablet(s) Oral at bedtime    MEDICATIONS  (PRN):  dextrose Oral Gel 15 Gram(s) Oral once PRN Blood Glucose LESS THAN 70 milliGRAM(s)/deciliter      Vital Signs Last 24 Hrs  T(C): 36.9 (21 Feb 2024 05:20), Max: 37.1 (20 Feb 2024 17:16)  T(F): 98.4 (21 Feb 2024 05:20), Max: 98.8 (20 Feb 2024 17:16)  HR: 79 (21 Feb 2024 05:20) (78 - 79)  BP: 125/78 (21 Feb 2024 05:20) (125/78 - 158/90)  BP(mean): 93 (21 Feb 2024 05:20) (93 - 93)  RR: 18 (21 Feb 2024 05:20) (18 - 18)  SpO2: 98% (21 Feb 2024 05:20) (97% - 98%)    Parameters below as of 21 Feb 2024 05:20  Patient On (Oxygen Delivery Method): room air        PHYSICAL EXAM:  General: in no acute distress, non toxic appearing, speaking in full sentences  Eyes: EOMI intact bilaterally. Anicteric sclerae, moist conjunctivae  HENT: Moist mucous membranes  Neck: Trachea midline, supple  Lungs: CTA B/L. No wheezes, rales, or rhonchi  Cardiovascular: RRR. No murmurs, rubs, or gallops  Abdomen: +BS Soft, non-tender non-distended; No rebound or guarding  Vasc: Rad and DP intact and equal b/l   Extremities: WWP, No clubbing, cyanosis or edema  Neurological: Alert and oriented  Skin: Warm and dry. No obvious rash     LABS:                MICROBIOLOGY:    RADIOLOGY & ADDITIONAL STUDIES: INTERVAL HPI/OVERNIGHT EVENTS: ANDREW     SUBJECTIVE: Pt seen and examined at bedside. C/o Rt arm pain and swelling from IV site, otherwise no other complaints.   Denies f/c/n/v/d, abd pain, SOB, CP,  sxs,     ANTIBIOTICS/RELEVANT: CTX 2g, Flagyl 500mg q12, and vancomycin 1g.     MEDICATIONS  (STANDING):  acetaminophen     Tablet .. 650 milliGRAM(s) Oral every 6 hours  bisacodyl 5 milliGRAM(s) Oral at bedtime  cefTRIAXone   IVPB 2000 milliGRAM(s) IV Intermittent every 24 hours  chlorhexidine 2% Cloths 1 Application(s) Topical <User Schedule>  dextrose 5%. 1000 milliLiter(s) (100 mL/Hr) IV Continuous <Continuous>  dextrose 5%. 1000 milliLiter(s) (50 mL/Hr) IV Continuous <Continuous>  dextrose 50% Injectable 12.5 Gram(s) IV Push once  dextrose 50% Injectable 25 Gram(s) IV Push once  dextrose 50% Injectable 25 Gram(s) IV Push once  glucagon  Injectable 1 milliGRAM(s) IntraMuscular once  insulin glargine Injectable (LANTUS) 22 Unit(s) SubCutaneous at bedtime  insulin lispro (ADMELOG) corrective regimen sliding scale   SubCutaneous Before meals and at bedtime  insulin lispro Injectable (ADMELOG) 10 Unit(s) SubCutaneous three times a day before meals  lidocaine   4% Patch 1 Patch Transdermal daily  metroNIDAZOLE    Tablet 500 milliGRAM(s) Oral every 12 hours  pantoprazole    Tablet 40 milliGRAM(s) Oral every 12 hours  polyethylene glycol 3350 17 Gram(s) Oral every 12 hours  senna 2 Tablet(s) Oral at bedtime    MEDICATIONS  (PRN):  dextrose Oral Gel 15 Gram(s) Oral once PRN Blood Glucose LESS THAN 70 milliGRAM(s)/deciliter      Vital Signs Last 24 Hrs  T(C): 36.9 (21 Feb 2024 05:20), Max: 37.1 (20 Feb 2024 17:16)  T(F): 98.4 (21 Feb 2024 05:20), Max: 98.8 (20 Feb 2024 17:16)  HR: 79 (21 Feb 2024 05:20) (78 - 79)  BP: 125/78 (21 Feb 2024 05:20) (125/78 - 158/90)  BP(mean): 93 (21 Feb 2024 05:20) (93 - 93)  RR: 18 (21 Feb 2024 05:20) (18 - 18)  SpO2: 98% (21 Feb 2024 05:20) (97% - 98%)    Parameters below as of 21 Feb 2024 05:20  Patient On (Oxygen Delivery Method): room air        PHYSICAL EXAM:  General: in no acute distress, non toxic appearing, speaking in full sentences  Eyes: EOMI intact bilaterally. Anicteric sclerae, moist conjunctivae  HENT: Moist mucous membranes  Neck: Trachea midline, supple  Lungs: CTA B/L. No wheezes, rales, or rhonchi  Cardiovascular: RRR. No murmurs, rubs, or gallops  Abdomen: +BS Soft, non-tender non-distended; No rebound or guarding  Vasc: Rad and DP intact and equal b/l   Extremities: Rt arm tender and edematous at the IV insertion site. Lt Hallux dry w/o purulent drainage. WWP, No clubbing, cyanosis or edema  Neurological: Alert and oriented x3   Skin: Warm and dry. No obvious rash     LABS:                MICROBIOLOGY:    RADIOLOGY & ADDITIONAL STUDIES:

## 2024-02-21 NOTE — DISCHARGE NOTE NURSING/CASE MANAGEMENT/SOCIAL WORK - NSDCFUADDAPPT_GEN_ALL_CORE_FT
Spoke with patient. When she lays down to do sleep test- she finds that she desaturates. She is supposed to complete sleep study. She will attempt a different position. She will increase oxygen and see if that helps. She is unable to do an inlab sleep study and she does not want to at this point due to multiple health concerns. She will discuss further issues with sleep lab as needed.   Please bring your Insurance card, Photo ID and Discharge paperwork to the following appointment:    (1) Please follow up with your Endocrinology Provider, Dr. Ami Perez at [address] [place] [address] on 3/4/2024 at 1:30pm.    Appointment was scheduled by Ms. VAISHNAVI Quintero, Referral Coordinator.    (2) Please follow up w. Dr. Maximiliano Porras. Please contact their office at [phone] to schedule an appointment 1-2 weeks after being discharged.     (3) Please follow up w/ Dr. Arora Infectious disease. Their office will contact you to schedule an appointment with them.     (4) Please follow up with your primary care physician within 1 week of discharge.

## 2024-02-21 NOTE — PROGRESS NOTE ADULT - ASSESSMENT
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 Patient treated w/ Abx and medically ready for discharge.

## 2024-02-21 NOTE — PROGRESS NOTE ADULT - PROBLEM SELECTOR PLAN 4
Hx of IDT2DM however patient unable to access his insulin. A1c on admission 10.6. Glucose 258. States he takes Metformin and Jardiance     PLAN:  - c/w mISS  - endocrine consult, f/u recs  - consistent carb diet  - c/w lantus 22, lispro 10  - Abx solution changed from D5 to NS.

## 2024-02-21 NOTE — PROGRESS NOTE ADULT - REASON FOR ADMISSION
osteomyelitis

## 2024-02-21 NOTE — PROGRESS NOTE ADULT - PROBLEM SELECTOR PLAN 2
Pt with hx of R foot ulcer. seen by podiatry in May 2022 and treated for OM and underwent R foot TMA w/ skin graft. Pt presenting now for chronic L hallux foot wound that began in Oct 2023. WBC 14.31, ESR 40, CRP 73.1. Per Podiatry, no concerns for gas or acute fx on imaging however cannot rule out OM. S/p Vanc 1g and Zosyn 3.375mg IV x1 in ED.  patient underwent excisional debridement down to and including subcutaneous tissue at bedside with serosanguinous drainage exposed. 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

## 2024-02-21 NOTE — DISCHARGE NOTE NURSING/CASE MANAGEMENT/SOCIAL WORK - NSDCVIVACCINE_GEN_ALL_CORE_FT
Tdap; 30-Jun-2021 18:05; Lorrie Ziegler (IVELISSE); [employer]; [id number] (Exp. Date: 25-Nov-2022); IntraMuscular; Deltoid Left.; 0.5 milliLiter(s); VIS (VIS Published: 09-May-2013, VIS Presented: 30-Jun-2021);

## 2024-02-21 NOTE — PROGRESS NOTE ADULT - PROBLEM SELECTOR PLAN 1
Type 2 diabetes mellitus with hyperglycemia  - Please increase Lantus to  units at bedtime  - Please increase Lispro to units before meals  - Continue lispro moderate dose sliding scale before meals and at bedtime.  - Patient's fingerstick glucose goal is 100-180 mg/dL.    - recommend vascular consult for LLE  - CDCES consulted.  - Discharge recommendations to be discussed. Continue freestyle Leo. Ozempic with high copay due to high deductible, but pt able to pay thru FSA at work and [facility] can order for patient.  - Patient can follow up at discharge with [facility] by calling [phone] to make an appointment.      Case discussed with Dr. Heranndez. Primary team updated. Type 2 diabetes mellitus with hyperglycemia  - Please increase Lantus to 28 units at bedtime  - Please increase Lispro to12  units before meals  - Continue lispro moderate dose sliding scale before meals and at bedtime.  - Patient's fingerstick glucose goal is 100-180 mg/dL.    - recommend vascular consult for LLE  - CDCES consulted.  - Discharge recommendations: Toujeo 28 units at bedtime. Start lispro 12 units with meals. Continue metformin 750mg BID and Jardiance 25mg daily. Restart Ozempic 0.25mg weekly. Continue freestyle Leo. (Ozempic with high copay due to high deductible, but pt able to pay thru FSA at work and [facility] can order for patient. He was having difficulty obtaining from retail pharmacy due to supply issues.)  - Patient can follow up at discharge with [facility] by calling [phone] to make an appointment.      Case discussed with Dr. Hernandez. Primary team updated.

## 2024-02-21 NOTE — PROGRESS NOTE ADULT - TIME BILLING
Managing OM
Managing OM
Review of weekend events and data, insulin adjustment
Insulin adjustment
Review of hospital course, labs, vitals, medical records.   Bedside exam and interview    Discussed plan of care with housestaff  Documenting the encounter

## 2024-02-21 NOTE — PROGRESS NOTE ADULT - PROBLEM SELECTOR PLAN 3
(Improving)Hx of multiple NSAID use.  PLAN:  - c/w ppi    #Constipation  AXR with stool burden  Received Golytely 2/18  - c/w aggressive bowel regimen

## 2024-02-21 NOTE — PROGRESS NOTE ADULT - PROBLEM SELECTOR PLAN 6
F: None   E: replete as needed; Keep K>4, Mg >2   D: Consistent Carb    Dispo: RMF
F: NS 1L   E: replete as needed; Keep K>4, Mg >2   D: Consistent Carb    Dispo: RMF

## 2024-02-22 NOTE — H&P ADULT - PROBLEM SELECTOR PROBLEM 5
Pt with call light in reach and gurney in low position for pt safety. Pt's AAOx4. Pt c/o left shoulder pain after slip and fall on the ice.  
500
Hyperlipidemia

## 2024-02-27 DIAGNOSIS — K04.7 PERIAPICAL ABSCESS WITHOUT SINUS: ICD-10-CM

## 2024-02-27 DIAGNOSIS — K04.90 UNSPECIFIED DISEASES OF PULP AND PERIAPICAL TISSUES: ICD-10-CM

## 2024-02-27 DIAGNOSIS — B95.62 METHICILLIN RESISTANT STAPHYLOCOCCUS AUREUS INFECTION AS THE CAUSE OF DISEASES CLASSIFIED ELSEWHERE: ICD-10-CM

## 2024-02-27 DIAGNOSIS — L03.116 CELLULITIS OF LEFT LOWER LIMB: ICD-10-CM

## 2024-02-27 DIAGNOSIS — M86.172 OTHER ACUTE OSTEOMYELITIS, LEFT ANKLE AND FOOT: ICD-10-CM

## 2024-02-27 DIAGNOSIS — Z89.421 ACQUIRED ABSENCE OF OTHER RIGHT TOE(S): ICD-10-CM

## 2024-02-27 DIAGNOSIS — E11.621 TYPE 2 DIABETES MELLITUS WITH FOOT ULCER: ICD-10-CM

## 2024-02-27 DIAGNOSIS — Z98.890 OTHER SPECIFIED POSTPROCEDURAL STATES: ICD-10-CM

## 2024-02-27 DIAGNOSIS — I10 ESSENTIAL (PRIMARY) HYPERTENSION: ICD-10-CM

## 2024-02-27 DIAGNOSIS — Z79.4 LONG TERM (CURRENT) USE OF INSULIN: ICD-10-CM

## 2024-02-27 DIAGNOSIS — Z71.3 DIETARY COUNSELING AND SURVEILLANCE: ICD-10-CM

## 2024-02-27 DIAGNOSIS — E44.0 MODERATE PROTEIN-CALORIE MALNUTRITION: ICD-10-CM

## 2024-02-27 DIAGNOSIS — K29.70 GASTRITIS, UNSPECIFIED, WITHOUT BLEEDING: ICD-10-CM

## 2024-02-27 DIAGNOSIS — K59.00 CONSTIPATION, UNSPECIFIED: ICD-10-CM

## 2024-02-27 DIAGNOSIS — M00.872 ARTHRITIS DUE TO OTHER BACTERIA, LEFT ANKLE AND FOOT: ICD-10-CM

## 2024-02-27 DIAGNOSIS — F14.90 COCAINE USE, UNSPECIFIED, UNCOMPLICATED: ICD-10-CM

## 2024-02-27 DIAGNOSIS — E11.69 TYPE 2 DIABETES MELLITUS WITH OTHER SPECIFIED COMPLICATION: ICD-10-CM

## 2024-02-27 DIAGNOSIS — E78.5 HYPERLIPIDEMIA, UNSPECIFIED: ICD-10-CM

## 2024-02-27 DIAGNOSIS — A41.9 SEPSIS, UNSPECIFIED ORGANISM: ICD-10-CM

## 2024-02-27 DIAGNOSIS — Z91.141 PATIENT'S OTHER NONCOMPLIANCE WITH MEDICATION REGIMEN DUE TO FINANCIAL HARDSHIP: ICD-10-CM

## 2024-02-27 DIAGNOSIS — E11.65 TYPE 2 DIABETES MELLITUS WITH HYPERGLYCEMIA: ICD-10-CM

## 2024-02-27 DIAGNOSIS — L97.429 NON-PRESSURE CHRONIC ULCER OF LEFT HEEL AND MIDFOOT WITH UNSPECIFIED SEVERITY: ICD-10-CM

## 2024-02-27 DIAGNOSIS — Z59.7 INSUFFICIENT SOCIAL INSURANCE AND WELFARE SUPPORT: ICD-10-CM

## 2024-02-27 DIAGNOSIS — T39.395A ADVERSE EFFECT OF OTHER NONSTEROIDAL ANTI-INFLAMMATORY DRUGS [NSAID], INITIAL ENCOUNTER: ICD-10-CM

## 2024-02-27 SDOH — ECONOMIC STABILITY - INCOME SECURITY: INSUFFICIENT SOCIAL INSURANCE AND WELFARE SUPPORT: Z59.7

## 2024-03-06 ENCOUNTER — APPOINTMENT (OUTPATIENT)
Dept: INFECTIOUS DISEASE | Facility: CLINIC | Age: 49
End: 2024-03-06
Payer: COMMERCIAL

## 2024-03-06 VITALS
HEART RATE: 93 BPM | WEIGHT: 225 LBS | DIASTOLIC BLOOD PRESSURE: 66 MMHG | HEIGHT: 72 IN | TEMPERATURE: 97.8 F | SYSTOLIC BLOOD PRESSURE: 120 MMHG | OXYGEN SATURATION: 98 % | BODY MASS INDEX: 30.48 KG/M2

## 2024-03-06 VITALS — BODY MASS INDEX: 30.52 KG/M2 | HEIGHT: 72 IN

## 2024-03-06 PROCEDURE — 99214 OFFICE O/P EST MOD 30 MIN: CPT

## 2024-03-06 RX ORDER — AMOXICILLIN AND CLAVULANATE POTASSIUM 875; 125 MG/1; MG/1
875-125 TABLET, COATED ORAL
Qty: 42 | Refills: 0 | Status: ACTIVE | COMMUNITY
Start: 2024-03-06 | End: 1900-01-01

## 2024-03-06 RX ORDER — SULFAMETHOXAZOLE AND TRIMETHOPRIM 800; 160 MG/1; MG/1
800-160 TABLET ORAL
Qty: 84 | Refills: 0 | Status: ACTIVE | COMMUNITY
Start: 2024-03-06 | End: 1900-01-01

## 2024-03-06 NOTE — ASSESSMENT
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 Noted recent OSH CBC w/diff from yesterday which was normal. CMP showed normal K but elevated SCr 1.4 (was around 1 on hospital discharge). May be artifactual, no symptoms of renal dysfunction, will repeat to ensure stability and follow closely. - RTC 3 weeks

## 2024-03-06 NOTE — PHYSICAL EXAM
[General Appearance - In No Acute Distress] : in no acute distress [General Appearance - Alert] : alert [Sclera] : the sclera and conjunctiva were normal [Oropharynx] : the oropharynx was normal with no thrush [Neck Appearance] : the appearance of the neck was normal [Exaggerated Use Of Accessory Muscles For Inspiration] : no accessory muscle use [Heart Rate And Rhythm] : heart rate was normal and rhythm regular [Abdomen Soft] : soft [Abdomen Tenderness] : non-tender [FreeTextEntry1] : L hallux wound healing, now superficial, ~2cm across in diameter, without drainage/erythema/tenderness or any exposed bone. [] : no rash

## 2024-03-06 NOTE — HISTORY OF PRESENT ILLNESS
[FreeTextEntry1] : 48M w/ cocaine use d/o, metabolic syndrome (A1c 10.6%) with history R foot OM with prior cx +MRSA in 5/2022, s/p TMA 5/10/2022 with negative bone margins, who presented to [facility] on 2/14 with fatigue, nausea, found to have a distal L hallux ulcer, which had first developed in 11/2023 while he was in the [place] (wore shoes always, no bites/puncture wounds, no water exposure) and healed spontaneously, then recurred briefly 12/2023 and self-resolved, and then recurred again a few weeks ago and at that time was associated with purulent drainage with subsequently resolved. Then on 2/13 he developed increased R foot swelling associated with fatigue/nausea as above, but no fevers/chills/sweats.  Had elevated WBC on admission but otherwise no systemic response noted. Podiatry evaluated patient for L hallux wound. Podiatry's exam showed 2.4 x 2.3 cm wound at distal hallux with +PTB, and s/s drainage, w/o surrounding erythema. MRI L foot w/wo contrast with findings c/w OM of first distal phalanx, with possible 1st interphalangeal joint septic arthritis, and subQ edema around dorsal foot. Also with mild marrow edema of proximal 2nd and third metatarsals and intermediate/lateral cuneiforms, favored stress reaction. Excisional debridement performed by podiatry and culture taken from deep wound, +MRSA, S.anginosus, S.intermedius, S.cohnii, C.aurimucosum, Prevotella disiens and anaerococcus spp. S/p bone biopsy of L hallux on 2/16, cx with S.cohnii and pettenkoferi, path neg, however was on broad spectrum abx prior to sampling.  Patient declined surgical intervention for this infection, wishes to try antibiotic therapy first. Unfortunately cost of IV abx services was prohibitive for patient at both home and [facility] settings, and patient wished to return home on PO abx. I advised him that PO abx therapy would be suboptimal for this infection, and have greater risk of side effects and may have lower chance of clinical cure- patient expressed understanding but still preferred to try antibiotic option rather than undergo surgery at this time.  He was treated with vanc/ceftriaxone/flagyl while inpatient, and discharged on bactrim/augmentin x 6 weeks.  Since returning home patient reports L first toe wound is healing. He has not followed up yet with Podiatrist despite recommendation. He reports taking abx as prescribed with excellent adherence and no side effects. No systemic sx of infection.

## 2024-03-07 LAB
ALBUMIN SERPL ELPH-MCNC: 4.4 G/DL
ALP BLD-CCNC: 83 U/L
ALT SERPL-CCNC: 86 U/L
ANION GAP SERPL CALC-SCNC: 15 MMOL/L
AST SERPL-CCNC: 39 U/L
BILIRUB SERPL-MCNC: 0.3 MG/DL
BUN SERPL-MCNC: 15 MG/DL
CALCIUM SERPL-MCNC: 9.9 MG/DL
CHLORIDE SERPL-SCNC: 100 MMOL/L
CO2 SERPL-SCNC: 25 MMOL/L
CREAT SERPL-MCNC: 1.44 MG/DL
CRP SERPL-MCNC: <3 MG/L
EGFR: 60 ML/MIN/1.73M2
ERYTHROCYTE [SEDIMENTATION RATE] IN BLOOD BY WESTERGREN METHOD: 13 MM/HR
GLUCOSE SERPL-MCNC: 136 MG/DL
POTASSIUM SERPL-SCNC: 5.4 MMOL/L
PROT SERPL-MCNC: 8.5 G/DL
SODIUM SERPL-SCNC: 140 MMOL/L

## 2024-03-10 RX ORDER — ATORVASTATIN CALCIUM 80 MG/1
1 TABLET, FILM COATED ORAL
Qty: 0 | Refills: 0 | DISCHARGE

## 2024-03-10 RX ORDER — LOSARTAN POTASSIUM 100 MG/1
1 TABLET, FILM COATED ORAL
Qty: 0 | Refills: 0 | DISCHARGE

## 2024-03-10 RX ORDER — METFORMIN HYDROCHLORIDE 850 MG/1
1 TABLET ORAL
Qty: 0 | Refills: 0 | DISCHARGE

## 2024-03-10 RX ORDER — REPAGLINIDE 1 MG/1
1 TABLET ORAL
Qty: 0 | Refills: 0 | DISCHARGE

## 2024-03-10 RX ORDER — EMPAGLIFLOZIN 10 MG/1
1 TABLET, FILM COATED ORAL
Qty: 0 | Refills: 0 | DISCHARGE

## 2024-03-10 RX ORDER — LISINOPRIL 2.5 MG/1
1 TABLET ORAL
Qty: 0 | Refills: 0 | DISCHARGE

## 2024-03-10 RX ORDER — INSULIN GLARGINE 100 [IU]/ML
20 INJECTION, SOLUTION SUBCUTANEOUS
Refills: 0 | DISCHARGE

## 2024-03-10 RX ORDER — ASPIRIN/CALCIUM CARB/MAGNESIUM 324 MG
1 TABLET ORAL
Qty: 0 | Refills: 0 | DISCHARGE

## 2024-03-10 RX ORDER — INSULIN LISPRO 100/ML
10 VIAL (ML) SUBCUTANEOUS
Qty: 0 | Refills: 0 | DISCHARGE

## 2024-03-10 RX ORDER — INSULIN GLARGINE AND LIXISENATIDE 100; 33 U/ML; UG/ML
30 INJECTION, SOLUTION SUBCUTANEOUS
Qty: 0 | Refills: 0 | DISCHARGE

## 2024-03-10 RX ORDER — METFORMIN HYDROCHLORIDE 850 MG/1
2 TABLET ORAL
Qty: 0 | Refills: 0 | DISCHARGE

## 2024-03-10 RX ORDER — INSULIN GLARGINE 100 [IU]/ML
20 INJECTION, SOLUTION SUBCUTANEOUS
Qty: 0 | Refills: 0 | DISCHARGE

## 2024-03-10 RX ORDER — EMPAGLIFLOZIN 10 MG/1
1 TABLET, FILM COATED ORAL
Refills: 0 | DISCHARGE

## 2024-03-10 RX ORDER — LOSARTAN POTASSIUM 100 MG/1
1 TABLET, FILM COATED ORAL
Refills: 0 | DISCHARGE

## 2024-03-10 RX ORDER — LOSARTAN POTASSIUM 100 MG/1
0 TABLET, FILM COATED ORAL
Qty: 0 | Refills: 0 | DISCHARGE

## 2024-03-10 RX ORDER — SEMAGLUTIDE 0.68 MG/ML
2 INJECTION, SOLUTION SUBCUTANEOUS
Refills: 0 | DISCHARGE

## 2024-03-10 RX ORDER — GLIPIZIDE/METFORMIN HCL 2.5-500 MG
2 TABLET ORAL
Qty: 0 | Refills: 0 | DISCHARGE

## 2024-03-22 ENCOUNTER — APPOINTMENT (OUTPATIENT)
Dept: INFECTIOUS DISEASE | Facility: CLINIC | Age: 49
End: 2024-03-22

## 2024-03-22 PROBLEM — Z87.898 PERSONAL HISTORY OF OTHER SPECIFIED CONDITIONS: Chronic | Status: ACTIVE | Noted: 2019-07-11

## 2024-03-23 PROBLEM — M86.9 OSTEOMYELITIS, UNSPECIFIED: Chronic | Status: ACTIVE | Noted: 2021-03-26

## 2024-03-23 PROBLEM — I10 ESSENTIAL (PRIMARY) HYPERTENSION: Chronic | Status: ACTIVE | Noted: 2019-07-11

## 2024-04-02 NOTE — ED PROVIDER NOTE - ATTENDING CONTRIBUTION TO CARE
Patient with no complaints. Denies vaginal bleeding or cramping.  . Anatomy scan at 20 WGA.  Pt reports under stress.  S.O with SI and is admitted for psychiatric care.  She has information for anonymous counseling that she would like to continue with. RTC in 4 weeks    Vitals signs, FHTs, urine dip, and PE findings documented, reviewed and available in OB flow chart.       I spent a total of 20 minutes on the day of the visit.This includes face to face time and non-face to face time preparing to see the patient (eg, review of tests), Obtaining and/or reviewing separately obtained history, Documenting clinical information in the electronic or other health record, Independently interpreting resultsand communicating results to the patient/family/caregiver, or Care coordination.     Coffective counseling sheet [employer] Love discussed with mother. Reinforced immediate skin to skin, the magic first hour, importance of the first feeding and delaying routine procedures. Encouraged mother to download Coffective mobile drake if she has not already done so. Mother verbalizes understanding.     42 M DM2 on insulin s/p prior toe amputation, p/w fever, chills, yesterday , blister to R foot with rupture of foul smelling blood discharge yesterday. VSS, afebrile.  Lung clear, belly soft, nl heent.  Foot with 2cm deep ulcer to R lateral foot.  Nl pulses.  Plan xray, labs, esr/crp, vascular and podiatry and reassess and give abx.

## 2024-04-17 NOTE — PATIENT PROFILE ADULT - NSASFALLNEEDSASSIST_GEN_A_NUR
REVIEW OF SYSTEMS  Constitutional: No fever, No Chills, No fatigue  HEENT: No eye pain, No visual disturbances, No difficulty hearing  Pulm: No cough,  No shortness of breath  Cardio: No chest pain, No palpitations  GI:  No abdominal pain, No nausea, No vomiting, No diarrhea, +constipation  : No dysuria, No frequency, No hematuria  Neuro: No headaches, No memory loss, + loss of strength, + numbness, No tremors  Skin: No itching, No rashes, No lesions   Endo: No temperature intolerance  MSK: No joint pain, No joint swelling, No muscle pain, No Neck pain,  +  back pain  Psych:  No depression, No anxiety Constitutional: No fever, No Chills, No fatigue  HEENT: No eye pain, No visual disturbances, No difficulty hearing  Pulm: No cough,  No shortness of breath  Cardio: No chest pain, No palpitations  GI:  No abdominal pain, No nausea, No vomiting, No diarrhea, +constipation  : No dysuria, No frequency, No hematuria  Neuro: No headaches, No memory loss, + loss of strength, + numbness, No tremors  Skin: No itching, No rashes, No lesions   Endo: No temperature intolerance  MSK: No joint pain, No joint swelling, No muscle pain, No Neck pain,  +  back pain  Psych:  No depression, No anxiety yes

## 2024-04-23 NOTE — PROGRESS NOTE ADULT - TIME-BASED
[Preoperative Visit] : for a medical evaluation prior to surgery
[Scheduled Procedure ___] : a [unfilled]
[Date of Surgery ___] : on [unfilled]
45
[Surgeon Name ___] : surgeon: [unfilled]
[Fever] : no fever
45
[Chills] : no chills
[Fatigue] : no fatigue
[Chest Pain] : no chest pain
[Cough] : no cough
[Dyspnea] : no dyspnea
[Dysuria] : no dysuria
[Urinary Frequency] : no urinary frequency
[Nausea] : no nausea
[Vomiting] : no vomiting
[Diarrhea] : no diarrhea
[Abdominal Pain] : no abdominal pain
[Easy Bruising] : no easy bruising
[Lower Extremity Swelling] : no lower extremity swelling
[Poor Exercise Tolerance] : no poor exercise tolerance
[Diabetes] : no diabetes
[Cardiovascular Disease] : cardiovascular disease
[Pulmonary Disease] : no pulmonary disease
[Anti-Platelet Agents] : no anti-platelet agents
[Nicotine Dependence] : no nicotine dependence
[Alcohol Use] : no  alcohol use
[Renal Disease] : no renal disease
[GI Disease] : gastrointestinal disease
[Sleep Apnea] : no sleep apnea
[Thromboembolic Problems] : no thromboembolic problems
[Clotting Disorder] : no clotting disorder
[Frequent use of NSAIDs] : no use of NSAIDs
[Bleeding Disorder] : no bleeding disorder
[Transfusion Reaction] : no transfusion reaction
[Impaired Immunity] : no impaired immunity
[Steroid Use in Last 6 Months] : no steroid use in the last six months
[Frequent Aspirin Use] : no frequent aspirin use
[Prior Anesthesia] : Prior anesthesia
[Prev Anesthesia Reaction] : no previous anesthesia reaction
[FreeTextEntry1] : LIZZIE  is a 69 year F w/MHx Agatston 0.4, Aflutter s/p ablation, MVP, HLD, Hypothyroid, GERD, Obesity who presents for preoperative cardiovascular examination.  I was asked to see this delightful patient today for Pre-op Evaluation.  The patient denies cough, wheezing, sputum production, hemoptysis, dyspnea, congestion, diarrhea, constipation, nausea, vomiting, bright red blood per rectum, melena, angina, chest pain, palpitations, diaphoresis, PND, incontinence, frequency, urgency, dysuria, edema, joint pain, headache, weakness, numbness and dizziness

## 2024-05-07 NOTE — H&P ADULT - NS MD HP PULSE FEMORAL
Obtain Level of Care/Service Not Available at this Facility/Worsening of Condition, Death, or Disability if Patient Does Not Transfer/Continuity of Care at Other Facility
2+ b/l no bruits

## 2024-06-10 ENCOUNTER — TRANSCRIPTION ENCOUNTER (OUTPATIENT)
Age: 49
End: 2024-06-10

## 2024-06-10 VITALS
OXYGEN SATURATION: 96 % | DIASTOLIC BLOOD PRESSURE: 104 MMHG | SYSTOLIC BLOOD PRESSURE: 148 MMHG | TEMPERATURE: 99 F | HEART RATE: 97 BPM | RESPIRATION RATE: 16 BRPM

## 2024-06-10 LAB
ADD ON TEST-SPECIMEN IN LAB: SIGNIFICANT CHANGE UP
ADD ON TEST-SPECIMEN IN LAB: SIGNIFICANT CHANGE UP
ANION GAP SERPL CALC-SCNC: 9 MMOL/L — SIGNIFICANT CHANGE UP (ref 5–17)
APTT BLD: 30.3 SEC — SIGNIFICANT CHANGE UP (ref 24.5–35.6)
BASOPHILS # BLD AUTO: 0.04 K/UL — SIGNIFICANT CHANGE UP (ref 0–0.2)
BASOPHILS NFR BLD AUTO: 0.3 % — SIGNIFICANT CHANGE UP (ref 0–2)
BUN SERPL-MCNC: 23 MG/DL — SIGNIFICANT CHANGE UP (ref 7–23)
CALCIUM SERPL-MCNC: 9.4 MG/DL — SIGNIFICANT CHANGE UP (ref 8.4–10.5)
CHLORIDE SERPL-SCNC: 97 MMOL/L — SIGNIFICANT CHANGE UP (ref 96–108)
CO2 SERPL-SCNC: 27 MMOL/L — SIGNIFICANT CHANGE UP (ref 22–31)
CREAT SERPL-MCNC: 1.11 MG/DL — SIGNIFICANT CHANGE UP (ref 0.5–1.3)
EGFR: 82 ML/MIN/1.73M2 — SIGNIFICANT CHANGE UP
EOSINOPHIL # BLD AUTO: 0.15 K/UL — SIGNIFICANT CHANGE UP (ref 0–0.5)
EOSINOPHIL NFR BLD AUTO: 1.1 % — SIGNIFICANT CHANGE UP (ref 0–6)
GLUCOSE SERPL-MCNC: 124 MG/DL — HIGH (ref 70–99)
HCT VFR BLD CALC: 38.5 % — LOW (ref 39–50)
HGB BLD-MCNC: 12.9 G/DL — LOW (ref 13–17)
IMM GRANULOCYTES NFR BLD AUTO: 0.3 % — SIGNIFICANT CHANGE UP (ref 0–0.9)
INR BLD: 0.97 — SIGNIFICANT CHANGE UP (ref 0.85–1.18)
LACTATE SERPL-SCNC: 0.7 MMOL/L — SIGNIFICANT CHANGE UP (ref 0.5–2)
LYMPHOCYTES # BLD AUTO: 15.7 % — SIGNIFICANT CHANGE UP (ref 13–44)
LYMPHOCYTES # BLD AUTO: 2.16 K/UL — SIGNIFICANT CHANGE UP (ref 1–3.3)
MCHC RBC-ENTMCNC: 29.8 PG — SIGNIFICANT CHANGE UP (ref 27–34)
MCHC RBC-ENTMCNC: 33.5 GM/DL — SIGNIFICANT CHANGE UP (ref 32–36)
MCV RBC AUTO: 88.9 FL — SIGNIFICANT CHANGE UP (ref 80–100)
MONOCYTES # BLD AUTO: 1.09 K/UL — HIGH (ref 0–0.9)
MONOCYTES NFR BLD AUTO: 7.9 % — SIGNIFICANT CHANGE UP (ref 2–14)
NEUTROPHILS # BLD AUTO: 10.32 K/UL — HIGH (ref 1.8–7.4)
NEUTROPHILS NFR BLD AUTO: 74.7 % — SIGNIFICANT CHANGE UP (ref 43–77)
NRBC # BLD: 0 /100 WBCS — SIGNIFICANT CHANGE UP (ref 0–0)
PLATELET # BLD AUTO: 316 K/UL — SIGNIFICANT CHANGE UP (ref 150–400)
POTASSIUM SERPL-MCNC: SIGNIFICANT CHANGE UP (ref 3.5–5.3)
POTASSIUM SERPL-SCNC: SIGNIFICANT CHANGE UP (ref 3.5–5.3)
PROTHROM AB SERPL-ACNC: 11.1 SEC — SIGNIFICANT CHANGE UP (ref 9.5–13)
RBC # BLD: 4.33 M/UL — SIGNIFICANT CHANGE UP (ref 4.2–5.8)
RBC # FLD: 13.4 % — SIGNIFICANT CHANGE UP (ref 10.3–14.5)
SODIUM SERPL-SCNC: 133 MMOL/L — LOW (ref 135–145)
WBC # BLD: 13.8 K/UL — HIGH (ref 3.8–10.5)
WBC # FLD AUTO: 13.8 K/UL — HIGH (ref 3.8–10.5)

## 2024-06-10 PROCEDURE — 73630 X-RAY EXAM OF FOOT: CPT | Mod: 26,LT

## 2024-06-10 PROCEDURE — 99285 EMERGENCY DEPT VISIT HI MDM: CPT

## 2024-06-10 RX ORDER — VANCOMYCIN HCL 1 G
1000 VIAL (EA) INTRAVENOUS ONCE
Refills: 0 | Status: COMPLETED | OUTPATIENT
Start: 2024-06-10 | End: 2024-06-10

## 2024-06-10 RX ORDER — PIPERACILLIN AND TAZOBACTAM 4; .5 G/20ML; G/20ML
3.38 INJECTION, POWDER, LYOPHILIZED, FOR SOLUTION INTRAVENOUS ONCE
Refills: 0 | Status: COMPLETED | OUTPATIENT
Start: 2024-06-10 | End: 2024-06-10

## 2024-06-10 RX ADMIN — PIPERACILLIN AND TAZOBACTAM 200 GRAM(S): 4; .5 INJECTION, POWDER, LYOPHILIZED, FOR SOLUTION INTRAVENOUS at 23:39

## 2024-06-10 NOTE — ED ADULT NURSE NOTE - OBJECTIVE STATEMENT
Received patient ambulatory with chief complaint of infected wound, 5th digit, left foot. Said complaint started about 2 days ago, noted wound on said part, eventually worsened with noted pus. Reports subjective fever which started tonight, minimal pain with ambulation.  On PE, AOX4, speaking full sentences without difficulty. Patient not in active cardiac or respiratory distress, no noted neurologic deficits. Noted infected wound of the 5th digit, left foot. Patient oriented to ED area. All needs attended. POC reviewed.  Purposeful proactive hourly rounding in progress.

## 2024-06-10 NOTE — ED ADULT TRIAGE NOTE - CHIEF COMPLAINT QUOTE
Pt. presents to the ED for a left 5th toe infection. +subjective fevers. +black in color. HX of DM. All toes on right foot are amputated.

## 2024-06-10 NOTE — ED ADULT NURSE NOTE - NSFALLUNIVINTERV_ED_ALL_ED
Bed/Stretcher in lowest position, wheels locked, appropriate side rails in place/Call bell, personal items and telephone in reach/Instruct patient to call for assistance before getting out of bed/chair/stretcher/Non-slip footwear applied when patient is off stretcher/[place] to call system/Physically safe environment - no spills, clutter or unnecessary equipment/Purposeful proactive rounding/Room/bathroom lighting operational, light cord in reach

## 2024-06-11 ENCOUNTER — INPATIENT (INPATIENT)
Facility: HOSPITAL | Age: 49
LOS: 1 days | Discharge: LEFT BEFORE DISCHARGE | End: 2024-06-13
Attending: STUDENT IN AN ORGANIZED HEALTH CARE EDUCATION/TRAINING PROGRAM | Admitting: STUDENT IN AN ORGANIZED HEALTH CARE EDUCATION/TRAINING PROGRAM
Payer: COMMERCIAL

## 2024-06-11 DIAGNOSIS — I10 ESSENTIAL (PRIMARY) HYPERTENSION: ICD-10-CM

## 2024-06-11 DIAGNOSIS — Z29.9 ENCOUNTER FOR PROPHYLACTIC MEASURES, UNSPECIFIED: ICD-10-CM

## 2024-06-11 DIAGNOSIS — E11.9 TYPE 2 DIABETES MELLITUS WITHOUT COMPLICATIONS: ICD-10-CM

## 2024-06-11 DIAGNOSIS — E78.5 HYPERLIPIDEMIA, UNSPECIFIED: ICD-10-CM

## 2024-06-11 DIAGNOSIS — Z89.429 ACQUIRED ABSENCE OF OTHER TOE(S), UNSPECIFIED SIDE: Chronic | ICD-10-CM

## 2024-06-11 DIAGNOSIS — Z94.5 SKIN TRANSPLANT STATUS: Chronic | ICD-10-CM

## 2024-06-11 DIAGNOSIS — M86.9 OSTEOMYELITIS, UNSPECIFIED: ICD-10-CM

## 2024-06-11 DIAGNOSIS — Z96.0 PRESENCE OF UROGENITAL IMPLANTS: Chronic | ICD-10-CM

## 2024-06-11 DIAGNOSIS — A41.9 SEPSIS, UNSPECIFIED ORGANISM: ICD-10-CM

## 2024-06-11 LAB
A1C WITH ESTIMATED AVERAGE GLUCOSE RESULT: 6.5 % — HIGH (ref 4–5.6)
ALBUMIN SERPL ELPH-MCNC: 3.8 G/DL — SIGNIFICANT CHANGE UP (ref 3.3–5)
ALBUMIN SERPL ELPH-MCNC: 4 G/DL — SIGNIFICANT CHANGE UP (ref 3.3–5)
ALP SERPL-CCNC: 63 U/L — SIGNIFICANT CHANGE UP (ref 40–120)
ALP SERPL-CCNC: 65 U/L — SIGNIFICANT CHANGE UP (ref 40–120)
ALT FLD-CCNC: 23 U/L — SIGNIFICANT CHANGE UP (ref 10–45)
ALT FLD-CCNC: 25 U/L — SIGNIFICANT CHANGE UP (ref 10–45)
ANION GAP SERPL CALC-SCNC: 8 MMOL/L — SIGNIFICANT CHANGE UP (ref 5–17)
ANION GAP SERPL CALC-SCNC: 8 MMOL/L — SIGNIFICANT CHANGE UP (ref 5–17)
APTT BLD: 33.4 SEC — SIGNIFICANT CHANGE UP (ref 24.5–35.6)
AST SERPL-CCNC: 22 U/L — SIGNIFICANT CHANGE UP (ref 10–40)
AST SERPL-CCNC: 25 U/L — SIGNIFICANT CHANGE UP (ref 10–40)
BASOPHILS # BLD AUTO: 0.06 K/UL — SIGNIFICANT CHANGE UP (ref 0–0.2)
BASOPHILS NFR BLD AUTO: 0.6 % — SIGNIFICANT CHANGE UP (ref 0–2)
BILIRUB SERPL-MCNC: 0.6 MG/DL — SIGNIFICANT CHANGE UP (ref 0.2–1.2)
BILIRUB SERPL-MCNC: 0.6 MG/DL — SIGNIFICANT CHANGE UP (ref 0.2–1.2)
BLD GP AB SCN SERPL QL: NEGATIVE — SIGNIFICANT CHANGE UP
BUN SERPL-MCNC: 20 MG/DL — SIGNIFICANT CHANGE UP (ref 7–23)
BUN SERPL-MCNC: 24 MG/DL — HIGH (ref 7–23)
CALCIUM SERPL-MCNC: 9.2 MG/DL — SIGNIFICANT CHANGE UP (ref 8.4–10.5)
CALCIUM SERPL-MCNC: 9.4 MG/DL — SIGNIFICANT CHANGE UP (ref 8.4–10.5)
CHLORIDE SERPL-SCNC: 101 MMOL/L — SIGNIFICANT CHANGE UP (ref 96–108)
CHLORIDE SERPL-SCNC: 104 MMOL/L — SIGNIFICANT CHANGE UP (ref 96–108)
CO2 SERPL-SCNC: 25 MMOL/L — SIGNIFICANT CHANGE UP (ref 22–31)
CO2 SERPL-SCNC: 25 MMOL/L — SIGNIFICANT CHANGE UP (ref 22–31)
CREAT SERPL-MCNC: 1.09 MG/DL — SIGNIFICANT CHANGE UP (ref 0.5–1.3)
CREAT SERPL-MCNC: 1.18 MG/DL — SIGNIFICANT CHANGE UP (ref 0.5–1.3)
EGFR: 76 ML/MIN/1.73M2 — SIGNIFICANT CHANGE UP
EGFR: 84 ML/MIN/1.73M2 — SIGNIFICANT CHANGE UP
EOSINOPHIL # BLD AUTO: 0.26 K/UL — SIGNIFICANT CHANGE UP (ref 0–0.5)
EOSINOPHIL NFR BLD AUTO: 2.6 % — SIGNIFICANT CHANGE UP (ref 0–6)
ESTIMATED AVERAGE GLUCOSE: 140 MG/DL — HIGH (ref 68–114)
GLUCOSE BLDC GLUCOMTR-MCNC: 108 MG/DL — HIGH (ref 70–99)
GLUCOSE BLDC GLUCOMTR-MCNC: 75 MG/DL — SIGNIFICANT CHANGE UP (ref 70–99)
GLUCOSE BLDC GLUCOMTR-MCNC: 75 MG/DL — SIGNIFICANT CHANGE UP (ref 70–99)
GLUCOSE BLDC GLUCOMTR-MCNC: 81 MG/DL — SIGNIFICANT CHANGE UP (ref 70–99)
GLUCOSE BLDC GLUCOMTR-MCNC: 83 MG/DL — SIGNIFICANT CHANGE UP (ref 70–99)
GLUCOSE BLDC GLUCOMTR-MCNC: 85 MG/DL — SIGNIFICANT CHANGE UP (ref 70–99)
GLUCOSE BLDC GLUCOMTR-MCNC: 92 MG/DL — SIGNIFICANT CHANGE UP (ref 70–99)
GLUCOSE SERPL-MCNC: 114 MG/DL — HIGH (ref 70–99)
GLUCOSE SERPL-MCNC: 82 MG/DL — SIGNIFICANT CHANGE UP (ref 70–99)
GRAM STN FLD: SIGNIFICANT CHANGE UP
HCT VFR BLD CALC: 41.9 % — SIGNIFICANT CHANGE UP (ref 39–50)
HGB BLD-MCNC: 13.6 G/DL — SIGNIFICANT CHANGE UP (ref 13–17)
IMM GRANULOCYTES NFR BLD AUTO: 0.3 % — SIGNIFICANT CHANGE UP (ref 0–0.9)
INR BLD: 0.96 — SIGNIFICANT CHANGE UP (ref 0.85–1.18)
LYMPHOCYTES # BLD AUTO: 2.47 K/UL — SIGNIFICANT CHANGE UP (ref 1–3.3)
LYMPHOCYTES # BLD AUTO: 24.8 % — SIGNIFICANT CHANGE UP (ref 13–44)
MAGNESIUM SERPL-MCNC: 1.8 MG/DL — SIGNIFICANT CHANGE UP (ref 1.6–2.6)
MCHC RBC-ENTMCNC: 29.6 PG — SIGNIFICANT CHANGE UP (ref 27–34)
MCHC RBC-ENTMCNC: 32.5 GM/DL — SIGNIFICANT CHANGE UP (ref 32–36)
MCV RBC AUTO: 91.3 FL — SIGNIFICANT CHANGE UP (ref 80–100)
MONOCYTES # BLD AUTO: 1.1 K/UL — HIGH (ref 0–0.9)
MONOCYTES NFR BLD AUTO: 11 % — SIGNIFICANT CHANGE UP (ref 2–14)
NEUTROPHILS # BLD AUTO: 6.05 K/UL — SIGNIFICANT CHANGE UP (ref 1.8–7.4)
NEUTROPHILS NFR BLD AUTO: 60.7 % — SIGNIFICANT CHANGE UP (ref 43–77)
NRBC # BLD: 0 /100 WBCS — SIGNIFICANT CHANGE UP (ref 0–0)
PHOSPHATE SERPL-MCNC: 3.2 MG/DL — SIGNIFICANT CHANGE UP (ref 2.5–4.5)
PLATELET # BLD AUTO: 310 K/UL — SIGNIFICANT CHANGE UP (ref 150–400)
POTASSIUM SERPL-MCNC: 3.6 MMOL/L — SIGNIFICANT CHANGE UP (ref 3.5–5.3)
POTASSIUM SERPL-MCNC: 4.2 MMOL/L — SIGNIFICANT CHANGE UP (ref 3.5–5.3)
POTASSIUM SERPL-SCNC: 3.6 MMOL/L — SIGNIFICANT CHANGE UP (ref 3.5–5.3)
POTASSIUM SERPL-SCNC: 4.2 MMOL/L — SIGNIFICANT CHANGE UP (ref 3.5–5.3)
PROT SERPL-MCNC: 7.4 G/DL — SIGNIFICANT CHANGE UP (ref 6–8.3)
PROT SERPL-MCNC: 7.5 G/DL — SIGNIFICANT CHANGE UP (ref 6–8.3)
PROTHROM AB SERPL-ACNC: 11 SEC — SIGNIFICANT CHANGE UP (ref 9.5–13)
RBC # BLD: 4.59 M/UL — SIGNIFICANT CHANGE UP (ref 4.2–5.8)
RBC # FLD: 13.3 % — SIGNIFICANT CHANGE UP (ref 10.3–14.5)
RH IG SCN BLD-IMP: POSITIVE — SIGNIFICANT CHANGE UP
SODIUM SERPL-SCNC: 134 MMOL/L — LOW (ref 135–145)
SODIUM SERPL-SCNC: 137 MMOL/L — SIGNIFICANT CHANGE UP (ref 135–145)
SPECIMEN SOURCE: SIGNIFICANT CHANGE UP
WBC # BLD: 9.97 K/UL — SIGNIFICANT CHANGE UP (ref 3.8–10.5)
WBC # FLD AUTO: 9.97 K/UL — SIGNIFICANT CHANGE UP (ref 3.8–10.5)

## 2024-06-11 PROCEDURE — 93010 ELECTROCARDIOGRAM REPORT: CPT

## 2024-06-11 PROCEDURE — 99223 1ST HOSP IP/OBS HIGH 75: CPT | Mod: GC

## 2024-06-11 PROCEDURE — 88311 DECALCIFY TISSUE: CPT | Mod: 26

## 2024-06-11 PROCEDURE — 88305 TISSUE EXAM BY PATHOLOGIST: CPT | Mod: 26

## 2024-06-11 PROCEDURE — 99222 1ST HOSP IP/OBS MODERATE 55: CPT

## 2024-06-11 PROCEDURE — 71045 X-RAY EXAM CHEST 1 VIEW: CPT | Mod: 26

## 2024-06-11 PROCEDURE — 88307 TISSUE EXAM BY PATHOLOGIST: CPT | Mod: 26

## 2024-06-11 RX ORDER — ENOXAPARIN SODIUM 100 MG/ML
40 INJECTION SUBCUTANEOUS EVERY 24 HOURS
Refills: 0 | Status: DISCONTINUED | OUTPATIENT
Start: 2024-06-11 | End: 2024-06-13

## 2024-06-11 RX ORDER — HYDROMORPHONE HYDROCHLORIDE 2 MG/ML
0.25 INJECTION INTRAMUSCULAR; INTRAVENOUS; SUBCUTANEOUS ONCE
Refills: 0 | Status: DISCONTINUED | OUTPATIENT
Start: 2024-06-11 | End: 2024-06-11

## 2024-06-11 RX ORDER — GLUCAGON INJECTION, SOLUTION 0.5 MG/.1ML
1 INJECTION, SOLUTION SUBCUTANEOUS ONCE
Refills: 0 | Status: DISCONTINUED | OUTPATIENT
Start: 2024-06-11 | End: 2024-06-11

## 2024-06-11 RX ORDER — PIPERACILLIN AND TAZOBACTAM 4; .5 G/20ML; G/20ML
4.5 INJECTION, POWDER, LYOPHILIZED, FOR SOLUTION INTRAVENOUS EVERY 8 HOURS
Refills: 0 | Status: DISCONTINUED | OUTPATIENT
Start: 2024-06-11 | End: 2024-06-13

## 2024-06-11 RX ORDER — GLUCAGON INJECTION, SOLUTION 0.5 MG/.1ML
1 INJECTION, SOLUTION SUBCUTANEOUS ONCE
Refills: 0 | Status: DISCONTINUED | OUTPATIENT
Start: 2024-06-11 | End: 2024-06-13

## 2024-06-11 RX ORDER — DEXTROSE 50 % IN WATER 50 %
12.5 SYRINGE (ML) INTRAVENOUS ONCE
Refills: 0 | Status: DISCONTINUED | OUTPATIENT
Start: 2024-06-11 | End: 2024-06-11

## 2024-06-11 RX ORDER — SODIUM CHLORIDE 9 MG/ML
1000 INJECTION, SOLUTION INTRAVENOUS
Refills: 0 | Status: DISCONTINUED | OUTPATIENT
Start: 2024-06-11 | End: 2024-06-11

## 2024-06-11 RX ORDER — DEXTROSE 50 % IN WATER 50 %
25 SYRINGE (ML) INTRAVENOUS ONCE
Refills: 0 | Status: DISCONTINUED | OUTPATIENT
Start: 2024-06-11 | End: 2024-06-11

## 2024-06-11 RX ORDER — AMLODIPINE BESYLATE 2.5 MG/1
5 TABLET ORAL ONCE
Refills: 0 | Status: COMPLETED | OUTPATIENT
Start: 2024-06-11 | End: 2024-06-11

## 2024-06-11 RX ORDER — SODIUM CHLORIDE 9 MG/ML
1000 INJECTION, SOLUTION INTRAVENOUS
Refills: 0 | Status: DISCONTINUED | OUTPATIENT
Start: 2024-06-11 | End: 2024-06-13

## 2024-06-11 RX ORDER — INSULIN GLARGINE 100 [IU]/ML
15 INJECTION, SOLUTION SUBCUTANEOUS AT BEDTIME
Refills: 0 | Status: DISCONTINUED | OUTPATIENT
Start: 2024-06-11 | End: 2024-06-11

## 2024-06-11 RX ORDER — DEXTROSE 50 % IN WATER 50 %
15 SYRINGE (ML) INTRAVENOUS ONCE
Refills: 0 | Status: DISCONTINUED | OUTPATIENT
Start: 2024-06-11 | End: 2024-06-13

## 2024-06-11 RX ORDER — ACETAMINOPHEN 500 MG
650 TABLET ORAL EVERY 6 HOURS
Refills: 0 | Status: DISCONTINUED | OUTPATIENT
Start: 2024-06-11 | End: 2024-06-11

## 2024-06-11 RX ORDER — HYDROMORPHONE HYDROCHLORIDE 2 MG/ML
0.5 INJECTION INTRAMUSCULAR; INTRAVENOUS; SUBCUTANEOUS ONCE
Refills: 0 | Status: DISCONTINUED | OUTPATIENT
Start: 2024-06-11 | End: 2024-06-11

## 2024-06-11 RX ORDER — PIPERACILLIN AND TAZOBACTAM 4; .5 G/20ML; G/20ML
4.5 INJECTION, POWDER, LYOPHILIZED, FOR SOLUTION INTRAVENOUS EVERY 8 HOURS
Refills: 0 | Status: DISCONTINUED | OUTPATIENT
Start: 2024-06-11 | End: 2024-06-11

## 2024-06-11 RX ORDER — DEXTROSE 10 % IN WATER 10 %
125 INTRAVENOUS SOLUTION INTRAVENOUS ONCE
Refills: 0 | Status: DISCONTINUED | OUTPATIENT
Start: 2024-06-11 | End: 2024-06-13

## 2024-06-11 RX ORDER — KETOROLAC TROMETHAMINE 30 MG/ML
15 SYRINGE (ML) INJECTION ONCE
Refills: 0 | Status: DISCONTINUED | OUTPATIENT
Start: 2024-06-11 | End: 2024-06-11

## 2024-06-11 RX ORDER — VANCOMYCIN HCL 1 G
1000 VIAL (EA) INTRAVENOUS EVERY 12 HOURS
Refills: 0 | Status: DISCONTINUED | OUTPATIENT
Start: 2024-06-11 | End: 2024-06-11

## 2024-06-11 RX ORDER — INSULIN GLARGINE 100 [IU]/ML
20 INJECTION, SOLUTION SUBCUTANEOUS AT BEDTIME
Refills: 0 | Status: DISCONTINUED | OUTPATIENT
Start: 2024-06-11 | End: 2024-06-11

## 2024-06-11 RX ORDER — PIPERACILLIN AND TAZOBACTAM 4; .5 G/20ML; G/20ML
3.38 INJECTION, POWDER, LYOPHILIZED, FOR SOLUTION INTRAVENOUS EVERY 8 HOURS
Refills: 0 | Status: DISCONTINUED | OUTPATIENT
Start: 2024-06-11 | End: 2024-06-11

## 2024-06-11 RX ORDER — INSULIN LISPRO 100/ML
VIAL (ML) SUBCUTANEOUS EVERY 6 HOURS
Refills: 0 | Status: DISCONTINUED | OUTPATIENT
Start: 2024-06-11 | End: 2024-06-11

## 2024-06-11 RX ORDER — DEXTROSE 50 % IN WATER 50 %
12.5 SYRINGE (ML) INTRAVENOUS ONCE
Refills: 0 | Status: DISCONTINUED | OUTPATIENT
Start: 2024-06-11 | End: 2024-06-13

## 2024-06-11 RX ORDER — INSULIN LISPRO 100/ML
VIAL (ML) SUBCUTANEOUS
Refills: 0 | Status: DISCONTINUED | OUTPATIENT
Start: 2024-06-11 | End: 2024-06-13

## 2024-06-11 RX ORDER — DEXTROSE 50 % IN WATER 50 %
15 SYRINGE (ML) INTRAVENOUS ONCE
Refills: 0 | Status: DISCONTINUED | OUTPATIENT
Start: 2024-06-11 | End: 2024-06-11

## 2024-06-11 RX ORDER — DEXTROSE 10 % IN WATER 10 %
125 INTRAVENOUS SOLUTION INTRAVENOUS ONCE
Refills: 0 | Status: DISCONTINUED | OUTPATIENT
Start: 2024-06-11 | End: 2024-06-11

## 2024-06-11 RX ORDER — INSULIN GLARGINE 100 [IU]/ML
15 INJECTION, SOLUTION SUBCUTANEOUS AT BEDTIME
Refills: 0 | Status: DISCONTINUED | OUTPATIENT
Start: 2024-06-11 | End: 2024-06-13

## 2024-06-11 RX ORDER — DEXTROSE 50 % IN WATER 50 %
25 SYRINGE (ML) INTRAVENOUS ONCE
Refills: 0 | Status: DISCONTINUED | OUTPATIENT
Start: 2024-06-11 | End: 2024-06-13

## 2024-06-11 RX ORDER — HYDROMORPHONE HYDROCHLORIDE 2 MG/ML
0.5 INJECTION INTRAMUSCULAR; INTRAVENOUS; SUBCUTANEOUS EVERY 6 HOURS
Refills: 0 | Status: DISCONTINUED | OUTPATIENT
Start: 2024-06-11 | End: 2024-06-13

## 2024-06-11 RX ORDER — SODIUM CHLORIDE 9 MG/ML
1000 INJECTION, SOLUTION INTRAVENOUS
Refills: 0 | Status: DISCONTINUED | OUTPATIENT
Start: 2024-06-11 | End: 2024-06-12

## 2024-06-11 RX ORDER — DAPTOMYCIN 500 MG/10ML
600 INJECTION, POWDER, LYOPHILIZED, FOR SOLUTION INTRAVENOUS EVERY 24 HOURS
Refills: 0 | Status: DISCONTINUED | OUTPATIENT
Start: 2024-06-11 | End: 2024-06-11

## 2024-06-11 RX ORDER — DAPTOMYCIN 500 MG/10ML
600 INJECTION, POWDER, LYOPHILIZED, FOR SOLUTION INTRAVENOUS EVERY 24 HOURS
Refills: 0 | Status: DISCONTINUED | OUTPATIENT
Start: 2024-06-11 | End: 2024-06-13

## 2024-06-11 RX ORDER — ACETAMINOPHEN 500 MG
650 TABLET ORAL EVERY 6 HOURS
Refills: 0 | Status: DISCONTINUED | OUTPATIENT
Start: 2024-06-11 | End: 2024-06-13

## 2024-06-11 RX ORDER — POTASSIUM CHLORIDE 20 MEQ
40 PACKET (EA) ORAL ONCE
Refills: 0 | Status: COMPLETED | OUTPATIENT
Start: 2024-06-11 | End: 2024-06-11

## 2024-06-11 RX ADMIN — HYDROMORPHONE HYDROCHLORIDE 0.5 MILLIGRAM(S): 2 INJECTION INTRAMUSCULAR; INTRAVENOUS; SUBCUTANEOUS at 14:59

## 2024-06-11 RX ADMIN — PIPERACILLIN AND TAZOBACTAM 25 GRAM(S): 4; .5 INJECTION, POWDER, LYOPHILIZED, FOR SOLUTION INTRAVENOUS at 21:46

## 2024-06-11 RX ADMIN — HYDROMORPHONE HYDROCHLORIDE 0.25 MILLIGRAM(S): 2 INJECTION INTRAMUSCULAR; INTRAVENOUS; SUBCUTANEOUS at 12:02

## 2024-06-11 RX ADMIN — Medication 650 MILLIGRAM(S): at 09:56

## 2024-06-11 RX ADMIN — HYDROMORPHONE HYDROCHLORIDE 0.25 MILLIGRAM(S): 2 INJECTION INTRAMUSCULAR; INTRAVENOUS; SUBCUTANEOUS at 11:42

## 2024-06-11 RX ADMIN — Medication 40 MILLIEQUIVALENT(S): at 09:27

## 2024-06-11 RX ADMIN — PIPERACILLIN AND TAZOBACTAM 25 GRAM(S): 4; .5 INJECTION, POWDER, LYOPHILIZED, FOR SOLUTION INTRAVENOUS at 16:43

## 2024-06-11 RX ADMIN — AMLODIPINE BESYLATE 5 MILLIGRAM(S): 2.5 TABLET ORAL at 07:00

## 2024-06-11 RX ADMIN — Medication 15 MILLIGRAM(S): at 00:21

## 2024-06-11 RX ADMIN — INSULIN GLARGINE 15 UNIT(S): 100 INJECTION, SOLUTION SUBCUTANEOUS at 03:34

## 2024-06-11 RX ADMIN — HYDROMORPHONE HYDROCHLORIDE 0.5 MILLIGRAM(S): 2 INJECTION INTRAMUSCULAR; INTRAVENOUS; SUBCUTANEOUS at 23:24

## 2024-06-11 RX ADMIN — SODIUM CHLORIDE 40 MILLILITER(S): 9 INJECTION, SOLUTION INTRAVENOUS at 16:43

## 2024-06-11 RX ADMIN — HYDROMORPHONE HYDROCHLORIDE 0.5 MILLIGRAM(S): 2 INJECTION INTRAMUSCULAR; INTRAVENOUS; SUBCUTANEOUS at 15:02

## 2024-06-11 RX ADMIN — Medication 250 MILLIGRAM(S): at 00:05

## 2024-06-11 RX ADMIN — ENOXAPARIN SODIUM 40 MILLIGRAM(S): 100 INJECTION SUBCUTANEOUS at 16:42

## 2024-06-11 RX ADMIN — PIPERACILLIN AND TAZOBACTAM 25 GRAM(S): 4; .5 INJECTION, POWDER, LYOPHILIZED, FOR SOLUTION INTRAVENOUS at 08:19

## 2024-06-11 RX ADMIN — INSULIN GLARGINE 15 UNIT(S): 100 INJECTION, SOLUTION SUBCUTANEOUS at 23:08

## 2024-06-11 RX ADMIN — DAPTOMYCIN 124 MILLIGRAM(S): 500 INJECTION, POWDER, LYOPHILIZED, FOR SOLUTION INTRAVENOUS at 17:35

## 2024-06-11 RX ADMIN — Medication 650 MILLIGRAM(S): at 10:56

## 2024-06-11 RX ADMIN — HYDROMORPHONE HYDROCHLORIDE 0.5 MILLIGRAM(S): 2 INJECTION INTRAMUSCULAR; INTRAVENOUS; SUBCUTANEOUS at 00:21

## 2024-06-11 RX ADMIN — HYDROMORPHONE HYDROCHLORIDE 0.5 MILLIGRAM(S): 2 INJECTION INTRAMUSCULAR; INTRAVENOUS; SUBCUTANEOUS at 23:09

## 2024-06-11 NOTE — H&P ADULT - HISTORY OF PRESENT ILLNESS
Mr. Kasi Guido is a     ER Course:  ED Vitals:  Mr. Kasi Guido is a 47 y/o male with PMHx of HTN, HLD, DM, R foot OM s/p R partial 2nd and full 4th-5th toe amputations, non-healing plantar L great toe ulcer presents to ED    ER Course:  ED Vitals: T: 98.7, HR 97, /104, Satting 96% on RA  Pertinent labs: WBC 13.8, Hgb/Hct 12.9/38.5, Platelets 316; Na 134, K 4.2, Cl 101, CO2 25, BUN/Cr 24/1.18; ESR 30, CRP 99  Interventions: Dilaudid 0.5mg IVP, Toradol 15mg IVP, Zosyn 3.375g, Vanc 1g  Consults in ED: Podiatry   Pt admitted to [facility] for sepsis 2/2 OM  Mr. Kasi Guido is a 47 y/o male with PMHx of HTN, HLD, DM, R foot OM s/p R partial 2nd and full 4th-5th toe amputations, prior non-healing plantar L great toe ulcer presents to ED for left 5th toe pain. Pt states he got out of bed and banged his 5th digit toe on Friday night 6/7/2024. The following morning pt noticed a blister. He lanced the blister, covered with antibiotic ointment, and a band-aid. Noticed morning of admission that the toe was starting to turn black. Further, pt states he developed mild fevers and chills so pt brought himself to ER. Noted swelling and redness beginning this evening so he presented to the ED.     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 Pt presented on 2/14/24 for L hallux wound. Podiatry’s exam showed 2.4 x 2.3 cm wound at distal hallux (1st digit) with +PTB, excisional debridement performed by podiatry and culture taken from deep wound, +MRSA, S.anginosus, S.intermedius, S.cohnii, C.aurimucosum, Prevotella disiens and anaerococcus spp. Pt was evaluated by ID, was recommended for amputation however pt preferred abx mgmt instead. Pt was then recommended for IV abx but instead was dc on PO abx 2/22/2024 due to pt's inability to pay for home infusions. Pt states he completed the full course of PO Augmentin and Bactrim with improvement of his L 1st digit.     ER Course:  ED Vitals: T: 98.7, HR 97, /104, Satting 96% on RA  Pertinent labs: WBC 13.8, Hgb/Hct 12.9/38.5, Platelets 316; Na 134, K 4.2, Cl 101, CO2 25, BUN/Cr 24/1.18; ESR 30, CRP 99  Interventions: Dilaudid 0.5mg IVP, Toradol 15mg IVP, Zosyn 3.375g, Vanc 1g  Consults in ED: Podiatry   Pt admitted to [facility] for sepsis 2/2 OM  Hospital Course:   Orion Valentin is a 49 y/o M with PMHx HTN, HLD, DM, R. foot OM s/p  R. TMA, L. hallux OM (cultures growing MRSA, s/p 6 week course PO abx) presenting with injury to left foot and gangrenous changes of L. fifth digit. Podiatry consulted, +PTB on exam. Started on IV vancomycin and zosyn 3.375g q8h. Underwent L. fifth toe amputation on 06/11 with podiatry. ID consulted, transitioned to IV zosyn PsA coverae and IV daptomycin 600mg QD. Wound/blood cultures pending. Started on D5 @ 40 cc/hr for downtrending FSGs while NPO.     Mr. Kasi Guido is a 49 y/o male with PMHx of HTN, HLD, DM, R foot OM s/p R partial 2nd and full 4th-5th toe amputations, prior non-healing plantar L great toe ulcer presents to ED for left 5th toe pain. Pt states he got out of bed and banged his 5th digit toe on Friday night 6/7/2024. The following morning pt noticed a blister. He lanced the blister, covered with antibiotic ointment, and a band-aid. Noticed morning of admission that the toe was starting to turn black. Further, pt states he developed mild fevers and chills so pt brought himself to ER. Noted swelling and redness beginning this evening so he presented to the ED.     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 Pt presented on 2/14/24 for L hallux wound. Podiatry’s exam showed 2.4 x 2.3 cm wound at distal hallux (1st digit) with +PTB, excisional debridement performed by podiatry and culture taken from deep wound, +MRSA, S.anginosus, S.intermedius, S.cohnii, C.aurimucosum, Prevotella disiens and anaerococcus spp. Pt was evaluated by ID, was recommended for amputation however pt preferred abx mgmt instead. Pt was then recommended for IV abx but instead was dc on PO abx 2/22/2024 due to pt's inability to pay for home infusions. Pt states he completed the full course of PO Augmentin and Bactrim with improvement of his L 1st digit.     ER Course:  ED Vitals: T: 98.7, HR 97, /104, Satting 96% on RA  Pertinent labs: WBC 13.8, Hgb/Hct 12.9/38.5, Platelets 316; Na 134, K 4.2, Cl 101, CO2 25, BUN/Cr 24/1.18; ESR 30, CRP 99  Interventions: Dilaudid 0.5mg IVP, Toradol 15mg IVP, Zosyn 3.375g, Vanc 1g  Consults in ED: Podiatry   Pt admitted to [facility] for sepsis 2/2 OM  Hospital Course:   Orion Valentin is a 47 y/o M with PMHx HTN, HLD, DM, R. foot OM s/p  R. TMA, L. hallux OM (cultures growing MRSA, s/p 6 week course PO abx) presenting with injury to left foot and gangrenous changes of L. fifth digit. Podiatry consulted, +PTB on exam. Started on IV vancomycin and zosyn 3.375g q8h. Underwent L. fifth toe amputation on 06/11 with podiatry. ID consulted, transitioned to IV zosyn PsA coverae and IV daptomycin 600mg QD. Wound/blood cultures pending. Started on D5 @ 40 cc/hr for downtrending FSGs while NPO now discontinued after returning from procedure.    Mr. Kasi Guido is a 47 y/o male with PMHx of HTN, HLD, DM, R foot OM s/p R partial 2nd and full 4th-5th toe amputations, prior non-healing plantar L great toe ulcer presents to ED for left 5th toe pain. Pt states he got out of bed and banged his 5th digit toe on Friday night 6/7/2024. The following morning pt noticed a blister. He lanced the blister, covered with antibiotic ointment, and a band-aid. Noticed morning of admission that the toe was starting to turn black. Further, pt states he developed mild fevers and chills so pt brought himself to ER. Noted swelling and redness beginning this evening so he presented to the ED.     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 Pt presented on 2/14/24 for L hallux wound. Podiatry’s exam showed 2.4 x 2.3 cm wound at distal hallux (1st digit) with +PTB, excisional debridement performed by podiatry and culture taken from deep wound, +MRSA, S.anginosus, S.intermedius, S.cohnii, C.aurimucosum, Prevotella disiens and anaerococcus spp. Pt was evaluated by ID, was recommended for amputation however pt preferred abx mgmt instead. Pt was then recommended for IV abx but instead was dc on PO abx 2/22/2024 due to pt's inability to pay for home infusions. Pt states he completed the full course of PO Augmentin and Bactrim with improvement of his L 1st digit.     ER Course:  ED Vitals: T: 98.7, HR 97, /104, Satting 96% on RA  Pertinent labs: WBC 13.8, Hgb/Hct 12.9/38.5, Platelets 316; Na 134, K 4.2, Cl 101, CO2 25, BUN/Cr 24/1.18; ESR 30, CRP 99  Interventions: Dilaudid 0.5mg IVP, Toradol 15mg IVP, Zosyn 3.375g, Vanc 1g  Consults in ED: Podiatry   Pt admitted to [facility] for sepsis 2/2 OM

## 2024-06-11 NOTE — ED PROVIDER NOTE - OBJECTIVE STATEMENT
48 male with PMHx of cocaine use disorder occasional alcohol use, HTN, HLD, DM, R foot OM s/p R partial 2nd and full 4th-5th toe amputations and non-healing plantar L great toe ulcer presents to ED With left great toe injury 2 days ago with redness swelling and black area that formed on his left great toe.  Denies fever chills or cough does admit to excruciating pain.  He is compliant with his insulin.  Denies fever or chills at home.  No nausea vomiting abdominal pain chest pain shortness of breath.

## 2024-06-11 NOTE — PROGRESS NOTE ADULT - ASSESSMENT
47 y/o DM male with infected L 5th digit with gangrenous changes, +PTB. At time of consult, VSS, WBC 13.3, CRP 99, ESR30. X-rays with no evidence of gas. s/p Left 5th digit amputation.    Plan:    -c/w IV abx  -f/u intra-operative cultures & path  -May restart diet  -Please restart DVT ppx after AM podiatry rounds, please restart other medications as appropriate  -WB status: NWB for 24 hours, Heel WBAT after  -Leg to remain elevated while in bed  -f/u post-op x-rays  -Please place pt on an adequate pain manangment regimine   -Daily dressing changes and surgical site monitoring  -Pt dressed with DSD, ACE wrap, and   -Rest of care up to primary team    Podiatry following, Plan d/w attending

## 2024-06-11 NOTE — H&P ADULT - ATTENDING COMMENTS
Mr. Kasi Guido is a 49 y/o male with PMHx of HTN, HLD, DM, R foot OM s/p R partial 2nd and full 4th-5th toe amputations, prior non-healing plantar L great toe ulcer presents to ED for left 5th toe pain.  OR 06/11 for 5th toe amputation. ID recs appreciated, started dapto and zosyn dose increased  Will follow OR cx.   Patient has expressed that he wants to leave on thursday. If so will discuss with ID regarding PO options for abx which will be suboptimal as cx data will not have resulted by then     Rest as per resident note

## 2024-06-11 NOTE — PROGRESS NOTE ADULT - ASSESSMENT
47 y/o DM male with infected L 5th digit with gangrenous changes, +PTB. At time of consult, VSS, WBC 13.3, CRP 99, ESR30. X-rays with no evidence of gas.     Plan:   - Plan for Left partial 5th ray amputation today, 6/11  - F/u morning T&S, Coags, and chest X-ray  - Please keep pt NPO until after procedure  - C/w broad spectrum IV antibiotics (vanc/zosyn)  - Pt can heel WBAT to LLE  - Rest of care per primary team    Plan d/w Attending. Podiatry following.

## 2024-06-11 NOTE — BRIEF OPERATIVE NOTE - OPERATION/FINDINGS
Pre op block of 1%Lidocaine and 0.5% Marcaine given 10 CC. L 5th digit removed. Deep wound culture, clean margin sent for culture and path. Partially closed with 3-0 Vicryl and packed. May need further debridment or amputation in the future. Dressed with betadine, DSD, and ACE.

## 2024-06-11 NOTE — CONSULT NOTE ADULT - SUBJECTIVE AND OBJECTIVE BOX
Attending: Dr. Green    Patient is a 48y old  Male who presents with a chief complaint of     HPI: 47 y/o M with pmh of DM presents for L 5th digit infection. Pt states he banged his toe 3 days ago. The next morning, he noticed a blister. He lanced blister with only clear fluid noted to have come out. Pt kept toe covered with antibiotic ointment and a band-aid. Noticed this morning that the toe was starting to turn black. Noted swelling and redness beginning this evening so he presented to the ED. Pt complaining of subjective low-grade fevers and chills. No recent antibiotic use. Last received antibiotics for toe infection in April. Has history of TMA on his right foot for infection. Pt follows with Dr. Green although has not been able to see him since April.       Review of systems negative except per HPI and as stated below  General:	 no weakness; no fevers, no chills  Skin/Breast: no rash  Respiratory and Thorax: no SOB, no cough  Cardiovascular:	No chest pain  Gastrointestinal:	 no nausea, vomiting , diarrhea  Genitourinary:	no dysuria, no difficulty urinating, no hematuria  Musculoskeletal:	no weakness, no joint swelling/pain  Neurological:	no focal weakness/numbness  Endocrine:	no polyuria, no polydipsia    PAST MEDICAL & SURGICAL HISTORY:  Hyperlipidemia, unspecified hyperlipidemia type      Hypertension      History of cocaine use      Diabetes mellitus      Osteomyelitis  R foot      Toe amputation status  Right foot      H/O skin graft  Right foot      S/P insertion of penile implant        Home Medications:  atorvastatin 40 mg oral tablet: 1 tab(s) orally once a day (10 Mar 2024 11:47)  Jardiance 25 mg oral tablet: 1 tab(s) orally once a day (10 Mar 2024 11:47)  losartan 50 mg oral tablet: 1 tab(s) orally once a day (10 Mar 2024 11:47)    Allergies    No Known Allergies    Intolerances      FAMILY HISTORY:  FH: CAD (coronary artery disease) (Mother)      Social History:       LABS                        12.9   13.80 )-----------( 316      ( 10 Kenan 2024 22:35 )             38.5     06-10    133<L>  |  97  |  23  ----------------------------<  124<H>  See Note   |  27  |  1.11    Ca    9.4      10 Kenan 2024 22:35    TPro  7.5  /  Alb  4.0  /  TBili  0.6  /  DBili  x   /  AST  25  /  ALT  25  /  AlkPhos  63  06-10    PT/INR - ( 10 Kenan 2024 22:35 )   PT: 11.1 sec;   INR: 0.97          PTT - ( 10 Kenan 2024 22:35 )  PTT:30.3 sec    Vital Signs Last 24 Hrs  T(C): 37.1 (10 Kenan 2024 21:54), Max: 37.1 (10 Kenan 2024 21:54)  T(F): 98.7 (10 Kenan 2024 21:54), Max: 98.7 (10 Kenan 2024 21:54)  HR: 97 (10 Kenan 2024 21:54) (97 - 97)  BP: 148/104 (10 Kenan 2024 21:54) (148/104 - 148/104)  BP(mean): --  RR: 16 (10 Kenan 2024 21:54) (16 - 16)  SpO2: 96% (10 Kenan 2024 21:54) (96% - 96%)    Parameters below as of 10 Kenan 2024 21:54  Patient On (Oxygen Delivery Method): room air        PHYSICAL EXAM  General: NAD, AA0x3    Lower Extremity Focused:  Vasc: DP/PT 1/4 b/l, Erythema and edema of L 5th digit extending to MPJ  Derm: Distal medial aspect of L 5th digit with necrotic plug and fibrous tissue - serous drainage,  +malodor, +PTB, surrounding macerated tissue, no purulence, tracks proximally within digit  Neuro: Protective sensation diminished  MSK: R TMA    RADIOLOGY  X-ray wet read: Wound defect noted at 5th digit, no obvious signs of gas                    
INFECTIOUS DISEASES INITIAL CONSULT NOTE    HPI:  47 y/o male with PMHx of HTN, HLD, DM, R foot OM s/p R partial 2nd and full 4th-5th toe amputations, prior non-healing plantar L great toe ulcer presents to ED for left 5th toe pain. ID consulted for OM.   Pt states he got out of bed and banged his 5th digit toe on Friday night 6/7/2024. The following morning pt noticed a blister. He lanced the blister, covered with antibiotic ointment, and a band-aid. Noticed morning of admission that the toe was starting to turn black. Further, pt states he developed mild fevers and chills so pt brought himself to ER. Noted swelling and redness beginning this evening so he presented to the ED.     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 Pt presented on 2/14/24 for L hallux wound. Podiatry’s exam showed 2.4 x 2.3 cm wound at distal hallux (1st digit) with +PTB, excisional debridement performed by podiatry and culture taken from deep wound, +MRSA, S.anginosus, S.intermedius, S.cohnii, C.aurimucosum, Prevotella disiens and anaerococcus spp. Pt was evaluated by ID, was recommended for amputation however pt preferred abx mgmt instead. Pt was then recommended for IV abx but instead was dc on PO abx 2/22/2024 due to pt's inability to pay for home infusions. Pt states he completed the full course of PO Augmentin and Bactrim with improvement of his L 1st digit.     ER Course:  ED Vitals: T: 98.7, HR 97, /104, Satting 96% on RA  Pertinent labs: WBC 13.8, , BUN/Cr 24/1.18; ESR 30, CRP 99  Interventions: Dilaudid 0.5mg IVP, Toradol 15mg IVP, Zosyn 3.375g, Vanc 1g  Consults in ED: Podiatry   Pt admitted to [email or web address] for sepsis 2/2 OM  (11 Jun 2024 00:26)      PAST MEDICAL & SURGICAL HISTORY:  Hyperlipidemia, unspecified hyperlipidemia type      Hypertension      History of cocaine use      Diabetes mellitus      Osteomyelitis  R foot      Toe amputation status  Right foot      H/O skin graft  Right foot      S/P insertion of penile implant            Review of Systems:   Constitutional, eyes, ENT, cardiovascular, respiratory, gastrointestinal, genitourinary, integumentary, neurological, psychiatric and heme/lymph are otherwise negative other than noted above       ANTIBIOTICS:  MEDICATIONS  (STANDING):    MEDICATIONS  (PRN):  HYDROmorphone  Injectable 0.5 milliGRAM(s) IV Push every 6 hours PRN Severe Pain (7 - 10)      Allergies    No Known Allergies    Intolerances    SOCIAL HISTORY:  - born in    - lives in [place] by himself  - work in maintenance   - denies tobacco use   - states he only drinks etoh/uses cocaine when on vacation   - denies pets  - denies recent water exposure      FAMILY HISTORY:  FH: CAD (coronary artery disease) (Mother)    no FH leading to current infection    Vital Signs Last 24 Hrs  T(C): 36.1 (11 Jun 2024 14:00), Max: 37.2 (11 Jun 2024 00:27)  T(F): 97 (11 Jun 2024 14:00), Max: 99 (11 Jun 2024 00:27)  HR: 82 (11 Jun 2024 14:15) (82 - 97)  BP: 130/75 (11 Jun 2024 14:15) (104/62 - 167/96)  BP(mean): 97 (11 Jun 2024 14:15) (78 - 97)  RR: 12 (11 Jun 2024 14:15) (12 - 18)  SpO2: 98% (11 Jun 2024 14:15) (96% - 99%)    Parameters below as of 11 Jun 2024 14:15  Patient On (Oxygen Delivery Method): room air      PHYSICAL EXAM:  Constitutional: alert, NAD  Eyes: the sclera and conjunctiva were normal.   ENT: the ears and nose were normal in appearance.   Neck: the appearance of the neck was normal and the neck was supple.   Pulmonary: no respiratory distress and lungs were clear to auscultation bilaterally.   Heart: heart rate was normal and rhythm regular, normal S1 and S2  Vascular:. there was no peripheral edema  Abdomen: normal bowel sounds, soft, non-tender  Neurological: no focal deficits.   Psychiatric: the affect was normal  Extremities: L foot wrapped- c/d/i. L 1st toe exposed -- no evidence of infection/wound       LABS:                        13.6   9.97  )-----------( 310      ( 11 Jun 2024 05:30 )             41.9     06-11    137  |  104  |  20  ----------------------------<  82  3.6   |  25  |  1.09    Ca    9.2      11 Jun 2024 05:30  Phos  3.2     06-11  Mg     1.8     06-11    TPro  7.4  /  Alb  3.8  /  TBili  0.6  /  DBili  x   /  AST  22  /  ALT  23  /  AlkPhos  65  06-11    PT/INR - ( 11 Jun 2024 05:30 )   PT: 11.0 sec;   INR: 0.96          PTT - ( 11 Jun 2024 05:30 )  PTT:33.4 sec  Urinalysis Basic - ( 11 Jun 2024 05:30 )    Color: x / Appearance: x / SG: x / pH: x  Gluc: 82 mg/dL / Ketone: x  / Bili: x / Urobili: x   Blood: x / Protein: x / Nitrite: x   Leuk Esterase: x / RBC: x / WBC x   Sq Epi: x / Non Sq Epi: x / Bacteria: x        MICROBIOLOGY:  reviewed   RADIOLOGY & ADDITIONAL STUDIES:  reviewed

## 2024-06-11 NOTE — H&P ADULT - PROBLEM SELECTOR PLAN 2
Pt presenting with OM of the L 5th toe. Pt evaluated by Podiatry Pt presenting with OM of the L 5th toe. Pt evaluated by Podiatry, possible OR in AM of 6/11. Will likely require amputation of toe which pt amenable to given dry gangrene.   Plan:  - C/w Vancomycin & Zosyn as above  - NPO after midnight, tight glycemic control  - F/u wound cultures, can consider ID consult once cultures result

## 2024-06-11 NOTE — H&P ADULT - PROBLEM SELECTOR PLAN 1
Pt presenting with OM of the L 5th toe. Pt evaluated by Podiatry Pt presenting with 2/4 SIRS criteria for leukocytosis & elevated HR. Pt presenting with 2/4 SIRS criteria for leukocytosis & elevated HR. Prior hx of OM of L 1st digit which was treated with full course of PO abx recommended by ID (Bactrim & Augmentin, 6 week course). Pt now presenting with 5th digit OM, + probe to bone. Lactate negative. ESR 30 and CRP 99.   Plan:  - C/w Vancomycin 1g Q12h for 3 doses, check trough prior to 4th dose  - C/w Zosyn 3.375g Q8h   - F/u blood cultures x2  - F/u wound cultures   - Podiatry recs

## 2024-06-11 NOTE — PROGRESS NOTE ADULT - SUBJECTIVE AND OBJECTIVE BOX
POST OP NOTE    ARIEL MOURA  MRN-[id number]    Procedure: Left 5th digit amp  Surgeon: Dr. Blum  Assistants: Hill Pimentel, PGY1    Patient tolerated procedure well without incident.  Patient transferred to PACU in stable condition.       PE / Post Op Check:       GEN: NAD, AAOx3, resting comfortably with pain controlled      LE Focused: CFT shows adequate perfusion b/l with no signs of ischemic compromise.  No strikethrough is appreciated on surgical dressings.  Dressings were dry, clean, and intact.  No neuromuscular deficits appreciated.

## 2024-06-11 NOTE — CONSULT NOTE ADULT - ASSESSMENT
47 y/o DM male with L 5th digit infection with gangrenous changes, +PTB. At time of consult, VSS, WBC 13.3, CRP 99, ESR pending. X-rays with no evidence of gas.     Plan:   - Admit to medicine  - Recommend broad spectrum IV antibiotics (vanc/zosyn)  - Possible plan for surgical intervention  - Recommend MRI   - X-rays reviewed   - Local wound care with betadine DSD  - Pt can heel WBAT to LLE  - Rest of care per primary team    Plan d/w Attending. Podiatry following. 47 y/o DM male with L 5th digit infection with gangrenous changes, +PTB. At time of consult, VSS, WBC 13.3, CRP 99, ESR pending. X-rays with no evidence of gas.     Plan:   - Admit to medicine  - Recommend broad spectrum IV antibiotics (vanc/zosyn)  - Possible plan for surgical intervention  - X-rays reviewed   - Local wound care with betadine DSD  - Pt can heel WBAT to LLE  - Rest of care per primary team    Plan d/w Attending. Podiatry following.

## 2024-06-11 NOTE — H&P ADULT - NSHPLABSRESULTS_GEN_ALL_CORE
.  LABS:                         12.9   13.80 )-----------( 316      ( 10 Kenan 2024 22:35 )             38.5     06-10    133<L>  |  97  |  23  ----------------------------<  124<H>  See Note   |  27  |  1.11    Ca    9.4      10 Kenan 2024 22:35    TPro  7.5  /  Alb  4.0  /  TBili  0.6  /  DBili  x   /  AST  25  /  ALT  25  /  AlkPhos  63  06-10    PT/INR - ( 10 Kenan 2024 22:35 )   PT: 11.1 sec;   INR: 0.97          PTT - ( 10 Kenan 2024 22:35 )  PTT:30.3 sec  Urinalysis Basic - ( 10 Kenan 2024 22:35 )    Color: x / Appearance: x / SG: x / pH: x  Gluc: 124 mg/dL / Ketone: x  / Bili: x / Urobili: x   Blood: x / Protein: x / Nitrite: x   Leuk Esterase: x / RBC: x / WBC x   Sq Epi: x / Non Sq Epi: x / Bacteria: x            Lactate, Blood: 0.7 mmol/L (06-10 @ 22:27)      RADIOLOGY, EKG & ADDITIONAL TESTS: Reviewed.

## 2024-06-11 NOTE — H&P ADULT - ASSESSMENT
Mr. Kasi Guido is a 47 y/o male with PMHx of HTN, HLD, DM, R foot OM s/p R partial 2nd and full 4th-5th toe amputations, prior non-healing plantar L great toe ulcer presents to ED for left 5th toe pain.

## 2024-06-11 NOTE — PATIENT PROFILE ADULT - FALL HARM RISK - HARM RISK INTERVENTIONS

## 2024-06-11 NOTE — ED PROVIDER NOTE - PHYSICAL EXAMINATION
VITAL SIGNS: I have reviewed nursing notes and confirm.  CONSTITUTIONAL: Well appearing, in no acute distress.   SKIN:  warm and dry, no acute rash.   HEAD:  normocephalic, atraumatic.  EYES: EOM intact; conjunctiva and sclera clear.  ENT: No nasal discharge; airway clear.   NECK: Supple.  CARD: S1, S2, Regular rate and rhythm.   RESP:  Clear to auscultation b/l, no wheezes, rales or rhonchi.  ABD: Normal bowel sounds; soft; non-distended; non-tender; no guarding/ rebound.  EXT: Normal ROM. No peripheral edema. Pulses intact and equal b/l.Left lower extremity fifth metatarsal cellulitis with necrotic eschar and wet gangrene tender to touch no crepitus surrounding erythema induration tracking up to left foot  NEURO: Alert, oriented, grossly unremarkable  PSYCH: Cooperative, mood and affect appropriate.

## 2024-06-11 NOTE — H&P ADULT - PROBLEM SELECTOR PLAN 5
F: Tolerating oral  E: Replete PRN  GI: None  Diet: NPO after midnight  DVT: Holding for procedure  Dispo: BARBIE

## 2024-06-11 NOTE — H&P ADULT - PROBLEM SELECTOR PLAN 3
[facility]
Pt w/ history of uncontrolled DM. On 2/14/2024, pt had A1c of 10.6. Was evaluated by Endocrine on prior admission and was discharged on regimen of 28U basal lantus at bedtime, 12U premeal insulin (pt states he does not use premeal insulin), Jardiance 25mg q24h, & Metformin 750mg q12h.   Plan:  - F/u AM A1C  - C/w mISS while inpatient  - Given pt is NPO with night sugar of 108, will reduce basal insulin to 15U   - Consider Endocrine consult

## 2024-06-11 NOTE — ED PROVIDER NOTE - CLINICAL SUMMARY MEDICAL DECISION MAKING FREE TEXT BOX
48-year-old male with likely fifth metatarsal osteomyelitis and lower extremity cellulitis will plan for IV antibiotic x-ray podiatry consultation and admission     patient with elevated white count x-ray showing osteo no air discussed with podiatry will admit for IV antibiotics endorsed BUSHRA 48-year-old male with likely fifth metatarsal osteomyelitis and lower extremity cellulitis will plan for IV antibiotic x-ray podiatry consultation and admission     Patient with elevated white count x-ray showing osteo no air discussed with podiatry will admit for IV antibiotics endorsed BUSHRA

## 2024-06-11 NOTE — CONSULT NOTE ADULT - NS ATTEND AMEND GEN_ALL_CORE FT
48M w/ cocaine use d/o, metabolic syndrome (A1c 6.5% but recently 10%) with history R foot OM with prior cx +MRSA in 5/2022, s/p TMA 5/10/2022 with negative bone margins, then in 2/2024 had L hallux OM (deep wound cx MRSA, S.anginosus, S.intermedius, S.cohnii, C.aurimucosum, Prevotella disiens and anaerococcus spp, bone bx while on broad spectrum abx with S.cohnii and pettenkoferi), declined surgery and IV abx were cost prohibitive and so patient decided to go home on PO abx, bactrim/augmentin x 6 weeks, ultimately lost to ID follow up after 1st outpt visit but reports completing his 6 week abx course and having improvement in L hallux, with resolution of wound and swelling. Now presents to ED on 6/10 after he hit his L 5th toe after getting out of bed 3 days prior, and developed a blister and then subsequently 5th toe turned black, with swelling and redness and fevers/chills. Podiatry evaluated patient in ED and found signs of SSTI of L 5th digit extending to MPJ, and L 5th digit ulceration with +PTB. Patient was afebrile in ED, WBC 14k, ESR 30, CRP 99, L foot XR wet read without gas. Started on vanc/zosyn and plan is for L partial 5th ray today. He plans to leave the hospital on 6/13 AM to [place] his brother for 5 days. We advised him against this, as we will still be waiting on operative/prox margin cultures/path at that time, and him leaving prior to that data being available may mean he has to take broader spectrum PO abx with more side effects (ie quinolones). Patient expressed understanding but still plans to travel on 6/13. Recommend stop vanc, start daptomycin 600mg IV q24h and check CK. Increase zosyn to 4.5g IV q8h EI.

## 2024-06-11 NOTE — PROGRESS NOTE ADULT - SUBJECTIVE AND OBJECTIVE BOX
PRE-OP NOTE    Pre-Op Diagnosis: Left 5th digit osteomyelitis  Planned Procedure: L partial 5th ray amputation  Scheduled Date / Time: 6/11/2024  Indication: Left 5th digit osteomyelitis  Labs:                       12.9   13.80 )-----------( 316      ( 10 Kenan 2024 22:35 )             38.5   06-10    133<L>  |  97  |  23  ----------------------------<  124<H>  See Note   |  27  |  1.11    Ca    9.4      10 Kenan 2024 22:35    TPro  7.5  /  Alb  4.0  /  TBili  0.6  /  DBili  x   /  AST  25  /  ALT  25  /  AlkPhos  63  06-10  LIVER FUNCTIONS - ( 10 Kenan 2024 22:28 )  Alb: 4.0 g/dL / Pro: 7.5 g/dL / ALK PHOS: 63 U/L / ALT: 25 U/L / AST: 25 U/L / GGT: x           Vitals: Vital Signs Last 24 Hrs  T(C): 36.4 (11 Jun 2024 06:05), Max: 37.2 (11 Jun 2024 00:27)  T(F): 97.6 (11 Jun 2024 06:05), Max: 99 (11 Jun 2024 00:27)  HR: 87 (11 Jun 2024 06:05) (87 - 97)  BP: 167/96 (11 Jun 2024 06:05) (121/81 - 167/96)  BP(mean): --  RR: 17 (11 Jun 2024 06:05) (16 - 18)  SpO2: 99% (11 Jun 2024 06:05) (96% - 99%)    Parameters below as of 11 Jun 2024 06:05  Patient On (Oxygen Delivery Method): room air      PE:   Official CXR reading: Pending  Official EKG reading: (On Chart)  Type and Cross/Screen: pending collection  NPO after MN  Antibiotics: vanc/zosyn  Operative Consent (on chart)

## 2024-06-11 NOTE — CONSULT NOTE ADULT - ASSESSMENT
49 y/o male with HTN, HLD, DM, R foot OM s/p R partial 2nd and full 4th-5th toe amputations, prior non-healing plantar L great toe ulcer presents to ED for left 5th toe pain found to have OM (toe with gangrenous changes, +PTB). Afebrile, leukocytosis of 13, now resolved. ESR 30, CRP 99.2. Xray without evidence of gas. Podiatry recommending L 5th partial ray amputation - patient agreeable and scheduled for OR today. On broad spectrum antibiotics. Patient states he [place] be [place] Thursday evening for his brother's back surgery and will be returning 6/19. Discussed with patient that it is recommended that he remain admitted following amputation until proximal margin cultures/pathology result to not only guide antibiotic therapy but duration of therapy as well. Patient expressed understanding however will still be leaving this Thursday.     Suggest:  -f/u bcxs x 2 --in lab  -f/u OR cultures and findings   -stop vancomycin  -start Daptomycin 600 mg IV Q24. Check CK  -adjust zosyn to 4.5g IV Q8 via EI  -check QTC. Anticipate patient may need quinolones since he plans to leave this Thursday for [place]     Team 2 will follow you.    Case d/w primary team.  Final recommendation pending attending note.    Courtney Silvestre, Infectious Diseases PA  Please reach out for any questions 9 am-5pm. For evenings and weekends, please call the ID physician on call.  Work cell: [phone]

## 2024-06-11 NOTE — H&P ADULT - NSHPPHYSICALEXAM_GEN_ALL_CORE
Physical Examination:  General: Alert and oriented x 3. No acute distress. Well-nourished.  Eyes: EOMI. Anicteric.  HEENT: Moist mucous membranes. No scleral icterus. No cervical lymphadenopathy.  Lungs: Clear to auscultation bilaterally. No accessory muscle use.  Cardiovascular: Regular rate and rhythm. No murmur. No JVD.  Abdomen: Soft, non-tender and non-distended. No palpable masses.  Extremities: No edema. Non-tender.  Skin: No rashes or lesions. Warm.  Neurologic: No focal neurological deficits. CN II-XII grossly intact, but not individually tested.  Psychiatric: Cooperative. Appropriate mood and affect. Physical Examination:  General: Alert and oriented x 3. No acute distress.   Eyes: EOMI. Anicteric.  HEENT: Moist mucous membranes. No scleral icterus. No cervical lymphadenopathy.  Lungs: Clear to auscultation bilaterally. No accessory muscle use.  Cardiovascular: Regular rate and rhythm.   Abdomen: Soft, non-tender and non-distended.  Extremities: + Enlarged L 1st digit toe, + dry & gangrenous L 5th digit. +DP pulses intact b/l.   Skin: No rashes or lesions.   Neurologic: No focal neurological deficits.  Psychiatric: Cooperative. Appropriate mood and affect.

## 2024-06-11 NOTE — BRIEF OPERATIVE NOTE - NSICDXBRIEFPREOP_GEN_ALL_CORE_FT
PRE-OP DIAGNOSIS:  Gangrenous toe 11-Jun-2024 14:08:35  Hill Pimentel  Gangrenous toe 11-Jun-2024 14:08:37  Hill Pimentel

## 2024-06-12 ENCOUNTER — TRANSCRIPTION ENCOUNTER (OUTPATIENT)
Age: 49
End: 2024-06-12

## 2024-06-12 LAB
A1C WITH ESTIMATED AVERAGE GLUCOSE RESULT: 6.8 % — HIGH (ref 4–5.6)
ANION GAP SERPL CALC-SCNC: 7 MMOL/L — SIGNIFICANT CHANGE UP (ref 5–17)
BASOPHILS # BLD AUTO: 0.05 K/UL — SIGNIFICANT CHANGE UP (ref 0–0.2)
BASOPHILS NFR BLD AUTO: 0.6 % — SIGNIFICANT CHANGE UP (ref 0–2)
BUN SERPL-MCNC: 12 MG/DL — SIGNIFICANT CHANGE UP (ref 7–23)
CALCIUM SERPL-MCNC: 9.3 MG/DL — SIGNIFICANT CHANGE UP (ref 8.4–10.5)
CHLORIDE SERPL-SCNC: 103 MMOL/L — SIGNIFICANT CHANGE UP (ref 96–108)
CK SERPL-CCNC: 117 U/L — SIGNIFICANT CHANGE UP (ref 30–200)
CO2 SERPL-SCNC: 28 MMOL/L — SIGNIFICANT CHANGE UP (ref 22–31)
CREAT SERPL-MCNC: 1.08 MG/DL — SIGNIFICANT CHANGE UP (ref 0.5–1.3)
CRP SERPL-MCNC: 78.5 MG/L — HIGH (ref 0–4)
EGFR: 85 ML/MIN/1.73M2 — SIGNIFICANT CHANGE UP
EOSINOPHIL # BLD AUTO: 0.23 K/UL — SIGNIFICANT CHANGE UP (ref 0–0.5)
EOSINOPHIL NFR BLD AUTO: 2.5 % — SIGNIFICANT CHANGE UP (ref 0–6)
ERYTHROCYTE [SEDIMENTATION RATE] IN BLOOD: 39 MM/HR — HIGH
ESTIMATED AVERAGE GLUCOSE: 148 MG/DL — HIGH (ref 68–114)
GLUCOSE BLDC GLUCOMTR-MCNC: 107 MG/DL — HIGH (ref 70–99)
GLUCOSE BLDC GLUCOMTR-MCNC: 129 MG/DL — HIGH (ref 70–99)
GLUCOSE BLDC GLUCOMTR-MCNC: 82 MG/DL — SIGNIFICANT CHANGE UP (ref 70–99)
GLUCOSE BLDC GLUCOMTR-MCNC: 89 MG/DL — SIGNIFICANT CHANGE UP (ref 70–99)
GLUCOSE BLDC GLUCOMTR-MCNC: 91 MG/DL — SIGNIFICANT CHANGE UP (ref 70–99)
GLUCOSE SERPL-MCNC: 82 MG/DL — SIGNIFICANT CHANGE UP (ref 70–99)
HCT VFR BLD CALC: 43.1 % — SIGNIFICANT CHANGE UP (ref 39–50)
HGB BLD-MCNC: 14.2 G/DL — SIGNIFICANT CHANGE UP (ref 13–17)
IMM GRANULOCYTES NFR BLD AUTO: 0.2 % — SIGNIFICANT CHANGE UP (ref 0–0.9)
LYMPHOCYTES # BLD AUTO: 2.12 K/UL — SIGNIFICANT CHANGE UP (ref 1–3.3)
LYMPHOCYTES # BLD AUTO: 23.3 % — SIGNIFICANT CHANGE UP (ref 13–44)
MAGNESIUM SERPL-MCNC: 1.8 MG/DL — SIGNIFICANT CHANGE UP (ref 1.6–2.6)
MCHC RBC-ENTMCNC: 30.1 PG — SIGNIFICANT CHANGE UP (ref 27–34)
MCHC RBC-ENTMCNC: 32.9 GM/DL — SIGNIFICANT CHANGE UP (ref 32–36)
MCV RBC AUTO: 91.3 FL — SIGNIFICANT CHANGE UP (ref 80–100)
MONOCYTES # BLD AUTO: 0.72 K/UL — SIGNIFICANT CHANGE UP (ref 0–0.9)
MONOCYTES NFR BLD AUTO: 7.9 % — SIGNIFICANT CHANGE UP (ref 2–14)
NEUTROPHILS # BLD AUTO: 5.94 K/UL — SIGNIFICANT CHANGE UP (ref 1.8–7.4)
NEUTROPHILS NFR BLD AUTO: 65.5 % — SIGNIFICANT CHANGE UP (ref 43–77)
NRBC # BLD: 0 /100 WBCS — SIGNIFICANT CHANGE UP (ref 0–0)
PHOSPHATE SERPL-MCNC: 2.8 MG/DL — SIGNIFICANT CHANGE UP (ref 2.5–4.5)
PLATELET # BLD AUTO: 354 K/UL — SIGNIFICANT CHANGE UP (ref 150–400)
POTASSIUM SERPL-MCNC: 4.5 MMOL/L — SIGNIFICANT CHANGE UP (ref 3.5–5.3)
POTASSIUM SERPL-SCNC: 4.5 MMOL/L — SIGNIFICANT CHANGE UP (ref 3.5–5.3)
RBC # BLD: 4.72 M/UL — SIGNIFICANT CHANGE UP (ref 4.2–5.8)
RBC # FLD: 13.2 % — SIGNIFICANT CHANGE UP (ref 10.3–14.5)
SODIUM SERPL-SCNC: 138 MMOL/L — SIGNIFICANT CHANGE UP (ref 135–145)
WBC # BLD: 9.08 K/UL — SIGNIFICANT CHANGE UP (ref 3.8–10.5)
WBC # FLD AUTO: 9.08 K/UL — SIGNIFICANT CHANGE UP (ref 3.8–10.5)

## 2024-06-12 PROCEDURE — 99233 SBSQ HOSP IP/OBS HIGH 50: CPT

## 2024-06-12 PROCEDURE — 99233 SBSQ HOSP IP/OBS HIGH 50: CPT | Mod: GC

## 2024-06-12 PROCEDURE — 73630 X-RAY EXAM OF FOOT: CPT | Mod: 26,LT

## 2024-06-12 RX ORDER — LINEZOLID 600 MG/300ML
1 INJECTION, SOLUTION INTRAVENOUS
Qty: 26 | Refills: 0
Start: 2024-06-12 | End: 2024-06-24

## 2024-06-12 RX ORDER — INSULIN GLARGINE 100 [IU]/ML
20 INJECTION, SOLUTION SUBCUTANEOUS
Qty: 1 | Refills: 0
Start: 2024-06-12 | End: 2024-07-11

## 2024-06-12 RX ORDER — DEXTROSE 10 % IN WATER 10 %
1000 INTRAVENOUS SOLUTION INTRAVENOUS
Refills: 0 | Status: DISCONTINUED | OUTPATIENT
Start: 2024-06-12 | End: 2024-06-12

## 2024-06-12 RX ADMIN — HYDROMORPHONE HYDROCHLORIDE 0.5 MILLIGRAM(S): 2 INJECTION INTRAMUSCULAR; INTRAVENOUS; SUBCUTANEOUS at 15:43

## 2024-06-12 RX ADMIN — INSULIN GLARGINE 15 UNIT(S): 100 INJECTION, SOLUTION SUBCUTANEOUS at 22:12

## 2024-06-12 RX ADMIN — HYDROMORPHONE HYDROCHLORIDE 0.5 MILLIGRAM(S): 2 INJECTION INTRAMUSCULAR; INTRAVENOUS; SUBCUTANEOUS at 15:28

## 2024-06-12 RX ADMIN — DAPTOMYCIN 124 MILLIGRAM(S): 500 INJECTION, POWDER, LYOPHILIZED, FOR SOLUTION INTRAVENOUS at 17:40

## 2024-06-12 RX ADMIN — Medication 40 MILLILITER(S): at 03:30

## 2024-06-12 RX ADMIN — HYDROMORPHONE HYDROCHLORIDE 0.5 MILLIGRAM(S): 2 INJECTION INTRAMUSCULAR; INTRAVENOUS; SUBCUTANEOUS at 08:55

## 2024-06-12 RX ADMIN — ENOXAPARIN SODIUM 40 MILLIGRAM(S): 100 INJECTION SUBCUTANEOUS at 17:40

## 2024-06-12 RX ADMIN — PIPERACILLIN AND TAZOBACTAM 25 GRAM(S): 4; .5 INJECTION, POWDER, LYOPHILIZED, FOR SOLUTION INTRAVENOUS at 22:12

## 2024-06-12 RX ADMIN — HYDROMORPHONE HYDROCHLORIDE 0.5 MILLIGRAM(S): 2 INJECTION INTRAMUSCULAR; INTRAVENOUS; SUBCUTANEOUS at 08:40

## 2024-06-12 RX ADMIN — HYDROMORPHONE HYDROCHLORIDE 0.5 MILLIGRAM(S): 2 INJECTION INTRAMUSCULAR; INTRAVENOUS; SUBCUTANEOUS at 22:12

## 2024-06-12 RX ADMIN — PIPERACILLIN AND TAZOBACTAM 25 GRAM(S): 4; .5 INJECTION, POWDER, LYOPHILIZED, FOR SOLUTION INTRAVENOUS at 05:07

## 2024-06-12 RX ADMIN — HYDROMORPHONE HYDROCHLORIDE 0.5 MILLIGRAM(S): 2 INJECTION INTRAMUSCULAR; INTRAVENOUS; SUBCUTANEOUS at 22:56

## 2024-06-12 RX ADMIN — PIPERACILLIN AND TAZOBACTAM 25 GRAM(S): 4; .5 INJECTION, POWDER, LYOPHILIZED, FOR SOLUTION INTRAVENOUS at 13:28

## 2024-06-12 NOTE — PROGRESS NOTE ADULT - SUBJECTIVE AND OBJECTIVE BOX
Patient is a 48y old  Male who presents with a chief complaint of L 5th digit wound (11 Jun 2024 14:58)      INTERVAL HPI/ OVERNIGHT EVENTS  s/p Left 5th digit amp. Dressing C/D/I. No further pedal complaints.         LABS                        14.2   9.08  )-----------( 354      ( 12 Jun 2024 05:30 )             43.1     06-12    138  |  103  |  12  ----------------------------<  82  4.5   |  28  |  1.08    Ca    9.3      12 Jun 2024 05:30  Phos  2.8     06-12  Mg     1.8     06-12    TPro  7.4  /  Alb  3.8  /  TBili  0.6  /  DBili  x   /  AST  22  /  ALT  23  /  AlkPhos  65  06-11    PT/INR - ( 11 Jun 2024 05:30 )   PT: 11.0 sec;   INR: 0.96          PTT - ( 11 Jun 2024 05:30 )  PTT:33.4 sec  ESR: 39  CRP: --  06-12 @ 05:30    ICU Vital Signs Last 24 Hrs  T(C): 36.6 (12 Jun 2024 06:21), Max: 36.6 (11 Jun 2024 11:57)  T(F): 97.9 (12 Jun 2024 06:21), Max: 97.9 (11 Jun 2024 11:57)  HR: 96 (12 Jun 2024 06:21) (81 - 98)  BP: 134/79 (12 Jun 2024 06:21) (104/62 - 158/96)  BP(mean): 99 (11 Jun 2024 15:15) (78 - 99)  ABP: --  ABP(mean): --  RR: 19 (12 Jun 2024 06:21) (12 - 19)  SpO2: 98% (12 Jun 2024 06:21) (97% - 100%)    O2 Parameters below as of 12 Jun 2024 06:21  Patient On (Oxygen Delivery Method): room air            RADIOLOGY    < from: Xray Foot AP + Lateral + Oblique, Left (06.10.24 @ 23:12) >  MPRESSION:  Age-indeterminate transverse fracture defect across hallux distal phalanx   tuft however new from prior, correlate clinically. No tracking gas   collections or definite radiographic evidence for osteomyelitis however   if concern remains MRI suggested to further assess.    Intact aligned remaining imaged osteoarticular structures.   Tarsometatarsal alignment maintained without evidence for a Lisfranc   injury. 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 Preserved remaining   joint spaces and no joint margin erosions.    No calcaneal spurring and unremarkable distal Achilles tendon shadow.    No discrete lytic or blastic lesions.    Vascular calcifications again noted.    --- End of Report ---      < end of copied text >            MICROBIOLOGY      Culture - Tissue with Gram Stain (collected 11 Jun 2024 14:57)  Source: .Tissue Left Clean Margin Fifth Material (2)  Gram Stain (11 Jun 2024 23:46):    No polymorphonuclear cells seen per low power field    No organisms seen per oil power field    Culture - Blood (collected 10 Kenan 2024 22:27)  Source: .Blood Blood  Preliminary Report (12 Jun 2024 03:03):    No growth at 24 hours    Culture - Blood (collected 10 Kenan 2024 22:27)  Source: .Blood Blood  Preliminary Report (12 Jun 2024 03:03):    No growth at 24 hours      PHYSICAL EXAM  Lower Extremity Focused  Vasc: DP/PT 1/4 b/l, Erythema and edema of L 5th digit extending to MPJ  Derm: Sx site sutures intact, no drainage. Mild post surgical site erythema.   Neuro: Protective sensation diminished  MSK: R TMA

## 2024-06-12 NOTE — DISCHARGE NOTE PROVIDER - ATTENDING DISCHARGE PHYSICAL EXAMINATION:
Patient left AMA     GENERAL: NAD, lying in bed comfortably  HEAD:  Atraumatic, normocephalic  EYES: EOMI, PERRLA, conjunctiva and sclera clear  NECK: Supple, trachea midline, no JVD  HEART: Regular rate and rhythm, no murmurs, rubs, or gallops  LUNGS: Unlabored respirations.  Clear to auscultation bilaterally, no crackles, wheezing, or rhonchi  ABDOMEN: Soft, nontender, nondistended, +BS   EXTREMITIES: 2+ peripheral pulses bilaterally. R TMA, left 5th toe amputation  NERVOUS SYSTEM:  A&Ox3, moving all extremities, no focal deficits   SKIN: No rashes or lesions

## 2024-06-12 NOTE — PROGRESS NOTE ADULT - PROBLEM SELECTOR PLAN 2
Pt presenting with OM of the L 5th toe. Pt evaluated by Podiatry, possible OR in AM of 6/11. Will likely require amputation of toe which pt amenable to given dry gangrene.   Plan:  - C/w Vancomycin & Zosyn as above  - NPO after midnight, tight glycemic control  - F/u wound cultures, can consider ID consult once cultures result Pt presenting with OM of the L 5th toe. Pt evaluated by Podiatry, possible OR in AM of 6/11. Will likely require amputation of toe which pt amenable to given dry gangrene.   Plan:  - as above

## 2024-06-12 NOTE — PROGRESS NOTE ADULT - ATTENDING COMMENTS
Mr. Kasi Guido is a 49 y/o male with PMHx of HTN, HLD, DM, R foot OM s/p R partial 2nd and full 4th-5th toe amputations, prior non-healing plantar L great toe ulcer presents to ED for left 5th toe pain.  OR 06/11 for 5th toe amputation. ID recs appreciated, started dapto and zosyn dose increased  Will follow OR cx.   Patient has expressed that he wants to leave on thursday. If so will discuss with ID regarding PO options for abx which will be suboptimal as cx data will not have resulted by then     Rest as per resident note .

## 2024-06-12 NOTE — PROGRESS NOTE ADULT - PROBLEM SELECTOR PLAN 4
Pt w/ hx of HTN, takes Losartan 50mg qd  Plan:  - Hold ARB prior to possible OR in AM Pt w/ hx of HTN, takes Losartan 50mg qd  Plan:  - resume when appropriate

## 2024-06-12 NOTE — PROGRESS NOTE ADULT - TIME BILLING
Managing OM
Bedside exam and interview   Reviewed vitals, labs   Discussed patient's plan of care with housestaff   Documentation of encounter

## 2024-06-12 NOTE — PROGRESS NOTE ADULT - ASSESSMENT
47 y/o male with HTN, HLD, DM, R foot OM s/p R partial 2nd and full 4th-5th toe amputations, prior non-healing plantar L great toe ulcer presents to ED for left 5th toe pain found to have OM (toe with gangrenous changes, +PTB). Afebrile, leukocytosis of 13, now resolved. ESR 30, CRP 99.2. Xray without evidence of gas. S/p L 5th partial ray amputation 6/11 -per brief op note, may need further debridment or amputation in the future. On broad spectrum antibiotics.     Patient states he will be flying to  Thursday evening for his brother's back surgery and will be returning 6/19. Again, advised patient to remain admitted following amputation until proximal margin cultures/pathology result to not only guide antibiotic therapy but duration of therapy as well. Also discussed that IV antibiotics is the preferred/safest therapy. Patient expressed understanding however will still be leaving this Thursday. Reviewed PO options with patient, emphasizing that this is suboptimal treatment. A regimen consisting of Linezolid/Cipro/Flagyl would provide broader antimicrobial coverage however would have higher risk of more significant side effects (tendon rupture, aortic aneurysm rupture, rupture of blood vessels, confusion, QTC prolongation associated with cipro). The other regimen that we could offer is Linezolid plus Augmentin - discussed that this would not provide as broad of coverage however would have lower risk of harmful side effects. Advised that patient would need to examine toe carefully with dressing changes to ensure no signs of worsening infection (redness, swelling, drainage) and report to ER immediately if there were signs of infection. Patient verbalized understanding and has chosen Linezolid/Augmentin regimen. Patient does have hx of cocaine use ("when on vacation")- informed patient of serious interaction between Linezolid and cocaine (hypertensive emergency) and advised that he does not use cocaine while taking Linezolid. Patient again verbalized understanding and stated he would not be using cocaine.     Suggest:  -f/u bcxs 6/10-- ngtd   -f/u OR proximal margin culture -- no org seen on GS  -f/u path  -continue Daptomycin 600 mg IV Q24 and zosyn 4.5g IV Q8 via EI while inpatient   -Upon discharge, transition to Linezolid 600mg PO Q12 plus Augmentin 875mg/125mg PO Q12 x 14 days from OR date (6/11-6/24).   -Antibiotics may be adjusted/extended/discontinued based on OR cultures/pathology. This will be followed closely as outpatient.   -Patient to follow up with Dr. Arora in 1-2 weeks ([address] [place] [address], [phone]), ID office will call patient to schedule       Team 2 will sign off. Thank you for your consultation   Please reconsult with questions.    Case d/w primary team.  Final recommendation pending attending note.    Courtney Silvestre, Infectious Diseases PA  Please reach out for any questions 9 am-5pm. For evenings and weekends, please call the ID physician on call.  Work cell: [phone]     47 y/o male with HTN, HLD, DM, R foot OM s/p R partial 2nd and full 4th-5th toe amputations, prior non-healing plantar L great toe ulcer presents to ED for left 5th toe pain found to have OM (toe with gangrenous changes, +PTB). Afebrile, leukocytosis of 13, now resolved. ESR 30, CRP 99.2. Xray without evidence of gas. S/p L 5th partial ray amputation 6/11 -per brief op note, may need further debridment or amputation in the future. On broad spectrum antibiotics.     Patient states he will be flying to  Thursday evening for his brother's back surgery and will be returning 6/19. Again, advised patient to remain admitted following amputation until proximal margin cultures/pathology result to not only guide antibiotic therapy but duration of therapy as well. Also discussed that IV antibiotics is the preferred/safest therapy. Patient expressed understanding however will chooses to leave tomorrow. Reviewed PO options with patient, emphasizing that this is suboptimal treatment. A regimen consisting of Linezolid/Cipro/Flagyl would provide broader antimicrobial coverage however would have higher risk of more significant side effects (tendon rupture, aortic aneurysm rupture, rupture of blood vessels, confusion, QTC prolongation associated with cipro). The other regimen that we could offer is Linezolid plus Augmentin - discussed that this would not provide as broad of coverage however would have lower risk of harmful side effects. Advised that patient would need to examine toe carefully with dressing changes to ensure no signs of worsening infection (redness, swelling, drainage) and report to ER immediately if there were signs of infection. Patient verbalized understanding and has chosen Linezolid/Augmentin regimen. Patient does have hx of cocaine use ("when on vacation")- informed patient of serious interaction between Linezolid and cocaine (hypertensive crisis) and advised that he does not use cocaine while taking Linezolid. Patient again verbalized understanding and stated he would not be using cocaine.     Suggest:  -f/u bcxs 6/10-- ngtd   -f/u OR proximal margin culture -- no org seen on GS  -f/u path  -continue Daptomycin 600 mg IV Q24 and zosyn 4.5g IV Q8 via EI while inpatient   -Upon discharge, transition to Linezolid 600mg PO Q12 plus Augmentin 875mg/125mg PO Q12 x 14 days from OR date (6/11-6/24).   -Antibiotics may be adjusted/extended/discontinued based on OR cultures/pathology. This will be followed closely as outpatient.   -Patient to follow up with Dr. Arora in 1-2 weeks ([address] [place] [address], [phone]), ID office will call patient to schedule       Team 2 will sign off. Thank you for your consultation   Please reconsult with questions.    Case d/w primary team.  Final recommendation pending attending note.    Courtney Silvestre, Infectious Diseases PA  Please reach out for any questions 9 am-5pm. For evenings and weekends, please call the ID physician on call.  Work cell: [phone]

## 2024-06-12 NOTE — PROGRESS NOTE ADULT - SUBJECTIVE AND OBJECTIVE BOX
O/N Events:    Subjective/ROS: Patient seen and examined at bedside.     Denies Fever/Chills, HA, CP, SOB, n/v, changes in bowel/urinary habits.  12pt ROS otherwise negative.    VITALS  Vital Signs Last 24 Hrs  T(C): 36.6 (12 Jun 2024 06:21), Max: 36.6 (11 Jun 2024 11:57)  T(F): 97.9 (12 Jun 2024 06:21), Max: 97.9 (11 Jun 2024 11:57)  HR: 96 (12 Jun 2024 06:21) (81 - 98)  BP: 134/79 (12 Jun 2024 06:21) (104/62 - 158/96)  BP(mean): 99 (11 Jun 2024 15:15) (78 - 99)  RR: 19 (12 Jun 2024 06:21) (12 - 19)  SpO2: 98% (12 Jun 2024 06:21) (97% - 100%)    Parameters below as of 12 Jun 2024 06:21  Patient On (Oxygen Delivery Method): room air        CAPILLARY BLOOD GLUCOSE      POCT Blood Glucose.: 82 mg/dL (12 Jun 2024 05:21)  POCT Blood Glucose.: 92 mg/dL (11 Jun 2024 22:37)  POCT Blood Glucose.: 75 mg/dL (11 Jun 2024 22:14)  POCT Blood Glucose.: 81 mg/dL (11 Jun 2024 17:53)  POCT Blood Glucose.: 85 mg/dL (11 Jun 2024 14:05)  POCT Blood Glucose.: 75 mg/dL (11 Jun 2024 11:51)      PHYSICAL EXAM  General: NAD  Head: NC/AT; MMM; PERRL; EOMI;  Neck: Supple; no JVD  Respiratory: CTAB; no wheezes/rales/rhonchi  Cardiovascular: Regular rhythm/rate; S1/S2+, no murmurs, rubs gallops   Gastrointestinal: Soft; NTND; bowel sounds normal and present  Extremities: WWP; no edema/cyanosis  Neurological: A&Ox3, CNII-XII grossly intact; no obvious focal deficits    MEDICATIONS  (STANDING):  DAPTOmycin IVPB 600 milliGRAM(s) IV Intermittent every 24 hours  dextrose 10% Bolus 125 milliLiter(s) IV Bolus once  dextrose 10%. 1000 milliLiter(s) (40 mL/Hr) IV Continuous <Continuous>  dextrose 5%. 1000 milliLiter(s) (50 mL/Hr) IV Continuous <Continuous>  dextrose 5%. 1000 milliLiter(s) (100 mL/Hr) IV Continuous <Continuous>  dextrose 50% Injectable 25 Gram(s) IV Push once  dextrose 50% Injectable 12.5 Gram(s) IV Push once  enoxaparin Injectable 40 milliGRAM(s) SubCutaneous every 24 hours  glucagon  Injectable 1 milliGRAM(s) IntraMuscular once  insulin glargine Injectable (LANTUS) 15 Unit(s) SubCutaneous at bedtime  insulin lispro (ADMELOG) corrective regimen sliding scale   SubCutaneous Before meals and at bedtime  piperacillin/tazobactam IVPB.. 4.5 Gram(s) IV Intermittent every 8 hours    MEDICATIONS  (PRN):  acetaminophen     Tablet .. 650 milliGRAM(s) Oral every 6 hours PRN Temp greater or equal to 38C (100.4F), Mild Pain (1 - 3), Moderate Pain (4 - 6)  dextrose Oral Gel 15 Gram(s) Oral once PRN Blood Glucose LESS THAN 70 milliGRAM(s)/deciliter  HYDROmorphone  Injectable 0.5 milliGRAM(s) IV Push every 6 hours PRN Severe Pain (7 - 10)      No Known Allergies      LABS                        13.6   9.97  )-----------( 310      ( 11 Jun 2024 05:30 )             41.9     06-11    137  |  104  |  20  ----------------------------<  82  3.6   |  25  |  1.09    Ca    9.2      11 Jun 2024 05:30  Phos  3.2     06-11  Mg     1.8     06-11    TPro  7.4  /  Alb  3.8  /  TBili  0.6  /  DBili  x   /  AST  22  /  ALT  23  /  AlkPhos  65  06-11    PT/INR - ( 11 Jun 2024 05:30 )   PT: 11.0 sec;   INR: 0.96          PTT - ( 11 Jun 2024 05:30 )  PTT:33.4 sec  Urinalysis Basic - ( 11 Jun 2024 05:30 )    Color: x / Appearance: x / SG: x / pH: x  Gluc: 82 mg/dL / Ketone: x  / Bili: x / Urobili: x   Blood: x / Protein: x / Nitrite: x   Leuk Esterase: x / RBC: x / WBC x   Sq Epi: x / Non Sq Epi: x / Bacteria: x            Culture - Tissue with Gram Stain (collected 06-11-24 @ 14:57)  Source: .Tissue Left Clean Margin Fifth Material (2)  Gram Stain (06-11-24 @ 23:46):    No polymorphonuclear cells seen per low power field    No organisms seen per oil power field        IMAGING/EKG/ETC   O/N Events: FSG 75-> 92 after juice , given 15 lantus, FSG 3AM 78, switched from D5 to D10 for 3hrs    Subjective/ROS: Patient seen and examined at bedside. Pt feels well and denies being lightheaded or dizzy    Denies Fever/Chills, HA, CP, SOB, n/v, changes in bowel/urinary habits.  12pt ROS otherwise negative.    VITALS  Vital Signs Last 24 Hrs  T(C): 36.6 (12 Jun 2024 06:21), Max: 36.6 (11 Jun 2024 11:57)  T(F): 97.9 (12 Jun 2024 06:21), Max: 97.9 (11 Jun 2024 11:57)  HR: 96 (12 Jun 2024 06:21) (81 - 98)  BP: 134/79 (12 Jun 2024 06:21) (104/62 - 158/96)  BP(mean): 99 (11 Jun 2024 15:15) (78 - 99)  RR: 19 (12 Jun 2024 06:21) (12 - 19)  SpO2: 98% (12 Jun 2024 06:21) (97% - 100%)    Parameters below as of 12 Jun 2024 06:21  Patient On (Oxygen Delivery Method): room air        CAPILLARY BLOOD GLUCOSE      POCT Blood Glucose.: 82 mg/dL (12 Jun 2024 05:21)  POCT Blood Glucose.: 92 mg/dL (11 Jun 2024 22:37)  POCT Blood Glucose.: 75 mg/dL (11 Jun 2024 22:14)  POCT Blood Glucose.: 81 mg/dL (11 Jun 2024 17:53)  POCT Blood Glucose.: 85 mg/dL (11 Jun 2024 14:05)  POCT Blood Glucose.: 75 mg/dL (11 Jun 2024 11:51)      PHYSICAL EXAM  General: NAD  Head: NC/AT; MMM; PERRL; EOMI;  Neck: Supple; no JVD  Respiratory: CTAB; no wheezes/rales/rhonchi  Cardiovascular: Regular rhythm/rate; S1/S2+, no murmurs, rubs gallops   Gastrointestinal: Soft; NTND; bowel sounds normal and present  Extremities: WWP; right TMA, Left 4th toe amputation  Neurological: A&Ox3, conversing normally    MEDICATIONS  (STANDING):  DAPTOmycin IVPB 600 milliGRAM(s) IV Intermittent every 24 hours  dextrose 10% Bolus 125 milliLiter(s) IV Bolus once  dextrose 10%. 1000 milliLiter(s) (40 mL/Hr) IV Continuous <Continuous>  dextrose 5%. 1000 milliLiter(s) (50 mL/Hr) IV Continuous <Continuous>  dextrose 5%. 1000 milliLiter(s) (100 mL/Hr) IV Continuous <Continuous>  dextrose 50% Injectable 25 Gram(s) IV Push once  dextrose 50% Injectable 12.5 Gram(s) IV Push once  enoxaparin Injectable 40 milliGRAM(s) SubCutaneous every 24 hours  glucagon  Injectable 1 milliGRAM(s) IntraMuscular once  insulin glargine Injectable (LANTUS) 15 Unit(s) SubCutaneous at bedtime  insulin lispro (ADMELOG) corrective regimen sliding scale   SubCutaneous Before meals and at bedtime  piperacillin/tazobactam IVPB.. 4.5 Gram(s) IV Intermittent every 8 hours    MEDICATIONS  (PRN):  acetaminophen     Tablet .. 650 milliGRAM(s) Oral every 6 hours PRN Temp greater or equal to 38C (100.4F), Mild Pain (1 - 3), Moderate Pain (4 - 6)  dextrose Oral Gel 15 Gram(s) Oral once PRN Blood Glucose LESS THAN 70 milliGRAM(s)/deciliter  HYDROmorphone  Injectable 0.5 milliGRAM(s) IV Push every 6 hours PRN Severe Pain (7 - 10)      No Known Allergies      LABS                        13.6   9.97  )-----------( 310      ( 11 Jun 2024 05:30 )             41.9     06-11    137  |  104  |  20  ----------------------------<  82  3.6   |  25  |  1.09    Ca    9.2      11 Jun 2024 05:30  Phos  3.2     06-11  Mg     1.8     06-11    TPro  7.4  /  Alb  3.8  /  TBili  0.6  /  DBili  x   /  AST  22  /  ALT  23  /  AlkPhos  65  06-11    PT/INR - ( 11 Jun 2024 05:30 )   PT: 11.0 sec;   INR: 0.96          PTT - ( 11 Jun 2024 05:30 )  PTT:33.4 sec  Urinalysis Basic - ( 11 Jun 2024 05:30 )    Color: x / Appearance: x / SG: x / pH: x  Gluc: 82 mg/dL / Ketone: x  / Bili: x / Urobili: x   Blood: x / Protein: x / Nitrite: x   Leuk Esterase: x / RBC: x / WBC x   Sq Epi: x / Non Sq Epi: x / Bacteria: x            Culture - Tissue with Gram Stain (collected 06-11-24 @ 14:57)  Source: .Tissue Left Clean Margin Fifth Material (2)  Gram Stain (06-11-24 @ 23:46):    No polymorphonuclear cells seen per low power field    No organisms seen per oil power field        IMAGING/EKG/ETC

## 2024-06-12 NOTE — PROGRESS NOTE ADULT - ASSESSMENT
49 y/o DM male with infected L 5th digit with gangrenous changes, +PTB. At time of consult, VSS, WBC 13.3, CRP 99, ESR30. X-rays with no evidence of gas. s/p Left 5th digit amputation. Explained to patient of leaving AMA while post op. He understands risks of potential infection of surgical site which can lead to loss of life or limb.     Plan:      -c/w IV abx, abx on DC per ID  -f/u intra-operative cultures & path  -WB status: Heel WBAT  -Leg to remain elevated while in bed  -f/u post-op x-rays  -Please place pt on an adequate pain manangment regimine   -Daily dressing changes and surgical site monitoring  -Pt dressed with DSD, ACE wrap, and   -Rest of care up to primary team    Podiatry following, Plan d/w attending    Patient should follow up with Dr. Maximiliano Green within 1 week of discharge.    Office information:          [facility] Address- [address] [place] [address], [address] [address] Phone: [phone]         [facility] Address- [address] [place] [address] Phone: [phone]     Discharge dressing instructions: Cleanse wound with normal sailine daily, pat dry with sterile guaze, apply  betadine soaked gauze to wound base, cover with dry sterile gauze, ABD pad, wrap with Kerlix and light ACE bandage. Packin wound site with packing

## 2024-06-12 NOTE — DISCHARGE NOTE PROVIDER - HOSPITAL COURSE
Mr. Kasi Guido is a 49 y/o male with PMHx of HTN, HLD, DM, R foot OM s/p R partial 2nd and full 4th-5th toe amputations, prior non-healing plantar L great toe ulcer presents to ED for left 5th toe pain s/p amputation.    # Sepsis  #OM of the fifth toe of L foot  Pt presenting with 2/4 SIRS criteria for leukocytosis & elevated HR. Prior hx of OM of L 1st digit which was treated with full course of PO abx recommended by ID (Bactrim & Augmentin, 6 week course). Pt now presenting with 5th digit OM, + probe to bone. Lactate negative. ESR 30 and CRP 99. Started on daptomycin and Zosyn while in patient. s/p amputation on 6/11  -Upon discharge, transition to Linezolid 600mg PO Q12 plus Augmentin 875mg/125mg PO Q12 x 14 days from OR date (6/11-6/24).     #Diabetes mellitus  Pt w/ history of uncontrolled DM. On 2/14/2024, pt had A1c of 10.6. Was evaluated by Endocrine on prior admission and was discharged on regimen of 28U basal lantus at bedtime, 12U premeal insulin (pt states he does not use premeal insulin), Jardiance 25mg q24h, & Metformin 750mg q12h. A1c 6.5. Pt reports taking 20U lantus at bedtime NOT 28U  Plan:  - c/w lantus 20U and c/w metformin and jardiance at discharge.    # Hypertension  Pt w/ hx of HTN, takes Losartan 50mg qd  - resume upon discharge    Patient was discharged to: home    New medications: linezolid and augmentin   Changes to old medications: none  Medications that were stopped: none    Items to follow up as outpatient: PCP, Infectious disease    Physical exam at the time of discharge:  GENERAL: NAD, lying in bed comfortably  HEAD:  Atraumatic, normocephalic  EYES: EOMI, PERRLA, conjunctiva and sclera clear  NECK: Supple, trachea midline, no JVD  HEART: Regular rate and rhythm, no murmurs, rubs, or gallops  LUNGS: Unlabored respirations.  Clear to auscultation bilaterally, no crackles, wheezing, or rhonchi  ABDOMEN: Soft, nontender, nondistended, +BS  EXTREMITIES: 2+ peripheral pulses bilaterally. R TMA, left 5th toe amputation  NERVOUS SYSTEM:  A&Ox3, moving all extremities, no focal deficits   SKIN: No rashes or lesions     Mr. Kasi Guido is a 47 y/o male with PMHx of HTN, HLD, DM, R foot OM s/p R partial 2nd and full 4th-5th toe amputations, prior non-healing plantar L great toe ulcer presents to ED for left 5th toe pain s/p amputation.    # Sepsis  #OM of the fifth toe of L foot  Pt presenting with 2/4 SIRS criteria for leukocytosis & elevated HR. Prior hx of OM of L 1st digit which was treated with full course of PO abx recommended by ID (Bactrim & Augmentin, 6 week course). Pt now presenting with 5th digit OM, + probe to bone. Lactate negative. ESR 30 and CRP 99. Started on daptomycin and Zosyn while in patient. s/p amputation on 6/11  -Upon discharge, transition to Linezolid 600mg PO Q12 plus Augmentin 875mg/125mg PO Q12 x 14 days from OR date (6/11-6/24).   - Cleanse wound with normal sailine daily, pat dry with sterile guaze, apply  betadine soaked gauze to wound base, cover with dry sterile gauze, ABD pad, wrap with Kerlix and light ACE bandage. Packin wound site with packing  - f/u outpatient podiatry  - f/u outpatient ID    #Diabetes mellitus  Pt w/ history of uncontrolled DM. On 2/14/2024, pt had A1c of 10.6. Was evaluated by Endocrine on prior admission and was discharged on regimen of 28U basal lantus at bedtime, 12U premeal insulin (pt states he does not use premeal insulin), Jardiance 25mg q24h, & Metformin 750mg q12h. A1c 6.5. Pt reports taking 20U lantus at bedtime NOT 28U  Plan:  - c/w lantus 20U and c/w metformin and jardiance at discharge.    # Hypertension  Pt w/ hx of HTN, takes Losartan 50mg qd  - resume upon discharge    Patient was discharged to: home    New medications: linezolid and augmentin   Changes to old medications: none  Medications that were stopped: none    Items to follow up as outpatient: PCP, Infectious disease    Physical exam at the time of discharge:  GENERAL: NAD, lying in bed comfortably  HEAD:  Atraumatic, normocephalic  EYES: EOMI, PERRLA, conjunctiva and sclera clear  NECK: Supple, trachea midline, no JVD  HEART: Regular rate and rhythm, no murmurs, rubs, or gallops  LUNGS: Unlabored respirations.  Clear to auscultation bilaterally, no crackles, wheezing, or rhonchi  ABDOMEN: Soft, nontender, nondistended, +BS  EXTREMITIES: 2+ peripheral pulses bilaterally. R TMA, left 5th toe amputation  NERVOUS SYSTEM:  A&Ox3, moving all extremities, no focal deficits   SKIN: No rashes or lesions

## 2024-06-12 NOTE — PROGRESS NOTE ADULT - PROBLEM SELECTOR PLAN 1
Pt presenting with 2/4 SIRS criteria for leukocytosis & elevated HR. Prior hx of OM of L 1st digit which was treated with full course of PO abx recommended by ID (Bactrim & Augmentin, 6 week course). Pt now presenting with 5th digit OM, + probe to bone. Lactate negative. ESR 30 and CRP 99.   Plan:  - C/w Vancomycin 1g Q12h for 3 doses, check trough prior to 4th dose  - C/w Zosyn 3.375g Q8h   - F/u blood cultures x2  - F/u wound cultures   - Podiatry recs Pt presenting with 2/4 SIRS criteria for leukocytosis & elevated HR. Prior hx of OM of L 1st digit which was treated with full course of PO abx recommended by ID (Bactrim & Augmentin, 6 week course). Pt now presenting with 5th digit OM, + probe to bone. Lactate negative. ESR 30 and CRP 99.   Plan:  - c/w daptomycin 600mg qd  - C/w Zosyn 4.5g Q8h   - F/u blood cultures x2  - F/u wound cultures - tissues cultures neg  - Podiatry recs

## 2024-06-12 NOTE — PROGRESS NOTE ADULT - SUBJECTIVE AND OBJECTIVE BOX
INFECTIOUS DISEASES CONSULT FOLLOW-UP NOTE    INTERVAL HPI/OVERNIGHT EVENTS:    Patient seen and examined at bedside. ANDREW. Afebrile. s/p L 5th digit amputation 6/11. Denies complaints       ROS:   Constitutional, eyes, ENT, cardiovascular, respiratory, gastrointestinal, genitourinary, integumentary, neurological, psychiatric and heme/lymph are otherwise negative other than noted above       ANTIBIOTICS/RELEVANT:    MEDICATIONS  (STANDING):  DAPTOmycin IVPB 600 milliGRAM(s) IV Intermittent every 24 hours  dextrose 10% Bolus 125 milliLiter(s) IV Bolus once  dextrose 5%. 1000 milliLiter(s) (100 mL/Hr) IV Continuous <Continuous>  dextrose 5%. 1000 milliLiter(s) (50 mL/Hr) IV Continuous <Continuous>  dextrose 50% Injectable 12.5 Gram(s) IV Push once  dextrose 50% Injectable 25 Gram(s) IV Push once  enoxaparin Injectable 40 milliGRAM(s) SubCutaneous every 24 hours  glucagon  Injectable 1 milliGRAM(s) IntraMuscular once  insulin glargine Injectable (LANTUS) 15 Unit(s) SubCutaneous at bedtime  insulin lispro (ADMELOG) corrective regimen sliding scale   SubCutaneous Before meals and at bedtime  piperacillin/tazobactam IVPB.. 4.5 Gram(s) IV Intermittent every 8 hours    MEDICATIONS  (PRN):  acetaminophen     Tablet .. 650 milliGRAM(s) Oral every 6 hours PRN Temp greater or equal to 38C (100.4F), Mild Pain (1 - 3), Moderate Pain (4 - 6)  dextrose Oral Gel 15 Gram(s) Oral once PRN Blood Glucose LESS THAN 70 milliGRAM(s)/deciliter  HYDROmorphone  Injectable 0.5 milliGRAM(s) IV Push every 6 hours PRN Severe Pain (7 - 10)        Vital Signs Last 24 Hrs  T(C): 36.6 (12 Jun 2024 06:21), Max: 36.6 (12 Jun 2024 06:21)  T(F): 97.9 (12 Jun 2024 06:21), Max: 97.9 (12 Jun 2024 06:21)  HR: 96 (12 Jun 2024 06:21) (81 - 98)  BP: 134/79 (12 Jun 2024 06:21) (125/80 - 134/79)  BP(mean): 99 (11 Jun 2024 15:15) (99 - 99)  RR: 19 (12 Jun 2024 06:21) (12 - 19)  SpO2: 98% (12 Jun 2024 06:21) (98% - 100%)    Parameters below as of 12 Jun 2024 06:21  Patient On (Oxygen Delivery Method): room air        PHYSICAL EXAM:  Constitutional: alert, NAD  Eyes: the sclera and conjunctiva were normal.   ENT: the ears and nose were normal in appearance.   Neck: the appearance of the neck was normal and the neck was supple.   Pulmonary: no respiratory distress and lungs were clear to auscultation bilaterally.   Heart: heart rate was normal and rhythm regular, normal S1 and S2  Vascular:. there was no peripheral edema  Abdomen: normal bowel sounds, soft, non-tender  Neurological: no focal deficits.   Psychiatric: the affect was normal  Extremities: L foot wrapped- c/d/i. L 1st toe exposed -- no evidence of infection/wound       LABS:                        14.2   9.08  )-----------( 354      ( 12 Jun 2024 05:30 )             43.1     06-12    138  |  103  |  12  ----------------------------<  82  4.5   |  28  |  1.08    Ca    9.3      12 Jun 2024 05:30  Phos  2.8     06-12  Mg     1.8     06-12    TPro  7.4  /  Alb  3.8  /  TBili  0.6  /  DBili  x   /  AST  22  /  ALT  23  /  AlkPhos  65  06-11    PT/INR - ( 11 Jun 2024 05:30 )   PT: 11.0 sec;   INR: 0.96          PTT - ( 11 Jun 2024 05:30 )  PTT:33.4 sec  Urinalysis Basic - ( 12 Jun 2024 05:30 )    Color: x / Appearance: x / SG: x / pH: x  Gluc: 82 mg/dL / Ketone: x  / Bili: x / Urobili: x   Blood: x / Protein: x / Nitrite: x   Leuk Esterase: x / RBC: x / WBC x   Sq Epi: x / Non Sq Epi: x / Bacteria: x        MICROBIOLOGY:    Culture - Tissue with Gram Stain (collected 06-11-24 @ 14:57)  Source: .Tissue Left Clean Margin Fifth Material (2)  Gram Stain (06-11-24 @ 23:46):    No polymorphonuclear cells seen per low power field    No organisms seen per oil power field    Culture - Blood (collected 06-10-24 @ 22:27)  Source: .Blood Blood  Preliminary Report (06-12-24 @ 03:03):    No growth at 24 hours    Culture - Blood (collected 06-10-24 @ 22:27)  Source: .Blood Blood  Preliminary Report (06-12-24 @ 03:03):    No growth at 24 hours        RADIOLOGY & ADDITIONAL STUDIES:  Reviewed

## 2024-06-12 NOTE — DISCHARGE NOTE PROVIDER - NSDCFUADDAPPT_GEN_ALL_CORE_FT
Patient should follow up with Podiatry, Dr. Maximiliano Green within 1 week of discharge.    Office information:          [place] Address- [address], [address], [place] [address] Phone: [phone]         [place] Address- [address] [place] [address] Phone: [phone]     Patient should follow up with Dr. Arora in 1-2 weeks ([address] [place] [address], [phone]), ID office will call patient to schedule

## 2024-06-12 NOTE — PROGRESS NOTE ADULT - PROBLEM SELECTOR PLAN 3
Pt w/ history of uncontrolled DM. On 2/14/2024, pt had A1c of 10.6. Was evaluated by Endocrine on prior admission and was discharged on regimen of 28U basal lantus at bedtime, 12U premeal insulin (pt states he does not use premeal insulin), Jardiance 25mg q24h, & Metformin 750mg q12h.   Plan:  - F/u AM A1C  - C/w mISS while inpatient  - Given pt is NPO with night sugar of 108, will reduce basal insulin to 15U   - Consider Endocrine consult Pt w/ history of uncontrolled DM. On 2/14/2024, pt had A1c of 10.6. Was evaluated by Endocrine on prior admission and was discharged on regimen of 28U basal lantus at bedtime, 12U premeal insulin (pt states he does not use premeal insulin), Jardiance 25mg q24h, & Metformin 750mg q12h. A1c 6.5. Pt reports taking 20U lantus at bedtime NOT 28U  Plan:  - C/w mISS while inpatient  - c/w lantus 20U and c/w metformin and jardiance at discharge

## 2024-06-12 NOTE — DISCHARGE NOTE PROVIDER - NSDCCPCAREPLAN_GEN_ALL_CORE_FT
PRINCIPAL DISCHARGE DIAGNOSIS  Diagnosis: Acute osteomyelitis  Assessment and Plan of Treatment: Osteomyelitis is an infection of a bone. Symptoms include pain and tenderness over the affected area of bone, and feeling unwell. It is a serious infection which needs prompt treatment with antibiotic medication. You got an amputation of the left 5th toe and were treated with antibiotics. You will need to continue taking care of your wound:  Cleanse wound with normal sailine daily, pat dry with sterile guaze, apply  betadine soaked gauze to wound base, cover with dry sterile gauze, ABD pad, wrap with Kerlix and light ACE bandage. Packin wound site with packing. For antibiotics, please take the medication as instructed until 6/24/2024 and follow up with Dr. Arora, Infectious disease specialist. Please note that Antibiotics may be adjusted/extended/discontinued based on OR cultures/pathology. This will be followed closely as outpatient. Please also note that this current antibiotics regimen may not cover the infectious bacteria. Lastly, please do not use any cocaine while on antibiotics.

## 2024-06-12 NOTE — DISCHARGE NOTE PROVIDER - NSDCMRMEDTOKEN_GEN_ALL_CORE_FT
amoxicillin-clavulanate 875 mg-125 mg oral tablet: 875 milligram(s) orally every 12 hours  atorvastatin 40 mg oral tablet: 1 tab(s) orally once a day  Jardiance 25 mg oral tablet: 1 tab(s) orally once a day  linezolid 600 mg oral tablet: 1 tab(s) orally every 12 hours  losartan 50 mg oral tablet: 1 tab(s) orally once a day  metFORMIN 750 mg oral tablet, extended release: 1 tab(s) orally 2 times a day  Ozempic 2 mg/3 mL (0.25 mg or 0.5 mg dose) subcutaneous solution: 0.25 milligram(s) subcutaneously once a week Inject 0.25mg SC once weekly for 4 weeks, and then if tolerating increase to 0.5mg weekly.  Topeter SoloStar 300 units/mL subcutaneous solution: 20 unit(s) subcutaneous once a day (at bedtime)

## 2024-06-12 NOTE — PROGRESS NOTE ADULT - NS ATTEND AMEND GEN_ALL_CORE FT
Agree with the above. Upon discharge will switch to linezolid/augmentin while operative cultures/path pending, and follow up with me outpatient. Patient aware that he is choosing suboptimal therapy (due to his prioritizing trip to  to be with his brother after his back surgery), and he understands the risks of this as above.

## 2024-06-12 NOTE — PROGRESS NOTE ADULT - ASSESSMENT
Mr. Kasi Guido is a 49 y/o male with PMHx of HTN, HLD, DM, R foot OM s/p R partial 2nd and full 4th-5th toe amputations, prior non-healing plantar L great toe ulcer presents to ED for left 5th toe pain.

## 2024-06-13 ENCOUNTER — TRANSCRIPTION ENCOUNTER (OUTPATIENT)
Age: 49
End: 2024-06-13

## 2024-06-13 VITALS
TEMPERATURE: 98 F | RESPIRATION RATE: 19 BRPM | HEART RATE: 93 BPM | DIASTOLIC BLOOD PRESSURE: 95 MMHG | SYSTOLIC BLOOD PRESSURE: 159 MMHG | OXYGEN SATURATION: 100 %

## 2024-06-13 LAB
-  AMOXICILLIN/CLAVULANIC ACID: SIGNIFICANT CHANGE UP
-  AMPICILLIN/SULBACTAM: SIGNIFICANT CHANGE UP
-  AMPICILLIN/SULBACTAM: SIGNIFICANT CHANGE UP
-  AMPICILLIN: SIGNIFICANT CHANGE UP
-  CEFAZOLIN: SIGNIFICANT CHANGE UP
-  CEFAZOLIN: SIGNIFICANT CHANGE UP
-  CEFEPIME: SIGNIFICANT CHANGE UP
-  CEFOXITIN: SIGNIFICANT CHANGE UP
-  CEFTRIAXONE: SIGNIFICANT CHANGE UP
-  CIPROFLOXACIN: SIGNIFICANT CHANGE UP
-  CLINDAMYCIN: SIGNIFICANT CHANGE UP
-  ERYTHROMYCIN: SIGNIFICANT CHANGE UP
-  GENTAMICIN: SIGNIFICANT CHANGE UP
-  GENTAMICIN: SIGNIFICANT CHANGE UP
-  LEVOFLOXACIN: SIGNIFICANT CHANGE UP
-  OXACILLIN: SIGNIFICANT CHANGE UP
-  PIPERACILLIN/TAZOBACTAM: SIGNIFICANT CHANGE UP
-  RIFAMPIN: SIGNIFICANT CHANGE UP
-  TETRACYCLINE: SIGNIFICANT CHANGE UP
-  TOBRAMYCIN: SIGNIFICANT CHANGE UP
-  TRIMETHOPRIM/SULFAMETHOXAZOLE: SIGNIFICANT CHANGE UP
-  TRIMETHOPRIM/SULFAMETHOXAZOLE: SIGNIFICANT CHANGE UP
-  VANCOMYCIN: SIGNIFICANT CHANGE UP
ANION GAP SERPL CALC-SCNC: 6 MMOL/L — SIGNIFICANT CHANGE UP (ref 5–17)
BASOPHILS # BLD AUTO: 0.05 K/UL — SIGNIFICANT CHANGE UP (ref 0–0.2)
BASOPHILS NFR BLD AUTO: 0.7 % — SIGNIFICANT CHANGE UP (ref 0–2)
BUN SERPL-MCNC: 14 MG/DL — SIGNIFICANT CHANGE UP (ref 7–23)
CALCIUM SERPL-MCNC: 9.5 MG/DL — SIGNIFICANT CHANGE UP (ref 8.4–10.5)
CHLORIDE SERPL-SCNC: 103 MMOL/L — SIGNIFICANT CHANGE UP (ref 96–108)
CO2 SERPL-SCNC: 28 MMOL/L — SIGNIFICANT CHANGE UP (ref 22–31)
CREAT SERPL-MCNC: 1.18 MG/DL — SIGNIFICANT CHANGE UP (ref 0.5–1.3)
CRP SERPL-MCNC: 47.5 MG/L — HIGH (ref 0–4)
EGFR: 76 ML/MIN/1.73M2 — SIGNIFICANT CHANGE UP
EOSINOPHIL # BLD AUTO: 0.21 K/UL — SIGNIFICANT CHANGE UP (ref 0–0.5)
EOSINOPHIL NFR BLD AUTO: 2.7 % — SIGNIFICANT CHANGE UP (ref 0–6)
ERYTHROCYTE [SEDIMENTATION RATE] IN BLOOD: 58 MM/HR — HIGH
GLUCOSE BLDC GLUCOMTR-MCNC: 78 MG/DL — SIGNIFICANT CHANGE UP (ref 70–99)
GLUCOSE BLDC GLUCOMTR-MCNC: 93 MG/DL — SIGNIFICANT CHANGE UP (ref 70–99)
GLUCOSE SERPL-MCNC: 97 MG/DL — SIGNIFICANT CHANGE UP (ref 70–99)
HCT VFR BLD CALC: 41.9 % — SIGNIFICANT CHANGE UP (ref 39–50)
HGB BLD-MCNC: 13.7 G/DL — SIGNIFICANT CHANGE UP (ref 13–17)
IMM GRANULOCYTES NFR BLD AUTO: 0.1 % — SIGNIFICANT CHANGE UP (ref 0–0.9)
LYMPHOCYTES # BLD AUTO: 2.34 K/UL — SIGNIFICANT CHANGE UP (ref 1–3.3)
LYMPHOCYTES # BLD AUTO: 30.5 % — SIGNIFICANT CHANGE UP (ref 13–44)
MAGNESIUM SERPL-MCNC: 1.7 MG/DL — SIGNIFICANT CHANGE UP (ref 1.6–2.6)
MCHC RBC-ENTMCNC: 29.9 PG — SIGNIFICANT CHANGE UP (ref 27–34)
MCHC RBC-ENTMCNC: 32.7 GM/DL — SIGNIFICANT CHANGE UP (ref 32–36)
MCV RBC AUTO: 91.5 FL — SIGNIFICANT CHANGE UP (ref 80–100)
METHOD TYPE: SIGNIFICANT CHANGE UP
METHOD TYPE: SIGNIFICANT CHANGE UP
MONOCYTES # BLD AUTO: 0.62 K/UL — SIGNIFICANT CHANGE UP (ref 0–0.9)
MONOCYTES NFR BLD AUTO: 8.1 % — SIGNIFICANT CHANGE UP (ref 2–14)
NEUTROPHILS # BLD AUTO: 4.45 K/UL — SIGNIFICANT CHANGE UP (ref 1.8–7.4)
NEUTROPHILS NFR BLD AUTO: 57.9 % — SIGNIFICANT CHANGE UP (ref 43–77)
NRBC # BLD: 0 /100 WBCS — SIGNIFICANT CHANGE UP (ref 0–0)
PHOSPHATE SERPL-MCNC: 2.9 MG/DL — SIGNIFICANT CHANGE UP (ref 2.5–4.5)
PLATELET # BLD AUTO: 378 K/UL — SIGNIFICANT CHANGE UP (ref 150–400)
POTASSIUM SERPL-MCNC: 4.6 MMOL/L — SIGNIFICANT CHANGE UP (ref 3.5–5.3)
POTASSIUM SERPL-SCNC: 4.6 MMOL/L — SIGNIFICANT CHANGE UP (ref 3.5–5.3)
RBC # BLD: 4.58 M/UL — SIGNIFICANT CHANGE UP (ref 4.2–5.8)
RBC # FLD: 13.2 % — SIGNIFICANT CHANGE UP (ref 10.3–14.5)
SODIUM SERPL-SCNC: 137 MMOL/L — SIGNIFICANT CHANGE UP (ref 135–145)
WBC # BLD: 7.68 K/UL — SIGNIFICANT CHANGE UP (ref 3.8–10.5)
WBC # FLD AUTO: 7.68 K/UL — SIGNIFICANT CHANGE UP (ref 3.8–10.5)

## 2024-06-13 PROCEDURE — 80053 COMPREHEN METABOLIC PANEL: CPT

## 2024-06-13 PROCEDURE — 99239 HOSP IP/OBS DSCHRG MGMT >30: CPT | Mod: GC

## 2024-06-13 PROCEDURE — 73630 X-RAY EXAM OF FOOT: CPT

## 2024-06-13 PROCEDURE — 83605 ASSAY OF LACTIC ACID: CPT

## 2024-06-13 PROCEDURE — 87186 SC STD MICRODIL/AGAR DIL: CPT

## 2024-06-13 PROCEDURE — 87075 CULTR BACTERIA EXCEPT BLOOD: CPT

## 2024-06-13 PROCEDURE — 86140 C-REACTIVE PROTEIN: CPT

## 2024-06-13 PROCEDURE — 71045 X-RAY EXAM CHEST 1 VIEW: CPT

## 2024-06-13 PROCEDURE — 87070 CULTURE OTHR SPECIMN AEROBIC: CPT

## 2024-06-13 PROCEDURE — 36415 COLL VENOUS BLD VENIPUNCTURE: CPT

## 2024-06-13 PROCEDURE — 85025 COMPLETE CBC W/AUTO DIFF WBC: CPT

## 2024-06-13 PROCEDURE — 85652 RBC SED RATE AUTOMATED: CPT

## 2024-06-13 PROCEDURE — 86901 BLOOD TYPING SEROLOGIC RH(D): CPT

## 2024-06-13 PROCEDURE — 88305 TISSUE EXAM BY PATHOLOGIST: CPT

## 2024-06-13 PROCEDURE — 82962 GLUCOSE BLOOD TEST: CPT

## 2024-06-13 PROCEDURE — 84100 ASSAY OF PHOSPHORUS: CPT

## 2024-06-13 PROCEDURE — 93005 ELECTROCARDIOGRAM TRACING: CPT

## 2024-06-13 PROCEDURE — 86850 RBC ANTIBODY SCREEN: CPT

## 2024-06-13 PROCEDURE — 82550 ASSAY OF CK (CPK): CPT

## 2024-06-13 PROCEDURE — 88307 TISSUE EXAM BY PATHOLOGIST: CPT

## 2024-06-13 PROCEDURE — 96375 TX/PRO/DX INJ NEW DRUG ADDON: CPT

## 2024-06-13 PROCEDURE — 88311 DECALCIFY TISSUE: CPT

## 2024-06-13 PROCEDURE — 80048 BASIC METABOLIC PNL TOTAL CA: CPT

## 2024-06-13 PROCEDURE — 87077 CULTURE AEROBIC IDENTIFY: CPT

## 2024-06-13 PROCEDURE — 87040 BLOOD CULTURE FOR BACTERIA: CPT

## 2024-06-13 PROCEDURE — 96374 THER/PROPH/DIAG INJ IV PUSH: CPT

## 2024-06-13 PROCEDURE — 83735 ASSAY OF MAGNESIUM: CPT

## 2024-06-13 PROCEDURE — 83036 HEMOGLOBIN GLYCOSYLATED A1C: CPT

## 2024-06-13 PROCEDURE — 99285 EMERGENCY DEPT VISIT HI MDM: CPT

## 2024-06-13 PROCEDURE — 85730 THROMBOPLASTIN TIME PARTIAL: CPT

## 2024-06-13 PROCEDURE — 85610 PROTHROMBIN TIME: CPT

## 2024-06-13 PROCEDURE — 86900 BLOOD TYPING SEROLOGIC ABO: CPT

## 2024-06-13 RX ADMIN — PIPERACILLIN AND TAZOBACTAM 25 GRAM(S): 4; .5 INJECTION, POWDER, LYOPHILIZED, FOR SOLUTION INTRAVENOUS at 05:37

## 2024-06-13 NOTE — CHART NOTE - NSCHARTNOTEFT_GEN_A_CORE
AMA NOTE:    Pt informed medical staff earlier this week that he has a flight to [place] tonight to visit his brother who is getting surgery done. He understands that we are pending culture results from OM and the current antibiotic regimen may not be optimal. Despite risk of his infection getting worse, patient is insistent on leaving. Podiatry who performed the 5th toe amputation is aware and has instructed patient on wound care. Infectious disease is also aware and discussed risks of leaving with patient. Patient understands all risks and has full capacity to make decision to AMA. Medical team sent all antibiotics to [facility] pharmacy.

## 2024-06-13 NOTE — DISCHARGE NOTE NURSING/CASE MANAGEMENT/SOCIAL WORK - NSDCVIVACCINE_GEN_ALL_CORE_FT
Tdap; 30-Jun-2021 18:05; Lorrie Ziegler (IVELISSE); [employer]; [id number] (Exp. Date: 25-Nov-2022); IntraMuscular; Deltoid Left.; 0.5 milliLiter(s); VIS (VIS Published: 09-May-2013, VIS Presented: 30-Jun-2021);

## 2024-06-13 NOTE — PROGRESS NOTE ADULT - ASSESSMENT
49 y/o DM male with infected L 5th digit with gangrenous changes, +PTB. At time of consult, VSS, WBC 13.3, CRP 99, ESR30. X-rays with no evidence of gas. s/p Left 5th digit amputation. Explained to patient of leaving AMA while post op. He understands risks of potential infection of surgical site which can lead to loss of life or limb.     Stressed importance of f/u and educated patient on how to properly dress and pack wound. Provided pt with supplies to take with him.     Plan:      -c/w IV abx, abx on DC per ID  -f/u intra-operative cultures & path  -WB status: Heel WBAT  -Leg to remain elevated while in bed  -f/u post-op x-rays final read  -Daily dressing changes and surgical site monitoring  -Pt dressed with DSD, ACE wrap, and packing  -Rest of care up to primary team    Podiatry following, Plan d/w attending    Patient should follow up with Dr. Maximiliano Green within 1 week of discharge.    Office information:          [place] Address- [address] [place] [address], [address], [place] [address] Phone: [phone]         [place] Address- [address] [place] [address] Phone: [phone]     Discharge dressing instructions: Cleanse wound with normal sailine daily, pat dry with sterile guaze, apply  betadine soaked gauze to wound base, cover with dry sterile gauze, ABD pad, wrap with Kerlix and light ACE bandage. Pack wound site with packing

## 2024-06-13 NOTE — DISCHARGE NOTE NURSING/CASE MANAGEMENT/SOCIAL WORK - NSDCPEFALRISK_GEN_ALL_CORE
For information on Fall & Injury Prevention, visit: [email or web address] OR  [email or web address] OR  https://www.cdc.gov/steadi/patient.html

## 2024-06-13 NOTE — DISCHARGE NOTE NURSING/CASE MANAGEMENT/SOCIAL WORK - PATIENT PORTAL LINK FT
You can access the FollowMyHealth Patient Portal offered by [facility] by registering at the following website: [email or web address]. By joining [facility]’s FollowMyHealth portal, you will also be able to view your health information using other applications (apps) compatible with our system.

## 2024-06-13 NOTE — DISCHARGE NOTE NURSING/CASE MANAGEMENT/SOCIAL WORK - NSDCFUADDAPPT_GEN_ALL_CORE_FT
Patient should follow up with Podiatry, Dr. Maximiliano Green within 1 week of discharge.    Office information:          [place] Address- [address], [address], [place] [address] Phone: [phone]         [place] Address- [address] [place] [address] Phone: [phone]     Patient should follow up with Dr. Arora in 1-2 weeks ([address] [place] [address], [phone]), ID office will call patient to schedule

## 2024-06-13 NOTE — PROGRESS NOTE ADULT - SUBJECTIVE AND OBJECTIVE BOX
Patient is a 48y old  Male who presents with a chief complaint of OM of fifth toe (12 Jun 2024 16:32)      INTERVAL HPI/ OVERNIGHT EVENTS  s/p Left 5th digit amp. Dressing C/D/I. No further pedal complaints.       LABS                        13.7   7.68  )-----------( 378      ( 13 Jun 2024 09:23 )             41.9     06-13    137  |  103  |  14  ----------------------------<  97  4.6   |  28  |  1.18    Ca    9.5      13 Jun 2024 09:23  Phos  2.9     06-13  Mg     1.7     06-13          ICU Vital Signs Last 24 Hrs  T(C): 36.9 (13 Jun 2024 05:58), Max: 36.9 (12 Jun 2024 21:05)  T(F): 98.4 (13 Jun 2024 05:58), Max: 98.4 (12 Jun 2024 21:05)  HR: 93 (13 Jun 2024 05:58) (91 - 99)  BP: 159/95 (13 Jun 2024 05:58) (142/86 - 159/95)  BP(mean): --  ABP: --  ABP(mean): --  RR: 19 (13 Jun 2024 05:58) (17 - 19)  SpO2: 100% (13 Jun 2024 05:58) (96% - 100%)    O2 Parameters below as of 13 Jun 2024 05:58  Patient On (Oxygen Delivery Method): room air      RADIOLOGY    < from: Xray Foot AP + Lateral + Oblique, Left (06.10.24 @ 23:12) >  MPRESSION:  Age-indeterminate transverse fracture defect across hallux distal phalanx   tuft however new from prior, correlate clinically. No tracking gas   collections or definite radiographic evidence for osteomyelitis however   if concern remains MRI suggested to further assess.    Intact aligned remaining imaged osteoarticular structures.   Tarsometatarsal alignment maintained without evidence for a Lisfranc   injury. 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 Preserved remaining   joint spaces and no joint margin erosions.    No calcaneal spurring and unremarkable distal Achilles tendon shadow.    No discrete lytic or blastic lesions.    Vascular calcifications again noted.    --- End of Report ---      < end of copied text >      MICROBIOLOGY      Culture - Surgical Swab (collected 11 Jun 2024 14:57)  Source: .Surgical Swab Left Foot Deep Wound (1)  Preliminary Report (12 Jun 2024 18:50):    Rare Escherichia coli    Culture - Tissue with Gram Stain (collected 11 Jun 2024 14:57)  Source: .Tissue Left Clean Margin Fifth Material (2)  Gram Stain (11 Jun 2024 23:46):    No polymorphonuclear cells seen per low power field    No organisms seen per oil power field  Preliminary Report (12 Jun 2024 15:57):    No growth    Culture - Blood (collected 10 Kenan 2024 22:27)  Source: .Blood Blood  Preliminary Report (13 Jun 2024 03:02):    No growth at 48 Hours    Culture - Blood (collected 10 Kenan 2024 22:27)  Source: .Blood Blood  Preliminary Report (13 Jun 2024 03:02):    No growth at 48 Hours          PHYSICAL EXAM  Lower Extremity Focused  Vasc: DP/PT 1/4 b/l, Erythema and edema of L 5th digit extending to MPJ  Derm: L foot Sx site sutures intact, no drainage. Mild post surgical site erythema.   Neuro: Protective sensation diminished  MSK: R TMA:

## 2024-06-16 LAB
CULTURE RESULTS: ABNORMAL
CULTURE RESULTS: SIGNIFICANT CHANGE UP
ORGANISM # SPEC MICROSCOPIC CNT: ABNORMAL
ORGANISM # SPEC MICROSCOPIC CNT: ABNORMAL
ORGANISM # SPEC MICROSCOPIC CNT: SIGNIFICANT CHANGE UP
SPECIMEN SOURCE: SIGNIFICANT CHANGE UP

## 2024-06-19 DIAGNOSIS — E11.65 TYPE 2 DIABETES MELLITUS WITH HYPERGLYCEMIA: ICD-10-CM

## 2024-06-19 DIAGNOSIS — I10 ESSENTIAL (PRIMARY) HYPERTENSION: ICD-10-CM

## 2024-06-19 DIAGNOSIS — E11.52 TYPE 2 DIABETES MELLITUS WITH DIABETIC PERIPHERAL ANGIOPATHY WITH GANGRENE: ICD-10-CM

## 2024-06-19 DIAGNOSIS — E78.5 HYPERLIPIDEMIA, UNSPECIFIED: ICD-10-CM

## 2024-06-19 DIAGNOSIS — M86.9 OSTEOMYELITIS, UNSPECIFIED: ICD-10-CM

## 2024-06-19 DIAGNOSIS — Z79.84 LONG TERM (CURRENT) USE OF ORAL HYPOGLYCEMIC DRUGS: ICD-10-CM

## 2024-06-19 DIAGNOSIS — Z53.29 PROCEDURE AND TREATMENT NOT CARRIED OUT BECAUSE OF PATIENT'S DECISION FOR OTHER REASONS: ICD-10-CM

## 2024-06-19 DIAGNOSIS — Z89.421 ACQUIRED ABSENCE OF OTHER RIGHT TOE(S): ICD-10-CM

## 2024-06-19 DIAGNOSIS — Z79.4 LONG TERM (CURRENT) USE OF INSULIN: ICD-10-CM

## 2024-06-19 DIAGNOSIS — A41.9 SEPSIS, UNSPECIFIED ORGANISM: ICD-10-CM

## 2024-06-19 DIAGNOSIS — L03.116 CELLULITIS OF LEFT LOWER LIMB: ICD-10-CM

## 2024-06-19 DIAGNOSIS — E11.69 TYPE 2 DIABETES MELLITUS WITH OTHER SPECIFIED COMPLICATION: ICD-10-CM

## 2024-06-19 DIAGNOSIS — Z79.85 LONG-TERM (CURRENT) USE OF INJECTABLE NON-INSULIN ANTIDIABETIC DRUGS: ICD-10-CM

## 2024-06-27 NOTE — PHYSICAL THERAPY INITIAL EVALUATION ADULT - LEVEL OF INDEPENDENCE: SUPINE/SIT, REHAB EVAL
Patient calling to speak with RN. Notification received from PA department, stating a PA was already approved for this medication. RN will call patient back after speaking to PA team.    12:45pm  RN called PA team and spoke with Janina. She states that the medication has been approved, and that patient's insurance will cover up to the specific amount he needs every 30 days. In other words, the way the pharmacy is billing the medication is wrong.     RN called patient back to provide update. He states he was told by one person he is to pay $2000 for his script, while another person told him it is free. RN does not have access to this information and will call pharmacy back to make sure they are billing the medication correctly, as suggested by the PA team.   independent

## 2024-07-12 NOTE — PATIENT PROFILE ADULT - SAFE PLACE TO LIVE
6  weeks   
Lmom for pt to call this office   
Received call from [place] on 6K wanting to schedule outpt consult with Dr. Dawson for idiopathic ST--Dr. Dawson, do you need to see pt in clinic, if so how soon? Pt has mechanical AVR procedure with Dr. Guillory on 8-12-24.        
Spoke with pt; appt scheduled  
no

## 2024-07-19 ENCOUNTER — APPOINTMENT (OUTPATIENT)
Dept: INFECTIOUS DISEASE | Facility: CLINIC | Age: 49
End: 2024-07-19

## 2024-07-30 NOTE — DIETITIAN INITIAL EVALUATION ADULT. - SOURCE
patient Hide Include Location In Plan Question?: No Detail Level: Generalized Include Location In Plan?: Yes Detail Level: Detailed

## 2024-10-08 NOTE — PROGRESS NOTE ADULT - PROBLEM SELECTOR PLAN 6
[FreeTextEntry1] : 59 yr old F/U   HTN- Controlled, low salt diet meds daily  Hyperlipidemia-  F: None   E: Replete for K<4, Mag<2  N: DASH/TLC CC diet   DVT ppx: lovenox   Full Code   Dispo: JUNIOR

## 2024-10-11 NOTE — PRE-OP CHECKLIST - TO WHOM
Christiano
clear to auscultation bilaterally/breath sounds equal/good air movement/respirations non-labored

## 2024-10-14 NOTE — ED ADULT NURSE NOTE - SKIN INTEGRITY
Quality 130: Documentation Of Current Medications In The Medical Record: Current Medications Documented
Quality 397: Melanoma: Reporting: Pathology report includes the pT Category, thickness, ulceration and mitotic rate, peripheral and deep margin status and presence or absence of microsatellitosis for invasive tumors.
Quality 431: Preventive Care And Screening: Unhealthy Alcohol Use - Screening: Patient not identified as an unhealthy alcohol user when screened for unhealthy alcohol use using a systematic screening method
Quality 137: Melanoma: Continuity Of Care - Recall System: Patient information entered into a recall system that includes: target date for the next exam specified AND a process to follow up with patients regarding missed or unscheduled appointments
Quality 226: Preventive Care And Screening: Tobacco Use: Screening And Cessation Intervention: Patient screened for tobacco use and is an ex/non-smoker
Quality 47: Advance Care Plan: Advance Care Planning discussed and documented; advance care plan or surrogate decision maker documented in the medical record.
Detail Level: Detailed
wound(s)

## 2024-10-23 ENCOUNTER — INPATIENT (INPATIENT)
Facility: HOSPITAL | Age: 49
LOS: 1 days | Discharge: HOME | DRG: 309 | End: 2024-10-25
Attending: STUDENT IN AN ORGANIZED HEALTH CARE EDUCATION/TRAINING PROGRAM | Admitting: INTERNAL MEDICINE
Payer: COMMERCIAL

## 2024-10-23 VITALS
TEMPERATURE: 98 F | WEIGHT: 220.02 LBS | OXYGEN SATURATION: 97 % | SYSTOLIC BLOOD PRESSURE: 103 MMHG | HEART RATE: 100 BPM | DIASTOLIC BLOOD PRESSURE: 69 MMHG | RESPIRATION RATE: 19 BRPM

## 2024-10-23 DIAGNOSIS — Z94.5 SKIN TRANSPLANT STATUS: Chronic | ICD-10-CM

## 2024-10-23 DIAGNOSIS — I10 ESSENTIAL (PRIMARY) HYPERTENSION: ICD-10-CM

## 2024-10-23 DIAGNOSIS — Z96.0 PRESENCE OF UROGENITAL IMPLANTS: Chronic | ICD-10-CM

## 2024-10-23 DIAGNOSIS — Z89.429 ACQUIRED ABSENCE OF OTHER TOE(S), UNSPECIFIED SIDE: Chronic | ICD-10-CM

## 2024-10-23 DIAGNOSIS — E11.9 TYPE 2 DIABETES MELLITUS WITHOUT COMPLICATIONS: ICD-10-CM

## 2024-10-23 DIAGNOSIS — I48.91 UNSPECIFIED ATRIAL FIBRILLATION: ICD-10-CM

## 2024-10-23 DIAGNOSIS — E78.5 HYPERLIPIDEMIA, UNSPECIFIED: ICD-10-CM

## 2024-10-23 LAB
ANION GAP SERPL CALC-SCNC: 11 MMOL/L — SIGNIFICANT CHANGE UP (ref 5–17)
APTT BLD: 34.4 SEC — SIGNIFICANT CHANGE UP (ref 24.5–35.6)
BASOPHILS # BLD AUTO: 0.05 K/UL — SIGNIFICANT CHANGE UP (ref 0–0.2)
BASOPHILS NFR BLD AUTO: 0.5 % — SIGNIFICANT CHANGE UP (ref 0–2)
BUN SERPL-MCNC: 25 MG/DL — HIGH (ref 7–23)
CALCIUM SERPL-MCNC: 9.4 MG/DL — SIGNIFICANT CHANGE UP (ref 8.4–10.5)
CHLORIDE SERPL-SCNC: 102 MMOL/L — SIGNIFICANT CHANGE UP (ref 96–108)
CO2 SERPL-SCNC: 26 MMOL/L — SIGNIFICANT CHANGE UP (ref 22–31)
CREAT SERPL-MCNC: 1.31 MG/DL — HIGH (ref 0.5–1.3)
EGFR: 67 ML/MIN/1.73M2 — SIGNIFICANT CHANGE UP
EOSINOPHIL # BLD AUTO: 0.21 K/UL — SIGNIFICANT CHANGE UP (ref 0–0.5)
EOSINOPHIL NFR BLD AUTO: 2.3 % — SIGNIFICANT CHANGE UP (ref 0–6)
FLUAV AG NPH QL: SIGNIFICANT CHANGE UP
FLUBV AG NPH QL: SIGNIFICANT CHANGE UP
GLUCOSE SERPL-MCNC: 198 MG/DL — HIGH (ref 70–99)
HCT VFR BLD CALC: 44.8 % — SIGNIFICANT CHANGE UP (ref 39–50)
HGB BLD-MCNC: 14.6 G/DL — SIGNIFICANT CHANGE UP (ref 13–17)
IMM GRANULOCYTES NFR BLD AUTO: 0.1 % — SIGNIFICANT CHANGE UP (ref 0–0.9)
INR BLD: 0.86 — SIGNIFICANT CHANGE UP (ref 0.85–1.16)
LYMPHOCYTES # BLD AUTO: 3.91 K/UL — HIGH (ref 1–3.3)
LYMPHOCYTES # BLD AUTO: 42.4 % — SIGNIFICANT CHANGE UP (ref 13–44)
MAGNESIUM SERPL-MCNC: 1.6 MG/DL — SIGNIFICANT CHANGE UP (ref 1.6–2.6)
MCHC RBC-ENTMCNC: 28.9 PG — SIGNIFICANT CHANGE UP (ref 27–34)
MCHC RBC-ENTMCNC: 32.6 GM/DL — SIGNIFICANT CHANGE UP (ref 32–36)
MCV RBC AUTO: 88.5 FL — SIGNIFICANT CHANGE UP (ref 80–100)
MONOCYTES # BLD AUTO: 0.71 K/UL — SIGNIFICANT CHANGE UP (ref 0–0.9)
MONOCYTES NFR BLD AUTO: 7.7 % — SIGNIFICANT CHANGE UP (ref 2–14)
NEUTROPHILS # BLD AUTO: 4.33 K/UL — SIGNIFICANT CHANGE UP (ref 1.8–7.4)
NEUTROPHILS NFR BLD AUTO: 47 % — SIGNIFICANT CHANGE UP (ref 43–77)
NRBC # BLD: 0 /100 WBCS — SIGNIFICANT CHANGE UP (ref 0–0)
NT-PROBNP SERPL-SCNC: 39 PG/ML — SIGNIFICANT CHANGE UP (ref 0–300)
PLATELET # BLD AUTO: 322 K/UL — SIGNIFICANT CHANGE UP (ref 150–400)
POTASSIUM SERPL-MCNC: 4.3 MMOL/L — SIGNIFICANT CHANGE UP (ref 3.5–5.3)
POTASSIUM SERPL-SCNC: 4.3 MMOL/L — SIGNIFICANT CHANGE UP (ref 3.5–5.3)
PROTHROM AB SERPL-ACNC: 10.1 SEC — SIGNIFICANT CHANGE UP (ref 9.9–13.4)
RBC # BLD: 5.06 M/UL — SIGNIFICANT CHANGE UP (ref 4.2–5.8)
RBC # FLD: 13.6 % — SIGNIFICANT CHANGE UP (ref 10.3–14.5)
RSV RNA NPH QL NAA+NON-PROBE: SIGNIFICANT CHANGE UP
SARS-COV-2 RNA SPEC QL NAA+PROBE: SIGNIFICANT CHANGE UP
SODIUM SERPL-SCNC: 139 MMOL/L — SIGNIFICANT CHANGE UP (ref 135–145)
TROPONIN T, HIGH SENSITIVITY RESULT: 19 NG/L — SIGNIFICANT CHANGE UP (ref 0–51)
TROPONIN T, HIGH SENSITIVITY RESULT: 21 NG/L — SIGNIFICANT CHANGE UP (ref 0–51)
WBC # BLD: 9.22 K/UL — SIGNIFICANT CHANGE UP (ref 3.8–10.5)
WBC # FLD AUTO: 9.22 K/UL — SIGNIFICANT CHANGE UP (ref 3.8–10.5)

## 2024-10-23 PROCEDURE — 92960 CARDIOVERSION ELECTRIC EXT: CPT

## 2024-10-23 PROCEDURE — 93010 ELECTROCARDIOGRAM REPORT: CPT | Mod: 76

## 2024-10-23 PROCEDURE — 71045 X-RAY EXAM CHEST 1 VIEW: CPT | Mod: 26

## 2024-10-23 PROCEDURE — 99285 EMERGENCY DEPT VISIT HI MDM: CPT

## 2024-10-23 RX ORDER — INSULIN GLARGINE,HUM.REC.ANLOG 100/ML
20 VIAL (ML) SUBCUTANEOUS AT BEDTIME
Refills: 0 | Status: DISCONTINUED | OUTPATIENT
Start: 2024-10-24 | End: 2024-10-25

## 2024-10-23 RX ORDER — METOPROLOL TARTRATE 50 MG
25 TABLET ORAL ONCE
Refills: 0 | Status: COMPLETED | OUTPATIENT
Start: 2024-10-23 | End: 2024-10-23

## 2024-10-23 RX ORDER — MAGNESIUM SULFATE IN 0.9% NACL 2 G/50 ML
2 INTRAVENOUS SOLUTION, PIGGYBACK (ML) INTRAVENOUS ONCE
Refills: 0 | Status: COMPLETED | OUTPATIENT
Start: 2024-10-23 | End: 2024-10-23

## 2024-10-23 RX ORDER — METOPROLOL TARTRATE 50 MG
5 TABLET ORAL ONCE
Refills: 0 | Status: COMPLETED | OUTPATIENT
Start: 2024-10-23 | End: 2024-10-23

## 2024-10-23 RX ORDER — ACETAMINOPHEN 500 MG
650 TABLET ORAL EVERY 6 HOURS
Refills: 0 | Status: DISCONTINUED | OUTPATIENT
Start: 2024-10-23 | End: 2024-10-24

## 2024-10-23 RX ORDER — INSULIN LISPRO 100/ML
VIAL (ML) SUBCUTANEOUS
Refills: 0 | Status: DISCONTINUED | OUTPATIENT
Start: 2024-10-23 | End: 2024-10-25

## 2024-10-23 RX ORDER — GLUCAGON INJECTION, SOLUTION 1 MG/.2ML
1 INJECTION, SOLUTION SUBCUTANEOUS ONCE
Refills: 0 | Status: DISCONTINUED | OUTPATIENT
Start: 2024-10-23 | End: 2024-10-25

## 2024-10-23 RX ORDER — ASPIRIN/MAG CARB/ALUMINUM AMIN 325 MG
81 TABLET ORAL DAILY
Refills: 0 | Status: DISCONTINUED | OUTPATIENT
Start: 2024-10-23 | End: 2024-10-24

## 2024-10-23 RX ORDER — ACETAMINOPHEN 500 MG
650 TABLET ORAL ONCE
Refills: 0 | Status: COMPLETED | OUTPATIENT
Start: 2024-10-23 | End: 2024-10-23

## 2024-10-23 RX ORDER — KETOROLAC TROMETHAMINE 30 MG/ML
15 INJECTION INTRAMUSCULAR; INTRAVENOUS ONCE
Refills: 0 | Status: DISCONTINUED | OUTPATIENT
Start: 2024-10-23 | End: 2024-10-23

## 2024-10-23 RX ADMIN — Medication 25 MILLIGRAM(S): at 19:15

## 2024-10-23 RX ADMIN — KETOROLAC TROMETHAMINE 15 MILLIGRAM(S): 30 INJECTION INTRAMUSCULAR; INTRAVENOUS at 20:31

## 2024-10-23 RX ADMIN — Medication 5 MILLIGRAM(S): at 20:31

## 2024-10-23 RX ADMIN — Medication 1000 MILLILITER(S): at 18:27

## 2024-10-23 RX ADMIN — Medication 650 MILLIGRAM(S): at 19:15

## 2024-10-23 NOTE — H&P ADULT - NSHPREVIEWOFSYSTEMS_GEN_ALL_CORE
GENERAL, CONSTITUTIONAL : denies recent weight loss, fever, chills  EYES, VISION: denies changes in vision   EARS, NOSE, THROAT: denies hearing loss  HEART, CARDIOVASCULAR: denies chest pain, admits to palpitations,  denies hx of arrhythmia,  RESPIRATORY: admits to SOB;  Denies cough, wheezing, PND, orthopnea  GASTROINTESTINAL: Denies abdominal pain, heartburn, bloody stool, dark tarry stool  GENITOURINARY: Denies frequent urination, urgency  MUSCULOSKELETAL denies joint pain or swelling, restricted motion, musculoskeletal pain.   SKIN & INTEGUMENTARY well healed s/p L toe  5th digit amputation scar;  and right foot partial 2nd toe amputation with full 4th-5th TMA scar  Denies rashes, sores, blisters, blisters, growths.  NEUROLOGICAL: Denies numbness or tingling sensations, sensation loss, burning.   PSYCHIATRIC: Denies nervousness, anxiety, depression  ENDOCRINE Denies heat or cold intolerance, excessive thirst  HEMATOLOGIC/LYMPHATIC: Denies abnormal bleeding, bleeding of any kind

## 2024-10-23 NOTE — ED ADULT NURSE NOTE - NS ED TRIAGE CLINICAL UPGRADE
Detail Level: Detailed Deteriorating patient status - Patient was clinically upgraded due to deteriorating patient status.

## 2024-10-23 NOTE — H&P ADULT - PROBLEM SELECTOR PLAN 5
AM lipid panel  - Initiate statin if appropriate      F: None  E: Replete if K<4 or Mag<2  N: DASH Diet  VTEppx: Eliquis  CODE: FULL  Dispo: Cardiac tele

## 2024-10-23 NOTE — ED ADULT NURSE NOTE - CHIEF COMPLAINT QUOTE
Pt. a&ox4 ambulatory, hx od DM2, p/w constant midsternal chest pain since 1130 this morning with accompanying sensation of SOB and dizziness since arrival to the ED ; regardless of exertion and pain is reproducible. ekg done, NAD. Denies nausea, HA.  On the ekg in triage, pt is in new onset Afib RVR up to the 150s. Rhythm strip obtained and pt made UG to MD Correia.

## 2024-10-23 NOTE — ED PROVIDER NOTE - NS ED ROS FT
CONSTITUTIONAL: No fever, no chills, +fatigue  EYES: No eye redness, no visual changes  ENT: No ear pain, no sore throat  CARDIOVASCULAR: +chest pain, +palpitations  RESPIRATORY: No cough, no SOB  GI: No abdominal pain, no nausea, no vomiting, no constipation, no diarrhea  GENITOURINARY: No dysuria, no frequency, no hematuria  MUSCULOSKELETAL: No back pain, no joint pain, no myalgias  SKIN: No rash, no peripheral edema  NEURO: No headache, no confusion    ALL OTHER SYSTEMS NEGATIVE.

## 2024-10-23 NOTE — H&P ADULT - PROBLEM SELECTOR PLAN 2
BUN/Cr 25/1.31 on admission, prior baseline 1.0  - Holding Losartan  - F/u Ulytes  - Renally dose meds, avoid nephrotoxic agents

## 2024-10-23 NOTE — ED PROVIDER NOTE - NSFOLLOWUPINSTRUCTIONS_ED_ALL_ED_FT
Follow up with your primary medical doctor as soon as possible.    Return to the emergency department if your symptoms worsen or if you develop new symptoms.  If you have any problems with followup, please call the ED Referral Coordinator at [phone].    Atrial Fibrillation    Atrial fibrillation is a type of irregular heartbeat (arrhythmia) where the heart quivers continuously in a chaotic pattern that makes the heart unable to pump blood normally. This can increase the risk for stroke, heart failure, and other heart-related conditions. Atrial fibrillation can be caused by a variety of conditions and may be temporary, intermittent, or permanent. Symptoms include feeling that your heart is beating rapidly or irregularly, chest discomfort, shortness of breath, or dizziness/lightheadedness that may be worse with exertion. Treatment is varied but may involve medication or electrical shock (cardioversion).    SEEK IMMEDIATE MEDICAL CARE IF YOU HAVE ANY OF THE FOLLOWING SYMPTOMS: chest pain, shortness of breath, abdominal pain, sweating, vomiting, blood in vomit/bowel movements/urine, dizziness/lightheadedness, weakness or numbness to face/arm/leg, trouble speaking or understanding, facial droop.

## 2024-10-23 NOTE — ED ADULT NURSE NOTE - OBJECTIVE STATEMENT
Pt is a 50yo male presenting to ED c/o chest pain. Pt reports chest pain in the middle of his chest with shortness of breath since 1130 today. Pt is A&Ox4, breathing even and unlabored speaking in clear full sentences, denies lightheadedness, vision changes, f/c. Pt placed on CCM, EKG completed, large bore IV acccess obtained and labs sent.

## 2024-10-23 NOTE — H&P ADULT - NSHPPHYSICALEXAM_GEN_ALL_CORE
T(C): 36.3 (10-24-24 @ 05:54), Max: 36.6 (10-23-24 @ 17:49)  HR: 93 (10-24-24 @ 05:54) (80 - 128)  BP: 126/72 (10-24-24 @ 05:54) (102/73 - 161/94)  RR: 18 (10-24-24 @ 05:54) (16 - 19)  SpO2: 95% (10-24-24 @ 05:54) (95% - 100%)  Wt(kg): --    Appearance: NAD  HEENT:   Normal oral mucosa, EOMI	  Neck: Supple, - JVD; No Carotid Bruit and 2+ pulses B/L  Cardiovascular: Irregular, irregular , No murmurs  Respiratory: Lungs clear to auscultation/No Rales, Rhonchi, Wheezing	  Gastrointestinal:  Soft, Non-tender, + BS	  Skin: No rashes, No ecchymoses, No cyanosis  Extremities: Normal range of motion, No clubbing, cyanosis or edema  Vascular: Femoral pulses 2+ b/l without bruit, R DP 2+; LDP +1 b/l, PT 1+ b/l  s/p L toe  5th toe  Neurologic: Non-focal  Psychiatry: A & O x 3, Mood & affect appropriate

## 2024-10-23 NOTE — H&P ADULT - ASSESSMENT
48 y/o male, +FHx CAD (mother CABG) hx of cocaine use (last use Sept), with PMHx of HTN, HLD, uncontrolled T2DM, R foot OM (+MRSA 5/2022) s/p R partial 2nd and full 4-5th TMA, L hallux OM (+MRSA 2/2024), and recent OM L 5th toe s/p amputation @ [facility]  6/11/24 (left AMA, completed PO Linezolid/Augmentin course) presents to [facility] ED complaining of palpitations/fatigue/chest discomfort onset today. Found to be in new onset A.fib with CHRIST and now admitted to cardiac tele for further management.

## 2024-10-23 NOTE — ED PROVIDER NOTE - CLINICAL SUMMARY MEDICAL DECISION MAKING FREE TEXT BOX
Afib w RVR. HDS, no chest pain. No known afib in past.  Pt is not on AC.  Will obtain labs to r/o ACS, electrolyte or metabolic disturbance, no infectious symptoms.  Will obtian CXR r/o PNA.  Will treat with metoprolol IV & PO, reassess.  Pt on monitor, well-appearing, BP stable.  +/- admission if unresponsive to treatment.  Suspect tension HA, improved with treatment.

## 2024-10-23 NOTE — ED ADULT NURSE NOTE - NSFALLUNIVINTERV_ED_ALL_ED
Bed/Stretcher in lowest position, wheels locked, appropriate side rails in place/Call bell, personal items and telephone in reach/Instruct patient to call for assistance before getting out of bed/chair/stretcher/Non-slip footwear applied when patient is off stretcher/[place] to call system/Physically safe environment - no spills, clutter or unnecessary equipment/Purposeful proactive rounding/Room/bathroom lighting operational, light cord in reach

## 2024-10-23 NOTE — H&P ADULT - PROBLEM SELECTOR PLAN 1
- Currently rate controlled. [id number] 2.  - TTE 3/26/21: Normal LV/RV size and systolic fctn. Mild cLVH. No valvular disease.   - TTE in AM  - CUrrently rate controlled, resume BB when urine drug screen results (hx of cocaine use)  - AC: Eliquis 5mg BID  - EP consult in AM - Currently rate controlled. [id number] 2.  - TTE 3/26/21: Normal LV/RV size and systolic fctn. Mild cLVH. No valvular disease.   - TTE in AM  - Rate control: Lopressor 25mg BID  - AC: Eliquis 5mg BID  - EP consult in AM

## 2024-10-23 NOTE — PATIENT PROFILE ADULT - FALL HARM RISK - HARM RISK INTERVENTIONS

## 2024-10-23 NOTE — ED ADULT TRIAGE NOTE - CHIEF COMPLAINT QUOTE
Pt. a&ox4 ambulatory, hx od DM2, p/w constant midsternal chest pain since 1130 this morning with accompanying sensation of SOB ; regardless of exertion and pain is reproducible. ekg done, NAD. Denies dizziness, nausea, HA. Pt. a&ox4 ambulatory, hx od DM2, p/w constant midsternal chest pain since 1130 this morning with accompanying sensation of SOB and dizziness since arrival to the ED ; regardless of exertion and pain is reproducible. ekg done, NAD. Denies nausea, HA.  On the ekg in triage, pt is in new onset Afib RVR up to the 150s. Rhythm strip obtained and pt made UG to MD Correia.

## 2024-10-23 NOTE — ED PROVIDER NOTE - OBJECTIVE STATEMENT
50 yo M w PMH of DM on metformin p/w chest discomfort, generalized fatigue, and palpitations since today. Found to be in afib with RVR in ED, no history of arrhythmia. Otherwise HDS. Pt developed mild HA while in ED, improved with analgesia. Chest discomfort is described as pressure, substernal, non-exertional, non-radiating, non-pleuritic, non-positional, non-ttp of chest. No SOB, fever/chills, leg swelling, cough, hemoptysis, exogenous estrogen, recent travel, surgery, malignancy, personal or FHx of VTE.

## 2024-10-23 NOTE — H&P ADULT - PROBLEM SELECTOR PLAN 3
Uncontrolled; s/p R partial 2nd and full 4-5th TMA & L 5th toe amputation  6/11/24 (left AMA, completed PO Linezolid/Augmentin course) s/p R partial 2nd toe and full 4th-5th toe amputation, s/p L 5th toe amputation  - Continue Lantus 20u QHS, ISS, monitor FS Uncontrolled; s/p R partial 2nd and full 4-5th TMA & L 5th toe amputation  6/11/24 (left AMA, completed PO Linezolid/Augmentin course) s/p R partial 2nd toe and full 4th-5th toe amputation, s/p L 5th toe amputation  - Home regimen: Metformin 750mg BID, Lantus 20u QHS, Jardiance 25mg QD  - Continue Lantus 20u QHS, ISS, monitor FS

## 2024-10-23 NOTE — ED PROVIDER NOTE - PHYSICAL EXAMINATION
CONSTITUTIONAL: Non-toxic; in no apparent distress  HEAD: Normocephalic; atraumatic  EYES: PERRL; EOM intact   ENMT: External appears normal  NECK: Supple; non-tender  CARD: Rapid, irregular, no murmurs, rubs, or gallops  RESP: Normal chest excursion with respiration; breath sounds clear and equal bilaterally  ABD: Soft, non-distended; non-tender  EXT: Normal ROM in all four extremities; non-tender to palpation, no peripheral edema  SKIN: Warm, dry, no rash  NEURO:  No focal neurological deficiencies

## 2024-10-23 NOTE — H&P ADULT - HISTORY OF PRESENT ILLNESS
INCOMPLETE 50 y/o male, +FHx CAD (mother CABG) hx of cocaine use (last use Sept), with PMHx of HTN, HLD, uncontrolled T2DM, R foot OM (+MRSA 5/2022) s/p R partial 2nd and full 4-5th TMA, L hallux OM (+MRSA 2/2024), and recent OM L 5th toe s/p amputation @ [facility]  6/11/24 (left AMA, completed PO Linezolid/Augmentin course) presents to [facility] ED complaining of palpitations, generalized fatigue, SOB and chest discomfort onset today. Pt states that this morning at 11AM while sitting he experiecned onset of sx. Lasted a few mintues intermittently. Denies any associated fever, chills, LE edema, cough, congestion, fever, chills, recent sick contacts. Further denies any hx of recent travel or prior hx of VTE. No prior hx of a.fib. No other cardiac history.     In the ED, -140s/70-90s, T 97.9F oral, -120s. Labs significnat for BUN/Cr 25/1.31, glucose 198, Mg 1.6. RVP negative. He was given Toradol 15mg IV x1, Lopressor 25mg PO x1, and Lopressor 5mg IVP x2 with improvement in HR to 80s. EKG on arrival a.fib w/ , repeat a.fib 86 nonischemic.     Pt now admitted to cardiac tele for further managment of new onset a.fib.    48 y/o male, +FHx CAD (mother CABG) hx of cocaine use (last use Sept), with PMHx of HTN, HLD, uncontrolled T2DM, R foot OM (+MRSA 5/2022) s/p R partial 2nd and full 4-5th TMA, L hallux OM (+MRSA 2/2024), and recent OM L 5th toe s/p amputation @ [facility]  6/11/24 (left AMA, completed PO Linezolid/Augmentin course) presents to [facility] ED complaining of palpitations, generalized fatigue, SOB and chest discomfort onset today. Pt states that this morning at 11AM while sitting he experienced onset of sx. Lasted a few minutes intermittently. Denies any associated fever, chills, LE edema, cough, congestion, fever, chills, recent sick contacts. Further denies any hx of recent travel or prior hx of VTE. No prior hx of a.fib. No other cardiac history.     In the ED, -140s/70-90s, T 97.9F oral, -120s. Labs significant for BUN/Cr 25/1.31, glucose 198, Mg 1.6. RVP negative. He was given Toradol 15mg IV x1, Lopressor 25mg PO x1, and Lopressor 5mg IVP x2 with improvement in HR to 80s. EKG on arrival a.fib w/ , repeat a.fib 86 nonischemic.     Pt now admitted to cardiac tele for further management of new onset a.fib.    48 y/o male, +FHx CAD (mother CABG) hx of cocaine use (last use Sept), with PMHx of HTN, HLD, uncontrolled T2DM, R foot OM (+MRSA 5/2022) s/p R partial 2nd and full 4-5th TMA, L hallux OM (+MRSA 2/2024), and recent OM L 5th toe s/p amputation @ [facility]  6/11/24 (left AMA, completed PO Linezolid/Augmentin course) presents to [facility] ED complaining of palpitations, generalized fatigue, SOB and chest discomfort onset today. Pt states that this morning at 11AM while sitting he experienced onset of sx. Lasted a few minutes intermittently since onset. Denies any associated fever, chills, LE edema, cough, congestion, fever, chills, recent sick contacts. Further denies any hx of recent travel or prior hx of VTE. No prior hx of a.fib. No other cardiac history.     In the ED, -140s/70-90s, T 97.9F oral, -120s. Labs significant for BUN/Cr 25/1.31, glucose 198, Mg 1.6. RVP negative. He was given Toradol 15mg IV x1, Lopressor 25mg PO x1, and Lopressor 5mg IVP x2 with improvement in HR to 80s. EKG on arrival a.fib w/ , repeat a.fib 86 nonischemic.     Pt now admitted to cardiac tele for further management of new onset a.fib.

## 2024-10-23 NOTE — H&P ADULT - NSHPLABSRESULTS_GEN_ALL_CORE
14.6   9.22  )-----------( 322      ( 23 Oct 2024 18:33 )             44.8       10-23    139  |  102  |  25[H]  ----------------------------<  198[H]  4.3   |  26  |  1.31[H]    Ca    9.4      23 Oct 2024 18:33  Mg     1.6     10-23        PT/INR - ( 23 Oct 2024 18:33 )   PT: 10.1 sec;   INR: 0.86          PTT - ( 23 Oct 2024 18:33 )  PTT:34.4 sec          Urinalysis Basic - ( 23 Oct 2024 18:33 )    Color: x / Appearance: x / SG: x / pH: x  Gluc: 198 mg/dL / Ketone: x  / Bili: x / Urobili: x   Blood: x / Protein: x / Nitrite: x   Leuk Esterase: x / RBC: x / WBC x   Sq Epi: x / Non Sq Epi: x / Bacteria: x

## 2024-10-24 ENCOUNTER — TRANSCRIPTION ENCOUNTER (OUTPATIENT)
Age: 49
End: 2024-10-24

## 2024-10-24 ENCOUNTER — RESULT REVIEW (OUTPATIENT)
Age: 49
End: 2024-10-24

## 2024-10-24 DIAGNOSIS — N17.9 ACUTE KIDNEY FAILURE, UNSPECIFIED: ICD-10-CM

## 2024-10-24 LAB
A1C WITH ESTIMATED AVERAGE GLUCOSE RESULT: 7.4 % — HIGH (ref 4–5.6)
ALBUMIN SERPL ELPH-MCNC: 3.4 G/DL — SIGNIFICANT CHANGE UP (ref 3.3–5)
ALP SERPL-CCNC: 74 U/L — SIGNIFICANT CHANGE UP (ref 40–120)
ALT FLD-CCNC: 18 U/L — SIGNIFICANT CHANGE UP (ref 10–45)
AMPHET UR-MCNC: NEGATIVE — SIGNIFICANT CHANGE UP
ANION GAP SERPL CALC-SCNC: 8 MMOL/L — SIGNIFICANT CHANGE UP (ref 5–17)
APPEARANCE UR: CLEAR — SIGNIFICANT CHANGE UP
APTT BLD: 32.6 SEC — SIGNIFICANT CHANGE UP (ref 24.5–35.6)
AST SERPL-CCNC: 15 U/L — SIGNIFICANT CHANGE UP (ref 10–40)
BARBITURATES UR SCN-MCNC: NEGATIVE — SIGNIFICANT CHANGE UP
BASOPHILS # BLD AUTO: 0.06 K/UL — SIGNIFICANT CHANGE UP (ref 0–0.2)
BASOPHILS NFR BLD AUTO: 0.6 % — SIGNIFICANT CHANGE UP (ref 0–2)
BENZODIAZ UR-MCNC: NEGATIVE — SIGNIFICANT CHANGE UP
BILIRUB DIRECT SERPL-MCNC: <0.2 MG/DL — SIGNIFICANT CHANGE UP (ref 0–0.3)
BILIRUB INDIRECT FLD-MCNC: SIGNIFICANT CHANGE UP MG/DL (ref 0.2–1)
BILIRUB SERPL-MCNC: 0.3 MG/DL — SIGNIFICANT CHANGE UP (ref 0.2–1.2)
BILIRUB UR-MCNC: NEGATIVE — SIGNIFICANT CHANGE UP
BUN SERPL-MCNC: 24 MG/DL — HIGH (ref 7–23)
CALCIUM SERPL-MCNC: 8.9 MG/DL — SIGNIFICANT CHANGE UP (ref 8.4–10.5)
CHLORIDE SERPL-SCNC: 105 MMOL/L — SIGNIFICANT CHANGE UP (ref 96–108)
CHOLEST SERPL-MCNC: 196 MG/DL — SIGNIFICANT CHANGE UP
CK MB CFR SERPL CALC: 3.2 NG/ML — SIGNIFICANT CHANGE UP (ref 0–6.7)
CK SERPL-CCNC: 112 U/L — SIGNIFICANT CHANGE UP (ref 30–200)
CO2 SERPL-SCNC: 26 MMOL/L — SIGNIFICANT CHANGE UP (ref 22–31)
COCAINE METAB.OTHER UR-MCNC: NEGATIVE — SIGNIFICANT CHANGE UP
COLOR SPEC: YELLOW — SIGNIFICANT CHANGE UP
CREAT ?TM UR-MCNC: 110 MG/DL — SIGNIFICANT CHANGE UP
CREAT SERPL-MCNC: 1.09 MG/DL — SIGNIFICANT CHANGE UP (ref 0.5–1.3)
DIFF PNL FLD: NEGATIVE — SIGNIFICANT CHANGE UP
EGFR: 83 ML/MIN/1.73M2 — SIGNIFICANT CHANGE UP
EOSINOPHIL # BLD AUTO: 0.18 K/UL — SIGNIFICANT CHANGE UP (ref 0–0.5)
EOSINOPHIL NFR BLD AUTO: 1.9 % — SIGNIFICANT CHANGE UP (ref 0–6)
ESTIMATED AVERAGE GLUCOSE: 166 MG/DL — HIGH (ref 68–114)
FENTANYL UR QL SCN: NEGATIVE — SIGNIFICANT CHANGE UP
GLUCOSE BLDC GLUCOMTR-MCNC: 128 MG/DL — HIGH (ref 70–99)
GLUCOSE BLDC GLUCOMTR-MCNC: 138 MG/DL — HIGH (ref 70–99)
GLUCOSE BLDC GLUCOMTR-MCNC: 164 MG/DL — HIGH (ref 70–99)
GLUCOSE BLDC GLUCOMTR-MCNC: 94 MG/DL — SIGNIFICANT CHANGE UP (ref 70–99)
GLUCOSE SERPL-MCNC: 151 MG/DL — HIGH (ref 70–99)
GLUCOSE UR QL: 250 MG/DL
HCT VFR BLD CALC: 42.4 % — SIGNIFICANT CHANGE UP (ref 39–50)
HDLC SERPL-MCNC: 53 MG/DL — SIGNIFICANT CHANGE UP
HGB BLD-MCNC: 14.2 G/DL — SIGNIFICANT CHANGE UP (ref 13–17)
IMM GRANULOCYTES NFR BLD AUTO: 0.3 % — SIGNIFICANT CHANGE UP (ref 0–0.9)
INR BLD: 0.96 — SIGNIFICANT CHANGE UP (ref 0.85–1.16)
KETONES UR-MCNC: NEGATIVE MG/DL — SIGNIFICANT CHANGE UP
LEUKOCYTE ESTERASE UR-ACNC: NEGATIVE — SIGNIFICANT CHANGE UP
LIPID PNL WITH DIRECT LDL SERPL: 123 MG/DL — HIGH
LYMPHOCYTES # BLD AUTO: 2.88 K/UL — SIGNIFICANT CHANGE UP (ref 1–3.3)
LYMPHOCYTES # BLD AUTO: 30.8 % — SIGNIFICANT CHANGE UP (ref 13–44)
MAGNESIUM SERPL-MCNC: 1.8 MG/DL — SIGNIFICANT CHANGE UP (ref 1.6–2.6)
MCHC RBC-ENTMCNC: 30.3 PG — SIGNIFICANT CHANGE UP (ref 27–34)
MCHC RBC-ENTMCNC: 33.5 GM/DL — SIGNIFICANT CHANGE UP (ref 32–36)
MCV RBC AUTO: 90.4 FL — SIGNIFICANT CHANGE UP (ref 80–100)
METHADONE UR-MCNC: NEGATIVE — SIGNIFICANT CHANGE UP
MONOCYTES # BLD AUTO: 0.74 K/UL — SIGNIFICANT CHANGE UP (ref 0–0.9)
MONOCYTES NFR BLD AUTO: 7.9 % — SIGNIFICANT CHANGE UP (ref 2–14)
NEUTROPHILS # BLD AUTO: 5.47 K/UL — SIGNIFICANT CHANGE UP (ref 1.8–7.4)
NEUTROPHILS NFR BLD AUTO: 58.5 % — SIGNIFICANT CHANGE UP (ref 43–77)
NITRITE UR-MCNC: NEGATIVE — SIGNIFICANT CHANGE UP
NON HDL CHOLESTEROL: 143 MG/DL — HIGH
NRBC # BLD: 0 /100 WBCS — SIGNIFICANT CHANGE UP (ref 0–0)
NT-PROBNP SERPL-SCNC: 556 PG/ML — HIGH (ref 0–300)
OPIATES UR-MCNC: NEGATIVE — SIGNIFICANT CHANGE UP
OSMOLALITY UR: 499 MOSM/KG — SIGNIFICANT CHANGE UP (ref 300–900)
PCP SPEC-MCNC: SIGNIFICANT CHANGE UP
PCP UR-MCNC: NEGATIVE — SIGNIFICANT CHANGE UP
PH UR: 6 — SIGNIFICANT CHANGE UP (ref 5–8)
PLATELET # BLD AUTO: 278 K/UL — SIGNIFICANT CHANGE UP (ref 150–400)
POTASSIUM SERPL-MCNC: 4.1 MMOL/L — SIGNIFICANT CHANGE UP (ref 3.5–5.3)
POTASSIUM SERPL-SCNC: 4.1 MMOL/L — SIGNIFICANT CHANGE UP (ref 3.5–5.3)
POTASSIUM UR-SCNC: 22 MMOL/L — SIGNIFICANT CHANGE UP
PROT ?TM UR-MCNC: 17 MG/DL — HIGH (ref 0–12)
PROT SERPL-MCNC: 6.8 G/DL — SIGNIFICANT CHANGE UP (ref 6–8.3)
PROT UR-MCNC: NEGATIVE MG/DL — SIGNIFICANT CHANGE UP
PROT/CREAT UR-RTO: 0.2 RATIO — SIGNIFICANT CHANGE UP (ref 0–0.2)
PROTHROM AB SERPL-ACNC: 11.2 SEC — SIGNIFICANT CHANGE UP (ref 9.9–13.4)
RBC # BLD: 4.69 M/UL — SIGNIFICANT CHANGE UP (ref 4.2–5.8)
RBC # FLD: 13.9 % — SIGNIFICANT CHANGE UP (ref 10.3–14.5)
SODIUM SERPL-SCNC: 139 MMOL/L — SIGNIFICANT CHANGE UP (ref 135–145)
SODIUM UR-SCNC: 58 MMOL/L — SIGNIFICANT CHANGE UP
SP GR SPEC: 1.02 — SIGNIFICANT CHANGE UP (ref 1–1.03)
T4 AB SER-ACNC: 5.11 UG/DL — SIGNIFICANT CHANGE UP (ref 4.5–11.7)
THC UR QL: NEGATIVE — SIGNIFICANT CHANGE UP
TRIGL SERPL-MCNC: 113 MG/DL — SIGNIFICANT CHANGE UP
TROPONIN T, HIGH SENSITIVITY RESULT: 19 NG/L — SIGNIFICANT CHANGE UP (ref 0–51)
TSH SERPL-MCNC: 0.48 UIU/ML — SIGNIFICANT CHANGE UP (ref 0.27–4.2)
UROBILINOGEN FLD QL: 0.2 MG/DL — SIGNIFICANT CHANGE UP (ref 0.2–1)
UUN UR-MCNC: 830 MG/DL — SIGNIFICANT CHANGE UP
WBC # BLD: 9.36 K/UL — SIGNIFICANT CHANGE UP (ref 3.8–10.5)
WBC # FLD AUTO: 9.36 K/UL — SIGNIFICANT CHANGE UP (ref 3.8–10.5)

## 2024-10-24 PROCEDURE — 99233 SBSQ HOSP IP/OBS HIGH 50: CPT

## 2024-10-24 PROCEDURE — 93312 ECHO TRANSESOPHAGEAL: CPT | Mod: 26

## 2024-10-24 PROCEDURE — 93306 TTE W/DOPPLER COMPLETE: CPT | Mod: 26

## 2024-10-24 PROCEDURE — 93010 ELECTROCARDIOGRAM REPORT: CPT

## 2024-10-24 RX ORDER — ACETAMINOPHEN 500 MG
650 TABLET ORAL ONCE
Refills: 0 | Status: COMPLETED | OUTPATIENT
Start: 2024-10-24 | End: 2024-10-24

## 2024-10-24 RX ORDER — DILTIAZEM HCL 5 MG/ML
120 VIAL (ML) INTRAVENOUS DAILY
Refills: 0 | Status: DISCONTINUED | OUTPATIENT
Start: 2024-10-24 | End: 2024-10-25

## 2024-10-24 RX ORDER — METOPROLOL TARTRATE 50 MG
25 TABLET ORAL
Refills: 0 | Status: DISCONTINUED | OUTPATIENT
Start: 2024-10-24 | End: 2024-10-24

## 2024-10-24 RX ORDER — MAGNESIUM OXIDE 400 MG/1
400 TABLET ORAL DAILY
Refills: 0 | Status: DISCONTINUED | OUTPATIENT
Start: 2024-10-24 | End: 2024-10-24

## 2024-10-24 RX ORDER — LOSARTAN POTASSIUM 25 MG/1
50 TABLET ORAL DAILY
Refills: 0 | Status: DISCONTINUED | OUTPATIENT
Start: 2024-10-25 | End: 2024-10-25

## 2024-10-24 RX ORDER — APIXABAN 5 MG/1
5 TABLET, FILM COATED ORAL ONCE
Refills: 0 | Status: COMPLETED | OUTPATIENT
Start: 2024-10-24 | End: 2024-10-24

## 2024-10-24 RX ORDER — APIXABAN 5 MG/1
1 TABLET, FILM COATED ORAL
Qty: 60 | Refills: 0
Start: 2024-10-24 | End: 2024-11-22

## 2024-10-24 RX ORDER — APIXABAN 5 MG/1
5 TABLET, FILM COATED ORAL EVERY 12 HOURS
Refills: 0 | Status: DISCONTINUED | OUTPATIENT
Start: 2024-10-24 | End: 2024-10-25

## 2024-10-24 RX ORDER — MAGNESIUM OXIDE 400 MG/1
800 TABLET ORAL ONCE
Refills: 0 | Status: DISCONTINUED | OUTPATIENT
Start: 2024-10-24 | End: 2024-10-25

## 2024-10-24 RX ORDER — INFLUENZ VIR VAC TV P-SURF2003 15MCG/.5ML
0.5 SYRINGE (ML) INTRAMUSCULAR ONCE
Refills: 0 | Status: DISCONTINUED | OUTPATIENT
Start: 2024-10-24 | End: 2024-10-25

## 2024-10-24 RX ORDER — METOPROLOL TARTRATE 50 MG
25 TABLET ORAL THREE TIMES A DAY
Refills: 0 | Status: DISCONTINUED | OUTPATIENT
Start: 2024-10-24 | End: 2024-10-24

## 2024-10-24 RX ADMIN — Medication 20 UNIT(S): at 22:23

## 2024-10-24 RX ADMIN — Medication 650 MILLIGRAM(S): at 11:30

## 2024-10-24 RX ADMIN — Medication 81 MILLIGRAM(S): at 10:42

## 2024-10-24 RX ADMIN — Medication 40 MILLIGRAM(S): at 22:24

## 2024-10-24 RX ADMIN — APIXABAN 5 MILLIGRAM(S): 5 TABLET, FILM COATED ORAL at 22:24

## 2024-10-24 RX ADMIN — Medication 650 MILLIGRAM(S): at 10:56

## 2024-10-24 RX ADMIN — Medication 650 MILLIGRAM(S): at 06:15

## 2024-10-24 RX ADMIN — Medication 25 GRAM(S): at 00:57

## 2024-10-24 RX ADMIN — Medication 25 MILLIGRAM(S): at 06:13

## 2024-10-24 RX ADMIN — APIXABAN 5 MILLIGRAM(S): 5 TABLET, FILM COATED ORAL at 10:42

## 2024-10-24 RX ADMIN — Medication 120 MILLIGRAM(S): at 22:24

## 2024-10-24 RX ADMIN — Medication 650 MILLIGRAM(S): at 07:15

## 2024-10-24 RX ADMIN — MAGNESIUM OXIDE 400 MILLIGRAM(S): 400 TABLET ORAL at 10:42

## 2024-10-24 RX ADMIN — APIXABAN 5 MILLIGRAM(S): 5 TABLET, FILM COATED ORAL at 01:28

## 2024-10-24 RX ADMIN — Medication 2: at 22:23

## 2024-10-24 NOTE — PROGRESS NOTE ADULT - PROBLEM SELECTOR PLAN 4
- SBP 120s  - Holding Losartan 50mg due to CHRIST - SBP 120s  - Losartan held on admission for mild Cr bump, resume tomorrow

## 2024-10-24 NOTE — DISCHARGE NOTE PROVIDER - PROVIDER TOKENS
PROVIDER:[TOKEN:[[id number]],FOLLOWUP:[1 month]],FREE:[LAST:[Bobby Jimenez],FIRST:[General Cardiology],PHONE:[phone]],FAX:[(   )    -],ADDRESS:[Please expect a call from General cardiology for time and date for your appointment.     [address],   [address] [place] [address]],FOLLOWUP:[2 weeks]] PROVIDER:[TOKEN:[[id number]],FOLLOWUP:[1 month]],FREE:[LAST:[Ramiro],FIRST:[Shay],PHONE:[phone]],FAX:[(   )    -],ADDRESS:[[address] [place] [address]],SCHEDULEDAPPT:[10/31/2024],SCHEDULEDAPPTTIME:[12:00 AM]]

## 2024-10-24 NOTE — PROGRESS NOTE ADULT - ASSESSMENT
50 y/o male, +FHx CAD (mother CABG) hx of cocaine use (last use Sept), with PMHx of HTN, HLD, uncontrolled T2DM, R foot OM (+MRSA 5/2022) s/p R partial 2nd and full 4-5th TMA, L hallux OM (+MRSA 2/2024), and recent OM L 5th toe s/p amputation @ [facility]  6/11/24 (left AMA, completed PO Linezolid/Augmentin course) presents to [facility] ED complaining of palpitations/fatigue/chest discomfort onset today. Found to be in new onset A.fib with CHRIST and now admitted to cardiac tele for further management.    48 y/o male, +FHx CAD (mother CABG) hx of cocaine use (last use Sept), with PMHx of HTN, HLD, uncontrolled T2DM, R foot OM (+MRSA 5/2022) s/p R partial 2nd and full 4-5th TMA, L hallux OM (+MRSA 2/2024), and recent OM L 5th toe s/p amputation @ [facility]  6/11/24 (left AMA, completed PO Linezolid/Augmentin course) presents to [facility] ED complaining of palpitations/fatigue/chest discomfort onset today. Found to be in new onset A.fib with CHRIST and now admitted to cardiac tele for further management, plan for [facility]/DCCV 10/24.    48 y/o male, +FHx CAD (mother CABG) hx of cocaine use (last use Sept), with PMHx of HTN, HLD, uncontrolled T2DM, R foot OM (+MRSA 5/2022) s/p R partial 2nd and full 4-5th TMA, L hallux OM (+MRSA 2/2024), and recent OM L 5th toe s/p amputation @ [facility]  6/11/24 (left AMA, completed PO Linezolid/Augmentin course) presents to [facility] ED complaining of palpitations/fatigue/chest discomfort onset x 1 day. Found to be in new onset A.fib with CHRIST (now improved). Underwent successful [facility]/DCCV 10/24. Plan for dc in AM.

## 2024-10-24 NOTE — PATIENT TRANSPORT NOTE - TRANSPORT TEAM DOCUMENTATION-TOKEN PULL
patient transporting to [facility] with doppler via hospital stretcher with continuous cardiac monitoring intact with charge nurse at bedside with transport.  patient stable.  Patient NPO.  will follow up when patient returns.

## 2024-10-24 NOTE — DISCHARGE NOTE PROVIDER - ATTENDING DISCHARGE PHYSICAL EXAMINATION:
Mr. Valentin is a 48 year old male with history of HTN, DM, Hx of R foot osteo with partial 2nd toe, 4-5th toe amputation, L toe ulcer, cocaine ulcer presented with palpitations and shortness of breath and admitted for new onset atrial fibrillation, rapid atrial fibrillation.       Exam:  NAD  JVP normal  S1, S2 irregular  CTA b/l  WWP, no edema.     New onset atrial fibrillation- TTE, Tsh normal, s/p [facility]O/DCCV. C/w CCB. Eliquis. Cardiology follow up.  DM- Home Oral, toujeo   HTN-ARB, statin  HLD- statin.

## 2024-10-24 NOTE — PROGRESS NOTE ADULT - PROBLEM SELECTOR PLAN 2
BUN/Cr 25/1.31 on admission, prior baseline 1.0  - Holding Losartan  - F/u Ulytes  - Renally dose meds, avoid nephrotoxic agents BUN/Cr 24/1.09 on admission, prior baseline 1.0  - Holding Losartan  - F/u Ulytes  - Renally dose meds, avoid nephrotoxic agents BUN/Cr 24/1.09 on admission, prior baseline 1.0  - Holding Losartan  - Ulytes WNL  - Renally dose meds, avoid nephrotoxic agents BUN/Cr 24/1.09 on admission, prior baseline 1.0  - Losartan previously held iso slight Cr bump, now resumed   - Ulytes WNL  - Renally dose meds, avoid nephrotoxic agents

## 2024-10-24 NOTE — PRE-ANESTHESIA EVALUATION ADULT - NSANTHPMHFT_GEN_ALL_CORE
48 y/o male with history of cocaine use (last use Sept), HTN, HLD, uncontrolled T2DM, R foot OM s/p toe amputations (last 6/2024), admitted with new symptomatic atrial fibrillation, for cardioversion.

## 2024-10-24 NOTE — DISCHARGE NOTE PROVIDER - CARE PROVIDER_API CALL
Rosa Hamm  Cardiac Electrophysiology  [address], [address] [place] [address]  Phone: [phone]  Fax: [phone]  Follow Up Time: 1 month    Bobby Jimenez General Cardiology  Please expect a call from General cardiology for time and date for your appointment.     [address],   [address], [place] [address]  Phone: [phone]  Fax: (   )    -  Follow Up Time: 2 weeks   Rosa Hmam  Cardiac Electrophysiology  [address], [address] [place] [address]  Phone: [phone]  Fax: [phone]  Follow Up Time: 1 month    Shay Rubio  [address] [place] [id number]  Phone: [phone]  Fax: (   )    -  Scheduled Appointment: 10/31/2024 12:00 AM

## 2024-10-24 NOTE — PROGRESS NOTE ADULT - SUBJECTIVE AND OBJECTIVE BOX
Cardiology PA Adult Progress Note    SUBJECTIVE ASSESSMENT:  	  MEDICATIONS:  metoprolol tartrate 25 milliGRAM(s) Oral two times a day        acetaminophen     Tablet .. 650 milliGRAM(s) Oral every 6 hours PRN      dextrose 50% Injectable 25 Gram(s) IV Push once  dextrose 50% Injectable 12.5 Gram(s) IV Push once  dextrose 50% Injectable 25 Gram(s) IV Push once  dextrose Oral Gel 15 Gram(s) Oral once PRN  glucagon  Injectable 1 milliGRAM(s) IntraMuscular once  insulin glargine Injectable (LANTUS) 20 Unit(s) SubCutaneous at bedtime  insulin lispro (ADMELOG) corrective regimen sliding scale   SubCutaneous Before meals and at bedtime    apixaban 5 milliGRAM(s) Oral every 12 hours  aspirin enteric coated 81 milliGRAM(s) Oral daily  dextrose 5%. 1000 milliLiter(s) IV Continuous <Continuous>  dextrose 5%. 1000 milliLiter(s) IV Continuous <Continuous>  influenza   Vaccine 0.5 milliLiter(s) IntraMuscular once    	  VITAL SIGNS:  T(C): 36.3 (10-24-24 @ 05:54), Max: 36.6 (10-23-24 @ 17:49)  HR: 93 (10-24-24 @ 05:54) (80 - 128)  BP: 126/72 (10-24-24 @ 05:54) (102/73 - 161/94)  RR: 18 (10-24-24 @ 05:54) (16 - 19)  SpO2: 95% (10-24-24 @ 05:54) (95% - 100%)  Wt(kg): --    I&O's Summary    23 Oct 2024 07:01  -  24 Oct 2024 07:00  --------------------------------------------------------  IN: 50 mL / OUT: 300 mL / NET: -250 mL      Height (cm): 175.3 (10-23 @ 23:51)  Weight (kg): 99.8 (10-23 @ 17:49)  BMI (kg/m2): 32.5 (10-23 @ 23:51)  BSA (m2): 2.15 (10-23 @ 23:51)                                     PHYSICAL EXAM:  Appearance: Normal	  HEENT: Normal oral mucosa, PERRL, EOMI	  Neck: Supple, + JVD/ - JVD; ___ Carotid Bruit   Cardiovascular: Normal S1 S2, No murmurs  Respiratory: Lungs clear to auscultation/Decreased Breath Sounds/No Rales, Rhonchi, Wheezing	  Gastrointestinal:  Soft, Non-tender, + BS	  Skin: No rashes, No ecchymoses, No cyanosis  Extremities: Normal range of motion, No clubbing, cyanosis or edema  Vascular: Peripheral pulses palpable 2+ bilaterally  Neurologic: Non-focal  Psychiatry: A & O x 3, Mood & affect appropriate    LABS:	 	                                  14.6   9.22  )-----------( 322      ( 23 Oct 2024 18:33 )             44.8     10-23    139  |  102  |  25[H]  ----------------------------<  198[H]  4.3   |  26  |  1.31[H]    Ca    9.4      23 Oct 2024 18:33  Mg     1.6     10-23      proBNP:   Lipid Profile:   HgA1c:   TSH:   PT/INR - ( 23 Oct 2024 18:33 )   PT: 10.1 sec;   INR: 0.86          PTT - ( 23 Oct 2024 18:33 )  PTT:34.4 sec Cardiology PA Adult Progress Note    SUBJECTIVE ASSESSMENT:  Pt seen and examined at bedside today. C/o HA since yesterday, slightly improved w/ tylenol. Also c/o abd discomfort he believes is attributed to no food intake. Denies CP, SOB, dizziness/lightheadedness.    MEDICATIONS:  metoprolol tartrate 25 milliGRAM(s) Oral two times a day  acetaminophen     Tablet .. 650 milliGRAM(s) Oral every 6 hours PRN  dextrose 50% Injectable 25 Gram(s) IV Push once  dextrose 50% Injectable 12.5 Gram(s) IV Push once  dextrose 50% Injectable 25 Gram(s) IV Push once  dextrose Oral Gel 15 Gram(s) Oral once PRN  glucagon  Injectable 1 milliGRAM(s) IntraMuscular once  insulin glargine Injectable (LANTUS) 20 Unit(s) SubCutaneous at bedtime  insulin lispro (ADMELOG) corrective regimen sliding scale   SubCutaneous Before meals and at bedtime  apixaban 5 milliGRAM(s) Oral every 12 hours  aspirin enteric coated 81 milliGRAM(s) Oral daily  dextrose 5%. 1000 milliLiter(s) IV Continuous <Continuous>  dextrose 5%. 1000 milliLiter(s) IV Continuous <Continuous>  influenza   Vaccine 0.5 milliLiter(s) IntraMuscular once    	  VITAL SIGNS:  T(C): 36.3 (10-24-24 @ 05:54), Max: 36.6 (10-23-24 @ 17:49)  HR: 93 (10-24-24 @ 05:54) (80 - 128)  BP: 126/72 (10-24-24 @ 05:54) (102/73 - 161/94)  RR: 18 (10-24-24 @ 05:54) (16 - 19)  SpO2: 95% (10-24-24 @ 05:54) (95% - 100%)  Wt(kg): --    I&O's Summary    23 Oct 2024 07:01  -  24 Oct 2024 07:00  --------------------------------------------------------  IN: 50 mL / OUT: 300 mL / NET: -250 mL      Height (cm): 175.3 (10-23 @ 23:51)  Weight (kg): 99.8 (10-23 @ 17:49)  BMI (kg/m2): 32.5 (10-23 @ 23:51)  BSA (m2): 2.15 (10-23 @ 23:51)                                     PHYSICAL EXAM:  Appearance: Normal	  HEENT: Normal oral mucosa, PERRL, EOMI	  Neck: Supple  Cardiovascular: Normal S1 S2, No murmurs  Respiratory: Lungs clear to auscultation/Decreased Breath Sounds/No Rales, Rhonchi, Wheezing	  Gastrointestinal:  Soft, Non-tender, + BS	  Skin: No rashes, No ecchymoses, No cyanosis  Extremities: Normal range of motion, No clubbing, cyanosis or edema. R partial 2nd and full 4-5th TMA & L 5th toe amputation  Vascular: Peripheral pulses palpable 2+ bilaterally  Neurologic: Non-focal  Psychiatry: A & O x 3, Mood & affect appropriate    LABS:	 	               14.6   9.22  )-----------( 322      ( 23 Oct 2024 18:33 )             44.8     10-23    139  |  102  |  25[H]  ----------------------------<  198[H]  4.3   |  26  |  1.31[H]    Ca    9.4      23 Oct 2024 18:33  Mg     1.6     10-23      proBNP:   Lipid Profile:   HgA1c:   TSH:   PT/INR - ( 23 Oct 2024 18:33 )   PT: 10.1 sec;   INR: 0.86          PTT - ( 23 Oct 2024 18:33 )  PTT:34.4 sec Cardiology PA Adult Progress Note    SUBJECTIVE ASSESSMENT:  Pt seen and examined at bedside today. C/o HA since yesterday, slightly improved w/ tylenol. Also c/o abd discomfort he believes is attributed to no food intake. Denies CP, SOB, dizziness/lightheadedness.    MEDICATIONS:  metoprolol tartrate 25 milliGRAM(s) Oral two times a day  acetaminophen     Tablet .. 650 milliGRAM(s) Oral every 6 hours PRN  dextrose 50% Injectable 25 Gram(s) IV Push once  dextrose 50% Injectable 12.5 Gram(s) IV Push once  dextrose 50% Injectable 25 Gram(s) IV Push once  dextrose Oral Gel 15 Gram(s) Oral once PRN  glucagon  Injectable 1 milliGRAM(s) IntraMuscular once  insulin glargine Injectable (LANTUS) 20 Unit(s) SubCutaneous at bedtime  insulin lispro (ADMELOG) corrective regimen sliding scale   SubCutaneous Before meals and at bedtime  apixaban 5 milliGRAM(s) Oral every 12 hours  aspirin enteric coated 81 milliGRAM(s) Oral daily  dextrose 5%. 1000 milliLiter(s) IV Continuous <Continuous>  dextrose 5%. 1000 milliLiter(s) IV Continuous <Continuous>  influenza   Vaccine 0.5 milliLiter(s) IntraMuscular once    	  VITAL SIGNS:  T(C): 36.3 (10-24-24 @ 05:54), Max: 36.6 (10-23-24 @ 17:49)  HR: 93 (10-24-24 @ 05:54) (80 - 128)  BP: 126/72 (10-24-24 @ 05:54) (102/73 - 161/94)  RR: 18 (10-24-24 @ 05:54) (16 - 19)  SpO2: 95% (10-24-24 @ 05:54) (95% - 100%)  Wt(kg): --    I&O's Summary    23 Oct 2024 07:01  -  24 Oct 2024 07:00  --------------------------------------------------------  IN: 50 mL / OUT: 300 mL / NET: -250 mL      Height (cm): 175.3 (10-23 @ 23:51)  Weight (kg): 99.8 (10-23 @ 17:49)  BMI (kg/m2): 32.5 (10-23 @ 23:51)  BSA (m2): 2.15 (10-23 @ 23:51)                                     PHYSICAL EXAM:  Appearance: Normal	  HEENT: Normal oral mucosa, PERRL, EOMI	  Neck: Supple  Cardiovascular: Normal S1 S2, No murmurs  Respiratory: Lungs clear to auscultation/Decreased Breath Sounds/No Rales, Rhonchi, Wheezing	  Gastrointestinal:  Soft, Non-tender, + BS	  Skin: No rashes, No ecchymoses, No cyanosis  Extremities: Normal range of motion, No clubbing, cyanosis or edema. R partial 2nd and full 4-5th TMA & L 5th toe amputation  Vascular: Peripheral pulses palpable 2+ bilaterally  Neurologic: Non-focal  Psychiatry: A & O x 3, Mood & affect appropriate    LABS:	 	               14.6   9.22  )-----------( 322      ( 23 Oct 2024 18:33 )             44.8     10-23    139  |  102  |  25[H]  ----------------------------<  198[H]  4.3   |  26  |  1.31[H]    Ca    9.4      23 Oct 2024 18:33  Mg     1.6     10-23      PT/INR - ( 23 Oct 2024 18:33 )   PT: 10.1 sec;   INR: 0.86     PTT - ( 23 Oct 2024 18:33 )  PTT:34.4 sec

## 2024-10-24 NOTE — DISCHARGE NOTE PROVIDER - NSDCMRMEDTOKEN_GEN_ALL_CORE_FT
apixaban 5 mg oral tablet: 1 tab(s) orally every 12 hours Please TEAMS copay to the ordering provider: COSME Wright  Jardiance 25 mg oral tablet: 1 tab(s) orally once a day  losartan 50 mg oral tablet: 1 tab(s) orally once a day  metFORMIN 750 mg oral tablet, extended release: 1 tab(s) orally 2 times a day  Ozempic 2 mg/3 mL (0.25 mg or 0.5 mg dose) subcutaneous solution: 0.25 milligram(s) subcutaneously once a week Inject 0.25mg SC once weekly for 4 weeks, and then if tolerating increase to 0.5mg weekly.  [place] SoloStar 300 units/mL subcutaneous solution: 20 unit(s) subcutaneous once a day (at bedtime)   apixaban 5 mg oral tablet: 1 tab(s) orally every 12 hours Please TEAMS copay to the ordering provider: COSME Wright  atorvastatin 40 mg oral tablet: 1 tab(s) orally once a day (at bedtime)  dilTIAZem 120 mg/24 hours oral capsule, extended release: 1 cap(s) orally once a day  Jardiance 25 mg oral tablet: 1 tab(s) orally once a day  losartan 50 mg oral tablet: 1 tab(s) orally once a day  metFORMIN 750 mg oral tablet, extended release: 1 tab(s) orally 2 times a day  Ozempic 2 mg/3 mL (0.25 mg or 0.5 mg dose) subcutaneous solution: 0.25 milligram(s) subcutaneously once a week Inject 0.25mg SC once weekly for 4 weeks, and then if tolerating increase to 0.5mg weekly.  Topeter SoloStar 300 units/mL subcutaneous solution: 20 unit(s) subcutaneous once a day (at bedtime)

## 2024-10-24 NOTE — DISCHARGE NOTE PROVIDER - HOSPITAL COURSE
***INCOMPLETE****    50 y/o male, +FHx CAD (mother CABG) hx of cocaine use (last use Sept), with PMHx of HTN, HLD, uncontrolled T2DM, R foot OM (+MRSA 5/2022) s/p R partial 2nd and full 4-5th TMA, L hallux OM (+MRSA 2/2024), and recent OM L 5th toe s/p amputation @ [facility]  6/11/24 (left AMA, completed PO Linezolid/Augmentin course) presented to [facility] ED complaining of palpitations, generalized fatigue, SOB and chest discomfort x 1 day.     He was found to to be in new onset afib RVR. He also had slight elevated Cr which improved and Losartan was resumed. EP was consulted and underwent successful CIPRIANO/DCCV 10/24/24. Patient will start/continued on Eliquis 5mg bid (chadvasc 2). His home aspirin was dc'd. He was also changed to diltiazem 120mg qd (instead of metoprolol due to hx of cocaine use, Utox negative this admission). He was also started on atorvastatin 40mg qd for .     -Echo 10/24/24: TDS, small LV size, moderate LVH, EF 55%, aortic sclerosis without significant stenosis. trivial pericardial effusion. mildly dilated aortic root.     Pt seen and examined at bedside this AM without any complaints or events overnight, VSS, labs and telemetry reviewed and pt stable for discharge as discussed with Dr. Ybarra. Pt has received appropriate discharge instructions, including medication regimen, access site management and follow up with General cardiology in 1-2 weeks (email sent for appointment and patient will be contacted for time and date). Patient will also need to follow up with EP clinic, Dr. Hamm, in 4-6 weeks ([phone]).                ***INCOMPLETE****    50 y/o male, +FHx CAD (mother CABG) hx of cocaine use (last use Sept), with PMHx of HTN, HLD, uncontrolled T2DM, R foot OM (+MRSA 5/2022) s/p R partial 2nd and full 4-5th TMA, L hallux OM (+MRSA 2/2024), and recent OM L 5th toe s/p amputation @ [facility]  6/11/24 (left AMA, completed PO Linezolid/Augmentin course) presented to [facility] ED complaining of palpitations, generalized fatigue, SOB and chest discomfort x 1 day.     He was found to to be in new onset afib RVR. He also had slight elevated Cr which improved and Losartan was resumed. EP was consulted and underwent successful CIPRIANO/DCCV 10/24/24. Patient will start/continued on Eliquis 5mg bid (chadvasc 2). His home aspirin was dc'd. He was also changed to diltiazem 120mg qd (instead of metoprolol due to hx of cocaine use, Utox negative this admission). He was also started on atorvastatin 40mg qd for .     -Echo 10/24/24: TDS, small LV size, moderate LVH, EF 55%, aortic sclerosis without significant stenosis. trivial pericardial effusion. mildly dilated aortic root.     Pt seen and examined at bedside this AM without any complaints or events overnight, VSS, labs and telemetry reviewed and pt stable for discharge as discussed with Dr. Ybarra. Pt has received appropriate discharge instructions, including medication regimen, access site management and follow up with Dr. Rubio 10/31/24 at 10 am. Patient will also need to follow up with EP clinic, Dr. Hamm, in 4-6 weeks ([phone]).                ***INCOMPLETE****    48 y/o male, +FHx CAD (mother CABG) hx of cocaine use (last use Sept), with PMHx of HTN, HLD, uncontrolled T2DM, R foot OM (+MRSA 5/2022) s/p R partial 2nd and full 4-5th TMA, L hallux OM (+MRSA 2/2024), and recent OM L 5th toe s/p amputation @ [facility]  6/11/24 (left AMA, completed PO Linezolid/Augmentin course) presented to [facility] ED complaining of palpitations, generalized fatigue, SOB and chest discomfort x 1 day.     He was found to to be in new onset afib RVR. He also had slight elevated Cr which improved and Losartan was resumed. EP was consulted and underwent successful CIPRIANO/DCCV 10/24/24. Patient will start/continued on Eliquis 5mg bid (chadvasc 2). His home aspirin was dc'd. He was also changed to diltiazem 120mg qd (instead of metoprolol due to hx of cocaine use, Utox negative this admission). He was also started on atorvastatin 40mg qd for .     -Echo 10/24/24: TDS, small LV size, moderate LVH, EF 55%, aortic sclerosis without significant stenosis. trivial pericardial effusion. mildly dilated aortic root.     Pt seen and examined at bedside this AM without any complaints or events overnight, VSS, labs and telemetry reviewed and pt stable for discharge as discussed with Dr. Ybarra. Pt has received appropriate discharge instructions, including medication regimen, access site management and follow up with Dr. Rubio 10/31/24 at 10 am. Patient will also need to follow up with EP clinic, Dr. Hamm, in 4-6 weeks ([phone]).     Cardiac Rehab (STEMI/NSTEMI/ACS/Unstable Angina/CHF/Post PCI): No - not STEMI/NSTEMI/ACS/Unstable Angina/CHF/Post PCI    Statin Prescribed (STEMI/NSTEMI/UA &/OR PCI this admission): Patient started on atorvastatin 40 mg    GLP-1 receptor agonist/SGLT2 inhibitor meds discussed w/ patients and encouraged to discuss further with PMD or Endocrinologist at next visit.     Pt discharge copies detail cardiovascular history, medications, testing/treatments, OR patient has created a patient portal account and instructed to provide their records at their 1st appointment.               ***INCOMPLETE****    50 y/o male, +FHx CAD (mother CABG) hx of cocaine use (last use Sept), with PMHx of HTN, HLD, uncontrolled T2DM, R foot OM (+MRSA 5/2022) s/p R partial 2nd and full 4-5th TMA, L hallux OM (+MRSA 2/2024), and recent OM L 5th toe s/p amputation @ [facility]  6/11/24 (left AMA, completed PO Linezolid/Augmentin course) presented to [facility] ED complaining of palpitations, generalized fatigue, SOB and chest discomfort x 1 day.     He was found to to be in new onset afib RVR. He also had slight elevated Cr which improved and Losartan was resumed. EP was consulted and underwent successful CIPRIANO/DCCV 10/24/24. Patient will start/continued on Eliquis 5mg bid (chadvasc 2). His home aspirin was dc'd. He was also changed to diltiazem 120mg qd (instead of metoprolol due to hx of cocaine use, Utox negative this admission). He was also started on atorvastatin 40mg qd for .     -Echo 10/24/24: TDS, small LV size, moderate LVH, EF 55%, aortic sclerosis without significant stenosis. trivial pericardial effusion. mildly dilated aortic root.     Pt seen and examined at bedside this AM without any complaints or events overnight, VSS, labs and telemetry reviewed and pt stable for discharge as discussed with Dr. Ybarra. Pt has received appropriate discharge instructions, including medication regimen, access site management and follow up with Dr. Rubio 10/31/24 at 10 am at [address]. Patient will also need to follow up with EP clinic, Dr. aHmm, in 4-6 weeks ([phone]).     Cardiac Rehab (STEMI/NSTEMI/ACS/Unstable Angina/CHF/Post PCI): No - not STEMI/NSTEMI/ACS/Unstable Angina/CHF/Post PCI    Statin Prescribed (STEMI/NSTEMI/UA &/OR PCI this admission): Patient started on atorvastatin 40 mg    GLP-1 receptor agonist/SGLT2 inhibitor meds discussed w/ patients and encouraged to discuss further with PMD or Endocrinologist at next visit.     Pt discharge copies detail cardiovascular history, medications, testing/treatments, OR patient has created a patient portal account and instructed to provide their records at their 1st appointment.               48 y/o male, +FHx CAD (mother CABG) hx of cocaine use (last use Sept), with PMHx of HTN, HLD, uncontrolled T2DM, R foot OM (+MRSA 5/2022) s/p R partial 2nd and full 4-5th TMA, L hallux OM (+MRSA 2/2024), and recent OM L 5th toe s/p amputation @ [facility]  6/11/24 (left AMA, completed PO Linezolid/Augmentin course) presented to [facility] ED complaining of palpitations, generalized fatigue, SOB and chest discomfort x 1 day.     He was found to to be in new onset afib RVR. He also had slight elevated Cr which improved and Losartan was resumed. EP was consulted and underwent successful CIPRIANO/DCCV 10/24/24. Patient will start/continued on Eliquis 5mg bid (chadvasc 2). His home aspirin was dc'd. He was also changed to diltiazem 120mg qd (instead of metoprolol due to hx of cocaine use, Utox negative this admission). He was also started on atorvastatin 40mg qd for .     -Echo 10/24/24: TDS, small LV size, moderate LVH, EF 55%, aortic sclerosis without significant stenosis. trivial pericardial effusion. mildly dilated aortic root.     Pt seen and examined at bedside this AM without any complaints or events overnight, VSS, labs and telemetry reviewed and pt stable for discharge as discussed with Dr. Ybarra. Pt has received appropriate discharge instructions, including medication regimen, access site management and follow up with Dr. Rubio 10/31/24 at 10 am at [address]. Patient will also need to follow up with EP clinic, Dr. Hamm, in 4-6 weeks ([phone]).     Cardiac Rehab (STEMI/NSTEMI/ACS/Unstable Angina/CHF/Post PCI): No - not STEMI/NSTEMI/ACS/Unstable Angina/CHF/Post PCI    Statin Prescribed (STEMI/NSTEMI/UA &/OR PCI this admission): Patient started on atorvastatin 40 mg    GLP-1 receptor agonist/SGLT2 inhibitor meds discussed w/ patients and encouraged to discuss further with PMD or Endocrinologist at next visit.     Pt discharge copies detail cardiovascular history, medications, testing/treatments, OR patient has created a patient portal account and instructed to provide their records at their 1st appointment.               50 y/o male, +FHx CAD (mother CABG) hx of cocaine use (last use Sept), with PMHx of HTN, HLD, uncontrolled T2DM, R foot OM (+MRSA 5/2022) s/p R partial 2nd and full 4-5th TMA, L hallux OM (+MRSA 2/2024), and recent OM L 5th toe s/p amputation @ [facility]  6/11/24 (left AMA, completed PO Linezolid/Augmentin course) presented to [facility] ED complaining of palpitations, generalized fatigue, SOB and chest discomfort x 1 day.     He was found to be in new onset AFib w/ RVR. He also had slight elevated Cr which improved and Losartan was resumed. EP was consulted and underwent successful CIPRIANO/DCCV 10/24/24. Patient will start/continue on Eliquis 5mg bid (chadvasc 2). His home aspirin was dc'd. He was also changed to diltiazem 120mg qd (instead of metoprolol due to hx of cocaine use, Utox negative this admission). He was also started on atorvastatin 40mg qd for .     -Echo 10/24/24: TDS, small LV size, moderate LVH, EF 55%, aortic sclerosis without significant stenosis. trivial pericardial effusion. mildly dilated aortic root.     Pt seen and examined at bedside this AM without any complaints or events overnight, VSS, labs and telemetry reviewed and pt stable for discharge as discussed with Dr. Ybarra. Pt has received appropriate discharge instructions, including medication regimen, access site management and follow up with Dr. Rubio 10/31/24 at 10 am at [address]. Patient will also need to follow up with EP clinic, Dr. Hamm, in 4-6 weeks ([phone]).     Cardiac Rehab (STEMI/NSTEMI/ACS/Unstable Angina/CHF/Post PCI): No - not STEMI/NSTEMI/ACS/Unstable Angina/CHF/Post PCI    Statin Prescribed (STEMI/NSTEMI/UA &/OR PCI this admission): Patient started on atorvastatin 40 mg    GLP-1 receptor agonist/SGLT2 inhibitor meds discussed w/ patients and encouraged to discuss further with PMD or Endocrinologist at next visit.     Pt discharge copies detail cardiovascular history, medications, testing/treatments, OR patient has created a patient portal account and instructed to provide their records at their 1st appointment.

## 2024-10-24 NOTE — DISCHARGE NOTE PROVIDER - NSDCCPCAREPLAN_GEN_ALL_CORE_FT
PRINCIPAL DISCHARGE DIAGNOSIS  Diagnosis: Atrial fibrillation with RVR  Assessment and Plan of Treatment: You have an abnormal heart rhythm (arrhythmia) called atrial fibrillation. With this condition, the hearts 2 upper chambers (the atria) quiver rather than squeeze the blood out in a normal pattern. This leads to an irregular and sometimes rapid heartbeat. Atrial fibrillation is serious condition as it affects the heart’s ability to fill with blood as it should and blood clots may form, which increases the risk for stroke.  -Please CONTINUE  ELIQUIS 5MG TWICE A DAY to prevent a stroke.  -Please CONTINUE Diltiazem 120mg once a day to keep your heart rate regular  -Please follow up with  ___.        SECONDARY DISCHARGE DIAGNOSES  Diagnosis: DM (diabetes mellitus)  Assessment and Plan of Treatment:     Diagnosis: HTN (hypertension)  Assessment and Plan of Treatment:     Diagnosis: HLD (hyperlipidemia)  Assessment and Plan of Treatment:      PRINCIPAL DISCHARGE DIAGNOSIS  Diagnosis: Atrial fibrillation with RVR  Assessment and Plan of Treatment: You have an abnormal heart rhythm (arrhythmia) called atrial fibrillation. With this condition, the hearts 2 upper chambers (the atria) quiver rather than squeeze the blood out in a normal pattern. This leads to an irregular and sometimes rapid heartbeat. Atrial fibrillation is serious condition as it affects the heart’s ability to fill with blood as it should and blood clots may form, which increases the risk for stroke.  You underwent successful CIPRIANO and DCCV on 10/24/24 which helped brought you back in normal rhythm.   -Please CONTINUE  ELIQUIS 5MG TWICE A DAY to prevent a stroke.  -Please CONTINUE Diltiazem 120mg once a day to keep your heart rate regular  -Please follow up with EP clinic, Dr. Hamm, in 4-6 weeks ([phone]).  -Plea expect a call for appointment with general cardiology in 7-10 days.         SECONDARY DISCHARGE DIAGNOSES  Diagnosis: DM (diabetes mellitus)  Assessment and Plan of Treatment: Your Hemoglobin A1c is 7.4 and your goal A1c is less than 7.0%. This number measures your average blood sugar level over the last three months.  Please continue your home diabetic regiment as listed for diabetes. Maintain a low carbohydrate, low sugar diet, exercise, monitor your fingerstick blood sugars regarly and follow up with your Endocrinologist/Primary Care Doctor.      Diagnosis: Elevated serum creatinine  Assessment and Plan of Treatment: Your kidney function was slightly elevated on admission and hence Losartan was held initially. However, your kidney function improved and Losartan was resumed. Please continue to monitor your kidney function routinely with your outpatient PCP.    Diagnosis: HTN (hypertension)  Assessment and Plan of Treatment: Please continue Losartan as before for BP management. Also, please start Diltiazem 120mg once a day as listed to keep your blood pressure and heart rate controlled. For blood pressure that is too high or too low please see your doctor or go to the emergency room as necessary.      Diagnosis: HLD (hyperlipidemia)  Assessment and Plan of Treatment: . Please start atorvastatin 40mg once a day at bedtime to keep your cholesterol low. High cholesterol contributes to heart disease.     PRINCIPAL DISCHARGE DIAGNOSIS  Diagnosis: Atrial fibrillation with RVR  Assessment and Plan of Treatment: You have an abnormal heart rhythm (arrhythmia) called atrial fibrillation. With this condition, the hearts 2 upper chambers (the atria) quiver rather than squeeze the blood out in a normal pattern. This leads to an irregular and sometimes rapid heartbeat. Atrial fibrillation is serious condition as it affects the heart’s ability to fill with blood as it should and blood clots may form, which increases the risk for stroke.  You underwent successful CIPRIANO and DCCV on 10/24/24 which helped brought you back in normal rhythm.   -Please CONTINUE  ELIQUIS 5MG TWICE A DAY to prevent a stroke.  -Please CONTINUE Diltiazem 120mg once a day to keep your heart rate regular  -Please follow up with EP clinic, Dr. Hamm, in 4-6 weeks ([phone]).  -Please follow up with Dr. Rubio for general cardiology on 10/31/24 at 10am.         SECONDARY DISCHARGE DIAGNOSES  Diagnosis: DM (diabetes mellitus)  Assessment and Plan of Treatment: Your Hemoglobin A1c is 7.4 and your goal A1c is less than 7.0%. This number measures your average blood sugar level over the last three months.  Please continue your home diabetic regiment as listed for diabetes. Maintain a low carbohydrate, low sugar diet, exercise, monitor your fingerstick blood sugars regarly and follow up with your Endocrinologist/Primary Care Doctor.      Diagnosis: Elevated serum creatinine  Assessment and Plan of Treatment: Your kidney function was slightly elevated on admission and hence Losartan was held initially. However, your kidney function improved and Losartan was resumed. Please continue to monitor your kidney function routinely with your outpatient PCP.    Diagnosis: HTN (hypertension)  Assessment and Plan of Treatment: Please continue Losartan as before for BP management. Also, please start Diltiazem 120mg once a day as listed to keep your blood pressure and heart rate controlled. For blood pressure that is too high or too low please see your doctor or go to the emergency room as necessary.      Diagnosis: HLD (hyperlipidemia)  Assessment and Plan of Treatment: . Please start atorvastatin 40mg once a day at bedtime to keep your cholesterol low. High cholesterol contributes to heart disease.     PRINCIPAL DISCHARGE DIAGNOSIS  Diagnosis: Atrial fibrillation with RVR  Assessment and Plan of Treatment: You have an abnormal heart rhythm (arrhythmia) called atrial fibrillation. With this condition, the hearts 2 upper chambers (the atria) quiver rather than squeeze the blood out in a normal pattern. This leads to an irregular and sometimes rapid heartbeat. Atrial fibrillation is serious condition as it affects the heart’s ability to fill with blood as it should and blood clots may form, which increases the risk for stroke.  You underwent successful CIPRIANO and DCCV on 10/24/24 which helped brought you back in normal rhythm.   -Please CONTINUE  ELIQUIS 5MG TWICE A DAY to prevent a stroke.  -Please CONTINUE Diltiazem 120mg once a day to keep your heart rate regular  -Please follow up with EP clinic, Dr. Hamm, in 4-6 weeks ([phone]).  -Please follow up with Dr. Rubio for general cardiology on 10/31/24 at 10am.   SEEK IMMEDIATE MEDICAL CARE IF YOU HAVE ANY OF THE FOLLOWING SYMPTOMS: worsening chest pain, racing heart beat, unexplained jaw/neck/back pain, severe abdominal pain, shortness of breath, dizziness or lightheadedness, fainting, sweaty or clammy skin, vomiting, or coughing up blood. These symptoms may represent a serious problem that is an emergency. Do not wait to see if the symptoms will go away. Get medical help right away. Call 911 and do not drive yourself to the hospital.      SECONDARY DISCHARGE DIAGNOSES  Diagnosis: DM (diabetes mellitus)  Assessment and Plan of Treatment: Your Hemoglobin A1c is 7.4 and your goal A1c is less than 7.0%. This number measures your average blood sugar level over the last three months.  Please continue your home diabetic regiment as listed for diabetes. Maintain a low carbohydrate, low sugar diet, exercise, monitor your fingerstick blood sugars regarly and follow up with your Endocrinologist/Primary Care Doctor.      Diagnosis: HTN (hypertension)  Assessment and Plan of Treatment: Please continue Losartan as before for BP management. Also, please start Diltiazem 120mg once a day as listed to keep your blood pressure and heart rate controlled. For blood pressure that is too high or too low please see your doctor or go to the emergency room as necessary.      Diagnosis: HLD (hyperlipidemia)  Assessment and Plan of Treatment: Your LDL cholesterol is elevated at 123. Please start atorvastatin 40mg once a day at bedtime to keep your cholesterol low. High cholesterol contributes to heart disease.    Diagnosis: Elevated serum creatinine  Assessment and Plan of Treatment: Your kidney function was slightly elevated on admission and hence Losartan was held initially. However, your kidney function improved and Losartan was resumed. Please continue to monitor your kidney function routinely with your outpatient PCP.

## 2024-10-24 NOTE — CONSULT NOTE ADULT - SUBJECTIVE AND OBJECTIVE BOX
HPI:  50 y/o male with history of cocaine use (last use Sept), HTN, HLD, uncontrolled T2DM, R foot OM s/p toe amputations (last 6/2024), admitted with new symptomatic atrial fibrillation.    He denies any recent drug use.  He states yesterday he was stressed out and "maybe dehydrated".  No history of arrhythmia or atrial fibrillation or any cardiac issues.  No history of stroke, bleeding issues or thyroid issues.  He states yesterday around 11 am he had sudden onset of palpitations and SOB.  He has never felt like this before.  No recent fever, chills or feeling ill.  He was found to be in new onset afib.  Now on Eliquis.  Ep called to discuss possible CIPRIANO/DCCV    PAST MEDICAL & SURGICAL HISTORY:  Hyperlipidemia, unspecified hyperlipidemia type  Hypertension  History of cocaine use  Diabetes mellitus  Osteomyelitis R foot  Toe amputation status Right foot  H/O skin graft Right foot  S/P insertion of penile implant        FH: CAD (coronary artery disease) (Mother)        Social History: see above    pertinent home medications:  · 	Toujeo SoloStar 300 units/mL subcutaneous solution: Last Dose Taken:  , 20 unit(s) subcutaneous once a day (at bedtime)  · 	metFORMIN 750 mg oral tablet, extended release: Last Dose Taken:  , 1 tab(s) orally 2 times a day  · 	Ozempic 2 mg/3 mL (0.25 mg or 0.5 mg dose) subcutaneous solution: Last Dose Taken:  , 0.25 milligram(s) subcutaneously once a week Inject 0.25mg SC once weekly for 4 weeks, and then if tolerating increase to 0.5mg weekly. -last 10.19  · 	losartan 50 mg oral tablet: Last Dose Taken:  , 1 tab(s) orally once a day  · 	Jardiance 25 mg oral tablet: Last Dose Taken:  , 1 tab(s) orally once a day    Inpatient Medications:   apixaban 5 milliGRAM(s) Oral every 12 hours  aspirin enteric coated 81 milliGRAM(s) Oral daily  insulin glargine Injectable (LANTUS) 20 Unit(s) SubCutaneous at bedtime  insulin lispro (ADMELOG) corrective regimen sliding scale   SubCutaneous Before meals and at bedtime  magnesium oxide 400 milliGRAM(s) Oral daily  metoprolol tartrate 25 milliGRAM(s) Oral two times a day      Allergies: No Known Allergies      ROS:   CONSTITUTIONAL: No fever, weight loss + fatigue  EYES: Pt denies  RESPIRATORY: No cough, wheezing, chills or hemoptysis   CARDIOVASCULAR: see HPI  GASTROINTESTINAL: Pt denies  NEUROLOGICAL: Pt denies  SKIN: Pt denies   PSYCHIATRIC: Pt denies  HEME/LYMPH: Pt denies    PHYSICAL:  T(C): 36.3 (10-24-24 @ 05:54), Max: 36.6 (10-23-24 @ 17:49)  HR: 93 (10-24-24 @ 08:45) (80 - 128)  BP: 112/61 (10-24-24 @ 08:45) (102/73 - 161/94)  RR: 18 (10-24-24 @ 08:45) (16 - 19)  SpO2: 99% (10-24-24 @ 08:45) (95% - 100%)     Appearance: No acute distress, well developed  Eyes: normal appearing conjunctiva, pupils and eyelids  Cardiovascular: irregularly irregular  Respiratory: Lungs clear to auscultation   Gastrointestinal:  Soft, NT/ND 	  Neurologic:  No deficit noted  Psych: A&Ox3   Musculoskeletal: no deformity needed  Skin: no rash noted, normal color and pigmentation.        LABS:                        14.2   9.36  )-----------( 278      ( 24 Oct 2024 08:15 )             42.4        139  |  105  |  24[H]  ----------------------------<  151[H]  4.1   |  26  |  1.09    Ca    8.9      24 Oct 2024 08:15  Mg     1.8          TPro  6.8  /  Alb  3.4  /  TBili  0.3  /  DBili  <0.2  /  AST  15  /  ALT  18  /  AlkPhos  74  10-24   PT: 11.2 sec;   INR: 0.96    PTT:32.6 sec  TSH 0.485       Telemetry: afib 80-100s    ECHO: pending    Prior EP procedures: denies    Cath / stress / Cardiac CTa: denies    Assessment Plan:  50 y/o male with history of cocaine use (last use Sept), HTN, HLD, uncontrolled T2DM, R foot OM s/p toe amputations (last 6/2024), admitted with new symptomatic atrial fibrillation.  We discussed the Normal conduction system of the heart and what atrial fibrillation is.  We discussed the association with stroke an importance of staying on oral anticoagulation.  We discussed the treatment options for atrial fibrillation including rate control vs. rhythm control strategies.  At this time we have recommended a CIPRIANO/Cardioversion.  He is amenable to this.  We discussed the procedure in detail including risks, benefits and alternatives.  Consent signed.  Ozempic last 5 days ago.  We discussed he will need to be on NOAC.  Recc Echo.  If EF normal would discharge on NOAC and Dilt .  if EF depressed recc NOAC and COreg given history of cocaine.  He will need to follow up in EP clinic in 4-6 weeks [phone] with Dr. Hamm

## 2024-10-24 NOTE — PRE-ANESTHESIA EVALUATION ADULT - NSANTHPEFT_GEN_ALL_CORE
General: AAOx3, NAD  Eyes: The sclera and conjunctiva normal, pupils equal in size.  ENT: The ears and nose were normal in appearance; oropharynx clear, moist mucus membranes.  Neck: The appearance of the neck was normal, with no gross masses or nodules.  Respiratory: Unlabored, no retractions.  CV: irregular  Neurological: No focal deficit, moves all extremities.  Exercise Tolerance:  METS>4.

## 2024-10-24 NOTE — DISCHARGE NOTE PROVIDER - NSDCFUSCHEDAPPT_GEN_ALL_CORE_FT
Shay Rubio  [facility] Physician [facility]  [facility] [address]  Scheduled Appointment: 10/31/2024     Shay Rubio  [facility] Physician [facility]  [facility] [address]  Scheduled Appointment: 10/31/2024    Rosa Hamm  [facility] Physician [facility]  [facility] [address]  Scheduled Appointment: 11/25/2024

## 2024-10-24 NOTE — PROGRESS NOTE ADULT - PROBLEM SELECTOR PLAN 1
- Currently rate controlled. [id number] 2.  - TTE 3/26/21: Normal LV/RV size and systolic fctn. Mild cLVH. No valvular disease.   - TTE in AM  - Rate control: Lopressor 25mg BID  - AC: Eliquis 5mg BID  - EP consult in AM - Currently rate controlled. [id number] 2.  - TTE 3/26/21: Normal LV/RV size and systolic function. Mild cLVH. No valvular disease.   - TTE in AM  - Rate control: Lopressor 25mg BID  - AC: Eliquis 5mg BID  - EP consult in AM - Currently rate controlled. [id number] 2.  - TTE 3/26/21: Normal LV/RV size and systolic function. Mild cLVH. No valvular disease.   - CXR 10/23/24: no acute findings  - Rate control: Lopressor 25mg BID  - AC: Eliquis 5mg BID  - EP consult consulted. plan for TTE and CIPRIANO/DCCV today 10/24 - Currently rate controlled. [id number] 2.  - TTE 3/26/21: Normal LV/RV size and systolic function. Mild cLVH. No valvular disease.   - CXR 10/23/24: no acute findings  - Rate control: Lopressor 25mg BID  - AC: Eliquis 5mg BID  - EP consulted. plan for TTE and CIPRIANO/DCCV today 10/24 [id number] 2.  -Echo 10/24/24: TDS, small LV size, moderate LVH, EF 55%, aortic sclerosis without significant stenosis. trivial pericardial effusion. mildly dilated aortic root.   -Rate control: Lopressor 25mg BID now dc ---> switched to diltiazem 120mg qd (hx cocaine use)  -AC: Eliquis 5mg BID  -underwent successful CIPRIANO/DCCV 10/24  -Plan for dc in AM

## 2024-10-24 NOTE — PROGRESS NOTE ADULT - PROBLEM SELECTOR PLAN 3
Uncontrolled; s/p R partial 2nd and full 4-5th TMA & L 5th toe amputation  6/11/24 (left AMA, completed PO Linezolid/Augmentin course) s/p R partial 2nd toe and full 4th-5th toe amputation, s/p L 5th toe amputation  - Home regimen: Metformin 750mg BID, Lantus 20u QHS, Jardiance 25mg QD  - Continue Lantus 20u QHS, ISS, monitor FS Uncontrolled; s/p R partial 2nd and full 4-5th TMA & L 5th toe amputation 6/11/24 (left AMA, completed PO Linezolid/Augmentin course)   - Home regimen: Metformin 750mg BID, Lantus 20u QHS, Jardiance 25mg QD  - Continue Lantus 20u QHS, ISS, monitor FS

## 2024-10-24 NOTE — PROGRESS NOTE ADULT - PROBLEM SELECTOR PLAN 5
AM lipid panel  - Initiate statin if appropriate      F: None  E: Replete if K<4 or Mag<2  N: DASH Diet  VTEppx: Eliquis  CODE: FULL  Dispo: Cardiac tele   - Initiate statin if appropriate      F: None  E: Replete if K<4 or Mag<2  N: DASH Diet  VTEppx: Eliquis  CODE: FULL  Dispo: Cardiac tele   -start atorvastatin 40mg qd      F: None  E: Replete if K<4 or Mag<2  N: DASH Diet  VTEppx: Eliquis  CODE: FULL  Dispo: Cardiac tele, anticipate dc in AM 10/25

## 2024-10-24 NOTE — DISCHARGE NOTE PROVIDER - CARE PROVIDERS DIRECT ADDRESSES
[email or web address]

## 2024-10-24 NOTE — PROGRESS NOTE ADULT - NS ATTEND AMEND GEN_ALL_CORE FT
Mr. Valentin is a 48 year old male with history of HTN, DM, Hx of R foot osteo with partial 2nd toe, 4-5th toe amputation, L toe ulcer, cocaine ulcer presented with palpitations and shortness of breath and admitted for new onset atrial fibrillation, rapid atrial fibrillation.       Exam:  NAD  JVP normal  S1, S2 irregular  CTA b/l  WWP, no edema.     New onset atrial fibrillation- TTE, Tsh normal, CIPRIANO/DCCV. Currently on bb switch to CCB in setting of cocaine use.   DM- Home Oral, toujeo   HTN-ARB, statin  HLD- statin

## 2024-10-24 NOTE — DISCHARGE NOTE PROVIDER - PROVIDER RX CONTACT NUMBER
[facility] Progress Note  [facility]. [place]        Patient Name: Adeel Gant Jr  : 1944  MRN: [id number]   María Elena Motley MD  Transcribed by Poppy Estevez María Elena Motley MD  Transcribed by Poppy Estevez PA-C.     This report was signed and finalized on 5/29/2024 9:35 AM by María Elena Motley MD.                   Assessment / Plan     ASSESSMENT:    PIERRE (of Rhode Island Hospitals  [phone]      Part of this note may be an electronic transcription/translation of spoken language to printed text using the Dragon Dictation System.                  [phone]

## 2024-10-25 ENCOUNTER — TRANSCRIPTION ENCOUNTER (OUTPATIENT)
Age: 49
End: 2024-10-25

## 2024-10-25 VITALS
SYSTOLIC BLOOD PRESSURE: 131 MMHG | HEART RATE: 89 BPM | RESPIRATION RATE: 19 BRPM | TEMPERATURE: 98 F | DIASTOLIC BLOOD PRESSURE: 71 MMHG

## 2024-10-25 LAB
ALBUMIN SERPL ELPH-MCNC: 3.9 G/DL — SIGNIFICANT CHANGE UP (ref 3.3–5)
ALP SERPL-CCNC: 57 U/L — SIGNIFICANT CHANGE UP (ref 40–120)
ALT FLD-CCNC: 19 U/L — SIGNIFICANT CHANGE UP (ref 10–45)
ANION GAP SERPL CALC-SCNC: 7 MMOL/L — SIGNIFICANT CHANGE UP (ref 5–17)
AST SERPL-CCNC: 18 U/L — SIGNIFICANT CHANGE UP (ref 10–40)
BILIRUB SERPL-MCNC: 0.3 MG/DL — SIGNIFICANT CHANGE UP (ref 0.2–1.2)
BUN SERPL-MCNC: 22 MG/DL — SIGNIFICANT CHANGE UP (ref 7–23)
CALCIUM SERPL-MCNC: 9.2 MG/DL — SIGNIFICANT CHANGE UP (ref 8.4–10.5)
CHLORIDE SERPL-SCNC: 106 MMOL/L — SIGNIFICANT CHANGE UP (ref 96–108)
CO2 SERPL-SCNC: 26 MMOL/L — SIGNIFICANT CHANGE UP (ref 22–31)
CREAT SERPL-MCNC: 1.14 MG/DL — SIGNIFICANT CHANGE UP (ref 0.5–1.3)
EGFR: 79 ML/MIN/1.73M2 — SIGNIFICANT CHANGE UP
GLUCOSE BLDC GLUCOMTR-MCNC: 109 MG/DL — HIGH (ref 70–99)
GLUCOSE BLDC GLUCOMTR-MCNC: 115 MG/DL — HIGH (ref 70–99)
GLUCOSE SERPL-MCNC: 152 MG/DL — HIGH (ref 70–99)
HCT VFR BLD CALC: 44.1 % — SIGNIFICANT CHANGE UP (ref 39–50)
HGB BLD-MCNC: 14.2 G/DL — SIGNIFICANT CHANGE UP (ref 13–17)
MAGNESIUM SERPL-MCNC: 1.7 MG/DL — SIGNIFICANT CHANGE UP (ref 1.6–2.6)
MCHC RBC-ENTMCNC: 29.2 PG — SIGNIFICANT CHANGE UP (ref 27–34)
MCHC RBC-ENTMCNC: 32.2 GM/DL — SIGNIFICANT CHANGE UP (ref 32–36)
MCV RBC AUTO: 90.6 FL — SIGNIFICANT CHANGE UP (ref 80–100)
NRBC # BLD: 0 /100 WBCS — SIGNIFICANT CHANGE UP (ref 0–0)
PLATELET # BLD AUTO: 324 K/UL — SIGNIFICANT CHANGE UP (ref 150–400)
POTASSIUM SERPL-MCNC: 4.6 MMOL/L — SIGNIFICANT CHANGE UP (ref 3.5–5.3)
POTASSIUM SERPL-SCNC: 4.6 MMOL/L — SIGNIFICANT CHANGE UP (ref 3.5–5.3)
PROT SERPL-MCNC: 7.3 G/DL — SIGNIFICANT CHANGE UP (ref 6–8.3)
RBC # BLD: 4.87 M/UL — SIGNIFICANT CHANGE UP (ref 4.2–5.8)
RBC # FLD: 13.6 % — SIGNIFICANT CHANGE UP (ref 10.3–14.5)
SODIUM SERPL-SCNC: 139 MMOL/L — SIGNIFICANT CHANGE UP (ref 135–145)
WBC # BLD: 6.84 K/UL — SIGNIFICANT CHANGE UP (ref 3.8–10.5)
WBC # FLD AUTO: 6.84 K/UL — SIGNIFICANT CHANGE UP (ref 3.8–10.5)

## 2024-10-25 PROCEDURE — 93312 ECHO TRANSESOPHAGEAL: CPT

## 2024-10-25 PROCEDURE — 84300 ASSAY OF URINE SODIUM: CPT

## 2024-10-25 PROCEDURE — 99285 EMERGENCY DEPT VISIT HI MDM: CPT

## 2024-10-25 PROCEDURE — 85025 COMPLETE CBC W/AUTO DIFF WBC: CPT

## 2024-10-25 PROCEDURE — 82248 BILIRUBIN DIRECT: CPT

## 2024-10-25 PROCEDURE — 93306 TTE W/DOPPLER COMPLETE: CPT

## 2024-10-25 PROCEDURE — 84133 ASSAY OF URINE POTASSIUM: CPT

## 2024-10-25 PROCEDURE — 84156 ASSAY OF PROTEIN URINE: CPT

## 2024-10-25 PROCEDURE — 83880 ASSAY OF NATRIURETIC PEPTIDE: CPT

## 2024-10-25 PROCEDURE — 83735 ASSAY OF MAGNESIUM: CPT

## 2024-10-25 PROCEDURE — 85610 PROTHROMBIN TIME: CPT

## 2024-10-25 PROCEDURE — 82553 CREATINE MB FRACTION: CPT

## 2024-10-25 PROCEDURE — 82962 GLUCOSE BLOOD TEST: CPT

## 2024-10-25 PROCEDURE — 83935 ASSAY OF URINE OSMOLALITY: CPT

## 2024-10-25 PROCEDURE — 80048 BASIC METABOLIC PNL TOTAL CA: CPT

## 2024-10-25 PROCEDURE — 99239 HOSP IP/OBS DSCHRG MGMT >30: CPT

## 2024-10-25 PROCEDURE — 84436 ASSAY OF TOTAL THYROXINE: CPT

## 2024-10-25 PROCEDURE — 80053 COMPREHEN METABOLIC PANEL: CPT

## 2024-10-25 PROCEDURE — 82550 ASSAY OF CK (CPK): CPT

## 2024-10-25 PROCEDURE — 36415 COLL VENOUS BLD VENIPUNCTURE: CPT

## 2024-10-25 PROCEDURE — 84484 ASSAY OF TROPONIN QUANT: CPT

## 2024-10-25 PROCEDURE — 81003 URINALYSIS AUTO W/O SCOPE: CPT

## 2024-10-25 PROCEDURE — 80061 LIPID PANEL: CPT

## 2024-10-25 PROCEDURE — 84443 ASSAY THYROID STIM HORMONE: CPT

## 2024-10-25 PROCEDURE — 83036 HEMOGLOBIN GLYCOSYLATED A1C: CPT

## 2024-10-25 PROCEDURE — 84540 ASSAY OF URINE/UREA-N: CPT

## 2024-10-25 PROCEDURE — 87637 SARSCOV2&INF A&B&RSV AMP PRB: CPT

## 2024-10-25 PROCEDURE — 85027 COMPLETE CBC AUTOMATED: CPT

## 2024-10-25 PROCEDURE — 93005 ELECTROCARDIOGRAM TRACING: CPT

## 2024-10-25 PROCEDURE — 71045 X-RAY EXAM CHEST 1 VIEW: CPT

## 2024-10-25 PROCEDURE — 85730 THROMBOPLASTIN TIME PARTIAL: CPT

## 2024-10-25 PROCEDURE — 80307 DRUG TEST PRSMV CHEM ANLYZR: CPT

## 2024-10-25 PROCEDURE — 82570 ASSAY OF URINE CREATININE: CPT

## 2024-10-25 RX ORDER — MAGNESIUM SULFATE IN 0.9% NACL 2 G/50 ML
2 INTRAVENOUS SOLUTION, PIGGYBACK (ML) INTRAVENOUS ONCE
Refills: 0 | Status: COMPLETED | OUTPATIENT
Start: 2024-10-25 | End: 2024-10-25

## 2024-10-25 RX ORDER — LOSARTAN POTASSIUM 25 MG/1
1 TABLET ORAL
Qty: 0 | Refills: 0 | DISCHARGE
Start: 2024-10-25

## 2024-10-25 RX ORDER — DILTIAZEM HCL 5 MG/ML
1 VIAL (ML) INTRAVENOUS
Qty: 30 | Refills: 2
Start: 2024-10-25 | End: 2025-01-22

## 2024-10-25 RX ADMIN — APIXABAN 5 MILLIGRAM(S): 5 TABLET, FILM COATED ORAL at 09:25

## 2024-10-25 RX ADMIN — LOSARTAN POTASSIUM 50 MILLIGRAM(S): 25 TABLET ORAL at 05:42

## 2024-10-25 RX ADMIN — Medication 25 GRAM(S): at 09:28

## 2024-10-25 NOTE — DISCHARGE NOTE NURSING/CASE MANAGEMENT/SOCIAL WORK - NSDCPEFALRISK_GEN_ALL_CORE
For information on Fall & Injury Prevention, visit: [email or web address] OR  [email or web address] OR  https://www.cdc.gov/steadi/patient.html

## 2024-10-25 NOTE — DISCHARGE NOTE NURSING/CASE MANAGEMENT/SOCIAL WORK - FINANCIAL ASSISTANCE
[facility] provides services at a reduced cost to those who are determined to be eligible through [facility]’s financial assistance program. Information regarding [facility]’s financial assistance program can be found by going to [email or web address] or by calling [phone].

## 2024-10-25 NOTE — DISCHARGE NOTE NURSING/CASE MANAGEMENT/SOCIAL WORK - PATIENT PORTAL LINK FT
You can access the FollowMyHealth Patient Portal offered by [facility] by registering at the following website: [email or web address]. By joining [email or web address]’s FollowMyHealth portal, you will also be able to view your health information using other applications (apps) compatible with our system.

## 2024-10-25 NOTE — DISCHARGE NOTE NURSING/CASE MANAGEMENT/SOCIAL WORK - NSDCVIVACCINE_GEN_ALL_CORE_FT
Tdap; 30-Jun-2021 18:05; Lorrie Ziegler (IVELISSE); [employer]; [id number] (Exp. Date: 25-Nov-2022); IntraMuscular; Deltoid Left.; 0.5 milliLiter(s); VIS (VIS Published: 09-May-2013, VIS Presented: 30-Jun-2021);

## 2024-10-29 DIAGNOSIS — I48.91 UNSPECIFIED ATRIAL FIBRILLATION: ICD-10-CM

## 2024-10-29 RX ORDER — METFORMIN ER 750 MG 750 MG/1
750 TABLET ORAL TWICE DAILY
Refills: 0 | Status: ACTIVE | COMMUNITY
Start: 2024-10-29

## 2024-10-29 RX ORDER — SEMAGLUTIDE 0.68 MG/ML
2 INJECTION, SOLUTION SUBCUTANEOUS
Qty: 1 | Refills: 3 | Status: ACTIVE | COMMUNITY
Start: 2024-10-29

## 2024-10-29 RX ORDER — DILTIAZEM HYDROCHLORIDE 120 MG/1
120 TABLET, EXTENDED RELEASE ORAL
Qty: 90 | Refills: 3 | Status: ACTIVE | COMMUNITY
Start: 2024-10-29

## 2024-10-29 RX ORDER — APIXABAN 5 MG/1
5 TABLET, FILM COATED ORAL
Qty: 180 | Refills: 3 | Status: ACTIVE | COMMUNITY
Start: 2024-10-29

## 2024-10-29 RX ORDER — EMPAGLIFLOZIN 25 MG/1
25 TABLET, FILM COATED ORAL DAILY
Refills: 0 | Status: ACTIVE | COMMUNITY
Start: 2024-10-29

## 2024-10-31 ENCOUNTER — APPOINTMENT (OUTPATIENT)
Dept: HEART AND VASCULAR | Facility: CLINIC | Age: 49
End: 2024-10-31

## 2024-11-01 DIAGNOSIS — Z11.52 ENCOUNTER FOR SCREENING FOR COVID-19: ICD-10-CM

## 2024-11-01 DIAGNOSIS — N17.9 ACUTE KIDNEY FAILURE, UNSPECIFIED: ICD-10-CM

## 2024-11-01 DIAGNOSIS — Z96.0 PRESENCE OF UROGENITAL IMPLANTS: ICD-10-CM

## 2024-11-01 DIAGNOSIS — I10 ESSENTIAL (PRIMARY) HYPERTENSION: ICD-10-CM

## 2024-11-01 DIAGNOSIS — Z82.49 FAMILY HISTORY OF ISCHEMIC HEART DISEASE AND OTHER DISEASES OF THE CIRCULATORY SYSTEM: ICD-10-CM

## 2024-11-01 DIAGNOSIS — I48.91 UNSPECIFIED ATRIAL FIBRILLATION: ICD-10-CM

## 2024-11-01 DIAGNOSIS — Z79.01 LONG TERM (CURRENT) USE OF ANTICOAGULANTS: ICD-10-CM

## 2024-11-01 DIAGNOSIS — Z79.4 LONG TERM (CURRENT) USE OF INSULIN: ICD-10-CM

## 2024-11-01 DIAGNOSIS — F14.90 COCAINE USE, UNSPECIFIED, UNCOMPLICATED: ICD-10-CM

## 2024-11-01 DIAGNOSIS — I35.8 OTHER NONRHEUMATIC AORTIC VALVE DISORDERS: ICD-10-CM

## 2024-11-01 DIAGNOSIS — Z89.422 ACQUIRED ABSENCE OF OTHER LEFT TOE(S): ICD-10-CM

## 2024-11-01 DIAGNOSIS — E11.9 TYPE 2 DIABETES MELLITUS WITHOUT COMPLICATIONS: ICD-10-CM

## 2024-11-01 DIAGNOSIS — Z89.421 ACQUIRED ABSENCE OF OTHER RIGHT TOE(S): ICD-10-CM

## 2024-11-01 DIAGNOSIS — Z79.84 LONG TERM (CURRENT) USE OF ORAL HYPOGLYCEMIC DRUGS: ICD-10-CM

## 2024-11-01 DIAGNOSIS — Z86.14 PERSONAL HISTORY OF METHICILLIN RESISTANT STAPHYLOCOCCUS AUREUS INFECTION: ICD-10-CM

## 2024-11-01 DIAGNOSIS — E78.5 HYPERLIPIDEMIA, UNSPECIFIED: ICD-10-CM

## 2024-11-01 DIAGNOSIS — Z79.85 LONG-TERM (CURRENT) USE OF INJECTABLE NON-INSULIN ANTIDIABETIC DRUGS: ICD-10-CM

## 2024-11-05 NOTE — ED PROVIDER NOTE - NS ED MD DISPO ADMIT [facility] PALLIATIVE CARE
Duration Of Freeze Thaw-Cycle (Seconds): 10 Aperture Size (Optional): C Show Applicator Variable?: Yes Render Note In Bullet Format When Appropriate: No Detail Level: Detailed NONE Consent: The patient's consent was obtained including but not limited to risks of crusting, scabbing, blistering, scarring, darker or lighter pigmentary change, recurrence, incomplete removal and infection. Number Of Freeze-Thaw Cycles: 1 freeze-thaw cycle Post-Care Instructions: I reviewed with the patient in detail post-care instructions. Patient is to wear sunprotection, and avoid picking at any of the treated lesions. Pt may apply Vaseline to crusted or scabbing areas.

## 2024-11-20 ENCOUNTER — EMERGENCY (EMERGENCY)
Facility: HOSPITAL | Age: 49
LOS: 1 days | Discharge: HOME | End: 2024-11-20
Attending: EMERGENCY MEDICINE | Admitting: EMERGENCY MEDICINE
Payer: COMMERCIAL

## 2024-11-20 VITALS
DIASTOLIC BLOOD PRESSURE: 90 MMHG | SYSTOLIC BLOOD PRESSURE: 160 MMHG | HEART RATE: 79 BPM | RESPIRATION RATE: 18 BRPM | OXYGEN SATURATION: 99 % | TEMPERATURE: 98 F

## 2024-11-20 VITALS
RESPIRATION RATE: 16 BRPM | OXYGEN SATURATION: 96 % | HEIGHT: 69 IN | HEART RATE: 107 BPM | TEMPERATURE: 98 F | SYSTOLIC BLOOD PRESSURE: 135 MMHG | DIASTOLIC BLOOD PRESSURE: 81 MMHG | WEIGHT: 223.11 LBS

## 2024-11-20 DIAGNOSIS — T38.3X6A UNDERDOSING OF INSULIN AND ORAL HYPOGLYCEMIC [ANTIDIABETIC] DRUGS, INITIAL ENCOUNTER: ICD-10-CM

## 2024-11-20 DIAGNOSIS — Z79.01 LONG TERM (CURRENT) USE OF ANTICOAGULANTS: ICD-10-CM

## 2024-11-20 DIAGNOSIS — Z96.0 PRESENCE OF UROGENITAL IMPLANTS: Chronic | ICD-10-CM

## 2024-11-20 DIAGNOSIS — Z89.429 ACQUIRED ABSENCE OF OTHER TOE(S), UNSPECIFIED SIDE: Chronic | ICD-10-CM

## 2024-11-20 DIAGNOSIS — R07.89 OTHER CHEST PAIN: ICD-10-CM

## 2024-11-20 DIAGNOSIS — R00.0 TACHYCARDIA, UNSPECIFIED: ICD-10-CM

## 2024-11-20 DIAGNOSIS — Z94.5 SKIN TRANSPLANT STATUS: Chronic | ICD-10-CM

## 2024-11-20 DIAGNOSIS — I49.40 UNSPECIFIED PREMATURE DEPOLARIZATION: ICD-10-CM

## 2024-11-20 DIAGNOSIS — R74.8 ABNORMAL LEVELS OF OTHER SERUM ENZYMES: ICD-10-CM

## 2024-11-20 DIAGNOSIS — E11.9 TYPE 2 DIABETES MELLITUS WITHOUT COMPLICATIONS: ICD-10-CM

## 2024-11-20 DIAGNOSIS — I11.9 HYPERTENSIVE HEART DISEASE WITHOUT HEART FAILURE: ICD-10-CM

## 2024-11-20 DIAGNOSIS — Z82.49 FAMILY HISTORY OF ISCHEMIC HEART DISEASE AND OTHER DISEASES OF THE CIRCULATORY SYSTEM: ICD-10-CM

## 2024-11-20 DIAGNOSIS — Z79.4 LONG TERM (CURRENT) USE OF INSULIN: ICD-10-CM

## 2024-11-20 DIAGNOSIS — R00.2 PALPITATIONS: ICD-10-CM

## 2024-11-20 DIAGNOSIS — I48.91 UNSPECIFIED ATRIAL FIBRILLATION: ICD-10-CM

## 2024-11-20 LAB
ALBUMIN SERPL ELPH-MCNC: 4.2 G/DL — SIGNIFICANT CHANGE UP (ref 3.3–5)
ALP SERPL-CCNC: 90 U/L — SIGNIFICANT CHANGE UP (ref 40–120)
ALT FLD-CCNC: 37 U/L — SIGNIFICANT CHANGE UP (ref 10–45)
ANION GAP SERPL CALC-SCNC: 11 MMOL/L — SIGNIFICANT CHANGE UP (ref 5–17)
AST SERPL-CCNC: 37 U/L — SIGNIFICANT CHANGE UP (ref 10–40)
BASOPHILS # BLD AUTO: 0.03 K/UL — SIGNIFICANT CHANGE UP (ref 0–0.2)
BASOPHILS NFR BLD AUTO: 0.5 % — SIGNIFICANT CHANGE UP (ref 0–2)
BILIRUB SERPL-MCNC: 0.5 MG/DL — SIGNIFICANT CHANGE UP (ref 0.2–1.2)
BUN SERPL-MCNC: 17 MG/DL — SIGNIFICANT CHANGE UP (ref 7–23)
CALCIUM SERPL-MCNC: 9 MG/DL — SIGNIFICANT CHANGE UP (ref 8.4–10.5)
CHLORIDE SERPL-SCNC: 102 MMOL/L — SIGNIFICANT CHANGE UP (ref 96–108)
CO2 SERPL-SCNC: 24 MMOL/L — SIGNIFICANT CHANGE UP (ref 22–31)
CREAT SERPL-MCNC: 0.96 MG/DL — SIGNIFICANT CHANGE UP (ref 0.5–1.3)
EGFR: 97 ML/MIN/1.73M2 — SIGNIFICANT CHANGE UP
EOSINOPHIL # BLD AUTO: 0.29 K/UL — SIGNIFICANT CHANGE UP (ref 0–0.5)
EOSINOPHIL NFR BLD AUTO: 4.5 % — SIGNIFICANT CHANGE UP (ref 0–6)
ETHANOL SERPL-MCNC: <10 MG/DL — SIGNIFICANT CHANGE UP (ref 0–10)
GLUCOSE SERPL-MCNC: 122 MG/DL — HIGH (ref 70–99)
HCT VFR BLD CALC: 42.8 % — SIGNIFICANT CHANGE UP (ref 39–50)
HGB BLD-MCNC: 14.1 G/DL — SIGNIFICANT CHANGE UP (ref 13–17)
IMM GRANULOCYTES NFR BLD AUTO: 0.2 % — SIGNIFICANT CHANGE UP (ref 0–0.9)
LIDOCAIN IGE QN: 64 U/L — HIGH (ref 7–60)
LYMPHOCYTES # BLD AUTO: 3.4 K/UL — HIGH (ref 1–3.3)
LYMPHOCYTES # BLD AUTO: 52.6 % — HIGH (ref 13–44)
MAGNESIUM SERPL-MCNC: 1.5 MG/DL — LOW (ref 1.6–2.6)
MCHC RBC-ENTMCNC: 28.8 PG — SIGNIFICANT CHANGE UP (ref 27–34)
MCHC RBC-ENTMCNC: 32.9 G/DL — SIGNIFICANT CHANGE UP (ref 32–36)
MCV RBC AUTO: 87.3 FL — SIGNIFICANT CHANGE UP (ref 80–100)
MONOCYTES # BLD AUTO: 0.91 K/UL — HIGH (ref 0–0.9)
MONOCYTES NFR BLD AUTO: 14.1 % — HIGH (ref 2–14)
NEUTROPHILS # BLD AUTO: 1.83 K/UL — SIGNIFICANT CHANGE UP (ref 1.8–7.4)
NEUTROPHILS NFR BLD AUTO: 28.1 % — LOW (ref 43–77)
NRBC # BLD: 0 /100 WBCS — SIGNIFICANT CHANGE UP (ref 0–0)
PLATELET # BLD AUTO: 326 K/UL — SIGNIFICANT CHANGE UP (ref 150–400)
POTASSIUM SERPL-MCNC: 4 MMOL/L — SIGNIFICANT CHANGE UP (ref 3.5–5.3)
POTASSIUM SERPL-SCNC: 4 MMOL/L — SIGNIFICANT CHANGE UP (ref 3.5–5.3)
PROT SERPL-MCNC: 7.9 G/DL — SIGNIFICANT CHANGE UP (ref 6–8.3)
RBC # BLD: 4.9 M/UL — SIGNIFICANT CHANGE UP (ref 4.2–5.8)
RBC # FLD: 13.3 % — SIGNIFICANT CHANGE UP (ref 10.3–14.5)
SODIUM SERPL-SCNC: 137 MMOL/L — SIGNIFICANT CHANGE UP (ref 135–145)
TROPONIN T, HIGH SENSITIVITY RESULT: 16 NG/L — SIGNIFICANT CHANGE UP (ref 0–51)
WBC # BLD: 6.47 K/UL — SIGNIFICANT CHANGE UP (ref 3.8–10.5)
WBC # FLD AUTO: 6.47 K/UL — SIGNIFICANT CHANGE UP (ref 3.8–10.5)

## 2024-11-20 PROCEDURE — 99285 EMERGENCY DEPT VISIT HI MDM: CPT

## 2024-11-20 PROCEDURE — 71045 X-RAY EXAM CHEST 1 VIEW: CPT

## 2024-11-20 PROCEDURE — 99285 EMERGENCY DEPT VISIT HI MDM: CPT | Mod: 25

## 2024-11-20 PROCEDURE — 85025 COMPLETE CBC W/AUTO DIFF WBC: CPT

## 2024-11-20 PROCEDURE — 36415 COLL VENOUS BLD VENIPUNCTURE: CPT

## 2024-11-20 PROCEDURE — 83735 ASSAY OF MAGNESIUM: CPT

## 2024-11-20 PROCEDURE — 71045 X-RAY EXAM CHEST 1 VIEW: CPT | Mod: 26

## 2024-11-20 PROCEDURE — 80307 DRUG TEST PRSMV CHEM ANLYZR: CPT

## 2024-11-20 PROCEDURE — 83690 ASSAY OF LIPASE: CPT

## 2024-11-20 PROCEDURE — 96374 THER/PROPH/DIAG INJ IV PUSH: CPT

## 2024-11-20 PROCEDURE — 93005 ELECTROCARDIOGRAM TRACING: CPT

## 2024-11-20 PROCEDURE — 84484 ASSAY OF TROPONIN QUANT: CPT

## 2024-11-20 PROCEDURE — 93010 ELECTROCARDIOGRAM REPORT: CPT

## 2024-11-20 PROCEDURE — 80053 COMPREHEN METABOLIC PANEL: CPT

## 2024-11-20 RX ORDER — FAMOTIDINE 10 MG/ML
20 INJECTION INTRAVENOUS ONCE
Refills: 0 | Status: COMPLETED | OUTPATIENT
Start: 2024-11-20 | End: 2024-11-20

## 2024-11-20 RX ORDER — FUROSEMIDE 40 MG
40 TABLET ORAL ONCE
Refills: 0 | Status: DISCONTINUED | OUTPATIENT
Start: 2024-11-20 | End: 2024-11-20

## 2024-11-20 RX ADMIN — FAMOTIDINE 20 MILLIGRAM(S): 10 INJECTION INTRAVENOUS at 02:16

## 2024-11-20 NOTE — ED PROVIDER NOTE - NSFOLLOWUPCLINICS_GEN_ALL_ED_FT
Cardiology at [facility]  Cardiology  [address] [place] [address], [address] [place] [address]  Phone: [phone]  Fax:

## 2024-11-20 NOTE — ED PROVIDER NOTE - PROGRESS NOTE DETAILS
Labs and imaging negative patient feeling well repeat heart rate 85, pt 98% on room air stable for DC

## 2024-11-20 NOTE — ED PROVIDER NOTE - CARE PLAN
1 Principal Discharge DX:	Palpitations  Secondary Diagnosis:	Atypical chest pain  Secondary Diagnosis:	Elevated lipase

## 2024-11-20 NOTE — ED PROVIDER NOTE - PATIENT PORTAL LINK FT
You can access the FollowMyHealth Patient Portal offered by [facility] by registering at the following website: [email or web address]. By joining [employer]’s FollowMyHealth portal, you will also be able to view your health information using other applications (apps) compatible with our system.

## 2024-11-20 NOTE — ED PROVIDER NOTE - OBJECTIVE STATEMENT
49-year-old male history of A-fib on Eliquis hypertension DM2 on insulin(did not take insulin tonight)  prior metatarsal amputations here today with palpitations and left-sided shoulder discomfort which started while at work tonight.  Denies cocaine use or alcohol use tonight.  No fever chills cough polyuria or polydipsia.  No associated shortness of breath.  Was concerned about his A-fib.  No hematochezia or melena.  No vomiting or abdominal pain.

## 2024-11-20 NOTE — ED PROVIDER NOTE - CLINICAL SUMMARY MEDICAL DECISION MAKING FREE TEXT BOX
49-year-old male with palpitations mildly tachycardic and anxious appearing at triage will evaluate for A-fib atypical ACS anxiety hyperglycemia symptomatic   CBC CMP troponin EKG IV fluid reassess   EKG normal sinus rhythm 78 leftward axis with LVH benign early repolarization no STEMI

## 2024-11-25 ENCOUNTER — APPOINTMENT (OUTPATIENT)
Dept: HEART AND VASCULAR | Facility: CLINIC | Age: 49
End: 2024-11-25

## 2024-12-03 NOTE — PROGRESS NOTE ADULT - ATTENDING COMMENTS
Her/She
Pt seen and examined by me at bedside this AM.  Plan discussed with housestaff in detail.
Patient was seen and examined with the resident team today.  I agree with Dr. Robins's assessment and plan with the following exceptions/additions:     Briefly, this is a 47yo gentleman with poorly controlled IDDM2 c/b DM foot ulcers, infections and prior OM, HTN, HLD and active cocaine use who p/w a non-healing R-foot ulcer, now awaiting the OR w/podiatry.     #Infected DM Foot Ulcer - OR today; on Zosyn; f/u intra-op cultures; monitor for post-op pain; wound care per Podiatry   #Poorly controlled IDDM2 - restart Lantus this evening at 20U and titrate to home dose of 30U as able; needs optimization of DM prior to discharge including DM Management; will need close Endo f/u upon discharge  #HTN - holding ARB; can restart tomorrow AM   #DVT PPx - on hold for the OR  #Dispo - home once cleared    Saba Guzman  [phone]

## 2024-12-08 NOTE — ED ADULT TRIAGE NOTE - AS O2 DELIVERY
[facility] EMERGENCY DEPARTMENT NOTE    Patient : Maine Morales Age: 73 year old Sex: female   MRN: [id number] Encounter Date: 12/8/2024    History of Present Illness      Chief Complaint   Patient presents with    Leg Swelling       Maine Morales is a 73 year old female presenting to the ED with chf, on diuretics, now with increased leg swelling, SOB. gained 10 lbs. states she had not been taking diuretics during past two weeks because of back pain and inability to get to the bathroom.  she is back on the water pill now,  bumex BID but does not know dose. here with friend. no chest pain. .     Past Medical History     Allergies   Allergen Reactions    Zosyn Other (See Comments)       Past Medical History:   Diagnosis Date    Acute kidney injury (CMD) 11/14/2021    Acute on chronic heart failure with preserved ejection fraction (CMD)     Acute right-sided heart failure (CMD) 11/14/2021    Congestive cardiac failure (CMD)     RHF with RVE, + pulmonary hypertension    COPD (chronic obstructive pulmonary disease) (CMD)     oxygen dependent 3L    Debility     Essential hypertension 12/14/2021    Morbid obesity with BMI of 40.0-44.9, adult (CMD)     Noncompliance     DENISE (obstructive sleep apnea)     noncompliant with BIPAP    Pneumonia of both lower lobes due to infectious organism 12/10/2021    Shortness of breath 12/9/2021    Tobacco abuse     Transaminitis 11/24/2021       No past surgical history on file.    Family History   Problem Relation Age of Onset    Myocardial Infarction Father     Asthma Sister        Social History     Tobacco Use    Smoking status: Former     Current packs/day: 0.00     Types: Cigarettes     Quit date: 11/12/2021     Years since quitting: 3.0    Smokeless tobacco: Never    Tobacco comments:     4/1/22: Pt reported she has not smoked since her November 2021 hospitalization    Vaping Use    Vaping status: never used   Substance Use Topics    Alcohol use: Never    Drug use: Never        Physical Exam     ED Triage Vitals [12/08/24 1559]   ED Triage Vitals Group      Temp 98.9 °F (37.2 °C)      Heart Rate 83      Resp 18      /54      SpO2 95 %      EtCO2 mmHg       Height 5' 5\" (1.651 m)      Weight 285 lb (129.3 kg)      Weight Scale Used Standing scale      BMI (Calculated) 47.43      IBW/kg (Calculated) 57       Physical Exam  Constitutional:       General: She is not in acute distress.     Appearance: She is not toxic-appearing.   HENT:      Head: Normocephalic and atraumatic.      Nose: Nose normal.      Mouth/Throat:      Mouth: Mucous membranes are moist.      Neck: Neck supple. No rigidity.   Eyes:      General:         Right eye: No discharge.         Left eye: No discharge.      Conjunctiva/sclera: Conjunctivae normal.   Cardiovascular:      Rate and Rhythm: Normal rate and regular rhythm.      Pulses: Normal pulses.   Pulmonary:      Effort: Pulmonary effort is normal. No respiratory distress.      Breath sounds: No stridor.   Musculoskeletal:         General: No deformity or signs of injury.      Comments: 2+ edema bilat lE    Skin:     General: Skin is warm and dry.      Coloration: Skin is not jaundiced.   Neurological:      Mental Status: She is alert and oriented to person, place, and time.      Motor: No weakness.   Psychiatric:         Mood and Affect: Mood normal.         Behavior: Behavior normal.         Lab Results     Results for orders placed or performed during the hospital encounter of 12/08/24   Comprehensive Metabolic Panel    Specimen: Blood, Venous   Result Value Ref Range    Fasting Status      Sodium 136 135 - 145 mmol/L    Potassium 3.8 3.4 - 5.1 mmol/L    Chloride 94 (L) 97 - 110 mmol/L    Carbon Dioxide 34 (H) 21 - 32 mmol/L    Anion Gap 12 7 - 19 mmol/L    Glucose 141 (H) 70 - 99 mg/dL    BUN 44 (H) 6 - 20 mg/dL    Creatinine 1.25 (H) 0.51 - 0.95 mg/dL    Glomerular Filtration Rate 46 (L) >=60    BUN/Cr 35 (H) 7 - 25    Calcium 9.3 8.4 - 10.2 mg/dL     room air Bilirubin, Total 0.6 0.2 - 1.0 mg/dL    GOT/AST 27 <=37 Units/L    GPT/ALT 13 <64 Units/L    Alkaline Phosphatase 94 45 - 117 Units/L    Albumin 3.5 3.4 - 5.0 g/dL    Protein, Total 7.9 6.4 - 8.2 g/dL    Globulin 4.4 (H) 2.0 - 4.0 g/dL    A/G Ratio 0.8 (L) 1.0 - 2.4   TROPONIN I, HIGH SENSITIVITY    Specimen: Blood, Venous   Result Value Ref Range    Troponin I, High Sensitivity 22 <52 ng/L   NT proBNP    Specimen: Blood, Venous   Result Value Ref Range    NT-proBNP 175 (H) <=125 pg/mL   Magnesium    Specimen: Blood, Venous   Result Value Ref Range    Magnesium 2.3 1.7 - 2.4 mg/dL   CBC with Automated Differential (performable only)    Specimen: Blood, Venous   Result Value Ref Range    WBC 10.2 4.2 - 11.0 K/mcL    RBC 3.36 (L) 4.00 - 5.20 mil/mcL    HGB 9.6 (L) 12.0 - 15.5 g/dL    HCT 30.5 (L) 36.0 - 46.5 %    MCV 90.8 78.0 - 100.0 fl    MCH 28.6 26.0 - 34.0 pg    MCHC 31.5 (L) 32.0 - 36.5 g/dL    RDW-CV 15.3 (H) 11.0 - 15.0 %    RDW-SD 50.8 (H) 39.0 - 50.0 fL     140 - 450 K/mcL    NRBC 0 <=0 /100 WBC    Neutrophil, Percent 84 %    Lymphocytes, Percent 8 %    Mono, Percent 8 %    Eosinophils, Percent 0 %    Basophils, Percent 0 %    Immature Granulocytes 0 %    Absolute Neutrophils 8.5 (H) 1.8 - 7.7 K/mcL    Absolute Lymphocytes 0.8 (L) 1.0 - 4.0 K/mcL    Absolute Monocytes 0.8 0.3 - 0.9 K/mcL    Absolute Eosinophils  0.0 0.0 - 0.5 K/mcL    Absolute Basophils 0.0 0.0 - 0.3 K/mcL    Absolute Immature Granulocytes 0.0 0.0 - 0.2 K/mcL       EKG Results     Encounter Date: 12/08/24   ECG   Result Value    Ventricular Rate EKG/Min (BPM) 81    Atrial Rate (BPM) 81    NM-Interval (MSEC) 180    QRS-Interval (MSEC) 96    QT-Interval (MSEC) 390    QTc 453    P Axis (Degrees) 77    R Axis (Degrees) -35    T Axis (Degrees) 39    REPORT TEXT      Normal sinus rhythm  Left axis deviation  Low voltage QRS  Cannot rule out  Anterior infarct  , age undetermined  Abnormal ECG  When compared with ECG of  12-NOV-2021  19:51,  PREVIOUS ECG IS PRESENT  Confirmed by JAQUI JIMENEZ MD ([id number]) on 12/9/2024 3:34:35 PM         EKG Interpretation: no STEMI, nsr  Cardiac monitor: nsr, rate 81, no ST elevation per my interpretation.     Radiology Results     Imaging Results              XR CHEST AP OR PA (Final result)  Result time 12/08/24 17:18:39      Final result                   Impression:      Limited portable chest examination without evidence of interstitial or  airspace edema. Chronic pleural and parenchymal scarring at the right lung  base remains unchanged. The possibility of pathology in the left lower lung  field remains a consideration. PA and lateral views may be helpful with  possible.    Electronically Signed by: MYKEL MACHADO MD   Signed on: 12/8/2024 5:18 PM   Workstation ID: [id number]               Narrative:    EXAMINATION: Chest single view study on   12/8/2024 4:56 PM     CLINICAL INDICATION: 73-year-old obese woman with lower extremity swelling    COMPARISON: 11/17/2021    FINDINGS:   A single AP upright   portable view of the chest was obtained. There is  significant limitation due to large body habitus, positioning, and portable  technique.  The heart size is not well evaluated due to prominent hazy opacity in the  left lower hemithorax accentuated by the apical lordotic positioning.  Chronic pleural and parenchymal scarring at the right lung base is  unchanged.  The lungs are clear.  The costophrenic angles are sharp.  Bones, joints, and soft tissues are unremarkable.                                      ED Course     Procedures    ED Medication Orders (From admission, onward)      None            Vitals:    12/08/24 1559   BP: 119/54   Pulse: 83   Resp: 18   Temp: 98.9 °F (37.2 °C)   SpO2: 95%   Weight: 129.3 kg (285 lb)   Height: 5' 5\" (1.651 m)       Is the patient potentially septic? No, explain: CHF    Critical Care Time  Critical care was provided to prevent clinically significant and  life-threatening deterioration of the patient’s condition. Pt required greater than 35 minutes of critical care time including initial evaluation, time spent at the bedside, multiple re-evaluations, discussion with the patient and/or family, and consultants as necessary.  This time also includes time reviewing the medical records, laboratory and x-ray results, and time spent documenting.  This time excluded any and all procedures.    Medical Decision Making        Maine Morales is a 73 year old female who presented with   Chief Complaint   Patient presents with    Leg Swelling     Patient is hemodynamically stable, afebrile. Pt with CHF, non-compliant with meds, leg swelling. CXR no pulm edema, however she is fluid overloaded based on physical exam. Ordered 40mg IV lasix.       ED Course as of 12/08/24 1826   Sun Dec 08, 2024   1822 HGB(!): 9.6  Stable anemia.  [FS]   1822 Creatinine(!): 1.25  Stable CKD.  [FS]   1823 Trop, BNP, unremarkable. Will OBS for fluid overload, diuresis.  [FS]      ED Course User Index  [FS] Jerry Garcia MD      Due to patients CHF, mobility limitations and social situation, I believe she will require greater than 2 midnights for treatment. I will admit her as inpatient status.     Clinical Impression     ED Diagnosis   1. Acute on chronic congestive heart failure, unspecified heart failure type  (CMD)        2. Hypervolemia, unspecified hypervolemia type        3. Morbid obesity  (CMD)            Disposition        Admit 12/8/2024  6:25 PM  Telemetry Bed?: Yes  Admitting Physician: ZAIRA TANG [[id number]]  Is this a telephone or verbal order?: This is a telephone order from the admitting physician    Medications   furosemide (LASIX INJECT) injection 40 mg (has no administration in time range)       New Prescriptions    No medications on file         Jerry Garcia MD   12/8/2024 5:46 PM     This note was made using voice dictation and may include inadvertent errors due to  the dictation software. Please contact for clarification regarding any noted discrepancies.           Jerry Garcia MD  12/11/24 9542

## 2025-01-05 NOTE — DISCHARGE NOTE NURSING/CASE MANAGEMENT/SOCIAL WORK - NSDCPEFALRISK_GEN_ALL_CORE
Reviewed  and supervised the care ,I agreed with the assessment and plan       Kelsi Hernandez MD  01/04/25 8695     For information on Fall & injury Prevention, visit [email or web address]

## 2025-01-27 NOTE — ED ADULT NURSE NOTE - NS ED NURSE LEVEL OF CONSCIOUSNESS AFFECT
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        Assessment:      Pressure Injury Location: sacrum    1/27    Last photo: 1/27  Wound type: Pressure Injury     Pressure Injury Stage: site of previously healed pressure injury, present on admission   Wound history/plan of care:   unknown worst stage, currently appears to be a stage 2 but was possibly deeper. Was noted as a reinjury on her last assessment here 12/4/24    Wound base: 100 % Epidermis and Pink     Palpation of the wound bed: normal      Drainage: none     Description of drainage: none     Measurements (length x width x depth, in cm) 0.5  x 0.3  x  0.1 cm      Tunneling N/A     Undermining N/A  Periwound skin: Intact      Color: normal and consistent with surrounding tissue      Temperature: normal   Odor: none  Pain: mild, tender  Pain intervention prior to dressing change: slow and gentle cares   Treatment goal: Protection  STATUS: initial assessment  Supplies ordered: supplies stored on unit    My PI Risk Assessment     Sensory Perception: 3 - Slightly Limited     Moisture: 2 - Very moist      Activity: 1 - Bedfast      Mobility: 3 - Slightly limited      Nutrition: 3 - Adequate     Friction/Shear: 2 - Potential problem      TOTAL: 14    Treatment Plan:     sacrum  Q shift, Every 3 days and as needed  Peel back dressing Q shift for evaluation, reapply or change as needed   Cleanse the area with NS and pat " "dry.  Apply No sting film barrier to periwound skin.  Dust open areas with ostomy powder and pat into place.   Cover wound with Sacral Mepilex (#920100)  Turn and reposition Q 2hrs side to side only.  Ensure pt has Tanner-cushion while sitting up in the chair.  FYI- If pt has constant incontinent loose stools needing dressing changes Q shift please discontinue the Mepilex dressing and apply criticaid barrier paste BID and PRN.       Perianal: BID and as needed  Cleanse the area with Marry cleanse and protect, very gently with soft cloth.  Apply THIN layer of critic aid paste on top of it.  With repeat application, do not scrub the paste, only remove soiled paste and reapply.  If complete removal of paste is necessary use baby oil/mineral oil (#642421) and soft wash cloth.  Ensure pt has Tanner-cushion (#663717) while sitting up in the chair.  Use only one Covidien pad in between mattress and pt. No brief while in bed.      Pressure Injury Prevention (PIP) Plan:  If patient is declining pressure injury prevention interventions: Explore reason why and address patient's concerns, Educate on pressure injury risk and prevention intervention(s), If patient is still declining, document \"informed refusal\" , and Ensure Care team is aware ( provider, charge nurse, etc)  Mattress: Follow bed algorithm, add Low Air Loss (Air+) mattress pump if skin is very moist or constantly moist.   HOB: Maintain at or below 30 degrees, unless contraindicated  Repositioning in bed: Every 1-2 hours , Left/right positioning; avoid supine, Raise foot of bed prior to raising head of bed, to reduce patient sliding down (shear), and Frequent microturns using positioning wedges, as patient tolerates  Heels: Keep elevated off mattress, Pillows under calves, and Heel lift boots  Protective Dressing: Sacral Mepilex for prevention (#295283),  especially for the agitated patient   Positioning Equipment:Positioning wedges (#433721) to help maintain 30 degree " side lying position   Chair positioning: Chair cushion (#395535) , Assist patient to reposition hourly, and Do NOT use a donut for sitting (this increases pressure to smaller area and creates a higher potential for injury)    If patient has a buttock pressure injury, or high risk for PI use chair cushion or SPS.  Moisture Management: Perineal cleansing /protection: Follow Incontinence Protocol, Avoid brief in bed, Clean and dry skin folds with bathing , Consider InterDry (#444102) between folds, and Moisturize dry skin  Under Devices: Inspect skin under all medical devices during skin inspection , Ensure tubes are stabilized without tension, and Ensure patient is not lying on medical devices or equipment when repositioned  Ask provider to discontinue device when no longer needed.    Orders: Written    RECOMMEND PRIMARY TEAM ORDER: None, at this time  Education provided: plan of care  Discussed plan of care with: Patient and Nurse  Notify [facility] if wound(s) deteriorate.  Nursing to notify the Provider(s) and re-consult the WOC Nurse if new skin concern.    DATA:     Current support surface: Standard  Standard gel mattress (Isoflex)  Containment of urine/stool: Incontinence Protocol  BMI: Body mass index is 27.63 kg/m .   Active diet order: Orders Placed This Encounter      Regular Diet Adult     Output: I/O last 3 completed shifts:  In: 700 [P.O.:700]  Out: 900 [Urine:900]     Labs:   Recent Labs   Lab 01/27/25  0318 01/26/25  0830 01/25/25  1254   ALBUMIN  --   --  2.3*   HGB 8.7*   < > 10.2*   WBC 6.4   < > 10.2    < > = values in this interval not displayed.     Pressure injury risk assessment:   Sensory Perception: 3-->slightly limited  Moisture: 3-->occasionally moist  Activity: 2-->chairfast  Mobility: 2-->very limited  Nutrition: 1-->very poor  Friction and Shear: 1-->problem  Yogi Score: 12    Pager no longer is use, please contact through Vocera   [employer] group: [facility] Nurse Kimberly   Dept. Office Number:  [phone]       Calm

## 2025-03-11 ENCOUNTER — EMERGENCY (EMERGENCY)
Facility: HOSPITAL | Age: 50
LOS: 1 days | Discharge: HOME | End: 2025-03-11
Admitting: STUDENT IN AN ORGANIZED HEALTH CARE EDUCATION/TRAINING PROGRAM
Payer: COMMERCIAL

## 2025-03-11 VITALS
HEART RATE: 68 BPM | SYSTOLIC BLOOD PRESSURE: 170 MMHG | OXYGEN SATURATION: 97 % | RESPIRATION RATE: 18 BRPM | DIASTOLIC BLOOD PRESSURE: 98 MMHG | TEMPERATURE: 98 F | WEIGHT: 235.01 LBS

## 2025-03-11 DIAGNOSIS — Z89.429 ACQUIRED ABSENCE OF OTHER TOE(S), UNSPECIFIED SIDE: Chronic | ICD-10-CM

## 2025-03-11 DIAGNOSIS — Z94.5 SKIN TRANSPLANT STATUS: Chronic | ICD-10-CM

## 2025-03-11 DIAGNOSIS — Z96.0 PRESENCE OF UROGENITAL IMPLANTS: Chronic | ICD-10-CM

## 2025-03-11 PROCEDURE — 99283 EMERGENCY DEPT VISIT LOW MDM: CPT | Mod: 25

## 2025-03-11 PROCEDURE — 73130 X-RAY EXAM OF HAND: CPT

## 2025-03-11 PROCEDURE — 99284 EMERGENCY DEPT VISIT MOD MDM: CPT

## 2025-03-11 PROCEDURE — 73130 X-RAY EXAM OF HAND: CPT | Mod: 26,RT

## 2025-03-11 RX ORDER — IBUPROFEN 200 MG
600 TABLET ORAL ONCE
Refills: 0 | Status: COMPLETED | OUTPATIENT
Start: 2025-03-11 | End: 2025-03-11

## 2025-03-11 RX ADMIN — Medication 600 MILLIGRAM(S): at 13:13

## 2025-03-11 NOTE — ED ADULT NURSE NOTE - OBJECTIVE STATEMENT
Pt is a 48y/o M w/ PMHx DM, presenting to the ED w/ c/o of R-thumb / R-hand pain that started suddenly today. Denies known injury, numbness/tingling. Pt A/Ox3, speaking in clear/complete sentences. Respirations easy/even and unlabored on RA. Pt ambulates independently w/ steady gait. Pt resting comfortably in chair, no acute distress. Pending ED provider serene.

## 2025-03-11 NOTE — ED PROVIDER NOTE - PHYSICAL EXAMINATION
CONSTITUTIONAL: Well-appearing;  in no apparent distress.   HEAD: Normocephalic; atraumatic.   EYES: PERRL; EOM intact; conjunctiva and sclera clear  ENT: normal nose; no rhinorrhea; normal pharynx with no erythema or lesions.   NECK: Supple; non-tender;   CARDIOVASCULAR: rrr,  RESPIRATORY: Breathing easily;   MSK: R hand- no swelling or redness, +tenderness to base of thumb, FROM. NV intact

## 2025-03-11 NOTE — ED PROVIDER NOTE - PATIENT PORTAL LINK FT
You can access the FollowMyHealth Patient Portal offered by [facility] by registering at the following website: [email or web address]. By joining [facility]’s FollowMyHealth portal, you will also be able to view your health information using other applications (apps) compatible with our system.

## 2025-03-11 NOTE — ED ADULT NURSE NOTE - NSFALLUNIVINTERV_ED_ALL_ED
Bed/Stretcher in lowest position, wheels locked, appropriate side rails in place/Call bell, personal items and telephone in reach/Instruct patient to call for assistance before getting out of bed/chair/stretcher/Non-slip footwear applied when patient is off stretcher/Philip to call system/Physically safe environment - no spills, clutter or unnecessary equipment/Purposeful proactive rounding/Room/bathroom lighting operational, light cord in reach

## 2025-03-11 NOTE — ED PROVIDER NOTE - CLINICAL SUMMARY MEDICAL DECISION MAKING FREE TEXT BOX
49-year-old male with past medical history of diabetes, right-hand-dominant complaining of right thumb pain that started this morning.  Denies trauma.  Pain worse when moving the thumb.  Denies fever, chills, numbness, tingling. R hand- no swelling or redness, +tenderness to base of thumb, FROM. NV intact. Xr neg. ?tendonitis vs arthritis vs inflammation. advised nsaids, ortho f/u

## 2025-03-11 NOTE — ED PROVIDER NOTE - OBJECTIVE STATEMENT
49-year-old male with past medical history of diabetes, right-hand-dominant complaining of right thumb pain that started this morning.  Denies trauma.  Pain worse when moving the thumb.  Denies fever, chills, numbness, tingling.  Has not taken anything for pain.
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
## 2025-03-13 DIAGNOSIS — M79.641 PAIN IN RIGHT HAND: ICD-10-CM

## 2025-03-13 DIAGNOSIS — E11.9 TYPE 2 DIABETES MELLITUS WITHOUT COMPLICATIONS: ICD-10-CM
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
## 2025-03-13 NOTE — PRE-OP CHECKLIST - TEMPERATURE IN FAHRENHEIT (DEGREES F)
called pt to inform sfs is  and she should renew before her appt. on 25 to avoid cx. Pt agreed will follow up-ABDI   
97.9

## 2025-03-21 NOTE — ED PROVIDER NOTE - PHYSICAL EXAMINATION
Yes CONSTITUTIONAL: Well-appearing; in no apparent distress.   HEAD: Normocephalic; atraumatic.   EYES:  conjunctiva and sclera clear  ENT: normal nose; no rhinorrhea; normal pharynx with no erythema or lesions.   NECK: Supple; non-tender;   CARDIOVASCULAR: Normal S1, S2; no murmurs, rubs, or gallops. Regular rate and rhythm.   RESPIRATORY: Breathing easily; breath sounds clear and equal bilaterally; no wheezes, rhonchi, or rales.  GI: Soft; non-distended; non-tender; no palpable organomegaly.   EXT: No cyanosis or edema; N/V intact, dressing to both feet, R foot purulent drainage, positive odor, and multiple ulcers to plant of R foot.   SKIN: Normal for age and race; warm; dry; good turgor; no apparent lesions or rash.   NEURO: A & O x 3; face symmetric; grossly unremarkable.   PSYCHOLOGICAL: The patient’s mood and manner are appropriate.

## 2025-04-14 NOTE — ED ADULT NURSE NOTE - CHIEF COMPLAINT
If your child received fluoride varnish today, here are some general guidelines for the rest of the day.    Your child can eat and drink right away after varnish is applied but should AVOID hot liquids or sticky/crunchy foods for 24 hours.    Don't brush or floss your teeth for the next 4-6 hours and resume regular brushing, flossing and dental checkups after this initial time period.    Patient Education    BRIGHT FUTURES HANDOUT- PARENT  30 MONTH VISIT  Here are some suggestions from [employer] experts that may be of value to your family.       FAMILY ROUTINES  Enjoy meals together as a family and always include your child.  Have quiet evening and bedtime routines.  Visit zoos, museums, and other places that help your child learn.  Be active together as a family.  Stay in touch with your friends. Do things outside your family.  Make sure you agree within your family on how to support your child s growing independence, while maintaining consistent limits.    LEARNING TO TALK AND COMMUNICATE  Read books together every day. Reading aloud will help your child get ready for .  Take your child to the library and story times.  Listen to your child carefully and repeat what she says using correct grammar.  Give your child extra time to answer questions.  Be patient. Your child may ask to read the same book again and again.    GETTING ALONG WITH OTHERS  Give your child chances to play with other toddlers. Supervise closely because your child may not be ready to share or play cooperatively.  Offer your child and his friend multiple items that they may like. Children need choices to avoid battles.  Give your child choices between 2 items your child prefers. More than 2 is too much for your child.  Limit TV, tablet, or smartphone use to no more than 1 hour of high-quality programs each day. Be aware of what your child is watching.  Consider making a family media plan. It helps you make rules for media use and  balance screen time with other activities, including exercise.    GETTING READY FOR   Think about  or group  for your child. If you need help selecting a program, we can give you information and resources.  Visit a teachers  store or bookstore to look for books about preparing your child for school.  Join a playgroup or make playdates.  Make toilet training easier.  Dress your child in clothing that can easily be removed.  Place your child on the toilet every 1 to 2 hours.  Praise your child when he is successful.  Try to develop a potty routine.  Create a relaxed environment by reading or singing on the potty.    SAFETY  Make sure the car safety seat is installed correctly in the back seat. Keep the seat rear facing until your child reaches the highest weight or height allowed by the . The harness straps should be snug against your child s chest.  Everyone should wear a lap and shoulder seat belt in the car. Don t start the vehicle until everyone is buckled up.  Never leave your child alone inside or outside your home, especially near cars or machinery.  Have your child wear a helmet that fits properly when riding bikes and trikes or in a seat on adult bikes.  Keep your child within arm s reach when she is near or in water.  Empty buckets, play pools, and tubs when you are finished using them.  When you go out, put a hat on your child, have her wear sun protection clothing, and apply sunscreen with SPF of 15 or higher on her exposed skin. Limit time outside when the sun is strongest (11:00 am-3:00 pm).  Have working smoke and carbon monoxide alarms on every floor. Test them every month and change the batteries every year. Make a family escape plan in case of fire in your home.    WHAT TO EXPECT AT YOUR CHILD S 3 YEAR VISIT  We will talk about  Caring for your child, your family, and yourself  Playing with other children  Encouraging reading and talking  Eating healthy and  staying active as a family  Keeping your child safe at home, outside, and in the car          Helpful Resources: Smoking Quit Line: 539.992.3573  Poison Help Line:  458.738.8269  Information About Car Safety Seats: www.safercar.gov/parents  Toll-free Auto Safety Hotline: 244.732.7823  Consistent with Bright Futures: Guidelines for Health Supervision of Infants, Children, and Adolescents, 4th Edition  For more information, go to https://brightfutures.aap.org.              The patient is a 46y Male complaining of fever.

## 2025-04-28 NOTE — PROCEDURE NOTE - NSANESTHESIA_GEN_A_CORE
----- Message from Deputy sent at 4/28/2025 12:12 PM CDT -----  Name of Who is Calling: JOVI STRAUSS [[id number]]What is the request in detail: Pt returned call and is asking to message her via portal.Please contact to further discuss and advise.Can the clinic reply by [employer]: YWhat Number to Call Back if not in [employer]: portal  
1% lidocaine

## 2025-05-20 NOTE — OCCUPATIONAL THERAPY INITIAL EVALUATION ADULT - PLANNED THERAPY INTERVENTIONS, OT EVAL
Health Maintenance       COVID-19 Vaccine (3 - 2024-25 season)  Overdue since 9/1/2024    Varicella Vaccine (1 of 2 - 13+ 2-dose series)  Never done           Following review of the above:  Patient is not proceeding with: COVID-19 and Varicella    Note: Refer to final orders and clinician documentation.       ADL retraining/balance training/bed mobility training/strengthening/transfer training

## 2025-06-02 NOTE — ED ADULT NURSE NOTE - PRIMARY CARE PROVIDER
Received request via: Pharmacy    Medication Name/Dosage carbidopa-levodopa SR (SINEMET CR)  MG per tablet    When was medication last prescribed 08/14/24    How many refills were previously provided 2    How many Refills does he patient have left from last prescription 0    Was the patient seen in the last year in this department? Yes   Date of last office visit 02/25/25     Per last Neurology Office Visit, when was the date of next follow up visit set for?                            Date of office visit follow up request None     Does the patient have an upcoming appointment? Yes   If yes, when 08/19/25             If no, schedule appointment N/A    Does the patient have [facility] Plus and need 100 day supply (blood pressure, diabetes and cholesterol meds only)? No     Unk

## 2025-06-10 NOTE — ED PROVIDER NOTE - CPE EDP SKIN NORM
Patient is requesting clobetasol OINTMENT for poison ivy rash. No recent Rx for this, solution was sent instead.    Rx pended for CLOBETASOL PROPIONATE 0.05 % EX OINT, please review and sign if approved.   normal...

## 2025-06-13 NOTE — ED ADULT TRIAGE NOTE - ESI TRIAGE ACUITY LEVEL, MLM
In an effort to ensure that our patients [employer]Well, a Team Member has reviewed your chart and identified an opportunity to provide the best care possible. An attempt was made to discuss or schedule due or overdue Preventive or Chronic Condition care.Care Gaps identified: Wellness Visits.    The Outcome was Contact was not made, left message. We are attempting to schedule a yearly wellness visit. If you have any questions or need help with scheduling, contact your primary care provider..   Type of Appointment needed: Comprehensive Annual Visit    4 3

## 2025-06-27 ENCOUNTER — EMERGENCY (EMERGENCY)
Facility: HOSPITAL | Age: 50
LOS: 1 days | End: 2025-06-27
Attending: STUDENT IN AN ORGANIZED HEALTH CARE EDUCATION/TRAINING PROGRAM | Admitting: STUDENT IN AN ORGANIZED HEALTH CARE EDUCATION/TRAINING PROGRAM
Payer: COMMERCIAL

## 2025-06-27 VITALS
SYSTOLIC BLOOD PRESSURE: 159 MMHG | HEIGHT: 70 IN | HEART RATE: 85 BPM | DIASTOLIC BLOOD PRESSURE: 98 MMHG | WEIGHT: 229.94 LBS | OXYGEN SATURATION: 97 % | RESPIRATION RATE: 18 BRPM | TEMPERATURE: 98 F

## 2025-06-27 VITALS
TEMPERATURE: 98 F | DIASTOLIC BLOOD PRESSURE: 93 MMHG | RESPIRATION RATE: 18 BRPM | OXYGEN SATURATION: 98 % | HEART RATE: 80 BPM | SYSTOLIC BLOOD PRESSURE: 158 MMHG

## 2025-06-27 DIAGNOSIS — Z94.5 SKIN TRANSPLANT STATUS: Chronic | ICD-10-CM

## 2025-06-27 DIAGNOSIS — Z89.429 ACQUIRED ABSENCE OF OTHER TOE(S), UNSPECIFIED SIDE: Chronic | ICD-10-CM

## 2025-06-27 DIAGNOSIS — Z96.0 PRESENCE OF UROGENITAL IMPLANTS: Chronic | ICD-10-CM

## 2025-06-27 LAB
ALBUMIN SERPL ELPH-MCNC: 4.1 G/DL — SIGNIFICANT CHANGE UP (ref 3.3–5)
ALP SERPL-CCNC: 104 U/L — SIGNIFICANT CHANGE UP (ref 40–120)
ALT FLD-CCNC: 25 U/L — SIGNIFICANT CHANGE UP (ref 10–45)
ANION GAP SERPL CALC-SCNC: 10 MMOL/L — SIGNIFICANT CHANGE UP (ref 5–17)
AST SERPL-CCNC: 27 U/L — SIGNIFICANT CHANGE UP (ref 10–40)
BASOPHILS # BLD AUTO: 0.06 K/UL — SIGNIFICANT CHANGE UP (ref 0–0.2)
BASOPHILS NFR BLD AUTO: 0.7 % — SIGNIFICANT CHANGE UP (ref 0–2)
BILIRUB SERPL-MCNC: 0.3 MG/DL — SIGNIFICANT CHANGE UP (ref 0.2–1.2)
BUN SERPL-MCNC: 27 MG/DL — HIGH (ref 7–23)
CALCIUM SERPL-MCNC: 9.7 MG/DL — SIGNIFICANT CHANGE UP (ref 8.4–10.5)
CHLORIDE SERPL-SCNC: 100 MMOL/L — SIGNIFICANT CHANGE UP (ref 96–108)
CO2 SERPL-SCNC: 27 MMOL/L — SIGNIFICANT CHANGE UP (ref 22–31)
CREAT SERPL-MCNC: 1.51 MG/DL — HIGH (ref 0.5–1.3)
EGFR: 56 ML/MIN/1.73M2 — LOW
EGFR: 56 ML/MIN/1.73M2 — LOW
EOSINOPHIL # BLD AUTO: 0.17 K/UL — SIGNIFICANT CHANGE UP (ref 0–0.5)
EOSINOPHIL NFR BLD AUTO: 2.1 % — SIGNIFICANT CHANGE UP (ref 0–6)
GLUCOSE SERPL-MCNC: 201 MG/DL — HIGH (ref 70–99)
HCT VFR BLD CALC: 39.2 % — SIGNIFICANT CHANGE UP (ref 39–50)
HGB BLD-MCNC: 13.1 G/DL — SIGNIFICANT CHANGE UP (ref 13–17)
IMM GRANULOCYTES # BLD AUTO: 0.01 K/UL — SIGNIFICANT CHANGE UP (ref 0–0.07)
IMM GRANULOCYTES NFR BLD AUTO: 0.1 % — SIGNIFICANT CHANGE UP (ref 0–0.9)
LYMPHOCYTES # BLD AUTO: 3.75 K/UL — HIGH (ref 1–3.3)
LYMPHOCYTES NFR BLD AUTO: 45.2 % — HIGH (ref 13–44)
MCHC RBC-ENTMCNC: 30.3 PG — SIGNIFICANT CHANGE UP (ref 27–34)
MCHC RBC-ENTMCNC: 33.4 G/DL — SIGNIFICANT CHANGE UP (ref 32–36)
MCV RBC AUTO: 90.7 FL — SIGNIFICANT CHANGE UP (ref 80–100)
MONOCYTES # BLD AUTO: 0.87 K/UL — SIGNIFICANT CHANGE UP (ref 0–0.9)
MONOCYTES NFR BLD AUTO: 10.5 % — SIGNIFICANT CHANGE UP (ref 2–14)
NEUTROPHILS # BLD AUTO: 3.43 K/UL — SIGNIFICANT CHANGE UP (ref 1.8–7.4)
NEUTROPHILS NFR BLD AUTO: 41.4 % — LOW (ref 43–77)
NRBC # BLD AUTO: 0 K/UL — SIGNIFICANT CHANGE UP (ref 0–0)
NRBC # FLD: 0 K/UL — SIGNIFICANT CHANGE UP (ref 0–0)
NRBC BLD AUTO-RTO: 0 /100 WBCS — SIGNIFICANT CHANGE UP (ref 0–0)
PLATELET # BLD AUTO: 314 K/UL — SIGNIFICANT CHANGE UP (ref 150–400)
PMV BLD: 10.6 FL — SIGNIFICANT CHANGE UP (ref 7–13)
POTASSIUM SERPL-MCNC: 4.3 MMOL/L — SIGNIFICANT CHANGE UP (ref 3.5–5.3)
POTASSIUM SERPL-SCNC: 4.3 MMOL/L — SIGNIFICANT CHANGE UP (ref 3.5–5.3)
PROT SERPL-MCNC: 7.9 G/DL — SIGNIFICANT CHANGE UP (ref 6–8.3)
RBC # BLD: 4.32 M/UL — SIGNIFICANT CHANGE UP (ref 4.2–5.8)
RBC # FLD: 13.9 % — SIGNIFICANT CHANGE UP (ref 10.3–14.5)
SODIUM SERPL-SCNC: 137 MMOL/L — SIGNIFICANT CHANGE UP (ref 135–145)
WBC # BLD: 8.29 K/UL — SIGNIFICANT CHANGE UP (ref 3.8–10.5)
WBC # FLD AUTO: 8.29 K/UL — SIGNIFICANT CHANGE UP (ref 3.8–10.5)

## 2025-06-27 PROCEDURE — 80053 COMPREHEN METABOLIC PANEL: CPT

## 2025-06-27 PROCEDURE — 99284 EMERGENCY DEPT VISIT MOD MDM: CPT

## 2025-06-27 PROCEDURE — 96372 THER/PROPH/DIAG INJ SC/IM: CPT

## 2025-06-27 PROCEDURE — 36415 COLL VENOUS BLD VENIPUNCTURE: CPT

## 2025-06-27 PROCEDURE — 85025 COMPLETE CBC W/AUTO DIFF WBC: CPT

## 2025-06-27 PROCEDURE — 82962 GLUCOSE BLOOD TEST: CPT

## 2025-06-27 PROCEDURE — 99283 EMERGENCY DEPT VISIT LOW MDM: CPT | Mod: 25

## 2025-06-27 RX ORDER — INSULIN GLARGINE-YFGN 100 [IU]/ML
30 INJECTION, SOLUTION SUBCUTANEOUS
Qty: 2 | Refills: 0
Start: 2025-06-27 | End: 2025-07-26

## 2025-06-27 RX ORDER — INSULIN LISPRO 100 U/ML
8 INJECTION, SOLUTION INTRAVENOUS; SUBCUTANEOUS
Qty: 9 | Refills: 0
Start: 2025-06-27

## 2025-06-27 RX ORDER — ISOPROPYL ALCOHOL, BENZOCAINE .7; .06 ML/ML; ML/ML
0 SWAB TOPICAL
Qty: 100 | Refills: 1
Start: 2025-06-27

## 2025-06-27 RX ADMIN — Medication 5 UNIT(S): at 18:40

## 2025-06-27 NOTE — ED ADULT NURSE NOTE - OBJECTIVE STATEMENT
48 yo male c/o high blood sugars, out of insulin, taking metformin daily. Endorses dry mouth. AAOx4, ambulatory with steady gait.

## 2025-06-27 NOTE — ED PROVIDER NOTE - NSFOLLOWUPINSTRUCTIONS_ED_ALL_ED_FT
Insulin Treatment for Diabetes Mellitus  Diabetes, also known as diabetes mellitus, is a long-term (chronic) disease. It occurs when the body does not properly use sugar (glucose). Glucose levels are controlled by a hormone called insulin. Insulin is made in the pancreas, which is an organ behind the stomach.  In type 1 diabetes, the pancreas does not make insulin.  In type 2 diabetes, the body does not use or respond to insulin properly (called insulin resistance). Also, in some people, the pancreas does not make enough insulin.  Treatment plans for diabetes vary. They are unique for each person. Treatment plans depend on:  The type of diabetes.  The treatment goals.  Your medical history.  People with type 1 diabetes and some with type 2 diabetes will need to take insulin as part of their treatment plan. Ask questions to understand your insulin treatment so you can be active in managing your diabetes.    Types of insulin  Insulin molecules are delicate. Insulin is destroyed by enzymes in the stomach or intestine. For this reason, insulin is not given in pill form. It is either injected under the skin or inhaled into the lungs.    You may use more than one type of insulin. The different types of insulin are described below. It is important to know the onset, peak, and duration of the type of insulin you take. The onset is when it starts lowering blood glucose. The peak is when it works the strongest. The duration is how long it works.    Insulin comes in different strengths. The most common strength is U-100, or 100 units per 1 mL of insulin. It is important to make sure you are using the right strength of insulin with the right syringe.    Rapid-acting insulin:    Onset: Within 15 minutes.  Peak: About 1–2 hours.  Duration: 2–4 hours.  Works well when taken right before a meal to quickly lower your blood glucose.  This type of insulin is available as an injection and an inhaler. You and your health care provider will decide if an injection or inhaler is best for you.  Short-acting insulin:    Onset: Within 30 minutes.  Peak: 2–3 hours.  Duration: 3–6 hours.  Should be taken about 30 minutes before you start eating a meal.  Intermediate-acting insulin:    Onset: Within 1.5–4 hours.  Peak: 4–12 hours.  Duration: 12–18 hours.  Lowers your blood glucose for a longer period of time. However, it does not work as well for lowering blood glucose right after a meal. Usually used 1–2 times per day.  Long-acting insulin:    Mimics the small amount of insulin that your pancreas usually makes throughout the day.  Onset: Within 2 hours.  Peak: There is no peak. Long-acting insulins lower blood glucose levels evenly throughout the day.  Duration: At least 24 hours. Long-acting insulins should be used one or two times a day.  Concentrated insulin:    Concentrated insulins are available in higher concentrations than U-100 insulins. Concentrated insulins are helpful for people who require high doses of insulin, usually more than 100 units per day. Concentrated insulins deliver the same amount of insulin but in a smaller volume.  Concentrated insulins are available as:  Humulin (Regular insulin U-500) has 500 units per 1 mL of insulin. This insulin should only be used only with the U-500 syringe or U-500 insulin pen. Do not use another type of syringe with this insulin. The wrong type of syringe can cause serious problems such as low blood glucose.  Humalog (Insulin Lispro, U-200) and Tresiba (Insulin degludec, U-200) have 200 units per 1 mL of insulin. These insulins are only available as a U-200 pen.  Toujeo (Insulin glargine, U-300) has 300 units per 1 mL of insulin. This insulin is only available as a U-300 pen.  Common terms related to insulin treatment:  Some terms that you might hear include:    Basal insulin or background insulin    This is the constant amount of insulin that keeps your blood glucose levels stable when you are not eating. People who have type 1 diabetes need basal insulin in a nonstop (continuous) or steady dose 24 hours a day. People with type 2 diabetes may also get basal insulin.  Usually, intermediate-acting or long-acting insulin is used one or two times a day to manage glucose levels.  Bolus insulin    This refers to meal-related insulin (prandial insulin).  Blood glucose rises quickly after a meal (postprandial). Rapid-acting or short-acting insulin can be used before a meal (preprandial) to help control blood glucose after the meal.  You may be told to adjust the amount of bolus insulin that you take based on how much carbohydrate is in your meal.  Correction insulin    This may also be called a correction dose or supplemental dose. This is a small amount of rapid-acting or short-acting insulin that can be used to lower your blood glucose if it is too high. You may be told to check your blood glucose at certain times of the day and use correction insulin as needed.    Tight control, intensive therapy, or basal-bolus insulin therapy    These terms are used for insulin plans that keep your blood glucose as close to your target as possible. They prevent your blood glucose from getting too high at any time of day, but especially after meals. People who have tight control of their diabetes have fewer long-term problems caused by diabetes.    Insulins are also available as mixtures of basal and bolus insulins. Using a premixed insulin decreases the number of injections you might need everyday. Talk to your health care provider to see if an insulin mixture is right for you.    What are the risks?  Possible side effects of insulin treatment include:  Low blood glucose (hypoglycemia).  Weight gain.  Bruising or irritation at the injection site.  Some of these side effects can be caused by incorrect insulin doses and improper injection technique. Be sure to learn how to inject insulin properly.    Supplies needed:  Soap and water.  Insulin.  Insulin syringe or insulin pen needles.  Alcohol wipes.  Blood glucose meter.  Blood glucose test strips and lancets.  A disposal container for sharp items (sharps container), such as an empty plastic bottle with a cover.  How to use insulin    Most often, insulin is given through an injection. It is injected using a syringe and needle, an insulin pen, a pump, or a jet injector. Your health care provider will:  Prescribe the type and amount of insulin that you need.  Tell you when you should inject your insulin.  Usually, you will give yourself insulin injections. Other people can also be taught how to give you injections. You will use a type of syringe that is made only for insulin. Some people may have an insulin pump that delivers insulin steadily through a tube (cannula) that is placed under the skin.    Insulin is also available in an inhaled form. An inhaler delivers bolus insulin doses. Inhaled insulin does not require injections, but most people will still need to use basal insulin that is injected.    Injection sites    Insulin is injected into a layer of fatty tissue under your skin. Good places to inject insulin include:  Abdomen. Generally, the abdomen is the best place to inject insulin. However, you should avoid any area that is less than 2 inches (5 cm) from your belly button.  Front of thigh.  Upper, outer side of thigh.  Upper, outer side of arm.  Upper, outer part of buttock.  It is important to:  Give your injection in a slightly different place each time. This helps to prevent irritation and improve absorption.  Avoid injecting into areas that have scar tissue.  Follow these instructions at home:  Eating and drinking    Talk with your health care provider or pharmacist about the type of insulin you should take and when you should take it. You should know when your insulin peaksand how long it lasts. You need this information to plan mealtimes and exercise.  Follow instructions from your health care provider about a healthy meal plan. Do not skip meals.  Drink enough fluid to keep your urine pale yellow.  Follow your sick day plan whenever you cannot eat or drink normally. Make this plan in advance with your health care provider.  Lifestyle    Work with your health care provider to manage your weight, blood pressure, cholesterol, and stress.  Exercise regularly.  Avoid drinking alcohol.  Do not use any products that contain nicotine or tobacco. These products include cigarettes, chewing tobacco, and vaping devices, such as e-cigarettes. If you need help quitting, ask your health care provider.  General instructions      Check your blood glucose as told. Your health care provider will tell you how often and when you should check your blood glucose. Your health care provider will also tell you what your glucose levels should be.  Make sure to check your blood glucose before and after you exercise. If you exercise longer or in a different way than usual, check your blood glucose more often.  Make sure you know the symptoms of high and low blood sugar and how to treat these.  Take over-the-counter and prescription medicines only as told by your health care provider.  Carry a medical alert card or wear medical alert jewelry.  Keep all follow-up visits. This is important.  Summary  Diabetes is a long-term disease. It occurs when the body does not properly use glucose. Glucose levels are controlled by a hormone called insulin.  Insulin treatment varies depending on your type of diabetes, your treatment goals, and your medical history.  Talk with your health care provider or pharmacist about the type of insulin you should take and when you should take it.  Check your blood glucose as told by your health care provider. Your health care provider will tell you how often and when you should check your blood glucose, and what your glucose levels should be.  Know the symptoms of high and low blood glucose and how to treat them.  This information is not intended to replace advice given to you by your health care provider. Make sure you discuss any questions you have with your health care provider.    Document Revised: 10/14/2021 Document Reviewed: 10/20/2021  Elsevier Patient Education © 2024 [employer]vier Inc.

## 2025-06-27 NOTE — ED PROVIDER NOTE - PHYSICAL EXAMINATION
General: comfortable, resting in ED  HEENT: atraumatic, no eye erythema or discharge  Pulm: no cyanosis, no added work of breathing  Cardiac: no pallor, intact peripheral pulse  GI: no abdominal distension  Neuro: alert, conversant  Psych: neutral affect, cooperative  Msk: no gross deformity or instability  Skin: no erythema or rash

## 2025-06-27 NOTE — ED PROVIDER NOTE - PATIENT PORTAL LINK FT
You can access the FollowMyHealth Patient Portal offered by [facility] by registering at the following website: [email or web address]. By joining [employer]’s FollowMyHealth portal, you will also be able to view your health information using other applications (apps) compatible with our system.

## 2025-06-27 NOTE — ED ADULT TRIAGE NOTE - CHIEF COMPLAINT QUOTE
Patient to ED c/o blood sugar readings >300. States he lost insurance and ran out of diabetic medications x 2 weeks ago.  in triage. AAOX4, NAD.

## 2025-06-27 NOTE — ED ADULT NURSE NOTE - TEMPLATE LIST FOR HEAD TO TOE ASSESSMENT
LCV:5/1/2024  [facility]  NCV:6-18-24 Dr. Velazquez  last clinic note;Continue Combigan BID right eye   
VIEW ALL

## 2025-06-27 NOTE — ED PROVIDER NOTE - OBJECTIVE STATEMENT
49M with DM, previously on insulin but due to insurance interruption is missing prescription.  Has noticed his fsg increased recently.  Patient recently resumed insurance, requesting short acting insulin prescription to keep his blood sugar under control until he can establish care with PCP.  Otherwise asymptomatic, including no fever, chest pain, trouble breathing, abdominal pain, vomiting, or diarrhea.

## 2025-06-27 NOTE — ED PROVIDER NOTE - CLINICAL SUMMARY MEDICAL DECISION MAKING FREE TEXT BOX
Concern for asymptomatic hyperglycemia 2/2 medication nonadherence 2/2 insurance disruption.  Patient knows previous insulin dose, will refill rx while patient establishes primary care.  No history or exam findings suggestive of HHS or DKA, concern below threshold for laboratory testing, IV hydration, or admission for medical stabilization.

## 2025-06-27 NOTE — ED PROVIDER NOTE - NSFOLLOWUPCLINICS_GEN_ALL_ED_FT
[facility]  Family Medicine  [address]  [place], [place] [address]  Phone: [phone]  Fax:

## 2025-06-28 RX ORDER — INSULIN LISPRO 100 U/ML
0 INJECTION, SOLUTION INTRAVENOUS; SUBCUTANEOUS
Refills: 0
Start: 2025-06-28

## 2025-06-28 RX ORDER — INSULIN LISPRO 100 U/ML
8 INJECTION, SOLUTION INTRAVENOUS; SUBCUTANEOUS
Qty: 5 | Refills: 0
Start: 2025-06-28

## 2025-06-28 NOTE — ED POST DISCHARGE NOTE - REASON FOR FOLLOW-UP
Other Received call from pharmacy that they don't have humalog 200mg/ml in stock. Rx changed to 100mg/ml and pt also given rx for pen needles.

## 2025-06-30 DIAGNOSIS — Z91.138 PATIENT'S UNINTENTIONAL UNDERDOSING OF MEDICATION REGIMEN FOR OTHER REASON: ICD-10-CM

## 2025-06-30 DIAGNOSIS — E11.65 TYPE 2 DIABETES MELLITUS WITH HYPERGLYCEMIA: ICD-10-CM

## 2025-06-30 DIAGNOSIS — T38.3X6A UNDERDOSING OF INSULIN AND ORAL HYPOGLYCEMIC [ANTIDIABETIC] DRUGS, INITIAL ENCOUNTER: ICD-10-CM

## 2025-08-05 ENCOUNTER — INPATIENT (INPATIENT)
Facility: HOSPITAL | Age: 50
LOS: 3 days | Discharge: HOME | DRG: 638 | End: 2025-08-09
Attending: STUDENT IN AN ORGANIZED HEALTH CARE EDUCATION/TRAINING PROGRAM | Admitting: STUDENT IN AN ORGANIZED HEALTH CARE EDUCATION/TRAINING PROGRAM
Payer: COMMERCIAL

## 2025-08-05 VITALS
TEMPERATURE: 99 F | RESPIRATION RATE: 18 BRPM | WEIGHT: 250 LBS | HEIGHT: 70 IN | SYSTOLIC BLOOD PRESSURE: 145 MMHG | OXYGEN SATURATION: 100 % | DIASTOLIC BLOOD PRESSURE: 96 MMHG | HEART RATE: 87 BPM

## 2025-08-05 DIAGNOSIS — E11.65 TYPE 2 DIABETES MELLITUS WITH HYPERGLYCEMIA: ICD-10-CM

## 2025-08-05 DIAGNOSIS — F19.10 OTHER PSYCHOACTIVE SUBSTANCE ABUSE, UNCOMPLICATED: ICD-10-CM

## 2025-08-05 DIAGNOSIS — Z94.5 SKIN TRANSPLANT STATUS: Chronic | ICD-10-CM

## 2025-08-05 DIAGNOSIS — Z96.0 PRESENCE OF UROGENITAL IMPLANTS: Chronic | ICD-10-CM

## 2025-08-05 DIAGNOSIS — Z29.9 ENCOUNTER FOR PROPHYLACTIC MEASURES, UNSPECIFIED: ICD-10-CM

## 2025-08-05 DIAGNOSIS — E78.5 HYPERLIPIDEMIA, UNSPECIFIED: ICD-10-CM

## 2025-08-05 DIAGNOSIS — L03.90 CELLULITIS, UNSPECIFIED: ICD-10-CM

## 2025-08-05 DIAGNOSIS — Z89.429 ACQUIRED ABSENCE OF OTHER TOE(S), UNSPECIFIED SIDE: Chronic | ICD-10-CM

## 2025-08-05 DIAGNOSIS — I48.91 UNSPECIFIED ATRIAL FIBRILLATION: ICD-10-CM

## 2025-08-05 DIAGNOSIS — I10 ESSENTIAL (PRIMARY) HYPERTENSION: ICD-10-CM

## 2025-08-05 LAB
ANION GAP SERPL CALC-SCNC: 13 MMOL/L — SIGNIFICANT CHANGE UP (ref 5–17)
APTT BLD: 29 SEC — SIGNIFICANT CHANGE UP (ref 26.1–36.8)
BASOPHILS # BLD AUTO: 0.06 K/UL — SIGNIFICANT CHANGE UP (ref 0–0.2)
BASOPHILS NFR BLD AUTO: 0.6 % — SIGNIFICANT CHANGE UP (ref 0–2)
BUN SERPL-MCNC: 21 MG/DL — SIGNIFICANT CHANGE UP (ref 7–23)
CALCIUM SERPL-MCNC: 9.7 MG/DL — SIGNIFICANT CHANGE UP (ref 8.4–10.5)
CHLORIDE SERPL-SCNC: 94 MMOL/L — LOW (ref 96–108)
CO2 SERPL-SCNC: 28 MMOL/L — SIGNIFICANT CHANGE UP (ref 22–31)
CREAT SERPL-MCNC: 1.08 MG/DL — SIGNIFICANT CHANGE UP (ref 0.5–1.3)
CRP SERPL-MCNC: 143.9 MG/L — HIGH (ref 0–4)
EGFR: 84 ML/MIN/1.73M2 — SIGNIFICANT CHANGE UP
EGFR: 84 ML/MIN/1.73M2 — SIGNIFICANT CHANGE UP
EOSINOPHIL # BLD AUTO: 0.19 K/UL — SIGNIFICANT CHANGE UP (ref 0–0.5)
EOSINOPHIL NFR BLD AUTO: 1.8 % — SIGNIFICANT CHANGE UP (ref 0–6)
ERYTHROCYTE [SEDIMENTATION RATE] IN BLOOD: 60 MM/HR — HIGH (ref 0–15)
GLUCOSE SERPL-MCNC: 324 MG/DL — HIGH (ref 70–99)
HCT VFR BLD CALC: 40.2 % — SIGNIFICANT CHANGE UP (ref 39–50)
HGB BLD-MCNC: 13.1 G/DL — SIGNIFICANT CHANGE UP (ref 13–17)
IMM GRANULOCYTES # BLD AUTO: 0.02 K/UL — SIGNIFICANT CHANGE UP (ref 0–0.07)
IMM GRANULOCYTES NFR BLD AUTO: 0.2 % — SIGNIFICANT CHANGE UP (ref 0–0.9)
INR BLD: 0.95 — SIGNIFICANT CHANGE UP (ref 0.85–1.16)
LYMPHOCYTES # BLD AUTO: 2.4 K/UL — SIGNIFICANT CHANGE UP (ref 1–3.3)
LYMPHOCYTES NFR BLD AUTO: 23.3 % — SIGNIFICANT CHANGE UP (ref 13–44)
MCHC RBC-ENTMCNC: 29.9 PG — SIGNIFICANT CHANGE UP (ref 27–34)
MCHC RBC-ENTMCNC: 32.6 G/DL — SIGNIFICANT CHANGE UP (ref 32–36)
MCV RBC AUTO: 91.8 FL — SIGNIFICANT CHANGE UP (ref 80–100)
MONOCYTES # BLD AUTO: 0.87 K/UL — SIGNIFICANT CHANGE UP (ref 0–0.9)
MONOCYTES NFR BLD AUTO: 8.4 % — SIGNIFICANT CHANGE UP (ref 2–14)
NEUTROPHILS # BLD AUTO: 6.77 K/UL — SIGNIFICANT CHANGE UP (ref 1.8–7.4)
NEUTROPHILS NFR BLD AUTO: 65.7 % — SIGNIFICANT CHANGE UP (ref 43–77)
NRBC # BLD AUTO: 0 K/UL — SIGNIFICANT CHANGE UP (ref 0–0)
NRBC # FLD: 0 K/UL — SIGNIFICANT CHANGE UP (ref 0–0)
NRBC BLD AUTO-RTO: 0 /100 WBCS — SIGNIFICANT CHANGE UP (ref 0–0)
PLATELET # BLD AUTO: 369 K/UL — SIGNIFICANT CHANGE UP (ref 150–400)
PMV BLD: 10.8 FL — SIGNIFICANT CHANGE UP (ref 7–13)
POTASSIUM SERPL-MCNC: 5.3 MMOL/L — SIGNIFICANT CHANGE UP (ref 3.5–5.3)
POTASSIUM SERPL-SCNC: 5.3 MMOL/L — SIGNIFICANT CHANGE UP (ref 3.5–5.3)
PROTHROM AB SERPL-ACNC: 11.1 SEC — SIGNIFICANT CHANGE UP (ref 9.9–13.4)
RBC # BLD: 4.38 M/UL — SIGNIFICANT CHANGE UP (ref 4.2–5.8)
RBC # FLD: 13.1 % — SIGNIFICANT CHANGE UP (ref 10.3–14.5)
SODIUM SERPL-SCNC: 135 MMOL/L — SIGNIFICANT CHANGE UP (ref 135–145)
WBC # BLD: 10.31 K/UL — SIGNIFICANT CHANGE UP (ref 3.8–10.5)
WBC # FLD AUTO: 10.31 K/UL — SIGNIFICANT CHANGE UP (ref 3.8–10.5)

## 2025-08-05 PROCEDURE — 73630 X-RAY EXAM OF FOOT: CPT | Mod: 26,RT

## 2025-08-05 PROCEDURE — 82962 GLUCOSE BLOOD TEST: CPT

## 2025-08-05 PROCEDURE — 80048 BASIC METABOLIC PNL TOTAL CA: CPT

## 2025-08-05 PROCEDURE — 85025 COMPLETE CBC W/AUTO DIFF WBC: CPT

## 2025-08-05 PROCEDURE — 85610 PROTHROMBIN TIME: CPT

## 2025-08-05 PROCEDURE — 36415 COLL VENOUS BLD VENIPUNCTURE: CPT

## 2025-08-05 PROCEDURE — 85730 THROMBOPLASTIN TIME PARTIAL: CPT

## 2025-08-05 PROCEDURE — 99223 1ST HOSP IP/OBS HIGH 75: CPT | Mod: GC

## 2025-08-05 PROCEDURE — 85652 RBC SED RATE AUTOMATED: CPT

## 2025-08-05 PROCEDURE — 99285 EMERGENCY DEPT VISIT HI MDM: CPT

## 2025-08-05 PROCEDURE — 86140 C-REACTIVE PROTEIN: CPT

## 2025-08-05 RX ORDER — MAGNESIUM, ALUMINUM HYDROXIDE 200-200 MG
30 TABLET,CHEWABLE ORAL EVERY 4 HOURS
Refills: 0 | Status: DISCONTINUED | OUTPATIENT
Start: 2025-08-05 | End: 2025-08-09

## 2025-08-05 RX ORDER — PIPERACILLIN-TAZO-DEXTROSE,ISO 3.375G/5
3.38 IV SOLUTION, PIGGYBACK PREMIX FROZEN(ML) INTRAVENOUS ONCE
Refills: 0 | Status: COMPLETED | OUTPATIENT
Start: 2025-08-05 | End: 2025-08-05

## 2025-08-05 RX ORDER — ACETAMINOPHEN 500 MG/5ML
650 LIQUID (ML) ORAL EVERY 6 HOURS
Refills: 0 | Status: DISCONTINUED | OUTPATIENT
Start: 2025-08-05 | End: 2025-08-09

## 2025-08-05 RX ORDER — ATORVASTATIN CALCIUM 80 MG/1
40 TABLET, FILM COATED ORAL AT BEDTIME
Refills: 0 | Status: DISCONTINUED | OUTPATIENT
Start: 2025-08-05 | End: 2025-08-09

## 2025-08-05 RX ORDER — ONDANSETRON HCL/PF 4 MG/2 ML
4 VIAL (ML) INJECTION EVERY 8 HOURS
Refills: 0 | Status: DISCONTINUED | OUTPATIENT
Start: 2025-08-05 | End: 2025-08-09

## 2025-08-05 RX ORDER — OXYCODONE HYDROCHLORIDE 30 MG/1
5 TABLET ORAL ONCE
Refills: 0 | Status: DISCONTINUED | OUTPATIENT
Start: 2025-08-05 | End: 2025-08-05

## 2025-08-05 RX ORDER — MELATONIN 5 MG
3 TABLET ORAL AT BEDTIME
Refills: 0 | Status: DISCONTINUED | OUTPATIENT
Start: 2025-08-05 | End: 2025-08-09

## 2025-08-05 RX ORDER — INSULIN GLARGINE-YFGN 100 [IU]/ML
20 INJECTION, SOLUTION SUBCUTANEOUS ONCE
Refills: 0 | Status: COMPLETED | OUTPATIENT
Start: 2025-08-05 | End: 2025-08-05

## 2025-08-05 RX ORDER — LOSARTAN POTASSIUM 100 MG/1
50 TABLET, FILM COATED ORAL DAILY
Refills: 0 | Status: DISCONTINUED | OUTPATIENT
Start: 2025-08-05 | End: 2025-08-09

## 2025-08-05 RX ADMIN — Medication 3.38 GRAM(S): at 20:18

## 2025-08-05 RX ADMIN — OXYCODONE HYDROCHLORIDE 5 MILLIGRAM(S): 30 TABLET ORAL at 20:49

## 2025-08-05 RX ADMIN — LOSARTAN POTASSIUM 50 MILLIGRAM(S): 100 TABLET, FILM COATED ORAL at 21:44

## 2025-08-05 RX ADMIN — INSULIN GLARGINE-YFGN 20 UNIT(S): 100 INJECTION, SOLUTION SUBCUTANEOUS at 22:59

## 2025-08-05 RX ADMIN — Medication 200 GRAM(S): at 20:18

## 2025-08-06 LAB
A1C WITH ESTIMATED AVERAGE GLUCOSE RESULT: 9.7 % — HIGH (ref 4–5.6)
ADD ON TEST-SPECIMEN IN LAB: SIGNIFICANT CHANGE UP
ALBUMIN SERPL ELPH-MCNC: 3.5 G/DL — SIGNIFICANT CHANGE UP (ref 3.3–5)
ALP SERPL-CCNC: 89 U/L — SIGNIFICANT CHANGE UP (ref 40–120)
ALT FLD-CCNC: 16 U/L — SIGNIFICANT CHANGE UP (ref 10–45)
ANION GAP SERPL CALC-SCNC: 11 MMOL/L — SIGNIFICANT CHANGE UP (ref 5–17)
AST SERPL-CCNC: 16 U/L — SIGNIFICANT CHANGE UP (ref 10–40)
BASOPHILS # BLD AUTO: 0.04 K/UL — SIGNIFICANT CHANGE UP (ref 0–0.2)
BASOPHILS NFR BLD AUTO: 0.5 % — SIGNIFICANT CHANGE UP (ref 0–2)
BILIRUB SERPL-MCNC: 0.4 MG/DL — SIGNIFICANT CHANGE UP (ref 0.2–1.2)
BUN SERPL-MCNC: 17 MG/DL — SIGNIFICANT CHANGE UP (ref 7–23)
CALCIUM SERPL-MCNC: 9.2 MG/DL — SIGNIFICANT CHANGE UP (ref 8.4–10.5)
CHLORIDE SERPL-SCNC: 96 MMOL/L — SIGNIFICANT CHANGE UP (ref 96–108)
CHOLEST SERPL-MCNC: 183 MG/DL — SIGNIFICANT CHANGE UP
CO2 SERPL-SCNC: 29 MMOL/L — SIGNIFICANT CHANGE UP (ref 22–31)
CREAT SERPL-MCNC: 1.02 MG/DL — SIGNIFICANT CHANGE UP (ref 0.5–1.3)
CRP SERPL-MCNC: 114.6 MG/L — HIGH (ref 0–4)
EGFR: 90 ML/MIN/1.73M2 — SIGNIFICANT CHANGE UP
EGFR: 90 ML/MIN/1.73M2 — SIGNIFICANT CHANGE UP
EOSINOPHIL # BLD AUTO: 0.28 K/UL — SIGNIFICANT CHANGE UP (ref 0–0.5)
EOSINOPHIL NFR BLD AUTO: 3.6 % — SIGNIFICANT CHANGE UP (ref 0–6)
ERYTHROCYTE [SEDIMENTATION RATE] IN BLOOD: 48 MM/HR — HIGH (ref 0–15)
ESTIMATED AVERAGE GLUCOSE: 232 MG/DL — HIGH (ref 68–114)
ETHANOL SERPL-MCNC: <10 MG/DL — SIGNIFICANT CHANGE UP (ref 0–10)
GLUCOSE SERPL-MCNC: 330 MG/DL — HIGH (ref 70–99)
HCT VFR BLD CALC: 37 % — LOW (ref 39–50)
HDLC SERPL-MCNC: 66 MG/DL — SIGNIFICANT CHANGE UP
HGB BLD-MCNC: 12.4 G/DL — LOW (ref 13–17)
IMM GRANULOCYTES # BLD AUTO: 0.04 K/UL — SIGNIFICANT CHANGE UP (ref 0–0.07)
IMM GRANULOCYTES NFR BLD AUTO: 0.5 % — SIGNIFICANT CHANGE UP (ref 0–0.9)
LDLC SERPL-MCNC: 97 MG/DL — SIGNIFICANT CHANGE UP
LIPID PNL WITH DIRECT LDL SERPL: 97 MG/DL — SIGNIFICANT CHANGE UP
LYMPHOCYTES # BLD AUTO: 2.49 K/UL — SIGNIFICANT CHANGE UP (ref 1–3.3)
LYMPHOCYTES NFR BLD AUTO: 32.3 % — SIGNIFICANT CHANGE UP (ref 13–44)
MAGNESIUM SERPL-MCNC: 1.8 MG/DL — SIGNIFICANT CHANGE UP (ref 1.6–2.6)
MCHC RBC-ENTMCNC: 30.3 PG — SIGNIFICANT CHANGE UP (ref 27–34)
MCHC RBC-ENTMCNC: 33.5 G/DL — SIGNIFICANT CHANGE UP (ref 32–36)
MCV RBC AUTO: 90.5 FL — SIGNIFICANT CHANGE UP (ref 80–100)
MONOCYTES # BLD AUTO: 0.76 K/UL — SIGNIFICANT CHANGE UP (ref 0–0.9)
MONOCYTES NFR BLD AUTO: 9.9 % — SIGNIFICANT CHANGE UP (ref 2–14)
NEUTROPHILS # BLD AUTO: 4.1 K/UL — SIGNIFICANT CHANGE UP (ref 1.8–7.4)
NEUTROPHILS NFR BLD AUTO: 53.2 % — SIGNIFICANT CHANGE UP (ref 43–77)
NONHDLC SERPL-MCNC: 117 MG/DL — SIGNIFICANT CHANGE UP
NRBC # BLD AUTO: 0 K/UL — SIGNIFICANT CHANGE UP (ref 0–0)
NRBC # FLD: 0 K/UL — SIGNIFICANT CHANGE UP (ref 0–0)
NRBC BLD AUTO-RTO: 0 /100 WBCS — SIGNIFICANT CHANGE UP (ref 0–0)
PHOSPHATE SERPL-MCNC: 2.7 MG/DL — SIGNIFICANT CHANGE UP (ref 2.5–4.5)
PLATELET # BLD AUTO: 370 K/UL — SIGNIFICANT CHANGE UP (ref 150–400)
PMV BLD: 10.9 FL — SIGNIFICANT CHANGE UP (ref 7–13)
POTASSIUM SERPL-MCNC: 4.2 MMOL/L — SIGNIFICANT CHANGE UP (ref 3.5–5.3)
POTASSIUM SERPL-SCNC: 4.2 MMOL/L — SIGNIFICANT CHANGE UP (ref 3.5–5.3)
PROT SERPL-MCNC: 7.6 G/DL — SIGNIFICANT CHANGE UP (ref 6–8.3)
RBC # BLD: 4.09 M/UL — LOW (ref 4.2–5.8)
RBC # FLD: 13 % — SIGNIFICANT CHANGE UP (ref 10.3–14.5)
SODIUM SERPL-SCNC: 136 MMOL/L — SIGNIFICANT CHANGE UP (ref 135–145)
TRIGL SERPL-MCNC: 111 MG/DL — SIGNIFICANT CHANGE UP
WBC # BLD: 7.71 K/UL — SIGNIFICANT CHANGE UP (ref 3.8–10.5)
WBC # FLD AUTO: 7.71 K/UL — SIGNIFICANT CHANGE UP (ref 3.8–10.5)

## 2025-08-06 PROCEDURE — 80048 BASIC METABOLIC PNL TOTAL CA: CPT

## 2025-08-06 PROCEDURE — 86140 C-REACTIVE PROTEIN: CPT

## 2025-08-06 PROCEDURE — 80053 COMPREHEN METABOLIC PANEL: CPT

## 2025-08-06 PROCEDURE — 80061 LIPID PANEL: CPT

## 2025-08-06 PROCEDURE — 83036 HEMOGLOBIN GLYCOSYLATED A1C: CPT

## 2025-08-06 PROCEDURE — 82962 GLUCOSE BLOOD TEST: CPT

## 2025-08-06 PROCEDURE — 85730 THROMBOPLASTIN TIME PARTIAL: CPT

## 2025-08-06 PROCEDURE — 83735 ASSAY OF MAGNESIUM: CPT

## 2025-08-06 PROCEDURE — 85652 RBC SED RATE AUTOMATED: CPT

## 2025-08-06 PROCEDURE — 80307 DRUG TEST PRSMV CHEM ANLYZR: CPT

## 2025-08-06 PROCEDURE — 93010 ELECTROCARDIOGRAM REPORT: CPT

## 2025-08-06 PROCEDURE — 85025 COMPLETE CBC W/AUTO DIFF WBC: CPT

## 2025-08-06 PROCEDURE — 87040 BLOOD CULTURE FOR BACTERIA: CPT

## 2025-08-06 PROCEDURE — 87070 CULTURE OTHR SPECIMN AEROBIC: CPT

## 2025-08-06 PROCEDURE — 85610 PROTHROMBIN TIME: CPT

## 2025-08-06 PROCEDURE — 36415 COLL VENOUS BLD VENIPUNCTURE: CPT

## 2025-08-06 PROCEDURE — 99233 SBSQ HOSP IP/OBS HIGH 50: CPT | Mod: GC

## 2025-08-06 PROCEDURE — 84100 ASSAY OF PHOSPHORUS: CPT

## 2025-08-06 RX ORDER — GLUCAGON 3 MG/1
1 POWDER NASAL ONCE
Refills: 0 | Status: DISCONTINUED | OUTPATIENT
Start: 2025-08-06 | End: 2025-08-09

## 2025-08-06 RX ORDER — INSULIN LISPRO 100 U/ML
INJECTION, SOLUTION INTRAVENOUS; SUBCUTANEOUS
Refills: 0 | Status: DISCONTINUED | OUTPATIENT
Start: 2025-08-06 | End: 2025-08-09

## 2025-08-06 RX ORDER — DEXTROSE 50 % IN WATER 50 %
25 SYRINGE (ML) INTRAVENOUS ONCE
Refills: 0 | Status: DISCONTINUED | OUTPATIENT
Start: 2025-08-06 | End: 2025-08-09

## 2025-08-06 RX ORDER — SODIUM CHLORIDE 9 G/1000ML
1000 INJECTION, SOLUTION INTRAVENOUS
Refills: 0 | Status: DISCONTINUED | OUTPATIENT
Start: 2025-08-06 | End: 2025-08-09

## 2025-08-06 RX ORDER — PIPERACILLIN-TAZO-DEXTROSE,ISO 3.375G/5
3.38 IV SOLUTION, PIGGYBACK PREMIX FROZEN(ML) INTRAVENOUS EVERY 8 HOURS
Refills: 0 | Status: DISCONTINUED | OUTPATIENT
Start: 2025-08-06 | End: 2025-08-06

## 2025-08-06 RX ORDER — DILTIAZEM HYDROCHLORIDE 240 MG/1
120 TABLET, EXTENDED RELEASE ORAL DAILY
Refills: 0 | Status: DISCONTINUED | OUTPATIENT
Start: 2025-08-06 | End: 2025-08-09

## 2025-08-06 RX ORDER — INSULIN GLARGINE-YFGN 100 [IU]/ML
30 INJECTION, SOLUTION SUBCUTANEOUS AT BEDTIME
Refills: 0 | Status: DISCONTINUED | OUTPATIENT
Start: 2025-08-06 | End: 2025-08-09

## 2025-08-06 RX ORDER — DEXTROSE 50 % IN WATER 50 %
15 SYRINGE (ML) INTRAVENOUS ONCE
Refills: 0 | Status: DISCONTINUED | OUTPATIENT
Start: 2025-08-06 | End: 2025-08-09

## 2025-08-06 RX ORDER — PIPERACILLIN-TAZO-DEXTROSE,ISO 3.375G/5
3.38 IV SOLUTION, PIGGYBACK PREMIX FROZEN(ML) INTRAVENOUS EVERY 8 HOURS
Refills: 0 | Status: DISCONTINUED | OUTPATIENT
Start: 2025-08-06 | End: 2025-08-09

## 2025-08-06 RX ORDER — VANCOMYCIN HCL IN 5 % DEXTROSE 1.5G/250ML
1750 PLASTIC BAG, INJECTION (ML) INTRAVENOUS EVERY 12 HOURS
Refills: 0 | Status: COMPLETED | OUTPATIENT
Start: 2025-08-06 | End: 2025-08-07

## 2025-08-06 RX ORDER — PIPERACILLIN-TAZO-DEXTROSE,ISO 3.375G/5
3.38 IV SOLUTION, PIGGYBACK PREMIX FROZEN(ML) INTRAVENOUS ONCE
Refills: 0 | Status: COMPLETED | OUTPATIENT
Start: 2025-08-06 | End: 2025-08-06

## 2025-08-06 RX ORDER — INSULIN LISPRO 100 U/ML
8 INJECTION, SOLUTION INTRAVENOUS; SUBCUTANEOUS
Refills: 0 | Status: DISCONTINUED | OUTPATIENT
Start: 2025-08-06 | End: 2025-08-09

## 2025-08-06 RX ORDER — DEXTROSE 50 % IN WATER 50 %
12.5 SYRINGE (ML) INTRAVENOUS ONCE
Refills: 0 | Status: DISCONTINUED | OUTPATIENT
Start: 2025-08-06 | End: 2025-08-09

## 2025-08-06 RX ORDER — APIXABAN 2.5 MG/1
5 TABLET, FILM COATED ORAL EVERY 12 HOURS
Refills: 0 | Status: DISCONTINUED | OUTPATIENT
Start: 2025-08-06 | End: 2025-08-09

## 2025-08-06 RX ADMIN — Medication 25 GRAM(S): at 22:06

## 2025-08-06 RX ADMIN — Medication 250 MILLIGRAM(S): at 02:08

## 2025-08-06 RX ADMIN — INSULIN GLARGINE-YFGN 30 UNIT(S): 100 INJECTION, SOLUTION SUBCUTANEOUS at 22:44

## 2025-08-06 RX ADMIN — Medication 650 MILLIGRAM(S): at 11:19

## 2025-08-06 RX ADMIN — INSULIN LISPRO 8 UNIT(S): 100 INJECTION, SOLUTION INTRAVENOUS; SUBCUTANEOUS at 18:05

## 2025-08-06 RX ADMIN — APIXABAN 5 MILLIGRAM(S): 2.5 TABLET, FILM COATED ORAL at 09:45

## 2025-08-06 RX ADMIN — Medication 650 MILLIGRAM(S): at 08:46

## 2025-08-06 RX ADMIN — INSULIN LISPRO 8 UNIT(S): 100 INJECTION, SOLUTION INTRAVENOUS; SUBCUTANEOUS at 10:06

## 2025-08-06 RX ADMIN — APIXABAN 5 MILLIGRAM(S): 2.5 TABLET, FILM COATED ORAL at 22:06

## 2025-08-06 RX ADMIN — INSULIN LISPRO 4: 100 INJECTION, SOLUTION INTRAVENOUS; SUBCUTANEOUS at 10:07

## 2025-08-06 RX ADMIN — ATORVASTATIN CALCIUM 40 MILLIGRAM(S): 80 TABLET, FILM COATED ORAL at 22:06

## 2025-08-06 RX ADMIN — DILTIAZEM HYDROCHLORIDE 120 MILLIGRAM(S): 240 TABLET, EXTENDED RELEASE ORAL at 09:44

## 2025-08-06 RX ADMIN — Medication 25 GRAM(S): at 15:47

## 2025-08-06 RX ADMIN — Medication 25 GRAM(S): at 05:30

## 2025-08-06 RX ADMIN — INSULIN LISPRO 10: 100 INJECTION, SOLUTION INTRAVENOUS; SUBCUTANEOUS at 22:44

## 2025-08-06 RX ADMIN — Medication 250 MILLIGRAM(S): at 19:23

## 2025-08-06 RX ADMIN — INSULIN LISPRO 6: 100 INJECTION, SOLUTION INTRAVENOUS; SUBCUTANEOUS at 05:50

## 2025-08-07 LAB
ALBUMIN SERPL ELPH-MCNC: 3.1 G/DL — LOW (ref 3.3–5)
ALP SERPL-CCNC: 102 U/L — SIGNIFICANT CHANGE UP (ref 40–120)
ALT FLD-CCNC: 16 U/L — SIGNIFICANT CHANGE UP (ref 10–45)
ANION GAP SERPL CALC-SCNC: 10 MMOL/L — SIGNIFICANT CHANGE UP (ref 5–17)
AST SERPL-CCNC: 19 U/L — SIGNIFICANT CHANGE UP (ref 10–40)
BASOPHILS # BLD AUTO: 0.06 K/UL — SIGNIFICANT CHANGE UP (ref 0–0.2)
BASOPHILS NFR BLD AUTO: 0.8 % — SIGNIFICANT CHANGE UP (ref 0–2)
BILIRUB SERPL-MCNC: 0.2 MG/DL — SIGNIFICANT CHANGE UP (ref 0.2–1.2)
BUN SERPL-MCNC: 20 MG/DL — SIGNIFICANT CHANGE UP (ref 7–23)
CALCIUM SERPL-MCNC: 9.3 MG/DL — SIGNIFICANT CHANGE UP (ref 8.4–10.5)
CHLORIDE SERPL-SCNC: 99 MMOL/L — SIGNIFICANT CHANGE UP (ref 96–108)
CO2 SERPL-SCNC: 28 MMOL/L — SIGNIFICANT CHANGE UP (ref 22–31)
CREAT SERPL-MCNC: 1.27 MG/DL — SIGNIFICANT CHANGE UP (ref 0.5–1.3)
EGFR: 69 ML/MIN/1.73M2 — SIGNIFICANT CHANGE UP
EGFR: 69 ML/MIN/1.73M2 — SIGNIFICANT CHANGE UP
EOSINOPHIL # BLD AUTO: 0.27 K/UL — SIGNIFICANT CHANGE UP (ref 0–0.5)
EOSINOPHIL NFR BLD AUTO: 3.5 % — SIGNIFICANT CHANGE UP (ref 0–6)
GLUCOSE SERPL-MCNC: 168 MG/DL — HIGH (ref 70–99)
HCT VFR BLD CALC: 38.2 % — LOW (ref 39–50)
HGB BLD-MCNC: 12.6 G/DL — LOW (ref 13–17)
IMM GRANULOCYTES # BLD AUTO: 0.03 K/UL — SIGNIFICANT CHANGE UP (ref 0–0.07)
IMM GRANULOCYTES NFR BLD AUTO: 0.4 % — SIGNIFICANT CHANGE UP (ref 0–0.9)
LYMPHOCYTES # BLD AUTO: 2.84 K/UL — SIGNIFICANT CHANGE UP (ref 1–3.3)
LYMPHOCYTES NFR BLD AUTO: 36.9 % — SIGNIFICANT CHANGE UP (ref 13–44)
MAGNESIUM SERPL-MCNC: 1.6 MG/DL — SIGNIFICANT CHANGE UP (ref 1.6–2.6)
MCHC RBC-ENTMCNC: 30.1 PG — SIGNIFICANT CHANGE UP (ref 27–34)
MCHC RBC-ENTMCNC: 33 G/DL — SIGNIFICANT CHANGE UP (ref 32–36)
MCV RBC AUTO: 91.4 FL — SIGNIFICANT CHANGE UP (ref 80–100)
MONOCYTES # BLD AUTO: 0.79 K/UL — SIGNIFICANT CHANGE UP (ref 0–0.9)
MONOCYTES NFR BLD AUTO: 10.3 % — SIGNIFICANT CHANGE UP (ref 2–14)
NEUTROPHILS # BLD AUTO: 3.71 K/UL — SIGNIFICANT CHANGE UP (ref 1.8–7.4)
NEUTROPHILS NFR BLD AUTO: 48.1 % — SIGNIFICANT CHANGE UP (ref 43–77)
NRBC # BLD AUTO: 0 K/UL — SIGNIFICANT CHANGE UP (ref 0–0)
NRBC # FLD: 0 K/UL — SIGNIFICANT CHANGE UP (ref 0–0)
NRBC BLD AUTO-RTO: 0 /100 WBCS — SIGNIFICANT CHANGE UP (ref 0–0)
PHOSPHATE SERPL-MCNC: 3.8 MG/DL — SIGNIFICANT CHANGE UP (ref 2.5–4.5)
PLATELET # BLD AUTO: 395 K/UL — SIGNIFICANT CHANGE UP (ref 150–400)
PMV BLD: 10.7 FL — SIGNIFICANT CHANGE UP (ref 7–13)
POTASSIUM SERPL-MCNC: 3.9 MMOL/L — SIGNIFICANT CHANGE UP (ref 3.5–5.3)
POTASSIUM SERPL-SCNC: 3.9 MMOL/L — SIGNIFICANT CHANGE UP (ref 3.5–5.3)
PROT SERPL-MCNC: 7 G/DL — SIGNIFICANT CHANGE UP (ref 6–8.3)
RBC # BLD: 4.18 M/UL — LOW (ref 4.2–5.8)
RBC # FLD: 12.9 % — SIGNIFICANT CHANGE UP (ref 10.3–14.5)
SODIUM SERPL-SCNC: 137 MMOL/L — SIGNIFICANT CHANGE UP (ref 135–145)
VANCOMYCIN TROUGH SERPL-MCNC: 15.5 UG/ML — SIGNIFICANT CHANGE UP (ref 10–20)
WBC # BLD: 7.7 K/UL — SIGNIFICANT CHANGE UP (ref 3.8–10.5)
WBC # FLD AUTO: 7.7 K/UL — SIGNIFICANT CHANGE UP (ref 3.8–10.5)

## 2025-08-07 PROCEDURE — 99233 SBSQ HOSP IP/OBS HIGH 50: CPT | Mod: GC

## 2025-08-07 PROCEDURE — 87077 CULTURE AEROBIC IDENTIFY: CPT

## 2025-08-07 PROCEDURE — 80053 COMPREHEN METABOLIC PANEL: CPT

## 2025-08-07 PROCEDURE — 87040 BLOOD CULTURE FOR BACTERIA: CPT

## 2025-08-07 PROCEDURE — 85610 PROTHROMBIN TIME: CPT

## 2025-08-07 PROCEDURE — 36415 COLL VENOUS BLD VENIPUNCTURE: CPT

## 2025-08-07 PROCEDURE — 87070 CULTURE OTHR SPECIMN AEROBIC: CPT

## 2025-08-07 PROCEDURE — 85730 THROMBOPLASTIN TIME PARTIAL: CPT

## 2025-08-07 PROCEDURE — 85025 COMPLETE CBC W/AUTO DIFF WBC: CPT

## 2025-08-07 PROCEDURE — 85652 RBC SED RATE AUTOMATED: CPT

## 2025-08-07 PROCEDURE — 93005 ELECTROCARDIOGRAM TRACING: CPT

## 2025-08-07 PROCEDURE — 83735 ASSAY OF MAGNESIUM: CPT

## 2025-08-07 PROCEDURE — 84100 ASSAY OF PHOSPHORUS: CPT

## 2025-08-07 PROCEDURE — 80202 ASSAY OF VANCOMYCIN: CPT

## 2025-08-07 PROCEDURE — 82962 GLUCOSE BLOOD TEST: CPT

## 2025-08-07 PROCEDURE — 80307 DRUG TEST PRSMV CHEM ANLYZR: CPT

## 2025-08-07 PROCEDURE — 83036 HEMOGLOBIN GLYCOSYLATED A1C: CPT

## 2025-08-07 PROCEDURE — 80048 BASIC METABOLIC PNL TOTAL CA: CPT

## 2025-08-07 PROCEDURE — 86140 C-REACTIVE PROTEIN: CPT

## 2025-08-07 PROCEDURE — 80061 LIPID PANEL: CPT

## 2025-08-07 RX ORDER — VANCOMYCIN HCL IN 5 % DEXTROSE 1.5G/250ML
1750 PLASTIC BAG, INJECTION (ML) INTRAVENOUS EVERY 12 HOURS
Refills: 0 | Status: COMPLETED | OUTPATIENT
Start: 2025-08-07 | End: 2025-08-08

## 2025-08-07 RX ORDER — MAGNESIUM SULFATE 500 MG/ML
2 SYRINGE (ML) INJECTION ONCE
Refills: 0 | Status: COMPLETED | OUTPATIENT
Start: 2025-08-07 | End: 2025-08-07

## 2025-08-07 RX ADMIN — Medication 25 GRAM(S): at 08:25

## 2025-08-07 RX ADMIN — Medication 250 MILLIGRAM(S): at 06:00

## 2025-08-07 RX ADMIN — INSULIN LISPRO 8 UNIT(S): 100 INJECTION, SOLUTION INTRAVENOUS; SUBCUTANEOUS at 12:59

## 2025-08-07 RX ADMIN — INSULIN LISPRO 2: 100 INJECTION, SOLUTION INTRAVENOUS; SUBCUTANEOUS at 22:29

## 2025-08-07 RX ADMIN — DILTIAZEM HYDROCHLORIDE 120 MILLIGRAM(S): 240 TABLET, EXTENDED RELEASE ORAL at 05:59

## 2025-08-07 RX ADMIN — APIXABAN 5 MILLIGRAM(S): 2.5 TABLET, FILM COATED ORAL at 09:32

## 2025-08-07 RX ADMIN — APIXABAN 5 MILLIGRAM(S): 2.5 TABLET, FILM COATED ORAL at 22:26

## 2025-08-07 RX ADMIN — Medication 250 MILLIGRAM(S): at 18:08

## 2025-08-07 RX ADMIN — INSULIN GLARGINE-YFGN 30 UNIT(S): 100 INJECTION, SOLUTION SUBCUTANEOUS at 22:30

## 2025-08-07 RX ADMIN — INSULIN LISPRO 4: 100 INJECTION, SOLUTION INTRAVENOUS; SUBCUTANEOUS at 09:32

## 2025-08-07 RX ADMIN — LOSARTAN POTASSIUM 50 MILLIGRAM(S): 100 TABLET, FILM COATED ORAL at 05:59

## 2025-08-07 RX ADMIN — INSULIN LISPRO 8 UNIT(S): 100 INJECTION, SOLUTION INTRAVENOUS; SUBCUTANEOUS at 18:08

## 2025-08-07 RX ADMIN — Medication 25 GRAM(S): at 14:21

## 2025-08-07 RX ADMIN — Medication 20 MILLIEQUIVALENT(S): at 08:26

## 2025-08-07 RX ADMIN — INSULIN LISPRO 4: 100 INJECTION, SOLUTION INTRAVENOUS; SUBCUTANEOUS at 12:59

## 2025-08-07 RX ADMIN — INSULIN LISPRO 8 UNIT(S): 100 INJECTION, SOLUTION INTRAVENOUS; SUBCUTANEOUS at 09:32

## 2025-08-07 RX ADMIN — ATORVASTATIN CALCIUM 40 MILLIGRAM(S): 80 TABLET, FILM COATED ORAL at 22:26

## 2025-08-07 RX ADMIN — Medication 25 GRAM(S): at 22:27

## 2025-08-07 RX ADMIN — Medication 25 GRAM(S): at 06:00

## 2025-08-08 LAB
-  AMOXICILLIN/CLAVULANIC ACID: SIGNIFICANT CHANGE UP
-  AMPICILLIN/SULBACTAM: SIGNIFICANT CHANGE UP
-  AMPICILLIN: SIGNIFICANT CHANGE UP
-  CEFAZOLIN: SIGNIFICANT CHANGE UP
-  CEFEPIME: SIGNIFICANT CHANGE UP
-  CEFOXITIN: SIGNIFICANT CHANGE UP
-  CEFTRIAXONE: SIGNIFICANT CHANGE UP
-  CIPROFLOXACIN: SIGNIFICANT CHANGE UP
-  CLINDAMYCIN: SIGNIFICANT CHANGE UP
-  ERYTHROMYCIN: SIGNIFICANT CHANGE UP
-  GENTAMICIN: SIGNIFICANT CHANGE UP
-  GENTAMICIN: SIGNIFICANT CHANGE UP
-  LEVOFLOXACIN: SIGNIFICANT CHANGE UP
-  LINEZOLID: SIGNIFICANT CHANGE UP
-  OXACILLIN: SIGNIFICANT CHANGE UP
-  PENICILLIN: SIGNIFICANT CHANGE UP
-  PIPERACILLIN/TAZOBACTAM: SIGNIFICANT CHANGE UP
-  RIFAMPIN: SIGNIFICANT CHANGE UP
-  TETRACYCLINE: SIGNIFICANT CHANGE UP
-  TIGECYCLINE: SIGNIFICANT CHANGE UP
-  TOBRAMYCIN: SIGNIFICANT CHANGE UP
-  TRIMETHOPRIM/SULFAMETHOXAZOLE: SIGNIFICANT CHANGE UP
-  TRIMETHOPRIM/SULFAMETHOXAZOLE: SIGNIFICANT CHANGE UP
-  VANCOMYCIN: SIGNIFICANT CHANGE UP
ALBUMIN SERPL ELPH-MCNC: 3.1 G/DL — LOW (ref 3.3–5)
ALP SERPL-CCNC: 97 U/L — SIGNIFICANT CHANGE UP (ref 40–120)
ALT FLD-CCNC: 17 U/L — SIGNIFICANT CHANGE UP (ref 10–45)
ANION GAP SERPL CALC-SCNC: 6 MMOL/L — SIGNIFICANT CHANGE UP (ref 5–17)
AST SERPL-CCNC: 17 U/L — SIGNIFICANT CHANGE UP (ref 10–40)
BASOPHILS # BLD AUTO: 0.06 K/UL — SIGNIFICANT CHANGE UP (ref 0–0.2)
BASOPHILS NFR BLD AUTO: 0.7 % — SIGNIFICANT CHANGE UP (ref 0–2)
BILIRUB SERPL-MCNC: 0.2 MG/DL — SIGNIFICANT CHANGE UP (ref 0.2–1.2)
BUN SERPL-MCNC: 16 MG/DL — SIGNIFICANT CHANGE UP (ref 7–23)
CALCIUM SERPL-MCNC: 9.4 MG/DL — SIGNIFICANT CHANGE UP (ref 8.4–10.5)
CHLORIDE SERPL-SCNC: 100 MMOL/L — SIGNIFICANT CHANGE UP (ref 96–108)
CO2 SERPL-SCNC: 28 MMOL/L — SIGNIFICANT CHANGE UP (ref 22–31)
CREAT SERPL-MCNC: 1.17 MG/DL — SIGNIFICANT CHANGE UP (ref 0.5–1.3)
CULTURE RESULTS: ABNORMAL
EGFR: 76 ML/MIN/1.73M2 — SIGNIFICANT CHANGE UP
EGFR: 76 ML/MIN/1.73M2 — SIGNIFICANT CHANGE UP
EOSINOPHIL # BLD AUTO: 0.31 K/UL — SIGNIFICANT CHANGE UP (ref 0–0.5)
EOSINOPHIL NFR BLD AUTO: 3.8 % — SIGNIFICANT CHANGE UP (ref 0–6)
GLUCOSE SERPL-MCNC: 191 MG/DL — HIGH (ref 70–99)
HCT VFR BLD CALC: 38.5 % — LOW (ref 39–50)
HGB BLD-MCNC: 12.9 G/DL — LOW (ref 13–17)
IMM GRANULOCYTES # BLD AUTO: 0.03 K/UL — SIGNIFICANT CHANGE UP (ref 0–0.07)
IMM GRANULOCYTES NFR BLD AUTO: 0.4 % — SIGNIFICANT CHANGE UP (ref 0–0.9)
LYMPHOCYTES # BLD AUTO: 3.23 K/UL — SIGNIFICANT CHANGE UP (ref 1–3.3)
LYMPHOCYTES NFR BLD AUTO: 39.2 % — SIGNIFICANT CHANGE UP (ref 13–44)
MAGNESIUM SERPL-MCNC: 1.6 MG/DL — SIGNIFICANT CHANGE UP (ref 1.6–2.6)
MCHC RBC-ENTMCNC: 30.7 PG — SIGNIFICANT CHANGE UP (ref 27–34)
MCHC RBC-ENTMCNC: 33.5 G/DL — SIGNIFICANT CHANGE UP (ref 32–36)
MCV RBC AUTO: 91.7 FL — SIGNIFICANT CHANGE UP (ref 80–100)
METHOD TYPE: SIGNIFICANT CHANGE UP
METHOD TYPE: SIGNIFICANT CHANGE UP
MONOCYTES # BLD AUTO: 0.76 K/UL — SIGNIFICANT CHANGE UP (ref 0–0.9)
MONOCYTES NFR BLD AUTO: 9.2 % — SIGNIFICANT CHANGE UP (ref 2–14)
NEUTROPHILS # BLD AUTO: 3.86 K/UL — SIGNIFICANT CHANGE UP (ref 1.8–7.4)
NEUTROPHILS NFR BLD AUTO: 46.7 % — SIGNIFICANT CHANGE UP (ref 43–77)
NRBC # BLD AUTO: 0 K/UL — SIGNIFICANT CHANGE UP (ref 0–0)
NRBC # FLD: 0 K/UL — SIGNIFICANT CHANGE UP (ref 0–0)
NRBC BLD AUTO-RTO: 0 /100 WBCS — SIGNIFICANT CHANGE UP (ref 0–0)
ORGANISM # SPEC MICROSCOPIC CNT: ABNORMAL
ORGANISM # SPEC MICROSCOPIC CNT: ABNORMAL
ORGANISM # SPEC MICROSCOPIC CNT: SIGNIFICANT CHANGE UP
PHOSPHATE SERPL-MCNC: 3.2 MG/DL — SIGNIFICANT CHANGE UP (ref 2.5–4.5)
PLATELET # BLD AUTO: 378 K/UL — SIGNIFICANT CHANGE UP (ref 150–400)
PMV BLD: 10 FL — SIGNIFICANT CHANGE UP (ref 7–13)
POTASSIUM SERPL-MCNC: 4.2 MMOL/L — SIGNIFICANT CHANGE UP (ref 3.5–5.3)
POTASSIUM SERPL-SCNC: 4.2 MMOL/L — SIGNIFICANT CHANGE UP (ref 3.5–5.3)
PROT SERPL-MCNC: 6.9 G/DL — SIGNIFICANT CHANGE UP (ref 6–8.3)
RBC # BLD: 4.2 M/UL — SIGNIFICANT CHANGE UP (ref 4.2–5.8)
RBC # FLD: 12.9 % — SIGNIFICANT CHANGE UP (ref 10.3–14.5)
SODIUM SERPL-SCNC: 134 MMOL/L — LOW (ref 135–145)
SPECIMEN SOURCE: SIGNIFICANT CHANGE UP
VANCOMYCIN TROUGH SERPL-MCNC: 16.3 UG/ML — SIGNIFICANT CHANGE UP (ref 10–20)
WBC # BLD: 8.25 K/UL — SIGNIFICANT CHANGE UP (ref 3.8–10.5)
WBC # FLD AUTO: 8.25 K/UL — SIGNIFICANT CHANGE UP (ref 3.8–10.5)

## 2025-08-08 PROCEDURE — 99233 SBSQ HOSP IP/OBS HIGH 50: CPT | Mod: GC

## 2025-08-08 RX ORDER — MAGNESIUM SULFATE 500 MG/ML
2 SYRINGE (ML) INJECTION ONCE
Refills: 0 | Status: COMPLETED | OUTPATIENT
Start: 2025-08-08 | End: 2025-08-08

## 2025-08-08 RX ORDER — VANCOMYCIN HCL IN 5 % DEXTROSE 1.5G/250ML
1750 PLASTIC BAG, INJECTION (ML) INTRAVENOUS EVERY 12 HOURS
Refills: 0 | Status: DISCONTINUED | OUTPATIENT
Start: 2025-08-08 | End: 2025-08-09

## 2025-08-08 RX ADMIN — Medication 25 GRAM(S): at 07:06

## 2025-08-08 RX ADMIN — ATORVASTATIN CALCIUM 40 MILLIGRAM(S): 80 TABLET, FILM COATED ORAL at 22:02

## 2025-08-08 RX ADMIN — INSULIN LISPRO 4: 100 INJECTION, SOLUTION INTRAVENOUS; SUBCUTANEOUS at 13:16

## 2025-08-08 RX ADMIN — APIXABAN 5 MILLIGRAM(S): 2.5 TABLET, FILM COATED ORAL at 09:17

## 2025-08-08 RX ADMIN — Medication 250 MILLIGRAM(S): at 22:02

## 2025-08-08 RX ADMIN — DILTIAZEM HYDROCHLORIDE 120 MILLIGRAM(S): 240 TABLET, EXTENDED RELEASE ORAL at 06:08

## 2025-08-08 RX ADMIN — LOSARTAN POTASSIUM 50 MILLIGRAM(S): 100 TABLET, FILM COATED ORAL at 06:07

## 2025-08-08 RX ADMIN — INSULIN GLARGINE-YFGN 30 UNIT(S): 100 INJECTION, SOLUTION SUBCUTANEOUS at 22:02

## 2025-08-08 RX ADMIN — INSULIN LISPRO 8 UNIT(S): 100 INJECTION, SOLUTION INTRAVENOUS; SUBCUTANEOUS at 13:17

## 2025-08-08 RX ADMIN — INSULIN LISPRO 8 UNIT(S): 100 INJECTION, SOLUTION INTRAVENOUS; SUBCUTANEOUS at 18:41

## 2025-08-08 RX ADMIN — INSULIN LISPRO 8 UNIT(S): 100 INJECTION, SOLUTION INTRAVENOUS; SUBCUTANEOUS at 09:17

## 2025-08-08 RX ADMIN — INSULIN LISPRO 6: 100 INJECTION, SOLUTION INTRAVENOUS; SUBCUTANEOUS at 18:41

## 2025-08-08 RX ADMIN — Medication 650 MILLIGRAM(S): at 07:07

## 2025-08-08 RX ADMIN — APIXABAN 5 MILLIGRAM(S): 2.5 TABLET, FILM COATED ORAL at 22:08

## 2025-08-08 RX ADMIN — Medication 25 GRAM(S): at 08:12

## 2025-08-08 RX ADMIN — Medication 25 GRAM(S): at 14:34

## 2025-08-08 RX ADMIN — Medication 650 MILLIGRAM(S): at 08:07

## 2025-08-08 RX ADMIN — INSULIN LISPRO 4: 100 INJECTION, SOLUTION INTRAVENOUS; SUBCUTANEOUS at 22:03

## 2025-08-08 RX ADMIN — Medication 250 MILLIGRAM(S): at 06:08

## 2025-08-08 RX ADMIN — INSULIN LISPRO 2: 100 INJECTION, SOLUTION INTRAVENOUS; SUBCUTANEOUS at 06:45

## 2025-08-09 ENCOUNTER — TRANSCRIPTION ENCOUNTER (OUTPATIENT)
Age: 50
End: 2025-08-09

## 2025-08-09 VITALS
HEART RATE: 66 BPM | RESPIRATION RATE: 17 BRPM | DIASTOLIC BLOOD PRESSURE: 71 MMHG | TEMPERATURE: 98 F | OXYGEN SATURATION: 96 % | SYSTOLIC BLOOD PRESSURE: 116 MMHG

## 2025-08-09 LAB
ANION GAP SERPL CALC-SCNC: 13 MMOL/L — SIGNIFICANT CHANGE UP (ref 5–17)
BASOPHILS # BLD AUTO: 0.08 K/UL — SIGNIFICANT CHANGE UP (ref 0–0.2)
BASOPHILS NFR BLD AUTO: 1 % — SIGNIFICANT CHANGE UP (ref 0–2)
BUN SERPL-MCNC: 16 MG/DL — SIGNIFICANT CHANGE UP (ref 7–23)
CALCIUM SERPL-MCNC: 9.4 MG/DL — SIGNIFICANT CHANGE UP (ref 8.4–10.5)
CHLORIDE SERPL-SCNC: 100 MMOL/L — SIGNIFICANT CHANGE UP (ref 96–108)
CO2 SERPL-SCNC: 27 MMOL/L — SIGNIFICANT CHANGE UP (ref 22–31)
CREAT SERPL-MCNC: 1.17 MG/DL — SIGNIFICANT CHANGE UP (ref 0.5–1.3)
EGFR: 76 ML/MIN/1.73M2 — SIGNIFICANT CHANGE UP
EGFR: 76 ML/MIN/1.73M2 — SIGNIFICANT CHANGE UP
EOSINOPHIL # BLD AUTO: 0.3 K/UL — SIGNIFICANT CHANGE UP (ref 0–0.5)
EOSINOPHIL NFR BLD AUTO: 3.7 % — SIGNIFICANT CHANGE UP (ref 0–6)
GLUCOSE SERPL-MCNC: 82 MG/DL — SIGNIFICANT CHANGE UP (ref 70–99)
HCT VFR BLD CALC: 39.2 % — SIGNIFICANT CHANGE UP (ref 39–50)
HGB BLD-MCNC: 12.9 G/DL — LOW (ref 13–17)
IMM GRANULOCYTES # BLD AUTO: 0.02 K/UL — SIGNIFICANT CHANGE UP (ref 0–0.07)
IMM GRANULOCYTES NFR BLD AUTO: 0.2 % — SIGNIFICANT CHANGE UP (ref 0–0.9)
LYMPHOCYTES # BLD AUTO: 3.17 K/UL — SIGNIFICANT CHANGE UP (ref 1–3.3)
LYMPHOCYTES NFR BLD AUTO: 38.9 % — SIGNIFICANT CHANGE UP (ref 13–44)
MAGNESIUM SERPL-MCNC: 1.5 MG/DL — LOW (ref 1.6–2.6)
MCHC RBC-ENTMCNC: 30.1 PG — SIGNIFICANT CHANGE UP (ref 27–34)
MCHC RBC-ENTMCNC: 32.9 G/DL — SIGNIFICANT CHANGE UP (ref 32–36)
MCV RBC AUTO: 91.4 FL — SIGNIFICANT CHANGE UP (ref 80–100)
MONOCYTES # BLD AUTO: 0.65 K/UL — SIGNIFICANT CHANGE UP (ref 0–0.9)
MONOCYTES NFR BLD AUTO: 8 % — SIGNIFICANT CHANGE UP (ref 2–14)
NEUTROPHILS # BLD AUTO: 3.93 K/UL — SIGNIFICANT CHANGE UP (ref 1.8–7.4)
NEUTROPHILS NFR BLD AUTO: 48.2 % — SIGNIFICANT CHANGE UP (ref 43–77)
NRBC # BLD AUTO: 0 K/UL — SIGNIFICANT CHANGE UP (ref 0–0)
NRBC # FLD: 0 K/UL — SIGNIFICANT CHANGE UP (ref 0–0)
NRBC BLD AUTO-RTO: 0 /100 WBCS — SIGNIFICANT CHANGE UP (ref 0–0)
PHOSPHATE SERPL-MCNC: 3.6 MG/DL — SIGNIFICANT CHANGE UP (ref 2.5–4.5)
PLATELET # BLD AUTO: 410 K/UL — HIGH (ref 150–400)
PMV BLD: 10.1 FL — SIGNIFICANT CHANGE UP (ref 7–13)
POTASSIUM SERPL-MCNC: 4.1 MMOL/L — SIGNIFICANT CHANGE UP (ref 3.5–5.3)
POTASSIUM SERPL-SCNC: 4.1 MMOL/L — SIGNIFICANT CHANGE UP (ref 3.5–5.3)
RBC # BLD: 4.29 M/UL — SIGNIFICANT CHANGE UP (ref 4.2–5.8)
RBC # FLD: 12.8 % — SIGNIFICANT CHANGE UP (ref 10.3–14.5)
SODIUM SERPL-SCNC: 140 MMOL/L — SIGNIFICANT CHANGE UP (ref 135–145)
WBC # BLD: 8.15 K/UL — SIGNIFICANT CHANGE UP (ref 3.8–10.5)
WBC # FLD AUTO: 8.15 K/UL — SIGNIFICANT CHANGE UP (ref 3.8–10.5)

## 2025-08-09 PROCEDURE — 80061 LIPID PANEL: CPT

## 2025-08-09 PROCEDURE — 85610 PROTHROMBIN TIME: CPT

## 2025-08-09 PROCEDURE — 87040 BLOOD CULTURE FOR BACTERIA: CPT

## 2025-08-09 PROCEDURE — 82962 GLUCOSE BLOOD TEST: CPT

## 2025-08-09 PROCEDURE — 80053 COMPREHEN METABOLIC PANEL: CPT

## 2025-08-09 PROCEDURE — 80307 DRUG TEST PRSMV CHEM ANLYZR: CPT

## 2025-08-09 PROCEDURE — 83735 ASSAY OF MAGNESIUM: CPT

## 2025-08-09 PROCEDURE — 36415 COLL VENOUS BLD VENIPUNCTURE: CPT

## 2025-08-09 PROCEDURE — 93010 ELECTROCARDIOGRAM REPORT: CPT

## 2025-08-09 PROCEDURE — 87077 CULTURE AEROBIC IDENTIFY: CPT

## 2025-08-09 PROCEDURE — 85730 THROMBOPLASTIN TIME PARTIAL: CPT

## 2025-08-09 PROCEDURE — 85025 COMPLETE CBC W/AUTO DIFF WBC: CPT

## 2025-08-09 PROCEDURE — 83036 HEMOGLOBIN GLYCOSYLATED A1C: CPT

## 2025-08-09 PROCEDURE — 85652 RBC SED RATE AUTOMATED: CPT

## 2025-08-09 PROCEDURE — 73630 X-RAY EXAM OF FOOT: CPT

## 2025-08-09 PROCEDURE — 87070 CULTURE OTHR SPECIMN AEROBIC: CPT

## 2025-08-09 PROCEDURE — 84100 ASSAY OF PHOSPHORUS: CPT

## 2025-08-09 PROCEDURE — 87186 SC STD MICRODIL/AGAR DIL: CPT

## 2025-08-09 PROCEDURE — 86140 C-REACTIVE PROTEIN: CPT

## 2025-08-09 PROCEDURE — 80048 BASIC METABOLIC PNL TOTAL CA: CPT

## 2025-08-09 PROCEDURE — 99238 HOSP IP/OBS DSCHRG MGMT 30/<: CPT

## 2025-08-09 PROCEDURE — 99285 EMERGENCY DEPT VISIT HI MDM: CPT | Mod: 25

## 2025-08-09 PROCEDURE — 80202 ASSAY OF VANCOMYCIN: CPT

## 2025-08-09 PROCEDURE — 93005 ELECTROCARDIOGRAM TRACING: CPT

## 2025-08-09 PROCEDURE — 96374 THER/PROPH/DIAG INJ IV PUSH: CPT

## 2025-08-09 RX ORDER — ACETAMINOPHEN 500 MG/5ML
650 LIQUID (ML) ORAL ONCE
Refills: 0 | Status: COMPLETED | OUTPATIENT
Start: 2025-08-09 | End: 2025-08-09

## 2025-08-09 RX ORDER — SULFAMETHOXAZOLE/TRIMETHOPRIM 800-160 MG
2 TABLET ORAL
Qty: 24 | Refills: 0
Start: 2025-08-09 | End: 2025-08-14

## 2025-08-09 RX ORDER — EMPAGLIFLOZIN 25 MG/1
1 TABLET, FILM COATED ORAL
Qty: 60 | Refills: 2
Start: 2025-08-09 | End: 2026-02-04

## 2025-08-09 RX ORDER — INSULIN LISPRO 100 U/ML
8 INJECTION, SOLUTION INTRAVENOUS; SUBCUTANEOUS
Qty: 4 | Refills: 2
Start: 2025-08-09 | End: 2025-11-06

## 2025-08-09 RX ORDER — INSULIN GLARGINE-YFGN 100 [IU]/ML
30 INJECTION, SOLUTION SUBCUTANEOUS
Qty: 3 | Refills: 3
Start: 2025-08-09 | End: 2025-12-06

## 2025-08-09 RX ORDER — ORAL SEMAGLUTIDE 14 MG/1
0.25 TABLET ORAL
Qty: 1 | Refills: 1
Start: 2025-08-09

## 2025-08-09 RX ORDER — AMOXICILLIN AND CLAVULANATE POTASSIUM 500; 125 MG/1; MG/1
1 TABLET, FILM COATED ORAL
Qty: 12 | Refills: 0
Start: 2025-08-09 | End: 2025-08-14

## 2025-08-09 RX ORDER — LOSARTAN POTASSIUM 100 MG/1
1 TABLET, FILM COATED ORAL
Qty: 60 | Refills: 2
Start: 2025-08-09 | End: 2026-02-04

## 2025-08-09 RX ADMIN — Medication 650 MILLIGRAM(S): at 03:15

## 2025-08-09 RX ADMIN — Medication 650 MILLIGRAM(S): at 02:15

## 2025-08-09 RX ADMIN — INSULIN LISPRO 8 UNIT(S): 100 INJECTION, SOLUTION INTRAVENOUS; SUBCUTANEOUS at 09:11

## 2025-08-09 RX ADMIN — Medication 250 MILLIGRAM(S): at 11:38

## 2025-08-09 RX ADMIN — Medication 25 GRAM(S): at 07:10

## 2025-08-09 RX ADMIN — Medication 40 MILLIGRAM(S): at 06:29

## 2025-08-09 RX ADMIN — Medication 25 GRAM(S): at 00:00

## 2025-08-09 RX ADMIN — LOSARTAN POTASSIUM 50 MILLIGRAM(S): 100 TABLET, FILM COATED ORAL at 06:29

## 2025-08-09 RX ADMIN — APIXABAN 5 MILLIGRAM(S): 2.5 TABLET, FILM COATED ORAL at 09:08

## 2025-08-09 RX ADMIN — DILTIAZEM HYDROCHLORIDE 120 MILLIGRAM(S): 240 TABLET, EXTENDED RELEASE ORAL at 06:30

## 2025-08-18 ENCOUNTER — APPOINTMENT (OUTPATIENT)
Age: 50
End: 2025-08-18

## 2025-08-20 DIAGNOSIS — L03.115 CELLULITIS OF RIGHT LOWER LIMB: ICD-10-CM

## 2025-08-20 DIAGNOSIS — L97.519 NON-PRESSURE CHRONIC ULCER OF OTHER PART OF RIGHT FOOT WITH UNSPECIFIED SEVERITY: ICD-10-CM

## 2025-08-20 DIAGNOSIS — I48.91 UNSPECIFIED ATRIAL FIBRILLATION: ICD-10-CM
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
## 2025-08-20 DIAGNOSIS — Z91.81 HISTORY OF FALLING: ICD-10-CM

## 2025-08-20 DIAGNOSIS — E78.5 HYPERLIPIDEMIA, UNSPECIFIED: ICD-10-CM

## 2025-08-20 DIAGNOSIS — F14.99 COCAINE USE, UNSPECIFIED WITH UNSPECIFIED COCAINE-INDUCED DISORDER: ICD-10-CM

## 2025-08-20 DIAGNOSIS — B96.20 UNSPECIFIED ESCHERICHIA COLI [E. COLI] AS THE CAUSE OF DISEASES CLASSIFIED ELSEWHERE: ICD-10-CM

## 2025-08-20 DIAGNOSIS — E11.621 TYPE 2 DIABETES MELLITUS WITH FOOT ULCER: ICD-10-CM

## 2025-08-20 DIAGNOSIS — Z79.4 LONG TERM (CURRENT) USE OF INSULIN: ICD-10-CM

## 2025-08-20 DIAGNOSIS — E11.65 TYPE 2 DIABETES MELLITUS WITH HYPERGLYCEMIA: ICD-10-CM

## 2025-08-20 DIAGNOSIS — E66.9 OBESITY, UNSPECIFIED: ICD-10-CM
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
## 2025-08-20 DIAGNOSIS — B95.62 METHICILLIN RESISTANT STAPHYLOCOCCUS AUREUS INFECTION AS THE CAUSE OF DISEASES CLASSIFIED ELSEWHERE: ICD-10-CM

## 2025-08-20 DIAGNOSIS — F10.90 ALCOHOL USE, UNSPECIFIED, UNCOMPLICATED: ICD-10-CM

## 2025-08-20 DIAGNOSIS — Z89.421 ACQUIRED ABSENCE OF OTHER RIGHT TOE(S): ICD-10-CM

## 2025-08-20 DIAGNOSIS — I10 ESSENTIAL (PRIMARY) HYPERTENSION: ICD-10-CM
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
## (undated) DEVICE — DRSG GAUZE MOISTURIZER 0.5 OZ 4X8

## (undated) DEVICE — GLV 7.5 PROTEXIS (WHITE)

## (undated) DEVICE — DRSG XEROFORM 1 X 8"

## (undated) DEVICE — PACK ORTHO FOOT ANKLE

## (undated) DEVICE — POSITIONER FOAM EGG CRATE ULNAR 2PCS (PINK)

## (undated) DEVICE — DRSG ACE BANDAGE 6"

## (undated) DEVICE — SAW BLADE STRYKER PRECISION 9X0.51X31MM

## (undated) DEVICE — VENODYNE/SCD SLEEVE CALF MEDIUM

## (undated) DEVICE — WARMING BLANKET UPPER ADULT

## (undated) DEVICE — IRR SYS BONE CLNNG INTERPULSE

## (undated) DEVICE — SAW BLADE STRYKER PRECISION THIN SAGITTAL MEDIUM 9MM X 25MM

## (undated) DEVICE — SAW BLADE LINVATEC SAGITTAL MIC 9.5X25.5X0.4MM

## (undated) DEVICE — SYR CONTROL LUER LOK 10CC

## (undated) DEVICE — DRSG KERLIX ROLL 4.5"